# Patient Record
Sex: FEMALE | Race: WHITE | Employment: OTHER | URBAN - NONMETROPOLITAN AREA
[De-identification: names, ages, dates, MRNs, and addresses within clinical notes are randomized per-mention and may not be internally consistent; named-entity substitution may affect disease eponyms.]

---

## 2017-01-03 DIAGNOSIS — E11.21 TYPE 2 DIABETES MELLITUS WITH DIABETIC NEPHROPATHY (H): Primary | ICD-10-CM

## 2017-01-03 DIAGNOSIS — Z79.4 TYPE 2 DIABETES MELLITUS WITH DIABETIC NEPHROPATHY, WITH LONG-TERM CURRENT USE OF INSULIN (H): Chronic | ICD-10-CM

## 2017-01-03 DIAGNOSIS — E11.21 TYPE 2 DIABETES MELLITUS WITH DIABETIC NEPHROPATHY, WITH LONG-TERM CURRENT USE OF INSULIN (H): Chronic | ICD-10-CM

## 2017-01-03 NOTE — TELEPHONE ENCOUNTER
BD-Pen Needle /Tasha  ** Pt said that she is testing 4 times daily. Needs new RX         Last Written Prescription Date: 9/30/16  Last Fill Quantity: 100, # refills: PRN  Last Office Visit with G, [facility] or [facility] prescribing provider:  11/1/16    Next 5 appointments (look out 90 days)     Feb 10, 2017 11:00 AM   Return Visit with Apolinar Martinez MD   [facility] ([facility])    [facility]  [address]  [facility] [place] [address]   [phone]                   BP Readings from Last 3 Encounters:   11/01/16 118/62   10/23/16 139/56   10/20/16 115/47     MICROL       72   3/26/2014  No results found for this basename: microalbumin  CREATININE   Date Value Ref Range Status   10/04/2016 1.82* 0.52 - 1.04 mg/dL Final   ]  GFR ESTIMATE   Date Value Ref Range Status   10/04/2016 27* >60 mL/min/1.7m2 Final     Comment:     Non  GFR Calc   10/03/2016 30* >60 mL/min/1.7m2 Final     Comment:     Non  GFR Calc   10/01/2016 35* >60 mL/min/1.7m2 Final     Comment:     Non  GFR Calc     GFR ESTIMATE IF BLACK   Date Value Ref Range Status   10/04/2016 33* >60 mL/min/1.7m2 Final     Comment:      GFR Calc   10/03/2016 37* >60 mL/min/1.7m2 Final     Comment:      GFR Calc   10/01/2016 42* >60 mL/min/1.7m2 Final     Comment:      GFR Calc     CHOL      120   9/16/2015  HDL       46   9/16/2015  LDL       50   9/16/2015  LDL       74   7/2/2014  TRIG      118   9/16/2015  CHOLHDLRATIO      2.6   9/16/2015  AST       30   10/3/2016  ALT       13   10/3/2016  A1C      8.2   10/1/2016  A1C      9.1   7/21/2016  A1C     14.6   4/5/2016  A1C      7.8   4/20/2015  A1C      8.8   11/5/2014  POTASSIUM   Date Value Ref Range Status   10/04/2016 4.4 3.4 - 5.3 mmol/L Final     Accu-Chek Millicent Plus          Last Written Prescription Date: 5/26/16  Last Fill Quantity: 360, # refills:  0  Last Office Visit with [facility], [facility] or [facility] prescribing provider:  11/1/16 Dr.VanEck [facility]     Next 5 appointments (look out 90 days)     Feb 10, 2017 11:00 AM   Return Visit with Apolinar Martinez MD   [facility] ([facility])    [facility]  [address]  [place] [address]   [phone]                   BP Readings from Last 3 Encounters:   11/01/16 118/62   10/23/16 139/56   10/20/16 115/47     MICROL       72   3/26/2014  No results found for this basename: microalbumin  CREATININE   Date Value Ref Range Status   10/04/2016 1.82* 0.52 - 1.04 mg/dL Final   ]  GFR ESTIMATE   Date Value Ref Range Status   10/04/2016 27* >60 mL/min/1.7m2 Final     Comment:     Non  GFR Calc   10/03/2016 30* >60 mL/min/1.7m2 Final     Comment:     Non  GFR Calc   10/01/2016 35* >60 mL/min/1.7m2 Final     Comment:     Non  GFR Calc     GFR ESTIMATE IF BLACK   Date Value Ref Range Status   10/04/2016 33* >60 mL/min/1.7m2 Final     Comment:      GFR Calc   10/03/2016 37* >60 mL/min/1.7m2 Final     Comment:      GFR Calc   10/01/2016 42* >60 mL/min/1.7m2 Final     Comment:      GFR Calc     CHOL      120   9/16/2015  HDL       46   9/16/2015  LDL       50   9/16/2015  LDL       74   7/2/2014  TRIG      118   9/16/2015  CHOLHDLRATIO      2.6   9/16/2015  AST       30   10/3/2016  ALT       13   10/3/2016  A1C      8.2   10/1/2016  A1C      9.1   7/21/2016  A1C     14.6   4/5/2016  A1C      7.8   4/20/2015  A1C      8.8   11/5/2014  POTASSIUM   Date Value Ref Range Status   10/04/2016 4.4 3.4 - 5.3 mmol/L Final

## 2017-01-05 ENCOUNTER — TELEPHONE (OUTPATIENT)
Dept: FAMILY MEDICINE | Facility: CLINIC | Age: 72
End: 2017-01-05

## 2017-01-05 NOTE — TELEPHONE ENCOUNTER
Reason for Call:  Form, our goal is to have forms completed with 72 hours, however, some forms may require a visit or additional information.    Type of letter, form or note:  [facility]- B/P    Who is the form from?: [facility]- B/P (if other please explain)    Where did the form come from: form was faxed in    What clinic location was the form placed at?: [place]    Where the form was placed: 's Box    Call taken on 1/5/2017 at 3:38 PM by Leilani Jurado

## 2017-01-06 ENCOUNTER — CARE COORDINATION (OUTPATIENT)
Dept: CARE COORDINATION | Facility: CLINIC | Age: 72
End: 2017-01-06

## 2017-01-06 NOTE — Clinical Note
[facility]  [address]  [address]        January 6, 2017      Stefanie Velazquez  [address] [place] [address]        Dear Stefanie,  I am the Clinic Care Coordinator that works with your primary care provider's clinic. I recently tried to call and was unable to reach you. I just want to see how you are doing or if you have had any concerns. Please feel free to call me anytime. Below is a description of what Clinic Care Coordination is and how I can further assist you.      The Clinic Care Coordinator role is a Registered Nurse and/or  who understands the health care system. The goal of Clinic Care Coordination is to help you manage your health and improve access to the [facility] in the most efficient manner.  The Registered Nurse can assist you in meeting your health care goals by providing education, coordinating services, and strengthening the communication among your providers. The  can assist you with financial, behavioral, psychosocial, and chemical dependency and counseling/psychiatric resources.    Please feel free to keep this letter and contact information to contact me at [phone] with any further questions or concerns that may arise. We at [place] are focused on providing you with the highest-quality healthcare experience possible and that all starts with you.       Sincerely,     Lauren Varma RN, [facility]  RN Care Coordinator  [facility]  Phone # [phone]

## 2017-01-06 NOTE — PROGRESS NOTES
Clinic Care Coordination Contact  Care Team Conversations    RN CC left a VM for Eunice RN CM with [facility] asking for update regarding patient, await for call back.    Lauren Varma RN, [facility]  RN Care Coordinator  [facility]  Phone # [phone]  1/6/2017 11:32 AM

## 2017-01-10 NOTE — TELEPHONE ENCOUNTER
Form received back signed  1/10/17  Faxed Back & Sent to be scanned to this encounter  Leilani Orn Station Sec

## 2017-01-11 ENCOUNTER — TELEPHONE (OUTPATIENT)
Dept: FAMILY MEDICINE | Facility: CLINIC | Age: 72
End: 2017-01-11

## 2017-01-11 DIAGNOSIS — E11.21 TYPE 2 DIABETES MELLITUS WITH DIABETIC NEPHROPATHY, WITH LONG-TERM CURRENT USE OF INSULIN (H): Primary | Chronic | ICD-10-CM

## 2017-01-11 DIAGNOSIS — Z79.4 TYPE 2 DIABETES MELLITUS WITH DIABETIC NEPHROPATHY, WITH LONG-TERM CURRENT USE OF INSULIN (H): Primary | Chronic | ICD-10-CM

## 2017-01-11 NOTE — TELEPHONE ENCOUNTER
Needs new Rx for insulin pen needles states she is taking insulin 4 times daily before every meal and at bedtime please fax new Rx to Myah Butler CSS

## 2017-01-12 NOTE — PROGRESS NOTES
Clinic Care Coordination Contact  Care Team Conversations    RN CC left VM x 2 for Eunice RN CM with [facility] home [facility] asking for update on patient, await for call back.    RN CC will attempt to reach patient and/or home care nurse in another week.    Lauren Varma RN, [facility]  RN Care Coordinator  [facility]  Phone # [phone]  1/12/2017 3:25 PM

## 2017-01-13 NOTE — PROGRESS NOTES
Clinic Care Coordination Contact  Care Team Conversations    Eunice DONIS CM from [facility] left RN CC VM stating she was really sorry for not getting back to RN CC. She states patient is doing well. No major complaints. She last saw patient 1/3/17. She states patient has mild edema which is down from before, shortness of breath with excertion.  She reports her appetite is good but does not really like to cook. Is going out to the community. Using Biofreeze on her knees. Has issues with her insurance but is talking with her county . Recently faxed over her blood pressure readings and PCP just instructed to keep monitoring.    Plan:  RN CC will outreach to Eunice in one month for update.    Lauren Varma RN, [facility]  RN Care Coordinator  [facility]  Phone # [phone]  1/13/2017 9:10 AM

## 2017-01-18 ENCOUNTER — MEDICAL CORRESPONDENCE (OUTPATIENT)
Dept: HEALTH INFORMATION MANAGEMENT | Facility: CLINIC | Age: 72
End: 2017-01-18

## 2017-01-20 ENCOUNTER — TELEPHONE (OUTPATIENT)
Dept: FAMILY MEDICINE | Facility: CLINIC | Age: 72
End: 2017-01-20

## 2017-01-20 NOTE — TELEPHONE ENCOUNTER
Reason for Call:  Form, our goal is to have forms completed with 72 hours, however, some forms may require a visit or additional information.    Type of letter, form or note:  [facility]- [facility] orders    Who is the form from?: Home care    Where did the form come from: form was faxed in    What clinic location was the form placed at?: [place]    Where the form was placed: 's Box      Call taken on 1/20/2017 at 12:54 PM by Leilani Jurado

## 2017-01-23 NOTE — TELEPHONE ENCOUNTER
Form received back signed  1/23/17  Faxed Back & Sent to be scanned to this encounter  Leilani Orn [facility] Sec

## 2017-01-25 ENCOUNTER — TELEPHONE (OUTPATIENT)
Dept: FAMILY MEDICINE | Facility: CLINIC | Age: 72
End: 2017-01-25

## 2017-01-25 NOTE — TELEPHONE ENCOUNTER
Reason for Call:  Form, our goal is to have forms completed with 72 hours, however, some forms may require a visit or additional information.    Type of letter, form or note:  medical    Who is the form from?: [facility] order and Plan of care    Where did the form come from: form was faxed in    What clinic location was the form placed at?: [place]    Where the form was placed: 's Box    What number is listed as a contact on the form?:  Fax# [phone]       Additional comments:     Call taken on 1/25/2017 at 9:29 AM by Ilene Butler

## 2017-01-26 PROCEDURE — G0179 MD RECERTIFICATION HHA PT: HCPCS | Performed by: FAMILY MEDICINE

## 2017-01-26 NOTE — TELEPHONE ENCOUNTER
Form received back signed  1/26/17  Faxed Back & Sent to be scanned to this encounter  Leilani Orn Station Sec

## 2017-02-02 DIAGNOSIS — C20 RECTAL CANCER (H): ICD-10-CM

## 2017-02-02 LAB
ALBUMIN SERPL-MCNC: 3 G/DL (ref 3.4–5)
ALP SERPL-CCNC: 101 U/L (ref 40–150)
ALT SERPL W P-5'-P-CCNC: 17 U/L (ref 0–50)
ANION GAP SERPL CALCULATED.3IONS-SCNC: 13 MMOL/L (ref 3–14)
AST SERPL W P-5'-P-CCNC: 28 U/L (ref 0–45)
BASOPHILS # BLD AUTO: 0 10E9/L (ref 0–0.2)
BASOPHILS NFR BLD AUTO: 0.6 %
BILIRUB SERPL-MCNC: 0.6 MG/DL (ref 0.2–1.3)
BUN SERPL-MCNC: 21 MG/DL (ref 7–30)
CALCIUM SERPL-MCNC: 8.6 MG/DL (ref 8.5–10.1)
CEA SERPL-MCNC: 3.4 UG/L (ref 0–2.5)
CHLORIDE SERPL-SCNC: 107 MMOL/L (ref 94–109)
CO2 SERPL-SCNC: 19 MMOL/L (ref 20–32)
CREAT SERPL-MCNC: 1.48 MG/DL (ref 0.52–1.04)
DIFFERENTIAL METHOD BLD: NORMAL
EOSINOPHIL # BLD AUTO: 0.3 10E9/L (ref 0–0.7)
EOSINOPHIL NFR BLD AUTO: 5.3 %
ERYTHROCYTE [DISTWIDTH] IN BLOOD BY AUTOMATED COUNT: 13.6 % (ref 10–15)
GFR SERPL CREATININE-BSD FRML MDRD: 34 ML/MIN/1.7M2
GLUCOSE SERPL-MCNC: 164 MG/DL (ref 70–99)
HCT VFR BLD AUTO: 40 % (ref 35–47)
HGB BLD-MCNC: 13.4 G/DL (ref 11.7–15.7)
LYMPHOCYTES # BLD AUTO: 1.2 10E9/L (ref 0.8–5.3)
LYMPHOCYTES NFR BLD AUTO: 24 %
MCH RBC QN AUTO: 28.9 PG (ref 26.5–33)
MCHC RBC AUTO-ENTMCNC: 33.5 G/DL (ref 31.5–36.5)
MCV RBC AUTO: 86 FL (ref 78–100)
MONOCYTES # BLD AUTO: 0.4 10E9/L (ref 0–1.3)
MONOCYTES NFR BLD AUTO: 8.1 %
NEUTROPHILS # BLD AUTO: 3.1 10E9/L (ref 1.6–8.3)
NEUTROPHILS NFR BLD AUTO: 62 %
PLATELET # BLD AUTO: 229 10E9/L (ref 150–450)
POTASSIUM SERPL-SCNC: 4 MMOL/L (ref 3.4–5.3)
PROT SERPL-MCNC: 6.8 G/DL (ref 6.8–8.8)
RBC # BLD AUTO: 4.64 10E12/L (ref 3.8–5.2)
SODIUM SERPL-SCNC: 139 MMOL/L (ref 133–144)
WBC # BLD AUTO: 4.9 10E9/L (ref 4–11)

## 2017-02-02 PROCEDURE — 36415 COLL VENOUS BLD VENIPUNCTURE: CPT | Performed by: INTERNAL MEDICINE

## 2017-02-02 PROCEDURE — 85025 COMPLETE CBC W/AUTO DIFF WBC: CPT | Performed by: INTERNAL MEDICINE

## 2017-02-02 PROCEDURE — 82378 CARCINOEMBRYONIC ANTIGEN: CPT | Performed by: INTERNAL MEDICINE

## 2017-02-02 PROCEDURE — 80053 COMPREHEN METABOLIC PANEL: CPT | Performed by: INTERNAL MEDICINE

## 2017-02-03 ENCOUNTER — HOSPITAL ENCOUNTER (OUTPATIENT)
Dept: CT IMAGING | Facility: CLINIC | Age: 72
Discharge: HOME | End: 2017-02-03
Attending: INTERNAL MEDICINE | Admitting: INTERNAL MEDICINE
Payer: MEDICARE

## 2017-02-03 DIAGNOSIS — C20 RECTAL CANCER (H): ICD-10-CM

## 2017-02-03 PROCEDURE — 74176 CT ABD & PELVIS W/O CONTRAST: CPT

## 2017-02-10 ENCOUNTER — ONCOLOGY VISIT (OUTPATIENT)
Dept: ONCOLOGY | Facility: CLINIC | Age: 72
End: 2017-02-10
Attending: INTERNAL MEDICINE
Payer: COMMERCIAL

## 2017-02-10 VITALS
DIASTOLIC BLOOD PRESSURE: 60 MMHG | HEART RATE: 71 BPM | BODY MASS INDEX: 38.8 KG/M2 | SYSTOLIC BLOOD PRESSURE: 113 MMHG | TEMPERATURE: 99.2 F | WEIGHT: 212.2 LBS | OXYGEN SATURATION: 94 %

## 2017-02-10 DIAGNOSIS — C20 RECTAL CANCER (H): Primary | Chronic | ICD-10-CM

## 2017-02-10 PROCEDURE — 99213 OFFICE O/P EST LOW 20 MIN: CPT | Performed by: INTERNAL MEDICINE

## 2017-02-10 PROCEDURE — 99211 OFF/OP EST MAY X REQ PHY/QHP: CPT
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
## 2017-02-10 NOTE — NURSING NOTE
"Stefanie Velazquez is a 71 year old female who presents for:  Chief Complaint   Patient presents with     Oncology Clinic Visit     F/U appt        Initial Vitals:  /60 mmHg  Pulse 71  Temp(Src) 99.2  F (37.3  C) (Tympanic)  Wt 96.253 kg (212 lb 3.2 oz)  SpO2 94%  Breastfeeding? No Estimated body mass index is 38.8 kg/(m^2) as calculated from the following:    Height as of 10/23/16: 1.575 m (5' 2\").    Weight as of this encounter: 96.253 kg (212 lb 3.2 oz).. There is no height on file to calculate BSA. BP completed using cuff size: large  Data Unavailable No LMP recorded. Patient is postmenopausal. Allergies and medications reviewed.     Medications: Medication refills not needed today.  Pharmacy name entered into [employer]: [facility] - [place], [place] - [address] [place], [place] [address]TH [facility]    Comments: Cough x 1 year.  Now cough is dry.   Back of throat is sore from coughing.      10 minutes for nursing intake (face to face time)   Debbie Davalos RN          "

## 2017-02-10 NOTE — PATIENT INSTRUCTIONS
Caleb Watts~      Dr. Martinez would like to see you back in 6 months with labs beforehand.  Please feel free to call us in the meantime with any questions or concerns.  Thank you.    Sincerely,    Debbie Davalos RN  Oncology/Hematology  [phone]

## 2017-02-10 NOTE — MR AVS SNAPSHOT
After Visit Summary   2/10/2017    Stefanie Velazquez    MRN: [id number]           Patient Information     Date Of Birth          1945        Visit Information        Provider Department      2/10/2017 11:00 AM Apolinar Martinez MD [facility] ONCOLOGY      Today's Diagnoses     Rectal cancer- newly diagnosed adenoCA rectum Jan 2011    -  1       Care Instructions    Caleb Watts~      Dr. Martinez would like to see you back in 6 months with labs beforehand.  Please feel free to call us in the meantime with any questions or concerns.  Thank you.    Sincerely,    Debbie Davalos RN  Oncology/Hematology  [phone]        Follow-ups after your visit        Follow-up notes from your care team     Return in about 6 months (around 8/10/2017) for Blood work before next appointment.      Your next 10 appointments already scheduled     Mar 03, 2017  8:35 AM   LAB with [facility] ([facility])    [address] [address]   [phone]           Patient must bring picture ID.  Patient should be prepared to give a urine specimen  Please do not eat 10-12 hours before your appointment if you are coming in fasting for labs on lipids, cholesterol, or glucose (sugar).  Pregnant women should follow their Care Team instructions. Water with medications is okay. Do not drink coffee or other fluids.   If you have concerns about taking  your medications, please ask at office or if scheduling via [employer], send a message by clicking on Secure Messaging, Message Your Care Team.            Mar 03, 2017  8:45 AM   Ech Complete with [address] Echocardiography ([facility])    [address] [address]   [phone]           1.  Please bring or wear a comfortable two-piece outfit. 2.  You may eat, drink and take your normal medicines. 3.  For any questions that cannot be answered, please contact the ordering  physician            Mar 08, 2017  3:00 PM   Return Visit with Terence Del Cid MD   [facility] HEART AT [facility] ([facility])    [address]  [facility] [address]   [phone]            Aug 03, 2017  1:00 PM   [facility]    [address]  [facility] [address]   [phone]           Patient must bring picture ID.  Patient should be prepared to give a urine specimen  Please do not eat 10-12 hours before your appointment if you are coming in fasting for labs on lipids, cholesterol, or glucose (sugar).  Pregnant women should follow their Care Team instructions. Water with medications is okay. Do not drink coffee or other fluids.   If you have concerns about taking  your medications, please ask at office or if scheduling via Personal, send a message by clicking on Secure Messaging, Message Your Care Team.            Aug 11, 2017 11:00 AM   Return Visit with Apolinar Martinez MD   [facility] ([facility])    [facility]  [address]  [facility] [address]   [phone]              Future tests that were ordered for you today     Open Future Orders        Priority Expected Expires Ordered    CEA Routine 7/10/2017 2/10/2018 2/10/2017    Comprehensive metabolic panel Routine 7/10/2017 2/10/2018 2/10/2017    CBC with platelets differential Routine 7/10/2017 2/10/2018 2/10/2017            Who to contact     If you have questions or need follow up information about today's clinic visit or your schedule please contact [facility] directly at [phone].  Normal or non-critical lab and imaging results will be communicated to you by MyChart, letter or phone within 4 business days after the clinic has received the results. If you do not hear from us within 7 days, please contact the clinic through MyChart or phone. If you have a  critical or abnormal lab result, we will notify you by phone as soon as possible.  Submit refill requests through [employer] or call your pharmacy and they will forward the refill request to us. Please allow 3 business days for your refill to be completed.          Additional Information About Your Visit        [employer] Information     [employer] gives you secure access to your electronic health record. If you see a primary care provider, you can also send messages to your care team and make appointments. If you have questions, please call your primary care clinic.  If you do not have a primary care provider, please call [phone] and they will assist you.        Care EveryWhere ID     This is your Care EveryWhere ID. This could be used by other organizations to access your [place] medical records  [id number]        Your Vitals Were     Pulse Temperature Pulse Oximetry Breastfeeding?          71 99.2  F (37.3  C) (Tympanic) 94% No         Blood Pressure from Last 3 Encounters:   02/10/17 113/60   11/01/16 118/62   10/23/16 139/56    Weight from Last 3 Encounters:   02/10/17 96.253 kg (212 lb 3.2 oz)   11/01/16 97.523 kg (215 lb)   10/23/16 96.616 kg (213 lb)               Primary Care Provider Office Phone # Fax #    Sandra Morales [phone]       [facility] [address] [address]        Thank you!     Thank you for choosing [facility]  for your care. Our goal is always to provide you with excellent care. Hearing back from our patients is one way we can continue to improve our services. Please take a few minutes to complete the written survey that you may receive in the mail after your visit with us. Thank you!             Your Updated Medication List - Protect others around you: Learn how to safely use, store and throw away your medicines at www.disposemymeds.org.          This list is accurate as of: 2/10/17 11:50 AM.  Always use your most recent med list.                    Brand Name Dispense Instructions for use    ACCU-CHEK MILVIA Jeana     1 device    dispense one meter       acetaminophen 650 MG CR tablet    TYLENOL    60 tablet    Take 1 tablet (650 mg) by mouth 3 times daily       aspirin 81 MG tablet     30 tablet    Take 1 tablet (81 mg) by mouth daily       benzonatate 200 MG capsule    TESSALON    21 capsule    Take 1 capsule (200 mg) by mouth 3 times daily as needed for cough       blood glucose monitoring lancets     1 Box    Test BG 4 times daily       blood glucose monitoring test strip    ACCU-CHEK MILVIA    360 each    Use to test blood sugars 4 times daily or as directed.       cyanocobalamin 1000 MCG/ML injection    VITAMIN B12    1 mL    Inject 1 mL (1,000 mcg) into the muscle every 30 days       DEPEND EASY FIT UNDERGARMENTS Misc     300 each    1 Units every 2 hours X-large       gabapentin 100 MG capsule    NEURONTIN    90 capsule    Take 1 capsule (100 mg) by mouth 3 times daily       insulin detemir 100 UNIT/ML injection    LEVEMIR FLEXPEN/FLEXTOUCH    3 mL    Inject 22 Units Subcutaneous At Bedtime       insulin pen needle 32G X 4 MM    BD GABRIELLA U/F    120 each    Use 4 times per day or as directed.       LASIX PO      Take 40 mg by mouth daily       Latex Gloves Misc     50 each    1 each every 2 hours as needed       levothyroxine 88 MCG tablet    SYNTHROID/LEVOTHROID    90 tablet    Take 1 tablet (88 mcg) by mouth daily       loperamide 2 MG capsule    IMODIUM     Take 2 mg by mouth 2 times daily And 2 mg po q 6 hours prn       LOSARTAN POTASSIUM PO      Take 100 mg by mouth       metoprolol 50 MG 24 hr tablet    TOPROL-XL    90 tablet    Take 0.5 tablets (25 mg) by mouth daily       mineral oil-hydrophilic petrolatum      Apply topically as needed       NovoLOG FLEXPEN 100 UNIT/ML injection   Generic drug:  insulin aspart      Inject 12 Units Subcutaneous 3 times daily (with meals)       OMEPRAZOLE PO      Take 20 mg by mouth daily as needed        * order for DME     1 Units    Equipment being ordered: Diabetic shoes       * order for DME     1 Device    Equipment being ordered: walker       * order for DME     1 Device    Equipment being ordered: diabetic shoes       * order for DME     90 Can    Equipment being ordered: glucerna one can per day       * order for DME     90 Can    Equipment being ordered: ensure one can per day       * order for DME     2 Package    Equipment being ordered: Compression Stockings. 20-30 Pressure Level Knee High.  Please Fax to [employer]. Yvan? Tele [phone]? Fax [phone]       oxyCODONE 5 MG IR tablet    ROXICODONE    20 tablet    Take 0.5-1 tablets (2.5-5 mg) by mouth every 4 hours as needed for breakthrough pain or moderate to severe pain       Potassium Gluconate 595 MG Caps      Take 1 tablet by mouth daily       rOPINIRole 1 MG tablet    REQUIP    180 tablet    1-2 tabs daily as needed for restless legs       simvastatin 40 MG tablet    ZOCOR    90 tablet    Take 1 tablet (40 mg) by mouth daily       syringe (disposable) 1 ML Misc     1 each    1 each every 30 days With 1 inch needle for Vit B12 injection       triamcinolone 0.1 % cream    KENALOG    80 g    Apply sparingly to affected area two times daily as needed       * Notice:  This list has 6 medication(s) that are the same as other medications prescribed for you. Read the directions carefully, and ask your doctor or other care provider to review them with you.
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
## 2017-02-13 ENCOUNTER — CARE COORDINATION (OUTPATIENT)
Dept: CARE COORDINATION | Facility: CLINIC | Age: 72
End: 2017-02-13
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
## 2017-03-10 ENCOUNTER — CARE COORDINATION (OUTPATIENT)
Dept: CARE COORDINATION | Facility: CLINIC | Age: 72
End: 2017-03-10

## 2017-03-10 NOTE — PROGRESS NOTES
Clinic Care Coordination Contact  Care Team Conversations    RN CC called Eunice Diop RN CM with [facility] to get update on patient, however Eunice was not able to talk at this time but agreed to call RN CC back.    Await for Eunice to call RN CC back.    Lauren Varma RN, [facility]  RN Care Coordinator  [facility]  Phone # [phone]  3/10/2017 11:51 AM

## 2017-03-10 NOTE — PROGRESS NOTES
Clinic Care Coordination Contact  Care Team Conversations    Eunice Diop RN CM with [facility] left RN CC  stating she is available now to discuss patient.    RN CC to call Eunice back.    Lauren Varma RN, [facility]  RN Care Coordinator  [facility]  Phone # [phone]  3/10/2017 3:18 PM

## 2017-03-10 NOTE — PROGRESS NOTES
Clinic Care Coordination Contact  Care Team Conversations    RN CC spoke with Eunice DONIS CM with [facility] about patient. She states she sees patient every other Tuesday and will be seeing her 3/14/17. She states that the last time she saw her she had a rash on her legs but started using a cream that she was given last time she was in the hospital for cellulitis. Eunice states she advised her that if her legs worsen, she needs to get in to see her PCP ASAP. Eunice states she no longer can give patient her Vit B12 shots as they were not being covered but can do them in the clinic for them to be covered. Eunice states patient is due for her HBG A1c and B12 otherwise she is doing well.      Plan:  Eunice states she will update RN JES after next weeks visit.    Lauren Varma RN, [facility]  RN Care Coordinator  [facility], [facility]  Phone # [phone]  3/10/2017 4:11 PM

## 2017-03-15 ENCOUNTER — TELEPHONE (OUTPATIENT)
Dept: FAMILY MEDICINE | Facility: CLINIC | Age: 72
End: 2017-03-15

## 2017-03-15 NOTE — PATIENT INSTRUCTIONS
Reason for Call:  Form, our goal is to have forms completed with 72 hours, however, some forms may require a visit or additional information.    Type of letter, form or note:  medical    Who is the form from?: [facility]    Where did the form come from: form was faxed in    What clinic location was the form placed at?: [place]    Where the form was placed: [facility]        Call taken on 3/15/2017 at 12:48 PM by Ilene Butler

## 2017-03-16 ENCOUNTER — MEDICAL CORRESPONDENCE (OUTPATIENT)
Dept: HEALTH INFORMATION MANAGEMENT | Facility: CLINIC | Age: 72
End: 2017-03-16

## 2017-03-30 ENCOUNTER — OFFICE VISIT (OUTPATIENT)
Dept: FAMILY MEDICINE | Facility: CLINIC | Age: 72
End: 2017-03-30
Payer: COMMERCIAL

## 2017-03-30 ENCOUNTER — MEDICAL CORRESPONDENCE (OUTPATIENT)
Dept: HEALTH INFORMATION MANAGEMENT | Facility: CLINIC | Age: 72
End: 2017-03-30

## 2017-03-30 VITALS
DIASTOLIC BLOOD PRESSURE: 88 MMHG | OXYGEN SATURATION: 95 % | HEIGHT: 62 IN | WEIGHT: 205 LBS | BODY MASS INDEX: 37.73 KG/M2 | HEART RATE: 67 BPM | TEMPERATURE: 97.6 F | SYSTOLIC BLOOD PRESSURE: 128 MMHG

## 2017-03-30 DIAGNOSIS — Z13.89 SCREENING FOR DIABETIC PERIPHERAL NEUROPATHY: ICD-10-CM

## 2017-03-30 DIAGNOSIS — Z79.4 TYPE 2 DIABETES MELLITUS WITH DIABETIC NEPHROPATHY, WITH LONG-TERM CURRENT USE OF INSULIN (H): Primary | Chronic | ICD-10-CM

## 2017-03-30 DIAGNOSIS — C20 RECTAL CANCER (H): Chronic | ICD-10-CM

## 2017-03-30 DIAGNOSIS — G25.81 RESTLESS LEG SYNDROME: ICD-10-CM

## 2017-03-30 DIAGNOSIS — I87.2 VENOUS STASIS DERMATITIS OF BOTH LOWER EXTREMITIES: ICD-10-CM

## 2017-03-30 DIAGNOSIS — Z12.31 VISIT FOR SCREENING MAMMOGRAM: ICD-10-CM

## 2017-03-30 DIAGNOSIS — N18.30 CKD (CHRONIC KIDNEY DISEASE) STAGE 3, GFR 30-59 ML/MIN (H): Chronic | ICD-10-CM

## 2017-03-30 DIAGNOSIS — G62.9 NEUROPATHY: ICD-10-CM

## 2017-03-30 DIAGNOSIS — E03.9 ACQUIRED HYPOTHYROIDISM: Chronic | ICD-10-CM

## 2017-03-30 DIAGNOSIS — E78.5 HYPERLIPIDEMIA LDL GOAL <100: Chronic | ICD-10-CM

## 2017-03-30 DIAGNOSIS — I10 BENIGN ESSENTIAL HYPERTENSION: Chronic | ICD-10-CM

## 2017-03-30 DIAGNOSIS — E53.8 VITAMIN B 12 DEFICIENCY: ICD-10-CM

## 2017-03-30 DIAGNOSIS — K52.9 CHRONIC DIARRHEA: ICD-10-CM

## 2017-03-30 DIAGNOSIS — E11.21 TYPE 2 DIABETES MELLITUS WITH DIABETIC NEPHROPATHY, WITH LONG-TERM CURRENT USE OF INSULIN (H): Primary | Chronic | ICD-10-CM

## 2017-03-30 DIAGNOSIS — M65.341 TRIGGER RING FINGER OF RIGHT HAND: ICD-10-CM

## 2017-03-30 LAB
ALBUMIN SERPL-MCNC: 2.9 G/DL (ref 3.4–5)
ALP SERPL-CCNC: 101 U/L (ref 40–150)
ALT SERPL W P-5'-P-CCNC: 16 U/L (ref 0–50)
ANION GAP SERPL CALCULATED.3IONS-SCNC: 12 MMOL/L (ref 3–14)
AST SERPL W P-5'-P-CCNC: 23 U/L (ref 0–45)
BILIRUB SERPL-MCNC: 0.8 MG/DL (ref 0.2–1.3)
BUN SERPL-MCNC: 23 MG/DL (ref 7–30)
CALCIUM SERPL-MCNC: 8.6 MG/DL (ref 8.5–10.1)
CHLORIDE SERPL-SCNC: 108 MMOL/L (ref 94–109)
CO2 SERPL-SCNC: 21 MMOL/L (ref 20–32)
CREAT SERPL-MCNC: 1.33 MG/DL (ref 0.52–1.04)
GFR SERPL CREATININE-BSD FRML MDRD: 39 ML/MIN/1.7M2
GLUCOSE SERPL-MCNC: 230 MG/DL (ref 70–99)
HBA1C MFR BLD: 7.1 % (ref 4.3–6)
POTASSIUM SERPL-SCNC: 3.9 MMOL/L (ref 3.4–5.3)
PROT SERPL-MCNC: 6.9 G/DL (ref 6.8–8.8)
SODIUM SERPL-SCNC: 141 MMOL/L (ref 133–144)
TSH SERPL DL<=0.05 MIU/L-ACNC: 2.9 MU/L (ref 0.4–4)

## 2017-03-30 PROCEDURE — 83036 HEMOGLOBIN GLYCOSYLATED A1C: CPT | Performed by: FAMILY MEDICINE

## 2017-03-30 PROCEDURE — 84443 ASSAY THYROID STIM HORMONE: CPT | Performed by: FAMILY MEDICINE

## 2017-03-30 PROCEDURE — 99207 C FOOT EXAM  NO CHARGE: CPT | Performed by: FAMILY MEDICINE

## 2017-03-30 PROCEDURE — 80053 COMPREHEN METABOLIC PANEL: CPT | Performed by: FAMILY MEDICINE

## 2017-03-30 PROCEDURE — 36415 COLL VENOUS BLD VENIPUNCTURE: CPT | Performed by: FAMILY MEDICINE

## 2017-03-30 PROCEDURE — 96372 THER/PROPH/DIAG INJ SC/IM: CPT | Performed by: FAMILY MEDICINE

## 2017-03-30 PROCEDURE — 99215 OFFICE O/P EST HI 40 MIN: CPT | Mod: 25 | Performed by: FAMILY MEDICINE

## 2017-03-30 PROCEDURE — 82607 VITAMIN B-12: CPT | Performed by: FAMILY MEDICINE

## 2017-03-30 RX ORDER — METOPROLOL SUCCINATE 50 MG/1
50 TABLET, EXTENDED RELEASE ORAL DAILY
Qty: 90 TABLET | Refills: 1 | Status: SHIPPED | OUTPATIENT
Start: 2017-03-30 | End: 2018-12-04

## 2017-03-30 RX ORDER — LOSARTAN POTASSIUM 100 MG/1
100 TABLET ORAL DAILY
Qty: 90 TABLET | Refills: 1 | Status: SHIPPED | OUTPATIENT
Start: 2017-03-30 | End: 2018-12-04

## 2017-03-30 RX ORDER — FLUOCINONIDE 0.5 MG/G
OINTMENT TOPICAL
Qty: 60 G | Refills: 11 | Status: SHIPPED | OUTPATIENT
Start: 2017-03-30 | End: 2018-09-18

## 2017-03-30 RX ORDER — LOPERAMIDE HCL 2 MG
CAPSULE ORAL
Qty: 90 CAPSULE | Refills: 3 | Status: SHIPPED | OUTPATIENT
Start: 2017-03-30 | End: 2018-09-18

## 2017-03-30 NOTE — MR AVS SNAPSHOT
After Visit Summary   3/30/2017    Stefanie Velazquez    MRN: [id number]           Patient Information     Date Of Birth          1945        Visit Information        Provider Department      3/30/2017 11:00 AM Sandra Morales MD [facility]        Today's Diagnoses     Type 2 diabetes mellitus with diabetic nephropathy, with long-term current use of insulin (H)    -  1    Visit for screening mammogram        Screening for diabetic peripheral neuropathy        Trigger ring finger of right hand        CKD (chronic kidney disease) stage 3, GFR 30-59 ml/min        Vitamin B 12 deficiency        Benign essential hypertension        Rectal cancer- newly diagnosed adenoCA rectum Jan 2011        Hyperlipidemia LDL goal <100        Acquired hypothyroidism        Venous stasis dermatitis of both lower extremities        Neuropathy (H)        Chronic diarrhea          Care Instructions    Schedule yearly mammogram.    Buy a soft bath brush with a long handle so that you can scrub your feet.     Apply new steroid twcie a day to legs. Discontinue Triamcinolone use on your legs. Once your legs have cleared up completely,  You may stop applying it. If it starts to return, start applying it again.     See ortho for the finger    See me back in 3 months        Follow-ups after your visit        Additional Services     ORTHO  REFERRAL       [facility] is referring you to the Orthopedic  Services at [facility].       The  Representative will assist you in the coordination of your Orthopedic and Musculoskeletal Care as prescribed by your physician.    The  Representative will call you within 1 business day to help schedule your appointment, or you may contact the  Representative at:    All areas ~ [phone]     Type of Referral : Surgical / Specialist       Timeframe requested: Routine    Coverage of these  services is subject to the terms and limitations of your health insurance plan.  Please call member services at your health plan with any benefit or coverage questions.      If X-rays, CT or MRI's have been performed, please contact the facility where they were done to arrange for , prior to your scheduled appointment.  Please bring this referral request to your appointment and present it to your specialist.                  Your next 10 appointments already scheduled     Aug 03, 2017  1:00 PM CDT   [facility]    [address] [id number] [phone]           Patient must bring picture ID.  Patient should be prepared to give a urine specimen  Please do not eat 10-12 hours before your appointment if you are coming in fasting for labs on lipids, cholesterol, or glucose (sugar).  Pregnant women should follow their Care Team instructions. Water with medications is okay. Do not drink coffee or other fluids.   If you have concerns about taking  your medications, please ask at office or if scheduling via So[employer], send a message by clicking on Secure Messaging, Message Your Care Team.            Aug 11, 2017 11:00 AM CDT   Return Visit with Apolinar Martinez MD   [facility] ([facility])    [facility]  [address]  [facility] [place] [address]   [phone]              Future tests that were ordered for you today     Open Future Orders        Priority Expected Expires Ordered    MA SCREENING DIGITAL BILAT - Future  (s+30) Routine  3/30/2018 3/30/2017            Who to contact     If you have questions or need follow up information about today's clinic visit or your schedule please contact [facility] directly at [phone].  Normal or non-critical lab and imaging results will be communicated to you by MyChart, letter or phone within 4 business days after the  "clinic has received the results. If you do not hear from us within 7 days, please contact the clinic through [employer] or phone. If you have a critical or abnormal lab result, we will notify you by phone as soon as possible.  Submit refill requests through InterEx or call your pharmacy and they will forward the refill request to us. Please allow 3 business days for your refill to be completed.          Additional Information About Your Visit        [employer] Information     InterEx gives you secure access to your electronic health record. If you see a primary care provider, you can also send messages to your care team and make appointments. If you have questions, please call your primary care clinic.  If you do not have a primary care provider, please call [phone] and they will assist you.        Care EveryWhere ID     This is your Care EveryWhere ID. This could be used by other organizations to access your [facility] medical records  [id number]        Your Vitals Were     Pulse Temperature Height Pulse Oximetry BMI (Body Mass Index)       67 97.6  F (36.4  C) (Tympanic) 5' 1.5\" (1.562 m) 95% 38.11 kg/m2        Blood Pressure from Last 3 Encounters:   03/30/17 128/88   02/10/17 113/60   11/01/16 118/62    Weight from Last 3 Encounters:   03/30/17 205 lb (93 kg)   02/10/17 212 lb 3.2 oz (96.3 kg)   11/01/16 215 lb (97.5 kg)              We Performed the Following     Comprehensive metabolic panel     HEMOGLOBIN A1C     ORTHO  REFERRAL     TSH     Vitamin B12          Today's Medication Changes          These changes are accurate as of: 3/30/17 12:04 PM.  If you have any questions, ask your nurse or doctor.               Start taking these medicines.        Dose/Directions    fluocinonide 0.05 % ointment   Commonly known as:  LIDEX   Used for:  Venous stasis dermatitis of both lower extremities   Started by:  Sandra Morales MD        Apply sparingly to affected area twice daily as needed.  Do not apply " to face.   Quantity:  60 g   Refills:  11         These medicines have changed or have updated prescriptions.        Dose/Directions    insulin aspart 100 UNIT/ML injection   Commonly known as:  NovoLOG FLEXPEN   This may have changed:  how much to take   Used for:  Type 2 diabetes mellitus with diabetic nephropathy, with long-term current use of insulin (H)   Changed by:  Sandra Morales MD        Dose:  15 Units   Inject 15 Units Subcutaneous 3 times daily (with meals)   Quantity:  3 mL   Refills:  11       insulin detemir 100 UNIT/ML injection   Commonly known as:  LEVEMIR FLEXPEN/FLEXTOUCH   This may have changed:    - how much to take  - additional instructions   Used for:  Type 2 diabetes mellitus with diabetic nephropathy, with long-term current use of insulin (H)   Changed by:  Sandra Morales MD        Dose:  25 Units   Inject 25 Units Subcutaneous At Bedtime Fill when needed   Quantity:  3 mL   Refills:  3       loperamide 2 MG capsule   Commonly known as:  IMODIUM   This may have changed:    - how much to take  - how to take this  - when to take this  - additional instructions   Used for:  Rectal cancer (H), Chronic diarrhea   Changed by:  Sandra Morales MD        One capsule once a day, can use a second one if needed   Quantity:  90 capsule   Refills:  3       losartan 100 MG tablet   Commonly known as:  COZAAR   This may have changed:    - medication strength  - when to take this   Used for:  Benign essential hypertension   Changed by:  Sandra Morales MD        Dose:  100 mg   Take 1 tablet (100 mg) by mouth daily   Quantity:  90 tablet   Refills:  1       metoprolol 50 MG 24 hr tablet   Commonly known as:  TOPROL-XL   This may have changed:  how much to take   Used for:  Benign essential hypertension   Changed by:  Sandra Morales MD        Dose:  50 mg   Take 1 tablet (50 mg) by mouth daily   Quantity:  90 tablet   Refills:  1         Stop taking these medicines if you haven't  already. Please contact your care team if you have questions.     gabapentin 100 MG capsule   Commonly known as:  NEURONTIN   Stopped by:  Sandra Morales MD           Latex Gloves Misc   Stopped by:  Sandra Morales MD           order for DME   Stopped by:  Sandra Morales MD           oxyCODONE 5 MG IR tablet   Commonly known as:  ROXICODONE   Stopped by:  Sandra Morales MD                Where to get your medicines      These medications [facility] [address] [facility]  [address] [facility] [address]     Phone:  [phone]     fluocinonide 0.05 % ointment    insulin aspart 100 UNIT/ML injection    insulin detemir 100 UNIT/ML injection    loperamide 2 MG capsule    losartan 100 MG tablet    metoprolol 50 MG 24 hr tablet                Primary Care Provider Office Phone # Fax #    Sandra Morales [phone]       [facility] [address] [facility] [address]        Thank you!     Thank you for choosing [facility]  for your care. Our goal is always to provide you with excellent care. Hearing back from our patients is one way we can continue to improve our services. Please take a few minutes to complete the written survey that you may receive in the mail after your visit with us. Thank you!             Your Updated Medication List - Protect others around you: Learn how to safely use, store and throw away your medicines at www.disposemymeds.org.          This list is accurate as of: 3/30/17 12:04 PM.  Always use your most recent med list.                   Brand Name Dispense Instructions for use    ACCU-CHEK MILVIA Jeana     1 device    dispense one meter       acetaminophen 650 MG CR tablet    TYLENOL    60 tablet    Take 1 tablet (650 mg) by mouth 3 times daily       aspirin 81 MG tablet     30 tablet    Take 1 tablet (81 mg) by mouth daily       benzonatate 200 MG capsule    TESSALON    21 capsule     Take 1 capsule (200 mg) by mouth 3 times daily as needed for cough       blood glucose monitoring lancets     1 Box    Test BG 4 times daily       blood glucose monitoring test strip    ACCU-CHEK MILVIA    360 each    Use to test blood sugars 4 times daily or as directed.       cyanocobalamin 1000 MCG/ML injection    VITAMIN B12    1 mL    Inject 1 mL (1,000 mcg) into the muscle every 30 days       DEPEND EASY FIT UNDERGARMENTS Misc     300 each    1 Units every 2 hours X-large       fluocinonide 0.05 % ointment    LIDEX    60 g    Apply sparingly to affected area twice daily as needed.  Do not apply to face.       insulin aspart 100 UNIT/ML injection    NovoLOG FLEXPEN    3 mL    Inject 15 Units Subcutaneous 3 times daily (with meals)       insulin detemir 100 UNIT/ML injection    LEVEMIR FLEXPEN/FLEXTOUCH    3 mL    Inject 25 Units Subcutaneous At Bedtime Fill when needed       insulin pen needle 32G X 4 MM    BD GABRIELLA U/F    120 each    Use 4 times per day or as directed.       LASIX PO      Take 40 mg by mouth daily       levothyroxine 88 MCG tablet    SYNTHROID/LEVOTHROID    90 tablet    Take 1 tablet (88 mcg) by mouth daily       loperamide 2 MG capsule    IMODIUM    90 capsule    One capsule once a day, can use a second one if needed       losartan 100 MG tablet    COZAAR    90 tablet    Take 1 tablet (100 mg) by mouth daily       metoprolol 50 MG 24 hr tablet    TOPROL-XL    90 tablet    Take 1 tablet (50 mg) by mouth daily       mineral oil-hydrophilic petrolatum      Apply topically as needed       OMEPRAZOLE PO      Take 20 mg by mouth daily as needed       Potassium Gluconate 595 MG Caps      Take 1 tablet by mouth daily       rOPINIRole 1 MG tablet    REQUIP    180 tablet    1-2 tabs daily as needed for restless legs       simvastatin 40 MG tablet    ZOCOR    90 tablet    Take 1 tablet (40 mg) by mouth daily       syringe (disposable) 1 ML Misc     1 each    1 each every 30 days With 1 inch needle for Vit  B12 injection       triamcinolone 0.1 % cream    KENALOG    80 g    Apply sparingly to affected area two times daily as needed

## 2017-03-30 NOTE — PROGRESS NOTES
SUBJECTIVE:                                                    Stefanie Velazquez is a 72 year old female who presents to clinic today for the following health issues:      Cellulitis    Duration: on and off x long time    Description (location/character/radiation): rt leg - but both legs are affected    Intensity:  moderate    Accompanying signs and symptoms: swelling in evening    History (similar episodes/previous evaluation): yes -     Precipitating or alleviating factors: None    Therapies tried and outcome: compression sock and cream   She has been hospitalized in the past for cellulitis. Most recent was September 2016. She required a stay at [facility] after her discharge. Her home health nurse had noticed a rash on her legs in February. She comes every two weeks.   She feels that the cellulitis is trying to come back. She applies a triamcinolone to her legs, and this mostly clears it up, but then it starts to come back. She applies it 1-2 times a day.       Diabetes- Levemir 25 U, Novolog 15U with each meal.   Lab Results   Component Value Date    A1C 8.2 10/01/2016    A1C 9.1 07/21/2016    A1C 14.6 04/05/2016    A1C 7.8 04/20/2015    A1C 8.8 11/05/2014       HTN- furosemide 40 mg, losartan 100 mg   BP Readings from Last 6 Encounters:   03/30/17 128/88   02/10/17 113/60   11/01/16 118/62   10/23/16 139/56   10/20/16 115/47   10/16/16 117/58         Hypothyroidism- levothyroxine 88 mcg  TSH   Date Value Ref Range Status   04/05/2016 1.56 0.40 - 4.00 mU/L Final   04/20/2015 2.98 0.40 - 4.00 mU/L Final   11/05/2014 2.21 0.40 - 4.00 mU/L Final     Comment:     Effective 7/30/2014, the reference range for this assay has changed to reflect   new instrumentation/methodology.     01/02/2014 2.85 0.4 - 5.0 mU/L Final   10/16/2013 1.63 0.4 - 5.0 mU/L Final     Hyperlipidemia- simvastatin 40 mg  Recent Labs   Lab Test  09/16/15   1130  03/23/15   0926   CHOL  120  112   HDL  46*  44*   LDL  50  41   TRIG  118  134    CHOLHDLRATIO  2.6  2.5         Rt ring finger flare up - wants a cortisone shot  She has a trigger finger that gets stuck in the flexed position. It is very painful to flex it. She cannot extend it without using another finger to push it up. She has gotten cortisone shots in the past, but she has not been out of her house most of the winter and has not gotten it done.     Rectal cancer- Follow with Dr. Martinez. Last seen in February. Recent CT scan showed no evidence of recurrence. She takes loperamide for diarrhea daily, but occasionally needs a second one in the morning. She also takes omeprazole to calm her stomach.   She has not been getting a mammogram because she thought that the CT scan would be a substitute.    Restless Legs- Requip  She takes 1 Requip at night and will take another one during the day if needed.     B12 def-  She reports that she has not been getting the B12 injections.     Problem list and histories reviewed & adjusted, as indicated.  Additional history: as documented    BP Readings from Last 3 Encounters:   03/30/17 128/88   02/10/17 113/60   11/01/16 118/62    Wt Readings from Last 3 Encounters:   03/30/17 93 kg (205 lb)   02/10/17 96.3 kg (212 lb 3.2 oz)   11/01/16 97.5 kg (215 lb)           Reviewed and updated as needed this visit by clinical staff  Tobacco  Allergies  Meds  Problems       Reviewed and updated as needed this visit by Provider  Allergies  Meds  Problems       ROS:  C: NEGATIVE for fever, chills, change in weight  INTEGUMENTARY/SKIN: POSITIVE for rash on legs and on foot  E/M: NEGATIVE for ear, mouth and throat problems  R: NEGATIVE for significant cough or SOB  CV: NEGATIVE for chest pain, palpitations or peripheral edema  GI: NEGATIVE for nausea, abdominal pain, heartburn, POSITIVE for  diarrhea  : negative for, dysuria, frequency, hematuria and hesitancy  NEURO:POSITIVE for restless legs.     OBJECTIVE:                                                    BP  "128/88  Pulse 67  Temp 97.6  F (36.4  C) (Tympanic)  Ht 1.562 m (5' 1.5\")  Wt 93 kg (205 lb)  SpO2 95%  BMI 38.11 kg/m2  Body mass index is 38.11 kg/(m^2).  GENERAL: Obese, alert and no distress    RESP:Somewhat decreased breath sound.  lungs clear to auscultation - no rales, rhonchi or wheezes  CV: regular rate and rhythm, normal S1 S2, no S3 or S4,2/6 systolic ejection murmur  ABDOMEN: soft, nontender, no hepatosplenomegaly,.Limited by body habitus.   MS: right 4th finger triggers, is tender to range of motion   Skin: lightly erythematous slightly raised rash on anterior side of both legs, thickened on calf area of left, few excoriations, few scabs.   Diabetic foot exam: absent DP and PT pulses,feet warm to tough. no trophic changes, thick hyperpigmented hyperkeratosis at base of toes on left foot, Abnormal monofilament exam in left foot, abnormal monofilament exam on right foot except big toe.       ASSESSMENT/PLAN:                                                    Stefanie was seen today for cellulitis and hand pain.    Diagnoses and all orders for this visit:    Type 2 diabetes mellitus with diabetic nephropathy, with long-term current use of insulin (H)  -     HEMOGLOBIN A1C  -     insulin aspart (NOVOLOG FLEXPEN) 100 UNIT/ML injection; Inject 15 Units Subcutaneous 3 times daily (with meals)  -     insulin detemir (LEVEMIR FLEXPEN/FLEXTOUCH) 100 UNIT/ML injection; Inject 25 Units Subcutaneous At Bedtime Fill when needed  -     Comprehensive metabolic panel    Visit for screening mammogram  -     MA SCREENING DIGITAL BILAT - Future  (s+30); Future    Screening for diabetic peripheral neuropathy    Trigger ring finger of right hand  -     ORTHO  REFERRAL    CKD (chronic kidney disease) stage 3, GFR 30-59 ml/min  -     Comprehensive metabolic panel    Vitamin B 12 deficiency  -     Vitamin B12    Benign essential hypertension  -     losartan (COZAAR) 100 MG tablet; Take 1 tablet (100 mg) by mouth " daily  -     metoprolol (TOPROL-XL) 50 MG 24 hr tablet; Take 1 tablet (50 mg) by mouth daily    Rectal cancer- newly diagnosed adenoCA rectum Jan 2011  -     loperamide (IMODIUM) 2 MG capsule; One capsule once a day, can use a second one if needed    Hyperlipidemia LDL goal <100  On Simvastatin 40 mg.     Acquired hypothyroidism  -     TSH  On replacement    Venous stasis dermatitis of both lower extremities  -     fluocinonide (LIDEX) 0.05 % ointment; Apply sparingly to affected area twice daily as needed.  Do not apply to face.    Neuropathy (H)  Monitor feet. She didn't tolerate gabapentin.     Restless legs  On Requip     Chronic diarrhea  -     loperamide (IMODIUM) 2 MG capsule; One capsule once a day, can use a second one if needed      Start: 11:20 AM  End: 12:06 PM  40 min spent with patient, greater than 50% in counseling and coordination of care, discussion of multiple issues, going through medications in great detail.      Patient Instructions   Schedule yearly mammogram.    Buy a soft bath brush with a long handle so that you can scrub your feet.     Apply new steroid twcie a day to legs. Discontinue Triamcinolone use on your legs. Once your legs have cleared up completely,  You may stop applying it. If it starts to return, start applying it again.     See ortho for the finger    See me back in 3 months      This document serves as a record of the services and decisions personally performed and made by Sandra Morales MD. It was created on her behalf by Debbie Virk, a trained medical scribe. The creation of this document is based the provider's statements to the medical scribe.  Debbie Virk 11:13 AM 3/30/2017    Provider:   The information in this document, created by the medical scribe for me, accurately reflects the services I personally performed and the decisions made by me. I have reviewed and approved this document for accuracy prior to leaving the patient care area.  Sandra Morales MD 11:13  AM 3/30/2017    Sandra Morales MD  [facility]

## 2017-03-30 NOTE — PATIENT INSTRUCTIONS
Schedule yearly mammogram.    Buy a soft bath brush with a long handle so that you can scrub your feet.     Apply new steroid twcie a day to legs. Discontinue Triamcinolone use on your legs. Once your legs have cleared up completely,  You may stop applying it. If it starts to return, start applying it again.     See ortho for the finger    See me back in 3 months

## 2017-03-31 ENCOUNTER — TELEPHONE (OUTPATIENT)
Dept: FAMILY MEDICINE | Facility: CLINIC | Age: 72
End: 2017-03-31

## 2017-03-31 LAB — VIT B12 SERPL-MCNC: >6000 PG/ML (ref 193–986)

## 2017-03-31 NOTE — TELEPHONE ENCOUNTER
Pt said she is unable to get appt until Aug 9 with her sister. Pt is wondering if that appt is ok to wait for her Mammogram. Should she have it sooner?  [facility]

## 2017-03-31 NOTE — TELEPHONE ENCOUNTER
"Spoke to patient and asked why she has to wait until August? She stated: \"My Sister made both an appt for me and for her at the same time, but I thought Dr Morales wanted me to be seen for one sooner?..\" \"My Sister was going to have her son drive us down to that appointment..\" \"I had Radiation for Colon cancer and they do CT scans from my neck down to my groin..\"     Advised to have Mammogram sometime in the next 3 months as Office visit dictation from yesterday states to have annual Mammogram and then be seen for follow up in 3 months, so advised she should have Mammogram done sometime in the next 3 months. Patient verbalized understanding. Yaneli Keyes RN        "

## 2017-04-02 PROBLEM — G25.81 RESTLESS LEG SYNDROME: Status: ACTIVE | Noted: 2017-04-02

## 2017-04-08 ENCOUNTER — OFFICE VISIT (OUTPATIENT)
Dept: URGENT CARE | Facility: URGENT CARE | Age: 72
End: 2017-04-08
Payer: COMMERCIAL

## 2017-04-08 VITALS
RESPIRATION RATE: 22 BRPM | HEART RATE: 67 BPM | OXYGEN SATURATION: 95 % | SYSTOLIC BLOOD PRESSURE: 150 MMHG | DIASTOLIC BLOOD PRESSURE: 77 MMHG | TEMPERATURE: 98.2 F

## 2017-04-08 DIAGNOSIS — K22.2 ESOPHAGEAL OBSTRUCTION: Primary | ICD-10-CM

## 2017-04-08 PROCEDURE — 99212 OFFICE O/P EST SF 10 MIN: CPT | Mod: AT | Performed by: NURSE PRACTITIONER

## 2017-04-08 NOTE — MR AVS SNAPSHOT
After Visit Summary   4/8/2017    Stefanie Velazquez    MRN: [id number]           Patient Information     Date Of Birth          1945        Visit Information        Provider Department      4/8/2017 12:15 PM Corinna Espinoza APRN CNP [facility]        Today's Diagnoses     Esophageal obstruction    -  1      Care Instructions    To ER via ambulance - to [place]        Follow-ups after your visit        Your next 10 appointments already scheduled     Apr 10, 2017 10:00 AM CDT   MA SCREENING DIGITAL BILATERAL with [id number]   [facility]    [address] [facility] [address]   [phone]           Do not use any powder, lotion or deodorant under your arms or on your breast. If you do, we will ask you to remove it before your exam.  Wear comfortable, two-piece clothing.  If you have any allergies, tell your care team.  Bring any previous mammograms from other facilities or have them mailed to the breast center.            Jul 07, 2017  9:45 AM CDT   Ech Complete with [address] ([facility])    [address] [facility] [address]   [phone]           1.  Please bring or wear a comfortable two-piece outfit. 2.  You may eat, drink and take your normal medicines. 3.  For any questions that cannot be answered, please contact the ordering physician            Jul 07, 2017 10:45 AM CDT   LAB with [facility] ([facility])    [address] [facility] [address]   [phone]           Patient must bring picture ID.  Patient should be prepared to give a urine specimen  Please do not eat 10-12 hours before your appointment if you are coming in fasting for labs on lipids, cholesterol, or glucose (sugar).  Pregnant women should follow their Care Team instructions. Water with medications is okay. Do not drink coffee or  other fluids.   If you have concerns about taking  your medications, please ask at office or if scheduling via [employer], send a message by clicking on Secure Messaging, Message Your Care Team.            Jul 11, 2017  3:00 PM CDT   Return Visit with Terence Del Cid MD   [facility] AT [facility] ([facility])    [address] [facility] [address]   [phone]            Aug 03, 2017  1:00 PM CDT   LAB with [facility]   [facility]    [address] [facility] [address]   [phone]           Patient must bring picture ID.  Patient should be prepared to give a urine specimen  Please do not eat 10-12 hours before your appointment if you are coming in fasting for labs on lipids, cholesterol, or glucose (sugar).  Pregnant women should follow their Care Team instructions. Water with medications is okay. Do not drink coffee or other fluids.   If you have concerns about taking  your medications, please ask at office or if scheduling via [employer], send a message by clicking on Secure Messaging, Message Your Care Team.            Aug 09, 2017 11:15 AM CDT   MA SCREENING DIGITAL BILATERAL with W[place]MA2   [facility] ([facility])    [address] [facility] [address]   [phone]           Do not use any powder, lotion or deodorant under your arms or on your breast. If you do, we will ask you to remove it before your exam.  Wear comfortable, two-piece clothing.  If you have any allergies, tell your care team.  Bring any previous mammograms from other facilities or have them mailed to the breast center.            Aug 11, 2017 11:00 AM CDT   Return Visit with Apolinar Martinez MD   [facility] ([facility])    [facility]  [address]  [facility] [address]   [phone]              Who to contact     If you have questions or  need follow up information about today's clinic visit or your schedule please contact [facility] directly at [phone].  Normal or non-critical lab and imaging results will be communicated to you by MyChart, letter or phone within 4 business days after the clinic has received the results. If you do not hear from us within 7 days, please contact the clinic through [employer] or phone. If you have a critical or abnormal lab result, we will notify you by phone as soon as possible.  Submit refill requests through [employer] or call your pharmacy and they will forward the refill request to us. Please allow 3 business days for your refill to be completed.          Additional Information About Your Visit        [employer] Information     [employer] gives you secure access to your electronic health record. If you see a primary care provider, you can also send messages to your care team and make appointments. If you have questions, please call your primary care clinic.  If you do not have a primary care provider, please call [phone] and they will assist you.        Care EveryWhere ID     This is your Care EveryWhere ID. This could be used by other organizations to access your [place] medical records  [id number]        Your Vitals Were     Pulse Temperature Respirations Pulse Oximetry          67 98.2  F (36.8  C) (Tympanic) 22 95%         Blood Pressure from Last 3 Encounters:   04/08/17 150/77   03/30/17 128/88   02/10/17 113/60    Weight from Last 3 Encounters:   03/30/17 205 lb (93 kg)   02/10/17 212 lb 3.2 oz (96.3 kg)   11/01/16 215 lb (97.5 kg)              Today, you had the following     No orders found for display       Primary Care Provider Office Phone # Fax #    Sandra Morales [phone]       [facility] [address] [place] [address]        Thank you!     Thank you for choosing [facility]  for your care. Our  goal is always to provide you with excellent care. Hearing back from our patients is one way we can continue to improve our services. Please take a few minutes to complete the written survey that you may receive in the mail after your visit with us. Thank you!             Your Updated Medication List - Protect others around you: Learn how to safely use, store and throw away your medicines at www.disposemymeds.org.          This list is accurate as of: 4/8/17  2:09 PM.  Always use your most recent med list.                   Brand Name Dispense Instructions for use    ACCU-CHEK MILVIA Jeana     1 device    dispense one meter       acetaminophen 650 MG CR tablet    TYLENOL    60 tablet    Take 1 tablet (650 mg) by mouth 3 times daily       aspirin 81 MG tablet     30 tablet    Take 1 tablet (81 mg) by mouth daily       benzonatate 200 MG capsule    TESSALON    21 capsule    Take 1 capsule (200 mg) by mouth 3 times daily as needed for cough       blood glucose monitoring lancets     1 Box    Test BG 4 times daily       blood glucose monitoring test strip    ACCU-CHEK MILVIA    360 each    Use to test blood sugars 4 times daily or as directed.       cyanocobalamin 1000 MCG/ML injection    VITAMIN B12    1 mL    Inject 1 mL (1,000 mcg) into the muscle every 30 days       DEPEND EASY FIT UNDERGARMENTS Misc     300 each    1 Units every 2 hours X-large       fluocinonide 0.05 % ointment    LIDEX    60 g    Apply sparingly to affected area twice daily as needed.  Do not apply to face.       insulin aspart 100 UNIT/ML injection    NovoLOG FLEXPEN    3 mL    Inject 15 Units Subcutaneous 3 times daily (with meals)       insulin detemir 100 UNIT/ML injection    LEVEMIR FLEXPEN/FLEXTOUCH    3 mL    Inject 25 Units Subcutaneous At Bedtime Fill when needed       insulin pen needle 32G X 4 MM    BD GABRIELLA U/F    120 each    Use 4 times per day or as directed.       LASIX PO      Take 40 mg by mouth daily       levothyroxine 88 MCG  tablet    SYNTHROID/LEVOTHROID    90 tablet    Take 1 tablet (88 mcg) by mouth daily       loperamide 2 MG capsule    IMODIUM    90 capsule    One capsule once a day, can use a second one if needed       losartan 100 MG tablet    COZAAR    90 tablet    Take 1 tablet (100 mg) by mouth daily       metoprolol 50 MG 24 hr tablet    TOPROL-XL    90 tablet    Take 1 tablet (50 mg) by mouth daily       mineral oil-hydrophilic petrolatum      Apply topically as needed       OMEPRAZOLE PO      Take 20 mg by mouth daily as needed       Potassium Gluconate 595 MG Caps      Take 1 tablet by mouth daily       rOPINIRole 1 MG tablet    REQUIP    180 tablet    1-2 tabs daily as needed for restless legs       simvastatin 40 MG tablet    ZOCOR    90 tablet    Take 1 tablet (40 mg) by mouth daily       syringe (disposable) 1 ML Misc     1 each    1 each every 30 days With 1 inch needle for Vit B12 injection       triamcinolone 0.1 % cream    KENALOG    80 g    Apply sparingly to affected area two times daily as needed

## 2017-04-08 NOTE — PROGRESS NOTES
SUBJECTIVE:                                                    Stefanie Velazquez is a 72 year old female who presents to clinic today for the following health issues:      Acid reflux      Duration: last night    Description (location/character/radiation): ate prime rib last night, was chewing excessively wonders if got extra air in stomach. Shortly after the reflux started. Has taken 3 omeprazole with no releif    Intensity:  moderate    Accompanying signs and symptoms: spitting up mucus    History (similar episodes/previous evaluation): None    Precipitating or alleviating factors: None    Therapies tried and outcome: omeprazole           Problem list and histories reviewed & adjusted, as indicated.  Additional history: as documented    Patient Active Problem List   Diagnosis     Hypothyroidism     bmi 40     Chronic ischemic heart disease     Generalized osteoarthrosis, unspecified site     HEARING LOSS CONDUCTIVE, COMBINED TYPE     Esophageal reflux     Osteoporosis     RC-moderate (AHI 12, LSat 60%); REM RDI-73     Neuropathy (H)     Hyperlipidemia LDL goal <100     CKD (chronic kidney disease) stage 3, GFR 30-59 ml/min     Rectal cancer- newly diagnosed adenoCA rectum Jan 2011     Advanced directives, counseling/discussion     Anemia     Type 2 diabetes mellitus with diabetic nephropathy (H)     Radiation therapy complication     Vitamin B 12 deficiency     Vitamin D deficiency     Fracture of femur, distal, left, closed (H)     Dysuria     Bowel and bladder incontinence     Cellulitis of lower extremity, bilateral     Failure of outpatient treatment     Benign essential hypertension     Health Care Home     Chronic diarrhea     Restless leg syndrome     Past Surgical History:   Procedure Laterality Date     cabg       COLECTOMY LOW ANTERIOR  6/21/2011    Procedure:COLECTOMY LOW ANTERIOR; with loop ielostomy; Surgeon:ROBBIN MCDONNELL; Location:[facility]     COLONOSCOPY  1/26/2011    COLONOSCOPY performed by FLY  MASOUD SIEGEL at [facility]     COLONOSCOPY N/A 3/1/2016    Procedure: COLONOSCOPY;  Surgeon: Liza Neal MD;  Location: [facility]     INSERT PORT VASCULAR ACCESS  3/2/2011    INSERT PORT VASCULAR ACCESS performed by LIZA NEAL at [facility]     OPEN REDUCTION INTERNAL FIXATION FEMUR DISTAL  2/12/2013    Procedure: OPEN REDUCTION INTERNAL FIXATION FEMUR DISTAL;  Open reduction internal fixation left femur fracture--Anesth.Choice;  Surgeon: Ley, Jeffrey Duane, MD;  Location: [facility]     ORTHOPEDIC SURGERY       SIGMOIDOSCOPY FLEXIBLE  9/9/2011    Procedure:SIGMOIDOSCOPY FLEXIBLE; Performed prior to induction.; Surgeon:ROBBIN MCDONNELL; Location: OR     SURGICAL HISTORY OF -   10/24/2002    Heart bypass     SURGICAL HISTORY OF -   2002    R Knee arthroplasty     SURGICAL HISTORY OF -   2002    Mi 2 stints     TAKEDOWN ILEOSTOMY  9/9/2011    Procedure:TAKEDOWN ILEOSTOMY; Exploratory Laparotomy,  Loop Ileostomy Takedown, Small Bowel Resection x 2.; Surgeon:ROBBIN MCDONNELL; Location: OR       Social History   Substance Use Topics     Smoking status: Former Smoker     Smokeless tobacco: Never Used     Alcohol use Yes      Comment: rare social use     Family History   Problem Relation Age of Onset     DIABETES Sister      C.A.D. Sister      Breast Cancer Sister          Current Outpatient Prescriptions   Medication Sig Dispense Refill     insulin aspart (NOVOLOG FLEXPEN) 100 UNIT/ML injection Inject 15 Units Subcutaneous 3 times daily (with meals) 3 mL 11     insulin detemir (LEVEMIR FLEXPEN/FLEXTOUCH) 100 UNIT/ML injection Inject 25 Units Subcutaneous At Bedtime Fill when needed 3 mL 3     loperamide (IMODIUM) 2 MG capsule One capsule once a day, can use a second one if needed 90 capsule 3     losartan (COZAAR) 100 MG tablet Take 1 tablet (100 mg) by mouth daily 90 tablet 1     metoprolol (TOPROL-XL) 50 MG 24 hr tablet Take 1 tablet (50 mg) by mouth daily 90 tablet 1     fluocinonide (LIDEX) 0.05 %  ointment Apply sparingly to affected area twice daily as needed.  Do not apply to face. 60 g 11     insulin pen needle (BD GABRIELLA U/F) 32G X 4 MM Use 4 times per day or as directed. 120 each 1     blood glucose monitoring (ACCU-CHEK MILVIA) test strip Use to test blood sugars 4 times daily or as directed. 360 each 1     Incontinence Supply Disposable (DEPEND EASY FIT UNDERGARMENTS) MISC 1 Units every 2 hours X-large 300 each prn     cyanocobalamin (VITAMIN B12) 1000 MCG/ML injection Inject 1 mL (1,000 mcg) into the muscle every 30 days 1 mL 12     syringe, disposable, 1 ML MISC 1 each every 30 days With 1 inch needle for Vit B12 injection 1 each 11     rOPINIRole (REQUIP) 1 MG tablet 1-2 tabs daily as needed for restless legs 180 tablet 1     benzonatate (TESSALON) 200 MG capsule Take 1 capsule (200 mg) by mouth 3 times daily as needed for cough 21 capsule 0     mineral oil-hydrophilic petrolatum (AQUAPHOR) ointment Apply topically as needed       Furosemide (LASIX PO) Take 40 mg by mouth daily        OMEPRAZOLE PO Take 20 mg by mouth daily as needed        acetaminophen (TYLENOL) 650 MG CR tablet Take 1 tablet (650 mg) by mouth 3 times daily 60 tablet      Potassium Gluconate 595 MG CAPS Take 1 tablet by mouth daily       simvastatin (ZOCOR) 40 MG tablet Take 1 tablet (40 mg) by mouth daily 90 tablet 2     levothyroxine (SYNTHROID, LEVOTHROID) 88 MCG tablet Take 1 tablet (88 mcg) by mouth daily 90 tablet 1     triamcinolone (KENALOG) 0.1 % cream Apply sparingly to affected area two times daily as needed 80 g 0     blood glucose monitoring (ACCU-CHEK MULTICLIX) lancets Test BG 4 times daily 1 Box 11     aspirin 81 MG tablet Take 1 tablet (81 mg) by mouth daily 30 tablet 3     ACCU-CHEK MILVIA MELODY dispense one meter 1 device 0     Allergies   Allergen Reactions     Lisinopril Cough            Vicodin [Hydrocodone-Acetaminophen] Other (See Comments)     Caused extreme dizziness     Labs reviewed in EPIC    Reviewed and  updated as needed this visit by clinical staff  Tobacco  Allergies  Med Hx  Surg Hx  Fam Hx  Soc Hx      Reviewed and updated as needed this visit by Provider  Allergies         ROS:  Constitutional, HEENT, cardiovascular, pulmonary, GI, , musculoskeletal, neuro, skin, endocrine and psych systems are negative, except as otherwise noted.    OBJECTIVE:                                                    /77  Pulse 67  Temp 98.2  F (36.8  C) (Tympanic)  Resp 22  SpO2 95%  There is no height or weight on file to calculate BMI.  GENERAL: healthy, alert and in mild distress, keeps spitting up saliva  EYES: Eyes grossly normal to inspection, PERRL and conjunctivae and sclerae normal  HENT:normocephalic  NECK: supple with full ROM  RESP: lungs clear to auscultation - no rales, rhonchi or wheezes  CV: regular rate and rhythm, normal S1 S2, no S3 or S4, no murmur, click or rub  ABDOMEN: soft, nontender, no hepatosplenomegaly, no masses and bowel sounds normal      Diagnostic Test Results:  No results found. However, due to the size of the patient record, not all encounters were searched. Please check Results Review for a complete set of results.     ASSESSMENT/PLAN:                                                    Suspect partial obstruction of esophagus with prime rib. Called Dr. Ratliff and they do not have a surgeon on call this weekend who does therapeutic endoscopies. [place] does not either.  Dr. Ojeda, ER doctor in [facility], will attempt to give glucagon IV and 2 packets of EZ Gas po and if this does not work patient understands they will transfer her to a facility, possibly [place], that would have a surgeon on staff for scoping. All explained to patient and she is ok with this. Also informed patient's sister that she is going to [place].  Problem List Items Addressed This Visit     None      Visit Diagnoses     Esophageal obstruction    -  Primary               Patient Instructions   To ER  via ambulance - to [facility] Pineda CNP  [facility]

## 2017-04-11 ENCOUNTER — TELEPHONE (OUTPATIENT)
Dept: FAMILY MEDICINE | Facility: CLINIC | Age: 72
End: 2017-04-11

## 2017-04-11 NOTE — TELEPHONE ENCOUNTER
[facility]ice [facility] has a question regarding pt's dose on her [place]. Pt & Eunice  thought she was taking 25 mg. Her bottle says 100 mg. Please Verify.  [facility]

## 2017-04-12 ENCOUNTER — TELEPHONE (OUTPATIENT)
Dept: FAMILY MEDICINE | Facility: CLINIC | Age: 72
End: 2017-04-12

## 2017-04-12 NOTE — TELEPHONE ENCOUNTER
Reason for Call:  Form, our goal is to have forms completed with 72 hours, however, some forms may require a visit or additional information.    Type of letter, form or note:  medical    Who is the form from?: [facility]    Where did the form come from: form was faxed in    What clinic location was the form placed at?: [place]    Where the form was placed: [facility]         Call taken on 4/12/2017 at 9:56 AM by Ilene Butler

## 2017-04-13 ENCOUNTER — TELEPHONE (OUTPATIENT)
Dept: FAMILY MEDICINE | Facility: CLINIC | Age: 72
End: 2017-04-13

## 2017-04-13 NOTE — TELEPHONE ENCOUNTER
Form received back signed  4/13/17  Faxed Back & Sent to be scanned to this encounter  Leilani [facility] Station [facility]

## 2017-04-13 NOTE — TELEPHONE ENCOUNTER
Reason for Call:  Form, our goal is to have forms completed with 72 hours, however, some forms may require a visit or additional information.    Type of letter, form or note:  [facility]- Blood Pressure & UTI Urinary retention    Who is the form from?: [facility]- Blood Pressure & UTI Urinary retention (if other please explain)    Where did the form come from: form was faxed in    What clinic location was the form placed at?: [place]    Where the form was placed: [facility]      Call taken on 4/13/2017 at 3:09 PM by Leilani Jurado

## 2017-04-14 ENCOUNTER — TELEPHONE (OUTPATIENT)
Dept: FAMILY MEDICINE | Facility: CLINIC | Age: 72
End: 2017-04-14

## 2017-04-14 NOTE — TELEPHONE ENCOUNTER
Pt called today requesting appointment with Dr. Bernstein for Bladder does not feel like it is emptying. Also stubbed foot and would like that looked at.  Should pt be seen sooner by another provider? Appt scheduled 4/20/17.  Leilani [facility] Station Sec

## 2017-04-14 NOTE — TELEPHONE ENCOUNTER
Form received back signed  4/14/17  Faxed Back & Sent to be scanned to this encounter  Leilani Orn [facility]

## 2017-04-14 NOTE — TELEPHONE ENCOUNTER
Spoke to pt, and she feels she is OK to wait until 4-20-17 will call back with any further problems.  Swetha Ramos RN

## 2017-04-20 ENCOUNTER — RADIANT APPOINTMENT (OUTPATIENT)
Dept: GENERAL RADIOLOGY | Facility: CLINIC | Age: 72
End: 2017-04-20
Attending: FAMILY MEDICINE
Payer: COMMERCIAL

## 2017-04-20 ENCOUNTER — OFFICE VISIT (OUTPATIENT)
Dept: FAMILY MEDICINE | Facility: CLINIC | Age: 72
End: 2017-04-20
Payer: COMMERCIAL

## 2017-04-20 VITALS
BODY MASS INDEX: 37.92 KG/M2 | DIASTOLIC BLOOD PRESSURE: 78 MMHG | OXYGEN SATURATION: 95 % | WEIGHT: 204 LBS | HEART RATE: 79 BPM | SYSTOLIC BLOOD PRESSURE: 122 MMHG | TEMPERATURE: 97.7 F

## 2017-04-20 DIAGNOSIS — S99.922A INJURY OF LEFT FOOT, INITIAL ENCOUNTER: ICD-10-CM

## 2017-04-20 DIAGNOSIS — M79.672 LEFT FOOT PAIN: ICD-10-CM

## 2017-04-20 DIAGNOSIS — K21.9 GASTROESOPHAGEAL REFLUX DISEASE WITHOUT ESOPHAGITIS: ICD-10-CM

## 2017-04-20 DIAGNOSIS — T18.108D FOREIGN BODY IN ESOPHAGUS, SUBSEQUENT ENCOUNTER: Primary | ICD-10-CM

## 2017-04-20 DIAGNOSIS — K22.2 ESOPHAGEAL OBSTRUCTION: ICD-10-CM

## 2017-04-20 DIAGNOSIS — M79.605 PAIN OF LEFT LOWER EXTREMITY: ICD-10-CM

## 2017-04-20 PROCEDURE — 99215 OFFICE O/P EST HI 40 MIN: CPT | Performed by: FAMILY MEDICINE

## 2017-04-20 PROCEDURE — 73630 X-RAY EXAM OF FOOT: CPT | Mod: LT

## 2017-04-20 PROCEDURE — 73590 X-RAY EXAM OF LOWER LEG: CPT | Mod: LT

## 2017-04-20 NOTE — PROGRESS NOTES
"  SUBJECTIVE:                                                    Stefanie Velazquez is a 72 year old female who presents to clinic today for the following health issues:      ED/UC Followup:    Facility:  [facility] in [place]  Date of visit: 4/08/2017  Reason for visit: foreign obstruction in throat  Current Status: doing better   Choked on meat on  4/7/17. No prior history of choking  Choked on Prime Ribs on Friday 4/7/17. Thought she was having reflux so she took three rounds of omeprazole throughout the night. The next morning she went out with her sister to eat, and started spitting up foam, coughing, and having trouble breathing. Her sister then took her to  in [place]. They did not have the equipment to remove the obstruction, so they sent her to [facility].  The surgeon gave her a glucagon IV, 2 packets of EZ gas. This caused her to vomit, and dislodge the foreign body, and symptoms subsided.     Discharged with instructions to have liquid diet for now and ease into small pieces of solid food.     The problem is that she has very poor dentition, ad doesn't rubina    Echocardiogram October 4, 2016.  CT scan in 02/17 showed faint scarring in right lung.     Left toes were stubbed on Hospital bed  X 1 week ago  She hit the three middle toes on her bed. They are very sore and she has been wearing band aids over them for padding. They are still swollen and bruised. She also has been having pain higher up on her shin, just distal to her knee. She doesn't know if she hit her leg at the same time. She also has some bruising on her arm that she thinks may be from when she \"jumped back\" during this episode. She has been sitting with her leg elevated most of the time.     Bladder problems-  She feels like she has to go again right after voiding her bladder. This does not happen often, but at random times. She denies any incontinence. She reports occasional dysuria.    Problem list and histories reviewed & adjusted, as " indicated.  Additional history: as documented    BP Readings from Last 3 Encounters:   04/20/17 122/78   04/08/17 150/77   03/30/17 128/88    Wt Readings from Last 3 Encounters:   04/20/17 204 lb (92.5 kg)   03/30/17 205 lb (93 kg)   02/10/17 212 lb 3.2 oz (96.3 kg)         Reviewed and updated as needed this visit by clinical staff  Tobacco  Allergies  Meds  Problems  Med Hx  Surg Hx  Fam Hx  Soc Hx        Reviewed and updated as needed this visit by Provider  Allergies  Meds  Problems       ROS:  C: NEGATIVE for fever, chills, change in weight  E/M: NEGATIVE for ear, mouth  Problems. POSITIVE for sore throat.   R: POSITIVE for mild chronic cough  CV: NEGATIVE for chest pain, palpitations or peripheral edema  MUSCULOSKELETAL: POSITIVE  for left foot and lower leg pain.    OBJECTIVE:                                                    /78 (BP Location: Right arm)  Pulse 79  Temp 97.7  F (36.5  C) (Tympanic)  Wt 204 lb (92.5 kg)  SpO2 95%  BMI 37.92 kg/m2  Body mass index is 37.92 kg/(m^2).  GENERAL: Obese, alert and no distress  RESP: Faint crackles at base of right lung posteriorly  CV: regular rate and rhythm, normal S1 S2, no S3 or S4, no murmur, click or rub,   MS: Extensive ecchymosis on phalanges 2-3 and spreading to 5th digit on left foot. Fading ecchymosis on posterior left forearm.  PSYCH: concentration poor and affect normal/bright    X-ray- mild arthritis noted in phalanges, no visible fractures.      ASSESSMENT/PLAN:                                                    Stefanie was seen today for musculoskeletal problem and er f/u.    Diagnoses and all orders for this visit:    Foreign body in esophagus, subsequent encounter  Recommended patient refrain from large, solid foods. Also counseled her on resources available to have teeth fixed.    Esophageal obstruction  Recommended patient refrain from large, solid foods. Also counseled her on resources available to have teeth  fixed.    Gastroesophageal reflux disease without esophagitis  Continue Omeprazole as directed    Pain of left lower extremity  -     XR Tibia & Fibula Left 2 Views; Future    Injury of left foot, initial encounter  -     XR Foot Left G/E 3 Views; Future  -     XR Tibia & Fibula Left 2 Views; Future    Left foot pain  -     XR Foot Left G/E 3 Views; Future  Hard-sole shoe provided to patient.      40 min spent with patient, greater than 50% in counseling and coordination of care, and review of records and documentation.  Discussed the need to get teeth fixed and resources available for her. Also discussed need for hearing test and how she can complete this. Examined left foot and took X-rays. Fitted a stiff-soled shoe for her feet.     Patient Instructions   No chunks of meat, everything well cooked and well cut up     Get those teeth fixed!    Have your hearing aide checked again    See you in a month to see how things are going       This document serves as a record of the services and decisions personally performed and made by Sandra Morales MD. It was created on her behalf by Debbie Virk, a trained medical scribe. The creation of this document is based the provider's statements to the medical scribe.  Debbie Virk 10:57 AM 4/20/2017    Provider:   The information in this document, created by the medical scribe for me, accurately reflects the services I personally performed and the decisions made by me. I have reviewed and approved this document for accuracy prior to leaving the patient care area.  Sandra Morales MD 10:57 AM 4/20/2017    Sandra Morales MD  [facility]

## 2017-04-20 NOTE — PATIENT INSTRUCTIONS
No chunks of meat, everything well cooked and well cut up     Get those teeth fixed!    Have your hearing aide checked again    See you in a month to see how things are going

## 2017-04-20 NOTE — MR AVS SNAPSHOT
After Visit Summary   4/20/2017    Stefanie Velazquez    MRN: [id number]           Patient Information     Date Of Birth          1945        Visit Information        Provider Department      4/20/2017 10:40 AM Sandra Morales MD [facility]        Today's Diagnoses     Foreign body in esophagus, subsequent encounter    -  1    Esophageal obstruction        Gastroesophageal reflux disease without esophagitis        Pain of left lower extremity        Injury of left foot, initial encounter        Left foot pain          Care Instructions    No chunks of meat, everything well cooked and well cut up     Get those teeth fixed!    Have your hearing aide checked again    See you in a month to see how things are going         Follow-ups after your visit        Your next 10 appointments already scheduled     Jul 07, 2017  9:45 AM CDT   Ech Complete with [address] Echocardiography ([facility])    [address] [address]   [phone]           1.  Please bring or wear a comfortable two-piece outfit. 2.  You may eat, drink and take your normal medicines. 3.  For any questions that cannot be answered, please contact the ordering physician            Jul 07, 2017 10:45 AM CDT   LAB with [facility] ([facility])    [address] [address]   [phone]           Patient must bring picture ID.  Patient should be prepared to give a urine specimen  Please do not eat 10-12 hours before your appointment if you are coming in fasting for labs on lipids, cholesterol, or glucose (sugar).  Pregnant women should follow their Care Team instructions. Water with medications is okay. Do not drink coffee or other fluids.   If you have concerns about taking  your medications, please ask at office or if scheduling via [employer], send a message by clicking on Secure Messaging, Message Your Care Team.             Jul 11, 2017  3:00 PM CDT   Return Visit with Terence Del Cid MD   [facility] PHYSICIAN HEART AT [facility] ([facility])    [address] [facility] [address]   [phone]            Aug 03, 2017  1:00 PM CDT   LAB with [facility]   [facility]    [address] [facility] [id number] [phone]           Patient must bring picture ID.  Patient should be prepared to give a urine specimen  Please do not eat 10-12 hours before your appointment if you are coming in fasting for labs on lipids, cholesterol, or glucose (sugar).  Pregnant women should follow their Care Team instructions. Water with medications is okay. Do not drink coffee or other fluids.   If you have concerns about taking  your medications, please ask at office or if scheduling via [employer], send a message by clicking on Secure Messaging, Message Your Care Team.            Aug 09, 2017 11:15 AM CDT   MA SCREENING DIGITAL BILATERAL with [facility]   [facility] ([facility])    [address] [address]   [phone]           Do not use any powder, lotion or deodorant under your arms or on your breast. If you do, we will ask you to remove it before your exam.  Wear comfortable, two-piece clothing.  If you have any allergies, tell your care team.  Bring any previous mammograms from other facilities or have them mailed to the breast center.            Aug 11, 2017 11:00 AM CDT   Return Visit with Apolinar Martinez MD   [facility] ([facility])    [facility]  [address]  [facility] [address]   [phone]              Who to contact     If you have questions or need follow up information about today's clinic visit or your schedule please contact [facility] directly at [phone].  Normal or non-critical lab and imaging results will be  communicated to you by [employer], letter or phone within 4 business days after the clinic has received the results. If you do not hear from us within 7 days, please contact the clinic through [employer] or phone. If you have a critical or abnormal lab result, we will notify you by phone as soon as possible.  Submit refill requests through Flit or call your pharmacy and they will forward the refill request to us. Please allow 3 business days for your refill to be completed.          Additional Information About Your Visit        [email or web address] Information     Flit gives you secure access to your electronic health record. If you see a primary care provider, you can also send messages to your care team and make appointments. If you have questions, please call your primary care clinic.  If you do not have a primary care provider, please call [phone] and they will assist you.        Care EveryWhere ID     This is your Care EveryWhere ID. This could be used by other organizations to access your [place] medical records  [id number]        Your Vitals Were     Pulse Temperature Pulse Oximetry BMI (Body Mass Index)          79 97.7  F (36.5  C) (Tympanic) 95% 37.92 kg/m2         Blood Pressure from Last 3 Encounters:   04/20/17 122/78   04/08/17 150/77   03/30/17 128/88    Weight from Last 3 Encounters:   04/20/17 204 lb (92.5 kg)   03/30/17 205 lb (93 kg)   02/10/17 212 lb 3.2 oz (96.3 kg)               Primary Care Provider Office Phone # Fax #    Sandra Morales [phone]       [facility] [address] [place] [address]        Thank you!     Thank you for choosing [facility][note 3]  for your care. Our goal is always to provide you with excellent care. Hearing back from our patients is one way we can continue to improve our services. Please take a few minutes to complete the written survey that you may receive in the mail after your visit with us. Thank you!             Your  Updated Medication List - Protect others around you: Learn how to safely use, store and throw away your medicines at www.disposemymeds.org.          This list is accurate as of: 4/20/17 12:38 PM.  Always use your most recent med list.                   Brand Name Dispense Instructions for use    ACCU-CHEK MILVIA Jeana     1 device    dispense one meter       acetaminophen 650 MG CR tablet    TYLENOL    60 tablet    Take 1 tablet (650 mg) by mouth 3 times daily       aspirin 81 MG tablet     30 tablet    Take 1 tablet (81 mg) by mouth daily       benzonatate 200 MG capsule    TESSALON    21 capsule    Take 1 capsule (200 mg) by mouth 3 times daily as needed for cough       blood glucose monitoring lancets     1 Box    Test BG 4 times daily       blood glucose monitoring test strip    ACCU-CHEK MILVIA    360 each    Use to test blood sugars 4 times daily or as directed.       cyanocobalamin 1000 MCG/ML injection    VITAMIN B12    1 mL    Inject 1 mL (1,000 mcg) into the muscle every 30 days       DEPEND EASY FIT UNDERGARMENTS Misc     300 each    1 Units every 2 hours X-large       fluocinonide 0.05 % ointment    LIDEX    60 g    Apply sparingly to affected area twice daily as needed.  Do not apply to face.       insulin aspart 100 UNIT/ML injection    NovoLOG FLEXPEN    3 mL    Inject 15 Units Subcutaneous 3 times daily (with meals)       insulin detemir 100 UNIT/ML injection    LEVEMIR FLEXPEN/FLEXTOUCH    3 mL    Inject 25 Units Subcutaneous At Bedtime Fill when needed       insulin pen needle 32G X 4 MM    BD GABRIELLA U/F    120 each    Use 4 times per day or as directed.       LASIX PO      Take 40 mg by mouth daily       levothyroxine 88 MCG tablet    SYNTHROID/LEVOTHROID    90 tablet    Take 1 tablet (88 mcg) by mouth daily       loperamide 2 MG capsule    IMODIUM    90 capsule    One capsule once a day, can use a second one if needed       losartan 100 MG tablet    COZAAR    90 tablet    Take 1 tablet (100 mg) by  mouth daily       metoprolol 50 MG 24 hr tablet    TOPROL-XL    90 tablet    Take 1 tablet (50 mg) by mouth daily       mineral oil-hydrophilic petrolatum      Apply topically as needed       OMEPRAZOLE PO      Take 20 mg by mouth daily as needed       Potassium Gluconate 595 MG Caps      Take 1 tablet by mouth daily       rOPINIRole 1 MG tablet    REQUIP    180 tablet    1-2 tabs daily as needed for restless legs       simvastatin 40 MG tablet    ZOCOR    90 tablet    Take 1 tablet (40 mg) by mouth daily       syringe (disposable) 1 ML Misc     1 each    1 each every 30 days With 1 inch needle for Vit B12 injection       triamcinolone 0.1 % cream    KENALOG    80 g    Apply sparingly to affected area two times daily as needed

## 2017-04-20 NOTE — NURSING NOTE
"Chief Complaint   Patient presents with     Musculoskeletal Problem     rt toes smashed against Hospital bed     ER F/U     Firstlight - choking       Initial /78 (BP Location: Right arm)  Pulse 79  Temp 97.7  F (36.5  C) (Tympanic)  Wt 204 lb (92.5 kg)  SpO2 95%  BMI 37.92 kg/m2 Estimated body mass index is 37.92 kg/(m^2) as calculated from the following:    Height as of 3/30/17: 5' 1.5\" (1.562 m).    Weight as of this encounter: 204 lb (92.5 kg).  Medication Reconciliation: complete    Health Maintenance that is potentially due pending provider review:  NONE    n/a    "

## 2017-04-28 ENCOUNTER — TELEPHONE (OUTPATIENT)
Dept: FAMILY MEDICINE | Facility: CLINIC | Age: 72
End: 2017-04-28

## 2017-04-28 ENCOUNTER — MEDICAL CORRESPONDENCE (OUTPATIENT)
Dept: HEALTH INFORMATION MANAGEMENT | Facility: CLINIC | Age: 72
End: 2017-04-28

## 2017-04-28 NOTE — TELEPHONE ENCOUNTER
Reason for Call:  Form, our goal is to have forms completed with 72 hours, however, some forms may require a visit or additional information.    Type of letter, form or note:  [facility] orders Cyanocobalamin    Who is the form from?: [facility] orders Cyanocobalamin (if other please explain)    Where did the form come from: form was mailed in    What clinic location was the form placed at?: [place]    Where the form was placed: [facility]      Call taken on 4/28/2017 at 11:41 AM by Leilani Jurado

## 2017-04-28 NOTE — TELEPHONE ENCOUNTER
Form received back signed  4/28/17  Faxed Back & Sent to be scanned to this encounter  Leilani [place] [facility]

## 2017-05-24 ENCOUNTER — TELEPHONE (OUTPATIENT)
Dept: FAMILY MEDICINE | Facility: CLINIC | Age: 72
End: 2017-05-24

## 2017-05-24 NOTE — TELEPHONE ENCOUNTER
Reason for Call:  Form, our goal is to have forms completed with 72 hours, however, some forms may require a visit or additional information.    Type of letter, form or note:  medical    Who is the form from?: [facility] Plan of Care    Where did the form come from: form was faxed in    What clinic location was the form placed at?: [place]    Where the form was placed: [facility]          Call taken on 5/24/2017 at 3:28 PM by Ilene Butler

## 2017-05-25 PROCEDURE — G0179 MD RECERTIFICATION HHA PT: HCPCS | Performed by: FAMILY MEDICINE

## 2017-05-26 NOTE — TELEPHONE ENCOUNTER
Form received back signed  5/26/17  Faxed Back & Sent to be scanned to this encounter  Leilani [facility]

## 2017-05-30 ENCOUNTER — OFFICE VISIT (OUTPATIENT)
Dept: FAMILY MEDICINE | Facility: CLINIC | Age: 72
End: 2017-05-30
Payer: COMMERCIAL

## 2017-05-30 VITALS
OXYGEN SATURATION: 96 % | BODY MASS INDEX: 37.92 KG/M2 | WEIGHT: 204 LBS | DIASTOLIC BLOOD PRESSURE: 66 MMHG | HEART RATE: 68 BPM | SYSTOLIC BLOOD PRESSURE: 110 MMHG | TEMPERATURE: 97.9 F

## 2017-05-30 DIAGNOSIS — C20 RECTAL CANCER (H): Chronic | ICD-10-CM

## 2017-05-30 DIAGNOSIS — K03.81 BROKEN OR CRACKED TOOTH, NONTRAUMATIC: ICD-10-CM

## 2017-05-30 DIAGNOSIS — E11.21 TYPE 2 DIABETES MELLITUS WITH DIABETIC NEPHROPATHY, WITH LONG-TERM CURRENT USE OF INSULIN (H): Chronic | ICD-10-CM

## 2017-05-30 DIAGNOSIS — K05.30: Primary | ICD-10-CM

## 2017-05-30 DIAGNOSIS — Z79.4 TYPE 2 DIABETES MELLITUS WITH DIABETIC NEPHROPATHY, WITH LONG-TERM CURRENT USE OF INSULIN (H): Chronic | ICD-10-CM

## 2017-05-30 PROCEDURE — 99215 OFFICE O/P EST HI 40 MIN: CPT | Performed by: FAMILY MEDICINE

## 2017-05-30 NOTE — NURSING NOTE
"Chief Complaint   Patient presents with     Forms     Organization determination form for oral surgery       Initial /66  Pulse 68  Temp 97.9  F (36.6  C) (Tympanic)  Wt 204 lb (92.5 kg)  SpO2 96%  BMI 37.92 kg/m2 Estimated body mass index is 37.92 kg/(m^2) as calculated from the following:    Height as of 3/30/17: 5' 1.5\" (1.562 m).    Weight as of this encounter: 204 lb (92.5 kg).  Medication Reconciliation: complete    Health Maintenance that is potentially due pending provider review:  NONE    n/a    "

## 2017-05-30 NOTE — MR AVS SNAPSHOT
After Visit Summary   5/30/2017    Stefanie Velazquez    MRN: [id number]           Patient Information     Date Of Birth          1945        Visit Information        Provider Department      5/30/2017 2:40 PM Sandra Morales MD [facility]        Today's Diagnoses     Severe gum disease    -  1    Broken or cracked tooth, nontraumatic        Type 2 diabetes mellitus with diabetic nephropathy, with long-term current use of insulin (H)          Care Instructions    Start antibiotic for tooth    Call the oral surgeons, let me know if you have trouble      I had some trouble finding out how to get the Organization of determination form filled out. However, it seems that you  Or your representative can contact your plan to see if it will be covered. If it is not, they will send you a letter after 14 days explaining why it is not covered. Then there is an appeals process that has several levels where you can try top get it covered again.     This is from the medicare web site:    How do I file an appeal?  What's an appeal?    An appeal is the action you can take if you disagree with a coverage or payment decision made by Medicare, your Medicare health plan, or your Medicare Prescription Drug Plan. You can appeal if Medicare or your plan denies one of these:    Your request for a health care service, supply, item, or prescription drug that you think you should be able to get  Your request for payment for a health care service, supply, item, or prescription drug you already got  Your request to change the amount you must pay for a health care service, supply, item or prescription drug.  You can also appeal if Medicare or your plan stops providing or paying for all or part of a service, supply, item, or prescription drug you think you still need.    If you have a Medicare Medical Savings Account (MSA) Plan, you may appeal if you've met your deductible or you believe a service or item should  "count toward your deductible.    If you decide to appeal    If you decide to appeal, ask your doctor, health care provider, or supplier for any information that may help your case. See your plan materials, or contact your plan for details about your appeal rights. Generally, you can find your plan's contact information on your plan membership card. Or, you can get your plan's contact information from a Personalized Search (under General Search), or search by plan name.    The appeals process has 5 levels. If you disagree with the decision made at any level of the process, you can generally go to the next level. At each level, you'll be given instructions in the decision letter on how to move to the next level of appeal.      Appeals if you have a Medicare health plan  Requesting an organization determination    You have the right to ask your plan to provide or pay for items or services you think should be covered, provided, or continued. This is called an \"organization determination.\" You, your representative, or your doctor can ask your plan in advance to make sure that the services are covered or after payment of the services is denied. Get your plan's contact information from a Personalized Search (under General Search), or search by plan name.  If you think your health could be seriously harmed by waiting the standard 14 days for a decision, ask your plan for a fast decision. The plan must give you its decision within 72 hours if it determines, or your doctor tells your plan, that waiting for a standard decision may seriously jeopardize your life, health, or ability to regain maximum function.  If the plan won't cover the items or services you asked for, you'll get a notice explaining why your plan fully or partially denied your request and instructions on how to appeal your plan's decision by requesting a reconsideration. If you appeal the plan s decision, you may want to ask for a copy of your file containing " medical and other information about your case. Your plan may charge you for this           Follow-ups after your visit        Additional Services     ORAL SURGERY REFERRAL       Your provider has referred you to: [facility]  [phone]  (NOT any MA plan), [facility]  [phone], [facility]  ([facility]) [phone], [facility]  [phone], [facility]  [phone] and, [facility]  [phone] and [facility]  [phone], [facility] ([facility]) [phone] and [facility] ([facility]) [phone]       Please be aware that coverage of these services is subject to the terms and limitations of your health insurance plan.  Call member services at your health plan with any benefit or coverage questions.      Please bring the following to your appointment:    >>   Any x-rays, CTs or MRIs which have been performed.  Contact the facility where they were done to arrange for  prior to your scheduled appointment.    >>   List of current medications   >>   This referral request   >>   Any documents/labs given to you for this referral                  Your next 10 appointments already scheduled     Jul 07, 2017  9:45 AM CDT   Ech Complete with ZACARIAS   [facility] ([facility])    [address] [place] [address] [phone]           1.  Please bring or wear a comfortable two-piece outfit. 2.  You may eat, drink and take your normal medicines. 3.  For any questions that cannot be answered, please contact the ordering physician            Jul 07, 2017 10:45 AM CDT   LAB with [place] [facility]   [facility]    [address] [address] [phone]            Patient must bring picture ID.  Patient should be prepared to give a urine specimen  Please do not eat 10-12 hours before your appointment if you are coming in fasting for labs on lipids, cholesterol, or glucose (sugar).  Pregnant women should follow their Care Team instructions. Water with medications is okay. Do not drink coffee or other fluids.   If you have concerns about taking  your medications, please ask at office or if scheduling via [facility], send a message by clicking on Secure Messaging, Message Your Care Team.            Jul 11, 2017  3:00 PM CDT   Return Visit with Terence Del Cid MD   [facility] ([facility])    [address] [address]   [phone]            Aug 03, 2017  1:00 PM CDT   LAB with PI LAB   [facility]    [address] [place] [address]   [phone]           Patient must bring picture ID.  Patient should be prepared to give a urine specimen  Please do not eat 10-12 hours before your appointment if you are coming in fasting for labs on lipids, cholesterol, or glucose (sugar).  Pregnant women should follow their Care Team instructions. Water with medications is okay. Do not drink coffee or other fluids.   If you have concerns about taking  your medications, please ask at office or if scheduling via [facility], send a message by clicking on Secure Messaging, Message Your Care Team.            Aug 09, 2017 11:15 AM CDT   MA SCREENING DIGITAL BILATERAL with WYMA2   [facility] ([facility])    [address] [address]   [phone]           Do not use any powder, lotion or deodorant under your arms or on your breast. If you do, we will ask you to remove it before your exam.  Wear comfortable, two-piece clothing.  If you have any allergies, tell your care team.  Bring any previous mammograms from other  facilities or have them mailed to the breast center.            Aug 11, 2017 11:00 AM CDT   Return Visit with Apolinar Martinez MD   [facility] ([facility])    [facility]  [address]  [place] [place] [facility] [place] [address] [phone]              Who to contact     If you have questions or need follow up information about today's clinic visit or your schedule please contact [facility] directly at [phone].  Normal or non-critical lab and imaging results will be communicated to you by [email or web address], letter or phone within 4 business days after the clinic has received the results. If you do not hear from us within 7 days, please contact the clinic through [employer] or phone. If you have a critical or abnormal lab result, we will notify you by phone as soon as possible.  Submit refill requests through [employer] or call your pharmacy and they will forward the refill request to us. Please allow 3 business days for your refill to be completed.          Additional Information About Your Visit        [email or web address] Information     [employer] gives you secure access to your electronic health record. If you see a primary care provider, you can also send messages to your care team and make appointments. If you have questions, please call your primary care clinic.  If you do not have a primary care provider, please call [phone] and they will assist you.        Care EveryWhere ID     This is your Care EveryWhere ID. This could be used by other organizations to access your [place] medical records  [id number]        Your Vitals Were     Pulse Temperature Pulse Oximetry BMI (Body Mass Index)          68 97.9  F (36.6  C) (Tympanic) 96% 37.92 kg/m2         Blood Pressure from Last 3 Encounters:   05/30/17 110/66   04/20/17 122/78   04/08/17 150/77    Weight from Last 3 Encounters:   05/30/17 204 lb (92.5 kg)   04/20/17 204 lb (92.5 kg)   03/30/17 205 lb (93 kg)              We  Performed the Following     ORAL SURGERY REFERRAL          Today's Medication Changes          These changes are accurate as of: 5/30/17  3:33 PM.  If you have any questions, ask your nurse or doctor.               Start taking these medicines.        Dose/Directions    amoxicillin-clavulanate 875-125 MG per tablet   Commonly known as:  AUGMENTIN   Used for:  Broken or cracked tooth, nontraumatic, Severe gum disease   Started by:  Sandra Morales MD        Dose:  1 tablet   Take 1 tablet by mouth 2 times daily   Quantity:  20 tablet   Refills:  0         Stop taking these medicines if you haven't already. Please contact your care team if you have questions.     cyanocobalamin 1000 MCG/ML injection   Commonly known as:  VITAMIN B12   Stopped by:  Sandra Morales MD                Where to get your medicines      These medications were sent to [facility] [address] [place] [address] [address]  [address] [facility] [address]     Phone:  [phone]     amoxicillin-clavulanate 875-125 MG per tablet                Primary Care Provider Office Phone # Fax #    Sandra Morales [phone]       [facility] [address] [facility] [address]        Thank you!     Thank you for choosing [facility]  for your care. Our goal is always to provide you with excellent care. Hearing back from our patients is one way we can continue to improve our services. Please take a few minutes to complete the written survey that you may receive in the mail after your visit with us. Thank you!             Your Updated Medication List - Protect others around you: Learn how to safely use, store and throw away your medicines at www.disposemymeds.org.          This list is accurate as of: 5/30/17  3:33 PM.  Always use your most recent med list.                   Brand Name Dispense Instructions for use    ACCU-CHEK MILVIA Jeana     1 device    dispense one meter       acetaminophen  650 MG CR tablet    TYLENOL    60 tablet    Take 1 tablet (650 mg) by mouth 3 times daily       amoxicillin-clavulanate 875-125 MG per tablet    AUGMENTIN    20 tablet    Take 1 tablet by mouth 2 times daily       aspirin 81 MG tablet     30 tablet    Take 1 tablet (81 mg) by mouth daily       benzonatate 200 MG capsule    TESSALON    21 capsule    Take 1 capsule (200 mg) by mouth 3 times daily as needed for cough       blood glucose monitoring lancets     1 Box    Test BG 4 times daily       blood glucose monitoring test strip    ACCU-CHEK MILVIA    360 each    Use to test blood sugars 4 times daily or as directed.       DEPEND EASY FIT UNDERGARMENTS Misc     300 each    1 Units every 2 hours X-large       fluocinonide 0.05 % ointment    LIDEX    60 g    Apply sparingly to affected area twice daily as needed.  Do not apply to face.       insulin aspart 100 UNIT/ML injection    NovoLOG FLEXPEN    3 mL    Inject 15 Units Subcutaneous 3 times daily (with meals)       insulin detemir 100 UNIT/ML injection    LEVEMIR FLEXPEN/FLEXTOUCH    3 mL    Inject 25 Units Subcutaneous At Bedtime Fill when needed       insulin pen needle 32G X 4 MM    BD GABRIELLA U/F    120 each    Use 4 times per day or as directed.       LASIX PO      Take 40 mg by mouth daily       levothyroxine 88 MCG tablet    SYNTHROID/LEVOTHROID    90 tablet    Take 1 tablet (88 mcg) by mouth daily       loperamide 2 MG capsule    IMODIUM    90 capsule    One capsule once a day, can use a second one if needed       losartan 100 MG tablet    COZAAR    90 tablet    Take 1 tablet (100 mg) by mouth daily       metoprolol 50 MG 24 hr tablet    TOPROL-XL    90 tablet    Take 1 tablet (50 mg) by mouth daily       mineral oil-hydrophilic petrolatum      Apply topically as needed       OMEPRAZOLE PO      Take 20 mg by mouth daily as needed       Potassium Gluconate 595 MG Caps      Take 1 tablet by mouth daily       rOPINIRole 1 MG tablet    REQUIP    180 tablet    1-2  tabs daily as needed for restless legs       simvastatin 40 MG tablet    ZOCOR    90 tablet    Take 1 tablet (40 mg) by mouth daily       syringe (disposable) 1 ML Misc     1 each    1 each every 30 days With 1 inch needle for Vit B12 injection       triamcinolone 0.1 % cream    KENALOG    80 g    Apply sparingly to affected area two times daily as needed

## 2017-05-30 NOTE — PATIENT INSTRUCTIONS
Start antibiotic for tooth    Call the oral surgeons, let me know if you have trouble      I had some trouble finding out how to get the Organization of determination form filled out. However, it seems that you  Or your representative can contact your plan to see if it will be covered. If it is not, they will send you a letter after 14 days explaining why it is not covered. Then there is an appeals process that has several levels where you can try top get it covered again.     This is from the medicare web site:    How do I file an appeal?  What's an appeal?    An appeal is the action you can take if you disagree with a coverage or payment decision made by Medicare, your Medicare health plan, or your Medicare Prescription Drug Plan. You can appeal if Medicare or your plan denies one of these:    Your request for a health care service, supply, item, or prescription drug that you think you should be able to get  Your request for payment for a health care service, supply, item, or prescription drug you already got  Your request to change the amount you must pay for a health care service, supply, item or prescription drug.  You can also appeal if Medicare or your plan stops providing or paying for all or part of a service, supply, item, or prescription drug you think you still need.    If you have a Medicare Medical Savings Account (MSA) Plan, you may appeal if you've met your deductible or you believe a service or item should count toward your deductible.    If you decide to appeal    If you decide to appeal, ask your doctor, health care provider, or supplier for any information that may help your case. See your plan materials, or contact your plan for details about your appeal rights. Generally, you can find your plan's contact information on your plan membership card. Or, you can get your plan's contact information from a Personalized Search (under General Search), or search by plan name.    The appeals process has 5  "levels. If you disagree with the decision made at any level of the process, you can generally go to the next level. At each level, you'll be given instructions in the decision letter on how to move to the next level of appeal.      Appeals if you have a Medicare health plan  Requesting an organization determination    You have the right to ask your plan to provide or pay for items or services you think should be covered, provided, or continued. This is called an \"organization determination.\" You, your representative, or your doctor can ask your plan in advance to make sure that the services are covered or after payment of the services is denied. Get your plan's contact information from a Personalized Search (under General Search), or search by plan name.  If you think your health could be seriously harmed by waiting the standard 14 days for a decision, ask your plan for a fast decision. The plan must give you its decision within 72 hours if it determines, or your doctor tells your plan, that waiting for a standard decision may seriously jeopardize your life, health, or ability to regain maximum function.  If the plan won't cover the items or services you asked for, you'll get a notice explaining why your plan fully or partially denied your request and instructions on how to appeal your plan's decision by requesting a reconsideration. If you appeal the plan s decision, you may want to ask for a copy of your file containing medical and other information about your case. Your plan may charge you for this   "

## 2017-05-30 NOTE — PROGRESS NOTES
SUBJECTIVE:                                                    Stefanie Velzaquez is a 72 year old female who presents to clinic today with her , Brittany,  for the following health issues:    Wanting to consult on what to do for oral surgery - will need a form if you feel it is medically necessary to have surgery for removal due to jaw issues.    She had chocked a while back due to inability to chew food adequately. She has extreme jaw pain now as she has over 24 rotten teeth. She saw the dentist over at [facility] and he told her that she would need a surgeon to remove them. Her friend called her insurance and she was told that she could get her teeth fixed as long as her doctor gave her a medical order and a organization of determination form.   She feels the chemoradiation she received in the past has affected her bones and teeth. She has a history of rectal cancer, removed in 2011 by Dr Daly. She originally saw Dr Santos in oncology, then Dr Granda, then Dr Martinez, she had chemoradiation donePatient initially presented with a mucinous discharge and bloody stool a year ago and eventually workup in 01/2011 identified an obstructing rectal mass at 30 cm.  Biopsy was consistent with adenocarcinoma.  Complete staging revealed T4N2 rectal cancer and patient completed neoadjuvant chemoradiation with 5 FU.  Initial PET scan did not identify any distal lesions and just confirmed locally advanced rectal cancer at the rectosigmoid area.  She completed chemoradiation between 03/2011 and 05/2011.  In 06/2011, patient was taken by Dr. Augustin Daly and underwent resection of the primary lesion and had diverting ileostomy.  Subsequently, after the initial surgery, patient had a complicated course where the ileostomy stoma bag could not be kept in place and had recurrent leak from the ileostomy site resulting in unnecessary odor and skin irritation and inflammatory response in the abdominal wall.  As a result,  patient had re-anastomosis of the primary surgery in 08/2011.    From Dr Hernandez's note:  Patient initially presented with a mucinous discharge and bloody stool a year ago and eventually workup in 01/2011 identified an obstructing rectal mass at 30 cm.  Biopsy was consistent with adenocarcinoma.  Complete staging revealed T4N2 rectal cancer and patient completed neoadjuvant chemoradiation with 5 FU.  Initial PET scan did not identify any distal lesions and just confirmed locally advanced rectal cancer at the rectosigmoid area.  She completed chemoradiation between 03/2011 and 05/2011.  In 06/2011, patient was taken by Dr. Augustin Daly and underwent resection of the primary lesion and had diverting ileostomy.  Subsequently, after the initial surgery, patient had a complicated course where the ileostomy stoma bag could not be kept in place and had recurrent leak from the ileostomy site resulting in unnecessary odor and skin irritation and inflammatory response in the abdominal wall.  As a result, patient had re-anastomosis of the primary surgery in 08/2011.    Diabetes-numbers have been good   Lab Results   Component Value Date    A1C 7.1 03/30/2017    A1C 8.2 10/01/2016    A1C 9.1 07/21/2016    A1C 14.6 04/05/2016    A1C 7.8 04/20/2015      Problem list and histories reviewed & adjusted, as indicated.  Additional history: as documented    BP Readings from Last 3 Encounters:   05/30/17 110/66   04/20/17 122/78   04/08/17 150/77    Wt Readings from Last 3 Encounters:   05/30/17 204 lb (92.5 kg)   04/20/17 204 lb (92.5 kg)   03/30/17 205 lb (93 kg)            Reviewed and updated as needed this visit by clinical staff  Tobacco  Allergies  Meds  Problems  Med Hx  Surg Hx  Fam Hx  Soc Hx        Reviewed and updated as needed this visit by Provider  Allergies  Meds  Problems       ROS:  C: NEGATIVE for fever, chills, change in weight  ENT/MOUTH: NEGATIVE for ear and throat problems, POSITIVE for tooth pain and  jaw pain  R: NEGATIVE for significant cough or SOB  CV: NEGATIVE for chest pain, palpitations    OBJECTIVE:                                                    /66  Pulse 68  Temp 97.9  F (36.6  C) (Tympanic)  Wt 204 lb (92.5 kg)  SpO2 96%  BMI 37.92 kg/m2  Body mass index is 37.92 kg/(m^2).  GENERAL: healthy, alert and no distress  HENT: Multiple broken off teeth. Black roots. Multiple teeth missing. Tooth 3 over on left side from center on bottom has erythema, swelling. Only 2 complete teeth on top, 5 complete teeth on bottom.   RESP: lungs clear to auscultation - no rales, rhonchi or wheezes  CV: regular rate and rhythm, normal S1 S2, no S3 or S4, 1-2/6 systolic ejection murmur, no click or rub       ASSESSMENT/PLAN:                                                    Stefanie was seen today for forms.    Diagnoses and all orders for this visit:    Severe gum disease  -     ORAL SURGERY REFERRAL  -     amoxicillin-clavulanate (AUGMENTIN) 875-125 MG per tablet; Take 1 tablet by mouth 2 times daily    Broken or cracked tooth, nontraumatic  -     ORAL SURGERY REFERRAL  -     amoxicillin-clavulanate (AUGMENTIN) 875-125 MG per tablet; Take 1 tablet by mouth 2 times daily    Type 2 diabetes mellitus with diabetic nephropathy, with long-term current use of insulin (H)  Will get A1C next visit.     Rectal cancer-  Follows with Dr bella    Start: 2:50 PM  End: 3:35 PM  40 min spent with patient, greater than 50% in counseling and coordination of care. We discussed her situation and need for extensive surgery.       Patient Instructions   Start antibiotic for tooth    Call the oral surgeons, let me know if you have trouble      I had some trouble finding out how to get the Organization of determination form filled out. However, it seems that you  Or your representative can contact your plan to see if it will be covered. If it is not, they will send you a letter after 14 days explaining why it is not covered. Then  there is an appeals process that has several levels where you can try top get it covered again.     This is from the medicare web site:    How do I file an appeal?  What's an appeal?    An appeal is the action you can take if you disagree with a coverage or payment decision made by Medicare, your Medicare health plan, or your Medicare Prescription Drug Plan. You can appeal if Medicare or your plan denies one of these:    Your request for a health care service, supply, item, or prescription drug that you think you should be able to get  Your request for payment for a health care service, supply, item, or prescription drug you already got  Your request to change the amount you must pay for a health care service, supply, item or prescription drug.  You can also appeal if Medicare or your plan stops providing or paying for all or part of a service, supply, item, or prescription drug you think you still need.    If you have a Medicare Medical Savings Account (MSA) Plan, you may appeal if you've met your deductible or you believe a service or item should count toward your deductible.    If you decide to appeal    If you decide to appeal, ask your doctor, health care provider, or supplier for any information that may help your case. See your plan materials, or contact your plan for details about your appeal rights. Generally, you can find your plan's contact information on your plan membership card. Or, you can get your plan's contact information from a Personalized Search (under General Search), or search by plan name.    The appeals process has 5 levels. If you disagree with the decision made at any level of the process, you can generally go to the next level. At each level, you'll be given instructions in the decision letter on how to move to the next level of appeal.      Appeals if you have a Medicare health plan  Requesting an organization determination    You have the right to ask your plan to provide or pay for  "items or services you think should be covered, provided, or continued. This is called an \"organization determination.\" You, your representative, or your doctor can ask your plan in advance to make sure that the services are covered or after payment of the services is denied. Get your plan's contact information from a Personalized Search (under General Search), or search by plan name.  If you think your health could be seriously harmed by waiting the standard 14 days for a decision, ask your plan for a fast decision. The plan must give you its decision within 72 hours if it determines, or your doctor tells your plan, that waiting for a standard decision may seriously jeopardize your life, health, or ability to regain maximum function.  If the plan won't cover the items or services you asked for, you'll get a notice explaining why your plan fully or partially denied your request and instructions on how to appeal your plan's decision by requesting a reconsideration. If you appeal the plan s decision, you may want to ask for a copy of your file containing medical and other information about your case. Your plan may charge you for this       This document serves as a record of the services and decisions personally performed and made by Sandra Morales MD. It was created on her behalf by Debbie Virk, a trained medical scribe. The creation of this document is based the provider's statements to the medical scribe.  Debbie Virk 2:50 PM 5/30/2017    Provider:   The information in this document, created by the medical scribe for me, accurately reflects the services I personally performed and the decisions made by me. I have reviewed and approved this document for accuracy prior to leaving the patient care area.  Sandra Morales MD 2:50 PM 5/30/2017    Sandra Morales MD  [facility]  "

## 2017-06-17 ENCOUNTER — CARE COORDINATION (OUTPATIENT)
Dept: CARE COORDINATION | Facility: CLINIC | Age: 72
End: 2017-06-17

## 2017-06-17 NOTE — PROGRESS NOTES
Clinic Care Coordination Contact    Situation: Patient chart reviewed by care coordinator.    Background: Patient continues to live alone but has lots of support services. She was discharged from [facility] 9/16/16 and handed off to RN CC for follow up.     Assessment: Patient continues to do well in the community. She currently has Eunice Diop from [employer] coming out every other Tuesday, she is getting Meals on Wheels, [employer], [employer], [employer],  and her family helps her out as well.  She was recently in to see PCP 5/30/17 to talk about having her 24 rotten teeth removed but needing a medical order for insurance to pay. Form was completed. Patient has appointment with PCP 6/27/17 for follow up. Patient also has [facility].    Plan/Recommendations: RN CC to outreach to patient and home care after OV with PCP 6/27/17. RN CC sending out updated patient care plan.    Lauren Varma RN, [facility]  RN Care Coordinator  [facility]  Phone # [phone]  6/17/2017 10:12 AM

## 2017-06-17 NOTE — LETTER
[facility]  [address] [place]  [address]        June 17, 2017      Stefanie Velazquez  [address] [address]        Dear Stefanie,  I just wanted to update your information in your chart. I printed a new [facility] plan with your current information. If you have any questions or concerns or something looks incorrect, please give me a call.     Otherwise, I will plan to continue to outreach to you as we have previously planned.     Sincerely,     Lauren Varma RN, [facility]  RN Care Coordinator  [facility]  Phone # [phone]  6/17/2017 10:14 AM

## 2017-06-19 ENCOUNTER — TELEPHONE (OUTPATIENT)
Dept: FAMILY MEDICINE | Facility: CLINIC | Age: 72
End: 2017-06-19

## 2017-06-19 NOTE — TELEPHONE ENCOUNTER
Dr. Lowe requesting a letter that Dr. Bernstein Did not want pt to be under General Anesthesia.  Fax to Dr. Alan at [facility] [phone]  [facility]

## 2017-06-20 ENCOUNTER — TELEPHONE (OUTPATIENT)
Dept: FAMILY MEDICINE | Facility: CLINIC | Age: 72
End: 2017-06-20

## 2017-06-20 NOTE — TELEPHONE ENCOUNTER
Pt was at the Oral Surgeon at [facility]. Cherise would like to discuss her discussing with the oral surgeon from 6/19/17  [facility]

## 2017-06-27 ENCOUNTER — OFFICE VISIT (OUTPATIENT)
Dept: FAMILY MEDICINE | Facility: CLINIC | Age: 72
End: 2017-06-27
Payer: COMMERCIAL

## 2017-06-27 VITALS
BODY MASS INDEX: 37.55 KG/M2 | WEIGHT: 202 LBS | DIASTOLIC BLOOD PRESSURE: 64 MMHG | OXYGEN SATURATION: 95 % | SYSTOLIC BLOOD PRESSURE: 110 MMHG | TEMPERATURE: 97.9 F

## 2017-06-27 DIAGNOSIS — K02.9 DENTAL CARIES: Primary | ICD-10-CM

## 2017-06-27 DIAGNOSIS — I10 BENIGN ESSENTIAL HYPERTENSION: Chronic | ICD-10-CM

## 2017-06-27 DIAGNOSIS — E78.5 HYPERLIPIDEMIA LDL GOAL <100: Chronic | ICD-10-CM

## 2017-06-27 DIAGNOSIS — C20 RECTAL CANCER (H): Chronic | ICD-10-CM

## 2017-06-27 DIAGNOSIS — E03.8 OTHER SPECIFIED HYPOTHYROIDISM: Chronic | ICD-10-CM

## 2017-06-27 DIAGNOSIS — E11.21 TYPE 2 DIABETES MELLITUS WITH DIABETIC NEPHROPATHY, WITH LONG-TERM CURRENT USE OF INSULIN (H): Chronic | ICD-10-CM

## 2017-06-27 DIAGNOSIS — Z79.4 TYPE 2 DIABETES MELLITUS WITH DIABETIC NEPHROPATHY, WITH LONG-TERM CURRENT USE OF INSULIN (H): Chronic | ICD-10-CM

## 2017-06-27 DIAGNOSIS — E53.8 VITAMIN B 12 DEFICIENCY: ICD-10-CM

## 2017-06-27 DIAGNOSIS — N18.30 CKD (CHRONIC KIDNEY DISEASE) STAGE 3, GFR 30-59 ML/MIN (H): Chronic | ICD-10-CM

## 2017-06-27 PROCEDURE — 99214 OFFICE O/P EST MOD 30 MIN: CPT | Performed by: FAMILY MEDICINE

## 2017-06-27 NOTE — PATIENT INSTRUCTIONS
1/2 the long acting insulin the night prior to any oral surgery   Day of procedure/surgery, don't take any fast acting insulin until you eat    Take your thyroid medication day of surgery early. Take your blood pressure meds day of surgery with a sip of water as well     Take your health care directives with you to the surgery

## 2017-06-27 NOTE — LETTER
[facility]  [address] [facility] [address]  Phone: [phone]    June 27, 2017    Re: Stefanie Velazquez  [address] [facility] [address]            [facility]    Dear Dr Alan       I'm writing this letter on behalf of my patient, Stefanie Velazquez.  I think with all her extensive medical issues, I do not recommend that she have her oral surgery done under general anesthesia. If possible, I think local with MAC would be a safer choice.     It is my pleasure to participate in Stefanie's health care needs. Please feel free to call if any questions or concerns..          Sincerely,              Sandra Bernstein MD

## 2017-06-27 NOTE — MR AVS SNAPSHOT
After Visit Summary   6/27/2017    Stefanie Velazquez    MRN: [id number]           Patient Information     Date Of Birth          1945        Visit Information        Provider Department      6/27/2017 1:40 PM Sandra Morales MD [facility]        Today's Diagnoses     Dental caries    -  1    CKD (chronic kidney disease) stage 3, GFR 30-59 ml/min        Vitamin B 12 deficiency        Benign essential hypertension        Type 2 diabetes mellitus with diabetic nephropathy, with long-term current use of insulin (H)        Rectal cancer- newly diagnosed adenoCA rectum Jan 2011        Hyperlipidemia LDL goal <100        Other specified hypothyroidism          Care Instructions    1/2 the long acting insulin the night prior to any oral surgery   Day of procedure/surgery, don't take any fast acting insulin until you eat    Take your thyroid medication day of surgery early. Take your blood pressure meds day of surgery with a sip of water as well     Take your health care directives with you to the surgery                    Follow-ups after your visit        Your next 10 appointments already scheduled     Jul 07, 2017  9:45 AM CDT   Ech Complete with [address] ([facility])    [address] [facility] [facility] [address]   [phone]           1.  Please bring or wear a comfortable two-piece outfit. 2.  You may eat, drink and take your normal medicines. 3.  For any questions that cannot be answered, please contact the ordering physician            Jul 07, 2017 10:45 AM CDT   LAB with [facility] ([facility])    [address] [facility] [address]   [phone]           Patient must bring picture ID.  Patient should be prepared to give a urine specimen  Please do not eat 10-12 hours before your appointment if you are coming in fasting for labs on lipids, cholesterol, or glucose (sugar).   Pregnant women should follow their Care Team instructions. Water with medications is okay. Do not drink coffee or other fluids.   If you have concerns about taking  your medications, please ask at office or if scheduling via [employer], send a message by clicking on Secure Messaging, Message Your Care Team.            Jul 11, 2017  3:00 PM CDT   Return Visit with Terence Del Cid MD   [facility] PHYSICIAN HEART AT [facility] ([facility])    [address] [address]   [phone]            Aug 03, 2017  1:00 PM CDT   [facility]   [facility]    [address] [facility] [id number] [phone]           Patient must bring picture ID.  Patient should be prepared to give a urine specimen  Please do not eat 10-12 hours before your appointment if you are coming in fasting for labs on lipids, cholesterol, or glucose (sugar).  Pregnant women should follow their Care Team instructions. Water with medications is okay. Do not drink coffee or other fluids.   If you have concerns about taking  your medications, please ask at office or if scheduling via [employer], send a message by clicking on Secure Messaging, Message Your Care Team.            Aug 09, 2017 11:15 AM CDT   MA SCREENING DIGITAL BILATERAL with [facility]   [facility] ([facility])    [address] [address]   [phone]           Do not use any powder, lotion or deodorant under your arms or on your breast. If you do, we will ask you to remove it before your exam.  Wear comfortable, two-piece clothing.  If you have any allergies, tell your care team.  Bring any previous mammograms from other facilities or have them mailed to the breast center.            Aug 11, 2017 11:00 AM CDT   Return Visit with Apolinar Martinez MD   [facility] ([facility])    [employer] [facility]  [address]  [facility] [address]   [phone]              Future tests that were ordered for you today     Open Future Orders        Priority Expected Expires Ordered    Hemoglobin A1c Routine 7/7/2017 7/27/2017 6/27/2017    Vitamin B12 Routine 7/7/2017 7/27/2017 6/27/2017    CBC with platelets Routine 7/7/2017 7/27/2017 6/27/2017    Lipid Profile with reflex to direct LDL Routine 7/7/2017 7/27/2017 6/27/2017    Comprehensive metabolic panel Routine 7/7/2017 7/27/2017 6/27/2017            Who to contact     If you have questions or need follow up information about today's clinic visit or your schedule please contact [facility] directly at [phone].  Normal or non-critical lab and imaging results will be communicated to you by MyChart, letter or phone within 4 business days after the clinic has received the results. If you do not hear from us within 7 days, please contact the clinic through [employer] or phone. If you have a critical or abnormal lab result, we will notify you by phone as soon as possible.  Submit refill requests through [employer] or call your pharmacy and they will forward the refill request to us. Please allow 3 business days for your refill to be completed.          Additional Information About Your Visit        [employer] Information     [employer] gives you secure access to your electronic health record. If you see a primary care provider, you can also send messages to your care team and make appointments. If you have questions, please call your primary care clinic.  If you do not have a primary care provider, please call [phone] and they will assist you.        Care EveryWhere ID     This is your Care EveryWhere ID. This could be used by other organizations to access your [place] medical records  [id number]        Your Vitals Were     Temperature Pulse Oximetry BMI (Body Mass Index)             97.9  F (36.6  C) (Tympanic) 95% 37.55 kg/m2          Blood Pressure  from Last 3 Encounters:   06/27/17 110/64   05/30/17 110/66   04/20/17 122/78    Weight from Last 3 Encounters:   06/27/17 202 lb (91.6 kg)   05/30/17 204 lb (92.5 kg)   04/20/17 204 lb (92.5 kg)                 Today's Medication Changes          These changes are accurate as of: 6/27/17  2:32 PM.  If you have any questions, ask your nurse or doctor.               Stop taking these medicines if you haven't already. Please contact your care team if you have questions.     amoxicillin-clavulanate 875-125 MG per tablet   Commonly known as:  AUGMENTIN   Stopped by:  Sandra Morales MD                    Primary Care Provider Office Phone # Fax #    Sandra Morales [phone]       [facility] [address] [facility] [address]        Equal Access to Services     [facility] Center[facility] : Hadii rod ng hadasho Soamos, waaxda luqadaha, qaybta kaalmada adeegyada, waxay lucnidain haynardan livan rivera . So [facility] [phone].    ATENCIÓN: Si habla español, tiene a waite disposición servicios gratuitos de asistencia lingüística. Llame al [phone].    We comply with applicable federal civil rights laws and [place] laws. We do not discriminate on the basis of race, color, national origin, age, disability sex, sexual orientation or gender identity.            Thank you!     Thank you for choosing [facility]  for your care. Our goal is always to provide you with excellent care. Hearing back from our patients is one way we can continue to improve our services. Please take a few minutes to complete the written survey that you may receive in the mail after your visit with us. Thank you!             Your Updated Medication List - Protect others around you: Learn how to safely use, store and throw away your medicines at www.disposemymeds.org.          This list is accurate as of: 6/27/17  2:32 PM.  Always use your most recent med list.                   Brand Name Dispense Instructions  for use Diagnosis    ACCU-CHEK MILVIA Jeana     1 device    dispense one meter    Type II or unspecified type diabetes mellitus without mention of complication, uncontrolled       acetaminophen 650 MG CR tablet    TYLENOL    60 tablet    Take 1 tablet (650 mg) by mouth 3 times daily    Bilateral cellulitis of lower leg       aspirin 81 MG tablet     30 tablet    Take 1 tablet (81 mg) by mouth daily        benzonatate 200 MG capsule    TESSALON    21 capsule    Take 1 capsule (200 mg) by mouth 3 times daily as needed for cough    Chronic cough       blood glucose monitoring lancets     1 Box    Test BG 4 times daily    Type II or unspecified type diabetes mellitus without mention of complication, uncontrolled       blood glucose monitoring test strip    ACCU-CHEK MILVIA    360 each    Use to test blood sugars 4 times daily or as directed.    Type 2 diabetes mellitus with diabetic nephropathy, with long-term current use of insulin (H)       DEPEND EASY FIT UNDERGARMENTS Misc     300 each    1 Units every 2 hours X-large    Bowel and bladder incontinence, Rectal cancer (H)       fluocinonide 0.05 % ointment    LIDEX    60 g    Apply sparingly to affected area twice daily as needed.  Do not apply to face.    Venous stasis dermatitis of both lower extremities       insulin aspart 100 UNIT/ML injection    NovoLOG FLEXPEN    3 mL    Inject 15 Units Subcutaneous 3 times daily (with meals)    Type 2 diabetes mellitus with diabetic nephropathy, with long-term current use of insulin (H)       insulin detemir 100 UNIT/ML injection    LEVEMIR FLEXPEN/FLEXTOUCH    3 mL    Inject 25 Units Subcutaneous At Bedtime Fill when needed    Type 2 diabetes mellitus with diabetic nephropathy, with long-term current use of insulin (H)       insulin pen needle 32G X 4 MM    BD GABRIELLA U/F    120 each    Use 4 times per day or as directed.    Type 2 diabetes mellitus with diabetic nephropathy, with long-term current use of insulin (H)       LASIX PO       Take 40 mg by mouth daily        levothyroxine 88 MCG tablet    SYNTHROID/LEVOTHROID    90 tablet    Take 1 tablet (88 mcg) by mouth daily        loperamide 2 MG capsule    IMODIUM    90 capsule    One capsule once a day, can use a second one if needed    Rectal cancer (H), Chronic diarrhea       losartan 100 MG tablet    COZAAR    90 tablet    Take 1 tablet (100 mg) by mouth daily    Benign essential hypertension       metoprolol 50 MG 24 hr tablet    TOPROL-XL    90 tablet    Take 1 tablet (50 mg) by mouth daily    Benign essential hypertension       mineral oil-hydrophilic petrolatum      Apply topically as needed        OMEPRAZOLE PO      Take 20 mg by mouth daily as needed        Potassium Gluconate 595 MG Caps      Take 1 tablet by mouth daily    Stasis edema of both lower extremities       rOPINIRole 1 MG tablet    REQUIP    180 tablet    1-2 tabs daily as needed for restless legs    Restless leg syndrome       simvastatin 40 MG tablet    ZOCOR    90 tablet    Take 1 tablet (40 mg) by mouth daily        syringe (disposable) 1 ML Misc     1 each    1 each every 30 days With 1 inch needle for Vit B12 injection    Vitamin B 12 deficiency       triamcinolone 0.1 % cream    KENALOG    80 g    Apply sparingly to affected area two times daily as needed    Stasis dermatitis of both legs

## 2017-06-27 NOTE — PROGRESS NOTES
SUBJECTIVE:                                                    Stefanie Velazquez is a 72 year old female who presents to clinic today for the following health issues:      ORAL surgeon follow up      Duration: since radiation    Description (location/character/radiation): had consult    Intensity:  severe    Accompanying signs and symptoms: needs surgery    History (similar episodes/previous evaluation): None         Main reason she is here, is that she wants to discuss the oral surgeon visit. They had called me and I had recommended that she not be put under general anesthesia if they can avoid it. She is agreed but doesn't want to be awake for the procedure.   I got a notice from the oral surgeon for a letter with my recommendation.    b12-was high, she had been getting shots, we decided to try off of them and see how she does    Diabetes-  Lab Results   Component Value Date    A1C 7.1 03/30/2017    A1C 8.2 10/01/2016    A1C 9.1 07/21/2016    A1C 14.6 04/05/2016    A1C 7.8 04/20/2015     Hypothyroidism-on replacement   TSH   Date Value Ref Range Status   03/30/2017 2.90 0.40 - 4.00 mU/L Final   ]       Problem list and histories reviewed & adjusted, as indicated.  Additional history: as documented    BP Readings from Last 3 Encounters:   06/27/17 110/64   05/30/17 110/66   04/20/17 122/78    Wt Readings from Last 3 Encounters:   06/27/17 202 lb (91.6 kg)   05/30/17 204 lb (92.5 kg)   04/20/17 204 lb (92.5 kg)              Reviewed and updated as needed this visit by clinical staff  Tobacco  Med Hx  Surg Hx  Fam Hx  Soc Hx      Reviewed and updated as needed this visit by Provider        ROS: 5 point ROS negative except as noted above in HPI, including Gen., Resp., CV, GI &  system review.     OBJECTIVE: /64  Temp 97.9  F (36.6  C) (Tympanic)  Wt 202 lb (91.6 kg)  SpO2 95%  BMI 37.55 kg/m2   General: appears well, no distress, sitting comfortably in wheelchair  HEENT: poor dentition as described in  my previous note  Neck: supple, no adenopathy, no JVD   Heart: regular rate and rhythm, normal S1S2, no murmur  Lungs: clear to ascultation   Abd: soft, nontender, no HSM, no mass or distention   Ext: trace edema     ASSESSMENT:  1. Dental caries    2. CKD (chronic kidney disease) stage 3, GFR 30-59 ml/min    3. Vitamin B 12 deficiency    4. Benign essential hypertension    5. Type 2 diabetes mellitus with diabetic nephropathy, with long-term current use of insulin (H)    6. Rectal cancer- newly diagnosed adenoCA rectum Jan 2011    7. Hyperlipidemia LDL goal <100    8. Other specified hypothyroidism        PLAN:  Orders Placed This Encounter     Hemoglobin A1c     Vitamin B12     CBC with platelets     Lipid Profile with reflex to direct LDL     Comprehensive metabolic panel     She's coming back to get her labs, so future labs were ordered for her.   She is to follow up with oncology as planned  Letter to her oral surgeon written and sent    Patient Instructions   1/2 the long acting insulin the night prior to any oral surgery   Day of procedure/surgery, don't take any fast acting insulin until you eat    Take your thyroid medication day of surgery early. Take your blood pressure meds day of surgery with a sip of water as well     Take your health care directives with you to the surgery      Sandra Bernstein MD

## 2017-06-27 NOTE — NURSING NOTE
"Chief Complaint   Patient presents with     Lab Result Notice     go over labs     Oral Chemotherapy Management     go over results from oral surgeon       Initial /64  Temp 97.9  F (36.6  C) (Tympanic)  Wt 202 lb (91.6 kg)  SpO2 95%  BMI 37.55 kg/m2 Estimated body mass index is 37.55 kg/(m^2) as calculated from the following:    Height as of 3/30/17: 5' 1.5\" (1.562 m).    Weight as of this encounter: 202 lb (91.6 kg).  Medication Reconciliation: complete    Health Maintenance that is potentially due pending provider review:  NONE    n/a    "

## 2017-07-07 ENCOUNTER — TELEPHONE (OUTPATIENT)
Dept: FAMILY MEDICINE | Facility: CLINIC | Age: 72
End: 2017-07-07

## 2017-07-07 ENCOUNTER — HOSPITAL ENCOUNTER (OUTPATIENT)
Dept: CARDIOLOGY | Facility: CLINIC | Age: 72
Discharge: HOME | End: 2017-07-07
Attending: INTERNAL MEDICINE | Admitting: INTERNAL MEDICINE
Payer: MEDICARE

## 2017-07-07 DIAGNOSIS — I25.9 CHRONIC ISCHEMIC HEART DISEASE: ICD-10-CM

## 2017-07-07 DIAGNOSIS — I25.10 CORONARY ARTERY DISEASE INVOLVING NATIVE CORONARY ARTERY OF NATIVE HEART WITHOUT ANGINA PECTORIS: ICD-10-CM

## 2017-07-07 PROCEDURE — 93306 TTE W/DOPPLER COMPLETE: CPT | Mod: 26 | Performed by: INTERNAL MEDICINE

## 2017-07-07 PROCEDURE — 25500064 ZZH RX 255 OP 636: Performed by: INTERNAL MEDICINE

## 2017-07-07 PROCEDURE — 40000264 ECHO COMPLETE WITH OPTISON

## 2017-07-07 RX ADMIN — HUMAN ALBUMIN MICROSPHERES AND PERFLUTREN 2 ML: 10; .22 INJECTION, SOLUTION INTRAVENOUS at 10:27

## 2017-07-07 NOTE — TELEPHONE ENCOUNTER
Reason for Call:  Form, our goal is to have forms completed with 72 hours, however, some forms may require a visit or additional information.    Type of letter, form or note:  [facility]- Plan of Care 3/25/17-5/23/17    Who is the form from?: Home care    Where did the form come from: form was faxed in    What clinic location was the form placed at?: [place]    Where the form was placed: 's Box      Call taken on 7/7/2017 at 2:02 PM by Leilani Jurado

## 2017-07-10 ENCOUNTER — TELEPHONE (OUTPATIENT)
Dept: FAMILY MEDICINE | Facility: CLINIC | Age: 72
End: 2017-07-10

## 2017-07-10 DIAGNOSIS — E53.8 VITAMIN B 12 DEFICIENCY: ICD-10-CM

## 2017-07-10 DIAGNOSIS — Z79.4 TYPE 2 DIABETES MELLITUS WITH DIABETIC NEPHROPATHY, WITH LONG-TERM CURRENT USE OF INSULIN (H): Chronic | ICD-10-CM

## 2017-07-10 DIAGNOSIS — C20 RECTAL CANCER (H): Chronic | ICD-10-CM

## 2017-07-10 DIAGNOSIS — T66.XXXS RADIATION THERAPY COMPLICATION, SEQUELA: ICD-10-CM

## 2017-07-10 DIAGNOSIS — K52.9 CHRONIC DIARRHEA: ICD-10-CM

## 2017-07-10 DIAGNOSIS — E78.5 HYPERLIPIDEMIA LDL GOAL <100: Chronic | ICD-10-CM

## 2017-07-10 DIAGNOSIS — E11.21 TYPE 2 DIABETES MELLITUS WITH DIABETIC NEPHROPATHY, WITH LONG-TERM CURRENT USE OF INSULIN (H): Chronic | ICD-10-CM

## 2017-07-10 DIAGNOSIS — K08.9 POOR DENTITION: Primary | ICD-10-CM

## 2017-07-10 DIAGNOSIS — E63.9 POOR NUTRITION: ICD-10-CM

## 2017-07-10 LAB
ALBUMIN SERPL-MCNC: 2.9 G/DL (ref 3.4–5)
ALP SERPL-CCNC: 100 U/L (ref 40–150)
ALT SERPL W P-5'-P-CCNC: 18 U/L (ref 0–50)
ANION GAP SERPL CALCULATED.3IONS-SCNC: 6 MMOL/L (ref 3–14)
AST SERPL W P-5'-P-CCNC: 25 U/L (ref 0–45)
BASOPHILS # BLD AUTO: 0 10E9/L (ref 0–0.2)
BASOPHILS NFR BLD AUTO: 0.9 %
BILIRUB SERPL-MCNC: 0.6 MG/DL (ref 0.2–1.3)
BUN SERPL-MCNC: 20 MG/DL (ref 7–30)
CALCIUM SERPL-MCNC: 9.1 MG/DL (ref 8.5–10.1)
CEA SERPL-MCNC: 4.4 UG/L (ref 0–2.5)
CHLORIDE SERPL-SCNC: 106 MMOL/L (ref 94–109)
CHOLEST SERPL-MCNC: 196 MG/DL
CO2 SERPL-SCNC: 26 MMOL/L (ref 20–32)
CREAT SERPL-MCNC: 1.51 MG/DL (ref 0.52–1.04)
DIFFERENTIAL METHOD BLD: NORMAL
EOSINOPHIL # BLD AUTO: 0.4 10E9/L (ref 0–0.7)
EOSINOPHIL NFR BLD AUTO: 8.4 %
ERYTHROCYTE [DISTWIDTH] IN BLOOD BY AUTOMATED COUNT: 13.6 % (ref 10–15)
GFR SERPL CREATININE-BSD FRML MDRD: 34 ML/MIN/1.7M2
GLUCOSE SERPL-MCNC: 123 MG/DL (ref 70–99)
HBA1C MFR BLD: 7.9 % (ref 4.3–6)
HCT VFR BLD AUTO: 39.7 % (ref 35–47)
HDLC SERPL-MCNC: 41 MG/DL
HGB BLD-MCNC: 13.5 G/DL (ref 11.7–15.7)
LDLC SERPL CALC-MCNC: 116 MG/DL
LYMPHOCYTES # BLD AUTO: 1.4 10E9/L (ref 0.8–5.3)
LYMPHOCYTES NFR BLD AUTO: 30.2 %
MCH RBC QN AUTO: 29.9 PG (ref 26.5–33)
MCHC RBC AUTO-ENTMCNC: 34 G/DL (ref 31.5–36.5)
MCV RBC AUTO: 88 FL (ref 78–100)
MONOCYTES # BLD AUTO: 0.5 10E9/L (ref 0–1.3)
MONOCYTES NFR BLD AUTO: 10.9 %
NEUTROPHILS # BLD AUTO: 2.2 10E9/L (ref 1.6–8.3)
NEUTROPHILS NFR BLD AUTO: 49.6 %
NONHDLC SERPL-MCNC: 155 MG/DL
PLATELET # BLD AUTO: 233 10E9/L (ref 150–450)
POTASSIUM SERPL-SCNC: 4.7 MMOL/L (ref 3.4–5.3)
PROT SERPL-MCNC: 6.8 G/DL (ref 6.8–8.8)
RBC # BLD AUTO: 4.52 10E12/L (ref 3.8–5.2)
SODIUM SERPL-SCNC: 138 MMOL/L (ref 133–144)
TRIGL SERPL-MCNC: 196 MG/DL
VIT B12 SERPL-MCNC: 489 PG/ML (ref 193–986)
WBC # BLD AUTO: 4.5 10E9/L (ref 4–11)

## 2017-07-10 PROCEDURE — 85025 COMPLETE CBC W/AUTO DIFF WBC: CPT | Performed by: INTERNAL MEDICINE

## 2017-07-10 PROCEDURE — 82607 VITAMIN B-12: CPT | Performed by: FAMILY MEDICINE

## 2017-07-10 PROCEDURE — 36415 COLL VENOUS BLD VENIPUNCTURE: CPT | Performed by: INTERNAL MEDICINE

## 2017-07-10 PROCEDURE — 82378 CARCINOEMBRYONIC ANTIGEN: CPT | Performed by: INTERNAL MEDICINE

## 2017-07-10 PROCEDURE — 80053 COMPREHEN METABOLIC PANEL: CPT | Performed by: INTERNAL MEDICINE

## 2017-07-10 PROCEDURE — 83036 HEMOGLOBIN GLYCOSYLATED A1C: CPT | Performed by: FAMILY MEDICINE

## 2017-07-10 PROCEDURE — 80061 LIPID PANEL: CPT | Performed by: FAMILY MEDICINE

## 2017-07-10 NOTE — LETTER
[facility]  [address] [facility] [address]  Phone: [phone]    July 11, 2017    Re: Stefanie Velazquez  [address] [facility] [address]              To Whom It May Concern:           I'm writing this letter on behalf of my patient, Stefnaie Velazquez. She has Type 2 diabetes, with chronic diarrhea from treated rectal cancer, and struggles to get good nutrition. I have ordered Glucerna for her to supplement her diet as she has very poor dentition, which will require several oral surgeries, and she now has and will have a hard time eating for the next few weeks to months. I believe this is medically necessary.       It is my pleasure to participate in Stefanie's health care needs. Please feel free to call if any questions or concerns.             Sincerely,            Sandra Bernstein MD

## 2017-07-10 NOTE — TELEPHONE ENCOUNTER
[facility] is requesting a script for Glucerna.  Please have provider sign and return to .  [facility]

## 2017-07-10 NOTE — TELEPHONE ENCOUNTER
Pt returned call- Pt is having Oral surgery at the [facility]- 1st surgery 7/25/17 & 2nd surgery 8/15/17  Pt thought  recommended the Glucerna. Pt said she has not started drinking it. She will not be able to get Dentures for 5 months post surgery. Also needs a letter of Medical Necessity in order for insurance to cover.

## 2017-07-11 ENCOUNTER — OFFICE VISIT (OUTPATIENT)
Dept: CARDIOLOGY | Facility: CLINIC | Age: 72
End: 2017-07-11
Payer: COMMERCIAL

## 2017-07-11 VITALS
OXYGEN SATURATION: 94 % | SYSTOLIC BLOOD PRESSURE: 127 MMHG | DIASTOLIC BLOOD PRESSURE: 75 MMHG | HEART RATE: 54 BPM | WEIGHT: 203 LBS | BODY MASS INDEX: 37.74 KG/M2

## 2017-07-11 DIAGNOSIS — I10 BENIGN ESSENTIAL HYPERTENSION: Chronic | ICD-10-CM

## 2017-07-11 DIAGNOSIS — I25.9 CHRONIC ISCHEMIC HEART DISEASE: Primary | Chronic | ICD-10-CM

## 2017-07-11 DIAGNOSIS — E78.5 HYPERLIPIDEMIA LDL GOAL <100: Chronic | ICD-10-CM

## 2017-07-11 PROCEDURE — G0179 MD RECERTIFICATION HHA PT: HCPCS | Performed by: FAMILY MEDICINE

## 2017-07-11 PROCEDURE — 99214 OFFICE O/P EST MOD 30 MIN: CPT | Performed by: INTERNAL MEDICINE
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
## 2017-07-11 NOTE — MR AVS SNAPSHOT
After Visit Summary   7/11/2017    Stefanie Velazquez    MRN: [id number]           Patient Information     Date Of Birth          1945        Visit Information        Provider Department      7/11/2017 3:00 PM [facility] AT [facility]        Today's Diagnoses     Chronic ischemic heart disease    -  1    Benign essential hypertension        Hyperlipidemia LDL goal <100          Care Instructions    Thank you for your  Heart Care visit today. Your provider has recommended the following:  Medication Changes:   None today. Continue taking your medications as you have been.  Recommendations:  As discussed at your visit today with Dr. Del Cid.  Follow-up:  See Dr. Del Cid for cardiology follow up in 4 months with fasting labs to be done 1-2 days prior.   We kindly ask that you call cardiology scheduling at [phone] three months prior to requested revisit date to schedule future cardiology appointments.  Reminder:  1. Please bring in your current medication list or your medication, over the counter supplements and vitamin bottles as we will review these at each office visit.               [facility]~[address]~[place], [place]~[address]  Questions about your visit today?  Call your Cardiology Clinic RN's-Paulina Crockett and/or Emily Oro at [phone].                Follow-ups after your visit        Additional Services     Follow-Up with Cardiologist                 Your next 10 appointments already scheduled     Aug 03, 2017  1:00 PM CDT   LAB with PI LAB   [facility] ([facility]    [address] [place] [id number] [phone]           Patient must bring picture ID.  Patient should be prepared to give a urine specimen  Please do not eat 10-12 hours before your appointment if you are coming in fasting for labs on lipids,  "cholesterol, or glucose (sugar).  Pregnant women should follow their Care Team instructions. Water with medications is okay. Do not drink coffee or other fluids.   If you have concerns about taking  your medications, please ask at office or if scheduling via [facility], send a message by clicking on Secure Messaging, Message Your Care Team.            Aug 09, 2017 11:15 AM CDT   MA SCREENING DIGITAL BILATERAL with W[place]MA2   [facility] ([facility])    [address]  [facility] [address]   [phone]           Do not use any powder, lotion or deodorant under your arms or on your breast. If you do, we will ask you to remove it before your exam.  Wear comfortable, two-piece clothing.  If you have any allergies, tell your care team.  Bring any previous mammograms from other facilities or have them mailed to the breast center. Three-dimensional (3D) mammograms are available at [address] locations in [facility] and [place]. Benefits of 3D mammograms include: - Improved rate of cancer detection - Decreases your chance of having to go back for more tests, which means fewer: - \"False-positive\" results (This means that there is an abnormal area but it isn't cancer.) - Invasive testing procedures, such as a biopsy or surgery - Can provide clearer images of the breast if you have dense breast tissue. 3D mammography is an optional exam that anyone can have with a 2D mammogram. It doesn't replace or take the place of a 2D mammogram. 2D mammograms remain an effective screening test for all women.  Not all insurance companies cover the cost of a 3D mammogram. Check with your insurance.            Aug 11, 2017 11:00 AM CDT   Return Visit with Apolinar Martinez MD   [facility] ([facility])    [facility]  [address]  [facility] [address]   [phone]              Future tests that were ordered for you today     Open " Future Orders        Priority Expected Expires Ordered    Follow-Up with Cardiologist Routine 11/8/2017 7/11/2018 7/11/2017    Basic metabolic panel Routine 11/8/2017 7/11/2018 7/11/2017    Lipid Profile Routine 11/8/2017 7/11/2018 7/11/2017    ALT Routine 11/8/2017 7/11/2018 7/11/2017            Who to contact     If you have questions or need follow up information about today's clinic visit or your schedule please contact [facility] PHYSICIAN HEART AT [facility] directly at [phone].  Normal or non-critical lab and imaging results will be communicated to you by [facility], letter or phone within 4 business days after the clinic has received the results. If you do not hear from us within 7 days, please contact the clinic through [facility] or phone. If you have a critical or abnormal lab result, we will notify you by phone as soon as possible.  Submit refill requests through [facility] or call your pharmacy and they will forward the refill request to us. Please allow 3 business days for your refill to be completed.          Additional Information About Your Visit        MyChart Information     [facility] gives you secure access to your electronic health record. If you see a primary care provider, you can also send messages to your care team and make appointments. If you have questions, please call your primary care clinic.  If you do not have a primary care provider, please call [phone] and they will assist you.        Care EveryWhere ID     This is your Care EveryWhere ID. This could be used by other organizations to access your [place] medical records  [id number]        Your Vitals Were     Pulse Pulse Oximetry BMI (Body Mass Index)             54 94% 37.74 kg/m2          Blood Pressure from Last 3 Encounters:   07/11/17 127/75   06/27/17 110/64   05/30/17 110/66    Weight from Last 3 Encounters:   07/11/17 92.1 kg (203 lb)   06/27/17 91.6 kg (202 lb)   05/30/17 92.5 kg (204 lb)                Primary Care Provider Office Phone # Fax #    Sandra Morales [phone]       [facility] [address] [facility] [address]        Equal Access to Services     NIDA NUNN : Hadii rod ng rosieo Sohumaali, waaxda luqadaha, qaybta kaalmada adekeylada, ceci guzman laMartinestiven moy. So [facility] [phone].    ATENCIÓN: Si habla español, tiene a waite disposición servicios gratuitos de asistencia lingüística. Llame al [phone].    We comply with applicable federal civil rights laws and [place] laws. We do not discriminate on the basis of race, color, national origin, age, disability sex, sexual orientation or gender identity.            Thank you!     Thank you for choosing [facility] PHYSICIAN HEART AT [facility]  for your care. Our goal is always to provide you with excellent care. Hearing back from our patients is one way we can continue to improve our services. Please take a few minutes to complete the written survey that you may receive in the mail after your visit with us. Thank you!             Your Updated Medication List - Protect others around you: Learn how to safely use, store and throw away your medicines at www.disposemymeds.org.          This list is accurate as of: 7/11/17  3:15 PM.  Always use your most recent med list.                   Brand Name Dispense Instructions for use Diagnosis    ACCU-CHEK MILVIA Jeana     1 device    dispense one meter    Type II or unspecified type diabetes mellitus without mention of complication, uncontrolled       acetaminophen 650 MG CR tablet    TYLENOL    60 tablet    Take 1 tablet (650 mg) by mouth 3 times daily    Bilateral cellulitis of lower leg       aspirin 81 MG tablet     30 tablet    Take 1 tablet (81 mg) by mouth daily        benzonatate 200 MG capsule    TESSALON    21 capsule    Take 1 capsule (200 mg) by mouth 3 times daily as needed for cough    Chronic cough       blood glucose monitoring  lancets     1 Box    Test BG 4 times daily    Type II or unspecified type diabetes mellitus without mention of complication, uncontrolled       blood glucose monitoring test strip    ACCU-CHEK MILVIA    360 each    Use to test blood sugars 4 times daily or as directed.    Type 2 diabetes mellitus with diabetic nephropathy, with long-term current use of insulin (H)       DEPEND EASY FIT UNDERGARMENTS Misc     300 each    1 Units every 2 hours X-large    Bowel and bladder incontinence, Rectal cancer (H)       fluocinonide 0.05 % ointment    LIDEX    60 g    Apply sparingly to affected area twice daily as needed.  Do not apply to face.    Venous stasis dermatitis of both lower extremities       insulin aspart 100 UNIT/ML injection    NovoLOG FLEXPEN    3 mL    Inject 15 Units Subcutaneous 3 times daily (with meals)    Type 2 diabetes mellitus with diabetic nephropathy, with long-term current use of insulin (H)       insulin detemir 100 UNIT/ML injection    LEVEMIR FLEXPEN/FLEXTOUCH    3 mL    Inject 25 Units Subcutaneous At Bedtime Fill when needed    Type 2 diabetes mellitus with diabetic nephropathy, with long-term current use of insulin (H)       insulin pen needle 32G X 4 MM    BD GABRIELLA U/F    120 each    Use 4 times per day or as directed.    Type 2 diabetes mellitus with diabetic nephropathy, with long-term current use of insulin (H)       LASIX PO      Take 40 mg by mouth daily        levothyroxine 88 MCG tablet    SYNTHROID/LEVOTHROID    90 tablet    Take 1 tablet (88 mcg) by mouth daily        loperamide 2 MG capsule    IMODIUM    90 capsule    One capsule once a day, can use a second one if needed    Rectal cancer (H), Chronic diarrhea       losartan 100 MG tablet    COZAAR    90 tablet    Take 1 tablet (100 mg) by mouth daily    Benign essential hypertension       metoprolol 50 MG 24 hr tablet    TOPROL-XL    90 tablet    Take 1 tablet (50 mg) by mouth daily    Benign essential hypertension       mineral  oil-hydrophilic petrolatum      Apply topically as needed        OMEPRAZOLE PO      Take 20 mg by mouth daily as needed        Potassium Gluconate 595 MG Caps      Take 1 tablet by mouth daily    Stasis edema of both lower extremities       rOPINIRole 1 MG tablet    REQUIP    180 tablet    1-2 tabs daily as needed for restless legs    Restless leg syndrome       simvastatin 40 MG tablet    ZOCOR    90 tablet    Take 1 tablet (40 mg) by mouth daily        syringe (disposable) 1 ML Misc     1 each    1 each every 30 days With 1 inch needle for Vit B12 injection    Vitamin B 12 deficiency       triamcinolone 0.1 % cream    KENALOG    80 g    Apply sparingly to affected area two times daily as needed    Stasis dermatitis of both legs

## 2017-07-11 NOTE — PATIENT INSTRUCTIONS
Thank you for your [facility] visit today. Your provider has recommended the following:  Medication Changes:   None today. Continue taking your medications as you have been.  Recommendations:  As discussed at your visit today with Dr. Del Cid.  Follow-up:  See Dr. Del Cid for cardiology follow up in 4 months with fasting labs to be done 1-2 days prior.   We kindly ask that you call cardiology scheduling at [phone] three months prior to requested revisit date to schedule future cardiology appointments.  Reminder:  1. Please bring in your current medication list or your medication, over the counter supplements and vitamin bottles as we will review these at each office visit.               [facility]  [facility]~[address]~[place], [place]~[address]  Questions about your visit today?  Call your Cardiology Clinic RN's-Paulina Crockett and/or Emily Oro at [phone].

## 2017-07-12 ENCOUNTER — MEDICAL CORRESPONDENCE (OUTPATIENT)
Dept: HEALTH INFORMATION MANAGEMENT | Facility: CLINIC | Age: 72
End: 2017-07-12

## 2017-07-13 ENCOUNTER — TELEPHONE (OUTPATIENT)
Dept: FAMILY MEDICINE | Facility: CLINIC | Age: 72
End: 2017-07-13

## 2017-07-13 NOTE — TELEPHONE ENCOUNTER
According to the cardiology note he said she needs careful monitoring (careful anesthesia, cardiac monitoring and careful fluid administration) but said nothing about staying overnight. So it is hard for me to call and tell them that, but they could ask the cardiology office to do so if that is what he wants

## 2017-07-13 NOTE — TELEPHONE ENCOUNTER
Form received back signed  7/11/17  Faxed Back & Sent to be scanned to this encounter  Leilani Orn Station [facility]

## 2017-07-13 NOTE — TELEPHONE ENCOUNTER
MARIE Pt is calling regarding she was into see the Cardiologist. He recommends her to stay over night due to past heart issues after her Dental surgery.  Pt is wondering if  could let her Dr at the  know. Please Advise.  Leilani Orn [facility]

## 2017-07-14 NOTE — TELEPHONE ENCOUNTER
Spoke with pt she is ok with calling the Cardiology Dept.  Pt will be having her Surgery at [facility] - Dr. Austin - [phone]  [facility]

## 2017-07-18 NOTE — PROGRESS NOTES
Called and spoke with patient with below information. She is checking myChart and still taking her statin. She did ask if she needs to fast for her oral surgery next week and I instructed her to contact her surgeon to ask those questions.    Kristel Campo CMA

## 2017-07-20 ENCOUNTER — DOCUMENTATION ONLY (OUTPATIENT)
Dept: LAB | Facility: CLINIC | Age: 72
End: 2017-07-20

## 2017-07-20 NOTE — PROGRESS NOTES
Patient is scheduled for a lab appointment for 8/03/17.  Please place future orders for labs needed.  Thank you.

## 2017-07-21 ENCOUNTER — TELEPHONE (OUTPATIENT)
Dept: FAMILY MEDICINE | Facility: CLINIC | Age: 72
End: 2017-07-21

## 2017-07-21 DIAGNOSIS — C20 RECTAL CANCER (H): Primary | Chronic | ICD-10-CM

## 2017-07-21 PROCEDURE — G0179 MD RECERTIFICATION HHA PT: HCPCS | Performed by: FAMILY MEDICINE

## 2017-08-02 ENCOUNTER — TELEPHONE (OUTPATIENT)
Dept: FAMILY MEDICINE | Facility: CLINIC | Age: 72
End: 2017-08-02

## 2017-08-02 NOTE — TELEPHONE ENCOUNTER
Reason for Call:  Form, our goal is to have forms completed with 72 hours, however, some forms may require a visit or additional information.    Type of letter, form or note:  medical    Who is the form from?: [facility]    Where did the form come from: form was faxed in    What clinic location was the form placed at?: [place]    Where the form was placed: [facility]           Call taken on 8/2/2017 at 3:19 PM by Ilene Butler

## 2017-08-03 ENCOUNTER — MEDICAL CORRESPONDENCE (OUTPATIENT)
Dept: HEALTH INFORMATION MANAGEMENT | Facility: CLINIC | Age: 72
End: 2017-08-03

## 2017-08-09 ENCOUNTER — HOSPITAL ENCOUNTER (OUTPATIENT)
Dept: MAMMOGRAPHY | Facility: CLINIC | Age: 72
Discharge: HOME | End: 2017-08-09
Attending: FAMILY MEDICINE | Admitting: FAMILY MEDICINE
Payer: MEDICARE

## 2017-08-09 DIAGNOSIS — Z12.31 VISIT FOR SCREENING MAMMOGRAM: ICD-10-CM

## 2017-08-09 PROCEDURE — G0202 SCR MAMMO BI INCL CAD: HCPCS

## 2017-08-11 ENCOUNTER — ONCOLOGY VISIT (OUTPATIENT)
Dept: ONCOLOGY | Facility: CLINIC | Age: 72
End: 2017-08-11
Attending: INTERNAL MEDICINE
Payer: COMMERCIAL

## 2017-08-11 VITALS
OXYGEN SATURATION: 95 % | TEMPERATURE: 97.5 F | BODY MASS INDEX: 36.49 KG/M2 | HEART RATE: 51 BPM | HEIGHT: 62 IN | WEIGHT: 198.3 LBS | DIASTOLIC BLOOD PRESSURE: 48 MMHG | RESPIRATION RATE: 20 BRPM | SYSTOLIC BLOOD PRESSURE: 103 MMHG

## 2017-08-11 DIAGNOSIS — C20 RECTAL CANCER (H): Primary | Chronic | ICD-10-CM

## 2017-08-11 PROCEDURE — 99211 OFF/OP EST MAY X REQ PHY/QHP: CPT

## 2017-08-11 PROCEDURE — 99214 OFFICE O/P EST MOD 30 MIN: CPT | Performed by: INTERNAL MEDICINE

## 2017-08-11 RX ORDER — FLUTICASONE PROPIONATE 50 MCG
1 SPRAY, SUSPENSION (ML) NASAL
COMMUNITY
Start: 2013-03-25 | End: 2018-09-18

## 2017-08-11 RX ORDER — CHLORHEXIDINE GLUCONATE ORAL RINSE 1.2 MG/ML
SOLUTION DENTAL
COMMUNITY
Start: 2017-07-25 | End: 2018-09-18

## 2017-08-11 RX ORDER — CYANOCOBALAMIN 1000 UG/ML
1000 INJECTION, SOLUTION INTRAMUSCULAR; SUBCUTANEOUS
COMMUNITY
Start: 2013-04-01 | End: 2017-08-11

## 2017-08-11 RX ORDER — OXYCODONE HYDROCHLORIDE 5 MG/1
TABLET ORAL
COMMUNITY
Start: 2017-07-25 | End: 2018-03-20

## 2017-08-11 ASSESSMENT — PAIN SCALES - GENERAL: PAINLEVEL: MILD PAIN (3)

## 2017-08-11 NOTE — MR AVS SNAPSHOT
After Visit Summary   8/11/2017    Stefanie Velazquez    MRN: [id number]           Patient Information     Date Of Birth          1945        Visit Information        Provider Department      8/11/2017 11:00 AM Apolinar Martinez MD [facility] ONCOLOGY      Today's Diagnoses     Rectal cancer- newly diagnosed adenoCA rectum Jan 2011    -  1       Follow-ups after your visit        Follow-up notes from your care team     Return in about 6 months (around 2/11/2018) for Blood work before next appointment.      Your next 10 appointments already scheduled     Oct 19, 2017  9:15 AM CDT   LAB with NB LAB   [facility] ([facility]    [address] [place] [id number]   [phone]           Patient must bring picture ID. Patient should be prepared to give a urine specimen  Please do not eat 10-12 hours before your appointment if you are coming in fasting for labs on lipids, cholesterol, or glucose (sugar). Pregnant women should follow their Care Team instructions. Water with medications is okay. Do not drink coffee or other fluids. If you have concerns about taking  your medications, please ask at office or if scheduling via [employer], send a message by clicking on Secure Messaging, Message Your Care Team.            Oct 23, 2017  3:00 PM CDT   Return Visit with Terence Del Cid MD   [facility] PHYSICIAN HEART AT [facility] ([facility])    [address] [place] [id number] [phone]              Future tests that were ordered for you today     Open Future Orders        Priority Expected Expires Ordered    Comprehensive metabolic panel Routine 2/11/2018 8/11/2018 8/11/2017    CBC with platelets differential Routine 2/11/2018 8/11/2018 8/11/2017    CEA Routine 2/11/2018 8/11/2018 8/11/2017            Who to contact     If you have questions or need follow up information about today's clinic visit or your  "schedule please contact [facility] directly at [phone].  Normal or non-critical lab and imaging results will be communicated to you by MyChart, letter or phone within 4 business days after the clinic has received the results. If you do not hear from us within 7 days, please contact the clinic through MyChart or phone. If you have a critical or abnormal lab result, we will notify you by phone as soon as possible.  Submit refill requests through [employer] or call your pharmacy and they will forward the refill request to us. Please allow 3 business days for your refill to be completed.          Additional Information About Your Visit        [employer] Information     [employer] gives you secure access to your electronic health record. If you see a primary care provider, you can also send messages to your care team and make appointments. If you have questions, please call your primary care clinic.  If you do not have a primary care provider, please call [phone] and they will assist you.        Care EveryWhere ID     This is your Care EveryWhere ID. This could be used by other organizations to access your [facility] medical records  [id number]        Your Vitals Were     Pulse Temperature Respirations Height Pulse Oximetry Breastfeeding?    51 97.5  F (36.4  C) (Tympanic) 20 1.575 m (5' 2\") 95% No    BMI (Body Mass Index)                   36.27 kg/m2            Blood Pressure from Last 3 Encounters:   08/11/17 103/48   07/11/17 127/75   06/27/17 110/64    Weight from Last 3 Encounters:   08/11/17 89.9 kg (198 lb 4.8 oz)   07/11/17 92.1 kg (203 lb)   06/27/17 91.6 kg (202 lb)               Primary Care Provider Office Phone # Fax #    Sandra Morales [phone] [address] [place] [address]        Equal Access to Services     NIDA NUNN : Cristine velezo Soomaali, waaxda luqadaha, qaybta kaalmada aderenettayada, ceci moy. So wa " [phone].    ATENCIÓN: Si balwinder kelly, tiene a waite disposición servicios gratuitos de asistencia lingüística. Kosta cooper [phone].    We comply with applicable federal civil rights laws and [place] laws. We do not discriminate on the basis of race, color, national origin, age, disability sex, sexual orientation or gender identity.            Thank you!     Thank you for choosing [facility] CANCER [facility]  for your care. Our goal is always to provide you with excellent care. Hearing back from our patients is one way we can continue to improve our services. Please take a few minutes to complete the written survey that you may receive in the mail after your visit with us. Thank you!             Your Updated Medication List - Protect others around you: Learn how to safely use, store and throw away your medicines at www.disposemymeds.org.          This list is accurate as of: 8/11/17 11:45 AM.  Always use your most recent med list.                   Brand Name Dispense Instructions for use Diagnosis    ACCU-CHEK MILVIA Jeana     1 device    dispense one meter    Type II or unspecified type diabetes mellitus without mention of complication, uncontrolled       acetaminophen 650 MG CR tablet    TYLENOL    60 tablet    Take 1 tablet (650 mg) by mouth 3 times daily    Bilateral cellulitis of lower leg       aspirin 81 MG tablet     30 tablet    Take 1 tablet (81 mg) by mouth daily        benzonatate 200 MG capsule    TESSALON    21 capsule    Take 1 capsule (200 mg) by mouth 3 times daily as needed for cough    Chronic cough       blood glucose monitoring lancets     1 Box    Test BG 4 times daily    Type II or unspecified type diabetes mellitus without mention of complication, uncontrolled       blood glucose monitoring test strip    ACCU-CHEK MILVIA    360 each    Use to test blood sugars 4 times daily or as directed.    Type 2 diabetes mellitus with diabetic nephropathy, with long-term current use of insulin (H)        chlorhexidine 0.12 % solution    PERIDEX          DEPEND EASY FIT UNDERGARMENTS Misc     300 each    1 Units every 2 hours X-large    Bowel and bladder incontinence, Rectal cancer (H)       fluocinonide 0.05 % ointment    LIDEX    60 g    Apply sparingly to affected area twice daily as needed.  Do not apply to face.    Venous stasis dermatitis of both lower extremities       fluticasone 50 MCG/ACT spray    FLONASE     1 spray        GLUCERNA OS Liqd     24 each    One can two to three times a day    Poor dentition, Poor nutrition, Type 2 diabetes mellitus with diabetic nephropathy, with long-term current use of insulin (H), Rectal cancer (H), Chronic diarrhea, Radiation therapy complication, sequela       hypromellose 0.3 % Soln ophthalmic solution    GENTEAL     2 drops        insulin aspart 100 UNIT/ML injection    NovoLOG FLEXPEN    3 mL    Inject 15 Units Subcutaneous 3 times daily (with meals)    Type 2 diabetes mellitus with diabetic nephropathy, with long-term current use of insulin (H)       insulin detemir 100 UNIT/ML injection    LEVEMIR FLEXPEN/FLEXTOUCH    3 mL    Inject 25 Units Subcutaneous At Bedtime Fill when needed    Type 2 diabetes mellitus with diabetic nephropathy, with long-term current use of insulin (H)       insulin pen needle 32G X 4 MM    BD GABRIELLA U/F    120 each    Use 4 times per day or as directed.    Type 2 diabetes mellitus with diabetic nephropathy, with long-term current use of insulin (H)       LASIX PO      Take 40 mg by mouth daily        levothyroxine 88 MCG tablet    SYNTHROID/LEVOTHROID    90 tablet    Take 1 tablet (88 mcg) by mouth daily        loperamide 2 MG capsule    IMODIUM    90 capsule    One capsule once a day, can use a second one if needed    Rectal cancer (H), Chronic diarrhea       losartan 100 MG tablet    COZAAR    90 tablet    Take 1 tablet (100 mg) by mouth daily    Benign essential hypertension       metoprolol 50 MG 24 hr tablet    TOPROL-XL    90 tablet     Take 1 tablet (50 mg) by mouth daily    Benign essential hypertension       mineral oil-hydrophilic petrolatum      Apply topically as needed        OMEPRAZOLE PO      Take 20 mg by mouth daily as needed        oxyCODONE 5 MG IR tablet    ROXICODONE          Potassium Gluconate 595 MG Caps      Take 1 tablet by mouth daily    Stasis edema of both lower extremities       rOPINIRole 1 MG tablet    REQUIP    180 tablet    1-2 tabs daily as needed for restless legs    Restless leg syndrome       simvastatin 40 MG tablet    ZOCOR    90 tablet    Take 1 tablet (40 mg) by mouth daily        syringe (disposable) 1 ML Misc     1 each    1 each every 30 days With 1 inch needle for Vit B12 injection    Vitamin B 12 deficiency       triamcinolone 0.1 % cream    KENALOG    80 g    Apply sparingly to affected area two times daily as needed    Stasis dermatitis of both legs

## 2017-08-11 NOTE — PATIENT INSTRUCTIONS
We would like to see you back in clinic with Dr. Martinez in 6 months with labs prior. Copy of appointments, and after visit summary (AVS) given to patient.  If you have any questions during business hours (M-F 8 AM- 4PM), please call Sarika Man RN, BSN, OCN Oncology Hematology /Breast Cancer Navigator at [facility] [phone].   For questions after business hours, or on holidays/weekends, please call our after hours Nurse Triage line [phone]. Thank you.

## 2017-08-11 NOTE — NURSING NOTE
"Oncology Rooming Note    August 11, 2017 11:13 AM   Stefanie Velazquez is a 72 year old female who presents for:    Chief Complaint   Patient presents with     Oncology Clinic Visit     6 month Rectal CA. Review labs and Mammogram.      Initial Vitals: /48 (BP Location: Left arm, Patient Position: Sitting, Cuff Size: Adult Large)  Pulse 51  Temp 97.5  F (36.4  C) (Tympanic)  Resp 20  Ht 1.575 m (5' 2\")  Wt 89.9 kg (198 lb 4.8 oz)  SpO2 95%  Breastfeeding? No  BMI 36.27 kg/m2 Estimated body mass index is 36.27 kg/(m^2) as calculated from the following:    Height as of this encounter: 1.575 m (5' 2\").    Weight as of this encounter: 89.9 kg (198 lb 4.8 oz). Body surface area is 1.98 meters squared.  Mild Pain (3) Comment: teeth   No LMP recorded. Patient is postmenopausal.  Allergies reviewed: Yes  Medications reviewed: Yes    Medications: Medication refills not needed today.  Pharmacy name entered into [employer]: [id number] - [place], [place] - [address] [place] [address]    Clinical concerns: 6 month follow up Rectal CA. Review labs and Mammogram. Recent dental work done maintaining a liquid diet.     10 minutes for nursing intake (face to face time)     Marva Del Rio, [facility]            "
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
## 2017-08-17 ENCOUNTER — TELEPHONE (OUTPATIENT)
Dept: FAMILY MEDICINE | Facility: CLINIC | Age: 72
End: 2017-08-17

## 2017-08-17 ENCOUNTER — MEDICAL CORRESPONDENCE (OUTPATIENT)
Dept: HEALTH INFORMATION MANAGEMENT | Facility: CLINIC | Age: 72
End: 2017-08-17

## 2017-08-17 NOTE — TELEPHONE ENCOUNTER
Reason for Call:  Form, our goal is to have forms completed with 72 hours, however, some forms may require a visit or additional information.    Type of letter, form or note:  [facility]- O[facility]one    Who is the form from?: [facility] (if other please explain)    Where did the form come from: form was faxed in    What clinic location was the form placed at?: [place]    Where the form was placed: [facility]      Call taken on 8/17/2017 at 11:16 AM by Leilani Jurado

## 2017-08-18 NOTE — TELEPHONE ENCOUNTER
Form received back signed  8/17/17  Faxed Back & Sent to be scanned to this encounter  Leilani [place] [facility]

## 2017-09-08 ENCOUNTER — TELEPHONE (OUTPATIENT)
Dept: FAMILY MEDICINE | Facility: CLINIC | Age: 72
End: 2017-09-08

## 2017-09-08 NOTE — TELEPHONE ENCOUNTER
Pt said she is done with her Dental Surgery Issues. She had her last visit last week. Pt is wondering if  wants to see her regarding her Dental Issues. She does not have any complaints  [facility]

## 2017-09-11 NOTE — TELEPHONE ENCOUNTER
If everything is going well, I can see her mid Oct when her A1C is due. Any problems, have her see me

## 2017-09-11 NOTE — TELEPHONE ENCOUNTER
Pt notified and understood. Will call back for her Appt in Oct. Has to line up a ride.  Leilani [place]n [place] Sec

## 2017-09-12 ENCOUNTER — CARE COORDINATION (OUTPATIENT)
Dept: CARE COORDINATION | Facility: CLINIC | Age: 72
End: 2017-09-12

## 2017-09-13 NOTE — PROGRESS NOTES
Clinic Care Coordination Contact  Care Team Conversations    Patient was last contacted 6/17/17 date. Chart reviewed. No concerns noted at this time. Patient has not outreached to RN CC.  Patient has county services involved and home care agency. Patient has a letter with my contact information and access plan to reference if needed. Will close to active CC outreaches at this time.    Lauren Varma RN, [facility]  RN Care Coordinator  [facility]  Phone # [phone]  9/12/2017 8:24 PM

## 2017-09-18 ENCOUNTER — TELEPHONE (OUTPATIENT)
Dept: FAMILY MEDICINE | Facility: CLINIC | Age: 72
End: 2017-09-18

## 2017-09-18 ENCOUNTER — MEDICAL CORRESPONDENCE (OUTPATIENT)
Dept: HEALTH INFORMATION MANAGEMENT | Facility: CLINIC | Age: 72
End: 2017-09-18

## 2017-09-18 PROCEDURE — G0179 MD RECERTIFICATION HHA PT: HCPCS | Performed by: FAMILY MEDICINE

## 2017-09-29 DIAGNOSIS — E78.5 HYPERLIPIDEMIA LDL GOAL <70: ICD-10-CM

## 2017-09-30 NOTE — TELEPHONE ENCOUNTER
simvastatin (ZOCOR) 40 MG tablet     Last Written Prescription Date: 9/30/16  Last Fill Quantity: 90, # refills: 2  Last Office Visit with [facility] or [facility] prescribing provider: 6/27/17  Next 5 appointments (look out 90 days)     Oct 23, 2017  3:00 PM CDT   Return Visit with Terence Del Cid MD   [facility] PHYSICIAN HEART AT [facility]    [address] [place] [id number]   [phone]                   Lab Results   Component Value Date    CHOL 196 07/10/2017     Lab Results   Component Value Date    HDL 41 07/10/2017     Lab Results   Component Value Date     07/10/2017     Lab Results   Component Value Date    TRIG 196 07/10/2017     Lab Results   Component Value Date    CHOLHDLRATIO 2.6 09/16/2015     Suzy LOZANOR)

## 2017-10-02 DIAGNOSIS — E11.21 TYPE 2 DIABETES MELLITUS WITH DIABETIC NEPHROPATHY, WITH LONG-TERM CURRENT USE OF INSULIN (H): Chronic | ICD-10-CM

## 2017-10-02 DIAGNOSIS — Z79.4 TYPE 2 DIABETES MELLITUS WITH DIABETIC NEPHROPATHY, WITH LONG-TERM CURRENT USE OF INSULIN (H): Chronic | ICD-10-CM

## 2017-10-02 RX ORDER — SIMVASTATIN 40 MG
TABLET ORAL
Qty: 90 TABLET | Refills: 2 | Status: SHIPPED | OUTPATIENT
Start: 2017-10-02 | End: 2017-10-23

## 2017-10-16 DIAGNOSIS — I25.9 CHRONIC ISCHEMIC HEART DISEASE: Chronic | ICD-10-CM

## 2017-10-16 DIAGNOSIS — E78.5 HYPERLIPIDEMIA LDL GOAL <100: Chronic | ICD-10-CM

## 2017-10-16 DIAGNOSIS — I10 BENIGN ESSENTIAL HYPERTENSION: Chronic | ICD-10-CM

## 2017-10-16 LAB
ALT SERPL W P-5'-P-CCNC: 19 U/L (ref 0–50)
ANION GAP SERPL CALCULATED.3IONS-SCNC: 8 MMOL/L (ref 3–14)
BUN SERPL-MCNC: 24 MG/DL (ref 7–30)
CALCIUM SERPL-MCNC: 9.3 MG/DL (ref 8.5–10.1)
CHLORIDE SERPL-SCNC: 105 MMOL/L (ref 94–109)
CHOLEST SERPL-MCNC: 183 MG/DL
CO2 SERPL-SCNC: 25 MMOL/L (ref 20–32)
CREAT SERPL-MCNC: 1.4 MG/DL (ref 0.52–1.04)
GFR SERPL CREATININE-BSD FRML MDRD: 37 ML/MIN/1.7M2
GLUCOSE SERPL-MCNC: 93 MG/DL (ref 70–99)
HDLC SERPL-MCNC: 56 MG/DL
LDLC SERPL CALC-MCNC: 91 MG/DL
NONHDLC SERPL-MCNC: 127 MG/DL
POTASSIUM SERPL-SCNC: 4.1 MMOL/L (ref 3.4–5.3)
SODIUM SERPL-SCNC: 138 MMOL/L (ref 133–144)
TRIGL SERPL-MCNC: 178 MG/DL

## 2017-10-16 PROCEDURE — 80061 LIPID PANEL: CPT | Performed by: INTERNAL MEDICINE

## 2017-10-16 PROCEDURE — 36415 COLL VENOUS BLD VENIPUNCTURE: CPT | Performed by: INTERNAL MEDICINE

## 2017-10-16 PROCEDURE — 80048 BASIC METABOLIC PNL TOTAL CA: CPT | Performed by: INTERNAL MEDICINE

## 2017-10-16 PROCEDURE — 84460 ALANINE AMINO (ALT) (SGPT): CPT | Performed by: INTERNAL MEDICINE

## 2017-10-23 ENCOUNTER — TRANSFERRED RECORDS (OUTPATIENT)
Dept: HEALTH INFORMATION MANAGEMENT | Facility: CLINIC | Age: 72
End: 2017-10-23

## 2017-10-23 ENCOUNTER — OFFICE VISIT (OUTPATIENT)
Dept: CARDIOLOGY | Facility: CLINIC | Age: 72
End: 2017-10-23
Attending: INTERNAL MEDICINE
Payer: COMMERCIAL

## 2017-10-23 VITALS
SYSTOLIC BLOOD PRESSURE: 153 MMHG | HEART RATE: 57 BPM | WEIGHT: 199 LBS | DIASTOLIC BLOOD PRESSURE: 68 MMHG | BODY MASS INDEX: 36.4 KG/M2 | OXYGEN SATURATION: 96 %

## 2017-10-23 DIAGNOSIS — E78.5 HYPERLIPIDEMIA LDL GOAL <100: Chronic | ICD-10-CM

## 2017-10-23 DIAGNOSIS — I10 BENIGN ESSENTIAL HYPERTENSION: Chronic | ICD-10-CM

## 2017-10-23 DIAGNOSIS — I25.9 CHRONIC ISCHEMIC HEART DISEASE: Chronic | ICD-10-CM

## 2017-10-23 PROCEDURE — 99214 OFFICE O/P EST MOD 30 MIN: CPT | Performed by: INTERNAL MEDICINE

## 2017-10-23 RX ORDER — FUROSEMIDE 20 MG
20 TABLET ORAL DAILY
Qty: 90 TABLET | Refills: 3 | Status: SHIPPED | OUTPATIENT
Start: 2017-10-23 | End: 2017-11-10

## 2017-10-23 RX ORDER — FUROSEMIDE 20 MG
40 TABLET ORAL DAILY
Qty: 30 TABLET | Refills: 11 | Status: SHIPPED | OUTPATIENT
Start: 2017-10-23 | End: 2017-10-23
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
## 2017-10-23 NOTE — PATIENT INSTRUCTIONS
Thank you for your [facility] visit today. Your provider has recommended the following:  Medication Changes:  1. Take Furosemide (Lasix) 20 mg once a day in the morning.  Recommendations:  As discussed at your visit today with Dr. Del Cid  Follow-up:  See Dr. Del Cid for cardiology follow up in 4 months with fasting labs to be done 1-2 days prior to this revisit..  We kindly ask that you call cardiology scheduling at [phone] three months prior to requested revisit date to schedule future cardiology appointments.  Reminder:  1. Please bring in your current medication list or your medication, over the counter supplements and vitamin bottles as we will review these at each office visit.               [facility] HEART CARE  [facility]~[address]~[place], [place]~[address]  Questions about your visit today?  Call your Cardiology Clinic RN's-Paulina Crockett and/or Emily Oro at [phone].
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
## 2017-10-23 NOTE — MR AVS SNAPSHOT
After Visit Summary   10/23/2017    Stefanie Velazquez    MRN: [id number]           Patient Information     Date Of Birth          1945        Visit Information        Provider Department      10/23/2017 3:00 PM Terence Del Cid MD [facility] PHYSICIAN HEART AT [facility]        Today's Diagnoses     Benign essential hypertension        Chronic ischemic heart disease        Hyperlipidemia LDL goal <100          Care Instructions    Thank you for your  Heart Care visit today. Your provider has recommended the following:  Medication Changes:  1. Take Furosemide (Lasix) 20 mg once a day in the morning.  Recommendations:  As discussed at your visit today with Dr. Del Cid  Follow-up:  See Dr. Del Cid for cardiology follow up in 4 months with fasting labs to be done 1-2 days prior to this revisit..  We kindly ask that you call cardiology scheduling at [phone] three months prior to requested revisit date to schedule future cardiology appointments.  Reminder:  1. Please bring in your current medication list or your medication, over the counter supplements and vitamin bottles as we will review these at each office visit.               [facility]~[address]~[place], [place]~[address]  Questions about your visit today?  Call your Cardiology Clinic RN's-Paulina Crockett and/or Emily Oro at [phone].                Follow-ups after your visit        Additional Services     Follow-Up with Cardiologist                 Your next 10 appointments already scheduled     Mar 16, 2018  2:00 PM CDT   [facility]   [facility]    [address] [id number] [phone]           Patient must bring picture ID. Patient should be prepared to give a urine specimen  Please do not eat 10-12 hours before your appointment if you are coming in fasting for labs on lipids,  cholesterol, or glucose (sugar). Pregnant women should follow their Care Team instructions. Water with medications is okay. Do not drink coffee or other fluids. If you have concerns about taking  your medications, please ask at office or if scheduling via Groupspeak, send a message by clicking on Secure Messaging, Message Your Care Team.            Mar 23, 2018 11:30 AM CDT   Return Visit with Apolinar Martinez MD   [facility] ([facility])    [facility]  [address]  [facility] [address]   [phone]              Future tests that were ordered for you today     Open Future Orders        Priority Expected Expires Ordered    Basic metabolic panel Routine 2/20/2018 10/23/2018 10/23/2017    Lipid Profile Routine 2/20/2018 10/23/2018 10/23/2017    ALT Routine 2/20/2018 10/23/2018 10/23/2017    Follow-Up with Cardiologist Routine 2/20/2018 10/23/2018 10/23/2017            Who to contact     If you have questions or need follow up information about today's clinic visit or your schedule please contact [facility] PHYSICIAN HEART AT [facility] directly at [phone].  Normal or non-critical lab and imaging results will be communicated to you by [employer], letter or phone within 4 business days after the clinic has received the results. If you do not hear from us within 7 days, please contact the clinic through [employer] or phone. If you have a critical or abnormal lab result, we will notify you by phone as soon as possible.  Submit refill requests through Groupspeak or call your pharmacy and they will forward the refill request to us. Please allow 3 business days for your refill to be completed.          Additional Information About Your Visit        Groupspeak Information     Groupspeak gives you secure access to your electronic health record. If you see a primary care provider, you can also send messages to your care team and make appointments. If you have  questions, please call your primary care clinic.  If you do not have a primary care provider, please call [phone] and they will assist you.        Care EveryWhere ID     This is your Care EveryWhere ID. This could be used by other organizations to access your [place] medical records  [id number]        Your Vitals Were     Pulse Pulse Oximetry BMI (Body Mass Index)             57 96% 36.4 kg/m2          Blood Pressure from Last 3 Encounters:   10/23/17 153/68   08/11/17 103/48   07/11/17 127/75    Weight from Last 3 Encounters:   10/23/17 90.3 kg (199 lb)   08/11/17 89.9 kg (198 lb 4.8 oz)   07/11/17 92.1 kg (203 lb)              We Performed the Following     Follow-Up with Cardiologist          Today's Medication Changes          These changes are accurate as of: 10/23/17  3:45 PM.  If you have any questions, ask your nurse or doctor.               Start taking these medicines.        Dose/Directions    furosemide 20 MG tablet   Commonly known as:  LASIX   Used for:  Benign essential hypertension   Started by:  Terence Del Cid MD        Dose:  20 mg   Take 1 tablet (20 mg) by mouth daily   Quantity:  90 tablet   Refills:  3         These medicines have changed or have updated prescriptions.        Dose/Directions    simvastatin 40 MG tablet   Commonly known as:  ZOCOR   This may have changed:  Another medication with the same name was removed. Continue taking this medication, and follow the directions you see here.   Changed by:  Sandra Morales MD        Dose:  40 mg   Take 1 tablet (40 mg) by mouth daily   Quantity:  90 tablet   Refills:  2         Stop taking these medicines if you haven't already. Please contact your care team if you have questions.     benzonatate 200 MG capsule   Commonly known as:  TESSALON   Stopped by:  Terence Del Cid MD                Where to get your medicines      These medications were sent to [facility] [address] [place] [address] - [facility] [address] [address]  [address] [facility] [address]     Phone:  [phone]     furosemide 20 MG tablet                Primary Care Provider Office Phone # Fax #    Sandra Morales [phone]       [address] [facility] [address]        Equal Access to Services     NIDA NUNN : Hadii aad ku hadasho Soomaali, waaxda luqadaha, qaybta kaalmada adeegyada, waxay idiin hayaan adeeg megan laMartinestiven moy. So [facility] [phone].    ATENCIÓN: Si habla español, tiene a waite disposición servicios gratuitos de asistencia lingüística. Llame al [phone].    We comply with applicable federal civil rights laws and [place] laws. We do not discriminate on the basis of race, color, national origin, age, disability, sex, sexual orientation, or gender identity.            Thank you!     Thank you for choosing [facility] PHYSICIAN HEART AT [facility]  for your care. Our goal is always to provide you with excellent care. Hearing back from our patients is one way we can continue to improve our services. Please take a few minutes to complete the written survey that you may receive in the mail after your visit with us. Thank you!             Your Updated Medication List - Protect others around you: Learn how to safely use, store and throw away your medicines at www.disposemymeds.org.          This list is accurate as of: 10/23/17  3:45 PM.  Always use your most recent med list.                   Brand Name Dispense Instructions for use Diagnosis    ACCU-CHEK MILVIA Jeana     1 device    dispense one meter    Type II or unspecified type diabetes mellitus without mention of complication, uncontrolled       acetaminophen 650 MG CR tablet    TYLENOL    60 tablet    Take 1 tablet (650 mg) by mouth 3 times daily    Bilateral cellulitis of lower leg       aspirin 81 MG tablet     30 tablet    Take 1 tablet (81 mg) by mouth daily        blood glucose monitoring lancets     1 Box    Test BG 4 times daily    Type II or unspecified  type diabetes mellitus without mention of complication, uncontrolled       blood glucose monitoring test strip    ACCU-CHEK MILVIA    360 each    Use to test blood sugars 4 times daily or as directed.    Type 2 diabetes mellitus with diabetic nephropathy, with long-term current use of insulin (H)       chlorhexidine 0.12 % solution    PERIDEX          DEPEND EASY FIT UNDERGARMENTS Misc     300 each    1 Units every 2 hours X-large    Bowel and bladder incontinence, Rectal cancer (H)       fluocinonide 0.05 % ointment    LIDEX    60 g    Apply sparingly to affected area twice daily as needed.  Do not apply to face.    Venous stasis dermatitis of both lower extremities       fluticasone 50 MCG/ACT spray    FLONASE     1 spray        furosemide 20 MG tablet    LASIX    90 tablet    Take 1 tablet (20 mg) by mouth daily    Benign essential hypertension       GLUCERNA OS Liqd     24 each    One can two to three times a day    Poor dentition, Poor nutrition, Type 2 diabetes mellitus with diabetic nephropathy, with long-term current use of insulin (H), Rectal cancer (H), Chronic diarrhea, Radiation therapy complication, sequela       hypromellose 0.3 % Soln ophthalmic solution    GENTEAL     2 drops        insulin aspart 100 UNIT/ML injection    NovoLOG FLEXPEN    3 mL    Inject 15 Units Subcutaneous 3 times daily (with meals)    Type 2 diabetes mellitus with diabetic nephropathy, with long-term current use of insulin (H)       insulin detemir 100 UNIT/ML injection    LEVEMIR FLEXPEN/FLEXTOUCH    3 mL    Inject 25 Units Subcutaneous At Bedtime Fill when needed    Type 2 diabetes mellitus with diabetic nephropathy, with long-term current use of insulin (H)       insulin pen needle 32G X 4 MM    BD GABRIELLA U/F    120 each    Use 4 times per day or as directed.    Type 2 diabetes mellitus with diabetic nephropathy, with long-term current use of insulin (H)       levothyroxine 88 MCG tablet    SYNTHROID/LEVOTHROID    90 tablet     Take 1 tablet (88 mcg) by mouth daily        loperamide 2 MG capsule    IMODIUM    90 capsule    One capsule once a day, can use a second one if needed    Rectal cancer (H), Chronic diarrhea       losartan 100 MG tablet    COZAAR    90 tablet    Take 1 tablet (100 mg) by mouth daily    Benign essential hypertension       metoprolol 50 MG 24 hr tablet    TOPROL-XL    90 tablet    Take 1 tablet (50 mg) by mouth daily    Benign essential hypertension       mineral oil-hydrophilic petrolatum      Apply topically as needed        OMEPRAZOLE PO      Take 20 mg by mouth daily as needed        oxyCODONE 5 MG IR tablet    ROXICODONE          Potassium Gluconate 595 MG Caps      Take 1 tablet by mouth daily    Stasis edema of both lower extremities       rOPINIRole 1 MG tablet    REQUIP    180 tablet    1-2 tabs daily as needed for restless legs    Restless leg syndrome       simvastatin 40 MG tablet    ZOCOR    90 tablet    Take 1 tablet (40 mg) by mouth daily        syringe (disposable) 1 ML Misc     1 each    1 each every 30 days With 1 inch needle for Vit B12 injection    Vitamin B 12 deficiency       triamcinolone 0.1 % cream    KENALOG    80 g    Apply sparingly to affected area two times daily as needed    Stasis dermatitis of both legs

## 2017-11-03 ENCOUNTER — TELEPHONE (OUTPATIENT)
Dept: FAMILY MEDICINE | Facility: CLINIC | Age: 72
End: 2017-11-03

## 2017-11-03 NOTE — TELEPHONE ENCOUNTER
Reason for Call:  Form, our goal is to have forms completed with 72 hours, however, some forms may require a visit or additional information.    Type of letter, form or note:  [facility]    Who is the form from?: [facility] (if other please explain)    Where did the form come from: form was faxed in    What clinic location was the form placed at?: [place]    Where the form was placed: 's Box        Call taken on 11/3/2017 at 1:54 PM by Leilani Jurado

## 2017-11-06 ENCOUNTER — MEDICAL CORRESPONDENCE (OUTPATIENT)
Dept: HEALTH INFORMATION MANAGEMENT | Facility: CLINIC | Age: 72
End: 2017-11-06

## 2017-11-06 NOTE — TELEPHONE ENCOUNTER
Form received back signed  11/6/17  Faxed Back & Sent to be scanned to this encounter  Leilani [place] Sec

## 2017-11-10 ENCOUNTER — RADIANT APPOINTMENT (OUTPATIENT)
Dept: GENERAL RADIOLOGY | Facility: CLINIC | Age: 72
End: 2017-11-10
Attending: FAMILY MEDICINE
Payer: COMMERCIAL

## 2017-11-10 ENCOUNTER — OFFICE VISIT (OUTPATIENT)
Dept: FAMILY MEDICINE | Facility: CLINIC | Age: 72
End: 2017-11-10
Payer: COMMERCIAL

## 2017-11-10 VITALS
WEIGHT: 198 LBS | RESPIRATION RATE: 20 BRPM | DIASTOLIC BLOOD PRESSURE: 70 MMHG | TEMPERATURE: 98.1 F | OXYGEN SATURATION: 95 % | HEART RATE: 69 BPM | SYSTOLIC BLOOD PRESSURE: 150 MMHG | BODY MASS INDEX: 36.21 KG/M2

## 2017-11-10 DIAGNOSIS — I95.2 HYPOTENSION DUE TO DRUGS: Primary | ICD-10-CM

## 2017-11-10 DIAGNOSIS — R21 RASH AND NONSPECIFIC SKIN ERUPTION: ICD-10-CM

## 2017-11-10 DIAGNOSIS — C20 RECTAL CANCER (H): Chronic | ICD-10-CM

## 2017-11-10 DIAGNOSIS — I10 BENIGN ESSENTIAL HYPERTENSION: Chronic | ICD-10-CM

## 2017-11-10 DIAGNOSIS — Z79.4 TYPE 2 DIABETES MELLITUS WITH DIABETIC NEPHROPATHY, WITH LONG-TERM CURRENT USE OF INSULIN (H): Chronic | ICD-10-CM

## 2017-11-10 DIAGNOSIS — M79.604 PAIN OF RIGHT LOWER EXTREMITY: ICD-10-CM

## 2017-11-10 DIAGNOSIS — R79.0 LOW MAGNESIUM LEVEL: ICD-10-CM

## 2017-11-10 DIAGNOSIS — Z23 NEED FOR PROPHYLACTIC VACCINATION AND INOCULATION AGAINST INFLUENZA: ICD-10-CM

## 2017-11-10 DIAGNOSIS — E03.8 OTHER SPECIFIED HYPOTHYROIDISM: Chronic | ICD-10-CM

## 2017-11-10 DIAGNOSIS — E11.21 TYPE 2 DIABETES MELLITUS WITH DIABETIC NEPHROPATHY, WITH LONG-TERM CURRENT USE OF INSULIN (H): Chronic | ICD-10-CM

## 2017-11-10 DIAGNOSIS — N18.30 CKD (CHRONIC KIDNEY DISEASE) STAGE 3, GFR 30-59 ML/MIN (H): Chronic | ICD-10-CM

## 2017-11-10 LAB
ANION GAP SERPL CALCULATED.3IONS-SCNC: 10 MMOL/L (ref 3–14)
BUN SERPL-MCNC: 23 MG/DL (ref 7–30)
CALCIUM SERPL-MCNC: 9.3 MG/DL (ref 8.5–10.1)
CHLORIDE SERPL-SCNC: 108 MMOL/L (ref 94–109)
CO2 SERPL-SCNC: 23 MMOL/L (ref 20–32)
CREAT SERPL-MCNC: 1.53 MG/DL (ref 0.52–1.04)
GFR SERPL CREATININE-BSD FRML MDRD: 33 ML/MIN/1.7M2
GLUCOSE SERPL-MCNC: 165 MG/DL (ref 70–99)
HBA1C MFR BLD: 7.1 % (ref 4.3–6)
MAGNESIUM SERPL-MCNC: 2 MG/DL (ref 1.6–2.3)
POTASSIUM SERPL-SCNC: 4.1 MMOL/L (ref 3.4–5.3)
SODIUM SERPL-SCNC: 141 MMOL/L (ref 133–144)
TSH SERPL DL<=0.005 MIU/L-ACNC: 4.13 MU/L (ref 0.4–4)

## 2017-11-10 PROCEDURE — G0008 ADMIN INFLUENZA VIRUS VAC: HCPCS | Performed by: FAMILY MEDICINE

## 2017-11-10 PROCEDURE — 90662 IIV NO PRSV INCREASED AG IM: CPT | Performed by: FAMILY MEDICINE

## 2017-11-10 PROCEDURE — 80048 BASIC METABOLIC PNL TOTAL CA: CPT | Performed by: FAMILY MEDICINE

## 2017-11-10 PROCEDURE — 73630 X-RAY EXAM OF FOOT: CPT | Mod: RT

## 2017-11-10 PROCEDURE — 36415 COLL VENOUS BLD VENIPUNCTURE: CPT | Performed by: FAMILY MEDICINE

## 2017-11-10 PROCEDURE — 83036 HEMOGLOBIN GLYCOSYLATED A1C: CPT | Performed by: FAMILY MEDICINE

## 2017-11-10 PROCEDURE — 83735 ASSAY OF MAGNESIUM: CPT | Performed by: FAMILY MEDICINE

## 2017-11-10 PROCEDURE — 84443 ASSAY THYROID STIM HORMONE: CPT | Performed by: FAMILY MEDICINE

## 2017-11-10 PROCEDURE — 99214 OFFICE O/P EST MOD 30 MIN: CPT | Mod: 25 | Performed by: FAMILY MEDICINE

## 2017-11-10 RX ORDER — MENTHOL AND ZINC OXIDE .44; 20.625 G/100G; G/100G
OINTMENT TOPICAL 4 TIMES DAILY PRN
Status: ON HOLD | COMMUNITY
Start: 2017-11-10 | End: 2020-08-20

## 2017-11-10 RX ORDER — FUROSEMIDE 20 MG
20 TABLET ORAL DAILY
Qty: 90 TABLET | Refills: 3 | COMMUNITY
Start: 2017-11-10 | End: 2018-08-29

## 2017-11-10 NOTE — NURSING NOTE
"Chief Complaint   Patient presents with     Hospital F/U     [place] - low B/P and UTI       Initial /70 (BP Location: Left arm)  Pulse 69  Temp 98.1  F (36.7  C) (Tympanic)  Resp 20  Wt 198 lb (89.8 kg)  SpO2 95%  BMI 36.21 kg/m2 Estimated body mass index is 36.21 kg/(m^2) as calculated from the following:    Height as of 8/11/17: 5' 2\" (1.575 m).    Weight as of this encounter: 198 lb (89.8 kg).  Medication Reconciliation: complete    Health Maintenance that is potentially due pending provider review:  NONE    n/a    Is there anyone who you would like to be able to receive your results? No  If yes have patient fill out HELLEN    "

## 2017-11-10 NOTE — PATIENT INSTRUCTIONS
Restart the furosemide (lasix) at 20 mg daily  Ok to restart the potassium pill you have at home    Send me your blood pressures via [employer]    See me in a month    Lab Results   Component Value Date    A1C 7.1 11/10/2017    A1C 7.9 07/10/2017    A1C 7.1 03/30/2017    A1C 8.2 10/01/2016    A1C 9.1 07/21/2016

## 2017-11-10 NOTE — PROGRESS NOTES
SUBJECTIVE:   Stefanie Velazquez is a 72 year old female who presents to clinic today for the following health issues:          Hospital Follow-up Visit:    Hospital/Nursing Home/IP Rehab Facility: [facility]  Date of Admission: 10/29/2017   Date of Discharge: 10/31/2017  Reason(s) for Admission: UTI - dehydration with low B/P            Problems taking medications regularly:  None       Medication changes since discharge: None       Problems adhering to non-medication therapy:  None    Summary of hospitalization:  CareEverywhere information obtained and reviewed  Diagnostic Tests/Treatments reviewed.  Follow up needed: BNP  Other Healthcare Providers Involved in Patient s Care:         Homecare and Care Coordination  Update since discharge: improved.     Post Discharge Medication Reconciliation: discharge medications reconciled, continue medications without change.  Plan of care communicated with patient     Coding guidelines for this visit:  Type of Medical   Decision Making Face-to-Face Visit       within 7 Days of discharge Face-to-Face Visit        within 14 days of discharge   Moderate Complexity [id number]   High Complexity [id number]          Was in [place] at a conference, felt like she was going to pass out, first aid station at the place took her blood pressure which was very low. She went to emergency department, was hospitalized for hypotension, had a urinary tract infection. Treated with fluids and cipro. She didn't have an symptom of dysuria, urinary frequency or urgency. .She was kept 2 days for unstable blood pressures. They stopped the furosemide, metoprolol and potassium.   Still on losartan.    She has been feeling well at home. Does have more swelling in her ankles off of the lasix.     Has bruising below toes on right foot, doesn't think she had any trauma. A little sore.       BP Readings from Last 6 Encounters:   11/10/17 150/70   10/23/17 153/68   08/11/17 103/48   07/11/17 127/75    06/27/17 110/64   05/30/17 110/66      Diabetes-on levemir  Lab Results   Component Value Date    A1C 7.9 07/10/2017    A1C 7.1 03/30/2017    A1C 8.2 10/01/2016    A1C 9.1 07/21/2016    A1C 14.6 04/05/2016         Problem list and histories reviewed & adjusted, as indicated.  Additional history: as documented    Recent Labs   Lab Test  10/16/17   0935  07/10/17   1005  03/30/17   1207   10/01/16   0615   04/05/16   1158   09/16/15   1130   A1C   --   7.9*  7.1*   --   8.2*   < >  14.6*   --    --    LDL  91  116*   --    --    --    --    --    --   50   HDL  56  41*   --    --    --    --    --    --   46*   TRIG  178*  196*   --    --    --    --    --    --   118   ALT  19  18  16   < >  12   < >  21   < >  17   CR  1.40*  1.51*  1.33*   < >  1.48*   < >  1.33*   < >  1.36*   GFRESTIMATED  37*  34*  39*   < >  35*   < >  39*   < >  38*   GFRESTBLACK  44*  41*  47*   < >  42*   < >  47*   < >  46*   POTASSIUM  4.1  4.7  3.9   < >  4.1   < >  4.9   < >  4.5   TSH   --    --   2.90   --    --    --   1.56   --    --     < > = values in this interval not displayed.      BP Readings from Last 3 Encounters:   11/10/17 150/70   10/23/17 153/68   08/11/17 103/48    Wt Readings from Last 3 Encounters:   11/10/17 198 lb (89.8 kg)   10/23/17 199 lb (90.3 kg)   08/11/17 198 lb 4.8 oz (89.9 kg)                        Reviewed and updated as needed this visit by clinical staffTobacco  Allergies  Med Hx  Surg Hx  Fam Hx  Soc Hx      Reviewed and updated as needed this visit by Provider         ROS:  C: NEGATIVE for fever, chills, change in weight  E/M: NEGATIVE for ear, mouth and throat problems  R: NEGATIVE for significant cough or SOB  CV: NEGATIVE for chest pain, palpitations or peripheral edema  GI: NEGATIVE for nausea, abdominal pain, heartburn, or change in bowel habits  : No dysuria, urinary frequency or urgency.     OBJECTIVE:                                                    /70 (BP Location: Left arm)   Pulse 69  Temp 98.1  F (36.7  C) (Tympanic)  Resp 20  Wt 198 lb (89.8 kg)  SpO2 95%  BMI 36.21 kg/m2  Body mass index is 36.21 kg/(m^2).  GENERAL: healthy, alert, well nourished, well hydrated, no distress  HENT:  Mouth- no ulcers, no lesions  NECK: no tenderness, no adenopathy, no asymmetry, no masses, no stiffness; thyroid- normal to palpation  RESP: lungs clear to auscultation - no rales, no rhonchi, no wheezes  CV: regular rates and rhythm, normal S1 S2, no S3 or S4 and no murmur, no click or rub -  ABDOMEN: soft, no tenderness, no  hepatosplenomegaly, no masses, normal bowel sounds  MS: extremities- no gross deformities noted, no edema  Dense area of ecchymoses base of 2-4 digits on right foot     Results for orders placed or performed in visit on 11/10/17   Hemoglobin A1c   Result Value Ref Range    Hemoglobin A1C 7.1 (H) 4.3 - 6.0 %   TSH   Result Value Ref Range    TSH 4.13 (H) 0.40 - 4.00 mU/L   Basic metabolic panel   Result Value Ref Range    Sodium 141 133 - 144 mmol/L    Potassium 4.1 3.4 - 5.3 mmol/L    Chloride 108 94 - 109 mmol/L    Carbon Dioxide 23 20 - 32 mmol/L    Anion Gap 10 3 - 14 mmol/L    Glucose 165 (H) 70 - 99 mg/dL    Urea Nitrogen 23 7 - 30 mg/dL    Creatinine 1.53 (H) 0.52 - 1.04 mg/dL    GFR Estimate 33 (L) >60 mL/min/1.7m2    GFR Estimate If Black 40 (L) >60 mL/min/1.7m2    Calcium 9.3 8.5 - 10.1 mg/dL   Magnesium   Result Value Ref Range    Magnesium 2.0 1.6 - 2.3 mg/dL     *Note: Due to a large number of results and/or encounters for the requested time period, some results have not been displayed. A complete set of results can be found in Results Review.     Xray foot: no fracture       ASSESSMENT/PLAN:                                                      ASSESSMENT:  1. Hypotension due to drugs    2. Type 2 diabetes mellitus with diabetic nephropathy, with long-term current use of insulin (H)    3. Rash and nonspecific skin eruption    4. Need for prophylactic vaccination  and inoculation against influenza    5. Pain of right lower extremity    6. CKD (chronic kidney disease) stage 3, GFR 30-59 ml/min    7. Rectal cancer- newly diagnosed adenoCA rectum Jan 2011    8. Other specified hypothyroidism    9. Low magnesium level    10. Benign essential hypertension        PLAN:  Orders Placed This Encounter     XR Foot Right G/E 3 Views     FLU VACCINE, INCREASED ANTIGEN, PRESV FREE, AGE 65+ [63138]     ADMIN INFLUENZA (For MEDICARE Patients ONLY) []     Hemoglobin A1c     TSH     Basic metabolic panel     Magnesium     menthol-zinc oxide (CALMOSEPTINE) 0.44-20.625 % OINT ointment     BUTT PASTE - REGULAR (DR LOVE POOP GOOP BUTT PASTE FORMULA)     furosemide (LASIX) 20 MG tablet       Patient Instructions     Restart the furosemide (lasix) at 20 mg daily  Ok to restart the potassium pill you have at home    Send me your blood pressures via [employer]    See me in a month    Lab Results   Component Value Date    A1C 7.1 11/10/2017    A1C 7.9 07/10/2017    A1C 7.1 03/30/2017    A1C 8.2 10/01/2016    A1C 9.1 07/21/2016           Sandra Morales MD  [facility]  Injectable Influenza Immunization Documentation    1.  Is the person to be vaccinated sick today?   No    2. Does the person to be vaccinated have an allergy to a component   of the vaccine?   No  Egg Allergy Algorithm Link    3. Has the person to be vaccinated ever had a serious reaction   to influenza vaccine in the past?   No    4. Has the person to be vaccinated ever had Guillain-Barré syndrome?   No    Form completed by shamika

## 2017-11-10 NOTE — MR AVS SNAPSHOT
After Visit Summary   11/10/2017    Stefanie Velazquez    MRN: [id number]           Patient Information     Date Of Birth          1945        Visit Information        Provider Department      11/10/2017 10:00 AM Sandra Morales MD [facility]        Today's Diagnoses     Hypotension due to drugs    -  1    Type 2 diabetes mellitus with diabetic nephropathy, with long-term current use of insulin (H)        Rash and nonspecific skin eruption        Need for prophylactic vaccination and inoculation against influenza        Pain of right lower extremity        CKD (chronic kidney disease) stage 3, GFR 30-59 ml/min        Rectal cancer- newly diagnosed adenoCA rectum Jan 2011        Other specified hypothyroidism        Low magnesium level        Benign essential hypertension          Care Instructions    Restart the furosemide (lasix) at 20 mg daily  Ok to restart the potassium pill you have at home    Send me your blood pressures via [employer]    See me in a month    Lab Results   Component Value Date    A1C 7.1 11/10/2017    A1C 7.9 07/10/2017    A1C 7.1 03/30/2017    A1C 8.2 10/01/2016    A1C 9.1 07/21/2016                Follow-ups after your visit        Your next 10 appointments already scheduled     Mar 16, 2018  2:00 PM CDT   LAB with PI LAB   [facility] ([facility])    [address] [id number] [phone]           Please do not eat 10-12 hours before your appointment if you are coming in fasting for labs on lipids, cholesterol, or glucose (sugar). This does not apply to pregnant women. Water, hot tea and black coffee (with nothing added) are okay. Do not drink other fluids, diet soda or chew gum.            Mar 23, 2018 11:30 AM CDT   Return Visit with Apolinar Martinez MD   [facility] ([facility])    [facility]  [address] [address] [phone]               Who to contact     If you have questions or need follow up information about today's clinic visit or your schedule please contact [facility] directly at [phone].  Normal or non-critical lab and imaging results will be communicated to you by MyChart, letter or phone within 4 business days after the clinic has received the results. If you do not hear from us within 7 days, please contact the clinic through [employer] or phone. If you have a critical or abnormal lab result, we will notify you by phone as soon as possible.  Submit refill requests through [employer] or call your pharmacy and they will forward the refill request to us. Please allow 3 business days for your refill to be completed.          Additional Information About Your Visit        [employer] Information     [employer] gives you secure access to your electronic health record. If you see a primary care provider, you can also send messages to your care team and make appointments. If you have questions, please call your primary care clinic.  If you do not have a primary care provider, please call [phone] and they will assist you.        Care EveryWhere ID     This is your Care EveryWhere ID. This could be used by other organizations to access your [place] medical records  [id number]        Your Vitals Were     Pulse Temperature Respirations Pulse Oximetry BMI (Body Mass Index)       69 98.1  F (36.7  C) (Tympanic) 20 95% 36.21 kg/m2        Blood Pressure from Last 3 Encounters:   11/10/17 150/70   10/23/17 153/68   08/11/17 103/48    Weight from Last 3 Encounters:   11/10/17 198 lb (89.8 kg)   10/23/17 199 lb (90.3 kg)   08/11/17 198 lb 4.8 oz (89.9 kg)              We Performed the Following     ADMIN INFLUENZA (For MEDICARE Patients ONLY) []     Basic metabolic panel     FLU VACCINE, INCREASED ANTIGEN, PRESV FREE, AGE 65+ [85535]     Hemoglobin A1c     Magnesium     TSH          Today's Medication Changes           These changes are accurate as of: 11/10/17 12:10 PM.  If you have any questions, ask your nurse or doctor.               Start taking these medicines.        Dose/Directions    menthol-zinc oxide 0.44-20.625 % Oint ointment   Commonly known as:  CALMOSEPTINE   Used for:  Rash and nonspecific skin eruption   Started by:  Sandra Morales MD        Apply topically 4 times daily as needed for skin protection   Refills:  0            Where to get your medicines      Some of these will need a paper prescription and others can be bought over the counter.  Ask your nurse if you have questions.     You don't need a prescription for these medications     menthol-zinc oxide 0.44-20.625 % Oint ointment                Primary Care Provider Office Phone # Fax #    Sandra Morales [phone] [address] [address]        Equal Access to Services     [facility]: Hadii rod ng hadasho Soomaali, waaxda luqadaha, qaybta kaalmada adeegyada, ceci rivera . So [facility] [phone].    ATENCIÓN: Si habla español, tiene a waite disposición servicios gratuitos de asistencia lingüística. Llame al [phone].    We comply with applicable federal civil rights laws and [place] laws. We do not discriminate on the basis of race, color, national origin, age, disability, sex, sexual orientation, or gender identity.            Thank you!     Thank you for choosing [facility]  for your care. Our goal is always to provide you with excellent care. Hearing back from our patients is one way we can continue to improve our services. Please take a few minutes to complete the written survey that you may receive in the mail after your visit with us. Thank you!             Your Updated Medication List - Protect others around you: Learn how to safely use, store and throw away your medicines at www.disposemymeds.org.          This list is accurate as of: 11/10/17 12:10 PM.   Always use your most recent med list.                   Brand Name Dispense Instructions for use Diagnosis    ACCU-CHEK MILVIA Jeana     1 device    dispense one meter    Type II or unspecified type diabetes mellitus without mention of complication, uncontrolled       acetaminophen 650 MG CR tablet    TYLENOL    60 tablet    Take 1 tablet (650 mg) by mouth 3 times daily    Bilateral cellulitis of lower leg       aspirin 81 MG tablet     30 tablet    Take 1 tablet (81 mg) by mouth daily        blood glucose monitoring lancets     1 Box    Test BG 4 times daily    Type II or unspecified type diabetes mellitus without mention of complication, uncontrolled       blood glucose monitoring test strip    ACCU-CHEK MILVIA    360 each    Use to test blood sugars 4 times daily or as directed.    Type 2 diabetes mellitus with diabetic nephropathy, with long-term current use of insulin (H)       BUTT PASTE - REGULAR    DR LOVE POOP GOOP BUTT PASTE FORMULA     Apply topically every hour as needed for skin protection        chlorhexidine 0.12 % solution    PERIDEX          DEPEND EASY FIT UNDERGARMENTS Misc     300 each    1 Units every 2 hours X-large    Bowel and bladder incontinence, Rectal cancer (H)       fluocinonide 0.05 % ointment    LIDEX    60 g    Apply sparingly to affected area twice daily as needed.  Do not apply to face.    Venous stasis dermatitis of both lower extremities       fluticasone 50 MCG/ACT spray    FLONASE     1 spray        GLUCERNA OS Liqd     24 each    One can two to three times a day    Poor dentition, Poor nutrition, Type 2 diabetes mellitus with diabetic nephropathy, with long-term current use of insulin (H), Rectal cancer (H), Chronic diarrhea, Radiation therapy complication, sequela       hypromellose 0.3 % Soln ophthalmic solution    GENTEAL     2 drops        insulin aspart 100 UNIT/ML injection    NovoLOG FLEXPEN    3 mL    Inject 15 Units Subcutaneous 3 times daily (with meals)    Type 2  diabetes mellitus with diabetic nephropathy, with long-term current use of insulin (H)       insulin detemir 100 UNIT/ML injection    LEVEMIR FLEXPEN/FLEXTOUCH    3 mL    Inject 25 Units Subcutaneous At Bedtime Fill when needed    Type 2 diabetes mellitus with diabetic nephropathy, with long-term current use of insulin (H)       insulin pen needle 32G X 4 MM    BD GABRIELLA U/F    120 each    Use 4 times per day or as directed.    Type 2 diabetes mellitus with diabetic nephropathy, with long-term current use of insulin (H)       LASIX 20 MG tablet   Generic drug:  furosemide     90 tablet    Take 1 tablet (20 mg) by mouth daily    Benign essential hypertension       levothyroxine 88 MCG tablet    SYNTHROID/LEVOTHROID    90 tablet    Take 1 tablet (88 mcg) by mouth daily        loperamide 2 MG capsule    IMODIUM    90 capsule    One capsule once a day, can use a second one if needed    Rectal cancer (H), Chronic diarrhea       losartan 100 MG tablet    COZAAR    90 tablet    Take 1 tablet (100 mg) by mouth daily    Benign essential hypertension       menthol-zinc oxide 0.44-20.625 % Oint ointment    CALMOSEPTINE     Apply topically 4 times daily as needed for skin protection    Rash and nonspecific skin eruption       metoprolol 50 MG 24 hr tablet    TOPROL-XL    90 tablet    Take 1 tablet (50 mg) by mouth daily    Benign essential hypertension       mineral oil-hydrophilic petrolatum      Apply topically as needed        OMEPRAZOLE PO      Take 20 mg by mouth daily as needed        oxyCODONE IR 5 MG tablet    ROXICODONE          Potassium Gluconate 595 MG Caps      Take 1 tablet by mouth daily    Stasis edema of both lower extremities       rOPINIRole 1 MG tablet    REQUIP    180 tablet    1-2 tabs daily as needed for restless legs    Restless leg syndrome       simvastatin 40 MG tablet    ZOCOR    90 tablet    Take 1 tablet (40 mg) by mouth daily        syringe (disposable) 1 ML Misc     1 each    1 each every 30 days  With 1 inch needle for Vit B12 injection    Vitamin B 12 deficiency       triamcinolone 0.1 % cream    KENALOG    80 g    Apply sparingly to affected area two times daily as needed    Stasis dermatitis of both legs

## 2017-11-17 ENCOUNTER — TELEPHONE (OUTPATIENT)
Dept: FAMILY MEDICINE | Facility: CLINIC | Age: 72
End: 2017-11-17

## 2017-11-17 NOTE — TELEPHONE ENCOUNTER
Reason for Call:  Form, our goal is to have forms completed with 72 hours, however, some forms may require a visit or additional information.    Type of letter, form or note:  medical    Who is the form from?: Home care    Where did the form come from: form was faxed in    What clinic location was the form placed at?: [place]    Where the form was placed: [facility]          Call taken on 11/17/2017 at 8:53 AM by Gina Lenz

## 2017-11-20 ENCOUNTER — MEDICAL CORRESPONDENCE (OUTPATIENT)
Dept: HEALTH INFORMATION MANAGEMENT | Facility: CLINIC | Age: 72
End: 2017-11-20

## 2017-11-20 PROCEDURE — G0179 MD RECERTIFICATION HHA PT: HCPCS | Performed by: FAMILY MEDICINE

## 2017-11-20 NOTE — TELEPHONE ENCOUNTER
Date Received Back:  11/20/17  Faxed back and sent to be scanned:  11/20/17    Tea Bryan   [facility] [place]

## 2017-11-30 ENCOUNTER — MEDICAL CORRESPONDENCE (OUTPATIENT)
Dept: HEALTH INFORMATION MANAGEMENT | Facility: CLINIC | Age: 72
End: 2017-11-30

## 2017-12-01 ENCOUNTER — TELEPHONE (OUTPATIENT)
Dept: FAMILY MEDICINE | Facility: CLINIC | Age: 72
End: 2017-12-01

## 2017-12-01 NOTE — TELEPHONE ENCOUNTER
Reason for Call:  Form, our goal is to have forms completed with 72 hours, however, some forms may require a visit or additional information.    Type of letter, form or note:  [facility] Metoprolol    Who is the form from?: [facility] Metoprolol (if other please explain)    Where did the form come from: form was faxed in    What clinic location was the form placed at?: [place]    Where the form was placed: [facility]    Form received back signed  12/1/17  Faxed Back & Sent to be scanned to this encounter  Leilani Jurado [facility]            Call taken on 12/1/2017 at 1:32 PM by Leilani Jurado

## 2017-12-13 ENCOUNTER — TELEPHONE (OUTPATIENT)
Dept: FAMILY MEDICINE | Facility: CLINIC | Age: 72
End: 2017-12-13

## 2017-12-13 NOTE — TELEPHONE ENCOUNTER
Reason for Call:  Form, our goal is to have forms completed with 72 hours, however, some forms may require a visit or additional information.    Type of letter, form or note:  [facility]    Who is the form from?: Home care    Where did the form come from: form was faxed in    What clinic location was the form placed at?: [place]    Where the form was placed: [facility]    What number is listed as a contact on the form?: [phone]           Call taken on 12/13/2017 at 4:00 PM by Milagros Mcdowell

## 2017-12-14 ENCOUNTER — MEDICAL CORRESPONDENCE (OUTPATIENT)
Dept: HEALTH INFORMATION MANAGEMENT | Facility: CLINIC | Age: 72
End: 2017-12-14

## 2018-01-09 ENCOUNTER — MEDICAL CORRESPONDENCE (OUTPATIENT)
Dept: HEALTH INFORMATION MANAGEMENT | Facility: CLINIC | Age: 73
End: 2018-01-09

## 2018-01-09 ENCOUNTER — TELEPHONE (OUTPATIENT)
Dept: FAMILY MEDICINE | Facility: CLINIC | Age: 73
End: 2018-01-09

## 2018-01-09 NOTE — TELEPHONE ENCOUNTER
Reason for Call:  Form, our goal is to have forms completed with 72 hours, however, some forms may require a visit or additional information.    Type of letter, form or note:  Physician-[facility] Physicians order    Who is the form from?: [facility] (if other please explain)    Where did the form come from: form was faxed in    What clinic location was the form placed at?: [place]    Where the form was placed: [facility]    Call taken on 1/9/2018 at 2:28 PM by Leilani Jurado

## 2018-01-12 NOTE — TELEPHONE ENCOUNTER
Form received back signed  1/12/18  Faxed Back & Sent to be scanned to this encounter  Leilani Orn [facility]

## 2018-01-16 ENCOUNTER — ANESTHESIA EVENT (OUTPATIENT)
Dept: SURGERY | Facility: CLINIC | Age: 73
End: 2018-01-16
Payer: MEDICARE

## 2018-01-16 ENCOUNTER — TELEPHONE (OUTPATIENT)
Dept: FAMILY MEDICINE | Facility: CLINIC | Age: 73
End: 2018-01-16

## 2018-01-16 PROCEDURE — G0179 MD RECERTIFICATION HHA PT: HCPCS | Performed by: FAMILY MEDICINE

## 2018-01-16 NOTE — TELEPHONE ENCOUNTER
Reason for Call:  Form, our goal is to have forms completed with 72 hours, however, some forms may require a visit or additional information.    Type of letter, form or note:  [facility]- Plan of Care 1/19/18-3/19/18, Physician Order- Medication list  Who is the form from?: Home care    Where did the form come from: form was faxed in    What clinic location was the form placed at?: [place]    Where the form was placed: [facility]        Call taken on 1/16/2018 at 10:25 AM by Leilani Jurado

## 2018-01-16 NOTE — H&P
[facility]  [facility]  [address]  [facility] [address]  [phone]  Dept: [phone]    OPHTHALMOLOGY PRE-OPERATIVE  HISTORY AND PHYSICAL    DATE OF H/P:  2018    DATE OF SURGERY:  2018  PROCEDURE:  Procedure(s):  PHACOEMULSIFICATION WITH STANDARD INTRAOCULAR LENS IMPLANT, Left Eye  LENS IMPLANT:  ZCB00 +17.5  REFRACTIVE GOAL:  PL Sph  SURGEON:  Rony Key MD    ANESTHESIA:  TOPICAL / MAC    OR CASE REQUIREMENTS:    DEMOGRAPHICS:  Demographic Information on Stefanie Velazquez:    Stefanie Velazquez  Gender: female  : 1945  [address]  [facility] [id number] [phone] (home)     Medical Record: [id number]  Social Security Number: [id number]  Pharmacy: [facility] [address] [place] [address] [address] [facility]  Primary Care Provider: Sandra Morales    Parent's names are: Data Unavailable (mother) and Data Unavailable (father).    Insurance: Payor: [employer] / Plan: [employer] PLATINUM BLUE / Product Type: PPO /     OCULAR HISTORY:  Cataracts    HISTORIES:  Past Medical History:   Diagnosis Date     Acute myocardial infarction of other specified sites, episode of care unspecified 2002    MI 2 stints     Cancer of colon (H)      Chronic ischemic heart disease, unspecified 2003    cabgx3 ,  adenosine ef70%     Coronary atherosclerosis of unspecified type of vessel, native or graft     Coronary artery disease     Diabetes mellitus (H)      Esophageal reflux      Gastro-oesophageal reflux disease      Generalized osteoarthrosis, unspecified site 2005     Generalized osteoarthrosis, unspecified site 2005     HEARING LOSS CONDUCTIVE, COMBINED TYPE 2005    [place] measles as child     HYPOTHYROIDISM  2003     Mixed hyperlipidemia 2005     Obesity, unspecified 2005     RC-moderate (AHI 12, LSat 60%); REM RDI-73 2009    Sleep study [facility] was performed 09 in order to re-evaluate severity of RC. The  total sleep time was 412.0 minutes. The sleep latency was decreased at 8.7 minutes with Ambien 10mg. The REM sleep latency was 426.0 minutes. Sleep efficiency was reduced at 79.0%. The sleep architecture was disrupted with frequent sleep stage changes and arousals.  Snoring:  loud. Respiratory Events:   RDI o     Stented coronary artery      Type II or unspecified type diabetes mellitus with renal manifestations, uncontrolled(250.42) (H) 6/22/2005     Type II or unspecified type diabetes mellitus with renal manifestations, uncontrolled(250.42) (H) 6/22/2005     Unspecified disorder resulting from impaired renal function 6/22/2005    CR     1.82   06/21/2005     Unspecified essential hypertension        Past Surgical History:   Procedure Laterality Date     cabg       COLECTOMY LOW ANTERIOR  6/21/2011    Procedure:COLECTOMY LOW ANTERIOR; with loop ielostomy; Surgeon:ROBBIN MCDONNELL; Location:[facility]     COLONOSCOPY  1/26/2011    COLONOSCOPY performed by MASOUD BILL at [facility]     COLONOSCOPY N/A 3/1/2016    Procedure: COLONOSCOPY;  Surgeon: Liza Neal MD;  Location: [facility]     INSERT PORT VASCULAR ACCESS  3/2/2011    INSERT PORT VASCULAR ACCESS performed by LIZA NEAL at [facility]     OPEN REDUCTION INTERNAL FIXATION FEMUR DISTAL  2/12/2013    Procedure: OPEN REDUCTION INTERNAL FIXATION FEMUR DISTAL;  Open reduction internal fixation left femur fracture--Anesth[facility]Choice;  Surgeon: Ley, Jeffrey Duane, MD;  Location: [facility]     ORTHOPEDIC SURGERY       SIGMOIDOSCOPY FLEXIBLE  9/9/2011    Procedure:SIGMOIDOSCOPY FLEXIBLE; Performed prior to induction.; Surgeon:ROBBIN MCDONNELL; Location: OR     SURGICAL HISTORY OF -   10/24/2002    Heart bypass     SURGICAL HISTORY OF -   2002    R Knee arthroplasty     SURGICAL HISTORY OF -   2002    Mi 2 stints     TAKEDOWN ILEOSTOMY  9/9/2011    Procedure:TAKEDOWN ILEOSTOMY; Exploratory Laparotomy,  Loop Ileostomy Takedown, Small Bowel Resection x 2.;  Surgeon:ROBBIN MCDONNELL; Location: OR       Family History   Problem Relation Age of Onset     DIABETES Sister      C.A.D. Sister      Breast Cancer Sister        Social History   Substance Use Topics     Smoking status: Former Smoker     Smokeless tobacco: Never Used     Alcohol use Yes      Comment: rare social use       MEDICATIONS:  No current facility-administered medications for this encounter.      Current Outpatient Prescriptions   Medication Sig     menthol-zinc oxide (CALMOSEPTINE) 0.44-20.625 % OINT ointment Apply topically 4 times daily as needed for skin protection     BUTT PASTE - REGULAR (DR LOVE POOP GOOP BUTT PASTE FORMULA) Apply topically every hour as needed for skin protection     furosemide (LASIX) 20 MG tablet Take 1 tablet (20 mg) by mouth daily     insulin pen needle (BD GABRIELLA U/F) 32G X 4 MM Use 4 times per day or as directed.     hypromellose (GENTEAL) 0.3 % SOLN ophthalmic solution 2 drops     fluticasone (FLONASE) 50 MCG/ACT spray 1 spray     chlorhexidine (PERIDEX) 0.12 % solution      oxyCODONE (ROXICODONE) 5 MG IR tablet      Nutritional Supplements (GLUCERNA OS) LIQD One can two to three times a day     insulin aspart (NOVOLOG FLEXPEN) 100 UNIT/ML injection Inject 15 Units Subcutaneous 3 times daily (with meals)     insulin detemir (LEVEMIR FLEXPEN/FLEXTOUCH) 100 UNIT/ML injection Inject 25 Units Subcutaneous At Bedtime Fill when needed     loperamide (IMODIUM) 2 MG capsule One capsule once a day, can use a second one if needed     losartan (COZAAR) 100 MG tablet Take 1 tablet (100 mg) by mouth daily     metoprolol (TOPROL-XL) 50 MG 24 hr tablet Take 1 tablet (50 mg) by mouth daily (Patient not taking: Reported on 11/10/2017)     fluocinonide (LIDEX) 0.05 % ointment Apply sparingly to affected area twice daily as needed.  Do not apply to face.     blood glucose monitoring (ACCU-CHEK MILVIA) test strip Use to test blood sugars 4 times daily or as directed.     Incontinence Supply  Disposable (DEPEND EASY FIT UNDERGARMENTS) MISC 1 Units every 2 hours X-large     syringe, disposable, 1 ML MISC 1 each every 30 days With 1 inch needle for Vit B12 injection     rOPINIRole (REQUIP) 1 MG tablet 1-2 tabs daily as needed for restless legs     mineral oil-hydrophilic petrolatum (AQUAPHOR) ointment Apply topically as needed     OMEPRAZOLE PO Take 20 mg by mouth daily as needed      acetaminophen (TYLENOL) 650 MG CR tablet Take 1 tablet (650 mg) by mouth 3 times daily     Potassium Gluconate 595 MG CAPS Take 1 tablet by mouth daily (Patient not taking: Reported on 11/10/2017)     simvastatin (ZOCOR) 40 MG tablet Take 1 tablet (40 mg) by mouth daily     levothyroxine (SYNTHROID, LEVOTHROID) 88 MCG tablet Take 1 tablet (88 mcg) by mouth daily     triamcinolone (KENALOG) 0.1 % cream Apply sparingly to affected area two times daily as needed     blood glucose monitoring (ACCU-CHEK MULTICLIX) lancets Test BG 4 times daily     aspirin 81 MG tablet Take 1 tablet (81 mg) by mouth daily     ACCU-CHEK MILVIA MELODY dispense one meter       ALLERGIES:     Allergies   Allergen Reactions     Hydrocodone Unknown     Lisinopril Cough            Vicodin [Hydrocodone-Acetaminophen] Other (See Comments)     Caused extreme dizziness       PERTINENT SYSTEMS REVIEW:    1. Yes: CAD, s/p CABG / stent - stable - Do you have a history of heart attack, stroke, stent, bypass or surgery on an artery in the head, neck, heart or legs?  2. No - Do you ever have any pain or discomfort in your chest?  3. No - Do you have a history of  Heart Failure?  4. No - Are you troubled by shortness of breath when walking: On the level, up a slight hill or at night?  5. No - Do you currently have a cold, bronchitis or other respiratory infection?  6. No - Do you have a cough, shortness of breath or wheezing?  7. No - Do you sometimes get pains in the calves of your legs when you walk?  8. No - Do you or anyone in your family have previous history of  blood clots?  9. No - Do you or does anyone in your family have a serious bleeding problem such as prolonged bleeding following surgeries or cuts?  10. No - Have you ever had problems with anemia or been told to take iron pills?  11. No - Have you had any abnormal blood loss such as black, tarry or bloody stools, or abnormal vaginal bleeding?  12. Yes: after CABG and cancer treatment - Have you ever had a blood transfusion?  13. No - Have you or any of your relatives ever had problems with anesthesia?  14. No - Do you have sleep apnea, excessive snoring or daytime drowsiness?  15. No - Do you have any prosthetic heart valves?  16. Yes: knee replacement - Do you have prosthetic joints?    EXAMINATION:  Vitals were reviewed                       Vison:  Va, right - 20/50; left - 20/70   BAT, right - 20/100, left - 20/200  HEENT:  Cataract, otherwise unremarkable.  LUNGS:  Clear  CV:  Regular rate and rhythm without murmur  ABD:  Soft and nontender  NEURO:  Alert and nonfocal    IMPRESSION:  Patient cleared for ophthalmic surgery.  Low risk with monitored, light sedation.  I have assessed the patient's DVT risk, and no additional orders necessary.    PLAN:  Procedure(s):  PHACOEMULSIFICATION WITH STANDARD INTRAOCULAR LENS IMPLANT, Left Eye      Rony Key MD

## 2018-01-17 ENCOUNTER — TELEPHONE (OUTPATIENT)
Dept: FAMILY MEDICINE | Facility: CLINIC | Age: 73
End: 2018-01-17

## 2018-01-17 DIAGNOSIS — I87.2 STASIS DERMATITIS OF BOTH LEGS: ICD-10-CM

## 2018-01-17 RX ORDER — TRIAMCINOLONE ACETONIDE 1 MG/G
CREAM TOPICAL
Qty: 80 G | Refills: 1 | Status: SHIPPED | OUTPATIENT
Start: 2018-01-17 | End: 2018-09-18

## 2018-01-17 NOTE — TELEPHONE ENCOUNTER
Eunice Diop from [facility] says she sent a fax to Dr Morales about patient's itching on her legs. Dr Morales said she could use Triamcinolone on this but it was not sent to her pharmacy. She uses [facility] in [place]. Please send Rx. She is aware that Dr Morales is not in the office today.

## 2018-01-18 NOTE — ANESTHESIA PREPROCEDURE EVALUATION
Anesthesia Evaluation     . Pt has had prior anesthetic.            ROS/MED HX    ENT/Pulmonary:     (+)sleep apnea, RC risk factors tobacco use, Past use , . .    Neurologic:       Cardiovascular:     (+) Dyslipidemia, hypertension--CAD, -past MI,CABG-date: CABG x3, 6/2003, stent,6/2002  2 . : . . . :. .       METS/Exercise Tolerance:     Hematologic:     (+) Anemia, -      Musculoskeletal:   (+) arthritis, , , -       GI/Hepatic:     (+) GERD       Renal/Genitourinary:     (+) chronic renal disease,       Endo:     (+) type II DM Diabetic complications: neuropathy, thyroid problem hypothyroidism, Obesity, .      Psychiatric:         Infectious Disease:   (+) MRSA,       Malignancy:   (+) Malignancy History of GI          Other:                     Physical Exam  Normal systems: cardiovascular and pulmonary    Airway   Mallampati: III  TM distance: >3 FB  Neck ROM: full    Dental   (+) upper dentures and lower dentures    Cardiovascular       Pulmonary                     Anesthesia Plan      History & Physical Review  History and physical reviewed and following examination; no interval change.    ASA Status:  3 .    NPO Status:  > 8 hours    Plan for MAC Reason for MAC:  Deep or markedly invasive procedure (G8)  PONV prophylaxis:  Ondansetron (or other 5HT-3) and Dexamethasone or Solumedrol       Postoperative Care  Postoperative pain management:  IV analgesics, Oral pain medications and Multi-modal analgesia.      Consents  Anesthetic plan, risks, benefits and alternatives discussed with:  Patient..                          .

## 2018-01-22 ENCOUNTER — ANESTHESIA (OUTPATIENT)
Dept: SURGERY | Facility: CLINIC | Age: 73
End: 2018-01-22
Payer: MEDICARE

## 2018-01-22 ENCOUNTER — SURGERY (OUTPATIENT)
Age: 73
End: 2018-01-22

## 2018-01-22 ENCOUNTER — HOSPITAL ENCOUNTER (OUTPATIENT)
Facility: CLINIC | Age: 73
Discharge: HOME | End: 2018-01-22
Attending: OPHTHALMOLOGY | Admitting: OPHTHALMOLOGY
Payer: MEDICARE

## 2018-01-22 VITALS
BODY MASS INDEX: 35.88 KG/M2 | TEMPERATURE: 97.5 F | HEIGHT: 62 IN | SYSTOLIC BLOOD PRESSURE: 120 MMHG | HEART RATE: 54 BPM | WEIGHT: 195 LBS | DIASTOLIC BLOOD PRESSURE: 61 MMHG | RESPIRATION RATE: 18 BRPM | OXYGEN SATURATION: 96 %

## 2018-01-22 LAB — GLUCOSE BLDC GLUCOMTR-MCNC: 110 MG/DL (ref 70–99)

## 2018-01-22 PROCEDURE — 71000022 ZZH RECOVERY CATRACT PACKAGE: Performed by: OPHTHALMOLOGY

## 2018-01-22 PROCEDURE — 25000128 H RX IP 250 OP 636: Performed by: NURSE ANESTHETIST, CERTIFIED REGISTERED

## 2018-01-22 PROCEDURE — 82962 GLUCOSE BLOOD TEST: CPT

## 2018-01-22 PROCEDURE — 25000128 H RX IP 250 OP 636: Performed by: OPHTHALMOLOGY

## 2018-01-22 PROCEDURE — 25000125 ZZHC RX 250: Performed by: NURSE ANESTHETIST, CERTIFIED REGISTERED

## 2018-01-22 PROCEDURE — 25000125 ZZHC RX 250: Performed by: OPHTHALMOLOGY

## 2018-01-22 PROCEDURE — 37000012 ZZH ANESTHESIA CATARACT PACKAGE: Performed by: OPHTHALMOLOGY

## 2018-01-22 PROCEDURE — 36000025 ZZH CATARACT SURGICAL PACKAGE: Performed by: OPHTHALMOLOGY

## 2018-01-22 PROCEDURE — V2632 POST CHMBR INTRAOCULAR LENS: HCPCS | Performed by: OPHTHALMOLOGY

## 2018-01-22 DEVICE — EYE IMP IOL AMO PCL TECNIS ZCB00 17.5: Type: IMPLANTABLE DEVICE | Site: POSTERIOR CHAMBER | Status: FUNCTIONAL

## 2018-01-22 RX ORDER — TROPICAMIDE 10 MG/ML
1 SOLUTION/ DROPS OPHTHALMIC
Status: COMPLETED | OUTPATIENT
Start: 2018-01-22 | End: 2018-01-22

## 2018-01-22 RX ORDER — DEXAMETHASONE SODIUM PHOSPHATE 4 MG/ML
4 INJECTION, SOLUTION INTRA-ARTICULAR; INTRALESIONAL; INTRAMUSCULAR; INTRAVENOUS; SOFT TISSUE EVERY 10 MIN PRN
Status: DISCONTINUED | OUTPATIENT
Start: 2018-01-22 | End: 2018-01-22 | Stop reason: HOSPADM

## 2018-01-22 RX ORDER — PHENYLEPHRINE HYDROCHLORIDE 25 MG/ML
1 SOLUTION/ DROPS OPHTHALMIC
Status: COMPLETED | OUTPATIENT
Start: 2018-01-22 | End: 2018-01-22

## 2018-01-22 RX ORDER — MEPERIDINE HYDROCHLORIDE 25 MG/ML
12.5 INJECTION INTRAMUSCULAR; INTRAVENOUS; SUBCUTANEOUS
Status: DISCONTINUED | OUTPATIENT
Start: 2018-01-22 | End: 2018-01-22 | Stop reason: HOSPADM

## 2018-01-22 RX ORDER — LIDOCAINE 40 MG/G
CREAM TOPICAL
Status: DISCONTINUED | OUTPATIENT
Start: 2018-01-22 | End: 2018-01-22 | Stop reason: HOSPADM

## 2018-01-22 RX ORDER — ONDANSETRON 4 MG/1
4 TABLET, ORALLY DISINTEGRATING ORAL EVERY 30 MIN PRN
Status: DISCONTINUED | OUTPATIENT
Start: 2018-01-22 | End: 2018-01-22 | Stop reason: HOSPADM

## 2018-01-22 RX ORDER — PROMETHAZINE HYDROCHLORIDE 25 MG/ML
12.5 INJECTION, SOLUTION INTRAMUSCULAR; INTRAVENOUS
Status: DISCONTINUED | OUTPATIENT
Start: 2018-01-22 | End: 2018-01-22 | Stop reason: HOSPADM

## 2018-01-22 RX ORDER — EPHEDRINE SULFATE 50 MG/ML
INJECTION, SOLUTION INTRAMUSCULAR; INTRAVENOUS; SUBCUTANEOUS PRN
Status: DISCONTINUED | OUTPATIENT
Start: 2018-01-22 | End: 2018-01-22

## 2018-01-22 RX ORDER — PROPARACAINE HYDROCHLORIDE 5 MG/ML
SOLUTION/ DROPS OPHTHALMIC PRN
Status: DISCONTINUED | OUTPATIENT
Start: 2018-01-22 | End: 2018-01-22 | Stop reason: HOSPADM

## 2018-01-22 RX ORDER — SODIUM CHLORIDE, SODIUM LACTATE, POTASSIUM CHLORIDE, CALCIUM CHLORIDE 600; 310; 30; 20 MG/100ML; MG/100ML; MG/100ML; MG/100ML
INJECTION, SOLUTION INTRAVENOUS CONTINUOUS
Status: DISCONTINUED | OUTPATIENT
Start: 2018-01-22 | End: 2018-01-22 | Stop reason: HOSPADM

## 2018-01-22 RX ORDER — BALANCED SALT SOLUTION 6.4; .75; .48; .3; 3.9; 1.7 MG/ML; MG/ML; MG/ML; MG/ML; MG/ML; MG/ML
SOLUTION OPHTHALMIC PRN
Status: DISCONTINUED | OUTPATIENT
Start: 2018-01-22 | End: 2018-01-22 | Stop reason: HOSPADM

## 2018-01-22 RX ORDER — ONDANSETRON 2 MG/ML
4 INJECTION INTRAMUSCULAR; INTRAVENOUS EVERY 30 MIN PRN
Status: DISCONTINUED | OUTPATIENT
Start: 2018-01-22 | End: 2018-01-22 | Stop reason: HOSPADM

## 2018-01-22 RX ORDER — DIMENHYDRINATE 50 MG/ML
25 INJECTION, SOLUTION INTRAMUSCULAR; INTRAVENOUS
Status: DISCONTINUED | OUTPATIENT
Start: 2018-01-22 | End: 2018-01-22 | Stop reason: HOSPADM

## 2018-01-22 RX ORDER — CYCLOPENTOLATE HYDROCHLORIDE 10 MG/ML
1 SOLUTION/ DROPS OPHTHALMIC
Status: COMPLETED | OUTPATIENT
Start: 2018-01-22 | End: 2018-01-22

## 2018-01-22 RX ORDER — NALOXONE HYDROCHLORIDE 0.4 MG/ML
.1-.4 INJECTION, SOLUTION INTRAMUSCULAR; INTRAVENOUS; SUBCUTANEOUS
Status: DISCONTINUED | OUTPATIENT
Start: 2018-01-22 | End: 2018-01-22 | Stop reason: HOSPADM

## 2018-01-22 RX ADMIN — EPHEDRINE SULFATE 5 MG: 50 INJECTION INTRAMUSCULAR; INTRAVENOUS; SUBCUTANEOUS at 09:15

## 2018-01-22 RX ADMIN — LIDOCAINE HYDROCHLORIDE 1 ML: 10 INJECTION, SOLUTION EPIDURAL; INFILTRATION; INTRACAUDAL; PERINEURAL at 07:51

## 2018-01-22 RX ADMIN — CYCLOPENTOLATE HYDROCHLORIDE 1 DROP: 10 SOLUTION/ DROPS OPHTHALMIC at 07:57

## 2018-01-22 RX ADMIN — PROPARACAINE HYDROCHLORIDE 2 DROP: 5 SOLUTION/ DROPS OPHTHALMIC at 09:22

## 2018-01-22 RX ADMIN — EPHEDRINE SULFATE 10 MG: 50 INJECTION INTRAMUSCULAR; INTRAVENOUS; SUBCUTANEOUS at 09:20

## 2018-01-22 RX ADMIN — LIDOCAINE HYDROCHLORIDE 1 TUBE: 20 JELLY TOPICAL at 09:14

## 2018-01-22 RX ADMIN — BALANCED SALT SOLUTION 200 ML: 6.4; .75; .48; .3; 3.9; 1.7 SOLUTION OPHTHALMIC at 09:14

## 2018-01-22 RX ADMIN — MIDAZOLAM 2 MG: 1 INJECTION INTRAMUSCULAR; INTRAVENOUS at 09:05

## 2018-01-22 RX ADMIN — EPINEPHRINE 1 ML: 1 INJECTION, SOLUTION INTRAMUSCULAR; SUBCUTANEOUS at 09:15

## 2018-01-22 RX ADMIN — PHENYLEPHRINE HYDROCHLORIDE 1 DROP: 25 SOLUTION/ DROPS OPHTHALMIC at 08:06

## 2018-01-22 RX ADMIN — MOXIFLOXACIN HYDROCHLORIDE 0.7 ML: 5 SOLUTION/ DROPS OPHTHALMIC at 08:19

## 2018-01-22 RX ADMIN — TROPICAMIDE 1 DROP: 10 SOLUTION/ DROPS OPHTHALMIC at 08:07

## 2018-01-22 RX ADMIN — EPHEDRINE SULFATE 10 MG: 50 INJECTION INTRAMUSCULAR; INTRAVENOUS; SUBCUTANEOUS at 09:26

## 2018-01-22 RX ADMIN — TROPICAMIDE 1 DROP: 10 SOLUTION/ DROPS OPHTHALMIC at 07:50

## 2018-01-22 RX ADMIN — CYCLOPENTOLATE HYDROCHLORIDE 1 DROP: 10 SOLUTION/ DROPS OPHTHALMIC at 07:50

## 2018-01-22 RX ADMIN — TROPICAMIDE 1 DROP: 10 SOLUTION/ DROPS OPHTHALMIC at 07:58

## 2018-01-22 RX ADMIN — PHENYLEPHRINE HYDROCHLORIDE 1 DROP: 25 SOLUTION/ DROPS OPHTHALMIC at 07:57

## 2018-01-22 RX ADMIN — PHENYLEPHRINE HYDROCHLORIDE 1 DROP: 25 SOLUTION/ DROPS OPHTHALMIC at 07:50

## 2018-01-22 RX ADMIN — CYCLOPENTOLATE HYDROCHLORIDE 1 DROP: 10 SOLUTION/ DROPS OPHTHALMIC at 08:06

## 2018-01-22 RX ADMIN — Medication 1.4 ML GIVEN: at 09:25

## 2018-01-22 ASSESSMENT — LIFESTYLE VARIABLES: TOBACCO_USE: 1

## 2018-01-22 NOTE — ANESTHESIA CARE TRANSFER NOTE
Patient: Stefanie Velazquez    Procedure(s):  Left cataract removal with implant - Wound Class: I-Clean    Diagnosis: Cataract  Diagnosis Additional Information: No value filed.    Anesthesia Type:   MAC     Note:  Airway :Room Air  Patient transferred to:Phase II  Handoff Report: Identifed the Patient, Identified the Reponsible Provider, Reviewed the pertinent medical history, Discussed the surgical course, Reviewed Intra-OP anesthesia mangement and issues during anesthesia, Set expectations for post-procedure period and Allowed opportunity for questions and acknowledgement of understanding      Vitals: (Last set prior to Anesthesia Care Transfer)    CRNA VITALS  1/22/2018 0858 - 1/22/2018 0928      1/22/2018             NIBP: (!)  73/43    Pulse: 60    NIBP Mean: 55    SpO2: 94 %                Electronically Signed By: CLEMENCIA Beasley CRNA  January 22, 2018  9:28 AM

## 2018-01-22 NOTE — ANESTHESIA POSTPROCEDURE EVALUATION
Patient: Stefanie Velazquez    Procedure(s):  Left cataract removal with implant - Wound Class: I-Clean    Diagnosis:Cataract  Diagnosis Additional Information: No value filed.    Anesthesia Type:  MAC    Note:  Anesthesia Post Evaluation    Patient location during evaluation: Phase 2 and Floor  Patient participation: Able to fully participate in evaluation  Level of consciousness: awake and alert  Pain management: adequate  Airway patency: patent  Cardiovascular status: hemodynamically stable  Respiratory status: acceptable and room air  Hydration status: acceptable  PONV: none     Anesthetic complications: None          Last vitals:  Vitals:    01/22/18 0724   BP: 135/66   Pulse: 54   Resp: 18   Temp: 36.4  C (97.5  F)   SpO2: 95%         Electronically Signed By: CLEMENCIA Beasley CRNA  January 22, 2018  9:28 AM

## 2018-01-22 NOTE — OP NOTE
CATARACT OPERATIVE NOTE    PATIENT: Stefanie Velazquez  DATE OF SURGERY: 1/22/2018  PREOPERATIVE DIAGNOSIS:  Senile Nuclear Cataract, Left eye  POSTOPERATIVE DIAGNOSIS:  Senile Nuclear Cataract, Left eye  OPERATIVE PROCEDURE:  Phacoemulsification and placement of intraocular lens  SURGEON:  Rony Key MD  ANESTHESIA:  Topical / MAC  EBL:  None  SPECIMENS:  None  COMPLICATIONS:  None    PROCEDURE:  The patient was brought to the operating room at [facility].  The left eye was prepped and draped in the usual fashion for cataract surgery.  A wire lid speculum was inserted.  A super sharp blade was used to make a paracentesis at the 5 O'clock position.  The super sharp blade was used to make a partial thickness temporal groove, which was 3 mm in length.  0.8 mL of non-preserved epi-Shugarcaine was injected into the anterior chamber.  Viscoelastic was used to inflate the anterior chamber through a cannula.  A 2.5 mm microkeratome was used to make a temporal clear corneal incision in a two-plane fashion.  A cystotome needle and forceps were used to make a capsulorrhexis.  Hydrodissection and hydrodelineation were performed with Balance Salt Solution.  The lens was then phacoemulsified and removed without complications.  The cortical material was removed with bimanual irrigation and aspiration.  The capsular bag was filled with viscoelastic.  A posterior chamber intraocular lens, preselected and recorded, was folded and inserted into the capsular bag.  The viscoelastic was removed with the irrigation and aspiration tip.  Balanced Salt Solution with Vigamox was used to refill the anterior chamber.  The wounds were checked for water tightness and required no suture.  The wire lid speculum was removed.  The patient's left eye was cleaned and a drop of each post-operative drop was placed, followed by a tavarez shield.  The patient tolerated the procedure well, and there were no  complications.      Rony Key MD

## 2018-02-09 DIAGNOSIS — E78.5 HYPERLIPIDEMIA LDL GOAL <100: Chronic | ICD-10-CM

## 2018-02-09 DIAGNOSIS — C20 RECTAL CANCER (H): Chronic | ICD-10-CM

## 2018-02-09 DIAGNOSIS — I10 BENIGN ESSENTIAL HYPERTENSION: Chronic | ICD-10-CM

## 2018-02-09 DIAGNOSIS — I25.9 CHRONIC ISCHEMIC HEART DISEASE: Chronic | ICD-10-CM

## 2018-02-09 LAB
ALBUMIN SERPL-MCNC: 3.1 G/DL (ref 3.4–5)
ALP SERPL-CCNC: 89 U/L (ref 40–150)
ALT SERPL W P-5'-P-CCNC: 16 U/L (ref 0–50)
ANION GAP SERPL CALCULATED.3IONS-SCNC: 6 MMOL/L (ref 3–14)
AST SERPL W P-5'-P-CCNC: 25 U/L (ref 0–45)
BASOPHILS # BLD AUTO: 0 10E9/L (ref 0–0.2)
BASOPHILS NFR BLD AUTO: 0.5 %
BILIRUB SERPL-MCNC: 0.9 MG/DL (ref 0.2–1.3)
BUN SERPL-MCNC: 17 MG/DL (ref 7–30)
CALCIUM SERPL-MCNC: 9.2 MG/DL (ref 8.5–10.1)
CEA SERPL-MCNC: 3.1 UG/L (ref 0–2.5)
CHLORIDE SERPL-SCNC: 103 MMOL/L (ref 94–109)
CHOLEST SERPL-MCNC: 213 MG/DL
CO2 SERPL-SCNC: 29 MMOL/L (ref 20–32)
CREAT SERPL-MCNC: 1.38 MG/DL (ref 0.52–1.04)
DIFFERENTIAL METHOD BLD: ABNORMAL
EOSINOPHIL # BLD AUTO: 0.3 10E9/L (ref 0–0.7)
EOSINOPHIL NFR BLD AUTO: 6.8 %
ERYTHROCYTE [DISTWIDTH] IN BLOOD BY AUTOMATED COUNT: 13.6 % (ref 10–15)
GFR SERPL CREATININE-BSD FRML MDRD: 37 ML/MIN/1.7M2
GLUCOSE SERPL-MCNC: 129 MG/DL (ref 70–99)
HCT VFR BLD AUTO: 41 % (ref 35–47)
HDLC SERPL-MCNC: 62 MG/DL
HGB BLD-MCNC: 13.8 G/DL (ref 11.7–15.7)
LDLC SERPL CALC-MCNC: 124 MG/DL
LYMPHOCYTES # BLD AUTO: 1.1 10E9/L (ref 0.8–5.3)
LYMPHOCYTES NFR BLD AUTO: 28.3 %
MCH RBC QN AUTO: 29.9 PG (ref 26.5–33)
MCHC RBC AUTO-ENTMCNC: 33.7 G/DL (ref 31.5–36.5)
MCV RBC AUTO: 89 FL (ref 78–100)
MONOCYTES # BLD AUTO: 0.4 10E9/L (ref 0–1.3)
MONOCYTES NFR BLD AUTO: 10.1 %
NEUTROPHILS # BLD AUTO: 2.1 10E9/L (ref 1.6–8.3)
NEUTROPHILS NFR BLD AUTO: 54.3 %
NONHDLC SERPL-MCNC: 151 MG/DL
PLATELET # BLD AUTO: 211 10E9/L (ref 150–450)
POTASSIUM SERPL-SCNC: 4 MMOL/L (ref 3.4–5.3)
PROT SERPL-MCNC: 7.1 G/DL (ref 6.8–8.8)
RBC # BLD AUTO: 4.62 10E12/L (ref 3.8–5.2)
SODIUM SERPL-SCNC: 138 MMOL/L (ref 133–144)
TRIGL SERPL-MCNC: 137 MG/DL
WBC # BLD AUTO: 3.9 10E9/L (ref 4–11)

## 2018-02-09 PROCEDURE — 36415 COLL VENOUS BLD VENIPUNCTURE: CPT | Performed by: INTERNAL MEDICINE

## 2018-02-09 PROCEDURE — 80061 LIPID PANEL: CPT | Performed by: INTERNAL MEDICINE

## 2018-02-09 PROCEDURE — 85025 COMPLETE CBC W/AUTO DIFF WBC: CPT | Performed by: INTERNAL MEDICINE

## 2018-02-09 PROCEDURE — 80053 COMPREHEN METABOLIC PANEL: CPT | Performed by: INTERNAL MEDICINE

## 2018-02-09 PROCEDURE — 82378 CARCINOEMBRYONIC ANTIGEN: CPT | Performed by: INTERNAL MEDICINE

## 2018-02-13 ENCOUNTER — OFFICE VISIT (OUTPATIENT)
Dept: CARDIOLOGY | Facility: CLINIC | Age: 73
End: 2018-02-13
Attending: INTERNAL MEDICINE
Payer: COMMERCIAL

## 2018-02-13 VITALS
WEIGHT: 196 LBS | BODY MASS INDEX: 35.56 KG/M2 | HEART RATE: 62 BPM | SYSTOLIC BLOOD PRESSURE: 145 MMHG | OXYGEN SATURATION: 96 % | DIASTOLIC BLOOD PRESSURE: 86 MMHG

## 2018-02-13 DIAGNOSIS — I25.9 CHRONIC ISCHEMIC HEART DISEASE: Chronic | ICD-10-CM

## 2018-02-13 DIAGNOSIS — I10 BENIGN ESSENTIAL HYPERTENSION: Chronic | ICD-10-CM

## 2018-02-13 DIAGNOSIS — E78.5 HYPERLIPIDEMIA LDL GOAL <100: Chronic | ICD-10-CM

## 2018-02-13 PROCEDURE — 99214 OFFICE O/P EST MOD 30 MIN: CPT | Performed by: INTERNAL MEDICINE
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
## 2018-02-13 NOTE — H&P
[facility]  [facility]  [address]  [facility] [address]  [phone]  Dept: [phone]    OPHTHALMOLOGY PRE-OPERATIVE  HISTORY AND PHYSICAL    DATE OF H/P:  2018    DATE OF SURGERY:  2018  PROCEDURE:  Procedure(s):  PHACOEMULSIFICATION WITH STANDARD INTRAOCULAR LENS IMPLANT, Right Eye  LENS IMPLANT:  ZCB00 +17.5  REFRACTIVE GOAL:  PL Sph  SURGEON:  Rony Key MD    ANESTHESIA:  TOPICAL / MAC    OR CASE REQUIREMENTS:    DEMOGRAPHICS:  Demographic Information on Stefanie Velazquez:    Stefanie Velazquez  Gender: female  : 1945  [address]  [facility] [id number] [phone] (home)     Medical Record: [id number]  Social Security Number: [id number]  Pharmacy: [facility] [address] [place] [address] [address] [facility]  Primary Care Provider: Sandra Morales    Parent's names are: Data Unavailable (mother) and Data Unavailable (father).    Insurance: Payor: [employer] / Plan: BCBS PLATINUM BLUE / Product Type: PPO /     OCULAR HISTORY:  Cataracts, s/p IOL OS.    HISTORIES:  Past Medical History:   Diagnosis Date     Acute myocardial infarction of other specified sites, episode of care unspecified 2002    MI 2 stints     Cancer of colon (H)      Chronic ischemic heart disease, unspecified 2003    cabgx3 ,  adenosine ef70%     Coronary atherosclerosis of unspecified type of vessel, native or graft     Coronary artery disease     Diabetes mellitus (H)      Esophageal reflux      Gastro-oesophageal reflux disease      Generalized osteoarthrosis, unspecified site 2005     Generalized osteoarthrosis, unspecified site 2005     HEARING LOSS CONDUCTIVE, COMBINED TYPE 2005    Montserratian measles as child     HYPOTHYROIDISM  2003     Mixed hyperlipidemia 2005     Obesity, unspecified 2005     RC-moderate (AHI 12, LSat 60%); REM RDI-73 2009    Sleep study [facility] was performed 09 in order to re-evaluate severity  of RC. The total sleep time was 412.0 minutes. The sleep latency was decreased at 8.7 minutes with Ambien 10mg. The REM sleep latency was 426.0 minutes. Sleep efficiency was reduced at 79.0%. The sleep architecture was disrupted with frequent sleep stage changes and arousals.  Snoring:  loud. Respiratory Events:   RDI o     Stented coronary artery      Type II or unspecified type diabetes mellitus with renal manifestations, uncontrolled(250.42) (H) 6/22/2005     Type II or unspecified type diabetes mellitus with renal manifestations, uncontrolled(250.42) (H) 6/22/2005     Unspecified disorder resulting from impaired renal function 6/22/2005    CR     1.82   06/21/2005     Unspecified essential hypertension        Past Surgical History:   Procedure Laterality Date     cabg       COLECTOMY LOW ANTERIOR  6/21/2011    Procedure:COLECTOMY LOW ANTERIOR; with loop ielostomy; Surgeon:ROBBIN MCDONNELL; Location: OR     COLONOSCOPY  1/26/2011    COLONOSCOPY performed by MASOUD BILL at [facility]     COLONOSCOPY N/A 3/1/2016    Procedure: COLONOSCOPY;  Surgeon: Liza Neal MD;  Location: [facility]     INSERT PORT VASCULAR ACCESS  3/2/2011    INSERT PORT VASCULAR ACCESS performed by LIZA NEAL at [facility]     OPEN REDUCTION INTERNAL FIXATION FEMUR DISTAL  2/12/2013    Procedure: OPEN REDUCTION INTERNAL FIXATION FEMUR DISTAL;  Open reduction internal fixation left femur fracture--Anesth.Choice;  Surgeon: Ley, Jeffrey Duane, MD;  Location: [facility]     ORTHOPEDIC SURGERY       PHACOEMULSIFICATION WITH STANDARD INTRAOCULAR LENS IMPLANT Left 1/22/2018    Procedure: PHACOEMULSIFICATION WITH STANDARD INTRAOCULAR LENS IMPLANT;  Left cataract removal with implant;  Surgeon: Rony Key MD;  Location: [facility]     SIGMOIDOSCOPY FLEXIBLE  9/9/2011    Procedure:SIGMOIDOSCOPY FLEXIBLE; Performed prior to induction.; Surgeon:ROBBIN MCDONNELL; Location: OR     SURGICAL HISTORY OF -   10/24/2002    Heart  bypass     SURGICAL HISTORY OF -   2002    R Knee arthroplasty     SURGICAL HISTORY OF -   2002    Mi 2 stints     TAKEDOWN ILEOSTOMY  9/9/2011    Procedure:TAKEDOWN ILEOSTOMY; Exploratory Laparotomy,  Loop Ileostomy Takedown, Small Bowel Resection x 2.; Surgeon:ROBBIN MCDONNELL; Location: OR       Family History   Problem Relation Age of Onset     DIABETES Sister      C.A.D. Sister      Breast Cancer Sister        Social History   Substance Use Topics     Smoking status: Former Smoker     Smokeless tobacco: Never Used     Alcohol use Yes      Comment: rare social use       MEDICATIONS:  No current facility-administered medications for this encounter.      Current Outpatient Prescriptions   Medication Sig     rOPINIRole (REQUIP) 1 MG tablet TAKE ONE TABLET BY MOUTH THREE TIMES DAILY     triamcinolone (KENALOG) 0.1 % cream Apply sparingly to affected area two times daily as needed     menthol-zinc oxide (CALMOSEPTINE) 0.44-20.625 % OINT ointment Apply topically 4 times daily as needed for skin protection     BUTT PASTE - REGULAR (DR LOVE POOP GOOP BUTT PASTE FORMULA) Apply topically every hour as needed for skin protection     furosemide (LASIX) 20 MG tablet Take 1 tablet (20 mg) by mouth daily     insulin pen needle (BD GABRIELLA U/F) 32G X 4 MM Use 4 times per day or as directed.     hypromellose (GENTEAL) 0.3 % SOLN ophthalmic solution 2 drops     fluticasone (FLONASE) 50 MCG/ACT spray 1 spray     chlorhexidine (PERIDEX) 0.12 % solution      oxyCODONE (ROXICODONE) 5 MG IR tablet      Nutritional Supplements (GLUCERNA OS) LIQD One can two to three times a day     insulin aspart (NOVOLOG FLEXPEN) 100 UNIT/ML injection Inject 15 Units Subcutaneous 3 times daily (with meals)     insulin detemir (LEVEMIR FLEXPEN/FLEXTOUCH) 100 UNIT/ML injection Inject 25 Units Subcutaneous At Bedtime Fill when needed     loperamide (IMODIUM) 2 MG capsule One capsule once a day, can use a second one if needed     losartan (COZAAR) 100 MG  tablet Take 1 tablet (100 mg) by mouth daily     metoprolol (TOPROL-XL) 50 MG 24 hr tablet Take 1 tablet (50 mg) by mouth daily     fluocinonide (LIDEX) 0.05 % ointment Apply sparingly to affected area twice daily as needed.  Do not apply to face.     blood glucose monitoring (ACCU-CHEK MILVIA) test strip Use to test blood sugars 4 times daily or as directed.     Incontinence Supply Disposable (DEPEND EASY FIT UNDERGARMENTS) MISC 1 Units every 2 hours X-large     syringe, disposable, 1 ML MISC 1 each every 30 days With 1 inch needle for Vit B12 injection     rOPINIRole (REQUIP) 1 MG tablet 1-2 tabs daily as needed for restless legs     mineral oil-hydrophilic petrolatum (AQUAPHOR) ointment Apply topically as needed     OMEPRAZOLE PO Take 20 mg by mouth daily as needed      acetaminophen (TYLENOL) 650 MG CR tablet Take 1 tablet (650 mg) by mouth 3 times daily     Potassium Gluconate 595 MG CAPS Take 1 tablet by mouth daily     simvastatin (ZOCOR) 40 MG tablet Take 1 tablet (40 mg) by mouth daily     levothyroxine (SYNTHROID, LEVOTHROID) 88 MCG tablet Take 1 tablet (88 mcg) by mouth daily     blood glucose monitoring (ACCU-CHEK MULTICLIX) lancets Test BG 4 times daily     aspirin 81 MG tablet Take 1 tablet (81 mg) by mouth daily     ACCU-CHEK MILVIA MELODY dispense one meter       ALLERGIES:     Allergies   Allergen Reactions     Hydrocodone Unknown     Lisinopril Cough            Vicodin [Hydrocodone-Acetaminophen] Other (See Comments)     Caused extreme dizziness       PERTINENT SYSTEMS REVIEW:    1. Yes: CAD, s/p CABG / stent - stable - Do you have a history of heart attack, stroke, stent, bypass or surgery on an artery in the head, neck, heart or legs?  2. No - Do you ever have any pain or discomfort in your chest?  3. No - Do you have a history of  Heart Failure?  4. No - Are you troubled by shortness of breath when walking: On the level, up a slight hill or at night?  5. No - Do you currently have a cold, bronchitis  or other respiratory infection?  6. No - Do you have a cough, shortness of breath or wheezing?  7. No - Do you sometimes get pains in the calves of your legs when you walk?  8. No - Do you or anyone in your family have previous history of blood clots?  9. No - Do you or does anyone in your family have a serious bleeding problem such as prolonged bleeding following surgeries or cuts?  10. No - Have you ever had problems with anemia or been told to take iron pills?  11. No - Have you had any abnormal blood loss such as black, tarry or bloody stools, or abnormal vaginal bleeding?  12. Yes: after CABG and cancer treatment - Have you ever had a blood transfusion?  13. No - Have you or any of your relatives ever had problems with anesthesia?  14. No - Do you have sleep apnea, excessive snoring or daytime drowsiness?  15. No - Do you have any prosthetic heart valves?  16. Yes: knee replacement - Do you have prosthetic joints?    EXAMINATION:  Vitals were reviewed                     Vison:  Va, right - 20/50;   BAT, right - 20/100;  HEENT:  Cataract, otherwise unremarkable.  LUNGS:  Clear  CV:  Regular rate and rhythm without murmur  ABD:  Soft and nontender  NEURO:  Alert and nonfocal    IMPRESSION:  Patient cleared for ophthalmic surgery.  Low risk with monitored, light sedation.  I have assessed the patient's DVT risk, and no additional orders necessary.    PLAN:  Procedure(s):  PHACOEMULSIFICATION WITH STANDARD INTRAOCULAR LENS IMPLANT, Right Eye      Rony Key MD
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
## 2018-02-13 NOTE — PATIENT INSTRUCTIONS
[facility]  [facility]~[address]~[place], [place]~[address]  Thank you for your [facility] visit today. If you have questions regarding your visit, please contact your cardiology RN's, Paulina Crockett or Emily Oro, at [phone]. Your provider has recommended the following:  Medication Changes:  None today. Continue taking your medications as you have been.  Recommendations:  1. Fasting blood work to be done in 3 months just prior to your follow up with Dr Del Cid  Follow-up:  See Dr Del Cid for cardiology follow up in 3 months  To schedule a future appointment, we kindly ask that you call cardiology scheduling at [phone] three months prior to requested revisit date.               Reminder:  For your safety, we ask that you bring in your current medication(s) or an updated list of your medications with you to EACH office visit. Include the medication name, dose of pill on bottle and how you are taking it. Include over-the-counter medications or supplements. Your provider will review this at each visit and plan your care based on your current information.

## 2018-02-13 NOTE — MR AVS SNAPSHOT
After Visit Summary   2/13/2018    Stefanie Velazquez    MRN: [id number]           Patient Information     Date Of Birth          1945        Visit Information        Provider Department      2/13/2018 2:00 PM Terence Del Cid MD [facility]        Today's Diagnoses     Benign essential hypertension        Chronic ischemic heart disease        Hyperlipidemia LDL goal <100          Care Instructions    [facility]~[address]~[place], [place]~[address]  Thank you for your  Heart Care visit today. If you have questions regarding your visit, please contact your cardiology RN's, Paulina Crockett or Emily Oro, at [phone]. Your provider has recommended the following:  Medication Changes:  None today. Continue taking your medications as you have been.  Recommendations:  1. Fasting blood work to be done in 3 months just prior to your follow up with Dr Del Cid  Follow-up:  See Dr Del Cid for cardiology follow up in 3 months  To schedule a future appointment, we kindly ask that you call cardiology scheduling at [phone] three months prior to requested revisit date.               Reminder:  For your safety, we ask that you bring in your current medication(s) or an updated list of your medications with you to EACH office visit. Include the medication name, dose of pill on bottle and how you are taking it. Include over-the-counter medications or supplements. Your provider will review this at each visit and plan your care based on your current information.               Follow-ups after your visit        Additional Services     Follow-Up with Cardiologist                 Your next 10 appointments already scheduled     Feb 19, 2018   Procedure with Rony Key MD   [facility] (--)    [address] [facility] [id number] [phone]           The [facility] center is  located at [address] (between [address] in [place], [place] miles north of [place]).            Mar 23, 2018 11:30 AM CDT   Return Visit with Apolinar Martinez MD   [facility] ([facility])    [facility]  [address]  [facility] [address]   [phone]              Future tests that were ordered for you today     Open Future Orders        Priority Expected Expires Ordered    Basic metabolic panel Routine 5/14/2018 2/13/2019 2/13/2018    Lipid Profile Routine 5/14/2018 2/13/2019 2/13/2018    ALT Routine 5/14/2018 2/13/2019 2/13/2018    Follow-Up with Cardiologist Routine 5/14/2018 2/13/2019 2/13/2018            Who to contact     If you have questions or need follow up information about today's clinic visit or your schedule please contact [facility] directly at [phone].  Normal or non-critical lab and imaging results will be communicated to you by MyChart, letter or phone within 4 business days after the clinic has received the results. If you do not hear from us within 7 days, please contact the clinic through [employer] or phone. If you have a critical or abnormal lab result, we will notify you by phone as soon as possible.  Submit refill requests through [employer] or call your pharmacy and they will forward the refill request to us. Please allow 3 business days for your refill to be completed.          Additional Information About Your Visit        [employer] Information     [employer] gives you secure access to your electronic health record. If you see a primary care provider, you can also send messages to your care team and make appointments. If you have questions, please call your primary care clinic.  If you do not have a primary care provider, please call [phone] and they will assist you.        Care EveryWhere ID     This is your Care EveryWhere ID. This could be used by other  organizations to access your [place] medical records  [id number]        Your Vitals Were     Pulse Pulse Oximetry BMI (Body Mass Index)             62 96% 35.56 kg/m2          Blood Pressure from Last 3 Encounters:   02/13/18 145/86   01/22/18 120/61   11/10/17 150/70    Weight from Last 3 Encounters:   02/13/18 88.9 kg (196 lb)   01/22/18 88.5 kg (195 lb)   11/10/17 89.8 kg (198 lb)              We Performed the Following     Follow-Up with Cardiologist        Primary Care Provider Office Phone # Fax #    Sandra Morales [phone]       [address] [place] [address]        Equal Access to Services     NIDA NUNN : Hadii rod ng hadasho Soamos, waaxda luqadaha, qaybta kaalmada adeegyada, ceci moy. So [facility] [phone].    ATENCIÓN: Si habla español, tiene a waite disposición servicios gratuitos de asistencia lingüística. [facility] [phone].    We comply with applicable federal civil rights laws and [place] laws. We do not discriminate on the basis of race, color, national origin, age, disability, sex, sexual orientation, or gender identity.            Thank you!     Thank you for choosing [facility]  for your care. Our goal is always to provide you with excellent care. Hearing back from our patients is one way we can continue to improve our services. Please take a few minutes to complete the written survey that you may receive in the mail after your visit with us. Thank you!             Your Updated Medication List - Protect others around you: Learn how to safely use, store and throw away your medicines at www.disposemymeds.org.          This list is accurate as of 2/13/18  2:40 PM.  Always use your most recent med list.                   Brand Name Dispense Instructions for use Diagnosis    ACCU-CHEK MILVIA Jeana     1 device    dispense one meter    Type II or unspecified type diabetes mellitus without mention  of complication, uncontrolled       acetaminophen 650 MG CR tablet    TYLENOL    60 tablet    Take 1 tablet (650 mg) by mouth 3 times daily    Bilateral cellulitis of lower leg       aspirin 81 MG tablet     30 tablet    Take 1 tablet (81 mg) by mouth daily        blood glucose monitoring lancets     1 Box    Test BG 4 times daily    Type II or unspecified type diabetes mellitus without mention of complication, uncontrolled       blood glucose monitoring test strip    ACCU-CHEK MILVIA    360 each    Use to test blood sugars 4 times daily or as directed.    Type 2 diabetes mellitus with diabetic nephropathy, with long-term current use of insulin (H)       BUTT PASTE - REGULAR    DR LOVE POOP GOOP BUTT PASTE FORMULA     Apply topically every hour as needed for skin protection        chlorhexidine 0.12 % solution    PERIDEX          DEPEND EASY FIT UNDERGARMENTS Misc     300 each    1 Units every 2 hours X-large    Bowel and bladder incontinence, Rectal cancer (H)       fluocinonide 0.05 % ointment    LIDEX    60 g    Apply sparingly to affected area twice daily as needed.  Do not apply to face.    Venous stasis dermatitis of both lower extremities       fluticasone 50 MCG/ACT spray    FLONASE     1 spray        GLUCERNA OS Liqd     24 each    One can two to three times a day    Poor dentition, Poor nutrition, Type 2 diabetes mellitus with diabetic nephropathy, with long-term current use of insulin (H), Rectal cancer (H), Chronic diarrhea, Radiation therapy complication, sequela       hypromellose 0.3 % Soln ophthalmic solution    GENTEAL     2 drops        insulin aspart 100 UNIT/ML injection    NovoLOG FLEXPEN    3 mL    Inject 15 Units Subcutaneous 3 times daily (with meals)    Type 2 diabetes mellitus with diabetic nephropathy, with long-term current use of insulin (H)       insulin detemir 100 UNIT/ML injection    LEVEMIR FLEXPEN/FLEXTOUCH    3 mL    Inject 25 Units Subcutaneous At Bedtime Fill when needed    Type  2 diabetes mellitus with diabetic nephropathy, with long-term current use of insulin (H)       insulin pen needle 32G X 4 MM    BD GABRIELLA U/F    120 each    Use 4 times per day or as directed.    Type 2 diabetes mellitus with diabetic nephropathy, with long-term current use of insulin (H)       LASIX 20 MG tablet   Generic drug:  furosemide     90 tablet    Take 1 tablet (20 mg) by mouth daily    Benign essential hypertension       levothyroxine 88 MCG tablet    SYNTHROID/LEVOTHROID    90 tablet    Take 1 tablet (88 mcg) by mouth daily        loperamide 2 MG capsule    IMODIUM    90 capsule    One capsule once a day, can use a second one if needed    Rectal cancer (H), Chronic diarrhea       losartan 100 MG tablet    COZAAR    90 tablet    Take 1 tablet (100 mg) by mouth daily    Benign essential hypertension       menthol-zinc oxide 0.44-20.625 % Oint ointment    CALMOSEPTINE     Apply topically 4 times daily as needed for skin protection    Rash and nonspecific skin eruption       metoprolol succinate 50 MG 24 hr tablet    TOPROL-XL    90 tablet    Take 1 tablet (50 mg) by mouth daily    Benign essential hypertension       mineral oil-hydrophilic petrolatum      Apply topically as needed        OMEPRAZOLE PO      Take 20 mg by mouth daily as needed        oxyCODONE IR 5 MG tablet    ROXICODONE          Potassium Gluconate 595 MG Caps      Take 1 tablet by mouth daily    Stasis edema of both lower extremities       * rOPINIRole 1 MG tablet    REQUIP    180 tablet    1-2 tabs daily as needed for restless legs    Restless leg syndrome       * rOPINIRole 1 MG tablet    REQUIP    270 tablet    TAKE ONE TABLET BY MOUTH THREE TIMES DAILY    RLS (restless legs syndrome)       simvastatin 40 MG tablet    ZOCOR    90 tablet    Take 1 tablet (40 mg) by mouth daily        syringe (disposable) 1 ML Misc     1 each    1 each every 30 days With 1 inch needle for Vit B12 injection    Vitamin B 12 deficiency       triamcinolone 0.1  % cream    KENALOG    80 g    Apply sparingly to affected area two times daily as needed    Stasis dermatitis of both legs       * Notice:  This list has 2 medication(s) that are the same as other medications prescribed for you. Read the directions carefully, and ask your doctor or other care provider to review them with you.
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
## 2018-02-16 ENCOUNTER — ANESTHESIA EVENT (OUTPATIENT)
Dept: SURGERY | Facility: CLINIC | Age: 73
End: 2018-02-16
Payer: MEDICARE

## 2018-02-16 ASSESSMENT — LIFESTYLE VARIABLES: TOBACCO_USE: 1

## 2018-02-16 NOTE — ANESTHESIA PREPROCEDURE EVALUATION
Anesthesia Evaluation     . Pt has had prior anesthetic. Type: MAC and General           ROS/MED HX    ENT/Pulmonary:     (+)sleep apnea, tobacco use, Past use , . .    Neurologic: Comment: RLS    (+)neuropathy     Cardiovascular:     (+) Dyslipidemia, hypertension--CAD, -past MI,CABG-date: x3, 6/2003, stent,6/2002  2 . : . . . :. . Previous cardiac testing Echodate:7/2017results:Interpretation Summary     The left ventricle is normal in size.  The visual ejection fraction is estimated at 55-60%.  No regional wall motion abnormalities noted.  The right ventricle is normal in size and function.  No significant valvular heart disease.  _____________________________________________________________________________  __        Left Ventricle  The left ventricle is normal in size. There is normal left ventricular wall  thickness. The visual ejection fraction is estimated at 55-60%. No regional  wall motion abnormalities noted.     Right Ventricle  The right ventricle is normal in size and function.     Atria  Normal left atrial size. Right atrial size is normal. There is no color  Doppler evidence of an atrial shunt.     Mitral Valve  The mitral valve leaflets are mildly thickened. There is trace mitral  regurgitation.        Tricuspid Valve  There is trace tricuspid regurgitation. Normal IVC (1.5-2.5cm) with >50%  respiratory collapse; right atrial pressure is estimated at 5-10mmHg. Right  ventricular systolic pressure could not be approximated due to inadequate  tricuspid regurgitation.     Aortic Valve  There is trivial trileaflet aortic sclerosis. There is trace aortic  regurgitation.     Pulmonic Valve  There is trace pulmonic valvular regurgitation.     Vessels  Normal size aorta. The aortic root is normal size.     Pericardium  There is no pericardial effusion.        Rhythm  The rhythm was normal sinus.date: results: date: results: date: results:          METS/Exercise Tolerance:     Hematologic:     (+) Anemia,  -      Musculoskeletal:   (+) arthritis, , , -       GI/Hepatic:     (+) GERD       Renal/Genitourinary:     (+) chronic renal disease, type: CRI,       Endo:     (+) type II DM thyroid problem hypothyroidism, Obesity, .      Psychiatric:  - neg psychiatric ROS       Infectious Disease:   (+) MRSA,       Malignancy:   (+) Malignancy (rectal cancer- s/p low anterior colectomy) History of GI  GI CA status post Radiation and Surgery,         Other:    - neg other ROS                 Physical Exam  Normal systems: cardiovascular and pulmonary    Airway   Mallampati: II  TM distance: >3 FB  Neck ROM: full    Dental   (+) other  Comment: No teeth    Cardiovascular       Pulmonary                     Anesthesia Plan      History & Physical Review  History and physical reviewed and following examination; no interval change.    ASA Status:  3 .    NPO Status:  > 6 hours    Plan for MAC Reason for MAC:  Deep or markedly invasive procedure (G8)  PONV prophylaxis:  Ondansetron (or other 5HT-3) and Dexamethasone or Solumedrol  Additional equipment: Videolaryngoscope      Postoperative Care  Postoperative pain management:  IV analgesics, Oral pain medications and Multi-modal analgesia.      Consents  Anesthetic plan, risks, benefits and alternatives discussed with:  Patient..                          .

## 2018-02-19 ENCOUNTER — ANESTHESIA (OUTPATIENT)
Dept: SURGERY | Facility: CLINIC | Age: 73
End: 2018-02-19
Payer: MEDICARE

## 2018-02-19 ENCOUNTER — HOSPITAL ENCOUNTER (OUTPATIENT)
Facility: CLINIC | Age: 73
Discharge: HOME | End: 2018-02-19
Attending: OPHTHALMOLOGY | Admitting: OPHTHALMOLOGY
Payer: MEDICARE

## 2018-02-19 VITALS
SYSTOLIC BLOOD PRESSURE: 138 MMHG | OXYGEN SATURATION: 94 % | TEMPERATURE: 96 F | DIASTOLIC BLOOD PRESSURE: 66 MMHG | BODY MASS INDEX: 36.07 KG/M2 | HEIGHT: 62 IN | RESPIRATION RATE: 16 BRPM | WEIGHT: 196 LBS

## 2018-02-19 LAB — GLUCOSE BLDC GLUCOMTR-MCNC: 128 MG/DL (ref 70–99)

## 2018-02-19 PROCEDURE — V2632 POST CHMBR INTRAOCULAR LENS: HCPCS | Performed by: OPHTHALMOLOGY

## 2018-02-19 PROCEDURE — 36000025 ZZH CATARACT SURGICAL PACKAGE: Performed by: OPHTHALMOLOGY

## 2018-02-19 PROCEDURE — 25000125 ZZHC RX 250: Performed by: OPHTHALMOLOGY

## 2018-02-19 PROCEDURE — 71000022 ZZH RECOVERY CATRACT PACKAGE: Performed by: OPHTHALMOLOGY

## 2018-02-19 PROCEDURE — 82962 GLUCOSE BLOOD TEST: CPT

## 2018-02-19 PROCEDURE — 25000128 H RX IP 250 OP 636: Performed by: OPHTHALMOLOGY

## 2018-02-19 PROCEDURE — 25000128 H RX IP 250 OP 636: Performed by: NURSE ANESTHETIST, CERTIFIED REGISTERED

## 2018-02-19 PROCEDURE — 37000012 ZZH ANESTHESIA CATARACT PACKAGE: Performed by: OPHTHALMOLOGY

## 2018-02-19 DEVICE — EYE IMP IOL AMO PCL TECNIS ZCB00 17.5: Type: IMPLANTABLE DEVICE | Site: EYE | Status: FUNCTIONAL

## 2018-02-19 RX ORDER — PROPARACAINE HYDROCHLORIDE 5 MG/ML
SOLUTION/ DROPS OPHTHALMIC PRN
Status: DISCONTINUED | OUTPATIENT
Start: 2018-02-19 | End: 2018-02-19 | Stop reason: HOSPADM

## 2018-02-19 RX ORDER — BALANCED SALT SOLUTION 6.4; .75; .48; .3; 3.9; 1.7 MG/ML; MG/ML; MG/ML; MG/ML; MG/ML; MG/ML
SOLUTION OPHTHALMIC PRN
Status: DISCONTINUED | OUTPATIENT
Start: 2018-02-19 | End: 2018-02-19 | Stop reason: HOSPADM

## 2018-02-19 RX ORDER — TROPICAMIDE 10 MG/ML
1 SOLUTION/ DROPS OPHTHALMIC
Status: COMPLETED | OUTPATIENT
Start: 2018-02-19 | End: 2018-02-19

## 2018-02-19 RX ORDER — PHENYLEPHRINE HYDROCHLORIDE 25 MG/ML
1 SOLUTION/ DROPS OPHTHALMIC
Status: COMPLETED | OUTPATIENT
Start: 2018-02-19 | End: 2018-02-19

## 2018-02-19 RX ORDER — CYCLOPENTOLATE HYDROCHLORIDE 10 MG/ML
1 SOLUTION/ DROPS OPHTHALMIC
Status: COMPLETED | OUTPATIENT
Start: 2018-02-19 | End: 2018-02-19

## 2018-02-19 RX ADMIN — PHENYLEPHRINE HYDROCHLORIDE 1 DROP: 25 SOLUTION/ DROPS OPHTHALMIC at 06:59

## 2018-02-19 RX ADMIN — TROPICAMIDE 1 DROP: 10 SOLUTION/ DROPS OPHTHALMIC at 07:14

## 2018-02-19 RX ADMIN — CYCLOPENTOLATE HYDROCHLORIDE 1 DROP: 10 SOLUTION/ DROPS OPHTHALMIC at 06:59

## 2018-02-19 RX ADMIN — LIDOCAINE HYDROCHLORIDE 5 ML: 10 INJECTION, SOLUTION EPIDURAL; INFILTRATION; INTRACAUDAL; PERINEURAL at 06:57

## 2018-02-19 RX ADMIN — MIDAZOLAM 2 MG: 1 INJECTION INTRAMUSCULAR; INTRAVENOUS at 07:58

## 2018-02-19 RX ADMIN — CYCLOPENTOLATE HYDROCHLORIDE 1 DROP: 10 SOLUTION/ DROPS OPHTHALMIC at 07:13

## 2018-02-19 RX ADMIN — PHENYLEPHRINE HYDROCHLORIDE 1 DROP: 25 SOLUTION/ DROPS OPHTHALMIC at 06:48

## 2018-02-19 RX ADMIN — TROPICAMIDE 1 DROP: 10 SOLUTION/ DROPS OPHTHALMIC at 07:00

## 2018-02-19 RX ADMIN — TROPICAMIDE 1 DROP: 10 SOLUTION/ DROPS OPHTHALMIC at 06:47

## 2018-02-19 RX ADMIN — CYCLOPENTOLATE HYDROCHLORIDE 1 DROP: 10 SOLUTION/ DROPS OPHTHALMIC at 06:48

## 2018-02-19 RX ADMIN — PHENYLEPHRINE HYDROCHLORIDE 1 DROP: 25 SOLUTION/ DROPS OPHTHALMIC at 07:13

## 2018-02-19 NOTE — OP NOTE
OPHTHALMOLOGY OPERATIVE NOTE    PATIENT: Stefanie Velazquez  DATE OF SURGERY: 2/19/2018  PREOPERATIVE DIAGNOSIS:  Senile Nuclear Cataract, Right eye  POSTOPERATIVE DIAGNOSIS:  Senile Nuclear Cataract, Right eye  OPERATIVE PROCEDURE:  Phacoemulsification with placement of intraocular lens  SURGEON:  Rony Key MD  ANESTHESIA:  Topical / MAC  EBL:  None  SPECIMENS:  None  COMPLICATIONS:  None    PROCEDURE:  The patient was brought to the operating room at [facility].  The right eye was prepped and draped in the usual fashion for cataract surgery.  A wire lid speculum was inserted.  A super sharp blade was used to make a paracentesis at the 11 O'clock position.  The super sharp blade was used to make a partial thickness temporal groove, which was 3 mm in length.  0.8 mL of non-preserved epi-Shugarcaine was injected into the anterior chamber.  Viscoelastic was used to inflate the anterior chamber through a cannula.  A 2.5 mm microkeratome was used to make a temporal clear corneal incision in a two-plane fashion.  A cystotome needle and forceps were used to make a capsulorrhexis.  Hydrodissection and hydrodelineation were performed with Balance Salt Solution.  The lens was then phacoemulsified and removed without complications.  The cortical material was removed with bimanual irrigation and aspiration.  The capsular bag was filled with viscoelastic.  A posterior chamber intraocular lens, preselected and recorded, was folded and inserted into the capsular bag.  The viscoelastic was removed with the irrigation and aspiration tip.  Balanced Salt Solution with Vigamox, 150mg/0.1mL, was used to refill the anterior chamber.  The wounds were checked for water tightness and required no suture.  The wire lid speculum was removed.  The patient's right eye was cleaned and a drop of each post-operative drop was placed, followed by a tavarez shield.  The patient tolerated the procedure well, and there  were no complications.      Rony Key MD

## 2018-02-19 NOTE — ANESTHESIA POSTPROCEDURE EVALUATION
Patient: Stefanie Velazquez    Procedure(s):  Right cataract removal with implant - Wound Class: I-Clean    Diagnosis:cataract  Diagnosis Additional Information: No value filed.    Anesthesia Type:  MAC    Note:  Anesthesia Post Evaluation    Patient location during evaluation: Phase 2 and Bedside  Patient participation: Able to fully participate in evaluation  Level of consciousness: awake and alert  Pain management: adequate  Airway patency: patent  Cardiovascular status: acceptable and hemodynamically stable  Respiratory status: acceptable and room air  Hydration status: acceptable  PONV: none     Anesthetic complications: None          Last vitals:  Vitals:    02/19/18 0632   BP: 144/67   Resp: 16   Temp: 35.6  C (96  F)   SpO2: 92%         Electronically Signed By: CLEMENCIA Beasley CRNA  February 19, 2018  8:23 AM

## 2018-02-19 NOTE — ANESTHESIA CARE TRANSFER NOTE
Patient: Stefanie Velazquez    Procedure(s):  Right cataract removal with implant - Wound Class: I-Clean    Diagnosis: cataract  Diagnosis Additional Information: No value filed.    Anesthesia Type:   MAC     Note:  Airway :Room Air  Patient transferred to:Phase II  Handoff Report: Identifed the Patient, Identified the Reponsible Provider, Reviewed the pertinent medical history, Discussed the surgical course, Reviewed Intra-OP anesthesia mangement and issues during anesthesia, Set expectations for post-procedure period and Allowed opportunity for questions and acknowledgement of understanding      Vitals: (Last set prior to Anesthesia Care Transfer)    CRNA VITALS  2/19/2018 0749 - 2/19/2018 0819      2/19/2018             Pulse: 50    SpO2: 93 %                Electronically Signed By: CLEMENCIA Beasley CRNA  February 19, 2018  8:19 AM

## 2018-02-21 ENCOUNTER — TELEPHONE (OUTPATIENT)
Dept: FAMILY MEDICINE | Facility: CLINIC | Age: 73
End: 2018-02-21

## 2018-02-21 NOTE — TELEPHONE ENCOUNTER
Reason for Call:  Form, our goal is to have forms completed with 72 hours, however, some forms may require a visit or additional information.    Type of letter, form or note:  medical    Who is the form from?: [facility] Order    Where did the form come from: form was faxed in    What clinic location was the form placed at?: [place]    Where the form was placed: [facility]           Call taken on 2/21/2018 at 8:22 AM by Ilene Butler

## 2018-02-22 NOTE — TELEPHONE ENCOUNTER
Form received back signed  2/22/18  Faxed Back & Sent to be scanned to this encounter  Leilani Orn [place] Sec

## 2018-03-02 ENCOUNTER — MEDICAL CORRESPONDENCE (OUTPATIENT)
Dept: HEALTH INFORMATION MANAGEMENT | Facility: CLINIC | Age: 73
End: 2018-03-02

## 2018-03-05 ENCOUNTER — TELEPHONE (OUTPATIENT)
Dept: FAMILY MEDICINE | Facility: CLINIC | Age: 73
End: 2018-03-05

## 2018-03-05 NOTE — TELEPHONE ENCOUNTER
Reason for Call:  Form, our goal is to have forms completed with 72 hours, however, some forms may require a visit or additional information.    Type of letter, form or note:  [facility]- Providers Orders    Who is the form from?: [facility]- Providers Orders (if other please explain)    Where did the form come from: form was faxed in    What clinic location was the form placed at?: [place]    Where the form was placed: [facility]      Call taken on 3/5/2018 at 1:03 PM by Leilani Jurado

## 2018-03-06 ENCOUNTER — TELEPHONE (OUTPATIENT)
Dept: FAMILY MEDICINE | Facility: CLINIC | Age: 73
End: 2018-03-06

## 2018-03-06 DIAGNOSIS — L03.119 CELLULITIS OF LOWER EXTREMITY: Chronic | ICD-10-CM

## 2018-03-06 DIAGNOSIS — G62.9 NEUROPATHY: Chronic | ICD-10-CM

## 2018-03-06 DIAGNOSIS — E11.21 TYPE 2 DIABETES MELLITUS WITH DIABETIC NEPHROPATHY, WITH LONG-TERM CURRENT USE OF INSULIN (H): Primary | Chronic | ICD-10-CM

## 2018-03-06 DIAGNOSIS — I25.9 CHRONIC ISCHEMIC HEART DISEASE: Chronic | ICD-10-CM

## 2018-03-06 DIAGNOSIS — M81.0 OSTEOPOROSIS: ICD-10-CM

## 2018-03-06 DIAGNOSIS — G47.33 OSA (OBSTRUCTIVE SLEEP APNEA): Chronic | ICD-10-CM

## 2018-03-06 DIAGNOSIS — Z79.4 TYPE 2 DIABETES MELLITUS WITH DIABETIC NEPHROPATHY, WITH LONG-TERM CURRENT USE OF INSULIN (H): Primary | Chronic | ICD-10-CM

## 2018-03-06 DIAGNOSIS — E11.21 TYPE 2 DIABETES MELLITUS WITH DIABETIC NEPHROPATHY (H): Chronic | ICD-10-CM

## 2018-03-06 NOTE — TELEPHONE ENCOUNTER
Reason for Call: Request for an order or referral:    Order or referral being requested: [facility] [employer]- Requesting orders for pt as she is needing Service & repair on her Power Scooter.  Order for 1) Service & Repair on her Power Scooter including 2 New Batteries. Include Diagnosis/ Dx/ICD 10 codes associated with pt.    Date needed: as soon as possible    Has the patient been seen by the PCP for this problem? YES    Phone number Patient can be reached at:  Home number on file [phone] (home)    Best Time:  Any Time      Can we leave a detailed message on this number?  YES    Call taken on 3/6/2018 at 2:58 PM by Leilani Jurado

## 2018-03-19 PROCEDURE — G0179 MD RECERTIFICATION HHA PT: HCPCS | Performed by: FAMILY MEDICINE

## 2018-03-20 ENCOUNTER — TELEPHONE (OUTPATIENT)
Dept: FAMILY MEDICINE | Facility: CLINIC | Age: 73
End: 2018-03-20

## 2018-03-20 ENCOUNTER — OFFICE VISIT (OUTPATIENT)
Dept: FAMILY MEDICINE | Facility: CLINIC | Age: 73
End: 2018-03-20
Payer: COMMERCIAL

## 2018-03-20 VITALS
HEART RATE: 74 BPM | WEIGHT: 194 LBS | SYSTOLIC BLOOD PRESSURE: 139 MMHG | OXYGEN SATURATION: 96 % | RESPIRATION RATE: 16 BRPM | TEMPERATURE: 97.8 F | BODY MASS INDEX: 35.2 KG/M2 | DIASTOLIC BLOOD PRESSURE: 84 MMHG

## 2018-03-20 DIAGNOSIS — E53.8 VITAMIN B12 DEFICIENCY (NON ANEMIC): ICD-10-CM

## 2018-03-20 DIAGNOSIS — N30.01 ACUTE CYSTITIS WITH HEMATURIA: ICD-10-CM

## 2018-03-20 DIAGNOSIS — R29.898 LEG WEAKNESS, BILATERAL: ICD-10-CM

## 2018-03-20 DIAGNOSIS — E03.9 ACQUIRED HYPOTHYROIDISM: Chronic | ICD-10-CM

## 2018-03-20 DIAGNOSIS — N18.30 CKD (CHRONIC KIDNEY DISEASE) STAGE 3, GFR 30-59 ML/MIN (H): Chronic | ICD-10-CM

## 2018-03-20 DIAGNOSIS — G47.33 OSA (OBSTRUCTIVE SLEEP APNEA): Chronic | ICD-10-CM

## 2018-03-20 DIAGNOSIS — N39.41 URGE INCONTINENCE OF URINE: ICD-10-CM

## 2018-03-20 DIAGNOSIS — C20 RECTAL CANCER (H): Chronic | ICD-10-CM

## 2018-03-20 DIAGNOSIS — G62.9 NEUROPATHY: Chronic | ICD-10-CM

## 2018-03-20 DIAGNOSIS — G25.81 RESTLESS LEG SYNDROME: ICD-10-CM

## 2018-03-20 DIAGNOSIS — Z79.4 TYPE 2 DIABETES MELLITUS WITH DIABETIC NEPHROPATHY, WITH LONG-TERM CURRENT USE OF INSULIN (H): Primary | Chronic | ICD-10-CM

## 2018-03-20 DIAGNOSIS — I25.9 CHRONIC ISCHEMIC HEART DISEASE: Chronic | ICD-10-CM

## 2018-03-20 DIAGNOSIS — E78.5 HYPERLIPIDEMIA LDL GOAL <100: Chronic | ICD-10-CM

## 2018-03-20 DIAGNOSIS — R82.90 NONSPECIFIC FINDING ON EXAMINATION OF URINE: ICD-10-CM

## 2018-03-20 DIAGNOSIS — E11.21 TYPE 2 DIABETES MELLITUS WITH DIABETIC NEPHROPATHY, WITH LONG-TERM CURRENT USE OF INSULIN (H): Primary | Chronic | ICD-10-CM

## 2018-03-20 LAB
ALBUMIN UR-MCNC: 100 MG/DL
APPEARANCE UR: CLEAR
BACTERIA #/AREA URNS HPF: ABNORMAL /HPF
BILIRUB UR QL STRIP: NEGATIVE
COLOR UR AUTO: YELLOW
GLUCOSE UR STRIP-MCNC: NEGATIVE MG/DL
HBA1C MFR BLD: 7.3 % (ref 4.3–6)
HGB UR QL STRIP: NEGATIVE
KETONES UR STRIP-MCNC: NEGATIVE MG/DL
LEUKOCYTE ESTERASE UR QL STRIP: ABNORMAL
NITRATE UR QL: NEGATIVE
NON-SQ EPI CELLS #/AREA URNS LPF: ABNORMAL /LPF
PH UR STRIP: 7 PH (ref 5–7)
RBC #/AREA URNS AUTO: ABNORMAL /HPF
SOURCE: ABNORMAL
SP GR UR STRIP: 1.02 (ref 1–1.03)
UROBILINOGEN UR STRIP-ACNC: 0.2 EU/DL (ref 0.2–1)
WBC #/AREA URNS AUTO: ABNORMAL /HPF

## 2018-03-20 PROCEDURE — 83036 HEMOGLOBIN GLYCOSYLATED A1C: CPT | Performed by: FAMILY MEDICINE

## 2018-03-20 PROCEDURE — 87088 URINE BACTERIA CULTURE: CPT | Performed by: FAMILY MEDICINE

## 2018-03-20 PROCEDURE — 99215 OFFICE O/P EST HI 40 MIN: CPT | Performed by: FAMILY MEDICINE

## 2018-03-20 PROCEDURE — 36415 COLL VENOUS BLD VENIPUNCTURE: CPT | Performed by: FAMILY MEDICINE

## 2018-03-20 PROCEDURE — 81001 URINALYSIS AUTO W/SCOPE: CPT | Performed by: FAMILY MEDICINE

## 2018-03-20 PROCEDURE — 87086 URINE CULTURE/COLONY COUNT: CPT | Performed by: FAMILY MEDICINE

## 2018-03-20 PROCEDURE — 87186 SC STD MICRODIL/AGAR DIL: CPT | Performed by: FAMILY MEDICINE

## 2018-03-20 PROCEDURE — 82607 VITAMIN B-12: CPT | Performed by: FAMILY MEDICINE

## 2018-03-20 NOTE — TELEPHONE ENCOUNTER
Reason for Call:  Form, our goal is to have forms completed with 72 hours, however, some forms may require a visit or additional information.    Type of letter, form or note:  [facility]- Plan of Care - 3/20/18-5/18/18    Who is the form from?: [facility]- Plan of Care - 3/20/18-5/18/18 (if other please explain)    Where did the form come from: form was faxed in    What clinic location was the form placed at?: [place]    Where the form was placed: [facility]    Form received back signed  3/19/18  Faxed Back & Sent to be scanned to this encounter            Call taken on 3/20/2018 at 9:57 AM by Leilani Jurado

## 2018-03-20 NOTE — PROGRESS NOTES
SUBJECTIVE:   Stefanie Velazquez is a 73 year old female who presents to clinic today for the following health issues:      Diabetes Follow-up      Patient is checking blood sugars: good    Diabetic concerns: Neuropathy of feet     Symptoms of hypoglycemia (low blood sugar): none     Paresthesias (numbness or burning in feet) or sores: Yes - wants to discuss medications     Date of last diabetic eye exam: 2018  Lab Results   Component Value Date    A1C 7.1 11/10/2017    A1C 7.9 07/10/2017    A1C 7.1 03/30/2017    A1C 8.2 10/01/2016    A1C 9.1 07/21/2016        Hyperlipidemia Follow-Up      Rate your low fat/cholesterol diet?: fair    Taking statin?  No    Other lipid medications/supplements?:  none  Recent Labs   Lab Test  02/09/18   0910  10/16/17   0935   09/16/15   1130  03/23/15   0926   CHOL  213*  183   < >  120  112   HDL  62  56   < >  46*  44*   LDL  124*  91   < >  50  41   TRIG  137  178*   < >  118  134   CHOLHDLRATIO   --    --    --   2.6  2.5    < > = values in this interval not displayed.     All teeth are pulled. She is due to get dentures in a few weeks. Is not able to eat solids     Hypertension Follow-up      Outpatient blood pressures are being checked at home.  Results are a little high.    Low Salt Diet: low salt    BP Readings from Last 2 Encounters:   03/20/18 139/84   02/19/18 138/66     Hemoglobin A1C (%)   Date Value   11/10/2017 7.1 (H)   07/10/2017 7.9 (H)     LDL Cholesterol Calculated (mg/dL)   Date Value   02/09/2018 124 (H)   10/16/2017 91       Amount of exercise or physical activity: None    Problems taking medications regularly: No    Medication side effects: none    Diet: diabetic    obstructive sleep apnea-is always fatigued. Anytime that she sits and is quiet she falls asleep. She is not using her CPAP    B12-she used to get shots some time ago, she has not been on them for some time and asks if she still needs them    hypothyroidism -on replacement  TSH   Date Value Ref Range  Status   11/10/2017 4.13 (H) 0.40 - 4.00 mU/L Final   ]     Problem list and histories reviewed & adjusted, as indicated.  Additional history: as documented    BP Readings from Last 3 Encounters:   03/20/18 139/84   02/19/18 138/66   02/13/18 145/86    Wt Readings from Last 3 Encounters:   03/20/18 194 lb (88 kg)   02/19/18 196 lb (88.9 kg)   02/13/18 196 lb (88.9 kg)                    Reviewed and updated as needed this visit by clinical staff  Tobacco  Allergies  Meds  Med Hx  Surg Hx  Fam Hx  Soc Hx      Reviewed and updated as needed this visit by Provider         ROS:  CONSTITUTIONAL: NEGATIVE for fever, chills, change in weight  ENT/MOUTH: NEGATIVE for ear, mouth and throat problems  RESP: NEGATIVE for significant cough or SOB  CV: NEGATIVE for chest pain, palpitations or peripheral edema  GI: NEGATIVE for nausea, abdominal pain, heartburn, or change in bowel habits  : No dysuria, urinary frequency, has urgency    OBJECTIVE:                                                    /84 (BP Location: Left arm)  Pulse 74  Temp 97.8  F (36.6  C) (Tympanic)  Resp 16  Wt 194 lb (88 kg)  SpO2 96%  BMI 35.2 kg/m2  Body mass index is 35.2 kg/(m^2).  GENERAL: alert, well nourished, well hydrated, no distress, sitting comfortably in wheelchair  HENT: Mouth- edentulous  NECK: no tenderness, no adenopathy, no JVD  RESP: lungs clear to auscultation, decreased breath sounds bases  CV: regular rates and rhythm, normal S1 S2, 1-2/6 systolic ejection murmur LUSB  ABDOMEN: soft, no tenderness, no mass or distention  MS: extremities- no gross deformities noted, trace edema  Skin: dry scaly rash with excoriations on lower legs, right greater than left     Results for orders placed or performed in visit on 03/20/18   UA reflex to Microscopic and Culture   Result Value Ref Range    Color Urine Yellow     Appearance Urine Clear     Glucose Urine Negative NEG^Negative mg/dL    Bilirubin Urine Negative NEG^Negative     Ketones Urine Negative NEG^Negative mg/dL    Specific Gravity Urine 1.020 1.003 - 1.035    Blood Urine Negative NEG^Negative    pH Urine 7.0 5.0 - 7.0 pH    Protein Albumin Urine 100 (A) NEG^Negative mg/dL    Urobilinogen Urine 0.2 0.2 - 1.0 EU/dL    Nitrite Urine Negative NEG^Negative    Leukocyte Esterase Urine Moderate (A) NEG^Negative    Source Midstream Urine    Urine Microscopic   Result Value Ref Range    WBC Urine 5-10 (A) OTO5^0 - 5 /HPF    RBC Urine O - 2 OTO2^O - 2 /HPF    Squamous Epithelial /LPF Urine Moderate (A) FEW^Few /LPF    Bacteria Urine Moderate (A) NEG^Negative /HPF   Hemoglobin A1c   Result Value Ref Range    Hemoglobin A1C 7.3 (H) 4.3 - 6.0 %     *Note: Due to a large number of results and/or encounters for the requested time period, some results have not been displayed. A complete set of results can be found in Results Review.         ASSESSMENT/PLAN:                                                      ASSESSMENT / PLAN:  (E11.21,  Z79.4) Type 2 diabetes mellitus with diabetic nephropathy, with long-term current use of insulin (H)  (primary encounter diagnosis)  Comment: wellcontrolled  Plan: Hemoglobin A1c        Continue insulin,     (N39.41) Urge incontinence of urine  Comment:   Plan: UA reflex to Microscopic and Culture, Urine         Microscopic        Will see what urine culture grows      (N18.3) CKD (chronic kidney disease) stage 3, GFR 30-59 ml/min  Comment: stable  Plan: chem    (G25.81) Restless leg syndrome  Comment: controlled  Plan: continue Requip    (I25.9) Chronic ischemic heart disease  Comment: stable  Plan: followed by cardiology    (E03.9) Acquired hypothyroidism  Comment:   Plan: check TSH today, adjust as needed    (G47.33) RC-moderate (AHI 12, LSat 60%); REM RDI-73  Comment: not using CPAP  Plan: SLEEP EVALUATION & MANAGEMENT REFERRAL - [facility]  [phone]        (Age 2 and up)        Discussed importance    (G62.9)  Neuropathy (H)  Comment: stable  Plan: continue current treatment     (E78.5) Hyperlipidemia LDL goal <100  Comment: controlled  Plan: continue simvastatin    (C20) Rectal cancer- newly diagnosed adenoCA rectum Jan 2011  Comment:   Plan: followed by oncology    (E53.8) Vitamin B12 deficiency (non anemic)  Comment: uncertain  Plan: Vitamin B12           (R29.898) Leg weakness, bilateral  Comment: deconditioning  Plan: PHYSICAL THERAPY REFERRAL    40 min spent with patient, greater than 50% in counseling and coordination of care, discussion of multiple issues, including importance of CPAP machine and will do sleep study.   Check labs today   Physical therapy for conditioning    Patient Instructions   Get another sleep study    Today will check A1C, B12, thyroid, and kidneys    Physical therapy     See you in 3 months     Sandra Morales MD  [facility]

## 2018-03-20 NOTE — MR AVS SNAPSHOT
After Visit Summary   3/20/2018    Stefanie Velazquez    MRN: [id number]           Patient Information     Date Of Birth          1945        Visit Information        Provider Department      3/20/2018 3:20 PM Sandra Morales MD [facility]        Today's Diagnoses     Type 2 diabetes mellitus with diabetic nephropathy, with long-term current use of insulin (H)    -  1    Urge incontinence of urine        Nonspecific finding on examination of urine        CKD (chronic kidney disease) stage 3, GFR 30-59 ml/min        Restless leg syndrome        Chronic ischemic heart disease        Acquired hypothyroidism        RC-moderate (AHI 12, LSat 60%); REM RDI-73        Neuropathy (H)        Hyperlipidemia LDL goal <100        Rectal cancer- newly diagnosed adenoCA rectum Jan 2011        Vitamin B12 deficiency (non anemic)        Leg weakness, bilateral          Care Instructions    Get another sleep study    Today will check A1C, B12, thyroid, and kidneys    Physical therapy     See you in 3 months          Follow-ups after your visit        Additional Services     PHYSICAL THERAPY REFERRAL       *This therapy referral will be filtered to a centralized scheduling office at [facility] and the patient will receive a call to schedule an appointment at a [facility] location most convenient for them. *     [facility] provides Physical Therapy evaluation and treatment and many specialty services across the [facility] system.  If requesting a specialty program, please choose from the list below.    If you have not heard from the scheduling office within 2 business days, please call [phone] for all locations, with the exception of [place], please call [phone] and [facility], please call [phone]  Treatment: Evaluation & Treatment  Special Instructions/Modalities:   Special Programs: None    Please be aware that coverage of these services is  "subject to the terms and limitations of your health insurance plan.  Call member services at your health plan with any benefit or coverage questions.      **Note to Provider:  If you are referring outside of [place] for the therapy appointment, please list the name of the location in the \"special instructions\" above, print the referral and give to the patient to schedule the appointment.            SLEEP EVALUATION & MANAGEMENT REFERRAL - ADULT -[facility]  [phone] (Age 2 and up)       Please be aware that coverage of these services is subject to the terms and limitations of your health insurance plan.  Call member services at your health plan with any benefit or coverage questions.      Please bring the following to your appointment:    >>   List of current medications   >>   This referral request   >>   Any documents/labs given to you for this referral                      Your next 10 appointments already scheduled     Mar 23, 2018 11:30 AM CDT   Return Visit with Apolinar Martinez MD   [facility] ([facility])    [facility]  [address]  [facility] [address]   [phone]            May 03, 2018  9:00 AM CDT   LAB with [facility] LAB   [facility]    [address] [address]   [phone]           Please do not eat 10-12 hours before your appointment if you are coming in fasting for labs on lipids, cholesterol, or glucose (sugar). This does not apply to pregnant women. Water, hot tea and black coffee (with nothing added) are okay. Do not drink other fluids, diet soda or chew gum.            May 07, 2018  2:00 PM CDT   Return Visit with Terence Del Cid MD   [facility] ([facility])    [address] [place]  [facility] [address]   [phone]              Future tests that were ordered for you today     Open " Future Orders        Priority Expected Expires Ordered    SLEEP EVALUATION & MANAGEMENT REFERRAL - ADULT -[facility]  [phone] (Age 2 and up) Routine  3/20/2019 3/20/2018            Who to contact     If you have questions or need follow up information about today's clinic visit or your schedule please contact [facility] directly at [phone].  Normal or non-critical lab and imaging results will be communicated to you by [facility] [employer], letter or phone within 4 business days after the clinic has received the results. If you do not hear from us within 7 days, please contact the clinic through [employer] or phone. If you have a critical or abnormal lab result, we will notify you by phone as soon as possible.  Submit refill requests through [employer] or call your pharmacy and they will forward the refill request to us. Please allow 3 business days for your refill to be completed.          Additional Information About Your Visit        [facility] Information     [employer] gives you secure access to your electronic health record. If you see a primary care provider, you can also send messages to your care team and make appointments. If you have questions, please call your primary care clinic.  If you do not have a primary care provider, please call [phone] and they will assist you.        Care EveryWhere ID     This is your Care EveryWhere ID. This could be used by other organizations to access your [facility] medical records  [id number]        Your Vitals Were     Pulse Temperature Respirations Pulse Oximetry BMI (Body Mass Index)       74 97.8  F (36.6  C) (Tympanic) 16 96% 35.2 kg/m2        Blood Pressure from Last 3 Encounters:   03/20/18 139/84   02/19/18 138/66   02/13/18 145/86    Weight from Last 3 Encounters:   03/20/18 194 lb (88 kg)   02/19/18 196 lb (88.9 kg)   02/13/18 196 lb (88.9 kg)              We Performed the Following     Hemoglobin A1c     PHYSICAL THERAPY REFERRAL      UA reflex to Microscopic and Culture     Urine Culture Aerobic Bacterial     Urine Microscopic     Vitamin B12          Today's Medication Changes          These changes are accurate as of 3/20/18  4:13 PM.  If you have any questions, ask your nurse or doctor.               These medicines have changed or have updated prescriptions.        Dose/Directions    rOPINIRole 1 MG tablet   Commonly known as:  REQUIP   This may have changed:  Another medication with the same name was removed. Continue taking this medication, and follow the directions you see here.   Used for:  RLS (restless legs syndrome)   Changed by:  Sandra Morales MD        TAKE ONE TABLET BY MOUTH THREE TIMES DAILY   Quantity:  270 tablet   Refills:  0         Stop taking these medicines if you haven't already. Please contact your care team if you have questions.     order for DME   Stopped by:  Sandra Morales MD           oxyCODONE IR 5 MG tablet   Commonly known as:  ROXICODONE   Stopped by:  Sandra Morales MD                    Primary Care Provider Office Phone # Fax #    Sandra Morales [phone]       [address] [place] [address]        Equal Access to Services     [facility]: Hadii rod ku hadasho Soomaali, waaxda luqadaha, qaybta kaalmada adeegyada, waxay idiin hayestiven rivera . So [facility] [phone].    ATENCIÓN: Si habla español, tiene a waite disposición servicios gratuitos de asistencia lingüística. Llame al [phone].    We comply with applicable federal civil rights laws and [place] laws. We do not discriminate on the basis of race, color, national origin, age, disability, sex, sexual orientation, or gender identity.            Thank you!     Thank you for choosing [facility]  for your care. Our goal is always to provide you with excellent care. Hearing back from our patients is one way we can continue to improve our services. Please take a few minutes to complete  the written survey that you may receive in the mail after your visit with us. Thank you!             Your Updated Medication List - Protect others around you: Learn how to safely use, store and throw away your medicines at www.disposemymeds.org.          This list is accurate as of 3/20/18  4:13 PM.  Always use your most recent med list.                   Brand Name Dispense Instructions for use Diagnosis    ACCU-CHEK MILVIA Jeana     1 device    dispense one meter    Type II or unspecified type diabetes mellitus without mention of complication, uncontrolled       acetaminophen 650 MG CR tablet    TYLENOL    60 tablet    Take 1 tablet (650 mg) by mouth 3 times daily    Bilateral cellulitis of lower leg       aspirin 81 MG tablet     30 tablet    Take 1 tablet (81 mg) by mouth daily        blood glucose monitoring lancets     1 Box    Test BG 4 times daily    Type II or unspecified type diabetes mellitus without mention of complication, uncontrolled       blood glucose monitoring test strip    ACCU-CHEK MILVIA    360 each    Use to test blood sugars 4 times daily or as directed.    Type 2 diabetes mellitus with diabetic nephropathy, with long-term current use of insulin (H)       BUTT PASTE - REGULAR    DR LOVE POOP GOOP BUTT PASTE FORMULA     Apply topically every hour as needed for skin protection        chlorhexidine 0.12 % solution    PERIDEX          DEPEND EASY FIT UNDERGARMENTS Misc     300 each    1 Units every 2 hours X-large    Bowel and bladder incontinence, Rectal cancer (H)       fluocinonide 0.05 % ointment    LIDEX    60 g    Apply sparingly to affected area twice daily as needed.  Do not apply to face.    Venous stasis dermatitis of both lower extremities       fluticasone 50 MCG/ACT spray    FLONASE     1 spray        GLUCERNA OS Liqd     24 each    One can two to three times a day    Poor dentition, Poor nutrition, Type 2 diabetes mellitus with diabetic nephropathy, with long-term current use of  insulin (H), Rectal cancer (H), Chronic diarrhea, Radiation therapy complication, sequela       hypromellose 0.3 % Soln ophthalmic solution    GENTEAL     2 drops        insulin aspart 100 UNIT/ML injection    NovoLOG FLEXPEN    3 mL    Inject 15 Units Subcutaneous 3 times daily (with meals)    Type 2 diabetes mellitus with diabetic nephropathy, with long-term current use of insulin (H)       insulin detemir 100 UNIT/ML injection    LEVEMIR FLEXPEN/FLEXTOUCH    3 mL    Inject 25 Units Subcutaneous At Bedtime Fill when needed    Type 2 diabetes mellitus with diabetic nephropathy, with long-term current use of insulin (H)       insulin pen needle 32G X 4 MM    BD GABRIELLA U/F    120 each    Use 4 times per day or as directed.    Type 2 diabetes mellitus with diabetic nephropathy, with long-term current use of insulin (H)       LASIX 20 MG tablet   Generic drug:  furosemide     90 tablet    Take 1 tablet (20 mg) by mouth daily    Benign essential hypertension       levothyroxine 88 MCG tablet    SYNTHROID/LEVOTHROID    90 tablet    Take 1 tablet (88 mcg) by mouth daily        loperamide 2 MG capsule    IMODIUM    90 capsule    One capsule once a day, can use a second one if needed    Rectal cancer (H), Chronic diarrhea       losartan 100 MG tablet    COZAAR    90 tablet    Take 1 tablet (100 mg) by mouth daily    Benign essential hypertension       menthol-zinc oxide 0.44-20.625 % Oint ointment    CALMOSEPTINE     Apply topically 4 times daily as needed for skin protection    Rash and nonspecific skin eruption       metoprolol succinate 50 MG 24 hr tablet    TOPROL-XL    90 tablet    Take 1 tablet (50 mg) by mouth daily    Benign essential hypertension       mineral oil-hydrophilic petrolatum      Apply topically as needed        OMEPRAZOLE PO      Take 20 mg by mouth daily as needed        Potassium Gluconate 595 MG Caps      Take 1 tablet by mouth daily    Stasis edema of both lower extremities       rOPINIRole 1 MG  tablet    REQUIP    270 tablet    TAKE ONE TABLET BY MOUTH THREE TIMES DAILY    RLS (restless legs syndrome)       simvastatin 40 MG tablet    ZOCOR    90 tablet    Take 1 tablet (40 mg) by mouth daily        syringe (disposable) 1 ML Misc     1 each    1 each every 30 days With 1 inch needle for Vit B12 injection    Vitamin B 12 deficiency       triamcinolone 0.1 % cream    KENALOG    80 g    Apply sparingly to affected area two times daily as needed    Stasis dermatitis of both legs

## 2018-03-20 NOTE — PATIENT INSTRUCTIONS
Get another sleep study    Today will check A1C, B12, thyroid, and kidneys    Physical therapy     See you in 3 months

## 2018-03-20 NOTE — NURSING NOTE
"Chief Complaint   Patient presents with     Diabetes     recheck       Initial /84 (BP Location: Left arm)  Pulse 74  Temp 97.8  F (36.6  C) (Tympanic)  Resp 16  Wt 194 lb (88 kg)  SpO2 96%  BMI 35.2 kg/m2 Estimated body mass index is 35.2 kg/(m^2) as calculated from the following:    Height as of 2/19/18: 5' 2.25\" (1.581 m).    Weight as of this encounter: 194 lb (88 kg).      Health Maintenance that is potentially due pending provider review:  NONE    n/a    Is there anyone who you would like to be able to receive your results? No  If yes have patient fill out HELLEN    "

## 2018-03-21 ENCOUNTER — TELEPHONE (OUTPATIENT)
Dept: FAMILY MEDICINE | Facility: CLINIC | Age: 73
End: 2018-03-21

## 2018-03-21 LAB — VIT B12 SERPL-MCNC: 386 PG/ML (ref 193–986)

## 2018-03-21 RX ORDER — CIPROFLOXACIN 250 MG/1
250 TABLET, FILM COATED ORAL 2 TIMES DAILY
Qty: 10 TABLET | Refills: 0 | Status: SHIPPED | OUTPATIENT
Start: 2018-03-21 | End: 2019-02-24

## 2018-03-21 NOTE — TELEPHONE ENCOUNTER
Stefanie was seen in  yesterday by Dr Morales and forgot her folder of her medical papers in an aqua plastic folder. She would like to stop on Friday to pick it up. Please call to let her know if it was found and if she can get it.  [phone]

## 2018-03-22 LAB
BACTERIA SPEC CULT: ABNORMAL
Lab: ABNORMAL
SPECIMEN SOURCE: ABNORMAL

## 2018-03-23 ENCOUNTER — ONCOLOGY VISIT (OUTPATIENT)
Dept: ONCOLOGY | Facility: CLINIC | Age: 73
End: 2018-03-23
Attending: INTERNAL MEDICINE
Payer: COMMERCIAL

## 2018-03-23 VITALS
HEIGHT: 62 IN | SYSTOLIC BLOOD PRESSURE: 159 MMHG | TEMPERATURE: 99.1 F | DIASTOLIC BLOOD PRESSURE: 61 MMHG | RESPIRATION RATE: 18 BRPM | HEART RATE: 71 BPM | WEIGHT: 195.4 LBS | OXYGEN SATURATION: 93 % | BODY MASS INDEX: 35.96 KG/M2

## 2018-03-23 DIAGNOSIS — K21.9 GASTROESOPHAGEAL REFLUX DISEASE WITHOUT ESOPHAGITIS: Chronic | ICD-10-CM

## 2018-03-23 DIAGNOSIS — E03.9 ACQUIRED HYPOTHYROIDISM: Chronic | ICD-10-CM

## 2018-03-23 DIAGNOSIS — E78.5 HYPERLIPIDEMIA LDL GOAL <100: Chronic | ICD-10-CM

## 2018-03-23 DIAGNOSIS — C20 RECTAL CANCER (H): Primary | Chronic | ICD-10-CM

## 2018-03-23 DIAGNOSIS — E11.21 TYPE 2 DIABETES MELLITUS WITH DIABETIC NEPHROPATHY, WITHOUT LONG-TERM CURRENT USE OF INSULIN (H): Chronic | ICD-10-CM

## 2018-03-23 PROCEDURE — 99214 OFFICE O/P EST MOD 30 MIN: CPT | Performed by: INTERNAL MEDICINE

## 2018-03-23 PROCEDURE — G0463 HOSPITAL OUTPT CLINIC VISIT: HCPCS

## 2018-03-23 ASSESSMENT — PAIN SCALES - GENERAL: PAINLEVEL: NO PAIN (0)
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
## 2018-03-23 NOTE — MR AVS SNAPSHOT
After Visit Summary   3/23/2018    Stefanie Velazquez    MRN: [id number]           Patient Information     Date Of Birth          1945        Visit Information        Provider Department      3/23/2018 11:30 AM Apolinar Martinez MD [facility] ONCOLOGY      Today's Diagnoses     Rectal cancer- newly diagnosed adenoCA rectum Jan 2011    -  1      Care Instructions    We would like to see you back in 6 months for a follow up appointment with labs prior.  When you are in need of a refill, please call your pharmacy and they will send us a request.  Copy of appointments, and after visit summary (AVS) given to patient.  If you have any questions during business hours (M-F 8 AM- 4PM), please call Sarika Man RN, BSN, OCN Oncology Hematology /Breast Cancer Navigator at [facility] [phone].   For questions after business hours, or on holidays/weekends, please call our after hours Nurse Triage line [phone]. Thank you.            Follow-ups after your visit        Your next 10 appointments already scheduled     Mar 28, 2018  3:15 PM CDT   [facility], [facility]   [facility]    [address] [address]   [phone]            May 03, 2018  9:00 AM CDT   LAB with [facility] LAB   [facility]    [address] [address]   [phone]           Please do not eat 10-12 hours before your appointment if you are coming in fasting for labs on lipids, cholesterol, or glucose (sugar). This does not apply to pregnant women. Water, hot tea and black coffee (with nothing added) are okay. Do not drink other fluids, diet soda or chew gum.            May 07, 2018  2:00 PM CDT   Return Visit with Terence Del Cid MD   [facility] ([facility])    [address] [place] [address]  [place]  [facility] [address]   [phone]            Sep 21, 2018 11:00 AM CDT   LAB with PI LAB   [facility] ([facility])    [address] [facility] [address]   [phone]           Please do not eat 10-12 hours before your appointment if you are coming in fasting for labs on lipids, cholesterol, or glucose (sugar). This does not apply to pregnant women. Water, hot tea and black coffee (with nothing added) are okay. Do not drink other fluids, diet soda or chew gum.            Sep 28, 2018 11:00 AM CDT   Return Visit with Apolinar Martinez MD   [facility] ([facility])    [facility]  [address]  [facility] [address]   [phone]              Future tests that were ordered for you today     Open Future Orders        Priority Expected Expires Ordered    CBC with platelets differential Routine 9/23/2018 3/23/2019 3/23/2018    Comprehensive metabolic panel Routine 9/23/2018 3/23/2019 3/23/2018    CEA Routine 9/23/2018 3/23/2019 3/23/2018            Who to contact     If you have questions or need follow up information about today's clinic visit or your schedule please contact [facility] directly at [phone].  Normal or non-critical lab and imaging results will be communicated to you by [employer], letter or phone within 4 business days after the clinic has received the results. If you do not hear from us within 7 days, please contact the clinic through [employer] or phone. If you have a critical or abnormal lab result, we will notify you by phone as soon as possible.  Submit refill requests through [employer] or call your pharmacy and they will forward the refill request to us. Please allow 3 business days for your refill to be completed.          Additional Information About Your Visit        [employer] Medical Information     [employer] gives you secure access to your electronic health record. If you see a primary  "care provider, you can also send messages to your care team and make appointments. If you have questions, please call your primary care clinic.  If you do not have a primary care provider, please call [phone] and they will assist you.        Care EveryWhere ID     This is your Care EveryWhere ID. This could be used by other organizations to access your [place] medical records  [id number]        Your Vitals Were     Pulse Temperature Respirations Height Pulse Oximetry Breastfeeding?    71 99.1  F (37.3  C) (Tympanic) 18 1.581 m (5' 2.25\") 93% No    BMI (Body Mass Index)                   35.45 kg/m2            Blood Pressure from Last 3 Encounters:   03/23/18 159/61   03/20/18 139/84   02/19/18 138/66    Weight from Last 3 Encounters:   03/23/18 88.6 kg (195 lb 6.4 oz)   03/20/18 88 kg (194 lb)   02/19/18 88.9 kg (196 lb)               Primary Care Provider Office Phone # Fax #    Sandra Morales [phone]       [address] [place] [address]        Equal Access to Services     [facility] AH: Hadii aad ku hadasho Sohumaali, waaxda luqadaha, qaybta kaalmada adeegyada, ceci torresn livan rivera . So [facility] [phone].    ATENCIÓN: Si habla español, tiene a waite disposición servicios gratuitos de asistencia lingüística. [facility] [phone].    We comply with applicable federal civil rights laws and [place] laws. We do not discriminate on the basis of race, color, national origin, age, disability, sex, sexual orientation, or gender identity.            Thank you!     Thank you for choosing [facility]  for your care. Our goal is always to provide you with excellent care. Hearing back from our patients is one way we can continue to improve our services. Please take a few minutes to complete the written survey that you may receive in the mail after your visit with us. Thank you!             Your Updated Medication List - Protect others around you: Learn how to safely " use, store and throw away your medicines at www.disposemymeds.org.          This list is accurate as of 3/23/18 12:11 PM.  Always use your most recent med list.                   Brand Name Dispense Instructions for use Diagnosis    ACCU-CHEK MILVIA Jeana     1 device    dispense one meter    Type II or unspecified type diabetes mellitus without mention of complication, uncontrolled       acetaminophen 650 MG CR tablet    TYLENOL    60 tablet    Take 1 tablet (650 mg) by mouth 3 times daily    Bilateral cellulitis of lower leg       aspirin 81 MG tablet     30 tablet    Take 1 tablet (81 mg) by mouth daily        blood glucose monitoring lancets     1 Box    Test BG 4 times daily    Type II or unspecified type diabetes mellitus without mention of complication, uncontrolled       blood glucose monitoring test strip    ACCU-CHEK MILVIA    360 each    Use to test blood sugars 4 times daily or as directed.    Type 2 diabetes mellitus with diabetic nephropathy, with long-term current use of insulin (H)       BUTT PASTE - REGULAR    DR LOVE POCHAUNCEY GOOP BUTT PASTE FORMULA     Apply topically every hour as needed for skin protection        chlorhexidine 0.12 % solution    PERIDEX          ciprofloxacin 250 MG tablet    CIPRO    10 tablet    Take 1 tablet (250 mg) by mouth 2 times daily for 5 days    Acute cystitis with hematuria       DEPEND EASY FIT UNDERGARMENTS Misc     300 each    1 Units every 2 hours X-large    Bowel and bladder incontinence, Rectal cancer (H)       fluocinonide 0.05 % ointment    LIDEX    60 g    Apply sparingly to affected area twice daily as needed.  Do not apply to face.    Venous stasis dermatitis of both lower extremities       fluticasone 50 MCG/ACT spray    FLONASE     1 spray        GLUCERNA OS Liqd     24 each    One can two to three times a day    Poor dentition, Poor nutrition, Type 2 diabetes mellitus with diabetic nephropathy, with long-term current use of insulin (H), Rectal cancer (H),  Chronic diarrhea, Radiation therapy complication, sequela       hypromellose 0.3 % Soln ophthalmic solution    GENTEAL     2 drops        insulin aspart 100 UNIT/ML injection    NovoLOG FLEXPEN    3 mL    Inject 15 Units Subcutaneous 3 times daily (with meals)    Type 2 diabetes mellitus with diabetic nephropathy, with long-term current use of insulin (H)       insulin detemir 100 UNIT/ML injection    LEVEMIR FLEXPEN/FLEXTOUCH    3 mL    Inject 25 Units Subcutaneous At Bedtime Fill when needed    Type 2 diabetes mellitus with diabetic nephropathy, with long-term current use of insulin (H)       insulin pen needle 32G X 4 MM    BD GABRIELLA U/F    120 each    Use 4 times per day or as directed.    Type 2 diabetes mellitus with diabetic nephropathy, with long-term current use of insulin (H)       LASIX 20 MG tablet   Generic drug:  furosemide     90 tablet    Take 1 tablet (20 mg) by mouth daily    Benign essential hypertension       levothyroxine 88 MCG tablet    SYNTHROID/LEVOTHROID    90 tablet    Take 1 tablet (88 mcg) by mouth daily        loperamide 2 MG capsule    IMODIUM    90 capsule    One capsule once a day, can use a second one if needed    Rectal cancer (H), Chronic diarrhea       losartan 100 MG tablet    COZAAR    90 tablet    Take 1 tablet (100 mg) by mouth daily    Benign essential hypertension       menthol-zinc oxide 0.44-20.625 % Oint ointment    CALMOSEPTINE     Apply topically 4 times daily as needed for skin protection    Rash and nonspecific skin eruption       metoprolol succinate 50 MG 24 hr tablet    TOPROL-XL    90 tablet    Take 1 tablet (50 mg) by mouth daily    Benign essential hypertension       mineral oil-hydrophilic petrolatum      Apply topically as needed        OMEPRAZOLE PO      Take 20 mg by mouth daily as needed        Potassium Gluconate 595 MG Caps      Take 1 tablet by mouth daily    Stasis edema of both lower extremities       rOPINIRole 1 MG tablet    REQUIP    270 tablet     TAKE ONE TABLET BY MOUTH THREE TIMES DAILY    RLS (restless legs syndrome)       simvastatin 40 MG tablet    ZOCOR    90 tablet    Take 1 tablet (40 mg) by mouth daily        syringe (disposable) 1 ML Misc     1 each    1 each every 30 days With 1 inch needle for Vit B12 injection    Vitamin B 12 deficiency       triamcinolone 0.1 % cream    KENALOG    80 g    Apply sparingly to affected area two times daily as needed    Stasis dermatitis of both legs

## 2018-03-23 NOTE — PATIENT INSTRUCTIONS
We would like to see you back in 6 months for a follow up appointment with labs prior.  When you are in need of a refill, please call your pharmacy and they will send us a request.  Copy of appointments, and after visit summary (AVS) given to patient.  If you have any questions during business hours (M-F 8 AM- 4PM), please call Sarika Man RN, BSN, OCN Oncology Hematology /Breast Cancer Navigator at [facility] [phone].   For questions after business hours, or on holidays/weekends, please call our after hours Nurse Triage line [phone]. Thank you.

## 2018-03-23 NOTE — NURSING NOTE
"Oncology Rooming Note    March 23, 2018 11:23 AM   Stefanie Velazquez is a 73 year old female who presents for:    Chief Complaint   Patient presents with     Oncology Clinic Visit     6 month follow up Rectal CA. Review lab results.      Initial Vitals: /61 (BP Location: Left arm, Patient Position: Chair, Cuff Size: Adult Large)  Pulse 71  Temp 99.1  F (37.3  C) (Tympanic)  Resp 18  Ht 1.581 m (5' 2.25\")  Wt 88.6 kg (195 lb 6.4 oz)  SpO2 93%  Breastfeeding? No  BMI 35.45 kg/m2 Estimated body mass index is 35.45 kg/(m^2) as calculated from the following:    Height as of this encounter: 1.581 m (5' 2.25\").    Weight as of this encounter: 88.6 kg (195 lb 6.4 oz). Body surface area is 1.97 meters squared.  No Pain (0) Comment: Data Unavailable   No LMP recorded. Patient is postmenopausal.  Allergies reviewed: Yes  Medications reviewed: Yes    Medications: Medication refills not needed today.  Pharmacy name entered into [facility]: [facility] - [place], [place] - [address] [place] [address]    Clinical concerns:  6 month follow up Rectal CA. Review lab results. Recently diagnosed with E-Coli currently being treated with Ciprofloxacin.     8 minutes for nursing intake (face to face time)     Marva De lRio, [facility]            "

## 2018-03-26 ENCOUNTER — MEDICAL CORRESPONDENCE (OUTPATIENT)
Dept: HEALTH INFORMATION MANAGEMENT | Facility: CLINIC | Age: 73
End: 2018-03-26

## 2018-03-27 ENCOUNTER — TELEPHONE (OUTPATIENT)
Dept: FAMILY MEDICINE | Facility: CLINIC | Age: 73
End: 2018-03-27

## 2018-03-27 NOTE — TELEPHONE ENCOUNTER
Reason for Call:  Form, our goal is to have forms completed with 72 hours, however, some forms may require a visit or additional information.    Type of letter, form or note:  [facility]- Phy Orders    Who is the form from?: Home care    Where did the form come from: form was faxed in    What clinic location was the form placed at?: [place]    Where the form was placed: [facility]        Call taken on 3/27/2018 at 10:36 AM by Leilani Jurado
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
## 2018-03-28 ENCOUNTER — HOSPITAL ENCOUNTER (OUTPATIENT)
Dept: PHYSICAL THERAPY | Facility: CLINIC | Age: 73
Setting detail: THERAPIES SERIES
End: 2018-03-28
Attending: FAMILY MEDICINE
Payer: MEDICARE

## 2018-03-28 PROCEDURE — 97162 PT EVAL MOD COMPLEX 30 MIN: CPT | Mod: GP | Performed by: PHYSICAL THERAPIST

## 2018-03-28 PROCEDURE — 40000718 ZZHC STATISTIC PT DEPARTMENT ORTHO VISIT: Performed by: PHYSICAL THERAPIST

## 2018-03-28 PROCEDURE — 97535 SELF CARE MNGMENT TRAINING: CPT | Mod: GP | Performed by: PHYSICAL THERAPIST

## 2018-03-28 PROCEDURE — G8978 MOBILITY CURRENT STATUS: HCPCS | Mod: GP,CL | Performed by: PHYSICAL THERAPIST

## 2018-03-28 PROCEDURE — G8979 MOBILITY GOAL STATUS: HCPCS | Mod: GP,CK | Performed by: PHYSICAL THERAPIST

## 2018-03-28 PROCEDURE — 97110 THERAPEUTIC EXERCISES: CPT | Mod: GP | Performed by: PHYSICAL THERAPIST

## 2018-03-29 ENCOUNTER — PATIENT OUTREACH (OUTPATIENT)
Dept: CARE COORDINATION | Facility: CLINIC | Age: 73
End: 2018-03-29

## 2018-03-29 ASSESSMENT — ACTIVITIES OF DAILY LIVING (ADL): DEPENDENT_IADLS:: CLEANING;COOKING;LAUNDRY;SHOPPING;MEAL PREPARATION;MEDICATION MANAGEMENT;TRANSPORATION
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
## 2018-03-29 NOTE — PROGRESS NOTES
[facility]        OUTPATIENT PHYSICAL THERAPY FUNCTIONAL EVALUATION  PLAN OF TREATMENT FOR OUTPATIENT REHABILITATION  (COMPLETE FOR INITIAL CLAIMS ONLY)  Patient's Last Name, First Name, M.I.  YOB: 1945  Stefanie Velazquez     Provider's Name   [facility]   Medical Record No.  [id number]     Start of Care Date:  03/28/18   Onset Date:  03/20/18   Type:     _X__PT   ____OT  ____SLP Medical Diagnosis:  Leg weakness, bilaterally     PT Diagnosis:  deconditioning and balance deficits Visits from SOC:  1                              __________________________________________________________________________________  Plan of Treatment/Functional Goals:  ADL retraining, balance training, gait training, joint mobilization, neuromuscular re-education, ROM, strengthening, stretching, manual therapy     TENS, Ultrasound, Thermotherapy: Hydrocollator Packs     GOALS  1  Patient will tolerate standing for more than 10 minutes without an increase in fatigue in order to wash her dishes.  06/07/18    2  Patient will tolerate ambulating more than 500 feet with rolling walker in order to ambulate to go shopping.  06/07/18    3  Patient will be able to balance in the romberg stance for 30 seconds in order to be safe without house hold tasks.  05/10/18    Therapy Frequency:  2 times/Week   Predicted Duration of Therapy Intervention:  10 weeks    Ruby Bailey, PT                                    I CERTIFY THE NEED FOR THESE SERVICES FURNISHED UNDER        THIS PLAN OF TREATMENT AND WHILE UNDER MY CARE     (Physician co-signature of this document indicates review and certification of the therapy plan).                Certification Date From:  03/29/18   Certification Date To:  06/07/18    Referring Provider:  Dr. Morales    Initial Assessment  See Epic Evaluation- Start of Care  Date: 03/28/18

## 2018-03-29 NOTE — PROGRESS NOTES
Clinic Care Coordination Contact  Care Team Conversations    RN CC received a VM from Eunice Diop RN CM with [facility] (home care), stating she is with patient right now and patient is needing to know who to contact for transportation services from [facility] to places outside of [facility]. She requested either a return call or RN CC to call patient back.    Plan:  RN CC will return Eunice's call.    Lauren Varma RN, [facility]  RN Care Coordinator  [facility]  Phone # [phone]  3/29/2018 2:24 PM

## 2018-03-29 NOTE — PROGRESS NOTES
Clinic Care Coordination Contact  Care Team Conversations    RN CC returned Eunice's call letting her know that RN CC is no longer involved with patient but that RN CC can direct them to contact Angelika with [facility] at [phone] to assist in the transportation. RN CC explained this was set up prior by Lauren MUÑIZ with [facility], she verbalized understanding and will call Angelika.    Plan:  No further Care Coordination needs identified at this time. Patient may be referred to Care Coordination in the future if additional needs arise.  Pt encouraged to contact Care Coordinator through the clinic if situation changes and assistance is needed. No follow-up planned.    Lauren Varma RN, [facility]  RN Care Coordinator  [facility]  Phone # [phone]  3/29/2018 2:27 PM

## 2018-04-03 ENCOUNTER — TELEPHONE (OUTPATIENT)
Dept: FAMILY MEDICINE | Facility: CLINIC | Age: 73
End: 2018-04-03

## 2018-04-03 NOTE — TELEPHONE ENCOUNTER
Reason for Call:  Form, our goal is to have forms completed with 72 hours, however, some forms may require a visit or additional information.    Type of letter, form or note:  [facility]- Medication Orders    Who is the form from?: [facility]- Medication Orders (if other please explain)    Where did the form come from: form was faxed in    What clinic location was the form placed at?: [place]    Where the form was placed: [facility]        Call taken on 4/3/2018 at 9:10 AM by Leilani Jurado

## 2018-04-04 ENCOUNTER — HOSPITAL ENCOUNTER (OUTPATIENT)
Dept: PHYSICAL THERAPY | Facility: CLINIC | Age: 73
Setting detail: THERAPIES SERIES
End: 2018-04-04
Attending: FAMILY MEDICINE
Payer: MEDICARE

## 2018-04-04 PROCEDURE — 40000718 ZZHC STATISTIC PT DEPARTMENT ORTHO VISIT: Performed by: PHYSICAL THERAPIST

## 2018-04-04 PROCEDURE — 97110 THERAPEUTIC EXERCISES: CPT | Mod: GP | Performed by: PHYSICAL THERAPIST

## 2018-04-20 ENCOUNTER — RADIANT APPOINTMENT (OUTPATIENT)
Dept: GENERAL RADIOLOGY | Facility: CLINIC | Age: 73
End: 2018-04-20
Attending: NURSE PRACTITIONER
Payer: COMMERCIAL

## 2018-04-20 ENCOUNTER — OFFICE VISIT (OUTPATIENT)
Dept: FAMILY MEDICINE | Facility: CLINIC | Age: 73
End: 2018-04-20
Payer: COMMERCIAL

## 2018-04-20 VITALS
OXYGEN SATURATION: 97 % | HEART RATE: 69 BPM | TEMPERATURE: 98 F | SYSTOLIC BLOOD PRESSURE: 146 MMHG | WEIGHT: 195 LBS | RESPIRATION RATE: 24 BRPM | BODY MASS INDEX: 35.38 KG/M2 | DIASTOLIC BLOOD PRESSURE: 66 MMHG

## 2018-04-20 DIAGNOSIS — S72.402S CLOSED FRACTURE OF DISTAL END OF LEFT FEMUR, UNSPECIFIED FRACTURE MORPHOLOGY, SEQUELA: ICD-10-CM

## 2018-04-20 DIAGNOSIS — M54.42 ACUTE LEFT-SIDED LOW BACK PAIN WITH LEFT-SIDED SCIATICA: Primary | ICD-10-CM

## 2018-04-20 DIAGNOSIS — M79.659 UPPER LEG PAIN: ICD-10-CM

## 2018-04-20 DIAGNOSIS — M54.42 LEFT-SIDED LOW BACK PAIN WITH LEFT-SIDED SCIATICA: ICD-10-CM

## 2018-04-20 DIAGNOSIS — M79.652 PAIN OF LEFT THIGH: ICD-10-CM

## 2018-04-20 PROCEDURE — 99214 OFFICE O/P EST MOD 30 MIN: CPT | Performed by: NURSE PRACTITIONER

## 2018-04-20 PROCEDURE — 73502 X-RAY EXAM HIP UNI 2-3 VIEWS: CPT | Mod: FY

## 2018-04-20 PROCEDURE — 73552 X-RAY EXAM OF FEMUR 2/>: CPT | Mod: LT

## 2018-04-20 PROCEDURE — 72100 X-RAY EXAM L-S SPINE 2/3 VWS: CPT | Mod: FY

## 2018-04-20 ASSESSMENT — PAIN SCALES - GENERAL: PAINLEVEL: EXTREME PAIN (8)

## 2018-04-20 NOTE — PATIENT INSTRUCTIONS
We will call you with the results of your x rays    Use scooter at all times to keep weight off of your left leg.     You can take the pain medication up to 3 times a day. Do not use Ibuprofen or related products while on Diclofenac. You can take Tylenol.

## 2018-04-20 NOTE — MR AVS SNAPSHOT
After Visit Summary   4/20/2018    Stefanie Velazquez    MRN: [id number]           Patient Information     Date Of Birth          1945        Visit Information        Provider Department      4/20/2018 2:20 PM Steph Birmingham APRN CNP [facility]        Today's Diagnoses     Acute left-sided low back pain with left-sided sciatica    -  1    Left-sided low back pain with left-sided sciatica        Upper leg pain        Closed fracture of distal end of left femur, unspecified fracture morphology, sequela          Care Instructions    We will call you with the results of your x rays    Use scooter at all times to keep weight off of your left leg.     You can take the pain medication up to 3 times a day. Do not use Ibuprofen or related products while on Diclofenac. You can take Tylenol.          Follow-ups after your visit        Additional Services     ORTHO  REFERRAL       [facility] is referring you to the Orthopedic  Services at [facility].       The  Representative will assist you in the coordination of your Orthopedic and Musculoskeletal Care as prescribed by your physician.    The  Representative will call you within 1 business day to help schedule your appointment, or you may contact the  Representative at:    All areas ~ [phone]     Type of Referral : Non Surgical       Timeframe requested: Routine    Coverage of these services is subject to the terms and limitations of your health insurance plan.  Please call member services at your health plan with any benefit or coverage questions.      If X-rays, CT or MRI's have been performed, please contact the facility where they were done to arrange for , prior to your scheduled appointment.  Please bring this referral request to your appointment and present it to your specialist.                  Your next 10 appointments already  scheduled     May 03, 2018  9:00 AM CDT   LAB with [facility] LAB   [facility]    [address] [facility] [address]   [phone]           Please do not eat 10-12 hours before your appointment if you are coming in fasting for labs on lipids, cholesterol, or glucose (sugar). This does not apply to pregnant women. Water, hot tea and black coffee (with nothing added) are okay. Do not drink other fluids, diet soda or chew gum.            May 07, 2018  2:00 PM CDT   Return Visit with Terence Del Cid MD   [facility] ([facility])    [address]  [facility] [place] [address] [phone]            Sep 21, 2018 11:00 AM CDT   LAB with PI LAB   [facility] ([facility])    [address] [facility] [address]   [phone]           Please do not eat 10-12 hours before your appointment if you are coming in fasting for labs on lipids, cholesterol, or glucose (sugar). This does not apply to pregnant women. Water, hot tea and black coffee (with nothing added) are okay. Do not drink other fluids, diet soda or chew gum.            Sep 28, 2018 11:00 AM CDT   Return Visit with Apolinar Martinez MD   [facility] ([facility])    [facility]  [address]  [facility] [place] [address]   [phone]              Who to contact     If you have questions or need follow up information about today's clinic visit or your schedule please contact [facility] directly at [phone].  Normal or non-critical lab and imaging results will be communicated to you by MyChart, letter or phone within 4 business days after the clinic has received the results. If you do not hear from us within 7 days, please contact the clinic through MyChart or phone. If you have a critical or abnormal lab result, we will notify you by phone as soon as  possible.  Submit refill requests through [employer] or call your pharmacy and they will forward the refill request to us. Please allow 3 business days for your refill to be completed.          Additional Information About Your Visit        [employer] Information     [employer] gives you secure access to your electronic health record. If you see a primary care provider, you can also send messages to your care team and make appointments. If you have questions, please call your primary care clinic.  If you do not have a primary care provider, please call [phone] and they will assist you.        Care EveryWhere ID     This is your Care EveryWhere ID. This could be used by other organizations to access your [place] medical records  [id number]        Your Vitals Were     Pulse Temperature Respirations Pulse Oximetry Breastfeeding? BMI (Body Mass Index)    69 98  F (36.7  C) (Tympanic) 24 97% No 35.38 kg/m2       Blood Pressure from Last 3 Encounters:   04/20/18 146/66   03/23/18 159/61   03/20/18 139/84    Weight from Last 3 Encounters:   04/20/18 195 lb (88.5 kg)   03/23/18 195 lb 6.4 oz (88.6 kg)   03/20/18 194 lb (88 kg)              We Performed the Following     ORTHO  REFERRAL          Today's Medication Changes          These changes are accurate as of 4/20/18  4:01 PM.  If you have any questions, ask your nurse or doctor.               Start taking these medicines.        Dose/Directions    diclofenac 50 MG EC tablet   Commonly known as:  VOLTAREN   Used for:  Acute left-sided low back pain with left-sided sciatica   Started by:  Steph Birmingham APRN CNP        Dose:  50 mg   Take 1 tablet (50 mg) by mouth 3 times daily as needed for moderate pain   Quantity:  21 tablet   Refills:  1            Where to get your medicines      These medications were sent to [facility] [address] [place] [address] [address] [place] [address]  [address] [facility] [address]     Phone:  [phone]     diclofenac 50 MG EC  tablet                Primary Care Provider Office Phone # Fax #    Sandra Morales [phone]       [address] [facility] [address]        Equal Access to Services     NIDA ARLINE : Cristine rod ng rosieo Sohumaali, waaxda luqadaha, qaybta kaalmada sera, ceci nicolasrosi chinmay. So [facility] [phone].    ATENCIÓN: Si habla español, tiene a waite disposición servicios gratuitos de asistencia lingüística. Llame al [phone].    We comply with applicable federal civil rights laws and [place] laws. We do not discriminate on the basis of race, color, national origin, age, disability, sex, sexual orientation, or gender identity.            Thank you!     Thank you for choosing [facility]  for your care. Our goal is always to provide you with excellent care. Hearing back from our patients is one way we can continue to improve our services. Please take a few minutes to complete the written survey that you may receive in the mail after your visit with us. Thank you!             Your Updated Medication List - Protect others around you: Learn how to safely use, store and throw away your medicines at www.disposemymeds.org.          This list is accurate as of 4/20/18  4:01 PM.  Always use your most recent med list.                   Brand Name Dispense Instructions for use Diagnosis    ACCU-CHEK MILVIA Jeana     1 device    dispense one meter    Type II or unspecified type diabetes mellitus without mention of complication, uncontrolled       acetaminophen 650 MG CR tablet    TYLENOL    60 tablet    Take 1 tablet (650 mg) by mouth 3 times daily    Bilateral cellulitis of lower leg       aspirin 81 MG tablet     30 tablet    Take 1 tablet (81 mg) by mouth daily        blood glucose monitoring lancets     1 Box    Test BG 4 times daily    Type II or unspecified type diabetes mellitus without mention of complication, uncontrolled       blood glucose monitoring test strip     ACCU-CHEK MILVIA    360 each    Use to test blood sugars 4 times daily or as directed.    Type 2 diabetes mellitus with diabetic nephropathy, with long-term current use of insulin (H)       BUTT PASTE - REGULAR    DR LOVE POOP GOOP BUTT PASTE FORMULA     Apply topically every hour as needed for skin protection        chlorhexidine 0.12 % solution    PERIDEX          DEPEND EASY FIT UNDERGARMENTS Misc     300 each    1 Units every 2 hours X-large    Bowel and bladder incontinence, Rectal cancer (H)       diclofenac 50 MG EC tablet    VOLTAREN    21 tablet    Take 1 tablet (50 mg) by mouth 3 times daily as needed for moderate pain    Acute left-sided low back pain with left-sided sciatica       fluocinonide 0.05 % ointment    LIDEX    60 g    Apply sparingly to affected area twice daily as needed.  Do not apply to face.    Venous stasis dermatitis of both lower extremities       fluticasone 50 MCG/ACT spray    FLONASE     1 spray        GLUCERNA OS Liqd     24 each    One can two to three times a day    Poor dentition, Poor nutrition, Type 2 diabetes mellitus with diabetic nephropathy, with long-term current use of insulin (H), Rectal cancer (H), Chronic diarrhea, Radiation therapy complication, sequela       hypromellose 0.3 % Soln ophthalmic solution    GENTEAL     2 drops        insulin aspart 100 UNIT/ML injection    NovoLOG FLEXPEN    3 mL    Inject 15 Units Subcutaneous 3 times daily (with meals)    Type 2 diabetes mellitus with diabetic nephropathy, with long-term current use of insulin (H)       insulin detemir 100 UNIT/ML injection    LEVEMIR FLEXPEN/FLEXTOUCH    3 mL    Inject 25 Units Subcutaneous At Bedtime Fill when needed    Type 2 diabetes mellitus with diabetic nephropathy, with long-term current use of insulin (H)       insulin pen needle 32G X 4 MM    BD GABRIELLA U/F    120 each    Use 4 times per day or as directed.    Type 2 diabetes mellitus with diabetic nephropathy, with long-term current use of  insulin (H)       LASIX 20 MG tablet   Generic drug:  furosemide     90 tablet    Take 1 tablet (20 mg) by mouth daily    Benign essential hypertension       levothyroxine 88 MCG tablet    SYNTHROID/LEVOTHROID    90 tablet    Take 1 tablet (88 mcg) by mouth daily        loperamide 2 MG capsule    IMODIUM    90 capsule    One capsule once a day, can use a second one if needed    Rectal cancer (H), Chronic diarrhea       losartan 100 MG tablet    COZAAR    90 tablet    Take 1 tablet (100 mg) by mouth daily    Benign essential hypertension       menthol-zinc oxide 0.44-20.625 % Oint ointment    CALMOSEPTINE     Apply topically 4 times daily as needed for skin protection    Rash and nonspecific skin eruption       metoprolol succinate 50 MG 24 hr tablet    TOPROL-XL    90 tablet    Take 1 tablet (50 mg) by mouth daily    Benign essential hypertension       mineral oil-hydrophilic petrolatum      Apply topically as needed        OMEPRAZOLE PO      Take 20 mg by mouth daily as needed        Potassium Gluconate 595 MG Caps      Take 1 tablet by mouth daily    Stasis edema of both lower extremities       rOPINIRole 1 MG tablet    REQUIP    270 tablet    TAKE ONE TABLET BY MOUTH THREE TIMES DAILY    RLS (restless legs syndrome)       simvastatin 40 MG tablet    ZOCOR    90 tablet    Take 1 tablet (40 mg) by mouth daily        syringe (disposable) 1 ML Misc     1 each    1 each every 30 days With 1 inch needle for Vit B12 injection    Vitamin B 12 deficiency       triamcinolone 0.1 % cream    KENALOG    80 g    Apply sparingly to affected area two times daily as needed    Stasis dermatitis of both legs

## 2018-04-20 NOTE — PROGRESS NOTES
SUBJECTIVE:   Stefanie Velazquez is a 73 year old female who presents to clinic today for the following health issues:      Musculoskeletal problem/pain      Duration: 2 weeks    Description  Location: left upper leg    Intensity:  Severe fells like pinching frm     runs down left leg along the lateral thigh. No numb or tingling but describes it as pinching sensation. Just pain- sore all the time from the pinching. When it pinches she has sharp pain. So hard a few times that felt like leg would buckle if didn't have walker.     No injury, came on all of a sudden.     Accompanying signs and symptoms: none    History  Previous similar problem: YES- history of femur fracture  Previous evaluation:  None for this    Precipitating or alleviating factors:  Trauma or overuse: no   Aggravating factors include: standing and walking    Therapies tried and outcome: took old Pain pill which have helped for her to sleep    Did have stomachflu at same time the pain started. History of colon cancer and diarrhea. has had some vomiting intermittently past few weeks. Orange color. Doesn't depend on what she eats. Eats a lot of hot cereal, soups and juice, tea. Sometimes vomits. Stools have been dry and pain with passing. Sometimes long strings.    Rectal cancer diagnosed in 2011.       Problem list and histories reviewed & adjusted, as indicated.  Additional history: as documented    Patient Active Problem List   Diagnosis     Hypothyroidism     bmi 40     Chronic ischemic heart disease     Generalized osteoarthrosis, unspecified site     HEARING LOSS CONDUCTIVE, COMBINED TYPE     Esophageal reflux     Osteoporosis     RC-moderate (AHI 12, LSat 60%); REM RDI-73     Neuropathy (H)     Hyperlipidemia LDL goal <100     CKD (chronic kidney disease) stage 3, GFR 30-59 ml/min     Rectal cancer- newly diagnosed adenoCA rectum Jan 2011     Advanced directives, counseling/discussion     Anemia     Type 2 diabetes mellitus with diabetic  nephropathy (H)     Radiation therapy complication     Vitamin B 12 deficiency     Vitamin D deficiency     Fracture of femur, distal, left, closed (H)     Dysuria     Bowel and bladder incontinence     Cellulitis of lower extremity, bilateral     Failure of outpatient treatment     Benign essential hypertension     Health Care Home     Chronic diarrhea     Restless leg syndrome     Type 2 diabetes mellitus with diabetic nephropathy, with long-term current use of insulin (H)     Spinal stenosis of lumbar region without neurogenic claudication     DDD (degenerative disc disease), lumbar     Past Surgical History:   Procedure Laterality Date     cabg       COLECTOMY LOW ANTERIOR  6/21/2011    Procedure:COLECTOMY LOW ANTERIOR; with loop ielostomy; Surgeon:ROBBIN MCDONNELL; Location:[facility]     COLONOSCOPY  1/26/2011    COLONOSCOPY performed by MASOUD BILL at [facility]     COLONOSCOPY N/A 3/1/2016    Procedure: COLONOSCOPY;  Surgeon: Liza Neal MD;  Location: [facility]     INSERT PORT VASCULAR ACCESS  3/2/2011    INSERT PORT VASCULAR ACCESS performed by LIZA NEAL at [facility]     OPEN REDUCTION INTERNAL FIXATION FEMUR DISTAL  2/12/2013    Procedure: OPEN REDUCTION INTERNAL FIXATION FEMUR DISTAL;  Open reduction internal fixation left femur fracture--Anesth[facility]Choice;  Surgeon: Ley, Jeffrey Duane, MD;  Location: [facility]     ORTHOPEDIC SURGERY       PHACOEMULSIFICATION WITH STANDARD INTRAOCULAR LENS IMPLANT Left 1/22/2018    Procedure: PHACOEMULSIFICATION WITH STANDARD INTRAOCULAR LENS IMPLANT;  Left cataract removal with implant;  Surgeon: Rony Key MD;  Location: [facility]     PHACOEMULSIFICATION WITH STANDARD INTRAOCULAR LENS IMPLANT Right 2/19/2018    Procedure: PHACOEMULSIFICATION WITH STANDARD INTRAOCULAR LENS IMPLANT;  Right cataract removal with implant;  Surgeon: Rony Key MD;  Location: [facility]     SIGMOIDOSCOPY FLEXIBLE  9/9/2011    Procedure:SIGMOIDOSCOPY  FLEXIBLE; Performed prior to induction.; Surgeon:ROBBIN MCDONNELL; Location:[facility]     SURGICAL HISTORY OF -   10/24/2002    Heart bypass     SURGICAL HISTORY OF -   2002    R Knee arthroplasty     SURGICAL HISTORY OF -   2002    Mi 2 stints     TAKEDOWN ILEOSTOMY  9/9/2011    Procedure:TAKEDOWN ILEOSTOMY; Exploratory Laparotomy,  Loop Ileostomy Takedown, Small Bowel Resection x 2.; Surgeon:ROBBIN MCDONNELL; Location:[facility]       Social History   Substance Use Topics     Smoking status: Former Smoker     Smokeless tobacco: Never Used     Alcohol use Yes      Comment: rare social use     Family History   Problem Relation Age of Onset     DIABETES Sister      C.A.D. Sister      Breast Cancer Sister          Current Outpatient Prescriptions   Medication Sig Dispense Refill     ACCU-CHEK MILVIA MELODY dispense one meter 1 device 0     acetaminophen (TYLENOL) 650 MG CR tablet Take 1 tablet (650 mg) by mouth 3 times daily 60 tablet      aspirin 81 MG tablet Take 1 tablet (81 mg) by mouth daily 30 tablet 3     blood glucose monitoring (ACCU-CHEK MILVIA) test strip Use to test blood sugars 4 times daily or as directed. 360 each 1     blood glucose monitoring (ACCU-CHEK MULTICLIX) lancets Test BG 4 times daily 1 Box 11     BUTT PASTE - REGULAR (DR LOVE POOP GOOP BUTT PASTE FORMULA) Apply topically every hour as needed for skin protection       chlorhexidine (PERIDEX) 0.12 % solution        diclofenac (VOLTAREN) 50 MG EC tablet Take 1 tablet (50 mg) by mouth 3 times daily as needed for moderate pain 21 tablet 1     fluocinonide (LIDEX) 0.05 % ointment Apply sparingly to affected area twice daily as needed.  Do not apply to face. 60 g 11     fluticasone (FLONASE) 50 MCG/ACT spray 1 spray       furosemide (LASIX) 20 MG tablet Take 1 tablet (20 mg) by mouth daily 90 tablet 3     hypromellose (GENTEAL) 0.3 % SOLN ophthalmic solution 2 drops       Incontinence Supply Disposable (DEPEND EASY FIT UNDERGARMENTS) MISC 1 Units every 2 hours  X-large 300 each prn     insulin aspart (NOVOLOG FLEXPEN) 100 UNIT/ML injection Inject 15 Units Subcutaneous 3 times daily (with meals) 3 mL 11     insulin detemir (LEVEMIR FLEXPEN/FLEXTOUCH) 100 UNIT/ML injection Inject 25 Units Subcutaneous At Bedtime Fill when needed 3 mL 3     insulin pen needle (BD GABRIELLA U/F) 32G X 4 MM Use 4 times per day or as directed. 120 each 5     levothyroxine (SYNTHROID, LEVOTHROID) 88 MCG tablet Take 1 tablet (88 mcg) by mouth daily 90 tablet 1     loperamide (IMODIUM) 2 MG capsule One capsule once a day, can use a second one if needed 90 capsule 3     losartan (COZAAR) 100 MG tablet Take 1 tablet (100 mg) by mouth daily 90 tablet 1     menthol-zinc oxide (CALMOSEPTINE) 0.44-20.625 % OINT ointment Apply topically 4 times daily as needed for skin protection       metoprolol (TOPROL-XL) 50 MG 24 hr tablet Take 1 tablet (50 mg) by mouth daily 90 tablet 1     mineral oil-hydrophilic petrolatum (AQUAPHOR) ointment Apply topically as needed       Nutritional Supplements (GLUCERNA OS) LIQD One can two to three times a day 24 each 11     OMEPRAZOLE PO Take 20 mg by mouth daily as needed        Potassium Gluconate 595 MG CAPS Take 1 tablet by mouth daily       rOPINIRole (REQUIP) 1 MG tablet TAKE ONE TABLET BY MOUTH THREE TIMES DAILY 270 tablet 0     simvastatin (ZOCOR) 40 MG tablet Take 1 tablet (40 mg) by mouth daily 90 tablet 2     syringe, disposable, 1 ML MISC 1 each every 30 days With 1 inch needle for Vit B12 injection 1 each 11     triamcinolone (KENALOG) 0.1 % cream Apply sparingly to affected area two times daily as needed 80 g 1     gabapentin (NEURONTIN) 300 MG capsule Take 1 tablet (300 mg) at bedtime, after 4 days may go to 2 if needed 60 capsule 1     oxyCODONE IR (ROXICODONE) 5 MG tablet Take 1 tablet (5 mg) by mouth every 6 hours as needed for severe pain maximum 6 tablet(s) per day 20 tablet 0       Reviewed and updated as needed this visit by clinical staff  Tobacco   Allergies  Meds  Problems  Med Hx  Surg Hx  Fam Hx  Soc Hx        Reviewed and updated as needed this visit by Provider  Allergies  Meds  Problems         ROS:  Constitutional, HEENT, cardiovascular, pulmonary, gi and gu systems are negative, except as otherwise noted.    OBJECTIVE:     /66  Pulse 69  Temp 98  F (36.7  C) (Tympanic)  Resp 24  Wt 195 lb (88.5 kg)  SpO2 97%  Breastfeeding? No  BMI 35.38 kg/m2  Body mass index is 35.38 kg/(m^2).  GENERAL: healthy, alert, no distress, elderly and hard of hearing to conversational tones.  Her hearing aid is broken.  NECK: no adenopathy, no asymmetry, masses, or scars and thyroid normal to palpation  RESP: lungs clear to auscultation - no rales, rhonchi or wheezes  CV: regular rate and rhythm, normal S1 S2, no S3 or S4, no murmur, click or rub, no peripheral edema and peripheral pulses strong  MS: tenderness to palpation Paralumbar spine and LLE exam shows decreased strength in the left hip action, range of motion limited due to pain.  Able to stand and bear weight, however is painful.  NEURO: Normal strength and tone, mentation intact and speech normal    Diagnostic Test Results:  Xray - LEFT FEMUR TWO VIEWS  4/20/2018 3:29 PM      HISTORY: Upper leg pain. Closed fracture of distal end of left femur,  unspecified fracture morphology, sequela.     COMPARISON: None.     FINDINGS: IM aron fixation noted. Severe loss of joint space in the  medial compartment of the knee. There is no acute fracture or  dislocation. There are no worrisome bony lesions.         IMPRESSION: No acute osseous abnormality demonstrated.     PRINCE MCGUIRE MD    LUMBAR SPINE TWO-THREE  VIEWS  4/20/2018 3:29 PM      HISTORY: Low back pain. Left-sided low back pain with left-sided  sciatica.     COMPARISON: None.     FINDINGS: Mild-moderate multilevel spurring. Moderate to severe loss  of disc height at L5-S1. Mild anterolisthesis of L4 on L5 which is  felt likely degenerative.  Normal lumbar lordosis. No spondylolysis.  There is no acute fracture or dislocation. There are no worrisome bony  lesions.         IMPRESSION:  No acute osseous abnormality demonstrated.     PRINCE MCGUIRE MD    PELVIS WITH UNILATERAL HIP ONE VIEW LEFT  4/20/2018 3:30 PM      HISTORY: Upper leg pain.     COMPARISON: None.     FINDINGS: Moderate loss of joint space and spur formation in both  hips. There is no acute fracture or dislocation. There are no  worrisome bony lesions.         IMPRESSION: No acute osseous abnormality demonstrated.     PRINCE MCGUIRE MD    ASSESSMENT/PLAN:     ASSESSMENT:  1. Acute left-sided low back pain with left-sided sciatica.  No apparent injury.   2. Left-sided low back pain with left-sided sciatica    3. Upper leg pain    4. Closed fracture of distal end of left femur, unspecified fracture morphology, sequela        PLAN:  Orders Placed This Encounter     XR Femur Left 2 Views     XR Lumbar Spine 2/3 Views     ORTHO  REFERRAL     diclofenac (VOLTAREN) 50 MG EC tablet       Patient Instructions   We will call you with the results of your x rays    Use scooter at all times to keep weight off of your left leg.     You can take the pain medication up to 3 times a day. Do not use Ibuprofen or related products while on Diclofenac. You can take Tylenol.    Steph Birmingham, MADELEINE, APRN CNP  [facility]

## 2018-04-20 NOTE — NURSING NOTE
"Chief Complaint   Patient presents with     Musculoskeletal Problem       Initial Breastfeeding? No Estimated body mass index is 35.45 kg/(m^2) as calculated from the following:    Height as of 3/23/18: 5' 2.25\" (1.581 m).    Weight as of 3/23/18: 195 lb 6.4 oz (88.6 kg).      Health Maintenance that is potentially due pending provider review:  PHQ9    Per patient-declined today, planning follow up with pcp        "

## 2018-04-23 ENCOUNTER — OFFICE VISIT (OUTPATIENT)
Dept: FAMILY MEDICINE | Facility: CLINIC | Age: 73
End: 2018-04-23
Payer: COMMERCIAL

## 2018-04-23 VITALS
DIASTOLIC BLOOD PRESSURE: 80 MMHG | SYSTOLIC BLOOD PRESSURE: 122 MMHG | HEART RATE: 74 BPM | WEIGHT: 193 LBS | BODY MASS INDEX: 35.02 KG/M2 | OXYGEN SATURATION: 94 % | RESPIRATION RATE: 20 BRPM

## 2018-04-23 DIAGNOSIS — M54.42 ACUTE MIDLINE LOW BACK PAIN WITH LEFT-SIDED SCIATICA: ICD-10-CM

## 2018-04-23 DIAGNOSIS — M54.16 LUMBAR RADICULOPATHY: Primary | ICD-10-CM

## 2018-04-23 DIAGNOSIS — R29.898 LEFT LEG WEAKNESS: ICD-10-CM

## 2018-04-23 DIAGNOSIS — C20 RECTAL CANCER (H): Chronic | ICD-10-CM

## 2018-04-23 PROCEDURE — 99214 OFFICE O/P EST MOD 30 MIN: CPT | Performed by: FAMILY MEDICINE

## 2018-04-23 RX ORDER — GABAPENTIN 300 MG/1
CAPSULE ORAL
Qty: 60 CAPSULE | Refills: 1 | Status: SHIPPED | OUTPATIENT
Start: 2018-04-23 | End: 2018-09-18

## 2018-04-23 RX ORDER — OXYCODONE HYDROCHLORIDE 5 MG/1
5 TABLET ORAL EVERY 6 HOURS PRN
Qty: 10 TABLET | Refills: 0 | Status: SHIPPED | OUTPATIENT
Start: 2018-04-23 | End: 2018-05-01

## 2018-04-23 NOTE — PATIENT INSTRUCTIONS
Take 1 tablet gabapentin (300 mg) at bedtime, after 4 days may go to 2 if needed    May use narcotic if pain is severe    Get the CT scan of the back done    Physical therapy for the back and leg weakness    See me back in 2 weeks, earlier if you are miserable

## 2018-04-23 NOTE — PROGRESS NOTES
SUBJECTIVE:   Stefanie Velazquez is a 73 year old female who presents to clinic today for the following health issues:      Left sided sciatica pain follow up    She started physical therapy, got the stomach flu, then after that started feeling this pinching feeling in the left posterior hip. More than 2 weeks ago. Pain got very intense on Friday and she saw Krystal Birmingham. Please refer to her note. Pain is about the same. She was given diclofenac, may help.   Has weakness in her left leg, has to help lift it  No new bowel or bladder issues    Results for orders placed or performed in visit on 04/20/18   XR Pelvis w Hip Left 1 View    Narrative    PELVIS WITH UNILATERAL HIP ONE VIEW LEFT  4/20/2018 3:30 PM     HISTORY: Upper leg pain.    COMPARISON: None.    FINDINGS: Moderate loss of joint space and spur formation in both  hips. There is no acute fracture or dislocation. There are no  worrisome bony lesions.      Impression    IMPRESSION: No acute osseous abnormality demonstrated.    PRINCE MCGUIRE MD     *Note: Due to a large number of results and/or encounters for the requested time period, some results have not been displayed. A complete set of results can be found in Results Review.     FINDINGS: Mild-moderate multilevel spurring. Moderate to severe loss  of disc height at L5-S1. Mild anterolisthesis of L4 on L5 which is  felt likely degenerative. Normal lumbar lordosis. No spondylolysis.  There is no acute fracture or dislocation. There are no worrisome bony  lesions.         IMPRESSION:  No acute osseous abnormality demonstrated.     PRINCE MCGUIRE MD    Problem list and histories reviewed & adjusted, as indicated.  Additional history: as documented    BP Readings from Last 3 Encounters:   04/23/18 122/80   04/20/18 146/66   03/23/18 159/61    Wt Readings from Last 3 Encounters:   04/23/18 193 lb (87.5 kg)   04/20/18 195 lb (88.5 kg)   03/23/18 195 lb 6.4 oz (88.6 kg)                    Reviewed and updated as  needed this visit by clinical staff  Tobacco  Allergies  Meds  Med Hx  Surg Hx  Fam Hx  Soc Hx      Reviewed and updated as needed this visit by Provider         ROS:  ROS: 5 point ROS negative except as noted above in HPI, including Gen., Resp., CV, GI &  system review.     OBJECTIVE:                                                    /80 (BP Location: Left arm)  Pulse 74  Resp 20  Wt 193 lb (87.5 kg)  SpO2 94%  BMI 35.02 kg/m2  Body mass index is 35.02 kg/(m^2).  General: appears well, no distress, comfortable sitting in wheelchair  ENT: edentulous  MS: back exam: gait not assessed, is able to stand next to wheelchair,  some tenderness to palpation of back, tender in the sciatic notch, lower extremities with decreased strength in the left hip flexors,   Straight leg negative     Recent Results (from the past 744 hour(s))   XR Femur Left 2 Views    Narrative    LEFT FEMUR TWO VIEWS  4/20/2018 3:29 PM     HISTORY: Upper leg pain. Closed fracture of distal end of left femur,  unspecified fracture morphology, sequela.    COMPARISON: None.    FINDINGS: IM aron fixation noted. Severe loss of joint space in the  medial compartment of the knee. There is no acute fracture or  dislocation. There are no worrisome bony lesions.      Impression    IMPRESSION: No acute osseous abnormality demonstrated.    PRINCE MCGUIRE MD   XR Lumbar Spine 2/3 Views    Narrative    LUMBAR SPINE TWO-THREE  VIEWS  4/20/2018 3:29 PM     HISTORY: Low back pain. Left-sided low back pain with left-sided  sciatica.    COMPARISON: None.    FINDINGS: Mild-moderate multilevel spurring. Moderate to severe loss  of disc height at L5-S1. Mild anterolisthesis of L4 on L5 which is  felt likely degenerative. Normal lumbar lordosis. No spondylolysis.  There is no acute fracture or dislocation. There are no worrisome bony  lesions.      Impression    IMPRESSION:  No acute osseous abnormality demonstrated.    PRINCE MCGUIRE MD   XR Pelvis w Hip  Left 1 View    Narrative    PELVIS WITH UNILATERAL HIP ONE VIEW LEFT  4/20/2018 3:30 PM     HISTORY: Upper leg pain.    COMPARISON: None.    FINDINGS: Moderate loss of joint space and spur formation in both  hips. There is no acute fracture or dislocation. There are no  worrisome bony lesions.      Impression    IMPRESSION: No acute osseous abnormality demonstrated.    PRINCE MCGUIRE MD          ASSESSMENT/PLAN:                                                      ASSESSMENT:  1. Lumbar radiculopathy    2. Rectal cancer- newly diagnosed adenoCA rectum Jan 2011    3. Acute midline low back pain with left-sided sciatica    4. Left leg weakness        PLAN:  Orders Placed This Encounter     CT Lumbar Spine w/o Contrast     gabapentin (NEURONTIN) 300 MG capsule     oxyCODONE IR (ROXICODONE) 5 MG tablet     See below  Start gabapentin for nerve pain  Recommend CT or MRI for low back, with all the hardware in her femur may be best to just get a CT scan  Gone over benefits and risks, as well as side effects of medication.     Patient Instructions   Take 1 tablet gabapentin (300 mg) at bedtime, after 4 days may go to 2 if needed    May use narcotic if pain is severe    Get the CT scan of the back done    Physical therapy for the back and leg weakness    See me back in 2 weeks, earlier if you are miserable     Sandra Morales MD  [facility]

## 2018-04-23 NOTE — MR AVS SNAPSHOT
After Visit Summary   4/23/2018    Stefanie Velazquez    MRN: [id number]           Patient Information     Date Of Birth          1945        Visit Information        Provider Department      4/23/2018 6:00 PM Sandra Morales MD [facility]        Today's Diagnoses     Lumbar radiculopathy    -  1    Rectal cancer- newly diagnosed adenoCA rectum Jan 2011        Acute midline low back pain with left-sided sciatica        Left leg weakness          Care Instructions    Take 1 tablet gabapentin (300 mg) at bedtime, after 4 days may go to 2 if needed    May use narcotic if pain is severe    Get the CT scan of the back done    Physical therapy for the back and leg weakness    See me back in 2 weeks, earlier if you are miserable          Follow-ups after your visit        Your next 10 appointments already scheduled     Apr 23, 2018  6:00 PM CDT   SHORT with Sandra Morales MD   [facility] ([facility])    [address] [place] [id number]   [phone]            May 03, 2018  9:00 AM CDT   [facility]   [facility]    [address] [facility] [place] [id number] [phone]           Please do not eat 10-12 hours before your appointment if you are coming in fasting for labs on lipids, cholesterol, or glucose (sugar). This does not apply to pregnant women. Water, hot tea and black coffee (with nothing added) are okay. Do not drink other fluids, diet soda or chew gum.            May 07, 2018  2:00 PM CDT   Return Visit with Terence Del Cid MD   [facility] ([facility])    [address] [address]   [phone]            Sep 21, 2018 11:00 AM CDT   LAB with [facility]   [facility]    [address] [place] [id number] [phone]           Please do not eat 10-12 hours  before your appointment if you are coming in fasting for labs on lipids, cholesterol, or glucose (sugar). This does not apply to pregnant women. Water, hot tea and black coffee (with nothing added) are okay. Do not drink other fluids, diet soda or chew gum.            Sep 28, 2018 11:00 AM CDT   Return Visit with Apolinar Martinez MD   [facility] ([facility])    [facility]  [address]  [facility] [address]   [phone]              Future tests that were ordered for you today     Open Future Orders        Priority Expected Expires Ordered    CT Lumbar Spine w/o Contrast Routine  4/23/2019 4/23/2018            Who to contact     If you have questions or need follow up information about today's clinic visit or your schedule please contact [facility] directly at [phone].  Normal or non-critical lab and imaging results will be communicated to you by MyChart, letter or phone within 4 business days after the clinic has received the results. If you do not hear from us within 7 days, please contact the clinic through [employer] or phone. If you have a critical or abnormal lab result, we will notify you by phone as soon as possible.  Submit refill requests through [employer] or call your pharmacy and they will forward the refill request to us. Please allow 3 business days for your refill to be completed.          Additional Information About Your Visit        MyChart Information     [employer] gives you secure access to your electronic health record. If you see a primary care provider, you can also send messages to your care team and make appointments. If you have questions, please call your primary care clinic.  If you do not have a primary care provider, please call [phone] and they will assist you.        Care EveryWhere ID     This is your Care EveryWhere ID. This could be used by other organizations to access your [facility]  records  [id number]        Your Vitals Were     Pulse Respirations Pulse Oximetry BMI (Body Mass Index)          74 20 94% 35.02 kg/m2         Blood Pressure from Last 3 Encounters:   04/23/18 122/80   04/20/18 146/66   03/23/18 159/61    Weight from Last 3 Encounters:   04/23/18 193 lb (87.5 kg)   04/20/18 195 lb (88.5 kg)   03/23/18 195 lb 6.4 oz (88.6 kg)                 Today's Medication Changes          These changes are accurate as of 4/23/18  4:30 PM.  If you have any questions, ask your nurse or doctor.               Start taking these medicines.        Dose/Directions    gabapentin 300 MG capsule   Commonly known as:  NEURONTIN   Used for:  Lumbar radiculopathy   Started by:  Sandra Morales MD        Take 1 tablet (300 mg) at bedtime, after 4 days may go to 2 if needed   Quantity:  60 capsule   Refills:  1       oxyCODONE IR 5 MG tablet   Commonly known as:  ROXICODONE   Used for:  Lumbar radiculopathy   Started by:  Sandra Morales MD        Dose:  5 mg   Take 1 tablet (5 mg) by mouth every 6 hours as needed for severe pain maximum 6 tablet(s) per day   Quantity:  10 tablet   Refills:  0            Where to get your medicines      These medications were sent to [facility] [address] [place] [address]     Phone:  [phone]     gabapentin 300 MG capsule         Some of these will need a paper prescription and others can be bought over the counter.  Ask your nurse if you have questions.     Bring a paper prescription for each of these medications     oxyCODONE IR 5 MG tablet               Information about OPIOIDS     PRESCRIPTION OPIOIDS: WHAT YOU NEED TO KNOW   You have a prescription for an opioid (narcotic) pain medicine. Opioids can cause addiction. If you have a history of chemical dependency of any type, you are at a higher risk of becoming addicted to opioids. Only take this medicine after all other options have been tried. Take it  for as short a time and as few doses as possible.     Do not:    Drive. If you drive while taking these medicines, you could be arrested for driving under the influence (DUI).    Operate heavy machinery    Do any other dangerous activities while taking these medicines.     Drink any alcohol while taking these medicines.      Take with any other medicines that contain acetaminophen. Read all labels carefully. Look for the word  acetaminophen  or  Tylenol.  Ask your pharmacist if you have questions or are unsure.    Store your pills in a secure place, locked if possible. We will not replace any lost or stolen medicine. If you don t finish your medicine, please throw away (dispose) as directed by your pharmacist. The [place] [employer]ution [employer] has more information about safe disposal: https://www.pca.[facility].mn.us/living-green/managing-unwanted-medications    All opioids tend to cause constipation. Drink plenty of water and eat foods that have a lot of fiber, such as fruits, vegetables, prune juice, apple juice and high-fiber cereal. Take a laxative (Miralax, milk of magnesia, Colace, Senna) if you don t move your bowels at least every other day.          Primary Care Provider Office Phone # Fax #    Sandra Morales [phone]       [address] [place] [address]        Equal Access to Services     NIDA NUNN : Hadii rod ku hadasho Soomaali, waaxda luqadaha, qaybta kaalmada adeegyada, ceci moy. So [facility] [phone].    ATENCIÓN: Si habla español, tiene a waite disposición servicios gratuitos de asistencia lingüística. Llame al [phone].    We comply with applicable federal civil rights laws and [place] laws. We do not discriminate on the basis of race, color, national origin, age, disability, sex, sexual orientation, or gender identity.            Thank you!     Thank you for choosing [facility]  for your care. Our goal is always to  provide you with excellent care. Hearing back from our patients is one way we can continue to improve our services. Please take a few minutes to complete the written survey that you may receive in the mail after your visit with us. Thank you!             Your Updated Medication List - Protect others around you: Learn how to safely use, store and throw away your medicines at www.disposemymeds.org.          This list is accurate as of 4/23/18  4:30 PM.  Always use your most recent med list.                   Brand Name Dispense Instructions for use Diagnosis    ACCU-CHEK MILVIA Jeana     1 device    dispense one meter    Type II or unspecified type diabetes mellitus without mention of complication, uncontrolled       acetaminophen 650 MG CR tablet    TYLENOL    60 tablet    Take 1 tablet (650 mg) by mouth 3 times daily    Bilateral cellulitis of lower leg       aspirin 81 MG tablet     30 tablet    Take 1 tablet (81 mg) by mouth daily        blood glucose monitoring lancets     1 Box    Test BG 4 times daily    Type II or unspecified type diabetes mellitus without mention of complication, uncontrolled       blood glucose monitoring test strip    ACCU-CHEK MILVIA    360 each    Use to test blood sugars 4 times daily or as directed.    Type 2 diabetes mellitus with diabetic nephropathy, with long-term current use of insulin (H)       BUTT PASTE - REGULAR    DR LOVE POOP GOOP BUTT PASTE FORMULA     Apply topically every hour as needed for skin protection        chlorhexidine 0.12 % solution    PERIDEX          DEPEND EASY FIT UNDERGARMENTS Misc     300 each    1 Units every 2 hours X-large    Bowel and bladder incontinence, Rectal cancer (H)       diclofenac 50 MG EC tablet    VOLTAREN    21 tablet    Take 1 tablet (50 mg) by mouth 3 times daily as needed for moderate pain    Acute left-sided low back pain with left-sided sciatica       fluocinonide 0.05 % ointment    LIDEX    60 g    Apply sparingly to affected area twice  daily as needed.  Do not apply to face.    Venous stasis dermatitis of both lower extremities       fluticasone 50 MCG/ACT spray    FLONASE     1 spray        gabapentin 300 MG capsule    NEURONTIN    60 capsule    Take 1 tablet (300 mg) at bedtime, after 4 days may go to 2 if needed    Lumbar radiculopathy       GLUCERNA OS Liqd     24 each    One can two to three times a day    Poor dentition, Poor nutrition, Type 2 diabetes mellitus with diabetic nephropathy, with long-term current use of insulin (H), Rectal cancer (H), Chronic diarrhea, Radiation therapy complication, sequela       hypromellose 0.3 % Soln ophthalmic solution    GENTEAL     2 drops        insulin aspart 100 UNIT/ML injection    NovoLOG FLEXPEN    3 mL    Inject 15 Units Subcutaneous 3 times daily (with meals)    Type 2 diabetes mellitus with diabetic nephropathy, with long-term current use of insulin (H)       insulin detemir 100 UNIT/ML injection    LEVEMIR FLEXPEN/FLEXTOUCH    3 mL    Inject 25 Units Subcutaneous At Bedtime Fill when needed    Type 2 diabetes mellitus with diabetic nephropathy, with long-term current use of insulin (H)       insulin pen needle 32G X 4 MM    BD GABRIELLA U/F    120 each    Use 4 times per day or as directed.    Type 2 diabetes mellitus with diabetic nephropathy, with long-term current use of insulin (H)       LASIX 20 MG tablet   Generic drug:  furosemide     90 tablet    Take 1 tablet (20 mg) by mouth daily    Benign essential hypertension       levothyroxine 88 MCG tablet    SYNTHROID/LEVOTHROID    90 tablet    Take 1 tablet (88 mcg) by mouth daily        loperamide 2 MG capsule    IMODIUM    90 capsule    One capsule once a day, can use a second one if needed    Rectal cancer (H), Chronic diarrhea       losartan 100 MG tablet    COZAAR    90 tablet    Take 1 tablet (100 mg) by mouth daily    Benign essential hypertension       menthol-zinc oxide 0.44-20.625 % Oint ointment    CALMOSEPTINE     Apply topically 4  times daily as needed for skin protection    Rash and nonspecific skin eruption       metoprolol succinate 50 MG 24 hr tablet    TOPROL-XL    90 tablet    Take 1 tablet (50 mg) by mouth daily    Benign essential hypertension       mineral oil-hydrophilic petrolatum      Apply topically as needed        OMEPRAZOLE PO      Take 20 mg by mouth daily as needed        oxyCODONE IR 5 MG tablet    ROXICODONE    10 tablet    Take 1 tablet (5 mg) by mouth every 6 hours as needed for severe pain maximum 6 tablet(s) per day    Lumbar radiculopathy       Potassium Gluconate 595 MG Caps      Take 1 tablet by mouth daily    Stasis edema of both lower extremities       rOPINIRole 1 MG tablet    REQUIP    270 tablet    TAKE ONE TABLET BY MOUTH THREE TIMES DAILY    RLS (restless legs syndrome)       simvastatin 40 MG tablet    ZOCOR    90 tablet    Take 1 tablet (40 mg) by mouth daily        syringe (disposable) 1 ML Misc     1 each    1 each every 30 days With 1 inch needle for Vit B12 injection    Vitamin B 12 deficiency       triamcinolone 0.1 % cream    KENALOG    80 g    Apply sparingly to affected area two times daily as needed    Stasis dermatitis of both legs

## 2018-04-23 NOTE — NURSING NOTE
"Chief Complaint   Patient presents with     Back Pain     left sided pain follow up       Initial /80 (BP Location: Left arm)  Pulse 74  Resp 20  Wt 193 lb (87.5 kg)  SpO2 94%  BMI 35.02 kg/m2 Estimated body mass index is 35.02 kg/(m^2) as calculated from the following:    Height as of 3/23/18: 5' 2.25\" (1.581 m).    Weight as of this encounter: 193 lb (87.5 kg).      Health Maintenance that is potentially due pending provider review:  NONE    n/a    Is there anyone who you would like to be able to receive your results? No  If yes have patient fill out HELLEN    "

## 2018-04-25 ENCOUNTER — HOSPITAL ENCOUNTER (OUTPATIENT)
Dept: CT IMAGING | Facility: CLINIC | Age: 73
Discharge: HOME | End: 2018-04-25
Attending: FAMILY MEDICINE | Admitting: FAMILY MEDICINE
Payer: MEDICARE

## 2018-04-25 DIAGNOSIS — M54.42 ACUTE MIDLINE LOW BACK PAIN WITH LEFT-SIDED SCIATICA: ICD-10-CM

## 2018-04-25 DIAGNOSIS — M54.16 LUMBAR RADICULOPATHY: ICD-10-CM

## 2018-04-25 DIAGNOSIS — C20 RECTAL CANCER (H): Chronic | ICD-10-CM

## 2018-04-25 PROCEDURE — 72131 CT LUMBAR SPINE W/O DYE: CPT

## 2018-05-01 ENCOUNTER — TELEPHONE (OUTPATIENT)
Dept: FAMILY MEDICINE | Facility: CLINIC | Age: 73
End: 2018-05-01

## 2018-05-01 ENCOUNTER — MEDICAL CORRESPONDENCE (OUTPATIENT)
Dept: HEALTH INFORMATION MANAGEMENT | Facility: CLINIC | Age: 73
End: 2018-05-01

## 2018-05-01 ENCOUNTER — TELEPHONE (OUTPATIENT)
Dept: PALLIATIVE MEDICINE | Facility: CLINIC | Age: 73
End: 2018-05-01

## 2018-05-01 ENCOUNTER — OFFICE VISIT (OUTPATIENT)
Dept: FAMILY MEDICINE | Facility: CLINIC | Age: 73
End: 2018-05-01
Payer: COMMERCIAL

## 2018-05-01 VITALS
BODY MASS INDEX: 35.02 KG/M2 | WEIGHT: 193 LBS | HEART RATE: 74 BPM | RESPIRATION RATE: 20 BRPM | DIASTOLIC BLOOD PRESSURE: 78 MMHG | TEMPERATURE: 99 F | OXYGEN SATURATION: 95 % | SYSTOLIC BLOOD PRESSURE: 138 MMHG

## 2018-05-01 DIAGNOSIS — M48.061 SPINAL STENOSIS OF LUMBAR REGION WITHOUT NEUROGENIC CLAUDICATION: ICD-10-CM

## 2018-05-01 DIAGNOSIS — M54.16 LUMBAR RADICULOPATHY: Primary | ICD-10-CM

## 2018-05-01 DIAGNOSIS — M51.369 DDD (DEGENERATIVE DISC DISEASE), LUMBAR: ICD-10-CM

## 2018-05-01 PROCEDURE — 99214 OFFICE O/P EST MOD 30 MIN: CPT | Performed by: FAMILY MEDICINE

## 2018-05-01 RX ORDER — OXYCODONE HYDROCHLORIDE 5 MG/1
5 TABLET ORAL EVERY 6 HOURS PRN
Qty: 20 TABLET | Refills: 0 | Status: SHIPPED | OUTPATIENT
Start: 2018-05-01 | End: 2018-09-18

## 2018-05-01 NOTE — PATIENT INSTRUCTIONS
Increase the gabapentin to 2 at bedtime  We can increase that if you need to    Ok to take oxycodone if you are really bad    I recommend injections, are done in [place]. They should call you.     Physical therapy

## 2018-05-01 NOTE — TELEPHONE ENCOUNTER
Reason for Call:  Form, our goal is to have forms completed with 72 hours, however, some forms may require a visit or additional information.    Type of letter, form or note:  [facility] - Medication    Who is the form from?: Home care    Where did the form come from: form was faxed in    What clinic location was the form placed at?: [place]    Where the form was placed: [facility]      Call taken on 5/1/2018 at 12:56 PM by Leilani Jurado

## 2018-05-01 NOTE — NURSING NOTE
"Chief Complaint   Patient presents with     Back Pain     go over CT results       Initial /78 (BP Location: Left arm)  Pulse 74  Temp 99  F (37.2  C) (Tympanic)  Resp 20  Wt 193 lb (87.5 kg)  SpO2 95%  BMI 35.02 kg/m2 Estimated body mass index is 35.02 kg/(m^2) as calculated from the following:    Height as of 3/23/18: 5' 2.25\" (1.581 m).    Weight as of this encounter: 193 lb (87.5 kg).      Health Maintenance that is potentially due pending provider review:  NONE    n/a    Is there anyone who you would like to be able to receive your results? No  If yes have patient fill out HELLEN    "
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
## 2018-05-02 DIAGNOSIS — I25.9 CHRONIC ISCHEMIC HEART DISEASE: Chronic | ICD-10-CM

## 2018-05-02 DIAGNOSIS — E78.5 HYPERLIPIDEMIA LDL GOAL <100: Chronic | ICD-10-CM

## 2018-05-02 DIAGNOSIS — I10 BENIGN ESSENTIAL HYPERTENSION: Chronic | ICD-10-CM

## 2018-05-02 LAB
ALT SERPL W P-5'-P-CCNC: 14 U/L (ref 0–50)
ANION GAP SERPL CALCULATED.3IONS-SCNC: 9 MMOL/L (ref 3–14)
BUN SERPL-MCNC: 19 MG/DL (ref 7–30)
CALCIUM SERPL-MCNC: 8.9 MG/DL (ref 8.5–10.1)
CHLORIDE SERPL-SCNC: 104 MMOL/L (ref 94–109)
CHOLEST SERPL-MCNC: 175 MG/DL
CO2 SERPL-SCNC: 26 MMOL/L (ref 20–32)
CREAT SERPL-MCNC: 1.36 MG/DL (ref 0.52–1.04)
GFR SERPL CREATININE-BSD FRML MDRD: 38 ML/MIN/1.7M2
GLUCOSE SERPL-MCNC: 237 MG/DL (ref 70–99)
HDLC SERPL-MCNC: 53 MG/DL
LDLC SERPL CALC-MCNC: 75 MG/DL
NONHDLC SERPL-MCNC: 122 MG/DL
POTASSIUM SERPL-SCNC: 3.3 MMOL/L (ref 3.4–5.3)
SODIUM SERPL-SCNC: 139 MMOL/L (ref 133–144)
TRIGL SERPL-MCNC: 235 MG/DL

## 2018-05-02 PROCEDURE — 36415 COLL VENOUS BLD VENIPUNCTURE: CPT | Performed by: INTERNAL MEDICINE

## 2018-05-02 PROCEDURE — 80061 LIPID PANEL: CPT | Performed by: INTERNAL MEDICINE

## 2018-05-02 PROCEDURE — 84460 ALANINE AMINO (ALT) (SGPT): CPT | Performed by: INTERNAL MEDICINE

## 2018-05-02 PROCEDURE — 80048 BASIC METABOLIC PNL TOTAL CA: CPT | Performed by: INTERNAL MEDICINE

## 2018-05-07 ENCOUNTER — OFFICE VISIT (OUTPATIENT)
Dept: CARDIOLOGY | Facility: CLINIC | Age: 73
End: 2018-05-07
Attending: INTERNAL MEDICINE
Payer: COMMERCIAL

## 2018-05-07 VITALS
OXYGEN SATURATION: 95 % | WEIGHT: 195 LBS | DIASTOLIC BLOOD PRESSURE: 55 MMHG | HEART RATE: 47 BPM | BODY MASS INDEX: 35.38 KG/M2 | SYSTOLIC BLOOD PRESSURE: 102 MMHG

## 2018-05-07 DIAGNOSIS — I25.9 CHRONIC ISCHEMIC HEART DISEASE: Chronic | ICD-10-CM

## 2018-05-07 DIAGNOSIS — I10 BENIGN ESSENTIAL HYPERTENSION: Chronic | ICD-10-CM

## 2018-05-07 DIAGNOSIS — E78.5 HYPERLIPIDEMIA LDL GOAL <100: Chronic | ICD-10-CM

## 2018-05-07 LAB
ANION GAP SERPL CALCULATED.3IONS-SCNC: 8 MMOL/L (ref 3–14)
BUN SERPL-MCNC: 19 MG/DL (ref 7–30)
CALCIUM SERPL-MCNC: 8.5 MG/DL (ref 8.5–10.1)
CHLORIDE SERPL-SCNC: 106 MMOL/L (ref 94–109)
CO2 SERPL-SCNC: 27 MMOL/L (ref 20–32)
CREAT SERPL-MCNC: 1.55 MG/DL (ref 0.52–1.04)
GFR SERPL CREATININE-BSD FRML MDRD: 33 ML/MIN/1.7M2
GLUCOSE SERPL-MCNC: 304 MG/DL (ref 70–99)
POTASSIUM SERPL-SCNC: 4 MMOL/L (ref 3.4–5.3)
SODIUM SERPL-SCNC: 141 MMOL/L (ref 133–144)

## 2018-05-07 PROCEDURE — 99214 OFFICE O/P EST MOD 30 MIN: CPT | Performed by: INTERNAL MEDICINE

## 2018-05-07 PROCEDURE — 36415 COLL VENOUS BLD VENIPUNCTURE: CPT | Performed by: INTERNAL MEDICINE

## 2018-05-07 PROCEDURE — 80048 BASIC METABOLIC PNL TOTAL CA: CPT | Performed by: INTERNAL MEDICINE
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
## 2018-05-07 NOTE — MR AVS SNAPSHOT
After Visit Summary   5/7/2018    Stefanie Velazquez    MRN: [id number]           Patient Information     Date Of Birth          1945        Visit Information        Provider Department      5/7/2018 2:00 PM Terence Del Cid MD [facility]        Today's Diagnoses     Benign essential hypertension        Chronic ischemic heart disease        Hyperlipidemia LDL goal <100          Care Instructions    [facility]~[address]~[place], [place]~[address]  Thank you for your  Heart Care visit today. If you have questions regarding your visit, please contact your cardiology RN's, Paulina Crockett or Emily Oro, at [phone]. Your provider has recommended the following:  Medication Changes:  None today. Continue taking your medications as you have been.  Recommendations:  1. As discussed at your office visit today with Dr. Del Cid.  2. Go downstairs before you leave today to have a lab draw.  Follow-up:  1. Make an appointment to see Megan Horn PA-C or Malou Agosto PA-C for cardiology follow up in 3 months with fasting labs to be done 1-2 days prior to this revisit.  2. Make an appointment to see Dr. Del Cid for cardiology follow up in 6 months with Dr. Hugh Madrid or Dr. Jovel with fasting labs to be done 1-2 days prior to this revisit.  To schedule a future appointment, we kindly ask that you call cardiology scheduling at [phone] three months prior to requested revisit date.               Reminder:  For your safety, we ask that you bring in your current medication(s) or an updated list of your medications with you to EACH office visit. Include the medication name, dose of pill on bottle and how you are taking it. Include over-the-counter medications or supplements. Your provider will review this at each visit and plan your care based on your current information.                Follow-ups after your visit        Additional Services     Follow-Up with Cardiac Advanced Practice Provider           Follow-Up with Cardiologist                 Your next 10 appointments already scheduled     May 09, 2018 10:15 AM CDT   Ortho Eval with Ruby Bailey PT   [facility] ([facility])    [address] [address]   [phone]            May 17, 2018  8:45 AM CDT   Radiology Injections with Blue Lopez MD   [facility] ([facility])    [address]  [address]  [facility] [address]   [phone]            Sep 21, 2018 11:00 AM CDT   LAB with PI LAB   [facility] ([facility])    [address] [address]   [phone]           Please do not eat 10-12 hours before your appointment if you are coming in fasting for labs on lipids, cholesterol, or glucose (sugar). This does not apply to pregnant women. Water, hot tea and black coffee (with nothing added) are okay. Do not drink other fluids, diet soda or chew gum.            Sep 28, 2018 11:00 AM CDT   Return Visit with Apolinar Martinez MD   [facility] ([facility])    [facility]  [address]  [facility] [address]   [phone]              Future tests that were ordered for you today     Open Future Orders        Priority Expected Expires Ordered    Basic metabolic panel Routine 11/3/2018 5/7/2019 5/7/2018    Lipid Profile Routine 11/3/2018 5/7/2019 5/7/2018    ALT Routine 11/3/2018 5/7/2019 5/7/2018    Follow-Up with Cardiologist Routine 11/3/2018 5/7/2019 5/7/2018    Basic metabolic panel Routine 8/5/2018 5/7/2019 5/7/2018    Lipid Profile Routine 8/5/2018 5/7/2019 5/7/2018    ALT Routine 8/5/2018 5/7/2019 5/7/2018    Follow-Up with Cardiac Advanced Practice Provider Routine 8/5/2018 5/7/2019 5/7/2018             Who to contact     If you have questions or need follow up information about today's clinic visit or your schedule please contact [facility] directly at [phone].  Normal or non-critical lab and imaging results will be communicated to you by MyChart, letter or phone within 4 business days after the clinic has received the results. If you do not hear from us within 7 days, please contact the clinic through MyChart or phone. If you have a critical or abnormal lab result, we will notify you by phone as soon as possible.  Submit refill requests through [employer] or call your pharmacy and they will forward the refill request to us. Please allow 3 business days for your refill to be completed.          Additional Information About Your Visit        MobileMDhart Information     [employer] gives you secure access to your electronic health record. If you see a primary care provider, you can also send messages to your care team and make appointments. If you have questions, please call your primary care clinic.  If you do not have a primary care provider, please call [phone] and they will assist you.        Care EveryWhere ID     This is your Care EveryWhere ID. This could be used by other organizations to access your [place] medical records  [id number]        Your Vitals Were     Pulse Pulse Oximetry BMI (Body Mass Index)             47 95% 35.38 kg/m2          Blood Pressure from Last 3 Encounters:   05/07/18 102/55   05/01/18 138/78   04/23/18 122/80    Weight from Last 3 Encounters:   05/07/18 88.5 kg (195 lb)   05/01/18 87.5 kg (193 lb)   04/23/18 87.5 kg (193 lb)              We Performed the Following     Follow-Up with Cardiologist          Today's Medication Changes          These changes are accurate as of 5/7/18  2:38 PM.  If you have any questions, ask your nurse or doctor.               Stop taking these medicines if you haven't already. Please contact your care team  if you have questions.     aspirin 81 MG tablet   Stopped by:  Terence Del Cid MD                    Primary Care Provider Office Phone # Fax #    Sandra Morales [phone] [address] [place] [address]        Equal Access to Services     NIDA NUNN : Hadii aad ku hadasho Soomaali, waaxda luqadaha, qaybta kaalmada adeegyada, waxay idiin hayaan adeeg khdianesh la'nardan ah. So [facility] [phone].    ATENCIÓN: Si habla español, tiene a waite disposición servicios gratuitos de asistencia lingüística. Llame al [phone].    We comply with applicable federal civil rights laws and [place] laws. We do not discriminate on the basis of race, color, national origin, age, disability, sex, sexual orientation, or gender identity.            Thank you!     Thank you for choosing [facility]  for your care. Our goal is always to provide you with excellent care. Hearing back from our patients is one way we can continue to improve our services. Please take a few minutes to complete the written survey that you may receive in the mail after your visit with us. Thank you!             Your Updated Medication List - Protect others around you: Learn how to safely use, store and throw away your medicines at www.disposemymeds.org.          This list is accurate as of 5/7/18  2:38 PM.  Always use your most recent med list.                   Brand Name Dispense Instructions for use Diagnosis    ACCU-CHEK MILVIA Jeana     1 device    dispense one meter    Type II or unspecified type diabetes mellitus without mention of complication, uncontrolled       acetaminophen 650 MG CR tablet    TYLENOL    60 tablet    Take 1 tablet (650 mg) by mouth 3 times daily    Bilateral cellulitis of lower leg       blood glucose monitoring lancets     1 Box    Test BG 4 times daily    Type II or unspecified type diabetes mellitus without mention of complication, uncontrolled       blood  glucose monitoring test strip    ACCU-CHEK MILVIA    360 each    Use to test blood sugars 4 times daily or as directed.    Type 2 diabetes mellitus with diabetic nephropathy, with long-term current use of insulin (H)       BUTT PASTE - REGULAR    DR LOVE POOP GOOP BUTT PASTE FORMULA     Apply topically every hour as needed for skin protection        chlorhexidine 0.12 % solution    PERIDEX          DEPEND EASY FIT UNDERGARMENTS Misc     300 each    1 Units every 2 hours X-large    Bowel and bladder incontinence, Rectal cancer (H)       diclofenac 50 MG EC tablet    VOLTAREN    21 tablet    Take 1 tablet (50 mg) by mouth 3 times daily as needed for moderate pain    Acute left-sided low back pain with left-sided sciatica       fluocinonide 0.05 % ointment    LIDEX    60 g    Apply sparingly to affected area twice daily as needed.  Do not apply to face.    Venous stasis dermatitis of both lower extremities       fluticasone 50 MCG/ACT spray    FLONASE     1 spray        gabapentin 300 MG capsule    NEURONTIN    60 capsule    Take 1 tablet (300 mg) at bedtime, after 4 days may go to 2 if needed    Lumbar radiculopathy       GLUCERNA OS Liqd     24 each    One can two to three times a day    Poor dentition, Poor nutrition, Type 2 diabetes mellitus with diabetic nephropathy, with long-term current use of insulin (H), Rectal cancer (H), Chronic diarrhea, Radiation therapy complication, sequela       hypromellose 0.3 % Soln ophthalmic solution    GENTEAL     2 drops        insulin aspart 100 UNIT/ML injection    NovoLOG FLEXPEN    3 mL    Inject 15 Units Subcutaneous 3 times daily (with meals)    Type 2 diabetes mellitus with diabetic nephropathy, with long-term current use of insulin (H)       insulin detemir 100 UNIT/ML injection    LEVEMIR FLEXPEN/FLEXTOUCH    3 mL    Inject 25 Units Subcutaneous At Bedtime Fill when needed    Type 2 diabetes mellitus with diabetic nephropathy, with long-term current use of insulin (H)        insulin pen needle 32G X 4 MM    BD GABRIELLA U/F    120 each    Use 4 times per day or as directed.    Type 2 diabetes mellitus with diabetic nephropathy, with long-term current use of insulin (H)       LASIX 20 MG tablet   Generic drug:  furosemide     90 tablet    Take 1 tablet (20 mg) by mouth daily    Benign essential hypertension       levothyroxine 88 MCG tablet    SYNTHROID/LEVOTHROID    90 tablet    Take 1 tablet (88 mcg) by mouth daily        loperamide 2 MG capsule    IMODIUM    90 capsule    One capsule once a day, can use a second one if needed    Rectal cancer (H), Chronic diarrhea       losartan 100 MG tablet    COZAAR    90 tablet    Take 1 tablet (100 mg) by mouth daily    Benign essential hypertension       menthol-zinc oxide 0.44-20.625 % Oint ointment    CALMOSEPTINE     Apply topically 4 times daily as needed for skin protection    Rash and nonspecific skin eruption       metoprolol succinate 50 MG 24 hr tablet    TOPROL-XL    90 tablet    Take 1 tablet (50 mg) by mouth daily    Benign essential hypertension       mineral oil-hydrophilic petrolatum      Apply topically as needed        OMEPRAZOLE PO      Take 20 mg by mouth daily as needed        oxyCODONE IR 5 MG tablet    ROXICODONE    20 tablet    Take 1 tablet (5 mg) by mouth every 6 hours as needed for severe pain maximum 6 tablet(s) per day    Lumbar radiculopathy       Potassium Gluconate 595 MG Caps      Take 1 tablet by mouth daily    Stasis edema of both lower extremities       rOPINIRole 1 MG tablet    REQUIP    270 tablet    TAKE ONE TABLET BY MOUTH THREE TIMES DAILY    RLS (restless legs syndrome)       simvastatin 40 MG tablet    ZOCOR    90 tablet    Take 1 tablet (40 mg) by mouth daily        syringe (disposable) 1 ML Misc     1 each    1 each every 30 days With 1 inch needle for Vit B12 injection    Vitamin B 12 deficiency       triamcinolone 0.1 % cream    KENALOG    80 g    Apply sparingly to affected area two times daily as  needed    Stasis dermatitis of both legs
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
## 2018-05-07 NOTE — PATIENT INSTRUCTIONS
[facility]  [facility]~[address]~[place], [place]~[address]  Thank you for your [facility] visit today. If you have questions regarding your visit, please contact your cardiology RN's, Paulina Crockett or Emily Oro, at [phone]. Your provider has recommended the following:  Medication Changes:  None today. Continue taking your medications as you have been.  Recommendations:  1. As discussed at your office visit today with Dr. Del Cid.  2. Go downstairs before you leave today to have a lab draw.  Follow-up:  1. Make an appointment to see Megan Horn PA-C or Malou Agosto PA-C for cardiology follow up in 3 months with fasting labs to be done 1-2 days prior to this revisit.  2. Make an appointment to see Dr. Del Cid for cardiology follow up in 6 months with Dr. Cuenca, Dr. Reese or Dr. Jovel with fasting labs to be done 1-2 days prior to this revisit.  To schedule a future appointment, we kindly ask that you call cardiology scheduling at [phone] three months prior to requested revisit date.               Reminder:  For your safety, we ask that you bring in your current medication(s) or an updated list of your medications with you to EACH office visit. Include the medication name, dose of pill on bottle and how you are taking it. Include over-the-counter medications or supplements. Your provider will review this at each visit and plan your care based on your current information.
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
## 2018-05-09 ENCOUNTER — HOSPITAL ENCOUNTER (OUTPATIENT)
Dept: PHYSICAL THERAPY | Facility: CLINIC | Age: 73
Setting detail: THERAPIES SERIES
End: 2018-05-09
Attending: FAMILY MEDICINE
Payer: MEDICARE

## 2018-05-09 PROCEDURE — G8978 MOBILITY CURRENT STATUS: HCPCS | Mod: GP,CL | Performed by: PHYSICAL THERAPIST

## 2018-05-09 PROCEDURE — G8979 MOBILITY GOAL STATUS: HCPCS | Mod: GP,CK | Performed by: PHYSICAL THERAPIST

## 2018-05-09 PROCEDURE — 40000718 ZZHC STATISTIC PT DEPARTMENT ORTHO VISIT: Performed by: PHYSICAL THERAPIST

## 2018-05-09 PROCEDURE — 97110 THERAPEUTIC EXERCISES: CPT | Mod: GP | Performed by: PHYSICAL THERAPIST

## 2018-05-09 PROCEDURE — 97162 PT EVAL MOD COMPLEX 30 MIN: CPT | Mod: GP | Performed by: PHYSICAL THERAPIST

## 2018-05-09 NOTE — PROGRESS NOTES
Physical Therapy Evaluation  05/09/18 1000   General Information   Type of Visit Initial OP Ortho PT Evaluation   Start of Care Date 05/09/18   Referring Physician Dr. Morales   Patient/Family Goals Statement To decrease leg pains   Orders Evaluate and Treat   Date of Order 05/01/18   Insurance Type Medicare;[employer]   Medical Diagnosis Lumbar radiculopathy; spinal stenosis of lumbar region without neurogenic claudication; DDD lumbar   Surgical/Medical history reviewed Yes   Precautions/Limitations no known precautions/limitations       Present No   Body Part(s)   Body Part(s) Lumbar Spine/SI   Presentation and Etiology   Pertinent history of current problem (include personal factors and/or comorbidities that impact the POC) Reports that 3 weeks ago she started hurting in her L leg down to her knee.  Was concerned that something was wrong with the aron in her leg.  She saw the MD and had a CT scan down.  Reports no change in symptoms, however oxy and gabapentin have been helpful.  Standing and walking increase her symptoms.   Impairments A. Pain;B. Decreased WB tolerance;C. Swelling;F. Decreased strength and endurance;H. Impaired gait   Functional Limitations perform activities of daily living;perform desired leisure / sports activities   Symptom Location L LE and low back   How/Where did it occur From Degenerative Joint Disease;From insidious onset   Onset date of current episode/exacerbation 04/18/18   Chronicity New   Pain rating (0-10 point scale) Best (/10);Worst (/10)   Best (/10) 8   Worst (/10) 10   Pain quality A. Sharp;E. Shooting;F. Stabbing   Frequency of pain/symptoms A. Constant   Pain/symptoms are: The same all the time   Pain/symptoms exacerbated by B. Walking;G. Certain positions;I. Bending;J. ADL   Pain/symptoms eased by D. Nothing   Progression of symptoms since onset: Unchanged   Current / Previous Interventions   Diagnostic Tests: CT scan   CT Results Results   CT results  1. Advanced degenerative disc disease throughout the lumbar spine,greatest at L5-S1. 2. Multilevel apophyseal joint degenerative arthrosis in the mid and lower lumbar spine, with degenerative spondylolisthesis present at L3-L4 and L4-L5. 3. Multilevel central stenosis, greatest at L1-L2 and L3-L4, but also to a lesser degree at L2-L3 and L4-L5. 4. Multilevel foraminal stenosis, greatest bilaterally at L5-S1 as well as on the right at L4-L5 and L3-L4.   Prior Level of Function   Prior Level of Function-Mobility independent   Prior Level of Function-ADLs independent   Functional Level Prior Comment independent   Current Level of Function   Patient role/employment history G. Disabled;F. Retired   Fall Risk Screen   Fall screen completed by PT   Have you fallen 2 or more times in the past year? No   Have you fallen and had an injury in the past year? No   Timed Up and Go score (seconds) not tested due to severity of symptoms   Is patient a fall risk? Yes;Department fall risk interventions implemented   Fall screen comments Falls risk due to fear of falling and needing an assistive device to transfer from scooter to table   System Outcome Measures   Outcome Measures Low Back Pain (see Oswestry and Elton)   Lumbar Spine/SI Objective Findings   Observation Patient arrives to session in scooter, difficulty and pain with transfering from scooter to bed   Posture forward shoulders rounded back, decreased lumbar lordosis   Gait/Locomotion has to hold onto something to transfer from scooter to table   Balance/Proprioception (Single Leg Stance) not tested due to pain   Flexion ROM not tested due to severe pain with standing   Extension ROM not tested due to severe pain with standing   Right Side Bending ROM not tested due to severe pain with standing   Left Side Bending ROM not tested due to severe pain with standing   Repeated Extension-Standing ROM not tested due to severe pain with standing   Repeated Flexion-Standing ROM not  tested due to severe pain with standing   Pelvic Screen thigh thrust painful (-) for reproduction of symptoms   Hip Screen decreased hip flexion, ER, IR, and extension and painful with all movements   Transversus Abdominus Strength ([place] Leg Lowering-deg) poor   Hip Flexion (L2) Strength L: unable to perform without pain; R: 3+/5   Hip Extension Strength 3-/5 B   Knee Flexion Strength 4/5   Knee Extension (L3) Strength 3+/5 painful   Ankle Dorsiflexion (L4) Strength 4-/5   Great Toe Extension (L5) Strength 4-/5   Ankle Plantar Flexion (S1) Strength not tested due to pain   Hamstring Flexibility restricted B   Hip Flexor Flexibility restricted L > R   Piriformis Flexibility restricted B   SLR painful L   Slump Test unable to perform due to pain   Segmental Mobility unable to lay prone   Sensation Testing denies numbness and tingling   Palpation TTP L piriformis, L lumbar paraspinals, L SIJ, L greater trochanter   Planned Therapy Interventions   Planned Therapy Interventions gait training;joint mobilization;manual therapy;neuromuscular re-education;ROM;strengthening;stretching   Planned Modality Interventions   Planned Modality Interventions Electrical stimulation;TENS;Ultrasound;Iontophoresis;Cryotherapy;Hot packs   Planned Modality Interventions Comments as needed only   Clinical Impression   Criteria for Skilled Therapeutic Interventions Met yes, treatment indicated   PT Diagnosis Low back pain consistent with Lumbar radiculopathy L5-S1 and stenosis   Influenced by the following impairments decreased ROM, weakness, decreased flexibility   Functional limitations due to impairments difficulty with standing, walking, chair to bed transfers, and rolling in bed   Clinical Presentation Evolving/Changing   Clinical Presentation Rationale Prior LE weakness, hx cancer, hx of femur fx, decreased mobility with need for scooter   Clinical Decision Making (Complexity) Moderate complexity   Therapy Frequency 2 times/Week    Predicted Duration of Therapy Intervention (days/wks) 10 weeks   Risk & Benefits of therapy have been explained Yes   Patient, Family & other staff in agreement with plan of care Yes   Education Assessment   Preferred Learning Style Listening;Demonstration   Barriers to Learning Hearing   ORTHO GOALS   PT Ortho Eval Goals 1;2;3;4   Ortho Goal 1   Goal Identifier Transfers   Goal Description Patient will tolerate transfering between her scooter and bed without an increase in symptoms in order to have increased safety with transfers at home.   Target Date 06/20/18   Ortho Goal 2   Goal Identifier Bed mobility   Goal Description Patient will demonstrate ability to roll in bed wihtout an increase in symptoms in order to have improved sleep and ability to transfer positions through out night.   Target Date 07/04/18   Ortho Goal 3   Goal Identifier Standing   Goal Description Patient will tolerate standing longer than 10 minutes in order to perform morning ADLs.   Target Date 07/04/18   Ortho Goal 4   Goal Identifier Walking   Goal Description Patient will tolerate ambulating from her bedroom to bathroom (20 feet) without an increase in symptoms in order to have increase safety with ambulation at home.   Target Date 07/18/18   Total Evaluation Time   Total Evaluation Time 45 mins   Therapy Certification   Certification date from 05/09/18   Certification date to 07/18/18   Medical Diagnosis Lumbar radiculopathy; Spinal stenosis of lumbar region without neurogenic claudication; DDD lumbar   Please contact me with any questions or concerns.    Thank you for your referral,     Ruby Bailey, PT, DPT, CLT  Physical Therapist & Certified Lymphedema Therapist  [address] [address]  [phone]

## 2018-05-09 NOTE — PROGRESS NOTES
[facility]          OUTPATIENT PHYSICAL THERAPY ORTHOPEDIC EVALUATION  PLAN OF TREATMENT FOR OUTPATIENT REHABILITATION  (COMPLETE FOR INITIAL CLAIMS ONLY)  Patient's Last Name, First Name, M.I.  YOB: 1945  CarolineStefanie  J    Provider s Name:  [facility]   Medical Record No.  [id number]   Start of Care Date:  05/09/18   Onset Date:  04/18/18   Type:     _X__PT   ___OT   ___SLP Medical Diagnosis:  Lumbar radiculopathy; Spinal stenosis of lumbar region without neurogenic claudication; DDD lumbar     PT Diagnosis:  Low back pain consistent with Lumbar radiculopathy L5-S1 and stenosis   Visits from SOC:  1      _________________________________________________________________________________  Plan of Treatment/Functional Goals:  gait training, joint mobilization, manual therapy, neuromuscular re-education, ROM, strengthening, stretching     Electrical stimulation, TENS, Ultrasound, Iontophoresis, Cryotherapy, Hot packs  as needed only  Goals  Goal Identifier: Transfers  Goal Description: Patient will tolerate transfering between her scooter and bed without an increase in symptoms in order to have increased safety with transfers at home.  Target Date: 06/20/18    Goal Identifier: Bed mobility  Goal Description: Patient will demonstrate ability to roll in bed wihtout an increase in symptoms in order to have improved sleep and ability to transfer positions through out night.  Target Date: 07/04/18    Goal Identifier: Standing  Goal Description: Patient will tolerate standing longer than 10 minutes in order to perform morning ADLs.  Target Date: 07/04/18    Goal Identifier: Walking  Goal Description: Patient will tolerate ambulating from her bedroom to bathroom (20 feet) without an increase in symptoms in order to have increase safety with ambulation at home.  Target Date: 07/18/18    Therapy Frequency:  2 times/Week  Predicted Duration of Therapy Intervention:  10  khanh Bailey, PT                 I CERTIFY THE NEED FOR THESE SERVICES FURNISHED UNDER        THIS PLAN OF TREATMENT AND WHILE UNDER MY CARE     (Physician co-signature of this document indicates review and certification of the therapy plan).                       Certification Date From:  05/09/18   Certification Date To:  07/18/18    Referring Provider:  Dr. Morales    Initial Assessment        See Epic Evaluation Start of Care Date: 05/09/18

## 2018-05-15 ENCOUNTER — HOSPITAL ENCOUNTER (OUTPATIENT)
Dept: PHYSICAL THERAPY | Facility: CLINIC | Age: 73
Setting detail: THERAPIES SERIES
End: 2018-05-15
Attending: FAMILY MEDICINE
Payer: MEDICARE

## 2018-05-15 PROCEDURE — 40000718 ZZHC STATISTIC PT DEPARTMENT ORTHO VISIT: Performed by: PHYSICAL THERAPIST

## 2018-05-15 PROCEDURE — 97110 THERAPEUTIC EXERCISES: CPT | Mod: GP | Performed by: PHYSICAL THERAPIST

## 2018-05-17 ENCOUNTER — RADIOLOGY INJECTION OFFICE VISIT (OUTPATIENT)
Dept: PALLIATIVE MEDICINE | Facility: CLINIC | Age: 73
End: 2018-05-17
Attending: FAMILY MEDICINE
Payer: COMMERCIAL

## 2018-05-17 ENCOUNTER — HOSPITAL ENCOUNTER (OUTPATIENT)
Dept: RADIOLOGY | Facility: CLINIC | Age: 73
Discharge: HOME | End: 2018-05-17
Attending: ANESTHESIOLOGY | Admitting: ANESTHESIOLOGY
Payer: MEDICARE

## 2018-05-17 VITALS — HEART RATE: 73 BPM | DIASTOLIC BLOOD PRESSURE: 81 MMHG | SYSTOLIC BLOOD PRESSURE: 183 MMHG | RESPIRATION RATE: 16 BRPM

## 2018-05-17 DIAGNOSIS — M51.369 DDD (DEGENERATIVE DISC DISEASE), LUMBAR: ICD-10-CM

## 2018-05-17 DIAGNOSIS — M54.16 LUMBAR RADICULOPATHY: ICD-10-CM

## 2018-05-17 DIAGNOSIS — M54.16 LUMBAR RADICULOPATHY: Primary | ICD-10-CM

## 2018-05-17 PROCEDURE — 64483 NJX AA&/STRD TFRM EPI L/S 1: CPT | Mod: LT | Performed by: ANESTHESIOLOGY

## 2018-05-17 PROCEDURE — 25000125 ZZHC RX 250: Performed by: ANESTHESIOLOGY

## 2018-05-17 PROCEDURE — 27210202 XR LUMBAR TRANSFORAMINAL INJ SINGLE: Mod: TC

## 2018-05-17 PROCEDURE — 25000128 H RX IP 250 OP 636: Performed by: ANESTHESIOLOGY

## 2018-05-17 RX ORDER — BUPIVACAINE HYDROCHLORIDE 5 MG/ML
10 INJECTION, SOLUTION PERINEURAL ONCE
Status: COMPLETED | OUTPATIENT
Start: 2018-05-17 | End: 2018-05-17

## 2018-05-17 RX ORDER — LIDOCAINE HYDROCHLORIDE 10 MG/ML
5 INJECTION, SOLUTION EPIDURAL; INFILTRATION; INTRACAUDAL; PERINEURAL ONCE
Status: COMPLETED | OUTPATIENT
Start: 2018-05-17 | End: 2018-05-17

## 2018-05-17 RX ORDER — IOPAMIDOL 612 MG/ML
3 INJECTION, SOLUTION INTRATHECAL ONCE
Status: COMPLETED | OUTPATIENT
Start: 2018-05-17 | End: 2018-05-17

## 2018-05-17 RX ORDER — METHYLPREDNISOLONE ACETATE 40 MG/ML
40 INJECTION, SUSPENSION INTRA-ARTICULAR; INTRALESIONAL; INTRAMUSCULAR; SOFT TISSUE ONCE
Status: COMPLETED | OUTPATIENT
Start: 2018-05-17 | End: 2018-05-17

## 2018-05-17 RX ORDER — DEXAMETHASONE SODIUM PHOSPHATE 10 MG/ML
20 INJECTION, SOLUTION INTRAMUSCULAR; INTRAVENOUS ONCE
Status: COMPLETED | OUTPATIENT
Start: 2018-05-17 | End: 2018-05-17

## 2018-05-17 RX ADMIN — IOPAMIDOL 1 ML: 612 INJECTION, SOLUTION INTRATHECAL at 09:23

## 2018-05-17 RX ADMIN — LIDOCAINE HYDROCHLORIDE 1.5 ML: 10 INJECTION, SOLUTION EPIDURAL; INFILTRATION; INTRACAUDAL; PERINEURAL at 09:22

## 2018-05-17 RX ADMIN — DEXAMETHASONE SODIUM PHOSPHATE 5 MG: 10 INJECTION, SOLUTION INTRAMUSCULAR; INTRAVENOUS at 09:16

## 2018-05-17 RX ADMIN — BUPIVACAINE HYDROCHLORIDE 1 ML: 5 INJECTION, SOLUTION EPIDURAL; INTRACAUDAL at 09:22

## 2018-05-17 RX ADMIN — METHYLPREDNISOLONE ACETATE 40 MG: 40 INJECTION, SUSPENSION INTRA-ARTICULAR; INTRALESIONAL; INTRAMUSCULAR; SOFT TISSUE at 09:16

## 2018-05-17 NOTE — DISCHARGE INSTRUCTIONS
[facility]   Procedure Discharge Instructions    Today you saw:    Dr. Blue Lopez      You had an:  Epidural steroid injection        Medications used:  Lidocaine   Marcaine   Decadron Depo-medrol Iso Mark            Be cautious when walking. Numbness and/or weakness in the lower extremities may occur for up to 6-8 hours after the procedure due to effect of the local anesthetic    Do not drive for 6 hours. The effect of the local anesthetic could slow your reflexes.     You may resume your regular activities after 24 hours    Avoid strenuous activity for the first 24 hours    You may shower, however avoid swimming, tub baths or hot tubs for 24 hours following your procedure    You may have a mild to moderate increase in pain for several days following the injection.    It may take up to 14 days for the steroid medication to start working although you may feel the effect as early as a few days after the procedure.       You may use ice packs for 10-15 minutes, 3 to 4 times a day at the injection site for comfort    Do not use heat to painful areas for 6 to 8 hours. This will give the local anesthetic time to wear off and prevent you from accidentally burning your skin.     You may use anti-inflammatory medications (such as Ibuprofen or Aleve or Advil) or Tylenol for pain control if necessary    If you have diabetes, check your blood sugar more frequently than usual as your blood sugar may be higher than normal for 10-14 days following a steroid injection. Contact your doctor who manages your diabetes if your blood sugar is higher than usual    If you experience any of the following, call the pain center nursing line during work hours at [phone] or the after hours provider line at [phone]:  -Fever over 100 degree F  -Swelling, bleeding, redness, drainage, warmth at the injection site  -Progressive weakness or numbness in your legs  -Loss of bowel or bladder function  -Unusual  headache that is not relieved by Tylenol or other pain reliever  -Unusual new onset of pain that is not improving      Phone #s:  Appointment line: [phone];  Nurse line: [phone]

## 2018-05-17 NOTE — IP AVS SNAPSHOT
MRN:[id number]                      After Visit Summary   5/17/2018    Stefanie Velazquez    MRN: [id number]           Visit Information        Provider Department      5/17/2018  8:45 AM [facility]           Review of your medicines      UNREVIEWED medicines. Ask your doctor about these medicines        Dose / Directions    acetaminophen 650 MG CR tablet   Commonly known as:  TYLENOL   Used for:  Bilateral cellulitis of lower leg        Dose:  650 mg   Take 1 tablet (650 mg) by mouth 3 times daily   Quantity:  60 tablet   Refills:  0       BUTT PASTE - REGULAR   Commonly known as:  DR RUSLAN CEJAOP BUTT PASTE FORMULA        Apply topically every hour as needed for skin protection   Refills:  0       chlorhexidine 0.12 % solution   Commonly known as:  PERIDEX        Refills:  0       diclofenac 50 MG EC tablet   Commonly known as:  VOLTAREN   Used for:  Acute left-sided low back pain with left-sided sciatica        Dose:  50 mg   Take 1 tablet (50 mg) by mouth 3 times daily as needed for moderate pain   Quantity:  21 tablet   Refills:  1       fluocinonide 0.05 % ointment   Commonly known as:  LIDEX   Used for:  Venous stasis dermatitis of both lower extremities        Apply sparingly to affected area twice daily as needed.  Do not apply to face.   Quantity:  60 g   Refills:  11       fluticasone 50 MCG/ACT spray   Commonly known as:  FLONASE        Dose:  1 spray   1 spray   Refills:  0       gabapentin 300 MG capsule   Commonly known as:  NEURONTIN   Used for:  Lumbar radiculopathy        Take 1 tablet (300 mg) at bedtime, after 4 days may go to 2 if needed   Quantity:  60 capsule   Refills:  1       GLUCERNA OS Liqd   Used for:  Poor dentition, Poor nutrition, Type 2 diabetes mellitus with diabetic nephropathy, with long-term current use of insulin (H), Rectal cancer (H), Chronic diarrhea, Radiation therapy complication, sequela        One can two to three times a day    Quantity:  24 each   Refills:  11       hypromellose 0.3 % Soln ophthalmic solution   Commonly known as:  GENTEAL        Dose:  2 drop   2 drops   Refills:  0       insulin aspart 100 UNIT/ML injection   Commonly known as:  NovoLOG FLEXPEN   Used for:  Type 2 diabetes mellitus with diabetic nephropathy, with long-term current use of insulin (H)        Dose:  15 Units   Inject 15 Units Subcutaneous 3 times daily (with meals)   Quantity:  3 mL   Refills:  11       insulin detemir 100 UNIT/ML injection   Commonly known as:  LEVEMIR FLEXPEN/FLEXTOUCH   Used for:  Type 2 diabetes mellitus with diabetic nephropathy, with long-term current use of insulin (H)        Dose:  25 Units   Inject 25 Units Subcutaneous At Bedtime Fill when needed   Quantity:  3 mL   Refills:  3       LASIX 20 MG tablet   Used for:  Benign essential hypertension   Generic drug:  furosemide        Dose:  20 mg   Take 1 tablet (20 mg) by mouth daily   Quantity:  90 tablet   Refills:  3       levothyroxine 88 MCG tablet   Commonly known as:  SYNTHROID/LEVOTHROID        Dose:  88 mcg   Take 1 tablet (88 mcg) by mouth daily   Quantity:  90 tablet   Refills:  1       loperamide 2 MG capsule   Commonly known as:  IMODIUM   Used for:  Rectal cancer (H), Chronic diarrhea        One capsule once a day, can use a second one if needed   Quantity:  90 capsule   Refills:  3       losartan 100 MG tablet   Commonly known as:  COZAAR   Used for:  Benign essential hypertension        Dose:  100 mg   Take 1 tablet (100 mg) by mouth daily   Quantity:  90 tablet   Refills:  1       menthol-zinc oxide 0.44-20.625 % Oint ointment   Commonly known as:  CALMOSEPTINE   Used for:  Rash and nonspecific skin eruption        Apply topically 4 times daily as needed for skin protection   Refills:  0       metoprolol succinate 50 MG 24 hr tablet   Commonly known as:  TOPROL-XL   Used for:  Benign essential hypertension        Dose:  50 mg   Take 1 tablet (50 mg) by mouth daily    Quantity:  90 tablet   Refills:  1       mineral oil-hydrophilic petrolatum        Apply topically as needed   Refills:  0       OMEPRAZOLE PO        Dose:  20 mg   Take 20 mg by mouth daily as needed   Refills:  0       oxyCODONE IR 5 MG tablet   Commonly known as:  ROXICODONE   Used for:  Lumbar radiculopathy        Dose:  5 mg   Take 1 tablet (5 mg) by mouth every 6 hours as needed for severe pain maximum 6 tablet(s) per day   Quantity:  20 tablet   Refills:  0       Potassium Gluconate 595 MG Caps   Used for:  Stasis edema of both lower extremities        Dose:  1 tablet   Take 1 tablet by mouth daily   Refills:  0       rOPINIRole 1 MG tablet   Commonly known as:  REQUIP   Used for:  RLS (restless legs syndrome)        TAKE ONE TABLET BY MOUTH THREE TIMES DAILY   Quantity:  270 tablet   Refills:  0       simvastatin 40 MG tablet   Commonly known as:  ZOCOR        Dose:  40 mg   Take 1 tablet (40 mg) by mouth daily   Quantity:  90 tablet   Refills:  2       triamcinolone 0.1 % cream   Commonly known as:  KENALOG   Used for:  Stasis dermatitis of both legs        Apply sparingly to affected area two times daily as needed   Quantity:  80 g   Refills:  1         CONTINUE these medicines which have NOT CHANGED        Dose / Directions    ACCU-CHEK MILVIA Jeana   Used for:  Type II or unspecified type diabetes mellitus without mention of complication, uncontrolled        dispense one meter   Quantity:  1 device   Refills:  0       blood glucose monitoring lancets   Used for:  Type II or unspecified type diabetes mellitus without mention of complication, uncontrolled        Test BG 4 times daily   Quantity:  1 Box   Refills:  11       blood glucose monitoring test strip   Commonly known as:  ACCU-CHEK MILVIA   Used for:  Type 2 diabetes mellitus with diabetic nephropathy, with long-term current use of insulin (H)        Use to test blood sugars 4 times daily or as directed.   Quantity:  360 each   Refills:  1        DEPEND EASY FIT UNDERGARMENTS Misc   Used for:  Bowel and bladder incontinence, Rectal cancer (H)        Dose:  1 Units   1 Units every 2 hours X-large   Quantity:  300 each   Refills:  prn       insulin pen needle 32G X 4 MM   Commonly known as:  BD GABRIELLA U/F   Used for:  Type 2 diabetes mellitus with diabetic nephropathy, with long-term current use of insulin (H)        Use 4 times per day or as directed.   Quantity:  120 each   Refills:  5       syringe (disposable) 1 ML Misc   Used for:  Vitamin B 12 deficiency        Dose:  1 each   1 each every 30 days With 1 inch needle for Vit B12 injection   Quantity:  1 each   Refills:  11                Protect others around you: Learn how to safely use, store and throw away your medicines at www.disposemymeds.org.         Follow-ups after your visit        Your next 10 appointments already scheduled     May 18, 2018  7:45 AM CDT   [facility]   [facility]    [address] [place] [address]   [phone]            May 21, 2018 11:15 AM CDT   [facility], PT   [facility]    [address] [place] [address]   [phone]            May 24, 2018  8:45 AM CDT   [facility]tho [facility] Ruby Bailey PT   [facility]    [address] [place] [address]   [phone]            Aug 10, 2018  9:00 AM CDT   LAB with [facility]   [facility]    [address] [place] [address]   [phone]           Please do not eat 10-12 hours before your appointment if you are coming in fasting for labs on lipids, cholesterol, or glucose (sugar). This does not apply to pregnant women. Water, hot tea and black coffee (with nothing added) are okay. Do not drink other fluids, diet soda or chew gum.             Aug 14, 2018 11:30 AM CDT   Return Visit with Michelle Horn PA-C   [facility] ([facility])    [address]  [facility] [address]   [phone]            Sep 21, 2018 11:00 AM CDT   LAB with [facility]   [facility]    [address] [facility] [address]   [phone]           Please do not eat 10-12 hours before your appointment if you are coming in fasting for labs on lipids, cholesterol, or glucose (sugar). This does not apply to pregnant women. Water, hot tea and black coffee (with nothing added) are okay. Do not drink other fluids, diet soda or chew gum.            Sep 28, 2018 11:00 AM CDT   Return Visit with Apolinar Martinez MD   [facility] ([facility])    [facility]  [address]  [facility] [address]   [phone]               Care Instructions        Further instructions from your care team       [facility]   Procedure Discharge Instructions    Today you saw:    Dr. Blue Lopez      You had an:  Epidural steroid injection        Medications used:  Lidocaine   Marcaine   Decadron Depo-medrol Iso Mark            Be cautious when walking. Numbness and/or weakness in the lower extremities may occur for up to 6-8 hours after the procedure due to effect of the local anesthetic    Do not drive for 6 hours. The effect of the local anesthetic could slow your reflexes.     You may resume your regular activities after 24 hours    Avoid strenuous activity for the first 24 hours    You may shower, however avoid swimming, tub baths or hot tubs for 24 hours following your procedure    You may have a mild to moderate increase in pain for several days following the injection.    It may take up to 14 days for the steroid medication to start working although you may feel the effect as early as a few days after the  procedure.       You may use ice packs for 10-15 minutes, 3 to 4 times a day at the injection site for comfort    Do not use heat to painful areas for 6 to 8 hours. This will give the local anesthetic time to wear off and prevent you from accidentally burning your skin.     You may use anti-inflammatory medications (such as Ibuprofen or Aleve or Advil) or Tylenol for pain control if necessary    If you have diabetes, check your blood sugar more frequently than usual as your blood sugar may be higher than normal for 10-14 days following a steroid injection. Contact your doctor who manages your diabetes if your blood sugar is higher than usual    If you experience any of the following, call the pain center nursing line during work hours at [phone] or the after hours provider line at [phone]:  -Fever over 100 degree F  -Swelling, bleeding, redness, drainage, warmth at the injection site  -Progressive weakness or numbness in your legs  -Loss of bowel or bladder function  -Unusual headache that is not relieved by Tylenol or other pain reliever  -Unusual new onset of pain that is not improving      Phone #s:  Appointment line: [phone];  Nurse line: [phone]             Additional Information About Your Visit        [employer] Information     [facility] gives you secure access to your electronic health record. If you see a primary care provider, you can also send messages to your care team and make appointments. If you have questions, please call your primary care clinic.  If you do not have a primary care provider, please call [phone] and they will assist you.        Care EveryWhere ID     This is your Care EveryWhere ID. This could be used by other organizations to access your [facility] medical records  [id number]        Your Vitals Were     Blood Pressure Pulse Respirations             183/81 73 16          Primary Care Provider Office Phone # Fax #    Sandra Morales [phone]       Equal Access to Services     [facility]: Hadii rod ng grabiel Soamos, waaxda luqadaha, qaybta kaalmabradley zamora, ceci moy. So [facility] [phone].    ATENCIÓN: Si habla español, tiene a waite disposición servicios gratuitos de asistencia lingüística. Llame al [phone].    We comply with applicable federal civil rights laws and [place] laws. We do not discriminate on the basis of race, color, national origin, age, disability, sex, sexual orientation, or gender identity.            Thank you!     Thank you for choosing [facility] for your care. Our goal is always to provide you with excellent care. Hearing back from our patients is one way we can continue to improve our services. Please take a few minutes to complete the written survey that you may receive in the mail after you visit with us. Thank you!             Medication List: This is a list of all your medications and when to take them. Check marks below indicate your daily home schedule. Keep this list as a reference.      Medications           Morning Afternoon Evening Bedtime As Needed    ACCU-CHEK MILVIA Jeana   dispense one meter                                acetaminophen 650 MG CR tablet   Commonly known as:  TYLENOL   Take 1 tablet (650 mg) by mouth 3 times daily                                blood glucose monitoring lancets   Test BG 4 times daily                                blood glucose monitoring test strip   Commonly known as:  ACCU-CHEK MILVIA   Use to test blood sugars 4 times daily or as directed.                                BUTT PASTE - REGULAR   Commonly known as:  DR RUSLAN GUILLERMO BUTT PASTE FORMULA   Apply topically every hour as needed for skin protection                                chlorhexidine 0.12 % solution   Commonly known as:  PERIDEX                                DEPEND EASY FIT UNDERGARMENTS Misc   1 Units every 2 hours X-large                                diclofenac 50 MG EC  tablet   Commonly known as:  VOLTAREN   Take 1 tablet (50 mg) by mouth 3 times daily as needed for moderate pain                                fluocinonide 0.05 % ointment   Commonly known as:  LIDEX   Apply sparingly to affected area twice daily as needed.  Do not apply to face.                                fluticasone 50 MCG/ACT spray   Commonly known as:  FLONASE   1 spray                                gabapentin 300 MG capsule   Commonly known as:  NEURONTIN   Take 1 tablet (300 mg) at bedtime, after 4 days may go to 2 if needed                                GLUCERNA OS Liqd   One can two to three times a day                                hypromellose 0.3 % Soln ophthalmic solution   Commonly known as:  GENTEAL   2 drops                                insulin aspart 100 UNIT/ML injection   Commonly known as:  NovoLOG FLEXPEN   Inject 15 Units Subcutaneous 3 times daily (with meals)                                insulin detemir 100 UNIT/ML injection   Commonly known as:  LEVEMIR FLEXPEN/FLEXTOUCH   Inject 25 Units Subcutaneous At Bedtime Fill when needed                                insulin pen needle 32G X 4 MM   Commonly known as:  BD GABRIELLA U/F   Use 4 times per day or as directed.                                LASIX 20 MG tablet   Take 1 tablet (20 mg) by mouth daily   Generic drug:  furosemide                                levothyroxine 88 MCG tablet   Commonly known as:  SYNTHROID/LEVOTHROID   Take 1 tablet (88 mcg) by mouth daily                                loperamide 2 MG capsule   Commonly known as:  IMODIUM   One capsule once a day, can use a second one if needed                                losartan 100 MG tablet   Commonly known as:  COZAAR   Take 1 tablet (100 mg) by mouth daily                                menthol-zinc oxide 0.44-20.625 % Oint ointment   Commonly known as:  CALMOSEPTINE   Apply topically 4 times daily as needed for skin protection                                 metoprolol succinate 50 MG 24 hr tablet   Commonly known as:  TOPROL-XL   Take 1 tablet (50 mg) by mouth daily                                mineral oil-hydrophilic petrolatum   Apply topically as needed                                OMEPRAZOLE PO   Take 20 mg by mouth daily as needed                                oxyCODONE IR 5 MG tablet   Commonly known as:  ROXICODONE   Take 1 tablet (5 mg) by mouth every 6 hours as needed for severe pain maximum 6 tablet(s) per day                                Potassium Gluconate 595 MG Caps   Take 1 tablet by mouth daily                                rOPINIRole 1 MG tablet   Commonly known as:  REQUIP   TAKE ONE TABLET BY MOUTH THREE TIMES DAILY                                simvastatin 40 MG tablet   Commonly known as:  ZOCOR   Take 1 tablet (40 mg) by mouth daily                                syringe (disposable) 1 ML Misc   1 each every 30 days With 1 inch needle for Vit B12 injection                                triamcinolone 0.1 % cream   Commonly known as:  KENALOG   Apply sparingly to affected area two times daily as needed

## 2018-05-17 NOTE — IP AVS SNAPSHOT
[facility]    [address]    [facility] [address]    Phone:  [phone]    Fax:  [phone]                                       After Visit Summary   5/17/2018    Stefanie Velazquez    MRN: [id number]           After Visit Summary Signature Page     I have received my discharge instructions, and my questions have been answered. I have discussed any challenges I see with this plan with the nurse or doctor.    ..........................................................................................................................................  Patient/Patient Representative Signature      ..........................................................................................................................................  Patient Representative Print Name and Relationship to Patient    ..................................................               ................................................  Date                                            Time    ..........................................................................................................................................  Reviewed by Signature/Title    ...................................................              ..............................................  Date                                                            Time

## 2018-05-17 NOTE — PROGRESS NOTES
Pre procedure Diagnosis: lumbar radiculopathy, lumbar degenerative disc disease   Post procedure Diagnosis: Same  Procedure performed: lumbar transforaminal epidural steroid injection at L5-S1 on the left, fluoroscopically guided, contrast controlled  Anesthesia: none  Complications: none  Operators: Blue Lopez MD     Indications:   Stefanie Velazquez is a 73 year old female was sent by Dr. Morales for lumbar epidural steroid injection.  They have a history of chronic lower back pain with pain radiating down the left lower extremity.  Exam shows lumbar tenderness, antalgic gait and they have tried conservative treatment including physical therapy, medications.    CT lumbar spine was done on 4/25/18 which showed   FINDINGS: There are five nonrib-bearing or lumbar-type vertebrae.  There is a well-corticated sagittally-oriented gap or lucency within  the right L2 transverse process, presumably related to an old ununited  fracture. Advanced apophyseal joint degenerative changes are noted in  the mid and lower lumbar spine, particularly from L2-L3 through L4-L5.  This is associated with mild degenerative grade 1 spondylolisthesis at  L4-L5 and L3-L4. Advanced degenerative disc disease is noted  throughout the lumbar spine from L1-L2 through L5-S1 with intradiscal  gas and mild to moderate loss of disc height which is greatest at  L5-S1. L1 inferior endplate discogenic irregularity is also noted  compatible with a chronic Schmorl's node. Vertebral body heights are  normal. Although differentiation between disc and thecal sac is poorly  seen, there is probable severe central stenosis at L1-L2, L3-L4, and  to a lesser degree at L2-L3 and L4-L5 related to a combination of  annular bulging and ligamentum flavum thickening as well as  degenerative spondylolisthesis at L3-L4 and L4-L5. Lateral annular  bulging results in multilevel foraminal stenosis which is greatest  bilaterally at L5-S1 and on the right at L4-L5 and  L3-L4.       IMPRESSION:  1. Advanced degenerative disc disease throughout the lumbar spine,  greatest at L5-S1.  2. 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  3. Multilevel central stenosis, greatest at L1-L2 and L3-L4, but also  to a lesser degree at L2-L3 and L4-L5.  4. Multilevel foraminal stenosis, greatest bilaterally at L5-S1 as  well as on the right at L4-L5 and L3-L4.    Options/alternatives, benefits and risks were discussed with the patient including bleeding, infection, tissue trauma, numbness, weakness, paralysis, spinal cord injury, radiation exposure, headache and reaction to medications. Questions were answered to her satisfaction and she agrees to proceed. Voluntary informed consent was obtained and signed.     Vitals were reviewed: Yes  183/81  73 15  Allergies were reviewed:  Yes   Medications were reviewed:  Yes   Pre-procedure pain score: 6/10    Procedure:  After getting informed consent, patient was brought into the procedure suite and was placed in a prone position on the procedure table.   A Pause for the Cause was performed.  Patient was prepped and draped in sterile fashion.     After identifying the left L5-S1 neuroforamen, the C-arm was rotated to a left lateral oblique angle.  A total of 1.5 ml of Lidocaine 1% was used to anesthetize the skin and the needle track at a skin entry site coaxial with the fluoroscopy beam, and overriding the superior aspect of the neuroforamen.  A 25 gauge 5 inch spinal needle was advanced under intermittent fluoroscopy until it entered the foramen superiorly.    The needle position was then inspected from anteroposterior and lateral views, and the needle adjusted appropriately.  A total of 1 ml of Isovue-300 was injected, confirming appropriate position, with spread into the nerve root sheath and the epidural space, with no intravascular uptake. 14  ml was wasted    Then, after repeated negative aspiration, 2.5 ml of a combination of Depomedrol 40 mg, Decadron 5 mg, 0.5% bupivacaine 1 ml was injected and the needle was flushed with lidocaine and removed.    During the procedure, there was not a paresthesia.  Hemostasis was achieved, the area was cleaned, and bandaids were placed when appropriate.  The patient tolerated the procedure well, and was taken to the recovery room.    Images were saved to PACS.    Post-procedure pain score: 4/10  Follow-up includes:   -f/u phone call in one week  -f/u with referring provider    Blue Lopez MD  [facility]

## 2018-05-21 ENCOUNTER — HOSPITAL ENCOUNTER (OUTPATIENT)
Dept: PHYSICAL THERAPY | Facility: CLINIC | Age: 73
Setting detail: THERAPIES SERIES
End: 2018-05-21
Attending: FAMILY MEDICINE
Payer: MEDICARE

## 2018-05-21 PROCEDURE — 40000718 ZZHC STATISTIC PT DEPARTMENT ORTHO VISIT: Performed by: PHYSICAL THERAPIST

## 2018-05-21 PROCEDURE — 97140 MANUAL THERAPY 1/> REGIONS: CPT | Mod: GP | Performed by: PHYSICAL THERAPIST

## 2018-05-21 PROCEDURE — 97110 THERAPEUTIC EXERCISES: CPT | Mod: GP | Performed by: PHYSICAL THERAPIST

## 2018-05-22 ENCOUNTER — TELEPHONE (OUTPATIENT)
Dept: FAMILY MEDICINE | Facility: CLINIC | Age: 73
End: 2018-05-22

## 2018-05-22 PROCEDURE — G0179 MD RECERTIFICATION HHA PT: HCPCS | Performed by: FAMILY MEDICINE

## 2018-05-22 NOTE — TELEPHONE ENCOUNTER
Reason for Call:  Form, our goal is to have forms completed with 72 hours, however, some forms may require a visit or additional information.    Type of letter, form or note:  [facility] - Plan of care 5/19/18-7/17/18    Who is the form from?: [facility] - Plan of care 5/19/18-7/17/18 (if other please explain)    Where did the form come from: form was faxed in    What clinic location was the form placed at?: [facility]    Where the form was placed: 's [facility]    Form received back signed  5/22/18  Faxed Back & Sent to be scanned to this encounter            Call taken on 5/22/2018 at 2:12 PM by Leilani Jurado

## 2018-05-24 ENCOUNTER — HOSPITAL ENCOUNTER (OUTPATIENT)
Dept: PHYSICAL THERAPY | Facility: CLINIC | Age: 73
Setting detail: THERAPIES SERIES
End: 2018-05-24
Attending: FAMILY MEDICINE
Payer: MEDICARE

## 2018-05-24 PROCEDURE — 40000718 ZZHC STATISTIC PT DEPARTMENT ORTHO VISIT: Performed by: PHYSICAL THERAPIST

## 2018-05-24 PROCEDURE — 97110 THERAPEUTIC EXERCISES: CPT | Mod: GP | Performed by: PHYSICAL THERAPIST

## 2018-05-24 PROCEDURE — 97140 MANUAL THERAPY 1/> REGIONS: CPT | Mod: GP | Performed by: PHYSICAL THERAPIST

## 2018-05-30 ENCOUNTER — HOSPITAL ENCOUNTER (OUTPATIENT)
Dept: PHYSICAL THERAPY | Facility: CLINIC | Age: 73
Setting detail: THERAPIES SERIES
End: 2018-05-30
Attending: FAMILY MEDICINE
Payer: MEDICARE

## 2018-05-30 PROCEDURE — 97140 MANUAL THERAPY 1/> REGIONS: CPT | Mod: GP | Performed by: PHYSICAL THERAPIST

## 2018-05-30 PROCEDURE — 97110 THERAPEUTIC EXERCISES: CPT | Mod: GP | Performed by: PHYSICAL THERAPIST

## 2018-05-30 PROCEDURE — 40000718 ZZHC STATISTIC PT DEPARTMENT ORTHO VISIT: Performed by: PHYSICAL THERAPIST

## 2018-06-04 ENCOUNTER — HOSPITAL ENCOUNTER (OUTPATIENT)
Dept: PHYSICAL THERAPY | Facility: CLINIC | Age: 73
Setting detail: THERAPIES SERIES
End: 2018-06-04
Attending: FAMILY MEDICINE
Payer: MEDICARE

## 2018-06-04 PROCEDURE — 40000718 ZZHC STATISTIC PT DEPARTMENT ORTHO VISIT: Performed by: PHYSICAL THERAPIST

## 2018-06-04 PROCEDURE — 97140 MANUAL THERAPY 1/> REGIONS: CPT | Mod: GP | Performed by: PHYSICAL THERAPIST

## 2018-06-04 PROCEDURE — 97110 THERAPEUTIC EXERCISES: CPT | Mod: GP | Performed by: PHYSICAL THERAPIST

## 2018-06-05 ENCOUNTER — MEDICAL CORRESPONDENCE (OUTPATIENT)
Dept: HEALTH INFORMATION MANAGEMENT | Facility: CLINIC | Age: 73
End: 2018-06-05

## 2018-06-11 ENCOUNTER — HOSPITAL ENCOUNTER (OUTPATIENT)
Dept: PHYSICAL THERAPY | Facility: CLINIC | Age: 73
Setting detail: THERAPIES SERIES
End: 2018-06-11
Attending: FAMILY MEDICINE
Payer: MEDICARE

## 2018-06-11 PROCEDURE — 97110 THERAPEUTIC EXERCISES: CPT | Mod: GP | Performed by: PHYSICAL THERAPIST

## 2018-06-11 PROCEDURE — 40000718 ZZHC STATISTIC PT DEPARTMENT ORTHO VISIT: Performed by: PHYSICAL THERAPIST

## 2018-06-19 ENCOUNTER — TRANSFERRED RECORDS (OUTPATIENT)
Dept: HEALTH INFORMATION MANAGEMENT | Facility: CLINIC | Age: 73
End: 2018-06-19

## 2018-06-19 ENCOUNTER — HOSPITAL ENCOUNTER (OUTPATIENT)
Dept: PHYSICAL THERAPY | Facility: CLINIC | Age: 73
Setting detail: THERAPIES SERIES
End: 2018-06-19
Attending: FAMILY MEDICINE
Payer: MEDICARE

## 2018-06-19 PROCEDURE — 40000718 ZZHC STATISTIC PT DEPARTMENT ORTHO VISIT: Performed by: PHYSICAL THERAPIST

## 2018-06-19 PROCEDURE — 97110 THERAPEUTIC EXERCISES: CPT | Mod: GP | Performed by: PHYSICAL THERAPIST

## 2018-06-25 ENCOUNTER — HOSPITAL ENCOUNTER (OUTPATIENT)
Dept: PHYSICAL THERAPY | Facility: CLINIC | Age: 73
Setting detail: THERAPIES SERIES
End: 2018-06-25
Attending: FAMILY MEDICINE
Payer: MEDICARE

## 2018-06-25 PROCEDURE — 97110 THERAPEUTIC EXERCISES: CPT | Mod: GP | Performed by: PHYSICAL THERAPIST

## 2018-06-25 PROCEDURE — 40000718 ZZHC STATISTIC PT DEPARTMENT ORTHO VISIT: Performed by: PHYSICAL THERAPIST

## 2018-07-03 ENCOUNTER — HOSPITAL ENCOUNTER (OUTPATIENT)
Dept: PHYSICAL THERAPY | Facility: CLINIC | Age: 73
Setting detail: THERAPIES SERIES
End: 2018-07-03
Attending: FAMILY MEDICINE
Payer: MEDICARE

## 2018-07-03 PROCEDURE — 40000718 ZZHC STATISTIC PT DEPARTMENT ORTHO VISIT: Performed by: PHYSICAL THERAPIST

## 2018-07-03 PROCEDURE — G8978 MOBILITY CURRENT STATUS: HCPCS | Mod: GP,CL | Performed by: PHYSICAL THERAPIST

## 2018-07-03 PROCEDURE — G8979 MOBILITY GOAL STATUS: HCPCS | Mod: GP,CK | Performed by: PHYSICAL THERAPIST

## 2018-07-03 PROCEDURE — 97110 THERAPEUTIC EXERCISES: CPT | Mod: GP | Performed by: PHYSICAL THERAPIST

## 2018-07-03 NOTE — PROGRESS NOTES
"   Physical Therapy Progress Note  07/03/18 1000   Signing Clinician's Name / Credentials   Signing clinician's name / credentials Ruby Bailey PT DPT CLT   Session Number   Session Number 10/20 [employer]   Progress Note/Recertification   Progress Note Due Date 07/18/18   Recertification Due Date 07/18/18   PT Medicare Only G-code   G-code Mobility: Walking & Moving Around   Mobility: Walking & Moving Around   Mobility Current Status,  (eval/re-eval & every progress note) CL: 60-79% impairment   Current Mobility Modifier Rationale decreased tolerance to standing, weakness, difficulty with ambulation and balance   Mobility Goal,  (eval/re-eval, every progress note, & discharge) CK: 40-59% impairment   Adult Goals   PT Ortho Eval Goals 1;2;3;4   Ortho Goal 1   Goal Identifier Transfers   Goal Description Patient will tolerate transfering between her scooter and bed without an increase in symptoms in order to have increased safety with transfers at home.   Target Date 06/20/18   Date Met 06/19/18   Ortho Goal 2   Goal Identifier Bed mobility   Goal Description Patient will demonstrate ability to roll in bed wihtout an increase in symptoms in order to have improved sleep and ability to transfer positions through out night.   Target Date 07/04/18   Date Met 05/24/18   Ortho Goal 3   Goal Identifier Standing (8 minutes required rest breaks between standing exercises)   Goal Description Patient will tolerate standing longer than 10 minutes in order to perform morning ADLs.   Target Date 07/04/18   Ortho Goal 4   Goal Identifier Walking   Goal Description Patient will tolerate ambulating from her bedroom to bathroom (20 feet) without an increase in symptoms in order to have increase safety with ambulation at home.   Target Date 07/18/18   Date Met 06/04/18   Subjective Report   Subjective Report Pateint asking if an \"up walker\" would be helpful for improving her back pain.   Objective Measures   Objective " Measures Objective Measure 1;Objective Measure 2;Objective Measure 3   Objective Measure 1   Objective Measure Gait   Details Patient reports an increase in L thigh pain with ambulating 50 feet, forward flexed posture with wheeled walker and decreased stance time on L LE   Objective Measure 2   Objective Measure joint mobility   Objective Measure 3   Objective Measure TrA contraction   Details poor   Treatment Interventions   Interventions Therapeutic Procedure/Exercise;Manual Therapy   Therapeutic Procedure/exercise   Minutes 45   Skilled Intervention stretching ans strengthening to decrease pain and improve ambulation ability   Patient Response demonstrates and verbalizes understanding   Treatment Detail ambulating 50 feet with wheeled walker SBA, standing toe tapping x 5 reps, standing HS curl x 5 reps, standing marching x 5 reps (patient required seated rest breaks between reps of standing exercises), supine pelvic tilts 3 sec hold x 10 reps, glut max iso 3 sec hold x 10 reps, HS iso 3 sec hold x 10 reps, hip abduction hooklying G TB x 10 reps, hip adduction ball hooklying x 10 reps.   Progress decreased ROM, strength, and flexibility   Assessments Completed   Assessments Completed Reports significant fatigue following standing and leg exercises. Pateint demonstrates increased tolerance to ambulation by tolerating long distances, however fatigue and weakness conitnues to limit the sessions and her functional mobility at home.   Plan   Homework see above   Updates to plan of care 1 x a week   Plan for next session LE strengthening, core strengthening, and balance, start standing exercises, progress to every other week   Total Session Time   Timed Code Treatment Minutes 45 mins   Total Treatment Time (sum of timed and untimed services) 45 mins, 3 TE (session cut 15 mins short due to patient reporting fatigue from new exercises)   AMBULATORY CLINICS ONLY-MEDICAL AND TREATMENT DIAGNOSIS   Medical Diagnosis lumbar  radiculopathy; spinal stenosis of lumbar region without neurogenic claudication; DDD lumbar   PT Diagnosis low back pain consistent with lumbar radiculopathy of L5-S1     RECERTIFICATION    Stefanie Velazquez  1945    Session Number: 10/20 since start of care.    Reasons for Continuing Treatment:   Patient has demonstrated improvement in LBP, however continues to demonstrate functional mobility deficits and weakness limiting her ambulation safety.    Frequency/Duration  1 times per week for 6 weeks for a total of 6 visits.    Recertification Period  7/3/18 - 8/14/18    Physician Signature:    Date:    X_______________________________________________________    Physician Name: Dr. Andrew MD    I certify the need for these services furnished under this plan of treatment and while under my care. Physician co-signature of this document indicates review and certification of the therapy plan.  This signature may be written on paper, or electronically signed within [facility].       Please contact me with any questions or concerns.    Thank you for your referral,     Ruby Bailey, PT, DPT, CLT  Physical Therapist & Certified Lymphedema Therapist  [facility] - [address] [address] [phone]

## 2018-07-10 ENCOUNTER — HOSPITAL ENCOUNTER (OUTPATIENT)
Dept: PHYSICAL THERAPY | Facility: CLINIC | Age: 73
Setting detail: THERAPIES SERIES
End: 2018-07-10
Attending: FAMILY MEDICINE
Payer: MEDICARE

## 2018-07-10 PROCEDURE — 97110 THERAPEUTIC EXERCISES: CPT | Mod: GP | Performed by: PHYSICAL THERAPIST

## 2018-07-10 PROCEDURE — 40000718 ZZHC STATISTIC PT DEPARTMENT ORTHO VISIT: Performed by: PHYSICAL THERAPIST

## 2018-07-12 ENCOUNTER — MEDICAL CORRESPONDENCE (OUTPATIENT)
Dept: HEALTH INFORMATION MANAGEMENT | Facility: CLINIC | Age: 73
End: 2018-07-12

## 2018-07-12 ENCOUNTER — TELEPHONE (OUTPATIENT)
Dept: FAMILY MEDICINE | Facility: CLINIC | Age: 73
End: 2018-07-12

## 2018-07-12 NOTE — TELEPHONE ENCOUNTER
Reason for Call:  Form, our goal is to have forms completed with 72 hours, however, some forms may require a visit or additional information.    Type of letter, form or note:  [employer]    Who is the form from?: [employer] [employer] [facility] [employer] (if other please explain)    Where did the form come from: form was faxed in    What clinic location was the form placed at?: [facility]    Where the form was placed: 's Box        Call taken on 7/12/2018 at 3:16 PM by Leilani Jurado

## 2018-07-13 NOTE — TELEPHONE ENCOUNTER
Form received back signed  7/13/18  Faxed Back & Sent to be scanned to this encounter  Leilani [place] [facility]

## 2018-07-16 ENCOUNTER — TELEPHONE (OUTPATIENT)
Dept: FAMILY MEDICINE | Facility: CLINIC | Age: 73
End: 2018-07-16

## 2018-07-16 NOTE — TELEPHONE ENCOUNTER
Reason for Call:  Form, our goal is to have forms completed with 72 hours, however, some forms may require a visit or additional information.    Type of letter, form or note:  Plan of Care - 7/18/18-9/15/18    Who is the form from?: Home care    Where did the form come from: form was faxed in    What clinic location was the form placed at?: [place]    Where the form was placed: 's Box      Call taken on 7/16/2018 at 10:11 AM by Leilani Jurado

## 2018-07-18 ENCOUNTER — TELEPHONE (OUTPATIENT)
Dept: FAMILY MEDICINE | Facility: CLINIC | Age: 73
End: 2018-07-18

## 2018-07-18 ENCOUNTER — HOSPITAL ENCOUNTER (OUTPATIENT)
Dept: PHYSICAL THERAPY | Facility: CLINIC | Age: 73
Setting detail: THERAPIES SERIES
End: 2018-07-18
Attending: FAMILY MEDICINE
Payer: MEDICARE

## 2018-07-18 DIAGNOSIS — M48.061 SPINAL STENOSIS OF LUMBAR REGION WITHOUT NEUROGENIC CLAUDICATION: Primary | ICD-10-CM

## 2018-07-18 DIAGNOSIS — G89.29 CHRONIC MIDLINE LOW BACK PAIN WITHOUT SCIATICA: ICD-10-CM

## 2018-07-18 DIAGNOSIS — M54.50 CHRONIC MIDLINE LOW BACK PAIN WITHOUT SCIATICA: ICD-10-CM

## 2018-07-18 PROCEDURE — 97110 THERAPEUTIC EXERCISES: CPT | Mod: GP | Performed by: PHYSICAL THERAPIST

## 2018-07-18 PROCEDURE — 40000718 ZZHC STATISTIC PT DEPARTMENT ORTHO VISIT: Performed by: PHYSICAL THERAPIST

## 2018-07-18 PROCEDURE — 97535 SELF CARE MNGMENT TRAINING: CPT | Mod: GP | Performed by: PHYSICAL THERAPIST

## 2018-07-18 PROCEDURE — 97140 MANUAL THERAPY 1/> REGIONS: CPT | Mod: GP | Performed by: PHYSICAL THERAPIST

## 2018-07-18 PROCEDURE — G0179 MD RECERTIFICATION HHA PT: HCPCS | Performed by: FAMILY MEDICINE

## 2018-07-18 NOTE — TELEPHONE ENCOUNTER
----- Message from Ruby Bailey PT sent at 7/18/2018  1:21 PM CDT -----  Regarding: Patient requesting a Back Brace  I have been working with Concha in physical therapy for 12 visits.  We have made significant improvements with her back pain and strength, however today she reports that her back pain is started to come back.  She has been asking the last few sessions to get a back brace.  I don't normally recommend back braces and prefer core and back strengthening.  The patient reports that she feels unsteady and feels that a back brace will help with her steadiness.  We have been addressing her ambulation and balance at our sessions and I have noticed an improvement in ambulation distance, however the patient reports 2 near falls recently.  With the new recurrence of back pain and her report of 2 near falls at home this week, I feel that a back brace may be appropriate.  I do feel continued PT would also be beneficial.  If you agree, could you put in an order for a lumbar back brace?    Please contact me with any questions or concerns.    Thank you for your referral,     Ruby Bailey, PT, DPT, CLT  Physical Therapist & Certified Lymphedema Therapist  [facility] [place] [address]  [phone]

## 2018-07-19 NOTE — TELEPHONE ENCOUNTER
Form received back signed  7/19/18  Faxed Back & Sent to be scanned to this encounter  Leilani [facility]

## 2018-07-19 NOTE — TELEPHONE ENCOUNTER
BEN Bailey PT faxed order to [facility]  [phone]  [facility] [place] [facility]

## 2018-07-20 ENCOUNTER — OFFICE VISIT (OUTPATIENT)
Dept: FAMILY MEDICINE | Facility: CLINIC | Age: 73
End: 2018-07-20
Payer: COMMERCIAL

## 2018-07-20 ENCOUNTER — TELEPHONE (OUTPATIENT)
Dept: FAMILY MEDICINE | Facility: CLINIC | Age: 73
End: 2018-07-20

## 2018-07-20 VITALS
DIASTOLIC BLOOD PRESSURE: 72 MMHG | WEIGHT: 196 LBS | RESPIRATION RATE: 16 BRPM | OXYGEN SATURATION: 100 % | TEMPERATURE: 100 F | SYSTOLIC BLOOD PRESSURE: 130 MMHG | HEART RATE: 75 BPM | BODY MASS INDEX: 35.56 KG/M2

## 2018-07-20 DIAGNOSIS — M48.061 SPINAL STENOSIS OF LUMBAR REGION WITHOUT NEUROGENIC CLAUDICATION: ICD-10-CM

## 2018-07-20 DIAGNOSIS — E11.21 TYPE 2 DIABETES MELLITUS WITH DIABETIC NEPHROPATHY, WITH LONG-TERM CURRENT USE OF INSULIN (H): Chronic | ICD-10-CM

## 2018-07-20 DIAGNOSIS — N18.30 CKD (CHRONIC KIDNEY DISEASE) STAGE 3, GFR 30-59 ML/MIN (H): Chronic | ICD-10-CM

## 2018-07-20 DIAGNOSIS — R06.02 SOB (SHORTNESS OF BREATH): ICD-10-CM

## 2018-07-20 DIAGNOSIS — M79.89 LEG SWELLING: Primary | ICD-10-CM

## 2018-07-20 DIAGNOSIS — Z79.4 TYPE 2 DIABETES MELLITUS WITH DIABETIC NEPHROPATHY, WITH LONG-TERM CURRENT USE OF INSULIN (H): Chronic | ICD-10-CM

## 2018-07-20 DIAGNOSIS — I10 BENIGN ESSENTIAL HYPERTENSION: Chronic | ICD-10-CM

## 2018-07-20 DIAGNOSIS — E03.8 OTHER SPECIFIED HYPOTHYROIDISM: Chronic | ICD-10-CM

## 2018-07-20 LAB
ALBUMIN SERPL-MCNC: 2.8 G/DL (ref 3.4–5)
ALP SERPL-CCNC: 101 U/L (ref 40–150)
ALT SERPL W P-5'-P-CCNC: 19 U/L (ref 0–50)
ANION GAP SERPL CALCULATED.3IONS-SCNC: 11 MMOL/L (ref 3–14)
AST SERPL W P-5'-P-CCNC: 22 U/L (ref 0–45)
BILIRUB SERPL-MCNC: 0.5 MG/DL (ref 0.2–1.3)
BUN SERPL-MCNC: 20 MG/DL (ref 7–30)
CALCIUM SERPL-MCNC: 8.8 MG/DL (ref 8.5–10.1)
CHLORIDE SERPL-SCNC: 108 MMOL/L (ref 94–109)
CO2 SERPL-SCNC: 21 MMOL/L (ref 20–32)
CREAT SERPL-MCNC: 1.43 MG/DL (ref 0.52–1.04)
ERYTHROCYTE [DISTWIDTH] IN BLOOD BY AUTOMATED COUNT: 13.5 % (ref 10–15)
GFR SERPL CREATININE-BSD FRML MDRD: 36 ML/MIN/1.7M2
GLUCOSE SERPL-MCNC: 185 MG/DL (ref 70–99)
HBA1C MFR BLD: 8.5 % (ref 0–5.6)
HCT VFR BLD AUTO: 39.2 % (ref 35–47)
HGB BLD-MCNC: 13.8 G/DL (ref 11.7–15.7)
MCH RBC QN AUTO: 29.9 PG (ref 26.5–33)
MCHC RBC AUTO-ENTMCNC: 35.2 G/DL (ref 31.5–36.5)
MCV RBC AUTO: 85 FL (ref 78–100)
NT-PROBNP SERPL-MCNC: 137 PG/ML (ref 0–450)
PLATELET # BLD AUTO: 191 10E9/L (ref 150–450)
POTASSIUM SERPL-SCNC: 4.2 MMOL/L (ref 3.4–5.3)
PROT SERPL-MCNC: 6.5 G/DL (ref 6.8–8.8)
RBC # BLD AUTO: 4.61 10E12/L (ref 3.8–5.2)
SODIUM SERPL-SCNC: 140 MMOL/L (ref 133–144)
TSH SERPL DL<=0.005 MIU/L-ACNC: 5.32 MU/L (ref 0.4–4)
WBC # BLD AUTO: 5.5 10E9/L (ref 4–11)

## 2018-07-20 PROCEDURE — 84443 ASSAY THYROID STIM HORMONE: CPT | Performed by: FAMILY MEDICINE

## 2018-07-20 PROCEDURE — 99215 OFFICE O/P EST HI 40 MIN: CPT | Performed by: FAMILY MEDICINE

## 2018-07-20 PROCEDURE — 85027 COMPLETE CBC AUTOMATED: CPT | Performed by: FAMILY MEDICINE

## 2018-07-20 PROCEDURE — 36415 COLL VENOUS BLD VENIPUNCTURE: CPT | Performed by: FAMILY MEDICINE

## 2018-07-20 PROCEDURE — 83880 ASSAY OF NATRIURETIC PEPTIDE: CPT | Performed by: FAMILY MEDICINE

## 2018-07-20 PROCEDURE — 80053 COMPREHEN METABOLIC PANEL: CPT | Performed by: FAMILY MEDICINE

## 2018-07-20 PROCEDURE — 83036 HEMOGLOBIN GLYCOSYLATED A1C: CPT | Performed by: FAMILY MEDICINE

## 2018-07-20 NOTE — PATIENT INSTRUCTIONS
Continue the 20 unit(s) levimir  Go to 5 unit(s) Novalog for lunch for now    Increase the furosemide to 20 mg am, and 20 mg lunch    May need to see lymphedema clinic, I put in a referral    See you in 2 weeks to see how it is going

## 2018-07-20 NOTE — TELEPHONE ENCOUNTER
I will get more information and talk with Dr Morales's nurse.left message for patient to return call.  Alice Cavazos RN

## 2018-07-20 NOTE — TELEPHONE ENCOUNTER
Reason for Call: Request for an order or referral:    Order or referral being requested: Dry Pressure Mattress for Pt's Hospital Bed    Date needed: as soon as possible    Has the patient been seen by the PCP for this problem? YES        Phone number Patient can be reached at:  Home number [facility] file [facility]  [place]    Best Time:  Any Time      Can we leave a detailed message on this number?  YES    Call taken on 7/20/2018 at 3:58 PM by Leilani Jurado

## 2018-07-20 NOTE — NURSING NOTE
"Chief Complaint   Patient presents with     Leg Swelling     getting worse -PT noticed wed      Back Pain     unstable        Initial /72 (BP Location: Right arm)  Pulse 75  Temp 100  F (37.8  C) (Tympanic)  Resp 16  Wt 196 lb (88.9 kg)  SpO2 100%  BMI 35.56 kg/m2 Estimated body mass index is 35.56 kg/(m^2) as calculated from the following:    Height as of 3/23/18: 5' 2.25\" (1.581 m).    Weight as of this encounter: 196 lb (88.9 kg).      Health Maintenance that is potentially due pending provider review:  NONE    n/a    Is there anyone who you would like to be able to receive your results? No  If yes have patient fill out HELLEN    "

## 2018-07-20 NOTE — PROGRESS NOTES
SUBJECTIVE:   Stefanie Velazquez is a 73 year old female who presents to clinic today for the following health issues:      Sciatica issues - becoming unstable - PT not helping any more  Has almost fallen on her walker, she feels unsteady.     IMPRESSION:  1. Advanced degenerative disc disease throughout the lumbar spine,  greatest at L5-S1.  2. 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  3. Multilevel central stenosis, greatest at L1-L2 and L3-L4, but also  to a lesser degree at L2-L3 and L4-L5.  4. Multilevel foraminal stenosis, greatest bilaterally at L5-S1 as  well as on the right at L4-L5 and L3-L4.       Leg swelling  Getting worse - rt more then left. She is a little short of breath.   She has had her legs wrapped by the lymphedema clinic in the past.   She has missed some pills, probably the furosemide.   When she had her teeth removed.  She now has dentures and is being better about taking her pills.    Last echocardiogram was a year ago.     Interpretation Summary     The left ventricle is normal in size.  The visual ejection fraction is estimated at 55-60%.  No regional wall motion abnormalities noted.  The right ventricle is normal in size and function.  No significant valvular heart disease.    Hypothyroidism-on replacment  TSH   Date Value Ref Range Status   11/10/2017 4.13 (H) 0.40 - 4.00 mU/L Final      diabetes   Because she hasn't had teeth, she wasn't eating much.   Taking 20 unit(s) levemir at HS  Doing one 12 unit(s) shot of Novalog at noon which is usually her main meal  blood sugars have been very good, has had a few lows at night. She does have hypoglycemic awareness.       Lab Results   Component Value Date    A1C 7.3 03/20/2018    A1C 7.1 11/10/2017    A1C 7.9 07/10/2017    A1C 7.1 03/30/2017    A1C 8.2 10/01/2016       Problem list and histories reviewed & adjusted, as  indicated.  Additional history: as documented    BP Readings from Last 3 Encounters:   07/20/18 130/72   05/17/18 183/81   05/07/18 102/55    Wt Readings from Last 3 Encounters:   07/20/18 196 lb (88.9 kg)   05/07/18 195 lb (88.5 kg)   05/01/18 193 lb (87.5 kg)           Reviewed and updated as needed this visit by clinical staff  Tobacco  Allergies  Meds       Reviewed and updated as needed this visit by Provider         ROS:  CONSTITUTIONAL: NEGATIVE for fever, chills, change in weight  ENT/MOUTH: NEGATIVE for ear, mouth and throat problems  RESP: NEGATIVE for significant cough.  Does have some mild shortness of breath with activities.  CV: NEGATIVE for chest pain, palpitations or peripheral edema  GI: NEGATIVE for nausea, abdominal pain, heartburn, or change in bowel habits  : No dysuria, urinary frequency or urgency.     OBJECTIVE:                                                    /72 (BP Location: Right arm)  Pulse 75  Temp 100  F (37.8  C) (Tympanic)  Resp 16  Wt 196 lb (88.9 kg)  SpO2 100%  BMI 35.56 kg/m2  Body mass index is 35.56 kg/(m^2).  GENERAL:alert, well nourished, well hydrated, no distress  HENT: edentulous  NECK: no tenderness, no adenopathy, no JVD  RESP: lungs clear to auscultation - no rales, no rhonchi, no wheezes  CV: regular rates and rhythm, normal S1 S2, no S3 or S4 and no murmur, no click or rub -  ABDOMEN: soft, no tenderness, no  hepatosplenomegaly, no masses, normal bowel sounds  MS: +2 edema bilaterally, skin taut and shiny.         ASSESSMENT/PLAN:                                                      ASSESSMENT:  1. Leg swelling    2. Benign essential hypertension    3. Type 2 diabetes mellitus with diabetic nephropathy, with long-term current use of insulin (H)    4. SOB (shortness of breath)    5. Other specified hypothyroidism    6. Spinal stenosis of lumbar region without neurogenic claudication    7. CKD (chronic kidney disease) stage 3, GFR 30-59 ml/min         PLAN:  Orders Placed This Encounter     Comprehensive metabolic panel     BNP-N terminal pro     Hemoglobin A1c     CBC with platelets     TSH     LYMPHEDEMA THERAPY REFERRAL     order for DME     insulin detemir (LEVEMIR FLEXPEN/FLEXTOUCH) 100 UNIT/ML injection     insulin aspart (NOVOLOG FLEXPEN) 100 UNIT/ML injection     40 min spent with patient, greater than 50% in counseling and coordination of care.   We will check the A1c to decide her dosage of insulin.  Our A1c machine is down right now.  So for now will continue on the 20 units of Levemir and because she has had lows, will go to 5 units of NovoLog for lunch.  Not sure etiology of swelling.  It might all be lymphedema,  Or venous stasis.  We will increase the water pill and check a BNP.  Likely she will need to see the lymphedema clinic, and I put in the referral.  I like to see her back in a couple weeks to check on her.    Patient Instructions   Continue the 20 unit(s) levimir  Go to 5 unit(s) Novalog for lunch for now    Increase the furosemide to 20 mg am, and 20 mg lunch    May need to see lymphedema clinic, I put in a referral    See you in 2 weeks to see how it is going     Sandra Morales MD  [facility]

## 2018-07-20 NOTE — TELEPHONE ENCOUNTER
Stefanie mirza stating she was told by PT on Wednesday 07.18.18 that both her legs are swollen and should be seen. Also asking for a back brace.    Ilene Butler CSS

## 2018-07-20 NOTE — MR AVS SNAPSHOT
After Visit Summary   7/20/2018    Stefanie Velazquez    MRN: [id number]           Patient Information     Date Of Birth          1945        Visit Information        Provider Department      7/20/2018 3:20 PM Sandra Morales MD [facility]        Today's Diagnoses     Leg swelling    -  1    Benign essential hypertension        Type 2 diabetes mellitus with diabetic nephropathy, with long-term current use of insulin (H)        SOB (shortness of breath)        Other specified hypothyroidism        Spinal stenosis of lumbar region without neurogenic claudication        CKD (chronic kidney disease) stage 3, GFR 30-59 ml/min          Care Instructions    Continue the 20 unit(s) levimir  Go to 5 unit(s) Novalog for lunch for now    Increase the furosemide to 20 mg am, and 20 mg lunch    May need to see lymphedema clinic, I put in a referral    See you in 2 weeks to see how it is going          Follow-ups after your visit        Additional Services     LYMPHEDEMA THERAPY REFERRAL                 Your next 10 appointments already scheduled     Jul 25, 2018 10:15 AM CDT   Ortho Treatment with Ruby Bailey, PT   [facility])    [address] [place] [address]   [phone]            Aug 10, 2018  9:00 AM CDT   LAB with [facility]   [address] [address]   [phone]           Please do not eat 10-12 hours before your appointment if you are coming in fasting for labs on lipids, cholesterol, or glucose (sugar). This does not apply to pregnant women. Water, hot tea and black coffee (with nothing added) are okay. Do not drink other fluids, diet soda or chew gum.            Aug 14, 2018 11:30 AM CDT   Return Visit with Michelle Horn PA-C   [facility] ([facility])    [address]  [place]  [facility] [address]   [phone]            Sep 21, 2018 11:00 AM CDT   LAB with [facility]   [facility] ([facility])    [address] [facility] [address]   [phone]           Please do not eat 10-12 hours before your appointment if you are coming in fasting for labs on lipids, cholesterol, or glucose (sugar). This does not apply to pregnant women. Water, hot tea and black coffee (with nothing added) are okay. Do not drink other fluids, diet soda or chew gum.            Sep 28, 2018 11:00 AM CDT   Return Visit with Apolinar Martinez MD   [facility] ([facility])    [facility]  [address]  [facility] [address]   [phone]              Who to contact     If you have questions or need follow up information about today's clinic visit or your schedule please contact [facility] directly at [phone].  Normal or non-critical lab and imaging results will be communicated to you by [employer], letter or phone within 4 business days after the clinic has received the results. If you do not hear from us within 7 days, please contact the clinic through [employer] or phone. If you have a critical or abnormal lab result, we will notify you by phone as soon as possible.  Submit refill requests through [employer] or call your pharmacy and they will forward the refill request to us. Please allow 3 business days for your refill to be completed.          Additional Information About Your Visit        [employer] Information     [employer] gives you secure access to your electronic health record. If you see a primary care provider, you can also send messages to your care team and make appointments. If you have questions, please call your primary care clinic.  If you do not have a primary care provider, please call [phone] and they will assist you.        Care EveryWhere ID     This is your Care EveryWhere  ID. This could be used by other organizations to access your [place] medical records  [id number]        Your Vitals Were     Pulse Temperature Respirations Pulse Oximetry BMI (Body Mass Index)       75 100  F (37.8  C) (Tympanic) 16 100% 35.56 kg/m2        Blood Pressure from Last 3 Encounters:   07/20/18 130/72   05/17/18 183/81   05/07/18 102/55    Weight from Last 3 Encounters:   07/20/18 196 lb (88.9 kg)   05/07/18 195 lb (88.5 kg)   05/01/18 193 lb (87.5 kg)              We Performed the Following     BNP-N terminal pro     CBC with platelets     Comprehensive metabolic panel     Hemoglobin A1c     LYMPHEDEMA THERAPY REFERRAL     TSH          Today's Medication Changes          These changes are accurate as of 7/20/18  4:46 PM.  If you have any questions, ask your nurse or doctor.               Start taking these medicines.        Dose/Directions    order for DME   Used for:  Spinal stenosis of lumbar region without neurogenic claudication   Started by:  Sandra Morales MD        Equipment being ordered: Dry Pressure Mattress for hospital   Quantity:  1 Device   Refills:  0         These medicines have changed or have updated prescriptions.        Dose/Directions    insulin detemir 100 UNIT/ML injection   Commonly known as:  LEVEMIR FLEXPEN/FLEXTOUCH   This may have changed:  how much to take   Used for:  Type 2 diabetes mellitus with diabetic nephropathy, with long-term current use of insulin (H)   Changed by:  Sandra Morales MD        Dose:  20 Units   Inject 20 Units Subcutaneous At Bedtime Fill when needed   Quantity:  3 mL   Refills:  3       NovoLOG FLEXPEN 100 UNIT/ML injection   This may have changed:    - how much to take  - how to take this  - when to take this  - additional instructions   Used for:  Type 2 diabetes mellitus with diabetic nephropathy, with long-term current use of insulin (H)   Generic drug:  insulin aspart   Changed by:  Sandra Morales MD        5 units with lunch    Quantity:  3 mL   Refills:  11            Where to get your medicines      These medications were sent to [id number] - [place], [place] - [address] [facility]  [address] , [facility] [address]     Phone:  [phone]     insulin detemir 100 UNIT/ML injection         Some of these will need a paper prescription and others can be bought over the counter.  Ask your nurse if you have questions.     Bring a paper prescription for each of these medications     order for DME                Primary Care Provider Office Phone # Fax #    Sandra Morales [phone]       [address] [facility] [address]        Equal Access to Services     [facility]: Hadii aad ku hadasho Soomaali, waaxda luqadaha, qaybta kaalmada adeegyada, waxmandie alexandre hayaan livan rivera . So [facility] [phone].    ATENCIÓN: Si habla español, tiene a waite disposición servicios gratuitos de asistencia lingüística. Llame al [phone].    We comply with applicable federal civil rights laws and [place] laws. We do not discriminate on the basis of race, color, national origin, age, disability, sex, sexual orientation, or gender identity.            Thank you!     Thank you for choosing [facility]  for your care. Our goal is always to provide you with excellent care. Hearing back from our patients is one way we can continue to improve our services. Please take a few minutes to complete the written survey that you may receive in the mail after your visit with us. Thank you!             Your Updated Medication List - Protect others around you: Learn how to safely use, store and throw away your medicines at www.disposemymeds.org.          This list is accurate as of 7/20/18  4:46 PM.  Always use your most recent med list.                   Brand Name Dispense Instructions for use Diagnosis    ACCU-CHEK MILVIA Jeana     1 device    dispense one meter    Type II or unspecified type diabetes mellitus without  mention of complication, uncontrolled       acetaminophen 650 MG CR tablet    TYLENOL    60 tablet    Take 1 tablet (650 mg) by mouth 3 times daily    Bilateral cellulitis of lower leg       blood glucose monitoring lancets     1 Box    Test BG 4 times daily    Type II or unspecified type diabetes mellitus without mention of complication, uncontrolled       blood glucose monitoring test strip    ACCU-CHEK MILVIA    360 each    Use to test blood sugars 4 times daily or as directed.    Type 2 diabetes mellitus with diabetic nephropathy, with long-term current use of insulin (H)       BUTT PASTE - REGULAR    DR LOVE POOP GOOP BUTT PASTE FORMULA     Apply topically every hour as needed for skin protection        chlorhexidine 0.12 % solution    PERIDEX          DEPEND EASY FIT UNDERGARMENTS Misc     300 each    1 Units every 2 hours X-large    Bowel and bladder incontinence, Rectal cancer (H)       diclofenac 50 MG EC tablet    VOLTAREN    21 tablet    Take 1 tablet (50 mg) by mouth 3 times daily as needed for moderate pain    Acute left-sided low back pain with left-sided sciatica       fluocinonide 0.05 % ointment    LIDEX    60 g    Apply sparingly to affected area twice daily as needed.  Do not apply to face.    Venous stasis dermatitis of both lower extremities       fluticasone 50 MCG/ACT spray    FLONASE     1 spray        gabapentin 300 MG capsule    NEURONTIN    60 capsule    Take 1 tablet (300 mg) at bedtime, after 4 days may go to 2 if needed    Lumbar radiculopathy       GLUCERNA OS Liqd     24 each    One can two to three times a day    Poor dentition, Poor nutrition, Type 2 diabetes mellitus with diabetic nephropathy, with long-term current use of insulin (H), Rectal cancer (H), Chronic diarrhea, Radiation therapy complication, sequela       hypromellose 0.3 % Soln ophthalmic solution    GENTEAL     2 drops        insulin detemir 100 UNIT/ML injection    LEVEMIR FLEXPEN/FLEXTOUCH    3 mL    Inject 20 Units  Subcutaneous At Bedtime Fill when needed    Type 2 diabetes mellitus with diabetic nephropathy, with long-term current use of insulin (H)       insulin pen needle 32G X 4 MM    BD GABRIELLA U/F    120 each    Use 4 times per day or as directed.    Type 2 diabetes mellitus with diabetic nephropathy, with long-term current use of insulin (H)       LASIX 20 MG tablet   Generic drug:  furosemide     90 tablet    Take 1 tablet (20 mg) by mouth daily    Benign essential hypertension       levothyroxine 88 MCG tablet    SYNTHROID/LEVOTHROID    90 tablet    Take 1 tablet (88 mcg) by mouth daily        loperamide 2 MG capsule    IMODIUM    90 capsule    One capsule once a day, can use a second one if needed    Rectal cancer (H), Chronic diarrhea       losartan 100 MG tablet    COZAAR    90 tablet    Take 1 tablet (100 mg) by mouth daily    Benign essential hypertension       menthol-zinc oxide 0.44-20.625 % Oint ointment    CALMOSEPTINE     Apply topically 4 times daily as needed for skin protection    Rash and nonspecific skin eruption       metoprolol succinate 50 MG 24 hr tablet    TOPROL-XL    90 tablet    Take 1 tablet (50 mg) by mouth daily    Benign essential hypertension       mineral oil-hydrophilic petrolatum      Apply topically as needed        NovoLOG FLEXPEN 100 UNIT/ML injection   Generic drug:  insulin aspart     3 mL    5 units with lunch    Type 2 diabetes mellitus with diabetic nephropathy, with long-term current use of insulin (H)       OMEPRAZOLE PO      Take 20 mg by mouth daily as needed        order for DME     1 Device    Equipment being ordered: back brace    Spinal stenosis of lumbar region without neurogenic claudication, Chronic midline low back pain without sciatica       order for DME     1 Device    Equipment being ordered: Dry Pressure Mattress for [facility]    Spinal stenosis of lumbar region without neurogenic claudication       oxyCODONE IR 5 MG tablet    ROXICODONE    20 tablet    Take 1 tablet  (5 mg) by mouth every 6 hours as needed for severe pain maximum 6 tablet(s) per day    Lumbar radiculopathy       Potassium Gluconate 595 MG Caps      Take 1 tablet by mouth daily    Stasis edema of both lower extremities       rOPINIRole 1 MG tablet    REQUIP    270 tablet    TAKE ONE TABLET BY MOUTH THREE TIMES DAILY    RLS (restless legs syndrome)       simvastatin 40 MG tablet    ZOCOR    90 tablet    Take 1 tablet (40 mg) by mouth daily        syringe (disposable) 1 ML Misc     1 each    1 each every 30 days With 1 inch needle for Vit B12 injection    Vitamin B 12 deficiency       triamcinolone 0.1 % cream    KENALOG    80 g    Apply sparingly to affected area two times daily as needed    Stasis dermatitis of both legs

## 2018-07-23 RX ORDER — LEVOTHYROXINE SODIUM 100 UG/1
100 TABLET ORAL DAILY
Qty: 90 TABLET | Refills: 0 | Status: SHIPPED | OUTPATIENT
Start: 2018-07-23 | End: 2018-09-12

## 2018-07-25 ENCOUNTER — HOSPITAL ENCOUNTER (OUTPATIENT)
Dept: PHYSICAL THERAPY | Facility: CLINIC | Age: 73
Setting detail: THERAPIES SERIES
End: 2018-07-25
Attending: FAMILY MEDICINE
Payer: MEDICARE

## 2018-07-25 ENCOUNTER — TELEPHONE (OUTPATIENT)
Dept: FAMILY MEDICINE | Facility: CLINIC | Age: 73
End: 2018-07-25

## 2018-07-25 PROCEDURE — 97161 PT EVAL LOW COMPLEX 20 MIN: CPT | Mod: GP | Performed by: PHYSICAL THERAPIST

## 2018-07-25 PROCEDURE — G8988 SELF CARE GOAL STATUS: HCPCS | Mod: GP,CI | Performed by: PHYSICAL THERAPIST

## 2018-07-25 PROCEDURE — G8987 SELF CARE CURRENT STATUS: HCPCS | Mod: GP,CJ | Performed by: PHYSICAL THERAPIST

## 2018-07-25 PROCEDURE — 40000449 ZZHC STATISTIC PT VISIT, LYMPHEDEMA: Performed by: PHYSICAL THERAPIST

## 2018-07-25 PROCEDURE — 97140 MANUAL THERAPY 1/> REGIONS: CPT | Mod: GP | Performed by: PHYSICAL THERAPIST

## 2018-07-25 NOTE — PROGRESS NOTES
Lymphedema Physical Therapy Evaluation  07/25/18 1000   Quick Adds   Quick Adds Certification   Rehab Discipline   Discipline PT   Type of Visit   Type of visit Initial Edema Evaluation       present No   General Information   Start of care 07/25/18   Referring physician Dr. Morales   Orders Evaluate and treat as indicated   Order date 07/20/18   Medical diagnosis Leg swelling   Onset of illness / date of surgery 06/25/18   Edema onset 06/25/18   Affected body parts LLE;RLE   Edema etiology Radiation;Surgery;Insidious   Location - Radiation pelvic radiation due to colon CA prior to resection   Edema etiology comments edema since radiation and surgery for colon CA. Multiple hospitalizations significant debilitation   Pertinent history of current problem (PT: include personal factors and/or comorbidities that impact the POC; OT: include additional occupational profile info) Reports that she has been wearing compression stockings everyday.  Normally doesn't wear compression stockings at night, however her legs don't normally swell at night.  They started to swell at night and then she couldn't get her compression stockings on and her legs started to swell more.   Surgical / medical history reviewed Yes   Edema special tests Other  (no hx of DVTs)   Prior level of functional mobility 4ww and scooter   Prior treatment Complete decongestive therapy;Compression garments;Exercise;Elevation;Diuretics;Gradient compression bandaging   Community support Family / friend caregiver;Home health aid;Transportation service;Housekeeping service;Emergency call system;Other   Patient role / employment history Retired   Living environment Apartment / condo   Living environment comments lives close to [facility]. Uses mobility scooter to go to [facility] and carry her purchases home.   Current assistive devices Front wheeled walker;4 wheeled walker   Assistive device comments power scooter to get to store.   Fall Risk  Screen   Fall screen completed by PT   Have you fallen 2 or more times in the past year? No   Have you fallen and had an injury in the past year? No   Is patient a fall risk? Yes;Department fall risk interventions implemented   Fall screen comments Falls risk due to fear of falling and needing an assistive device to transfer from scooter to table   Subjective Report   Patient report of symptoms tightness, heaviness   Patient / Family Goals   Patient / family goals statement decrease edema to improve walking   Pain   Patient currently in pain No   Cognitive Status   Orientation Orientation to person, place and time   Level of consciousness Alert   Follows commands and answers questions 100% of the time   Personal safety and judgement Intact   Memory Intact   Edema Exam / Assessment   Skin condition Pitting;Intact   Pitting 2+   Pitting location R LE > L LE foot to mid shin B   Capillary refill Symmetrical   Dorsal pedal pulse Symmetrical   Stemmer sign Negative   Ulceration No   Girth Measurements   Girth Measurements Refer to separate girth measurement flowsheet   Volume LE   Right LE (mL) 2878.1   Left LE (mL) 2455.3   LE volume comparison RLE volume greater than LLE volume   % difference 14.7%   Range of Motion   ROM No deficits were identified   Strength   Strength comments generalized deconditioning   Posture   Posture Forward head position;Protracted shoulders   Palpation   Palpation mild tenderness bilateral dorsum of feet   Sensory   Sensory perception comments neuropathy   Vascular Assessment   Vascular Assessment Vascular concerns   Coordination   Coordination Gross motor coordination appropriate   Muscle Tone   Muscle tone No deficits were identified   Planned Edema Interventions   Planned edema interventions Manual lymph drainage;Gradient compression bandaging;Fit for compression garment;Exercises;Precautions to prevent infection / exacerbation;Education;Skin care / precautions;Home management program  development   Clinical Impression   Criteria for skilled therapeutic intervention met Yes   Therapy diagnosis B LE Lymphedema   Influenced by the following impairments / conditions Edema;Stage 2   Functional limitations due to impairments / conditions difficulty with ambulation and proper fit of clothing   Clinical Presentation  Clinical Presentation Rationale  Clinical Decision Making  Treatment frequency Stable/Uncomplicated  DM2 and LBP  Low Complexity  3 times / week   Treatment duration 8 weeks   Patient / family and/or staff in agreement with plan of care Yes   Risks and benefits of therapy have been explained Yes   Education Assessment   Preferred learning style Listening;Demonstration;Pictures / video   Barriers to learning Hearing   Goals   Edema Eval Goals 1;2;3   Goal 1   Goal identifier stg   Goal description pt to have around the clock tolerance to BLE comperm/GCB for edema reduction response   Target date 08/15/18   Goal 2   Goal identifier ltg   Goal description once appropriate, pt to be independent with donning, doffing and care of compression stocking/garment for longterm edema management for maintenance   Target date 09/19/18   Goal 3   Goal identifier ltg   Goal description pt to be independent with longterm RLE lymphedema management via HEP, elevation, compression garment wear and MLD (when/if appropriate)   Target date 09/19/18   Total Evaluation Time   Total evaluation time 30   Certification   Certification date from 07/25/18   Certification date to 09/19/18   Medical Diagnosis Leg Swelling   Certification I certify the need for these services furnished under this plan of treatment and while under my care.  (Physician co-signature of this document indicates review and certification of the therapy plan).    Please contact me with any questions or concerns.    Thank you for your referral,     Ruby Bailey, PT, DPT, CLT  Physical Therapist & Certified Lymphedema Therapist  [facility]  Rehabilitation Services - [address] [id number] [phone]

## 2018-07-25 NOTE — PROGRESS NOTES
[facility]        OUTPATIENT PHYSICAL THERAPY EDEMA EVALUATION  PLAN OF TREATMENT FOR OUTPATIENT REHABILITATION  (COMPLETE FOR INITIAL CLAIMS ONLY)  Patient's Last Name, First Name, Stefanie Freeman                           Provider s Name:   [facility] Medical Record No.  [id number]     Start of Care Date:  07/25/18   Onset Date:  06/25/18   Type:  PT   Medical Diagnosis:  Leg Swelling   Therapy Diagnosis:  B LE Lymphedema Visits from SOC:  1                                     __________________________________________________________________________________   Plan of Treatment/Functional Goals:    Manual lymph drainage, Gradient compression bandaging, Fit for compression garment, Exercises, Precautions to prevent infection / exacerbation, Education, Skin care / precautions, Home management program development        GOALS  1. Goal description: pt to have around the clock tolerance to BLE comperm/GCB for edema reduction response       Target date: 08/15/18  2. Goal description: once appropriate, pt to be independent with donning, doffing and care of compression stocking/garment for longterm edema management for maintenance       Target date: 09/19/18  3. Goal description: pt to be independent with longterm RLE lymphedema management via HEP, elevation, compression garment wear and MLD (when/if appropriate)       Target date: 09/19/18    Treatment frequency: 3 times / week   Treatment duration: 8 weeks    Ruby aBiley, PT                                    I CERTIFY THE NEED FOR THESE SERVICES FURNISHED UNDER        THIS PLAN OF TREATMENT AND WHILE UNDER MY CARE     (Physician co-signature of this document indicates review and certification of the therapy plan).                   Certification date from: 07/25/18       Certification date to: 09/19/18            Referring physician: Dr. Morales   Initial Assessment  See Epic Evaluation- Start of care: 07/25/18

## 2018-07-25 NOTE — TELEPHONE ENCOUNTER
Reason for Call:  Form, our goal is to have forms completed with 72 hours, however, some forms may require a visit or additional information.    Type of letter, form or note:  [facility]    Who is the form from?: [facility] (if other please explain)    Where did the form come from: form was faxed in    What clinic location was the form placed at?: [facility]    Where the form was placed: [facility]        Call taken on 7/25/2018 at 10:43 AM by Leilani Jurado

## 2018-07-26 ENCOUNTER — TELEPHONE (OUTPATIENT)
Dept: FAMILY MEDICINE | Facility: CLINIC | Age: 73
End: 2018-07-26

## 2018-07-26 DIAGNOSIS — I89.0 LYMPHEDEMA OF BOTH LOWER EXTREMITIES: ICD-10-CM

## 2018-07-26 DIAGNOSIS — G62.9 NEUROPATHY: Primary | Chronic | ICD-10-CM

## 2018-07-26 DIAGNOSIS — G25.81 RESTLESS LEG SYNDROME: ICD-10-CM

## 2018-07-26 NOTE — TELEPHONE ENCOUNTER
[facility] asking for Dr Morales to change an order.  The order needs to be Physical Therapy with the special program of Lymphedema for insurance to cover.

## 2018-07-30 NOTE — TELEPHONE ENCOUNTER
Form received back signed  8/26/18  Faxed Back & Sent to be scanned to this encounter  Leilani [place] Sec

## 2018-07-31 ENCOUNTER — HOSPITAL ENCOUNTER (OUTPATIENT)
Dept: PHYSICAL THERAPY | Facility: CLINIC | Age: 73
Setting detail: THERAPIES SERIES
End: 2018-07-31
Attending: FAMILY MEDICINE
Payer: MEDICARE

## 2018-07-31 PROCEDURE — 40000718 ZZHC STATISTIC PT DEPARTMENT ORTHO VISIT: Performed by: PHYSICAL THERAPIST

## 2018-07-31 PROCEDURE — 97140 MANUAL THERAPY 1/> REGIONS: CPT | Mod: GP | Performed by: PHYSICAL THERAPIST

## 2018-08-02 ENCOUNTER — HOSPITAL ENCOUNTER (OUTPATIENT)
Dept: PHYSICAL THERAPY | Facility: CLINIC | Age: 73
Setting detail: THERAPIES SERIES
End: 2018-08-02
Attending: FAMILY MEDICINE
Payer: MEDICARE

## 2018-08-02 PROCEDURE — 97140 MANUAL THERAPY 1/> REGIONS: CPT | Mod: GP | Performed by: PHYSICAL THERAPIST

## 2018-08-02 PROCEDURE — 40000449 ZZHC STATISTIC PT VISIT, LYMPHEDEMA: Performed by: PHYSICAL THERAPIST

## 2018-08-03 ENCOUNTER — TELEPHONE (OUTPATIENT)
Dept: FAMILY MEDICINE | Facility: CLINIC | Age: 73
End: 2018-08-03

## 2018-08-03 NOTE — TELEPHONE ENCOUNTER
Calling stating she had to cancel appointment today because ride issues. First 40 min appointment is 08.21.2018 please advise and call Stefanie with date and time states needs to be seen for spine issues    Ilene Butler CSS

## 2018-08-06 ENCOUNTER — HOSPITAL ENCOUNTER (OUTPATIENT)
Dept: PHYSICAL THERAPY | Facility: CLINIC | Age: 73
Setting detail: THERAPIES SERIES
End: 2018-08-06
Attending: FAMILY MEDICINE
Payer: MEDICARE

## 2018-08-06 PROCEDURE — 40000449 ZZHC STATISTIC PT VISIT, LYMPHEDEMA: Performed by: PHYSICAL THERAPIST

## 2018-08-06 PROCEDURE — 97140 MANUAL THERAPY 1/> REGIONS: CPT | Mod: GP | Performed by: PHYSICAL THERAPIST

## 2018-08-07 ENCOUNTER — OFFICE VISIT (OUTPATIENT)
Dept: FAMILY MEDICINE | Facility: CLINIC | Age: 73
End: 2018-08-07
Payer: COMMERCIAL

## 2018-08-07 VITALS
SYSTOLIC BLOOD PRESSURE: 139 MMHG | DIASTOLIC BLOOD PRESSURE: 72 MMHG | HEART RATE: 76 BPM | RESPIRATION RATE: 20 BRPM | TEMPERATURE: 98.8 F | WEIGHT: 195 LBS | BODY MASS INDEX: 35.38 KG/M2

## 2018-08-07 DIAGNOSIS — G25.81 RESTLESS LEG SYNDROME: ICD-10-CM

## 2018-08-07 DIAGNOSIS — I89.0 LYMPHEDEMA OF GENITALIA: ICD-10-CM

## 2018-08-07 DIAGNOSIS — E11.21 TYPE 2 DIABETES MELLITUS WITH DIABETIC NEPHROPATHY, WITH LONG-TERM CURRENT USE OF INSULIN (H): Primary | Chronic | ICD-10-CM

## 2018-08-07 DIAGNOSIS — G47.33 OSA (OBSTRUCTIVE SLEEP APNEA): ICD-10-CM

## 2018-08-07 DIAGNOSIS — R53.83 FATIGUE, UNSPECIFIED TYPE: ICD-10-CM

## 2018-08-07 DIAGNOSIS — Z79.4 TYPE 2 DIABETES MELLITUS WITH DIABETIC NEPHROPATHY, WITH LONG-TERM CURRENT USE OF INSULIN (H): Primary | Chronic | ICD-10-CM

## 2018-08-07 DIAGNOSIS — R06.02 SOB (SHORTNESS OF BREATH): ICD-10-CM

## 2018-08-07 DIAGNOSIS — I10 BENIGN ESSENTIAL HYPERTENSION: Chronic | ICD-10-CM

## 2018-08-07 DIAGNOSIS — I89.0 LYMPHEDEMA OF BOTH LOWER EXTREMITIES: ICD-10-CM

## 2018-08-07 DIAGNOSIS — M51.369 DDD (DEGENERATIVE DISC DISEASE), LUMBAR: ICD-10-CM

## 2018-08-07 DIAGNOSIS — H91.93 BILATERAL HEARING LOSS, UNSPECIFIED HEARING LOSS TYPE: ICD-10-CM

## 2018-08-07 DIAGNOSIS — M48.061 SPINAL STENOSIS OF LUMBAR REGION WITHOUT NEUROGENIC CLAUDICATION: ICD-10-CM

## 2018-08-07 PROCEDURE — 99215 OFFICE O/P EST HI 40 MIN: CPT | Performed by: FAMILY MEDICINE

## 2018-08-07 NOTE — MR AVS SNAPSHOT
After Visit Summary   8/7/2018    Stefanie Velazquez    MRN: [id number]           Patient Information     Date Of Birth          1945        Visit Information        Provider Department      8/7/2018 2:40 PM Sandra Morales MD [facility]        Today's Diagnoses     Fatigue, unspecified type    -  1    Lymphedema of both lower extremities        DDD (degenerative disc disease), lumbar        Spinal stenosis of lumbar region without neurogenic claudication        Restless leg syndrome        Type 2 diabetes mellitus with diabetic nephropathy, with long-term current use of insulin (H)        Benign essential hypertension        RC (obstructive sleep apnea)          Care Instructions    See the sleep doctor    See you in a few weeks               Follow-ups after your visit        Additional Services     SLEEP EVALUATION & MANAGEMENT REFERRAL - ADULT -[facility]  [phone] (Age 2 and up)       Please be aware that coverage of these services is subject to the terms and limitations of your health insurance plan.  Call member services at your health plan with any benefit or coverage questions.      Please bring the following to your appointment:    >>   List of current medications   >>   This referral request   >>   Any documents/labs given to you for this referral                      Your next 10 appointments already scheduled     Aug 08, 2018  1:45 PM CDT   [facility]hedema [facility]   [facility]    [address] [place] [address]   [phone]            Aug 10, 2018  9:00 AM CDT   LAB with [facility]   [facility]    [address] [facility] [address]   [phone]           Please do not eat 10-12 hours before your appointment if you are coming in fasting for labs on lipids, cholesterol, or glucose (sugar). This does not  apply to pregnant women. Water, hot tea and black coffee (with nothing added) are okay. Do not drink other fluids, diet soda or chew gum.            Aug 13, 2018 10:45 AM CDT   [facility]ema [facility], [facility]   [facility]    [address] [facility] [address]   [phone]            Aug 14, 2018 11:30 AM CDT   Return Visit with Michelle Horn PA-C   [facility] ([facility])    [address] [place]  [facility] [address]   [phone]            Aug 16, 2018  9:45 AM CDT   Lymphedema Treatment with Ruby Bailey, PT   [facility]    [address] [place] [address]   [phone]            Aug 21, 2018  2:40 PM CDT   SHORT with Sandra Morales MD   [facility]    [address] [place] [address] [phone]            Sep 21, 2018 11:00 AM CDT   LAB with PI LAB   [facility]    [address] [place] [id number] [phone]           Please do not eat 10-12 hours before your appointment if you are coming in fasting for labs on lipids, cholesterol, or glucose (sugar). This does not apply to pregnant women. Water, hot tea and black coffee (with nothing added) are okay. Do not drink other fluids, diet soda or chew gum.            Sep 28, 2018 11:00 AM CDT   Return Visit with Apolinar Martinez MD   [facility] ([facility])    [facility]  [address]  [facility] [address]   [phone]              Future tests that were ordered for you today     Open Future Orders        Priority Expected Expires Ordered    SLEEP EVALUATION & MANAGEMENT REFERRAL - ADULT -[facility]  [phone] (Age 2 and up) Routine  8/7/2019 8/7/2018             Who to contact     If you have questions or need follow up information about today's clinic visit or your schedule please contact [facility] directly at [phone].  Normal or non-critical lab and imaging results will be communicated to you by MyChart, letter or phone within 4 business days after the clinic has received the results. If you do not hear from us within 7 days, please contact the clinic through MyChart or phone. If you have a critical or abnormal lab result, we will notify you by phone as soon as possible.  Submit refill requests through [employer] or call your pharmacy and they will forward the refill request to us. Please allow 3 business days for your refill to be completed.          Additional Information About Your Visit        [employer] Information     [employer] gives you secure access to your electronic health record. If you see a primary care provider, you can also send messages to your care team and make appointments. If you have questions, please call your primary care clinic.  If you do not have a primary care provider, please call [phone] and they will assist you.        Care EveryWhere ID     This is your Care EveryWhere ID. This could be used by other organizations to access your [facility] medical records  [id number]        Your Vitals Were     Pulse Temperature Respirations BMI (Body Mass Index)          76 98.8  F (37.1  C) (Tympanic) 20 35.38 kg/m2         Blood Pressure from Last 3 Encounters:   08/07/18 139/72   07/20/18 130/72   05/17/18 183/81    Weight from Last 3 Encounters:   08/07/18 195 lb (88.5 kg)   07/20/18 196 lb (88.9 kg)   05/07/18 195 lb (88.5 kg)               Primary Care Provider Office Phone # Fax #    Sandra Morales [phone] [address] [place] [address]        Equal Access to Services     NIDA NUNN : Cristine Perry, fernando burgess, qaybta ceci roberts  megan rivera ah. [facility] [phone].    ATENCIÓN: Si balwinder kelly, tiene a waite disposición servicios gratuitos de asistencia lingüística. Kosta cooper [phone].    We comply with applicable federal civil rights laws and [place] laws. We do not discriminate on the basis of race, color, national origin, age, disability, sex, sexual orientation, or gender identity.            Thank you!     Thank you for choosing [facility]  for your care. Our goal is always to provide you with excellent care. Hearing back from our patients is one way we can continue to improve our services. Please take a few minutes to complete the written survey that you may receive in the mail after your visit with us. Thank you!             Your Updated Medication List - Protect others around you: Learn how to safely use, store and throw away your medicines at www.disposemymeds.org.          This list is accurate as of 8/7/18  4:23 PM.  Always use your most recent med list.                   Brand Name Dispense Instructions for use Diagnosis    ACCU-CHEK MILVIA Jeana     1 device    dispense one meter    Type II or unspecified type diabetes mellitus without mention of complication, uncontrolled       acetaminophen 650 MG CR tablet    TYLENOL    60 tablet    Take 1 tablet (650 mg) by mouth 3 times daily    Bilateral cellulitis of lower leg       blood glucose monitoring lancets     1 Box    Test BG 4 times daily    Type II or unspecified type diabetes mellitus without mention of complication, uncontrolled       blood glucose monitoring test strip    ACCU-CHEK MILVIA    360 each    Use to test blood sugars 4 times daily or as directed.    Type 2 diabetes mellitus with diabetic nephropathy, with long-term current use of insulin (H)       BUTT PASTE - REGULAR    DR LOVE POOP GOOP BUTT PASTE FORMULA     Apply topically every hour as needed for skin protection        chlorhexidine 0.12 % solution    PERIDEX          DEPEND EASY FIT  UNDERGARMENTS Misc     300 each    1 Units every 2 hours X-large    Bowel and bladder incontinence, Rectal cancer (H)       diclofenac 50 MG EC tablet    VOLTAREN    21 tablet    Take 1 tablet (50 mg) by mouth 3 times daily as needed for moderate pain    Acute left-sided low back pain with left-sided sciatica       fluocinonide 0.05 % ointment    LIDEX    60 g    Apply sparingly to affected area twice daily as needed.  Do not apply to face.    Venous stasis dermatitis of both lower extremities       fluticasone 50 MCG/ACT spray    FLONASE     1 spray        gabapentin 300 MG capsule    NEURONTIN    60 capsule    Take 1 tablet (300 mg) at bedtime, after 4 days may go to 2 if needed    Lumbar radiculopathy       GLUCERNA OS Liqd     24 each    One can two to three times a day    Poor dentition, Poor nutrition, Type 2 diabetes mellitus with diabetic nephropathy, with long-term current use of insulin (H), Rectal cancer (H), Chronic diarrhea, Radiation therapy complication, sequela       hypromellose 0.3 % Soln ophthalmic solution    GENTEAL     2 drops        insulin detemir 100 UNIT/ML injection    LEVEMIR FLEXPEN/FLEXTOUCH    3 mL    Inject 20 Units Subcutaneous At Bedtime Fill when needed    Type 2 diabetes mellitus with diabetic nephropathy, with long-term current use of insulin (H)       insulin pen needle 32G X 4 MM    BD GABRIELLA U/F    120 each    Use 4 times per day or as directed.    Type 2 diabetes mellitus with diabetic nephropathy, with long-term current use of insulin (H)       LASIX 20 MG tablet   Generic drug:  furosemide     90 tablet    Take 1 tablet (20 mg) by mouth daily    Benign essential hypertension       levothyroxine 100 MCG tablet    SYNTHROID/LEVOTHROID    90 tablet    Take 1 tablet (100 mcg) by mouth daily    Other specified hypothyroidism       loperamide 2 MG capsule    IMODIUM    90 capsule    One capsule once a day, can use a second one if needed    Rectal cancer (H), Chronic diarrhea        losartan 100 MG tablet    COZAAR    90 tablet    Take 1 tablet (100 mg) by mouth daily    Benign essential hypertension       menthol-zinc oxide 0.44-20.625 % Oint ointment    CALMOSEPTINE     Apply topically 4 times daily as needed for skin protection    Rash and nonspecific skin eruption       metoprolol succinate 50 MG 24 hr tablet    TOPROL-XL    90 tablet    Take 1 tablet (50 mg) by mouth daily    Benign essential hypertension       mineral oil-hydrophilic petrolatum      Apply topically as needed        NovoLOG FLEXPEN 100 UNIT/ML injection   Generic drug:  insulin aspart     3 mL    5 units with lunch    Type 2 diabetes mellitus with diabetic nephropathy, with long-term current use of insulin (H)       OMEPRAZOLE PO      Take 20 mg by mouth daily as needed        order for DME     1 Device    Equipment being ordered: back brace    Spinal stenosis of lumbar region without neurogenic claudication, Chronic midline low back pain without sciatica       order for DME     1 Device    Equipment being ordered: Dry Pressure [facility] for hospitals    Spinal stenosis of lumbar region without neurogenic claudication       oxyCODONE IR 5 MG tablet    ROXICODONE    20 tablet    Take 1 tablet (5 mg) by mouth every 6 hours as needed for severe pain maximum 6 tablet(s) per day    Lumbar radiculopathy       Potassium Gluconate 595 MG Caps      Take 1 tablet by mouth daily    Stasis edema of both lower extremities       rOPINIRole 1 MG tablet    REQUIP    270 tablet    TAKE ONE TABLET BY MOUTH THREE TIMES DAILY    RLS (restless legs syndrome)       simvastatin 40 MG tablet    ZOCOR    90 tablet    Take 1 tablet (40 mg) by mouth daily        syringe (disposable) 1 ML Misc     1 each    1 each every 30 days With 1 inch needle for Vit B12 injection    Vitamin B 12 deficiency       triamcinolone 0.1 % cream    KENALOG    80 g    Apply sparingly to affected area two times daily as needed    Stasis dermatitis of both legs

## 2018-08-07 NOTE — NURSING NOTE
"Chief Complaint   Patient presents with     Edema     leg swelling recheck       Initial /72  Pulse 76  Temp 98.8  F (37.1  C) (Tympanic)  Resp 20  Wt 195 lb (88.5 kg)  BMI 35.38 kg/m2 Estimated body mass index is 35.38 kg/(m^2) as calculated from the following:    Height as of 3/23/18: 5' 2.25\" (1.581 m).    Weight as of this encounter: 195 lb (88.5 kg).      Health Maintenance that is potentially due pending provider review:  NONE    n/a    Is there anyone who you would like to be able to receive your results? No  If yes have patient fill out HELLEN    "

## 2018-08-07 NOTE — PROGRESS NOTES
SUBJECTIVE:   Stefanie Velazquez is a 73 year old female who presents to clinic today for the following health issues:      Medication Followup of leg swelling    Taking Medication as prescribed: yes    Side Effects:  None    Medication Helping Symptoms:  yes     BNP was not elevated, I had her increase the furosemide to 20 mg am and lunch. That has helped.   She went to lymphedema clinic.  Her left leg is 3 cm smaller and right leg 5 cm smaller when measured on Friday 4 days ago. They are making great progress.     Diabetes-blood sugars still a little high  Checks in am fasting, at lunch or dinner, before or after    Lab Results   Component Value Date    A1C 8.5 07/20/2018    A1C 7.3 03/20/2018    A1C 7.1 11/10/2017    A1C 7.9 07/10/2017    A1C 7.1 03/30/2017     Wt Readings from Last 5 Encounters:   08/07/18 195 lb (88.5 kg)   07/20/18 196 lb (88.9 kg)   05/07/18 195 lb (88.5 kg)   05/01/18 193 lb (87.5 kg)   04/23/18 193 lb (87.5 kg)         Hypertension Follow-up      Outpatient blood pressures are being checked at home.  Results are good.    Low Salt Diet: low salt  BP Readings from Last 6 Encounters:   08/07/18 139/72   07/20/18 130/72   05/17/18 183/81   05/07/18 102/55   05/01/18 138/78   04/23/18 122/80      Can have some shortness of breath or light headedness with making the bed or activities. Is followed by cardiology.     Low back pain-they came out to measure for her back brace, but it hasn't come in yet.   Is hard for her to sit.   She has seen Dr Samantha Camara, is using a scooter or walker. The injection did help that he did. She had it in May.  Is taking tylenol, rarely oxycodone.    Is very fatigued. Sleeps poorly at night, is up a lot, sometimes doesn't get back to sleep right away.   She was using a CPAP but stopped it when she was in to the hospital for her cancer.   Her oxygen levels were checked at the hospital or [facility] at some point and it was ok per patient.     Problem list and histories  reviewed & adjusted, as indicated.  Additional history: as documented    BP Readings from Last 3 Encounters:   08/07/18 139/72   07/20/18 130/72   05/17/18 183/81    Wt Readings from Last 3 Encounters:   08/07/18 195 lb (88.5 kg)   07/20/18 196 lb (88.9 kg)   05/07/18 195 lb (88.5 kg)                    Reviewed and updated as needed this visit by clinical staff  Tobacco  Allergies  Meds       Reviewed and updated as needed this visit by Provider         ROS:  CONSTITUTIONAL: NEGATIVE for fever, chills, change in weight  ENT/MOUTH: NEGATIVE for ear, mouth and throat problems  RESP: mild shortness of breath as above  CV: NEGATIVE for chest pain, palpitations or peripheral edema  GI: NEGATIVE for nausea, abdominal pain, heartburn, or change in bowel habits  : No dysuria, urinary frequency or urgency.     OBJECTIVE:                                                    /72  Pulse 76  Temp 98.8  F (37.1  C) (Tympanic)  Resp 20  Wt 195 lb (88.5 kg)  BMI 35.38 kg/m2  Body mass index is 35.38 kg/(m^2).  GENERAL: healthy, alert, well nourished, well hydrated, no distress  Obvious hard of hearing  NECK: no tenderness, no adenopathy, no asymmetry, no masses, no stiffness; thyroid- normal to palpation  RESP: lungs clear to auscultation - no rales, no rhonchi, no wheezes  CV: regular rates and rhythm, normal S1 S2, no S3 or S4 and no murmur, no click or rub -  ABDOMEN: soft, no tenderness, no  hepatosplenomegaly, no masses, normal bowel sounds  MS: extremities- no gross deformities noted, graduated wraps in place, no edema noted         ASSESSMENT/PLAN:                                                      ASSESSMENT:  1. Type 2 diabetes mellitus with diabetic nephropathy, with long-term current use of insulin (H)    2. Lymphedema of both lower extremities    3. Fatigue, unspecified type    4. DDD (degenerative disc disease), lumbar    5. Spinal stenosis of lumbar region without neurogenic claudication    6.  Restless leg syndrome    7. Benign essential hypertension    8. RC (obstructive sleep apnea)    9. Bilateral hearing loss, unspecified hearing loss type    10. Lymphedema of genitalia    11. SOB (shortness of breath)        PLAN:  Orders Placed This Encounter     SLEEP EVALUATION & MANAGEMENT REFERRAL - [facility]  [phone] (Age 2 and up)     40 min spent with patient, greater than 50% in counseling and coordination of care, discussion of multiple issues today.   shortness of breath is not new, likely deconditioning  Continue lymphedema clinic, is helping  Get the CPAP figured out, likely source of her fatigue   Needs to continue to work with diabetic educator    Patient Instructions   See the sleep doctor    See you in a few weeks       Sandra Morales MD  [facility]

## 2018-08-08 ENCOUNTER — TELEPHONE (OUTPATIENT)
Dept: FAMILY MEDICINE | Facility: CLINIC | Age: 73
End: 2018-08-08

## 2018-08-08 ENCOUNTER — HOSPITAL ENCOUNTER (OUTPATIENT)
Dept: PHYSICAL THERAPY | Facility: CLINIC | Age: 73
Setting detail: THERAPIES SERIES
End: 2018-08-08
Attending: FAMILY MEDICINE
Payer: MEDICARE

## 2018-08-08 DIAGNOSIS — M51.369 DDD (DEGENERATIVE DISC DISEASE), LUMBAR: Primary | ICD-10-CM

## 2018-08-08 DIAGNOSIS — M48.061 SPINAL STENOSIS OF LUMBAR REGION WITHOUT NEUROGENIC CLAUDICATION: ICD-10-CM

## 2018-08-08 PROCEDURE — 40000718 ZZHC STATISTIC PT DEPARTMENT ORTHO VISIT: Performed by: PHYSICAL THERAPIST

## 2018-08-08 PROCEDURE — 97140 MANUAL THERAPY 1/> REGIONS: CPT | Mod: GP | Performed by: PHYSICAL THERAPIST

## 2018-08-08 NOTE — TELEPHONE ENCOUNTER
Pt notified along with Eunice who is helping her as pt is [facility].  Pt was given the Tele number to call as she also has a riding Issue. [phone]  Pt also aware of Sleep Study and Pt does not want to do this due to a ride also. Eunice will help her with these Appts.  Leilani [employer] [facility]

## 2018-08-08 NOTE — TELEPHONE ENCOUNTER
Reason for Call: Request for an order or referral:    Order or referral being requested: Stefanie says she saw Dr Morales yesterday and she wanted her to get another injection in her back. She says she tried to call to schedule but they told her Dr Morales has to put an order in her chart and once they get this, they will call her.     Date needed: as soon as possible    Has the patient been seen by the PCP for this problem? YES        Phone number Patient can be reached at:  Home number on file [phone] (home)    Best Time:  anytime    Can we leave a detailed message on this number?  YES    Call taken on 8/8/2018 at 10:34 AM by Crystal Skelton

## 2018-08-10 ENCOUNTER — HOSPITAL ENCOUNTER (OUTPATIENT)
Dept: PHYSICAL THERAPY | Facility: CLINIC | Age: 73
Setting detail: THERAPIES SERIES
End: 2018-08-10
Attending: FAMILY MEDICINE
Payer: MEDICARE

## 2018-08-10 PROCEDURE — 40000449 ZZHC STATISTIC PT VISIT, LYMPHEDEMA: Performed by: PHYSICAL THERAPIST

## 2018-08-10 PROCEDURE — 97140 MANUAL THERAPY 1/> REGIONS: CPT | Mod: GP | Performed by: PHYSICAL THERAPIST

## 2018-08-12 PROBLEM — I89.0 LYMPHEDEMA OF GENITALIA: Status: ACTIVE | Noted: 2018-08-12

## 2018-08-13 ENCOUNTER — HOSPITAL ENCOUNTER (OUTPATIENT)
Dept: PHYSICAL THERAPY | Facility: CLINIC | Age: 73
Setting detail: THERAPIES SERIES
End: 2018-08-13
Attending: FAMILY MEDICINE
Payer: MEDICARE

## 2018-08-13 PROCEDURE — 40000449 ZZHC STATISTIC PT VISIT, LYMPHEDEMA: Performed by: PHYSICAL THERAPIST

## 2018-08-13 PROCEDURE — 97140 MANUAL THERAPY 1/> REGIONS: CPT | Mod: GP | Performed by: PHYSICAL THERAPIST

## 2018-08-15 NOTE — PROGRESS NOTES
Outpatient Physical Therapy Discharge Note     Patient: Stefanie Velazquez  : 1945    Beginning/End Dates of Reporting Period:  3/28/18 to 18    Referring Provider: Dr. Morales    Therapy Diagnosis: deconditioning and balance deficits    Patient did not return for follow up treatments as directed.  Goal status and current objective information is therefore unknown.  Discharge from PT services at this time for this episode of treatment. Please see attached documentation under this episode of care for further information including dates of service, start of care date, referring physician, Dx, treatment plan, treatments, etc.    Please contact me with any questions or concerns.    Thank you for your referral.    Ruby Bailey, PT, DPT, CLT  Physical Therapist & Certified Lymphedema Therapist  [facility] - [address] [address] [phone]

## 2018-08-16 ENCOUNTER — HOSPITAL ENCOUNTER (OUTPATIENT)
Dept: PHYSICAL THERAPY | Facility: CLINIC | Age: 73
Setting detail: THERAPIES SERIES
End: 2018-08-16
Attending: FAMILY MEDICINE
Payer: MEDICARE

## 2018-08-16 PROCEDURE — 40000449 ZZHC STATISTIC PT VISIT, LYMPHEDEMA: Performed by: PHYSICAL THERAPIST

## 2018-08-16 PROCEDURE — 97140 MANUAL THERAPY 1/> REGIONS: CPT | Mod: GP | Performed by: PHYSICAL THERAPIST

## 2018-08-22 ENCOUNTER — TELEPHONE (OUTPATIENT)
Dept: FAMILY MEDICINE | Facility: CLINIC | Age: 73
End: 2018-08-22

## 2018-08-22 NOTE — TELEPHONE ENCOUNTER
Reason for Call:  Appointment, Requested Provider:  Dr Morales    PCP: Sandra Morales    Reason for visit: Stefanie had to cancel apt yesterday ( Tuesday) because she is having trouble getting rides from [place] to [place]. She says she had an overnight sleep study done at [facility] on Monday night and needs to see Dr Bernstein to go over the results and also to talk about her back. She says her niece is going to come to [place] next Tuesday 8/28 and she could get a ride with her if Dr Morales could work her in. Morning would be preferred.     Phone number patient can be reached at: Home number on file [phone] (home)    Best Time: anytime    Call taken on 8/22/2018 at 3:11 PM by Crystal Skelton

## 2018-08-22 NOTE — TELEPHONE ENCOUNTER
Reason for Call:  Form, our goal is to have forms completed with 72 hours, however, some forms may require a visit or additional information.    Type of letter, form or note:  Plan of Care    Who is the form from?: Home care    Where did the form come from: form was faxed in    What clinic location was the form placed at?: [place]    Where the form was placed: 's Box    What number is listed as a contact on the form?: Fax to [phone]       Micki Aviles  [facility] [place] [facility]      Call taken on 7/21/2017 at 8:47 AM by Micki Aviles         Detail Level: Simple Number Of Freeze-Thaw Cycles: 1 freeze-thaw cycle Render Post-Care Instructions In Note?: yes Duration Of Freeze Thaw-Cycle (Seconds): 2 Post-Care Instructions: I reviewed with the patient in detail post-care instructions. Patient is to wear sunprotection, and avoid picking at any of the treated lesions. Pt may apply Vaseline to crusted or scabbing areas. Consent: The patient's consent was obtained including but not limited to risks of crusting, scabbing, blistering, scarring, darker or lighter pigmentary change, recurrence, incomplete removal and infection.

## 2018-08-24 ENCOUNTER — TELEPHONE (OUTPATIENT)
Dept: FAMILY MEDICINE | Facility: CLINIC | Age: 73
End: 2018-08-24

## 2018-08-28 ENCOUNTER — TELEPHONE (OUTPATIENT)
Dept: FAMILY MEDICINE | Facility: CLINIC | Age: 73
End: 2018-08-28

## 2018-08-28 NOTE — TELEPHONE ENCOUNTER
[facility] - Edema  Form received back signed  8/28/18  Faxed Back & Sent to be scanned to this encounter  Leilani [facility]

## 2018-08-29 ENCOUNTER — OFFICE VISIT (OUTPATIENT)
Dept: FAMILY MEDICINE | Facility: CLINIC | Age: 73
End: 2018-08-29
Payer: COMMERCIAL

## 2018-08-29 VITALS
OXYGEN SATURATION: 95 % | TEMPERATURE: 98.3 F | DIASTOLIC BLOOD PRESSURE: 62 MMHG | SYSTOLIC BLOOD PRESSURE: 122 MMHG | HEART RATE: 82 BPM

## 2018-08-29 DIAGNOSIS — G89.29 CHRONIC MIDLINE LOW BACK PAIN WITHOUT SCIATICA: ICD-10-CM

## 2018-08-29 DIAGNOSIS — M54.50 CHRONIC MIDLINE LOW BACK PAIN WITHOUT SCIATICA: ICD-10-CM

## 2018-08-29 DIAGNOSIS — G25.81 RLS (RESTLESS LEGS SYNDROME): ICD-10-CM

## 2018-08-29 DIAGNOSIS — M48.061 SPINAL STENOSIS OF LUMBAR REGION WITHOUT NEUROGENIC CLAUDICATION: ICD-10-CM

## 2018-08-29 DIAGNOSIS — R29.6 FALLS FREQUENTLY: ICD-10-CM

## 2018-08-29 DIAGNOSIS — E03.8 OTHER SPECIFIED HYPOTHYROIDISM: ICD-10-CM

## 2018-08-29 DIAGNOSIS — I10 BENIGN ESSENTIAL HYPERTENSION: Chronic | ICD-10-CM

## 2018-08-29 DIAGNOSIS — Z79.4 TYPE 2 DIABETES MELLITUS WITH DIABETIC NEPHROPATHY, WITH LONG-TERM CURRENT USE OF INSULIN (H): Primary | Chronic | ICD-10-CM

## 2018-08-29 DIAGNOSIS — E78.5 HYPERLIPIDEMIA LDL GOAL <100: Chronic | ICD-10-CM

## 2018-08-29 DIAGNOSIS — M51.369 DDD (DEGENERATIVE DISC DISEASE), LUMBAR: ICD-10-CM

## 2018-08-29 DIAGNOSIS — E11.21 TYPE 2 DIABETES MELLITUS WITH DIABETIC NEPHROPATHY, WITH LONG-TERM CURRENT USE OF INSULIN (H): Primary | Chronic | ICD-10-CM

## 2018-08-29 PROCEDURE — 99215 OFFICE O/P EST HI 40 MIN: CPT | Performed by: FAMILY MEDICINE

## 2018-08-29 RX ORDER — ROPINIROLE 1 MG/1
TABLET, FILM COATED ORAL
Qty: 200 TABLET | Refills: 3 | Status: SHIPPED | OUTPATIENT
Start: 2018-08-29 | End: 2019-06-23

## 2018-08-29 RX ORDER — SIMVASTATIN 40 MG
40 TABLET ORAL DAILY
Qty: 90 TABLET | Refills: 2 | Status: SHIPPED | OUTPATIENT
Start: 2018-08-29 | End: 2019-10-15

## 2018-08-29 RX ORDER — FUROSEMIDE 20 MG
20 TABLET ORAL 2 TIMES DAILY
Qty: 180 TABLET | Refills: 3 | Status: SHIPPED | OUTPATIENT
Start: 2018-08-29 | End: 2020-05-16

## 2018-08-29 NOTE — PROGRESS NOTES
SUBJECTIVE:   Stefanie Velazquez is a 73 year old female who presents to clinic today for the following health issues:      Recheck on Leg Swelling    *left leg edema gone down  * Right leg edema persists, especially by ankle   *Started middle of June, but continues to get worse  *Getting them wrapped helped reduce the edema    Sleep study was completed 8/20. I can not access this in Care everywhere, I do see where it was done. She is to go back to get her cpap machine.    Low back pain-  Has not been able to get her back brace. She has had a lot of trouble with her back, more pain, feels more unstable walking with her walker, feels like she can tip over.   FINDINGS: There are five nonrib-bearing or lumbar-type vertebrae.  There is a well-corticated sagittally-oriented gap or lucency within  the right L2 transverse process, presumably related to an old ununited  fracture. Advanced apophyseal joint degenerative changes are noted in  the mid and lower lumbar spine, particularly from L2-L3 through L4-L5.  This is associated with mild degenerative grade 1 spondylolisthesis at  L4-L5 and L3-L4. Advanced degenerative disc disease is noted  throughout the lumbar spine from L1-L2 through L5-S1 with intradiscal  gas and mild to moderate loss of disc height which is greatest at  L5-S1. L1 inferior endplate discogenic irregularity is also noted  compatible with a chronic Schmorl's node. Vertebral body heights are  normal. Although differentiation between disc and thecal sac is poorly  seen, there is probable severe central stenosis at L1-L2, L3-L4, and  to a lesser degree at L2-L3 and L4-L5 related to a combination of  annular bulging and ligamentum flavum thickening as well as  degenerative spondylolisthesis at L3-L4 and L4-L5. Lateral annular  bulging results in multilevel foraminal stenosis which is greatest  bilaterally at L5-S1 and on the right at L4-L5 and L3-L4.         IMPRESSION:  1. Advanced degenerative disc disease  throughout the lumbar spine,  greatest at L5-S1.  2. 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  3. Multilevel central stenosis, greatest at L1-L2 and L3-L4, but also  to a lesser degree at L2-L3 and L4-L5.  4. Multilevel foraminal stenosis, greatest bilaterally at L5-S1 as  well as on the right at L4-L5 and L3-L4.     ISI GIBBONS MD      hypothyroidism   TSH   Date Value Ref Range Status   07/20/2018 5.32 (H) 0.40 - 4.00 mU/L Final      Restless legs-uses her medication bid and occasionally a dose in the middle and asks for me to update her prescription    Diabetes-  Lab Results   Component Value Date    A1C 8.5 07/20/2018    A1C 7.3 03/20/2018    A1C 7.1 11/10/2017    A1C 7.9 07/10/2017    A1C 7.1 03/30/2017     She is slowly increasing insulin    hypertension -  BP Readings from Last 6 Encounters:   08/29/18 122/62   08/07/18 139/72   07/20/18 130/72   05/17/18 183/81   05/07/18 102/55   05/01/18 138/78      She was just approved for assisted living facility here in town and is quite happy about that. Will be able to take her scooter a lot of places when weather is nice and they have a shuttle as well.     Problem list and histories reviewed & adjusted, as indicated.  Additional history: as documented    BP Readings from Last 3 Encounters:   08/29/18 122/62   08/07/18 139/72   07/20/18 130/72    Wt Readings from Last 3 Encounters:   08/07/18 195 lb (88.5 kg)   07/20/18 196 lb (88.9 kg)   05/07/18 195 lb (88.5 kg)              Reviewed and updated as needed this visit by clinical staff  Tobacco  Allergies  Meds  Problems  Med Hx  Surg Hx  Fam Hx  Soc Hx        Reviewed and updated as needed this visit by Provider  Allergies  Meds  Problems      ROS: 5 point ROS negative except as noted above in HPI, including Gen., Resp., CV, GI &  system review.     OBJECTIVE: /62 (BP Location: Left  arm, Patient Position: Sitting, Cuff Size: Adult Large)  Pulse 82  Temp 98.3  F (36.8  C) (Tympanic)  SpO2 95%   General: appears well, no distress, sitting in wheelchair  Neck: supple, no adenopathy, no JVD but difficult to assess due to body habitus  Heart: regular rate and rhythm, normal S1S2, no murmur  Lungs: clear to ascultation   Ext: right leg with plus one edema and an light erythematous rash on lower right leg with some excoriations on back    ASSESSMENT:  1. Type 2 diabetes mellitus with diabetic nephropathy, with long-term current use of insulin (H)    2. Other specified hypothyroidism    3. Benign essential hypertension    4. RLS (restless legs syndrome)    5. Hyperlipidemia LDL goal <100    6. DDD (degenerative disc disease), lumbar    7. Spinal stenosis of lumbar region without neurogenic claudication    8. Falls frequently    9. Chronic midline low back pain without sciatica        PLAN:  Orders Placed This Encounter     TSH     HOME CARE NURSING REFERRAL     furosemide (LASIX) 20 MG tablet     rOPINIRole (REQUIP) 1 MG tablet     simvastatin (ZOCOR) 40 MG tablet     See below  40 min spent with patient, greater than 50% in counseling and coordination of care and dealing with multiple issues today.   Recommend more physical therapy for her back and gait  Will need to get things ready for her move to assisted living and will see her back for that    Patient Instructions   Make sure you tell them you need the lab next week that I ordered as well    Use your steroid ointment twice a day on right leg    See you in a month prior to your moving to assisted living      Sandra Bernstein MD

## 2018-08-29 NOTE — NURSING NOTE
"Chief Complaint   Patient presents with     Leg Swelling     Recheck       Initial /62 (BP Location: Left arm, Patient Position: Sitting, Cuff Size: Adult Large)  Pulse 82  Temp 98.3  F (36.8  C) (Tympanic)  SpO2 95% Estimated body mass index is 35.38 kg/(m^2) as calculated from the following:    Height as of 3/23/18: 5' 2.25\" (1.581 m).    Weight as of 8/7/18: 195 lb (88.5 kg).      Health Maintenance that is potentially due pending provider review:  NONE    n/a    Is there anyone who you would like to be able to receive your results? No  If yes have patient fill out HELLEN      "

## 2018-08-29 NOTE — MR AVS SNAPSHOT
After Visit Summary   8/29/2018    Stefanie Velazquez    MRN: [id number]           Patient Information     Date Of Birth          1945        Visit Information        Provider Department      8/29/2018 11:40 AM Sandra Morales MD [facility]        Today's Diagnoses     Type 2 diabetes mellitus with diabetic nephropathy, with long-term current use of insulin (H)    -  1    Other specified hypothyroidism        Benign essential hypertension        RLS (restless legs syndrome)        Hyperlipidemia LDL goal <100          Care Instructions    Make sure you tell them you need the lab next week that I ordered as well    Use your steroid ointment twice a day on right leg    See you in a month prior to your moving to assisted living                  Follow-ups after your visit        Follow-up notes from your care team     Return in about 1 month (around 9/29/2018) for diabetes, assisted living.      Your next 10 appointments already scheduled     Aug 31, 2018  9:00 AM CDT   LAB with [facility]   [facility]    [address] [place] [id number] [phone]           Please do not eat 10-12 hours before your appointment if you are coming in fasting for labs on lipids, cholesterol, or glucose (sugar). This does not apply to pregnant women. Water, hot tea and black coffee (with nothing added) are okay. Do not drink other fluids, diet soda or chew gum.            Sep 07, 2018  1:30 PM CDT   Return Visit with CLEMENCIA Barry CNP   [facility] ([facility])    [address] [place] [address]   [phone]            Sep 21, 2018 11:00 AM CDT   LAB with [facility]   [facility]    [address] [place] [id number] [phone]           Please do not eat 10-12 hours before your appointment if you are coming in  fasting for labs on lipids, cholesterol, or glucose (sugar). This does not apply to pregnant women. Water, hot tea and black coffee (with nothing added) are okay. Do not drink other fluids, diet soda or chew gum.            Sep 28, 2018 11:00 AM CDT   Return Visit with Apolinar Martinez MD   [facility] ([facility])    [facility]  [address]  [facility] [place] [address] [phone]              Future tests that were ordered for you today     Open Future Orders        Priority Expected Expires Ordered    TSH Routine  8/29/2019 8/29/2018            Who to contact     If you have questions or need follow up information about today's clinic visit or your schedule please contact [facility] directly at [phone].  Normal or non-critical lab and imaging results will be communicated to you by Tengahhart, letter or phone within 4 business days after the clinic has received the results. If you do not hear from us within 7 days, please contact the clinic through Tengahhart or phone. If you have a critical or abnormal lab result, we will notify you by phone as soon as possible.  Submit refill requests through Cloudvu or call your pharmacy and they will forward the refill request to us. Please allow 3 business days for your refill to be completed.          Additional Information About Your Visit        Cloudvu Information     Cloudvu gives you secure access to your electronic health record. If you see a primary care provider, you can also send messages to your care team and make appointments. If you have questions, please call your primary care clinic.  If you do not have a primary care provider, please call [phone] and they will assist you.        Care EveryWhere ID     This is your Care EveryWhere ID. This could be used by other organizations to access your [place] medical records  [id number]        Your Vitals Were     Pulse Temperature Pulse  Oximetry             82 98.3  F (36.8  C) (Tympanic) 95%          Blood Pressure from Last 3 Encounters:   08/29/18 122/62   08/07/18 139/72   07/20/18 130/72    Weight from Last 3 Encounters:   08/07/18 195 lb (88.5 kg)   07/20/18 196 lb (88.9 kg)   05/07/18 195 lb (88.5 kg)                 Today's Medication Changes          These changes are accurate as of 8/29/18  1:06 PM.  If you have any questions, ask your nurse or doctor.               These medicines have changed or have updated prescriptions.        Dose/Directions    furosemide 20 MG tablet   Commonly known as:  LASIX   This may have changed:  when to take this   Used for:  Benign essential hypertension   Changed by:  Sandra Morales MD        Dose:  20 mg   Take 1 tablet (20 mg) by mouth 2 times daily   Quantity:  180 tablet   Refills:  3       rOPINIRole 1 MG tablet   Commonly known as:  REQUIP   This may have changed:  See the new instructions.   Used for:  RLS (restless legs syndrome)   Changed by:  Sandra Morales MD        1-3 tabs daily as needed, patient takes one in am, one in pm. occasionally will take in midday.   Quantity:  200 tablet   Refills:  3            Where to get your medicines      These medications were sent to [facility] [address] [place] [address]  [address] [place] [address]     Phone:  [phone]     furosemide 20 MG tablet    rOPINIRole 1 MG tablet    simvastatin 40 MG tablet                Primary Care Provider Office Phone # Fax #    Sandra Morales [phone]       [address] [place] [address]        Equal Access to Services     BRUNA NUNN AH: Hadbrice ng hadashshola Soamos, waaxda luqadaha, qaybta kaalmada aderenettayabradley, ceci moy. So [facility] [phone].    ATENCIÓN: Si habla español, tiene a waite disposición servicios gratuitos de asistencia lingüística. Llame al [phone].    We comply with applicable federal civil rights laws and  [place] laws. We do not discriminate on the basis of race, color, national origin, age, disability, sex, sexual orientation, or gender identity.            Thank you!     Thank you for choosing [facility]  for your care. Our goal is always to provide you with excellent care. Hearing back from our patients is one way we can continue to improve our services. Please take a few minutes to complete the written survey that you may receive in the mail after your visit with us. Thank you!             Your Updated Medication List - Protect others around you: Learn how to safely use, store and throw away your medicines at www.disposemymeds.org.          This list is accurate as of 8/29/18  1:06 PM.  Always use your most recent med list.                   Brand Name Dispense Instructions for use Diagnosis    ACCU-CHEK MILVIA Jeana     1 device    dispense one meter    Type II or unspecified type diabetes mellitus without mention of complication, uncontrolled       acetaminophen 650 MG CR tablet    TYLENOL    60 tablet    Take 1 tablet (650 mg) by mouth 3 times daily    Bilateral cellulitis of lower leg       blood glucose monitoring lancets     1 Box    Test BG 4 times daily    Type II or unspecified type diabetes mellitus without mention of complication, uncontrolled       blood glucose monitoring test strip    ACCU-CHEK MILVIA    360 each    Use to test blood sugars 4 times daily or as directed.    Type 2 diabetes mellitus with diabetic nephropathy, with long-term current use of insulin (H)       BUTT PASTE - REGULAR    DR LOVE POOP GOOP BUTT PASTE FORMULA     Apply topically every hour as needed for skin protection        chlorhexidine 0.12 % solution    PERIDEX          DEPEND EASY FIT UNDERGARMENTS Misc     300 each    1 Units every 2 hours X-large    Bowel and bladder incontinence, Rectal cancer (H)       diclofenac 50 MG EC tablet    VOLTAREN    21 tablet    Take 1 tablet (50 mg) by mouth 3 times daily  as needed for moderate pain    Acute left-sided low back pain with left-sided sciatica       fluocinonide 0.05 % ointment    LIDEX    60 g    Apply sparingly to affected area twice daily as needed.  Do not apply to face.    Venous stasis dermatitis of both lower extremities       fluticasone 50 MCG/ACT spray    FLONASE     1 spray        furosemide 20 MG tablet    LASIX    180 tablet    Take 1 tablet (20 mg) by mouth 2 times daily    Benign essential hypertension       gabapentin 300 MG capsule    NEURONTIN    60 capsule    Take 1 tablet (300 mg) at bedtime, after 4 days may go to 2 if needed    Lumbar radiculopathy       GLUCERNA OS Liqd     24 each    One can two to three times a day    Poor dentition, Poor nutrition, Type 2 diabetes mellitus with diabetic nephropathy, with long-term current use of insulin (H), Rectal cancer (H), Chronic diarrhea, Radiation therapy complication, sequela       hypromellose 0.3 % Soln ophthalmic solution    GENTEAL     2 drops        insulin detemir 100 UNIT/ML injection    LEVEMIR FLEXPEN/FLEXTOUCH    3 mL    Inject 20 Units Subcutaneous At Bedtime Fill when needed    Type 2 diabetes mellitus with diabetic nephropathy, with long-term current use of insulin (H)       insulin pen needle 32G X 4 MM    BD GABRIELLA U/F    120 each    Use 4 times per day or as directed.    Type 2 diabetes mellitus with diabetic nephropathy, with long-term current use of insulin (H)       levothyroxine 100 MCG tablet    SYNTHROID/LEVOTHROID    90 tablet    Take 1 tablet (100 mcg) by mouth daily    Other specified hypothyroidism       loperamide 2 MG capsule    IMODIUM    90 capsule    One capsule once a day, can use a second one if needed    Rectal cancer (H), Chronic diarrhea       losartan 100 MG tablet    COZAAR    90 tablet    Take 1 tablet (100 mg) by mouth daily    Benign essential hypertension       menthol-zinc oxide 0.44-20.625 % Oint ointment    CALMOSEPTINE     Apply topically 4 times daily as  needed for skin protection    Rash and nonspecific skin eruption       metoprolol succinate 50 MG 24 hr tablet    TOPROL-XL    90 tablet    Take 1 tablet (50 mg) by mouth daily    Benign essential hypertension       mineral oil-hydrophilic petrolatum      Apply topically as needed        NovoLOG FLEXPEN 100 UNIT/ML injection   Generic drug:  insulin aspart     3 mL    5 units with lunch    Type 2 diabetes mellitus with diabetic nephropathy, with long-term current use of insulin (H)       OMEPRAZOLE PO      Take 20 mg by mouth daily as needed        order for DME     1 Device    Equipment being ordered: back brace    Spinal stenosis of lumbar region without neurogenic claudication, Chronic midline low back pain without sciatica       order for DME     1 Device    Equipment being ordered: Dry Pressure Mattress for [facility]    Spinal stenosis of lumbar region without neurogenic claudication       oxyCODONE IR 5 MG tablet    ROXICODONE    20 tablet    Take 1 tablet (5 mg) by mouth every 6 hours as needed for severe pain maximum 6 tablet(s) per day    Lumbar radiculopathy       Potassium Gluconate 595 MG Caps      Take 1 tablet by mouth daily    Stasis edema of both lower extremities       rOPINIRole 1 MG tablet    REQUIP    200 tablet    1-3 tabs daily as needed, patient takes one in am, one in pm. occasionally will take in midday.    RLS (restless legs syndrome)       simvastatin 40 MG tablet    ZOCOR    90 tablet    Take 1 tablet (40 mg) by mouth daily    Hyperlipidemia LDL goal <100       syringe (disposable) 1 ML Misc     1 each    1 each every 30 days With 1 inch needle for Vit B12 injection    Vitamin B 12 deficiency       triamcinolone 0.1 % cream    KENALOG    80 g    Apply sparingly to affected area two times daily as needed    Stasis dermatitis of both legs

## 2018-08-29 NOTE — PATIENT INSTRUCTIONS
Make sure you tell them you need the lab next week that I ordered as well    Use your steroid ointment twice a day on right leg    See you in a month prior to your moving to assisted living

## 2018-08-30 ENCOUNTER — TELEPHONE (OUTPATIENT)
Dept: FAMILY MEDICINE | Facility: CLINIC | Age: 73
End: 2018-08-30

## 2018-08-30 NOTE — TELEPHONE ENCOUNTER
S-(situation): Patient currently has SN and homemaking services [facility]     B-(background): They are not able to provide physical therapy.  [facility] will discharge patient on 9/4/18    A-(assessment): We need new orders for physical therapy and skilled nursing effective 9/4/18    R-(recommendations):  Please put new orders in EPIC effective for 9/4/18

## 2018-08-31 ENCOUNTER — TELEPHONE (OUTPATIENT)
Dept: FAMILY MEDICINE | Facility: CLINIC | Age: 73
End: 2018-08-31

## 2018-08-31 DIAGNOSIS — I10 BENIGN ESSENTIAL HYPERTENSION: Chronic | ICD-10-CM

## 2018-08-31 DIAGNOSIS — C20 RECTAL CANCER (H): Chronic | ICD-10-CM

## 2018-08-31 DIAGNOSIS — E78.5 HYPERLIPIDEMIA LDL GOAL <100: Chronic | ICD-10-CM

## 2018-08-31 DIAGNOSIS — E03.8 OTHER SPECIFIED HYPOTHYROIDISM: ICD-10-CM

## 2018-08-31 DIAGNOSIS — I25.9 CHRONIC ISCHEMIC HEART DISEASE: Chronic | ICD-10-CM

## 2018-08-31 LAB
ALBUMIN SERPL-MCNC: 2.7 G/DL (ref 3.4–5)
ALP SERPL-CCNC: 89 U/L (ref 40–150)
ALT SERPL W P-5'-P-CCNC: 13 U/L (ref 0–50)
ANION GAP SERPL CALCULATED.3IONS-SCNC: 10 MMOL/L (ref 3–14)
AST SERPL W P-5'-P-CCNC: 20 U/L (ref 0–45)
BASOPHILS # BLD AUTO: 0 10E9/L (ref 0–0.2)
BASOPHILS NFR BLD AUTO: 0.6 %
BILIRUB SERPL-MCNC: 1.3 MG/DL (ref 0.2–1.3)
BUN SERPL-MCNC: 18 MG/DL (ref 7–30)
CALCIUM SERPL-MCNC: 8.3 MG/DL (ref 8.5–10.1)
CEA SERPL-MCNC: 3.3 UG/L (ref 0–2.5)
CHLORIDE SERPL-SCNC: 104 MMOL/L (ref 94–109)
CHOLEST SERPL-MCNC: 185 MG/DL
CO2 SERPL-SCNC: 23 MMOL/L (ref 20–32)
CREAT SERPL-MCNC: 1.45 MG/DL (ref 0.52–1.04)
DIFFERENTIAL METHOD BLD: NORMAL
EOSINOPHIL # BLD AUTO: 0.3 10E9/L (ref 0–0.7)
EOSINOPHIL NFR BLD AUTO: 5.4 %
ERYTHROCYTE [DISTWIDTH] IN BLOOD BY AUTOMATED COUNT: 13.6 % (ref 10–15)
GFR SERPL CREATININE-BSD FRML MDRD: 35 ML/MIN/1.7M2
GLUCOSE SERPL-MCNC: 115 MG/DL (ref 70–99)
HCT VFR BLD AUTO: 39.6 % (ref 35–47)
HDLC SERPL-MCNC: 51 MG/DL
HGB BLD-MCNC: 13.7 G/DL (ref 11.7–15.7)
LDLC SERPL CALC-MCNC: 107 MG/DL
LYMPHOCYTES # BLD AUTO: 1.4 10E9/L (ref 0.8–5.3)
LYMPHOCYTES NFR BLD AUTO: 21.9 %
MCH RBC QN AUTO: 29.8 PG (ref 26.5–33)
MCHC RBC AUTO-ENTMCNC: 34.6 G/DL (ref 31.5–36.5)
MCV RBC AUTO: 86 FL (ref 78–100)
MONOCYTES # BLD AUTO: 0.6 10E9/L (ref 0–1.3)
MONOCYTES NFR BLD AUTO: 9.1 %
NEUTROPHILS # BLD AUTO: 4 10E9/L (ref 1.6–8.3)
NEUTROPHILS NFR BLD AUTO: 63 %
NONHDLC SERPL-MCNC: 134 MG/DL
PLATELET # BLD AUTO: 188 10E9/L (ref 150–450)
POTASSIUM SERPL-SCNC: 3.4 MMOL/L (ref 3.4–5.3)
PROT SERPL-MCNC: 6.5 G/DL (ref 6.8–8.8)
RBC # BLD AUTO: 4.59 10E12/L (ref 3.8–5.2)
SODIUM SERPL-SCNC: 137 MMOL/L (ref 133–144)
TRIGL SERPL-MCNC: 134 MG/DL
TSH SERPL DL<=0.005 MIU/L-ACNC: 4.24 MU/L (ref 0.4–4)
WBC # BLD AUTO: 6.3 10E9/L (ref 4–11)

## 2018-08-31 PROCEDURE — 36415 COLL VENOUS BLD VENIPUNCTURE: CPT | Performed by: INTERNAL MEDICINE

## 2018-08-31 PROCEDURE — 82378 CARCINOEMBRYONIC ANTIGEN: CPT | Performed by: INTERNAL MEDICINE

## 2018-08-31 PROCEDURE — 84443 ASSAY THYROID STIM HORMONE: CPT | Performed by: FAMILY MEDICINE

## 2018-08-31 PROCEDURE — 80061 LIPID PANEL: CPT | Performed by: INTERNAL MEDICINE

## 2018-08-31 PROCEDURE — 80053 COMPREHEN METABOLIC PANEL: CPT | Performed by: INTERNAL MEDICINE

## 2018-08-31 PROCEDURE — 85025 COMPLETE CBC W/AUTO DIFF WBC: CPT | Performed by: INTERNAL MEDICINE

## 2018-08-31 NOTE — TELEPHONE ENCOUNTER
S-(situation):  [facility] received orders for home physical therapy    B-(background):  We received a call today from her nurse Eunice Diop from [facility].    A-(assessment):  Stefanie is not homebound, she has been going outpatient to [facility] for physical therapy and to have her legs wrapped.  [facility] can not see patient since she is not homebound.  Orders should be put in for her to continue outpatient physical therapy.  [facility] will continue to see her at home.    R-(recommendations): Please put in outpatient physical therapy orders.

## 2018-09-07 ENCOUNTER — OFFICE VISIT (OUTPATIENT)
Dept: CARDIOLOGY | Facility: CLINIC | Age: 73
End: 2018-09-07
Attending: INTERNAL MEDICINE
Payer: COMMERCIAL

## 2018-09-07 VITALS
BODY MASS INDEX: 35.16 KG/M2 | SYSTOLIC BLOOD PRESSURE: 114 MMHG | WEIGHT: 193.8 LBS | HEART RATE: 57 BPM | OXYGEN SATURATION: 91 % | DIASTOLIC BLOOD PRESSURE: 65 MMHG

## 2018-09-07 DIAGNOSIS — I10 BENIGN ESSENTIAL HYPERTENSION: Chronic | ICD-10-CM

## 2018-09-07 DIAGNOSIS — R60.0 LEG EDEMA, RIGHT: ICD-10-CM

## 2018-09-07 DIAGNOSIS — E78.5 HYPERLIPIDEMIA LDL GOAL <70: ICD-10-CM

## 2018-09-07 DIAGNOSIS — I25.810 CORONARY ARTERY DISEASE INVOLVING CORONARY BYPASS GRAFT OF NATIVE HEART WITHOUT ANGINA PECTORIS: Primary | ICD-10-CM

## 2018-09-07 PROCEDURE — 99214 OFFICE O/P EST MOD 30 MIN: CPT | Performed by: NURSE PRACTITIONER

## 2018-09-07 NOTE — PROGRESS NOTES
HPI and Plan:   See dictation    No orders of the defined types were placed in this encounter.      No orders of the defined types were placed in this encounter.      There are no discontinued medications.      Encounter Diagnoses   Name Primary?     Benign essential hypertension      Coronary artery disease involving coronary bypass graft of native heart without angina pectoris Yes     Hyperlipidemia LDL goal <70      Leg edema, right        CURRENT MEDICATIONS:  Current Outpatient Prescriptions   Medication Sig Dispense Refill     acetaminophen (TYLENOL) 650 MG CR tablet Take 1 tablet (650 mg) by mouth 3 times daily 60 tablet      BUTT PASTE - REGULAR (DR LOVE POOP GOOP BUTT PASTE FORMULA) Apply topically every hour as needed for skin protection       chlorhexidine (PERIDEX) 0.12 % solution        diclofenac (VOLTAREN) 50 MG EC tablet Take 1 tablet (50 mg) by mouth 3 times daily as needed for moderate pain 21 tablet 1     fluocinonide (LIDEX) 0.05 % ointment Apply sparingly to affected area twice daily as needed.  Do not apply to face. 60 g 11     fluticasone (FLONASE) 50 MCG/ACT spray 1 spray       furosemide (LASIX) 20 MG tablet Take 1 tablet (20 mg) by mouth 2 times daily 180 tablet 3     gabapentin (NEURONTIN) 300 MG capsule Take 1 tablet (300 mg) at bedtime, after 4 days may go to 2 if needed 60 capsule 1     hypromellose (GENTEAL) 0.3 % SOLN ophthalmic solution 2 drops       Incontinence Supply Disposable (DEPEND EASY FIT UNDERGARMENTS) MISC 1 Units every 2 hours X-large 300 each prn     insulin aspart (NOVOLOG FLEXPEN) 100 UNIT/ML injection 5 units with lunch 3 mL 11     insulin detemir (LEVEMIR FLEXPEN/FLEXTOUCH) 100 UNIT/ML injection Inject 20 Units Subcutaneous At Bedtime Fill when needed 3 mL 3     levothyroxine (SYNTHROID/LEVOTHROID) 100 MCG tablet Take 1 tablet (100 mcg) by mouth daily 90 tablet 0     loperamide (IMODIUM) 2 MG capsule One capsule once a day, can use a second one if needed 90 capsule 3      losartan (COZAAR) 100 MG tablet Take 1 tablet (100 mg) by mouth daily 90 tablet 1     menthol-zinc oxide (CALMOSEPTINE) 0.44-20.625 % OINT ointment Apply topically 4 times daily as needed for skin protection       metoprolol (TOPROL-XL) 50 MG 24 hr tablet Take 1 tablet (50 mg) by mouth daily 90 tablet 1     mineral oil-hydrophilic petrolatum (AQUAPHOR) ointment Apply topically as needed       Nutritional Supplements (GLUCERNA OS) LIQD One can two to three times a day 24 each 11     OMEPRAZOLE PO Take 20 mg by mouth daily as needed        oxyCODONE IR (ROXICODONE) 5 MG tablet Take 1 tablet (5 mg) by mouth every 6 hours as needed for severe pain maximum 6 tablet(s) per day 20 tablet 0     Potassium Gluconate 595 MG CAPS Take 1 tablet by mouth daily       rOPINIRole (REQUIP) 1 MG tablet 1-3 tabs daily as needed, patient takes one in am, one in pm. occasionally will take in midday. 200 tablet 3     simvastatin (ZOCOR) 40 MG tablet Take 1 tablet (40 mg) by mouth daily 90 tablet 2     triamcinolone (KENALOG) 0.1 % cream Apply sparingly to affected area two times daily as needed 80 g 1     ACCU-CHEK MILVIA MELODY dispense one meter (Patient not taking: No sig reported) 1 device 0     blood glucose monitoring (ACCU-CHEK MILVIA) test strip Use to test blood sugars 4 times daily or as directed. (Patient not taking: Reported on 9/7/2018) 360 each 1     blood glucose monitoring (ACCU-CHEK MULTICLIX) lancets Test BG 4 times daily (Patient not taking: Reported on 9/7/2018) 1 Box 11     insulin pen needle (BD GABRIELLA U/F) 32G X 4 MM Use 4 times per day or as directed. (Patient not taking: Reported on 9/7/2018) 120 each 5     order for DME Equipment being ordered: Dry Pressure Mattress for hospital (Patient not taking: Reported on 9/7/2018) 1 Device 0     order for DME Equipment being ordered: back brace (Patient not taking: Reported on 9/7/2018) 1 Device 0     syringe, disposable, 1 ML MISC 1 each every 30 days With 1 inch needle for  Vit B12 injection (Patient not taking: Reported on 9/7/2018) 1 each 11       ALLERGIES     Allergies   Allergen Reactions     Hydrocodone Unknown     Lisinopril Cough            Vicodin [Hydrocodone-Acetaminophen] Other (See Comments)     Caused extreme dizziness       PAST MEDICAL HISTORY:  Past Medical History:   Diagnosis Date     Acute myocardial infarction of other specified sites, episode of care unspecified 6/2002    MI 2 stints     Cancer of colon (H)      Chronic ischemic heart disease, unspecified 6/22/2003    cabgx3 , 2002 2/05 adenosine ef70%     Coronary atherosclerosis of unspecified type of vessel, native or graft     Coronary artery disease     Diabetes mellitus (H)      Esophageal reflux      Gastro-oesophageal reflux disease      Generalized osteoarthrosis, unspecified site 6/22/2005     Generalized osteoarthrosis, unspecified site 6/22/2005     HEARING LOSS CONDUCTIVE, COMBINED TYPE 6/22/2005    Malagasy measles as child     HYPOTHYROIDISM  6/22/2003     Mixed hyperlipidemia 6/22/2005     Obesity, unspecified 6/22/2005     RC-moderate (AHI 12, LSat 60%); REM RDI-73 6/1/2009    Sleep study [facility] was performed 5/26/09 in order to re-evaluate severity of RC. The total sleep time was 412.0 minutes. The sleep latency was decreased at 8.7 minutes with Ambien 10mg. The REM sleep latency was 426.0 minutes. Sleep efficiency was reduced at 79.0%. The sleep architecture was disrupted with frequent sleep stage changes and arousals.  Snoring:  loud. Respiratory Events:   RDI o     Stented coronary artery      Type II or unspecified type diabetes mellitus with renal manifestations, uncontrolled(250.42) (H) 6/22/2005     Type II or unspecified type diabetes mellitus with renal manifestations, uncontrolled(250.42) (H) 6/22/2005     Unspecified disorder resulting from impaired renal function 6/22/2005    CR     1.82   06/21/2005     Unspecified essential hypertension        PAST SURGICAL HISTORY:  Past  Surgical History:   Procedure Laterality Date     cabg       COLECTOMY LOW ANTERIOR  6/21/2011    Procedure:COLECTOMY LOW ANTERIOR; with loop ielostomy; Surgeon:ROBBIN MCDONNELL; Location: OR     COLONOSCOPY  1/26/2011    COLONOSCOPY performed by MASOUD BILL at [facility]     COLONOSCOPY N/A 3/1/2016    Procedure: COLONOSCOPY;  Surgeon: Liza Neal MD;  Location: [facility]     INSERT PORT VASCULAR ACCESS  3/2/2011    INSERT PORT VASCULAR ACCESS performed by LIZA NEAL at [facility]     OPEN REDUCTION INTERNAL FIXATION FEMUR DISTAL  2/12/2013    Procedure: OPEN REDUCTION INTERNAL FIXATION FEMUR DISTAL;  Open reduction internal fixation left femur fracture--Anesth[facility];  Surgeon: Ley, Jeffrey Duane, MD;  Location: [facility]     ORTHOPEDIC SURGERY       PHACOEMULSIFICATION WITH STANDARD INTRAOCULAR LENS IMPLANT Left 1/22/2018    Procedure: PHACOEMULSIFICATION WITH STANDARD INTRAOCULAR LENS IMPLANT;  Left cataract removal with implant;  Surgeon: Rony Key MD;  Location: [facility]     PHACOEMULSIFICATION WITH STANDARD INTRAOCULAR LENS IMPLANT Right 2/19/2018    Procedure: PHACOEMULSIFICATION WITH STANDARD INTRAOCULAR LENS IMPLANT;  Right cataract removal with implant;  Surgeon: Rony Key MD;  Location: [facility]     SIGMOIDOSCOPY FLEXIBLE  9/9/2011    Procedure:SIGMOIDOSCOPY FLEXIBLE; Performed prior to induction.; Surgeon:ROBBIN MCDONNELL; Location: OR     SURGICAL HISTORY OF -   10/24/2002    Heart bypass     SURGICAL HISTORY OF -   2002    R Knee arthroplasty     SURGICAL HISTORY OF -   2002    Mi 2 stints     TAKEDOWN ILEOSTOMY  9/9/2011    Procedure:TAKEDOWN ILEOSTOMY; Exploratory Laparotomy,  Loop Ileostomy Takedown, Small Bowel Resection x 2.; Surgeon:ROBBIN MCDONNELL; Location: OR       FAMILY HISTORY:  Family History   Problem Relation Age of Onset     Diabetes Sister      C.A.D. Sister      Breast Cancer Sister        SOCIAL HISTORY:  Social History     Social  History     Marital status: Single     Spouse name: N/A     Number of children: N/A     Years of education: N/A     Social History Main Topics     Smoking status: Former Smoker     Smokeless tobacco: Never Used     Alcohol use Yes      Comment: rare social use     Drug use: No     Sexual activity: No     Other Topics Concern     Parent/Sibling W/ Cabg, Mi Or Angioplasty Before 65f 55m? Yes     Social History Narrative       Review of Systems:  Skin:  Positive for bruising     Eyes:  Negative      ENT:  Positive for hearing loss    Respiratory:  Positive for cough;dyspnea on exertion     Cardiovascular:    Positive for;fatigue;edema;lightheadedness;dizziness    Gastroenterology:        Genitourinary:  Positive for   CKD- patient does not see nephrology  Musculoskeletal:  Negative      Neurologic:  Positive for   neuropathy  Psychiatric:  Negative      Heme/Lymph/Imm:  Positive for anemia    Endocrine:  Positive for thyroid disorder;diabetes      Physical Exam:  Vitals: /65 (BP Location: Right arm, Patient Position: Sitting, Cuff Size: Adult Regular)  Pulse 57  Wt 87.9 kg (193 lb 12.8 oz)  SpO2 91%  BMI 35.16 kg/m2    Constitutional:  cooperative, alert and oriented, well developed, well nourished, in no acute distress overweight      Skin:  warm and dry to the touch          Head:  normocephalic        Eyes:  sclera white        Lymph:      ENT:  no pallor or cyanosis dentures;hearing aide(s) present      Neck:  no stiffness        Respiratory:  normal breath sounds, clear to auscultation, normal A-P diameter, normal symmetry, normal respiratory excursion, no use of accessory muscles prolonged expiration       Cardiac: regular rhythm;normal S1 and S2   S4   systolic murmur;LUSB;grade 1          pulses below the femoral arteries are diminished                                      GI:  abdomen soft        Extremities and Muscular Skeletal:  no deformities, clubbing, cyanosis, erythema observed   bilateral LE  edema;1+;R greater than L;2+          Neurological:  affect appropriate;no gross motor deficits        Psych:  Alert and Oriented x 3        CC  Terence Del Cid MD  [address]  [place] [id number]

## 2018-09-07 NOTE — LETTER
9/7/2018    Sandra Morales MD  [address] [facility] [address]    RE: Stefanie Velazquez       Dear Colleague,    I had the pleasure of seeing Stefanie Velazquez in the [facility].    Cardiology Clinic Progress Note  Stefanie Velazquez MRN# [id number]   YOB: 1945 Age: 73 year old     Reason For Visit:  3 month follow-up   Primary Cardiologist:   Dr. Del Cid          History of Presenting Illness:    Stefanie Velazquez is a pleasant 73 year old patient with a past cardiac history significant for CAD status post CABG x 3 2002 in [place] with subsequent PCI for stent restenosis, hypertension, hyperlipidemia.  Past medical history significant for diabetes type 2, sleep apnea treated with CPAP, rectal carcinoma status post surgery, chemo, and radiation.    Pt was last seen by Dr. Del Cid on 5/7/2018.  She was doing well from from a cardiac standpoint and follow-up in 3 months was recommended.  Since then, she was seen by her PCP on 7/20/2018 for lower extremity edema and Lasix was increased to 20 mg twice daily.  She was also referred to lymphedema clinic.    Waqar presents today, with her sister Daiana and niece Crystal, for 3 month follow-up.  BMP shows stable creatinine at 1.45 with normal BUN and electrolytes.  Lipid profile showing total cholesterol 185 HDL 51  and triglycerides 134.  These results were reviewed with her today.  Since her Lasix was increased and she has been doing lymphedema therapy, her lower extremity edema has been improving.  Her left lower extremity is back to normal and her right lower extremity will have further lymphedema therapy.  She denies any other cardiac symptoms and has been doing well from a cardiac standpoint. She has restarted using CPAP due to symptoms of fatigue.  She will be moving to an assisted living within the next month.  She has also received a new set of dentures after her previous teeth needed to be pulled from side effects of  chemotherapy.  She tells me that before she received her dentures, she was having a hard time swallowing pills and may not have gotten all her doses of statin medication.  This may have contributed to her elevated LDL.  She is now able to swallow pills better and we will see what her LDL is when we recheck it. Patient reports no chest pain, shortness of breath, PND, orthopnea, presyncope, syncope, heart racing, or palpitations.      Current Cardiac Medications   Lasix 20 mg twice daily  Losartan 100 mg daily  Metoprolol XL 50 mg daily  Simvastatin 40 mg daily                     Assessment and Plan:     Plan  1.  Continue lymphedema therapy  2.  Follow-up with  March 3/2019 with lab work prior       1. CAD    s/p PCI to unknown vessel and CABG x 3 2002 in [place]     No angina     Continue statin, ARB, BB       2. Hyperlipidemia          Continue simvastatin 40 mg daily       3. Hypertension    Controlled     Continue losartan, metoprolol       4. LE Edema     Improving with lasix and lymphedema therapy          Thank you for allowing me to participate in this delightful patient's care.      This note was completed in part using Dragon voice recognition software. Although reviewed after completion, some word and grammatical errors may occur.    Tanya Nance, APRN, CNP           Data:   All laboratory data reviewed      HPI and Plan:   See dictation    No orders of the defined types were placed in this encounter.      No orders of the defined types were placed in this encounter.      There are no discontinued medications.      Encounter Diagnoses   Name Primary?     Benign essential hypertension      Coronary artery disease involving coronary bypass graft of native heart without angina pectoris Yes     Hyperlipidemia LDL goal <70      Leg edema, right        CURRENT MEDICATIONS:  Current Outpatient Prescriptions   Medication Sig Dispense Refill     acetaminophen (TYLENOL) 650 MG CR tablet  Take 1 tablet (650 mg) by mouth 3 times daily 60 tablet      BUTT PASTE - REGULAR (DR LOVE POOP GOOP BUTT PASTE FORMULA) Apply topically every hour as needed for skin protection       chlorhexidine (PERIDEX) 0.12 % solution        diclofenac (VOLTAREN) 50 MG EC tablet Take 1 tablet (50 mg) by mouth 3 times daily as needed for moderate pain 21 tablet 1     fluocinonide (LIDEX) 0.05 % ointment Apply sparingly to affected area twice daily as needed.  Do not apply to face. 60 g 11     fluticasone (FLONASE) 50 MCG/ACT spray 1 spray       furosemide (LASIX) 20 MG tablet Take 1 tablet (20 mg) by mouth 2 times daily 180 tablet 3     gabapentin (NEURONTIN) 300 MG capsule Take 1 tablet (300 mg) at bedtime, after 4 days may go to 2 if needed 60 capsule 1     hypromellose (GENTEAL) 0.3 % SOLN ophthalmic solution 2 drops       Incontinence Supply Disposable (DEPEND EASY FIT UNDERGARMENTS) MISC 1 Units every 2 hours X-large 300 each prn     insulin aspart (NOVOLOG FLEXPEN) 100 UNIT/ML injection 5 units with lunch 3 mL 11     insulin detemir (LEVEMIR FLEXPEN/FLEXTOUCH) 100 UNIT/ML injection Inject 20 Units Subcutaneous At Bedtime Fill when needed 3 mL 3     levothyroxine (SYNTHROID/LEVOTHROID) 100 MCG tablet Take 1 tablet (100 mcg) by mouth daily 90 tablet 0     loperamide (IMODIUM) 2 MG capsule One capsule once a day, can use a second one if needed 90 capsule 3     losartan (COZAAR) 100 MG tablet Take 1 tablet (100 mg) by mouth daily 90 tablet 1     menthol-zinc oxide (CALMOSEPTINE) 0.44-20.625 % OINT ointment Apply topically 4 times daily as needed for skin protection       metoprolol (TOPROL-XL) 50 MG 24 hr tablet Take 1 tablet (50 mg) by mouth daily 90 tablet 1     mineral oil-hydrophilic petrolatum (AQUAPHOR) ointment Apply topically as needed       Nutritional Supplements (GLUCERNA OS) LIQD One can two to three times a day 24 each 11     OMEPRAZOLE PO Take 20 mg by mouth daily as needed        oxyCODONE IR (ROXICODONE) 5 MG  tablet Take 1 tablet (5 mg) by mouth every 6 hours as needed for severe pain maximum 6 tablet(s) per day 20 tablet 0     Potassium Gluconate 595 MG CAPS Take 1 tablet by mouth daily       rOPINIRole (REQUIP) 1 MG tablet 1-3 tabs daily as needed, patient takes one in am, one in pm. occasionally will take in midday. 200 tablet 3     simvastatin (ZOCOR) 40 MG tablet Take 1 tablet (40 mg) by mouth daily 90 tablet 2     triamcinolone (KENALOG) 0.1 % cream Apply sparingly to affected area two times daily as needed 80 g 1     ACCU-CHEK MILVIA MELODY dispense one meter (Patient not taking: No sig reported) 1 device 0     blood glucose monitoring (ACCU-CHEK MILVIA) test strip Use to test blood sugars 4 times daily or as directed. (Patient not taking: Reported on 9/7/2018) 360 each 1     blood glucose monitoring (ACCU-CHEK MULTICLIX) lancets Test BG 4 times daily (Patient not taking: Reported on 9/7/2018) 1 Box 11     insulin pen needle (BD GABRIELLA U/F) 32G X 4 MM Use 4 times per day or as directed. (Patient not taking: Reported on 9/7/2018) 120 each 5     order for DME Equipment being ordered: Dry Pressure Mattress for hospital (Patient not taking: Reported on 9/7/2018) 1 Device 0     order for DME Equipment being ordered: back brace (Patient not taking: Reported on 9/7/2018) 1 Device 0     syringe, disposable, 1 ML MISC 1 each every 30 days With 1 inch needle for Vit B12 injection (Patient not taking: Reported on 9/7/2018) 1 each 11       ALLERGIES     Allergies   Allergen Reactions     Hydrocodone Unknown     Lisinopril Cough            Vicodin [Hydrocodone-Acetaminophen] Other (See Comments)     Caused extreme dizziness       PAST MEDICAL HISTORY:  Past Medical History:   Diagnosis Date     Acute myocardial infarction of other specified sites, episode of care unspecified 6/2002    MI 2 stints     Cancer of colon (H)      Chronic ischemic heart disease, unspecified 6/22/2003    cabgx3 , 2002 2/05 adenosine ef70%     Coronary  atherosclerosis of unspecified type of vessel, native or graft     Coronary artery disease     Diabetes mellitus (H)      Esophageal reflux      Gastro-oesophageal reflux disease      Generalized osteoarthrosis, unspecified site 6/22/2005     Generalized osteoarthrosis, unspecified site 6/22/2005     HEARING LOSS CONDUCTIVE, COMBINED TYPE 6/22/2005    Mongolian measles as child     HYPOTHYROIDISM  6/22/2003     Mixed hyperlipidemia 6/22/2005     Obesity, unspecified 6/22/2005     RC-moderate (AHI 12, LSat 60%); REM RDI-73 6/1/2009    Sleep study [facility] was performed 5/26/09 in order to re-evaluate severity of RC. The total sleep time was 412.0 minutes. The sleep latency was decreased at 8.7 minutes with Ambien 10mg. The REM sleep latency was 426.0 minutes. Sleep efficiency was reduced at 79.0%. The sleep architecture was disrupted with frequent sleep stage changes and arousals.  Snoring:  loud. Respiratory Events:   RDI o     Stented coronary artery      Type II or unspecified type diabetes mellitus with renal manifestations, uncontrolled(250.42) (H) 6/22/2005     Type II or unspecified type diabetes mellitus with renal manifestations, uncontrolled(250.42) (H) 6/22/2005     Unspecified disorder resulting from impaired renal function 6/22/2005    CR     1.82   06/21/2005     Unspecified essential hypertension        PAST SURGICAL HISTORY:  Past Surgical History:   Procedure Laterality Date     cabg       COLECTOMY LOW ANTERIOR  6/21/2011    Procedure:COLECTOMY LOW ANTERIOR; with loop ielostomy; Surgeon:ROBBIN MCDONNELL; Location:[facility]     COLONOSCOPY  1/26/2011    COLONOSCOPY performed by MASOUD BILL at [facility]     COLONOSCOPY N/A 3/1/2016    Procedure: COLONOSCOPY;  Surgeon: Liza Neal MD;  Location: [facility]     INSERT PORT VASCULAR ACCESS  3/2/2011    INSERT PORT VASCULAR ACCESS performed by LIZA NEAL at [facility]     OPEN REDUCTION INTERNAL FIXATION FEMUR DISTAL  2/12/2013     Procedure: OPEN REDUCTION INTERNAL FIXATION FEMUR DISTAL;  Open reduction internal fixation left femur fracture--[facility];  Surgeon: Ley, Jeffrey Duane, MD;  Location: [facility]     ORTHOPEDIC SURGERY       PHACOEMULSIFICATION WITH STANDARD INTRAOCULAR LENS IMPLANT Left 1/22/2018    Procedure: PHACOEMULSIFICATION WITH STANDARD INTRAOCULAR LENS IMPLANT;  Left cataract removal with implant;  Surgeon: Rony Key MD;  Location: [facility]     PHACOEMULSIFICATION WITH STANDARD INTRAOCULAR LENS IMPLANT Right 2/19/2018    Procedure: PHACOEMULSIFICATION WITH STANDARD INTRAOCULAR LENS IMPLANT;  Right cataract removal with implant;  Surgeon: Rony Key MD;  Location: [facility]     SIGMOIDOSCOPY FLEXIBLE  9/9/2011    Procedure:SIGMOIDOSCOPY FLEXIBLE; Performed prior to induction.; Surgeon:ROBBIN MCDONNELL; Location: OR     SURGICAL HISTORY OF -   10/24/2002    Heart bypass     SURGICAL HISTORY OF -   2002    R Knee arthroplasty     SURGICAL HISTORY OF -   2002    Mi 2 stints     TAKEDOWN ILEOSTOMY  9/9/2011    Procedure:TAKEDOWN ILEOSTOMY; Exploratory Laparotomy,  Loop Ileostomy Takedown, Small Bowel Resection x 2.; Surgeon:ROBBIN MCDONNELL; Location: OR       FAMILY HISTORY:  Family History   Problem Relation Age of Onset     Diabetes Sister      C.A.D. Sister      Breast Cancer Sister        SOCIAL HISTORY:  Social History     Social History     Marital status: Single     Spouse name: N/A     Number of children: N/A     Years of education: N/A     Social History Main Topics     Smoking status: Former Smoker     Smokeless tobacco: Never Used     Alcohol use Yes      Comment: rare social use     Drug use: No     Sexual activity: No     Other Topics Concern     Parent/Sibling W/ Cabg, Mi Or Angioplasty Before 65f 55m? Yes     Social History Narrative       Review of Systems:  Skin:  Positive for bruising     Eyes:  Negative      ENT:  Positive for hearing loss    Respiratory:  Positive for cough;dyspnea on  exertion     Cardiovascular:    Positive for;fatigue;edema;lightheadedness;dizziness    Gastroenterology:        Genitourinary:  Positive for   CKD- patient does not see nephrology  Musculoskeletal:  Negative      Neurologic:  Positive for   neuropathy  Psychiatric:  Negative      Heme/Lymph/Imm:  Positive for anemia    Endocrine:  Positive for thyroid disorder;diabetes      Physical Exam:  Vitals: /65 (BP Location: Right arm, Patient Position: Sitting, Cuff Size: Adult Regular)  Pulse 57  Wt 87.9 kg (193 lb 12.8 oz)  SpO2 91%  BMI 35.16 kg/m2    Constitutional:  cooperative, alert and oriented, well developed, well nourished, in no acute distress overweight      Skin:  warm and dry to the touch          Head:  normocephalic        Eyes:  sclera white        Lymph:      ENT:  no pallor or cyanosis dentures;hearing aide(s) present      Neck:  no stiffness        Respiratory:  normal breath sounds, clear to auscultation, normal A-P diameter, normal symmetry, normal respiratory excursion, no use of accessory muscles prolonged expiration       Cardiac: regular rhythm;normal S1 and S2   S4   systolic murmur;LUSB;grade 1          pulses below the femoral arteries are diminished                                      GI:  abdomen soft        Extremities and Muscular Skeletal:  no deformities, clubbing, cyanosis, erythema observed   bilateral LE edema;1+;R greater than L;2+          Neurological:  affect appropriate;no gross motor deficits        Psych:  Alert and Oriented x 3        Thank you for allowing me to participate in the care of your patient.    Sincerely,     CLEMENCIA Minaya [facility]

## 2018-09-07 NOTE — PROGRESS NOTES
Cardiology Clinic Progress Note  Stefanie Velazquez MRN# [id number]   YOB: 1945 Age: 73 year old     Reason For Visit:  3 month follow-up   Primary Cardiologist:   Dr. Del Cid          History of Presenting Illness:    Stefanie Velazquez is a pleasant 73 year old patient with a past cardiac history significant for CAD status post CABG x 3 2002 in [place] with subsequent PCI for stent restenosis, hypertension, hyperlipidemia.  Past medical history significant for diabetes type 2, sleep apnea treated with CPAP, rectal carcinoma status post surgery, chemo, and radiation.    Pt was last seen by Dr. Del Cid on 5/7/2018.  She was doing well from from a cardiac standpoint and follow-up in 3 months was recommended.  Since then, she was seen by her PCP on 7/20/2018 for lower extremity edema and Lasix was increased to 20 mg twice daily.  She was also referred to lymphedema clinic.    Pt presents today, with her sister Daiana and niece Crystal, for 3 month follow-up.  BMP shows stable creatinine at 1.45 with normal BUN and electrolytes.  Lipid profile showing total cholesterol 185 HDL 51  and triglycerides 134.  These results were reviewed with her today.  Since her Lasix was increased and she has been doing lymphedema therapy, her lower extremity edema has been improving.  Her left lower extremity is back to normal and her right lower extremity will have further lymphedema therapy.  She denies any other cardiac symptoms and has been doing well from a cardiac standpoint. She has restarted using CPAP due to symptoms of fatigue.  She will be moving to an assisted living within the next month.  She has also received a new set of dentures after her previous teeth needed to be pulled from side effects of chemotherapy.  She tells me that before she received her dentures, she was having a hard time swallowing pills and may not have gotten all her doses of statin medication.  This may have contributed to her elevated LDL.   She is now able to swallow pills better and we will see what her LDL is when we recheck it. Patient reports no chest pain, shortness of breath, PND, orthopnea, presyncope, syncope, heart racing, or palpitations.      Current Cardiac Medications   Lasix 20 mg twice daily  Losartan 100 mg daily  Metoprolol XL 50 mg daily  Simvastatin 40 mg daily                     Assessment and Plan:     Plan  1.  Continue lymphedema therapy  2.  Follow-up with  March 3/2019 with lab work prior       1. CAD    s/p PCI to unknown vessel and CABG x 3 2002 in [place]     No angina     Continue statin, ARB, BB       2. Hyperlipidemia          Continue simvastatin 40 mg daily       3. Hypertension    Controlled     Continue losartan, metoprolol       4. LE Edema     Improving with lasix and lymphedema therapy          Thank you for allowing me to participate in this delightful patient's care.      This note was completed in part using Dragon voice recognition software. Although reviewed after completion, some word and grammatical errors may occur.    Tanya Nance, CLEMENCIA, CNP           Data:   All laboratory data reviewed

## 2018-09-07 NOTE — MR AVS SNAPSHOT
After Visit Summary   9/7/2018    Stefanie Velazquez    MRN: [id number]           Patient Information     Date Of Birth          1945        Visit Information        Provider Department      9/7/2018 1:30 PM Tanya Rodney APRN CNP [facility]        Today's Diagnoses     Benign essential hypertension        Chronic ischemic heart disease        Hyperlipidemia LDL goal <100          Care Instructions    Thank you for your [facility] visit today. Your provider has recommended the following:  Medication Changes:  No changes   Recommendations:  Call if you have any concerns prior to next visit.   Follow-up:  See Dr. Cuenca for cardiology follow up in March 2019 with nonfasting lab work prior.  Call [phone] two months prior to request date to schedule any future appointments.  Reminder:  1. Please bring in all current medications, over the counter supplements and vitamin bottles to your next appointment.               [facility]~[address]~[place], [place]~[address]  Questions about your visit today?   Call your Cardiology Clinic RN's-Paulina Crockett and/or Emily Oro at [phone].            Follow-ups after your visit        Your next 10 appointments already scheduled     Sep 21, 2018 11:00 AM CDT   [facility]   [facility]    [address] [place] [address] [phone]           Please do not eat 10-12 hours before your appointment if you are coming in fasting for labs on lipids, cholesterol, or glucose (sugar). This does not apply to pregnant women. Water, hot tea and black coffee (with nothing added) are okay. Do not drink other fluids, diet soda or chew gum.            Sep 28, 2018 11:00 AM CDT   Return Visit with Apolinar Martinez MD   [facility] ([facility])    [facility]  [place]  [address]  [facility] [place] [address]   [phone]              Who to contact     If you have questions or need follow up information about today's clinic visit or your schedule please contact [facility] directly at [phone].  Normal or non-critical lab and imaging results will be communicated to you by MyChart, letter or phone within 4 business days after the clinic has received the results. If you do not hear from us within 7 days, please contact the clinic through MyChart or phone. If you have a critical or abnormal lab result, we will notify you by phone as soon as possible.  Submit refill requests through [facility] or call your pharmacy and they will forward the refill request to us. Please allow 3 business days for your refill to be completed.          Additional Information About Your Visit        MyChart Information     [facility] gives you secure access to your electronic health record. If you see a primary care provider, you can also send messages to your care team and make appointments. If you have questions, please call your primary care clinic.  If you do not have a primary care provider, please call [phone] and they will assist you.        Care EveryWhere ID     This is your Care EveryWhere ID. This could be used by other organizations to access your [facility] medical records  [id number]        Your Vitals Were     Pulse Pulse Oximetry BMI (Body Mass Index)             57 91% 35.16 kg/m2          Blood Pressure from Last 3 Encounters:   09/07/18 114/65   08/29/18 122/62   08/07/18 139/72    Weight from Last 3 Encounters:   09/07/18 87.9 kg (193 lb 12.8 oz)   08/07/18 88.5 kg (195 lb)   07/20/18 88.9 kg (196 lb)              We Performed the Following     Follow-Up with Cardiac Advanced Practice Provider        Primary Care Provider Office Phone # Fax #    Sandra Morales [phone] [address] [place] [address]  [place] [address]        Equal Access to Services     [facility] ARLINE : Hadii rod ng grabiel Perry, wajenda luqadaha, qaybta kaalmabradley zamora, ceci moy. So [facility] [phone].    ATENCIÓN: Si habla español, tiene a waite disposición servicios gratuitos de asistencia lingüística. Llame al [phone].    We comply with applicable federal civil rights laws and [place] laws. We do not discriminate on the basis of race, color, national origin, age, disability, sex, sexual orientation, or gender identity.            Thank you!     Thank you for choosing [facility]  for your care. Our goal is always to provide you with excellent care. Hearing back from our patients is one way we can continue to improve our services. Please take a few minutes to complete the written survey that you may receive in the mail after your visit with us. Thank you!             Your Updated Medication List - Protect others around you: Learn how to safely use, store and throw away your medicines at www.disposemymeds.org.          This list is accurate as of 9/7/18  2:07 PM.  Always use your most recent med list.                   Brand Name Dispense Instructions for use Diagnosis    ACCU-CHEK MILVIA Jeana     1 device    dispense one meter    Type II or unspecified type diabetes mellitus without mention of complication, uncontrolled       acetaminophen 650 MG CR tablet    TYLENOL    60 tablet    Take 1 tablet (650 mg) by mouth 3 times daily    Bilateral cellulitis of lower leg       blood glucose monitoring lancets     1 Box    Test BG 4 times daily    Type II or unspecified type diabetes mellitus without mention of complication, uncontrolled       blood glucose monitoring test strip    ACCU-CHEK MILVIA    360 each    Use to test blood sugars 4 times daily or as directed.    Type 2 diabetes mellitus with diabetic nephropathy, with long-term current use of insulin (H)       BUTT PASTE -  REGULAR    DR LOVE POOP GOOP BUTT PASTE FORMULA     Apply topically every hour as needed for skin protection        chlorhexidine 0.12 % solution    PERIDEX          DEPEND EASY FIT UNDERGARMENTS Misc     300 each    1 Units every 2 hours X-large    Bowel and bladder incontinence, Rectal cancer (H)       diclofenac 50 MG EC tablet    VOLTAREN    21 tablet    Take 1 tablet (50 mg) by mouth 3 times daily as needed for moderate pain    Acute left-sided low back pain with left-sided sciatica       fluocinonide 0.05 % ointment    LIDEX    60 g    Apply sparingly to affected area twice daily as needed.  Do not apply to face.    Venous stasis dermatitis of both lower extremities       fluticasone 50 MCG/ACT spray    FLONASE     1 spray        furosemide 20 MG tablet    LASIX    180 tablet    Take 1 tablet (20 mg) by mouth 2 times daily    Benign essential hypertension       gabapentin 300 MG capsule    NEURONTIN    60 capsule    Take 1 tablet (300 mg) at bedtime, after 4 days may go to 2 if needed    Lumbar radiculopathy       GLUCERNA OS Liqd     24 each    One can two to three times a day    Poor dentition, Poor nutrition, Type 2 diabetes mellitus with diabetic nephropathy, with long-term current use of insulin (H), Rectal cancer (H), Chronic diarrhea, Radiation therapy complication, sequela       hypromellose 0.3 % Soln ophthalmic solution    GENTEAL     2 drops        insulin detemir 100 UNIT/ML injection    LEVEMIR FLEXPEN/FLEXTOUCH    3 mL    Inject 20 Units Subcutaneous At Bedtime Fill when needed    Type 2 diabetes mellitus with diabetic nephropathy, with long-term current use of insulin (H)       insulin pen needle 32G X 4 MM    BD GABRIELLA U/F    120 each    Use 4 times per day or as directed.    Type 2 diabetes mellitus with diabetic nephropathy, with long-term current use of insulin (H)       levothyroxine 100 MCG tablet    SYNTHROID/LEVOTHROID    90 tablet    Take 1 tablet (100 mcg) by mouth daily    Other  specified hypothyroidism       loperamide 2 MG capsule    IMODIUM    90 capsule    One capsule once a day, can use a second one if needed    Rectal cancer (H), Chronic diarrhea       losartan 100 MG tablet    COZAAR    90 tablet    Take 1 tablet (100 mg) by mouth daily    Benign essential hypertension       menthol-zinc oxide 0.44-20.625 % Oint ointment    CALMOSEPTINE     Apply topically 4 times daily as needed for skin protection    Rash and nonspecific skin eruption       metoprolol succinate 50 MG 24 hr tablet    TOPROL-XL    90 tablet    Take 1 tablet (50 mg) by mouth daily    Benign essential hypertension       mineral oil-hydrophilic petrolatum      Apply topically as needed        NovoLOG FLEXPEN 100 UNIT/ML injection   Generic drug:  insulin aspart     3 mL    5 units with lunch    Type 2 diabetes mellitus with diabetic nephropathy, with long-term current use of insulin (H)       OMEPRAZOLE PO      Take 20 mg by mouth daily as needed        order for DME     1 Device    Equipment being ordered: back brace    Spinal stenosis of lumbar region without neurogenic claudication, Chronic midline low back pain without sciatica       order for DME     1 Device    Equipment being ordered: Dry Pressure Mattress for hospital    Spinal stenosis of lumbar region without neurogenic claudication       oxyCODONE IR 5 MG tablet    ROXICODONE    20 tablet    Take 1 tablet (5 mg) by mouth every 6 hours as needed for severe pain maximum 6 tablet(s) per day    Lumbar radiculopathy       Potassium Gluconate 595 MG Caps      Take 1 tablet by mouth daily    Stasis edema of both lower extremities       rOPINIRole 1 MG tablet    REQUIP    200 tablet    1-3 tabs daily as needed, patient takes one in am, one in pm. occasionally will take in midday.    RLS (restless legs syndrome)       simvastatin 40 MG tablet    ZOCOR    90 tablet    Take 1 tablet (40 mg) by mouth daily    Hyperlipidemia LDL goal <100       syringe (disposable) 1 ML  Misc     1 each    1 each every 30 days With 1 inch needle for Vit B12 injection    Vitamin B 12 deficiency       triamcinolone 0.1 % cream    KENALOG    80 g    Apply sparingly to affected area two times daily as needed    Stasis dermatitis of both legs

## 2018-09-07 NOTE — PATIENT INSTRUCTIONS
Thank you for your [facility] visit today. Your provider has recommended the following:  Medication Changes:  No changes   Recommendations:  Call if you have any concerns prior to next visit.   Follow-up:  See Dr. Cuenca for cardiology follow up in March 2019 with nonfasting lab work prior.  Call [phone] two months prior to request date to schedule any future appointments.  Reminder:  1. Please bring in all current medications, over the counter supplements and vitamin bottles to your next appointment.               [facility]  [facility]~[address]~[place], [place]~[address]  Questions about your visit today?   Call your Cardiology Clinic RN's-Paulina Crockett and/or Emily Oro at [phone].

## 2018-09-12 ENCOUNTER — TELEPHONE (OUTPATIENT)
Dept: FAMILY MEDICINE | Facility: CLINIC | Age: 73
End: 2018-09-12

## 2018-09-12 RX ORDER — LEVOTHYROXINE SODIUM 112 UG/1
112 TABLET ORAL DAILY
Qty: 90 TABLET | Refills: 0 | Status: SHIPPED | OUTPATIENT
Start: 2018-09-12 | End: 2018-11-12

## 2018-09-12 NOTE — TELEPHONE ENCOUNTER
Let patient know Tues 9/18/18  11:20 am, [facility] [address] already, let her know. Have her bring in paperwork for assisted living

## 2018-09-12 NOTE — TELEPHONE ENCOUNTER
Patient is moving and Dr. Bernstein wanted to see her once more before she moves. She is moving the last week in September and would like to get in next week (the week of September 17th-21st). She also states her legs are swelling pretty bad too. Please call her and let her know what day she can be seen.     Becky Merino-[place]

## 2018-09-13 ENCOUNTER — OFFICE VISIT (OUTPATIENT)
Dept: FAMILY MEDICINE | Facility: CLINIC | Age: 73
End: 2018-09-13
Payer: COMMERCIAL

## 2018-09-13 ENCOUNTER — HOSPITAL ENCOUNTER (OUTPATIENT)
Dept: ULTRASOUND IMAGING | Facility: CLINIC | Age: 73
Discharge: HOME | End: 2018-09-13
Attending: FAMILY MEDICINE | Admitting: FAMILY MEDICINE
Payer: MEDICARE

## 2018-09-13 ENCOUNTER — NURSE TRIAGE (OUTPATIENT)
Dept: NURSING | Facility: CLINIC | Age: 73
End: 2018-09-13

## 2018-09-13 ENCOUNTER — HOSPITAL ENCOUNTER (EMERGENCY)
Facility: CLINIC | Age: 73
Discharge: HOME | End: 2018-09-13
Attending: FAMILY MEDICINE | Admitting: FAMILY MEDICINE
Payer: MEDICARE

## 2018-09-13 ENCOUNTER — TELEPHONE (OUTPATIENT)
Dept: FAMILY MEDICINE | Facility: CLINIC | Age: 73
End: 2018-09-13

## 2018-09-13 VITALS
SYSTOLIC BLOOD PRESSURE: 172 MMHG | BODY MASS INDEX: 35.15 KG/M2 | HEART RATE: 60 BPM | DIASTOLIC BLOOD PRESSURE: 89 MMHG | WEIGHT: 191 LBS | TEMPERATURE: 97.7 F | HEIGHT: 62 IN | OXYGEN SATURATION: 95 %

## 2018-09-13 VITALS
RESPIRATION RATE: 18 BRPM | HEART RATE: 68 BPM | SYSTOLIC BLOOD PRESSURE: 114 MMHG | DIASTOLIC BLOOD PRESSURE: 84 MMHG | TEMPERATURE: 98.8 F | OXYGEN SATURATION: 96 % | HEIGHT: 62 IN | WEIGHT: 191 LBS | BODY MASS INDEX: 35.15 KG/M2

## 2018-09-13 DIAGNOSIS — Z79.4 TYPE 2 DIABETES MELLITUS WITH DIABETIC NEPHROPATHY, WITH LONG-TERM CURRENT USE OF INSULIN (H): Chronic | ICD-10-CM

## 2018-09-13 DIAGNOSIS — I82.491 ACUTE DEEP VEIN THROMBOSIS (DVT) OF OTHER SPECIFIED VEIN OF RIGHT LOWER EXTREMITY (H): Primary | ICD-10-CM

## 2018-09-13 DIAGNOSIS — R60.0 PEDAL EDEMA: ICD-10-CM

## 2018-09-13 DIAGNOSIS — I82.401 ACUTE DEEP VEIN THROMBOSIS (DVT) OF RIGHT LOWER EXTREMITY, UNSPECIFIED VEIN (H): ICD-10-CM

## 2018-09-13 DIAGNOSIS — E11.21 TYPE 2 DIABETES MELLITUS WITH DIABETIC NEPHROPATHY, WITH LONG-TERM CURRENT USE OF INSULIN (H): Chronic | ICD-10-CM

## 2018-09-13 LAB
ANION GAP SERPL CALCULATED.3IONS-SCNC: 9 MMOL/L (ref 3–14)
APTT PPP: 29 SEC (ref 22–37)
BASOPHILS # BLD AUTO: 0.1 10E9/L (ref 0–0.2)
BASOPHILS NFR BLD AUTO: 1 %
BUN SERPL-MCNC: 18 MG/DL (ref 7–30)
CALCIUM SERPL-MCNC: 9.4 MG/DL (ref 8.5–10.1)
CHLORIDE SERPL-SCNC: 106 MMOL/L (ref 94–109)
CO2 SERPL-SCNC: 27 MMOL/L (ref 20–32)
CREAT SERPL-MCNC: 1.48 MG/DL (ref 0.52–1.04)
DIFFERENTIAL METHOD BLD: NORMAL
EOSINOPHIL # BLD AUTO: 0.2 10E9/L (ref 0–0.7)
EOSINOPHIL NFR BLD AUTO: 4 %
ERYTHROCYTE [DISTWIDTH] IN BLOOD BY AUTOMATED COUNT: 13.1 % (ref 10–15)
GFR SERPL CREATININE-BSD FRML MDRD: 34 ML/MIN/1.7M2
GLUCOSE BLDC GLUCOMTR-MCNC: 100 MG/DL (ref 70–99)
GLUCOSE SERPL-MCNC: 93 MG/DL (ref 70–99)
HCT VFR BLD AUTO: 42.4 % (ref 35–47)
HGB BLD-MCNC: 14.2 G/DL (ref 11.7–15.7)
IMM GRANULOCYTES # BLD: 0 10E9/L (ref 0–0.4)
IMM GRANULOCYTES NFR BLD: 0.4 %
INR PPP: 1.15 (ref 0.86–1.14)
LYMPHOCYTES # BLD AUTO: 1.2 10E9/L (ref 0.8–5.3)
LYMPHOCYTES NFR BLD AUTO: 23.2 %
MCH RBC QN AUTO: 29.2 PG (ref 26.5–33)
MCHC RBC AUTO-ENTMCNC: 33.5 G/DL (ref 31.5–36.5)
MCV RBC AUTO: 87 FL (ref 78–100)
MONOCYTES # BLD AUTO: 0.4 10E9/L (ref 0–1.3)
MONOCYTES NFR BLD AUTO: 8 %
NEUTROPHILS # BLD AUTO: 3.3 10E9/L (ref 1.6–8.3)
NEUTROPHILS NFR BLD AUTO: 63.4 %
NRBC # BLD AUTO: 0 10*3/UL
NRBC BLD AUTO-RTO: 0 /100
PLATELET # BLD AUTO: 244 10E9/L (ref 150–450)
POTASSIUM SERPL-SCNC: 3.6 MMOL/L (ref 3.4–5.3)
RBC # BLD AUTO: 4.87 10E12/L (ref 3.8–5.2)
SODIUM SERPL-SCNC: 142 MMOL/L (ref 133–144)
WBC # BLD AUTO: 5.2 10E9/L (ref 4–11)

## 2018-09-13 PROCEDURE — 85610 PROTHROMBIN TIME: CPT | Performed by: FAMILY MEDICINE

## 2018-09-13 PROCEDURE — 80048 BASIC METABOLIC PNL TOTAL CA: CPT | Performed by: FAMILY MEDICINE

## 2018-09-13 PROCEDURE — 85730 THROMBOPLASTIN TIME PARTIAL: CPT | Performed by: FAMILY MEDICINE

## 2018-09-13 PROCEDURE — 99284 EMERGENCY DEPT VISIT MOD MDM: CPT | Mod: 25

## 2018-09-13 PROCEDURE — 00000146 ZZHCL STATISTIC GLUCOSE BY METER IP

## 2018-09-13 PROCEDURE — A9270 NON-COVERED ITEM OR SERVICE: HCPCS | Mod: GY | Performed by: FAMILY MEDICINE

## 2018-09-13 PROCEDURE — 25000132 ZZH RX MED GY IP 250 OP 250 PS 637: Mod: GY | Performed by: FAMILY MEDICINE

## 2018-09-13 PROCEDURE — 85025 COMPLETE CBC W/AUTO DIFF WBC: CPT | Performed by: FAMILY MEDICINE

## 2018-09-13 PROCEDURE — 99284 EMERGENCY DEPT VISIT MOD MDM: CPT | Mod: Z6 | Performed by: FAMILY MEDICINE

## 2018-09-13 PROCEDURE — 93971 EXTREMITY STUDY: CPT | Mod: RT

## 2018-09-13 PROCEDURE — 99214 OFFICE O/P EST MOD 30 MIN: CPT | Performed by: FAMILY MEDICINE

## 2018-09-13 RX ORDER — ROPINIROLE 1 MG/1
1 TABLET, FILM COATED ORAL ONCE
Status: COMPLETED | OUTPATIENT
Start: 2018-09-13 | End: 2018-09-13

## 2018-09-13 RX ORDER — CEPHALEXIN 500 MG/1
500 CAPSULE ORAL 2 TIMES DAILY
Qty: 20 CAPSULE | Refills: 0 | Status: SHIPPED | OUTPATIENT
Start: 2018-09-13 | End: 2018-09-13

## 2018-09-13 RX ADMIN — ROPINIROLE HYDROCHLORIDE 1 MG: 1 TABLET, FILM COATED ORAL at 19:21

## 2018-09-13 RX ADMIN — APIXABAN 10 MG: 5 TABLET, FILM COATED ORAL at 19:44

## 2018-09-13 ASSESSMENT — ENCOUNTER SYMPTOMS
WHEEZING: 0
BLOOD IN STOOL: 0
FREQUENCY: 0
SINUS PRESSURE: 0
DIAPHORESIS: 0
FEVER: 0
DIARRHEA: 0
DYSURIA: 0
ABDOMINAL PAIN: 0
CONSTIPATION: 0
PALPITATIONS: 0
COUGH: 0
NAUSEA: 0
SORE THROAT: 0
HEADACHES: 0
CHILLS: 0
VOMITING: 0
SHORTNESS OF BREATH: 0

## 2018-09-13 NOTE — TELEPHONE ENCOUNTER
Reason for call:  Patient reporting a symptom    Symptom or request: Bilateral Swelling Legs per [facility]    Phone Number patient can be reached at:  Home number on file [phone] (home)    Best Time:  Any Time      Can we leave a detailed message on this number:  YES    Call taken on 9/13/2018 at 2:49 PM by Leilani Jurado

## 2018-09-13 NOTE — ED AVS SNAPSHOT
[facility]    [address] [address]    Phone:  [phone]    Fax:  [phone]                                       Stefanie Velazquez   MRN: [id number]    Department:  [facility]   Date of Visit:  9/13/2018           After Visit Summary Signature Page     I have received my discharge instructions, and my questions have been answered. I have discussed any challenges I see with this plan with the nurse or doctor.    ..........................................................................................................................................  Patient/Patient Representative Signature      ..........................................................................................................................................  Patient Representative Print Name and Relationship to Patient    ..................................................               ................................................  Date                                   Time    ..........................................................................................................................................  Reviewed by Signature/Title    ...................................................              ..............................................  Date                                               Time          22EPIC Rev 08/18

## 2018-09-13 NOTE — MR AVS SNAPSHOT
After Visit Summary   9/13/2018    Stefanie Velazquez    MRN: [id number]           Patient Information     Date Of Birth          1945        Visit Information        Provider Department      9/13/2018 3:40 PM Orion Mancini MD [facility]        Today's Diagnoses     Pedal edema    -  1    Cellulitis of right lower extremity        Type 2 diabetes mellitus with diabetic nephropathy, with long-term current use of insulin (H)          Care Instructions    Please call [phone] to schedule U/S leg.            Follow-ups after your visit        Your next 10 appointments already scheduled     Sep 18, 2018 11:20 AM CDT   SHORT with Sandra Morales MD   [facility] ([facility])    [address] [place] [id number] [phone]            Sep 21, 2018 11:00 AM CDT   LAB with [facility]   [facility] ([facility]    [address] [facility] [place] [id number] [phone]           Please do not eat 10-12 hours before your appointment if you are coming in fasting for labs on lipids, cholesterol, or glucose (sugar). This does not apply to pregnant women. Water, hot tea and black coffee (with nothing added) are okay. Do not drink other fluids, diet soda or chew gum.            Sep 28, 2018 11:00 AM CDT   Return Visit with Apolinar Martinez MD   [facility] ([facility])    [facility]  [address] [facility] [address]  [facility] [address] [phone]              Future tests that were ordered for you today     Open Future Orders        Priority Expected Expires Ordered    US Lower Extremity Venous Duplex Right STAT  9/13/2019 9/13/2018            Who to contact     If you have questions or need follow up information about today's clinic visit or your schedule please contact [facility] directly at [phone].  Normal or non-critical lab and imaging  "results will be communicated to you by MyChart, letter or phone within 4 business days after the clinic has received the results. If you do not hear from us within 7 days, please contact the clinic through [employer] or phone. If you have a critical or abnormal lab result, we will notify you by phone as soon as possible.  Submit refill requests through [employer] or call your pharmacy and they will forward the refill request to us. Please allow 3 business days for your refill to be completed.          Additional Information About Your Visit        [employer] Information     [employer] gives you secure access to your electronic health record. If you see a primary care provider, you can also send messages to your care team and make appointments. If you have questions, please call your primary care clinic.  If you do not have a primary care provider, please call [phone] and they will assist you.        Care EveryWhere ID     This is your Care EveryWhere ID. This could be used by other organizations to access your [place] medical records  [id number]        Your Vitals Were     Pulse Temperature Respirations Height Pulse Oximetry Breastfeeding?    68 98.8  F (37.1  C) (Tympanic) 18 5' 2.25\" (1.581 m) 96% No    BMI (Body Mass Index)                   34.65 kg/m2            Blood Pressure from Last 3 Encounters:   09/13/18 114/84   09/07/18 114/65   08/29/18 122/62    Weight from Last 3 Encounters:   09/13/18 191 lb (86.6 kg)   09/07/18 193 lb 12.8 oz (87.9 kg)   08/07/18 195 lb (88.5 kg)                 Today's Medication Changes          These changes are accurate as of 9/13/18  4:21 PM.  If you have any questions, ask your nurse or doctor.               Start taking these medicines.        Dose/Directions    cephALEXin 500 MG capsule   Commonly known as:  KEFLEX   Used for:  Cellulitis of right lower extremity   Started by:  Orion Mancini MD        Dose:  500 mg   Take 1 capsule (500 mg) by mouth 2 times daily "   Quantity:  20 capsule   Refills:  0            Where to get your medicines      These medications were sent to [id number] - [place], [place] - [address] [facility]  [address] , [facility] [address]     Phone:  [phone]     cephALEXin 500 MG capsule                Primary Care Provider Office Phone # Fax #    Sandra Morales [phone]       [address] [facility] [address]        Equal Access to Services     NIDA NUNN : Hadii aad ku hadasho Soomaali, waaxda luqadaha, qaybta kaalmada adeegyada, waxay idiin hayaan adeeg kharash la'estiven . So [facility] [phone].    ATENCIÓN: Si habla español, tiene a waite disposición servicios gratuitos de asistencia lingüística. [facility] [phone].    We comply with applicable federal civil rights laws and [place] laws. We do not discriminate on the basis of race, color, national origin, age, disability, sex, sexual orientation, or gender identity.            Thank you!     Thank you for choosing [facility]  for your care. Our goal is always to provide you with excellent care. Hearing back from our patients is one way we can continue to improve our services. Please take a few minutes to complete the written survey that you may receive in the mail after your visit with us. Thank you!             Your Updated Medication List - Protect others around you: Learn how to safely use, store and throw away your medicines at www.disposemymeds.org.          This list is accurate as of 9/13/18  4:21 PM.  Always use your most recent med list.                   Brand Name Dispense Instructions for use Diagnosis    ACCU-CHEK MILVIA Jeana     1 device    dispense one meter    Type II or unspecified type diabetes mellitus without mention of complication, uncontrolled       acetaminophen 650 MG CR tablet    TYLENOL    60 tablet    Take 1 tablet (650 mg) by mouth 3 times daily    Bilateral cellulitis of lower leg       blood glucose monitoring lancets      1 Box    Test BG 4 times daily    Type II or unspecified type diabetes mellitus without mention of complication, uncontrolled       blood glucose monitoring test strip    ACCU-CHEK MILVIA    360 each    Use to test blood sugars 4 times daily or as directed.    Type 2 diabetes mellitus with diabetic nephropathy, with long-term current use of insulin (H)       BUTT PASTE - REGULAR    DR LOVE POOP GOOP BUTT PASTE FORMULA     Apply topically every hour as needed for skin protection        cephALEXin 500 MG capsule    KEFLEX    20 capsule    Take 1 capsule (500 mg) by mouth 2 times daily    Cellulitis of right lower extremity       chlorhexidine 0.12 % solution    PERIDEX          DEPEND EASY FIT UNDERGARMENTS Misc     300 each    1 Units every 2 hours X-large    Bowel and bladder incontinence, Rectal cancer (H)       diclofenac 50 MG EC tablet    VOLTAREN    21 tablet    Take 1 tablet (50 mg) by mouth 3 times daily as needed for moderate pain    Acute left-sided low back pain with left-sided sciatica       fluocinonide 0.05 % ointment    LIDEX    60 g    Apply sparingly to affected area twice daily as needed.  Do not apply to face.    Venous stasis dermatitis of both lower extremities       fluticasone 50 MCG/ACT spray    FLONASE     1 spray        furosemide 20 MG tablet    LASIX    180 tablet    Take 1 tablet (20 mg) by mouth 2 times daily    Benign essential hypertension       gabapentin 300 MG capsule    NEURONTIN    60 capsule    Take 1 tablet (300 mg) at bedtime, after 4 days may go to 2 if needed    Lumbar radiculopathy       GLUCERNA OS Liqd     24 each    One can two to three times a day    Poor dentition, Poor nutrition, Type 2 diabetes mellitus with diabetic nephropathy, with long-term current use of insulin (H), Rectal cancer (H), Chronic diarrhea, Radiation therapy complication, sequela       hypromellose 0.3 % Soln ophthalmic solution    GENTEAL     2 drops        insulin detemir 100 UNIT/ML injection     LEVEMIR FLEXPEN/FLEXTOUCH    3 mL    Inject 20 Units Subcutaneous At Bedtime Fill when needed    Type 2 diabetes mellitus with diabetic nephropathy, with long-term current use of insulin (H)       insulin pen needle 32G X 4 MM    BD GABRIELLA U/F    120 each    Use 4 times per day or as directed.    Type 2 diabetes mellitus with diabetic nephropathy, with long-term current use of insulin (H)       levothyroxine 112 MCG tablet    SYNTHROID/LEVOTHROID    90 tablet    Take 1 tablet (112 mcg) by mouth daily    Other specified hypothyroidism       loperamide 2 MG capsule    IMODIUM    90 capsule    One capsule once a day, can use a second one if needed    Rectal cancer (H), Chronic diarrhea       losartan 100 MG tablet    COZAAR    90 tablet    Take 1 tablet (100 mg) by mouth daily    Benign essential hypertension       menthol-zinc oxide 0.44-20.625 % Oint ointment    CALMOSEPTINE     Apply topically 4 times daily as needed for skin protection    Rash and nonspecific skin eruption       metoprolol succinate 50 MG 24 hr tablet    TOPROL-XL    90 tablet    Take 1 tablet (50 mg) by mouth daily    Benign essential hypertension       mineral oil-hydrophilic petrolatum      Apply topically as needed        NovoLOG FLEXPEN 100 UNIT/ML injection   Generic drug:  insulin aspart     3 mL    5 units with lunch    Type 2 diabetes mellitus with diabetic nephropathy, with long-term current use of insulin (H)       OMEPRAZOLE PO      Take 20 mg by mouth daily as needed        order for DME     1 Device    Equipment being ordered: back brace    Spinal stenosis of lumbar region without neurogenic claudication, Chronic midline low back pain without sciatica       order for DME     1 Device    Equipment being ordered: Dry Pressure Mattress for [facility]    Spinal stenosis of lumbar region without neurogenic claudication       oxyCODONE IR 5 MG tablet    ROXICODONE    20 tablet    Take 1 tablet (5 mg) by mouth every 6 hours as needed for  severe pain maximum 6 tablet(s) per day    Lumbar radiculopathy       Potassium Gluconate 595 MG Caps      Take 1 tablet by mouth daily    Stasis edema of both lower extremities       rOPINIRole 1 MG tablet    REQUIP    200 tablet    1-3 tabs daily as needed, patient takes one in am, one in pm. occasionally will take in midday.    RLS (restless legs syndrome)       simvastatin 40 MG tablet    ZOCOR    90 tablet    Take 1 tablet (40 mg) by mouth daily    Hyperlipidemia LDL goal <100       syringe (disposable) 1 ML Misc     1 each    1 each every 30 days With 1 inch needle for Vit B12 injection    Vitamin B 12 deficiency       triamcinolone 0.1 % cream    KENALOG    80 g    Apply sparingly to affected area two times daily as needed    Stasis dermatitis of both legs

## 2018-09-13 NOTE — NURSING NOTE
"Chief Complaint   Patient presents with     Leg Swelling       Initial /84 (Cuff Size: Adult Regular)  Pulse 68  Temp 98.8  F (37.1  C) (Tympanic)  Resp 18  Ht 5' 2.25\" (1.581 m)  Wt 191 lb (86.6 kg)  SpO2 96%  Breastfeeding? No  BMI 34.65 kg/m2 Estimated body mass index is 34.65 kg/(m^2) as calculated from the following:    Height as of this encounter: 5' 2.25\" (1.581 m).    Weight as of this encounter: 191 lb (86.6 kg).    Patient presents to the clinic using Walker    Health Maintenance that is potentially due pending provider review:  NONE    n/a    Is there anyone who you would like to be able to receive your results? Not Applicable  If yes have patient fill out HELLEN    "

## 2018-09-13 NOTE — PROGRESS NOTES
SUBJECTIVE:   Stefanie Velazquez is a 73 year old female who presents to clinic today for the following health issues:      Swelling       Duration: 4 days     Description (location/character/radiation): Increased edema in both legs, Right is worse.     Intensity:  moderate    Accompanying signs and symptoms: Rash is getting worse on legs. Will get red and spreads pretty quickly.    History (similar episodes/previous evaluation): has been hospitalized for his same condition in the past     Precipitating or alleviating factors: has been sitting a lot packing boxes as she is getting ready to move.     Therapies tried and outcome: trying to elevate legs         Problem list and histories reviewed & adjusted, as indicated.  Additional history: as documented    Patient Active Problem List   Diagnosis     Hypothyroidism     bmi 40     Chronic ischemic heart disease     Generalized osteoarthrosis, unspecified site     HEARING LOSS CONDUCTIVE, COMBINED TYPE     Esophageal reflux     Osteoporosis     RC-moderate (AHI 12, LSat 60%); REM RDI-73     Neuropathy (H)     Hyperlipidemia LDL goal <100     CKD (chronic kidney disease) stage 3, GFR 30-59 ml/min     Rectal cancer- newly diagnosed adenoCA rectum Jan 2011     Advanced directives, counseling/discussion     Anemia     Type 2 diabetes mellitus with diabetic nephropathy (H)     Radiation therapy complication     Vitamin B 12 deficiency     Vitamin D deficiency     Fracture of femur, distal, left, closed (H)     Dysuria     Bowel and bladder incontinence     Cellulitis of lower extremity, bilateral     Benign essential hypertension     Health Care Home     Chronic diarrhea     Restless leg syndrome     Type 2 diabetes mellitus with diabetic nephropathy, with long-term current use of insulin (H)     Spinal stenosis of lumbar region without neurogenic claudication     DDD (degenerative disc disease), lumbar     Lymphedema of genitalia     Past Surgical History:   Procedure  Laterality Date     cabg       COLECTOMY LOW ANTERIOR  6/21/2011    Procedure:COLECTOMY LOW ANTERIOR; with loop ielostomy; Surgeon:ROBBIN MCDONNELL; Location: OR     COLONOSCOPY  1/26/2011    COLONOSCOPY performed by MASOUD BILL at [facility]     COLONOSCOPY N/A 3/1/2016    Procedure: COLONOSCOPY;  Surgeon: Liza Neal MD;  Location: [facility]     INSERT PORT VASCULAR ACCESS  3/2/2011    INSERT PORT VASCULAR ACCESS performed by LIZA NEAL at [facility]     OPEN REDUCTION INTERNAL FIXATION FEMUR DISTAL  2/12/2013    Procedure: OPEN REDUCTION INTERNAL FIXATION FEMUR DISTAL;  Open reduction internal fixation left femur fracture--Anesth[facility]Choice;  Surgeon: Ley, Jeffrey Duane, MD;  Location: [facility]     ORTHOPEDIC SURGERY       PHACOEMULSIFICATION WITH STANDARD INTRAOCULAR LENS IMPLANT Left 1/22/2018    Procedure: PHACOEMULSIFICATION WITH STANDARD INTRAOCULAR LENS IMPLANT;  Left cataract removal with implant;  Surgeon: Rony Key MD;  Location: [facility]     PHACOEMULSIFICATION WITH STANDARD INTRAOCULAR LENS IMPLANT Right 2/19/2018    Procedure: PHACOEMULSIFICATION WITH STANDARD INTRAOCULAR LENS IMPLANT;  Right cataract removal with implant;  Surgeon: Rony Key MD;  Location: [facility]     SIGMOIDOSCOPY FLEXIBLE  9/9/2011    Procedure:SIGMOIDOSCOPY FLEXIBLE; Performed prior to induction.; Surgeon:ROBBIN MCDONNELL; Location: OR     SURGICAL HISTORY OF -   10/24/2002    Heart bypass     SURGICAL HISTORY OF -   2002    R Knee arthroplasty     SURGICAL HISTORY OF -   2002    Mi 2 stints     TAKEDOWN ILEOSTOMY  9/9/2011    Procedure:TAKEDOWN ILEOSTOMY; Exploratory Laparotomy,  Loop Ileostomy Takedown, Small Bowel Resection x 2.; Surgeon:ROBBIN MCDONNELL; Location: OR       Social History   Substance Use Topics     Smoking status: Former Smoker     Smokeless tobacco: Never Used     Alcohol use Yes      Comment: rare social use     Family History   Problem Relation Age of Onset      Diabetes Sister      C.A.D. Sister      Breast Cancer Sister          Current Outpatient Prescriptions   Medication Sig Dispense Refill     ACCU-CHEK MILVIA MELODY dispense one meter 1 device 0     acetaminophen (TYLENOL) 650 MG CR tablet Take 1 tablet (650 mg) by mouth 3 times daily 60 tablet      blood glucose monitoring (ACCU-CHEK MILVIA) test strip Use to test blood sugars 4 times daily or as directed. 360 each 1     blood glucose monitoring (ACCU-CHEK MULTICLIX) lancets Test BG 4 times daily 1 Box 11     BUTT PASTE - REGULAR (DR LOVE POOP GOOP BUTT PASTE FORMULA) Apply topically every hour as needed for skin protection       chlorhexidine (PERIDEX) 0.12 % solution        diclofenac (VOLTAREN) 50 MG EC tablet Take 1 tablet (50 mg) by mouth 3 times daily as needed for moderate pain 21 tablet 1     fluocinonide (LIDEX) 0.05 % ointment Apply sparingly to affected area twice daily as needed.  Do not apply to face. 60 g 11     fluticasone (FLONASE) 50 MCG/ACT spray 1 spray       furosemide (LASIX) 20 MG tablet Take 1 tablet (20 mg) by mouth 2 times daily 180 tablet 3     gabapentin (NEURONTIN) 300 MG capsule Take 1 tablet (300 mg) at bedtime, after 4 days may go to 2 if needed 60 capsule 1     hypromellose (GENTEAL) 0.3 % SOLN ophthalmic solution 2 drops       Incontinence Supply Disposable (DEPEND EASY FIT UNDERGARMENTS) MISC 1 Units every 2 hours X-large 300 each prn     insulin aspart (NOVOLOG FLEXPEN) 100 UNIT/ML injection 5 units with lunch 3 mL 11     insulin detemir (LEVEMIR FLEXPEN/FLEXTOUCH) 100 UNIT/ML injection Inject 20 Units Subcutaneous At Bedtime Fill when needed 3 mL 3     insulin pen needle (BD GABRIELLA U/F) 32G X 4 MM Use 4 times per day or as directed. 120 each 5     levothyroxine (SYNTHROID/LEVOTHROID) 112 MCG tablet Take 1 tablet (112 mcg) by mouth daily 90 tablet 0     loperamide (IMODIUM) 2 MG capsule One capsule once a day, can use a second one if needed 90 capsule 3     losartan (COZAAR) 100 MG  tablet Take 1 tablet (100 mg) by mouth daily 90 tablet 1     menthol-zinc oxide (CALMOSEPTINE) 0.44-20.625 % OINT ointment Apply topically 4 times daily as needed for skin protection       metoprolol (TOPROL-XL) 50 MG 24 hr tablet Take 1 tablet (50 mg) by mouth daily 90 tablet 1     mineral oil-hydrophilic petrolatum (AQUAPHOR) ointment Apply topically as needed       Nutritional Supplements (GLUCERNA OS) LIQD One can two to three times a day 24 each 11     OMEPRAZOLE PO Take 20 mg by mouth daily as needed        order for DME Equipment being ordered: Dry Pressure Mattress for hospital 1 Device 0     order for DME Equipment being ordered: back brace 1 Device 0     oxyCODONE IR (ROXICODONE) 5 MG tablet Take 1 tablet (5 mg) by mouth every 6 hours as needed for severe pain maximum 6 tablet(s) per day 20 tablet 0     Potassium Gluconate 595 MG CAPS Take 1 tablet by mouth daily       rOPINIRole (REQUIP) 1 MG tablet 1-3 tabs daily as needed, patient takes one in am, one in pm. occasionally will take in midday. 200 tablet 3     simvastatin (ZOCOR) 40 MG tablet Take 1 tablet (40 mg) by mouth daily 90 tablet 2     syringe, disposable, 1 ML MISC 1 each every 30 days With 1 inch needle for Vit B12 injection 1 each 11     triamcinolone (KENALOG) 0.1 % cream Apply sparingly to affected area two times daily as needed 80 g 1     Allergies   Allergen Reactions     Hydrocodone Unknown     Lisinopril Cough            Vicodin [Hydrocodone-Acetaminophen] Other (See Comments)     Caused extreme dizziness     Recent Labs   Lab Test  08/31/18   0905  07/20/18   1647   05/02/18   0910  03/20/18   1611  02/09/18   0910  11/10/17   1118   A1C   --   8.5*   --    --   7.3*   --   7.1*   LDL  107*   --    --   75   --   124*   --    HDL  51   --    --   53   --   62   --    TRIG  134   --    --   235*   --   137   --    ALT  13  19   --   14   --   16   --    CR  1.45*  1.43*   < >  1.36*   --   1.38*  1.53*   GFRESTIMATED  35*  36*   < >  38*  "  --   37*  33*   GFRESTBLACK  43*  43*   < >  46*   --   45*  40*   POTASSIUM  3.4  4.2   < >  3.3*   --   4.0  4.1   TSH  4.24*  5.32*   --    --    --    --   4.13*    < > = values in this interval not displayed.      BP Readings from Last 3 Encounters:   09/13/18 114/84   09/07/18 114/65   08/29/18 122/62    Wt Readings from Last 3 Encounters:   09/13/18 191 lb (86.6 kg)   09/07/18 193 lb 12.8 oz (87.9 kg)   08/07/18 195 lb (88.5 kg)                  Labs reviewed in EPIC    Reviewed and updated as needed this visit by clinical staff       Reviewed and updated as needed this visit by Provider         ROS:  Constitutional, HEENT, cardiovascular, pulmonary, gi and gu systems are negative, except as otherwise noted.    OBJECTIVE:     /84 (Cuff Size: Adult Regular)  Pulse 68  Temp 98.8  F (37.1  C) (Tympanic)  Resp 18  Ht 5' 2.25\" (1.581 m)  Wt 191 lb (86.6 kg)  SpO2 96%  Breastfeeding? No  BMI 34.65 kg/m2  Body mass index is 34.65 kg/(m^2).  GENERAL: alert, no distress and obese  NECK: no adenopathy, no asymmetry, masses, or scars and thyroid normal to palpation  RESP: lungs clear to auscultation - no rales, rhonchi or wheezes  CV: regular rates and rhythm, normal S1 S2, no S3 or S4 and no murmur, click or rub  ABDOMEN: soft, nontender, no hepatosplenomegaly, no masses and bowel sounds normal  SKIN: right leg pedal edema 2+ with some erythema/tenderness, pulses 2+, sensation to touch and pressure intact, no calf tenderness  NEURO: Normal strength and tone, mentation intact and speech normal  PSYCH: mentation appears normal, affect normal/bright            Lab Results   Component Value Date    A1C 8.5 07/20/2018    A1C 7.3 03/20/2018    A1C 7.1 11/10/2017    A1C 7.9 07/10/2017    A1C 7.1 03/30/2017     VENOUS ULTRASOUND RIGHT LEG  9/13/2018 5:48 PM      HISTORY: ; Pedal edema     COMPARISON: None.     FINDINGS:  Examination of the deep veins with graded compression and  color flow Doppler with spectral " wave form analysis shows nonocclusive  DVT thrombus from the right proximal femoral vein into the distal  posterior tibial vein in the calf.  Nonocclusive thrombus is seen in  the femoral vein, popliteal vein, and posterior tibial vein.         IMPRESSION: Nonocclusive DVT extending from the upper thigh to the mid  calf.    ASSESSMENT/PLAN:         ICD-10-CM    1. Acute deep vein thrombosis (DVT) of other specified vein of right lower extremity (H) I82.491    2. Pedal edema R60.0 US Lower Extremity Venous Duplex Right   3. Type 2 diabetes mellitus with diabetic nephropathy, with long-term current use of insulin (H) E11.21     Z79.4      73-year-old female presents with worsening right leg edema and pain.  Past medical history significant for chronic leg edema, type 2 diabetes mellitus, on insulin, chronic kidney disease stage III.  Physical examination remarkable for right leg edema with subtle erythema and tenderness.  Ultrasound confirmed nonocclusive DVT extending from the upper thigh to the mid calf.  Recommended to go ER for further evaluation and management.  Patient understood and in agreement with above plan.  All questions answered.      Patient Instructions   Please call [phone] to schedule U/S leg.        Orion Mancini MD  [facility]     Orion Mancini MD  [facility]

## 2018-09-13 NOTE — TELEPHONE ENCOUNTER
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  Denies l/e pain, reports feels more numb than discomfort.  Hx lymphedema, past hx cellulitis.  No VS available, denies feeling feverish.   Advised needs to be seen sooner than 09-18-18 OV with PCP. Scheduled with Dr. Mancini this afternoon.  JAM Rees RN

## 2018-09-13 NOTE — ED AVS SNAPSHOT
[facility] Emergency Department    [address] [facility] [address]    Phone:  [phone]    Fax:  [phone]                                       Stefanie Velazquez   MRN: [id number]    Department:  [facility] Emergency Department   Date of Visit:  9/13/2018           Patient Information     Date Of Birth          1945        Your diagnoses for this visit were:     Acute deep vein thrombosis (DVT) of right lower extremity, unspecified vein (H) Take eliquis 10 mg twice daily for 7 days, then take eliquis 5 mg orally twice daily.  Follow-up in clinic with Trino Bhagat as scheduled.    Type 2 diabetes mellitus with diabetic nephropathy, with long-term current use of insulin (H) Given the diabetes and other conditions, may continue aspirin, but only 81 mg orally daily and must take with food or milk.       You were seen by Leroy Patino MD.      Follow-up Information     Follow up with Sandra Morales MD In 4 days.    Specialty:  Family Practice    Why:  as scheduled    Contact information:    [address] [place] [id number]  [phone]          Follow up with [facility] Emergency Department.    Specialty:  EMERGENCY MEDICINE    Why:  As needed, If symptoms worsen    Contact information:    [address] [place] [address] [phone]    Additional information:    The medical center is located at   [address] [facility]. (between [address] in [place], [place] north   of [place]).        Discharge Instructions         ICD-10-CM    1. Acute deep vein thrombosis (DVT) of right lower extremity, unspecified vein (H) I82.401     Take eliquis 10 mg twice daily for 7 days, then take eliquis 5 mg orally twice daily.  Follow-up in clinic with Trino Bhagat as scheduled.   2. Type 2 diabetes mellitus with diabetic nephropathy, with long-term current use of insulin (H) E11.21     Z79.4     Given the diabetes and other conditions, may continue  aspirin, but only 81 mg orally daily and must take with food or milk.         Understanding Deep Vein Thrombosis  Deep vein thrombosis (DVT) is a condition in which a blood clot or thrombus forms in a deep vein. A blood clot is most common in the leg, but can develop in a large vein deep inside the leg, arm, or other part of the body. Part of the clot called an embolus can separate from the vein. It may travel to the lungs and form a pulmonary embolus (PE). This can cut off the flow to a portion of the lung or to the entire lung. A PE is a medical emergency and may cause death. Healthcare providers use the term venous thromboembolism (VTE) to describe the two conditions, DVT and PE. They use the term VTE because the two conditions are very closely related. And, because their prevention and treatment are closely related.  Over time, a blood clot can also permanently damage veins. To protect your health, a blood clot must be treated right away.  How DVT develops  The deep veins of the legs are located in the muscles. These help carry blood clot from the legs to the heart. When leg muscles contract and relax, blood is squeezed through the veins back to the heart. One-way valves inside the veins help keep the blood moving in the right direction. When blood moves too slowly or not at all, it can pool in the veins. This makes a clot more likely to form.    Risk factors  Anyone can develop a blood clot. The following risk factors make a blood clot more likely to happen:    Being inactive for a long period, such as when you re in the hospital, or traveling by plane or car    Injury to a vein from an accident, a broken bone, or surgery    Having blood clots in the past    Personal or family history of a blood-clotting disorder    Recent surgery    Cancer and certain cancer treatments    Smoking  Other factors can also put you at higher risk for a blood clot. They include:    Age over 60 years    Pregnancy    Taking birth  control or hormone replacement    Having other vein problems    Being overweight  Common symptoms  A blood clot does not always cause obvious symptoms. If you do have symptoms, they usually happen suddenly. They may include:    Pain, especially deep in the muscle    Swelling    Aching or tenderness    Red or warm skin    Fever  Call your healthcare provider if you have these symptoms.  Symptoms of pulmonary embolism may include:    Trouble breathing    Fast heartbeat    Chest pain    Sweating    Coughing (may cough up blood)    Fainting  Call 911 if you have these symptoms.   If you take medicine to help prevent blood clots, you have an increased risk of bleeding. Call 911 if you have heavy or uncontrolled bleeding. Call your healthcare provider if you have other signs or symptoms of bleeding like blood in the urine, bleeding with bowel movements, or bleeding from the nose, gums, a cut, or vagina.  Diagnosing DVT  Diagnosis begins with thorough questions about your symptoms and medical history along with a physical exam.  Diagnostic tests include:    An imaging test called a duplex ultrasound. This test uses sound waves to create pictures of veins and blood flow.    Blood tests to check for clotting and other problems.  If your healthcare provider thinks you may have pulmonary embolism, additional testing will be done.  Treating DVT  Treating a blood clot may include:    Medicine to thin the blood and prevent pulmonary embolism and other complications.    Staying in the hospital. This may or may not be necessary.    Surgery for some people, like those who cannot take blood-thinning medicines.  Preventing DVT  Many people who are in the hospital are at an increased risk of developing blood clots. So, preventing blood clots is an important part of in-hospital care. The care may include getting out of bed regularly, taking medicine, or using special therapies or devices. Other factors and conditions may increase the  risk of blood clots. Review your risk with your healthcare provider.  Date Last Reviewed: 5/1/2016 2000-2017 [employer]. [address] [place] [address], [place] [address]. All rights reserved. This information is not intended as a substitute for professional medical care. Always follow your healthcare professional's instructions.          Your next 10 appointments already scheduled     Sep 18, 2018 11:20 AM CDT   SHORT with Sandra Morales MD   [facility] ([facility])    [address] [place] [id number] [phone]            Sep 21, 2018 11:00 AM CDT   LAB with [facility]   [facility]    [address] [facility] [id number] [phone]           Please do not eat 10-12 hours before your appointment if you are coming in fasting for labs on lipids, cholesterol, or glucose (sugar). This does not apply to pregnant women. Water, hot tea and black coffee (with nothing added) are okay. Do not drink other fluids, diet soda or chew gum.            Sep 28, 2018 11:00 AM CDT   Return Visit with Apolinar Martinez MD   [facility] ([facility])    [facility]  [address] [facility] [address]  [facility] [address] [phone]              24 Hour Appointment Hotline       To make an appointment at any [facility], call [phone] ([phone]). If you don't have a family doctor or clinic, we will help you find one. [facility] are conveniently located to serve the needs of you and your family.             Review of your medicines      START taking        Dose / Directions Last dose taken    apixaban ANTICOAGULANT 5 MG tablet   Commonly known as:  ELIQUIS   Quantity:  74 tablet        Take 2 tabs twice daily for 7 days, then 5 mg orally twice daily   Refills:  0          Our records show that you are taking the medicines listed below. If these are  incorrect, please call your family doctor or clinic.        Dose / Directions Last dose taken    ACCU-CHEK MILVIA Jeana   Quantity:  1 device        dispense one meter   Refills:  0        acetaminophen 650 MG CR tablet   Commonly known as:  TYLENOL   Dose:  650 mg   Quantity:  60 tablet        Take 1 tablet (650 mg) by mouth 3 times daily   Refills:  0        blood glucose monitoring lancets   Quantity:  1 Box        Test BG 4 times daily   Refills:  11        blood glucose monitoring test strip   Commonly known as:  ACCU-CHEK MILVIA   Quantity:  360 each        Use to test blood sugars 4 times daily or as directed.   Refills:  1        BUTT PASTE - REGULAR   Commonly known as:  DR RUSLAN RITTER GOCHAUNCEY BUTT PASTE FORMULA        Apply topically every hour as needed for skin protection   Refills:  0        chlorhexidine 0.12 % solution   Commonly known as:  PERIDEX        Refills:  0        DEPEND EASY FIT UNDERGARMENTS Misc   Dose:  1 Units   Quantity:  300 each        1 Units every 2 hours X-large   Refills:  prn        diclofenac 50 MG EC tablet   Commonly known as:  VOLTAREN   Dose:  50 mg   Quantity:  21 tablet        Take 1 tablet (50 mg) by mouth 3 times daily as needed for moderate pain   Refills:  1        fluocinonide 0.05 % ointment   Commonly known as:  LIDEX   Quantity:  60 g        Apply sparingly to affected area twice daily as needed.  Do not apply to face.   Refills:  11        fluticasone 50 MCG/ACT spray   Commonly known as:  FLONASE   Dose:  1 spray        1 spray   Refills:  0        furosemide 20 MG tablet   Commonly known as:  LASIX   Dose:  20 mg   Quantity:  180 tablet        Take 1 tablet (20 mg) by mouth 2 times daily   Refills:  3        gabapentin 300 MG capsule   Commonly known as:  NEURONTIN   Quantity:  60 capsule        Take 1 tablet (300 mg) at bedtime, after 4 days may go to 2 if needed   Refills:  1        GLUCERNA OS Liqd   Quantity:  24 each        One can two to three times a day    Refills:  11        hypromellose 0.3 % Soln ophthalmic solution   Commonly known as:  GENTEAL   Dose:  2 drop        2 drops   Refills:  0        insulin detemir 100 UNIT/ML injection   Commonly known as:  LEVEMIR FLEXPEN/FLEXTOUCH   Dose:  20 Units   Quantity:  3 mL        Inject 20 Units Subcutaneous At Bedtime Fill when needed   Refills:  3        insulin pen needle 32G X 4 MM   Commonly known as:  BD GABRIELLA U/F   Quantity:  120 each        Use 4 times per day or as directed.   Refills:  5        levothyroxine 112 MCG tablet   Commonly known as:  SYNTHROID/LEVOTHROID   Dose:  112 mcg   Quantity:  90 tablet        Take 1 tablet (112 mcg) by mouth daily   Refills:  0        loperamide 2 MG capsule   Commonly known as:  IMODIUM   Quantity:  90 capsule        One capsule once a day, can use a second one if needed   Refills:  3        losartan 100 MG tablet   Commonly known as:  COZAAR   Dose:  100 mg   Quantity:  90 tablet        Take 1 tablet (100 mg) by mouth daily   Refills:  1        menthol-zinc oxide 0.44-20.625 % Oint ointment   Commonly known as:  CALMOSEPTINE        Apply topically 4 times daily as needed for skin protection   Refills:  0        metoprolol succinate 50 MG 24 hr tablet   Commonly known as:  TOPROL-XL   Dose:  50 mg   Quantity:  90 tablet        Take 1 tablet (50 mg) by mouth daily   Refills:  1        mineral oil-hydrophilic petrolatum        Apply topically as needed   Refills:  0        NovoLOG FLEXPEN 100 UNIT/ML injection   Quantity:  3 mL   Generic drug:  insulin aspart        5 units with lunch   Refills:  11        OMEPRAZOLE PO   Dose:  20 mg        Take 20 mg by mouth daily as needed   Refills:  0        order for DME   Quantity:  1 Device        Equipment being ordered: back brace   Refills:  0        order for DME   Quantity:  1 Device        Equipment being ordered: Dry Pressure Mattress for hospital   Refills:  0        oxyCODONE IR 5 MG tablet   Commonly known as:  ROXICODONE    Dose:  5 mg   Quantity:  20 tablet        Take 1 tablet (5 mg) by mouth every 6 hours as needed for severe pain maximum 6 tablet(s) per day   Refills:  0        Potassium Gluconate 595 MG Caps   Dose:  1 tablet        Take 1 tablet by mouth daily   Refills:  0        rOPINIRole 1 MG tablet   Commonly known as:  REQUIP   Quantity:  200 tablet        1-3 tabs daily as needed, patient takes one in am, one in pm. occasionally will take in midday.   Refills:  3        simvastatin 40 MG tablet   Commonly known as:  ZOCOR   Dose:  40 mg   Quantity:  90 tablet        Take 1 tablet (40 mg) by mouth daily   Refills:  2        syringe (disposable) 1 ML Misc   Dose:  1 each   Quantity:  1 each        1 each every 30 days With 1 inch needle for Vit B12 injection   Refills:  11        triamcinolone 0.1 % cream   Commonly known as:  KENALOG   Quantity:  80 g        Apply sparingly to affected area two times daily as needed   Refills:  1                Prescriptions were sent or printed at these locations (1 Prescription)                   [facility] [address]   [address] [facility] [address]    Telephone:  [phone]   Fax:  [phone]   Hours:                  E-Prescribed (1 of 1)         apixaban ANTICOAGULANT (ELIQUIS) 5 MG tablet                Procedures and tests performed during your visit     Basic metabolic panel    CBC with platelets differential    Glucose Monitoring Nursing    Glucose by meter    INR    Partial thromboplastin time      Orders Needing Specimen Collection     None      Pending Results     No orders found from 9/11/2018 to 9/14/2018.            Pending Culture Results     No orders found from 9/11/2018 to 9/14/2018.            Pending Results Instructions     If you had any lab results that were not finalized at the time of your Discharge, you can call the ED Lab Result RN at [phone]. You will be contacted by this team for any positive Lab results  or changes in treatment. The nurses are available 7 days a week from 10A to 6:30P.  You can leave a message 24 hours per day and they will return your call.        Test Results From Your Hospital Stay        9/13/2018  7:11 PM      Component Results     Component Value Ref Range & Units Status    WBC 5.2 4.0 - 11.0 10e9/L Final    RBC Count 4.87 3.8 - 5.2 10e12/L Final    Hemoglobin 14.2 11.7 - 15.7 g/dL Final    Hematocrit 42.4 35.0 - 47.0 % Final    MCV 87 78 - 100 fl Final    MCH 29.2 26.5 - 33.0 pg Final    MCHC 33.5 31.5 - 36.5 g/dL Final    RDW 13.1 10.0 - 15.0 % Final    Platelet Count 244 150 - 450 10e9/L Final    Diff Method Automated Method  Final    % Neutrophils 63.4 % Final    % Lymphocytes 23.2 % Final    % Monocytes 8.0 % Final    % Eosinophils 4.0 % Final    % Basophils 1.0 % Final    % Immature Granulocytes 0.4 % Final    Nucleated RBCs 0 0 /100 Final    Absolute Neutrophil 3.3 1.6 - 8.3 10e9/L Final    Absolute Lymphocytes 1.2 0.8 - 5.3 10e9/L Final    Absolute Monocytes 0.4 0.0 - 1.3 10e9/L Final    Absolute Eosinophils 0.2 0.0 - 0.7 10e9/L Final    Absolute Basophils 0.1 0.0 - 0.2 10e9/L Final    Abs Immature Granulocytes 0.0 0 - 0.4 10e9/L Final    Absolute Nucleated RBC 0.0  Final         9/13/2018  7:53 PM      Component Results     Component Value Ref Range & Units Status    INR 1.15 (H) 0.86 - 1.14 Final         9/13/2018  7:22 PM      Component Results     Component Value Ref Range & Units Status    PTT 29 22 - 37 sec Final         9/13/2018  7:33 PM      Component Results     Component Value Ref Range & Units Status    Sodium 142 133 - 144 mmol/L Final    Potassium 3.6 3.4 - 5.3 mmol/L Final    Chloride 106 94 - 109 mmol/L Final    Carbon Dioxide 27 20 - 32 mmol/L Final    Anion Gap 9 3 - 14 mmol/L Final    Glucose 93 70 - 99 mg/dL Final    Urea Nitrogen 18 7 - 30 mg/dL Final    Creatinine 1.48 (H) 0.52 - 1.04 mg/dL Final    GFR Estimate 34 (L) >60 mL/min/1.7m2 Final    Non African  American GFR Calc    GFR Estimate If Black 42 (L) >60 mL/min/1.7m2 Final    African American GFR Calc    Calcium 9.4 8.5 - 10.1 mg/dL Final         9/13/2018  7:44 PM      Component Results     Component Value Ref Range & Units Status    Glucose 100 (H) 70 - 99 mg/dL Final                Thank you for choosing [facility]       Thank you for choosing [facility] for your care. Our goal is always to provide you with excellent care. Hearing back from our patients is one way we can continue to improve our services. Please take a few minutes to complete the written survey that you may receive in the mail after you visit with us. Thank you!        [facility] Information     [employer] gives you secure access to your electronic health record. If you see a primary care provider, you can also send messages to your care team and make appointments. If you have questions, please call your primary care clinic.  If you do not have a primary care provider, please call [phone] and they will assist you.        Care EveryWhere ID     This is your Care EveryWhere ID. This could be used by other organizations to access your [facility] medical records  [id number]        Equal Access to Services     NIDA NUNN : Hadbrice Perry, fernando burgess, karon zamora, ceci moy. So [facility] [phone].    ATENCIÓN: Si habla español, tiene a waite disposición servicios gratuitos de asistencia lingüística. Kosta al [phone].    We comply with applicable federal civil rights laws and [place] laws. We do not discriminate on the basis of race, color, national origin, age, disability, sex, sexual orientation, or gender identity.            After Visit Summary       This is your record. Keep this with you and show to your community pharmacist(s) and doctor(s) at your next visit.

## 2018-09-14 ENCOUNTER — TELEPHONE (OUTPATIENT)
Dept: FAMILY MEDICINE | Facility: CLINIC | Age: 73
End: 2018-09-14

## 2018-09-14 NOTE — DISCHARGE INSTRUCTIONS
ICD-10-CM    1. Acute deep vein thrombosis (DVT) of right lower extremity, unspecified vein (H) I82.401     Take eliquis 10 mg twice daily for 7 days, then take eliquis 5 mg orally twice daily.  Follow-up in clinic with Trino Bhagat as scheduled.   2. Type 2 diabetes mellitus with diabetic nephropathy, with long-term current use of insulin (H) E11.21     Z79.4     Given the diabetes and other conditions, may continue aspirin, but only 81 mg orally daily and must take with food or milk.         Understanding Deep Vein Thrombosis  Deep vein thrombosis (DVT) is a condition in which a blood clot or thrombus forms in a deep vein. A blood clot is most common in the leg, but can develop in a large vein deep inside the leg, arm, or other part of the body. Part of the clot called an embolus can separate from the vein. It may travel to the lungs and form a pulmonary embolus (PE). This can cut off the flow to a portion of the lung or to the entire lung. A PE is a medical emergency and may cause death. Healthcare providers use the term venous thromboembolism (VTE) to describe the two conditions, DVT and PE. They use the term VTE because the two conditions are very closely related. And, because their prevention and treatment are closely related.  Over time, a blood clot can also permanently damage veins. To protect your health, a blood clot must be treated right away.  How DVT develops  The deep veins of the legs are located in the muscles. These help carry blood clot from the legs to the heart. When leg muscles contract and relax, blood is squeezed through the veins back to the heart. One-way valves inside the veins help keep the blood moving in the right direction. When blood moves too slowly or not at all, it can pool in the veins. This makes a clot more likely to form.    Risk factors  Anyone can develop a blood clot. The following risk factors make a blood clot more likely to happen:    Being inactive for a long  period, such as when you re in the hospital, or traveling by plane or car    Injury to a vein from an accident, a broken bone, or surgery    Having blood clots in the past    Personal or family history of a blood-clotting disorder    Recent surgery    Cancer and certain cancer treatments    Smoking  Other factors can also put you at higher risk for a blood clot. They include:    Age over 60 years    Pregnancy    Taking birth control or hormone replacement    Having other vein problems    Being overweight  Common symptoms  A blood clot does not always cause obvious symptoms. If you do have symptoms, they usually happen suddenly. They may include:    Pain, especially deep in the muscle    Swelling    Aching or tenderness    Red or warm skin    Fever  Call your healthcare provider if you have these symptoms.  Symptoms of pulmonary embolism may include:    Trouble breathing    Fast heartbeat    Chest pain    Sweating    Coughing (may cough up blood)    Fainting  Call 911 if you have these symptoms.   If you take medicine to help prevent blood clots, you have an increased risk of bleeding. Call 911 if you have heavy or uncontrolled bleeding. Call your healthcare provider if you have other signs or symptoms of bleeding like blood in the urine, bleeding with bowel movements, or bleeding from the nose, gums, a cut, or vagina.  Diagnosing DVT  Diagnosis begins with thorough questions about your symptoms and medical history along with a physical exam.  Diagnostic tests include:    An imaging test called a duplex ultrasound. This test uses sound waves to create pictures of veins and blood flow.    Blood tests to check for clotting and other problems.  If your healthcare provider thinks you may have pulmonary embolism, additional testing will be done.  Treating DVT  Treating a blood clot may include:    Medicine to thin the blood and prevent pulmonary embolism and other complications.    Staying in the hospital. This may or may  not be necessary.    Surgery for some people, like those who cannot take blood-thinning medicines.  Preventing DVT  Many people who are in the hospital are at an increased risk of developing blood clots. So, preventing blood clots is an important part of in-hospital care. The care may include getting out of bed regularly, taking medicine, or using special therapies or devices. Other factors and conditions may increase the risk of blood clots. Review your risk with your healthcare provider.  Date Last Reviewed: 5/1/2016 2000-2017 [employer]. [address] [place] [address], [place] [address]. All rights reserved. This information is not intended as a substitute for professional medical care. Always follow your healthcare professional's instructions.

## 2018-09-14 NOTE — TELEPHONE ENCOUNTER
Pt called to let  know.  Pt was into see Dr. Mancini per conversation with Home [facility] and RN- went to ER found she has a Blood Clot. Pt was placed on a Blood Thinner and sent her home.   Just a FYI for Dr Bernstein and she will keep her appt Tues.  [facility]

## 2018-09-14 NOTE — ED PROVIDER NOTES
History     Chief Complaint   Patient presents with     Leg Swelling     bilat leg swelling and was just dx with blood clot in rt leg, no cp or soa     HPI  Stefanie Velazquez is a 73 year old female who presents with a history of long-standing lymphedema, type 2 diabetes, prior lower extremity cellulitis, rectal cancer and status post radiation therapy with complications and followed by Dr. Holman, hypothyroidism and chronic kidney disease stage III.  She was referred from clinic by Dr. Underwood who called me to let me know that for the last 4 days the patient had been having increasing lower extremity edema that was particularly worse on the right lower extremity and was not associated with significant dyspnea or chest pain.  She had no history of DVT or PE.  She went to ultrasound of the right lower extremity demonstrating an extensive clot that is nonocclusive but continues from the mid thigh into the calf.    Patient is also noted a mild erythema that comes and goes over the anterior shin on the right side.  Has not had any associated fever.  There is no significant pain.    She also notes that her restless legs is active and she asked if I would give her a single ropinirole which he normally takes at home.    Problem List:    Patient Active Problem List    Diagnosis Date Noted     Fracture of femur, distal, left, closed (H) 02/11/2013     Priority: High     Advanced directives, counseling/discussion 06/17/2011     Priority: High     Advance Care Planning 7/18/2016: ACP Review of Chart / Resources Provided:  Reviewed chart for advance care plan.  Stefanie Velazquez has an up to date advance care plan on file.  Added by Jailyn Maki    Directive completed and on File 6/1/11.        Lymphedema of genitalia 08/12/2018     Priority: Medium     Spinal stenosis of lumbar region without neurogenic claudication 05/01/2018     Priority: Medium     DDD (degenerative disc disease), lumbar 05/01/2018     Priority: Medium      Type 2 diabetes mellitus with diabetic nephropathy, with long-term current use of insulin (H) 06/27/2017     Priority: Medium     Restless leg syndrome 04/02/2017     Priority: Medium     Chronic diarrhea 03/30/2017     Priority: Medium     Health Care Home 10/28/2016     Priority: Medium     Cellulitis of lower extremity, bilateral 09/30/2016     Priority: Medium     Benign essential hypertension 09/30/2016     Priority: Medium     Bowel and bladder incontinence 05/22/2015     Priority: Medium     Dysuria 10/24/2013     Priority: Medium     Vitamin B 12 deficiency 05/14/2012     Priority: Medium     Vitamin D deficiency 05/14/2012     Priority: Medium     Radiation therapy complication 11/09/2011     Priority: Medium     Type 2 diabetes mellitus with diabetic nephropathy (H) 10/19/2011     Priority: Medium     Anemia 08/26/2011     Priority: Medium     Rectal cancer- newly diagnosed adenoCA rectum Jan 2011 02/17/2011     Priority: Medium     Hyperlipidemia LDL goal <100 10/31/2010     Priority: Medium     Neuropathy (H) 04/09/2010     Priority: Medium     RC-moderate (AHI 12, LSat 60%); REM RDI-73 06/01/2009     Priority: Medium     Sleep study [facility] was performed 5/26/09 in order to re-evaluate severity of RC. The total sleep time was 412.0 minutes. The sleep latency was decreased at 8.7 minutes with Ambien 10mg. The REM sleep latency was 426.0 minutes. Sleep efficiency was reduced at 79.0%. The sleep architecture was disrupted with frequent sleep stage changes and arousals.  Snoring:  loud. Respiratory Events:   RDI of 57.5 and an AHI of 12.2. The REM RDI was 74.3 The lowest O2 saturation was 60.0%. This study is suggestive of moderate sleep apnea, profound desaturations during REM sleep, with 35 second apneas.    Other: PLM index was 13.8.      Recommendations:  Due to the profound desaturations during REM sleep, consider CPAP titration study to establish optimal CPAP treatment pressure.  2. Check  ferritin given her RLS symptoms. If RLS symptoms persist after treatment of RC, further therapy may be warranted. Replace iron as indicated by ferritin.         Osteoporosis 02/29/2008     Priority: Medium     Problem list name updated by automated process. Provider to review       Esophageal reflux 10/13/2007     Priority: Medium     On protonix;        bmi 40 06/22/2005     Priority: Medium     Problem list name updated by automated process. Provider to review       Generalized osteoarthrosis, unspecified site 06/22/2005     Priority: Medium     HEARING LOSS CONDUCTIVE, COMBINED TYPE 06/22/2005     Priority: Medium     [place] measles as child       Hypothyroidism 06/22/2003     Priority: Medium     Problem list name updated by automated process. Provider to review       Chronic ischemic heart disease 06/22/2003     Priority: Medium     Class: Chronic     cabgx3 09/2002,  with a left internal mammary (LIMA) to the left anterior descending and a bypass graft to the obtuse marginal branch of the circumflex and also PDA branch of the right coronary artery. She had an episode of atrial fibrillation postoperatively and was treated with sotalol.   PTCA and stent placement for recurrent restenosis.   2/05 adenosine ef70%  9/24/12 - Lexiscan nuclear stress test was positive for mild partially reversible ischemia in the LAD distribution. Imdur added.           CKD (chronic kidney disease) stage 3, GFR 30-59 ml/min 01/26/2011     Priority: Low        Past Medical History:    Past Medical History:   Diagnosis Date     Acute myocardial infarction of other specified sites, episode of care unspecified 6/2002     Cancer of colon (H)      Chronic ischemic heart disease, unspecified 6/22/2003     Coronary atherosclerosis of unspecified type of vessel, native or graft      Diabetes mellitus (H)      Esophageal reflux      Gastro-oesophageal reflux disease      Generalized osteoarthrosis, unspecified site 6/22/2005     Generalized  osteoarthrosis, unspecified site 6/22/2005     HEARING LOSS CONDUCTIVE, COMBINED TYPE 6/22/2005     HYPOTHYROIDISM  6/22/2003     Mixed hyperlipidemia 6/22/2005     Obesity, unspecified 6/22/2005     RC-moderate (AHI 12, LSat 60%); REM RDI-73 6/1/2009     Stented coronary artery      Type II or unspecified type diabetes mellitus with renal manifestations, uncontrolled(250.42) (H) 6/22/2005     Type II or unspecified type diabetes mellitus with renal manifestations, uncontrolled(250.42) (H) 6/22/2005     Unspecified disorder resulting from impaired renal function 6/22/2005     Unspecified essential hypertension        Past Surgical History:    Past Surgical History:   Procedure Laterality Date     cabg       COLECTOMY LOW ANTERIOR  6/21/2011    Procedure:COLECTOMY LOW ANTERIOR; with loop ielostomy; Surgeon:ROBBIN MCDONNELL; Location:[facility]     COLONOSCOPY  1/26/2011    COLONOSCOPY performed by MASOUD BILL at [facility]     COLONOSCOPY N/A 3/1/2016    Procedure: COLONOSCOPY;  Surgeon: Liza Neal MD;  Location: [facility]     INSERT PORT VASCULAR ACCESS  3/2/2011    INSERT PORT VASCULAR ACCESS performed by LIZA NEAL at [facility]     OPEN REDUCTION INTERNAL FIXATION FEMUR DISTAL  2/12/2013    Procedure: OPEN REDUCTION INTERNAL FIXATION FEMUR DISTAL;  Open reduction internal fixation left femur fracture--[facility];  Surgeon: Ley, Jeffrey Duane, MD;  Location: [facility]     ORTHOPEDIC SURGERY       PHACOEMULSIFICATION WITH STANDARD INTRAOCULAR LENS IMPLANT Left 1/22/2018    Procedure: PHACOEMULSIFICATION WITH STANDARD INTRAOCULAR LENS IMPLANT;  Left cataract removal with implant;  Surgeon: Rony Key MD;  Location: [facility]     PHACOEMULSIFICATION WITH STANDARD INTRAOCULAR LENS IMPLANT Right 2/19/2018    Procedure: PHACOEMULSIFICATION WITH STANDARD INTRAOCULAR LENS IMPLANT;  Right cataract removal with implant;  Surgeon: Rony Key MD;  Location: [facility]     SIGMOIDOSCOPY  FLEXIBLE  9/9/2011    Procedure:SIGMOIDOSCOPY FLEXIBLE; Performed prior to induction.; Surgeon:ROBBIN MCDONNELL; Location:[facility]     SURGICAL HISTORY OF -   10/24/2002    Heart bypass     SURGICAL HISTORY OF -   2002    R Knee arthroplasty     SURGICAL HISTORY OF -   2002    Mi 2 stints     TAKEDOWN ILEOSTOMY  9/9/2011    Procedure:TAKEDOWN ILEOSTOMY; Exploratory Laparotomy,  Loop Ileostomy Takedown, Small Bowel Resection x 2.; Surgeon:ROBBIN MCDONNELL; Location:[facility]       Family History:    Family History   Problem Relation Age of Onset     Diabetes Sister      C.A.D. Sister      Breast Cancer Sister        Social History:  Marital Status:  Single [1]  Social History   Substance Use Topics     Smoking status: Former Smoker     Smokeless tobacco: Never Used     Alcohol use Yes      Comment: rare social use        Medications:      ACCU-CHEK MILVIA MELODY   acetaminophen (TYLENOL) 650 MG CR tablet   blood glucose monitoring (ACCU-CHEK MILVIA) test strip   blood glucose monitoring (ACCU-CHEK MULTICLIX) lancets   BUTT PASTE - REGULAR (DR LOVE POCHAUNCEY GOOP BUTT PASTE FORMULA)   chlorhexidine (PERIDEX) 0.12 % solution   diclofenac (VOLTAREN) 50 MG EC tablet   fluocinonide (LIDEX) 0.05 % ointment   fluticasone (FLONASE) 50 MCG/ACT spray   furosemide (LASIX) 20 MG tablet   gabapentin (NEURONTIN) 300 MG capsule   hypromellose (GENTEAL) 0.3 % SOLN ophthalmic solution   Incontinence Supply Disposable (DEPEND EASY FIT UNDERGARMENTS) MISC   insulin aspart (NOVOLOG FLEXPEN) 100 UNIT/ML injection   insulin detemir (LEVEMIR FLEXPEN/FLEXTOUCH) 100 UNIT/ML injection   insulin pen needle (BD GABRIELLA U/F) 32G X 4 MM   levothyroxine (SYNTHROID/LEVOTHROID) 112 MCG tablet   loperamide (IMODIUM) 2 MG capsule   losartan (COZAAR) 100 MG tablet   menthol-zinc oxide (CALMOSEPTINE) 0.44-20.625 % OINT ointment   metoprolol (TOPROL-XL) 50 MG 24 hr tablet   mineral oil-hydrophilic petrolatum (AQUAPHOR) ointment   Nutritional Supplements (GLUCERNA OS) LIQD  "  OMEPRAZOLE PO   order for DME   order for DME   oxyCODONE IR (ROXICODONE) 5 MG tablet   Potassium Gluconate 595 MG CAPS   rOPINIRole (REQUIP) 1 MG tablet   simvastatin (ZOCOR) 40 MG tablet   syringe, disposable, 1 ML MISC   triamcinolone (KENALOG) 0.1 % cream         Review of Systems   Constitutional: Negative for chills, diaphoresis and fever.   HENT: Negative for ear pain, sinus pressure and sore throat.    Eyes: Negative for visual disturbance.   Respiratory: Negative for cough, shortness of breath and wheezing.    Cardiovascular: Positive for leg swelling. Negative for chest pain and palpitations.   Gastrointestinal: Negative for abdominal pain, blood in stool, constipation, diarrhea, nausea and vomiting.   Genitourinary: Negative for dysuria, frequency and urgency.   Skin: Positive for rash.   Neurological: Negative for headaches.   All other systems reviewed and are negative.    The patient is tangential and on every 1 of the review of systems questions the patient diverted, but was ultimately able to [place] back to the symptoms and it did not appear to be other significant associated symptoms at least in the acute period    Physical Exam   BP: (!) 163/100  Pulse: 60  Temp: 97.7  F (36.5  C)  Height: 157.5 cm (5' 2\")  Weight: 86.6 kg (191 lb)  SpO2: 96 %      Physical Exam   Constitutional: No distress.   HENT:   Mouth/Throat: Oropharynx is clear and moist.   Eyes: Conjunctivae are normal.   Neck: Neck supple.   Cardiovascular: Normal rate and regular rhythm.  Exam reveals no gallop and no friction rub.    No murmur heard.  Pulmonary/Chest: No respiratory distress. She has no wheezes. She has no rales.   Abdominal: Soft. Bowel sounds are normal. She exhibits no distension. There is no rebound.   Musculoskeletal: She exhibits edema (RT > LT).   Neurological: She exhibits normal muscle tone.   Skin: Rash (mild erythema anterior shin) noted. She is not diaphoretic.       ED Course     ED Course "     Procedures               Critical Care time:  none                   Results for orders placed or performed during the hospital encounter of 09/13/18 (from the past 24 hour(s))   US Lower Extremity Venous Duplex Right    Narrative    VENOUS ULTRASOUND RIGHT LEG  9/13/2018 5:48 PM     HISTORY: ; Pedal edema    COMPARISON: None.    FINDINGS:  Examination of the deep veins with graded compression and  color flow Doppler with spectral wave form analysis shows nonocclusive  DVT thrombus from the right proximal femoral vein into the distal  posterior tibial vein in the calf.  Nonocclusive thrombus is seen in  the femoral vein, popliteal vein, and posterior tibial vein.      Impression    IMPRESSION: Nonocclusive DVT extending from the upper thigh to the mid  calf.    HILARIO ALVARADO MD     *Note: Due to a large number of results and/or encounters for the requested time period, some results have not been displayed. A complete set of results can be found in Results Review.       Medications   rOPINIRole (REQUIP) tablet 1 mg (not administered)       Assessments & Plan (with Medical Decision Making)     MDM: Stefanie Velazquez is a 73 year old female who presented, referred from clinic for diagnosed deep vein thrombosis after swelling had been occurring in the right leg greater than left for the last 2-3 weeks with an underlying history of lymphedema and chronic lower extremity edema.  No dyspnea or chest pain.  Normal vital signs including oxygenation and respiratory rate.  Hypertensive but no hypertensive urgency.  The clot is fairly extensive but nonocclusive.  Involves from the thigh to the calf.  She is started on Eliquis after I conferred with pharmacy to confirm that she needed no renal adjustment and that it would be covered by insurance.  I have warned her that there is a high risk of bleeding with this and that she absolutely must not be injured or fall on this medication.  I have asked her to follow-up in clinic and  have started her on 10 mg twice daily of the Eliquis for 7 days  and 5 mg bid after the first week.  She does have a history of  Rectal cancer and is followed by Dr. Holman in oncology, but does not have an active cancer history.  Following up with Dr. Holman would be important regarding the duration of the Eliquis.  She is on aspirin due to diabetes and I asked her to limit the dosing to 81 mg daily.      I have reviewed the nursing notes.    I have reviewed the findings, diagnosis, plan and need for follow up with the patient.       New Prescriptions    No medications on file       Final diagnoses:   Acute deep vein thrombosis (DVT) of right lower extremity, unspecified vein (H) - Take eliquis 10 mg twice daily for 7 days, then take eliquis 5 mg orally twice daily.  Follow-up in clinic with Trino Bhagat as scheduled.   Type 2 diabetes mellitus with diabetic nephropathy, with long-term current use of insulin (H) - Given the diabetes and other conditions, may continue aspirin, but only 81 mg orally daily and must take with food or milk.       9/13/2018   [facility] EMERGENCY DEPARTMENT     Leroy Patino MD  09/14/18 [id number]

## 2018-09-17 ENCOUNTER — MEDICAL CORRESPONDENCE (OUTPATIENT)
Dept: HEALTH INFORMATION MANAGEMENT | Facility: CLINIC | Age: 73
End: 2018-09-17

## 2018-09-17 ENCOUNTER — TELEPHONE (OUTPATIENT)
Dept: FAMILY MEDICINE | Facility: CLINIC | Age: 73
End: 2018-09-17

## 2018-09-17 DIAGNOSIS — Z53.9 DIAGNOSIS NOT YET DEFINED: Primary | ICD-10-CM

## 2018-09-17 PROCEDURE — G0179 MD RECERTIFICATION HHA PT: HCPCS | Performed by: FAMILY MEDICINE

## 2018-09-17 NOTE — TELEPHONE ENCOUNTER
[facility] Medications & Plan of Care  Form placed on Dr. Bernstein's desk.  [place] Sec

## 2018-09-18 ENCOUNTER — OFFICE VISIT (OUTPATIENT)
Dept: FAMILY MEDICINE | Facility: CLINIC | Age: 73
End: 2018-09-18
Payer: COMMERCIAL

## 2018-09-18 VITALS
BODY MASS INDEX: 34.75 KG/M2 | WEIGHT: 190 LBS | OXYGEN SATURATION: 96 % | RESPIRATION RATE: 20 BRPM | SYSTOLIC BLOOD PRESSURE: 122 MMHG | TEMPERATURE: 98.5 F | HEART RATE: 59 BPM | DIASTOLIC BLOOD PRESSURE: 70 MMHG

## 2018-09-18 DIAGNOSIS — C20 RECTAL CANCER (H): Chronic | ICD-10-CM

## 2018-09-18 DIAGNOSIS — R15.9 BOWEL AND BLADDER INCONTINENCE: ICD-10-CM

## 2018-09-18 DIAGNOSIS — I82.401 ACUTE DEEP VEIN THROMBOSIS (DVT) OF RIGHT LOWER EXTREMITY, UNSPECIFIED VEIN (H): Primary | ICD-10-CM

## 2018-09-18 DIAGNOSIS — E11.21 TYPE 2 DIABETES MELLITUS WITH DIABETIC NEPHROPATHY, WITH LONG-TERM CURRENT USE OF INSULIN (H): Chronic | ICD-10-CM

## 2018-09-18 DIAGNOSIS — K52.9 CHRONIC DIARRHEA: ICD-10-CM

## 2018-09-18 DIAGNOSIS — M54.16 LUMBAR RADICULOPATHY: ICD-10-CM

## 2018-09-18 DIAGNOSIS — Z79.4 TYPE 2 DIABETES MELLITUS WITH DIABETIC NEPHROPATHY, WITH LONG-TERM CURRENT USE OF INSULIN (H): Chronic | ICD-10-CM

## 2018-09-18 DIAGNOSIS — I10 BENIGN ESSENTIAL HYPERTENSION: Chronic | ICD-10-CM

## 2018-09-18 DIAGNOSIS — R32 BOWEL AND BLADDER INCONTINENCE: ICD-10-CM

## 2018-09-18 DIAGNOSIS — L03.115 BILATERAL CELLULITIS OF LOWER LEG: ICD-10-CM

## 2018-09-18 DIAGNOSIS — I87.2 VENOUS STASIS DERMATITIS OF BOTH LOWER EXTREMITIES: ICD-10-CM

## 2018-09-18 DIAGNOSIS — J31.0 CHRONIC RHINITIS, UNSPECIFIED TYPE: ICD-10-CM

## 2018-09-18 DIAGNOSIS — L03.116 BILATERAL CELLULITIS OF LOWER LEG: ICD-10-CM

## 2018-09-18 DIAGNOSIS — Z23 NEED FOR PROPHYLACTIC VACCINATION AND INOCULATION AGAINST INFLUENZA: ICD-10-CM

## 2018-09-18 DIAGNOSIS — N18.30 CKD (CHRONIC KIDNEY DISEASE) STAGE 3, GFR 30-59 ML/MIN (H): Chronic | ICD-10-CM

## 2018-09-18 PROCEDURE — 90662 IIV NO PRSV INCREASED AG IM: CPT | Performed by: FAMILY MEDICINE

## 2018-09-18 PROCEDURE — G0008 ADMIN INFLUENZA VIRUS VAC: HCPCS | Performed by: FAMILY MEDICINE

## 2018-09-18 PROCEDURE — 99215 OFFICE O/P EST HI 40 MIN: CPT | Mod: 25 | Performed by: FAMILY MEDICINE

## 2018-09-18 RX ORDER — GABAPENTIN 300 MG/1
CAPSULE ORAL
Qty: 90 CAPSULE | Refills: 1
Start: 2018-09-18 | End: 2019-01-31

## 2018-09-18 RX ORDER — FLUOCINONIDE 0.5 MG/G
OINTMENT TOPICAL
Qty: 60 G | Refills: 11
Start: 2018-09-18 | End: 2021-01-01

## 2018-09-18 RX ORDER — LOPERAMIDE HCL 2 MG
CAPSULE ORAL
Qty: 90 CAPSULE | Refills: 3 | Status: ON HOLD
Start: 2018-09-18 | End: 2020-08-20

## 2018-09-18 RX ORDER — FLUTICASONE PROPIONATE 50 MCG
1 SPRAY, SUSPENSION (ML) NASAL DAILY
Qty: 1 BOTTLE | Refills: 3
Start: 2018-09-18

## 2018-09-18 RX ORDER — SENNOSIDES 8.6 MG
650 CAPSULE ORAL 3 TIMES DAILY PRN
Qty: 60 TABLET
Start: 2018-09-18 | End: 2020-08-19 | Stop reason: DRUGHIGH

## 2018-09-18 NOTE — MR AVS SNAPSHOT
After Visit Summary   9/18/2018    Stefanie Velazquez    MRN: [id number]           Patient Information     Date Of Birth          1945        Visit Information        Provider Department      9/18/2018 11:20 AM Sandra Morales MD [facility]        Today's Diagnoses     Acute deep vein thrombosis (DVT) of right lower extremity, unspecified vein (H)    -  1    Type 2 diabetes mellitus with diabetic nephropathy, with long-term current use of insulin (H)        CKD (chronic kidney disease) stage 3, GFR 30-59 ml/min        Benign essential hypertension        Rectal cancer- adenoCA rectum Jan 2011        Bilateral cellulitis of lower leg        Venous stasis dermatitis of both lower extremities        Chronic rhinitis, unspecified type        Lumbar radiculopathy        Bowel and bladder incontinence        Rectal cancer- newly diagnosed adenoCA rectum Jan 2011        Chronic diarrhea          Care Instructions    See you end of Oct for recheck labs    At some point you should see Dr Martinez about how long to be on the blood thinner              Follow-ups after your visit        Follow-up notes from your care team     Return in about 6 years (around 9/18/2024) for diabetes.      Your next 10 appointments already scheduled     Sep 21, 2018 11:00 AM CDT   LAB with [facility]   [facility] ([facility]    [address] [address]   [phone]           Please do not eat 10-12 hours before your appointment if you are coming in fasting for labs on lipids, cholesterol, or glucose (sugar). This does not apply to pregnant women. Water, hot tea and black coffee (with nothing added) are okay. Do not drink other fluids, diet soda or chew gum.            Sep 28, 2018 11:00 AM CDT   Return Visit with Apolinar Martinez MD   [facility] ([facility])    [facility]  [address]  [facility] [place]  [id number] [phone]              Who to contact     If you have questions or need follow up information about today's clinic visit or your schedule please contact [facility] directly at [phone].  Normal or non-critical lab and imaging results will be communicated to you by DataParentinghart, letter or phone within 4 business days after the clinic has received the results. If you do not hear from us within 7 days, please contact the clinic through DataParentinghart or phone. If you have a critical or abnormal lab result, we will notify you by phone as soon as possible.  Submit refill requests through [employer]ona or call your pharmacy and they will forward the refill request to us. Please allow 3 business days for your refill to be completed.          Additional Information About Your Visit        DataParentinghart Information     Opsona gives you secure access to your electronic health record. If you see a primary care provider, you can also send messages to your care team and make appointments. If you have questions, please call your primary care clinic.  If you do not have a primary care provider, please call [phone] and they will assist you.        Care EveryWhere ID     This is your Care EveryWhere ID. This could be used by other organizations to access your [facility] medical records  [id number]        Your Vitals Were     Pulse Temperature Respirations Pulse Oximetry BMI (Body Mass Index)       59 98.5  F (36.9  C) (Tympanic) 20 96% 34.75 kg/m2        Blood Pressure from Last 3 Encounters:   09/18/18 122/70   09/13/18 172/89   09/13/18 114/84    Weight from Last 3 Encounters:   09/18/18 190 lb (86.2 kg)   09/13/18 191 lb (86.6 kg)   09/13/18 191 lb (86.6 kg)              Today, you had the following     No orders found for display         Today's Medication Changes          These changes are accurate as of 9/18/18  1:00 PM.  If you have any questions, ask your nurse or doctor.               These medicines  have changed or have updated prescriptions.        Dose/Directions    acetaminophen 650 MG CR tablet   Commonly known as:  TYLENOL   This may have changed:    - when to take this  - reasons to take this   Used for:  Bilateral cellulitis of lower leg   Changed by:  Sandra Morales MD        Dose:  650 mg   Take 1 tablet (650 mg) by mouth 3 times daily as needed for mild pain or fever   Quantity:  60 tablet   Refills:  0       apixaban ANTICOAGULANT 5 MG tablet   Commonly known as:  ELIQUIS   This may have changed:  additional instructions   Used for:  Acute deep vein thrombosis (DVT) of right lower extremity, unspecified vein (H)   Changed by:  Sandra Morales MD        5 mg orally twice daily   Quantity:  60 tablet   Refills:  0       fluocinonide 0.05 % ointment   Commonly known as:  LIDEX   This may have changed:  additional instructions   Used for:  Venous stasis dermatitis of both lower extremities   Changed by:  Sandra Morales MD        Apply sparingly to rash on legs twice daily as needed.  Do not apply to face.   Quantity:  60 g   Refills:  11       fluticasone 50 MCG/ACT spray   Commonly known as:  FLONASE   This may have changed:    - how to take this  - when to take this   Used for:  Chronic rhinitis, unspecified type   Changed by:  Sandra Morales MD        Dose:  1 spray   Spray 1 spray into both nostrils daily   Quantity:  1 Bottle   Refills:  3       gabapentin 300 MG capsule   Commonly known as:  NEURONTIN   This may have changed:  additional instructions   Used for:  Lumbar radiculopathy   Changed by:  Sandra Morales MD        Take 1 tablet (300 mg) at bedtime   Quantity:  90 capsule   Refills:  1       loperamide 2 MG capsule   Commonly known as:  IMODIUM   This may have changed:  additional instructions   Used for:  Rectal cancer (H), Chronic diarrhea   Changed by:  Sandra Morales MD        One capsule once a day PRN, can use a second one if needed   Quantity:  90 capsule    Refills:  3         Stop taking these medicines if you haven't already. Please contact your care team if you have questions.     chlorhexidine 0.12 % solution   Commonly known as:  PERIDEX   Stopped by:  Sandra Morales MD           DEPEND EASY FIT UNDERGARMENTS Misc   Stopped by:  Sandra Morales MD           diclofenac 50 MG EC tablet   Commonly known as:  VOLTAREN   Stopped by:  Sandra Morales MD           GLUCERNA OS Liqd   Stopped by:  Sandra Morales MD           hypromellose 0.3 % Soln ophthalmic solution   Commonly known as:  GENTEAL   Stopped by:  Sandra Morales MD           mineral oil-hydrophilic petrolatum   Stopped by:  Sandra Morales MD           order for DME   Stopped by:  Sandra Morales MD           order for DME   Stopped by:  Sandra Morales MD           oxyCODONE IR 5 MG tablet   Commonly known as:  ROXICODONE   Stopped by:  Sandra Morales MD           syringe (disposable) 1 ML Misc   Stopped by:  Sandra Morales MD           triamcinolone 0.1 % cream   Commonly known as:  KENALOG   Stopped by:  Sandra Morales MD                Where to get your medicines      Some of these will need a paper prescription and others can be bought over the counter.  Ask your nurse if you have questions.     You don't need a prescription for these medications     acetaminophen 650 MG CR tablet    apixaban ANTICOAGULANT 5 MG tablet    fluocinonide 0.05 % ointment    fluticasone 50 MCG/ACT spray    gabapentin 300 MG capsule    loperamide 2 MG capsule                Primary Care Provider Office Phone # Fax #    Sandra Morales [phone]       [address] [place] [address]        Equal Access to Services     [facility] : Hadii rod Perry, wajenda aditya, qaybceci gonzalez. So [facility] [phone].    ATENCIÓN: Si habla español, tiene a waite disposición servicios gratuitos de  asistencia lingüística. Kosta al [phone].    We comply with applicable federal civil rights laws and [place] laws. We do not discriminate on the basis of race, color, national origin, age, disability, sex, sexual orientation, or gender identity.            Thank you!     Thank you for choosing [facility]  for your care. Our goal is always to provide you with excellent care. Hearing back from our patients is one way we can continue to improve our services. Please take a few minutes to complete the written survey that you may receive in the mail after your visit with us. Thank you!             Your Updated Medication List - Protect others around you: Learn how to safely use, store and throw away your medicines at www.disposemymeds.org.          This list is accurate as of 9/18/18  1:00 PM.  Always use your most recent med list.                   Brand Name Dispense Instructions for use Diagnosis    ACCU-CHEK MILVIA Jeana     1 device    dispense one meter    Type II or unspecified type diabetes mellitus without mention of complication, uncontrolled       acetaminophen 650 MG CR tablet    TYLENOL    60 tablet    Take 1 tablet (650 mg) by mouth 3 times daily as needed for mild pain or fever    Bilateral cellulitis of lower leg       apixaban ANTICOAGULANT 5 MG tablet    ELIQUIS    60 tablet    5 mg orally twice daily    Acute deep vein thrombosis (DVT) of right lower extremity, unspecified vein (H)       blood glucose monitoring lancets     1 Box    Test BG 4 times daily    Type II or unspecified type diabetes mellitus without mention of complication, uncontrolled       blood glucose monitoring test strip    ACCU-CHEK MILVIA    360 each    Use to test blood sugars 4 times daily or as directed.    Type 2 diabetes mellitus with diabetic nephropathy, with long-term current use of insulin (H)       BUTT PASTE - REGULAR    DR LOVE POOP GOOP BUTT PASTE FORMULA     Apply topically every hour as needed  for skin protection        fluocinonide 0.05 % ointment    LIDEX    60 g    Apply sparingly to rash on legs twice daily as needed.  Do not apply to face.    Venous stasis dermatitis of both lower extremities       fluticasone 50 MCG/ACT spray    FLONASE    1 Bottle    Spray 1 spray into both nostrils daily    Chronic rhinitis, unspecified type       furosemide 20 MG tablet    LASIX    180 tablet    Take 1 tablet (20 mg) by mouth 2 times daily    Benign essential hypertension       gabapentin 300 MG capsule    NEURONTIN    90 capsule    Take 1 tablet (300 mg) at bedtime    Lumbar radiculopathy       insulin detemir 100 UNIT/ML injection    LEVEMIR FLEXPEN/FLEXTOUCH    3 mL    Inject 20 Units Subcutaneous At Bedtime Fill when needed    Type 2 diabetes mellitus with diabetic nephropathy, with long-term current use of insulin (H)       insulin pen needle 32G X 4 MM    BD GABRIELLA U/F    120 each    Use 4 times per day or as directed.    Type 2 diabetes mellitus with diabetic nephropathy, with long-term current use of insulin (H)       levothyroxine 112 MCG tablet    SYNTHROID/LEVOTHROID    90 tablet    Take 1 tablet (112 mcg) by mouth daily    Other specified hypothyroidism       loperamide 2 MG capsule    IMODIUM    90 capsule    One capsule once a day PRN, can use a second one if needed    Rectal cancer (H), Chronic diarrhea       losartan 100 MG tablet    COZAAR    90 tablet    Take 1 tablet (100 mg) by mouth daily    Benign essential hypertension       menthol-zinc oxide 0.44-20.625 % Oint ointment    CALMOSEPTINE     Apply topically 4 times daily as needed for skin protection    Rash and nonspecific skin eruption       metoprolol succinate 50 MG 24 hr tablet    TOPROL-XL    90 tablet    Take 1 tablet (50 mg) by mouth daily    Benign essential hypertension       NovoLOG FLEXPEN 100 UNIT/ML injection   Generic drug:  insulin aspart     3 mL    5 units with lunch    Type 2 diabetes mellitus with diabetic nephropathy, with  long-term current use of insulin (H)       OMEPRAZOLE PO      Take 20 mg by mouth daily as needed        Potassium Gluconate 595 MG Caps      Take 1 tablet by mouth daily    Stasis edema of both lower extremities       rOPINIRole 1 MG tablet    REQUIP    200 tablet    1-3 tabs daily as needed, patient takes one in am, one in pm. occasionally will take in midday.    RLS (restless legs syndrome)       simvastatin 40 MG tablet    ZOCOR    90 tablet    Take 1 tablet (40 mg) by mouth daily    Hyperlipidemia LDL goal <100

## 2018-09-18 NOTE — PATIENT INSTRUCTIONS
See you end of Oct for recheck labs    At some point you should see Dr Martinez about how long to be on the blood thinner

## 2018-09-18 NOTE — PROGRESS NOTES
SUBJECTIVE:   Stefanie Velazquez is a 73 year old female who presents to clinic today for the following health issues:      ED/UC Followup:    Facility:  [facility]  Date of visit: 9/13/2018  Reason for visit: Rt DVT  Current Status: recheck swelling     DVT-  Had 4 days of increasing swelling in right leg, saw Dr Mancini, went to ultrasound and had an extensive dvt.   She was started on Eliquis. Swelling is a little better  She sees Dr Martinez for her rectal cancer.     Diabetes-on insulin, not as well controlled recently. She is back on track.     Lab Results   Component Value Date    A1C 8.5 07/20/2018    A1C 7.3 03/20/2018    A1C 7.1 11/10/2017    A1C 7.9 07/10/2017    A1C 7.1 03/30/2017      moving to assisted living next week, has forms for me to sign. Will need to have updated med list signed as well.     Hypertension-on losartan, lasix, metoprolol  BP Readings from Last 6 Encounters:   09/18/18 122/70   09/13/18 172/89   09/13/18 114/84   09/07/18 114/65   08/29/18 122/62   08/07/18 139/72      hypothyroidism -on levothyroxine   TSH   Date Value Ref Range Status   08/31/2018 4.24 (H) 0.40 - 4.00 mU/L Final      Neuropathy-on gabapentin    Hyperlipidemia-on simvastatin  Recent Labs   Lab Test  08/31/18   0905  05/02/18   0910   09/16/15   1130  03/23/15   0926   CHOL  185  175   < >  120  112   HDL  51  53   < >  46*  44*   LDL  107*  75   < >  50  41   TRIG  134  235*   < >  118  134   CHOLHDLRATIO   --    --    --   2.6  2.5    < > = values in this interval not displayed.          Problem list and histories reviewed & adjusted, as indicated.  Additional history: as documented    Recent Labs   Lab Test  09/13/18   1855  08/31/18   0905  07/20/18   1647   05/02/18   0910  03/20/18   1611  02/09/18   0910  11/10/17   1118   A1C   --    --   8.5*   --    --   7.3*   --   7.1*   LDL   --   107*   --    --   75   --   124*   --    HDL   --   51   --    --   53   --   62   --    TRIG   --   134   --    --   235*   --    137   --    ALT   --   13  19   --   14   --   16   --    CR  1.48*  1.45*  1.43*   < >  1.36*   --   1.38*  1.53*   GFRESTIMATED  34*  35*  36*   < >  38*   --   37*  33*   GFRESTBLACK  42*  43*  43*   < >  46*   --   45*  40*   POTASSIUM  3.6  3.4  4.2   < >  3.3*   --   4.0  4.1   TSH   --   4.24*  5.32*   --    --    --    --   4.13*    < > = values in this interval not displayed.      BP Readings from Last 3 Encounters:   09/18/18 122/70   09/13/18 172/89   09/13/18 114/84    Wt Readings from Last 3 Encounters:   09/18/18 190 lb (86.2 kg)   09/13/18 191 lb (86.6 kg)   09/13/18 191 lb (86.6 kg)                    Reviewed and updated as needed this visit by clinical staff  Tobacco  Allergies  Meds  Med Hx  Surg Hx  Fam Hx  Soc Hx      Reviewed and updated as needed this visit by Provider         ROS:  CONSTITUTIONAL: NEGATIVE for fever, chills, change in weight  ENT/MOUTH: NEGATIVE for ear, mouth and throat problems  RESP: NEGATIVE for significant cough or SOB  CV: NEGATIVE for chest pain, palpitations or peripheral edema  GI:  has had some early satiety or things don't taste great  : No dysuria, urinary frequency or urgency.     OBJECTIVE:                                                    /70  Pulse 59  Temp 98.5  F (36.9  C) (Tympanic)  Resp 20  Wt 190 lb (86.2 kg)  SpO2 96%  BMI 34.75 kg/m2  Body mass index is 34.75 kg/(m^2).  GENERAL: healthy, alert, well nourished, well hydrated, no distress  NECK: no tenderness, no adenopathy, no asymmetry, no masses, no stiffness; thyroid- normal to palpation  RESP: lungs clear to auscultation - no rales, no rhonchi, no wheezes  CV: regular rates and rhythm, normal S1 S2, no S3 or S4 and no murmur, no click or rub -  ABDOMEN: soft, no tenderness, no  hepatosplenomegaly, no masses, normal bowel sounds  MS: extremities- plus one edema, right greater than left         ASSESSMENT/PLAN:                                                      ASSESSMENT:  1.  Acute deep vein thrombosis (DVT) of right lower extremity, unspecified vein (H)    2. Type 2 diabetes mellitus with diabetic nephropathy, with long-term current use of insulin (H)    3. CKD (chronic kidney disease) stage 3, GFR 30-59 ml/min    4. Benign essential hypertension    5. Rectal cancer- adenoCA rectum Jan 2011    6. Bilateral cellulitis of lower leg    7. Venous stasis dermatitis of both lower extremities    8. Chronic rhinitis, unspecified type    9. Lumbar radiculopathy    10. Bowel and bladder incontinence    11. Rectal cancer- newly diagnosed adenoCA rectum Jan 2011    12. Chronic diarrhea    13. Need for prophylactic vaccination and inoculation against influenza        PLAN:  Orders Placed This Encounter     FLU VACCINE, INCREASED ANTIGEN, PRESV FREE, AGE 65+ [83754]     ADMIN INFLUENZA (For MEDICARE Patients ONLY) []     apixaban ANTICOAGULANT (ELIQUIS) 5 MG tablet     acetaminophen (TYLENOL) 650 MG CR tablet     fluocinonide (LIDEX) 0.05 % ointment     fluticasone (FLONASE) 50 MCG/ACT spray     gabapentin (NEURONTIN) 300 MG capsule     loperamide (IMODIUM) 2 MG capsule     40 min spent with patient, greater than 50% in counseling and coordination of care, going over med list in detatin, filling out forms and making sure she has everything she needs and understands for her assisted living move.   Recheck in a month for her labs    Patient Instructions   See you end of Oct for recheck labs    At some point you should see Dr Martinez about how long to be on the blood thinner         Sandra Morales MD  [facility]

## 2018-09-18 NOTE — PROGRESS NOTES

## 2018-09-18 NOTE — NURSING NOTE
"Chief Complaint   Patient presents with     ER F/U     DVT rt leg       Initial /70  Pulse 59  Temp 98.5  F (36.9  C) (Tympanic)  Resp 20  Wt 190 lb (86.2 kg)  SpO2 96%  BMI 34.75 kg/m2 Estimated body mass index is 34.75 kg/(m^2) as calculated from the following:    Height as of 9/13/18: 5' 2\" (1.575 m).    Weight as of this encounter: 190 lb (86.2 kg).    Patient presents to the clinic using Wheel Chair    Health Maintenance that is potentially due pending provider review:  NONE    n/a    Is there anyone who you would like to be able to receive your results? No  If yes have patient fill out HELLEN    "

## 2018-09-21 ENCOUNTER — DOCUMENTATION ONLY (OUTPATIENT)
Dept: LAB | Facility: CLINIC | Age: 73
End: 2018-09-21

## 2018-09-21 NOTE — PROGRESS NOTES
It looks like the pt had their labs drawn for Dr. Martinez 3 weeks ago. No additional labs are needed.     Thanks  Patricia Mao RN on 9/21/2018 at 9:54 AM

## 2018-09-21 NOTE — PROGRESS NOTES
Patient is scheduled for a lab appointment this morning.  Please place future orders for labs needed.  Thank you.

## 2018-09-28 ENCOUNTER — ONCOLOGY VISIT (OUTPATIENT)
Dept: ONCOLOGY | Facility: CLINIC | Age: 73
End: 2018-09-28
Attending: INTERNAL MEDICINE
Payer: COMMERCIAL

## 2018-09-28 VITALS
TEMPERATURE: 97.7 F | DIASTOLIC BLOOD PRESSURE: 58 MMHG | HEIGHT: 62 IN | OXYGEN SATURATION: 94 % | BODY MASS INDEX: 35.43 KG/M2 | SYSTOLIC BLOOD PRESSURE: 118 MMHG | RESPIRATION RATE: 18 BRPM | WEIGHT: 192.5 LBS | HEART RATE: 56 BPM

## 2018-09-28 DIAGNOSIS — E03.8 OTHER SPECIFIED HYPOTHYROIDISM: Chronic | ICD-10-CM

## 2018-09-28 DIAGNOSIS — E11.21 TYPE 2 DIABETES MELLITUS WITH DIABETIC NEPHROPATHY, WITH LONG-TERM CURRENT USE OF INSULIN (H): Chronic | ICD-10-CM

## 2018-09-28 DIAGNOSIS — Z79.4 TYPE 2 DIABETES MELLITUS WITH DIABETIC NEPHROPATHY, WITH LONG-TERM CURRENT USE OF INSULIN (H): Chronic | ICD-10-CM

## 2018-09-28 DIAGNOSIS — I10 BENIGN ESSENTIAL HYPERTENSION: Chronic | ICD-10-CM

## 2018-09-28 DIAGNOSIS — E78.5 HYPERLIPIDEMIA LDL GOAL <100: Chronic | ICD-10-CM

## 2018-09-28 DIAGNOSIS — C20 RECTAL CANCER (H): Primary | Chronic | ICD-10-CM

## 2018-09-28 DIAGNOSIS — K21.9 GASTROESOPHAGEAL REFLUX DISEASE WITHOUT ESOPHAGITIS: Chronic | ICD-10-CM

## 2018-09-28 PROCEDURE — 99214 OFFICE O/P EST MOD 30 MIN: CPT | Performed by: INTERNAL MEDICINE

## 2018-09-28 PROCEDURE — G0463 HOSPITAL OUTPT CLINIC VISIT: HCPCS

## 2018-09-28 RX ORDER — CEPHALEXIN 500 MG/1
CAPSULE ORAL
COMMUNITY
Start: 2018-09-13 | End: 2018-10-30

## 2018-09-28 ASSESSMENT — PAIN SCALES - GENERAL: PAINLEVEL: NO PAIN (0)

## 2018-09-28 NOTE — MR AVS SNAPSHOT
After Visit Summary   9/28/2018    Stefanie Velazquez    MRN: [id number]           Patient Information     Date Of Birth          1945        Visit Information        Provider Department      9/28/2018 11:00 AM Apolinar Martinez MD [facility] ONCOLOGY      Today's Diagnoses     Rectal cancer- newly diagnosed adenoCA rectum Jan 2011    -  1    Benign essential hypertension        Gastroesophageal reflux disease without esophagitis        Hyperlipidemia LDL goal <100        Other specified hypothyroidism        Type 2 diabetes mellitus with diabetic nephropathy, with long-term current use of insulin (H)           Follow-ups after your visit        Follow-up notes from your care team     Return in about 6 months (around 3/28/2019) for Blood work before next appointment.      Your next 10 appointments already scheduled     Oct 30, 2018 11:00 AM CDT   Office Visit with Sandra Morales MD   [facility] ([facility]    [address] [place] [id number] [phone]           Bring a current list of meds and any records pertaining to this visit. For Physicals, please bring immunization records and any forms needing to be filled out. Please arrive 10 minutes early to complete paperwork.            Mar 28, 2019 11:00 AM CDT   Return Visit with Apolinar Martinez MD   [facility] ([facility])    [facility]  [address] [address]   [phone]              Future tests that were ordered for you today     Open Future Orders        Priority Expected Expires Ordered    CBC with platelets differential Routine 3/28/2019 9/28/2019 9/28/2018    Comprehensive metabolic panel Routine 3/28/2019 9/28/2019 9/28/2018    CEA Routine 3/28/2019 9/28/2019 9/28/2018            Who to contact     If you have questions or need follow up information about today's clinic visit or your schedule  "please contact [facility] directly at [phone].  Normal or non-critical lab and imaging results will be communicated to you by MyChart, letter or phone within 4 business days after the clinic has received the results. If you do not hear from us within 7 days, please contact the clinic through MyChart or phone. If you have a critical or abnormal lab result, we will notify you by phone as soon as possible.  Submit refill requests through [employer] or call your pharmacy and they will forward the refill request to us. Please allow 3 business days for your refill to be completed.          Additional Information About Your Visit        [employer] Information     [employer] gives you secure access to your electronic health record. If you see a primary care provider, you can also send messages to your care team and make appointments. If you have questions, please call your primary care clinic.  If you do not have a primary care provider, please call [phone] and they will assist you.        Care EveryWhere ID     This is your Care EveryWhere ID. This could be used by other organizations to access your [facility] medical records  [id number]        Your Vitals Were     Pulse Temperature Respirations Height Pulse Oximetry Breastfeeding?    56 97.7  F (36.5  C) (Tympanic) 18 1.575 m (5' 2\") 94% No    BMI (Body Mass Index)                   35.21 kg/m2            Blood Pressure from Last 3 Encounters:   09/28/18 118/58   09/18/18 122/70   09/13/18 172/89    Weight from Last 3 Encounters:   09/28/18 87.3 kg (192 lb 8 oz)   09/18/18 86.2 kg (190 lb)   09/13/18 86.6 kg (191 lb)               Primary Care Provider Office Phone # Fax #    Sandra Morales [phone] [address] [place] [address]        Equal Access to Services     BRUNA NUNN : Cristine velezo Soamos, waaxda luqadaha, qaybta kaalmada sera, ceci moy. So [facility] [phone].    ATENCIÓN: " Si habla leticia, tiene a waite disposición servicios gratuitos de asistencia lingüística. Kosta cooper [phone].    We comply with applicable federal civil rights laws and [place] laws. We do not discriminate on the basis of race, color, national origin, age, disability, sex, sexual orientation, or gender identity.            Thank you!     Thank you for choosing [facility]  for your care. Our goal is always to provide you with excellent care. Hearing back from our patients is one way we can continue to improve our services. Please take a few minutes to complete the written survey that you may receive in the mail after your visit with us. Thank you!             Your Updated Medication List - Protect others around you: Learn how to safely use, store and throw away your medicines at www.disposemymeds.org.          This list is accurate as of 9/28/18 11:57 AM.  Always use your most recent med list.                   Brand Name Dispense Instructions for use Diagnosis    ACCU-CHEK MILVIA Jeana     1 device    dispense one meter    Type II or unspecified type diabetes mellitus without mention of complication, uncontrolled       acetaminophen 650 MG CR tablet    TYLENOL    60 tablet    Take 1 tablet (650 mg) by mouth 3 times daily as needed for mild pain or fever    Bilateral cellulitis of lower leg       apixaban ANTICOAGULANT 5 MG tablet    ELIQUIS    60 tablet    5 mg orally twice daily    Acute deep vein thrombosis (DVT) of right lower extremity, unspecified vein (H)       blood glucose monitoring lancets     1 Box    Test BG 4 times daily    Type II or unspecified type diabetes mellitus without mention of complication, uncontrolled       blood glucose monitoring test strip    ACCU-CHEK MILVIA    360 each    Use to test blood sugars 4 times daily or as directed.    Type 2 diabetes mellitus with diabetic nephropathy, with long-term current use of insulin (H)       BUTT PASTE - REGULAR    DR LOVE POOP GOOP BUTT  PASTE FORMULA     Apply topically every hour as needed for skin protection        cephALEXin 500 MG capsule    KEFLEX          fluocinonide 0.05 % ointment    LIDEX    60 g    Apply sparingly to rash on legs twice daily as needed.  Do not apply to face.    Venous stasis dermatitis of both lower extremities       fluticasone 50 MCG/ACT spray    FLONASE    1 Bottle    Spray 1 spray into both nostrils daily    Chronic rhinitis, unspecified type       furosemide 20 MG tablet    LASIX    180 tablet    Take 1 tablet (20 mg) by mouth 2 times daily    Benign essential hypertension       gabapentin 300 MG capsule    NEURONTIN    90 capsule    Take 1 tablet (300 mg) at bedtime    Lumbar radiculopathy       insulin detemir 100 UNIT/ML injection    LEVEMIR FLEXPEN/FLEXTOUCH    3 mL    Inject 20 Units Subcutaneous At Bedtime Fill when needed    Type 2 diabetes mellitus with diabetic nephropathy, with long-term current use of insulin (H)       insulin pen needle 32G X 4 MM    BD GABRIELLA U/F    120 each    Use 4 times per day or as directed.    Type 2 diabetes mellitus with diabetic nephropathy, with long-term current use of insulin (H)       levothyroxine 112 MCG tablet    SYNTHROID/LEVOTHROID    90 tablet    Take 1 tablet (112 mcg) by mouth daily    Other specified hypothyroidism       loperamide 2 MG capsule    IMODIUM    90 capsule    One capsule once a day PRN, can use a second one if needed    Rectal cancer (H), Chronic diarrhea       losartan 100 MG tablet    COZAAR    90 tablet    Take 1 tablet (100 mg) by mouth daily    Benign essential hypertension       menthol-zinc oxide 0.44-20.625 % Oint ointment    CALMOSEPTINE     Apply topically 4 times daily as needed for skin protection    Rash and nonspecific skin eruption       metoprolol succinate 50 MG 24 hr tablet    TOPROL-XL    90 tablet    Take 1 tablet (50 mg) by mouth daily    Benign essential hypertension       NovoLOG FLEXPEN 100 UNIT/ML injection   Generic drug:   insulin aspart     3 mL    5 units with lunch    Type 2 diabetes mellitus with diabetic nephropathy, with long-term current use of insulin (H)       OMEPRAZOLE PO      Take 20 mg by mouth daily as needed        Potassium Gluconate 595 MG Caps      Take 1 tablet by mouth daily    Stasis edema of both lower extremities       rOPINIRole 1 MG tablet    REQUIP    200 tablet    1-3 tabs daily as needed, patient takes one in am, one in pm. occasionally will take in midday.    RLS (restless legs syndrome)       simvastatin 40 MG tablet    ZOCOR    90 tablet    Take 1 tablet (40 mg) by mouth daily    Hyperlipidemia LDL goal <100
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
## 2018-09-28 NOTE — NURSING NOTE
"Oncology Rooming Note    September 28, 2018 11:12 AM   Stefanie Velazquez is a 73 year old female who presents for:    Chief Complaint   Patient presents with     Oncology Clinic Visit     6 month follow up Rectal CA. Review lab results.      Initial Vitals: /58 (BP Location: Left arm, Patient Position: Sitting, Cuff Size: Adult Large)  Pulse 56  Temp 97.7  F (36.5  C) (Tympanic)  Resp 18  Ht 1.575 m (5' 2\")  Wt 87.3 kg (192 lb 8 oz)  SpO2 94%  Breastfeeding? No  BMI 35.21 kg/m2 Estimated body mass index is 35.21 kg/(m^2) as calculated from the following:    Height as of this encounter: 1.575 m (5' 2\").    Weight as of this encounter: 87.3 kg (192 lb 8 oz). Body surface area is 1.95 meters squared.  No Pain (0) Comment: Data Unavailable   No LMP recorded. Patient is postmenopausal.  Allergies reviewed: Yes  Medications reviewed: Yes    Medications: Medication refills not needed today.  Pharmacy name entered into Encl[employer]: [facility] - [address]    Clinical concerns: 6 month follow up Rectal CA. Review lab results. C/o developed a blood clot last week in her right leg. Still with some edema bilaterally in her legs.     8 minutes for nursing intake (face to face time)     Marva Del Rio, [facility]            "

## 2018-09-28 NOTE — PATIENT INSTRUCTIONS
We would like to see you back in 6 months for a follow up appointment with labs prior.     When you are in need of a refill, please call your pharmacy and they will send us a request.      Copy of appointments, and after visit summary (AVS) given to patient.      If you have any questions please call Patricia Mao RN, BSN Oncology Hematology  [facility] [phone]. For questions after business hours, or on holidays/weekends, please call our after hours Nurse Triage line [phone]. Thank you.

## 2018-10-01 ENCOUNTER — MEDICAL CORRESPONDENCE (OUTPATIENT)
Dept: HEALTH INFORMATION MANAGEMENT | Facility: CLINIC | Age: 73
End: 2018-10-01
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
## 2018-10-03 ENCOUNTER — TELEPHONE (OUTPATIENT)
Dept: FAMILY MEDICINE | Facility: CLINIC | Age: 73
End: 2018-10-03

## 2018-10-03 DIAGNOSIS — K52.9 CHRONIC DIARRHEA: ICD-10-CM

## 2018-10-03 DIAGNOSIS — I89.0 LYMPHEDEMA OF BOTH LOWER EXTREMITIES: Primary | ICD-10-CM

## 2018-10-03 DIAGNOSIS — M51.369 DDD (DEGENERATIVE DISC DISEASE), LUMBAR: Primary | ICD-10-CM

## 2018-10-03 DIAGNOSIS — Z79.4 TYPE 2 DIABETES MELLITUS WITH DIABETIC NEPHROPATHY, WITH LONG-TERM CURRENT USE OF INSULIN (H): Chronic | ICD-10-CM

## 2018-10-03 DIAGNOSIS — G62.9 NEUROPATHY: Chronic | ICD-10-CM

## 2018-10-03 DIAGNOSIS — E11.21 TYPE 2 DIABETES MELLITUS WITH DIABETIC NEPHROPATHY, WITH LONG-TERM CURRENT USE OF INSULIN (H): Chronic | ICD-10-CM

## 2018-10-03 DIAGNOSIS — I82.401 ACUTE DEEP VEIN THROMBOSIS (DVT) OF RIGHT LOWER EXTREMITY, UNSPECIFIED VEIN (H): ICD-10-CM

## 2018-10-03 DIAGNOSIS — C20 RECTAL CANCER (H): Chronic | ICD-10-CM

## 2018-10-03 NOTE — TELEPHONE ENCOUNTER
Reason for call:  Patient reporting a symptom    Symptom or request: Pt continues to have Rt Leg Swelling. Pt is concerned should she be seen sooner then 10/30/18.   RN feels she does not need to go to the ER. Staff does not know pt well since she is new to them. But would like this addressed.    Duration (how long have symptoms been present): See OV Notes    Have you been treated for this before? Yes      Phone Number patient can be reached at:  [phone] Mell DONIS at [facility]    Best Time:  Any Time      Can we leave a detailed message on this number:  YES    Call taken on 10/3/2018 at 2:53 PM by Leilani Jurado

## 2018-10-03 NOTE — TELEPHONE ENCOUNTER
Order request came from [place]/[facility]  Requesting Service & Repair on her Hospital Bed. Please Include Diagnosis, DX/ICD 10 codes.  Please have Dr. Bernstein sign order return to [facility].    [facility]

## 2018-10-04 ENCOUNTER — TELEPHONE (OUTPATIENT)
Dept: FAMILY MEDICINE | Facility: CLINIC | Age: 73
End: 2018-10-04

## 2018-10-04 ENCOUNTER — MEDICAL CORRESPONDENCE (OUTPATIENT)
Dept: HEALTH INFORMATION MANAGEMENT | Facility: CLINIC | Age: 73
End: 2018-10-04

## 2018-10-04 NOTE — TELEPHONE ENCOUNTER
Dr. Bersntein said it is common to have swelling after a DVT and she should wear compression stockings. We can work her in on 10/16 at 12:40, but if she feels she needs to be seen sooner have her see someone else. Lisset Man MA

## 2018-10-04 NOTE — TELEPHONE ENCOUNTER
Janessa- Code Status  Form received back signed  10/4/18  Faxed Back & Sent to be scanned to this encounter  [facility]

## 2018-10-04 NOTE — TELEPHONE ENCOUNTER
Dr. Morales,  Spoke with Mell and gave Dr. Morales's message below, Mell says she needs an order for the compression stockings, have DME pended, also Mell says the patient had diarrhea through the night and an emesis once yesterday that they think is due to diet change and gave 2 capsules of Loperimide, wondering if can give her more, please advise.    JEWELS Crooks

## 2018-10-04 NOTE — TELEPHONE ENCOUNTER
DME Signed and in my outbasket   Ok to use loperimide one after each loose stool, up to 8 mg a day

## 2018-10-04 NOTE — TELEPHONE ENCOUNTER
Spoke with Mell and gave her the instruction below from Dr. Morales for the Loperimide, Mell will need the order signed and faxed over. Verbal order completed with instruction made, signed and faxed to Mell, closing this encounter.    JEWELS Crooks

## 2018-10-04 NOTE — TELEPHONE ENCOUNTER
Family care services- Discontinue home care services    Form received back signed  10/4/18  Faxed Back & Sent to be scanned to this encounter  [facility]

## 2018-10-04 NOTE — TELEPHONE ENCOUNTER
Spoke with authorized family member Crystal and gave her the message to tell the patient, Crystal gave patient's new cell phone number [phone] to call as well. Called the cell number and left message to call back.    JEWELS Crooks

## 2018-10-08 ENCOUNTER — TELEPHONE (OUTPATIENT)
Dept: FAMILY MEDICINE | Facility: CLINIC | Age: 73
End: 2018-10-08

## 2018-10-08 NOTE — TELEPHONE ENCOUNTER
Family Care Services- Discharge Summary  Form placed Dr. Amandeep daniels folder  [facility]il[facility]

## 2018-10-12 ENCOUNTER — TELEPHONE (OUTPATIENT)
Dept: FAMILY MEDICINE | Facility: CLINIC | Age: 73
End: 2018-10-12

## 2018-10-12 NOTE — TELEPHONE ENCOUNTER
Janessa Form - Phy Orders  Placed on Dr. Muhammad Desk for Sig    Leil[facility]

## 2018-10-15 ENCOUNTER — TELEPHONE (OUTPATIENT)
Dept: FAMILY MEDICINE | Facility: CLINIC | Age: 73
End: 2018-10-15

## 2018-10-15 ENCOUNTER — MEDICAL CORRESPONDENCE (OUTPATIENT)
Dept: HEALTH INFORMATION MANAGEMENT | Facility: CLINIC | Age: 73
End: 2018-10-15

## 2018-10-15 NOTE — TELEPHONE ENCOUNTER
Form received back signed  10/15/18  Faxed Back & Sent to be scanned to this encounter  [facility]

## 2018-10-15 NOTE — TELEPHONE ENCOUNTER
Reason for Call:    Appointment, Requested Provider:  Dr Morales    PCP: Sandra Morales    Reason for visit: Pt is scheduled to see Dr Morales but would like to see her sooner than Oct 30th if possible.    Duration of symptoms: Mell, Nurse at [facility] says Stefanie wants to be seen for unresolved diarrhea although it has been getting better in the last few days. She also has swelling in her legs but the nurse thinks it is looking better. Patient is concerned because of history of DVT.    Have you been treated for this in the past? Yes     Can we leave a detailed message on this number? YES    Phone number Mell can be reached at:  [phone]    Best Time: anytime    Call taken on 10/15/2018 at 10:53 AM by Crystal Skelton

## 2018-10-16 NOTE — TELEPHONE ENCOUNTER
Left message that we could see Concha on Oct.25th at 11:20. Told to call to reschedule to this if that time works. Lisset Man MA

## 2018-10-30 ENCOUNTER — TELEPHONE (OUTPATIENT)
Dept: FAMILY MEDICINE | Facility: CLINIC | Age: 73
End: 2018-10-30

## 2018-10-30 ENCOUNTER — OFFICE VISIT (OUTPATIENT)
Dept: FAMILY MEDICINE | Facility: CLINIC | Age: 73
End: 2018-10-30
Payer: COMMERCIAL

## 2018-10-30 VITALS
DIASTOLIC BLOOD PRESSURE: 62 MMHG | WEIGHT: 194 LBS | BODY MASS INDEX: 35.48 KG/M2 | RESPIRATION RATE: 20 BRPM | SYSTOLIC BLOOD PRESSURE: 118 MMHG | HEART RATE: 60 BPM | TEMPERATURE: 98.5 F

## 2018-10-30 DIAGNOSIS — R11.10 VOMITING AND DIARRHEA: Primary | ICD-10-CM

## 2018-10-30 DIAGNOSIS — N18.30 CKD (CHRONIC KIDNEY DISEASE) STAGE 3, GFR 30-59 ML/MIN (H): Chronic | ICD-10-CM

## 2018-10-30 DIAGNOSIS — E11.21 TYPE 2 DIABETES MELLITUS WITH DIABETIC NEPHROPATHY, WITH LONG-TERM CURRENT USE OF INSULIN (H): Chronic | ICD-10-CM

## 2018-10-30 DIAGNOSIS — I10 BENIGN ESSENTIAL HYPERTENSION: Chronic | ICD-10-CM

## 2018-10-30 DIAGNOSIS — I82.401 ACUTE DEEP VEIN THROMBOSIS (DVT) OF RIGHT LOWER EXTREMITY, UNSPECIFIED VEIN (H): ICD-10-CM

## 2018-10-30 DIAGNOSIS — Z79.4 TYPE 2 DIABETES MELLITUS WITH DIABETIC NEPHROPATHY, WITH LONG-TERM CURRENT USE OF INSULIN (H): Chronic | ICD-10-CM

## 2018-10-30 DIAGNOSIS — C20 RECTAL CANCER (H): Chronic | ICD-10-CM

## 2018-10-30 DIAGNOSIS — E03.8 OTHER SPECIFIED HYPOTHYROIDISM: Chronic | ICD-10-CM

## 2018-10-30 DIAGNOSIS — R53.1 WEAKNESS: ICD-10-CM

## 2018-10-30 DIAGNOSIS — R19.7 VOMITING AND DIARRHEA: Primary | ICD-10-CM

## 2018-10-30 LAB
ALBUMIN SERPL-MCNC: 2.7 G/DL (ref 3.4–5)
ALP SERPL-CCNC: 107 U/L (ref 40–150)
ALT SERPL W P-5'-P-CCNC: 20 U/L (ref 0–50)
ANION GAP SERPL CALCULATED.3IONS-SCNC: 11 MMOL/L (ref 3–14)
AST SERPL W P-5'-P-CCNC: 32 U/L (ref 0–45)
BASOPHILS # BLD AUTO: 0 10E9/L (ref 0–0.2)
BASOPHILS # BLD AUTO: NORMAL 10E9/L (ref 0–0.2)
BASOPHILS NFR BLD AUTO: 0.4 %
BASOPHILS NFR BLD AUTO: NORMAL %
BILIRUB SERPL-MCNC: 0.6 MG/DL (ref 0.2–1.3)
BUN SERPL-MCNC: 36 MG/DL (ref 7–30)
CALCIUM SERPL-MCNC: 8.8 MG/DL (ref 8.5–10.1)
CHLORIDE SERPL-SCNC: 104 MMOL/L (ref 94–109)
CO2 SERPL-SCNC: 21 MMOL/L (ref 20–32)
CREAT SERPL-MCNC: 1.57 MG/DL (ref 0.52–1.04)
DIFFERENTIAL METHOD BLD: ABNORMAL
DIFFERENTIAL METHOD BLD: NORMAL
EOSINOPHIL # BLD AUTO: 1.7 10E9/L (ref 0–0.7)
EOSINOPHIL # BLD AUTO: NORMAL 10E9/L (ref 0–0.7)
EOSINOPHIL NFR BLD AUTO: 25.7 %
EOSINOPHIL NFR BLD AUTO: NORMAL %
ERYTHROCYTE [DISTWIDTH] IN BLOOD BY AUTOMATED COUNT: 13.3 % (ref 10–15)
ERYTHROCYTE [DISTWIDTH] IN BLOOD BY AUTOMATED COUNT: NORMAL % (ref 10–15)
GFR SERPL CREATININE-BSD FRML MDRD: 32 ML/MIN/1.7M2
GLUCOSE SERPL-MCNC: 217 MG/DL (ref 70–99)
HBA1C MFR BLD: 8 % (ref 0–5.6)
HCT VFR BLD AUTO: 39.9 % (ref 35–47)
HCT VFR BLD AUTO: NORMAL % (ref 35–47)
HGB BLD-MCNC: 12.8 G/DL (ref 11.7–15.7)
HGB BLD-MCNC: NORMAL G/DL (ref 11.7–15.7)
IMM GRANULOCYTES # BLD: 0 10E9/L (ref 0–0.4)
IMM GRANULOCYTES NFR BLD: 0.3 %
LYMPHOCYTES # BLD AUTO: 1.1 10E9/L (ref 0.8–5.3)
LYMPHOCYTES # BLD AUTO: NORMAL 10E9/L (ref 0.8–5.3)
LYMPHOCYTES NFR BLD AUTO: 16 %
LYMPHOCYTES NFR BLD AUTO: NORMAL %
MCH RBC QN AUTO: 29.6 PG (ref 26.5–33)
MCH RBC QN AUTO: NORMAL PG (ref 26.5–33)
MCHC RBC AUTO-ENTMCNC: 32.1 G/DL (ref 31.5–36.5)
MCHC RBC AUTO-ENTMCNC: NORMAL G/DL (ref 31.5–36.5)
MCV RBC AUTO: 92 FL (ref 78–100)
MCV RBC AUTO: NORMAL FL (ref 78–100)
MONOCYTES # BLD AUTO: 0.6 10E9/L (ref 0–1.3)
MONOCYTES # BLD AUTO: NORMAL 10E9/L (ref 0–1.3)
MONOCYTES NFR BLD AUTO: 8.5 %
MONOCYTES NFR BLD AUTO: NORMAL %
NEUTROPHILS # BLD AUTO: 3.3 10E9/L (ref 1.6–8.3)
NEUTROPHILS # BLD AUTO: NORMAL 10E9/L (ref 1.6–8.3)
NEUTROPHILS NFR BLD AUTO: 49.1 %
NEUTROPHILS NFR BLD AUTO: NORMAL %
NRBC # BLD AUTO: 0 10*3/UL
NRBC BLD AUTO-RTO: 0 /100
PLATELET # BLD AUTO: 162 10E9/L (ref 150–450)
PLATELET # BLD AUTO: NORMAL 10E9/L (ref 150–450)
POTASSIUM SERPL-SCNC: 4.1 MMOL/L (ref 3.4–5.3)
PROT SERPL-MCNC: 7 G/DL (ref 6.8–8.8)
RBC # BLD AUTO: 4.33 10E12/L (ref 3.8–5.2)
RBC # BLD AUTO: NORMAL 10E12/L (ref 3.8–5.2)
SODIUM SERPL-SCNC: 136 MMOL/L (ref 133–144)
TSH SERPL DL<=0.005 MIU/L-ACNC: 0.19 MU/L (ref 0.4–4)
WBC # BLD AUTO: 6.7 10E9/L (ref 4–11)
WBC # BLD AUTO: NORMAL 10E9/L (ref 4–11)

## 2018-10-30 PROCEDURE — 84443 ASSAY THYROID STIM HORMONE: CPT | Performed by: FAMILY MEDICINE

## 2018-10-30 PROCEDURE — 85025 COMPLETE CBC W/AUTO DIFF WBC: CPT | Performed by: FAMILY MEDICINE

## 2018-10-30 PROCEDURE — 80053 COMPREHEN METABOLIC PANEL: CPT | Performed by: FAMILY MEDICINE

## 2018-10-30 PROCEDURE — 83036 HEMOGLOBIN GLYCOSYLATED A1C: CPT | Performed by: FAMILY MEDICINE

## 2018-10-30 PROCEDURE — 99215 OFFICE O/P EST HI 40 MIN: CPT | Performed by: FAMILY MEDICINE

## 2018-10-30 PROCEDURE — 36415 COLL VENOUS BLD VENIPUNCTURE: CPT | Performed by: FAMILY MEDICINE

## 2018-10-30 NOTE — PROGRESS NOTES
SUBJECTIVE:   Stefanie Velazquez is a 73 year old female who presents to clinic today for the following health issues:      Diarrhea      Duration: 1 month    Description:       Consistency of stool: runny       Blood in stool: no        Number of loose stools past 24 hours: 4-5    Intensity:  moderate    Accompanying signs and symptoms:       Fever: no        Nausea/vomitting: YES-        Abdominal pain: no        Weight loss: no     History (recent antibiotics or travel/ill contacts/med changes/testing done): in new housing    Precipitating or alleviating factors: new diet    Therapies tried and outcome: unknown    She had diarrhea for a little while when she first moved to assisted living, for about two weeks, did well and had solid stools, then returned a few days ago.   She has had nausea and vomiting the last week, threw up 3-4 times.   Has no abdominal pain.   No one else sick.   Stools are runny, 4-5 or more stools a day.   She thinks she has an open wound on her bottom from the diarrhea.   Feels weak  Has a history of rectal cancer and radiation, and some diarrhea from that, but had been pretty good.     DVT follow up  She is on Eliquis. Saw her oncologist recently, unfortunately he did not address the DVT.     Hypothyroidism-on levothyroxine   TSH   Date Value Ref Range Status   10/30/2018 0.19 (L) 0.40 - 4.00 mU/L Final      Diabetes-the assisted living is taking her blood sugars, I don't have those today.   She is supposed to be getting 20 units of levemir at night, and 5 of fast acting at lunch.     Hypertension-on losartan, metoprolol, lasix  BP Readings from Last 6 Encounters:   10/30/18 118/62   09/28/18 118/58   09/18/18 122/70   09/13/18 172/89   09/13/18 114/84   09/07/18 114/65         Problem list and histories reviewed & adjusted, as indicated.  Additional history: as documented    Recent Labs   Lab Test  10/30/18   1212  09/13/18   1855  08/31/18   0905  07/20/18   1647   05/02/18   0910   03/20/18   1611  02/09/18   0910   A1C  8.0*   --    --   8.5*   --    --   7.3*   --    LDL   --    --   107*   --    --   75   --   124*   HDL   --    --   51   --    --   53   --   62   TRIG   --    --   134   --    --   235*   --   137   ALT  20   --   13  19   --   14   --   16   CR  1.57*  1.48*  1.45*  1.43*   < >  1.36*   --   1.38*   GFRESTIMATED  32*  34*  35*  36*   < >  38*   --   37*   GFRESTBLACK  39*  42*  43*  43*   < >  46*   --   45*   POTASSIUM  4.1  3.6  3.4  4.2   < >  3.3*   --   4.0   TSH  0.19*   --   4.24*  5.32*   --    --    --    --     < > = values in this interval not displayed.      BP Readings from Last 3 Encounters:   10/30/18 118/62   09/28/18 118/58   09/18/18 122/70    Wt Readings from Last 3 Encounters:   10/30/18 194 lb (88 kg)   09/28/18 192 lb 8 oz (87.3 kg)   09/18/18 190 lb (86.2 kg)             Reviewed and updated as needed this visit by clinical staff  Tobacco  Allergies  Meds  Problems  Med Hx  Surg Hx  Fam Hx  Soc Hx        Reviewed and updated as needed this visit by Provider  Allergies  Meds  Problems         ROS:  CONSTITUTIONAL: NEGATIVE for fever, chills, change in weight  ENT/MOUTH: NEGATIVE for ear, mouth and throat problems  RESP: NEGATIVE for significant cough or SOB  CV: NEGATIVE for chest pain, palpitations or peripheral edema  GI: positive as above  : No dysuria, urinary frequency or urgency.     OBJECTIVE:                                                    /62 (BP Location: Left arm)  Pulse 60  Temp 98.5  F (36.9  C) (Tympanic)  Resp 20  Wt 194 lb (88 kg)  BMI 35.48 kg/m2  Body mass index is 35.48 kg/(m^2).  GENERAL:  Alert, appears fatigued, well nourished, well hydrated, no distress  NECK: no tenderness, no adenopathy, no asymmetry, no masses, no stiffness; thyroid- normal to palpation  RESP: lungs clear to auscultation - no rales, no rhonchi, no wheezes  CV: regular rates and rhythm, normal S1 S2, no S3 or S4 and no murmur, no  click or rub -  ABDOMEN: soft, no tenderness, no  hepatosplenomegaly, no masses, normal bowel sounds  Briefs taken down and stool in them, gluteal cleft has some erythema but no open areas noted     ASSESSMENT/PLAN:                                                      ASSESSMENT:  1. Vomiting and diarrhea    2. Weakness    3. Other specified hypothyroidism    4. CKD (chronic kidney disease) stage 3, GFR 30-59 ml/min (H)    5. Type 2 diabetes mellitus with diabetic nephropathy, with long-term current use of insulin (H)    6. Benign essential hypertension    7. Rectal cancer- newly diagnosed adenoCA rectum Jan 2011    8. Acute deep vein thrombosis (DVT) of right lower extremity, unspecified vein (H)        PLAN:  Orders Placed This Encounter     CBC with platelets     Comprehensive metabolic panel     Hemoglobin A1c     TSH     WBC Differential     CBC with platelets and differential     40 min spent with patient, greater than 50% in counseling and coordination of care, dealing with multiple issues.   See below    Patient Instructions   Imodium tid  Stool studies and labs    I'll have them send me the blood sugars and depending on those and A1C today, I'll give you my recommendations      Sandra Morales MD  [facility]

## 2018-10-30 NOTE — MR AVS SNAPSHOT
After Visit Summary   10/30/2018    Stefanie Velazquez    MRN: [id number]           Patient Information     Date Of Birth          1945        Visit Information        Provider Department      10/30/2018 11:00 AM Sandra Morales MD [facility]        Today's Diagnoses     Vomiting and diarrhea    -  1    Weakness        Other specified hypothyroidism        CKD (chronic kidney disease) stage 3, GFR 30-59 ml/min (H)        Type 2 diabetes mellitus with diabetic nephropathy, with long-term current use of insulin (H)        Benign essential hypertension        Rectal cancer- newly diagnosed adenoCA rectum Jan 2011          Care Instructions    Imodium tid  Stool studies and labs    I'll have them send me the blood sugars and depending on those and A1C today, I'll give you my recommendations              Follow-ups after your visit        Your next 10 appointments already scheduled     Mar 28, 2019 11:00 AM CDT   Return Visit with Apolinar Martinez MD   [facility] ([facility])    [facility]  [address] [address] [phone]              Future tests that were ordered for you today     Open Future Orders        Priority Expected Expires Ordered    Ova and Parasite Exam Routine Routine  10/30/2019 10/30/2018    Clostridium difficile toxin B PCR Routine  11/29/2018 10/30/2018    Enteric Bacteria and Virus Panel by CATERINA Stool Routine  10/30/2019 10/30/2018            Who to contact     If you have questions or need follow up information about today's clinic visit or your schedule please contact [facility] directly at [phone].  Normal or non-critical lab and imaging results will be communicated to you by MyChart, letter or phone within 4 business days after the clinic has received the results. If you do not hear from us within 7 days, please contact the clinic through MyChart or phone. If  you have a critical or abnormal lab result, we will notify you by phone as soon as possible.  Submit refill requests through [employer] or call your pharmacy and they will forward the refill request to us. Please allow 3 business days for your refill to be completed.          Additional Information About Your Visit        [employer] Information     [employer] gives you secure access to your electronic health record. If you see a primary care provider, you can also send messages to your care team and make appointments. If you have questions, please call your primary care clinic.  If you do not have a primary care provider, please call [phone] and they will assist you.        Care EveryWhere ID     This is your Care EveryWhere ID. This could be used by other organizations to access your [facility] medical records  [id number]        Your Vitals Were     Pulse Temperature Respirations BMI (Body Mass Index)          60 98.5  F (36.9  C) (Tympanic) 20 35.48 kg/m2         Blood Pressure from Last 3 Encounters:   10/30/18 118/62   09/28/18 118/58   09/18/18 122/70    Weight from Last 3 Encounters:   10/30/18 194 lb (88 kg)   09/28/18 192 lb 8 oz (87.3 kg)   09/18/18 190 lb (86.2 kg)              We Performed the Following     CBC with platelets     Comprehensive metabolic panel     Hemoglobin A1c     TSH          Today's Medication Changes          These changes are accurate as of 10/30/18 12:09 PM.  If you have any questions, ask your nurse or doctor.               Stop taking these medicines if you haven't already. Please contact your care team if you have questions.     cephALEXin 500 MG capsule   Commonly known as:  KEFLEX   Stopped by:  Sandra Morales MD                    Primary Care Provider Office Phone # Fax #    Sandra Morales [phone]       [address] [place] [address]        Equal Access to Services     NIDA NUNN : fernando Barakat, karon mcgill  ceci zamorarenetta murillo'aan ah. So [facility] [phone].    ATENCIÓN: Si shamala leticia, tiene a waite disposición servicios gratuitos de asistencia lingüística. Kosta al [phone].    We comply with applicable federal civil rights laws and [place] laws. We do not discriminate on the basis of race, color, national origin, age, disability, sex, sexual orientation, or gender identity.            Thank you!     Thank you for choosing [facility]  for your care. Our goal is always to provide you with excellent care. Hearing back from our patients is one way we can continue to improve our services. Please take a few minutes to complete the written survey that you may receive in the mail after your visit with us. Thank you!             Your Updated Medication List - Protect others around you: Learn how to safely use, store and throw away your medicines at www.disposemymeds.org.          This list is accurate as of 10/30/18 12:09 PM.  Always use your most recent med list.                   Brand Name Dispense Instructions for use Diagnosis    ACCU-CHEK MILVIA Jeana     1 device    dispense one meter    Type II or unspecified type diabetes mellitus without mention of complication, uncontrolled       acetaminophen 650 MG CR tablet    TYLENOL    60 tablet    Take 1 tablet (650 mg) by mouth 3 times daily as needed for mild pain or fever    Bilateral cellulitis of lower leg       apixaban ANTICOAGULANT 5 MG tablet    ELIQUIS    60 tablet    5 mg orally twice daily    Acute deep vein thrombosis (DVT) of right lower extremity, unspecified vein (H)       blood glucose monitoring lancets     1 Box    Test BG 4 times daily    Type II or unspecified type diabetes mellitus without mention of complication, uncontrolled       blood glucose monitoring test strip    ACCU-CHEK MILVIA    360 each    Use to test blood sugars 4 times daily or as directed.    Type 2 diabetes mellitus with diabetic  nephropathy, with long-term current use of insulin (H)       BUTT PASTE - REGULAR    DR LOVE POOP GOOP BUTT PASTE FORMULA     Apply topically every hour as needed for skin protection        fluocinonide 0.05 % ointment    LIDEX    60 g    Apply sparingly to rash on legs twice daily as needed.  Do not apply to face.    Venous stasis dermatitis of both lower extremities       fluticasone 50 MCG/ACT spray    FLONASE    1 Bottle    Spray 1 spray into both nostrils daily    Chronic rhinitis, unspecified type       furosemide 20 MG tablet    LASIX    180 tablet    Take 1 tablet (20 mg) by mouth 2 times daily    Benign essential hypertension       gabapentin 300 MG capsule    NEURONTIN    90 capsule    Take 1 tablet (300 mg) at bedtime    Lumbar radiculopathy       insulin detemir 100 UNIT/ML injection    LEVEMIR FLEXPEN/FLEXTOUCH    3 mL    Inject 20 Units Subcutaneous At Bedtime Fill when needed    Type 2 diabetes mellitus with diabetic nephropathy, with long-term current use of insulin (H)       insulin pen needle 32G X 4 MM    BD GABRIELLA U/F    120 each    Use 4 times per day or as directed.    Type 2 diabetes mellitus with diabetic nephropathy, with long-term current use of insulin (H)       levothyroxine 112 MCG tablet    SYNTHROID/LEVOTHROID    90 tablet    Take 1 tablet (112 mcg) by mouth daily    Other specified hypothyroidism       loperamide 2 MG capsule    IMODIUM    90 capsule    One capsule once a day PRN, can use a second one if needed    Rectal cancer (H), Chronic diarrhea       losartan 100 MG tablet    COZAAR    90 tablet    Take 1 tablet (100 mg) by mouth daily    Benign essential hypertension       menthol-zinc oxide 0.44-20.625 % Oint ointment    CALMOSEPTINE     Apply topically 4 times daily as needed for skin protection    Rash and nonspecific skin eruption       metoprolol succinate 50 MG 24 hr tablet    TOPROL-XL    90 tablet    Take 1 tablet (50 mg) by mouth daily    Benign essential hypertension        NovoLOG FLEXPEN 100 UNIT/ML injection   Generic drug:  insulin aspart     3 mL    5 units with lunch    Type 2 diabetes mellitus with diabetic nephropathy, with long-term current use of insulin (H)       OMEPRAZOLE PO      Take 20 mg by mouth daily as needed        order for DME     1 each    Equipment being ordered: Hospital Bed service and repair    DDD (degenerative disc disease), lumbar, Type 2 diabetes mellitus with diabetic nephropathy, with long-term current use of insulin (H), Neuropathy, Rectal cancer (H)       order for DME     2 Device    Equipment being ordered: Compression stockings    Acute deep vein thrombosis (DVT) of right lower extremity, unspecified vein (H)       Potassium Gluconate 595 MG Caps      Take 1 tablet by mouth daily    Stasis edema of both lower extremities       rOPINIRole 1 MG tablet    REQUIP    200 tablet    1-3 tabs daily as needed, patient takes one in am, one in pm. occasionally will take in midday.    RLS (restless legs syndrome)       simvastatin 40 MG tablet    ZOCOR    90 tablet    Take 1 tablet (40 mg) by mouth daily    Hyperlipidemia LDL goal <100

## 2018-10-30 NOTE — TELEPHONE ENCOUNTER
Containers at the .  Mell notified  Alice Cavazos RN    
Reason for Call:  Other call back    Detailed comments: Dr Morales ordered stool studies for patient but patient did not receive anything to collect the specimen with. Can the family  some specimen collect containers?    Phone Number Patient can be reached at: Other phone number:  dawn - [phone] - Please call when ready for pickup    Best Time: any    Can we leave a detailed message on this number?     Call taken on 10/30/2018 at 2:23 PM by Milagros Mcdowell      
no anemia, no easy bruising, no jaundice, no swollen lymph nodes.

## 2018-10-30 NOTE — NURSING NOTE
"Chief Complaint   Patient presents with     Deep Vein Thrombosis     recheck     Diarrhea     x 1 month       Initial /62 (BP Location: Left arm)  Pulse 60  Temp 98.5  F (36.9  C) (Tympanic)  Resp 20  Wt 194 lb (88 kg)  BMI 35.48 kg/m2 Estimated body mass index is 35.48 kg/(m^2) as calculated from the following:    Height as of 9/28/18: 5' 2\" (1.575 m).    Weight as of this encounter: 194 lb (88 kg).    Patient presents to the clinic using Wheel Chair    Health Maintenance that is potentially due pending provider review:  NONE    n/a    Is there anyone who you would like to be able to receive your results? No  If yes have patient fill out HELLEN    "

## 2018-10-30 NOTE — PATIENT INSTRUCTIONS
Imodium tid  Stool studies and labs    I'll have them send me the blood sugars and depending on those and A1C today, I'll give you my recommendations

## 2018-11-06 ENCOUNTER — PATIENT OUTREACH (OUTPATIENT)
Dept: EDUCATION SERVICES | Facility: CLINIC | Age: 73
End: 2018-11-06
Payer: COMMERCIAL

## 2018-11-06 DIAGNOSIS — E11.21 TYPE 2 DIABETES MELLITUS WITH DIABETIC NEPHROPATHY, WITH LONG-TERM CURRENT USE OF INSULIN (H): Chronic | ICD-10-CM

## 2018-11-06 DIAGNOSIS — Z79.4 TYPE 2 DIABETES MELLITUS WITH DIABETIC NEPHROPATHY, WITH LONG-TERM CURRENT USE OF INSULIN (H): Chronic | ICD-10-CM

## 2018-11-06 NOTE — PROGRESS NOTES
Diabetes education:    Blood sugar obtained from [facility] assisted living/[facility].    Question if all readings are premeal due to timing of blood sugars.  Ones noted two hours post when they should have been taken were noted as after meals below.    Call out to facility but all nurses at an offsite seminar will try again tomorrow.    Discussed with nurse at center: unable to do small or large meal since aids are giving the insulin injections.  Will have them do 3 units at breakfast, 5 units at lunch and 3 units at supper meal.        Work hard at getting blood sugar before meals are eaten     Breakfast pp Lunch insulin Supper  pp HS   23-Oct 120  232  219  267   24-Oct 131  205  211  294   25-Oct 152   248 169  261   26-Oct 129  271  263  222   27-Oct  304 191  177  159   28-Oct 229  229  218  243   29-Oct 136  214  170  167   30-Oct 97          304 224 248 204 #DIV/0! 230     Novolog dosing for Concha Velazquez:    3 units at breakfast  5 units at lunch  3 units at supper    If blood sugar is under 175 at a meal and pt is sick with emesis do not give novolog at that meal.

## 2018-11-06 NOTE — LETTER
11/7/2018     RE: Stefanie Velazquez  [address] [place] [address]      Nursing staff at [facility],    Please follow below plan for Concha Velazquez:    Novolog dosing for Concha Velazquez:    3 units at breakfast  5 units at lunch  3 units at supper    If blood sugar is under 175 at a meal and pt is sick with emesis do not give novolog at that meal.    Sincerely,    Sandra Medina MD  [facility] [place]  [address] [place] [address]  Phone: [phone]  Fax: [phone]

## 2018-11-09 ENCOUNTER — TELEPHONE (OUTPATIENT)
Dept: FAMILY MEDICINE | Facility: CLINIC | Age: 73
End: 2018-11-09

## 2018-11-09 DIAGNOSIS — E03.8 OTHER SPECIFIED HYPOTHYROIDISM: ICD-10-CM

## 2018-11-09 NOTE — TELEPHONE ENCOUNTER
Hi Concha,   Your thyroid was a little suppressed. I think Ill have you decrease the medication. Take 1/2 pill less one day a week. So a pill every day except on Sundays, take 1/2 tab. The kidney function was off but not changed, so stable. Blood count was fine although you had a greater amount of white blood cells that are associated with allergies, not sure why. Your left A1C was 8 as we discussed, I looked over the blood sugars that they sent, I am going to talk to Concha Bradley about them tomorrow, I may have you use the fast acting insulin with dinner as well as lunch. Your liver and electrolytes were fine.   Sandra Bernstein MD     left message for patient to return call.  Alice Cavazos RN

## 2018-11-11 ENCOUNTER — MEDICAL CORRESPONDENCE (OUTPATIENT)
Dept: HEALTH INFORMATION MANAGEMENT | Facility: CLINIC | Age: 73
End: 2018-11-11

## 2018-11-12 ENCOUNTER — TELEPHONE (OUTPATIENT)
Dept: FAMILY MEDICINE | Facility: CLINIC | Age: 73
End: 2018-11-12

## 2018-11-12 RX ORDER — LEVOTHYROXINE SODIUM 112 UG/1
112 TABLET ORAL DAILY
Qty: 90 TABLET | Refills: 0 | COMMUNITY
Start: 2018-11-12 | End: 2018-12-14

## 2018-11-12 NOTE — TELEPHONE ENCOUNTER
[facility]-Emesis- Omeprazole  Form placed in Dr. Bernstein's Green Folder  [facility] Sec

## 2018-11-12 NOTE — TELEPHONE ENCOUNTER
Patient asked the nurse at her assisted living to call as she says someone tried to reach her on Friday.     Please send this order to decrease her thyroid medication to [facility] because they are managing her meds.   Fax to: [phone]  Att: Mell    If any questions call Mell   [phone]

## 2018-11-16 NOTE — TELEPHONE ENCOUNTER
Form received back signed  11/16/18  Faxed Back & Sent to be scanned to this encounter  [facility]

## 2018-11-19 ENCOUNTER — TELEPHONE (OUTPATIENT)
Dept: FAMILY MEDICINE | Facility: CLINIC | Age: 73
End: 2018-11-19

## 2018-11-19 NOTE — TELEPHONE ENCOUNTER
Shannan with PT calling for 3 orders.    1. Video Swallow Study, at [facility]  2.  Speech Therapy    1 x week for 1 week   2 x a week for 1 week   1 x a week for 2 weeks   1 x a  Month   3. Physical Therapy Eval.    Please call back Shannan with  Verbal orders.    At [phone]    Shira Ace  [facility]

## 2018-11-19 NOTE — TELEPHONE ENCOUNTER
Prior Authorization Retail Medication Request    Medication/Dose: omeprazole qty  ICD code (if different than what is on RX):  Esophageal reflux [K21.9]   Previously Tried and Failed:  Omeprazole 20mg 1qd, omeprazole 40mg 1qd.  nolonger effective all day long.  Rationale:  Stable on 20mg BID-provides all day relief.    Insurance Name:  [facility] [phone]  Insurance ID:  [id number]      Pharmacy Information (if different than what is on RX)  Name:  [facility] pharmacy  Phone:  [phone]

## 2018-11-20 ENCOUNTER — MEDICAL CORRESPONDENCE (OUTPATIENT)
Dept: HEALTH INFORMATION MANAGEMENT | Facility: CLINIC | Age: 73
End: 2018-11-20

## 2018-11-20 ENCOUNTER — TELEPHONE (OUTPATIENT)
Dept: FAMILY MEDICINE | Facility: CLINIC | Age: 73
End: 2018-11-20

## 2018-11-20 NOTE — TELEPHONE ENCOUNTER
Central Prior Authorization Team   Phone: [phone]    PA Initiation    Medication: omeprazole qty  Insurance Company: [employer] - Phone [phone] Fax [phone]  Pharmacy Filling the Rx:    Filling Pharmacy Phone:    Filling Pharmacy Fax:    Start Date: 11/20/2018

## 2018-11-20 NOTE — TELEPHONE ENCOUNTER
Prior Authorization Approval    Authorization Effective Date: 1/1/2018  Authorization Expiration Date: 12/3/2018  Medication: omeprazole-APPROVED  Approved Dose/Quantity:    Reference #:     Insurance Company: [employer] [phone] Fax [phone]  Expected CoPay:       CoPay Card Available:      Foundation Assistance Needed:    Which Pharmacy is filling the prescription (Not needed for infusion/clinic administered):    Pharmacy Notified: Yes  Patient Notified: Yes

## 2018-11-20 NOTE — TELEPHONE ENCOUNTER
Shannan from [facility] is calling to request a video swallow study order be sent to [facility].     Patient also has hypertension and is wondering what the parameters are for contacting Dr. Bernstein with the readings?     Shannan can be reached at [phone].    Becky Merino-Station

## 2018-11-21 ENCOUNTER — TELEPHONE (OUTPATIENT)
Dept: FAMILY MEDICINE | Facility: CLINIC | Age: 73
End: 2018-11-21

## 2018-11-21 NOTE — TELEPHONE ENCOUNTER
Per Destin Beasley @ Home Care PT    1) Requesting Parameters when to be call Provider regarding B/P & BS    2) Requesting VO - PT on Going    2 x 8 weeks  Add   OT Eval- Pt having Problems with ADL, not completing Hygiene, Pressure Ulcer Risk    3)  Order Visit- Requesting Resources in her area. Pt moved to a new location.    [facility]

## 2018-11-21 NOTE — TELEPHONE ENCOUNTER
Call if blood sugar greater than 400 or less than 60  Call if blood pressure less than 100/60 or greater than 180/100    Ok for physical therapy   Ok for

## 2018-11-23 ENCOUNTER — TELEPHONE (OUTPATIENT)
Dept: FAMILY MEDICINE | Facility: CLINIC | Age: 73
End: 2018-11-23

## 2018-11-23 DIAGNOSIS — R13.10 DYSPHAGIA: Primary | ICD-10-CM

## 2018-11-23 NOTE — TELEPHONE ENCOUNTER
[facility] Blood Sugar numbers form  Form received back signed  11/21/18  Faxed Back & Sent to be scanned to this encounter  [facility]

## 2018-11-23 NOTE — TELEPHONE ENCOUNTER
Shannan is calling requesting a VO   Down grade her Liquids nector consistancy  [facility]

## 2018-11-23 NOTE — TELEPHONE ENCOUNTER
Shannan Mccann the Speech Therapist from [place] is needing verbal orders for the patient to down grade to [place] consistency, currently on regular liquids, says that the patient is showing symptoms of aspiration, the swallow study has not been completed yet but the staff are working to arrange transportation for this, please advise as Dr. Morales is out till next Tuesday.     JEWELS Crooks

## 2018-11-23 NOTE — TELEPHONE ENCOUNTER
Please call.  OK to change diet to nectar thickened liquids pending clinic swallow study from speech therapy.   ARTUR KOHLER MD

## 2018-11-26 ENCOUNTER — TELEPHONE (OUTPATIENT)
Dept: FAMILY MEDICINE | Facility: CLINIC | Age: 73
End: 2018-11-26

## 2018-11-26 NOTE — TELEPHONE ENCOUNTER
Form- [place] at Home- Down Grade Liquids to [place] Consistency  Form placed on Provider Dr. Amandeep shrestha for Signature.  [facility]

## 2018-11-26 NOTE — TELEPHONE ENCOUNTER
Shannan - Speech Therapist is requesting a Order for Thickened liquids to be sent to [facility]. Fax [phone]  [facility]

## 2018-11-26 NOTE — TELEPHONE ENCOUNTER
Taran Dunn  Form placed on Provider Dr. Bernstein's desk for Signature.  [facility]

## 2018-11-27 ENCOUNTER — TELEPHONE (OUTPATIENT)
Dept: FAMILY MEDICINE | Facility: CLINIC | Age: 73
End: 2018-11-27

## 2018-11-27 ENCOUNTER — MEDICAL CORRESPONDENCE (OUTPATIENT)
Dept: HEALTH INFORMATION MANAGEMENT | Facility: CLINIC | Age: 73
End: 2018-11-27

## 2018-11-27 DIAGNOSIS — I10 BENIGN ESSENTIAL HYPERTENSION: Chronic | ICD-10-CM

## 2018-11-27 NOTE — TELEPHONE ENCOUNTER
Trevin from patient's assisted living facility is calling stating patient's blood pressure has been low. They were ordered to call if her BP has been below 110/65. She states today it was 78/46 and her pulse was 54. Yesterday it was 110/60, the 25th it was 113/56, the 24th it was 106/48, 23rd it was 115/57, and the 22nd it was 104/51. Patient is in speech therapy and they are putting a thickener in her liquids. She hasn't been drinking much because she does not like the thickened liquid.     Becky Merino-Station

## 2018-11-29 NOTE — TELEPHONE ENCOUNTER
Form received back signed  11/29/18  Faxed Back & Sent to be scanned to this encounter  [facility]

## 2018-11-30 ENCOUNTER — HOSPITAL ENCOUNTER (OUTPATIENT)
Dept: SPEECH THERAPY | Facility: CLINIC | Age: 73
Setting detail: THERAPIES SERIES
End: 2018-11-30
Attending: FAMILY MEDICINE
Payer: MEDICARE

## 2018-11-30 ENCOUNTER — HOSPITAL ENCOUNTER (OUTPATIENT)
Dept: GENERAL RADIOLOGY | Facility: CLINIC | Age: 73
Discharge: HOME | End: 2018-11-30
Attending: FAMILY MEDICINE | Admitting: FAMILY MEDICINE
Payer: MEDICARE

## 2018-11-30 DIAGNOSIS — R13.10 DYSPHAGIA: ICD-10-CM

## 2018-11-30 PROCEDURE — G8997 SWALLOW GOAL STATUS: HCPCS | Mod: GN,CK | Performed by: SPEECH-LANGUAGE PATHOLOGIST

## 2018-11-30 PROCEDURE — 92611 MOTION FLUOROSCOPY/SWALLOW: CPT | Mod: GN | Performed by: SPEECH-LANGUAGE PATHOLOGIST

## 2018-11-30 PROCEDURE — G8998 SWALLOW D/C STATUS: HCPCS | Mod: GN,CK | Performed by: SPEECH-LANGUAGE PATHOLOGIST

## 2018-11-30 PROCEDURE — G8996 SWALLOW CURRENT STATUS: HCPCS | Mod: GN,CK | Performed by: SPEECH-LANGUAGE PATHOLOGIST

## 2018-11-30 PROCEDURE — 74230 X-RAY XM SWLNG FUNCJ C+: CPT

## 2018-11-30 PROCEDURE — 40000211 ZZHC STATISTIC SLP  DEPARTMENT VISIT: Performed by: SPEECH-LANGUAGE PATHOLOGIST

## 2018-11-30 RX ORDER — BARIUM SULFATE 400 MG/ML
10 SUSPENSION ORAL ONCE
Status: COMPLETED | OUTPATIENT
Start: 2018-11-30 | End: 2018-11-30

## 2018-11-30 RX ADMIN — BARIUM SULFATE 10 ML: 400 SUSPENSION ORAL at 10:18

## 2018-11-30 RX ADMIN — BARIUM SULFATE 10 ML: 400 SUSPENSION ORAL at 10:19

## 2018-11-30 NOTE — TELEPHONE ENCOUNTER
Number for Trevin called and the phone rang and rang, unable to leave a message.    JEWELS Crooks

## 2018-12-04 ENCOUNTER — TELEPHONE (OUTPATIENT)
Dept: FAMILY MEDICINE | Facility: CLINIC | Age: 73
End: 2018-12-04

## 2018-12-04 ENCOUNTER — MEDICAL CORRESPONDENCE (OUTPATIENT)
Dept: HEALTH INFORMATION MANAGEMENT | Facility: CLINIC | Age: 73
End: 2018-12-04

## 2018-12-04 RX ORDER — METOPROLOL SUCCINATE 50 MG/1
50 TABLET, EXTENDED RELEASE ORAL DAILY
Qty: 90 TABLET | Refills: 1 | COMMUNITY
Start: 2018-12-04 | End: 2019-05-21 | Stop reason: DRUGHIGH

## 2018-12-04 RX ORDER — LOSARTAN POTASSIUM 100 MG/1
100 TABLET ORAL DAILY
Qty: 90 TABLET | Refills: 1 | COMMUNITY
Start: 2018-12-04 | End: 2019-03-31

## 2018-12-04 NOTE — TELEPHONE ENCOUNTER
I sent signed fax to  [phone] with instructions to decrease losartan to 50 mg and metoprolol to 25 mg daily  Alice Cavazos RN

## 2018-12-04 NOTE — PROGRESS NOTES
Impression:  Ms. Velazquez presents with mild-moderate oropharyngeal dysphagia charactized by mild pharyngeal residue, swallow delay, and aspiration on thin liquids.  Pt would benefit from continues  services for dysphagia therapy to address use of chin tuck to see if that decreases overt aspiration symptoms to have upgrade to thin liquids,  oropharyngeal strengthening exercises, and determine if pt a candidate for free water protocol to encourage hydration.   Recommended continue diet of DDL2 with nectar consistency liquids until home care SLP determines upgrade appropriate.     Video Swallow Study  11/30/18    General Information   Type Of Visit Initial   Start Of Care Date 11/30/18   Referring Physician Sandra Morales MD   Orders Evaluate And Treat   Medical Diagnosis Dysphagia   Onset Of Illness/injury Or Date Of Surgery 11/23/18  (order date)   Precautions/limitations Swallowing Precautions  (modified diet)   Hearing Hard of hearing   Pertinent History of Current Problem/OT: Additional Occupational Profile Info Pt referred for a video swallow study by her SLP from homecare and physician.  Pt has been showing signs of aspiration with thin liquids.  Her diet was recently modified to mechanical soft with nectar liquids.  History includes coughing with emesis at times with oral intake.   Respiratory Status Room air   Prior Level Of Function Swallowing   Prior Level Of Function Comment pt reports has had cough for years.     Patient Role/employment History Retired   Living Environment Assisted Living  ([facility])   General Observations Pt alert and pleasant.  Able to provide PMH>   Patient/family Goals To drink morning coffee without thickener.   General Information Comments Pt states she does not wear her dentures as it makes her gag.  She does not like thickened liquids and hold back from drinking liquids.  Accepts thickened juice/milk.   Pain Assessment   Pain Reported No   Fall Risk Screen   Fall  screen completed by SLP   Fall screen comments Has homecare currently.   Clinical Swallow Evaluation   Oral Musculature generally intact   Structural Abnormalities none present   Dentition edentulous, does not have dentures   Mucosal Quality adequate   Mandibular Strength and Mobility intact   Oral Labial Strength and Mobility WFL   Lingual Strength and Mobility impaired coordination   Laryngeal Function Voicing initiated;Swallow;Throat clear;Cough   VFSS Eval: Radiology   Radiologist Dr. Callejas   Views Taken left lateral   Physical Location of Procedure [facility]   VFSS Eval: Thin Liquid Texture Trial   Mode of Presentation, Thin Liquid cup;self-fed   Order of Presentation 1,2   Preparatory Phase WFL   Oral Phase, Thin Liquid Premature pharyngeal entry   Pharyngeal Phase, Thin Liquid Delayed swallow reflex;Residue in valleculae  (mild post swallow residue)   Rosenbek's Penetration Aspiration Scale: Thin Liquid Trial Results 6 - contrast passes glottis, no subglottic residue remains (aspiration)   Response to Aspiration (delayed cough)   Successful Strategies Trialed During Procedure, Thin Liquid chin tuck   Diagnostic Statement Transient penetration and aspiration noted during the swallow response.  Delayed cough (4 seconds) present after aspiration.  Chin tuck appeared effective on 1/1 trials.   VFSS Eval: Nectar Thick Liquid Texture Trial   Mode of Presentation, Nectar cup;self-fed   Order of Presentation 3,4   Preparatory Phase WFL   Oral Phase, Nectar WFL   Pharyngeal Phase, Nectar Residue in valleculae  (minimal)   Rosenbek's Penetration Aspiration Scale: Nectar-Thick Liquid Trial Results 1 - no aspiration, contrast does not enter airway   Diagnostic Statement WFL   VFSS Eval: Honey Thick Texture Trial   Mode of Presentation, Honey spoon;fed by clinician   Order of Presentation 5   Preparatory Phase WFL   Oral Phase, Honey WFL   Pharyngeal Phase, Honey WFL   Rosenbek's Penetration Aspiration  Scale: Honey Trial Results 1 - no aspiration, contrast does not enter airway   Diagnostic Statement WFL   VFSS Eval: Pudding Thick Liquid Texture Trial   Mode of Presentation, Pudding spoon;fed by clinician   Order of Presentation 6   Preparatory Phase WFL   Oral Phase, Pudding WFL   Pharyngeal Phase, Pudding WFL   Rosenbek's Penetration Aspiration Scale: Pudding-Thick Liquid Trial Results 1 - no aspiration, contrast does not enter airway   Diagnostic Statement WFL   VFSS Eval: Solid Food Texture Trial   Mode of Presentation, Solid fed by clinician  (crushed rose cracker in pudding barium)   Order of Presentation 7   Preparatory Phase Poor bolus control;Insufficient mastication   Oral Phase, Solid Premature pharyngeal entry;WFL   Pharyngeal Phase, Solid Delayed swallow reflex   Rosenbek's Penetration Aspiration Scale: Solid Food Trial Results 1 - no aspiration, contrast does not enter airway   Diagnostic Statement Premature spillage over base of tongue with bolus in vallaculae 8+ seconds prior to initiation of swallow response.  Cleared well.   Swallow Compensations   Swallow Compensations Chin tuck   Results No difficulties noted   Educational Assessment   Barriers to Learning No barriers   Preferred Learning Style Listening   Esophageal Phase of Swallow   Patient reports or presents with symptoms of esophageal dysphagia No   Esophageal comments no residue in UES   Swallow Eval: Clinical Impressions   Skilled Criteria for Therapy Intervention Skilled criteria met.  Treatment indicated.  (currently seen through homecare)   Functional Assessment Scale (FAS) 4   Dysphagia Outcome Severity Scale (ALEA) Level 4 - ALEA   Treatment Diagnosis Mild-moderate dysphagia   Diet texture recommendations Dysphagia diet level 2;Nectar thick liquids   Recommended Feeding/Eating Techniques small sips/bites;maintain upright posture during/after eating for 30 mins;tuck chin during every swallow  (chin tuck with liquids)   Rehab  Potential good, to achieve stated therapy goals   Clinical Impression Comments Ms. Velazquez presents with mild-moderate oropharyngeal dysphagia charactized by mild pharyngeal residue, swallow delay, and aspiration on thin liquids.  Pt would benefit from continues ST services for dysphagia therapy to address use of chin tuck to see if that decreases overt aspiration symptoms to have upgrade to thin liquids,  oropharyngeal strengthening exercises, and determine if pt a candidate for free water protocol to encourage hydration.   Recommended continue diet of DDL2 with nectar consistency liquids until home care SLP determines upgrade appropriate.   Total Session Time   Total Session Time 30   Total Evaluation Time 30   SLP Medicare Only G-code   G-code Swallowing   Swallowing   Swallowing:  Current Status , Goal , Discharge -Clua Only-Modifier the same for all G-codes CK: 40-59% impairment   Swallowing: Current  & Discharge Modifier Rationale-Eval Only outcome measure tool level 4

## 2018-12-04 NOTE — ADDENDUM NOTE
Encounter addended by: Annmarie Cerna, SLP on: 12/4/2018  9:23 AM<BR>     Actions taken: Sign clinical note, Flowsheet accepted

## 2018-12-04 NOTE — TELEPHONE ENCOUNTER
I talked with Concha's sister, Daiana and she will have someone from where Concha is living call me today  Alice Cavazos RN

## 2018-12-04 NOTE — TELEPHONE ENCOUNTER
Reason for Call: Request for an order or referral:    Order or referral being requested: Increase speech therapy to 2x week for 2 weeks to address dysphagia.    Date needed: as soon as possible    Has the patient been seen by the PCP for this problem?     Additional comments:     Phone number Patient can be reached at:  Other phone number:  [facility] [phone]    Best Time:  any    Can we leave a detailed message on this number?      Call taken on 12/4/2018 at 3:21 PM by Milagros Mcdowell

## 2018-12-05 NOTE — PROGRESS NOTES
Outpatient Physical Therapy Discharge Note     Patient: Stefanie Velazquez  : 1945    Beginning/End Dates of Reporting Period:  18 to 18    Referring Provider: Dr. Morales    Therapy Diagnosis: low back pain consistent with lumbar radiculopathy    Patient did not return for follow up treatments as directed.  Goal status and current objective information is therefore unknown.  Discharge from PT services at this time for this episode of treatment. Please see attached documentation under this episode of care for further information including dates of service, start of care date, referring physician, Dx, treatment plan, treatments, etc.    Please contact me with any questions or concerns.    Thank you for your referral.    Ruby Bailey, PT, DPT, CLT  Physical Therapist & Certified Lymphedema Therapist  [address] [address] [phone]

## 2018-12-05 NOTE — PROGRESS NOTES
Outpatient Physical Therapy Discharge Note     Patient: Stefanie Velazquez  : 1945    Beginning/End Dates of Reporting Period:  18 to 18    Referring Provider: Dr. Morales    Therapy Diagnosis: B LE lymphedema    Patient did not return for follow up treatments as directed.  Goal status and current objective information is therefore unknown.  Discharge from PT services at this time for this episode of treatment. Please see attached documentation under this episode of care for further information including dates of service, start of care date, referring physician, Dx, treatment plan, treatments, etc.    Please contact me with any questions or concerns.    Thank you for your referral.    Ruby Bailey, PT, DPT, CLT  Physical Therapist & Certified Lymphedema Therapist  [address] [address] [phone]

## 2018-12-06 ENCOUNTER — MEDICAL CORRESPONDENCE (OUTPATIENT)
Dept: HEALTH INFORMATION MANAGEMENT | Facility: CLINIC | Age: 73
End: 2018-12-06

## 2018-12-06 ENCOUNTER — TELEPHONE (OUTPATIENT)
Dept: FAMILY MEDICINE | Facility: CLINIC | Age: 73
End: 2018-12-06

## 2018-12-06 NOTE — TELEPHONE ENCOUNTER
1) Additional Goal no need to track  2) increase Freq & Duration PT  3) Plan of Care - 11/19/18-1/17/19  Leilani Orn Station Sec

## 2018-12-12 ENCOUNTER — TELEPHONE (OUTPATIENT)
Dept: FAMILY MEDICINE | Facility: CLINIC | Age: 73
End: 2018-12-12

## 2018-12-12 NOTE — TELEPHONE ENCOUNTER
[facility] - Speech Therapy - [phone] -     Patient had a video swallow and it found rafaela aspiration with thin liquids - The recommendations is for her to have nectar thickining liquids.  Patient is refusing this - Patient lives at an assistant living - She has signed a waiver to have thin liquids against orders and they are afraid to give her medications because she chokes and aspirates - The patient spits up at the dinner table - which is a hazard to the others at her table. Patient is in denile that this is related to her swallowing - Her visit with Dr. Medina tomorrow is to run this by Dr. Medina and see what other options are.  Assisted Living is talking about maybe sending the patient to a different facility because of the issues involved.   Please call Shannan if you have any concerns or questions

## 2018-12-13 ENCOUNTER — OFFICE VISIT (OUTPATIENT)
Dept: FAMILY MEDICINE | Facility: CLINIC | Age: 73
End: 2018-12-13
Payer: COMMERCIAL

## 2018-12-13 ENCOUNTER — TELEPHONE (OUTPATIENT)
Dept: FAMILY MEDICINE | Facility: CLINIC | Age: 73
End: 2018-12-13

## 2018-12-13 VITALS
HEART RATE: 64 BPM | BODY MASS INDEX: 35.48 KG/M2 | DIASTOLIC BLOOD PRESSURE: 52 MMHG | TEMPERATURE: 97.9 F | RESPIRATION RATE: 16 BRPM | HEIGHT: 62 IN | OXYGEN SATURATION: 96 % | SYSTOLIC BLOOD PRESSURE: 120 MMHG

## 2018-12-13 DIAGNOSIS — N18.30 CKD (CHRONIC KIDNEY DISEASE) STAGE 3, GFR 30-59 ML/MIN (H): ICD-10-CM

## 2018-12-13 DIAGNOSIS — I82.401 ACUTE DEEP VEIN THROMBOSIS (DVT) OF RIGHT LOWER EXTREMITY, UNSPECIFIED VEIN (H): ICD-10-CM

## 2018-12-13 DIAGNOSIS — E11.21 TYPE 2 DIABETES MELLITUS WITH DIABETIC NEPHROPATHY, WITH LONG-TERM CURRENT USE OF INSULIN (H): ICD-10-CM

## 2018-12-13 DIAGNOSIS — Z79.4 TYPE 2 DIABETES MELLITUS WITH DIABETIC NEPHROPATHY, WITH LONG-TERM CURRENT USE OF INSULIN (H): ICD-10-CM

## 2018-12-13 DIAGNOSIS — E03.8 OTHER SPECIFIED HYPOTHYROIDISM: ICD-10-CM

## 2018-12-13 DIAGNOSIS — E66.01 MORBID OBESITY (H): ICD-10-CM

## 2018-12-13 DIAGNOSIS — I10 BENIGN ESSENTIAL HYPERTENSION: ICD-10-CM

## 2018-12-13 DIAGNOSIS — T17.908A ASPIRATION INTO AIRWAY, INITIAL ENCOUNTER: ICD-10-CM

## 2018-12-13 DIAGNOSIS — H91.93 BILATERAL HEARING LOSS, UNSPECIFIED HEARING LOSS TYPE: ICD-10-CM

## 2018-12-13 DIAGNOSIS — K52.9 CHRONIC DIARRHEA: ICD-10-CM

## 2018-12-13 DIAGNOSIS — R13.12 OROPHARYNGEAL DYSPHAGIA: Primary | ICD-10-CM

## 2018-12-13 LAB
ALBUMIN SERPL-MCNC: 2.8 G/DL (ref 3.4–5)
ALP SERPL-CCNC: 120 U/L (ref 40–150)
ALT SERPL W P-5'-P-CCNC: 23 U/L (ref 0–50)
ANION GAP SERPL CALCULATED.3IONS-SCNC: 7 MMOL/L (ref 3–14)
AST SERPL W P-5'-P-CCNC: 25 U/L (ref 0–45)
BILIRUB SERPL-MCNC: 0.4 MG/DL (ref 0.2–1.3)
BUN SERPL-MCNC: 37 MG/DL (ref 7–30)
CALCIUM SERPL-MCNC: 9.1 MG/DL (ref 8.5–10.1)
CHLORIDE SERPL-SCNC: 106 MMOL/L (ref 94–109)
CO2 SERPL-SCNC: 25 MMOL/L (ref 20–32)
CREAT SERPL-MCNC: 1.9 MG/DL (ref 0.52–1.04)
GFR SERPL CREATININE-BSD FRML MDRD: 26 ML/MIN/1.7M2
GLUCOSE SERPL-MCNC: 220 MG/DL (ref 70–99)
POTASSIUM SERPL-SCNC: 4.6 MMOL/L (ref 3.4–5.3)
PROT SERPL-MCNC: 6.9 G/DL (ref 6.8–8.8)
SODIUM SERPL-SCNC: 138 MMOL/L (ref 133–144)
TSH SERPL DL<=0.005 MIU/L-ACNC: 0.04 MU/L (ref 0.4–4)

## 2018-12-13 PROCEDURE — 80053 COMPREHEN METABOLIC PANEL: CPT | Performed by: FAMILY MEDICINE

## 2018-12-13 PROCEDURE — 84443 ASSAY THYROID STIM HORMONE: CPT | Performed by: FAMILY MEDICINE

## 2018-12-13 PROCEDURE — 99215 OFFICE O/P EST HI 40 MIN: CPT | Performed by: FAMILY MEDICINE

## 2018-12-13 PROCEDURE — 36415 COLL VENOUS BLD VENIPUNCTURE: CPT | Performed by: FAMILY MEDICINE

## 2018-12-13 NOTE — TELEPHONE ENCOUNTER
Oh, my mistake. She is supposed to stop after 3 months, I thought that was in Jan. No so. She can stop tomorrow.

## 2018-12-13 NOTE — NURSING NOTE
"Chief Complaint   Patient presents with     Throat Problem     Video swallow results f/u     Diabetes     Pt. would like insulin dosage to be changed       Initial /52 (BP Location: Right arm, Patient Position: Chair, Cuff Size: Adult Large)   Pulse 64   Temp 97.9  F (36.6  C) (Tympanic)   Resp 16   SpO2 96%  Estimated body mass index is 35.48 kg/m  as calculated from the following:    Height as of 9/28/18: 1.575 m (5' 2\").    Weight as of 10/30/18: 88 kg (194 lb).    Patient presents to the clinic using Wheel Chair    Health Maintenance that is potentially due pending provider review:  Advanced directive, labs    Possibly completing today per provider review.    Is there anyone who you would like to be able to receive your results? No  If yes have patient fill out HELLEN    Mayelin John CMA      "

## 2018-12-13 NOTE — TELEPHONE ENCOUNTER
Patient was seen today. They were originally told to stop the Eliquis tomorrow and then after today they got an order to stop the Eliquis on January 2nd. They need clarified directions on exactly when to stop this. Please advise.    Becky Merino-Station

## 2018-12-13 NOTE — PROGRESS NOTES
SUBJECTIVE:   Stefanie eVlazquez is a 73 year old female who presents to clinic today for the following health issues:      Gastrointestinal symptoms- Video Swallow F/U      Description: Pt. Had video swallow done. Was given instructions to thicken liquids. Pt. Has refused such instructions, and would like to know what Dr. Medina would instruct her to do.             REFLUX SYMPTOMS - problems swallowing solids and liquids and cough      Intensity:  moderate    History  Previous {similar problem: YES  Previous evaluation:  Video swallow    Aggravating factors: thin liquids    Alleviating factors: nothing    Other Therapies tried: Thickening liquids    Results of her video swallow study:   Impression:  Ms. Velazquez presents with mild-moderate oropharyngeal dysphagia charactized by mild pharyngeal residue, swallow delay, and aspiration on thin liquids.  Pt would benefit from [facility] services for dysphagia therapy to address use of chin tuck to see if that decreases overt aspiration symptoms to have upgrade to thin liquids,  oropharyngeal strengthening exercises, and determine if pt a candidate for free water protocol to encourage hydration.   Recommended continue diet of DDL2 with nectar consistency liquids until home care SLP determines upgrade appropriate.    Patient hates the water and coffee thickened and signed a waiver that she will drink thin liquids against medical advice. She tells me that she did that until she could talk to me about alternatives.   I go this telephone note from her home caring agency:      [facility] - Speech Therapy - [phone] -      Patient had a video swallow and it found rafaela aspiration with thin liquids - The recommendations is for her to have nectar thickining liquids.  Patient is refusing this - Patient lives at an assistant living - She has signed a waiver to have thin liquids against orders and they are afraid to give her medications because she chokes and  aspirates - The patient spits up at the dinner table - which is a hazard to the others at her table. Patient is in denile that this is related to her swallowing - Her visit with Dr. Medina tomorrow is to run this by Dr. Medina and see what other options are.  Assisted Living is talking about maybe sending the patient to a different facility because of the issues involved.   Please call Shannan if you have any concerns or questions      Diabetes Follow-up    Pt. Is reporting hyperglycemia. Would like to ask about possibly changing insulin dosage.  Patient is checking blood sugars: three times daily.   Blood sugar testing frequency justification: Uncontrolled diabetes  Results are as follows:         am - 112-140         lunchtime - 250-300         suppertime - 200's    Diabetic concerns: Blood sugar frequently over 200     Symptoms of hypoglycemia (low blood sugar): none     Paresthesias (numbness or burning in feet) or sores: No     Date of last diabetic eye exam: Unknown    On insulin, levemir 20 units, and short acting insulin 5 until tid  Lab Results   Component Value Date    A1C 8.0 10/30/2018    A1C 8.5 07/20/2018    A1C 7.3 03/20/2018    A1C 7.1 11/10/2017    A1C 7.9 07/10/2017     Sugars are low in ams, low 100s, lunch were 150s then last 5 days 200s-300s, supper 200s to 300s, night time are 200s-300s.     BP Readings from Last 2 Encounters:   12/13/18 120/52   10/30/18 118/62     Hemoglobin A1C (%)   Date Value   10/30/2018 8.0 (H)   07/20/2018 8.5 (H)     LDL Cholesterol Calculated (mg/dL)   Date Value   08/31/2018 107 (H)   05/02/2018 75       Diarrhea-has cleared up  She got used to being in a wheelchair, is now doing physical therapy and feels that is helping     DVT-on Elliquis, which stops soon    hypothyroidism -on levothyroxine, 112 mgm, takes one daily except Sun 1/2 pill which equals 104 mcg/day  TSH   Date Value Ref Range Status   10/30/2018 0.19 (L) 0.40 - 4.00 mU/L Final      Hearing loss-she  "broke one hearing aide, forgot her pocket talker today    Hypertension-on losartan, metoprolol, lasix  BP Readings from Last 6 Encounters:   12/13/18 120/52   10/30/18 118/62   09/28/18 118/58   09/18/18 122/70   09/13/18 172/89   09/13/18 114/84      blood pressures at her facility are very low   but close to 100, diastolic 40s and 50s  Problem list and histories reviewed & adjusted, as indicated.  Additional history: as documented    BP Readings from Last 3 Encounters:   12/13/18 120/52   10/30/18 118/62   09/28/18 118/58    Wt Readings from Last 3 Encounters:   10/30/18 88 kg (194 lb)   09/28/18 87.3 kg (192 lb 8 oz)   09/18/18 86.2 kg (190 lb)                    Reviewed and updated as needed this visit by clinical staff  Tobacco  Allergies  Meds  Med Hx  Surg Hx  Fam Hx  Soc Hx      Reviewed and updated as needed this visit by Provider         ROS:  CONSTITUTIONAL: NEGATIVE for fever, chills, change in weight  ENT/MOUTH: has not been able to wear dentures due to gag reflex, is working with physical therapy to help adjust  RESP:chronic cough, aspiration?  CV: NEGATIVE for chest pain, palpitations or peripheral edema  GI: NEGATIVE for nausea, abdominal pain, heartburn, or change in bowel habits  : No dysuria, urinary frequency or urgency.     OBJECTIVE:                                                    /52 (BP Location: Right arm, Patient Position: Chair, Cuff Size: Adult Large)   Pulse 64   Temp 97.9  F (36.6  C) (Tympanic)   Resp 16   Ht 1.575 m (5' 2\")   SpO2 96%   BMI 35.48 kg/m    Body mass index is 35.48 kg/m .  General: appears well, no distress, comfortable sitting in wheelchair  Oriented x 3  Very hard of hearing  HEENT: edentulous, one hearing aid in place on right   Neck: supple, no adenopathy, no JVD   Heart: regular rate and rhythm, normal S1S2, no murmur  Lungs: clear to ascultation   Abd: soft, nontender,  no mass or distention     Results for orders placed or performed " in visit on 12/13/18   TSH   Result Value Ref Range    TSH 0.04 (L) 0.40 - 4.00 mU/L   Comprehensive metabolic panel (BMP + Alb, Alk Phos, ALT, AST, Total. Bili, TP)   Result Value Ref Range    Sodium 138 133 - 144 mmol/L    Potassium 4.6 3.4 - 5.3 mmol/L    Chloride 106 94 - 109 mmol/L    Carbon Dioxide 25 20 - 32 mmol/L    Anion Gap 7 3 - 14 mmol/L    Glucose 220 (H) 70 - 99 mg/dL    Urea Nitrogen 37 (H) 7 - 30 mg/dL    Creatinine 1.90 (H) 0.52 - 1.04 mg/dL    GFR Estimate 26 (L) >60 mL/min/1.7m2    GFR Estimate If Black 31 (L) >60 mL/min/1.7m2    Calcium 9.1 8.5 - 10.1 mg/dL    Bilirubin Total 0.4 0.2 - 1.3 mg/dL    Albumin 2.8 (L) 3.4 - 5.0 g/dL    Protein Total 6.9 6.8 - 8.8 g/dL    Alkaline Phosphatase 120 40 - 150 U/L    ALT 23 0 - 50 U/L    AST 25 0 - 45 U/L     *Note: Due to a large number of results and/or encounters for the requested time period, some results have not been displayed. A complete set of results can be found in Results Review.         ASSESSMENT/PLAN:                                                      ASSESSMENT:  1. Oropharyngeal dysphagia    2. Type 2 diabetes mellitus with diabetic nephropathy, with long-term current use of insulin (H)    3. Acute deep vein thrombosis (DVT) of right lower extremity, unspecified vein (H)    4. Benign essential hypertension    5. Other specified hypothyroidism    6. CKD (chronic kidney disease) stage 3, GFR 30-59 ml/min (H)    7. Aspiration into airway, initial encounter    8. Morbid obesity (H)    9. Chronic diarrhea    10. Bilateral hearing loss, unspecified hearing loss type        PLAN:  Orders Placed This Encounter     TSH     Comprehensive metabolic panel (BMP + Alb, Alk Phos, ALT, AST, Total. Bili, TP)     50 min spent with patient, greater than 50% in counseling and coordination of care.  Discussion of multiple issues today. She really like the facility that she is in. I don't have any alternatives for her other than a G tube, and I don't  recommend that. I doI recommend that she thicken her liquids for now if she wants to stay in her facility, and I think she has a good chance of learning techniques like chin tuck and strengthening muscles to be able to handle thin liquids.   She agrees.   Will increase Novolog as below.   I was ill informed that she started Eliquis on 11/1 and they called, thus will stop that tomorrow, not Jan 2, for a total of 3 months anticoagulation  TSH came back suppressed, will decrease the levothyroxine from 112 mcg  (was taking 1/2 on one day so total 104 mcg/day) to 88 mcg/ day  blood pressures are way too low and will decrease her losartan  Recheck end of Jan/early Feb    Patient Instructions   Go with the thickened liquids for now  ]  Stop Eliquis Jan 2, 2019    Decrease losartan to 25 mg daily    Increase the Novolog to 5/7/7 units with appropriate meals       Sandra Morales MD  [facility]

## 2018-12-13 NOTE — PATIENT INSTRUCTIONS
Go with the thickened liquids for now  ]  Stop Eliquis Jan 2, 2019    Decrease losartan to 25 mg daily    Increase the Novolog to 5/7/7 units with appropriate meals

## 2018-12-14 ENCOUNTER — TELEPHONE (OUTPATIENT)
Dept: FAMILY MEDICINE | Facility: CLINIC | Age: 73
End: 2018-12-14

## 2018-12-14 DIAGNOSIS — E03.8 OTHER SPECIFIED HYPOTHYROIDISM: ICD-10-CM

## 2018-12-14 PROBLEM — R13.12 OROPHARYNGEAL DYSPHAGIA: Status: ACTIVE | Noted: 2018-12-14

## 2018-12-14 RX ORDER — LEVOTHYROXINE SODIUM 88 UG/1
88 TABLET ORAL DAILY
Qty: 90 TABLET | Refills: 1 | Status: SHIPPED | OUTPATIENT
Start: 2018-12-14 | End: 2019-01-31

## 2018-12-14 NOTE — TELEPHONE ENCOUNTER
Homecare RN called. They are suppose to report all BP's out of parameter. Today BP is is 112/58 (if under 60 they are suppose to notify MD). Pt is completely asymptomatic and acting normal. Advised to continue to monitor for now. Wilda Naqvi RN

## 2018-12-14 NOTE — TELEPHONE ENCOUNTER
Reason for Call:  Other     Detailed comments: [facility] - [phone] - Calling for advise on patients BP - 112/58 - About average - please call    Phone Number Patient can be reached at:     Best Time:     Can we leave a detailed message on this number? YES    Call taken on 12/14/2018 at 2:19 PM by Sandra Milian

## 2018-12-14 NOTE — TELEPHONE ENCOUNTER
Patient's niece returning call from clinic. Reviewed chart and likely call was regarding lab results and change in thyroid medication. Caller states this info should be given to patient's facility phone #[phone]. Attempted to call and give this info but they need order in faxed over and unable to do that from [facility]. Call routed to clinic to f/u with facility on Monday regarding below message:    Notes recorded by Deborah Martini MA on 12/14/2018 at 3:26 PM CST  Left Message for Patient.  Please give results when patient returns call.   Deborah Martini CMA     ------    Notes recorded by Sandra Morales MD on 12/14/2018 at 1:56 PM CST  Her thyroid is still suppressed, I've ordered a different dose of levothyroxine for her to take daily. Let her facility know it will be 88 mcg daily.  Her kidney function is a little bit worse, we will keep monitoring.   Sandra Bernstein MD

## 2018-12-14 NOTE — TELEPHONE ENCOUNTER
Spoke with nurse Trevin (asked if she was a nurse and said yes) at [facility] and orders to stop Eliquis per Dr. Bernstein given. Med list updated to reflect this.     Lisa RICHARDS RN

## 2018-12-14 NOTE — TELEPHONE ENCOUNTER
I left a message for the patient to return my call. Lives at [facility]: [phone].     Lisa RICHARDS RN

## 2018-12-14 NOTE — TELEPHONE ENCOUNTER
Trevin from [facility] called back and call her on her cell # [phone]    Ilene Butler CSS

## 2018-12-17 ENCOUNTER — TELEPHONE (OUTPATIENT)
Dept: FAMILY MEDICINE | Facility: CLINIC | Age: 73
End: 2018-12-17

## 2018-12-17 RX ORDER — LEVOTHYROXINE SODIUM 88 UG/1
88 TABLET ORAL DAILY
Qty: 90 TABLET | Refills: 1 | Status: CANCELLED | OUTPATIENT
Start: 2018-12-17

## 2018-12-17 NOTE — TELEPHONE ENCOUNTER
[facility]-1)  Plan of Care 2) PT Order  Form placed on Provider Dr. Amandeep shrestha for Signature.  Leilani Orn Station Sec

## 2018-12-18 ENCOUNTER — TELEPHONE (OUTPATIENT)
Dept: FAMILY MEDICINE | Facility: CLINIC | Age: 73
End: 2018-12-18

## 2018-12-18 NOTE — TELEPHONE ENCOUNTER
Reason for Call: Request for an order or referral:    Order or referral being requested: Continuation orders for speech therapy for dysphagia.  One x week for 2 weeks  Two X week for 3 weeks    Date needed: as soon as possible    Has the patient been seen by the PCP for this problem?     Additional comments:     Phone number Patient can be reached at:  Other phone number:  [facility]  [phone]    Best Time:  any    Can we leave a detailed message on this number?      Call taken on 12/18/2018 at 8:42 AM by Milagros Mcdowell

## 2018-12-19 ENCOUNTER — TELEPHONE (OUTPATIENT)
Dept: FAMILY MEDICINE | Facility: CLINIC | Age: 73
End: 2018-12-19

## 2018-12-19 ENCOUNTER — MEDICAL CORRESPONDENCE (OUTPATIENT)
Dept: HEALTH INFORMATION MANAGEMENT | Facility: CLINIC | Age: 73
End: 2018-12-19

## 2018-12-19 DIAGNOSIS — B37.2 YEAST INFECTION OF THE SKIN: Primary | ICD-10-CM

## 2018-12-19 RX ORDER — NYSTATIN 100000 [USP'U]/G
POWDER TOPICAL 2 TIMES DAILY
Qty: 30 G | Refills: 1 | Status: CANCELLED | OUTPATIENT
Start: 2018-12-19 | End: 2019-12-19

## 2018-12-19 RX ORDER — NYSTATIN 100000 [USP'U]/G
POWDER TOPICAL 2 TIMES DAILY
Qty: 60 G | Refills: 2 | Status: SHIPPED | OUTPATIENT
Start: 2018-12-19 | End: 2019-12-31

## 2018-12-19 NOTE — TELEPHONE ENCOUNTER
Form received back signed  12/19/18  Faxed Back & Sent to be scanned to this encounter  Leilani Orn Station Sec

## 2018-12-19 NOTE — TELEPHONE ENCOUNTER
RX with form in outbasket signed in [facility] Provider [facility].  Thanks Malou Lamar [facility]

## 2018-12-19 NOTE — TELEPHONE ENCOUNTER
Fax received from [facility]  Requesting Nystatin Powder for Abdominal Folds that are quite Red .  Fax placed on Malou KHANNA-CNP desk  [facility]

## 2018-12-26 ENCOUNTER — TELEPHONE (OUTPATIENT)
Dept: FAMILY MEDICINE | Facility: CLINIC | Age: 73
End: 2018-12-26

## 2018-12-26 NOTE — TELEPHONE ENCOUNTER
[facility]- Skilled ST Orders  Form placed on Provider Dr. Amandeep shrestha for Signature.  [facility]

## 2018-12-31 ENCOUNTER — MEDICAL CORRESPONDENCE (OUTPATIENT)
Dept: HEALTH INFORMATION MANAGEMENT | Facility: CLINIC | Age: 73
End: 2018-12-31

## 2019-01-03 NOTE — TELEPHONE ENCOUNTER
Form received back signed  12/31/19  Faxed Back & Sent to be scanned to this encounter  [facility]

## 2019-01-04 ENCOUNTER — TELEPHONE (OUTPATIENT)
Dept: FAMILY MEDICINE | Facility: CLINIC | Age: 74
End: 2019-01-04

## 2019-01-04 ENCOUNTER — MEDICAL CORRESPONDENCE (OUTPATIENT)
Dept: HEALTH INFORMATION MANAGEMENT | Facility: CLINIC | Age: 74
End: 2019-01-04

## 2019-01-04 DIAGNOSIS — R13.10 ABNORMAL SWALLOWING: Primary | ICD-10-CM

## 2019-01-04 NOTE — TELEPHONE ENCOUNTER
Reason for Call: Request for an order or referral:    Order or referral being requested: Repeat Video Swallow Study for Dyshagia  Shannan with [facility] is calling for a order for Concha Velazquez as stated above.  Would like this at [facility]  Their fax for this is [phone]      Date needed: as soon as possible    Has the patient been seen by the PCP for this problem? YES    Additional comments: pt resides at [facility] and will need to coordinate this order with them as the patient need metro mobility transportation.  [facility]'s number  [phone]        Can we leave a detailed message on this number?  YES    Call taken on 1/4/2019 at 3:10 PM by Shira Ace

## 2019-01-07 ENCOUNTER — MEDICAL CORRESPONDENCE (OUTPATIENT)
Dept: HEALTH INFORMATION MANAGEMENT | Facility: CLINIC | Age: 74
End: 2019-01-07

## 2019-01-08 ENCOUNTER — TELEPHONE (OUTPATIENT)
Dept: FAMILY MEDICINE | Facility: CLINIC | Age: 74
End: 2019-01-08

## 2019-01-08 NOTE — TELEPHONE ENCOUNTER
Sofia from [facility] at [place] - [phone] - Is requesting another video swallow since patient has been taking speech therapy - since November - They want to see if she is strong enough to go back to a regular diet. - Please place order and call Sofia.

## 2019-01-09 ENCOUNTER — TELEPHONE (OUTPATIENT)
Dept: FAMILY MEDICINE | Facility: CLINIC | Age: 74
End: 2019-01-09

## 2019-01-09 NOTE — TELEPHONE ENCOUNTER
[facility]- RALPH Training    Form received back signed  1/9/18  Faxed Back & Sent to be scanned to this encounter  [facility]

## 2019-01-11 ENCOUNTER — TELEPHONE (OUTPATIENT)
Dept: FAMILY MEDICINE | Facility: CLINIC | Age: 74
End: 2019-01-11

## 2019-01-11 NOTE — TELEPHONE ENCOUNTER
[facility] Orders- Medication  Form placed on Provider Dr. Amandeep shrestha for Signature.  [facility]      .

## 2019-01-14 ENCOUNTER — MEDICAL CORRESPONDENCE (OUTPATIENT)
Dept: HEALTH INFORMATION MANAGEMENT | Facility: CLINIC | Age: 74
End: 2019-01-14

## 2019-01-16 NOTE — TELEPHONE ENCOUNTER
Form received back signed  1/19/18  Faxed Back & Sent to be scanned to this encounter  [facility]

## 2019-01-22 ENCOUNTER — HOSPITAL ENCOUNTER (OUTPATIENT)
Dept: SPEECH THERAPY | Facility: CLINIC | Age: 74
Setting detail: THERAPIES SERIES
End: 2019-01-22
Attending: FAMILY MEDICINE
Payer: COMMERCIAL

## 2019-01-22 ENCOUNTER — HOSPITAL ENCOUNTER (OUTPATIENT)
Dept: GENERAL RADIOLOGY | Facility: CLINIC | Age: 74
Discharge: HOME | End: 2019-01-22
Attending: FAMILY MEDICINE | Admitting: FAMILY MEDICINE
Payer: COMMERCIAL

## 2019-01-22 DIAGNOSIS — R13.10 ABNORMAL SWALLOWING: ICD-10-CM

## 2019-01-22 DIAGNOSIS — R13.10 PROBLEMS WITH SWALLOWING: ICD-10-CM

## 2019-01-22 PROCEDURE — 92611 MOTION FLUOROSCOPY/SWALLOW: CPT | Mod: GN | Performed by: SPEECH-LANGUAGE PATHOLOGIST

## 2019-01-22 PROCEDURE — 74230 X-RAY XM SWLNG FUNCJ C+: CPT

## 2019-01-22 NOTE — PROGRESS NOTES
Video Swallow Study  01/22/19    General Information   Type Of Visit Initial   Start Of Care Date 01/22/19   Referring Physician Sandra Morales MD   Orders Comment Video Swallow Study   Medical Diagnosis Abnormal Swallow   Onset Of Illness/injury Or Date Of Surgery 01/07/19  (order date)   Precautions/limitations Swallowing Precautions   Hearing WFL for exam   Pertinent History of Current Problem/OT: Additional Occupational Profile Info Pt had previous video swallow on 11/30/18 :  presents with mild-moderate oropharyngeal dysphagia charactized by mild pharyngeal residue, swallow delay, and aspiration on thin liquids.  Since then she has been on a mechanical soft diet with nectar liquids.  She has been receiving ST through home care with focus on pharyngeal exercises and safe swallow strategies of chin tuck and double swallow.   Respiratory Status Room air   Patient Role/employment History Retired   Living Environment [place]   General Observations Pt alert and able to answer questions appropriately.   Patient/family Goals To be able to drink thin liquids.   General Information Comments Pt states she has been using a chin tuck at home 99.95 of the time.   Pain Assessment   Pain Reported No   Fall Risk Screen   Fall screen comments receives home care services currently.   Clinical Swallow Evaluation   Oral Musculature generally intact   Structural Abnormalities none present   Dentition edentulous, does not have dentures  (dentures are at home; not worn often )   Mucosal Quality good   Oral Labial Strength and Mobility WFL   Lingual Strength and Mobility WFL   Buccal Strength and Mobility intact   Laryngeal Function Voicing initiated;Throat clear;Swallow   VFSS Eval: Radiology   Radiologist Dr. Callejas   Views Taken left lateral   Physical Location of Procedure [facility] Radiology   VFSS Eval: Thin Liquid Texture Trial   Mode of Presentation, Thin Liquid cup;straw;self-fed   Order of Presentation 1,2,3,7    Preparatory Phase WFL   Pharyngeal Phase, Thin Liquid Residue in valleculae;Residue in pyriform sinus   Rosenbek's Penetration Aspiration Scale: Thin Liquid Trial Results 5 - contrast contacts vocal cords, visible residue remains (penetration)   Diagnostic Statement Pt spontaneously used chin tuck unless instructed not to use.  When a chin tuck was not used thin liquid penetrated to the level of the vocal cords.  Mild post swallow residue in vallaculea.  Double swallow helpful but not effective in fully clearing residue.   VFSS Eval: Nectar Thick Liquid Texture Trial   Mode of Presentation, Nectar cup;self-fed   Order of Presentation 4   Preparatory Phase WFL   Oral Phase, Nectar WFL   Pharyngeal Phase, [place] WFL   Rosenbek's Penetration Aspiration Scale: Nectar-Thick Liquid Trial Results 1 - no aspiration, contrast does not enter airway   Diagnostic Statement WFL.  Pt utilized chin tuck independently.   VFSS Eval: Pudding Thick Liquid Texture Trial   Mode of Presentation, Pudding spoon;fed by clinician   Order of Presentation 5   Preparatory Phase WFL   Oral Phase, Pudding WFL   Pharyngeal Phase, Pudding WFL   Rosenbek's Penetration Aspiration Scale: Pudding-Thick Liquid Trial Results 1 - no aspiration, contrast does not enter airway   Diagnostic Statement WNL   VFSS Eval: Solid Food Texture Trial   Mode of Presentation, Solid self-fed   Order of Presentation 6   Preparatory Phase WFL;Poor bolus control   Oral Phase, Solid Premature pharyngeal entry;WFL   Pharyngeal Phase, Solid Delayed swallow reflex;Pharyngeal wall coating   Rosenbek's Penetration Aspiration Scale: Solid Food Trial Results 1 - no aspiration, contrast does not enter airway   Diagnostic Statement Premature spillage over base of tongue to vallaculae with swallow delay.  Post swallow residue present in vallaculae.   Swallow Compensations   Swallow Compensations Chin tuck;Multiple swallow   Results (chin tuck appears effective)   Educational  Assessment   Barriers to Learning No barriers   Preferred Learning Style Listening   Esophageal Phase of Swallow   Patient reports or presents with symptoms of esophageal dysphagia No   Esophageal sweep performed during today s vidofluoroscopic exam  No   Swallow Eval: Clinical Impressions   Dysphagia Outcome Severity Scale (ALEA) Level 5 - ALEA   Treatment Diagnosis Mild oropharyngeal dysphagia   Diet texture recommendations Dysphagia diet level 2;Thin liquids   Recommended Feeding/Eating Techniques maintain upright posture during/after eating for 30 mins;no straws;tuck chin during every swallow   Clinical Impression Comments Mild dysphagia characterized by mildly reduced mastication and oral retention with solids. Note: pt had no dentition duiring exam.  Penetration with thin liquids to the level of the vocal cords if no chin tuck utilized.  No penetration with use of chin tuck 3/3 trials.  Double swallow minimally effective to clear residue in vallaculae.  Recommendations: Contineu mechanical soft diet.  Upgrade to be determined by treating SLP however premature pharyngal entry noted today with rose bernard.  Thin liquids with use of chin tuck for every sip.   Total Session Time   SLP Eval: VideoFluoroscopic Swallow function Minutes (90295) 30   Total Evaluation Time 30

## 2019-01-23 ENCOUNTER — TELEPHONE (OUTPATIENT)
Dept: FAMILY MEDICINE | Facility: CLINIC | Age: 74
End: 2019-01-23

## 2019-01-23 NOTE — TELEPHONE ENCOUNTER
[facility] [place] Order   Form placed on Provider Dr. Amandeep shrestha for Signature.  [facility]

## 2019-01-24 ENCOUNTER — MEDICAL CORRESPONDENCE (OUTPATIENT)
Dept: HEALTH INFORMATION MANAGEMENT | Facility: CLINIC | Age: 74
End: 2019-01-24

## 2019-01-24 ENCOUNTER — TELEPHONE (OUTPATIENT)
Dept: FAMILY MEDICINE | Facility: CLINIC | Age: 74
End: 2019-01-24

## 2019-01-24 NOTE — TELEPHONE ENCOUNTER
Reason for Call:  Home Health Care    Sofia with [facility] called regarding (reason for call): Request for change in Diet orders    Orders are needed for this patient.  Sofia says she has been on nectar thick diet and she is requesting this be changed to thin liquids        Pt Provider: Dr Morales    Phone Number Homecare Nurse can be reached at: [phone]    Can we leave a detailed message on this number? YES              Call taken on 1/24/2019 at 10:48 AM by Crystal Skelton

## 2019-01-24 NOTE — TELEPHONE ENCOUNTER
Form received back signed  1/24/18  Faxed Back & Sent to be scanned to this encounter  [facility]

## 2019-01-24 NOTE — TELEPHONE ENCOUNTER
Routing to PCP to advise.    Study Result     XR VIDEO SWALLOW W/O ESOPHAGRAM 1/22/2019 12:46 PM     HISTORY: Dysphagia.     FLUORO TIME:  1.9 minutes.     PROCEDURE: The patient's swallowing mechanism is assessed under  fluoroscopy in conjunction with a speech pathologist. Multiple  consistencies are utilized.  The cervical portion of the esophagus is  also visualized. Eight fluoroscopic sequences are obtained.     FINDINGS: A single episode of penetration is seen during the swallow  with the thin barium type consistency. Other trials with thin barium  type consistency ,utilizing the chin tuck maneuver before the swallow,  shows no additional convincing penetration. No aspiration is  identified. Some residual is seen in the pharynx after the swallow  with the thicker type consistencies. No pharyngeal mass, web, or  diverticulum are seen.                                                                      IMPRESSION: Swallowing mechanism findings, as described above. No  aspiration.     NIMESH ANGELA MD

## 2019-01-24 NOTE — TELEPHONE ENCOUNTER
Called and relayed order to JEWELS Black from [facility], as well as reviewed impression from swallow study, that there was no aspiration.    Lisa RICHARDS RN

## 2019-01-28 ENCOUNTER — TELEPHONE (OUTPATIENT)
Dept: FAMILY MEDICINE | Facility: CLINIC | Age: 74
End: 2019-01-28

## 2019-01-28 ENCOUNTER — MEDICAL CORRESPONDENCE (OUTPATIENT)
Dept: HEALTH INFORMATION MANAGEMENT | Facility: CLINIC | Age: 74
End: 2019-01-28

## 2019-01-28 NOTE — TELEPHONE ENCOUNTER
[facility] [place]- Videofleuroscopic Swallow Study    Form received back signed  1/28/19  Faxed Back & Sent to be scanned to this encounter  [facility]

## 2019-01-31 ENCOUNTER — OFFICE VISIT (OUTPATIENT)
Dept: FAMILY MEDICINE | Facility: CLINIC | Age: 74
End: 2019-01-31
Payer: COMMERCIAL

## 2019-01-31 VITALS
RESPIRATION RATE: 20 BRPM | HEART RATE: 58 BPM | BODY MASS INDEX: 36.44 KG/M2 | OXYGEN SATURATION: 98 % | SYSTOLIC BLOOD PRESSURE: 120 MMHG | WEIGHT: 198 LBS | DIASTOLIC BLOOD PRESSURE: 82 MMHG | TEMPERATURE: 97.8 F | HEIGHT: 62 IN

## 2019-01-31 DIAGNOSIS — E03.8 OTHER SPECIFIED HYPOTHYROIDISM: ICD-10-CM

## 2019-01-31 DIAGNOSIS — N18.4 CKD (CHRONIC KIDNEY DISEASE) STAGE 4, GFR 15-29 ML/MIN (H): ICD-10-CM

## 2019-01-31 DIAGNOSIS — E03.9 ACQUIRED HYPOTHYROIDISM: ICD-10-CM

## 2019-01-31 DIAGNOSIS — L30.9 DERMATITIS: ICD-10-CM

## 2019-01-31 DIAGNOSIS — E11.21 TYPE 2 DIABETES MELLITUS WITH DIABETIC NEPHROPATHY, WITH LONG-TERM CURRENT USE OF INSULIN (H): Primary | Chronic | ICD-10-CM

## 2019-01-31 DIAGNOSIS — I10 BENIGN ESSENTIAL HYPERTENSION: ICD-10-CM

## 2019-01-31 DIAGNOSIS — K52.9 CHRONIC DIARRHEA: ICD-10-CM

## 2019-01-31 DIAGNOSIS — M54.16 LUMBAR RADICULOPATHY: ICD-10-CM

## 2019-01-31 DIAGNOSIS — Z79.4 TYPE 2 DIABETES MELLITUS WITH DIABETIC NEPHROPATHY, WITH LONG-TERM CURRENT USE OF INSULIN (H): Primary | Chronic | ICD-10-CM

## 2019-01-31 LAB
CREAT UR-MCNC: 118 MG/DL
HBA1C MFR BLD: 8.1 % (ref 0–5.6)
MICROALBUMIN UR-MCNC: 179 MG/L
MICROALBUMIN/CREAT UR: 151.7 MG/G CR (ref 0–25)
TSH SERPL DL<=0.005 MIU/L-ACNC: 0.14 MU/L (ref 0.4–4)

## 2019-01-31 PROCEDURE — 83036 HEMOGLOBIN GLYCOSYLATED A1C: CPT | Performed by: FAMILY MEDICINE

## 2019-01-31 PROCEDURE — 82043 UR ALBUMIN QUANTITATIVE: CPT | Performed by: FAMILY MEDICINE

## 2019-01-31 PROCEDURE — 36415 COLL VENOUS BLD VENIPUNCTURE: CPT | Performed by: FAMILY MEDICINE

## 2019-01-31 PROCEDURE — 99215 OFFICE O/P EST HI 40 MIN: CPT | Performed by: FAMILY MEDICINE

## 2019-01-31 PROCEDURE — 84443 ASSAY THYROID STIM HORMONE: CPT | Performed by: FAMILY MEDICINE

## 2019-01-31 PROCEDURE — 80048 BASIC METABOLIC PNL TOTAL CA: CPT | Performed by: FAMILY MEDICINE

## 2019-01-31 RX ORDER — GABAPENTIN 300 MG/1
CAPSULE ORAL
Qty: 90 CAPSULE | Refills: 1 | Status: SHIPPED | OUTPATIENT
Start: 2019-01-31 | End: 2019-06-20

## 2019-01-31 RX ORDER — LEVOTHYROXINE SODIUM 88 UG/1
88 TABLET ORAL DAILY
Qty: 90 TABLET | Refills: 1 | Status: SHIPPED | OUTPATIENT
Start: 2019-01-31 | End: 2019-02-24

## 2019-01-31 ASSESSMENT — MIFFLIN-ST. JEOR: SCORE: 1338.43

## 2019-01-31 NOTE — PATIENT INSTRUCTIONS
Increase the insulin to 5 U/10u/10U     Use steroid cream twice a day to rash on hands and arms    See me back in 3 months

## 2019-01-31 NOTE — NURSING NOTE
"Chief Complaint   Patient presents with     Diabetes     3 month recheck       Initial /82 (BP Location: Left arm)   Pulse 58   Temp 97.8  F (36.6  C) (Tympanic)   Resp 20   Ht 1.562 m (5' 1.5\")   Wt 89.8 kg (198 lb)   SpO2 98%   BMI 36.81 kg/m   Estimated body mass index is 36.81 kg/m  as calculated from the following:    Height as of this encounter: 1.562 m (5' 1.5\").    Weight as of this encounter: 89.8 kg (198 lb).    Patient presents to the clinic using Wheel Chair    Health Maintenance that is potentially due pending provider review:  NONE    n/a    Is there anyone who you would like to be able to receive your results? No  If yes have patient fill out HELLEN    "

## 2019-01-31 NOTE — PROGRESS NOTES
SUBJECTIVE:   Stefanie Velazquez is a 73 year old female who presents to clinic today for the following health issues:      Diabetes Follow-up    Patient is checking blood sugars: twice daily.    Blood sugar testing frequency justification:   Results are as follows:         120-350    Diabetic concerns: blood sugar frequently over 200 and other - nausea and fatigue     Symptoms of hypoglycemia (low blood sugar): none     Paresthesias (numbness or burning in feet) or sores: No     Date of last diabetic eye exam: 2017    Her numbers are consistently low in am when fasting, 200s at noon prior to lunch, 200-300s supper and at bedtime.    Diabetes Management Resources    Hyperlipidemia Follow-Up      Rate your low fat/cholesterol diet?: fair    Taking statin?  Yes, no muscle aches from statin    Other lipid medications/supplements?:  none    Hypertension Follow-up      Outpatient blood pressures are being checked at home.  Results are 120/60.    Low Salt Diet: low salt    Hypothyroidism-on replacement  TSH   Date Value Ref Range Status   12/13/2018 0.04 (L) 0.40 - 4.00 mU/L Final      BP Readings from Last 2 Encounters:   01/31/19 120/82   12/13/18 120/52     Hemoglobin A1C (%)   Date Value   01/31/2019 8.1 (H)   10/30/2018 8.0 (H)     LDL Cholesterol Calculated (mg/dL)   Date Value   08/31/2018 107 (H)   05/02/2018 75       Amount of exercise or physical activity: None    Problems taking medications regularly: No    Medication side effects: none    Diet: diabetic      Rash is spreading on hands and forerms    Chronic kidney disease-last GFR was in the stage 4 range.    She passed her swallow study and now has thin liquids which makes her very happy     Her sister has put her name on the waiting list to live at same assisted living place    Rectal cancer-frequently gets diarrhea, imodium helps  Followed by oncology    Problem list and histories reviewed & adjusted, as indicated.  Additional history: as  "documented    Recent Labs   Lab Test 01/31/19  1044 12/13/18  1214 10/30/18  1212  08/31/18  0905 07/20/18  1647  05/02/18  0910  02/09/18  0910   A1C 8.1*  --  8.0*  --   --  8.5*  --   --    < >  --    LDL  --   --   --   --  107*  --   --  75  --  124*   HDL  --   --   --   --  51  --   --  53  --  62   TRIG  --   --   --   --  134  --   --  235*  --  137   ALT  --  23 20  --  13 19  --  14  --  16   CR  --  1.90* 1.57*   < > 1.45* 1.43*   < > 1.36*  --  1.38*   GFRESTIMATED  --  26* 32*   < > 35* 36*   < > 38*  --  37*   GFRESTBLACK  --  31* 39*   < > 43* 43*   < > 46*  --  45*   POTASSIUM  --  4.6 4.1   < > 3.4 4.2   < > 3.3*  --  4.0   TSH 0.14* 0.04* 0.19*  --  4.24* 5.32*  --   --   --   --     < > = values in this interval not displayed.      BP Readings from Last 3 Encounters:   01/31/19 120/82   12/13/18 120/52   10/30/18 118/62    Wt Readings from Last 3 Encounters:   01/31/19 89.8 kg (198 lb)   10/30/18 88 kg (194 lb)   09/28/18 87.3 kg (192 lb 8 oz)             Reviewed and updated as needed this visit by clinical staff  Tobacco  Allergies  Meds  Problems  Med Hx  Surg Hx  Fam Hx  Soc Hx        Reviewed and updated as needed this visit by Provider  Tobacco  Allergies  Meds  Problems  Med Hx  Surg Hx  Fam Hx         ROS:  CONSTITUTIONAL: NEGATIVE for fever, chills, change in weight  ENT/MOUTH: NEGATIVE for ear, mouth and throat problems  RESP: NEGATIVE for significant cough or SOB  CV: NEGATIVE for chest pain, palpitations or peripheral edema  GI: NEGATIVE for nausea, abdominal pain, heartburn, or change in bowel habits  : No dysuria, urinary frequency or urgency.     OBJECTIVE:                                                    /82 (BP Location: Left arm)   Pulse 58   Temp 97.8  F (36.6  C) (Tympanic)   Resp 20   Ht 1.562 m (5' 1.5\")   Wt 89.8 kg (198 lb)   SpO2 98%   BMI 36.81 kg/m    Body mass index is 36.81 kg/m .  GENERAL:  alert, well nourished, well hydrated, no " distress sitting comfortably in wheelchair  HENT: edentulous, hearing aids in place  NECK: no tenderness, no adenopathy, no JVD  RESP: lungs clear to auscultation - no rales, no rhonchi, no wheezes  CV: regular rates and rhythm, normal S1 S2, no S3 or S4 and no murmur, no click or rub -  ABDOMEN: soft, no tenderness, no mass or distention  Skin: few scattered erythematous macular dry patches on hands and arms    Diagnostic test results:  Results for orders placed or performed in visit on 01/31/19   Hemoglobin A1c   Result Value Ref Range    Hemoglobin A1C 8.1 (H) 0 - 5.6 %   Albumin Random Urine Quantitative with Creat Ratio   Result Value Ref Range    Creatinine Urine 118 mg/dL    Albumin Urine mg/L 179 mg/L    Albumin Urine mg/g Cr 151.70 (H) 0 - 25 mg/g Cr   TSH   Result Value Ref Range    TSH 0.14 (L) 0.40 - 4.00 mU/L     *Note: Due to a large number of results and/or encounters for the requested time period, some results have not been displayed. A complete set of results can be found in Results Review.         ASSESSMENT/PLAN:                                                      ASSESSMENT:  1. Type 2 diabetes mellitus with diabetic nephropathy, with long-term current use of insulin (H)    2. Lumbar radiculopathy    3. Other specified hypothyroidism    4. Benign essential hypertension    5. Chronic diarrhea    6. Acquired hypothyroidism    7. CKD (chronic kidney disease) stage 4, GFR 15-29 ml/min (H)    8. Dermatitis        PLAN:  Orders Placed This Encounter     EYE EXAM (SIMPLE-NONBILLABLE)         Hemoglobin A1c     Albumin Random Urine Quantitative with Creat Ratio     TSH     Basic metabolic panel     insulin aspart (NOVOLOG FLEXPEN) 100 UNIT/ML pen     gabapentin (NEURONTIN) 300 MG capsule     insulin detemir (LEVEMIR FLEXPEN/FLEXTOUCH) 100 UNIT/ML pen     levothyroxine (SYNTHROID/LEVOTHROID) 88 MCG tablet     Will increase insulin as below  It looks like we didn't get kidney function, will add on, if not  will need to have come back in.   If GFR persists under 30, will have see nephrology  40 min spent with patient, greater than 50% in counseling and coordination of care, discussion of multiple issues, and going over nursing home notes.     Patient Instructions   Increase the insulin to 5 U/10u/10U     Use steroid cream twice a day to rash on hands and arms    See me back in 3 months     Sandra Morales MD  [facility]

## 2019-02-03 PROBLEM — N18.4 CKD (CHRONIC KIDNEY DISEASE) STAGE 4, GFR 15-29 ML/MIN (H): Status: ACTIVE | Noted: 2019-02-03

## 2019-02-03 LAB
ANION GAP SERPL CALCULATED.3IONS-SCNC: 10 MMOL/L (ref 3–14)
BUN SERPL-MCNC: 40 MG/DL (ref 7–30)
CALCIUM SERPL-MCNC: 8.3 MG/DL (ref 8.5–10.1)
CHLORIDE SERPL-SCNC: 106 MMOL/L (ref 94–109)
CO2 SERPL-SCNC: 25 MMOL/L (ref 20–32)
CREAT SERPL-MCNC: 1.78 MG/DL (ref 0.52–1.04)
GFR SERPL CREATININE-BSD FRML MDRD: 27 ML/MIN/{1.73_M2}
GLUCOSE SERPL-MCNC: 289 MG/DL (ref 70–99)
POTASSIUM SERPL-SCNC: 4.6 MMOL/L (ref 3.4–5.3)
SODIUM SERPL-SCNC: 141 MMOL/L (ref 133–144)

## 2019-02-04 ENCOUNTER — TELEPHONE (OUTPATIENT)
Dept: FAMILY MEDICINE | Facility: CLINIC | Age: 74
End: 2019-02-04

## 2019-02-04 DIAGNOSIS — N18.4 CKD (CHRONIC KIDNEY DISEASE) STAGE 4, GFR 15-29 ML/MIN (H): Primary | ICD-10-CM

## 2019-02-04 NOTE — TELEPHONE ENCOUNTER
Dr.Van Latif please finish referral for nephrology. This will need to be faxed to [phone] this is the number for her assisted living. The telephone number on the referral is for .       Kamini Taylor CMA

## 2019-02-06 ENCOUNTER — TELEPHONE (OUTPATIENT)
Dept: FAMILY MEDICINE | Facility: CLINIC | Age: 74
End: 2019-02-06

## 2019-02-06 NOTE — TELEPHONE ENCOUNTER
Sofia Speech Therapist per [facility]   Requesting Continuation of Speech Therapy   2 x 1 week  1 x 2 weeks  Pt continues to progress towards her Goal which has not been met yet.  Swallowing to get her back to regular texture Liquids.   VO per Wilda Naqvi RN  [facility]

## 2019-02-07 ENCOUNTER — TELEPHONE (OUTPATIENT)
Dept: FAMILY MEDICINE | Facility: CLINIC | Age: 74
End: 2019-02-07

## 2019-02-07 ENCOUNTER — TRANSFERRED RECORDS (OUTPATIENT)
Dept: HEALTH INFORMATION MANAGEMENT | Facility: CLINIC | Age: 74
End: 2019-02-07

## 2019-02-07 NOTE — TELEPHONE ENCOUNTER
[facility]- Compression Hose  Form placed on Provider Dr. Bernstein desfrank for Signature.  Form received back signed  2/7/19  Faxed Back & Sent to be scanned to this encounter  [facility]

## 2019-02-11 ENCOUNTER — MEDICAL CORRESPONDENCE (OUTPATIENT)
Dept: HEALTH INFORMATION MANAGEMENT | Facility: CLINIC | Age: 74
End: 2019-02-11

## 2019-02-15 ENCOUNTER — MEDICAL CORRESPONDENCE (OUTPATIENT)
Dept: HEALTH INFORMATION MANAGEMENT | Facility: CLINIC | Age: 74
End: 2019-02-15

## 2019-02-15 ENCOUNTER — TELEPHONE (OUTPATIENT)
Dept: FAMILY MEDICINE | Facility: CLINIC | Age: 74
End: 2019-02-15

## 2019-02-15 NOTE — TELEPHONE ENCOUNTER
[facility]- 1/18/19-3/18-19  Form received back signed  2/15/19  Faxed Back & Sent to be scanned to this encounter  [facility]

## 2019-02-18 ENCOUNTER — TELEPHONE (OUTPATIENT)
Dept: FAMILY MEDICINE | Facility: CLINIC | Age: 74
End: 2019-02-18

## 2019-02-18 ENCOUNTER — MEDICAL CORRESPONDENCE (OUTPATIENT)
Dept: HEALTH INFORMATION MANAGEMENT | Facility: CLINIC | Age: 74
End: 2019-02-18

## 2019-02-18 NOTE — TELEPHONE ENCOUNTER
[facility] - OT   Form placed on Provider Dr. Amandeep shrestha for Signature.  [facility]

## 2019-02-20 ENCOUNTER — TELEPHONE (OUTPATIENT)
Dept: FAMILY MEDICINE | Facility: CLINIC | Age: 74
End: 2019-02-20

## 2019-02-21 ENCOUNTER — TELEPHONE (OUTPATIENT)
Dept: FAMILY MEDICINE | Facility: CLINIC | Age: 74
End: 2019-02-21

## 2019-02-21 ENCOUNTER — MEDICAL CORRESPONDENCE (OUTPATIENT)
Dept: HEALTH INFORMATION MANAGEMENT | Facility: CLINIC | Age: 74
End: 2019-02-21

## 2019-02-21 NOTE — TELEPHONE ENCOUNTER
Reason for call:  Patient reporting a symptom    Symptom or request: Jona Mell at [facility] - Pt is having Back Pain. Pt said she had a MRI done in the past. Pt is wondering if she can get Oxycodone. Janessa said they do not have a order for Pain Medication since she has arrived in Oct.Please Advise.    Have you been treated for this before? Yes    Phone Number patient can be reached at:  Home number on file [phone] (home)    Best Time:  Any Time      Can we leave a detailed message on this number:  YES    Call taken on 2/21/2019 at 12:04 PM by Leilani Jurado

## 2019-02-22 ENCOUNTER — TELEPHONE (OUTPATIENT)
Dept: FAMILY MEDICINE | Facility: CLINIC | Age: 74
End: 2019-02-22

## 2019-02-22 ENCOUNTER — OFFICE VISIT (OUTPATIENT)
Dept: FAMILY MEDICINE | Facility: CLINIC | Age: 74
End: 2019-02-22
Payer: COMMERCIAL

## 2019-02-22 VITALS
WEIGHT: 197 LBS | BODY MASS INDEX: 36.62 KG/M2 | HEART RATE: 60 BPM | TEMPERATURE: 96.8 F | SYSTOLIC BLOOD PRESSURE: 114 MMHG | DIASTOLIC BLOOD PRESSURE: 62 MMHG

## 2019-02-22 DIAGNOSIS — R82.90 NONSPECIFIC FINDING ON EXAMINATION OF URINE: ICD-10-CM

## 2019-02-22 DIAGNOSIS — N30.00 ACUTE CYSTITIS WITHOUT HEMATURIA: ICD-10-CM

## 2019-02-22 DIAGNOSIS — M54.50 ACUTE MIDLINE LOW BACK PAIN WITHOUT SCIATICA: Primary | ICD-10-CM

## 2019-02-22 LAB
ALBUMIN UR-MCNC: NEGATIVE MG/DL
APPEARANCE UR: CLEAR
BACTERIA #/AREA URNS HPF: ABNORMAL /HPF
BILIRUB UR QL STRIP: NEGATIVE
COLOR UR AUTO: YELLOW
GLUCOSE UR STRIP-MCNC: NEGATIVE MG/DL
HGB UR QL STRIP: ABNORMAL
KETONES UR STRIP-MCNC: NEGATIVE MG/DL
LEUKOCYTE ESTERASE UR QL STRIP: ABNORMAL
NITRATE UR QL: NEGATIVE
NON-SQ EPI CELLS #/AREA URNS LPF: ABNORMAL /LPF
PH UR STRIP: 7 PH (ref 5–7)
RBC #/AREA URNS AUTO: ABNORMAL /HPF
SOURCE: ABNORMAL
SP GR UR STRIP: 1.01 (ref 1–1.03)
UROBILINOGEN UR STRIP-ACNC: 0.2 EU/DL (ref 0.2–1)
WBC #/AREA URNS AUTO: ABNORMAL /HPF
WBC CLUMPS #/AREA URNS HPF: PRESENT /HPF

## 2019-02-22 PROCEDURE — 81001 URINALYSIS AUTO W/SCOPE: CPT | Performed by: NURSE PRACTITIONER

## 2019-02-22 PROCEDURE — 99214 OFFICE O/P EST MOD 30 MIN: CPT | Performed by: NURSE PRACTITIONER

## 2019-02-22 PROCEDURE — 87186 SC STD MICRODIL/AGAR DIL: CPT | Performed by: NURSE PRACTITIONER

## 2019-02-22 PROCEDURE — 87086 URINE CULTURE/COLONY COUNT: CPT | Performed by: NURSE PRACTITIONER

## 2019-02-22 PROCEDURE — 87088 URINE BACTERIA CULTURE: CPT | Performed by: NURSE PRACTITIONER

## 2019-02-22 RX ORDER — CEPHALEXIN 500 MG/1
500 CAPSULE ORAL 2 TIMES DAILY
Qty: 14 CAPSULE | Refills: 0 | Status: SHIPPED | OUTPATIENT
Start: 2019-02-22 | End: 2019-03-06

## 2019-02-22 NOTE — NURSING NOTE
"Chief Complaint   Patient presents with     Back Pain     last Oxy fill was 5/1/2018 ended on 9/18/18 of 20 tabs       Initial /62 (BP Location: Right arm, Patient Position: Chair, Cuff Size: Adult Large)   Pulse 60   Temp 96.8  F (36  C) (Tympanic)   Wt 89.4 kg (197 lb)   BMI 36.62 kg/m   Estimated body mass index is 36.62 kg/m  as calculated from the following:    Height as of 1/31/19: 1.562 m (5' 1.5\").    Weight as of this encounter: 89.4 kg (197 lb).    Patient presents to the clinic using No DME    Health Maintenance that is potentially due pending provider review:  NONE    n/a    Is there anyone who you would like to be able to receive your results? Not Applicable  If yes have patient fill out HELLEN Drummond M.A.      "

## 2019-02-22 NOTE — TELEPHONE ENCOUNTER
[facility] Esquivel two step order  Form received back signed  2/22/19  Faxed Back & Sent to be scanned to this encounter  [facility]

## 2019-02-22 NOTE — PROGRESS NOTES
SUBJECTIVE:   Stefanie Velazquez is a 73 year old female who presents to clinic today for the following health issues:    Back Pain       Duration: since August?        Specific cause: none    Description:   Location of pain: low back bilateral  Character of pain: intense and constant  Pain radiation: Starting to go upward into middle back   New numbness or weakness in legs, not attributed to pain:  no     Intensity: Currently 9/10    History:   Pain interferes with job: Not applicable  History of back problems: previous degenerative joint disease of the lumbar spine  Any previous MRI or X-rays: Yes- at [place].  Date 4/2018  Sees a specialist for back pain:  No  Therapies tried without relief: Tylenol only helps a little bit     Alleviating factors:   Improved by: none      Precipitating factors:  Worsened by: Standing          Accompanying Signs & Symptoms:  Risk of Fracture:    Risk of Cauda Equina:    Risk of Infection:    Risk of Cancer:    Risk of Ankylosing Spondylitis:  Onset at age <35, male, AND morning back stiffness. no                    Problem list and histories reviewed & adjusted, as indicated.  Additional history: as documented    Patient Active Problem List   Diagnosis     Hypothyroidism     bmi 40     Chronic ischemic heart disease     Generalized osteoarthrosis, unspecified site     HEARING LOSS CONDUCTIVE, COMBINED TYPE     Esophageal reflux     Osteoporosis     RC-moderate (AHI 12, LSat 60%); REM RDI-73     Neuropathy (H)     Hyperlipidemia LDL goal <100     CKD (chronic kidney disease) stage 3, GFR 30-59 ml/min (H)     Rectal cancer- newly diagnosed adenoCA rectum Jan 2011     Advanced directives, counseling/discussion     Anemia     Type 2 diabetes mellitus with diabetic nephropathy (H)     Radiation therapy complication     Vitamin B 12 deficiency     Vitamin D deficiency     Fracture of femur, distal, left, closed (H)     Dysuria     Bowel and bladder incontinence     Cellulitis of lower  extremity, bilateral     Benign essential hypertension     Health Care Home     Chronic diarrhea     Restless leg syndrome     Type 2 diabetes mellitus with diabetic nephropathy, with long-term current use of insulin (H)     Spinal stenosis of lumbar region without neurogenic claudication     DDD (degenerative disc disease), lumbar     Lymphedema of genitalia     Acute deep vein thrombosis (DVT) of right lower extremity, unspecified vein (H)     Oropharyngeal dysphagia     Bilateral hearing loss, unspecified hearing loss type     Lumbar radiculopathy     CKD (chronic kidney disease) stage 4, GFR 15-29 ml/min (H)     Past Surgical History:   Procedure Laterality Date     cabg       COLECTOMY LOW ANTERIOR  6/21/2011    Procedure:COLECTOMY LOW ANTERIOR; with loop ielostomy; Surgeon:ROBBIN MCDONNELL; Location:[facility]     COLONOSCOPY  1/26/2011    COLONOSCOPY performed by MASOUD BILL at [facility]     COLONOSCOPY N/A 3/1/2016    Procedure: COLONOSCOPY;  Surgeon: Liza Neal MD;  Location: [facility]     INSERT PORT VASCULAR ACCESS  3/2/2011    INSERT PORT VASCULAR ACCESS performed by LIZA NEAL at [facility]     OPEN REDUCTION INTERNAL FIXATION FEMUR DISTAL  2/12/2013    Procedure: OPEN REDUCTION INTERNAL FIXATION FEMUR DISTAL;  Open reduction internal fixation left femur fracture--[facility];  Surgeon: Ley, Jeffrey Duane, MD;  Location: [facility]     ORTHOPEDIC SURGERY       PHACOEMULSIFICATION WITH STANDARD INTRAOCULAR LENS IMPLANT Left 1/22/2018    Procedure: PHACOEMULSIFICATION WITH STANDARD INTRAOCULAR LENS IMPLANT;  Left cataract removal with implant;  Surgeon: Rony Key MD;  Location: [facility]     PHACOEMULSIFICATION WITH STANDARD INTRAOCULAR LENS IMPLANT Right 2/19/2018    Procedure: PHACOEMULSIFICATION WITH STANDARD INTRAOCULAR LENS IMPLANT;  Right cataract removal with implant;  Surgeon: Rony Key MD;  Location: [facility]     SIGMOIDOSCOPY FLEXIBLE  9/9/2011     Procedure:SIGMOIDOSCOPY FLEXIBLE; Performed prior to induction.; Surgeon:ROBBIN MCDONNELL; Location:[facility]     SURGICAL HISTORY OF -   10/24/2002    Heart bypass     SURGICAL HISTORY OF -   2002    R Knee arthroplasty     SURGICAL HISTORY OF -   2002    Mi 2 stints     TAKEDOWN ILEOSTOMY  9/9/2011    Procedure:TAKEDOWN ILEOSTOMY; Exploratory Laparotomy,  Loop Ileostomy Takedown, Small Bowel Resection x 2.; Surgeon:ROBBIN MCDONNELL; Location:[facility]       Social History     Tobacco Use     Smoking status: Former Smoker     Smokeless tobacco: Never Used   Substance Use Topics     Alcohol use: Yes     Comment: rare social use     Family History   Problem Relation Age of Onset     Diabetes Sister      C.A.D. Sister      Breast Cancer Sister          Current Outpatient Medications   Medication Sig Dispense Refill     ACCU-CHEK MILVIA MELODY dispense one meter 1 device 0     acetaminophen (TYLENOL) 650 MG CR tablet Take 1 tablet (650 mg) by mouth 3 times daily as needed for mild pain or fever 60 tablet      blood glucose monitoring (ACCU-CHEK MILVIA) test strip Use to test blood sugars 4 times daily or as directed. 360 each 1     blood glucose monitoring (ACCU-CHEK MULTICLIX) lancets Test BG 4 times daily 1 Box 11     BUTT PASTE - REGULAR (DR LOVE POOP GOOP BUTT PASTE FORMULA) Apply topically every hour as needed for skin protection       cephALEXin (KEFLEX) 500 MG capsule Take 1 capsule (500 mg) by mouth 2 times daily for 7 days 14 capsule 0     fluocinonide (LIDEX) 0.05 % ointment Apply sparingly to rash on legs twice daily as needed.  Do not apply to face. 60 g 11     fluticasone (FLONASE) 50 MCG/ACT spray Spray 1 spray into both nostrils daily 1 Bottle 3     furosemide (LASIX) 20 MG tablet Take 1 tablet (20 mg) by mouth 2 times daily 180 tablet 3     gabapentin (NEURONTIN) 300 MG capsule Take 1 tablet (300 mg) at bedtime 90 capsule 1     insulin aspart (NOVOLOG FLEXPEN) 100 UNIT/ML pen 5 units with breakfast, 10 units with lunch  and 10 units with supper 15 mL 1     insulin detemir (LEVEMIR FLEXPEN/FLEXTOUCH) 100 UNIT/ML pen Inject 20 Units Subcutaneous At Bedtime Fill when needed 3 mL 3     insulin pen needle (BD GABRIELLA U/F) 32G X 4 MM Use 4 times per day or as directed. 120 each 5     levothyroxine (SYNTHROID/LEVOTHROID) 88 MCG tablet Take 1 tablet (88 mcg) by mouth daily 90 tablet 1     loperamide (IMODIUM) 2 MG capsule One capsule once a day PRN, can use a second one if needed 90 capsule 3     losartan (COZAAR) 100 MG tablet Take 0.5 tablets (50 mg) by mouth daily 90 tablet 1     menthol-zinc oxide (CALMOSEPTINE) 0.44-20.625 % OINT ointment Apply topically 4 times daily as needed for skin protection       metoprolol succinate ER (TOPROL-XL) 50 MG 24 hr tablet Take 0.5 tablets (25 mg) by mouth daily 90 tablet 1     nystatin (MYCOSTATIN) 591840 UNIT/GM external powder Apply topically 2 times daily Until resolved then to use as needed. 60 g 2     OMEPRAZOLE PO Take 20 mg by mouth daily as needed        order for DME Equipment being ordered: Compression stockings 2 Device 0     order for DME Equipment being ordered: Hospital Bed service and repair 1 each 0     Potassium Gluconate 595 MG CAPS Take 1 tablet by mouth daily       rOPINIRole (REQUIP) 1 MG tablet 1-3 tabs daily as needed, patient takes one in am, one in pm. occasionally will take in midday. 200 tablet 3     simvastatin (ZOCOR) 40 MG tablet Take 1 tablet (40 mg) by mouth daily 90 tablet 2     Allergies   Allergen Reactions     Hydrocodone Unknown     Lisinopril Cough            Vicodin [Hydrocodone-Acetaminophen] Other (See Comments)     Caused extreme dizziness     Labs reviewed in EPIC    Reviewed and updated as needed this visit by clinical staff  Tobacco  Allergies  Meds  Problems  Med Hx  Surg Hx  Fam Hx  Soc Hx        Reviewed and updated as needed this visit by Provider  Tobacco  Allergies  Meds  Problems  Med Hx  Surg Hx  Fam Hx         ROS:  Constitutional,  HEENT, cardiovascular, pulmonary, GI, , musculoskeletal, neuro, skin, endocrine and psych systems are negative, except as otherwise noted.    OBJECTIVE:     /62 (BP Location: Right arm, Patient Position: Chair, Cuff Size: Adult Large)   Pulse 60   Temp 96.8  F (36  C) (Tympanic)   Wt 89.4 kg (197 lb)   BMI 36.62 kg/m    Body mass index is 36.62 kg/m .   GENERAL: healthy, alert and no distress, nontoxic in appearance  EYES: Eyes grossly normal to inspection, PERRL and conjunctivae and sclerae normal  HENT: ear canals and TM's normal, nose and mouth without ulcers or lesions, no teeth, tongue thick but speaks well  NECK: no adenopathy, supple with full ROM  RESP: lungs clear to auscultation - no rales, rhonchi or wheezes  CV: regular rate and rhythm, normal S1 S2, no S3 or S4, no murmur, click or rub, no peripheral edema  ABDOMEN: soft, nontender, no hepatosplenomegaly, no masses and bowel sounds normal  MS: no gross musculoskeletal defects noted, no edema, no pain to palpation over entire spine  No rash  Neg CVA tenderness    Diagnostic Test Results:  Results for orders placed or performed in visit on 02/22/19 (from the past 24 hour(s))   UA reflex to Microscopic and Culture   Result Value Ref Range    Color Urine Yellow     Appearance Urine Clear     Glucose Urine Negative NEG^Negative mg/dL    Bilirubin Urine Negative NEG^Negative    Ketones Urine Negative NEG^Negative mg/dL    Specific Gravity Urine 1.015 1.003 - 1.035    Blood Urine Trace (A) NEG^Negative    pH Urine 7.0 5.0 - 7.0 pH    Protein Albumin Urine Negative NEG^Negative mg/dL    Urobilinogen Urine 0.2 0.2 - 1.0 EU/dL    Nitrite Urine Negative NEG^Negative    Leukocyte Esterase Urine Moderate (A) NEG^Negative    Source Midstream Urine    Urine Microscopic   Result Value Ref Range    WBC Urine 25-50 (A) OTO5^0 - 5 /HPF    RBC Urine O - 2 OTO2^O - 2 /HPF    WBC Clumps Present (A) NEG^Negative /HPF    Squamous Epithelial /LPF Urine Few FEW^Few  /LPF    Bacteria Urine Few (A) NEG^Negative /HPF     *Note: Due to a large number of results and/or encounters for the requested time period, some results have not been displayed. A complete set of results can be found in Results Review.       ASSESSMENT/PLAN:     Problem List Items Addressed This Visit     None      Visit Diagnoses     Acute midline low back pain without sciatica    -  Primary    Relevant Orders    UA reflex to Microscopic and Culture (Completed)    Urine Microscopic (Completed)    Urine Culture Aerobic Bacterial (Completed)    Nonspecific finding on examination of urine        Relevant Orders    Urine Culture Aerobic Bacterial (Completed)    Acute cystitis without hematuria        Relevant Medications    cephALEXin (KEFLEX) 500 MG capsule               Patient Instructions   Take antibiotics as directed.    Follow up with Dr. Morales next Tuesday, February 26, 2019 at 3 PM    Antibiotics as directed   Urine culture is pending, will contact you if there is a change in treatment therapy.   Drink plenty of fluids. Prevention and treatment of UTI's discussed.   Signs and symptoms of pyelonephritis mentioned.   RTC if any worsening symptoms or if not improving as expected.   After completion of your antibiotic return for a repeat urinalysis.   You will just need to call the clinic to make a lab only appointment     Patient Education     Bladder Infection, Female (Adult)    Urine is normally doesn't have any bacteria in it. But bacteria can get into the urinary tract from the skin around the rectum. Or they can travel in the blood from elsewhere in the body. Once they are in your urinary tract, they can cause infection in the urethra (urethritis), the bladder (cystitis), or the kidneys (pyelonephritis).  The most common place for an infection is in the bladder. This is called a bladder infection. This is one of the most common infections in women. Most bladder infections are easily treated. They are not  "serious unless the infection spreads to the kidney.  The phrases \"bladder infection,\" \"UTI,\" and \"cystitis\" are often used to describe the same thing. But they are not always the same. Cystitis is an inflammation of the bladder. The most common cause of cystitis is an infection.  Symptoms  The infection causes inflammation in the urethra and bladder. This causes many of the symptoms. The most common symptoms of a bladder infection are:    Pain or burning when urinating    Having to urinate more often than usual    Urgent need to urinate    Only a small amount of urine comes out    Blood in urine    Abdominal discomfort. This is usually in the lower abdomen above the pubic bone.    Cloudy urine    Strong- or bad-smelling urine    Unable to urinate (urinary retention)    Unable to hold urine in (urinary incontinence)    Fever    Loss of appetite    Confusion (in older adults)  Causes  Bladder infections are not contagious. You can't get one from someone else, from a toilet seat, or from sharing a bath.  The most common cause of bladder infections is bacteria from the bowels. The bacteria get onto the skin around the opening of the urethra. From there, they can get into the urine and travel up to the bladder, causing inflammation and infection. This usually happens because of:    Wiping improperly after urinating. Always wipe from front to back.    Bowel incontinence    Pregnancy    Procedures such as having a catheter inserted    Older age    Not emptying your bladder. This can allow bacteria a chance to grow in your urine.    Dehydration    Constipation    Sex    Use of a diaphragm for birth control   Treatment  Bladder infections are diagnosed by a urine test. They are treated with antibiotics and usually clear up quickly without complications. Treatment helps prevent a more serious kidney infection.  Medicines  Medicines can help in the treatment of a bladder infection:    Take antibiotics until they are used up, " even if you feel better. It is important to finish them to make sure the infection has cleared.    You can use acetaminophen or ibuprofen for pain, fever, or discomfort, unless another medicine was prescribed. If you have chronic liver or kidney disease, talk with your healthcare provider before using these medicines. Also talk with your provider if you've ever had a stomach ulcer or gastrointestinal bleeding, or are taking blood-thinner medicines.    If you are given phenazopydridine to reduce burning with urination, it will cause your urine to become a bright orange color. This can stain clothing.  Care and prevention  These self-care steps can help prevent future infections:    Drink plenty of fluids to prevent dehydration and flush out your bladder. Do this unless you must restrict fluids for other health reasons, or your doctor told you not to.    Proper cleaning after going to the bathroom is important. Wipe from front to back after using the toilet to prevent the spread of bacteria.    Urinate more often. Don't try to hold urine in for a long time.    Wear loose-fitting clothes and cotton underwear. Avoid tight-fitting pants.    Improve your diet and prevent constipation. Eat more fresh fruit and vegetables, and fiber, and less junk and fatty foods.    Avoid sex until your symptoms are gone.    Avoid caffeine, alcohol, and spicy foods. These can irritate your bladder.    Urinate right after intercourse to flush out your bladder.    If you use birth control pills and have frequent bladder infections, discuss it with your doctor.  Follow-up care  Call your healthcare provider if all symptoms are not gone after 3 days of treatment. This is especially important if you have repeat infections.  If a culture was done, you will be told if your treatment needs to be changed. If directed, you can call to find out the results.  If X-rays were done, you will be told if the results will affect your treatment.  Call  911  Call 911 if any of the following occur:    Trouble breathing    Hard to wake up or confusion    Fainting or loss of consciousness    Rapid heart rate  When to seek medical advice  Call your healthcare provider right away if any of these occur:    Fever of 100.4 F (38.0 C) or higher, or as directed by your healthcare provider    Symptoms are not better by the third day of treatment    Back or belly (abdominal) pain that gets worse    Repeated vomiting, or unable to keep medicine down    Weakness or dizziness    Vaginal discharge    Pain, redness, or swelling in the outer vaginal area (labia)  Date Last Reviewed: 10/1/2016    9710-1512 [employer]. [address] [place] [address]. All rights reserved. This information is not intended as a substitute for professional medical care. Always follow your healthcare professional's instructions.             25 minutes spent with this patient greater than 50% in face to face counseling regarding the above.    CLEMENCIA Lee [facility]

## 2019-02-22 NOTE — PATIENT INSTRUCTIONS
"Take antibiotics as directed.    Follow up with Dr. Morales next Tuesday, February 26, 2019 at 3 PM    Antibiotics as directed   Urine culture is pending, will contact you if there is a change in treatment therapy.   Drink plenty of fluids. Prevention and treatment of UTI's discussed.   Signs and symptoms of pyelonephritis mentioned.   RTC if any worsening symptoms or if not improving as expected.   After completion of your antibiotic return for a repeat urinalysis.   You will just need to call the clinic to make a lab only appointment     Patient Education     Bladder Infection, Female (Adult)    Urine is normally doesn't have any bacteria in it. But bacteria can get into the urinary tract from the skin around the rectum. Or they can travel in the blood from elsewhere in the body. Once they are in your urinary tract, they can cause infection in the urethra (urethritis), the bladder (cystitis), or the kidneys (pyelonephritis).  The most common place for an infection is in the bladder. This is called a bladder infection. This is one of the most common infections in women. Most bladder infections are easily treated. They are not serious unless the infection spreads to the kidney.  The phrases \"bladder infection,\" \"UTI,\" and \"cystitis\" are often used to describe the same thing. But they are not always the same. Cystitis is an inflammation of the bladder. The most common cause of cystitis is an infection.  Symptoms  The infection causes inflammation in the urethra and bladder. This causes many of the symptoms. The most common symptoms of a bladder infection are:    Pain or burning when urinating    Having to urinate more often than usual    Urgent need to urinate    Only a small amount of urine comes out    Blood in urine    Abdominal discomfort. This is usually in the lower abdomen above the pubic bone.    Cloudy urine    Strong- or bad-smelling urine    Unable to urinate (urinary retention)    Unable to hold urine in " (urinary incontinence)    Fever    Loss of appetite    Confusion (in older adults)  Causes  Bladder infections are not contagious. You can't get one from someone else, from a toilet seat, or from sharing a bath.  The most common cause of bladder infections is bacteria from the bowels. The bacteria get onto the skin around the opening of the urethra. From there, they can get into the urine and travel up to the bladder, causing inflammation and infection. This usually happens because of:    Wiping improperly after urinating. Always wipe from front to back.    Bowel incontinence    Pregnancy    Procedures such as having a catheter inserted    Older age    Not emptying your bladder. This can allow bacteria a chance to grow in your urine.    Dehydration    Constipation    Sex    Use of a diaphragm for birth control   Treatment  Bladder infections are diagnosed by a urine test. They are treated with antibiotics and usually clear up quickly without complications. Treatment helps prevent a more serious kidney infection.  Medicines  Medicines can help in the treatment of a bladder infection:    Take antibiotics until they are used up, even if you feel better. It is important to finish them to make sure the infection has cleared.    You can use acetaminophen or ibuprofen for pain, fever, or discomfort, unless another medicine was prescribed. If you have chronic liver or kidney disease, talk with your healthcare provider before using these medicines. Also talk with your provider if you've ever had a stomach ulcer or gastrointestinal bleeding, or are taking blood-thinner medicines.    If you are given phenazopydridine to reduce burning with urination, it will cause your urine to become a bright orange color. This can stain clothing.  Care and prevention  These self-care steps can help prevent future infections:    Drink plenty of fluids to prevent dehydration and flush out your bladder. Do this unless you must restrict fluids  for other health reasons, or your doctor told you not to.    Proper cleaning after going to the bathroom is important. Wipe from front to back after using the toilet to prevent the spread of bacteria.    Urinate more often. Don't try to hold urine in for a long time.    Wear loose-fitting clothes and cotton underwear. Avoid tight-fitting pants.    Improve your diet and prevent constipation. Eat more fresh fruit and vegetables, and fiber, and less junk and fatty foods.    Avoid sex until your symptoms are gone.    Avoid caffeine, alcohol, and spicy foods. These can irritate your bladder.    Urinate right after intercourse to flush out your bladder.    If you use birth control pills and have frequent bladder infections, discuss it with your doctor.  Follow-up care  Call your healthcare provider if all symptoms are not gone after 3 days of treatment. This is especially important if you have repeat infections.  If a culture was done, you will be told if your treatment needs to be changed. If directed, you can call to find out the results.  If X-rays were done, you will be told if the results will affect your treatment.  Call 911  Call 911 if any of the following occur:    Trouble breathing    Hard to wake up or confusion    Fainting or loss of consciousness    Rapid heart rate  When to seek medical advice  Call your healthcare provider right away if any of these occur:    Fever of 100.4 F (38.0 C) or higher, or as directed by your healthcare provider    Symptoms are not better by the third day of treatment    Back or belly (abdominal) pain that gets worse    Repeated vomiting, or unable to keep medicine down    Weakness or dizziness    Vaginal discharge    Pain, redness, or swelling in the outer vaginal area (labia)  Date Last Reviewed: 10/1/2016    1575-5892 [employer]. [address] [place] [address], [place] [address]. All rights reserved. This information is not intended as a substitute for professional  medical care. Always follow your healthcare professional's instructions.

## 2019-02-22 NOTE — TELEPHONE ENCOUNTER
Form received back signed  2/22/19  Faxed Back & Sent to be scanned to this encounter  [facility]

## 2019-02-24 ENCOUNTER — APPOINTMENT (OUTPATIENT)
Dept: CT IMAGING | Facility: CLINIC | Age: 74
End: 2019-02-24
Attending: FAMILY MEDICINE
Payer: COMMERCIAL

## 2019-02-24 ENCOUNTER — HOSPITAL ENCOUNTER (EMERGENCY)
Facility: CLINIC | Age: 74
Discharge: HOME | End: 2019-02-24
Attending: FAMILY MEDICINE | Admitting: FAMILY MEDICINE
Payer: COMMERCIAL

## 2019-02-24 ENCOUNTER — APPOINTMENT (OUTPATIENT)
Dept: GENERAL RADIOLOGY | Facility: CLINIC | Age: 74
End: 2019-02-24
Attending: FAMILY MEDICINE
Payer: COMMERCIAL

## 2019-02-24 ENCOUNTER — APPOINTMENT (OUTPATIENT)
Dept: ULTRASOUND IMAGING | Facility: CLINIC | Age: 74
End: 2019-02-24
Attending: FAMILY MEDICINE
Payer: COMMERCIAL

## 2019-02-24 VITALS
RESPIRATION RATE: 20 BRPM | WEIGHT: 198 LBS | TEMPERATURE: 97.9 F | HEART RATE: 67 BPM | SYSTOLIC BLOOD PRESSURE: 122 MMHG | DIASTOLIC BLOOD PRESSURE: 63 MMHG | OXYGEN SATURATION: 97 % | BODY MASS INDEX: 36.81 KG/M2

## 2019-02-24 DIAGNOSIS — N18.4 ACUTE RENAL FAILURE SUPERIMPOSED ON STAGE 4 CHRONIC KIDNEY DISEASE, UNSPECIFIED ACUTE RENAL FAILURE TYPE (H): ICD-10-CM
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
## 2019-02-24 DIAGNOSIS — I82.541 CHRONIC DEEP VEIN THROMBOSIS (DVT) OF TIBIAL VEIN OF RIGHT LOWER EXTREMITY (H): ICD-10-CM

## 2019-02-24 DIAGNOSIS — N17.9 ACUTE RENAL FAILURE SUPERIMPOSED ON STAGE 4 CHRONIC KIDNEY DISEASE, UNSPECIFIED ACUTE RENAL FAILURE TYPE (H): ICD-10-CM

## 2019-02-24 DIAGNOSIS — R41.0 CONFUSION: ICD-10-CM

## 2019-02-24 DIAGNOSIS — R60.0 LEG EDEMA: ICD-10-CM

## 2019-02-24 LAB
ALBUMIN SERPL-MCNC: 3.2 G/DL (ref 3.4–5)
ALP SERPL-CCNC: 100 U/L (ref 40–150)
ALT SERPL W P-5'-P-CCNC: 18 U/L (ref 0–50)
ANION GAP SERPL CALCULATED.3IONS-SCNC: 9 MMOL/L (ref 3–14)
AST SERPL W P-5'-P-CCNC: 27 U/L (ref 0–45)
BASOPHILS # BLD AUTO: 0.1 10E9/L (ref 0–0.2)
BASOPHILS NFR BLD AUTO: 0.9 %
BILIRUB SERPL-MCNC: 0.5 MG/DL (ref 0.2–1.3)
BUN SERPL-MCNC: 50 MG/DL (ref 7–30)
CALCIUM SERPL-MCNC: 9.3 MG/DL (ref 8.5–10.1)
CHLORIDE SERPL-SCNC: 109 MMOL/L (ref 94–109)
CO2 SERPL-SCNC: 25 MMOL/L (ref 20–32)
CREAT SERPL-MCNC: 2.49 MG/DL (ref 0.52–1.04)
D DIMER PPP FEU-MCNC: 0.9 UG/ML FEU (ref 0–0.5)
DIFFERENTIAL METHOD BLD: NORMAL
EOSINOPHIL # BLD AUTO: 0.1 10E9/L (ref 0–0.7)
EOSINOPHIL NFR BLD AUTO: 1.9 %
ERYTHROCYTE [DISTWIDTH] IN BLOOD BY AUTOMATED COUNT: 12.8 % (ref 10–15)
GFR SERPL CREATININE-BSD FRML MDRD: 18 ML/MIN/{1.73_M2}
GLUCOSE SERPL-MCNC: 127 MG/DL (ref 70–99)
HCT VFR BLD AUTO: 37.8 % (ref 35–47)
HGB BLD-MCNC: 12.4 G/DL (ref 11.7–15.7)
IMM GRANULOCYTES # BLD: 0 10E9/L (ref 0–0.4)
IMM GRANULOCYTES NFR BLD: 0.3 %
LYMPHOCYTES # BLD AUTO: 1.5 10E9/L (ref 0.8–5.3)
LYMPHOCYTES NFR BLD AUTO: 25.8 %
MCH RBC QN AUTO: 30.1 PG (ref 26.5–33)
MCHC RBC AUTO-ENTMCNC: 32.8 G/DL (ref 31.5–36.5)
MCV RBC AUTO: 92 FL (ref 78–100)
MONOCYTES # BLD AUTO: 0.9 10E9/L (ref 0–1.3)
MONOCYTES NFR BLD AUTO: 15.7 %
NEUTROPHILS # BLD AUTO: 3.2 10E9/L (ref 1.6–8.3)
NEUTROPHILS NFR BLD AUTO: 55.4 %
NRBC # BLD AUTO: 0 10*3/UL
NRBC BLD AUTO-RTO: 0 /100
PLATELET # BLD AUTO: 186 10E9/L (ref 150–450)
POTASSIUM SERPL-SCNC: 4.6 MMOL/L (ref 3.4–5.3)
PROT SERPL-MCNC: 6.9 G/DL (ref 6.8–8.8)
RBC # BLD AUTO: 4.12 10E12/L (ref 3.8–5.2)
SODIUM SERPL-SCNC: 143 MMOL/L (ref 133–144)
WBC # BLD AUTO: 5.7 10E9/L (ref 4–11)

## 2019-02-24 PROCEDURE — 71046 X-RAY EXAM CHEST 2 VIEWS: CPT

## 2019-02-24 PROCEDURE — 72170 X-RAY EXAM OF PELVIS: CPT

## 2019-02-24 PROCEDURE — 93971 EXTREMITY STUDY: CPT | Mod: RT

## 2019-02-24 PROCEDURE — 36415 COLL VENOUS BLD VENIPUNCTURE: CPT | Performed by: FAMILY MEDICINE

## 2019-02-24 PROCEDURE — 72125 CT NECK SPINE W/O DYE: CPT

## 2019-02-24 PROCEDURE — 80053 COMPREHEN METABOLIC PANEL: CPT | Performed by: FAMILY MEDICINE

## 2019-02-24 PROCEDURE — 85379 FIBRIN DEGRADATION QUANT: CPT | Performed by: FAMILY MEDICINE

## 2019-02-24 PROCEDURE — 93010 ELECTROCARDIOGRAM REPORT: CPT | Mod: Z6 | Performed by: FAMILY MEDICINE

## 2019-02-24 PROCEDURE — 70450 CT HEAD/BRAIN W/O DYE: CPT

## 2019-02-24 PROCEDURE — 93005 ELECTROCARDIOGRAM TRACING: CPT

## 2019-02-24 PROCEDURE — 99285 EMERGENCY DEPT VISIT HI MDM: CPT | Mod: 25 | Performed by: FAMILY MEDICINE

## 2019-02-24 PROCEDURE — 85025 COMPLETE CBC W/AUTO DIFF WBC: CPT | Performed by: FAMILY MEDICINE

## 2019-02-24 PROCEDURE — 99285 EMERGENCY DEPT VISIT HI MDM: CPT | Mod: 25

## 2019-02-24 RX ORDER — MULTIVITAMIN WITH IRON
1 TABLET ORAL DAILY
Status: ON HOLD | COMMUNITY
End: 2020-08-31

## 2019-02-24 RX ORDER — LEVOTHYROXINE SODIUM 88 UG/1
88 TABLET ORAL DAILY
COMMUNITY
End: 2020-11-16

## 2019-02-24 ASSESSMENT — ENCOUNTER SYMPTOMS
DIAPHORESIS: 0
BLOOD IN STOOL: 0
SINUS PAIN: 0
SPEECH DIFFICULTY: 1
DIARRHEA: 0
COUGH: 0
SINUS PRESSURE: 0
WHEEZING: 0
HEADACHES: 0
FATIGUE: 1
SORE THROAT: 0
SHORTNESS OF BREATH: 0
MYALGIAS: 0
CHILLS: 0
ARTHRALGIAS: 0
ABDOMINAL PAIN: 0
NAUSEA: 0
CONSTIPATION: 0
DYSURIA: 0
FEVER: 0
FREQUENCY: 0
PALPITATIONS: 0
VOMITING: 0

## 2019-02-24 NOTE — ED AVS SNAPSHOT
[facility] Emergency Department  [address] [address]  Phone:  [phone]  Fax:  [phone]                                    Stefanie Velazquez   MRN: [id number]    Department:  [facility] Emergency Department   Date of Visit:  2/24/2019           After Visit Summary Signature Page    I have received my discharge instructions, and my questions have been answered. I have discussed any challenges I see with this plan with the nurse or doctor.    ..........................................................................................................................................  Patient/Patient Representative Signature      ..........................................................................................................................................  Patient Representative Print Name and Relationship to Patient    ..................................................               ................................................  Date                                   Time    ..........................................................................................................................................  Reviewed by Signature/Title    ...................................................              ..............................................  Date                                               Time          22EPIC Rev 08/18

## 2019-02-24 NOTE — ED NOTES
Pt resident of [facility]. Found on floor in [place]  8 am. She states that she fell while getting up from toilet. No LOC. Since fall she states that her hearing  Is more distant. Wears hearing aids. Has been having issues with one of them. She also states that she feels alittle she slow to process. Speech is slow and slurred.

## 2019-02-24 NOTE — ED PROVIDER NOTES
History     Chief Complaint   Patient presents with     Fall     found on floor by alexander 8 am this. no LOC      HPI  Stefanie Velazquez is a 73 year old female with a complex medical history who presents to the ED for evaluation following a fall. History was difficult to obtain as the patient was a poor historian. She is a resident of [facility]. At around 8:00 this morning, the patient reportedly stood up from the toilet to pull up her underwear when she fell to the floor. She was unable to get up by herself and staff discovered her a short time later. The patient denied any injuries, and was brought to breakfast. A short time later, the patient notified staff that she had difficulty reading the menu. Staff then became concerned at called EMS for transport to the ED. In the ED, the patient denies any head trauma, LOC, head or neck pain but acknowledges that she has felt more tired recently. She notes bilateral hearing loss chronically which makes initial evaluation more difficult. She adds that she recently completed occupational therapy for difficulty swallowing.   history of coronary disease, s/p CABG, but no current chest pain, shortness of breath or palpitations.    The patient is not currently on anticoagulant therapy, but was in the past for a history of a DVT in her lower right leg - last on anticoagulants in fall/winter 2018.      Allergies:  Allergies   Allergen Reactions     Hydrocodone Unknown     Lisinopril Cough            Vicodin [Hydrocodone-Acetaminophen] Other (See Comments)     Caused extreme dizziness       Problem List:    Patient Active Problem List    Diagnosis Date Noted     Fracture of femur, distal, left, closed (H) 02/11/2013     Priority: High     Advanced directives, counseling/discussion 06/17/2011     Priority: High     Advance Care Planning 7/18/2016: ACP Review of Chart / Resources Provided:  Reviewed chart for advance care plan.  Stefanie Velazquez has an up to date  advance care plan on file.  Added by Jailyn Maki    Directive completed and on File 6/1/11.        CKD (chronic kidney disease) stage 4, GFR 15-29 ml/min (H) 02/03/2019     Priority: Medium     Lumbar radiculopathy 01/31/2019     Priority: Medium     Oropharyngeal dysphagia 12/14/2018     Priority: Medium     Bilateral hearing loss, unspecified hearing loss type 12/14/2018     Priority: Medium     Acute deep vein thrombosis (DVT) of right lower extremity, unspecified vein (H) 10/31/2018     Priority: Medium     Lymphedema of genitalia 08/12/2018     Priority: Medium     Spinal stenosis of lumbar region without neurogenic claudication 05/01/2018     Priority: Medium     DDD (degenerative disc disease), lumbar 05/01/2018     Priority: Medium     Type 2 diabetes mellitus with diabetic nephropathy, with long-term current use of insulin (H) 06/27/2017     Priority: Medium     Restless leg syndrome 04/02/2017     Priority: Medium     Chronic diarrhea 03/30/2017     Priority: Medium     Health Care Home 10/28/2016     Priority: Medium     Cellulitis of lower extremity, bilateral 09/30/2016     Priority: Medium     Benign essential hypertension 09/30/2016     Priority: Medium     Bowel and bladder incontinence 05/22/2015     Priority: Medium     Dysuria 10/24/2013     Priority: Medium     Vitamin B 12 deficiency 05/14/2012     Priority: Medium     Vitamin D deficiency 05/14/2012     Priority: Medium     Radiation therapy complication 11/09/2011     Priority: Medium     Type 2 diabetes mellitus with diabetic nephropathy (H) 10/19/2011     Priority: Medium     Anemia 08/26/2011     Priority: Medium     Rectal cancer- newly diagnosed adenoCA rectum Jan 2011 02/17/2011     Priority: Medium     Hyperlipidemia LDL goal <100 10/31/2010     Priority: Medium     Neuropathy (H) 04/09/2010     Priority: Medium     RC-moderate (AHI 12, LSat 60%); REM RDI-73 06/01/2009     Priority: Medium     Sleep study [facility] was performed  5/26/09 in order to re-evaluate severity of RC. The total sleep time was 412.0 minutes. The sleep latency was decreased at 8.7 minutes with Ambien 10mg. The REM sleep latency was 426.0 minutes. Sleep efficiency was reduced at 79.0%. The sleep architecture was disrupted with frequent sleep stage changes and arousals.  Snoring:  loud. Respiratory Events:   RDI of 57.5 and an AHI of 12.2. The REM RDI was 74.3 The lowest O2 saturation was 60.0%. This study is suggestive of moderate sleep apnea, profound desaturations during REM sleep, with 35 second apneas.    Other: PLM index was 13.8.      Recommendations:  Due to the profound desaturations during REM sleep, consider CPAP titration study to establish optimal CPAP treatment pressure.  2. Check ferritin given her RLS symptoms. If RLS symptoms persist after treatment of RC, further therapy may be warranted. Replace iron as indicated by ferritin.         Osteoporosis 02/29/2008     Priority: Medium     Problem list name updated by automated process. Provider to review       Esophageal reflux 10/13/2007     Priority: Medium     On protonix;        bmi 40 06/22/2005     Priority: Medium     Problem list name updated by automated process. Provider to review       Generalized osteoarthrosis, unspecified site 06/22/2005     Priority: Medium     HEARING LOSS CONDUCTIVE, COMBINED TYPE 06/22/2005     Priority: Medium     [place] measles as child       Hypothyroidism 06/22/2003     Priority: Medium     Problem list name updated by automated process. Provider to review       Chronic ischemic heart disease 06/22/2003     Priority: Medium     Class: Chronic     cabgx3 09/2002,  with a left internal mammary (LIMA) to the left anterior descending and a bypass graft to the obtuse marginal branch of the circumflex and also PDA branch of the right coronary artery. She had an episode of atrial fibrillation postoperatively and was treated with sotalol.   PTCA and stent placement for  recurrent restenosis.   2/05 adenosine ef70%  9/24/12 - Lexiscan nuclear stress test was positive for mild partially reversible ischemia in the LAD distribution. Imdur added.           CKD (chronic kidney disease) stage 3, GFR 30-59 ml/min (H) 01/26/2011     Priority: Low        Past Medical History:    Past Medical History:   Diagnosis Date     Acute myocardial infarction of other specified sites, episode of care unspecified 6/2002     Cancer of colon (H)      Chronic ischemic heart disease, unspecified 6/22/2003     Coronary atherosclerosis of unspecified type of vessel, native or graft      Diabetes mellitus (H)      Esophageal reflux      Gastro-oesophageal reflux disease      Generalized osteoarthrosis, unspecified site 6/22/2005     Generalized osteoarthrosis, unspecified site 6/22/2005     HEARING LOSS CONDUCTIVE, COMBINED TYPE 6/22/2005     HYPOTHYROIDISM  6/22/2003     Mixed hyperlipidemia 6/22/2005     Obesity, unspecified 6/22/2005     RC-moderate (AHI 12, LSat 60%); REM RDI-73 6/1/2009     Stented coronary artery      Type II or unspecified type diabetes mellitus with renal manifestations, uncontrolled(250.42) (H) 6/22/2005     Type II or unspecified type diabetes mellitus with renal manifestations, uncontrolled(250.42) (H) 6/22/2005     Unspecified disorder resulting from impaired renal function 6/22/2005     Unspecified essential hypertension        Past Surgical History:    Past Surgical History:   Procedure Laterality Date     cabg       COLECTOMY LOW ANTERIOR  6/21/2011    Procedure:COLECTOMY LOW ANTERIOR; with loop ielostomy; Surgeon:ROBBIN MCDONNELL; Location:[facility]     COLONOSCOPY  1/26/2011    COLONOSCOPY performed by MASOUD BILL at [facility]     COLONOSCOPY N/A 3/1/2016    Procedure: COLONOSCOPY;  Surgeon: Liza Neal MD;  Location: [facility]     INSERT PORT VASCULAR ACCESS  3/2/2011    INSERT PORT VASCULAR ACCESS performed by LIZA NEAL at [facility]     OPEN  REDUCTION INTERNAL FIXATION FEMUR DISTAL  2/12/2013    Procedure: OPEN REDUCTION INTERNAL FIXATION FEMUR DISTAL;  Open reduction internal fixation left femur fracture--[facility];  Surgeon: Ley, Jeffrey Duane, MD;  Location: [facility]     ORTHOPEDIC SURGERY       PHACOEMULSIFICATION WITH STANDARD INTRAOCULAR LENS IMPLANT Left 1/22/2018    Procedure: PHACOEMULSIFICATION WITH STANDARD INTRAOCULAR LENS IMPLANT;  Left cataract removal with implant;  Surgeon: Rony Key MD;  Location: [facility]     PHACOEMULSIFICATION WITH STANDARD INTRAOCULAR LENS IMPLANT Right 2/19/2018    Procedure: PHACOEMULSIFICATION WITH STANDARD INTRAOCULAR LENS IMPLANT;  Right cataract removal with implant;  Surgeon: Rony Key MD;  Location: [facility]     SIGMOIDOSCOPY FLEXIBLE  9/9/2011    Procedure:SIGMOIDOSCOPY FLEXIBLE; Performed prior to induction.; Surgeon:ROBBIN MCDONNELL; Location: OR     SURGICAL HISTORY OF -   10/24/2002    Heart bypass     SURGICAL HISTORY OF -   2002    R Knee arthroplasty     SURGICAL HISTORY OF -   2002    Mi 2 stints     TAKEDOWN ILEOSTOMY  9/9/2011    Procedure:TAKEDOWN ILEOSTOMY; Exploratory Laparotomy,  Loop Ileostomy Takedown, Small Bowel Resection x 2.; Surgeon:ROBBIN MCDONNELL; Location: OR       Family History:    Family History   Problem Relation Age of Onset     Diabetes Sister      C.A.D. Sister      Breast Cancer Sister        Social History:  Marital Status:  Single [1]  Social History     Tobacco Use     Smoking status: Former Smoker     Smokeless tobacco: Never Used   Substance Use Topics     Alcohol use: Yes     Comment: rare social use     Drug use: No        Medications:      ACCU-CHEK MILVIA MELODY   acetaminophen (TYLENOL) 650 MG CR tablet   blood glucose monitoring (ACCU-CHEK MILVIA) test strip   blood glucose monitoring (ACCU-CHEK MULTICLIX) lancets   BUTT PASTE - REGULAR (DR LOVE POOP GOCHAUNCEY BUTT PASTE FORMULA)   cephALEXin (KEFLEX) 500 MG capsule   fluocinonide (LIDEX) 0.05 %  ointment   fluticasone (FLONASE) 50 MCG/ACT spray   furosemide (LASIX) 20 MG tablet   gabapentin (NEURONTIN) 300 MG capsule   insulin aspart (NOVOLOG FLEXPEN) 100 UNIT/ML pen   insulin detemir (LEVEMIR FLEXPEN/FLEXTOUCH) 100 UNIT/ML pen   insulin pen needle (BD GABRIELLA U/F) 32G X 4 MM   levothyroxine (SYNTHROID/LEVOTHROID) 88 MCG tablet   loperamide (IMODIUM) 2 MG capsule   losartan (COZAAR) 100 MG tablet   menthol-zinc oxide (CALMOSEPTINE) 0.44-20.625 % OINT ointment   metoprolol succinate ER (TOPROL-XL) 50 MG 24 hr tablet   nystatin (MYCOSTATIN) 329542 UNIT/GM external powder   OMEPRAZOLE PO   order for DME   order for DME   Potassium Gluconate 595 MG CAPS   rOPINIRole (REQUIP) 1 MG tablet   simvastatin (ZOCOR) 40 MG tablet         Review of Systems   Constitutional: Positive for fatigue. Negative for chills, diaphoresis and fever.   HENT: Negative for congestion, ear pain, sinus pressure, sinus pain and sore throat.    Eyes: Positive for visual disturbance.   Respiratory: Negative for cough, shortness of breath and wheezing.    Cardiovascular: Negative for chest pain and palpitations.   Gastrointestinal: Negative for abdominal pain, blood in stool, constipation, diarrhea, nausea and vomiting.   Genitourinary: Negative for dysuria, frequency and urgency.   Musculoskeletal: Negative for arthralgias and myalgias.   Skin: Negative for rash.   Neurological: Positive for syncope and speech difficulty. Negative for headaches.   All other systems reviewed and are negative.      Physical Exam   BP: 112/53  Heart Rate: 60  Temp: 97.6  F (36.4  C)  Resp: 16  Weight: 89.8 kg (198 lb)  SpO2: 97 %      Physical Exam   Constitutional: She is oriented to person, place, and time. She appears distressed.   HENT:   Head: Atraumatic.   Right Ear: Decreased hearing is noted.   Left Ear: Decreased hearing is noted.   Mouth/Throat: Oropharynx is clear and moist.   Eyes: EOM are normal. Pupils are equal, round, and reactive to light.    Neck: Neck supple. No tracheal tenderness and no spinous process tenderness present.   Cardiovascular: Normal rate and regular rhythm. Exam reveals distant heart sounds. Exam reveals no friction rub.   No murmur heard.  Pulmonary/Chest: Effort normal. No stridor. No respiratory distress. She has decreased breath sounds. She has no wheezes. She has no rales.   Abdominal: Soft. There is no tenderness.   Musculoskeletal:   Pelvis is non-tender to palpation.   Mildly asymmetric edema noted in the right leg compared to the left. Minimal pitting with no erythema.   Neurological: She is alert and oriented to person, place, and time. She has normal strength. She displays no tremor. No cranial nerve deficit or sensory deficit. Coordination (finger-nose-finger) normal. GCS eye subscore is 4. GCS verbal subscore is 5. GCS motor subscore is 6.   Slower responses to questions.                ED Course        Procedures                 EKG Interpretation:      Interpreted by Leroy Patino MD  EKG done at 1457 hrs. demonstrates a sinus rhythm 62 bpm with a normal axis and no ST change.  T wave inversion in leads with V4 V5 V6.  Flattening in 3 and aVF.  No Q waves.  Some poor R progression.  Intervals normal.  Normal conduction.  No ectopy.  Impression sinus rhythm 62 bpm and T wave flattening and inversion in the lateral and inferior leads.  Marginal change from prior EKG done September 2016.      Critical Care time:  none               Results for orders placed or performed during the hospital encounter of 02/24/19 (from the past 24 hour(s))   CBC with platelets differential   Result Value Ref Range    WBC 5.7 4.0 - 11.0 10e9/L    RBC Count 4.12 3.8 - 5.2 10e12/L    Hemoglobin 12.4 11.7 - 15.7 g/dL    Hematocrit 37.8 35.0 - 47.0 %    MCV 92 78 - 100 fl    MCH 30.1 26.5 - 33.0 pg    MCHC 32.8 31.5 - 36.5 g/dL    RDW 12.8 10.0 - 15.0 %    Platelet Count 186 150 - 450 10e9/L    Diff Method Automated Method     % Neutrophils  55.4 %    % Lymphocytes 25.8 %    % Monocytes 15.7 %    % Eosinophils 1.9 %    % Basophils 0.9 %    % Immature Granulocytes 0.3 %    Nucleated RBCs 0 0 /100    Absolute Neutrophil 3.2 1.6 - 8.3 10e9/L    Absolute Lymphocytes 1.5 0.8 - 5.3 10e9/L    Absolute Monocytes 0.9 0.0 - 1.3 10e9/L    Absolute Eosinophils 0.1 0.0 - 0.7 10e9/L    Absolute Basophils 0.1 0.0 - 0.2 10e9/L    Abs Immature Granulocytes 0.0 0 - 0.4 10e9/L    Absolute Nucleated RBC 0.0    Comprehensive metabolic panel   Result Value Ref Range    Sodium 143 133 - 144 mmol/L    Potassium 4.6 3.4 - 5.3 mmol/L    Chloride 109 94 - 109 mmol/L    Carbon Dioxide 25 20 - 32 mmol/L    Anion Gap 9 3 - 14 mmol/L    Glucose 127 (H) 70 - 99 mg/dL    Urea Nitrogen 50 (H) 7 - 30 mg/dL    Creatinine 2.49 (H) 0.52 - 1.04 mg/dL    GFR Estimate 18 (L) >60 mL/min/[1.73_m2]    GFR Estimate If Black 21 (L) >60 mL/min/[1.73_m2]    Calcium 9.3 8.5 - 10.1 mg/dL    Bilirubin Total 0.5 0.2 - 1.3 mg/dL    Albumin 3.2 (L) 3.4 - 5.0 g/dL    Protein Total 6.9 6.8 - 8.8 g/dL    Alkaline Phosphatase 100 40 - 150 U/L    ALT 18 0 - 50 U/L    AST 27 0 - 45 U/L   US Lower Extremity Venous Duplex Right    Narrative    VENOUS ULTRASOUND RIGHT LEG  2/24/2019 3:31 PM     HISTORY: Prior right DVT.  Not on anticoag,  asymmetric edema right.    COMPARISON: Ultrasound 9/13/2018.    TECHNIQUE: Examination of the deep veins was completed with graded  compression and color flow Doppler with spectral wave form analysis.    FINDINGS: Nonocclusive short segment deep vein thrombosis is present  within the right distal femoral vein at the junction of the femoral  and popliteal veins, improved compared to 9/13/2018. Nonocclusive  thrombus is present within the distal posterior tibial veins.  Evaluation of the peroneal veins is limited without appreciable  abnormality. Otherwise, the common femoral, proximal femoral, and  remaining segments of the calf veins demonstrate normal contour,  echogenicity,  vascularity, and compressibility.      Impression    IMPRESSION:  1. Nonocclusive short segment deep vein thrombosis is present at the  junction of the femoral and popliteal veins, improved compared to  9/13/2018.  2. Nonocclusive thrombus is present within the distal posterior tibial  veins.    DEBORAH ALLEN MD   Cervical spine CT w/o contrast    Narrative    CT CERVICAL SPINE WITHOUT CONTRAST   2/24/2019 4:35 PM     HISTORY: fall, ?feels slow since then     TECHNIQUE: Axial images of the cervical spine were obtained without  intravenous contrast. Multiplanar reformations were performed.   Radiation dose for this scan was reduced using automated exposure  control, adjustment of the mA and/or kV according to patient size, or  iterative reconstruction technique.    COMPARISON: None.    FINDINGS: There is no evidence of fracture. Alignment is normal.      Craniocervical junction: Normal.     C1-C2:  Normal.     C2-C3:  Normal disc, facet joints, spinal canal and neural foramina.     C3-C4:  Minimal annular disc bulge. Central canal and neural foramen  are patent. Mild degenerative change in the facet joints.     C4-C5:  Minimal annular disc bulge. Central canal and neural foramen  are patent.     C5-C6:  Central canal and neural foramen are patent.      C6-C7:  Minimal annular disc bulge. Central canal and neural foramen  are patent.      C7-T1:   Soft tissues are obscured by streak artifact off the  shoulders. The bony central canal and bony neural foramen are patent.      Impression    IMPRESSION:    1. Negative for fracture.  2. Mild degenerative changes.    HILARIO ALVARADO MD   Head CT w/o contrast    Narrative    CT SCAN OF THE HEAD WITHOUT CONTRAST   2/24/2019 4:36 PM     HISTORY: fall, head injury, ?confusion    TECHNIQUE:  Axial images of the head and coronal reformations without  IV contrast material. Radiation dose for this scan was reduced using  automated exposure control, adjustment of the mA and/or kV  according  to patient size, or iterative reconstruction technique.    COMPARISON: None.    FINDINGS:  There is diffuse parenchymal volume loss.  White matter  changes are present in the cerebral hemispheres that are consistent  with small vessel ischemic disease in this age patient. There is no  evidence of intracranial hemorrhage, mass, acute infarct or anomaly.  The visualized portions of the sinuses and mastoids appear normal.  There is no evidence of trauma.      Impression    IMPRESSION: No bleed or fractures are identified.      HILARIO ALVARADO MD   XR Pelvis 1/2 Views    Narrative    XR PELVIS 1/2 VW   2/24/2019 4:43 PM     HISTORY: fall    COMPARISON: Film dated 4/20/2018      Impression    IMPRESSION: No pelvic fractures are identified. Mild degenerative  changes seen in the right hip joint. An intramedullary aron is seen in  the left femur.    HILARIO ALVARADO MD   Chest XR,  PA & LAT    Narrative    CHEST TWO VIEWS 2/24/2019 4:44 PM     HISTORY: Fall, syncope.    COMPARISON: March 31, 2015       Impression    IMPRESSION: Some interstitial changes and vascular congestion  suggestive of congestive heart failure or volume overload noted. No  definite acute infiltrates.    PRINCE MCGUIRE MD   D dimer quantitative   Result Value Ref Range    D Dimer 0.9 (H) 0.0 - 0.50 ug/ml FEU     *Note: Due to a large number of results and/or encounters for the requested time period, some results have not been displayed. A complete set of results can be found in Results Review.       Medications - No data to display       14:15 Patient assessed.      Assessments & Plan (with Medical Decision Making)     MDM: Stefanie Velazquez is a 73 year old female who presented from who presented with a fall that occurred from a relatively low height basically when she was standing to try and put on her underwear after using the toilet and fell over.  She was not aware of any head injury or neck injury.  She presented to the emergency department with a  complaint that her group home of difficulty with some blurred vision and possible confusion after this it occurred.  She had no obvious focal neurologic deficit and was alert and oriented and some of her slow responses were likely related to her difficulty hearing loss and will much better when she used a pocket talker.  She had no obvious external signs of trauma.    Evaluation today included CT head and CT cervical spine as well as syncopal evaluation with your EKG and laboratory testing all of which was reassuring, except for a worsening of her chronic kidney disease.  Her creatinine at this time is increased to 2.5 from a baseline of 1.75.  Her chest x-ray demonstrates marginal change that may be consistent with minimal pulmonary congestion.  At this point I recommended pushing oral hydration and rechecking the creatinine again in short interval to confirm improvement.        I did identify some possible increased swelling of the right lower extremity and it is unclear if this represents post thrombotic syndrome of the leg which is previously had a DVT or new change.  As some of her fall had been possibly syncopal I did include an ultrasound of the lower extremity which demonstrates no occlusive thrombus but there is a nonocclusive thrombus that is in the tibial veins.  I did speak with the radiologist about this and it is unclear if this is old or new although it did not have the typical changes for chronic thrombus and it may represent new clot.  Other changes within the vein were not consistent with new DVT.  Unfortunately she is just had a fall and starting an anticoagulant may predispose her to greater risk than observation.  And therefore with her follow-up scheduled for the next couple of days I have asked her to await recheck before starting an anticoagulants and make a decision at that time whether potentially repeating serial ultrasounds to confirm no progression versus starting a short course on  anticoagulant.  This is also complicated by her renal function which would modify the dosing of a DOAC and may indicate the use of warfarin instead.    I have not started an anticoagulant at this time.    I have reviewed the nursing notes.    I have reviewed the findings, diagnosis, plan and need for follow up with the patient.           Final diagnoses:   Fall, initial encounter - no signs of significant injury from the fall.   Confusion - No serious findings on exam.  return for changes.   Leg edema   Chronic deep vein thrombosis (DVT) of tibial vein of right lower extremity (H) - This may be new clot while off the anticoagulant.  However, given the fall and the kidney function worsening today, I have not restarted the blood thinner.  This should be re-evaluated on tuesday at follow-up.  I would consider serial ultrasound vs restarting blood thinner.   Acute renal failure superimposed on stage 4 chronic kidney disease, unspecified acute renal failure type (H) - Stay hydrated, but avoid excessive salt. recheck labs tuesday on follow-up.       This document serves as a record of the services and decisions personally performed and made by Leroy Patino MD. It was created on HIS/HER behalf by Gina Akbar, a trained medical scribe. The creation of this document is based the provider's statements to the medical scribe.  Gina Akbar 3:10 PM 2/24/2019    Provider:   The information in this document, created by the medical scribe for me, accurately reflects the services I personally performed and the decisions made by me. I have reviewed and approved this document for accuracy prior to leaving the patient care area.  Leroy Patino MD 3:10 PM 2/24/2019 2/24/2019   [facility] EMERGENCY DEPARTMENT     Leroy Patino MD  02/25/19 0220

## 2019-02-24 NOTE — ED NOTES
Pt is alert and oriented -feels that her speech is slow and that she is processing things slower than usual-feels that speech is somewhat slurred-does not wear upper dentures-states that she fell after using toilet and trying to pull up diaper-denies injury-no bruises noted with head to toe exam-pt states that she did have difficulty reading breakfast menu after fall-also states that she was unable to get self off the floor after fall-uses a walker-pt is incontinent of urine and wet diaper changed -pt also used bedpan to urinate more

## 2019-02-24 NOTE — ED NOTES
Pt will get bedside US before going to CT and xray. Labs sent, no IV placed. Pt is using a pocket talker for communication as she does not have all of her hearing aides.

## 2019-02-25 ENCOUNTER — TELEPHONE (OUTPATIENT)
Dept: FAMILY MEDICINE | Facility: CLINIC | Age: 74
End: 2019-02-25

## 2019-02-25 DIAGNOSIS — Z79.4 TYPE 2 DIABETES MELLITUS WITH DIABETIC NEPHROPATHY, WITH LONG-TERM CURRENT USE OF INSULIN (H): Chronic | ICD-10-CM

## 2019-02-25 DIAGNOSIS — E11.21 TYPE 2 DIABETES MELLITUS WITH DIABETIC NEPHROPATHY, WITH LONG-TERM CURRENT USE OF INSULIN (H): Chronic | ICD-10-CM

## 2019-02-25 LAB
BACTERIA SPEC CULT: ABNORMAL
BACTERIA SPEC CULT: ABNORMAL
Lab: ABNORMAL
SPECIMEN SOURCE: ABNORMAL

## 2019-02-25 RX ORDER — INSULIN GLARGINE 100 [IU]/ML
20 INJECTION, SOLUTION SUBCUTANEOUS DAILY
Qty: 18 ML | Refills: 1 | Status: SHIPPED | OUTPATIENT
Start: 2019-02-25 | End: 2019-04-24

## 2019-02-25 NOTE — TELEPHONE ENCOUNTER
Levemir is not covered.  basaglar howardikpen is preferred--per [employer]. Please fax new rx if OK to change.

## 2019-02-25 NOTE — DISCHARGE INSTRUCTIONS
ICD-10-CM    1. Fall, initial encounter W19.XXXA     no signs of significant injury from the fall.   2. Confusion R41.0     No serious findings on exam.  return for changes.   3. Leg edema R60.0    4. Chronic deep vein thrombosis (DVT) of tibial vein of right lower extremity (H) I82.541     This may be new clot while off the anticoagulant.  However, given the fall and the kidney function worsening today, I have not restarted the blood thinner.  This should be re-evaluated on tuesday at follow-up.  I would consider serial ultrasound vs restarting blood thinner.   5. Acute renal failure superimposed on stage 4 chronic kidney disease, unspecified acute renal failure type (H) N17.9     N18.4     Stay hydrated, but avoid excessive salt. recheck labs tuesday on follow-up.

## 2019-02-25 NOTE — ED NOTES
Up and dressed. Discharge information given. Alert and oriented and offers no complaints at present. nad

## 2019-02-25 NOTE — TELEPHONE ENCOUNTER
Levemir Inj Flex touch  Form placed on Provider Sandra Bernstein desk for [employer].  [facility]

## 2019-02-26 ENCOUNTER — OFFICE VISIT (OUTPATIENT)
Dept: FAMILY MEDICINE | Facility: CLINIC | Age: 74
End: 2019-02-26
Payer: COMMERCIAL

## 2019-02-26 VITALS
BODY MASS INDEX: 36.81 KG/M2 | DIASTOLIC BLOOD PRESSURE: 52 MMHG | HEART RATE: 61 BPM | RESPIRATION RATE: 20 BRPM | TEMPERATURE: 98.3 F | SYSTOLIC BLOOD PRESSURE: 110 MMHG | WEIGHT: 198 LBS | OXYGEN SATURATION: 97 %

## 2019-02-26 DIAGNOSIS — N18.4 CKD (CHRONIC KIDNEY DISEASE) STAGE 4, GFR 15-29 ML/MIN (H): ICD-10-CM

## 2019-02-26 DIAGNOSIS — H91.93 BILATERAL HEARING LOSS, UNSPECIFIED HEARING LOSS TYPE: ICD-10-CM

## 2019-02-26 DIAGNOSIS — Z79.4 TYPE 2 DIABETES MELLITUS WITH DIABETIC NEPHROPATHY, WITH LONG-TERM CURRENT USE OF INSULIN (H): Chronic | ICD-10-CM

## 2019-02-26 DIAGNOSIS — I10 BENIGN ESSENTIAL HYPERTENSION: ICD-10-CM

## 2019-02-26 DIAGNOSIS — I82.401 ACUTE DEEP VEIN THROMBOSIS (DVT) OF RIGHT LOWER EXTREMITY, UNSPECIFIED VEIN (H): ICD-10-CM

## 2019-02-26 DIAGNOSIS — I82.403 RECURRENT ACUTE DEEP VEIN THROMBOSIS (DVT) OF BOTH LOWER EXTREMITIES (H): Primary | ICD-10-CM

## 2019-02-26 DIAGNOSIS — E11.21 TYPE 2 DIABETES MELLITUS WITH DIABETIC NEPHROPATHY, WITH LONG-TERM CURRENT USE OF INSULIN (H): Chronic | ICD-10-CM

## 2019-02-26 DIAGNOSIS — E03.8 OTHER SPECIFIED HYPOTHYROIDISM: ICD-10-CM

## 2019-02-26 DIAGNOSIS — C20 RECTAL CANCER (H): ICD-10-CM

## 2019-02-26 DIAGNOSIS — E66.01 MORBID OBESITY (H): ICD-10-CM

## 2019-02-26 PROCEDURE — 99215 OFFICE O/P EST HI 40 MIN: CPT | Performed by: FAMILY MEDICINE

## 2019-02-26 NOTE — NURSING NOTE
"Chief Complaint   Patient presents with     ER F/U     2/24 Fv [place] - fell by toilet and became disoriented       Initial /52 (BP Location: Right arm)   Pulse 61   Temp 98.3  F (36.8  C) (Tympanic)   Resp 20   Wt 86.2 kg (190 lb)   SpO2 97%   BMI 35.32 kg/m   Estimated body mass index is 35.32 kg/m  as calculated from the following:    Height as of 1/31/19: 1.562 m (5' 1.5\").    Weight as of this encounter: 86.2 kg (190 lb).    Patient presents to the clinic using Wheel Chair    Health Maintenance that is potentially due pending provider review:  NONE    n/a    Is there anyone who you would like to be able to receive your results? No  If yes have patient fill out HELLEN    "

## 2019-02-26 NOTE — PROGRESS NOTES
SUBJECTIVE:   Stefanie Velazquez is a 73 year old female who presents to clinic today for the following health issues:      ED/UC Followup:    Facility:  [facility]  Date of visit: 2/24/2019  Reason for visit: fall - possible DVT  Current Status: weak     Her right leg went out on her and she fell.   Had an ultrasound which was positive  She had the initial DVT in the right leg, that was about the same or improved, but had another one. She had stopped the Eliquis after 3 months therapy from the first one which happened in Sept.   They did not restart her on Eliquis in the emergency department, left it up to me. Her kidney function had worsened. Creatinine was 2.49. Eliquis does not have any renal adjustment, but not well studied for cr 2.5 or above.     Diabetes-blood sugars have been very high at breakfast and lunch. She is on basiglar 20 units (changed from levimir) and Novolog 5/10/10 units.     Lab Results   Component Value Date    A1C 8.1 01/31/2019    A1C 8.0 10/30/2018    A1C 8.5 07/20/2018    A1C 7.3 03/20/2018    A1C 7.1 11/10/2017      acute on chronic renal failure-  Creatinine is 2.49. This is up from 1.78 end of Jan., 1.90 12/13/18.  1.57 10/30/18.   She was referred to nephrology a month ago.    Problem list and histories reviewed & adjusted, as indicated.  Additional history: as documented    BP Readings from Last 3 Encounters:   02/26/19 110/52   02/24/19 122/63   02/22/19 114/62    Wt Readings from Last 3 Encounters:   02/26/19 89.8 kg (198 lb)   02/24/19 89.8 kg (198 lb)   02/22/19 89.4 kg (197 lb)                    Reviewed and updated as needed this visit by clinical staff  Tobacco  Allergies  Meds  Problems  Med Hx  Surg Hx  Fam Hx  Soc Hx        Reviewed and updated as needed this visit by Provider  Tobacco  Allergies  Meds  Problems  Med Hx  Surg Hx  Fam Hx         ROS: 5 point ROS negative except as noted above in HPI, including Gen., Resp., CV, GI &  system review.     OBJECTIVE:  /52 (BP Location: Right arm)   Pulse 61   Temp 98.3  F (36.8  C) (Tympanic)   Resp 20   Wt 89.8 kg (198 lb)   SpO2 97%   BMI 36.81 kg/m     General: appears well, no distress, comfortable sitting in wheelchair  Oriented x 3, extremely hard of hearing  Neck: supple, no adenopathy, no JVD   Heart: regular rate and rhythm, normal S1S2, quiet murmur 1/6  Lungs: clear to ascultation   Abd: soft, nontender,  no mass or distention   Ext: trace edema     ASSESSMENT:  1. Recurrent acute deep vein thrombosis (DVT) of both lower extremities (H)    2. Type 2 diabetes mellitus with diabetic nephropathy, with long-term current use of insulin (H)    3. Morbid obesity (H)    4. Rectal cancer (H)    5. CKD (chronic kidney disease) stage 4, GFR 15-29 ml/min (H)    6. Other specified hypothyroidism    7. Benign essential hypertension    8. Acute deep vein thrombosis (DVT) of right lower extremity, unspecified vein (H)    9. Bilateral hearing loss, unspecified hearing loss type        PLAN:  Orders Placed This Encounter     apixaban ANTICOAGULANT (ELIQUIS) 5 MG tablet     She is a hard stick, and is supposed to get fasting labs drawn next week prior to seeing the cardiologist, and a CEA later in the month for Dr Martinez.   Will get fasting labs this week and recheck her kidney function. They are able to do them at her assisted living facility.   She should see nephrology, not sure where that is at   If kidney function worsens, we may have to stop Eliquis. I'd like for her to discuss the situation with dr Martinez.   Recheck one month  40 min spent with patient, greater than 50% in counseling and coordination of care.     Patient Instructions   Draw blood on Friday    Increase breakfast novolog to 10 mg    Restart Eliquis 5 mg twice a day       Sandra Bernstein MD

## 2019-02-27 ENCOUNTER — TELEPHONE (OUTPATIENT)
Dept: FAMILY MEDICINE | Facility: CLINIC | Age: 74
End: 2019-02-27

## 2019-02-27 NOTE — TELEPHONE ENCOUNTER
JEWELS Licea from the [facility] says Stefanie was seen yesterday by Dr Morales and she wrote on her orders that they could draw a CEA blood test.  She needs the diagnosis code  Call Anuja [phone]    (Cyndi said Dr Morales will be in at noon today so we will need to ask her)

## 2019-02-27 NOTE — TELEPHONE ENCOUNTER
It is for her colon cancer. Dr Martinez wanted it so thought we would just add it on so she didn't need to get drawn again

## 2019-03-01 ENCOUNTER — HOSPITAL LABORATORY (OUTPATIENT)
Facility: OTHER | Age: 74
End: 2019-03-01

## 2019-03-01 LAB
ALBUMIN SERPL-MCNC: 2.9 G/DL (ref 3.4–5)
ALP SERPL-CCNC: 95 U/L (ref 40–150)
ALT SERPL W P-5'-P-CCNC: 21 U/L (ref 0–50)
ANION GAP SERPL CALCULATED.3IONS-SCNC: 9 MMOL/L (ref 3–14)
AST SERPL W P-5'-P-CCNC: 26 U/L (ref 0–45)
BILIRUB SERPL-MCNC: 0.5 MG/DL (ref 0.2–1.3)
BUN SERPL-MCNC: 49 MG/DL (ref 7–30)
CALCIUM SERPL-MCNC: 9 MG/DL (ref 8.5–10.1)
CHLORIDE SERPL-SCNC: 109 MMOL/L (ref 94–109)
CHOLEST SERPL-MCNC: 133 MG/DL
CO2 SERPL-SCNC: 25 MMOL/L (ref 20–32)
CREAT SERPL-MCNC: 1.86 MG/DL (ref 0.52–1.04)
ERYTHROCYTE [DISTWIDTH] IN BLOOD BY AUTOMATED COUNT: 12.2 % (ref 10–15)
GFR SERPL CREATININE-BSD FRML MDRD: 26 ML/MIN/{1.73_M2}
GLUCOSE SERPL-MCNC: 104 MG/DL (ref 70–99)
HCT VFR BLD AUTO: 34.6 % (ref 35–47)
HDLC SERPL-MCNC: 46 MG/DL
HGB BLD-MCNC: 11.9 G/DL (ref 11.7–15.7)
LDLC SERPL CALC-MCNC: 44 MG/DL
MCH RBC QN AUTO: 29.5 PG (ref 26.5–33)
MCHC RBC AUTO-ENTMCNC: 34.4 G/DL (ref 31.5–36.5)
MCV RBC AUTO: 86 FL (ref 78–100)
NONHDLC SERPL-MCNC: 87 MG/DL
PLATELET # BLD AUTO: 184 10E9/L (ref 150–450)
POTASSIUM SERPL-SCNC: 4.2 MMOL/L (ref 3.4–5.3)
PROT SERPL-MCNC: 6.3 G/DL (ref 6.8–8.8)
RBC # BLD AUTO: 4.03 10E12/L (ref 3.8–5.2)
SODIUM SERPL-SCNC: 143 MMOL/L (ref 133–144)
TRIGL SERPL-MCNC: 215 MG/DL
WBC # BLD AUTO: 3.5 10E9/L (ref 4–11)

## 2019-03-04 ENCOUNTER — HOSPITAL LABORATORY (OUTPATIENT)
Facility: OTHER | Age: 74
End: 2019-03-04

## 2019-03-04 LAB
ALBUMIN UR-MCNC: NEGATIVE MG/DL
APPEARANCE UR: ABNORMAL
BILIRUB UR QL STRIP: NEGATIVE
COLOR UR AUTO: ABNORMAL
GLUCOSE UR STRIP-MCNC: NEGATIVE MG/DL
HGB UR QL STRIP: NEGATIVE
KETONES UR STRIP-MCNC: NEGATIVE MG/DL
LEUKOCYTE ESTERASE UR QL STRIP: ABNORMAL
NITRATE UR QL: POSITIVE
PH UR STRIP: 5 PH (ref 5–7)
RBC #/AREA URNS AUTO: 15 /HPF (ref 0–2)
SOURCE: ABNORMAL
SP GR UR STRIP: 1.01 (ref 1–1.03)
UROBILINOGEN UR STRIP-MCNC: 0 MG/DL (ref 0–2)
WBC #/AREA URNS AUTO: 75 /HPF (ref 0–5)

## 2019-03-05 ENCOUNTER — DOCUMENTATION ONLY (OUTPATIENT)
Dept: OTHER | Facility: CLINIC | Age: 74
End: 2019-03-05

## 2019-03-06 ENCOUNTER — OFFICE VISIT (OUTPATIENT)
Dept: CARDIOLOGY | Facility: CLINIC | Age: 74
End: 2019-03-06
Payer: COMMERCIAL

## 2019-03-06 ENCOUNTER — TELEPHONE (OUTPATIENT)
Dept: FAMILY MEDICINE | Facility: CLINIC | Age: 74
End: 2019-03-06

## 2019-03-06 VITALS
WEIGHT: 196 LBS | HEART RATE: 55 BPM | BODY MASS INDEX: 36.43 KG/M2 | DIASTOLIC BLOOD PRESSURE: 51 MMHG | SYSTOLIC BLOOD PRESSURE: 91 MMHG | OXYGEN SATURATION: 96 %

## 2019-03-06 DIAGNOSIS — I25.10 CORONARY ARTERY DISEASE INVOLVING NATIVE CORONARY ARTERY OF NATIVE HEART WITHOUT ANGINA PECTORIS: Primary | ICD-10-CM

## 2019-03-06 LAB
BACTERIA SPEC CULT: NORMAL
Lab: NORMAL
SPECIMEN SOURCE: NORMAL

## 2019-03-06 PROCEDURE — 99203 OFFICE O/P NEW LOW 30 MIN: CPT | Performed by: INTERNAL MEDICINE

## 2019-03-06 NOTE — PATIENT INSTRUCTIONS
Stop the Cozaar monitor BP; if cough goes away don't restart Cozaar;  If /130 call clinic for BP follow up.        Reminder:  Please bring in all current medications, over the counter supplements and vitamin bottles to your next appointment.               [facility]~[address]~[place], [place]~[address]  Questions about your visit today?   Call your Cardiology Clinic RN's-Paulina Crockett and/or Emily Oro at [phone].

## 2019-03-06 NOTE — LETTER
3/6/2019      Sandra Morales MD  [address] [facility] [address]      RE: Stefanie Velazquez       Dear Colleague,    I had the pleasure of seeing Stefanie Velazquez in the [facility].    Service Date: 03/06/2019      HISTORY OF PRESENT ILLNESS:  Ms. Velazquez is a pleasant, 74-year-old woman who has a past medical history significant for coronary artery disease.  Specifically, she underwent bypass surgery in [place] in 2002 with a LIMA to the LAD, a vein graft to the OM and a vein graft to the PDA.  It sounds like she subsequently had one stent placed for some stenosis, but nothing since then, and that was also done around the time of her bypass surgery.  She was followed by Dr. Del Cid and is establishing care today, as he has retired.  She has a history of hypertension, hyperlipidemia, diabetes, type 2, sleep apnea and rectal carcinoma.  From the standpoint of her coronary artery disease, she has not had any chest pain, tightness or shortness of breath.  She is not particularly active.  She uses a walker to get around.  Her last stress test was in 2014 that showed a mild anterior defect, which was unchanged compared to a prior study.  Her diabetes in the past has been under poor control, but most recently in 01/2019, it is under a little bit better control with an A1c of 8.1.  She is on insulin.  Her cholesterol is under good control with an LDL of 44 and HDL of 46 on a cholesterol screen 03/01/2019.  She does have sleep apnea.  Apparently, she was not using her CPAP mask for a period of time, and Medicare took it away.  She does need to get back in to be reevaluated for that.  She does have lower extremity edema that is being managed with Lasix and has been relatively stable.  She has a lower extremity DVT as well, for which she is on Eliquis.  She told me about a cough that she has had.  I note that in the past she did have a cough with lisinopril, and I switched her to Cozaar.  The  cough went away when she stopped the lisinopril.  She did have a recent fall as well.  Her blood pressure is on the lower end at 91/50 with a pulse of 55.  Her weight has been relatively stable; she is 196.      IMPRESSION, REPORT AND PLAN:   1.  Coronary artery disease, status post CABG x3 in 2002 with a LIMA to the LAD, a vein graft to the OM and a vein graft to the PDA with subsequent PCI around 4361-9580 with no subsequent interventions, currently asymptomatic.   2.  Diabetes, on insulin, with an A1c of 8.1 in 01/2019.   3.  Obstructive sleep apnea.  Currently does not use her CPAP mask, but getting back in to have it represcribed.   4.  Rectal carcinoma, in remission.   5.  Lower extremity lymphedema, stable.   6.  Renal insufficiency with a creatinine of 2.5.   7.  Hyperlipidemia, well controlled on Zocor 40.   8.  Cough.   9.  Lower extremity DVT, on Eliquis.      DISCUSSION:  It was a pleasure being involved in the care of Ms. Velazquez.  I reviewed her history in detail as outlined.  Fortunately, from a cardiovascular standpoint, she seems to be doing relatively well.  We discussed her cough could be due to Cozaar, and given her very soft blood pressure and her recent fall, I think it is reasonable to stop the Cozaar and see how she does.  Her blood pressure is going to need to be monitored.  If her cough goes away, then I would not restart losartan.  If her blood pressure remains stable, then we can just keep her on the medicines that she is on and not add any additional ones.  However, If her blood pressure creeps up into the 130s, then I would like to see her back in clinic to adjust her medicines as needed.  She is going to try to get back in to Sleep Medicine to have a new CPAP mask prescribed.  Otherwise, no change to her medications.  We will plan to see her back in a year with an NP or sooner if there are any issues in the interim.         VISH COLLADO MD             D: 03/06/2019   T: 03/06/2019    MT: marc      Name:     SOL WORRELL   MRN:      [id number]        Account:      [id number]   :      1945           Service Date: 2019      Document: [id number]         Outpatient Encounter Medications as of 3/6/2019   Medication Sig Dispense Refill     ACCU-CHEK MILVIA MELODY dispense one meter 1 device 0     acetaminophen (TYLENOL) 650 MG CR tablet Take 1 tablet (650 mg) by mouth 3 times daily as needed for mild pain or fever 60 tablet      apixaban ANTICOAGULANT (ELIQUIS) 5 MG tablet Take 1 tablet (5 mg) by mouth 2 times daily 60 tablet 5     blood glucose monitoring (ACCU-CHEK MILVIA) test strip Use to test blood sugars 4 times daily or as directed. 360 each 1     blood glucose monitoring (ACCU-CHEK MULTICLIX) lancets Test BG 4 times daily 1 Box 11     BUTT PASTE - REGULAR (DR LOVE POCHAUNCEY GOOP BUTT PASTE FORMULA) Apply topically every hour as needed for skin protection       cholecalciferol 1000 units TABS Take 1,000 Units by mouth daily       fluocinonide (LIDEX) 0.05 % ointment Apply sparingly to rash on legs twice daily as needed.  Do not apply to face. 60 g 11     fluticasone (FLONASE) 50 MCG/ACT spray Spray 1 spray into both nostrils daily 1 Bottle 3     furosemide (LASIX) 20 MG tablet Take 1 tablet (20 mg) by mouth 2 times daily 180 tablet 3     gabapentin (NEURONTIN) 300 MG capsule Take 1 tablet (300 mg) at bedtime 90 capsule 1     insulin aspart (NOVOLOG FLEXPEN) 100 UNIT/ML pen 10 units with breakfast, 10 units with lunch and 10 units with supper 15 mL 1     insulin glargine (BASAGLAR KWIKPEN) 100 UNIT/ML pen Inject 20 Units Subcutaneous daily 18 mL 1     insulin pen needle (BD GABRIELLA U/F) 32G X 4 MM Use 4 times per day or as directed. 120 each 5     levothyroxine (SYNTHROID/LEVOTHROID) 88 MCG tablet Take 88 mcg by mouth daily       loperamide (IMODIUM) 2 MG capsule One capsule once a day PRN, can use a second one if needed 90 capsule 3     losartan (COZAAR) 100 MG tablet Take 100 mg by mouth  daily  90 tablet 1     magnesium 250 MG tablet Take 1 tablet by mouth daily       menthol-zinc oxide (CALMOSEPTINE) 0.44-20.625 % OINT ointment Apply topically 4 times daily as needed for skin protection       metoprolol succinate ER (TOPROL-XL) 50 MG 24 hr tablet Take 50 mg by mouth daily  90 tablet 1     nystatin (MYCOSTATIN) 204402 UNIT/GM external powder Apply topically 2 times daily Until resolved then to use as needed. 60 g 2     OMEPRAZOLE PO Take 20 mg by mouth daily as needed        order for DME Equipment being ordered: Compression stockings 2 Device 0     order for DME Equipment being ordered: Hospital Bed service and repair 1 each 0     Potassium Gluconate 595 MG CAPS Take 1 tablet by mouth daily       rOPINIRole (REQUIP) 1 MG tablet 1-3 tabs daily as needed, patient takes one in am, one in pm. occasionally will take in midday. 200 tablet 3     simvastatin (ZOCOR) 40 MG tablet Take 1 tablet (40 mg) by mouth daily 90 tablet 2     [DISCONTINUED] cephALEXin (KEFLEX) 500 MG capsule Take 1 capsule (500 mg) by mouth 2 times daily for 7 days 14 capsule 0     No facility-administered encounter medications on file as of 3/6/2019.        Again, thank you for allowing me to participate in the care of your patient.      Sincerely,    Starla Cuenca MD     [facility]

## 2019-03-06 NOTE — PROGRESS NOTES
Service Date: 03/06/2019      HISTORY OF PRESENT ILLNESS:  Ms. Velazquez is a pleasant, 74-year-old woman who has a past medical history significant for coronary artery disease.  Specifically, she underwent bypass surgery in [place] in 2002 with a LIMA to the LAD, a vein graft to the OM and a vein graft to the PDA.  It sounds like she subsequently had one stent placed for some stenosis, but nothing since then, and that was also done around the time of her bypass surgery.  She was followed by Dr. Del Cid and is establishing care today, as he has retired.  She has a history of hypertension, hyperlipidemia, diabetes, type 2, sleep apnea and rectal carcinoma.  From the standpoint of her coronary artery disease, she has not had any chest pain, tightness or shortness of breath.  She is not particularly active.  She uses a walker to get around.  Her last stress test was in 2014 that showed a mild anterior defect, which was unchanged compared to a prior study.  Her diabetes in the past has been under poor control, but most recently in 01/2019, it is under a little bit better control with an A1c of 8.1.  She is on insulin.  Her cholesterol is under good control with an LDL of 44 and HDL of 46 on a cholesterol screen 03/01/2019.  She does have sleep apnea.  Apparently, she was not using her CPAP mask for a period of time, and Medicare took it away.  She does need to get back in to be reevaluated for that.  She does have lower extremity edema that is being managed with Lasix and has been relatively stable.  She has a lower extremity DVT as well, for which she is on Eliquis.  She told me about a cough that she has had.  I note that in the past she did have a cough with lisinopril, and I switched her to Cozaar.  The cough went away when she stopped the lisinopril.  She did have a recent fall as well.  Her blood pressure is on the lower end at 91/50 with a pulse of 55.  Her weight has been relatively stable; she is 196.       IMPRESSION, REPORT AND PLAN:   1.  Coronary artery disease, status post CABG x3 in  with a LIMA to the LAD, a vein graft to the OM and a vein graft to the PDA with subsequent PCI around 9269-7970 with no subsequent interventions, currently asymptomatic.   2.  Diabetes, on insulin, with an A1c of 8.1 in 2019.   3.  Obstructive sleep apnea.  Currently does not use her CPAP mask, but getting back in to have it represcribed.   4.  Rectal carcinoma, in remission.   5.  Lower extremity lymphedema, stable.   6.  Renal insufficiency with a creatinine of 2.5.   7.  Hyperlipidemia, well controlled on Zocor 40.   8.  Cough.   9.  Lower extremity DVT, on Eliquis.      DISCUSSION:  It was a pleasure being involved in the care of Ms. Velazquez.  I reviewed her history in detail as outlined.  Fortunately, from a cardiovascular standpoint, she seems to be doing relatively well.  We discussed her cough could be due to Cozaar, and given her very soft blood pressure and her recent fall, I think it is reasonable to stop the Cozaar and see how she does.  Her blood pressure is going to need to be monitored.  If her cough goes away, then I would not restart losartan.  If her blood pressure remains stable, then we can just keep her on the medicines that she is on and not add any additional ones.  However, If her blood pressure creeps up into the 130s, then I would like to see her back in clinic to adjust her medicines as needed.  She is going to try to get back in to Sleep Medicine to have a new CPAP mask prescribed.  Otherwise, no change to her medications.  We will plan to see her back in a year with an NP or sooner if there are any issues in the interim.         VISH COLLADO MD             D: 2019   T: 2019   MT: marc      Name:     SOL VELAZQUEZ   MRN:      [id number]        Account:      [id number]   :      1945           Service Date: 2019      Document: [id number]

## 2019-03-06 NOTE — TELEPHONE ENCOUNTER
Date Exam Time Accession # Results    3/4/19  1:50 PM [id number]    Specimen Information: Unspecified Urine        Component Value Flag Ref Range Units Status Collected Lab   Color Urine Marilyn     Final 03/04/2019  1:50 PM 59   Appearance Urine     Final 03/04/2019  1:50 PM 59   Slightly Cloudy    Glucose Urine Negative   NEG^Negative mg/dL Final 03/04/2019  1:50 PM 59   Bilirubin Urine Negative   NEG^Negative  Final 03/04/2019  1:50 PM 59   Ketones Urine Negative   NEG^Negative mg/dL Final 03/04/2019  1:50 PM 59   Specific Gravity Urine 1.009   1.003 - 1.035  Final 03/04/2019  1:50 PM 59   Blood Urine Negative   NEG^Negative  Final 03/04/2019  1:50 PM 59   pH Urine 5.0   5.0 - 7.0 pH Final 03/04/2019  1:50 PM 59   Protein Albumin Urine Negative   NEG^Negative mg/dL Final 03/04/2019  1:50 PM 59   Urobilinogen mg/dL 0.0   0.0 - 2.0 mg/dL Final 03/04/2019  1:50 PM 59   Nitrite Urine Positive Abnormal   A  NEG^Negative  Final 03/04/2019  1:50 PM 59   Leukocyte Esterase Urine Small Abnormal   A  NEG^Negative  Final 03/04/2019  1:50 PM 59   Source     Final 03/04/2019  1:50 PM 59   Unspecified Urine    WBC Urine 75 Abnormally high   H  0 - 5 /HPF Final 03/04/2019  1:50 PM 59   RBC Urine 15 Abnormally high   H  0 - 2 /HPF Final 03/04/2019  1:50 PM 59   Lab and Collection     UA with Microscopic reflex to Culture (Order: [id number]) - 3/4/2019   Result History     UA with Microscopic reflex to Culture (Order #[id number]) on 3/4/2019 - Order Result History Report   UA with Microscopic reflex to Culture [[id number]]     Electronically signed by: Bal, [employer] Results on 03/04/19 1350 Status: Completed   Ordering user: Bal, [employer] Results 03/04/19 1350   Order Providers     Authorizing Provider Encounter Provider   Ana Mao APRN CNP Hemmer, Kelly, APRN CNP   Patient Release Status:     This result is viewable by the patient in [facility]   Result Information     Flag: Abnormal Status: Final result  (Collected: 3/4/2019  1:50 PM) Provider Status: Ordered

## 2019-03-06 NOTE — TELEPHONE ENCOUNTER
Pt finished antibiotic on 3-1-19 for UTI. F/u ua completed see results from 3/4/10.  Please advise in pcp absence. Notes left on Dr Penaloza's desk.  Wilda Naqvi RN

## 2019-03-06 NOTE — PROGRESS NOTES
HPI:     Please see dictated note    PAST MEDICAL HISTORY:  Past Medical History:   Diagnosis Date     Acute myocardial infarction of other specified sites, episode of care unspecified 6/2002    MI 2 stints     Cancer of colon (H)      Chronic ischemic heart disease, unspecified 6/22/2003    cabgx3 , 2002 2/05 adenosine ef70%     Coronary atherosclerosis of unspecified type of vessel, native or graft     Coronary artery disease     Diabetes mellitus (H)      Esophageal reflux      Gastro-oesophageal reflux disease      Generalized osteoarthrosis, unspecified site 6/22/2005     Generalized osteoarthrosis, unspecified site 6/22/2005     HEARING LOSS CONDUCTIVE, COMBINED TYPE 6/22/2005    [place] measles as child     HYPOTHYROIDISM  6/22/2003     Mixed hyperlipidemia 6/22/2005     Obesity, unspecified 6/22/2005     RC-moderate (AHI 12, LSat 60%); REM RDI-73 6/1/2009    Sleep study [facility] was performed 5/26/09 in order to re-evaluate severity of RC. The total sleep time was 412.0 minutes. The sleep latency was decreased at 8.7 minutes with Ambien 10mg. The REM sleep latency was 426.0 minutes. Sleep efficiency was reduced at 79.0%. The sleep architecture was disrupted with frequent sleep stage changes and arousals.  Snoring:  loud. Respiratory Events:   RDI o     Stented coronary artery      Type II or unspecified type diabetes mellitus with renal manifestations, uncontrolled(250.42) (H) 6/22/2005     Type II or unspecified type diabetes mellitus with renal manifestations, uncontrolled(250.42) (H) 6/22/2005     Unspecified disorder resulting from impaired renal function 6/22/2005    CR     1.82   06/21/2005     Unspecified essential hypertension        CURRENT MEDICATIONS:  Current Outpatient Medications   Medication Sig Dispense Refill     ACCU-CHEK MILVIA MELODY dispense one meter 1 device 0     acetaminophen (TYLENOL) 650 MG CR tablet Take 1 tablet (650 mg) by mouth 3 times daily as needed for mild pain or fever 60  tablet      apixaban ANTICOAGULANT (ELIQUIS) 5 MG tablet Take 1 tablet (5 mg) by mouth 2 times daily 60 tablet 5     blood glucose monitoring (ACCU-CHEK MILVIA) test strip Use to test blood sugars 4 times daily or as directed. 360 each 1     blood glucose monitoring (ACCU-CHEK MULTICLIX) lancets Test BG 4 times daily 1 Box 11     BUTT PASTE - REGULAR (DR LOVE POOP GOOP BUTT PASTE FORMULA) Apply topically every hour as needed for skin protection       cholecalciferol 1000 units TABS Take 1,000 Units by mouth daily       fluocinonide (LIDEX) 0.05 % ointment Apply sparingly to rash on legs twice daily as needed.  Do not apply to face. 60 g 11     fluticasone (FLONASE) 50 MCG/ACT spray Spray 1 spray into both nostrils daily 1 Bottle 3     furosemide (LASIX) 20 MG tablet Take 1 tablet (20 mg) by mouth 2 times daily 180 tablet 3     gabapentin (NEURONTIN) 300 MG capsule Take 1 tablet (300 mg) at bedtime 90 capsule 1     insulin aspart (NOVOLOG FLEXPEN) 100 UNIT/ML pen 10 units with breakfast, 10 units with lunch and 10 units with supper 15 mL 1     insulin glargine (BASAGLAR KWIKPEN) 100 UNIT/ML pen Inject 20 Units Subcutaneous daily 18 mL 1     insulin pen needle (BD GABRIELLA U/F) 32G X 4 MM Use 4 times per day or as directed. 120 each 5     levothyroxine (SYNTHROID/LEVOTHROID) 88 MCG tablet Take 88 mcg by mouth daily       loperamide (IMODIUM) 2 MG capsule One capsule once a day PRN, can use a second one if needed 90 capsule 3     losartan (COZAAR) 100 MG tablet Take 100 mg by mouth daily  90 tablet 1     magnesium 250 MG tablet Take 1 tablet by mouth daily       menthol-zinc oxide (CALMOSEPTINE) 0.44-20.625 % OINT ointment Apply topically 4 times daily as needed for skin protection       metoprolol succinate ER (TOPROL-XL) 50 MG 24 hr tablet Take 50 mg by mouth daily  90 tablet 1     nystatin (MYCOSTATIN) 131524 UNIT/GM external powder Apply topically 2 times daily Until resolved then to use as needed. 60 g 2     OMEPRAZOLE  PO Take 20 mg by mouth daily as needed        order for DME Equipment being ordered: Compression stockings 2 Device 0     order for DME Equipment being ordered: Hospital Bed service and repair 1 each 0     Potassium Gluconate 595 MG CAPS Take 1 tablet by mouth daily       rOPINIRole (REQUIP) 1 MG tablet 1-3 tabs daily as needed, patient takes one in am, one in pm. occasionally will take in midday. 200 tablet 3     simvastatin (ZOCOR) 40 MG tablet Take 1 tablet (40 mg) by mouth daily 90 tablet 2       PAST SURGICAL HISTORY:  Past Surgical History:   Procedure Laterality Date     cabg       COLECTOMY LOW ANTERIOR  6/21/2011    Procedure:COLECTOMY LOW ANTERIOR; with loop ielostomy; Surgeon:ROBBIN MCDONNELL; Location:[facility]     COLONOSCOPY  1/26/2011    COLONOSCOPY performed by MASOUD BILL at [facility]     COLONOSCOPY N/A 3/1/2016    Procedure: COLONOSCOPY;  Surgeon: Liza Neal MD;  Location: [facility]     INSERT PORT VASCULAR ACCESS  3/2/2011    INSERT PORT VASCULAR ACCESS performed by LIZA NEAL at [facility]     OPEN REDUCTION INTERNAL FIXATION FEMUR DISTAL  2/12/2013    Procedure: OPEN REDUCTION INTERNAL FIXATION FEMUR DISTAL;  Open reduction internal fixation left femur fracture--Anesth[facility]Choice;  Surgeon: Ley, Jeffrey Duane, MD;  Location: [facility]     ORTHOPEDIC SURGERY       PHACOEMULSIFICATION WITH STANDARD INTRAOCULAR LENS IMPLANT Left 1/22/2018    Procedure: PHACOEMULSIFICATION WITH STANDARD INTRAOCULAR LENS IMPLANT;  Left cataract removal with implant;  Surgeon: Rony Key MD;  Location: [facility]     PHACOEMULSIFICATION WITH STANDARD INTRAOCULAR LENS IMPLANT Right 2/19/2018    Procedure: PHACOEMULSIFICATION WITH STANDARD INTRAOCULAR LENS IMPLANT;  Right cataract removal with implant;  Surgeon: Rony Key MD;  Location: [facility]     SIGMOIDOSCOPY FLEXIBLE  9/9/2011    Procedure:SIGMOIDOSCOPY FLEXIBLE; Performed prior to induction.; Surgeon:ROBBIN MCDONNELL;  Location:[facility]     SURGICAL HISTORY OF -   10/24/2002    Heart bypass     SURGICAL HISTORY OF -   2002    R Knee arthroplasty     SURGICAL HISTORY OF -   2002    Mi 2 stints     TAKEDOWN ILEOSTOMY  9/9/2011    Procedure:TAKEDOWN ILEOSTOMY; Exploratory Laparotomy,  Loop Ileostomy Takedown, Small Bowel Resection x 2.; Surgeon:ROBBIN MCDONNELL; Location:[facility]       ALLERGIES     Allergies   Allergen Reactions     Hydrocodone Unknown     Lisinopril Cough            Vicodin [Hydrocodone-Acetaminophen] Other (See Comments)     Caused extreme dizziness       FAMILY HISTORY:  Family History   Problem Relation Age of Onset     Diabetes Sister      C.A.D. Sister      Breast Cancer Sister        SOCIAL HISTORY:  Social History     Socioeconomic History     Marital status: Single     Spouse name: Not on file     Number of children: Not on file     Years of education: Not on file     Highest education level: Not on file   Occupational History     Not on file   Social Needs     Financial resource strain: Not on file     Food insecurity:     Worry: Not on file     Inability: Not on file     Transportation needs:     Medical: Not on file     Non-medical: Not on file   Tobacco Use     Smoking status: Former Smoker     Smokeless tobacco: Never Used   Substance and Sexual Activity     Alcohol use: Yes     Comment: rare social use     Drug use: No     Sexual activity: No   Lifestyle     Physical activity:     Days per week: Not on file     Minutes per session: Not on file     Stress: Not on file   Relationships     Social connections:     Talks on phone: Not on file     Gets together: Not on file     Attends Sikh service: Not on file     Active member of club or organization: Not on file     Attends meetings of clubs or organizations: Not on file     Relationship status: Not on file     Intimate partner violence:     Fear of current or ex partner: Not on file     Emotionally abused: Not on file     Physically abused: Not on file      Forced sexual activity: Not on file   Other Topics Concern     Parent/sibling w/ CABG, MI or angioplasty before 65F 55M? Yes   Social History Narrative     Not on file       ROS:   Constitutional: No fever, chills, or sweats. No weight gain/loss   ENT: No visual disturbance, ear ache, epistaxis, sore throat  Allergies/Immunologic: Negative.   Respiratory: No cough, hemoptysia  Cardiovascular: As per HPI  GI: No nausea, vomiting, hematemesis, melena, or hematochezia  : No urinary frequency, dysuria, or hematuria  Integument: Negative  Psychiatric: Negative  Neuro: Negative  Endocrinology: Negative   Musculoskeletal: Negative    EXAM:  BP 91/51   Pulse 55   Wt 88.9 kg (196 lb)   SpO2 96%   BMI 36.43 kg/m    In general, the patient is a pleasant female in no apparent distress.    HEENT: NC/AT.  PERRLA.  EOMI.  Sclerae white, not injected.  Nares clear.  Pharynx without erythema or exudate.  Dentition intact.    Neck:  Carotids +4/4 bilaterally without bruits.  No jugular venous distension.   Heart: RRR. Normal S1, S2 splits physiologically. No murmur, rub, click, or gallop.   Lungs: CTA.  No ronchi, wheezes, rales.   Abdomen: Soft, nontender, nondistended.   Extremities: No clubbing, cyanosis, + edema 1+  Neurologic: Alert and oriented to person/place/time, normal speech, gait and affect  Skin: No petechiae, purpura or rash.    Labs:  LIPID RESULTS:  Lab Results   Component Value Date    CHOL 133 03/01/2019    HDL 46 (L) 03/01/2019    LDL 44 03/01/2019    TRIG 215 (H) 03/01/2019    CHOLHDLRATIO 2.6 09/16/2015    NHDL 87 03/01/2019       LIVER ENZYME RESULTS:  Lab Results   Component Value Date    AST 26 03/01/2019    ALT 21 03/01/2019       CBC RESULTS:  Lab Results   Component Value Date    WBC 3.5 (L) 03/01/2019    RBC 4.03 03/01/2019    HGB 11.9 03/01/2019    HCT 34.6 (L) 03/01/2019    MCV 86 03/01/2019    MCH 29.5 03/01/2019    MCHC 34.4 03/01/2019    RDW 12.2 03/01/2019     03/01/2019       BMP  RESULTS:  Lab Results   Component Value Date     03/01/2019    POTASSIUM 4.2 03/01/2019    CHLORIDE 109 03/01/2019    CO2 25 03/01/2019    ANIONGAP 9 03/01/2019     (H) 03/01/2019    BUN 49 (H) 03/01/2019    CR 1.86 (H) 03/01/2019    GFRESTIMATED 26 (L) 03/01/2019    GFRESTBLACK 30 (L) 03/01/2019    DWIGHT 9.0 03/01/2019        A1C RESULTS:  Lab Results   Component Value Date    A1C 8.1 (H) 01/31/2019       INR RESULTS:  Lab Results   Component Value Date    INR 1.15 (H) 09/13/2018    INR 2.71 (H) 02/18/2013       ROSIE Cuenca MD     CC  Patient Care Team:  Sandra Morales MD as PCP - General (Family Practice)  Eunice Diop, RN CM [facility] as   [facility] as   [facility] Volunteer  coordinator as Other (see comments)  Lori Pope-Health Care Agent  Ruth Chaudhari-Sister  Krystal-Financial Worker Sandra Dupont MD as Assigned PCP

## 2019-03-07 NOTE — TELEPHONE ENCOUNTER
Form / Results faxed back to [facility]  Sent to be scanned to [facility]

## 2019-03-07 NOTE — TELEPHONE ENCOUNTER
Called, talked with teri roberts.  No sx of concern, acting normally.  Culture shows no infection.      Does have asymptomatic pyuria, positive UA.  Continue to monitor.  In the absence of positive culture, and with no sx, no indication to treat.

## 2019-03-14 ENCOUNTER — TELEPHONE (OUTPATIENT)
Dept: FAMILY MEDICINE | Facility: CLINIC | Age: 74
End: 2019-03-14

## 2019-03-14 NOTE — TELEPHONE ENCOUNTER
[facility]- Diet Issue  Form placed on Provider Dr. Amandeep shrestha for Signature.  [facility]

## 2019-03-18 ENCOUNTER — MEDICAL CORRESPONDENCE (OUTPATIENT)
Dept: HEALTH INFORMATION MANAGEMENT | Facility: CLINIC | Age: 74
End: 2019-03-18

## 2019-03-19 NOTE — TELEPHONE ENCOUNTER
Form received back signed  3/19/19  Order for Diet issue/ PT  Faxed Back & Sent to be scanned to this encounter  [facility]

## 2019-03-20 ENCOUNTER — TELEPHONE (OUTPATIENT)
Dept: ONCOLOGY | Facility: CLINIC | Age: 74
End: 2019-03-20

## 2019-03-20 NOTE — TELEPHONE ENCOUNTER
Mell from [facility] called to request labs be faxed to them at [phone] as she, missed her lab appt here at clinic Monday. Labs will be drawn at their facility this week with results faxed to us.

## 2019-03-21 ENCOUNTER — TELEPHONE (OUTPATIENT)
Dept: FAMILY MEDICINE | Facility: CLINIC | Age: 74
End: 2019-03-21

## 2019-03-21 NOTE — TELEPHONE ENCOUNTER
Concha, RPT from [facility] says they need verbal orders for PT. They did get order for eval. She says Stefanie has a blood clot so she needs to know if they are allowed to walk her or exercise or if she needs to wait until she has re-evaluation with Dr Morales on 3/28.    Concha's secure voice mail [phone]

## 2019-03-22 ENCOUNTER — TRANSFERRED RECORDS (OUTPATIENT)
Dept: HEALTH INFORMATION MANAGEMENT | Facility: CLINIC | Age: 74
End: 2019-03-22

## 2019-03-22 ENCOUNTER — HOSPITAL LABORATORY (OUTPATIENT)
Facility: OTHER | Age: 74
End: 2019-03-22

## 2019-03-28 ENCOUNTER — OFFICE VISIT (OUTPATIENT)
Dept: FAMILY MEDICINE | Facility: CLINIC | Age: 74
End: 2019-03-28
Payer: COMMERCIAL

## 2019-03-28 ENCOUNTER — ONCOLOGY VISIT (OUTPATIENT)
Dept: ONCOLOGY | Facility: CLINIC | Age: 74
End: 2019-03-28
Attending: INTERNAL MEDICINE
Payer: COMMERCIAL

## 2019-03-28 VITALS
HEART RATE: 54 BPM | BODY MASS INDEX: 37 KG/M2 | TEMPERATURE: 98.1 F | OXYGEN SATURATION: 96 % | WEIGHT: 199 LBS | RESPIRATION RATE: 20 BRPM | DIASTOLIC BLOOD PRESSURE: 56 MMHG | SYSTOLIC BLOOD PRESSURE: 112 MMHG

## 2019-03-28 VITALS
HEART RATE: 61 BPM | BODY MASS INDEX: 38.44 KG/M2 | SYSTOLIC BLOOD PRESSURE: 123 MMHG | OXYGEN SATURATION: 97 % | HEIGHT: 61 IN | TEMPERATURE: 98.2 F | DIASTOLIC BLOOD PRESSURE: 48 MMHG | WEIGHT: 203.6 LBS | RESPIRATION RATE: 18 BRPM

## 2019-03-28 DIAGNOSIS — Z79.4 TYPE 2 DIABETES MELLITUS WITH DIABETIC NEPHROPATHY, WITH LONG-TERM CURRENT USE OF INSULIN (H): ICD-10-CM

## 2019-03-28 DIAGNOSIS — C20 RECTAL CANCER (H): ICD-10-CM

## 2019-03-28 DIAGNOSIS — I10 BENIGN ESSENTIAL HYPERTENSION: ICD-10-CM

## 2019-03-28 DIAGNOSIS — N18.4 CKD (CHRONIC KIDNEY DISEASE) STAGE 4, GFR 15-29 ML/MIN (H): ICD-10-CM

## 2019-03-28 DIAGNOSIS — B37.2 YEAST INFECTION OF THE SKIN: ICD-10-CM

## 2019-03-28 DIAGNOSIS — C20 RECTAL CANCER (H): Primary | Chronic | ICD-10-CM

## 2019-03-28 DIAGNOSIS — E11.21 TYPE 2 DIABETES MELLITUS WITH DIABETIC NEPHROPATHY, WITH LONG-TERM CURRENT USE OF INSULIN (H): Chronic | ICD-10-CM

## 2019-03-28 DIAGNOSIS — K21.9 GASTROESOPHAGEAL REFLUX DISEASE WITHOUT ESOPHAGITIS: Chronic | ICD-10-CM

## 2019-03-28 DIAGNOSIS — Z79.4 TYPE 2 DIABETES MELLITUS WITH DIABETIC NEPHROPATHY, WITH LONG-TERM CURRENT USE OF INSULIN (H): Chronic | ICD-10-CM

## 2019-03-28 DIAGNOSIS — I82.403 RECURRENT ACUTE DEEP VEIN THROMBOSIS (DVT) OF BOTH LOWER EXTREMITIES (H): Primary | ICD-10-CM

## 2019-03-28 DIAGNOSIS — G25.81 RESTLESS LEGS SYNDROME: ICD-10-CM

## 2019-03-28 DIAGNOSIS — H91.93 BILATERAL HEARING LOSS, UNSPECIFIED HEARING LOSS TYPE: ICD-10-CM

## 2019-03-28 DIAGNOSIS — E78.5 HYPERLIPIDEMIA LDL GOAL <100: Chronic | ICD-10-CM

## 2019-03-28 DIAGNOSIS — H61.21 IMPACTED CERUMEN OF RIGHT EAR: ICD-10-CM

## 2019-03-28 DIAGNOSIS — I10 BENIGN ESSENTIAL HYPERTENSION: Chronic | ICD-10-CM

## 2019-03-28 DIAGNOSIS — E03.9 ACQUIRED HYPOTHYROIDISM: Chronic | ICD-10-CM

## 2019-03-28 DIAGNOSIS — E11.21 TYPE 2 DIABETES MELLITUS WITH DIABETIC NEPHROPATHY, WITH LONG-TERM CURRENT USE OF INSULIN (H): ICD-10-CM

## 2019-03-28 DIAGNOSIS — I82.401 ACUTE DEEP VEIN THROMBOSIS (DVT) OF RIGHT LOWER EXTREMITY, UNSPECIFIED VEIN (H): ICD-10-CM

## 2019-03-28 PROCEDURE — G0463 HOSPITAL OUTPT CLINIC VISIT: HCPCS

## 2019-03-28 PROCEDURE — 99215 OFFICE O/P EST HI 40 MIN: CPT | Performed by: FAMILY MEDICINE

## 2019-03-28 PROCEDURE — 99214 OFFICE O/P EST MOD 30 MIN: CPT | Performed by: INTERNAL MEDICINE

## 2019-03-28 ASSESSMENT — MIFFLIN-ST. JEOR: SCORE: 1358.77

## 2019-03-28 ASSESSMENT — PAIN SCALES - GENERAL: PAINLEVEL: NO PAIN (0)

## 2019-03-28 NOTE — PROGRESS NOTES
SUBJECTIVE:   Stefanie Velazquez is a 74 year old female who presents to clinic today for the following health issues:    Medication Followup of DVT    Taking Medication as prescribed: yes    Side Effects:  None    Medication Helping Symptoms:  yes     We restarted Eliquis. Is to stay on it for 6 months at least. Dr Martinez will recheck an ultrasound in future    Diabetes,   She tells me blood sugars are still a little high in am and at lunch, but they did not send her blood sugars with her today.    Lab Results   Component Value Date    A1C 8.1 01/31/2019    A1C 8.0 10/30/2018    A1C 8.5 07/20/2018    A1C 7.3 03/20/2018    A1C 7.1 11/10/2017        Cerumen-was told she has wax by the audiologist and that it would impair her hearing aid effectiveness    hypertension -is on lasix,  Metoprolol. Her cardiologist stopped her losartan.   BP Readings from Last 6 Encounters:   03/28/19 112/56   03/28/19 123/48   03/06/19 91/51   02/26/19 110/52   02/24/19 122/63   02/22/19 114/62        Chronic kidney disease   Stage 4, creatinine was improved last time checked  Creatinine   Date Value Ref Range Status   03/01/2019 1.86 (H) 0.52 - 1.04 mg/dL Final      Yeast infection-The assisted living place set me a fax that she was having trouble with yeast infection under breasts, Malou Lamar had given her nystatin to use, she has been using lotion as well.     Rectal cancer-just saw Dr Martinez in oncology, with some bleeding he recommended that she have a colonoscopy    Restless legs-she always is taking one tab bid, occasionally one in middle of day. I have it written for tid prn so she always has to ask for it.     Problem list and histories reviewed & adjusted, as indicated.  Additional history: as documented    BP Readings from Last 3 Encounters:   03/28/19 112/56   03/28/19 123/48   03/06/19 91/51    Wt Readings from Last 3 Encounters:   03/28/19 90.3 kg (199 lb)   03/28/19 92.4 kg (203 lb 9.6 oz)   03/06/19 88.9 kg (196 lb)             Reviewed and updated as needed this visit by clinical staff  Tobacco  Allergies  Meds  Problems  Med Hx  Surg Hx  Fam Hx       Reviewed and updated as needed this visit by Provider  Tobacco  Allergies  Meds  Problems  Med Hx  Surg Hx  Fam Hx         ROS: 5 point ROS negative except as noted above in HPI, including Gen., Resp., CV, GI &  system review.     OBJECTIVE: /56 (BP Location: Left arm)   Pulse 54   Temp 98.1  F (36.7  C) (Tympanic)   Resp 20   Wt 90.3 kg (199 lb)   SpO2 96%   BMI 37.00 kg/m     General: appears well, no distress  HEENT: cerumen right side, oropharynx with no erythema, no exudate  Neck: supple, no adenopathy, no JVD  Heart: regular rate and rhythm, normal S1S2, no murmur  Lungs: clear to ascultation   Abd: soft, nontender, no mass or distention   Ext: trace edema both legs  Skin: erythematous macular let rash under right breast    ASSESSMENT:  1. Recurrent acute deep vein thrombosis (DVT) of both lower extremities (H)    2. Type 2 diabetes mellitus with diabetic nephropathy, with long-term current use of insulin (H)    3. Rectal cancer (H)    4. CKD (chronic kidney disease) stage 4, GFR 15-29 ml/min (H)    5. Benign essential hypertension    6. Bilateral hearing loss, unspecified hearing loss type    7. Yeast infection of the skin    8. Restless legs syndrome    9. Impacted cerumen of right ear        PLAN:  Orders Placed This Encounter     NEPHROLOGY ADULT REFERRAL     40 min spent with patient, greater than 50% in counseling and coordination of care and dealing with multiple issues today.   See below  Asked for blood pressures and blood sugars. She certainly doesn't seem to need the losartan.   Colonoscopy as planned     Patient Instructions   See the nephrologist for chronic kidney disease Stage 4  Last Creatinines 2.49, 1.86, GFRs were 18, then 26    [phone] Main number to call ask to see someone in [place] with [facility] consultants      Sandra Bernstein MD

## 2019-03-28 NOTE — PATIENT INSTRUCTIONS
See the nephrologist for chronic kidney disease Stage 4  Last Creatinines 2.49, 1.86, GFRs were 18, then 26    [phone] Main number to call ask to see someone in [place] with [facility] consultants
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
## 2019-03-28 NOTE — PATIENT INSTRUCTIONS
Arrange for colonoscopy.  Stop anticoagulants 2 days prior to the procedure.  Follow-up in 6 months with a repeat blood work

## 2019-03-28 NOTE — NURSING NOTE
"Chief Complaint   Patient presents with     Deep Vein Thrombosis     follow up from starting Eliquis       Initial /56 (BP Location: Left arm)   Pulse 54   Temp 98.1  F (36.7  C) (Tympanic)   Resp 20   Wt 90.3 kg (199 lb)   SpO2 96%   BMI 37.00 kg/m   Estimated body mass index is 37 kg/m  as calculated from the following:    Height as of an earlier encounter on 3/28/19: 1.562 m (5' 1.5\").    Weight as of this encounter: 90.3 kg (199 lb).    Patient presents to the clinic using Wheel Chair    Health Maintenance that is potentially due pending provider review:  NONE    n/a    Is there anyone who you would like to be able to receive your results? No  If yes have patient fill out HELLEN    "

## 2019-03-28 NOTE — PROGRESS NOTES
"Oncology Rooming Note    March 28, 2019 11:20 AM   Stefanie Velazquez is a 74 year old female who presents for:    Chief Complaint   Patient presents with     Oncology Clinic Visit     6 month follow up rectal cancer. Review lab results.      Initial Vitals: /48 (BP Location: Right arm, Patient Position: Sitting, Cuff Size: Adult Large)   Pulse 61   Temp 98.2  F (36.8  C) (Tympanic)   Resp 18   Ht 1.562 m (5' 1.5\")   Wt 92.4 kg (203 lb 9.6 oz)   SpO2 97%   Breastfeeding? No   BMI 37.85 kg/m   Estimated body mass index is 37.85 kg/m  as calculated from the following:    Height as of this encounter: 1.562 m (5' 1.5\").    Weight as of this encounter: 92.4 kg (203 lb 9.6 oz). Body surface area is 2 meters squared.  No Pain (0) Comment: Data Unavailable   No LMP recorded. Patient is postmenopausal.  Allergies reviewed: Yes  Medications reviewed: Yes    Medications: Medication refills not needed today.  Pharmacy name entered into EPIC: [facility] - [address]    Clinical concerns: 6 month follow up rectal cancer. Review lab results. Patient is concerned with her recent leg clot, very restless legs.       Marva Del Rio, [facility]            "
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
## 2019-03-31 PROBLEM — I82.403 RECURRENT ACUTE DEEP VEIN THROMBOSIS (DVT) OF BOTH LOWER EXTREMITIES (H): Status: ACTIVE | Noted: 2019-03-31

## 2019-03-31 PROBLEM — H61.21 IMPACTED CERUMEN OF RIGHT EAR: Status: ACTIVE | Noted: 2019-03-31

## 2019-04-01 LAB
ALBUMIN SERPL-MCNC: 2.5 G/DL (ref 3.4–5)
ALP SERPL-CCNC: 100 U/L (ref 40–150)
ALT SERPL W P-5'-P-CCNC: 18 U/L (ref 0–50)
ANION GAP SERPL CALCULATED.3IONS-SCNC: 7 MMOL/L (ref 3–14)
AST SERPL W P-5'-P-CCNC: 21 U/L (ref 0–45)
BILIRUB SERPL-MCNC: 0.4 MG/DL (ref 0.2–1.3)
BUN SERPL-MCNC: 36 MG/DL (ref 7–30)
CALCIUM SERPL-MCNC: 8.5 MG/DL (ref 8.5–10.1)
CEA SERPL-MCNC: 3.9 UG/L (ref 0–2.5)
CHLORIDE SERPL-SCNC: 104 MMOL/L (ref 94–109)
CO2 SERPL-SCNC: 26 MMOL/L (ref 20–32)
CREAT SERPL-MCNC: 1.65 MG/DL (ref 0.52–1.04)
ERYTHROCYTE [DISTWIDTH] IN BLOOD BY AUTOMATED COUNT: 13 % (ref 10–15)
GFR SERPL CREATININE-BSD FRML MDRD: 30 ML/MIN/{1.73_M2}
GLUCOSE SERPL-MCNC: 283 MG/DL (ref 70–99)
HCT VFR BLD AUTO: 33.4 % (ref 35–47)
HGB BLD-MCNC: 10.5 G/DL (ref 11.7–15.7)
MCH RBC QN AUTO: 28.5 PG (ref 26.5–33)
MCHC RBC AUTO-ENTMCNC: 31.4 G/DL (ref 31.5–36.5)
MCV RBC AUTO: 91 FL (ref 78–100)
PLATELET # BLD AUTO: 174 10E9/L (ref 150–450)
POTASSIUM SERPL-SCNC: 3.9 MMOL/L (ref 3.4–5.3)
PROT SERPL-MCNC: 6.2 G/DL (ref 6.8–8.8)
RBC # BLD AUTO: 3.69 10E12/L (ref 3.8–5.2)
SODIUM SERPL-SCNC: 137 MMOL/L (ref 133–144)
WBC # BLD AUTO: 2.9 10E9/L (ref 4–11)

## 2019-04-04 ENCOUNTER — MEDICAL CORRESPONDENCE (OUTPATIENT)
Dept: HEALTH INFORMATION MANAGEMENT | Facility: CLINIC | Age: 74
End: 2019-04-04

## 2019-04-05 ENCOUNTER — TELEPHONE (OUTPATIENT)
Dept: FAMILY MEDICINE | Facility: CLINIC | Age: 74
End: 2019-04-05

## 2019-04-05 NOTE — TELEPHONE ENCOUNTER
[facility] - Plan of Care- 3/20/19-5/18/19  Form received back signed  4/5/19  Faxed Back & Sent to be scanned to this encounter  Leil[facility] Sec

## 2019-04-15 ENCOUNTER — TELEPHONE (OUTPATIENT)
Dept: FAMILY MEDICINE | Facility: CLINIC | Age: 74
End: 2019-04-15

## 2019-04-15 NOTE — TELEPHONE ENCOUNTER
Pt is to have colonoscopy. Med list to provider to review and sign before testing is done. Forms were given to Dr Morales.

## 2019-04-15 NOTE — TELEPHONE ENCOUNTER
Mell, one of Stefanie's caretakers is calling stating about a week ago she faxed over a sheet asking for instructions for Stefanie's colonoscopy on the 24th. She hasn't received anything back but is faxing it again. Needs instructions on her medications.    Becky [facility]

## 2019-04-16 ENCOUNTER — MEDICAL CORRESPONDENCE (OUTPATIENT)
Dept: HEALTH INFORMATION MANAGEMENT | Facility: CLINIC | Age: 74
End: 2019-04-16

## 2019-04-16 NOTE — TELEPHONE ENCOUNTER
Called and gave orders, then faxed.     She is high risk because of her recurrent DVT, indeterminate bleeding risk with colonoscopy (depends on if they do biopsy or not).     Will stop the Eliquis one day prior, due to chronic kidney disease should last her until colonoscopy, then restart next day.

## 2019-04-18 NOTE — TELEPHONE ENCOUNTER
Forms was signed and faxed and sent to scanning.    [facility]  [facility]

## 2019-04-22 ENCOUNTER — TELEPHONE (OUTPATIENT)
Dept: FAMILY MEDICINE | Facility: CLINIC | Age: 74
End: 2019-04-22

## 2019-04-22 NOTE — TELEPHONE ENCOUNTER
Received letter from patients insurance regarding needing a new RX because patient is on Losartan Potassium 50mg - please advise

## 2019-04-23 ENCOUNTER — ANESTHESIA EVENT (OUTPATIENT)
Dept: GASTROENTEROLOGY | Facility: CLINIC | Age: 74
End: 2019-04-23
Payer: COMMERCIAL

## 2019-04-23 ENCOUNTER — TELEPHONE (OUTPATIENT)
Dept: FAMILY MEDICINE | Facility: CLINIC | Age: 74
End: 2019-04-23

## 2019-04-23 DIAGNOSIS — E11.21 TYPE 2 DIABETES MELLITUS WITH DIABETIC NEPHROPATHY, WITH LONG-TERM CURRENT USE OF INSULIN (H): Chronic | ICD-10-CM

## 2019-04-23 DIAGNOSIS — Z79.4 TYPE 2 DIABETES MELLITUS WITH DIABETIC NEPHROPATHY, WITH LONG-TERM CURRENT USE OF INSULIN (H): Chronic | ICD-10-CM

## 2019-04-23 ASSESSMENT — LIFESTYLE VARIABLES: TOBACCO_USE: 1

## 2019-04-23 NOTE — TELEPHONE ENCOUNTER
Last time I saw her, her cardiologist stopped her losartan.  BP Readings from Last 6 Encounters:   03/28/19 112/56   03/28/19 123/48   03/06/19 91/51   02/26/19 110/52   02/24/19 122/63   02/22/19 114/62      Call the place and ask them, it should be off of her list.

## 2019-04-23 NOTE — ANESTHESIA PREPROCEDURE EVALUATION
Anesthesia Pre-Procedure Evaluation    Patient: Stefanie Velazquez   MRN: [id number] : 1945          Preoperative Diagnosis: hx of rectal cancer,   screening    Procedure(s):  COLONOSCOPY    Past Medical History:   Diagnosis Date     Acute myocardial infarction of other specified sites, episode of care unspecified 2002    MI 2 stints     Cancer of colon (H)      Chronic ischemic heart disease, unspecified 2003    cabgx3 ,  adenosine ef70%     Coronary atherosclerosis of unspecified type of vessel, native or graft     Coronary artery disease     Diabetes mellitus (H)      Esophageal reflux      Gastro-oesophageal reflux disease      Generalized osteoarthrosis, unspecified site 2005     Generalized osteoarthrosis, unspecified site 2005     HEARING LOSS CONDUCTIVE, COMBINED TYPE 2005    [place] measles as child     HYPOTHYROIDISM  2003     Mixed hyperlipidemia 2005     Obesity, unspecified 2005     RC-moderate (AHI 12, LSat 60%); REM RDI-73 2009    Sleep study [facility] was performed 09 in order to re-evaluate severity of RC. The total sleep time was 412.0 minutes. The sleep latency was decreased at 8.7 minutes with Ambien 10mg. The REM sleep latency was 426.0 minutes. Sleep efficiency was reduced at 79.0%. The sleep architecture was disrupted with frequent sleep stage changes and arousals.  Snoring:  loud. Respiratory Events:   RDI o     Stented coronary artery      Type II or unspecified type diabetes mellitus with renal manifestations, uncontrolled(250.42) (H) 2005     Type II or unspecified type diabetes mellitus with renal manifestations, uncontrolled(250.42) (H) 2005     Unspecified disorder resulting from impaired renal function 2005    CR     1.82   2005     Unspecified essential hypertension      Past Surgical History:   Procedure Laterality Date     cabg       COLECTOMY LOW ANTERIOR  2011    Procedure:COLECTOMY LOW  ANTERIOR; with loop ielostomy; Surgeon:ROBBIN MCDONNELL; Location:[facility]     COLONOSCOPY  1/26/2011    COLONOSCOPY performed by MASOUD BILL at [facility]     COLONOSCOPY N/A 3/1/2016    Procedure: COLONOSCOPY;  Surgeon: Liza Neal MD;  Location: [facility]     INSERT PORT VASCULAR ACCESS  3/2/2011    INSERT PORT VASCULAR ACCESS performed by LIZA NEAL at [facility]     OPEN REDUCTION INTERNAL FIXATION FEMUR DISTAL  2/12/2013    Procedure: OPEN REDUCTION INTERNAL FIXATION FEMUR DISTAL;  Open reduction internal fixation left femur fracture--[facility];  Surgeon: Ley, Jeffrey Duane, MD;  Location: [facility]     ORTHOPEDIC SURGERY       PHACOEMULSIFICATION WITH STANDARD INTRAOCULAR LENS IMPLANT Left 1/22/2018    Procedure: PHACOEMULSIFICATION WITH STANDARD INTRAOCULAR LENS IMPLANT;  Left cataract removal with implant;  Surgeon: Rony Key MD;  Location: [facility]     PHACOEMULSIFICATION WITH STANDARD INTRAOCULAR LENS IMPLANT Right 2/19/2018    Procedure: PHACOEMULSIFICATION WITH STANDARD INTRAOCULAR LENS IMPLANT;  Right cataract removal with implant;  Surgeon: Rony Key MD;  Location: [facility]     SIGMOIDOSCOPY FLEXIBLE  9/9/2011    Procedure:SIGMOIDOSCOPY FLEXIBLE; Performed prior to induction.; Surgeon:ROBBIN MCDONNELL; Location:[facility]     SURGICAL HISTORY OF -   10/24/2002    Heart bypass     SURGICAL HISTORY OF -   2002    R Knee arthroplasty     SURGICAL HISTORY OF -   2002    Mi 2 stints     TAKEDOWN ILEOSTOMY  9/9/2011    Procedure:TAKEDOWN ILEOSTOMY; Exploratory Laparotomy,  Loop Ileostomy Takedown, Small Bowel Resection x 2.; Surgeon:ROBBIN MCDONNELL; Location:[facility]       Anesthesia Evaluation     . Pt has had prior anesthetic. Type: General, MAC and Regional           ROS/MED HX    ENT/Pulmonary:     (+)sleep apnea, other ENT- Hearing loss, tobacco use, Past use , . .    Neurologic:     (+)neuropathy other neuro RLS    Cardiovascular:     (+) Dyslipidemia,  hypertension--CAD, -past MI,CABG-date: x3, 2002, stent,2 . : . . . :. . Previous cardiac testing Echodate:7/7/17results:Interpretation Summary     The left ventricle is normal in size.  The visual ejection fraction is estimated at 55-60%.  No regional wall motion abnormalities noted.  The right ventricle is normal in size and function.  No significant valvular heart disease.  _____________________________________________________________________________  __        Left Ventricle  The left ventricle is normal in size. There is normal left ventricular wall  thickness. The visual ejection fraction is estimated at 55-60%. No regional  wall motion abnormalities noted.     Right Ventricle  The right ventricle is normal in size and function.     Atria  Normal left atrial size. Right atrial size is normal. There is no color  Doppler evidence of an atrial shunt.     Mitral Valve  The mitral valve leaflets are mildly thickened. There is trace mitral  regurgitation.        Tricuspid Valve  There is trace tricuspid regurgitation. Normal IVC (1.5-2.5cm) with >50%  respiratory collapse; right atrial pressure is estimated at 5-10mmHg. Right  ventricular systolic pressure could not be approximated due to inadequate  tricuspid regurgitation.     Aortic Valve  There is trivial trileaflet aortic sclerosis. There is trace aortic  regurgitation.     Pulmonic Valve  There is trace pulmonic valvular regurgitation.     Vessels  Normal size aorta. The aortic root is normal size.     Pericardium  There is no pericardial effusion.        Rhythm  The rhythm was normal sinus.  _______________________________________________date: results:ECG reviewed date:2/24/19 results:SR  Poor R wave progression  T wave abnormality date: results:          METS/Exercise Tolerance:     Hematologic:     (+) History of blood clots -      Musculoskeletal:   (+) arthritis,  other musculoskeletal- DDD; Spinal stenosis      GI/Hepatic:     (+) GERD Other GI/Hepatic  "Dysphagia      Renal/Genitourinary:     (+) chronic renal disease (CKD stage 4),       Endo:     (+) type II DM Last HgA1c: 8.1 date: 1/31/19 Diabetic complications: nephropathy, thyroid problem hypothyroidism, Obesity, .      Psychiatric:  - neg psychiatric ROS       Infectious Disease:         Malignancy:   (+) Malignancy History of GI          Other:                          Physical Exam  Normal systems: cardiovascular and pulmonary    Airway   Mallampati: II  TM distance: >3 FB  Neck ROM: full    Dental   (+) upper dentures and lower dentures    Cardiovascular       Pulmonary             Lab Results   Component Value Date    WBC 2.9 (L) 03/22/2019    HGB 10.5 (L) 03/22/2019    HCT 33.4 (L) 03/22/2019     03/22/2019    SED 51 (H) 10/16/2013     03/22/2019    POTASSIUM 3.9 03/22/2019    CHLORIDE 104 03/22/2019    CO2 26 03/22/2019    BUN 36 (H) 03/22/2019    CR 1.65 (H) 03/22/2019     (H) 03/22/2019    DWIGHT 8.5 03/22/2019    PHOS 3.1 09/15/2011    MAG 2.0 11/10/2017    ALBUMIN 2.5 (L) 03/22/2019    PROTTOTAL 6.2 (L) 03/22/2019    ALT 18 03/22/2019    AST 21 03/22/2019    ALKPHOS 100 03/22/2019    BILITOTAL 0.4 03/22/2019    LIPASE 286 08/30/2016    PTT 29 09/13/2018    INR 1.15 (H) 09/13/2018    FIBR 325 06/21/2011    TSH 0.14 (L) 01/31/2019    T4 1.32 06/14/2013       Preop Vitals  BP Readings from Last 3 Encounters:   03/28/19 112/56   03/28/19 123/48   03/06/19 91/51    Pulse Readings from Last 3 Encounters:   03/28/19 54   03/28/19 61   03/06/19 55      Resp Readings from Last 3 Encounters:   03/28/19 20   03/28/19 18   02/26/19 20    SpO2 Readings from Last 3 Encounters:   03/28/19 96%   03/28/19 97%   03/06/19 96%      Temp Readings from Last 1 Encounters:   03/28/19 36.7  C (98.1  F) (Tympanic)    Ht Readings from Last 1 Encounters:   03/28/19 1.562 m (5' 1.5\")      Wt Readings from Last 1 Encounters:   03/28/19 90.3 kg (199 lb)    Estimated body mass index is 37 kg/m  as calculated from " "the following:    Height as of 3/28/19: 1.562 m (5' 1.5\").    Weight as of 3/28/19: 90.3 kg (199 lb).       Anesthesia Plan      History & Physical Review  History and physical reviewed and following examination; no interval change.    ASA Status:  4 .    NPO Status:  > 6 hours    Plan for MAC with Propofol and Intravenous induction. Reason for MAC:  Deep or markedly invasive procedure (G8)  PONV prophylaxis:  Ondansetron (or other 5HT-3) and Dexamethasone or Solumedrol       Postoperative Care  Postoperative pain management:  Multi-modal analgesia.      Consents  Anesthetic plan, risks, benefits and alternatives discussed with:  Patient..                 CLEMENCIA Karimi CRNA  "

## 2019-04-23 NOTE — TELEPHONE ENCOUNTER
She is no longer on Losartan, from cardiology visit 3/6/19:    DISCUSSION:  It was a pleasure being involved in the care of Ms. Velazquez.  I reviewed her history in detail as outlined.  Fortunately, from a cardiovascular standpoint, she seems to be doing relatively well.  We discussed her cough could be due to Cozaar, and given her very soft blood pressure and her recent fall, I think it is reasonable to stop the Cozaar and see how she does.  Her blood pressure is going to need to be monitored.  If her cough goes away, then I would not restart losartan.  If her blood pressure remains stable, then we can just keep her on the medicines that she is on and not add any additional ones.  However, If her blood pressure creeps up into the 130s, then I would like to see her back in clinic to adjust her medicines as needed.

## 2019-04-23 NOTE — TELEPHONE ENCOUNTER
Basaglar will nolonger be covered next month.  Lantus or Toujeo are preferred.  Please fax new rx for her next fill. --per [facility]  Ins ph [phone]

## 2019-04-24 ENCOUNTER — HOSPITAL ENCOUNTER (OUTPATIENT)
Facility: CLINIC | Age: 74
Discharge: HOME | End: 2019-04-24
Attending: SURGERY | Admitting: SURGERY
Payer: COMMERCIAL

## 2019-04-24 ENCOUNTER — ANESTHESIA (OUTPATIENT)
Dept: GASTROENTEROLOGY | Facility: CLINIC | Age: 74
End: 2019-04-24
Payer: COMMERCIAL

## 2019-04-24 VITALS
SYSTOLIC BLOOD PRESSURE: 107 MMHG | WEIGHT: 199 LBS | DIASTOLIC BLOOD PRESSURE: 57 MMHG | RESPIRATION RATE: 16 BRPM | TEMPERATURE: 98 F | BODY MASS INDEX: 37 KG/M2

## 2019-04-24 LAB
COLONOSCOPY: NORMAL
GLUCOSE BLDC GLUCOMTR-MCNC: 118 MG/DL (ref 70–99)

## 2019-04-24 PROCEDURE — 37000008 ZZH ANESTHESIA TECHNICAL FEE, 1ST 30 MIN: Performed by: SURGERY

## 2019-04-24 PROCEDURE — 88305 TISSUE EXAM BY PATHOLOGIST: CPT | Mod: 26 | Performed by: SURGERY

## 2019-04-24 PROCEDURE — 45385 COLONOSCOPY W/LESION REMOVAL: CPT | Performed by: SURGERY

## 2019-04-24 PROCEDURE — 82962 GLUCOSE BLOOD TEST: CPT

## 2019-04-24 PROCEDURE — 45384 COLONOSCOPY W/LESION REMOVAL: CPT | Performed by: SURGERY

## 2019-04-24 PROCEDURE — 25800030 ZZH RX IP 258 OP 636: Performed by: SURGERY

## 2019-04-24 PROCEDURE — 88305 TISSUE EXAM BY PATHOLOGIST: CPT | Performed by: SURGERY

## 2019-04-24 PROCEDURE — 25000128 H RX IP 250 OP 636: Performed by: NURSE ANESTHETIST, CERTIFIED REGISTERED

## 2019-04-24 PROCEDURE — 25000125 ZZHC RX 250: Performed by: SURGERY

## 2019-04-24 RX ORDER — PROPOFOL 10 MG/ML
INJECTION, EMULSION INTRAVENOUS PRN
Status: DISCONTINUED | OUTPATIENT
Start: 2019-04-24 | End: 2019-04-24

## 2019-04-24 RX ORDER — FLUMAZENIL 0.1 MG/ML
0.2 INJECTION, SOLUTION INTRAVENOUS
Status: DISCONTINUED | OUTPATIENT
Start: 2019-04-24 | End: 2019-04-24 | Stop reason: HOSPADM

## 2019-04-24 RX ORDER — ONDANSETRON 2 MG/ML
4 INJECTION INTRAMUSCULAR; INTRAVENOUS
Status: DISCONTINUED | OUTPATIENT
Start: 2019-04-24 | End: 2019-04-24 | Stop reason: HOSPADM

## 2019-04-24 RX ORDER — PROPOFOL 10 MG/ML
INJECTION, EMULSION INTRAVENOUS CONTINUOUS PRN
Status: DISCONTINUED | OUTPATIENT
Start: 2019-04-24 | End: 2019-04-24

## 2019-04-24 RX ORDER — NALOXONE HYDROCHLORIDE 0.4 MG/ML
.1-.4 INJECTION, SOLUTION INTRAMUSCULAR; INTRAVENOUS; SUBCUTANEOUS
Status: DISCONTINUED | OUTPATIENT
Start: 2019-04-24 | End: 2019-04-24 | Stop reason: HOSPADM

## 2019-04-24 RX ORDER — LIDOCAINE 40 MG/G
CREAM TOPICAL
Status: DISCONTINUED | OUTPATIENT
Start: 2019-04-24 | End: 2019-04-24 | Stop reason: HOSPADM

## 2019-04-24 RX ORDER — SODIUM CHLORIDE, SODIUM LACTATE, POTASSIUM CHLORIDE, CALCIUM CHLORIDE 600; 310; 30; 20 MG/100ML; MG/100ML; MG/100ML; MG/100ML
INJECTION, SOLUTION INTRAVENOUS CONTINUOUS
Status: DISCONTINUED | OUTPATIENT
Start: 2019-04-24 | End: 2019-04-24 | Stop reason: HOSPADM

## 2019-04-24 RX ADMIN — LIDOCAINE HYDROCHLORIDE 50 MG: 10 INJECTION, SOLUTION EPIDURAL; INFILTRATION; INTRACAUDAL; PERINEURAL at 11:49

## 2019-04-24 RX ADMIN — PROPOFOL 40 MG: 10 INJECTION, EMULSION INTRAVENOUS at 11:50

## 2019-04-24 RX ADMIN — PROPOFOL 100 MCG/KG/MIN: 10 INJECTION, EMULSION INTRAVENOUS at 11:50

## 2019-04-24 RX ADMIN — LIDOCAINE HYDROCHLORIDE 1 ML: 10 INJECTION, SOLUTION EPIDURAL; INFILTRATION; INTRACAUDAL; PERINEURAL at 10:31

## 2019-04-24 RX ADMIN — SODIUM CHLORIDE, POTASSIUM CHLORIDE, SODIUM LACTATE AND CALCIUM CHLORIDE: 600; 310; 30; 20 INJECTION, SOLUTION INTRAVENOUS at 10:30

## 2019-04-24 NOTE — ANESTHESIA POSTPROCEDURE EVALUATION
Patient: Stefanie Velazquez    Procedure(s):  COLONOSCOPY, WITH LESION EXCISION USING HOT BIOPSY DEVICE    Diagnosis:hx of rectal cancer,   screening  Diagnosis Additional Information: No value filed.    Anesthesia Type:  MAC    Note:  Anesthesia Post Evaluation    Patient location during evaluation: Bedside  Patient participation: Able to fully participate in evaluation  Level of consciousness: awake and alert  Pain management: adequate  Airway patency: patent  Cardiovascular status: acceptable  Respiratory status: acceptable  Hydration status: acceptable  PONV: none     Anesthetic complications: None          Last vitals:  Vitals:    04/24/19 1027   BP: 109/59   Resp: 20   Temp: 36.7  C (98  F)         Electronically Signed By: CLEMENCIA Beasley CRNA  April 24, 2019  12:12 PM

## 2019-04-24 NOTE — ANESTHESIA CARE TRANSFER NOTE
Patient: Stefanie Velazquez    Procedure(s):  COLONOSCOPY, WITH LESION EXCISION USING HOT BIOPSY DEVICE    Diagnosis: hx of rectal cancer,   screening  Diagnosis Additional Information: No value filed.    Anesthesia Type:   MAC     Note:  Airway :Room Air  Patient transferred to:Phase II  Handoff Report: Identifed the Patient, Identified the Reponsible Provider, Reviewed the pertinent medical history, Discussed the surgical course, Reviewed Intra-OP anesthesia mangement and issues during anesthesia, Set expectations for post-procedure period and Allowed opportunity for questions and acknowledgement of understanding      Vitals: (Last set prior to Anesthesia Care Transfer)    CRNA VITALS  4/24/2019 1141 - 4/24/2019 1211      4/24/2019             Pulse:  68    Ht Rate:  67    SpO2:  100 %                Electronically Signed By: CLEMENCIA Beasley CRNA  April 24, 2019  12:11 PM

## 2019-04-24 NOTE — H&P
74 year old year old female here for colonoscopy for personal history of rectal cancer in 2011.  She has some occasional blood in her stools. She is on eliquis, last took 2 days ago.  Last colonoscopy was 2016, negative.      Patient Active Problem List   Diagnosis     Hypothyroidism     bmi 40     Chronic ischemic heart disease     Generalized osteoarthrosis, unspecified site     HEARING LOSS CONDUCTIVE, COMBINED TYPE     Esophageal reflux     Osteoporosis     RC-moderate (AHI 12, LSat 60%); REM RDI-73     Neuropathy (H)     Hyperlipidemia LDL goal <100     CKD (chronic kidney disease) stage 3, GFR 30-59 ml/min (H)     Rectal cancer- newly diagnosed adenoCA rectum Jan 2011     Anemia     Type 2 diabetes mellitus with diabetic nephropathy (H)     Radiation therapy complication     Vitamin B 12 deficiency     Vitamin D deficiency     Fracture of femur, distal, left, closed (H)     Dysuria     Bowel and bladder incontinence     Cellulitis of lower extremity, bilateral     Benign essential hypertension     Health Care Home     Chronic diarrhea     Restless leg syndrome     Type 2 diabetes mellitus with diabetic nephropathy, with long-term current use of insulin (H)     Spinal stenosis of lumbar region without neurogenic claudication     DDD (degenerative disc disease), lumbar     Lymphedema of genitalia     Acute deep vein thrombosis (DVT) of right lower extremity, unspecified vein (H)     Oropharyngeal dysphagia     Bilateral hearing loss, unspecified hearing loss type     Lumbar radiculopathy     CKD (chronic kidney disease) stage 4, GFR 15-29 ml/min (H)     Impacted cerumen of right ear     Recurrent acute deep vein thrombosis (DVT) of both lower extremities (H)       Past Medical History:   Diagnosis Date     Acute myocardial infarction of other specified sites, episode of care unspecified 6/2002    MI 2 stints     Cancer of colon (H)      Chronic ischemic heart disease, unspecified 6/22/2003    cabgx3 , 2002 2/05  adenosine ef70%     Coronary atherosclerosis of unspecified type of vessel, native or graft     Coronary artery disease     Diabetes mellitus (H)      Esophageal reflux      Gastro-oesophageal reflux disease      Generalized osteoarthrosis, unspecified site 6/22/2005     Generalized osteoarthrosis, unspecified site 6/22/2005     HEARING LOSS CONDUCTIVE, COMBINED TYPE 6/22/2005    [place] measles as child     HYPOTHYROIDISM  6/22/2003     Mixed hyperlipidemia 6/22/2005     Obesity, unspecified 6/22/2005     RC-moderate (AHI 12, LSat 60%); REM RDI-73 6/1/2009    Sleep study [facility] was performed 5/26/09 in order to re-evaluate severity of RC. The total sleep time was 412.0 minutes. The sleep latency was decreased at 8.7 minutes with Ambien 10mg. The REM sleep latency was 426.0 minutes. Sleep efficiency was reduced at 79.0%. The sleep architecture was disrupted with frequent sleep stage changes and arousals.  Snoring:  loud. Respiratory Events:   RDI o     Stented coronary artery      Type II or unspecified type diabetes mellitus with renal manifestations, uncontrolled(250.42) (H) 6/22/2005     Type II or unspecified type diabetes mellitus with renal manifestations, uncontrolled(250.42) (H) 6/22/2005     Unspecified disorder resulting from impaired renal function 6/22/2005    CR     1.82   06/21/2005     Unspecified essential hypertension        Past Surgical History:   Procedure Laterality Date     cabg       COLECTOMY LOW ANTERIOR  6/21/2011    Procedure:COLECTOMY LOW ANTERIOR; with loop ielostomy; Surgeon:ROBBIN MCDONNELL; Location:[facility]     COLONOSCOPY  1/26/2011    COLONOSCOPY performed by MASOUD BILL at [facility]     COLONOSCOPY N/A 3/1/2016    Procedure: COLONOSCOPY;  Surgeon: Liza Neal MD;  Location: [facility]     INSERT PORT VASCULAR ACCESS  3/2/2011    INSERT PORT VASCULAR ACCESS performed by LIZA NEAL at [facility]     OPEN REDUCTION INTERNAL FIXATION FEMUR DISTAL  2/12/2013     Procedure: OPEN REDUCTION INTERNAL FIXATION FEMUR DISTAL;  Open reduction internal fixation left femur fracture--Anesth[facility]Choice;  Surgeon: Ley, Jeffrey Duane, MD;  Location: [facility]     ORTHOPEDIC SURGERY       PHACOEMULSIFICATION WITH STANDARD INTRAOCULAR LENS IMPLANT Left 1/22/2018    Procedure: PHACOEMULSIFICATION WITH STANDARD INTRAOCULAR LENS IMPLANT;  Left cataract removal with implant;  Surgeon: Rony Key MD;  Location: [facility]     PHACOEMULSIFICATION WITH STANDARD INTRAOCULAR LENS IMPLANT Right 2/19/2018    Procedure: PHACOEMULSIFICATION WITH STANDARD INTRAOCULAR LENS IMPLANT;  Right cataract removal with implant;  Surgeon: Rony Key MD;  Location: [facility]     SIGMOIDOSCOPY FLEXIBLE  9/9/2011    Procedure:SIGMOIDOSCOPY FLEXIBLE; Performed prior to induction.; Surgeon:ROBBIN MCDONNELL; Location: OR     SURGICAL HISTORY OF -   10/24/2002    Heart bypass     SURGICAL HISTORY OF -   2002    R Knee arthroplasty     SURGICAL HISTORY OF -   2002    Mi 2 stints     TAKEDOWN ILEOSTOMY  9/9/2011    Procedure:TAKEDOWN ILEOSTOMY; Exploratory Laparotomy,  Loop Ileostomy Takedown, Small Bowel Resection x 2.; Surgeon:ROBBIN MCDONNELL; Location: OR       Family History   Problem Relation Age of Onset     Diabetes Sister      C.A.D. Sister      Breast Cancer Sister        No current outpatient medications on file.       Allergies   Allergen Reactions     Hydrocodone Unknown     Lisinopril Cough            Vicodin [Hydrocodone-Acetaminophen] Other (See Comments)     Caused extreme dizziness       Pt reports that she has quit smoking. She has never used smokeless tobacco. She reports that she drinks alcohol. She reports that she does not use drugs.    Exam:  /59   Temp 98  F (36.7  C)   Resp 20   Wt 90.3 kg (199 lb)   Breastfeeding? No   BMI 37.00 kg/m      Awake, Alert OX3  Lungs - CTA bilaterally  CV - RRR, no murmurs, distal pulses intact  Abd - soft, non-distended, non-tender, +BS  Extr  - No cyanosis or edema    A/P 74 year old year old female in need of colonoscopy for personal history of colon cancer. Risks, benefits, alternatives, and complications were discussed including the possibility of perforation, bleeding, missed lesion and the patient agreed to proceed.    Sandor Chambers, DO on 4/24/2019 at 11:42 AM

## 2019-04-26 LAB — COPATH REPORT: NORMAL

## 2019-05-02 ENCOUNTER — OFFICE VISIT (OUTPATIENT)
Dept: FAMILY MEDICINE | Facility: CLINIC | Age: 74
End: 2019-05-02
Payer: COMMERCIAL

## 2019-05-02 VITALS
SYSTOLIC BLOOD PRESSURE: 108 MMHG | OXYGEN SATURATION: 97 % | WEIGHT: 199 LBS | BODY MASS INDEX: 37 KG/M2 | HEART RATE: 62 BPM | TEMPERATURE: 98.3 F | DIASTOLIC BLOOD PRESSURE: 64 MMHG

## 2019-05-02 DIAGNOSIS — I10 BENIGN ESSENTIAL HYPERTENSION: ICD-10-CM

## 2019-05-02 DIAGNOSIS — E78.5 HYPERLIPIDEMIA LDL GOAL <100: ICD-10-CM

## 2019-05-02 DIAGNOSIS — E11.21 TYPE 2 DIABETES MELLITUS WITH DIABETIC NEPHROPATHY, WITH LONG-TERM CURRENT USE OF INSULIN (H): Primary | Chronic | ICD-10-CM

## 2019-05-02 DIAGNOSIS — H91.93 BILATERAL HEARING LOSS, UNSPECIFIED HEARING LOSS TYPE: ICD-10-CM

## 2019-05-02 DIAGNOSIS — I82.403 RECURRENT ACUTE DEEP VEIN THROMBOSIS (DVT) OF BOTH LOWER EXTREMITIES (H): ICD-10-CM

## 2019-05-02 DIAGNOSIS — C20 RECTAL CANCER (H): ICD-10-CM

## 2019-05-02 DIAGNOSIS — E03.8 OTHER SPECIFIED HYPOTHYROIDISM: ICD-10-CM

## 2019-05-02 DIAGNOSIS — Z79.4 TYPE 2 DIABETES MELLITUS WITH DIABETIC NEPHROPATHY, WITH LONG-TERM CURRENT USE OF INSULIN (H): Primary | Chronic | ICD-10-CM

## 2019-05-02 DIAGNOSIS — N18.4 CKD (CHRONIC KIDNEY DISEASE) STAGE 4, GFR 15-29 ML/MIN (H): ICD-10-CM

## 2019-05-02 LAB
HBA1C MFR BLD: 8.7 % (ref 0–5.6)
TSH SERPL DL<=0.005 MIU/L-ACNC: 0.48 MU/L (ref 0.4–4)

## 2019-05-02 PROCEDURE — 99214 OFFICE O/P EST MOD 30 MIN: CPT | Performed by: FAMILY MEDICINE

## 2019-05-02 PROCEDURE — 36415 COLL VENOUS BLD VENIPUNCTURE: CPT | Performed by: FAMILY MEDICINE

## 2019-05-02 PROCEDURE — 84443 ASSAY THYROID STIM HORMONE: CPT | Performed by: FAMILY MEDICINE

## 2019-05-02 PROCEDURE — 83036 HEMOGLOBIN GLYCOSYLATED A1C: CPT | Performed by: FAMILY MEDICINE

## 2019-05-02 NOTE — NURSING NOTE
"Chief Complaint   Patient presents with     Diabetes     recheck       Initial /64 (BP Location: Right arm)   Pulse 62   Temp 98.3  F (36.8  C) (Tympanic)   Wt 90.3 kg (199 lb)   SpO2 97%   BMI 37.00 kg/m   Estimated body mass index is 37 kg/m  as calculated from the following:    Height as of 3/28/19: 1.562 m (5' 1.5\").    Weight as of this encounter: 90.3 kg (199 lb).    Patient presents to the clinic using Wheel Chair    Health Maintenance that is potentially due pending provider review:  NONE    n/a    Is there anyone who you would like to be able to receive your results? No  If yes have patient fill out HELLEN    "

## 2019-05-02 NOTE — PATIENT INSTRUCTIONS
We'll increase the short acting insulin a little    See the foot doctor    See you back in 3 months

## 2019-05-02 NOTE — PROGRESS NOTES
SUBJECTIVE:   Stefanie Velazquez is a 74 year old female who presents to clinic today for the following   health issues:      Diabetes Follow-up      Patient is checking blood sugars: 4 x daily    Diabetic concerns: blood sugar frequently over 200     Symptoms of hypoglycemia (low blood sugar): none     Paresthesias (numbness or burning in feet) or sores: Yes      Date of last diabetic eye exam: 2018    Blood sugars are still up at lunch and dinner. They are good in am.   She wonders if she can get new diabetic shoes    Lab Results   Component Value Date    A1C 8.1 01/31/2019    A1C 8.0 10/30/2018    A1C 8.5 07/20/2018    A1C 7.3 03/20/2018    A1C 7.1 11/10/2017     On insulin   Diabetes Management Resources    Hyperlipidemia Follow-Up      Rate your low fat/cholesterol diet?: fair    Taking statin?  Yes, no muscle aches from statin    Other lipid medications/supplements?:  None      Recent Labs   Lab Test 03/01/19  0742 08/31/18  0905  09/16/15  1130 03/23/15  0926   CHOL 133 185   < > 120 112   HDL 46* 51   < > 46* 44*   LDL 44 107*   < > 50 41   TRIG 215* 134   < > 118 134   CHOLHDLRATIO  --   --   --  2.6 2.5    < > = values in this interval not displayed.      Hypertension Follow-up      Outpatient blood pressures are being checked at home.  Results are 130/80.    Low Salt Diet: low salt    BP Readings from Last 2 Encounters:   05/02/19 108/64   04/24/19 107/57     Hemoglobin A1C (%)   Date Value   05/02/2019 8.7 (H)   01/31/2019 8.1 (H)     LDL Cholesterol Calculated (mg/dL)   Date Value   03/01/2019 44   08/31/2018 107 (H)       Amount of exercise or physical activity: None    Problems taking medications regularly: No    Medication side effects: none    Diet: diabetic    Rectal cancer-followed by oncology. Just had a colonoscopy 4/24/19, she had 3 polyps removed.     DVT-recurrent, is on Eliquis. Followed by hematology.    Hypothyroidism-on replacement  TSH   Date Value Ref Range Status   05/02/2019 0.48 0.40  - 4.00 mU/L Final      Chronic kidney disease-she was at stage 4 more recently improved to GFR of 30. She was referred to nephrology.     Additional history: as documented    Reviewed  and updated as needed this visit by clinical staff  Tobacco  Allergies  Meds  Problems  Med Hx  Surg Hx  Fam Hx         Reviewed and updated as needed this visit by Provider  Tobacco  Allergies  Meds  Problems  Med Hx  Surg Hx  Fam Hx         BP Readings from Last 3 Encounters:   05/02/19 108/64   04/24/19 107/57   03/28/19 112/56    Wt Readings from Last 3 Encounters:   05/02/19 90.3 kg (199 lb)   04/24/19 90.3 kg (199 lb)   03/28/19 90.3 kg (199 lb)                    ROS:  CONSTITUTIONAL: NEGATIVE for fever, chills, change in weight  ENT/MOUTH: NEGATIVE for ear, mouth and throat problems  RESP: NEGATIVE for significant cough or SOB  CV: NEGATIVE for chest pain, palpitations or peripheral edema  GI: negative, diarrhea has resolved  : No dysuria, urinary frequency or urgency.     OBJECTIVE:                                                    /64 (BP Location: Right arm)   Pulse 62   Temp 98.3  F (36.8  C) (Tympanic)   Wt 90.3 kg (199 lb)   SpO2 97%   BMI 37.00 kg/m    Body mass index is 37 kg/m .  GENERAL: healthy, alert, well nourished, well hydrated, no distress  HENT:edentulous  NECK: no tenderness, no adenopathy, no asymmetry, no masses, no stiffness; thyroid- normal to palpation  RESP: lungs clear to auscultation - no rales, no rhonchi, no wheezes  CV: regular rates and rhythm, normal S1 S2, no S3 or S4 and 1-2/6 systolic ejection murmur, no click or rub -  ABDOMEN: soft, no tenderness, no  hepatosplenomegaly, no masses, normal bowel sounds  MS: extremities- compression stockings in placee       ASSESSMENT/PLAN:                                                      ASSESSMENT:  1. Type 2 diabetes mellitus with diabetic nephropathy, with long-term current use of insulin (H)    2. Recurrent acute deep vein  thrombosis (DVT) of both lower extremities (H)    3. Benign essential hypertension    4. Rectal cancer (H)    5. CKD (chronic kidney disease) stage 4, GFR 15-29 ml/min (H)    6. Bilateral hearing loss, unspecified hearing loss type    7. Other specified hypothyroidism    8. Hyperlipidemia LDL goal <100        PLAN:  Orders Placed This Encounter     Hemoglobin A1c     TSH     PODIATRY/FOOT & ANKLE SURGERY REFERRAL       Patient Instructions   We'll increase the short acting insulin a little    See the foot doctor    See you back in 3 months     Sandra Morales MD  [facility]

## 2019-05-07 ENCOUNTER — MEDICAL CORRESPONDENCE (OUTPATIENT)
Dept: HEALTH INFORMATION MANAGEMENT | Facility: CLINIC | Age: 74
End: 2019-05-07

## 2019-05-07 ENCOUNTER — TELEPHONE (OUTPATIENT)
Dept: FAMILY MEDICINE | Facility: CLINIC | Age: 74
End: 2019-05-07

## 2019-05-07 NOTE — TELEPHONE ENCOUNTER
Janessa- [facility] & Urine smelling strong, cloudy & Fatigued.  Form received back signed  5/7/19  Faxed Back & Sent to be scanned to this encounter  [facility]

## 2019-05-08 ENCOUNTER — TRANSFERRED RECORDS (OUTPATIENT)
Dept: HEALTH INFORMATION MANAGEMENT | Facility: CLINIC | Age: 74
End: 2019-05-08

## 2019-05-08 ENCOUNTER — HOSPITAL LABORATORY (OUTPATIENT)
Facility: OTHER | Age: 74
End: 2019-05-08

## 2019-05-08 LAB
ALBUMIN UR-MCNC: NEGATIVE MG/DL
APPEARANCE UR: CLEAR
BILIRUB UR QL STRIP: NEGATIVE
COLOR UR AUTO: ABNORMAL
GLUCOSE UR STRIP-MCNC: NEGATIVE MG/DL
HGB UR QL STRIP: NEGATIVE
KETONES UR STRIP-MCNC: NEGATIVE MG/DL
LEUKOCYTE ESTERASE UR QL STRIP: ABNORMAL
NITRATE UR QL: NEGATIVE
PH UR STRIP: 7 PH (ref 5–7)
RBC #/AREA URNS AUTO: 1 /HPF (ref 0–2)
SOURCE: ABNORMAL
SP GR UR STRIP: 1.01 (ref 1–1.03)
UROBILINOGEN UR STRIP-MCNC: 0 MG/DL (ref 0–2)
WBC #/AREA URNS AUTO: 88 /HPF (ref 0–5)

## 2019-05-09 ENCOUNTER — OFFICE VISIT (OUTPATIENT)
Dept: PODIATRY | Facility: CLINIC | Age: 74
End: 2019-05-09
Attending: FAMILY MEDICINE
Payer: COMMERCIAL

## 2019-05-09 ENCOUNTER — MEDICAL CORRESPONDENCE (OUTPATIENT)
Dept: HEALTH INFORMATION MANAGEMENT | Facility: CLINIC | Age: 74
End: 2019-05-09

## 2019-05-09 ENCOUNTER — TELEPHONE (OUTPATIENT)
Dept: FAMILY MEDICINE | Facility: CLINIC | Age: 74
End: 2019-05-09

## 2019-05-09 VITALS
WEIGHT: 199 LBS | DIASTOLIC BLOOD PRESSURE: 60 MMHG | BODY MASS INDEX: 37 KG/M2 | HEART RATE: 54 BPM | SYSTOLIC BLOOD PRESSURE: 138 MMHG

## 2019-05-09 DIAGNOSIS — M20.41 HAMMER TOES OF BOTH FEET: ICD-10-CM

## 2019-05-09 DIAGNOSIS — N30.00 ACUTE CYSTITIS WITHOUT HEMATURIA: Primary | ICD-10-CM

## 2019-05-09 DIAGNOSIS — E11.43 TYPE II DIABETES MELLITUS WITH PERIPHERAL AUTONOMIC NEUROPATHY (H): Primary | ICD-10-CM

## 2019-05-09 DIAGNOSIS — M20.42 HAMMER TOES OF BOTH FEET: ICD-10-CM

## 2019-05-09 PROCEDURE — 99213 OFFICE O/P EST LOW 20 MIN: CPT | Performed by: PODIATRIST

## 2019-05-09 RX ORDER — CIPROFLOXACIN 250 MG/1
250 TABLET, FILM COATED ORAL 2 TIMES DAILY
Qty: 10 TABLET | Refills: 0 | Status: CANCELLED | OUTPATIENT
Start: 2019-05-09 | End: 2019-05-14

## 2019-05-09 RX ORDER — CIPROFLOXACIN 500 MG/1
500 TABLET, FILM COATED ORAL DAILY
Qty: 5 TABLET | Refills: 0 | Status: ON HOLD | OUTPATIENT
Start: 2019-05-09 | End: 2019-05-23

## 2019-05-09 NOTE — PROGRESS NOTES
PATIENT HISTORY:  Stefanie Velazquez is a 74 year old female who presents to clinic for a diabetic foot evaluation.  The patient relates pain with ambulation in shoe gear.  The patient relates that the nails are difficult to trim at home.  The patient relates blood sugars are within normal limits.  The patient denies any redness, swelling or open sores on both feet.        REVIEW OF SYSTEMS:  Constitutional, HEENT, cardiovascular, pulmonary, GI, , musculoskeletal, neuro, skin, endocrine and psych systems are negative, except as otherwise noted.     PAST MEDICAL HISTORY:   Past Medical History:   Diagnosis Date     Acute myocardial infarction of other specified sites, episode of care unspecified 6/2002    MI 2 stints     Cancer of colon (H)      Chronic ischemic heart disease, unspecified 6/22/2003    cabgx3 , 2002 2/05 adenosine ef70%     Coronary atherosclerosis of unspecified type of vessel, native or graft     Coronary artery disease     Diabetes mellitus (H)      Esophageal reflux      Gastro-oesophageal reflux disease      Generalized osteoarthrosis, unspecified site 6/22/2005     Generalized osteoarthrosis, unspecified site 6/22/2005     HEARING LOSS CONDUCTIVE, COMBINED TYPE 6/22/2005    [place] measles as child     HYPOTHYROIDISM  6/22/2003     Mixed hyperlipidemia 6/22/2005     Obesity, unspecified 6/22/2005     RC-moderate (AHI 12, LSat 60%); REM RDI-73 6/1/2009    Sleep study [facility] was performed 5/26/09 in order to re-evaluate severity of RC. The total sleep time was 412.0 minutes. The sleep latency was decreased at 8.7 minutes with Ambien 10mg. The REM sleep latency was 426.0 minutes. Sleep efficiency was reduced at 79.0%. The sleep architecture was disrupted with frequent sleep stage changes and arousals.  Snoring:  loud. Respiratory Events:   RDI o     Stented coronary artery      Type II or unspecified type diabetes mellitus with renal manifestations, uncontrolled(250.42) (H) 6/22/2005     Type  II or unspecified type diabetes mellitus with renal manifestations, uncontrolled(250.42) (H) 6/22/2005     Unspecified disorder resulting from impaired renal function 6/22/2005    CR     1.82   06/21/2005     Unspecified essential hypertension         PAST SURGICAL HISTORY:   Past Surgical History:   Procedure Laterality Date     cabg       COLECTOMY LOW ANTERIOR  6/21/2011    Procedure:COLECTOMY LOW ANTERIOR; with loop ielostomy; Surgeon:ROBBIN MCDONNELL; Location[facility]     COLONOSCOPY  1/26/2011    COLONOSCOPY performed by MASOUD BILL at [facility]     COLONOSCOPY N/A 3/1/2016    Procedure: COLONOSCOPY;  Surgeon: Liza Neal MD;  Location: [facility]     INSERT PORT VASCULAR ACCESS  3/2/2011    INSERT PORT VASCULAR ACCESS performed by LIZA NEAL at [facility]     OPEN REDUCTION INTERNAL FIXATION FEMUR DISTAL  2/12/2013    Procedure: OPEN REDUCTION INTERNAL FIXATION FEMUR DISTAL;  Open reduction internal fixation left femur fracture--Anesth[facility]Choice;  Surgeon: Ley, Jeffrey Duane, MD;  Location: [facility]     ORTHOPEDIC SURGERY       PHACOEMULSIFICATION WITH STANDARD INTRAOCULAR LENS IMPLANT Left 1/22/2018    Procedure: PHACOEMULSIFICATION WITH STANDARD INTRAOCULAR LENS IMPLANT;  Left cataract removal with implant;  Surgeon: Rony Key MD;  Location: [facility]     PHACOEMULSIFICATION WITH STANDARD INTRAOCULAR LENS IMPLANT Right 2/19/2018    Procedure: PHACOEMULSIFICATION WITH STANDARD INTRAOCULAR LENS IMPLANT;  Right cataract removal with implant;  Surgeon: Rony Key MD;  Location: [facility]     SIGMOIDOSCOPY FLEXIBLE  9/9/2011    Procedure:SIGMOIDOSCOPY FLEXIBLE; Performed prior to induction.; Surgeon:ROBBIN MCDONNELL; Location: OR     SURGICAL HISTORY OF -   10/24/2002    Heart bypass     SURGICAL HISTORY OF -   2002    R Knee arthroplasty     SURGICAL HISTORY OF -   2002    Mi 2 stints     TAKEDOWN ILEOSTOMY  9/9/2011    Procedure:TAKEDOWN ILEOSTOMY; Exploratory Laparotomy,   Loop Ileostomy Takedown, Small Bowel Resection x 2.; Surgeon:ROBBIN MCDONNELL; Location[facility]        MEDICATIONS:   Current Outpatient Medications:      ACCU-CHEK MILVIA MELODY, dispense one meter, Disp: 1 device, Rfl: 0     acetaminophen (TYLENOL) 650 MG CR tablet, Take 1 tablet (650 mg) by mouth 3 times daily as needed for mild pain or fever, Disp: 60 tablet, Rfl:      apixaban ANTICOAGULANT (ELIQUIS) 5 MG tablet, Take 1 tablet (5 mg) by mouth 2 times daily, Disp: 60 tablet, Rfl: 5     blood glucose monitoring (ACCU-CHEK MILVIA) test strip, Use to test blood sugars 4 times daily or as directed., Disp: 360 each, Rfl: 1     blood glucose monitoring (ACCU-CHEK MULTICLIX) lancets, Test BG 4 times daily, Disp: 1 Box, Rfl: 11     BUTT PASTE - REGULAR (DR LOVE POOP GOOP BUTT PASTE FORMULA), Apply topically every hour as needed for skin protection, Disp: , Rfl:      cholecalciferol 1000 units TABS, Take 1,000 Units by mouth daily, Disp: , Rfl:      ciprofloxacin (CIPRO) 500 MG tablet, Take 1 tablet (500 mg) by mouth daily for 5 days, Disp: 5 tablet, Rfl: 0     fluocinonide (LIDEX) 0.05 % ointment, Apply sparingly to rash on legs twice daily as needed.  Do not apply to face., Disp: 60 g, Rfl: 11     fluticasone (FLONASE) 50 MCG/ACT spray, Spray 1 spray into both nostrils daily, Disp: 1 Bottle, Rfl: 3     furosemide (LASIX) 20 MG tablet, Take 1 tablet (20 mg) by mouth 2 times daily, Disp: 180 tablet, Rfl: 3     gabapentin (NEURONTIN) 300 MG capsule, Take 1 tablet (300 mg) at bedtime, Disp: 90 capsule, Rfl: 1     insulin aspart (NOVOLOG FLEXPEN) 100 UNIT/ML pen, 10 units with breakfast, 10 units with lunch and 10 units with supper, Disp: 15 mL, Rfl: 1     insulin glargine (LANTUS SOLOSTAR PEN) 100 UNIT/ML pen, Inject 20 Units Subcutaneous At Bedtime, Disp: 18 mL, Rfl: 3     insulin pen needle (BD GABRIELLA U/F) 32G X 4 MM, Use 4 times per day or as directed., Disp: 120 each, Rfl: 5     levothyroxine (SYNTHROID/LEVOTHROID) 88 MCG  tablet, Take 88 mcg by mouth daily, Disp: , Rfl:      loperamide (IMODIUM) 2 MG capsule, One capsule once a day PRN, can use a second one if needed, Disp: 90 capsule, Rfl: 3     magnesium 250 MG tablet, Take 1 tablet by mouth daily, Disp: , Rfl:      menthol-zinc oxide (CALMOSEPTINE) 0.44-20.625 % OINT ointment, Apply topically 4 times daily as needed for skin protection, Disp: , Rfl:      metoprolol succinate ER (TOPROL-XL) 50 MG 24 hr tablet, Take 50 mg by mouth daily , Disp: 90 tablet, Rfl: 1     nystatin (MYCOSTATIN) 569949 UNIT/GM external powder, Apply topically 2 times daily Until resolved then to use as needed., Disp: 60 g, Rfl: 2     OMEPRAZOLE PO, Take 20 mg by mouth daily as needed , Disp: , Rfl:      order for DME, Equipment being ordered: Compression stockings, Disp: 2 Device, Rfl: 0     order for DME, Equipment being ordered: Hospital Bed service and repair, Disp: 1 each, Rfl: 0     Potassium Gluconate 595 MG CAPS, Take 1 tablet by mouth daily, Disp: , Rfl:      rOPINIRole (REQUIP) 1 MG tablet, 1-3 tabs daily as needed, patient takes one in am, one in pm. occasionally will take in midday., Disp: 200 tablet, Rfl: 3     simvastatin (ZOCOR) 40 MG tablet, Take 1 tablet (40 mg) by mouth daily, Disp: 90 tablet, Rfl: 2     ALLERGIES:    Allergies   Allergen Reactions     Hydrocodone Unknown     Lisinopril Cough            Vicodin [Hydrocodone-Acetaminophen] Other (See Comments)     Caused extreme dizziness        SOCIAL HISTORY:   Social History     Socioeconomic History     Marital status: Single     Spouse name: Not on file     Number of children: Not on file     Years of education: Not on file     Highest education level: Not on file   Occupational History     Not on file   Social Needs     Financial resource strain: Not on file     Food insecurity:     Worry: Not on file     Inability: Not on file     Transportation needs:     Medical: Not on file     Non-medical: Not on file   Tobacco Use     Smoking  status: Former Smoker     Smokeless tobacco: Never Used   Substance and Sexual Activity     Alcohol use: Yes     Comment: rare social use     Drug use: No     Sexual activity: Never   Lifestyle     Physical activity:     Days per week: Not on file     Minutes per session: Not on file     Stress: Not on file   Relationships     Social connections:     Talks on phone: Not on file     Gets together: Not on file     Attends Worship service: Not on file     Active member of club or organization: Not on file     Attends meetings of clubs or organizations: Not on file     Relationship status: Not on file     Intimate partner violence:     Fear of current or ex partner: Not on file     Emotionally abused: Not on file     Physically abused: Not on file     Forced sexual activity: Not on file   Other Topics Concern     Parent/sibling w/ CABG, MI or angioplasty before 65F 55M? Yes   Social History Narrative     Not on file        FAMILY HISTORY:   Family History   Problem Relation Age of Onset     Diabetes Sister      C.A.D. Sister      Breast Cancer Sister         EXAM:Vitals: /60   Pulse 54   Wt 90.3 kg (199 lb)   BMI 37.00 kg/m    BMI= Body mass index is 37 kg/m .    Weight management plan: Patient was referred to their PCP to discuss a diet and exercise plan.    General appearance: Patient is alert and fully cooperative with history & exam.  No sign of distress is noted during the visit.     Psychiatric: Affect is pleasant & appropriate.  Patient appears motivated to improve health.     Respiratory: Breathing is regular & unlabored while sitting.     HEENT: Hearing is intact to spoken word.  Speech is clear.  No gross evidence of visual impairment that would impact ambulation.     Dermatologic: Skin is intact to both lower extremities without significant lesions, rash or abrasion.  No paronychia or evidence of soft tissue infection is noted.  The nails are hypertrophic and discolored.     Vascular: DP & PT  pulses are intact & regular bilaterally.  No significant edema or varicosities noted.  CFT and skin temperature is normal to both lower extremities.  There are no varicosities, no edema and no trophic changes noted.      Neurologic: Lower extremity sensation is intact to light touch.  No evidence of weakness or contracture in the lower extremities.  Noted evidence of neuropathy with diminished sensation bilaterally.       Musculoskeletal: Patient is ambulatory without assistive device or brace.  No gross ankle deformity noted.  No foot or ankle joint effusion is noted.  One notes a hammertoe contracture of the second digit bilaterally.       Assessment:  1.  Diabetes Mellitus and Peripheral Neuropathy.    2.  Hammertoe deformity.         Plan:  I have explained to the patient the condition.  I have discussed with the patient the importance of diabetic foot care.  The patient was referred to [facility] for custom diabetic shoes with repair with accommodative inserts that will aid in offloading the tension forces to the soft tissues and prevent further risk of amputation..            VELMA Ramos D.P.M., [email or web address].

## 2019-05-09 NOTE — PATIENT INSTRUCTIONS
"DIABETES AND YOUR FEET  Diabetes can result in several problems in the feet including ulcers (open sores) and amputations. Two of the most important reasons why people develop foot problems when they have diabetes is : 1. Neuropathy (loss of feeling)  2. Vascular disease (loss or decrease of blood flow).    Neuropathy is a term used to describe a loss of nerve function.  Patients with diabetes are at risk of developing neuropathy if their sugars continue to run high and are above the normal value. One theory for neuropathy is that the \"extra\" sugar in the body enters the nerves and is broken down. These by-products build up in the nerve causing it to swell and impairing nerve function. Often times, this can be prevented by controlling your sugars, dieting and exercise.    When a person develops neuropathy, they usually begin to feel numbness or tingling in their feet and sometime in their legs.  Other symptoms may include painful burning or hot feet, tingling or feeling like insects or ants are crawling on your feet or legs.  If the diabetes is sever and the sugars run high for long periods of time, neuropathy can also occur in the hands.    Vascular disease  is a term used to describe a loss or decrease in circulation (blood flow). There is a problem in getting blood and oxygen to areas that need it. Similar to neuropathy, sugars can build up in the walls of the arteries (blood vessels) and cause them to become swollen, thickened and hardened. This decreases the amount of blood that can go to an area that needs it. Though this is common in the legs of diabetic patients, it can also affect other arteries (blood vessels) in the body such as in the heart and eyes.    In the legs, vascular disease usually results in cramping. Patients who develop leg cramps after walking the same distance every time (i.e. One block, half a mile, ect.) need to let their doctors know so that their circulation may be checked. Cramps " causing severe pain in the feet and/or legs while sleeping and the cramps go away when you stand or hang your legs off the side of the bed, may also be a sign of poor blood circulation.  Occasional cramping in cold weather or on rare occasions with activity may not be due to poor circulation, but you should inform your doctor.    PREVENTION OF THESE DISEASES  The key to prevention is good blood sugar control. Poor blood sugar control is a big reason many of these problems start. Physical activity (exercise) is a very good way to help decrease your blood sugars. Exercise can lower your blood sugar, blood pressure, and cholesterol. It also reduces your risk for heart disease and stroke, relieves stress, and strengthens your heart, muscles and bones.  In addition, regular activity helps insulin work better, improves your blood circulation, and keeps your joints flexible. If you're trying to lose weight, a combination of exercise and wise food choices can help you reach your target weight and maintain it.      PAIN MANAGEMENT  1.Blood Sugar Control - Most important  2. Medications such as:  Amytriptylline, duloxetine, gabapentin, lyrica, tramadol  3. Nutritional therapy:  Vitamin B6 (100mg daily), Vitamin B12 (75mcg daily), Vitamin D 2000 IU daily), Alpha-Lipoic Acid (600-1800mg daily), Acetyl-L-Carnitine (500-1000mg TID, L-methyl folate (1500mcg daily)    ** Metformin can block Vitamin B6 and B12 so it is important to supplement**    FOOT CARE RECOMMENDATIONS   1. Wash your feet with lukewarm water and a mild soap and then dry them thoroughly, especially between the toes.     2. Examine your feet daily looking for cuts, corns, blisters, cracks, ect, especially after wearing new shoes. Make sure to look between your toes. If you cannot see the bottom of your feet, set a mirror on the floor and hold your foot over it, or ask a spouse, friend or family member to examine your feet for you. Contact your doctor immediately  if new problems are noted or if sores are not healing.     3. Immediately apply moisturizer to the tops and bottoms of your feet, avoiding areas between the toes. Hand lotion (Intesive Care, Fátima, Eucerin, Neutrogena, Curel, ect) is sufficient unless your doctor prescribes a medicated lotion. Apply sunscreen to your feet when going swimming outside.     4. Use clean comfortable shoes, wear white socks (if you have any bleeding or drainage, you will see it on white socks). Socks should not have thick seams or cut off the circulation around the leg. Break in new shoes slowly and rotate with older shoes until broken in. Check the inside of your shoes with your hand to look for areas of irritation or objects that may have fallen into your shoes.       5. Keep slippers by the side of your bed for use during the night.     6.  Shoes should be fitted by a professional and should not cause areas of irritation.  Check your feet regularly when wearing a new pair of shoes and replace them as needed.     7.  Talk to your doctor about proper exercise. Exercise and stretching stimulate blood flow to your feet and maintain proper glucose levels.     8.  Monitor your blood glucose level as instructed by your doctor. Notify your doctor immediately if your blood sugar is abnormally high or low.    9. Cut your nails straight across, but then gently round any sharp edges with a cardboard nail file. If you have neuropathy, peripheral vascular disease or cannot see that well to trim your own toenails contact [facility] ([phone]) or [facility] [employer] ([phone]).      THINGS TO AVOID DOING   1.  Do not soak your feet if you have an open sore. Use only lukewarm water and always check the temperature with your hand as hot water can easily burn your feet.       2.  Never use a hot water bottle or heating pad on your feet. Also do not apply cold compresses to your feet. With decreased sensation, you could burn or freeze your feet.        3.  Do not apply any of these to your feet:    -  Over the counter medicine for corns or warts    -  Harsh chemicals like boric acid    -  Do not self-treat corns, cuts, blisters or infections. Always consult your doctor.       4.  Do not wear sandals, slippers or walk barefoot, especially on hot sand or concrete or other harsh surfaces.     5.  If you smoke, stop!!!

## 2019-05-09 NOTE — TELEPHONE ENCOUNTER
[facility]- Discharge from Agency Summary  Form placed on Provider [employer] desk for [facility].  [facility]

## 2019-05-09 NOTE — LETTER
5/9/2019         RE: Stefanie Velazquez  [address] [address] [place] [id number]        Dear Colleague,    Thank you for referring your patient, Stefanie Velazquez, to the [facility]. Please see a copy of my visit note below.    PATIENT HISTORY:  Stefanie Velazquez is a 74 year old female who presents to clinic for a diabetic foot evaluation.  The patient relates pain with ambulation in shoe gear.  The patient relates that the nails are difficult to trim at home.  The patient relates blood sugars are within normal limits.  The patient denies any redness, swelling or open sores on both feet.        REVIEW OF SYSTEMS:  Constitutional, HEENT, cardiovascular, pulmonary, GI, , musculoskeletal, neuro, skin, endocrine and psych systems are negative, except as otherwise noted.     PAST MEDICAL HISTORY:   Past Medical History:   Diagnosis Date     Acute myocardial infarction of other specified sites, episode of care unspecified 6/2002    MI 2 stints     Cancer of colon (H)      Chronic ischemic heart disease, unspecified 6/22/2003    cabgx3 , 2002 2/05 adenosine ef70%     Coronary atherosclerosis of unspecified type of vessel, native or graft     Coronary artery disease     Diabetes mellitus (H)      Esophageal reflux      Gastro-oesophageal reflux disease      Generalized osteoarthrosis, unspecified site 6/22/2005     Generalized osteoarthrosis, unspecified site 6/22/2005     HEARING LOSS CONDUCTIVE, COMBINED TYPE 6/22/2005    Irish measles as child     HYPOTHYROIDISM  6/22/2003     Mixed hyperlipidemia 6/22/2005     Obesity, unspecified 6/22/2005     RC-moderate (AHI 12, LSat 60%); REM RDI-73 6/1/2009    Sleep study [facility] was performed 5/26/09 in order to re-evaluate severity of RC. The total sleep time was 412.0 minutes. The sleep latency was decreased at 8.7 minutes with Ambien 10mg. The REM sleep latency was 426.0 minutes. Sleep efficiency was reduced at 79.0%. The sleep architecture  was disrupted with frequent sleep stage changes and arousals.  Snoring:  loud. Respiratory Events:   RDI o     Stented coronary artery      Type II or unspecified type diabetes mellitus with renal manifestations, uncontrolled(250.42) (H) 6/22/2005     Type II or unspecified type diabetes mellitus with renal manifestations, uncontrolled(250.42) (H) 6/22/2005     Unspecified disorder resulting from impaired renal function 6/22/2005    CR     1.82   06/21/2005     Unspecified essential hypertension         PAST SURGICAL HISTORY:   Past Surgical History:   Procedure Laterality Date     cabg       COLECTOMY LOW ANTERIOR  6/21/2011    Procedure:COLECTOMY LOW ANTERIOR; with loop ielostomy; Surgeon:ROBBIN MCDONNELL; Location:[facility]     COLONOSCOPY  1/26/2011    COLONOSCOPY performed by MASOUD BILL at [facility]     COLONOSCOPY N/A 3/1/2016    Procedure: COLONOSCOPY;  Surgeon: Liza Neal MD;  Location: [facility]     INSERT PORT VASCULAR ACCESS  3/2/2011    INSERT PORT VASCULAR ACCESS performed by LIZA NEAL at [facility]     OPEN REDUCTION INTERNAL FIXATION FEMUR DISTAL  2/12/2013    Procedure: OPEN REDUCTION INTERNAL FIXATION FEMUR DISTAL;  Open reduction internal fixation left femur fracture--[facility];  Surgeon: Ley, Jeffrey Duane, MD;  Location: [facility]     ORTHOPEDIC SURGERY       PHACOEMULSIFICATION WITH STANDARD INTRAOCULAR LENS IMPLANT Left 1/22/2018    Procedure: PHACOEMULSIFICATION WITH STANDARD INTRAOCULAR LENS IMPLANT;  Left cataract removal with implant;  Surgeon: Rony Key MD;  Location: [facility]     PHACOEMULSIFICATION WITH STANDARD INTRAOCULAR LENS IMPLANT Right 2/19/2018    Procedure: PHACOEMULSIFICATION WITH STANDARD INTRAOCULAR LENS IMPLANT;  Right cataract removal with implant;  Surgeon: Rony Key MD;  Location: [facility]     SIGMOIDOSCOPY FLEXIBLE  9/9/2011    Procedure:SIGMOIDOSCOPY FLEXIBLE; Performed prior to induction.; Surgeon:ROBBIN MCDONNELL;  Location:[facility]     SURGICAL HISTORY OF -   10/24/2002    Heart bypass     SURGICAL HISTORY OF -   2002    R Knee arthroplasty     SURGICAL HISTORY OF -   2002    Mi 2 stints     TAKEDOWN ILEOSTOMY  9/9/2011    Procedure:TAKEDOWN ILEOSTOMY; Exploratory Laparotomy,  Loop Ileostomy Takedown, Small Bowel Resection x 2.; Surgeon:ROBBIN MCDONNELL; Location:[facility]        MEDICATIONS:   Current Outpatient Medications:      ACCU-CHEK MILVIA MELODY, dispense one meter, Disp: 1 device, Rfl: 0     acetaminophen (TYLENOL) 650 MG CR tablet, Take 1 tablet (650 mg) by mouth 3 times daily as needed for mild pain or fever, Disp: 60 tablet, Rfl:      apixaban ANTICOAGULANT (ELIQUIS) 5 MG tablet, Take 1 tablet (5 mg) by mouth 2 times daily, Disp: 60 tablet, Rfl: 5     blood glucose monitoring (ACCU-CHEK MILVIA) test strip, Use to test blood sugars 4 times daily or as directed., Disp: 360 each, Rfl: 1     blood glucose monitoring (ACCU-CHEK MULTICLIX) lancets, Test BG 4 times daily, Disp: 1 Box, Rfl: 11     BUTT PASTE - REGULAR (DR LOVE POOP GOOP BUTT PASTE FORMULA), Apply topically every hour as needed for skin protection, Disp: , Rfl:      cholecalciferol 1000 units TABS, Take 1,000 Units by mouth daily, Disp: , Rfl:      ciprofloxacin (CIPRO) 500 MG tablet, Take 1 tablet (500 mg) by mouth daily for 5 days, Disp: 5 tablet, Rfl: 0     fluocinonide (LIDEX) 0.05 % ointment, Apply sparingly to rash on legs twice daily as needed.  Do not apply to face., Disp: 60 g, Rfl: 11     fluticasone (FLONASE) 50 MCG/ACT spray, Spray 1 spray into both nostrils daily, Disp: 1 Bottle, Rfl: 3     furosemide (LASIX) 20 MG tablet, Take 1 tablet (20 mg) by mouth 2 times daily, Disp: 180 tablet, Rfl: 3     gabapentin (NEURONTIN) 300 MG capsule, Take 1 tablet (300 mg) at bedtime, Disp: 90 capsule, Rfl: 1     insulin aspart (NOVOLOG FLEXPEN) 100 UNIT/ML pen, 10 units with breakfast, 10 units with lunch and 10 units with supper, Disp: 15 mL, Rfl: 1     insulin  glargine (LANTUS SOLOSTAR PEN) 100 UNIT/ML pen, Inject 20 Units Subcutaneous At Bedtime, Disp: 18 mL, Rfl: 3     insulin pen needle (BD GABRIELLA U/F) 32G X 4 MM, Use 4 times per day or as directed., Disp: 120 each, Rfl: 5     levothyroxine (SYNTHROID/LEVOTHROID) 88 MCG tablet, Take 88 mcg by mouth daily, Disp: , Rfl:      loperamide (IMODIUM) 2 MG capsule, One capsule once a day PRN, can use a second one if needed, Disp: 90 capsule, Rfl: 3     magnesium 250 MG tablet, Take 1 tablet by mouth daily, Disp: , Rfl:      menthol-zinc oxide (CALMOSEPTINE) 0.44-20.625 % OINT ointment, Apply topically 4 times daily as needed for skin protection, Disp: , Rfl:      metoprolol succinate ER (TOPROL-XL) 50 MG 24 hr tablet, Take 50 mg by mouth daily , Disp: 90 tablet, Rfl: 1     nystatin (MYCOSTATIN) 470363 UNIT/GM external powder, Apply topically 2 times daily Until resolved then to use as needed., Disp: 60 g, Rfl: 2     OMEPRAZOLE PO, Take 20 mg by mouth daily as needed , Disp: , Rfl:      order for DME, Equipment being ordered: Compression stockings, Disp: 2 Device, Rfl: 0     order for DME, Equipment being ordered: Hospital Bed service and repair, Disp: 1 each, Rfl: 0     Potassium Gluconate 595 MG CAPS, Take 1 tablet by mouth daily, Disp: , Rfl:      rOPINIRole (REQUIP) 1 MG tablet, 1-3 tabs daily as needed, patient takes one in am, one in pm. occasionally will take in midday., Disp: 200 tablet, Rfl: 3     simvastatin (ZOCOR) 40 MG tablet, Take 1 tablet (40 mg) by mouth daily, Disp: 90 tablet, Rfl: 2     ALLERGIES:    Allergies   Allergen Reactions     Hydrocodone Unknown     Lisinopril Cough            Vicodin [Hydrocodone-Acetaminophen] Other (See Comments)     Caused extreme dizziness        SOCIAL HISTORY:   Social History     Socioeconomic History     Marital status: Single     Spouse name: Not on file     Number of children: Not on file     Years of education: Not on file     Highest education level: Not on file    Occupational History     Not on file   Social Needs     Financial resource strain: Not on file     Food insecurity:     Worry: Not on file     Inability: Not on file     Transportation needs:     Medical: Not on file     Non-medical: Not on file   Tobacco Use     Smoking status: Former Smoker     Smokeless tobacco: Never Used   Substance and Sexual Activity     Alcohol use: Yes     Comment: rare social use     Drug use: No     Sexual activity: Never   Lifestyle     Physical activity:     Days per week: Not on file     Minutes per session: Not on file     Stress: Not on file   Relationships     Social connections:     Talks on phone: Not on file     Gets together: Not on file     Attends Gnosticist service: Not on file     Active member of club or organization: Not on file     Attends meetings of clubs or organizations: Not on file     Relationship status: Not on file     Intimate partner violence:     Fear of current or ex partner: Not on file     Emotionally abused: Not on file     Physically abused: Not on file     Forced sexual activity: Not on file   Other Topics Concern     Parent/sibling w/ CABG, MI or angioplasty before 65F 55M? Yes   Social History Narrative     Not on file        FAMILY HISTORY:   Family History   Problem Relation Age of Onset     Diabetes Sister      C.A.D. Sister      Breast Cancer Sister         EXAM:Vitals: /60   Pulse 54   Wt 90.3 kg (199 lb)   BMI 37.00 kg/m     BMI= Body mass index is 37 kg/m .    Weight management plan: Patient was referred to their PCP to discuss a diet and exercise plan.    General appearance: Patient is alert and fully cooperative with history & exam.  No sign of distress is noted during the visit.     Psychiatric: Affect is pleasant & appropriate.  Patient appears motivated to improve health.     Respiratory: Breathing is regular & unlabored while sitting.     HEENT: Hearing is intact to spoken word.  Speech is clear.  No gross evidence of visual  impairment that would impact ambulation.     Dermatologic: Skin is intact to both lower extremities without significant lesions, rash or abrasion.  No paronychia or evidence of soft tissue infection is noted.  The nails are hypertrophic and discolored.     Vascular: DP & PT pulses are intact & regular bilaterally.  No significant edema or varicosities noted.  CFT and skin temperature is normal to both lower extremities.  There are no varicosities, no edema and no trophic changes noted.      Neurologic: Lower extremity sensation is intact to light touch.  No evidence of weakness or contracture in the lower extremities.  Noted evidence of neuropathy with diminished sensation bilaterally.       Musculoskeletal: Patient is ambulatory without assistive device or brace.  No gross ankle deformity noted.  No foot or ankle joint effusion is noted.  One notes a hammertoe contracture of the second digit bilaterally.       Assessment:  1.  Diabetes Mellitus and Peripheral Neuropathy.    2.  Hammertoe deformity.         Plan:  I have explained to the patient the condition.  I have discussed with the patient the importance of diabetic foot care.  The patient was referred to [facility] for custom diabetic shoes with repair with accommodative inserts that will aid in offloading the tension forces to the soft tissues and prevent further risk of amputation..            VELMA Ramos D.P.M., F.A.C.F.A.S.          Again, thank you for allowing me to participate in the care of your patient.        Sincerely,        Vinicio Ramos DPM

## 2019-05-09 NOTE — NURSING NOTE
"Chief Complaint   Patient presents with     Consult     Refferal daniel Elena inserts checked       Initial /60   Pulse 54   Wt 90.3 kg (199 lb)   BMI 37.00 kg/m   Estimated body mass index is 37 kg/m  as calculated from the following:    Height as of 3/28/19: 1.562 m (5' 1.5\").    Weight as of this encounter: 90.3 kg (199 lb).  Medications and allergies reviewed.      Tanya OLIVER MA    "

## 2019-05-09 NOTE — TELEPHONE ENCOUNTER
her urine was positive. I have an antibiotic for her, do they want that sent here or elsewhere?  Should be sent to [facility]  Patient notified and states understands results and advice. Also spoke to her caregiver, Mell.  Olga Man CMA.

## 2019-05-12 NOTE — TELEPHONE ENCOUNTER
Reason for Call:  Form, our goal is to have forms completed with 72 hours, however, some forms may require a visit or additional information.    Type of letter, form or note:  [facility]  B/P    Who is the form from?: [facility]  B/P (if other please explain)    Where did the form come from: form was faxed in    What clinic location was the form placed at?: [facility]    Where the form was placed: [facility]    Form received back signed  9/18/17  Faxed Back & Sent to be scanned to this encounter            Call taken on 9/18/2017 at 1:43 PM by Leilani Jurado         This patient does have evidence of infective focus-urine/possible pneumonia     My overall impression is sepsis.  Antibiotics given-   Antibiotics (From admission, onward)    Start     Stop Route Frequency Ordered    05/12/19 1745  piperacillin-tazobactam 4.5 g in dextrose 5 % 100 mL IVPB (ready to mix system)      05/13 0544 IV ED 1 Time 05/12/19 1735        Vancomycin    Blood  Urine culture   Severe Sepsis only-  Latest labs reviewed, they are-  Recent Labs   Lab 05/12/19  1241   BILITOT 0.3     Recent Labs   Lab 05/12/19  1241   LACTATE 0.8     No results for input(s): PH, PO2, PCO2, HCO3, BE in the last 168 hours.    Organ dysfunction indicated by Encephalopathy    Septic Shock only-  Shock with decreased perfusion noted, Fluid challenge  was given at 30cc/kg    Post- resuscitation assessment- (Done after fluids given for shock)  Vital signs post fluid administrations were-    Temp Readings from Last 1 Encounters:   05/12/19 99 °F (37.2 °C)     BP Readings from Last 1 Encounters:   05/12/19 120/74     Pulse Readings from Last 1 Encounters:   05/12/19 100       Perfusion assessment post bolus shows Good distal pulses, adequate capillary refill    Will Not start Pressors- Levophed for MAP of 65

## 2019-05-13 LAB
BACTERIA SPEC CULT: ABNORMAL
Lab: ABNORMAL
SPECIMEN SOURCE: ABNORMAL

## 2019-05-15 ENCOUNTER — TELEPHONE (OUTPATIENT)
Dept: FAMILY MEDICINE | Facility: CLINIC | Age: 74
End: 2019-05-15

## 2019-05-15 NOTE — TELEPHONE ENCOUNTER
[facility]- UA Results/ Culture  Dr. Bernstein wanted us to call to see if Pt completed her Antibiotic.   Yes Antibiotic was finished 5/13/19  [facility]

## 2019-05-21 ENCOUNTER — HOSPITAL ENCOUNTER (OUTPATIENT)
Facility: CLINIC | Age: 74
Setting detail: OBSERVATION
Discharge: HOME HEALTH | End: 2019-05-23
Attending: FAMILY MEDICINE | Admitting: INTERNAL MEDICINE
Payer: COMMERCIAL

## 2019-05-21 ENCOUNTER — APPOINTMENT (OUTPATIENT)
Dept: CT IMAGING | Facility: CLINIC | Age: 74
End: 2019-05-21
Attending: FAMILY MEDICINE
Payer: COMMERCIAL

## 2019-05-21 ENCOUNTER — APPOINTMENT (OUTPATIENT)
Dept: GENERAL RADIOLOGY | Facility: CLINIC | Age: 74
End: 2019-05-21
Attending: FAMILY MEDICINE
Payer: COMMERCIAL

## 2019-05-21 ENCOUNTER — APPOINTMENT (OUTPATIENT)
Dept: ULTRASOUND IMAGING | Facility: CLINIC | Age: 74
End: 2019-05-21
Attending: INTERNAL MEDICINE
Payer: COMMERCIAL

## 2019-05-21 ENCOUNTER — TELEPHONE (OUTPATIENT)
Dept: FAMILY MEDICINE | Facility: CLINIC | Age: 74
End: 2019-05-21

## 2019-05-21 ENCOUNTER — APPOINTMENT (OUTPATIENT)
Dept: ULTRASOUND IMAGING | Facility: CLINIC | Age: 74
End: 2019-05-21
Attending: PHYSICIAN ASSISTANT
Payer: COMMERCIAL

## 2019-05-21 DIAGNOSIS — N18.4 CKD (CHRONIC KIDNEY DISEASE) STAGE 4, GFR 15-29 ML/MIN (H): Chronic | ICD-10-CM

## 2019-05-21 DIAGNOSIS — E11.21 TYPE 2 DIABETES MELLITUS WITH DIABETIC NEPHROPATHY, WITH LONG-TERM CURRENT USE OF INSULIN (H): Chronic | ICD-10-CM

## 2019-05-21 DIAGNOSIS — Z79.4 TYPE 2 DIABETES MELLITUS WITH DIABETIC NEPHROPATHY, WITH LONG-TERM CURRENT USE OF INSULIN (H): Chronic | ICD-10-CM

## 2019-05-21 DIAGNOSIS — R55 SYNCOPE AND COLLAPSE: ICD-10-CM

## 2019-05-21 DIAGNOSIS — Z86.718 H/O DEEP VENOUS THROMBOSIS: ICD-10-CM

## 2019-05-21 DIAGNOSIS — N18.4 TYPE 2 DIABETES MELLITUS WITH STAGE 4 CHRONIC KIDNEY DISEASE, UNSPECIFIED WHETHER LONG TERM INSULIN USE (H): ICD-10-CM

## 2019-05-21 DIAGNOSIS — R55 SYNCOPE, UNSPECIFIED SYNCOPE TYPE: Primary | ICD-10-CM

## 2019-05-21 DIAGNOSIS — I25.9 CHRONIC ISCHEMIC HEART DISEASE: Chronic | ICD-10-CM

## 2019-05-21 DIAGNOSIS — E11.22 TYPE 2 DIABETES MELLITUS WITH STAGE 4 CHRONIC KIDNEY DISEASE, UNSPECIFIED WHETHER LONG TERM INSULIN USE (H): ICD-10-CM

## 2019-05-21 PROBLEM — I82.401 ACUTE DEEP VEIN THROMBOSIS (DVT) OF RIGHT LOWER EXTREMITY, UNSPECIFIED VEIN (H): Status: RESOLVED | Noted: 2018-10-31 | Resolved: 2019-05-21

## 2019-05-21 PROBLEM — I82.403 RECURRENT ACUTE DEEP VEIN THROMBOSIS (DVT) OF BOTH LOWER EXTREMITIES (H): Status: RESOLVED | Noted: 2019-03-31 | Resolved: 2019-05-21

## 2019-05-21 PROBLEM — I82.401 ACUTE DEEP VEIN THROMBOSIS (DVT) OF RIGHT LOWER EXTREMITY, UNSPECIFIED VEIN (H): Status: ACTIVE | Noted: 2018-10-31

## 2019-05-21 PROBLEM — R00.1 SYMPTOMATIC BRADYCARDIA: Status: ACTIVE | Noted: 2017-10-29

## 2019-05-21 LAB
ALBUMIN SERPL-MCNC: 2.9 G/DL (ref 3.4–5)
ALP SERPL-CCNC: 121 U/L (ref 40–150)
ALT SERPL W P-5'-P-CCNC: 16 U/L (ref 0–50)
ANION GAP SERPL CALCULATED.3IONS-SCNC: 5 MMOL/L (ref 3–14)
AST SERPL W P-5'-P-CCNC: 18 U/L (ref 0–45)
BASOPHILS # BLD AUTO: 0 10E9/L (ref 0–0.2)
BASOPHILS NFR BLD AUTO: 1.1 %
BILIRUB SERPL-MCNC: 0.3 MG/DL (ref 0.2–1.3)
BUN SERPL-MCNC: 33 MG/DL (ref 7–30)
CALCIUM SERPL-MCNC: 9.2 MG/DL (ref 8.5–10.1)
CHLORIDE SERPL-SCNC: 106 MMOL/L (ref 94–109)
CO2 SERPL-SCNC: 27 MMOL/L (ref 20–32)
CREAT SERPL-MCNC: 1.64 MG/DL (ref 0.52–1.04)
DIFFERENTIAL METHOD BLD: ABNORMAL
EOSINOPHIL # BLD AUTO: 0.2 10E9/L (ref 0–0.7)
EOSINOPHIL NFR BLD AUTO: 4.3 %
ERYTHROCYTE [DISTWIDTH] IN BLOOD BY AUTOMATED COUNT: 12.7 % (ref 10–15)
GFR SERPL CREATININE-BSD FRML MDRD: 30 ML/MIN/{1.73_M2}
GLUCOSE BLDC GLUCOMTR-MCNC: 146 MG/DL (ref 70–99)
GLUCOSE BLDC GLUCOMTR-MCNC: 267 MG/DL (ref 70–99)
GLUCOSE SERPL-MCNC: 176 MG/DL (ref 70–99)
HCT VFR BLD AUTO: 34.1 % (ref 35–47)
HGB BLD-MCNC: 10.8 G/DL (ref 11.7–15.7)
IMM GRANULOCYTES # BLD: 0 10E9/L (ref 0–0.4)
IMM GRANULOCYTES NFR BLD: 0.3 %
LYMPHOCYTES # BLD AUTO: 1 10E9/L (ref 0.8–5.3)
LYMPHOCYTES NFR BLD AUTO: 25.5 %
MCH RBC QN AUTO: 27.3 PG (ref 26.5–33)
MCHC RBC AUTO-ENTMCNC: 31.7 G/DL (ref 31.5–36.5)
MCV RBC AUTO: 86 FL (ref 78–100)
MONOCYTES # BLD AUTO: 0.5 10E9/L (ref 0–1.3)
MONOCYTES NFR BLD AUTO: 13.3 %
NEUTROPHILS # BLD AUTO: 2.1 10E9/L (ref 1.6–8.3)
NEUTROPHILS NFR BLD AUTO: 55.5 %
NRBC # BLD AUTO: 0 10*3/UL
NRBC BLD AUTO-RTO: 0 /100
PLATELET # BLD AUTO: 215 10E9/L (ref 150–450)
POTASSIUM SERPL-SCNC: 3.6 MMOL/L (ref 3.4–5.3)
PROT SERPL-MCNC: 6.5 G/DL (ref 6.8–8.8)
RBC # BLD AUTO: 3.96 10E12/L (ref 3.8–5.2)
SODIUM SERPL-SCNC: 138 MMOL/L (ref 133–144)
TROPONIN I SERPL-MCNC: <0.015 UG/L (ref 0–0.04)
TROPONIN I SERPL-MCNC: <0.015 UG/L (ref 0–0.04)
WBC # BLD AUTO: 3.8 10E9/L (ref 4–11)

## 2019-05-21 PROCEDURE — 36415 COLL VENOUS BLD VENIPUNCTURE: CPT | Performed by: INTERNAL MEDICINE

## 2019-05-21 PROCEDURE — 93010 ELECTROCARDIOGRAM REPORT: CPT | Mod: Z6 | Performed by: FAMILY MEDICINE

## 2019-05-21 PROCEDURE — 93880 EXTRACRANIAL BILAT STUDY: CPT

## 2019-05-21 PROCEDURE — 99285 EMERGENCY DEPT VISIT HI MDM: CPT | Mod: 25 | Performed by: FAMILY MEDICINE

## 2019-05-21 PROCEDURE — 00000146 ZZHCL STATISTIC GLUCOSE BY METER IP

## 2019-05-21 PROCEDURE — 99219 ZZC INITIAL OBSERVATION CARE,LEVL II: CPT | Performed by: PHYSICIAN ASSISTANT

## 2019-05-21 PROCEDURE — 36415 COLL VENOUS BLD VENIPUNCTURE: CPT | Performed by: FAMILY MEDICINE

## 2019-05-21 PROCEDURE — 85025 COMPLETE CBC W/AUTO DIFF WBC: CPT | Performed by: FAMILY MEDICINE

## 2019-05-21 PROCEDURE — 84484 ASSAY OF TROPONIN QUANT: CPT | Performed by: FAMILY MEDICINE

## 2019-05-21 PROCEDURE — 80053 COMPREHEN METABOLIC PANEL: CPT | Performed by: FAMILY MEDICINE

## 2019-05-21 PROCEDURE — 25000131 ZZH RX MED GY IP 250 OP 636 PS 637: Performed by: PHYSICIAN ASSISTANT

## 2019-05-21 PROCEDURE — 99207 ZZC CDG-CODE CATEGORY CHANGED: CPT | Performed by: PHYSICIAN ASSISTANT

## 2019-05-21 PROCEDURE — 96372 THER/PROPH/DIAG INJ SC/IM: CPT

## 2019-05-21 PROCEDURE — 70450 CT HEAD/BRAIN W/O DYE: CPT

## 2019-05-21 PROCEDURE — 93970 EXTREMITY STUDY: CPT

## 2019-05-21 PROCEDURE — 99285 EMERGENCY DEPT VISIT HI MDM: CPT | Mod: 25

## 2019-05-21 PROCEDURE — 71046 X-RAY EXAM CHEST 2 VIEWS: CPT

## 2019-05-21 PROCEDURE — G0378 HOSPITAL OBSERVATION PER HR: HCPCS

## 2019-05-21 PROCEDURE — 93005 ELECTROCARDIOGRAM TRACING: CPT

## 2019-05-21 PROCEDURE — 25000132 ZZH RX MED GY IP 250 OP 250 PS 637: Performed by: INTERNAL MEDICINE

## 2019-05-21 PROCEDURE — 25000132 ZZH RX MED GY IP 250 OP 250 PS 637: Performed by: PHYSICIAN ASSISTANT

## 2019-05-21 PROCEDURE — 84484 ASSAY OF TROPONIN QUANT: CPT | Mod: 91 | Performed by: INTERNAL MEDICINE

## 2019-05-21 RX ORDER — SIMVASTATIN 40 MG
40 TABLET ORAL DAILY
Status: DISCONTINUED | OUTPATIENT
Start: 2019-05-21 | End: 2019-05-23 | Stop reason: HOSPADM

## 2019-05-21 RX ORDER — LEVOTHYROXINE SODIUM 88 UG/1
44 TABLET ORAL
COMMUNITY
End: 2020-11-16

## 2019-05-21 RX ORDER — NICOTINE POLACRILEX 4 MG
15-30 LOZENGE BUCCAL
Status: DISCONTINUED | OUTPATIENT
Start: 2019-05-21 | End: 2019-05-23 | Stop reason: HOSPADM

## 2019-05-21 RX ORDER — POTASSIUM CHLORIDE 750 MG/1
10 TABLET, EXTENDED RELEASE ORAL DAILY
Status: DISCONTINUED | OUTPATIENT
Start: 2019-05-21 | End: 2019-05-23 | Stop reason: HOSPADM

## 2019-05-21 RX ORDER — LEVOTHYROXINE SODIUM 88 UG/1
88 TABLET ORAL DAILY
Status: DISCONTINUED | OUTPATIENT
Start: 2019-05-21 | End: 2019-05-23 | Stop reason: HOSPADM

## 2019-05-21 RX ORDER — SENNOSIDES 8.6 MG
650 CAPSULE ORAL 3 TIMES DAILY PRN
Status: DISCONTINUED | OUTPATIENT
Start: 2019-05-21 | End: 2019-05-23 | Stop reason: HOSPADM

## 2019-05-21 RX ORDER — ROPINIROLE 1 MG/1
1 TABLET, FILM COATED ORAL 3 TIMES DAILY PRN
Status: DISCONTINUED | OUTPATIENT
Start: 2019-05-21 | End: 2019-05-23 | Stop reason: HOSPADM

## 2019-05-21 RX ORDER — DEXTROSE MONOHYDRATE 25 G/50ML
25-50 INJECTION, SOLUTION INTRAVENOUS
Status: DISCONTINUED | OUTPATIENT
Start: 2019-05-21 | End: 2019-05-23 | Stop reason: HOSPADM

## 2019-05-21 RX ORDER — METOPROLOL SUCCINATE 25 MG/1
1 TABLET, EXTENDED RELEASE ORAL DAILY
Status: ON HOLD | COMMUNITY
Start: 2019-04-25 | End: 2019-05-23

## 2019-05-21 RX ORDER — LEVOTHYROXINE SODIUM 88 UG/1
44 TABLET ORAL WEEKLY
Status: DISCONTINUED | OUTPATIENT
Start: 2019-05-26 | End: 2019-05-23 | Stop reason: HOSPADM

## 2019-05-21 RX ORDER — FUROSEMIDE 20 MG
20 TABLET ORAL 2 TIMES DAILY
Status: DISCONTINUED | OUTPATIENT
Start: 2019-05-21 | End: 2019-05-23 | Stop reason: HOSPADM

## 2019-05-21 RX ORDER — GABAPENTIN 300 MG/1
300 CAPSULE ORAL AT BEDTIME
Status: DISCONTINUED | OUTPATIENT
Start: 2019-05-21 | End: 2019-05-23 | Stop reason: HOSPADM

## 2019-05-21 RX ORDER — METOPROLOL SUCCINATE 25 MG/1
25 TABLET, EXTENDED RELEASE ORAL DAILY
Status: DISCONTINUED | OUTPATIENT
Start: 2019-05-21 | End: 2019-05-22

## 2019-05-21 RX ADMIN — INSULIN ASPART 3 UNITS: 100 INJECTION, SOLUTION INTRAVENOUS; SUBCUTANEOUS at 17:17

## 2019-05-21 RX ADMIN — ROPINIROLE HYDROCHLORIDE 1 MG: 1 TABLET, FILM COATED ORAL at 15:43

## 2019-05-21 RX ADMIN — APIXABAN 5 MG: 5 TABLET, FILM COATED ORAL at 20:06

## 2019-05-21 RX ADMIN — LEVOTHYROXINE SODIUM 88 MCG: 88 TABLET ORAL at 15:43

## 2019-05-21 RX ADMIN — OMEPRAZOLE 20 MG: 20 CAPSULE, DELAYED RELEASE ORAL at 15:43

## 2019-05-21 RX ADMIN — METOPROLOL SUCCINATE 25 MG: 25 TABLET, EXTENDED RELEASE ORAL at 17:16

## 2019-05-21 RX ADMIN — GABAPENTIN 300 MG: 300 CAPSULE ORAL at 21:47

## 2019-05-21 RX ADMIN — SIMVASTATIN 40 MG: 40 TABLET, FILM COATED ORAL at 15:43

## 2019-05-21 RX ADMIN — INSULIN GLARGINE 20 UNITS: 100 INJECTION, SOLUTION SUBCUTANEOUS at 21:47

## 2019-05-21 ASSESSMENT — ENCOUNTER SYMPTOMS
DIAPHORESIS: 0
PALPITATIONS: 0
WHEEZING: 0
BLOOD IN STOOL: 0
COUGH: 0
SORE THROAT: 0
SHORTNESS OF BREATH: 0
DYSURIA: 0
FEVER: 0
DIARRHEA: 0
CONSTIPATION: 0
SINUS PRESSURE: 0
HEADACHES: 0
ABDOMINAL PAIN: 0
CHILLS: 0
VOMITING: 0
NAUSEA: 0
FREQUENCY: 0

## 2019-05-21 ASSESSMENT — MIFFLIN-ST. JEOR: SCORE: 1347.5

## 2019-05-21 NOTE — ED NOTES
"Patient has  [facility] to Observation  order. Patient has been given the Observation brochure -  What does Observation mean to me.\"  Patient has been given the opportunity to ask questions about observation status and their plan of care.      Lisa Sifuentes    "

## 2019-05-21 NOTE — PROGRESS NOTES
"After receiving \"blue slip\" indicating a high probability of admission, this chart was accessed to assess needs and begin patient care planning.    "

## 2019-05-21 NOTE — PLAN OF CARE
Vitals stable. Patient denies pain. States she is occasionally lightheaded.  Patient is up with assist of one and is able to stand and pivot to commode/chair, patient states she mainly uses wheelchair at home.  Patient c/o restless legs, PRN requip given which was effective in decreasing discomfort.  Chronic numbness/tingling noted to BLE.  Patient sleepy this evening, states she didn't get any sleep last night and is tired tonight.  Pocket talker being used d/t patient is hard of hearing.

## 2019-05-21 NOTE — H&P
[facility]    History and Physical - Hospitalist Service       Date of Admission:  5/21/2019    Assessment & Plan   Stefanie Velazquez is a 74 year old female with a history of MI, colon cancer, type 2 diabetes, GERD, hyper thyroidism, hyperlipidemia, obesity, RC, hypertension, and recurrent DVTs admitted on 5/21/2019. She presents following a syncopal episode that occurred while seated.    Syncope  Syncopal episode that occurred while seated in her wheelchair.  No prodrome.  Some full body shaking noted by bystanders.  No incontinence.  No postictal period.  ECG shows sinus bradycardia @ 51 without ischemic changes.  CT head negative. Mildly anemic, consistent with baseline. Neuro exam grossly normal.  Presence of a prodrome is primarily concerning for arrhythmia versus seizure.  No incontinence, tongue biting, or postictal period, suggesting arrhythmia is the more likely diagnosis.  - Telemetry  - Echo   - Carotid US  - Venous US of legs  - Troponin  - Orthostatics  - Consider Zio on discharge  - Consider VQ tomorrow    H/O deep venous thrombosis  Right lower extremity DVTs September 2018 and February 2019.  She was no longer anticoagulated at the time of the second DVT.  History of rectal cancer, not active at the time of the DVTs.  Anticoagulated on Eliquis.  - Continue Eliquis.    Chronic ischemic heart disease  S/P CABG x 3 2002. PCI 2002 with stent placement.  No recent episodes of chest pain, dyspnea, JOAQUIN, or palpitations. On metoprolol ER 25 mg, simvastatin 40 mg.  - Continue metoprolol, simvastatin.    Hypothyroidism  TSH 0.48 on 5/2/19.  Managed with Synthroid 88 mcg every day except Sunday, which she takes 44 mcg.  - Continue Synthroid.    Esophageal reflux  - Continue omeprazole.    RC moderate  Not currently using CPAP.    Hyperlipidemia  - Continue simvastatin 40 mg.    Rectal cancer  Diagnosed with rectal adenocarcinoma (13 cm mass) Jan 2011. Completed chemo and resection.  Follows with Dr. Martinez.    Anemia, likely due to CKD  Hgb 10.8 on admission, with recent range. Trending downwards since 10/2018.  - Follow daily hgb  - F/U with PCP    Benign essential hypertension  Patient blood pressures reviewed, appears well controlled on metoprolol succinate 25 mg and Lasix 20 mg.  - Continue Lasix and metoprolol.    Restless leg syndrome  Chronic, unchanged.  Well managed with Requip 3 times daily.  - Continue Requip.    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  Takes NovoLog 10 units with breakfast, 12 units with lunch, and 12 units with dinner. Lantus 20 units at bedtime.  Gabapentin 300 mg at bedtime for neuropathy.  - Continue gabapentin, Novolog and Lantus.  - Medium correction scale.  - Hypo/hyperglycemia protocols.    CKD stage 4  Creatinine 1.64, GFR 30 on admission, consistent with her baseline.  - Follow       Diet: Moderate Consistent CHO Diet    DVT Prophylaxis: Pneumatic Compression Devices  Lr Catheter: not present  Code Status: Full    Disposition Plan   Expected discharge: 1-2 days, recommended to prior living arrangement once syncope workup completed.  Entered: Michael Santos PA-C 05/21/2019, 6:03 PM     The patient's care was discussed with the Attending Physician, Dr. Self and Patient.    Michael Santos PA-C  [facility]    ______________________________________________________________________    Chief Complaint   Syncope    History is obtained from the patient, electronic health record and emergency department physician.    History of Present Illness   Stefanie Velazquez is a 74 year old female with a history of ischemic heart disease, hypertension CKD stage IV, rectal cancer, RC, hyperlipidemia, anemia of chronic disease, and RLS who presents following a syncopal episode at her assisted living facility.  She reports that she was wheeling herself  around the dining room in her wheelchair when she apparently blacked out and woke up a few minutes later next to the medication room, surrounded by staff.  She denies any preceding symptoms like dizziness, lightheadedness, chest pain, palpitations, or dyspnea.  Per bystanders, she exhibited brief full body shaking.  She woke up immediately without a postictal period.  No tongue biting or incontinence.    Denies headache, dizziness, chest pain, palpitations, dyspnea, JOAQUIN, lower extremity pain/swelling.    Review of Systems    The 10 point Review of Systems is negative other than noted in the HPI or here.     Past Medical History    I have reviewed this patient's medical history and updated it with pertinent information if needed.   Past Medical History:   Diagnosis Date     Acute deep vein thrombosis (DVT) of right lower extremity, unspecified vein (H) 10/31/2018     Acute myocardial infarction of other specified sites, episode of care unspecified 6/2002    MI 2 stints     Cancer of colon (H)      Cellulitis of lower extremity, bilateral 9/30/2016     Chronic ischemic heart disease, unspecified 6/22/2003    cabgx3 , 2002 2/05 adenosine ef70%     Coronary atherosclerosis of unspecified type of vessel, native or graft     Coronary artery disease     Diabetes mellitus (H)      Esophageal reflux      Fracture of femur, distal, left, closed (H) 2/11/2013     Gastro-oesophageal reflux disease      Generalized osteoarthrosis, unspecified site 6/22/2005     Generalized osteoarthrosis, unspecified site 6/22/2005     HEARING LOSS CONDUCTIVE, COMBINED TYPE 6/22/2005    [place] measles as child     HYPOTHYROIDISM  6/22/2003     Mixed hyperlipidemia 6/22/2005     Obesity, unspecified 6/22/2005     RC-moderate (AHI 12, LSat 60%); REM RDI-73 6/1/2009    Sleep study [facility] was performed 5/26/09 in order to re-evaluate severity of RC. The total sleep time was 412.0 minutes. The sleep latency was decreased at 8.7 minutes with  Ambien 10mg. The REM sleep latency was 426.0 minutes. Sleep efficiency was reduced at 79.0%. The sleep architecture was disrupted with frequent sleep stage changes and arousals.  Snoring:  loud. Respiratory Events:   RDI o     Recurrent acute deep vein thrombosis (DVT) of both lower extremities (H) 3/31/2019     Stented coronary artery      Type II or unspecified type diabetes mellitus with renal manifestations, uncontrolled(250.42) (H) 6/22/2005     Type II or unspecified type diabetes mellitus with renal manifestations, uncontrolled(250.42) (H) 6/22/2005     Unspecified disorder resulting from impaired renal function 6/22/2005    CR     1.82   06/21/2005     Unspecified essential hypertension        Past Surgical History   I have reviewed this patient's surgical history and updated it with pertinent information if needed.  Past Surgical History:   Procedure Laterality Date     cabg       COLECTOMY LOW ANTERIOR  6/21/2011    Procedure:COLECTOMY LOW ANTERIOR; with loop ielostomy; Surgeon:ROBBIN MCDONNELL; Location:[facility]     COLONOSCOPY  1/26/2011    COLONOSCOPY performed by MASOUD BILL at [facility]     COLONOSCOPY N/A 3/1/2016    Procedure: COLONOSCOPY;  Surgeon: Liza Neal MD;  Location: [facility]     INSERT PORT VASCULAR ACCESS  3/2/2011    INSERT PORT VASCULAR ACCESS performed by LIZA NEAL at [facility]     OPEN REDUCTION INTERNAL FIXATION FEMUR DISTAL  2/12/2013    Procedure: OPEN REDUCTION INTERNAL FIXATION FEMUR DISTAL;  Open reduction internal fixation left femur fracture--Anesth.Choice;  Surgeon: Ley, Jeffrey Duane, MD;  Location: [facility]     ORTHOPEDIC SURGERY       PHACOEMULSIFICATION WITH STANDARD INTRAOCULAR LENS IMPLANT Left 1/22/2018    Procedure: PHACOEMULSIFICATION WITH STANDARD INTRAOCULAR LENS IMPLANT;  Left cataract removal with implant;  Surgeon: Rony Key MD;  Location: [facility]     PHACOEMULSIFICATION WITH STANDARD INTRAOCULAR LENS IMPLANT Right 2/19/2018     Procedure: PHACOEMULSIFICATION WITH STANDARD INTRAOCULAR LENS IMPLANT;  Right cataract removal with implant;  Surgeon: Rony Key MD;  Location: [facility]     SIGMOIDOSCOPY FLEXIBLE  9/9/2011    Procedure:SIGMOIDOSCOPY FLEXIBLE; Performed prior to induction.; Surgeon:ROBBIN MCDONNELL; Location:[facility]     SURGICAL HISTORY OF -   10/24/2002    Heart bypass     SURGICAL HISTORY OF -   2002    R Knee arthroplasty     SURGICAL HISTORY OF -   2002    Mi 2 stints     TAKEDOWN ILEOSTOMY  9/9/2011    Procedure:TAKEDOWN ILEOSTOMY; Exploratory Laparotomy,  Loop Ileostomy Takedown, Small Bowel Resection x 2.; Surgeon:ROBBIN MCDONNELL; Location:[facility]       Social History   I have reviewed this patient's social history and updated it with pertinent information if needed.  Social History     Tobacco Use     Smoking status: Former Smoker     Smokeless tobacco: Never Used   Substance Use Topics     Alcohol use: Yes     Comment: rare social use     Drug use: No       Family History   I have reviewed this patient's family history and updated it with pertinent information if needed.   Family History   Problem Relation Age of Onset     Diabetes Sister      C.A.D. Sister      Breast Cancer Sister        Prior to Admission Medications   Prior to Admission Medications   Prescriptions Last Dose Informant Patient Reported? Taking?   ACCU-CHEK MILVIA MELODY  Self No Yes   Sig: dispense one meter   BUTT PASTE - REGULAR (DR LOVE POOP GOCHAUNCEY BUTT PASTE FORMULA) Unknown at Unknown time  Yes No   Sig: Apply topically every hour as needed for skin protection   OMEPRAZOLE PO 5/20/2019 at 1630  Yes Yes   Sig: Take 20 mg by mouth 2 times daily (before meals)    Potassium Gluconate 595 MG CAPS 5/20/2019 at 0830  No Yes   Sig: Take 1 tablet by mouth daily   acetaminophen (TYLENOL) 650 MG CR tablet 5/21/2019 at 0351  No Yes   Sig: Take 1 tablet (650 mg) by mouth 3 times daily as needed for mild pain or fever   apixaban ANTICOAGULANT (ELIQUIS) 5 MG  tablet 5/20/2019 at pm  No Yes   Sig: Take 1 tablet (5 mg) by mouth 2 times daily   blood glucose monitoring (ACCU-CHEK MILVIA) test strip 5/21/2019 at 0800 #128  No Yes   Sig: Use to test blood sugars 4 times daily or as directed.   blood glucose monitoring (ACCU-CHEK MULTICLIX) lancets 5/21/2019 at 0800 Self No Yes   Sig: Test BG 4 times daily   Patient taking differently: by In Vitro route 4 times daily Test BG 4 times daily   cholecalciferol 1000 units TABS 5/20/2019 at 0800  Yes Yes   Sig: Take 1,000 Units by mouth daily   ciprofloxacin (CIPRO) 500 MG tablet   No No   Sig: Take 1 tablet (500 mg) by mouth daily for 5 days   diclofenac (VOLTAREN) 50 MG EC tablet Unknown at Unknown time  Yes No   Sig: Take 50 mg by mouth 3 times daily as needed for moderate pain   fluocinonide (LIDEX) 0.05 % ointment 5/20/2019 at Unknown time  No Yes   Sig: Apply sparingly to rash on legs twice daily as needed.  Do not apply to face.   fluticasone (FLONASE) 50 MCG/ACT spray Unknown at Unknown time  No No   Sig: Spray 1 spray into both nostrils daily   furosemide (LASIX) 20 MG tablet 5/20/2019 at 1200  No Yes   Sig: Take 1 tablet (20 mg) by mouth 2 times daily   gabapentin (NEURONTIN) 300 MG capsule 5/20/2019 at hs  No Yes   Sig: Take 1 tablet (300 mg) at bedtime   insulin aspart (NOVOLOG FLEXPEN) 100 UNIT/ML pen 5/21/2019 at 0630  No Yes   Sig: 10 units with breakfast, 10 units with lunch and 10 units with supper   Patient taking differently: Inject 10 Units Subcutaneous daily (with breakfast) 10 units with breakfast, 10 units with lunch and 10 units with supper   insulin aspart (NOVOLOG FLEXPEN) 100 UNIT/ML pen 5/20/2019 at 1630  Yes Yes   Sig: Inject 12 Units Subcutaneous 2 times daily (with meals) Lunch and dinner   insulin glargine (LANTUS SOLOSTAR PEN) 100 UNIT/ML pen 5/20/2019 at hs  No Yes   Sig: Inject 20 Units Subcutaneous At Bedtime   insulin pen needle (BD GABRIELLA U/F) 32G X 4 MM   No No   Sig: Use 4 times per day or as  directed.   levothyroxine (SYNTHROID/LEVOTHROID) 88 MCG tablet 2019 at am  Yes Yes   Sig: Take 88 mcg by mouth daily Except on    levothyroxine (SYNTHROID/LEVOTHROID) 88 MCG tablet 2019 at am  Yes Yes   Sig: Take 44 mcg by mouth once a week = 1.2  Tab on    loperamide (IMODIUM) 2 MG capsule 2019  No Yes   Sig: One capsule once a day PRN, can use a second one if needed   magnesium 250 MG tablet 2019 at 10901  Yes Yes   Sig: Take 1 tablet by mouth daily   menthol-zinc oxide (CALMOSEPTINE) 0.44-20.625 % OINT ointment Unknown at Unknown time  No No   Sig: Apply topically 4 times daily as needed for skin protection   metoprolol succinate ER (TOPROL-XL) 25 MG 24 hr tablet 2019 at am  Yes Yes   Sig: Take 1 tablet by mouth daily   nystatin (MYCOSTATIN) 783214 UNIT/GM external powder Unknown at Unknown time  No No   Sig: Apply topically 2 times daily Until resolved then to use as needed.   order for DME   No No   Sig: Equipment being ordered: Hospital Bed service and repair   order for DME   No No   Sig: Equipment being ordered: Compression stockings   rOPINIRole (REQUIP) 1 MG tablet 2019 at am  No Yes   Si-3 tabs daily as needed, patient takes one in am, one in pm. occasionally will take in midday.   simvastatin (ZOCOR) 40 MG tablet 2019 at am  No Yes   Sig: Take 1 tablet (40 mg) by mouth daily      Facility-Administered Medications: None     Allergies   Allergies   Allergen Reactions     Hydrocodone Other (See Comments)     dizzy     Lisinopril Cough            Vicodin [Hydrocodone-Acetaminophen] Other (See Comments)     Caused extreme dizziness       Physical Exam   Vital Signs: Temp: 97.5  F (36.4  C) Temp src: Oral BP: 157/51 Pulse: 69 Heart Rate: 51 Resp: 16 SpO2: 96 % O2 Device: None (Room air)    Weight: 206 lbs 5.61 oz    General: Appears calm, comfortable, nontoxic. Answers questions quickly and appropriately with clear speech. No apparent distress.  Skin: Pink,  warm, dry.  Eyes: PERRL. Sclera are white.  Ptosis of the right eye, chronic.  HENT:  Normocephalic, atraumatic. Oropharynx is moist, without lesions or exudate.  Lymph/Hematologic: No occipital, anterior/posterior cervical, supraclavicular, or axillary lymphadenopathy.  CV: RRR, clear S1/S2 without murmur, rub, or gallop. Radial pulses equal bilaterally. No lower extremity edema.  Respiratory: CTA and equal bilaterally. Normal respiratory effort.  GI: Soft, nontender. Normal bowel sounds.  Musculoskeletal: Moving all extremities well. Normal muscle bulk and tone.  Neuro: Memory and cognition appear normal. CN II - XII grossly intact. Symmetrical extremity strength.  Finger-to-nose intact.  Rapid alternating movements intact.  Psych: Normal mood and affect.    Data   Data reviewed today: I reviewed all medications, new labs and imaging results over the last 24 hours. I personally reviewed the EKG tracing showing Sinus bradycardia without ischemic changes and the chest x-ray image(s) showing No effusions or infiltrates, normal cardiac silhouette.    Recent Labs   Lab 05/21/19  1101   WBC 3.8*   HGB 10.8*   MCV 86         POTASSIUM 3.6   CHLORIDE 106   CO2 27   BUN 33*   CR 1.64*   ANIONGAP 5   DWIGHT 9.2   *   ALBUMIN 2.9*   PROTTOTAL 6.5*   BILITOTAL 0.3   ALKPHOS 121   ALT 16   AST 18   TROPI <0.015     Recent Results (from the past 24 hour(s))   Head CT w/o contrast    Narrative    CT OF THE HEAD WITHOUT CONTRAST 5/21/2019 10:04 AM     COMPARISON: Head CT 2/24/2019    HISTORY: Altered level of consciousness (LOC), unexplained. Syncope  versus focal seizure.    TECHNIQUE: 5 mm thick axial CT images of the head were acquired  without IV contrast material.    FINDINGS: There is moderate diffuse cerebral volume loss. There are  subtle patchy areas of decreased density in the cerebral white matter  bilaterally that are consistent with sequela of chronic small vessel  ischemic disease.    The ventricles  and basal cisterns are within normal limits in  configuration given the degree of cerebral volume loss. There is no  midline shift. There are no extra-axial fluid collections.    No intracranial hemorrhage, mass or recent infarct.    The visualized paranasal sinuses are well-aerated. There is no  mastoiditis. There are no fractures of the visualized bones.      Impression    IMPRESSION: Diffuse cerebral volume loss and cerebral white matter  changes consistent with chronic small vessel ischemic disease. No  evidence for acute intracranial pathology.      Radiation dose for this scan was reduced using automated exposure  control, adjustment of the mA and/or kV according to patient size, or  iterative reconstruction technique.    IRMA NATH MD   XR Chest 2 Views    Narrative    XR CHEST 2 VW   5/21/2019 10:18 AM     HISTORY: seizure ,    COMPARISON: 2/24/2019      Impression    IMPRESSION: Tip of a right subclavian port is in satisfactory position  in the SVC. Question central pulmonary vascular congestion and  interstitial pulmonary edema. No focal infiltrates.    LESLIE SOSA MD

## 2019-05-21 NOTE — H&P
Pt seen in shared visit with PA. Pt with spell that sounds more like syncope than seizure. Etiology unclear. Will monitor with tele, get echo, serial trops, carotid ultrasound. Need to consider PE in this patient with hx dvt and if no obvious cause by tomorrow am, will order v/q scan ( ckd so can't have ct pulm angio.)    Sandra Self

## 2019-05-21 NOTE — TELEPHONE ENCOUNTER
Reason for Call:  Other     Detailed comments: FYI pt has been sent to [facility] for possible seizure and canceled her appt for 5/22/19  Call taken on 5/21/2019 at 2:44 PM by Shira Ace

## 2019-05-21 NOTE — LETTER
Transition Communication Hand-off for Care Transitions to Next Level of Care Provider    Name: Stfeanie Velazquez  : 1945  MRN #: [id number]  Primary Care Provider: Sandra Morales  Primary Care MD Name: mckenna  Primary Clinic: [address] [facility] [address]  Primary Care Clinic Name: [facility]  Reason for Hospitalization:  Syncope and collapse [R55]  Admit Date/Time: 2019  9:20 AM  Discharge Date: 19  Payor Source: Payor: [facility] / Plan: ARE MEDICARE / Product Type: HMO /     Readmission Assessment Measure (DEVON) Risk Score/category: NA         Reason for Communication Hand-off Referral: Fragility    Discharge Plan:  Back to [facility] with added  Home care PT       Concern for non-adherence with plan of care:   Y/N no  Discharge Needs Assessment:  Needs      Most Recent Value   Equipment Currently Used at Home  walker, rolling          Follow-up specialty is recommended: No    Follow-up plan:    Future Appointments   Date Time Provider Department Center   2019  2:40 PM Sandra Morales MD [facility]   2019 11:20 AM Apolinar Martinez MD [facility]   3/27/2020 11:20 AM Apolinar Martinez MD [facility]       Any outstanding tests or procedures:        Referrals     Future Labs/Procedures    Home Care Referral     Comments:    Your provider has referred you to: [facility]: [facility] [phone]   [email or web address]    Extended Emergency Contact Information  Primary Emergency Contact: Dori Pena (nimattie)  Address: [address] [place] [address]  Mobile Phone: [phone]  Relation: Relative  Secondary Emergency Contact: Lroi Pope ([facility])           [facility] [place]: [phone]  Mobile Phone: [phone]  Relation: Relative    Patient Anticipated Discharge Date: 19   RN, PT, HHA to begin 24 - 48 hours after discharge.  PLEASE  EVALUATE AND TREAT (Evaluation timeline is 24 - 48 hrs. Please call if there is need for a variance to this timeline).    REASON FOR REFERRAL: Assessment & Treatment: PT    ADDITIONAL SERVICES NEEDED: none    OTHER PERTINENT INFORMATION: Patient was last seen by provider on 5/22/219 for syncope.    No current outpatient medications on file.    Patient Active Problem List:     Hypothyroidism     bmi 40     Chronic ischemic heart disease     Generalized osteoarthrosis, unspecified site     HEARING LOSS CONDUCTIVE, COMBINED TYPE     Esophageal reflux     Osteoporosis     RC-moderate (AHI 12, LSat 60%); REM RDI-73     Neuropathy (H)     Hyperlipidemia LDL goal <100     Rectal cancer- newly diagnosed adenoCA rectum Jan 2011 and Positive Tubular Adenoma 2019     Anemia     Radiation therapy complication     Vitamin B 12 deficiency     Vitamin D deficiency     Dysuria     Bowel and bladder incontinence     Benign essential hypertension     Health Care Home     Chronic diarrhea     Restless leg syndrome     Type 2 diabetes mellitus with diabetic nephropathy, with long-term current use of insulin (H)     Spinal stenosis of lumbar region without neurogenic claudication     DDD (degenerative disc disease), lumbar     Lymphedema of genitalia     Oropharyngeal dysphagia     Bilateral hearing loss, unspecified hearing loss type     Lumbar radiculopathy     CKD (chronic kidney disease) stage 4, GFR 15-29 ml/min (H)     Impacted cerumen of right ear     H/O deep venous thrombosis     Symptomatic bradycardia     Syncope     Syncope and collapse      Documentation of Face to Face and Certification for Home Health Services    I certify that patient, Stefanie Velazquez is under my care and that I, or a Nurse Practitioner or Physician's Assistant working with me, had a face-to-face encounter that meets the physician face-to-face encounter requirements with this patient on: 5/22/2019.    This encounter with the patient was in whole, or in  part, for the following medical condition, which is the primary reason for Home Health Care: syncope.    I certify that, based on my findings, the following services are medically necessary Home Health Services: Physical Therapy    My clinical findings support the need for the above services because: Physical Therapy Services are needed to assess and treat the following functional impairments: syncope.    Further, I certify that my clinical findings support that this patient is homebound (i.e. absences from home require considerable and taxing effort and are for medical reasons or [facility] services or infrequently or of short duration when for other reasons) because: Requires assistance of another person or specialized equipment to access medical services because patient: Requires supervision of another for safe transfer..    Based on the above findings, I certify that this patient is confined to the home and needs intermittent skilled nursing care, physical therapy and/or speech therapy.  The patient is under my care, and I have initiated the establishment of the plan of care.  This patient will be followed by a physician who will periodically review the plan of care.    Physician/Provider to provide follow up care: Sandra Morales certified Physician at time of discharge: Dr. Sandra Self    Please be aware that coverage of these services is subject to the terms and limitations of your health insurance plan.  Call member services at your health plan with any benefit or coverage questions.            Key Recommendations:  Patient admitted following syncopal episode at [facility]; no clear cause, ziopatch x 2 weeks on discharge.   [facility] home care for PHYSICAL THERAPY at [facility] ordered.  Patient at baseline mobility upon discharge.      Magdalena Hamilton    AVS/Discharge Summary is the source of truth; this is a helpful guide for improved communication of patient story

## 2019-05-21 NOTE — ED PROVIDER NOTES
History     Chief Complaint   Patient presents with     Seizures     seizure for 30 seconds at nursing home      HPI  Stefanie Velazquez is a 74 year old female who presents with a history of coronary artery disease, rectal cancer adenocarcinoma of the rectum in 2011, type 2 diabetes on insulin, chronic kidney disease stage III, prior radiation therapy to the abdomen, spinal stenosis of the lumbar spine, prior DVT, vitamin B12 and vitamin D deficiency, hypothyroidism who presented with a sudden onset of seizure-like activity that lasted for less than a minutes while she was seated at the nursing home for breakfast had arm and leg movement that apparently was symmetric and altered level of consciousness during the event but minimal to no postictal.  As related by paramedics.  Glucose on their arrival was normal.  Patient just received insulin and was about to eat but did not take anything orally at that point.  There is no history of prior seizure and no loss of bowel or bladder function.  Patient has not had a prior syncopal episode.  She has multiple risks as well.  There have been no new medication changes.  She had been given her ropinirole which is been taking for some time -and this was dosed just before the episode occurred.  He relates no palpitations chest pain shortness of breath or other presyncopal symptoms.  She has no current symptoms currently.  She did note that her restless leg type symptoms were more active at night      Allergies:  Allergies   Allergen Reactions     Hydrocodone Unknown     Lisinopril Cough            Vicodin [Hydrocodone-Acetaminophen] Other (See Comments)     Caused extreme dizziness       Problem List:    Patient Active Problem List    Diagnosis Date Noted     Fracture of femur, distal, left, closed (H) 02/11/2013     Priority: High     Impacted cerumen of right ear 03/31/2019     Priority: Medium     Recurrent acute deep vein thrombosis (DVT) of both lower extremities (H)  03/31/2019     Priority: Medium     CKD (chronic kidney disease) stage 4, GFR 15-29 ml/min (H) 02/03/2019     Priority: Medium     Lumbar radiculopathy 01/31/2019     Priority: Medium     Oropharyngeal dysphagia 12/14/2018     Priority: Medium     Bilateral hearing loss, unspecified hearing loss type 12/14/2018     Priority: Medium     Acute deep vein thrombosis (DVT) of right lower extremity, unspecified vein (H) 10/31/2018     Priority: Medium     Lymphedema of genitalia 08/12/2018     Priority: Medium     Spinal stenosis of lumbar region without neurogenic claudication 05/01/2018     Priority: Medium     DDD (degenerative disc disease), lumbar 05/01/2018     Priority: Medium     Type 2 diabetes mellitus with diabetic nephropathy, with long-term current use of insulin (H) 06/27/2017     Priority: Medium     Restless leg syndrome 04/02/2017     Priority: Medium     Chronic diarrhea 03/30/2017     Priority: Medium     Health Care Home 10/28/2016     Priority: Medium     Cellulitis of lower extremity, bilateral 09/30/2016     Priority: Medium     Benign essential hypertension 09/30/2016     Priority: Medium     Bowel and bladder incontinence 05/22/2015     Priority: Medium     Dysuria 10/24/2013     Priority: Medium     Vitamin B 12 deficiency 05/14/2012     Priority: Medium     Vitamin D deficiency 05/14/2012     Priority: Medium     Radiation therapy complication 11/09/2011     Priority: Medium     Type 2 diabetes mellitus with diabetic nephropathy (H) 10/19/2011     Priority: Medium     Anemia 08/26/2011     Priority: Medium     Rectal cancer- newly diagnosed adenoCA rectum Jan 2011 and Positive Tubular Adenoma 2019 02/17/2011     Priority: Medium     Hyperlipidemia LDL goal <100 10/31/2010     Priority: Medium     Neuropathy (H) 04/09/2010     Priority: Medium     RC-moderate (AHI 12, LSat 60%); REM RDI-73 06/01/2009     Priority: Medium     Sleep study [facility] was performed 5/26/09 in order to re-evaluate  severity of RC. The total sleep time was 412.0 minutes. The sleep latency was decreased at 8.7 minutes with Ambien 10mg. The REM sleep latency was 426.0 minutes. Sleep efficiency was reduced at 79.0%. The sleep architecture was disrupted with frequent sleep stage changes and arousals.  Snoring:  loud. Respiratory Events:   RDI of 57.5 and an AHI of 12.2. The REM RDI was 74.3 The lowest O2 saturation was 60.0%. This study is suggestive of moderate sleep apnea, profound desaturations during REM sleep, with 35 second apneas.    Other: PLM index was 13.8.      Recommendations:  Due to the profound desaturations during REM sleep, consider CPAP titration study to establish optimal CPAP treatment pressure.  2. Check ferritin given her RLS symptoms. If RLS symptoms persist after treatment of RC, further therapy may be warranted. Replace iron as indicated by ferritin.         Osteoporosis 02/29/2008     Priority: Medium     Problem list name updated by automated process. Provider to review       Esophageal reflux 10/13/2007     Priority: Medium     On protonix;        bmi 40 06/22/2005     Priority: Medium     Problem list name updated by automated process. Provider to review       Generalized osteoarthrosis, unspecified site 06/22/2005     Priority: Medium     HEARING LOSS CONDUCTIVE, COMBINED TYPE 06/22/2005     Priority: Medium     [place]guillan measles as child       Hypothyroidism 06/22/2003     Priority: Medium     Problem list name updated by automated process. Provider to review       Chronic ischemic heart disease 06/22/2003     Priority: Medium     Class: Chronic     cabgx3 09/2002,  with a left internal mammary (LIMA) to the left anterior descending and a bypass graft to the obtuse marginal branch of the circumflex and also PDA branch of the right coronary artery. She had an episode of atrial fibrillation postoperatively and was treated with sotalol.   PTCA and stent placement for recurrent restenosis.   2/05 adenosine  ef70%  9/24/12 - Lexiscan nuclear stress test was positive for mild partially reversible ischemia in the LAD distribution. Imdur added.           CKD (chronic kidney disease) stage 3, GFR 30-59 ml/min (H) 01/26/2011     Priority: Low        Past Medical History:    Past Medical History:   Diagnosis Date     Acute myocardial infarction of other specified sites, episode of care unspecified 6/2002     Cancer of colon (H)      Chronic ischemic heart disease, unspecified 6/22/2003     Coronary atherosclerosis of unspecified type of vessel, native or graft      Diabetes mellitus (H)      Esophageal reflux      Gastro-oesophageal reflux disease      Generalized osteoarthrosis, unspecified site 6/22/2005     Generalized osteoarthrosis, unspecified site 6/22/2005     HEARING LOSS CONDUCTIVE, COMBINED TYPE 6/22/2005     HYPOTHYROIDISM  6/22/2003     Mixed hyperlipidemia 6/22/2005     Obesity, unspecified 6/22/2005     RC-moderate (AHI 12, LSat 60%); REM RDI-73 6/1/2009     Stented coronary artery      Type II or unspecified type diabetes mellitus with renal manifestations, uncontrolled(250.42) (H) 6/22/2005     Type II or unspecified type diabetes mellitus with renal manifestations, uncontrolled(250.42) (H) 6/22/2005     Unspecified disorder resulting from impaired renal function 6/22/2005     Unspecified essential hypertension        Past Surgical History:    Past Surgical History:   Procedure Laterality Date     cabg       COLECTOMY LOW ANTERIOR  6/21/2011    Procedure:COLECTOMY LOW ANTERIOR; with loop ielostomy; Surgeon:ROBBIN MCDONNELL; [facility]     COLONOSCOPY  1/26/2011    COLONOSCOPY performed by MASOUD BILL at [facility]     COLONOSCOPY N/A 3/1/2016    Procedure: COLONOSCOPY;  Surgeon: Liza Neal MD;  Location: [facility]     INSERT PORT VASCULAR ACCESS  3/2/2011    INSERT PORT VASCULAR ACCESS performed by LIZA NEAL at [facility]     OPEN REDUCTION INTERNAL FIXATION FEMUR DISTAL   2/12/2013    Procedure: OPEN REDUCTION INTERNAL FIXATION FEMUR DISTAL;  Open reduction internal fixation left femur fracture--[facility];  Surgeon: Ley, Jeffrey Duane, MD;  Location: [facility]     ORTHOPEDIC SURGERY       PHACOEMULSIFICATION WITH STANDARD INTRAOCULAR LENS IMPLANT Left 1/22/2018    Procedure: PHACOEMULSIFICATION WITH STANDARD INTRAOCULAR LENS IMPLANT;  Left cataract removal with implant;  Surgeon: Rony Key MD;  Location: [facility]     PHACOEMULSIFICATION WITH STANDARD INTRAOCULAR LENS IMPLANT Right 2/19/2018    Procedure: PHACOEMULSIFICATION WITH STANDARD INTRAOCULAR LENS IMPLANT;  Right cataract removal with implant;  Surgeon: Rony Key MD;  Location: [facility]     SIGMOIDOSCOPY FLEXIBLE  9/9/2011    Procedure:SIGMOIDOSCOPY FLEXIBLE; Performed prior to induction.; Surgeon:ROBBIN MCDONNELL; Location: OR     SURGICAL HISTORY OF -   10/24/2002    Heart bypass     SURGICAL HISTORY OF -   2002    R Knee arthroplasty     SURGICAL HISTORY OF -   2002    Mi 2 stints     TAKEDOWN ILEOSTOMY  9/9/2011    Procedure:TAKEDOWN ILEOSTOMY; Exploratory Laparotomy,  Loop Ileostomy Takedown, Small Bowel Resection x 2.; Surgeon:ROBBIN MCDONNELL; Location: OR       Family History:    Family History   Problem Relation Age of Onset     Diabetes Sister      C.A.D. Sister      Breast Cancer Sister        Social History:  Marital Status:  Single [1]  Social History     Tobacco Use     Smoking status: Former Smoker     Smokeless tobacco: Never Used   Substance Use Topics     Alcohol use: Yes     Comment: rare social use     Drug use: No        Medications:      ACCU-CHEK MILVIA MELODY   acetaminophen (TYLENOL) 650 MG CR tablet   apixaban ANTICOAGULANT (ELIQUIS) 5 MG tablet   blood glucose monitoring (ACCU-CHEK MILVIA) test strip   blood glucose monitoring (ACCU-CHEK MULTICLIX) lancets   BUTT PASTE - REGULAR (DR LOVE POOP GOCHAUNCEY BUTT PASTE FORMULA)   cholecalciferol 1000 units TABS   fluocinonide (LIDEX) 0.05 %  ointment   fluticasone (FLONASE) 50 MCG/ACT spray   furosemide (LASIX) 20 MG tablet   gabapentin (NEURONTIN) 300 MG capsule   insulin aspart (NOVOLOG FLEXPEN) 100 UNIT/ML pen   insulin glargine (LANTUS SOLOSTAR PEN) 100 UNIT/ML pen   insulin pen needle (BD GABRIELLA U/F) 32G X 4 MM   levothyroxine (SYNTHROID/LEVOTHROID) 88 MCG tablet   loperamide (IMODIUM) 2 MG capsule   magnesium 250 MG tablet   menthol-zinc oxide (CALMOSEPTINE) 0.44-20.625 % OINT ointment   metoprolol succinate ER (TOPROL-XL) 50 MG 24 hr tablet   nystatin (MYCOSTATIN) 729333 UNIT/GM external powder   OMEPRAZOLE PO   order for DME   order for DME   Potassium Gluconate 595 MG CAPS   rOPINIRole (REQUIP) 1 MG tablet   simvastatin (ZOCOR) 40 MG tablet         Review of Systems   Constitutional: Negative for chills, diaphoresis and fever.   HENT: Negative for ear pain, sinus pressure and sore throat.    Eyes: Negative for visual disturbance.   Respiratory: Negative for cough, shortness of breath and wheezing.    Cardiovascular: Negative for chest pain and palpitations.   Gastrointestinal: Negative for abdominal pain, blood in stool, constipation, diarrhea, nausea and vomiting.   Genitourinary: Negative for dysuria, frequency and urgency.   Skin: Negative for rash.   Neurological: Positive for syncope. Negative for headaches.   All other systems reviewed and are negative.        Physical Exam   BP: 118/56  Heart Rate: 54  Resp: 16  SpO2: 96 %      Physical Exam   Constitutional: No distress.   HENT:   Mouth/Throat: Oropharynx is clear and moist.   Eyes: Conjunctivae and EOM are normal.   Neck: Normal range of motion. Neck supple.   Cardiovascular: Normal rate, regular rhythm and normal heart sounds. Exam reveals no friction rub.   No murmur heard.  Pulmonary/Chest: Effort normal and breath sounds normal. No stridor. No respiratory distress. She has no wheezes. She has no rales.   Abdominal: Soft. Bowel sounds are normal. She exhibits no distension and no  mass. There is no tenderness. There is no guarding.   Musculoskeletal: She exhibits edema (bilateral symmetric, non-pitting).   Neurological: She is alert. She displays normal reflexes. No cranial nerve deficit or sensory deficit. She exhibits normal muscle tone. Coordination normal. GCS eye subscore is 4. GCS verbal subscore is 5. GCS motor subscore is 6.   Skin: No rash noted. She is not diaphoretic. No pallor.       ED Course     ED Course as of May 21 1850   Tue May 21, 2019   1105 XR Chest 2 Views     Procedures                 EKG Interpretation:      Interpreted by Leroy Patino MD  EKG done at 0944 hrs. demonstrates a sinus rhythm at 51 bpm with a normal axis and no ST change.  T wave inversion in lead V2 and flattening overall in the precordial leads.  Normal R progression.  No Q waves.  Normal intervals.  Normal conduction.  No ectopy.  Impression sinus rhythm 51 bpm and there is T wave flattening overall in inversion V2 but these signs were found on EKG done in February 2019    Critical Care time:  none               No results found. However, due to the size of the patient record, not all encounters were searched. Please check Results Review for a complete set of results.    Medications - No data to display    Assessments & Plan (with Medical Decision Making)     MDM: Stefanie Velazquez is a 74 year old female who presents with a history of seizure-like activity at the nursing home where she resides this morning but it was less than a minute and is likely more consistent with a syncopal episode.  She describes no presyncopal symptoms.  She is on anticoagulation but has had no head injury.  No seizure history in the past.  She is mildly bradycardic on presentation in the 50s and is on metoprolol.  This could have contributed.  She also has a history of anticoagulation on Eliquis but has no obvious bleeding.  She also has a history of diabetes and has been using insulin but glucose was in the 200s  today.    Differential diagnosis would include arrhythmia, coronary syndrome.  No recent VTE does have a history of DVT in the past no obvious acute provocative findings and is not tachycardic on presentation.  Not hypoxic and not tachypneic.  Due to her anticoagulation use will check CT head but doubt this was seizure as there was no postictal.  Loss of bowel or bladder function or history of seizure in the past.    The remainder of her testing was reassuring regarding syncopal evaluation including her EKG.  She does have bradycardia into the upper 40s on metoprolol, and may be reasonable to decrease this dosing.  Given her living in assisted living I would recommend a higher level of monitoring at least overnight to demonstrate no recurrent episodes.  I recommended that she remain overnight for monitoring and she agrees.  This will be on telemetry.  Discussed with Dr. Self who accepts for observation.         ED to Inpatient Handoff:    Discussed with Dr. Self  Patient accepted for Observation Stay  Pending studies include none  Code Status: Not Addressed             I have reviewed the nursing notes.    I have reviewed the findings, diagnosis, plan and need for follow up with the patient.          Medication List      There are no discharge medications for this visit.         Final diagnoses:   Syncope and collapse   H/O deep venous thrombosis   CKD (chronic kidney disease) stage 4, GFR 15-29 ml/min (H)   Type 2 diabetes mellitus with diabetic nephropathy, with long-term current use of insulin (H)       5/21/2019   [facility] EMERGENCY DEPARTMENT     Leroy Patino MD  05/21/19 2671

## 2019-05-21 NOTE — PROGRESS NOTES
[place] [facility] [place]    Patient admitted to room 2215 at approximately 1510 via cart from emergency room. Patient was accompanied by transport tech.     Verbal SBAR report received from JEWELS Kaufman prior to patient arrival.     Patient ambulated to bed with one assist. Patient alert and oriented X 3. The patient is not having any pain. 0-10 Pain Scale: 0. Admission vital signs: Blood pressure 157/51, pulse 60, temperature 97.5  F (36.4  C), temperature source Oral, resp. rate 16, height 1.524 m (5'), weight 93.6 kg (206 lb 5.6 oz), SpO2 96 %, not currently breastfeeding. Patient was oriented to plan of care, call light, bed controls, tv, telephone, bathroom and visiting hours.     Risk Assessment    The following safety risks were identified during admission: fall. Yellow risk band applied: YES.     Skin Initial Assessment    This writer admitted this patient and completed a full skin assessment and Efe score in the Adult PCS flowsheet. Appropriate interventions initiated as needed.     Sheri Shrestha

## 2019-05-21 NOTE — ED NOTES
"Patient has  [place] to Observation  order. Patient has been given the Observation brochure -  What does Observation mean to me.\"  Patient has been given the opportunity to ask questions about observation status and their plan of care.      Lisa Sifuentes    "

## 2019-05-21 NOTE — ED NOTES
Brief 30 sec loc with seizure like activity this am at breakfast table followed by emesis-pt alert and oriented when EMS arrived-mild ha-feels ok now but hungry

## 2019-05-22 ENCOUNTER — APPOINTMENT (OUTPATIENT)
Dept: CARDIOLOGY | Facility: CLINIC | Age: 74
End: 2019-05-22
Attending: INTERNAL MEDICINE
Payer: COMMERCIAL

## 2019-05-22 ENCOUNTER — APPOINTMENT (OUTPATIENT)
Dept: PHYSICAL THERAPY | Facility: CLINIC | Age: 74
End: 2019-05-22
Payer: COMMERCIAL

## 2019-05-22 ENCOUNTER — APPOINTMENT (OUTPATIENT)
Dept: NUCLEAR MEDICINE | Facility: CLINIC | Age: 74
End: 2019-05-22
Attending: INTERNAL MEDICINE
Payer: COMMERCIAL

## 2019-05-22 ENCOUNTER — APPOINTMENT (OUTPATIENT)
Dept: OCCUPATIONAL THERAPY | Facility: CLINIC | Age: 74
End: 2019-05-22
Payer: COMMERCIAL

## 2019-05-22 LAB
GLUCOSE BLDC GLUCOMTR-MCNC: 116 MG/DL (ref 70–99)
GLUCOSE BLDC GLUCOMTR-MCNC: 216 MG/DL (ref 70–99)
GLUCOSE BLDC GLUCOMTR-MCNC: 252 MG/DL (ref 70–99)
GLUCOSE BLDC GLUCOMTR-MCNC: 297 MG/DL (ref 70–99)
IRON SATN MFR SERPL: 11 % (ref 15–46)
IRON SERPL-MCNC: 37 UG/DL (ref 35–180)
TIBC SERPL-MCNC: 351 UG/DL (ref 240–430)
TROPONIN I SERPL-MCNC: <0.015 UG/L (ref 0–0.04)

## 2019-05-22 PROCEDURE — 99225 ZZC SUBSEQUENT OBSERVATION CARE,LEVEL II: CPT | Performed by: INTERNAL MEDICINE

## 2019-05-22 PROCEDURE — 40000264 ECHOCARDIOGRAM COMPLETE

## 2019-05-22 PROCEDURE — 97161 PT EVAL LOW COMPLEX 20 MIN: CPT | Mod: GP

## 2019-05-22 PROCEDURE — 97165 OT EVAL LOW COMPLEX 30 MIN: CPT | Mod: GO

## 2019-05-22 PROCEDURE — G0378 HOSPITAL OBSERVATION PER HR: HCPCS

## 2019-05-22 PROCEDURE — 84484 ASSAY OF TROPONIN QUANT: CPT | Performed by: INTERNAL MEDICINE

## 2019-05-22 PROCEDURE — 25000132 ZZH RX MED GY IP 250 OP 250 PS 637: Performed by: INTERNAL MEDICINE

## 2019-05-22 PROCEDURE — 96372 THER/PROPH/DIAG INJ SC/IM: CPT | Mod: 59

## 2019-05-22 PROCEDURE — 34300033 ZZH RX 343: Performed by: INTERNAL MEDICINE

## 2019-05-22 PROCEDURE — 27210210 NM LUNG SCAN VENTILATION AND PERFUSION

## 2019-05-22 PROCEDURE — A9540 TC99M MAA: HCPCS | Performed by: INTERNAL MEDICINE

## 2019-05-22 PROCEDURE — A9567 TECHNETIUM TC-99M AEROSOL: HCPCS | Performed by: INTERNAL MEDICINE

## 2019-05-22 PROCEDURE — 25500064 ZZH RX 255 OP 636: Performed by: INTERNAL MEDICINE

## 2019-05-22 PROCEDURE — 93306 TTE W/DOPPLER COMPLETE: CPT | Mod: 26 | Performed by: INTERNAL MEDICINE

## 2019-05-22 PROCEDURE — 25000132 ZZH RX MED GY IP 250 OP 250 PS 637: Performed by: PHYSICIAN ASSISTANT

## 2019-05-22 PROCEDURE — 99207 ZZC CDG-CODE CATEGORY CHANGED: CPT | Performed by: INTERNAL MEDICINE

## 2019-05-22 PROCEDURE — 83540 ASSAY OF IRON: CPT | Performed by: INTERNAL MEDICINE

## 2019-05-22 PROCEDURE — 36415 COLL VENOUS BLD VENIPUNCTURE: CPT | Performed by: INTERNAL MEDICINE

## 2019-05-22 PROCEDURE — 83550 IRON BINDING TEST: CPT | Performed by: INTERNAL MEDICINE

## 2019-05-22 PROCEDURE — 25000131 ZZH RX MED GY IP 250 OP 636 PS 637: Performed by: PHYSICIAN ASSISTANT

## 2019-05-22 PROCEDURE — 00000146 ZZHCL STATISTIC GLUCOSE BY METER IP

## 2019-05-22 RX ADMIN — APIXABAN 5 MG: 5 TABLET, FILM COATED ORAL at 19:33

## 2019-05-22 RX ADMIN — APIXABAN 5 MG: 5 TABLET, FILM COATED ORAL at 08:28

## 2019-05-22 RX ADMIN — METOPROLOL SUCCINATE 12.5 MG: 25 TABLET, EXTENDED RELEASE ORAL at 08:33

## 2019-05-22 RX ADMIN — GABAPENTIN 300 MG: 300 CAPSULE ORAL at 21:04

## 2019-05-22 RX ADMIN — OMEPRAZOLE 20 MG: 20 CAPSULE, DELAYED RELEASE ORAL at 06:08

## 2019-05-22 RX ADMIN — POTASSIUM CHLORIDE 10 MEQ: 750 TABLET, FILM COATED, EXTENDED RELEASE ORAL at 08:28

## 2019-05-22 RX ADMIN — KIT FOR THE PREPARATION OF TECHNETIUM TC 99M PENTETATE 41.7 MILLICURIE: 20 INJECTION, POWDER, LYOPHILIZED, FOR SOLUTION INTRAVENOUS; RESPIRATORY (INHALATION) at 12:00

## 2019-05-22 RX ADMIN — FUROSEMIDE 20 MG: 20 TABLET ORAL at 19:33

## 2019-05-22 RX ADMIN — ROPINIROLE HYDROCHLORIDE 1 MG: 1 TABLET, FILM COATED ORAL at 08:28

## 2019-05-22 RX ADMIN — SIMVASTATIN 40 MG: 40 TABLET, FILM COATED ORAL at 08:28

## 2019-05-22 RX ADMIN — ROPINIROLE HYDROCHLORIDE 1 MG: 1 TABLET, FILM COATED ORAL at 21:05

## 2019-05-22 RX ADMIN — HUMAN ALBUMIN MICROSPHERES AND PERFLUTREN 2 ML: 10; .22 INJECTION, SOLUTION INTRAVENOUS at 11:11

## 2019-05-22 RX ADMIN — OMEPRAZOLE 20 MG: 20 CAPSULE, DELAYED RELEASE ORAL at 17:09

## 2019-05-22 RX ADMIN — LEVOTHYROXINE SODIUM 88 MCG: 88 TABLET ORAL at 06:08

## 2019-05-22 RX ADMIN — FUROSEMIDE 20 MG: 20 TABLET ORAL at 08:28

## 2019-05-22 RX ADMIN — Medication 5.1 MILLICURIE: at 12:30

## 2019-05-22 RX ADMIN — INSULIN ASPART 4 UNITS: 100 INJECTION, SOLUTION INTRAVENOUS; SUBCUTANEOUS at 17:09

## 2019-05-22 RX ADMIN — INSULIN GLARGINE 20 UNITS: 100 INJECTION, SOLUTION SUBCUTANEOUS at 21:05

## 2019-05-22 RX ADMIN — INSULIN ASPART 2 UNITS: 100 INJECTION, SOLUTION INTRAVENOUS; SUBCUTANEOUS at 13:13

## 2019-05-22 NOTE — CONSULTS
"Care Transition Initial Assessment - RN      Met with: Patient.    DATA  Principal Problem:    Syncope  Active Problems:    Hypothyroidism    Chronic ischemic heart disease    Esophageal reflux    RC-moderate (AHI 12, LSat 60%); REM RDI-73    Hyperlipidemia LDL goal <100    Rectal cancer- newly diagnosed adenoCA rectum Jan 2011 and Positive Tubular Adenoma 2019    Anemia    Benign essential hypertension    Restless leg syndrome    Type 2 diabetes mellitus with diabetic nephropathy, with long-term current use of insulin (H)    CKD (chronic kidney disease) stage 4, GFR 15-29 ml/min (H)    H/O deep venous thrombosis    Syncope and collapse       Cognitive Status: awake, alert and oriented.  [facility]  Primary Care MD Name: mckenna  Contact information and PCP information verified: Yes  Lives With: alone         Description of Support System: Supportive, Involved       Support Assessment: Adequate family and caregiver support   Insurance concerns: No Insurance issues identified        This writer met with pt, introduced self and role.  Patient currently lives at [facility] (phone: [phone] Fax: [phone]).  Spoke with Trevin DONIS at [facility].  Patient currently has assistance with showers and bathing.  She moves independently in her own apartment.  Her niece, Crystal provides transportation in the community.  Trevin mentioned [facility] could accept patient back with \"intermittent\" assist of 2 needs.  Will await therapy recommendations. Discussed discharge planning and Medicare guidelines in regards to home care, TCU. Patient refers to return to [facility] if able but is agreeable to TCU if recommended.  Patient was provided with Medicare certified nursing home list. Pts choices are as follows [facility] (Phone: [phone] Fax: [phone]), [facility] (Phone: [phone] Admissions: [phone] Fax: [phone]) and [facility] (Admissions Phone: [phone] Main Phone: [phone] Fax: [phone]).  Referrals sent.      Addendum:  Following therapy recommendations, plan to return to [facility] (phone: [phone] Fax: [phone]) with [facility] ([phone] Fax: [phone]) for physical therapy services.  Spoke again with Trevin at [facility] who is in agreement with plan.  Home care referral/order placed.  Patient agreeable with plan.  Family to transport when ready for discharge.    PLAN    Return to [facility] with added physical therapy services via [facility] ([phone] Fax: [phone])    Magdalena Hamliton RN  Inpatient Care Coordinator  [facility] [phone]  [facility] [phone]      HOME CARE HAND OFF  Patient Name: Stefanie Velazquez    MRN: [id number]    : 1945    Patient Zip Code: [id number]    Admit Diagnosis: Syncope and collapse [R55]      Services Pt Needs at Home: PT    Discharge Support: Independent-Alone    Living Arrangements: Assisted living     or Address Other Than Pt: No    Wound Care: No    Anticipate DC Date: 2019

## 2019-05-22 NOTE — PROGRESS NOTES
Addendum    Nursing reported pt was having BRBPR with straining. I went to talk to pt. She says it has not happened in the hospital. It has been on and off since her colon surgery 2011. It was happening a few weeks before admit. She last had colonoscopy 4/24/2019.  This does not appear to be an acute problem.    Sandra Self

## 2019-05-22 NOTE — PROGRESS NOTES
Telemetry note: Patient has been in a Sinus Bradycardia high 40's to low 50's with occasional PAC's. Her rate hs dipped to the low of 41 bpm, which is not sustained.

## 2019-05-22 NOTE — PLAN OF CARE
Patient denies dizziness, lightheadedness.  Up with assist of 1, gait belt and walker. Some bright red blood noted with BM.  MD updated.  Requip given x1 for restless legs.

## 2019-05-22 NOTE — PROGRESS NOTES
05/22/19 1300   Quick Adds   Type of Visit Initial Occupational Therapy Evaluation   Living Environment   Lives With alone   Living Arrangements assisted living   Home Accessibility no concerns   Functional Level   Ambulation 3-->assistive equipment and person   Transferring 1-->assistive equipment   Toileting 1-->assistive equipment   Bathing 3-->assistive equipment and person  (assist to get into walk-in tub. )   Dressing 0-->independent   Eating 0-->independent   Communication 0-->understands/communicates without difficulty   Swallowing 0-->swallows foods/liquids without difficulty   Cognition 0 - no cognition issues reported   Fall history within last six months yes   Number of times patient has fallen within last six months 2   General Information   Onset of Illness/Injury or Date of Surgery - Date 05/21/19   Referring Physician Sandra Self MD   Patient/Family Goals Statement To return to [facility]   Additional Occupational Profile Info/Pertinent History of Current Problem Stefanie Velazquez is a 74 year old female with a history of MI, colon cancer, type 2 diabetes, GERD, hyper thyroidism, hyperlipidemia, obesity, RC, hypertension, and recurrent DVTs admitted on 5/21/2019. She presents following a syncopal episode that occurred while seated.   Precautions/Limitations fall precautions   Cognitive Status Examination   Orientation orientation to person, place and time   Level of Consciousness alert   Transfer Skill: Sit to Stand   Level of [place]: Sit/Stand stand-by assist   Physical Assist/Nonphysical Assist: Sit/Stand supervision   Transfer Skill: Sit to Stand full weight-bearing   Assistive Device for Transfer: Sit/Stand standard walker   Transfer Skill: Toilet Transfer   Level of [place]: Toilet stand-by assist   Physical Assist/Nonphysical Assist: Toilet supervision   Weight-Bearing Restrictions: Toilet full weight-bearing   Assistive Device standard walker   Upper Body Dressing   Level of  "[place]: Dress Upper Body independent   Lower Body Dressing   Level of [place]: Dress Lower Body independent   Grooming   Level of [place]: Grooming independent   Eating/Self Feeding   Level of [place]: Eating independent   Instrumental Activities of Daily Living (IADL)   IADL Comments walks down to meals with assist of w/c follow if staff is avaible. If not able to propel self to dining room.    Clinical Impression   Criteria for Skilled Therapeutic Interventions Met evaluation only   OT Diagnosis assess ADLs   Influenced by the following impairments syncope, weakness   Clinical Decision Making (Complexity) Low complexity   Anticipated Discharge Disposition Home with Assist;Home with Home Therapy  (with continued services. )   Risks and Benefits of Treatment have been explained. Yes   Patient, Family & other staff in agreement with plan of care Yes   [facility] AM-PAC  \"6 Clicks\" Daily Activity Inpatient Short Form   1. Putting on and taking off regular lower body clothing? 4 - None   2. Bathing (including washing, rinsing, drying)? 3 - A Little   3. Toileting, which includes using toilet, bedpan or urinal? 4 - None   4. Putting on and taking off regular upper body clothing? 4 - None   5. Taking care of personal grooming such as brushing teeth? 4 - None   6. Eating meals? 4 - None   Daily Activity Raw Score (Score out of 24.Lower scores equate to lower levels of function) 23   Total Evaluation Time   Total Evaluation Time (Minutes) 20     "

## 2019-05-22 NOTE — PLAN OF CARE
Patient alert, oriented, assist of 2 to pivot to commode. Primarily uses wheelchair at baseline. Denies pain, nausea, SOB. Rested comfortably overnight. LS clear. Telemetry in place. Blood glucose monitoring. Bradycardic. Pocket talker in room. Echo today. /41   Pulse 69   Temp 97.9  F (36.6  C) (Axillary)   Resp 16   Ht 1.524 m (5')   Wt 93.6 kg (206 lb 5.6 oz)   SpO2 96%   BMI 40.30 kg/m

## 2019-05-22 NOTE — PROGRESS NOTES
05/22/19 1143   Quick Adds   Type of Visit Initial PT Evaluation   Living Environment   Lives With alone   Living Arrangements assisted living   Home Accessibility no concerns   Functional Level Prior   Ambulation 3-->assistive equipment and person   Transferring 1-->assistive equipment   Toileting 1-->assistive equipment   Bathing 1-->assistive equipment   Communication 0-->understands/communicates without difficulty   Swallowing 0-->swallows foods/liquids without difficulty   General Information   Onset of Illness/Injury or Date of Surgery - Date 05/21/19   Referring Physician Dr Self   Patient/Family Goals Statement wants to return to [facility]   Pertinent History of Current Problem (include personal factors and/or comorbidities that impact the POC) Per H&P: Stefanie Velazquez is a 74 year old female with a history of MI, colon cancer, type 2 diabetes, GERD, hyper thyroidism, hyperlipidemia, obesity, RC, hypertension, and recurrent DVTs admitted on 5/21/2019. She presents following a syncopal episode that occurred while seated.   Cognitive Status Examination   Orientation person;place   Level of Consciousness alert   Follows Commands and Answers Questions 100% of the time   Personal Safety and Judgment intact   Posture    Posture Forward head position;Protracted shoulders;Kyphosis   Range of Motion (ROM)   ROM Comment WFL for age   Strength   Strength Comments generally 4 to 4+/5 throughout   Bed Mobility   Bed Mobility Comments SBA   Transfer Skills   Transfer Comments SBA   Gait   Gait Comments Pt amb with RW with ' with wide ROGELIO, slow yumiko, short step length, good balance.   Balance   Balance Comments good with RW   Clinical Impression   Criteria for Skilled Therapeutic Intervention evaluation only;current level of function same as previous level of function   Clinical Presentation Stable/Uncomplicated   Clinical Presentation Rationale clinical judgement   Clinical Decision Making (Complexity) Low  "complexity   Anticipated Discharge Disposition   ([facility])   Risk & Benefits of therapy have been explained Yes   Patient, Family & other staff in agreement with plan of care Yes   [facility] AM-PAC TM \"6 Clicks\"   2016, Trustees of [facility], under license to [employer].  All rights reserved.   6 Clicks Short Forms Basic Mobility Inpatient Short Form   [facility] AM-PAC  \"6 Clicks\" V.2 Basic Mobility Inpatient Short Form   1. Turning from your back to your side while in a flat bed without using bedrails? 3 - A Little   2. Moving from lying on your back to sitting on the side of a flat bed without using bedrails? 4 - None   3. Moving to and from a bed to a chair (including a wheelchair)? 3 - A Little   4. Standing up from a chair using your arms (e.g., wheelchair, or bedside chair)? 4 - None   5. To walk in hospital room? 3 - A Little   6. Climbing 3-5 steps with a railing? 2 - A Lot   Basic Mobility Raw Score (Score out of 24.Lower scores equate to lower levels of function) 19   Total Evaluation Time   Total Evaluation Time (Minutes) 30     Micki Man PT      "

## 2019-05-23 VITALS
DIASTOLIC BLOOD PRESSURE: 44 MMHG | BODY MASS INDEX: 40.51 KG/M2 | TEMPERATURE: 97.8 F | SYSTOLIC BLOOD PRESSURE: 126 MMHG | HEART RATE: 59 BPM | OXYGEN SATURATION: 96 % | HEIGHT: 60 IN | RESPIRATION RATE: 16 BRPM | WEIGHT: 206.35 LBS

## 2019-05-23 LAB
GLUCOSE BLDC GLUCOMTR-MCNC: 166 MG/DL (ref 70–99)
GLUCOSE BLDC GLUCOMTR-MCNC: 185 MG/DL (ref 70–99)
GLUCOSE BLDC GLUCOMTR-MCNC: 282 MG/DL (ref 70–99)
HGB BLD-MCNC: 11 G/DL (ref 11.7–15.7)

## 2019-05-23 PROCEDURE — 25000132 ZZH RX MED GY IP 250 OP 250 PS 637: Performed by: INTERNAL MEDICINE

## 2019-05-23 PROCEDURE — 00000146 ZZHCL STATISTIC GLUCOSE BY METER IP

## 2019-05-23 PROCEDURE — 99217 ZZC OBSERVATION CARE DISCHARGE: CPT | Performed by: INTERNAL MEDICINE

## 2019-05-23 PROCEDURE — 85018 HEMOGLOBIN: CPT | Performed by: INTERNAL MEDICINE

## 2019-05-23 PROCEDURE — 96372 THER/PROPH/DIAG INJ SC/IM: CPT

## 2019-05-23 PROCEDURE — G0378 HOSPITAL OBSERVATION PER HR: HCPCS

## 2019-05-23 PROCEDURE — 25000132 ZZH RX MED GY IP 250 OP 250 PS 637: Performed by: PHYSICIAN ASSISTANT

## 2019-05-23 PROCEDURE — 99207 ZZC CDG-CODE CATEGORY CHANGED: CPT | Performed by: INTERNAL MEDICINE

## 2019-05-23 PROCEDURE — 36415 COLL VENOUS BLD VENIPUNCTURE: CPT | Performed by: INTERNAL MEDICINE

## 2019-05-23 RX ORDER — IRBESARTAN 75 MG/1
75 TABLET ORAL AT BEDTIME
Status: DISCONTINUED | OUTPATIENT
Start: 2019-05-23 | End: 2019-05-23 | Stop reason: HOSPADM

## 2019-05-23 RX ORDER — IRBESARTAN 75 MG/1
75 TABLET ORAL AT BEDTIME
Qty: 30 TABLET | Refills: 0 | Status: SHIPPED | OUTPATIENT
Start: 2019-05-23 | End: 2019-06-20

## 2019-05-23 RX ORDER — POTASSIUM CHLORIDE 1.5 G/1.58G
20 POWDER, FOR SOLUTION ORAL
Qty: 15 PACKET | Refills: 0 | Status: SHIPPED | OUTPATIENT
Start: 2019-05-24 | End: 2019-08-11

## 2019-05-23 RX ORDER — METOPROLOL SUCCINATE 25 MG/1
12.5 TABLET, EXTENDED RELEASE ORAL DAILY
Qty: 30 TABLET | Refills: 0 | Status: SHIPPED | OUTPATIENT
Start: 2019-05-24 | End: 2019-12-27

## 2019-05-23 RX ADMIN — SIMVASTATIN 40 MG: 40 TABLET, FILM COATED ORAL at 08:36

## 2019-05-23 RX ADMIN — LEVOTHYROXINE SODIUM 88 MCG: 88 TABLET ORAL at 06:46

## 2019-05-23 RX ADMIN — INSULIN ASPART 1 UNITS: 100 INJECTION, SOLUTION INTRAVENOUS; SUBCUTANEOUS at 08:37

## 2019-05-23 RX ADMIN — METOPROLOL SUCCINATE 12.5 MG: 25 TABLET, EXTENDED RELEASE ORAL at 08:36

## 2019-05-23 RX ADMIN — POTASSIUM CHLORIDE 10 MEQ: 750 TABLET, FILM COATED, EXTENDED RELEASE ORAL at 08:36

## 2019-05-23 RX ADMIN — OMEPRAZOLE 20 MG: 20 CAPSULE, DELAYED RELEASE ORAL at 06:46

## 2019-05-23 RX ADMIN — INSULIN ASPART 3 UNITS: 100 INJECTION, SOLUTION INTRAVENOUS; SUBCUTANEOUS at 12:04

## 2019-05-23 RX ADMIN — APIXABAN 5 MG: 5 TABLET, FILM COATED ORAL at 08:36

## 2019-05-23 RX ADMIN — FUROSEMIDE 20 MG: 20 TABLET ORAL at 08:36

## 2019-05-23 NOTE — PLAN OF CARE
Alert and oriented, hard of hearing( pocket talker in room).   Up with assist of one and walker to bathroom. Incontinence of urine and small bowel movement.   Denies any dizziness. Denies pain.   Vital signs stable.   Using call light appropriately. Bed alarm on for safety.

## 2019-05-23 NOTE — PROGRESS NOTES
Name: Stefanie Velazquez    MRN#: [id number]    Reason for Hospitalization: Syncope and collapse [R55]    Discharge Date: 5/23/2019    Patient / Family response to discharge plan: Patient and family in agreement with renato to discharge today back to [facility] (phone: [phone] Fax: [phone]) with added [facility] ([phone] Fax: [phone]).  Family to transport when ready.    Other Providers (Care Coordinator, [facility], PCA services etc): No    CTS Hand Off Completed: Yes: completed    PAS #: NA    DEVON Score: NA    Future Appointments:   Future Appointments   Date Time Provider Department Center   5/29/2019 10:20 AM Deloris Costello APRN CNP [facility]   8/8/2019  2:40 PM Sandra Morales MD [facility]   9/27/2019 11:20 AM Apolinar Martinez MD [facility]   3/27/2020 11:20 AM Apolinar Martinez MD [facility]       Discharge Disposition: assisted living    Discharge Planner   Discharge Plans in progress: completed:  [facility]/ [facility] ([phone] Fax: [phone])  Barriers to discharge plan: none  Follow up plan: CCC, pcp follow up, Home care PT       Entered by: Magdalena Hamilton 05/23/2019 4:06 PM

## 2019-05-23 NOTE — DISCHARGE SUMMARY
[facility]    Discharge Summary  Hospital Medicine    Date of Admission:  5/21/2019  Date of Discharge:  5/23/2019   Discharging Provider: Sandra Self  Date of Service: 5/23/2019     Primary Care     Sandra Morales  [address] [facility] [address]       Assessment & Plan     Stefanie Velazquez is a 74 year old female with a history of MI, colon cancer, type 2 diabetes, GERD, hyper thyroidism, hyperlipidemia, obesity, RC, hypertension, and recurrent DVTs admitted on 5/21/2019. She presents following a syncopal episode that occurred while seated.     Syncope  Syncopal episode that occurred while seated in her wheelchair.  No prodrome.  Some full body shaking noted by bystanders.  No incontinence.  No postictal period.  ECG shows sinus bradycardia @ 51 without ischemic changes.  CT head negative. Mildly anemic, consistent with baseline. Neuro exam grossly normal.  Presence of a prodrome is primarily concerning for arrhythmia versus seizure.  No incontinence, tongue biting, or postictal period, suggesting syncope is the more likely diagnosis.  - Telemetry- so far nsr and sinus dilip to 40's ( no sx here)  - Echo - no findings that would cause syncope  - Carotid US- 50-69%bilat int carotid. Not severe enough to cause syncope  - Venous US of legs- neg for dvt  - Troponin- nl  - Orthostatics- slight rise in hr but this may just be from the activity of standing. Was not symptomatic  -  VQ   low probabilitiy    At this time no cause found unless pt was significantly bradycardic at the time of event. HR since lowering toprol are 50-60's. Will discharge pt on ziopatch for 14 days     H/O deep venous thrombosis  Right lower extremity DVTs September 2018 and February 2019.  She was no longer anticoagulated at the time of the second DVT.  History of rectal cancer, not active at the time of the DVTs.  Anticoagulated on Eliquis.  - Continue Eliquis.     Chronic ischemic heart disease  S/P CABG x 3  2002. PCI 2002 with stent placement.  No recent episodes of chest pain, dyspnea, JOAQUIN, or palpitations. On metoprolol ER 25 mg, simvastatin 40 mg.  - Continue metoprolo but lower dose of toprol bc of bradycardial, simvastatin.     Hypothyroidism  TSH 0.48 on 5/2/19.  Managed with Synthroid 88 mcg every day except Sunday, which she takes 44 mcg.  - Continue Synthroid.     Esophageal reflux  - Continue omeprazole.     RC moderate  Not currently using CPAP.     Hyperlipidemia  - Continue simvastatin 40 mg.     Rectal cancer  Diagnosed with rectal adenocarcinoma (13 cm mass) Jan 2011. Completed chemo and resection. Follows with Dr. Martienz.pt reports intermittent brbpr since     Anemia, likely due to CKD  Hgb 10.8 on admission, 11 on discharge. Stable since 3/2019. fe studies- sat 11 %  - F/U with PCP     Benign essential hypertension  Patient blood pressures reviewed, appears well controlled on metoprolol succinate 25 mg and Lasix 20 mg.  - Continue Lasix and metoprolol.bc of hr occ in 40's and as of now no clear cause for syncope, I decreased toprol to 12.5 daily     Restless leg syndrome  Chronic, unchanged.  Well managed with Requip 3 times daily.  - Continue Requip.     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  Takes NovoLog 10 units with breakfast, 12 units with lunch, and 12 units with dinner. Lantus 20 units at bedtime.  Gabapentin 300 mg at bedtime for neuropathy.  - Continue gabapentin, Novolog and Lantus.       CKD stage 4  Creatinine 1.64, GFR 30 on admission, consistent with her baseline    Left great toe pain-possible trauma related contusion vs ingrown toenail- on exam-? bruising under nail. She is supposed to reschedule an appt with DR Austin for insoles and can discuss this with him at that time           Discharge Disposition   To asisted living    Discharge Orders      Home Care Referral      Reason for your  hospital stay    syncope     Follow-up and recommended labs and tests     Primary md 1 week. Needs BMP at that time     Zio Patch Holter Adult Pediatric Greater than 48 hrs    Can this be done before patient leaves hospital today     Discharge Medications   Current Discharge Medication List      START taking these medications    Details   irbesartan (AVAPRO) 75 MG tablet Take 1 tablet (75 mg) by mouth At Bedtime  Qty: 30 tablet, Refills: 0    Associated Diagnoses: Chronic ischemic heart disease      potassium chloride (KLOR-CON) 20 MEQ packet Take 20 mEq by mouth Every Mon, Wed, Fri Morning  Qty: 15 packet, Refills: 0    Associated Diagnoses: Chronic ischemic heart disease         CONTINUE these medications which have CHANGED    Details   metoprolol succinate ER (TOPROL-XL) 25 MG 24 hr tablet Take 0.5 tablets (12.5 mg) by mouth daily  Qty: 30 tablet, Refills: 0    Associated Diagnoses: Chronic ischemic heart disease         CONTINUE these medications which have NOT CHANGED    Details   ACCU-CHEK MILVIA MELODY dispense one meter  Qty: 1 device, Refills: 0    Comments: Diagnosis code: 250.02  Associated Diagnoses: Type II or unspecified type diabetes mellitus without mention of complication, uncontrolled      acetaminophen (TYLENOL) 650 MG CR tablet Take 1 tablet (650 mg) by mouth 3 times daily as needed for mild pain or fever  Qty: 60 tablet    Associated Diagnoses: Bilateral cellulitis of lower leg      apixaban ANTICOAGULANT (ELIQUIS) 5 MG tablet Take 1 tablet (5 mg) by mouth 2 times daily  Qty: 60 tablet, Refills: 5    Associated Diagnoses: Recurrent acute deep vein thrombosis (DVT) of both lower extremities (H)      blood glucose monitoring (ACCU-CHEK MILVIA) test strip Use to test blood sugars 4 times daily or as directed.  Qty: 360 each, Refills: 1    Associated Diagnoses: Type 2 diabetes mellitus with diabetic nephropathy, with long-term current use of insulin (H)      blood glucose monitoring (ACCU-CHEK  MULTICLIX) lancets Test BG 4 times daily  Qty: 1 Box, Refills: 11    Associated Diagnoses: Type II or unspecified type diabetes mellitus without mention of complication, uncontrolled      cholecalciferol 1000 units TABS Take 1,000 Units by mouth daily      fluocinonide (LIDEX) 0.05 % ointment Apply sparingly to rash on legs twice daily as needed.  Do not apply to face.  Qty: 60 g, Refills: 11    Associated Diagnoses: Venous stasis dermatitis of both lower extremities      furosemide (LASIX) 20 MG tablet Take 1 tablet (20 mg) by mouth 2 times daily  Qty: 180 tablet, Refills: 3    Associated Diagnoses: Benign essential hypertension      gabapentin (NEURONTIN) 300 MG capsule Take 1 tablet (300 mg) at bedtime  Qty: 90 capsule, Refills: 1    Associated Diagnoses: Lumbar radiculopathy      !! insulin aspart (NOVOLOG FLEXPEN) 100 UNIT/ML pen Inject 12 Units Subcutaneous 2 times daily (with meals) Lunch and dinner      !! insulin aspart (NOVOLOG FLEXPEN) 100 UNIT/ML pen 10 units with breakfast, 10 units with lunch and 10 units with supper  Qty: 15 mL, Refills: 1    Comments: Dose change, fill when needed  Associated Diagnoses: Type 2 diabetes mellitus with diabetic nephropathy, with long-term current use of insulin (H)      insulin glargine (LANTUS SOLOSTAR PEN) 100 UNIT/ML pen Inject 20 Units Subcutaneous At Bedtime  Qty: 18 mL, Refills: 3    Associated Diagnoses: Type 2 diabetes mellitus with diabetic nephropathy, with long-term current use of insulin (H)      !! levothyroxine (SYNTHROID/LEVOTHROID) 88 MCG tablet Take 44 mcg by mouth once a week = 1.2  Tab on Sunday      !! levothyroxine (SYNTHROID/LEVOTHROID) 88 MCG tablet Take 88 mcg by mouth daily Except on Sunday      loperamide (IMODIUM) 2 MG capsule One capsule once a day PRN, can use a second one if needed  Qty: 90 capsule, Refills: 3    Associated Diagnoses: Rectal cancer (H); Chronic diarrhea      magnesium 250 MG tablet Take 1 tablet by mouth daily       OMEPRAZOLE PO Take 20 mg by mouth 2 times daily (before meals)       Potassium Gluconate 595 MG CAPS Take 1 tablet by mouth daily    Associated Diagnoses: Stasis edema of both lower extremities      rOPINIRole (REQUIP) 1 MG tablet 1-3 tabs daily as needed, patient takes one in am, one in pm. occasionally will take in midday.  Qty: 200 tablet, Refills: 3    Associated Diagnoses: RLS (restless legs syndrome)      simvastatin (ZOCOR) 40 MG tablet Take 1 tablet (40 mg) by mouth daily  Qty: 90 tablet, Refills: 2    Associated Diagnoses: Hyperlipidemia LDL goal <100      BUTT PASTE - REGULAR (DR LOVE POOP GOOP BUTT PASTE FORMULA) Apply topically every hour as needed for skin protection      diclofenac (VOLTAREN) 50 MG EC tablet Take 50 mg by mouth 3 times daily as needed for moderate pain      fluticasone (FLONASE) 50 MCG/ACT spray Spray 1 spray into both nostrils daily  Qty: 1 Bottle, Refills: 3    Associated Diagnoses: Chronic rhinitis, unspecified type      insulin pen needle (BD GABRIELLA U/F) 32G X 4 MM Use 4 times per day or as directed.  Qty: 120 each, Refills: 5    Associated Diagnoses: Type 2 diabetes mellitus with diabetic nephropathy, with long-term current use of insulin (H)      menthol-zinc oxide (CALMOSEPTINE) 0.44-20.625 % OINT ointment Apply topically 4 times daily as needed for skin protection    Associated Diagnoses: Rash and nonspecific skin eruption      nystatin (MYCOSTATIN) 803097 UNIT/GM external powder Apply topically 2 times daily Until resolved then to use as needed.  Qty: 60 g, Refills: 2    Associated Diagnoses: Yeast infection of the skin      !! order for DME Equipment being ordered: Compression stockings  Qty: 2 Device, Refills: 0    Associated Diagnoses: Acute deep vein thrombosis (DVT) of right lower extremity, unspecified vein (H)      !! order for DME Equipment being ordered: Hospital Bed service and repair  Qty: 1 each, Refills: 0    Associated Diagnoses: DDD (degenerative disc disease),  lumbar; Type 2 diabetes mellitus with diabetic nephropathy, with long-term current use of insulin (H); Neuropathy; Rectal cancer (H)       !! - Potential duplicate medications found. Please discuss with provider.      STOP taking these medications       ciprofloxacin (CIPRO) 500 MG tablet Comments:   Reason for Stopping:             Allergies   Allergies   Allergen Reactions     Hydrocodone Other (See Comments)     dizzy     Lisinopril Cough            Vicodin [Hydrocodone-Acetaminophen] Other (See Comments)     Caused extreme dizziness       Consultations This Hospital Stay     PHYSICAL THERAPY ADULT IP CONSULT  OCCUPATIONAL THERAPY ADULT IP CONSULT  CARE TRANSITION RN/SW IP CONSULT    Significant Results and Procedures   Procedures        Data   Results for orders placed or performed during the hospital encounter of 05/21/19   Head CT w/o contrast    Narrative    CT OF THE HEAD WITHOUT CONTRAST 5/21/2019 10:04 AM     COMPARISON: Head CT 2/24/2019    HISTORY: Altered level of consciousness (LOC), unexplained. Syncope  versus focal seizure.    TECHNIQUE: 5 mm thick axial CT images of the head were acquired  without IV contrast material.    FINDINGS: There is moderate diffuse cerebral volume loss. There are  subtle patchy areas of decreased density in the cerebral white matter  bilaterally that are consistent with sequela of chronic small vessel  ischemic disease.    The ventricles and basal cisterns are within normal limits in  configuration given the degree of cerebral volume loss. There is no  midline shift. There are no extra-axial fluid collections.    No intracranial hemorrhage, mass or recent infarct.    The visualized paranasal sinuses are well-aerated. There is no  mastoiditis. There are no fractures of the visualized bones.      Impression    IMPRESSION: Diffuse cerebral volume loss and cerebral white matter  changes consistent with chronic small vessel ischemic disease. No  evidence for acute intracranial  pathology.      Radiation dose for this scan was reduced using automated exposure  control, adjustment of the mA and/or kV according to patient size, or  iterative reconstruction technique.    IRMA NATH MD   XR Chest 2 Views    Narrative    XR CHEST 2 VW   5/21/2019 10:18 AM     HISTORY: seizure ,    COMPARISON: 2/24/2019      Impression    IMPRESSION: Tip of a right subclavian port is in satisfactory position  in the SVC. Question central pulmonary vascular congestion and  interstitial pulmonary edema. No focal infiltrates.    LESLIE SOSA MD   US Carotid Bilateral    Narrative    BILATERAL CAROTID ULTRASOUND May 22, 2019 12:21 AM     HISTORY: Syncope.    COMPARISON: None.    RIGHT CAROTID FINDINGS: There is minimal to mild atherosclerotic  plaque at the carotid bifurcation and proximal internal carotid  artery.  Right ICA PSV:  176  cm/sec.  Right ICA EDV:  11 cm/sec.  Right ICA/CCA PSV Ratio:  1.09.    These indicate  50 - 69%  diameter stenosis of the right ICA.    Right Vertebral: Antegrade flow.   Right ECA: Antegrade flow.     LEFT CAROTID FINDINGS: There is mild smooth atherosclerotic  plaque/intimal thickening in the common carotid and proximal internal  carotid arteries.  Left ICA PSV:  165  cm/sec.  Left ICA EDV:  21 cm/sec.  Left ICA/CCA PSV Ratio:  0.98.    These indicate  50 - 69%  diameter stenosis of the left ICA.    Left Vertebral: Antegrade flow.   Left ECA: Antegrade flow.     Causes of Decreased Accuracy: Overall, exam was challenging as patient  was unable to cooperate throughout the exam.      Impression    IMPRESSION:    1. 50-69% diameter stenosis of the right internal carotid artery  relative to the distal internal carotid artery diameter.  2. 50-69% diameter stenosis of the left internal carotid artery  relative to the distal internal carotid artery diameter.    SAPNA ANDERSON MD   US Lower Extremity Venous Duplex Bilateral    Narrative    ULTRASOUND VENOUS BILATERAL LOWER  EXTREMITIES WITH DOPPLER  5/22/2019  12:21 AM     HISTORY: Syncopal episode. Suspect deep venous thrombus and pulmonary  embolus.    COMPARISON: None.    TECHNIQUE: Spectral waveform and color Doppler evaluation were  performed.    FINDINGS: Normal compressibility of bilateral common femoral, femoral,  popliteal, posterior tibial and peroneal veins. The greater saphenous  veins are not visualized bilaterally. Unremarkable Doppler waveform  evaluation of bilateral common femoral, femoral and popliteal veins.      Impression    IMPRESSION: No evidence of thrombus in the major veins of bilateral  lower extremities.    GAVIN RODAS MD   NM Lung Scan Ventilation and Perfusion    [facility] LUNG SCAN VENTILATION AND PERFUSION 5/22/2019 1:04 PM    HISTORY: Short of breath.    TECHNIQUE: 5.1 mCi of Tc 99m MAA is given intravenously for the  perfusion portion of the study. 41.7 mCi of technetium 99m DTPA is  inhaled for the ventilation portion of the study.    COMPARISON: Is demonstrated 5/21/2019    FINDINGS: There are no ventilation-perfusion mismatches.  Some matched  nonsegmental defects are seen in each lung, mainly centrally. There  are no corresponding chest x-ray findings.      Impression    IMPRESSION: Low probability for pulmonary embolism.    NIMESH ANGELA MD     *Note: Due to a large number of results and/or encounters for the requested time period, some results have not been displayed. A complete set of results can be found in Results Review.     Echo-  Technically difficult study.     Left ventricular systolic function is normal.The visual ejection fraction is  estimated at 60-65%.  The right ventricular systolic function is normal.  The aortic valve is not well visualized.Aortic valve sclerosis noted. No  aortic stenosis is present.  The ascending aorta is mildly dilated.  The inferior vena cava was normal in size with preserved respiratory  Variability      Physical Exam   Temp:  [96.1  F (35.6   C)-98.3  F (36.8  C)] 97.8  F (36.6  C)  Pulse:  [50-65] 59  Heart Rate:  [53-62] 53  Resp:  [16-18] 16  BP: (123-146)/(36-59) 126/44  SpO2:  [94 %-99 %] 96 %  Vitals:    05/21/19 1502   Weight: 93.6 kg (206 lb 5.6 oz)       Constitutional:nad  CV: Regular  Respiratory: CTA bilaterally  GI: Soft, nontender  Skin: Warm and dry      The discharge plan was discussed with the patient     Total time on this discharge was  35 min    Sandra Self

## 2019-05-23 NOTE — PLAN OF CARE
Patient has been up to the bathroom to void and had a bowel movement . Patient has not c/o any dizziness. Patient has good appetitie oraly intake well. Patient uses her call ligth and can let her needs be known.call light within reach bed alarm on. All questions answered.

## 2019-05-23 NOTE — PLAN OF CARE
[facility] DISCHARGE NOTE    Patient discharged to assisted living at 12:48 PM via wheel chair. Accompanied by other:family  and staff. Discharge instructions reviewed with patient, opportunity offered to ask questions. Prescriptions sent to patients preferred pharmacy. All belongings sent with patient.    Michelle Beach

## 2019-05-24 ENCOUNTER — PATIENT OUTREACH (OUTPATIENT)
Dept: CARE COORDINATION | Facility: CLINIC | Age: 74
End: 2019-05-24

## 2019-05-24 ASSESSMENT — ACTIVITIES OF DAILY LIVING (ADL): DEPENDENT_IADLS:: CLEANING;COOKING;LAUNDRY;SHOPPING;MEAL PREPARATION;MONEY MANAGEMENT;TRANSPORTATION

## 2019-05-24 NOTE — PROGRESS NOTES
Letter by Magdalena Hamilton, RN on 2019     Transition Communication Hand-off for Care Transitions to Next Level of Care Provider     Name: Stefanie Velazquez  : 1945  MRN #: [id number]  Primary Care Provider: Sandra Morales  Primary Care MD Name: mckenna  Primary Clinic: [address] [facility] [address]  Primary Care Clinic Name: [facility]  Reason for Hospitalization:  Syncope and collapse [R55]  Admit Date/Time: 2019  9:20 AM  Discharge Date: 19  Payor Source: Payor: [facility] / Plan: ARE MEDICARE / Product Type: HMO /      Readmission Assessment Measure (DEVON) Risk Score/category: NA                       Reason for Communication Hand-off Referral: Fragility     Discharge Plan:  Back to [facility] with added [facility] PT        Concern for non-adherence with plan of care:                 Y/N no  Discharge Needs Assessment:      Needs      Most Recent Value   Equipment Currently Used at Home  walker, rolling             Follow-up specialty is recommended: No    Follow-up plan:    Future Appointments   Date Time Provider Department Center   2019  2:40 PM Sandra Morales MD [facility]   2019 11:20 AM Apolinar Martinez MD [facility]   3/27/2020 11:20 AM Apolinar Martinez MD [facility]         Any outstanding tests or procedures:        Referrals      Future Labs/Procedures     Home Care Referral      Comments:     Your provider has referred you to: [facility]: [facility] [phone]   [email or web address]     Extended Emergency Contact Information  Primary Emergency Contact: Dori Pena ([facility]  Address: [address] [id number] [facility]  Mobile Phone: [phone]  Relation: Relative  Secondary Emergency Contact: Lori Pope ([facility])           [facility] [place]  Home Phone: [phone]  Mobile Phone: [phone]  Relation: Relative     Patient  Anticipated Discharge Date: 5/23/19   RN, PT, HHA to begin 24 - 48 hours after discharge.  PLEASE EVALUATE AND TREAT (Evaluation timeline is 24 - 48 hrs. Please call if there is need for a variance to this timeline).     REASON FOR REFERRAL: Assessment & Treatment: PT     ADDITIONAL SERVICES NEEDED: none     OTHER PERTINENT INFORMATION: Patient was last seen by provider on 5/22/219 for syncope.     No current outpatient medications on file.     Patient Active Problem List:     Hypothyroidism     bmi 40     Chronic ischemic heart disease     Generalized osteoarthrosis, unspecified site     HEARING LOSS CONDUCTIVE, COMBINED TYPE     Esophageal reflux     Osteoporosis     RC-moderate (AHI 12, LSat 60%); REM RDI-73     Neuropathy (H)     Hyperlipidemia LDL goal <100     Rectal cancer- newly diagnosed adenoCA rectum Jan 2011 and Positive Tubular Adenoma 2019     Anemia     Radiation therapy complication     Vitamin B 12 deficiency     Vitamin D deficiency     Dysuria     Bowel and bladder incontinence     Benign essential hypertension     Health Care Home     Chronic diarrhea     Restless leg syndrome     Type 2 diabetes mellitus with diabetic nephropathy, with long-term current use of insulin (H)     Spinal stenosis of lumbar region without neurogenic claudication     DDD (degenerative disc disease), lumbar     Lymphedema of genitalia     Oropharyngeal dysphagia     Bilateral hearing loss, unspecified hearing loss type     Lumbar radiculopathy     CKD (chronic kidney disease) stage 4, GFR 15-29 ml/min (H)     Impacted cerumen of right ear     H/O deep venous thrombosis     Symptomatic bradycardia     Syncope     Syncope and collapse        Documentation of Face to Face and Certification for Home Health Services     I certify that patient, Stefanie Velazquez is under my care and that I, or a Nurse Practitioner or Physician's Assistant working with me, had a face-to-face encounter that meets the physician face-to-face  encounter requirements with this patient on: 5/22/2019.     This encounter with the patient was in whole, or in part, for the following medical condition, which is the primary reason for Home Health Care: syncope.     I certify that, based on my findings, the following services are medically necessary Home Health Services: Physical Therapy     My clinical findings support the need for the above services because: Physical Therapy Services are needed to assess and treat the following functional impairments: syncope.     Further, I certify that my clinical findings support that this patient is homebound (i.e. absences from home require considerable and taxing effort and are for medical reasons or [employer] services or infrequently or of short duration when for other reasons) because: Requires assistance of another person or specialized equipment to access medical services because patient: Requires supervision of another for safe transfer..     Based on the above findings, I certify that this patient is confined to the home and needs intermittent skilled nursing care, physical therapy and/or speech therapy.  The patient is under my care, and I have initiated the establishment of the plan of care.  This patient will be followed by a physician who will periodically review the plan of care.     Physician/Provider to provide follow up care: Sandra Morales certified Physician at time of discharge: Dr. Sandra Self     Please be aware that coverage of these services is subject to the terms and limitations of your health insurance plan.  Call member services at your health plan with any benefit or coverage questions.              Key Recommendations:  Patient admitted following syncopal episode at [facility]; no clear cause, ziopatch x 2 weeks on discharge.   [facility] for PHYSICAL THERAPY at [facility] ordered.  Patient at baseline mobility upon discharge.       Magdalena Hamilton     AVS/Discharge Summary is the  source of truth; this is a helpful guide for improved communication of patient story

## 2019-05-24 NOTE — LETTER
[facility] CARE COORDINATION  [place] [id number] [address] [address]  [phone]    May 24, 2019    Stefanie Velazquez  [address] [address] [place] [address]      Dear Stefanie,    I am a clinic care coordinator who works with Sandra Morales MD at [facility].  I wanted to introduce myself and provide you with my contact information so that you can call me with questions or concerns about your health care. Below is a description of clinic care coordination and how I can further assist you.     The clinic care coordinator is a registered nurse and/or  who understand the health care system. The goal of clinic care coordination is to help you manage your health and improve access to the [facility] in the most efficient manner. The registered nurse can assist you in meeting your health care goals by providing education, coordinating services, and strengthening the communication among your providers. The  can assist you with financial, behavioral, psychosocial, chemical dependency, counseling, and/or psychiatric resources.    Please feel free to contact me at [phone], with any questions or concerns. We at [facility] are focused on providing you with the highest-quality healthcare experience possible and that all starts with you.     Sincerely,     Lauren MALDONADO RN, PHN, [facility]  Primary Care Clinic RN Care Coordinator  [facility]   [email or web address]  Office:  [phone]          Enclosed: I have enclosed a copy of a 24 Hour Access Plan. This has helpful phone numbers for you to call when needed. Please keep this in an easy to access place to use as needed.

## 2019-05-24 NOTE — PROGRESS NOTES
Clinic Care Coordination Contact  Care Coordination Communication    Referral Source: IP Handoff    Clinical Data: Patient was hospitalized at [facility] from 5/21/19 to 5/23/19 with diagnosis of synocope.     Home Care Contact:              Home Care Agency: [facility]              Contact name () and phone number: Angela Tucker PT,  [phone].              Care Coordination contacted home care: Yes              Anticipated start of care date: 5/24/19    Patient Contact:               Introduced self and role of care coordination.               Discharge instructions were reviewed with patient/caregiver.               Do you have any questions about your medications? Caregiver Savannah denies having any questions or concerns.              Follow up appointment is scheduled for 5/29/19 with Deloris Costello CNP.              Provided 24 Hour Nurse Line and/or 24 Hour Appointment Scheduling: Yes              Home care has contacted patient: Yes              Patient questions/concerns: Care Coordinator RN spoke with caregiver Savannah at [facility], she states patient is doing good at [facility]. She denies having any questions or concerns regarding her discharge instructions. She states home care has made contact as well.     Plan: RN/SW Care Coordinator will await notification from home care staff informing RN/SW Care Coordinator of patients discharge plans/needs. RN/SW Care Coordinator will review chart and outreach to home care every 4 weeks and as needed.      Lauren MALDONADO, RN, PHN, [facility]  Primary Care Clinic RN Care Coordinator  [facility]   [email or web address]  Office:  [phone]

## 2019-05-24 NOTE — LETTER
Health Care Home - Access Care Plan    About Me:    Patient Name:  Stefanie Velazquez    YOB: 1945  Age:                      74 year old   Milady MRN:     [id number] Telephone Information:   Home Phone [phone]   Mobile [phone]       Address:  [address] [address] [place], [place] [address] Email address:  [email or web address]      Emergency Contact(s)   Name Relationship Lgl Grd Work Phone Home Phone Mobile Phone   1. ROB MCBRIDE* Relative No   [phone]   2. ELLE, * Relative No  [phone]   3. SARAI FARRIS Sister No  [phone]    4. KASANDRA SUTTON Sister No  [phone]              Health Maintenance: Routine Health maintenance Reviewed: Due/Overdue   Health Maintenance Due   Topic Date Due     ZOSTER IMMUNIZATION (1 of 2) 03/05/1993     MEDICARE ANNUAL WELLNESS VISIT  03/05/2008     DEXA  02/20/2010     DIABETIC FOOT EXAM  04/02/2018     FALL RISK ASSESSMENT  03/29/2019     Pt to talk with provider about what is needed or can continue wait.        My Access Plan  Medical Emergency 910   Questions or concerns during clinic hours Primary Clinic Line, I will call the clinic directly: [facility] - [phone]   24 Hour Appointment Line [phone] or  [phone]) (toll free)   24 Hour Nurse Line [phone] (toll free)   Questions or concerns outside clinic hours 24 Hour Appointment Line, I will call the after-hours on-call line:   [facility] [phone] or [phone] ([phone]) (toll-free)   Preferred Urgent Care [facility], [phone]   Preferred Hospital [place], [place]  [phone]   Preferred Pharmacy [facility] - [address]     Behavioral Health Crisis Line The National Suicide Prevention Lifeline at 1-150.225.3954 or 911                     My Care Team Members  Patient Care Team       Relationship Specialty Notifications Start  Sandra Womack MD PCP - General Family Practice  7/18/16     Phone: [phone] Fax: [phone] [address] [place] [address]    [facility]    9/6/16     Phone: [phone]         [facility]    9/6/16     Lori Pope-Health Care Agent    9/6/16     Assists with transportation, appointments-Niece    Phone: [phone]         Ruth Chaudhari-Sister    9/6/16     Lives by her, 85 years old, provides support & visits, has an easier time communicating than Concha    Phone: [phone]         Krystal-Financial Worker [place]    11/7/16     Phone: [phone]         Sandra Morales MD Assigned PCP   9/30/18     Phone: [phone] Fax: [phone] [address] [place] [address]    Lauren Varma, RN Clinic Care Coordinator Primary Care - CC Admissions 5/24/19     Phone: [phone] Fax: [phone]        [facility]    5/24/19     Phone: [phone]                My Medical and Care Information  Problem List   Patient Active Problem List   Diagnosis     Hypothyroidism     bmi 40     Chronic ischemic heart disease     Generalized osteoarthrosis, unspecified site     HEARING LOSS CONDUCTIVE, COMBINED TYPE     Esophageal reflux     Osteoporosis     RC-moderate (AHI 12, LSat 60%); REM RDI-73     Neuropathy (H)     Hyperlipidemia LDL goal <100     Rectal cancer- newly diagnosed adenoCA rectum Jan 2011 and Positive Tubular Adenoma 2019     Anemia     Radiation therapy complication     Vitamin B 12 deficiency     Vitamin D deficiency     Dysuria     Bowel and bladder incontinence     Benign essential hypertension     Health Care Home     Chronic diarrhea     Restless leg syndrome     Type 2 diabetes mellitus with diabetic nephropathy, with long-term current use of insulin (H)     Spinal stenosis of lumbar region without neurogenic claudication     DDD (degenerative disc disease), lumbar     Lymphedema of genitalia      Oropharyngeal dysphagia     Bilateral hearing loss, unspecified hearing loss type     Lumbar radiculopathy     CKD (chronic kidney disease) stage 4, GFR 15-29 ml/min (H)     Impacted cerumen of right ear     H/O deep venous thrombosis     Symptomatic bradycardia     Syncope     Syncope and collapse      Current Medications and Allergies:  See printed Medication Report

## 2019-05-29 ENCOUNTER — OFFICE VISIT (OUTPATIENT)
Dept: FAMILY MEDICINE | Facility: CLINIC | Age: 74
End: 2019-05-29
Payer: COMMERCIAL

## 2019-05-29 VITALS
DIASTOLIC BLOOD PRESSURE: 68 MMHG | RESPIRATION RATE: 20 BRPM | TEMPERATURE: 98.8 F | HEART RATE: 76 BPM | SYSTOLIC BLOOD PRESSURE: 126 MMHG | HEIGHT: 60 IN | BODY MASS INDEX: 40.3 KG/M2

## 2019-05-29 DIAGNOSIS — G25.81 RESTLESS LEG SYNDROME: Primary | ICD-10-CM

## 2019-05-29 DIAGNOSIS — K64.4 EXTERNAL HEMORRHOIDS: ICD-10-CM

## 2019-05-29 PROCEDURE — 99214 OFFICE O/P EST MOD 30 MIN: CPT | Performed by: NURSE PRACTITIONER

## 2019-05-29 PROCEDURE — 99207 C PAF COMPLETED  NO CHARGE: CPT | Performed by: NURSE PRACTITIONER

## 2019-05-29 RX ORDER — ROPINIROLE 0.5 MG/1
0.5 TABLET, FILM COATED ORAL 3 TIMES DAILY
Qty: 90 TABLET | Refills: 0 | Status: SHIPPED | OUTPATIENT
Start: 2019-05-29 | End: 2019-06-07

## 2019-05-29 RX ORDER — ACETAMINOPHEN 325 MG/1
650 TABLET ORAL ONCE
Status: COMPLETED | OUTPATIENT
Start: 2019-05-29 | End: 2019-05-29

## 2019-05-29 RX ADMIN — ACETAMINOPHEN 650 MG: 325 TABLET ORAL at 16:46

## 2019-05-29 NOTE — NURSING NOTE
Prior to injection, verified patient identity using patient's name and date of birth.  Due to injection administration, patient instructed to remain in clinic for 15 minutes  afterwards, and to report any adverse reaction to me immediately.    Acetaminophen 325mg    Drug Amount Wasted:  None.  2 tablets  Expiration Date:  12/2019    Mary Solorzano MA

## 2019-05-29 NOTE — PROGRESS NOTES
Subjective     Stefanie Velazquez is a 74 year old female who presents to clinic today for the following health issues:    HPI       Hospital Follow-up Visit:    Hospital/Nursing Home/IP Rehab Facility: [facility]  Date of Admission: 5/21/19  Date of Discharge: 5/23/19  Reason(s) for Admission: Syncope, DVT            Problems taking medications regularly:  None       Medication changes since discharge: None       Problems adhering to non-medication therapy:  None    Summary of hospitalization:  [facility] discharge summary reviewed  Diagnostic Tests/Treatments reviewed.  Follow up needed:   Other Healthcare Providers Involved in Patient s Care:         None  Update since discharge: improved.     Post Discharge Medication Reconciliation: discharge medications reconciled and changed, per note/orders (see AVS).  Plan of care communicated with patient     Coding guidelines for this visit:  Type of Medical   Decision Making Face-to-Face Visit       within 7 Days of discharge Face-to-Face Visit        within 14 days of discharge   Moderate Complexity [id number]   High Complexity 97974 [id number]                Patient Active Problem List   Diagnosis     Hypothyroidism     bmi 40     Chronic ischemic heart disease     Generalized osteoarthrosis, unspecified site     HEARING LOSS CONDUCTIVE, COMBINED TYPE     Esophageal reflux     Osteoporosis     RC-moderate (AHI 12, LSat 60%); REM RDI-73     Neuropathy (H)     Hyperlipidemia LDL goal <100     Rectal cancer- newly diagnosed adenoCA rectum Jan 2011 and Positive Tubular Adenoma 2019     Anemia     Radiation therapy complication     Vitamin B 12 deficiency     Vitamin D deficiency     Dysuria     Bowel and bladder incontinence     Benign essential hypertension     Health Care Home     Chronic diarrhea     Restless leg syndrome     Type 2 diabetes mellitus with diabetic nephropathy, with long-term current use of insulin (H)     Spinal stenosis of lumbar region  without neurogenic claudication     DDD (degenerative disc disease), lumbar     Lymphedema of genitalia     Oropharyngeal dysphagia     Bilateral hearing loss, unspecified hearing loss type     Lumbar radiculopathy     CKD (chronic kidney disease) stage 4, GFR 15-29 ml/min (H)     Impacted cerumen of right ear     H/O deep venous thrombosis     Symptomatic bradycardia     Syncope     Syncope and collapse     Past Surgical History:   Procedure Laterality Date     cabg       COLECTOMY LOW ANTERIOR  6/21/2011    Procedure:COLECTOMY LOW ANTERIOR; with loop ielostomy; Surgeon:ROBBIN MCDONNELL; Location:[facility]     COLONOSCOPY  1/26/2011    COLONOSCOPY performed by MASOUD BILL at [facility]     COLONOSCOPY N/A 3/1/2016    Procedure: COLONOSCOPY;  Surgeon: Liza Neal MD;  Location: [facility]     INSERT PORT VASCULAR ACCESS  3/2/2011    INSERT PORT VASCULAR ACCESS performed by LIZA NEAL at [facility]     OPEN REDUCTION INTERNAL FIXATION FEMUR DISTAL  2/12/2013    Procedure: OPEN REDUCTION INTERNAL FIXATION FEMUR DISTAL;  Open reduction internal fixation left femur fracture--Anesth[facility]Choice;  Surgeon: Ley, Jeffrey Duane, MD;  Location: [facility]     ORTHOPEDIC SURGERY       PHACOEMULSIFICATION WITH STANDARD INTRAOCULAR LENS IMPLANT Left 1/22/2018    Procedure: PHACOEMULSIFICATION WITH STANDARD INTRAOCULAR LENS IMPLANT;  Left cataract removal with implant;  Surgeon: Rony Key MD;  Location: [facility]     PHACOEMULSIFICATION WITH STANDARD INTRAOCULAR LENS IMPLANT Right 2/19/2018    Procedure: PHACOEMULSIFICATION WITH STANDARD INTRAOCULAR LENS IMPLANT;  Right cataract removal with implant;  Surgeon: Rony Key MD;  Location: [facility]     SIGMOIDOSCOPY FLEXIBLE  9/9/2011    Procedure:SIGMOIDOSCOPY FLEXIBLE; Performed prior to induction.; Surgeon:ROBBIN MCDONNELL; Location: OR     SURGICAL HISTORY OF -   10/24/2002    Heart bypass     SURGICAL HISTORY OF -   2002    R Knee arthroplasty      SURGICAL HISTORY OF -   2002    Mi 2 stints     TAKEDOWN ILEOSTOMY  9/9/2011    Procedure:TAKEDOWN ILEOSTOMY; Exploratory Laparotomy,  Loop Ileostomy Takedown, Small Bowel Resection x 2.; Surgeon:ROBBIN MCDONNELL; [facility]       Social History     Tobacco Use     Smoking status: Former Smoker     Smokeless tobacco: Never Used   Substance Use Topics     Alcohol use: Yes     Comment: rare social use     Family History   Problem Relation Age of Onset     Diabetes Sister      C.A.D. Sister      Breast Cancer Sister          Current Outpatient Medications   Medication Sig Dispense Refill     ACCU-CHEK MILVIA MELODY dispense one meter 1 device 0     acetaminophen (TYLENOL) 650 MG CR tablet Take 1 tablet (650 mg) by mouth 3 times daily as needed for mild pain or fever 60 tablet      apixaban ANTICOAGULANT (ELIQUIS) 5 MG tablet Take 1 tablet (5 mg) by mouth 2 times daily 60 tablet 5     blood glucose monitoring (ACCU-CHEK MILVIA) test strip Use to test blood sugars 4 times daily or as directed. 360 each 1     blood glucose monitoring (ACCU-CHEK MULTICLIX) lancets Test BG 4 times daily (Patient taking differently: by In Vitro route 4 times daily Test BG 4 times daily) 1 Box 11     BUTT PASTE - REGULAR (DR LOVE POOP GOOP BUTT PASTE FORMULA) Apply topically every hour as needed for skin protection       cholecalciferol 1000 units TABS Take 1,000 Units by mouth daily       diclofenac (VOLTAREN) 50 MG EC tablet Take 50 mg by mouth 3 times daily as needed for moderate pain       fluocinonide (LIDEX) 0.05 % ointment Apply sparingly to rash on legs twice daily as needed.  Do not apply to face. 60 g 11     fluticasone (FLONASE) 50 MCG/ACT spray Spray 1 spray into both nostrils daily 1 Bottle 3     furosemide (LASIX) 20 MG tablet Take 1 tablet (20 mg) by mouth 2 times daily 180 tablet 3     gabapentin (NEURONTIN) 300 MG capsule Take 1 tablet (300 mg) at bedtime 90 capsule 1     hydrocortisone (ANUSOL-HC) 2.5 % cream Place  rectally 2 times daily as needed for hemorrhoids 30 g 0     insulin aspart (NOVOLOG FLEXPEN) 100 UNIT/ML pen Inject 12 Units Subcutaneous 2 times daily (with meals) Lunch and dinner       insulin aspart (NOVOLOG FLEXPEN) 100 UNIT/ML pen 10 units with breakfast, 10 units with lunch and 10 units with supper (Patient taking differently: Inject 10 Units Subcutaneous daily (with breakfast) 10 units with breakfast, 10 units with lunch and 10 units with supper) 15 mL 1     insulin glargine (LANTUS SOLOSTAR PEN) 100 UNIT/ML pen Inject 20 Units Subcutaneous At Bedtime 18 mL 3     insulin pen needle (BD GABRIELLA U/F) 32G X 4 MM Use 4 times per day or as directed. 120 each 5     irbesartan (AVAPRO) 75 MG tablet Take 1 tablet (75 mg) by mouth At Bedtime 30 tablet 0     levothyroxine (SYNTHROID/LEVOTHROID) 88 MCG tablet Take 44 mcg by mouth once a week = 1.2  Tab on Sunday       levothyroxine (SYNTHROID/LEVOTHROID) 88 MCG tablet Take 88 mcg by mouth daily Except on Sunday       loperamide (IMODIUM) 2 MG capsule One capsule once a day PRN, can use a second one if needed 90 capsule 3     magnesium 250 MG tablet Take 1 tablet by mouth daily       menthol-zinc oxide (CALMOSEPTINE) 0.44-20.625 % OINT ointment Apply topically 4 times daily as needed for skin protection       metoprolol succinate ER (TOPROL-XL) 25 MG 24 hr tablet Take 0.5 tablets (12.5 mg) by mouth daily 30 tablet 0     nystatin (MYCOSTATIN) 176175 UNIT/GM external powder Apply topically 2 times daily Until resolved then to use as needed. 60 g 2     OMEPRAZOLE PO Take 20 mg by mouth 2 times daily (before meals)        order for DME Equipment being ordered: Compression stockings 2 Device 0     order for DME Equipment being ordered: Hospital Bed service and repair 1 each 0     potassium chloride (KLOR-CON) 20 MEQ packet Take 20 mEq by mouth Every Mon, Wed, Fri Morning 15 packet 0     Potassium Gluconate 595 MG CAPS Take 1 tablet by mouth daily       rOPINIRole (REQUIP) 0.5  MG tablet Take 1 tablet (0.5 mg) by mouth 3 times daily 90 tablet 0     rOPINIRole (REQUIP) 1 MG tablet 1-3 tabs daily as needed, patient takes one in am, one in pm. occasionally will take in midday. 200 tablet 3     simvastatin (ZOCOR) 40 MG tablet Take 1 tablet (40 mg) by mouth daily 90 tablet 2     Allergies   Allergen Reactions     Hydrocodone Other (See Comments)     dizzy     Lisinopril Cough            Vicodin [Hydrocodone-Acetaminophen] Other (See Comments)     Caused extreme dizziness     Recent Labs   Lab Test 05/21/19  1101 05/02/19  1447 03/22/19  0923 03/01/19  0742  01/31/19  1044  10/30/18  1212  08/31/18  0905  05/02/18  0910   A1C  --  8.7*  --   --   --  8.1*  --  8.0*  --   --    < >  --    LDL  --   --   --  44  --   --   --   --   --  107*  --  75   HDL  --   --   --  46*  --   --   --   --   --  51  --  53   TRIG  --   --   --  215*  --   --   --   --   --  134  --  235*   ALT 16  --  18 21   < >  --    < > 20  --  13   < > 14   CR 1.64*  --  1.65* 1.86*   < > 1.78*   < > 1.57*   < > 1.45*   < > 1.36*   GFRESTIMATED 30*  --  30* 26*   < > 27*   < > 32*   < > 35*   < > 38*   GFRESTBLACK 35*  --  35* 30*   < > 32*   < > 39*   < > 43*   < > 46*   POTASSIUM 3.6  --  3.9 4.2   < > 4.6   < > 4.1   < > 3.4   < > 3.3*   TSH  --  0.48  --   --   --  0.14*   < > 0.19*  --  4.24*   < >  --     < > = values in this interval not displayed.      BP Readings from Last 3 Encounters:   05/29/19 126/68   05/23/19 126/44   05/09/19 138/60    Wt Readings from Last 3 Encounters:   05/21/19 93.6 kg (206 lb 5.6 oz)   05/09/19 90.3 kg (199 lb)   05/02/19 90.3 kg (199 lb)                 Reviewed and updated as needed this visit by Provider         Review of Systems   ROS COMP: Constitutional, HEENT, cardiovascular, pulmonary, gi and gu systems are negative, except as otherwise noted.      Objective    There were no vitals taken for this visit.  There is no height or weight on file to calculate BMI.  Physical Exam    GENERAL: healthy, alert and no distress  EYES: Eyes grossly normal to inspection, PERRL and conjunctivae and sclerae normal  HENT: ear canals and TM's normal, nose and mouth without ulcers or lesions  NECK: no adenopathy, no asymmetry, masses, or scars and thyroid normal to palpation  RESP: lungs clear to auscultation - no rales, rhonchi or wheezes  CV: regular rate and rhythm, normal S1 S2, no S3 or S4, no murmur, click or rub, no peripheral edema and peripheral pulses strong  MS: no gross musculoskeletal defects noted, no edema  SKIN: no suspicious lesions or rashes  NEURO: Normal strength and tone, mentation intact and speech normal  PSYCH: mentation appears normal, affect normal/bright    Diagnostic Test Results:  Labs reviewed in Epic        Assessment & Plan     (G25.81) Restless leg syndrome  (primary encounter diagnosis)  Comment: Patient recently seen for syncopal episodes work-up was negative for strokes or TIAs.  Feel part of the concern can be that the Requip is a for her restless legs syndrome at 1 mg tablets 3 times daily can be causing some of her drowsiness and syncopal.  Will decrease down to 0.5 mg to see if it controls symptoms drowsiness improves as recommended by the emergency room patient is still to follow-up for ZIO Patch.  We will repeat her comprehensive panel unable to obtain today patient patient states she needs to have her right pick her up declined lab appointment will return for that in the next couple of days Will return next week for lab only appointment  Plan: rOPINIRole (REQUIP) 0.5 MG tablet,         acetaminophen (TYLENOL) tablet 650 mg      (K64.4) External hemorrhoids  Comment: Patient noted to have hemorrhoids from colonoscopy.  Unable to do a thorough rectal exam we will treat for hemorrhoids if not improving should return.   Plan: hydrocortisone (ANUSOL-HC) 2.5 % cream      Reviewed with care provider that she does need to have a ZIO patch and a lab only appointment on  Monday or Tuesday of next week..        No follow-ups on file.    CLEMENCIA Quintero CNP  [facility]

## 2019-05-30 ENCOUNTER — TELEPHONE (OUTPATIENT)
Dept: FAMILY MEDICINE | Facility: CLINIC | Age: 74
End: 2019-05-30

## 2019-05-30 DIAGNOSIS — C20 RECTAL CANCER (H): Primary | ICD-10-CM

## 2019-05-30 DIAGNOSIS — R15.9 BOWEL AND BLADDER INCONTINENCE: ICD-10-CM

## 2019-05-30 DIAGNOSIS — R32 BOWEL AND BLADDER INCONTINENCE: ICD-10-CM

## 2019-05-30 NOTE — TELEPHONE ENCOUNTER
Faxed letter received from [facility]. Requesting new order for XL Size  Pull ups.  Pt is currently using 6-8 pull ups a day.   Please give signed order to Sta[facility]. Leilani to fax with Form.  Leilani [facility]

## 2019-06-03 ENCOUNTER — MEDICAL CORRESPONDENCE (OUTPATIENT)
Dept: HEALTH INFORMATION MANAGEMENT | Facility: CLINIC | Age: 74
End: 2019-06-03

## 2019-06-03 ENCOUNTER — TELEPHONE (OUTPATIENT)
Dept: FAMILY MEDICINE | Facility: CLINIC | Age: 74
End: 2019-06-03

## 2019-06-03 NOTE — TELEPHONE ENCOUNTER
Form received back signed  6/3/19  Faxed Back & Sent to be scanned to this encounter  Leilani Orn [facility]

## 2019-06-04 ENCOUNTER — TELEPHONE (OUTPATIENT)
Dept: FAMILY MEDICINE | Facility: CLINIC | Age: 74
End: 2019-06-04

## 2019-06-04 ENCOUNTER — MEDICAL CORRESPONDENCE (OUTPATIENT)
Dept: HEALTH INFORMATION MANAGEMENT | Facility: CLINIC | Age: 74
End: 2019-06-04

## 2019-06-04 NOTE — TELEPHONE ENCOUNTER
[facility] now requests orders and shares plan of care/discharge summaries for some patients through [employer].  Please REPLY TO THIS MESSAGE OR ROUTE BACK TO THE AUTHOR in order to give authorization for orders when needed.  This is considered a verbal order, you will still receive a faxed copy of orders for signature.  Thank you for your assistance in improving collaboration for our patients.    ORDER    MD SUMMARY/PLAN OF CARE    PT admission needed new orders for homecare SOC as we have been unable to schedule client.    Angela Tucker MPT

## 2019-06-04 NOTE — TELEPHONE ENCOUNTER
[facility] now requests orders and shares plan of care/discharge summaries for some patients through [employer].  Please REPLY TO THIS MESSAGE OR ROUTE BACK TO THE AUTHOR in order to give authorization for orders when needed.  This is considered a verbal order, you will still receive a faxed copy of orders for signature.  Thank you for your assistance in improving collaboration for our patients.    ORDER    MD SUMMARY/PLAN OF CARE    PT for ambulation and exercises.  All other disciplines declined.    Angela Tucker MPT

## 2019-06-04 NOTE — TELEPHONE ENCOUNTER
[facility]  Requesting order for Incontinence Products ( up to 300/month), Gloves ( 4 Boxes) & chux pads.  Please include refill amounts or PRN, Diagnosis DX / ICD - 10 codes.  Please return scripts to [facility] to Fax.  Leilani [facility]n [facility]

## 2019-06-05 ENCOUNTER — HOSPITAL LABORATORY (OUTPATIENT)
Facility: OTHER | Age: 74
End: 2019-06-05

## 2019-06-05 LAB
ALBUMIN SERPL-MCNC: 3.1 G/DL (ref 3.4–5)
ALP SERPL-CCNC: 119 U/L (ref 40–150)
ALT SERPL W P-5'-P-CCNC: 22 U/L (ref 0–50)
ANION GAP SERPL CALCULATED.3IONS-SCNC: 7 MMOL/L (ref 3–14)
AST SERPL W P-5'-P-CCNC: 22 U/L (ref 0–45)
BILIRUB SERPL-MCNC: 0.4 MG/DL (ref 0.2–1.3)
BUN SERPL-MCNC: 41 MG/DL (ref 7–30)
CALCIUM SERPL-MCNC: 9.3 MG/DL (ref 8.5–10.1)
CHLORIDE SERPL-SCNC: 107 MMOL/L (ref 94–109)
CO2 SERPL-SCNC: 28 MMOL/L (ref 20–32)
CREAT SERPL-MCNC: 1.83 MG/DL (ref 0.52–1.04)
GFR SERPL CREATININE-BSD FRML MDRD: 26 ML/MIN/{1.73_M2}
GLUCOSE SERPL-MCNC: 223 MG/DL (ref 70–99)
POTASSIUM SERPL-SCNC: 4.6 MMOL/L (ref 3.4–5.3)
PROT SERPL-MCNC: 7 G/DL (ref 6.8–8.8)
SODIUM SERPL-SCNC: 142 MMOL/L (ref 133–144)

## 2019-06-06 ENCOUNTER — TELEPHONE (OUTPATIENT)
Dept: FAMILY MEDICINE | Facility: CLINIC | Age: 74
End: 2019-06-06

## 2019-06-06 NOTE — TELEPHONE ENCOUNTER
Addended by: PARAS QUINTEROS on: 3/23/2018 03:17 PM     Modules accepted: Orders     [facility]- Lab Results  Form received back signed  6/6/19  Faxed Back & Sent to be scanned to this encounter  [facility]

## 2019-06-07 ENCOUNTER — MEDICAL CORRESPONDENCE (OUTPATIENT)
Dept: HEALTH INFORMATION MANAGEMENT | Facility: CLINIC | Age: 74
End: 2019-06-07

## 2019-06-07 ENCOUNTER — TELEPHONE (OUTPATIENT)
Dept: FAMILY MEDICINE | Facility: CLINIC | Age: 74
End: 2019-06-07

## 2019-06-07 DIAGNOSIS — G25.81 RESTLESS LEG SYNDROME: ICD-10-CM

## 2019-06-07 RX ORDER — PRAMIPEXOLE DIHYDROCHLORIDE 0.25 MG/1
0.25 TABLET ORAL 2 TIMES DAILY
Qty: 60 TABLET | Refills: 1 | Status: SHIPPED | OUTPATIENT
Start: 2019-06-07 | End: 2019-12-31

## 2019-06-07 NOTE — TELEPHONE ENCOUNTER
Trevin with assisted living calling.  Reason for Call:  Other appointment    Detailed comments: Pt was in on the 5/29/19. She saw Deloris Costello. Pts restless leg syndrome meds were increased to 3 times a day. Pt sleeps all day. Then on 6/3/19 Dr Morales lowered to twice a day. Pt still sleeps all day. Even ate yogurt with knife last night cause she is so tired and out of it. appt is set up for 6/20/19 but she wants to get in sooner.    Phone Number Patient can be reached at: Other phone number:  Trevin - [phone]    Best Time: any    Can we leave a detailed message on this number? YES    Call taken on 6/7/2019 at 9:34 AM by Milagros Mcdowell

## 2019-06-10 ENCOUNTER — MEDICAL CORRESPONDENCE (OUTPATIENT)
Dept: HEALTH INFORMATION MANAGEMENT | Facility: CLINIC | Age: 74
End: 2019-06-10

## 2019-06-10 ENCOUNTER — TELEPHONE (OUTPATIENT)
Dept: FAMILY MEDICINE | Facility: CLINIC | Age: 74
End: 2019-06-10

## 2019-06-10 NOTE — TELEPHONE ENCOUNTER
[facility] - Restless Legs Issues.   Form received back signed  6/10/19  Faxed Back & Sent to be scanned to this encounter  Leilani [facility]          [facility]

## 2019-06-13 ENCOUNTER — MEDICAL CORRESPONDENCE (OUTPATIENT)
Dept: HEALTH INFORMATION MANAGEMENT | Facility: CLINIC | Age: 74
End: 2019-06-13

## 2019-06-14 ENCOUNTER — HOSPITAL ENCOUNTER (OUTPATIENT)
Dept: CARDIOLOGY | Facility: CLINIC | Age: 74
Discharge: HOME | End: 2019-06-14
Attending: INTERNAL MEDICINE | Admitting: INTERNAL MEDICINE
Payer: COMMERCIAL

## 2019-06-14 ENCOUNTER — TELEPHONE (OUTPATIENT)
Dept: FAMILY MEDICINE | Facility: CLINIC | Age: 74
End: 2019-06-14

## 2019-06-14 ENCOUNTER — ALLIED HEALTH/NURSE VISIT (OUTPATIENT)
Dept: AUDIOLOGY | Facility: CLINIC | Age: 74
End: 2019-06-14
Payer: COMMERCIAL

## 2019-06-14 DIAGNOSIS — H91.90 HEARING LOSS, UNSPECIFIED HEARING LOSS TYPE, UNSPECIFIED LATERALITY: Primary | ICD-10-CM

## 2019-06-14 DIAGNOSIS — R55 SYNCOPE, UNSPECIFIED SYNCOPE TYPE: ICD-10-CM

## 2019-06-14 DIAGNOSIS — R55 SYNCOPE AND COLLAPSE: ICD-10-CM

## 2019-06-14 PROCEDURE — V5266 BATTERY FOR HEARING DEVICE: HCPCS | Performed by: AUDIOLOGIST

## 2019-06-14 PROCEDURE — 99207 ZZC NO CHARGE LOS: CPT | Performed by: AUDIOLOGIST

## 2019-06-14 PROCEDURE — 0298T ZIO PATCH HOLTER ADULT PEDIATRIC GREATER THAN 48 HRS: CPT | Performed by: INTERNAL MEDICINE

## 2019-06-14 PROCEDURE — 0296T ZIO PATCH HOLTER ADULT PEDIATRIC GREATER THAN 48 HRS: CPT

## 2019-06-14 NOTE — TELEPHONE ENCOUNTER
[facility]- Multiple issues at [facility]- Sleepy, Potassium, Abdominal Fold yeast  Infection.  Form received back signed  6/4/19  Faxed Back & Sent to be scanned to this encounter  Leilani [facility]

## 2019-06-14 NOTE — PROGRESS NOTES
Patient stopped in to purchase hearing aid batteries. She purchased two packs (12 cells) of size 13 hearing aid batteries.     Daniel Kwon, [facility]  Licensed Audiologist #[id number]  6/14/2019

## 2019-06-18 ENCOUNTER — TELEPHONE (OUTPATIENT)
Dept: FAMILY MEDICINE | Facility: CLINIC | Age: 74
End: 2019-06-18

## 2019-06-18 ENCOUNTER — MEDICAL CORRESPONDENCE (OUTPATIENT)
Dept: HEALTH INFORMATION MANAGEMENT | Facility: CLINIC | Age: 74
End: 2019-06-18

## 2019-06-18 DIAGNOSIS — G47.33 OSA (OBSTRUCTIVE SLEEP APNEA): Primary | Chronic | ICD-10-CM

## 2019-06-18 NOTE — TELEPHONE ENCOUNTER
Reason for Call: Request for an order or referral:    Order or referral being requested: Sleep study    Date needed: as soon as possible    Has the patient been seen by the PCP for this problem? YES    Additional comments: Note from Trevin: Stefanie Velazquez would like to have a sleep study completed so she can get her CPAP back.   If provider agrees please place order and  write the phone number so she can coordinate the appointment and transporation    Phone number Patient can be reached at:  Other phone number:  Trevin Riddle [phone]  Fax [phone]    Copy of fax request given to Dr Morales for signature    Best Time:  any    Can we leave a detailed message on this number?      Call taken on 6/18/2019 at 8:40 AM by Milagros Mcdowell

## 2019-06-18 NOTE — TELEPHONE ENCOUNTER
"Reason for Call: Request for an order or referral:    Order or referral being requested: Standard walker, khoa size for person under 5'2\". With 5\" wheels.    Date needed: as soon as possible    Has the patient been seen by the PCP for this problem?     Additional comments: karri Westbrook  PT requesting walker for patient. She is to short for the one she is currently using.  Send request to [employer] [facility].    Phone number Patient can be reached at:  Other phone number:  Angela - JATIN [phone]    Best Time:  any    Can we leave a detailed message on this number?      Call taken on 6/18/2019 at 10:23 AM by Milagros Mcdowell  "

## 2019-06-20 ENCOUNTER — OFFICE VISIT (OUTPATIENT)
Dept: FAMILY MEDICINE | Facility: CLINIC | Age: 74
End: 2019-06-20
Payer: COMMERCIAL

## 2019-06-20 VITALS
DIASTOLIC BLOOD PRESSURE: 44 MMHG | WEIGHT: 202 LBS | RESPIRATION RATE: 14 BRPM | SYSTOLIC BLOOD PRESSURE: 112 MMHG | OXYGEN SATURATION: 94 % | TEMPERATURE: 99.2 F | HEART RATE: 67 BPM | HEIGHT: 60 IN | BODY MASS INDEX: 39.66 KG/M2

## 2019-06-20 DIAGNOSIS — Z79.4 TYPE 2 DIABETES MELLITUS WITH DIABETIC NEPHROPATHY, WITH LONG-TERM CURRENT USE OF INSULIN (H): ICD-10-CM

## 2019-06-20 DIAGNOSIS — I10 BENIGN ESSENTIAL HYPERTENSION: ICD-10-CM

## 2019-06-20 DIAGNOSIS — H91.93 BILATERAL HEARING LOSS, UNSPECIFIED HEARING LOSS TYPE: ICD-10-CM

## 2019-06-20 DIAGNOSIS — I82.403 RECURRENT ACUTE DEEP VEIN THROMBOSIS (DVT) OF BOTH LOWER EXTREMITIES (H): ICD-10-CM

## 2019-06-20 DIAGNOSIS — E11.21 TYPE 2 DIABETES MELLITUS WITH DIABETIC NEPHROPATHY, WITH LONG-TERM CURRENT USE OF INSULIN (H): ICD-10-CM

## 2019-06-20 DIAGNOSIS — G25.81 RESTLESS LEG SYNDROME: ICD-10-CM

## 2019-06-20 DIAGNOSIS — G47.33 OSA (OBSTRUCTIVE SLEEP APNEA): Primary | ICD-10-CM

## 2019-06-20 DIAGNOSIS — N18.4 CKD (CHRONIC KIDNEY DISEASE) STAGE 4, GFR 15-29 ML/MIN (H): ICD-10-CM

## 2019-06-20 DIAGNOSIS — I25.9 CHRONIC ISCHEMIC HEART DISEASE: Chronic | ICD-10-CM

## 2019-06-20 DIAGNOSIS — E03.8 OTHER SPECIFIED HYPOTHYROIDISM: ICD-10-CM

## 2019-06-20 PROCEDURE — 99215 OFFICE O/P EST HI 40 MIN: CPT | Performed by: FAMILY MEDICINE

## 2019-06-20 RX ORDER — IRBESARTAN 75 MG/1
37.5 TABLET ORAL AT BEDTIME
Qty: 30 TABLET | Refills: 3 | Status: ON HOLD | OUTPATIENT
Start: 2019-06-20 | End: 2020-04-13

## 2019-06-20 ASSESSMENT — PAIN SCALES - GENERAL: PAINLEVEL: NO PAIN (0)

## 2019-06-20 ASSESSMENT — MIFFLIN-ST. JEOR: SCORE: 1327.77

## 2019-06-20 NOTE — PROGRESS NOTES
Subjective     Stefanie Velazquez is a 74 year old female who presents to clinic today for the following health issues:    HPI   Diabetes Follow-up      How often are you checking your blood sugar? Four or more times daily    What time of day are you checking your blood sugars (select all that apply)?  Before and after meals and At bedtime    Have you had any blood sugars above 200?  Yes 400 and 300 the rest are 200    Have you had any blood sugars below 70?  No    What symptoms do you notice when your blood sugar is low?  None    What concerns do you have today about your diabetes? None     Do you have any of these symptoms? (Select all that apply)  Numbness in feet     Have you had a diabetic eye exam in the last 12 months? Yes- Date of last eye exam: 03/2019    Blood sugars in ams are lower, low 100s, avg 138  Prior to lunch are higher, 200s, avg 215  Prior to supper avg 221  Late pm avg 263  Lab Results   Component Value Date    A1C 8.7 05/02/2019    A1C 8.1 01/31/2019    A1C 8.0 10/30/2018    A1C 8.5 07/20/2018    A1C 7.3 03/20/2018     Is on lantus 20 units, and novolog 10/12/12 units with meals     falls asleep very easily. Has been that way for years, but much worse the last few months. Can fall asleep at dinner, during a conversation. Is excessive    hypertension -blood pressures at assisted living  avg 131/61. Often diastolic readings are under 60  Is on lasix, Avapro, metoprolol, potassium   She asks if she can take the over the counter potassium as well as the prescription strength potassium   BP Readings from Last 6 Encounters:   06/20/19 112/44   05/29/19 126/68   05/23/19 126/44   05/09/19 138/60   05/02/19 108/64   04/24/19 107/57      Hypothyroidism-on replacement  TSH   Date Value Ref Range Status   05/02/2019 0.48 0.40 - 4.00 mU/L Final         Restless legs-  The small dose of mirapex is working very well just at night  Has not had to use her PRN dose    obstructive sleep apnea-she didn't use her  machine every night and the company took it away. She'll need a new sleep study, and this was ordered yesterday and she already has an apt.     Recurrent DVT-on Elliquis, followed by hematology    BP Readings from Last 2 Encounters:   06/20/19 112/44   05/29/19 126/68     Hemoglobin A1C (%)   Date Value   05/02/2019 8.7 (H)   01/31/2019 8.1 (H)     LDL Cholesterol Calculated (mg/dL)   Date Value   03/01/2019 44   08/31/2018 107 (H)       Diabetes Management Resources    Amount of exercise or physical activity: None - chair exercises    Problems taking medications regularly: No    Medication side effects: none    Diet: regular (no restrictions)        BP Readings from Last 3 Encounters:   06/20/19 112/44   05/29/19 126/68   05/23/19 126/44    Wt Readings from Last 3 Encounters:   06/20/19 91.6 kg (202 lb)   05/21/19 93.6 kg (206 lb 5.6 oz)   05/09/19 90.3 kg (199 lb)                 Reviewed and updated as needed this visit by Provider  Tobacco  Allergies  Meds  Problems  Med Hx  Surg Hx  Fam Hx         Review of Systems   ROS: 5 point ROS negative except as noted above in HPI, including Gen., Resp., CV, GI &  system review.       Objective    /44 (BP Location: Left arm, Patient Position: Sitting, Cuff Size: Adult Large)   Pulse 67   Temp 99.2  F (37.3  C) (Tympanic)   Resp 14   Ht 1.524 m (5')   Wt 91.6 kg (202 lb)   SpO2 94%   Breastfeeding? No   BMI 39.45 kg/m    Body mass index is 39.45 kg/m .  Physical Exam   General: appears well, no distress, comfortable sitting in wheel chair  Emerald: edentulous, hard of hearing  Neck: supple, no adenopathy, no JVD   Heart: regular rate and rhythm, normal S1S2, 1-2/6 systolic ejection murmur   Lungs: clear to ascultation   Abd: soft, nontender,  no mass or distention   Ext: plus one edema left leg, trace right leg        ASSESSMENT:  1. RC-moderate (AHI 12, LSat 60%); REM RDI-73    2. Chronic ischemic heart disease    3. Restless leg syndrome    4. Type 2  diabetes mellitus with diabetic nephropathy, with long-term current use of insulin (H)    5. Recurrent acute deep vein thrombosis (DVT) of both lower extremities (H)    6. Benign essential hypertension    7. CKD (chronic kidney disease) stage 4, GFR 15-29 ml/min (H)    8. Bilateral hearing loss, unspecified hearing loss type    9. Other specified hypothyroidism        PLAN:  Orders Placed This Encounter     irbesartan (AVAPRO) 75 MG tablet     Not sure why she is so tired.   Going through medications, gabapentin could be contributing and will do trial of  Also hypotension could be a factor.   Decrease the Avapro to half and monitor  We'll increase the short acting insulin across the board by 2 untits each meal to 12/14/14  Low blood sugars in am limit increasing lantus  Recheck one month  40 min spent with patient, greater than 50% in counseling and coordination of care and discussion of multiple issues.   Reminded her she needs to see the nephrologist.   Untreated RC is also likely contributing    Patient Instructions   We are going to try off the gabapentin to see if it is contributing to the sleepiness    Decrease blood pressure medication    Increase the short acting insulin     Sandra Bernstein MD

## 2019-06-20 NOTE — PATIENT INSTRUCTIONS
We are going to try off the gabapentin to see if it is contributing to the sleepiness    Decrease blood pressure medication    Increase the short acting insulin

## 2019-06-21 ENCOUNTER — PATIENT OUTREACH (OUTPATIENT)
Dept: CARE COORDINATION | Facility: CLINIC | Age: 74
End: 2019-06-21

## 2019-06-21 ASSESSMENT — ACTIVITIES OF DAILY LIVING (ADL): DEPENDENT_IADLS:: CLEANING;COOKING;LAUNDRY;SHOPPING;MEAL PREPARATION;MONEY MANAGEMENT;TRANSPORTATION

## 2019-06-21 NOTE — PROGRESS NOTES
Clinic Care Coordination Contact    Situation: Patient chart reviewed by care coordinator.    Background: Patient was hospitalized at [facility] from 5/21/19 to 5/23/19 with diagnosis of synocope. Patient discharged to home at [facility] with [facility]. She is only receiving PT services as she declined all other services.    Assessment: Patient currently has [facility] services and lives at [facility]. She did see PCP yesterday, weaning off of gabapentin to see if that is contributing to her sleepiness, decrease BP medication, increase short acting insulin and f/u with PCP in one month.     Plan/Recommendations: RN Care Coordinator will await notification from home care staff informing RN Care Coordinator of any concerns and/or discharge plans/needs. No further follow up planned unless notified of any concerns.       Lauren MALDONADO RN, PHN, [facility]  Primary Care Clinic RN Care Coordinator  [facility]   [email or web address]  Office:  [phone]

## 2019-06-24 ENCOUNTER — MEDICAL CORRESPONDENCE (OUTPATIENT)
Dept: HEALTH INFORMATION MANAGEMENT | Facility: CLINIC | Age: 74
End: 2019-06-24

## 2019-06-25 ENCOUNTER — TELEPHONE (OUTPATIENT)
Dept: FAMILY MEDICINE | Facility: CLINIC | Age: 74
End: 2019-06-25

## 2019-06-25 NOTE — TELEPHONE ENCOUNTER
[facility] PT Orders & B/P's attached  Form placed on Provider Dr. Amandeep shrestha for Signature.  Leilani Orn Station Sec

## 2019-06-25 NOTE — TELEPHONE ENCOUNTER
Form received back signed  6/25/19  Faxed Back & Sent to be scanned to this encounter  Leilani Orn Station Sec

## 2019-07-01 ENCOUNTER — TELEPHONE (OUTPATIENT)
Dept: FAMILY MEDICINE | Facility: CLINIC | Age: 74
End: 2019-07-01

## 2019-07-01 NOTE — TELEPHONE ENCOUNTER
VO Orders PT 1 x week for 1 week  2 x week for 4 weeks.  Strengthening  and endurance training.  Pt is currently at [facility]  VO ok per Emily Cavazos RN  Leilani Orn Station Sec

## 2019-07-15 ENCOUNTER — TELEPHONE (OUTPATIENT)
Dept: FAMILY MEDICINE | Facility: CLINIC | Age: 74
End: 2019-07-15

## 2019-07-15 ENCOUNTER — MEDICAL CORRESPONDENCE (OUTPATIENT)
Dept: HEALTH INFORMATION MANAGEMENT | Facility: CLINIC | Age: 74
End: 2019-07-15

## 2019-07-15 NOTE — TELEPHONE ENCOUNTER
[facility]- Plan of Care 6/28/19-8/26/19  Form placed on Provider Dr. Amandeep shrestha for Signature.  Leilani Orn Station Sec

## 2019-07-15 NOTE — TELEPHONE ENCOUNTER
Form received back signed  7/15/19  Faxed Back & Sent to be scanned to this encounter  Leilani Orn Station Sec

## 2019-07-22 ENCOUNTER — TELEPHONE (OUTPATIENT)
Dept: FAMILY MEDICINE | Facility: CLINIC | Age: 74
End: 2019-07-22

## 2019-07-22 ENCOUNTER — ANCILLARY PROCEDURE (OUTPATIENT)
Dept: GENERAL RADIOLOGY | Facility: CLINIC | Age: 74
End: 2019-07-22
Attending: PODIATRIST
Payer: COMMERCIAL

## 2019-07-22 ENCOUNTER — OFFICE VISIT (OUTPATIENT)
Dept: PODIATRY | Facility: CLINIC | Age: 74
End: 2019-07-22
Payer: COMMERCIAL

## 2019-07-22 VITALS
SYSTOLIC BLOOD PRESSURE: 143 MMHG | HEIGHT: 60 IN | WEIGHT: 202 LBS | HEART RATE: 59 BPM | DIASTOLIC BLOOD PRESSURE: 69 MMHG | BODY MASS INDEX: 39.66 KG/M2

## 2019-07-22 DIAGNOSIS — M79.672 LEFT FOOT PAIN: ICD-10-CM

## 2019-07-22 DIAGNOSIS — E11.43 TYPE II DIABETES MELLITUS WITH PERIPHERAL AUTONOMIC NEUROPATHY (H): ICD-10-CM

## 2019-07-22 DIAGNOSIS — M20.41 HAMMER TOES OF BOTH FEET: ICD-10-CM

## 2019-07-22 DIAGNOSIS — M79.672 LEFT FOOT PAIN: Primary | ICD-10-CM

## 2019-07-22 DIAGNOSIS — M20.42 HAMMER TOES OF BOTH FEET: ICD-10-CM

## 2019-07-22 PROCEDURE — 73630 X-RAY EXAM OF FOOT: CPT | Mod: LT

## 2019-07-22 PROCEDURE — 99213 OFFICE O/P EST LOW 20 MIN: CPT | Performed by: PODIATRIST

## 2019-07-22 ASSESSMENT — MIFFLIN-ST. JEOR: SCORE: 1327.77

## 2019-07-22 NOTE — TELEPHONE ENCOUNTER
Janessa- Medications Crushed  Form placed on Provider [place] desk for Signature.  Leilani [facility] Station Sec

## 2019-07-22 NOTE — LETTER
7/22/2019         RE: Stefanie Velazquez  [address] [address] [place] [id number]        Dear Colleague,    Thank you for referring your patient, Stefanie Velazquez, to the [facility]. Please see a copy of my visit note below.    PATIENT HISTORY:  Stefanie Velazquez is a 74 year old female who presents to clinic for a diabetic foot evaluation.  The patient relates pain with ambulation in shoe gear.   The patient relates blood sugars are within normal limits.  The patient denies any redness, swelling or open sores on both feet.       REVIEW OF SYSTEMS:  Constitutional, HEENT, cardiovascular, pulmonary, GI, , musculoskeletal, neuro, skin, endocrine and psych systems are negative, except as otherwise noted.     PAST MEDICAL HISTORY:   Past Medical History:   Diagnosis Date     Acute deep vein thrombosis (DVT) of right lower extremity, unspecified vein (H) 10/31/2018     Acute myocardial infarction of other specified sites, episode of care unspecified 6/2002    MI 2 stints     Cancer of colon (H)      Cellulitis of lower extremity, bilateral 9/30/2016     Chronic ischemic heart disease, unspecified 6/22/2003    cabgx3 , 2002 2/05 adenosine ef70%     Coronary atherosclerosis of unspecified type of vessel, native or graft     Coronary artery disease     Diabetes mellitus (H)      Esophageal reflux      Fracture of femur, distal, left, closed (H) 2/11/2013     Gastro-oesophageal reflux disease      Generalized osteoarthrosis, unspecified site 6/22/2005     Generalized osteoarthrosis, unspecified site 6/22/2005     HEARING LOSS CONDUCTIVE, COMBINED TYPE 6/22/2005    Maltese measles as child     HYPOTHYROIDISM  6/22/2003     Mixed hyperlipidemia 6/22/2005     Obesity, unspecified 6/22/2005     RC-moderate (AHI 12, LSat 60%); REM RDI-73 6/1/2009    Sleep study [facility] was performed 5/26/09 in order to re-evaluate severity of RC. The total sleep time was 412.0 minutes. The sleep latency was  decreased at 8.7 minutes with Ambien 10mg. The REM sleep latency was 426.0 minutes. Sleep efficiency was reduced at 79.0%. The sleep architecture was disrupted with frequent sleep stage changes and arousals.  Snoring:  loud. Respiratory Events:   RDI o     Recurrent acute deep vein thrombosis (DVT) of both lower extremities (H) 3/31/2019     Stented coronary artery      Type II or unspecified type diabetes mellitus with renal manifestations, uncontrolled(250.42) (H) 6/22/2005     Type II or unspecified type diabetes mellitus with renal manifestations, uncontrolled(250.42) (H) 6/22/2005     Unspecified disorder resulting from impaired renal function 6/22/2005    CR     1.82   06/21/2005     Unspecified essential hypertension         PAST SURGICAL HISTORY:   Past Surgical History:   Procedure Laterality Date     cabg       COLECTOMY LOW ANTERIOR  6/21/2011    Procedure:COLECTOMY LOW ANTERIOR; with loop ielostomy; Surgeon:ROBBIN MCDONNELL; Location:[facility]     COLONOSCOPY  1/26/2011    COLONOSCOPY performed by MASOUD BILL at [facility]     COLONOSCOPY N/A 3/1/2016    Procedure: COLONOSCOPY;  Surgeon: Liza Neal MD;  Location: [facility]     INSERT PORT VASCULAR ACCESS  3/2/2011    INSERT PORT VASCULAR ACCESS performed by LIZA NEAL at [facility]     OPEN REDUCTION INTERNAL FIXATION FEMUR DISTAL  2/12/2013    Procedure: OPEN REDUCTION INTERNAL FIXATION FEMUR DISTAL;  Open reduction internal fixation left femur fracture--[facility];  Surgeon: Ley, Jeffrey Duane, MD;  Location: [facility]     ORTHOPEDIC SURGERY       PHACOEMULSIFICATION WITH STANDARD INTRAOCULAR LENS IMPLANT Left 1/22/2018    Procedure: PHACOEMULSIFICATION WITH STANDARD INTRAOCULAR LENS IMPLANT;  Left cataract removal with implant;  Surgeon: Rony Key MD;  Location: [facility]     PHACOEMULSIFICATION WITH STANDARD INTRAOCULAR LENS IMPLANT Right 2/19/2018    Procedure: PHACOEMULSIFICATION WITH STANDARD INTRAOCULAR LENS  IMPLANT;  Right cataract removal with implant;  Surgeon: Rony Key MD;  Location: [facility]     SIGMOIDOSCOPY FLEXIBLE  9/9/2011    Procedure:SIGMOIDOSCOPY FLEXIBLE; Performed prior to induction.; Surgeon:ROBBIN MCDONNELL; Location:[facility]     SURGICAL HISTORY OF -   10/24/2002    Heart bypass     SURGICAL HISTORY OF -   2002    R Knee arthroplasty     SURGICAL HISTORY OF -   2002    Mi 2 stints     TAKEDOWN ILEOSTOMY  9/9/2011    Procedure:TAKEDOWN ILEOSTOMY; Exploratory Laparotomy,  Loop Ileostomy Takedown, Small Bowel Resection x 2.; Surgeon:ROBBIN MCDONNELL; Location:[facility]        MEDICATIONS:   Current Outpatient Medications:      ACCU-CHEK MILVIA MELODY, dispense one meter, Disp: 1 device, Rfl: 0     apixaban ANTICOAGULANT (ELIQUIS) 5 MG tablet, Take 1 tablet (5 mg) by mouth 2 times daily, Disp: 60 tablet, Rfl: 5     blood glucose monitoring (ACCU-CHEK MILVIA) test strip, Use to test blood sugars 4 times daily or as directed., Disp: 360 each, Rfl: 1     blood glucose monitoring (ACCU-CHEK MULTICLIX) lancets, Test BG 4 times daily (Patient taking differently: by In Vitro route 4 times daily Test BG 4 times daily), Disp: 1 Box, Rfl: 11     BUTT PASTE - REGULAR (DR LOVE POOP GOOP BUTT PASTE FORMULA), Apply topically every hour as needed for skin protection, Disp: , Rfl:      cholecalciferol 1000 units TABS, Take 1,000 Units by mouth daily, Disp: , Rfl:      diclofenac (VOLTAREN) 50 MG EC tablet, Take 50 mg by mouth 3 times daily as needed for moderate pain, Disp: , Rfl:      fluocinonide (LIDEX) 0.05 % ointment, Apply sparingly to rash on legs twice daily as needed.  Do not apply to face., Disp: 60 g, Rfl: 11     fluticasone (FLONASE) 50 MCG/ACT spray, Spray 1 spray into both nostrils daily, Disp: 1 Bottle, Rfl: 3     furosemide (LASIX) 20 MG tablet, Take 1 tablet (20 mg) by mouth 2 times daily, Disp: 180 tablet, Rfl: 3     hydrocortisone (ANUSOL-HC) 2.5 % cream, Place rectally 2 times daily as needed for  hemorrhoids, Disp: 30 g, Rfl: 0     insulin aspart (NOVOLOG FLEXPEN) 100 UNIT/ML pen, Inject 12 Units Subcutaneous 2 times daily (with meals) Lunch and dinner, Disp: , Rfl:      insulin aspart (NOVOLOG FLEXPEN) 100 UNIT/ML pen, 10 units with breakfast, 10 units with lunch and 10 units with supper (Patient taking differently: Inject 10 Units Subcutaneous daily (with breakfast) 10 units with breakfast, 10 units with lunch and 10 units with supper), Disp: 15 mL, Rfl: 1     insulin glargine (LANTUS SOLOSTAR PEN) 100 UNIT/ML pen, Inject 20 Units Subcutaneous At Bedtime, Disp: 18 mL, Rfl: 3     insulin pen needle (BD GABRIELLA U/F) 32G X 4 MM, Use 4 times per day or as directed., Disp: 120 each, Rfl: 5     irbesartan (AVAPRO) 75 MG tablet, Take 0.5 tablets (37.5 mg) by mouth At Bedtime, Disp: 30 tablet, Rfl: 3     levothyroxine (SYNTHROID/LEVOTHROID) 88 MCG tablet, Take 44 mcg by mouth once a week = 1.2  Tab on Sunday, Disp: , Rfl:      levothyroxine (SYNTHROID/LEVOTHROID) 88 MCG tablet, Take 88 mcg by mouth daily Except on Sunday, Disp: , Rfl:      loperamide (IMODIUM) 2 MG capsule, One capsule once a day PRN, can use a second one if needed, Disp: 90 capsule, Rfl: 3     magnesium 250 MG tablet, Take 1 tablet by mouth daily, Disp: , Rfl:      menthol-zinc oxide (CALMOSEPTINE) 0.44-20.625 % OINT ointment, Apply topically 4 times daily as needed for skin protection, Disp: , Rfl:      metoprolol succinate ER (TOPROL-XL) 25 MG 24 hr tablet, Take 0.5 tablets (12.5 mg) by mouth daily, Disp: 30 tablet, Rfl: 0     nystatin (MYCOSTATIN) 575581 UNIT/GM external powder, Apply topically 2 times daily Until resolved then to use as needed., Disp: 60 g, Rfl: 2     OMEPRAZOLE PO, Take 20 mg by mouth 2 times daily (before meals) , Disp: , Rfl:      order for DME, Equipment being ordered:  Incontinence Products: Gloves (4 Boxes) & chux pads, Disp: 300 each, Rfl: 11     order for DME, Equipment being ordered: order for XL Size  Pull ups. Pt is  currently using 6-8 pull ups a day, Disp: 300 each, Rfl: 11     order for DME, Equipment being ordered: Compression stockings, Disp: 2 Device, Rfl: 0     order for DME, Equipment being ordered: Hospital Bed service and repair, Disp: 1 each, Rfl: 0     potassium chloride (KLOR-CON) 20 MEQ packet, Take 20 mEq by mouth Every Mon, Wed, Fri Morning, Disp: 15 packet, Rfl: 0     pramipexole (MIRAPEX) 0.25 MG tablet, Take 1 tablet (0.25 mg) by mouth 2 times daily, Disp: 60 tablet, Rfl: 1     simvastatin (ZOCOR) 40 MG tablet, Take 1 tablet (40 mg) by mouth daily, Disp: 90 tablet, Rfl: 2     acetaminophen (TYLENOL) 650 MG CR tablet, Take 1 tablet (650 mg) by mouth 3 times daily as needed for mild pain or fever (Patient not taking: Reported on 6/20/2019), Disp: 60 tablet, Rfl:      ALLERGIES:    Allergies   Allergen Reactions     Hydrocodone Other (See Comments)     dizzy     Lisinopril Cough            Vicodin [Hydrocodone-Acetaminophen] Other (See Comments)     Caused extreme dizziness        SOCIAL HISTORY:   Social History     Socioeconomic History     Marital status: Single     Spouse name: Not on file     Number of children: Not on file     Years of education: Not on file     Highest education level: Not on file   Occupational History     Not on file   Social Needs     Financial resource strain: Not on file     Food insecurity:     Worry: Not on file     Inability: Not on file     Transportation needs:     Medical: Not on file     Non-medical: Not on file   Tobacco Use     Smoking status: Former Smoker     Smokeless tobacco: Never Used   Substance and Sexual Activity     Alcohol use: Yes     Comment: rare social use     Drug use: No     Sexual activity: Never   Lifestyle     Physical activity:     Days per week: Not on file     Minutes per session: Not on file     Stress: Not on file   Relationships     Social connections:     Talks on phone: Not on file     Gets together: Not on file     Attends Gnosticist service: Not  on file     Active member of club or organization: Not on file     Attends meetings of clubs or organizations: Not on file     Relationship status: Not on file     Intimate partner violence:     Fear of current or ex partner: Not on file     Emotionally abused: Not on file     Physically abused: Not on file     Forced sexual activity: Not on file   Other Topics Concern     Parent/sibling w/ CABG, MI or angioplasty before 65F 55M? Yes   Social History Narrative     Not on file        FAMILY HISTORY:   Family History   Problem Relation Age of Onset     Diabetes Sister      C.A.D. Sister      Breast Cancer Sister         EXAM:Vitals: /69   Pulse 59   Ht 1.524 m (5')   Wt 91.6 kg (202 lb)   BMI 39.45 kg/m     BMI= Body mass index is 39.45 kg/m .        General appearance: Patient is alert and fully cooperative with history & exam.  No sign of distress is noted during the visit.     Psychiatric: Affect is pleasant & appropriate.  Patient appears motivated to improve health.     Respiratory: Breathing is regular & unlabored while sitting.     HEENT: Hearing is intact to spoken word.  Speech is clear.  No gross evidence of visual impairment that would impact ambulation.     Dermatologic: Skin is intact to both lower extremities without significant lesions, rash or abrasion.  No paronychia or evidence of soft tissue infection is noted.  The nails are hypertrophic and discolored.     Vascular: DP & PT pulses are intact & regular bilaterally.  No significant edema or varicosities noted.  CFT and skin temperature is normal to both lower extremities.  There are noted varicosities, noted edema and noted trophic changes noted.      Neurologic: Lower extremity sensation is intact to light touch.  No evidence of weakness or contracture in the lower extremities.  Noted evidence of neuropathy with diminished sensation bilaterally.       Musculoskeletal: Patient is ambulatory without assistive device or brace.  No gross  ankle deformity noted.  No foot or ankle joint effusion is noted.  One notes a hammertoe contracture of the second digit bilaterally.         Assessment:  1.  Diabetes Mellitus and Peripheral Neuropathy.    2.  Hammertoe deformity.          Plan:  I have explained to the patient the condition.  I have discussed with the patient the importance of diabetic foot care.  The patient was referred to [facility] for custom diabetic shoes and accommodative inserts that will aid in offloading the tension forces to the soft tissues and prevent further risk of amputation.           BENITO Barcenas.PSUSHMA., F.A.C.F.A.S.          Again, thank you for allowing me to participate in the care of your patient.        Sincerely,        Vinicio Ramos DPM

## 2019-07-22 NOTE — PATIENT INSTRUCTIONS
"Concha to follow up with Primary Care provider regarding elevated blood pressure.  DIABETES AND YOUR FEET  Diabetes can result in several problems in the feet including ulcers (open sores) and amputations. Two of the most important reasons why people develop foot problems when they have diabetes is : 1. Neuropathy (loss of feeling)  2. Vascular disease (loss or decrease of blood flow).    Neuropathy is a term used to describe a loss of nerve function.  Patients with diabetes are at risk of developing neuropathy if their sugars continue to run high and are above the normal value. One theory for neuropathy is that the \"extra\" sugar in the body enters the nerves and is broken down. These by-products build up in the nerve causing it to swell and impairing nerve function. Often times, this can be prevented by controlling your sugars, dieting and exercise.    When a person develops neuropathy, they usually begin to feel numbness or tingling in their feet and sometime in their legs.  Other symptoms may include painful burning or hot feet, tingling or feeling like insects or ants are crawling on your feet or legs.  If the diabetes is sever and the sugars run high for long periods of time, neuropathy can also occur in the hands.    Vascular disease  is a term used to describe a loss or decrease in circulation (blood flow). There is a problem in getting blood and oxygen to areas that need it. Similar to neuropathy, sugars can build up in the walls of the arteries (blood vessels) and cause them to become swollen, thickened and hardened. This decreases the amount of blood that can go to an area that needs it. Though this is common in the legs of diabetic patients, it can also affect other arteries (blood vessels) in the body such as in the heart and eyes.    In the legs, vascular disease usually results in cramping. Patients who develop leg cramps after walking the same distance every time (i.e. One block, half a mile, ect.) need " to let their doctors know so that their circulation may be checked. Cramps causing severe pain in the feet and/or legs while sleeping and the cramps go away when you stand or hang your legs off the side of the bed, may also be a sign of poor blood circulation.  Occasional cramping in cold weather or on rare occasions with activity may not be due to poor circulation, but you should inform your doctor.    PREVENTION OF THESE DISEASES  The key to prevention is good blood sugar control. Poor blood sugar control is a big reason many of these problems start. Physical activity (exercise) is a very good way to help decrease your blood sugars. Exercise can lower your blood sugar, blood pressure, and cholesterol. It also reduces your risk for heart disease and stroke, relieves stress, and strengthens your heart, muscles and bones.  In addition, regular activity helps insulin work better, improves your blood circulation, and keeps your joints flexible. If you're trying to lose weight, a combination of exercise and wise food choices can help you reach your target weight and maintain it.      PAIN MANAGEMENT  1.Blood Sugar Control - Most important  2. Medications such as:  Amytriptylline, duloxetine, gabapentin, lyrica, tramadol  3. Nutritional therapy:  Vitamin B6 (100mg daily), Vitamin B12 (75mcg daily), Vitamin D 2000 IU daily), Alpha-Lipoic Acid (600-1800mg daily), Acetyl-L-Carnitine (500-1000mg TID, L-methyl folate (1500mcg daily)    ** Metformin can block Vitamin B6 and B12 so it is important to supplement**    FOOT CARE RECOMMENDATIONS   1. Wash your feet with lukewarm water and a mild soap and then dry them thoroughly, especially between the toes.     2. Examine your feet daily looking for cuts, corns, blisters, cracks, ect, especially after wearing new shoes. Make sure to look between your toes. If you cannot see the bottom of your feet, set a mirror on the floor and hold your foot over it, or ask a spouse, friend or  family member to examine your feet for you. Contact your doctor immediately if new problems are noted or if sores are not healing.     3. Immediately apply moisturizer to the tops and bottoms of your feet, avoiding areas between the toes. Hand lotion (Intesive Care, Fátima, Eucerin, Neutrogena, Curel, ect) is sufficient unless your doctor prescribes a medicated lotion. Apply sunscreen to your feet when going swimming outside.     4. Use clean comfortable shoes, wear white socks (if you have any bleeding or drainage, you will see it on white socks). Socks should not have thick seams or cut off the circulation around the leg. Break in new shoes slowly and rotate with older shoes until broken in. Check the inside of your shoes with your hand to look for areas of irritation or objects that may have fallen into your shoes.       5. Keep slippers by the side of your bed for use during the night.     6.  Shoes should be fitted by a professional and should not cause areas of irritation.  Check your feet regularly when wearing a new pair of shoes and replace them as needed.     7.  Talk to your doctor about proper exercise. Exercise and stretching stimulate blood flow to your feet and maintain proper glucose levels.     8.  Monitor your blood glucose level as instructed by your doctor. Notify your doctor immediately if your blood sugar is abnormally high or low.    9. Cut your nails straight across, but then gently round any sharp edges with a cardboard nail file. If you have neuropathy, peripheral vascular disease or cannot see that well to trim your own toenails contact [facility] ([phone]) or [facility] ([phone]).      THINGS TO AVOID DOING   1.  Do not soak your feet if you have an open sore. Use only lukewarm water and always check the temperature with your hand as hot water can easily burn your feet.       2.  Never use a hot water bottle or heating pad on your feet. Also do not apply cold compresses to your  feet. With decreased sensation, you could burn or freeze your feet.       3.  Do not apply any of these to your feet:    -  Over the counter medicine for corns or warts    -  Harsh chemicals like boric acid    -  Do not self-treat corns, cuts, blisters or infections. Always consult your doctor.       4.  Do not wear sandals, slippers or walk barefoot, especially on hot sand or concrete or other harsh surfaces.     5.  If you smoke, stop!!!

## 2019-07-22 NOTE — NURSING NOTE
Chief Complaint   Patient presents with     Consult     fell on left foot       Initial /69   Pulse 59   Ht 1.524 m (5')   Wt 91.6 kg (202 lb)   BMI 39.45 kg/m   Estimated body mass index is 39.45 kg/m  as calculated from the following:    Height as of this encounter: 1.524 m (5').    Weight as of this encounter: 91.6 kg (202 lb).  Medications and allergies reviewed.      Tanya OLIVER MA

## 2019-07-23 ENCOUNTER — MEDICAL CORRESPONDENCE (OUTPATIENT)
Dept: HEALTH INFORMATION MANAGEMENT | Facility: CLINIC | Age: 74
End: 2019-07-23

## 2019-07-23 NOTE — PROGRESS NOTES
PATIENT HISTORY:  Stefanie Velazquez is a 74 year old female who presents to clinic for a diabetic foot evaluation.  The patient relates pain with ambulation in shoe gear.   The patient relates blood sugars are within normal limits.  The patient denies any redness, swelling or open sores on both feet.       REVIEW OF SYSTEMS:  Constitutional, HEENT, cardiovascular, pulmonary, GI, , musculoskeletal, neuro, skin, endocrine and psych systems are negative, except as otherwise noted.     PAST MEDICAL HISTORY:   Past Medical History:   Diagnosis Date     Acute deep vein thrombosis (DVT) of right lower extremity, unspecified vein (H) 10/31/2018     Acute myocardial infarction of other specified sites, episode of care unspecified 6/2002    MI 2 stints     Cancer of colon (H)      Cellulitis of lower extremity, bilateral 9/30/2016     Chronic ischemic heart disease, unspecified 6/22/2003    cabgx3 , 2002 2/05 adenosine ef70%     Coronary atherosclerosis of unspecified type of vessel, native or graft     Coronary artery disease     Diabetes mellitus (H)      Esophageal reflux      Fracture of femur, distal, left, closed (H) 2/11/2013     Gastro-oesophageal reflux disease      Generalized osteoarthrosis, unspecified site 6/22/2005     Generalized osteoarthrosis, unspecified site 6/22/2005     HEARING LOSS CONDUCTIVE, COMBINED TYPE 6/22/2005    Luxembourger measles as child     HYPOTHYROIDISM  6/22/2003     Mixed hyperlipidemia 6/22/2005     Obesity, unspecified 6/22/2005     RC-moderate (AHI 12, LSat 60%); REM RDI-73 6/1/2009    Sleep study [facility] was performed 5/26/09 in order to re-evaluate severity of RC. The total sleep time was 412.0 minutes. The sleep latency was decreased at 8.7 minutes with Ambien 10mg. The REM sleep latency was 426.0 minutes. Sleep efficiency was reduced at 79.0%. The sleep architecture was disrupted with frequent sleep stage changes and arousals.  Snoring:  loud. Respiratory Events:   RDI o      Recurrent acute deep vein thrombosis (DVT) of both lower extremities (H) 3/31/2019     Stented coronary artery      Type II or unspecified type diabetes mellitus with renal manifestations, uncontrolled(250.42) (H) 6/22/2005     Type II or unspecified type diabetes mellitus with renal manifestations, uncontrolled(250.42) (H) 6/22/2005     Unspecified disorder resulting from impaired renal function 6/22/2005    CR     1.82   06/21/2005     Unspecified essential hypertension         PAST SURGICAL HISTORY:   Past Surgical History:   Procedure Laterality Date     cabg       COLECTOMY LOW ANTERIOR  6/21/2011    Procedure:COLECTOMY LOW ANTERIOR; with loop ielostomy; Surgeon:ROBBIN MCDONNELL; Location: OR     COLONOSCOPY  1/26/2011    COLONOSCOPY performed by MASOUD BILL at [facility]     COLONOSCOPY N/A 3/1/2016    Procedure: COLONOSCOPY;  Surgeon: Liza Nael MD;  Location: [facility]     INSERT PORT VASCULAR ACCESS  3/2/2011    INSERT PORT VASCULAR ACCESS performed by LIZA NEAL at [facility]     OPEN REDUCTION INTERNAL FIXATION FEMUR DISTAL  2/12/2013    Procedure: OPEN REDUCTION INTERNAL FIXATION FEMUR DISTAL;  Open reduction internal fixation left femur fracture--[facility];  Surgeon: Ley, Jeffrey Duane, MD;  Location: [facility]     ORTHOPEDIC SURGERY       PHACOEMULSIFICATION WITH STANDARD INTRAOCULAR LENS IMPLANT Left 1/22/2018    Procedure: PHACOEMULSIFICATION WITH STANDARD INTRAOCULAR LENS IMPLANT;  Left cataract removal with implant;  Surgeon: Rony Key MD;  Location: [facility]     PHACOEMULSIFICATION WITH STANDARD INTRAOCULAR LENS IMPLANT Right 2/19/2018    Procedure: PHACOEMULSIFICATION WITH STANDARD INTRAOCULAR LENS IMPLANT;  Right cataract removal with implant;  Surgeon: Rony Key MD;  Location: [facility]     SIGMOIDOSCOPY FLEXIBLE  9/9/2011    Procedure:SIGMOIDOSCOPY FLEXIBLE; Performed prior to induction.; Surgeon:ROBBIN MCDONNELL; Location: OR     SURGICAL  HISTORY OF -   10/24/2002    Heart bypass     SURGICAL HISTORY OF -   2002    R Knee arthroplasty     SURGICAL HISTORY OF -   2002    Mi 2 stints     TAKEDOWN ILEOSTOMY  9/9/2011    Procedure:TAKEDOWN ILEOSTOMY; Exploratory Laparotomy,  Loop Ileostomy Takedown, Small Bowel Resection x 2.; Surgeon:ROBBIN MCDONNELL; Location: OR        MEDICATIONS:   Current Outpatient Medications:      ACCU-CHEK MILVIA MELODY, dispense one meter, Disp: 1 device, Rfl: 0     apixaban ANTICOAGULANT (ELIQUIS) 5 MG tablet, Take 1 tablet (5 mg) by mouth 2 times daily, Disp: 60 tablet, Rfl: 5     blood glucose monitoring (ACCU-CHEK MILVIA) test strip, Use to test blood sugars 4 times daily or as directed., Disp: 360 each, Rfl: 1     blood glucose monitoring (ACCU-CHEK MULTICLIX) lancets, Test BG 4 times daily (Patient taking differently: by In Vitro route 4 times daily Test BG 4 times daily), Disp: 1 Box, Rfl: 11     BUTT PASTE - REGULAR (DR LOVE POOP GOOP BUTT PASTE FORMULA), Apply topically every hour as needed for skin protection, Disp: , Rfl:      cholecalciferol 1000 units TABS, Take 1,000 Units by mouth daily, Disp: , Rfl:      diclofenac (VOLTAREN) 50 MG EC tablet, Take 50 mg by mouth 3 times daily as needed for moderate pain, Disp: , Rfl:      fluocinonide (LIDEX) 0.05 % ointment, Apply sparingly to rash on legs twice daily as needed.  Do not apply to face., Disp: 60 g, Rfl: 11     fluticasone (FLONASE) 50 MCG/ACT spray, Spray 1 spray into both nostrils daily, Disp: 1 Bottle, Rfl: 3     furosemide (LASIX) 20 MG tablet, Take 1 tablet (20 mg) by mouth 2 times daily, Disp: 180 tablet, Rfl: 3     hydrocortisone (ANUSOL-HC) 2.5 % cream, Place rectally 2 times daily as needed for hemorrhoids, Disp: 30 g, Rfl: 0     insulin aspart (NOVOLOG FLEXPEN) 100 UNIT/ML pen, Inject 12 Units Subcutaneous 2 times daily (with meals) Lunch and dinner, Disp: , Rfl:      insulin aspart (NOVOLOG FLEXPEN) 100 UNIT/ML pen, 10 units with breakfast, 10 units with  lunch and 10 units with supper (Patient taking differently: Inject 10 Units Subcutaneous daily (with breakfast) 10 units with breakfast, 10 units with lunch and 10 units with supper), Disp: 15 mL, Rfl: 1     insulin glargine (LANTUS SOLOSTAR PEN) 100 UNIT/ML pen, Inject 20 Units Subcutaneous At Bedtime, Disp: 18 mL, Rfl: 3     insulin pen needle (BD GABRIELLA U/F) 32G X 4 MM, Use 4 times per day or as directed., Disp: 120 each, Rfl: 5     irbesartan (AVAPRO) 75 MG tablet, Take 0.5 tablets (37.5 mg) by mouth At Bedtime, Disp: 30 tablet, Rfl: 3     levothyroxine (SYNTHROID/LEVOTHROID) 88 MCG tablet, Take 44 mcg by mouth once a week = 1.2  Tab on Sunday, Disp: , Rfl:      levothyroxine (SYNTHROID/LEVOTHROID) 88 MCG tablet, Take 88 mcg by mouth daily Except on Sunday, Disp: , Rfl:      loperamide (IMODIUM) 2 MG capsule, One capsule once a day PRN, can use a second one if needed, Disp: 90 capsule, Rfl: 3     magnesium 250 MG tablet, Take 1 tablet by mouth daily, Disp: , Rfl:      menthol-zinc oxide (CALMOSEPTINE) 0.44-20.625 % OINT ointment, Apply topically 4 times daily as needed for skin protection, Disp: , Rfl:      metoprolol succinate ER (TOPROL-XL) 25 MG 24 hr tablet, Take 0.5 tablets (12.5 mg) by mouth daily, Disp: 30 tablet, Rfl: 0     nystatin (MYCOSTATIN) 438985 UNIT/GM external powder, Apply topically 2 times daily Until resolved then to use as needed., Disp: 60 g, Rfl: 2     OMEPRAZOLE PO, Take 20 mg by mouth 2 times daily (before meals) , Disp: , Rfl:      order for DME, Equipment being ordered:  Incontinence Products: Gloves (4 Boxes) & chux pads, Disp: 300 each, Rfl: 11     order for DME, Equipment being ordered: order for XL Size  Pull ups. Pt is currently using 6-8 pull ups a day, Disp: 300 each, Rfl: 11     order for DME, Equipment being ordered: Compression stockings, Disp: 2 Device, Rfl: 0     order for DME, Equipment being ordered: Hospital Bed service and repair, Disp: 1 each, Rfl: 0     potassium  chloride (KLOR-CON) 20 MEQ packet, Take 20 mEq by mouth Every Mon, Wed, Fri Morning, Disp: 15 packet, Rfl: 0     pramipexole (MIRAPEX) 0.25 MG tablet, Take 1 tablet (0.25 mg) by mouth 2 times daily, Disp: 60 tablet, Rfl: 1     simvastatin (ZOCOR) 40 MG tablet, Take 1 tablet (40 mg) by mouth daily, Disp: 90 tablet, Rfl: 2     acetaminophen (TYLENOL) 650 MG CR tablet, Take 1 tablet (650 mg) by mouth 3 times daily as needed for mild pain or fever (Patient not taking: Reported on 6/20/2019), Disp: 60 tablet, Rfl:      ALLERGIES:    Allergies   Allergen Reactions     Hydrocodone Other (See Comments)     dizzy     Lisinopril Cough            Vicodin [Hydrocodone-Acetaminophen] Other (See Comments)     Caused extreme dizziness        SOCIAL HISTORY:   Social History     Socioeconomic History     Marital status: Single     Spouse name: Not on file     Number of children: Not on file     Years of education: Not on file     Highest education level: Not on file   Occupational History     Not on file   Social Needs     Financial resource strain: Not on file     Food insecurity:     Worry: Not on file     Inability: Not on file     Transportation needs:     Medical: Not on file     Non-medical: Not on file   Tobacco Use     Smoking status: Former Smoker     Smokeless tobacco: Never Used   Substance and Sexual Activity     Alcohol use: Yes     Comment: rare social use     Drug use: No     Sexual activity: Never   Lifestyle     Physical activity:     Days per week: Not on file     Minutes per session: Not on file     Stress: Not on file   Relationships     Social connections:     Talks on phone: Not on file     Gets together: Not on file     Attends Mandaen service: Not on file     Active member of club or organization: Not on file     Attends meetings of clubs or organizations: Not on file     Relationship status: Not on file     Intimate partner violence:     Fear of current or ex partner: Not on file     Emotionally abused:  Not on file     Physically abused: Not on file     Forced sexual activity: Not on file   Other Topics Concern     Parent/sibling w/ CABG, MI or angioplasty before 65F 55M? Yes   Social History Narrative     Not on file        FAMILY HISTORY:   Family History   Problem Relation Age of Onset     Diabetes Sister      C.A.D. Sister      Breast Cancer Sister         EXAM:Vitals: /69   Pulse 59   Ht 1.524 m (5')   Wt 91.6 kg (202 lb)   BMI 39.45 kg/m    BMI= Body mass index is 39.45 kg/m .        General appearance: Patient is alert and fully cooperative with history & exam.  No sign of distress is noted during the visit.     Psychiatric: Affect is pleasant & appropriate.  Patient appears motivated to improve health.     Respiratory: Breathing is regular & unlabored while sitting.     HEENT: Hearing is intact to spoken word.  Speech is clear.  No gross evidence of visual impairment that would impact ambulation.     Dermatologic: Skin is intact to both lower extremities without significant lesions, rash or abrasion.  No paronychia or evidence of soft tissue infection is noted.  The nails are hypertrophic and discolored.     Vascular: DP & PT pulses are intact & regular bilaterally.  No significant edema or varicosities noted.  CFT and skin temperature is normal to both lower extremities.  There are noted varicosities, noted edema and noted trophic changes noted.      Neurologic: Lower extremity sensation is intact to light touch.  No evidence of weakness or contracture in the lower extremities.  Noted evidence of neuropathy with diminished sensation bilaterally.       Musculoskeletal: Patient is ambulatory without assistive device or brace.  No gross ankle deformity noted.  No foot or ankle joint effusion is noted.  One notes a hammertoe contracture of the second digit bilaterally.         Assessment:  1.  Diabetes Mellitus and Peripheral Neuropathy.    2.  Hammertoe deformity.          Plan:  I have explained to  the patient the condition.  I have discussed with the patient the importance of diabetic foot care.  The patient was referred to [facility] for custom diabetic shoes and accommodative inserts that will aid in offloading the tension forces to the soft tissues and prevent further risk of amputation.           VELMA Ramos D.P.M., JD.DANIEL.

## 2019-07-26 ENCOUNTER — TELEPHONE (OUTPATIENT)
Dept: FAMILY MEDICINE | Facility: CLINIC | Age: 74
End: 2019-07-26

## 2019-07-26 NOTE — TELEPHONE ENCOUNTER
[facility]- Phy Orders & B/P  Form placed on Provider [place] desk for Signature.  Leilani Orn [place] Sec

## 2019-07-26 NOTE — TELEPHONE ENCOUNTER
Form received back signed  7/26/19  Faxed Back & Sent to be scanned to this encounter  Leilani [place] Sec

## 2019-07-30 ENCOUNTER — MEDICAL CORRESPONDENCE (OUTPATIENT)
Dept: HEALTH INFORMATION MANAGEMENT | Facility: CLINIC | Age: 74
End: 2019-07-30

## 2019-07-30 ENCOUNTER — TELEPHONE (OUTPATIENT)
Dept: FAMILY MEDICINE | Facility: CLINIC | Age: 74
End: 2019-07-30

## 2019-07-30 NOTE — TELEPHONE ENCOUNTER
Form received back signed  7/30/19  Faxed Back & Sent to be scanned to this encounter  Leilani [place] Sec

## 2019-07-30 NOTE — TELEPHONE ENCOUNTER
[facility]- Multiple requests & orders requested  Form received back signed  7/30/19  Faxed Back & Sent to be scanned to this encounter  Leilani Orn [place] Sec

## 2019-08-05 ENCOUNTER — TELEPHONE (OUTPATIENT)
Dept: FAMILY MEDICINE | Facility: CLINIC | Age: 74
End: 2019-08-05

## 2019-08-05 NOTE — TELEPHONE ENCOUNTER
Reason for call:  Patient reporting a symptom    Symptom or request: Mell from [facility] says patient usually takes pramipexole (MIRAPEX) 0.25 MG tablet at 9:00 PM before bedtime for restless legs. Last night she refused it. She took it a 1:00 AM.  Her legs have been moving all morning so they gave her a PRN dose at 9:00 AM. She says her legs are still moving and uncomfortable. They are wondering if Dr Morales has any ideas as to what they can give her with the medications she has.       Phone Number patient can be reached at:   [phone]  Mell -Nurse at [facility]    Best Time:  anytime    Can we leave a detailed message on this number:  YES    Call taken on 8/5/2019 at 10:01 AM by Crystal Skelton

## 2019-08-06 ENCOUNTER — TELEPHONE (OUTPATIENT)
Dept: FAMILY MEDICINE | Facility: CLINIC | Age: 74
End: 2019-08-06

## 2019-08-08 ENCOUNTER — OFFICE VISIT (OUTPATIENT)
Dept: FAMILY MEDICINE | Facility: CLINIC | Age: 74
End: 2019-08-08
Payer: COMMERCIAL

## 2019-08-08 VITALS
TEMPERATURE: 98.6 F | WEIGHT: 201 LBS | BODY MASS INDEX: 39.46 KG/M2 | RESPIRATION RATE: 24 BRPM | HEIGHT: 60 IN | HEART RATE: 76 BPM | DIASTOLIC BLOOD PRESSURE: 76 MMHG | OXYGEN SATURATION: 97 % | SYSTOLIC BLOOD PRESSURE: 139 MMHG

## 2019-08-08 DIAGNOSIS — Z79.4 TYPE 2 DIABETES MELLITUS WITH DIABETIC NEPHROPATHY, WITH LONG-TERM CURRENT USE OF INSULIN (H): Primary | ICD-10-CM

## 2019-08-08 DIAGNOSIS — E11.21 TYPE 2 DIABETES MELLITUS WITH DIABETIC NEPHROPATHY, WITH LONG-TERM CURRENT USE OF INSULIN (H): Primary | ICD-10-CM

## 2019-08-08 DIAGNOSIS — I25.9 CHRONIC ISCHEMIC HEART DISEASE: Chronic | ICD-10-CM

## 2019-08-08 DIAGNOSIS — N18.4 CKD (CHRONIC KIDNEY DISEASE) STAGE 4, GFR 15-29 ML/MIN (H): ICD-10-CM

## 2019-08-08 DIAGNOSIS — E03.8 OTHER SPECIFIED HYPOTHYROIDISM: ICD-10-CM

## 2019-08-08 DIAGNOSIS — G25.81 RESTLESS LEG SYNDROME: ICD-10-CM

## 2019-08-08 DIAGNOSIS — I87.303 STASIS EDEMA OF BOTH LOWER EXTREMITIES: ICD-10-CM

## 2019-08-08 DIAGNOSIS — G47.33 OSA (OBSTRUCTIVE SLEEP APNEA): ICD-10-CM

## 2019-08-08 DIAGNOSIS — I10 BENIGN ESSENTIAL HYPERTENSION: ICD-10-CM

## 2019-08-08 LAB
ANION GAP SERPL CALCULATED.3IONS-SCNC: 9 MMOL/L (ref 3–14)
BUN SERPL-MCNC: 36 MG/DL (ref 7–30)
CALCIUM SERPL-MCNC: 8.9 MG/DL (ref 8.5–10.1)
CHLORIDE SERPL-SCNC: 109 MMOL/L (ref 94–109)
CO2 SERPL-SCNC: 24 MMOL/L (ref 20–32)
CREAT SERPL-MCNC: 1.82 MG/DL (ref 0.52–1.04)
GFR SERPL CREATININE-BSD FRML MDRD: 26 ML/MIN/{1.73_M2}
GLUCOSE SERPL-MCNC: 155 MG/DL (ref 70–99)
HBA1C MFR BLD: 7.8 % (ref 0–5.6)
POTASSIUM SERPL-SCNC: 4.3 MMOL/L (ref 3.4–5.3)
SODIUM SERPL-SCNC: 142 MMOL/L (ref 133–144)

## 2019-08-08 PROCEDURE — 83036 HEMOGLOBIN GLYCOSYLATED A1C: CPT | Performed by: FAMILY MEDICINE

## 2019-08-08 PROCEDURE — 99215 OFFICE O/P EST HI 40 MIN: CPT | Performed by: FAMILY MEDICINE

## 2019-08-08 PROCEDURE — 36415 COLL VENOUS BLD VENIPUNCTURE: CPT | Performed by: FAMILY MEDICINE

## 2019-08-08 PROCEDURE — 80048 BASIC METABOLIC PNL TOTAL CA: CPT | Performed by: FAMILY MEDICINE

## 2019-08-08 ASSESSMENT — MIFFLIN-ST. JEOR: SCORE: 1323.23

## 2019-08-08 NOTE — NURSING NOTE
Chief Complaint   Patient presents with     Diabetes     Hypertension       Initial /76 (BP Location: Left arm)   Pulse 76   Temp 98.6  F (37  C) (Tympanic)   Resp 24   Ht 1.524 m (5')   Wt 91.2 kg (201 lb)   SpO2 97%   BMI 39.26 kg/m   Estimated body mass index is 39.26 kg/m  as calculated from the following:    Height as of this encounter: 1.524 m (5').    Weight as of this encounter: 91.2 kg (201 lb).    Patient presents to the clinic using Wheel Chair    Health Maintenance that is potentially due pending provider review:  NONE    n/a    Is there anyone who you would like to be able to receive your results? No  If yes have patient fill out HELLEN

## 2019-08-08 NOTE — PATIENT INSTRUCTIONS
Increase the lantus to 22 units    See lymphedema clinic to get wraps    Try 1/2 mirapex at bedtime    I'll talk to Concha about the continuous blood glucose monitor    See you in 3 months

## 2019-08-08 NOTE — PROGRESS NOTES
Subjective     Stefanie Velazquez is a 74 year old female who presents to clinic today for the following health issues:    HPI   Diabetes Follow-up      How often are you checking your blood sugar? Four or more times daily    What time of day are you checking your blood sugars (select all that apply)?  Before and after meals and At bedtime    Have you had any blood sugars above 200?  Yes     Have you had any blood sugars below 70?  No    What symptoms do you notice when your blood sugar is low?  None    What concerns do you have today about your diabetes? Blood sugar is often over 200 and Other: fingers  hurt     Do you have any of these symptoms? (Select all that apply)  Numbness in feet and Burning in feet - ace wrap works best - worried about swelling in legs     Have you had a diabetic eye exam in the last 12 months? Yes - total eye    Lab Results   Component Value Date    A1C 7.8 2019    A1C 8.7 2019    A1C 8.1 2019    A1C 8.0 10/30/2018    A1C 8.5 2018      blood sugars are av:30 am:180, 11:30 am 246, 4:30 pm 263, 8 pm 255    On Lantus 20 units daily, Novolog 10 units am, 12 units lunch and dinner  She hates having her finger poked qid    Pt has type 2 diabetes. She is testing her blood sugars 4 times daily. She is on 4 insulin injections daily.  Pt would benefit from a continuous glucose monitor to adjust her insulin and improve her diabetes control.     Diabetes Management Resources    Hyperlipidemia Follow-Up      Are you having any of the following symptoms? (Select all that apply)  No complaints of shortness of breath, chest pain or pressure.  No increased sweating or nausea with activity.  No left-sided neck or arm pain.  No complaints of pain in calves when walking 1-2 blocks.    Are you regularly taking any medication or supplement to lower your cholesterol?   Yes- .    Are you having muscle aches or other side effects that you think could be caused by your cholesterol lowering  medication?  No  Recent Labs   Lab Test 03/01/19  0742 08/31/18  0905  09/16/15  1130 03/23/15  0926   CHOL 133 185   < > 120 112   HDL 46* 51   < > 46* 44*   LDL 44 107*   < > 50 41   TRIG 215* 134   < > 118 134   CHOLHDLRATIO  --   --   --  2.6 2.5    < > = values in this interval not displayed.      Hypertension Follow-up      Do you check your blood pressure regularly outside of the clinic? Yes     Are you following a low salt diet? No    Are your blood pressures ever more than 140 on the top number (systolic) OR more   than 90 on the bottom number (diastolic), for example 140/90? No  On irbesartan  Also on metoprolol if pulse allows  avg pulse was 63  On lasix and potassium   She asks if she can take the over the counter potassium instead of the packets of potassium as they taste so bad  Average blood pressures 131/63    hypothyroidism   TSH   Date Value Ref Range Status   05/02/2019 0.48 0.40 - 4.00 mU/L Final     Restless legs-she wonders if the pill is making her tired. Wants to try less.    BP Readings from Last 2 Encounters:   08/08/19 139/76   07/22/19 143/69     Hemoglobin A1C (%)   Date Value   08/08/2019 7.8 (H)   05/02/2019 8.7 (H)     LDL Cholesterol Calculated (mg/dL)   Date Value   03/01/2019 44   08/31/2018 107 (H)       Amount of exercise or physical activity: None    Problems taking medications regularly: No    Medication side effects: none    Diet: diabetic        BP Readings from Last 3 Encounters:   08/08/19 139/76   07/22/19 143/69   06/20/19 112/44    Wt Readings from Last 3 Encounters:   08/08/19 91.2 kg (201 lb)   07/22/19 91.6 kg (202 lb)   06/20/19 91.6 kg (202 lb)                 Reviewed and updated as needed this visit by Provider  Tobacco  Allergies  Meds  Problems  Med Hx  Surg Hx  Fam Hx         Review of Systems   ROS: 5 point ROS negative except as noted above in HPI, including Gen., Resp., CV, GI &  system review.   Bowels are better, no diarrhea      Objective    BP  139/76 (BP Location: Left arm)   Pulse 76   Temp 98.6  F (37  C) (Tympanic)   Resp 24   Ht 1.524 m (5')   Wt 91.2 kg (201 lb)   SpO2 97%   BMI 39.26 kg/m    Body mass index is 39.26 kg/m .  Physical Exam   General: appears well, no distress, sitting comfortably in wheelchair  HEENT: hearing aides in place, edentulous  Neck: supple, no adenopathy, no JVD  Heart: regular rate and rhythm, normal S1S2, no murmur  Lungs: clear to ascultation   Abd: .soft, nontender, no mass or distention   Ext: has socks on that are too loose, plus one to two edema    Lab Results   Component Value Date    A1C 7.8 08/08/2019           ASSESSMENT:  1. Type 2 diabetes mellitus with diabetic nephropathy, with long-term current use of insulin (H)    2. Stasis edema of both lower extremities    3. RC-moderate (AHI 12, LSat 60%); REM RDI-73    4. Restless leg syndrome    5. Benign essential hypertension    6. CKD (chronic kidney disease) stage 4, GFR 15-29 ml/min (H)    7. Other specified hypothyroidism    8. Chronic ischemic heart disease        PLAN:  Orders Placed This Encounter     Hemoglobin A1c     Basic metabolic panel     LYMPHEDEMA THERAPY REFERRAL     40 min spent with patient, greater than 50% in counseling and coordination of care and dealing with multiple issues today.   See below  Recheck 3 months    Pt has type 2 diabetes. She is testing her blood sugars 4 times daily. She is on 4 insulin injections daily.  Pt would benefit from a continuous glucose monitor to adjust her insulin and improve her diabetes control.     Patient Instructions   Increase the lantus to 22 units    See lymphedema clinic to get wraps    Try 1/2 mirapex at bedtime    I'll talk to Concha about the continuous blood glucose monitor    See you in 3 months     Sandra Bernstein MD

## 2019-08-09 ENCOUNTER — MEDICAL CORRESPONDENCE (OUTPATIENT)
Dept: HEALTH INFORMATION MANAGEMENT | Facility: CLINIC | Age: 74
End: 2019-08-09

## 2019-08-09 NOTE — TELEPHONE ENCOUNTER
[facility], fax dated  08/09/19 Rewrite of order for Pramipexole without the range as stated.    Shira Ace  [facility]

## 2019-08-11 RX ORDER — POTASSIUM CHLORIDE 750 MG/1
10 TABLET, EXTENDED RELEASE ORAL 2 TIMES DAILY
Qty: 180 TABLET | Refills: 1 | Status: ON HOLD | OUTPATIENT
Start: 2019-08-11 | End: 2020-06-06

## 2019-08-13 NOTE — TELEPHONE ENCOUNTER
Form received back signed  8/13/19  Faxed Back & Sent to be scanned to this encounter  [facility]

## 2019-08-15 ENCOUNTER — TELEPHONE (OUTPATIENT)
Dept: FAMILY MEDICINE | Facility: CLINIC | Age: 74
End: 2019-08-15

## 2019-08-15 NOTE — TELEPHONE ENCOUNTER
[employer] for Medicare  Form placed on Provider Malou KHANNA-CNP desk for Signature.  [facility]

## 2019-08-15 NOTE — TELEPHONE ENCOUNTER
Form received back signed  8/15/19  Faxed Back & Sent to be scanned to this encounter  [facility]

## 2019-08-22 ENCOUNTER — HOSPITAL ENCOUNTER (OUTPATIENT)
Dept: PHYSICAL THERAPY | Facility: CLINIC | Age: 74
Setting detail: THERAPIES SERIES
End: 2019-08-22
Attending: FAMILY MEDICINE | Admitting: FAMILY MEDICINE
Payer: COMMERCIAL

## 2019-08-22 DIAGNOSIS — N18.4 CKD (CHRONIC KIDNEY DISEASE) STAGE 4, GFR 15-29 ML/MIN (H): ICD-10-CM

## 2019-08-22 PROCEDURE — 97140 MANUAL THERAPY 1/> REGIONS: CPT | Mod: GP | Performed by: PHYSICAL THERAPIST

## 2019-08-22 PROCEDURE — 97161 PT EVAL LOW COMPLEX 20 MIN: CPT | Mod: GP | Performed by: PHYSICAL THERAPIST

## 2019-08-22 NOTE — PROGRESS NOTES
[facility]        OUTPATIENT PHYSICAL THERAPY EDEMA EVALUATION  PLAN OF TREATMENT FOR OUTPATIENT REHABILITATION  (COMPLETE FOR INITIAL CLAIMS ONLY)  Patient's Last Name, First Name, Stefanie Freeman                           Provider s Name:   [facility] Medical Record No.  [id number]     Start of Care Date:  08/22/19   Onset Date:  08/08/19   Type:  PT   Medical Diagnosis:  CKD stage 4 GFR 15-20 ml/min; lymphedema   Therapy Diagnosis:  B LE secondary lymphedema Visits from SOC:  1                                     __________________________________________________________________________________   Plan of Treatment/Functional Goals:    Manual lymph drainage, Gradient compression bandaging, Fit for compression garment, Exercises, Precautions to prevent infection / exacerbation, Education, Manual therapy, Skin care / precautions, Soft tissue mobilization, Myofascial release, Home management program development        GOALS  1. Goal description: pt to have around the clock tolerance to BLE GCB for edema reduction response       Target date: 09/12/19  2. Goal description: once appropriate, pt to be independent with donning, doffing and care of compression stocking/garment for longterm edema management for maintenance       Target date: 10/31/19  3. Goal description: pt to be independent with longterm BLE lymphedema management via HEP, elevation, compression garment wear and MLD (when/if appropriate)       Target date: 10/31/19            Treatment Frequency: 3x/week   Treatment duration: 10 week    Ruby Bailey, PT                                    I CERTIFY THE NEED FOR THESE SERVICES FURNISHED UNDER        THIS PLAN OF TREATMENT AND WHILE UNDER MY CARE     (Physician co-signature of this document indicates review and certification of the therapy plan).                    Certification date from: 08/22/19       Certification date to: 10/31/19           Referring physician: Dr. Morales   Initial Assessment  See Epic Evaluation- Start of care: 08/22/19
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
## 2019-08-26 ENCOUNTER — HOSPITAL ENCOUNTER (OUTPATIENT)
Dept: PHYSICAL THERAPY | Facility: CLINIC | Age: 74
Setting detail: THERAPIES SERIES
End: 2019-08-26
Attending: FAMILY MEDICINE
Payer: COMMERCIAL

## 2019-08-26 PROCEDURE — 97140 MANUAL THERAPY 1/> REGIONS: CPT | Mod: GP | Performed by: REHABILITATION PRACTITIONER

## 2019-08-29 ENCOUNTER — HOSPITAL ENCOUNTER (OUTPATIENT)
Dept: PHYSICAL THERAPY | Facility: CLINIC | Age: 74
Setting detail: THERAPIES SERIES
End: 2019-08-29
Attending: FAMILY MEDICINE
Payer: COMMERCIAL

## 2019-08-29 PROCEDURE — 97140 MANUAL THERAPY 1/> REGIONS: CPT | Mod: GP | Performed by: REHABILITATION PRACTITIONER

## 2019-09-03 ENCOUNTER — TELEPHONE (OUTPATIENT)
Dept: FAMILY MEDICINE | Facility: CLINIC | Age: 74
End: 2019-09-03

## 2019-09-03 ENCOUNTER — HOSPITAL ENCOUNTER (OUTPATIENT)
Dept: PHYSICAL THERAPY | Facility: CLINIC | Age: 74
Setting detail: THERAPIES SERIES
End: 2019-09-03
Attending: FAMILY MEDICINE
Payer: COMMERCIAL

## 2019-09-03 DIAGNOSIS — I87.303 STASIS EDEMA OF BOTH LOWER EXTREMITIES: Primary | ICD-10-CM

## 2019-09-03 PROCEDURE — 97140 MANUAL THERAPY 1/> REGIONS: CPT | Mod: GP | Performed by: REHABILITATION PRACTITIONER

## 2019-09-03 NOTE — TELEPHONE ENCOUNTER
----- Message from Ruby Bailey PT sent at 8/27/2019  4:32 PM CDT -----  Regarding: Pt requesting home care  Dr. Morales,    Patient and her assisted living are reporting that it is too difficult for her to come to the lymphedema clinic in [place] due to transportation.  They are requesting that she receive homecare lymphedema services.  I instructed them that she would need to get an order for homecare lymphedema and they wanted me to reach out to you.    Please contact me with any questions or concerns.    Thank you for your referral,     Ruby Bailey, PT, DPT, CLT  Physical Therapist & Certified [place] [address]  [phone]

## 2019-09-04 ENCOUNTER — TELEPHONE (OUTPATIENT)
Dept: FAMILY MEDICINE | Facility: CLINIC | Age: 74
End: 2019-09-04

## 2019-09-04 NOTE — TELEPHONE ENCOUNTER
S: Request for OT to do lymphedema  B: Patient well known to HC, but will need RN to do SOC visit.   A: Due to high volume of patients we will probably not see patient until early next week.  R: May we get HC RN orders to begin 9/9?  Thanks so much,  Krystal Crawford, RN  [facility]   [phone]

## 2019-09-06 ENCOUNTER — TELEPHONE (OUTPATIENT)
Dept: FAMILY MEDICINE | Facility: CLINIC | Age: 74
End: 2019-09-06

## 2019-09-06 DIAGNOSIS — C20 RECTAL CANCER (H): ICD-10-CM

## 2019-09-06 DIAGNOSIS — R32 BOWEL AND BLADDER INCONTINENCE: ICD-10-CM

## 2019-09-06 DIAGNOSIS — R15.9 BOWEL AND BLADDER INCONTINENCE: ICD-10-CM

## 2019-09-06 NOTE — TELEPHONE ENCOUNTER
Signed RX faxed to [facility].  [facility]

## 2019-09-06 NOTE — TELEPHONE ENCOUNTER
[facility]essa- Incontinent Briefs  Per Staff at [facility] is concerned about running out due to increased incontinence. Requesting for a order to be sent to [facility] for a larger qty.   [facility]

## 2019-09-09 ENCOUNTER — PATIENT OUTREACH (OUTPATIENT)
Dept: GERIATRIC MEDICINE | Facility: CLINIC | Age: 74
End: 2019-09-09

## 2019-09-09 ENCOUNTER — TELEPHONE (OUTPATIENT)
Dept: FAMILY MEDICINE | Facility: CLINIC | Age: 74
End: 2019-09-09

## 2019-09-09 NOTE — LETTER
September 9, 2019    SOL WORRELL  [id number] [address]      Dear Sol:    As a member of [facility] (MSC+) you are provided a care coordinator. I will be your new care coordinator as of 09/01/2019. I will be calling you soon to see how you are doing and determine your needs.    If you have any questions, please feel free to call me at [phone]. If you reach my voice mail, please leave a message and your phone number. If you are hearing impaired, please call the [place] Relay at 612 or [phone] ([email or web address] relay service).    I look forward to speaking with you soon.    Sincerely,        Komal Krishnan RN-BC    E-mail: [email or web address]  Phone: [phone]      [facility]            [facility]+ [facility]  [id number] DHS Approved ([id number])  [id number] (11/18)

## 2019-09-09 NOTE — PROGRESS NOTES
[facility] Care Coordination Contact    Member became effective with [facility] on 09/01/2019 with [facility] MSC+.  Previous Health Plan: [facility] MSC+  Previous Care System: [facility]  Previous care coordinators name and number: Debbie Saunders  Waliudmila Type: EW  Last MMIS Entry: Date 07/31/2019 and Type 98 OTHER  MMIS visit date if different from above:   Services Listed in MMIS:   A3 F [facility]-CSP 35 F CASE MGMT 06 C HOMEMAKER  62 C LAUNDRY  Member currently receiving the following home care services:     Member currently receiving the following community resources:    UTF received: No: Requested on 09/09/2019 via [facility]     Tony Woods  Care Management Specialist  [facility]  [phone]    9/10/2019  cc received notification that member will now be Case Managed by [facility].    EMR reviewed. Awaiting UTF from [facility].    Komal Krishnan RN-[facility]   [phone]

## 2019-09-10 ENCOUNTER — PATIENT OUTREACH (OUTPATIENT)
Dept: GERIATRIC MEDICINE | Facility: CLINIC | Age: 74
End: 2019-09-10

## 2019-09-10 NOTE — PROGRESS NOTES
[facility] Care Coordination Contact      [facility] Care System Change (Transfer)    Member is new enrollee to [facility] effective 9/1/2019 with [facility] health plan. Member transferred from [facility].    No home visit required because this care coordinator (CC) has received all required documentation from the previous CC.    Writer t/c to member, introduced self as member's new CC. Confirmed with member that the welcome letter with writer's name and contact information has been received.  Reviewed LTCC/Health Risk Assessment (HRA) and POC with member. No changes noted.  Transitional HRA completed. Care Plan Summary updated and reflects current services.  Required referral authorization information communicated to CMS: Yes    MMIS entry completed by: Care Coordinator    Writer reviewed the following with member:    ER visits: Yes 5/21 d/t Syncope  Hospitalizations: Yes 5/21/19 d/t Syncope  TCU stays: No  Significant health status changes: None   Falls/Injuries: No  ADL/IADL changes: No  Changes in services: No    Follow-Up Plan: Member informed of future contact, plan to f/u with member with at next regularly scheduled contact.  Contact information shared with member and family, encouraged member to call with any questions or concerns.      cc will schedule member to be seen by the last day of March 2020 to ensure her EW remains open.    Komal Krishnan RN-[facility]   [phone]

## 2019-09-17 ENCOUNTER — DOCUMENTATION ONLY (OUTPATIENT)
Dept: FAMILY MEDICINE | Facility: CLINIC | Age: 74
End: 2019-09-17

## 2019-09-17 ENCOUNTER — MEDICAL CORRESPONDENCE (OUTPATIENT)
Dept: HEALTH INFORMATION MANAGEMENT | Facility: CLINIC | Age: 74
End: 2019-09-17

## 2019-09-17 NOTE — PROGRESS NOTES
[facility] now requests orders and shares plan of care/discharge summaries for some patients through [employer].  Please REPLY TO THIS MESSAGE OR ROUTE BACK TO THE AUTHOR in order to give authorization for orders when needed.  This is considered a verbal order, you will still receive a faxed copy of orders for signature.  Thank you for your assistance in improving collaboration for our patients.    ORDER  PT eval done today.  PT to see 1 x more this week then 2x a week for up to 4 weeks . PT to see for encouraging more regular ambulation, make sure safely lock the wc at all times, bed mobility and fall prevention education

## 2019-09-18 ENCOUNTER — TELEPHONE (OUTPATIENT)
Dept: FAMILY MEDICINE | Facility: CLINIC | Age: 74
End: 2019-09-18

## 2019-09-18 NOTE — TELEPHONE ENCOUNTER
[facility]- Phy Orders  Form received back signed  9/18/19  Faxed Back & Sent to be scanned to this encounter  [facility]

## 2019-09-19 NOTE — PROGRESS NOTES
Outpatient Physical Therapy Discharge Note     Patient: Stefanie Velazquez  : 1945    Beginning/End Dates of Reporting Period:  2019 to 2019    Referring Provider: Dr. Sandra Bernstein MD    Therapy Diagnosis: BLE secondary lymphedema      Client Self Report: CNA changed GCB's on Saturday    Objective Measurements:  Objective Measure: girth  Data: last measured on 19 RLE -1.7% and LLE -3.1%     Goals:  Goal Identifier stg 1   Goal Description pt to have around the clock tolerance to BLE GCB for edema reduction response   Target Date 19   Date Met  19   Progress:     Goal Identifier ltg 1   Goal Description once appropriate, pt to be independent with donning, doffing and care of compression stocking/garment for longterm edema management for maintenance   Target Date 10/31/19   Date Met      Progress:     Goal Identifier ltg 2   Goal Description pt to be independent with longterm BLE lymphedema management via HEP, elevation, compression garment wear and MLD (when/if appropriate)   Target Date 10/31/19   Date Met      Progress:     Progress Toward Goals:   Progress limited due to patient last seen in clinic on 9/3/19 and then started homecare to take over cares and services       Plan:  Discharge from therapy.    Discharge:    Reason for Discharge: transferring cares to homecare     Equipment Issued: none    Discharge Plan: Patient to continue with home care therapy

## 2019-09-20 ENCOUNTER — HOSPITAL LABORATORY (OUTPATIENT)
Facility: OTHER | Age: 74
End: 2019-09-20

## 2019-09-20 ENCOUNTER — TRANSFERRED RECORDS (OUTPATIENT)
Dept: HEALTH INFORMATION MANAGEMENT | Facility: CLINIC | Age: 74
End: 2019-09-20

## 2019-09-20 ENCOUNTER — TELEPHONE (OUTPATIENT)
Dept: PHYSICAL THERAPY | Facility: CLINIC | Age: 74
End: 2019-09-20

## 2019-09-20 LAB
ALT SERPL-CCNC: 21 U/L (ref 0–50)
AST SERPL-CCNC: 28 U/L (ref 0–45)
CREAT SERPL-MCNC: 1.81 MG/DL (ref 0.52–1.04)
GFR SERPL CREATININE-BSD FRML MDRD: 27 ML/MIN/1.73_M2
GLUCOSE SERPL-MCNC: 130 MG/DL (ref 70–99)
POTASSIUM SERPL-SCNC: 3.7 MMOL/L (ref 3.4–5.3)

## 2019-09-20 NOTE — TELEPHONE ENCOUNTER
Spoke with Arnold at [facility] to inform her that pt will be discharged at this clinic due to pt being pickup by Home care agency, and phone number of  with  agency that will be covering compression garments,  agency should pursue further coverage for compression garments.

## 2019-09-23 ENCOUNTER — OFFICE VISIT (OUTPATIENT)
Dept: SLEEP MEDICINE | Facility: CLINIC | Age: 74
End: 2019-09-23
Attending: FAMILY MEDICINE
Payer: COMMERCIAL

## 2019-09-23 ENCOUNTER — DOCUMENTATION ONLY (OUTPATIENT)
Dept: FAMILY MEDICINE | Facility: CLINIC | Age: 74
End: 2019-09-23

## 2019-09-23 ENCOUNTER — MEDICAL CORRESPONDENCE (OUTPATIENT)
Dept: HEALTH INFORMATION MANAGEMENT | Facility: CLINIC | Age: 74
End: 2019-09-23

## 2019-09-23 VITALS
RESPIRATION RATE: 16 BRPM | BODY MASS INDEX: 39.46 KG/M2 | DIASTOLIC BLOOD PRESSURE: 50 MMHG | HEIGHT: 60 IN | WEIGHT: 201 LBS | OXYGEN SATURATION: 95 % | SYSTOLIC BLOOD PRESSURE: 112 MMHG | HEART RATE: 63 BPM

## 2019-09-23 DIAGNOSIS — G47.33 OSA (OBSTRUCTIVE SLEEP APNEA): Chronic | ICD-10-CM

## 2019-09-23 DIAGNOSIS — G47.33 OSA (OBSTRUCTIVE SLEEP APNEA): Primary | ICD-10-CM

## 2019-09-23 PROCEDURE — 99205 OFFICE O/P NEW HI 60 MIN: CPT | Performed by: FAMILY MEDICINE

## 2019-09-23 ASSESSMENT — MIFFLIN-ST. JEOR: SCORE: 1323.23

## 2019-09-23 NOTE — PROGRESS NOTES
[facility] now requests orders and shares plan of care/discharge summaries for some patients through [employer].  Please REPLY TO THIS MESSAGE OR ROUTE BACK TO THE AUTHOR in order to give authorization for orders when needed.  This is considered a verbal order, you will still receive a faxed copy of orders for signature.  Thank you for your assistance in improving collaboration for our patients.    Order  OT eval for cognitive testing requested by [facility] staff to see if she is able to make appropriate decisions for herself.

## 2019-09-23 NOTE — PATIENT INSTRUCTIONS
Your BMI is Body mass index is 39.26 kg/m .  Weight management is a personal decision.  If you are interested in exploring weight loss strategies, the following discussion covers the approaches that may be successful. Body mass index (BMI) is one way to tell whether you are at a healthy weight, overweight, or obese. It measures your weight in relation to your height.  A BMI of 18.5 to 24.9 is in the healthy range. A person with a BMI of 25 to 29.9 is considered overweight, and someone with a BMI of 30 or greater is considered obese. More than two-thirds of American adults are considered overweight or obese.  Being overweight or obese increases the risk for further weight gain. Excess weight may lead to heart disease and diabetes.  Creating and following plans for healthy eating and physical activity may help you improve your health.  Weight control is part of healthy lifestyle and includes exercise, emotional health, and healthy eating habits. Careful eating habits lifelong are the mainstay of weight control. Though there are significant health benefits from weight loss, long-term weight loss with diet alone may be very difficult to achieve- studies show long-term success with dietary management in less than 10% of people. Attaining a healthy weight may be especially difficult to achieve in those with severe obesity. In some cases, medications, devices and surgical management might be considered.  What can you do?  If you are overweight or obese and are interested in methods for weight loss, you should discuss this with your provider.     Consider reducing daily calorie intake by 500 calories.     Keep a food journal.     Avoiding skipping meals, consider cutting portions instead.    Diet combined with exercise helps maintain muscle while optimizing fat loss. Strength training is particularly important for building and maintaining muscle mass. Exercise helps reduce stress, increase energy, and improves fitness.  Increasing exercise without diet control, however, may not burn enough calories to loose weight.       Start walking three days a week 10-20 minutes at a time    Work towards walking thirty minutes five days a week     Eventually, increase the speed of your walking for 1-2 minutes at time    In addition, we recommend that you review healthy lifestyles and methods for weight loss available through the National Institutes of Health patient information sites:  http://win.niddk.nih.gov/publications/index.htm    And look into health and wellness programs that may be available through your health insurance provider, employer, local community center, or mariela club.    Weight management plan: Patient was referred to their PCP to discuss a diet and exercise plan.

## 2019-09-24 ENCOUNTER — TELEPHONE (OUTPATIENT)
Dept: FAMILY MEDICINE | Facility: CLINIC | Age: 74
End: 2019-09-24

## 2019-09-24 NOTE — TELEPHONE ENCOUNTER
[facility]- Lab results & Orders  Form received back signed  9/24/19  Faxed Back & Sent to be scanned to this encounter  Leil[facility]

## 2019-09-24 NOTE — PROGRESS NOTES
"  Sleep Consultation:    Date on this visit: 9/23/2019    Stefanie Velazquez is sent by Sandra Medina for a sleep consultation regarding restarting treatment for RC.    Primary Physician: Sandra Medina     Chief Complaint: \"I think I slept better with a CPAP.\"    HPI:  Stefanie Velazquez is a 73 yo F with history of RC who presents to get replacement or new CPAP and equipment to restart treatment for RC.    Pertinent PMHx of rectal cancer, bilateral hearing loss, neuropathy, hypothyroidism, DM II, ischemic CAD, HTN, bradycardia, RLS, obesity.    She is seen with her niece today, lives in an assisted living / nursing home environment.  She is very pleasant, but quite hard of hearing.  But, she seems to follow conversation when speaking loudly and slowly.    She does have a history of at least 3-4 prior PSG's and most recently were performed here in 2009:  5/26/2009 - PSG with weight 229 lbs, AHI 12.2, RDI 57.5, lele SpO2 60% in REM.  PLM index 13.8.  6/17/2009 - Titration PSG with CPAP 15 effective but with some evidence of airflow limitation and no supine REM observed.  Recommended for CPAP 16.  1/8/2002 - PAP titration PSG.  CPAP 11 effective.  PLM index 41.8 and with arousals of 13.6.  11/19/2001 - MSLT with mean sleep latency of 11.8 minutes with no SOREM's on 4 naps.  11/14/2001 - PSG with AHI 14.6, lele SpO2 87%, worse in REM.    Her history of using CPAP is very vague to me.  Records show she did receive a CPAP in 2009, but no supplies since ~2012.  She thinks someone took her CPAP back while in the hospital, but I can't confirm this.    She would like to restart using CPAP since she is bothered by feeling very sleepy during the day.  She originally felt this was due to her pramipexole 0.25mg BID, but I feel this is less likely.  She also tends to sleep with head of bed elevated.    It is unclear to me about her RLS symptoms, but presumed are controlled on the prampipexole 0.25mg BID.  Most recent " ferritin of 169 ng/mL on 10/16/2013.    Stefanie denies any sleep walking and dream enactment.      Allergies:    Allergies   Allergen Reactions     Hydrocodone Other (See Comments)     dizzy     Lisinopril Cough            Vicodin [Hydrocodone-Acetaminophen] Other (See Comments)     Caused extreme dizziness       Medications:    Current Outpatient Medications   Medication Sig Dispense Refill     ACCU-CHEK MILVIA MELODY dispense one meter 1 device 0     acetaminophen (TYLENOL) 650 MG CR tablet Take 1 tablet (650 mg) by mouth 3 times daily as needed for mild pain or fever 60 tablet      apixaban ANTICOAGULANT (ELIQUIS) 5 MG tablet Take 1 tablet (5 mg) by mouth 2 times daily 60 tablet 5     blood glucose monitoring (ACCU-CHEK MILVIA) test strip Use to test blood sugars 4 times daily or as directed. 360 each 1     blood glucose monitoring (ACCU-CHEK MULTICLIX) lancets Test BG 4 times daily (Patient taking differently: by In Vitro route 4 times daily Test BG 4 times daily) 1 Box 11     BUTT PASTE - REGULAR (DR LOVE POOP GOOP BUTT PASTE FORMULA) Apply topically every hour as needed for skin protection       cholecalciferol 1000 units TABS Take 1,000 Units by mouth daily       diclofenac (VOLTAREN) 50 MG EC tablet Take 50 mg by mouth 3 times daily as needed for moderate pain       fluocinonide (LIDEX) 0.05 % ointment Apply sparingly to rash on legs twice daily as needed.  Do not apply to face. 60 g 11     fluticasone (FLONASE) 50 MCG/ACT spray Spray 1 spray into both nostrils daily 1 Bottle 3     furosemide (LASIX) 20 MG tablet Take 1 tablet (20 mg) by mouth 2 times daily 180 tablet 3     hydrocortisone (ANUSOL-HC) 2.5 % cream Place rectally 2 times daily as needed for hemorrhoids 30 g 0     insulin aspart (NOVOLOG FLEXPEN) 100 UNIT/ML pen Inject 12 Units Subcutaneous 2 times daily (with meals) Lunch and dinner       insulin aspart (NOVOLOG FLEXPEN) 100 UNIT/ML pen 10 units with breakfast, 10 units with lunch and 10 units with  supper (Patient taking differently: Inject 10 Units Subcutaneous daily (with breakfast) 10 units with breakfast, 10 units with lunch and 10 units with supper) 15 mL 1     insulin glargine (LANTUS SOLOSTAR PEN) 100 UNIT/ML pen Inject 20 Units Subcutaneous At Bedtime 18 mL 3     insulin pen needle (BD GABRIELLA U/F) 32G X 4 MM Use 4 times per day or as directed. 120 each 5     irbesartan (AVAPRO) 75 MG tablet Take 0.5 tablets (37.5 mg) by mouth At Bedtime 30 tablet 3     levothyroxine (SYNTHROID/LEVOTHROID) 88 MCG tablet Take 44 mcg by mouth once a week = 1.2  Tab on Sunday       levothyroxine (SYNTHROID/LEVOTHROID) 88 MCG tablet Take 88 mcg by mouth daily Except on Sunday       loperamide (IMODIUM) 2 MG capsule One capsule once a day PRN, can use a second one if needed 90 capsule 3     magnesium 250 MG tablet Take 1 tablet by mouth daily       menthol-zinc oxide (CALMOSEPTINE) 0.44-20.625 % OINT ointment Apply topically 4 times daily as needed for skin protection       metoprolol succinate ER (TOPROL-XL) 25 MG 24 hr tablet Take 0.5 tablets (12.5 mg) by mouth daily 30 tablet 0     nystatin (MYCOSTATIN) 230576 UNIT/GM external powder Apply topically 2 times daily Until resolved then to use as needed. 60 g 2     OMEPRAZOLE PO Take 20 mg by mouth 2 times daily (before meals)        order for DME Equipment being ordered:  order for XL Size  Pull ups.  Pt is currently using 12 pull ups a day 350 each 11     order for DME Equipment being ordered:   Incontinence Products: Gloves (4 Boxes) & chux pads 300 each 11     order for DME Equipment being ordered: Compression stockings 2 Device 0     order for DME Equipment being ordered: Hospital Bed service and repair 1 each 0     potassium chloride ER (K-DUR/KLOR-CON M) 10 MEQ CR tablet Take 1 tablet (10 mEq) by mouth 2 times daily 180 tablet 1     pramipexole (MIRAPEX) 0.25 MG tablet Take 1 tablet (0.25 mg) by mouth 2 times daily 60 tablet 1     simvastatin (ZOCOR) 40 MG tablet Take 1  tablet (40 mg) by mouth daily 90 tablet 2       Problem List:  Patient Active Problem List    Diagnosis Date Noted     H/O deep venous thrombosis 2019     Priority: Medium     Syncope 2019     Priority: Medium     Syncope and collapse 2019     Priority: Medium     Impacted cerumen of right ear 2019     Priority: Medium     CKD (chronic kidney disease) stage 4, GFR 15-29 ml/min (H) 2019     Priority: Medium     Lumbar radiculopathy 2019     Priority: Medium     Oropharyngeal dysphagia 2018     Priority: Medium     Bilateral hearing loss, unspecified hearing loss type 2018     Priority: Medium     Lymphedema of genitalia 2018     Priority: Medium     Spinal stenosis of lumbar region without neurogenic claudication 2018     Priority: Medium     DDD (degenerative disc disease), lumbar 2018     Priority: Medium     Symptomatic bradycardia 10/29/2017     Priority: Medium     Type 2 diabetes mellitus with diabetic nephropathy, with long-term current use of insulin (H) 2017     Priority: Medium     Restless leg syndrome 2017     Priority: Medium     Chronic diarrhea 2017     Priority: Medium     Health Care Home 10/28/2016     Priority: Medium     [facility] Care Coordination  JUNE Vega  Phone: [phone]    [facility] CARE PLAN SUMMARY    Member Name:  Stefanie JUAN Velazquez        *Assisted Living*  Address:   [place] [address] Phone: [phone] (Home)    :  1945 Date of Assessment:  Due by 3/31/2020 d/t Opening of EW on 2019   Health Plan:  [facility] [employer]  Health Plan Number: [id number] Medical Assistance Number: [id number]  Financial Worker:    Case #:     P Care Coordinator:    JUNE Vega CC Phone:  [phone]  CC Fax:  [phone]   FVP Enrollment Date: 2019 Case Mix:  A  Rate Cell:  B   Waiver Type:  EW   Primary Emergency Contact:  Dori Pena (niece)  Address: [address] [place] [address]  Mobile Phone: [phone]  Relation: Relative  Secondary Emergency Contact: Lori Pope (niece)           [facility]  Home Phone: [phone]  Mobile Phone: [phone]  Relation: Relative Language:  English  :  No   Health Care Agent/POA:  Yes Advanced Directives/Living Will:  Yes   Primary Care Clinic/Phone/Fax:  [facility]/(p) [phone], (f) [phone] Primary Dx:   R69  Secondary Dx:     Primary Physician:  Sandra Medina   Height:  5' 0''  Weight:  201 lbs   Specialty Physician:    Audiologist:     Eye Care Provider:   Dental Care Provider:    [facility]: [facility] [phone] or [phone]   Other:        [facility] CURRENT SERVICES SUMMARY  Equipment owned/DME history: Unk  SERVICE TYPE/PROVIDER NAME/PHONE AUTH DATE FREQUENCY Units OR $ Amt DESCRIPTION   Medical Transportation: [employer] [facility] [phone]  Fax:  9/1/2019 - 3/31/2020 As needed Varies    [facility]  Phone: [phone]    Fax: 9/1/2019- 3/31/2019 Monthly Per [employer]      * For ADC please select ADC provider and EW Transportation in order to process auth               Benign essential hypertension 09/30/2016     Priority: Medium     Bowel and bladder incontinence 05/22/2015     Priority: Medium     Dysuria 10/24/2013     Priority: Medium     Vitamin B 12 deficiency 05/14/2012     Priority: Medium     Vitamin D deficiency 05/14/2012     Priority: Medium     Radiation therapy complication 11/09/2011     Priority: Medium     Anemia 08/26/2011     Priority: Medium     Rectal cancer- newly diagnosed adenoCA rectum Jan 2011 and Positive Tubular Adenoma 2019 02/17/2011     Priority: Medium     Hyperlipidemia LDL goal <100 10/31/2010     Priority: Medium     Neuropathy (H) 04/09/2010     Priority: Medium     RC-moderate (AHI 12, LSat 60%); REM  RDI-73 06/01/2009     Priority: Medium     Sleep study [facility] was performed 5/26/09 in order to re-evaluate severity of RC. The total sleep time was 412.0 minutes. The sleep latency was decreased at 8.7 minutes with Ambien 10mg. The REM sleep latency was 426.0 minutes. Sleep efficiency was reduced at 79.0%. The sleep architecture was disrupted with frequent sleep stage changes and arousals.  Snoring:  loud. Respiratory Events:   RDI of 57.5 and an AHI of 12.2. The REM RDI was 74.3 The lowest O2 saturation was 60.0%. This study is suggestive of moderate sleep apnea, profound desaturations during REM sleep, with 35 second apneas.    Other: PLM index was 13.8.      Recommendations:  Due to the profound desaturations during REM sleep, consider CPAP titration study to establish optimal CPAP treatment pressure.  2. Check ferritin given her RLS symptoms. If RLS symptoms persist after treatment of RC, further therapy may be warranted. Replace iron as indicated by ferritin.         Osteoporosis 02/29/2008     Priority: Medium     Problem list name updated by automated process. Provider to review       Esophageal reflux 10/13/2007     Priority: Medium     On protonix;        bmi 40 06/22/2005     Priority: Medium     Problem list name updated by automated process. Provider to review       Generalized osteoarthrosis, unspecified site 06/22/2005     Priority: Medium     HEARING LOSS CONDUCTIVE, COMBINED TYPE 06/22/2005     Priority: Medium     [place]ian measles as child       Hypothyroidism 06/22/2003     Priority: Medium     Problem list name updated by automated process. Provider to review       Chronic ischemic heart disease 06/22/2003     Priority: Medium     Class: Chronic     cabgx3 09/2002,  with a left internal mammary (LIMA) to the left anterior descending and a bypass graft to the obtuse marginal branch of the circumflex and also PDA branch of the right coronary artery. She had an episode of atrial fibrillation  postoperatively and was treated with sotalol.   PTCA and stent placement for recurrent restenosis.   2/05 adenosine ef70%  9/24/12 - Lexiscan nuclear stress test was positive for mild partially reversible ischemia in the LAD distribution. Imdur added.              Past Medical/Surgical History:  Past Medical History:   Diagnosis Date     Acute deep vein thrombosis (DVT) of right lower extremity, unspecified vein (H) 10/31/2018     Acute myocardial infarction of other specified sites, episode of care unspecified 6/2002    MI 2 stints     Cancer of colon (H)      Cellulitis of lower extremity, bilateral 9/30/2016     Chronic ischemic heart disease, unspecified 6/22/2003    cabgx3 , 2002 2/05 adenosine ef70%     Coronary atherosclerosis of unspecified type of vessel, native or graft     Coronary artery disease     Diabetes mellitus (H)      Esophageal reflux      Fracture of femur, distal, left, closed (H) 2/11/2013     Gastro-oesophageal reflux disease      Generalized osteoarthrosis, unspecified site 6/22/2005     Generalized osteoarthrosis, unspecified site 6/22/2005     HEARING LOSS CONDUCTIVE, COMBINED TYPE 6/22/2005    Latvian measles as child     HYPOTHYROIDISM  6/22/2003     Mixed hyperlipidemia 6/22/2005     Obesity, unspecified 6/22/2005     RC-moderate (AHI 12, LSat 60%); REM RDI-73 6/1/2009    Sleep study [facility] was performed 5/26/09 in order to re-evaluate severity of RC. The total sleep time was 412.0 minutes. The sleep latency was decreased at 8.7 minutes with Ambien 10mg. The REM sleep latency was 426.0 minutes. Sleep efficiency was reduced at 79.0%. The sleep architecture was disrupted with frequent sleep stage changes and arousals.  Snoring:  loud. Respiratory Events:   RDI o     Recurrent acute deep vein thrombosis (DVT) of both lower extremities (H) 3/31/2019     Stented coronary artery      Type II or unspecified type diabetes mellitus with renal manifestations, uncontrolled(250.42) (H)  6/22/2005     Type II or unspecified type diabetes mellitus with renal manifestations, uncontrolled(250.42) (H) 6/22/2005     Unspecified disorder resulting from impaired renal function 6/22/2005    CR     1.82   06/21/2005     Unspecified essential hypertension      Past Surgical History:   Procedure Laterality Date     cabg       COLECTOMY LOW ANTERIOR  6/21/2011    Procedure:COLECTOMY LOW ANTERIOR; with loop ielostomy; Surgeon:ROBBIN MCDONNELL; Location:[facility]     COLONOSCOPY  1/26/2011    COLONOSCOPY performed by MASOUD BILL at [facility]     COLONOSCOPY N/A 3/1/2016    Procedure: COLONOSCOPY;  Surgeon: Liza Neal MD;  Location: [facility]     INSERT PORT VASCULAR ACCESS  3/2/2011    INSERT PORT VASCULAR ACCESS performed by LIZA NEAL at [facility]     OPEN REDUCTION INTERNAL FIXATION FEMUR DISTAL  2/12/2013    Procedure: OPEN REDUCTION INTERNAL FIXATION FEMUR DISTAL;  Open reduction internal fixation left femur fracture--[facility];  Surgeon: Ley, Jeffrey Duane, MD;  Location: [facility]     ORTHOPEDIC SURGERY       PHACOEMULSIFICATION WITH STANDARD INTRAOCULAR LENS IMPLANT Left 1/22/2018    Procedure: PHACOEMULSIFICATION WITH STANDARD INTRAOCULAR LENS IMPLANT;  Left cataract removal with implant;  Surgeon: Rony Key MD;  Location: [facility]     PHACOEMULSIFICATION WITH STANDARD INTRAOCULAR LENS IMPLANT Right 2/19/2018    Procedure: PHACOEMULSIFICATION WITH STANDARD INTRAOCULAR LENS IMPLANT;  Right cataract removal with implant;  Surgeon: Rony Key MD;  Location: [facility]     SIGMOIDOSCOPY FLEXIBLE  9/9/2011    Procedure:SIGMOIDOSCOPY FLEXIBLE; Performed prior to induction.; Surgeon:ROBBIN MCDONNELL; Location: OR     SURGICAL HISTORY OF -   10/24/2002    Heart bypass     SURGICAL HISTORY OF -   2002    R Knee arthroplasty     SURGICAL HISTORY OF -   2002    Mi 2 stints     TAKEDOWN ILEOSTOMY  9/9/2011    Procedure:TAKEDOWN ILEOSTOMY; Exploratory Laparotomy,  Loop  Ileostomy Takedown, Small Bowel Resection x 2.; Surgeon:ROBBIN MCDONNELL; Location: OR       Social History:  Social History     Socioeconomic History     Marital status: Single     Spouse name: Not on file     Number of children: Not on file     Years of education: Not on file     Highest education level: Not on file   Occupational History     Not on file   Social Needs     Financial resource strain: Not on file     Food insecurity:     Worry: Not on file     Inability: Not on file     Transportation needs:     Medical: Not on file     Non-medical: Not on file   Tobacco Use     Smoking status: Former Smoker     Smokeless tobacco: Never Used   Substance and Sexual Activity     Alcohol use: Yes     Comment: rare social use     Drug use: No     Sexual activity: Never   Lifestyle     Physical activity:     Days per week: Not on file     Minutes per session: Not on file     Stress: Not on file   Relationships     Social connections:     Talks on phone: Not on file     Gets together: Not on file     Attends Orthodox service: Not on file     Active member of club or organization: Not on file     Attends meetings of clubs or organizations: Not on file     Relationship status: Not on file     Intimate partner violence:     Fear of current or ex partner: Not on file     Emotionally abused: Not on file     Physically abused: Not on file     Forced sexual activity: Not on file   Other Topics Concern     Parent/sibling w/ CABG, MI or angioplasty before 65F 55M? Yes   Social History Narrative     Not on file       Family History:  Family History   Problem Relation Age of Onset     Diabetes Sister      C.A.D. Sister      Breast Cancer Sister        Review of Systems:  A complete review of systems reviewed by me is negative with the exeption of what has been mentioned in the history of present illness.    Physical Examination:  Vitals: /50   Pulse 63   Resp 16   Ht 5' (1.524 m)   Wt 201 lb (91.2 kg)   SpO2 95%   BMI  39.26 kg/m    BMI= Body mass index is 39.26 kg/m .    Neck Cir (cm): 38 cm    No flowsheet data found.       GENERAL APPEARANCE: healthy, alert, no distress, over weight and hard of hearing  PSYCH: mentation appears normal and affect normal/bright    Impression/Plan:    Stefanie Velazquez is a 75 yo F with history of RC who presents to get replacement or new CPAP and equipment to restart treatment for RC.    Pertinent PMHx of rectal cancer, bilateral hearing loss, neuropathy, hypothyroidism, DM II, ischemic CAD, HTN, bradycardia, RLS, obesity.    She is seen with her niece today, lives in an assisted living / nursing home environment.  She is very pleasant, but quite hard of hearing.  But, she seems to follow conversation when speaking loudly and slowly.    1.)  History of mild RC with severe desaturations in REM  2.)  RLS  3.)  Hypersomnia   - Given she reports that she had improvement in hypersomnia with treatment of RC, it seems reasonable to restart treatment.  I also feel it is very important to normalize SpO2 given co-morbid ischemic CAD / HTN.   - Based on her prior sleep testing, we agreed to start with CPAP auto-titrate 10-15 cm H2O with follow-up overnight oximetry if AHI < 5 and clinically improved.   - No concerns with continuing pramipexole 0.25mg BID and I feel this would be unlikely to be the cause of her daytime sleepiness.    Plan as given to patient as above.    I have spent 60 minutes with this patient today in which greater than 50% of this time was spent in the counseling / coordination of care regarding RC.    Over 50% of our time was spent in coordination and counseling of care for obstructive sleep apnea (RC).  We reviewed pathophysiology of RC, prior sleep testing if performed, indication for sleep testing, importance of treatment of significant RC to reduce long-term cardiovascular risk factors, importance of weight management, and proper care of equipment.      Pasha Velez,  MD    CC: Sandra Medina

## 2019-09-25 ENCOUNTER — TELEPHONE (OUTPATIENT)
Dept: FAMILY MEDICINE | Facility: CLINIC | Age: 74
End: 2019-09-25

## 2019-09-25 LAB — CEA SERPL-MCNC: 2.4 UG/L (ref 0–2.5)

## 2019-09-25 NOTE — TELEPHONE ENCOUNTER
[facility] now requests orders and shares plan of care/discharge summaries for some patients through [employer].  Please REPLY TO THIS MESSAGE OR ROUTE BACK TO THE AUTHOR in order to give authorization for orders when needed.  This is considered a verbal order, you will still receive a faxed copy of orders for signature.  Thank you for your assistance in improving collaboration for our patients.    ORDER    Requesting Occupational therapy treatment to complete comprehensive cognitive testing,educate caregivers on pt's cognitive status, and modify environment as needed to best support pt in her home. Requesting 2x per week for 2 weeks starting week of 9/29/19    Thank you,  Kassidy Shoemaker, OTR/L

## 2019-09-27 ENCOUNTER — ONCOLOGY VISIT (OUTPATIENT)
Dept: ONCOLOGY | Facility: CLINIC | Age: 74
End: 2019-09-27
Attending: INTERNAL MEDICINE
Payer: COMMERCIAL

## 2019-09-27 VITALS
DIASTOLIC BLOOD PRESSURE: 51 MMHG | HEIGHT: 60 IN | WEIGHT: 204.4 LBS | TEMPERATURE: 98.9 F | BODY MASS INDEX: 40.13 KG/M2 | SYSTOLIC BLOOD PRESSURE: 127 MMHG | OXYGEN SATURATION: 96 % | HEART RATE: 64 BPM | RESPIRATION RATE: 20 BRPM

## 2019-09-27 DIAGNOSIS — E03.9 ACQUIRED HYPOTHYROIDISM: Chronic | ICD-10-CM

## 2019-09-27 DIAGNOSIS — C20 RECTAL CANCER (H): Primary | Chronic | ICD-10-CM

## 2019-09-27 DIAGNOSIS — E11.21 TYPE 2 DIABETES MELLITUS WITH DIABETIC NEPHROPATHY, WITH LONG-TERM CURRENT USE OF INSULIN (H): Chronic | ICD-10-CM

## 2019-09-27 DIAGNOSIS — E78.5 HYPERLIPIDEMIA LDL GOAL <100: Chronic | ICD-10-CM

## 2019-09-27 DIAGNOSIS — I10 BENIGN ESSENTIAL HYPERTENSION: Chronic | ICD-10-CM

## 2019-09-27 DIAGNOSIS — Z79.4 TYPE 2 DIABETES MELLITUS WITH DIABETIC NEPHROPATHY, WITH LONG-TERM CURRENT USE OF INSULIN (H): Chronic | ICD-10-CM

## 2019-09-27 PROCEDURE — 99214 OFFICE O/P EST MOD 30 MIN: CPT | Performed by: INTERNAL MEDICINE

## 2019-09-27 PROCEDURE — G0463 HOSPITAL OUTPT CLINIC VISIT: HCPCS

## 2019-09-27 ASSESSMENT — MIFFLIN-ST. JEOR: SCORE: 1338.65

## 2019-09-27 ASSESSMENT — PAIN SCALES - GENERAL: PAINLEVEL: NO PAIN (0)
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
## 2019-10-07 ENCOUNTER — TELEPHONE (OUTPATIENT)
Dept: FAMILY MEDICINE | Facility: CLINIC | Age: 74
End: 2019-10-07

## 2019-10-07 NOTE — TELEPHONE ENCOUNTER
Pt is calling wanting to discuss her Legs/ Wrappings with Dr. Medina. She was asking for appt 10/8/19.  I told Stefanie I would talk to the Nurse at [facility] to have her come and discuss with pt.  I spoke with Mell Nurse at [facility] [phone]. Pt has been doing good with her wraps. Pt feels she needs to follow up with Dr. Medina  After each wrapping.  Appt scheduled 11/8/19 with Dr. Medina  [facility]

## 2019-10-10 ENCOUNTER — DOCUMENTATION ONLY (OUTPATIENT)
Dept: SLEEP MEDICINE | Facility: CLINIC | Age: 74
End: 2019-10-10
Payer: COMMERCIAL

## 2019-10-10 DIAGNOSIS — G47.33 OSA (OBSTRUCTIVE SLEEP APNEA): Primary | ICD-10-CM

## 2019-10-10 NOTE — PROGRESS NOTES
"Pt came to CPAP appointment with Lori ocasio and sister. Per discussion with patient, she had a CPAP last year that was \"taken away\" was provided by [facility]. Called [facility] to verify, spoke with Marisol at the [place] location. Pt did receive a CPAP on 9/4/18 and was returned on 2/21/19 due to not meeting compliance. Pt states she did not meet compliance due to other health issues going on at that time. With this information I am unable to provide patient with a new CPAP today until a repeat sleep study is completed, either in lab or home. Patient would prefer to do a home sleep test if allowed by her insurance. Will route to Dr. Velez to place an order.    Called Lori ocasio, after appointment to see if she would like to do the HST with [facility] or [facility], requested her to call me back at [phone]    Purvi Rees, DME Coordinator    "

## 2019-10-10 NOTE — PROGRESS NOTES
Patient Lori ocasio returned call. I explained that patient is able to complete a Home Sleep Test. She would like to complete it through [facility]. Discussed that usual  time for HST is on Mondays. She states understanding.     Purvi Rees, DME Coordinator

## 2019-10-11 ENCOUNTER — TELEPHONE (OUTPATIENT)
Dept: SLEEP MEDICINE | Facility: CLINIC | Age: 74
End: 2019-10-11

## 2019-10-15 DIAGNOSIS — E78.5 HYPERLIPIDEMIA LDL GOAL <100: Chronic | ICD-10-CM

## 2019-10-15 RX ORDER — SIMVASTATIN 40 MG
40 TABLET ORAL DAILY
Qty: 90 TABLET | Refills: 2 | Status: SHIPPED | OUTPATIENT
Start: 2019-10-15 | End: 2020-09-09 | Stop reason: DRUGHIGH

## 2019-10-15 NOTE — TELEPHONE ENCOUNTER
Lori called [facility] to set up appointment for HST. Informed her that HST's are booked out for a month at least. Would like orders sent to [facility] in case she is able to get there sooner. Will route to sleep providers medical assistant.    Purvi Rees, DME Coordinator

## 2019-10-15 NOTE — TELEPHONE ENCOUNTER
Called Lori. She is going to check with [place] to see when they can do an HST first. If sooner she will call back and we will need to fax direct referral to their lab to [facility] in [place].with signed order and Dr. Teresa. The phone number to sleep center . [phone]  Waiting for her to call us back with what she preferrs to do

## 2019-10-16 NOTE — TELEPHONE ENCOUNTER
Attempted to schedule HST. No answer on Lori(pt's niece) phone. LVM to contact [facility].

## 2019-10-16 NOTE — TELEPHONE ENCOUNTER
Called Lori ocasio informed her what [facility] states. Will route message to [facility].    Please call Lori to schedule.    Purvi Rees DME Coordinator

## 2019-10-16 NOTE — TELEPHONE ENCOUNTER
Micki from [facility] returned call. States that since patient failed compliance with her CPAP earlier this year, the provider there would not sign off on patient completing a home sleep study test in order to re-qualify as they would want to complete a split-night if possible. She requested that I contact patients ninfa Lori. Informed her I would do so.    Purvi Rees, DME Coordinator

## 2019-10-17 ENCOUNTER — PATIENT OUTREACH (OUTPATIENT)
Dept: GERIATRIC MEDICINE | Facility: CLINIC | Age: 74
End: 2019-10-17

## 2019-10-30 NOTE — PROGRESS NOTES
[facility] Care Coordination Contact    cc received a VM message from Renny ( [phone]), members niece.    Renny stated she had some questions and would like a call back.    cc attempted to reach her.  VM message left requesting a return call.    Komal Krishnan RN-[facility]   [phone]       10/30/2019  cc spoke with renny.  Renny stated that member has several questions and is requesting a home visit.    Visit scheduled for November 5th @ 10a.m.    CMS to prep chart.     Komal Krishnan RN-[facility]   [phone]

## 2019-11-05 ENCOUNTER — HEALTH MAINTENANCE LETTER (OUTPATIENT)
Age: 74
End: 2019-11-05

## 2019-11-08 ENCOUNTER — TELEPHONE (OUTPATIENT)
Dept: EDUCATION SERVICES | Facility: CLINIC | Age: 74
End: 2019-11-08

## 2019-11-08 ENCOUNTER — OFFICE VISIT (OUTPATIENT)
Dept: FAMILY MEDICINE | Facility: CLINIC | Age: 74
End: 2019-11-08
Payer: COMMERCIAL

## 2019-11-08 VITALS
SYSTOLIC BLOOD PRESSURE: 104 MMHG | HEIGHT: 60 IN | WEIGHT: 201 LBS | TEMPERATURE: 97.5 F | DIASTOLIC BLOOD PRESSURE: 58 MMHG | BODY MASS INDEX: 39.46 KG/M2 | OXYGEN SATURATION: 96 % | HEART RATE: 58 BPM | RESPIRATION RATE: 14 BRPM

## 2019-11-08 DIAGNOSIS — E11.21 TYPE 2 DIABETES MELLITUS WITH DIABETIC NEPHROPATHY, WITH LONG-TERM CURRENT USE OF INSULIN (H): Primary | Chronic | ICD-10-CM

## 2019-11-08 DIAGNOSIS — D64.9 ANEMIA, UNSPECIFIED TYPE: Primary | ICD-10-CM

## 2019-11-08 DIAGNOSIS — I10 BENIGN ESSENTIAL HYPERTENSION: ICD-10-CM

## 2019-11-08 DIAGNOSIS — E78.5 HYPERLIPIDEMIA LDL GOAL <100: ICD-10-CM

## 2019-11-08 DIAGNOSIS — H90.2: ICD-10-CM

## 2019-11-08 DIAGNOSIS — M51.369 DDD (DEGENERATIVE DISC DISEASE), LUMBAR: ICD-10-CM

## 2019-11-08 DIAGNOSIS — C20 RECTAL CANCER (H): ICD-10-CM

## 2019-11-08 DIAGNOSIS — M48.061 SPINAL STENOSIS OF LUMBAR REGION WITHOUT NEUROGENIC CLAUDICATION: ICD-10-CM

## 2019-11-08 DIAGNOSIS — G47.33 OSA (OBSTRUCTIVE SLEEP APNEA): ICD-10-CM

## 2019-11-08 DIAGNOSIS — N18.4 CKD (CHRONIC KIDNEY DISEASE) STAGE 4, GFR 15-29 ML/MIN (H): ICD-10-CM

## 2019-11-08 DIAGNOSIS — E11.21 TYPE 2 DIABETES MELLITUS WITH DIABETIC NEPHROPATHY, WITH LONG-TERM CURRENT USE OF INSULIN (H): ICD-10-CM

## 2019-11-08 DIAGNOSIS — G25.81 RESTLESS LEG SYNDROME: ICD-10-CM

## 2019-11-08 DIAGNOSIS — Z86.718 H/O DEEP VENOUS THROMBOSIS: ICD-10-CM

## 2019-11-08 DIAGNOSIS — E03.8 OTHER SPECIFIED HYPOTHYROIDISM: ICD-10-CM

## 2019-11-08 DIAGNOSIS — Z79.4 TYPE 2 DIABETES MELLITUS WITH DIABETIC NEPHROPATHY, WITH LONG-TERM CURRENT USE OF INSULIN (H): ICD-10-CM

## 2019-11-08 DIAGNOSIS — M54.16 LUMBAR RADICULOPATHY: ICD-10-CM

## 2019-11-08 DIAGNOSIS — Z79.4 TYPE 2 DIABETES MELLITUS WITH DIABETIC NEPHROPATHY, WITH LONG-TERM CURRENT USE OF INSULIN (H): Primary | Chronic | ICD-10-CM

## 2019-11-08 LAB
ALBUMIN SERPL-MCNC: 2.9 G/DL (ref 3.4–5)
ALP SERPL-CCNC: 102 U/L (ref 40–150)
ALT SERPL W P-5'-P-CCNC: 20 U/L (ref 0–50)
ANION GAP SERPL CALCULATED.3IONS-SCNC: 6 MMOL/L (ref 3–14)
AST SERPL W P-5'-P-CCNC: 27 U/L (ref 0–45)
BASOPHILS # BLD AUTO: 0 10E9/L (ref 0–0.2)
BASOPHILS NFR BLD AUTO: 0.5 %
BILIRUB SERPL-MCNC: 0.4 MG/DL (ref 0.2–1.3)
BUN SERPL-MCNC: 39 MG/DL (ref 7–30)
CALCIUM SERPL-MCNC: 8.9 MG/DL (ref 8.5–10.1)
CHLORIDE SERPL-SCNC: 107 MMOL/L (ref 94–109)
CO2 SERPL-SCNC: 25 MMOL/L (ref 20–32)
CREAT SERPL-MCNC: 1.65 MG/DL (ref 0.52–1.04)
DIFFERENTIAL METHOD BLD: ABNORMAL
EOSINOPHIL # BLD AUTO: 0.1 10E9/L (ref 0–0.7)
EOSINOPHIL NFR BLD AUTO: 3.8 %
ERYTHROCYTE [DISTWIDTH] IN BLOOD BY AUTOMATED COUNT: 14.2 % (ref 10–15)
GFR SERPL CREATININE-BSD FRML MDRD: 30 ML/MIN/{1.73_M2}
GLUCOSE SERPL-MCNC: 140 MG/DL (ref 70–99)
HBA1C MFR BLD: 7.7 % (ref 0–5.6)
HCT VFR BLD AUTO: 31.7 % (ref 35–47)
HGB BLD-MCNC: 9.8 G/DL (ref 11.7–15.7)
LYMPHOCYTES # BLD AUTO: 1 10E9/L (ref 0.8–5.3)
LYMPHOCYTES NFR BLD AUTO: 27.4 %
MCH RBC QN AUTO: 25.5 PG (ref 26.5–33)
MCHC RBC AUTO-ENTMCNC: 30.9 G/DL (ref 31.5–36.5)
MCV RBC AUTO: 83 FL (ref 78–100)
MONOCYTES # BLD AUTO: 0.4 10E9/L (ref 0–1.3)
MONOCYTES NFR BLD AUTO: 10.3 %
NEUTROPHILS # BLD AUTO: 2.1 10E9/L (ref 1.6–8.3)
NEUTROPHILS NFR BLD AUTO: 58 %
PLATELET # BLD AUTO: 219 10E9/L (ref 150–450)
POTASSIUM SERPL-SCNC: 4.4 MMOL/L (ref 3.4–5.3)
PROT SERPL-MCNC: 6.6 G/DL (ref 6.8–8.8)
RBC # BLD AUTO: 3.84 10E12/L (ref 3.8–5.2)
SODIUM SERPL-SCNC: 138 MMOL/L (ref 133–144)
WBC # BLD AUTO: 3.7 10E9/L (ref 4–11)

## 2019-11-08 PROCEDURE — 90662 IIV NO PRSV INCREASED AG IM: CPT | Performed by: FAMILY MEDICINE

## 2019-11-08 PROCEDURE — 99215 OFFICE O/P EST HI 40 MIN: CPT | Mod: 25 | Performed by: FAMILY MEDICINE

## 2019-11-08 PROCEDURE — 80053 COMPREHEN METABOLIC PANEL: CPT | Performed by: FAMILY MEDICINE

## 2019-11-08 PROCEDURE — 83036 HEMOGLOBIN GLYCOSYLATED A1C: CPT | Performed by: FAMILY MEDICINE

## 2019-11-08 PROCEDURE — G0008 ADMIN INFLUENZA VIRUS VAC: HCPCS | Performed by: FAMILY MEDICINE

## 2019-11-08 PROCEDURE — 85025 COMPLETE CBC W/AUTO DIFF WBC: CPT | Performed by: FAMILY MEDICINE

## 2019-11-08 PROCEDURE — 36415 COLL VENOUS BLD VENIPUNCTURE: CPT | Performed by: FAMILY MEDICINE

## 2019-11-08 RX ORDER — FLASH GLUCOSE SCANNING READER
1 EACH MISCELLANEOUS PRN
Qty: 1 DEVICE | Refills: 0 | Status: SHIPPED | OUTPATIENT
Start: 2019-11-08 | End: 2019-11-11

## 2019-11-08 RX ORDER — FLASH GLUCOSE SENSOR
1 KIT MISCELLANEOUS
Qty: 2 EACH | Refills: 11 | Status: SHIPPED | OUTPATIENT
Start: 2019-11-08 | End: 2019-11-11

## 2019-11-08 ASSESSMENT — PAIN SCALES - GENERAL: PAINLEVEL: NO PAIN (0)

## 2019-11-08 ASSESSMENT — MIFFLIN-ST. JEOR: SCORE: 1323.23

## 2019-11-08 NOTE — PROGRESS NOTES
Subjective   Chief Complaint   Patient presents with     Leg Swelling     f/u lymphadema     Diabetes     diabetes check         Stefanie Velazquez is a 74 year old female who presents to clinic today for the following health issues:    HPI   Diabetes Follow-up    How often are you checking your blood sugar? Four or more times daily  Blood sugar testing frequency justification:  Adjustment of medication(s)  What time of day are you checking your blood sugars (select all that apply)?  Before and after meals  Have you had any blood sugars above 200?  No  Have you had any blood sugars below 70?  No    What symptoms do you notice when your blood sugar is low?  None    What concerns do you have today about your diabetes? Other: 4 x a day checking blood sugars is this nescessary     Do you have any of these symptoms? (Select all that apply)  Numbness in feet     Have you had a diabetic eye exam in the last 12 months? yes  On NovoLog 12 units with lunch and dinner and 10 units with breakfast.  On Lantus 20 units at bedtime.  Unfortunately she does not have her blood sugars with her today.  She has them test her blood sugars 4 times a day breakfast lunch dinner and bedtime and she says is really killing her fingers.  Concha Bradley had tred to get her a continuous glucose monitor and I am not sure where we are at with that.    Lab Results   Component Value Date    A1C 7.7 11/08/2019    A1C 7.8 08/08/2019    A1C 8.7 05/02/2019    A1C 8.1 01/31/2019    A1C 8.0 10/30/2018        Diabetes Management Resources    Hyperlipidemia Follow-Up      Are you having any of the following symptoms? (Select all that apply)  Shortness of breath    Are you regularly taking any medication or supplement to lower your cholesterol?   No    Are you having muscle aches or other side effects that you think could be caused by your cholesterol lowering medication?  No  Recent Labs   Lab Test 03/01/19  0742 08/31/18  0905  09/16/15  1130 03/23/15  0926   CHOL  133 185   < > 120 112   HDL 46* 51   < > 46* 44*   LDL 44 107*   < > 50 41   TRIG 215* 134   < > 118 134   CHOLHDLRATIO  --   --   --  2.6 2.5    < > = values in this interval not displayed.      Hypertension Follow-up      Do you check your blood pressure regularly outside of the clinic? Yes     Are you following a low salt diet? No    Are your blood pressures ever more than 140 on the top number (systolic) OR more   than 90 on the bottom number (diastolic), for example 140/90? No  On lasix, metoprolol, irbesartan    Lymphedema  Leg wraps are working very well    Wheelchair is broken, they can't get the correct screws and as it is very old, she asks me to order a new one   She does try to walk as much as she can      BP Readings from Last 2 Encounters:   11/08/19 104/58   09/27/19 127/51     Hemoglobin A1C (%)   Date Value   08/08/2019 7.8 (H)   05/02/2019 8.7 (H)     LDL Cholesterol Calculated (mg/dL)   Date Value   03/01/2019 44   08/31/2018 107 (H)         How many servings of fruits and vegetables do you eat daily?  0-1    On average, how many sweetened beverages do you drink each day (soda, juice, sweet tea, etc)?   0    How many days per week do you miss taking your medication? 0    Sleep Apnea  Needs her sleep machine  She had an appointment with Dr. Muir and, in the end she needed to redo her sleep study to have Medicare pay for her CPAP again.  She will have a home sleep test on Monday, her niece will go pick it up and then bring back the next day.  Then should be able to get her CPAP back and she is really anxious about that. She is not sleeping well and is tired during the day.  Looking back her appointment with the [facility] sleep doctor last year showed she had moderate sleep apnea. When she had a bout of diarrhea earlier this year, she stopped using it as it impeded her getting to the bathroom in time and they took it away for noncompliance.     hypothyroidism -on levothyroxine   TSH   Date Value Ref  Range Status   05/02/2019 0.48 0.40 - 4.00 mU/L Final     Rectal cancer  Saw the oncologist 9/27/19    Recent Labs   Lab Test 09/20/19 08/08/19  1437 06/05/19  1109 05/21/19  1101 05/02/19  1447  03/01/19  0742  01/31/19  1044  08/31/18  0905  05/02/18  0910   A1C  --  7.8*  --   --  8.7*  --   --   --  8.1*   < >  --    < >  --    LDL  --   --   --   --   --   --  44  --   --   --  107*  --  75   HDL  --   --   --   --   --   --  46*  --   --   --  51  --  53   TRIG  --   --   --   --   --   --  215*  --   --   --  134  --  235*   ALT 21  --  22 16  --    < > 21   < >  --    < > 13   < > 14   CR 1.81* 1.82* 1.83* 1.64*  --    < > 1.86*   < > 1.78*   < > 1.45*   < > 1.36*   GFRESTIMATED 27* 26* 26* 30*  --    < > 26*   < > 27*   < > 35*   < > 38*   GFRESTBLACK 31* 31* 30* 35*  --    < > 30*   < > 32*   < > 43*   < > 46*   POTASSIUM 3.7 4.3 4.6 3.6  --    < > 4.2   < > 4.6   < > 3.4   < > 3.3*   TSH  --   --   --   --  0.48  --   --   --  0.14*   < > 4.24*   < >  --     < > = values in this interval not displayed.      BP Readings from Last 3 Encounters:   11/08/19 104/58   09/27/19 127/51   09/23/19 112/50    Wt Readings from Last 3 Encounters:   11/08/19 91.2 kg (201 lb)   09/27/19 92.7 kg (204 lb 6.4 oz)   09/23/19 91.2 kg (201 lb)                 Reviewed and updated as needed this visit by Provider         Review of Systems   ROS COMP: Constitutional, HEENT, cardiovascular, pulmonary, gi and gu systems are negative, except as otherwise noted.      Objective    /58 (BP Location: Right arm, Patient Position: Sitting, Cuff Size: Adult Large)   Pulse 58   Temp 97.5  F (36.4  C) (Tympanic)   Resp 14   Ht 1.524 m (5')   Wt 91.2 kg (201 lb)   SpO2 96%   Breastfeeding? No   BMI 39.26 kg/m    Body mass index is 39.26 kg/m .  Physical Exam   GENERAL: healthy, alert and no distress  EYES: Eyes grossly normal to inspection, PERRL and conjunctivae and sclerae normal  NECK: no adenopathy, no asymmetry, masses,  or scars and thyroid normal to palpation  RESP: lungs clear to auscultation - no rales, rhonchi or wheezes  CV: regular rate and rhythm, normal S1 S2, no S3 or S4, no murmur, click or rub, no peripheral edema and peripheral pulses strong  ABDOMEN: soft, nontender, no hepatosplenomegaly, no masses and bowel sounds normal  MS: no gross musculoskeletal defects noted, no edema  PSYCH: mentation appears normal, affect normal/bright    Diagnostic Test Results:  Labs reviewed in Epic        ASSESSMENT:  1. Type 2 diabetes mellitus with diabetic nephropathy, with long-term current use of insulin (H)    2. RC-moderate (AHI 12, LSat 60%); REM RDI-73    3. Hyperlipidemia LDL goal <100    4. Rectal cancer (H)    5. Restless leg syndrome    6. Benign essential hypertension    7. CKD (chronic kidney disease) stage 4, GFR 15-29 ml/min (H)    8. Other specified hypothyroidism    9. HEARING LOSS CONDUCTIVE, COMBINED TYPE    10. Spinal stenosis of lumbar region without neurogenic claudication    11. Lumbar radiculopathy    12. H/O deep venous thrombosis    13. DDD (degenerative disc disease), lumbar        PLAN:  Orders Placed This Encounter     VACCINE ADMINISTRATION, INITIAL     FLU VACCINE, INCREASED ANTIGEN, PRESV FREE     Hemoglobin A1c     Comprehensive metabolic panel     CBC with platelets and differential     order for DME     40 min spent with patient, greater than 50% in counseling and coordination of care and dealing with multiple issues today.   See below.   Concha Bradley is working on the CGM    Patient Instructions   We'll try to get you a continuous glucose monitor    I've ordered a new wheelchair for you. Your place or niece will need to help you get it.     Decrease the furosemide to 20 mg once a day instead of twice     Sandra Bernstein MD

## 2019-11-08 NOTE — PATIENT INSTRUCTIONS
We'll try to get you a continuous glucose monitor    I've ordered a new wheelchair for you. Your place or niece will need to help you get it.     Decrease the furosemide to 20 mg once a day instead of twice

## 2019-11-08 NOTE — TELEPHONE ENCOUNTER
Diabetes education:    Will try ordering cameron 14 day via [employer] for pt.    [facility] stated she is not in there network and didn't bother to let us know.    RN sent to esteban Braldey RD on 11/13/2019 at 2:21 PM

## 2019-11-11 ENCOUNTER — TELEPHONE (OUTPATIENT)
Dept: FAMILY MEDICINE | Facility: CLINIC | Age: 74
End: 2019-11-11

## 2019-11-11 ENCOUNTER — OFFICE VISIT (OUTPATIENT)
Dept: SLEEP MEDICINE | Facility: CLINIC | Age: 74
End: 2019-11-11
Payer: COMMERCIAL

## 2019-11-11 ENCOUNTER — MEDICAL CORRESPONDENCE (OUTPATIENT)
Dept: HEALTH INFORMATION MANAGEMENT | Facility: CLINIC | Age: 74
End: 2019-11-11

## 2019-11-11 DIAGNOSIS — Z79.4 TYPE 2 DIABETES MELLITUS WITH DIABETIC NEPHROPATHY, WITH LONG-TERM CURRENT USE OF INSULIN (H): Chronic | ICD-10-CM

## 2019-11-11 DIAGNOSIS — G47.33 OSA (OBSTRUCTIVE SLEEP APNEA): ICD-10-CM

## 2019-11-11 DIAGNOSIS — E11.21 TYPE 2 DIABETES MELLITUS WITH DIABETIC NEPHROPATHY, WITH LONG-TERM CURRENT USE OF INSULIN (H): Chronic | ICD-10-CM

## 2019-11-11 PROCEDURE — G0399 HOME SLEEP TEST/TYPE 3 PORTA: HCPCS | Performed by: FAMILY MEDICINE

## 2019-11-11 RX ORDER — FLASH GLUCOSE SCANNING READER
1 EACH MISCELLANEOUS PRN
Qty: 1 DEVICE | Refills: 0 | Status: SHIPPED | OUTPATIENT
Start: 2019-11-11 | End: 2020-03-16

## 2019-11-11 RX ORDER — FLASH GLUCOSE SENSOR
1 KIT MISCELLANEOUS
Qty: 2 EACH | Refills: 11 | Status: SHIPPED | OUTPATIENT
Start: 2019-11-11 | End: 2020-03-16

## 2019-11-11 NOTE — PROGRESS NOTES
Pt is completing a home sleep test. Pt's niece, Lori, was instructed on how to put on the Noxturnal T3 device and associated equipment before going to bed and given the opportunity to practice putting it on before leaving the sleep center. Lori was reminded to bring the home sleep test kit back to the center tomorrow, at agreed upon time for download and reporting.

## 2019-11-11 NOTE — TELEPHONE ENCOUNTER
[employer] She[employer] sensors and  readers.  Medicare Part B needs to be billed first. They won't fill at [facility] as they do not Bill Medicare Part B  This needs to go to [facility] [phone] DME Supplier  Please Advise.  Leilani Orn Station Sec

## 2019-11-12 ENCOUNTER — DOCUMENTATION ONLY (OUTPATIENT)
Dept: SLEEP MEDICINE | Facility: CLINIC | Age: 74
End: 2019-11-12
Payer: COMMERCIAL

## 2019-11-12 NOTE — PROGRESS NOTES
This HSAT was performed using a Noxturnal T3 device which recorded snore, sound, movement activity, body position, nasal pressure, oronasal thermal airflow, pulse, oximetry and both chest and abdominal respiratory effort. HSAT data was restricted to the time patient states they were in bed.     HSAT was scored using 1B 4% hypopnea rule.     HST AHI (Non-PAT): 6.8  Snoring was reported as light - soft grunts  Time with SpO2 below 89% was 43 minutes.   Overall signal quality was good    Pt will follow up with sleep provider to determine appropriate therapy.

## 2019-11-14 ENCOUNTER — DOCUMENTATION ONLY (OUTPATIENT)
Dept: SLEEP MEDICINE | Facility: CLINIC | Age: 74
End: 2019-11-14

## 2019-11-14 NOTE — PROCEDURES
HOME SLEEP STUDY INTERPRETATION    Patient: Stefanie Velazquez  MRN: [id number]  YOB: 1945  Study Date: 11/11/2019  Referring Provider: Sandra Medina  Ordering Provider: Pasha Velez MD     Indications for Home Study: Stefanie Velazquez is a 74 year old female with a history of rectal cancer, bilateral hearing loss, neuropathy, hypothyroidism, DM II, ischemic CAD, HTN, bradycardia, RLS, obesity who presents with known history of obstructive sleep apnea and needed repeat testing to allow for restart of treatment.    She does have a history of at least 3-4 prior PSG's and most recently were performed here in 2009:  5/26/2009 - PSG with weight 229 lbs, AHI 12.2, RDI 57.5, lele SpO2 60% in REM.  PLM index 13.8.  6/17/2009 - Titration PSG with CPAP 15 effective but with some evidence of airflow limitation and no supine REM observed.  Recommended for CPAP 16.  1/8/2002 - PAP titration PSG.  CPAP 11 effective.  PLM index 41.8 and with arousals of 13.6.  11/19/2001 - MSLT with mean sleep latency of 11.8 minutes with no SOREM's on 4 naps.  11/14/2001 - PSG with AHI 14.6, lele SpO2 87%, worse in REM.    Estimated body mass index is 39.26 kg/m  as calculated from the following:    Height as of 11/8/19: 1.524 m (5').    Weight as of 11/8/19: 91.2 kg (201 lb).    Data: A full night home sleep study was performed recording the standard physiologic parameters including body position, movement, sound, nasal pressure, thermal oral airflow, chest and abdominal movements with respiratory inductance plethysmography, and oxygen saturation by pulse oximetry. Pulse rate was estimated by oximetry recording. This study was considered adequate based on > 4 hours of quality oximetry and respiratory recording. As specified by the AASM Manual for the Scoring of Sleep and Associated events, version 2.3, Rule VIII.D 1B, 4% oxygen desaturation scoring for hypopneas is used as a standard of care on all home sleep apnea  testing.    Analysis Time:  540.2 minutes    Respiration:   Sleep Associated Hypoxemia: sustained hypoxemia was not present. Baseline oxygen saturation was 91.1%.  Time with saturation less than or equal to 88% was 43 minutes. The lowest oxygen saturation was 74%.   Snoring: Snoring was present.  Respiratory events: The home study revealed a presence of 7 obstructive apneas and 3 mixed and central apneas. There were 51 hypopneas resulting in a combined apnea/hypopnea index [AHI] of 6.8 events per hour.  AHI was 6.9 per hour supine, - per hour prone, 4.6 per hour on left side, and 0 per hour on right side.   Pattern: Excluding events noted above, respiratory rate and pattern was Normal.    Position: Percent of time spent: supine - 93.5%, prone - 0%, on left - 2.3%, on right - 1%.    Heart Rate: By pulse oximetry normal rate was noted.     Assessment:   - Mild obstructive sleep apnea.  - Sleep associated hypoxemia was present.    Recommendations:  Consider auto-CPAP at 5-15 cmH2O or polysomnography with full night PAP titration.  Suggest optimizing sleep hygiene and avoiding sleep deprivation.  Weight management.    Diagnosis Code(s): Obstructive Sleep Apnea G47.33, Hypoxemia G47.36    Pasha Velez MD, MD, November 14, 2019   Diplomate, American Board of Family Medicine, Sleep Medicine

## 2019-11-21 ENCOUNTER — TELEPHONE (OUTPATIENT)
Dept: FAMILY MEDICINE | Facility: CLINIC | Age: 74
End: 2019-11-21

## 2019-11-21 DIAGNOSIS — R32 BOWEL AND BLADDER INCONTINENCE: ICD-10-CM

## 2019-11-21 DIAGNOSIS — C20 RECTAL CANCER (H): ICD-10-CM

## 2019-11-21 DIAGNOSIS — R15.9 BOWEL AND BLADDER INCONTINENCE: ICD-10-CM

## 2019-11-21 NOTE — TELEPHONE ENCOUNTER
[employer]- Manual Wheel Chair  Form placed on Provider [place] desk for [employer].  Leilani Orn Station Sec

## 2019-11-21 NOTE — TELEPHONE ENCOUNTER
Form received back signed  11/21/19  Faxed Back & Sent to be scanned to this encounter  Leilani Orn Station Sec

## 2019-11-21 NOTE — TELEPHONE ENCOUNTER
[employer]- Gloves & Chux Pads  FL/PC  Requesting Incontin[employer] Products- ( up to 300/month) , Gloves ( 4 Boxes ), & Chux Pads. Please include Refill amounts or PRN.   Please-Please return to S.S.  Leilani Orn Station Sec

## 2019-11-27 ENCOUNTER — HOSPITAL PATHOLOGY (OUTPATIENT)
Facility: OTHER | Age: 74
End: 2019-11-27

## 2019-11-27 ENCOUNTER — TELEPHONE (OUTPATIENT)
Dept: FAMILY MEDICINE | Facility: CLINIC | Age: 74
End: 2019-11-27

## 2019-11-27 ENCOUNTER — HOSPITAL LABORATORY (OUTPATIENT)
Facility: OTHER | Age: 74
End: 2019-11-27

## 2019-11-27 LAB
BASOPHILS # BLD AUTO: 0 10E9/L (ref 0–0.2)
BASOPHILS NFR BLD AUTO: 0.8 %
DIFFERENTIAL METHOD BLD: ABNORMAL
EOSINOPHIL # BLD AUTO: 0.2 10E9/L (ref 0–0.7)
EOSINOPHIL NFR BLD AUTO: 5.3 %
ERYTHROCYTE [DISTWIDTH] IN BLOOD BY AUTOMATED COUNT: 14.2 % (ref 10–15)
FERRITIN SERPL-MCNC: 7 NG/ML (ref 8–252)
HCT VFR BLD AUTO: 31.3 % (ref 35–47)
HGB BLD-MCNC: 9.4 G/DL (ref 11.7–15.7)
IMM GRANULOCYTES # BLD: 0 10E9/L (ref 0–0.4)
IMM GRANULOCYTES NFR BLD: 0.3 %
LYMPHOCYTES # BLD AUTO: 1.2 10E9/L (ref 0.8–5.3)
LYMPHOCYTES NFR BLD AUTO: 30 %
MCH RBC QN AUTO: 24.6 PG (ref 26.5–33)
MCHC RBC AUTO-ENTMCNC: 30 G/DL (ref 31.5–36.5)
MCV RBC AUTO: 82 FL (ref 78–100)
MONOCYTES # BLD AUTO: 0.4 10E9/L (ref 0–1.3)
MONOCYTES NFR BLD AUTO: 9.8 %
NEUTROPHILS # BLD AUTO: 2.1 10E9/L (ref 1.6–8.3)
NEUTROPHILS NFR BLD AUTO: 53.8 %
NRBC # BLD AUTO: 0 10*3/UL
NRBC BLD AUTO-RTO: 0 /100
PLATELET # BLD AUTO: 227 10E9/L (ref 150–450)
RBC # BLD AUTO: 3.82 10E12/L (ref 3.8–5.2)
RETICS # AUTO: 60.4 10E9/L (ref 25–95)
RETICS/RBC NFR AUTO: 1.6 % (ref 0.5–2)
VIT B12 SERPL-MCNC: 369 PG/ML (ref 193–986)
WBC # BLD AUTO: 4 10E9/L (ref 4–11)

## 2019-11-27 NOTE — TELEPHONE ENCOUNTER
[facility] sent fax requesting Robitussin DM 10 ml q 4 hours prn for congestion. Please advise. Call attempted to nursing home no answer. Wilda Naqvi RN

## 2019-12-02 LAB — COPATH REPORT: NORMAL

## 2019-12-03 ENCOUNTER — TELEPHONE (OUTPATIENT)
Dept: FAMILY MEDICINE | Facility: CLINIC | Age: 74
End: 2019-12-03

## 2019-12-03 DIAGNOSIS — D64.9 ANEMIA, UNSPECIFIED TYPE: Primary | ICD-10-CM

## 2019-12-03 RX ORDER — FERROUS SULFATE 325(65) MG
325 TABLET ORAL
Qty: 30 TABLET | Refills: 1 | Status: ON HOLD | OUTPATIENT
Start: 2019-12-03 | End: 2020-06-06

## 2019-12-03 NOTE — TELEPHONE ENCOUNTER
Notes recorded by Sandra Medina MD on 11/29/2019 at 9:30 AM CST  Her iron is low. Have them give her one iron ferrous sulfate daily 325 mg. If on now, make it twice a day. Recheck in one or two months  Sandra Medina MD

## 2019-12-10 ENCOUNTER — TELEPHONE (OUTPATIENT)
Dept: FAMILY MEDICINE | Facility: CLINIC | Age: 74
End: 2019-12-10

## 2019-12-10 NOTE — TELEPHONE ENCOUNTER
[employer]- Medication- [employer]   Form placed on Provider  desk for [employer].  Leilani Orn Station Sec

## 2019-12-12 ENCOUNTER — MEDICAL CORRESPONDENCE (OUTPATIENT)
Dept: HEALTH INFORMATION MANAGEMENT | Facility: CLINIC | Age: 74
End: 2019-12-12

## 2019-12-13 NOTE — TELEPHONE ENCOUNTER
Form received back signed  12/13/19  Faxed Back & Sent to be scanned to this encounter  Leilani Orn Station Sec

## 2019-12-17 ENCOUNTER — MEDICAL CORRESPONDENCE (OUTPATIENT)
Dept: HEALTH INFORMATION MANAGEMENT | Facility: CLINIC | Age: 74
End: 2019-12-17

## 2019-12-19 ENCOUNTER — TELEPHONE (OUTPATIENT)
Dept: FAMILY MEDICINE | Facility: CLINIC | Age: 74
End: 2019-12-19

## 2019-12-19 NOTE — TELEPHONE ENCOUNTER
Form - [employer]- B/P dates   Form received back signed  12/19/19  Faxed Back & Sent to be scanned to this encounter  Leilani [facility] Sec

## 2019-12-27 ENCOUNTER — VIRTUAL VISIT (OUTPATIENT)
Dept: SLEEP MEDICINE | Facility: CLINIC | Age: 74
End: 2019-12-27
Payer: COMMERCIAL

## 2019-12-27 DIAGNOSIS — G47.33 OSA (OBSTRUCTIVE SLEEP APNEA): Primary | ICD-10-CM

## 2019-12-27 PROCEDURE — G2012 BRIEF CHECK IN BY MD/QHP: HCPCS | Performed by: FAMILY MEDICINE

## 2019-12-27 NOTE — PROGRESS NOTES
"Stefanie Velazquez is a 74 year old female who is being evaluated via a billable telephone visit.      The patient has been notified of following:     \"This telephone visit will be conducted via a call between you and your physician/provider. We have found that certain health care needs can be provided without the need for a physical exam.  This service lets us provide the care you need with a short phone conversation.  If a prescription is necessary we can send it directly to your pharmacy.  If lab work is needed we can place an order for that and you can then stop by our lab to have the test done at a later time.    If during the course of the call the physician/provider feels a telephone visit is not appropriate, you will not be charged for this service.\"     Consent has been obtained for this service by 1 care team member: yes. See the scanned image in the medical record.    Stefanie Velazquez complains of  No chief complaint on file.      I have reviewed and updated the patient's Past Medical History, Social History, Family History and Medication List.    ALLERGIES  Hydrocodone; Lisinopril; and Vicodin [hydrocodone-acetaminophen]    Lisa Howard MA on 12/27/2019 at 1:46 PM      Additional provider notes: Called and reviewed HST results with group home staff member.  HST performed 11/11/2019 with weight 201 lbs.  Analysis time 540.2 minutes.  AHI 6.8.  Baseline SpO2 91.1%, <= 88% for 43 minutes.    Assessment/Plan:  1.)  Mild RC with sleep-associated hypoxemia   - Group home staff with share results with Concha and her family.  Based on our visit, I feel she would likely struggle with CPAP, but can consider.    I have reviewed the note as documented above.  This accurately captures the substance of my conversation with the patient.  Group home staff    Total time of call between patient and provider was 10 minutes     Pasha Velez MD, MD      "

## 2019-12-31 ENCOUNTER — HOSPITAL ENCOUNTER (EMERGENCY)
Facility: CLINIC | Age: 74
Discharge: HOME | End: 2019-12-31
Attending: PHYSICIAN ASSISTANT | Admitting: PHYSICIAN ASSISTANT
Payer: COMMERCIAL

## 2019-12-31 ENCOUNTER — APPOINTMENT (OUTPATIENT)
Dept: GENERAL RADIOLOGY | Facility: CLINIC | Age: 74
End: 2019-12-31
Attending: PHYSICIAN ASSISTANT
Payer: COMMERCIAL

## 2019-12-31 ENCOUNTER — APPOINTMENT (OUTPATIENT)
Dept: ULTRASOUND IMAGING | Facility: CLINIC | Age: 74
End: 2019-12-31
Attending: PHYSICIAN ASSISTANT
Payer: COMMERCIAL

## 2019-12-31 VITALS
OXYGEN SATURATION: 93 % | RESPIRATION RATE: 18 BRPM | HEART RATE: 72 BPM | SYSTOLIC BLOOD PRESSURE: 150 MMHG | TEMPERATURE: 97.9 F | DIASTOLIC BLOOD PRESSURE: 72 MMHG

## 2019-12-31 DIAGNOSIS — G25.81 RESTLESS LEG SYNDROME: ICD-10-CM

## 2019-12-31 DIAGNOSIS — R82.90 ABNORMAL FINDING ON URINALYSIS: ICD-10-CM

## 2019-12-31 DIAGNOSIS — K92.1 BLACK STOOL: ICD-10-CM

## 2019-12-31 DIAGNOSIS — M79.652 PAIN OF LEFT THIGH: ICD-10-CM

## 2019-12-31 LAB
ALBUMIN SERPL-MCNC: 2.7 G/DL (ref 3.4–5)
ALBUMIN UR-MCNC: NEGATIVE MG/DL
ALP SERPL-CCNC: 108 U/L (ref 40–150)
ALT SERPL W P-5'-P-CCNC: 20 U/L (ref 0–50)
ANION GAP SERPL CALCULATED.3IONS-SCNC: 9 MMOL/L (ref 3–14)
APPEARANCE UR: CLEAR
AST SERPL W P-5'-P-CCNC: 29 U/L (ref 0–45)
BACTERIA #/AREA URNS HPF: ABNORMAL /HPF
BASOPHILS # BLD AUTO: 0.1 10E9/L (ref 0–0.2)
BASOPHILS NFR BLD AUTO: 1.4 %
BILIRUB SERPL-MCNC: 0.4 MG/DL (ref 0.2–1.3)
BILIRUB UR QL STRIP: NEGATIVE
BUN SERPL-MCNC: 22 MG/DL (ref 7–30)
CALCIUM SERPL-MCNC: 8.8 MG/DL (ref 8.5–10.1)
CHLORIDE SERPL-SCNC: 112 MMOL/L (ref 94–109)
CO2 SERPL-SCNC: 23 MMOL/L (ref 20–32)
COLOR UR AUTO: ABNORMAL
CREAT SERPL-MCNC: 1.39 MG/DL (ref 0.52–1.04)
DIFFERENTIAL METHOD BLD: ABNORMAL
EOSINOPHIL # BLD AUTO: 0.2 10E9/L (ref 0–0.7)
EOSINOPHIL NFR BLD AUTO: 5.3 %
ERYTHROCYTE [DISTWIDTH] IN BLOOD BY AUTOMATED COUNT: 19.9 % (ref 10–15)
GFR SERPL CREATININE-BSD FRML MDRD: 37 ML/MIN/{1.73_M2}
GLUCOSE SERPL-MCNC: 217 MG/DL (ref 70–99)
GLUCOSE UR STRIP-MCNC: NEGATIVE MG/DL
HCT VFR BLD AUTO: 34.9 % (ref 35–47)
HEMOCCULT STL QL: NORMAL
HGB BLD-MCNC: 10.8 G/DL (ref 11.7–15.7)
HGB UR QL STRIP: NEGATIVE
IMM GRANULOCYTES # BLD: 0 10E9/L (ref 0–0.4)
IMM GRANULOCYTES NFR BLD: 0.3 %
INTERNAL QC OK POCT: YES
KETONES UR STRIP-MCNC: NEGATIVE MG/DL
LEUKOCYTE ESTERASE UR QL STRIP: ABNORMAL
LIPASE SERPL-CCNC: 281 U/L (ref 73–393)
LYMPHOCYTES # BLD AUTO: 1 10E9/L (ref 0.8–5.3)
LYMPHOCYTES NFR BLD AUTO: 27.5 %
MCH RBC QN AUTO: 26.5 PG (ref 26.5–33)
MCHC RBC AUTO-ENTMCNC: 30.9 G/DL (ref 31.5–36.5)
MCV RBC AUTO: 86 FL (ref 78–100)
MONOCYTES # BLD AUTO: 0.5 10E9/L (ref 0–1.3)
MONOCYTES NFR BLD AUTO: 14.4 %
NEUTROPHILS # BLD AUTO: 1.8 10E9/L (ref 1.6–8.3)
NEUTROPHILS NFR BLD AUTO: 51.1 %
NITRATE UR QL: NEGATIVE
NRBC # BLD AUTO: 0 10*3/UL
NRBC BLD AUTO-RTO: 0 /100
PH UR STRIP: 7 PH (ref 5–7)
PLATELET # BLD AUTO: 205 10E9/L (ref 150–450)
POTASSIUM SERPL-SCNC: 4 MMOL/L (ref 3.4–5.3)
PROT SERPL-MCNC: 6.4 G/DL (ref 6.8–8.8)
RBC # BLD AUTO: 4.07 10E12/L (ref 3.8–5.2)
RBC #/AREA URNS AUTO: 1 /HPF (ref 0–2)
SODIUM SERPL-SCNC: 144 MMOL/L (ref 133–144)
SOURCE: ABNORMAL
SP GR UR STRIP: 1.01 (ref 1–1.03)
TEST CARD LOT NUMBER: NORMAL
UROBILINOGEN UR STRIP-MCNC: 0 MG/DL (ref 0–2)
WBC # BLD AUTO: 3.6 10E9/L (ref 4–11)
WBC #/AREA URNS AUTO: 11 /HPF (ref 0–5)

## 2019-12-31 PROCEDURE — 99285 EMERGENCY DEPT VISIT HI MDM: CPT | Mod: Z6 | Performed by: PHYSICIAN ASSISTANT

## 2019-12-31 PROCEDURE — 73552 X-RAY EXAM OF FEMUR 2/>: CPT | Mod: LT

## 2019-12-31 PROCEDURE — 87088 URINE BACTERIA CULTURE: CPT | Performed by: PHYSICIAN ASSISTANT

## 2019-12-31 PROCEDURE — 87186 SC STD MICRODIL/AGAR DIL: CPT | Performed by: PHYSICIAN ASSISTANT

## 2019-12-31 PROCEDURE — 73502 X-RAY EXAM HIP UNI 2-3 VIEWS: CPT

## 2019-12-31 PROCEDURE — 96374 THER/PROPH/DIAG INJ IV PUSH: CPT | Performed by: PHYSICIAN ASSISTANT

## 2019-12-31 PROCEDURE — 81001 URINALYSIS AUTO W/SCOPE: CPT | Performed by: PHYSICIAN ASSISTANT

## 2019-12-31 PROCEDURE — 82272 OCCULT BLD FECES 1-3 TESTS: CPT | Performed by: PHYSICIAN ASSISTANT

## 2019-12-31 PROCEDURE — 99285 EMERGENCY DEPT VISIT HI MDM: CPT | Mod: 25 | Performed by: PHYSICIAN ASSISTANT

## 2019-12-31 PROCEDURE — 25000128 H RX IP 250 OP 636: Performed by: PHYSICIAN ASSISTANT

## 2019-12-31 PROCEDURE — 83690 ASSAY OF LIPASE: CPT | Performed by: PHYSICIAN ASSISTANT

## 2019-12-31 PROCEDURE — 72100 X-RAY EXAM L-S SPINE 2/3 VWS: CPT

## 2019-12-31 PROCEDURE — 87086 URINE CULTURE/COLONY COUNT: CPT | Performed by: PHYSICIAN ASSISTANT

## 2019-12-31 PROCEDURE — 85025 COMPLETE CBC W/AUTO DIFF WBC: CPT | Performed by: PHYSICIAN ASSISTANT

## 2019-12-31 PROCEDURE — 80053 COMPREHEN METABOLIC PANEL: CPT | Performed by: PHYSICIAN ASSISTANT

## 2019-12-31 PROCEDURE — 93971 EXTREMITY STUDY: CPT | Mod: LT

## 2019-12-31 RX ORDER — METOPROLOL SUCCINATE 25 MG/1
12.5 TABLET, EXTENDED RELEASE ORAL DAILY
Status: ON HOLD | COMMUNITY
Start: 2019-11-20 | End: 2020-04-13

## 2019-12-31 RX ORDER — APIXABAN 5 MG/1
1 TABLET, FILM COATED ORAL 2 TIMES DAILY
COMMUNITY
Start: 2019-12-19 | End: 2020-11-16

## 2019-12-31 RX ORDER — CEFDINIR 300 MG/1
300 CAPSULE ORAL 2 TIMES DAILY
Qty: 14 CAPSULE | Refills: 0 | Status: SHIPPED | OUTPATIENT
Start: 2019-12-31 | End: 2019-12-31

## 2019-12-31 RX ORDER — HYDROMORPHONE HYDROCHLORIDE 1 MG/ML
0.2 INJECTION, SOLUTION INTRAMUSCULAR; INTRAVENOUS; SUBCUTANEOUS ONCE
Status: COMPLETED | OUTPATIENT
Start: 2019-12-31 | End: 2019-12-31

## 2019-12-31 RX ORDER — CEFDINIR 300 MG/1
300 CAPSULE ORAL 2 TIMES DAILY
Qty: 14 CAPSULE | Refills: 0 | Status: SHIPPED | OUTPATIENT
Start: 2019-12-31 | End: 2020-03-16

## 2019-12-31 RX ORDER — PRAMIPEXOLE DIHYDROCHLORIDE 0.5 MG/1
1 TABLET ORAL AT BEDTIME
Status: ON HOLD | COMMUNITY
Start: 2019-12-19 | End: 2020-06-06

## 2019-12-31 RX ORDER — PRAMIPEXOLE DIHYDROCHLORIDE 0.5 MG/1
0.5 TABLET ORAL
Status: ON HOLD | COMMUNITY
End: 2020-06-06

## 2019-12-31 RX ADMIN — HYDROMORPHONE HYDROCHLORIDE 0.2 MG: 1 INJECTION, SOLUTION INTRAMUSCULAR; INTRAVENOUS; SUBCUTANEOUS at 11:07

## 2019-12-31 ASSESSMENT — ENCOUNTER SYMPTOMS
NAUSEA: 0
EYES NEGATIVE: 1
LIGHT-HEADEDNESS: 0
NUMBNESS: 0
WEAKNESS: 0
RESPIRATORY NEGATIVE: 1
FEVER: 0
FATIGUE: 1
CARDIOVASCULAR NEGATIVE: 1
ABDOMINAL PAIN: 0
DIZZINESS: 0
VOMITING: 0
HEADACHES: 0
DIARRHEA: 0
NECK PAIN: 0
BACK PAIN: 0
WOUND: 0

## 2019-12-31 NOTE — ED PROVIDER NOTES
History     Chief Complaint   Patient presents with     Leg Pain     left leg pain since last pm-hip to knee-no known injury     HPI  Stefanie Velazquez is a 74 year old female with history of deep vein thrombosis, syncope, chronic kidney disease stage IV, lumbar radiculopathy, oropharyngeal dysphasia, bilateral hearing loss, lymphedema of the genitalia, spinal stenosis of lumbar region, degenerative disc disease, symptomatic bradycardia, type 2 diabetes mellitus, restless leg syndrome, chronic diarrhea, benign hypertension, bowel bladder incontinence, anemia, rectal cancer, hyperlipidemia, neuropathy, and osteoarthritis and chronic ischemic heart disease. Patient presents via ambulance from her assisted living facility for left leg pain. Patient states the pain is from her left hip to the knee. Patient states she fell about 6-8 weeks ago, but denies recent fall or injury. Patient states she normally transfers from her bed to a wheelchair and moves around the assisted living facility in a wheelchair.  Patient does this by herself without issues, but over the past day or 2 has needed more help per patient. Patient states she her left leg pain is constant. Patient denies calf swelling, redness, warmth, swelling, abdominal pain, fevers, chest pain, shortness of breath, headache, dizziness, weakness, rash, nausea/vomiting, diarrhea, constipation, joint swelling/erythema.  Throughout history patient was able to answer questions, but appeared slightly confused at times and occasionally would go off on tangents regarding her counted.  Patient's niece ended up arriving few minutes after history and physical was obtained and states that this sometimes is patient's baseline cognition and patient does have a history of hearing loss which can cause her to be slightly more confused.  Patient states she was informed today that she has a intertrigo yeast infection in her groin region that she gets frequently because of her  incontinence and wearing depends.  Staff is aware and was putting powder on her groin region for fungal infection.  Patient also states that she took Tylenol in the middle of the night for her leg pain, but is unsure of when her last dose was.  After further discussion with niece I was able to get permission to contact the assisted living facility.  I spoke to Mell at the assisted living who states patient was given her regular morning meds except she refused the Tylenol, restless leg medication, and diclofenac.  Her last dose of Tylenol was around 1 AM this morning for her leg pain.  Daughter also states that she has not been sleeping well for the past 2 nights and a little more teary yesterday.  She does not think the patient has had any more confusion over the past day or 2, but was not at the facility this morning to see her in person.  She also states the patient has not had any significant falls or injury recently and no recent illness.    Allergies:  Allergies   Allergen Reactions     Hydrocodone Other (See Comments)     dizzy     Lisinopril Cough            Vicodin [Hydrocodone-Acetaminophen] Other (See Comments)     Caused extreme dizziness       Problem List:    Patient Active Problem List    Diagnosis Date Noted     H/O deep venous thrombosis 05/21/2019     Priority: Medium     Syncope 05/21/2019     Priority: Medium     Syncope and collapse 05/21/2019     Priority: Medium     Impacted cerumen of right ear 03/31/2019     Priority: Medium     CKD (chronic kidney disease) stage 4, GFR 15-29 ml/min (H) 02/03/2019     Priority: Medium     Lumbar radiculopathy 01/31/2019     Priority: Medium     Oropharyngeal dysphagia 12/14/2018     Priority: Medium     Bilateral hearing loss, unspecified hearing loss type 12/14/2018     Priority: Medium     Lymphedema of genitalia 08/12/2018     Priority: Medium     Spinal stenosis of lumbar region without neurogenic claudication 05/01/2018     Priority: Medium     DDD  (degenerative disc disease), lumbar 2018     Priority: Medium     Symptomatic bradycardia 10/29/2017     Priority: Medium     Type 2 diabetes mellitus with diabetic nephropathy, with long-term current use of insulin (H) 2017     Priority: Medium     Restless leg syndrome 2017     Priority: Medium     Chronic diarrhea 2017     Priority: Medium     Health Care Home 10/28/2016     Priority: Medium     [facility] Care Coordination  JUNE Vega  Phone: [phone]    [facility] CARE PLAN SUMMARY    Member Name:  Stefanie Velazquez        *Assisted Living*  Address:   [facility]    [address] [place], [place] [address] Phone: [phone] (Home)    :  1945 Date of Assessment:  Due by 3/31/2020 d/t Opening of EW on 2019   Health Plan:  [facility] [employer]  Health Plan Number: [id number] Medical Assistance Number: [id number]  Financial Worker:    Case #:     [facility] Care Coordinator:    JUNE Vega CC Phone: - [phone]  CC Fax:  [phone]   FVP Enrollment Date: 2019 Case Mix:  A-  Rate Cell:  B   Waiver Type:  EW   Primary Emergency Contact: Dori Pena (niece)  Address: [address] [place] [address]  Mobile Phone: [phone]  Relation: Relative  Secondary Emergency Contact: Lori Pope (niece)           [place], [place]   Home Phone: [phone]  Mobile Phone: [phone]  Relation: Relative Language:  English  :  No   Health Care Agent/POA:  Yes Advanced Directives/Living Will:  Yes   Primary Care Clinic/Phone/Fax:  [facility]/(p) [phone], (f) [phone] Primary Dx:   R69  Secondary Dx:     Primary Physician:  Sandra Medina   Height:  5' 0''  Weight:  204 lbs   Specialty Physician:    Audiologist:     Eye Care Provider:   Dental Care Provider:    [facility]: [facility] [phone] or [phone]   Other:        [facility] CURRENT SERVICES  SUMMARY  Equipment owned/DME history: [employer]  SERVICE TYPE/PROVIDER NAME/PHONE AUTH DATE FREQUENCY Units OR $ Amt DESCRIPTION   Medical Transportation: [employer] [facility] [phone]  Fax:  9/1/2019 - 3/31/2020 As needed Varies    [facility]  Phone: [phone]    Fax: 9/1/2019- 3/31/2019 Monthly Per [employer]      * For ADC please select ADC provider and [facility] Transportation in order to process auth               Benign essential hypertension 09/30/2016     Priority: Medium     Bowel and bladder incontinence 05/22/2015     Priority: Medium     Dysuria 10/24/2013     Priority: Medium     Vitamin B 12 deficiency 05/14/2012     Priority: Medium     Vitamin D deficiency 05/14/2012     Priority: Medium     Radiation therapy complication 11/09/2011     Priority: Medium     Anemia 08/26/2011     Priority: Medium     Rectal cancer- newly diagnosed adenoCA rectum Jan 2011 and Positive Tubular Adenoma 2019 02/17/2011     Priority: Medium     Hyperlipidemia LDL goal <100 10/31/2010     Priority: Medium     Neuropathy (H) 04/09/2010     Priority: Medium     RC-moderate (AHI 12, LSat 60%); REM RDI-73 06/01/2009     Priority: Medium     HST performed 11/11/2019 with weight 201 lbs.  Analysis time 540.2 minutes.  AHI 6.8.  Baseline SpO2 91.1%, <= 88% for 43 minutes.    Sleep study [facility] was performed 5/26/09 in order to re-evaluate severity of CR. The total sleep time was 412.0 minutes. The sleep latency was decreased at 8.7 minutes with Ambien 10mg. The REM sleep latency was 426.0 minutes. Sleep efficiency was reduced at 79.0%. The sleep architecture was disrupted with frequent sleep stage changes and arousals.  Snoring:  loud. Respiratory Events:   RDI of 57.5 and an AHI of 12.2. The REM RDI was 74.3 The lowest O2 saturation was 60.0%. This study is suggestive of moderate sleep apnea, profound desaturations during REM sleep, with 35 second apneas.    Other: PLM index was 13.8.      Recommendations:  Due to the  profound desaturations during REM sleep, consider CPAP titration study to establish optimal CPAP treatment pressure.  2. Check ferritin given her RLS symptoms. If RLS symptoms persist after treatment of RC, further therapy may be warranted. Replace iron as indicated by ferritin.         Osteoporosis 02/29/2008     Priority: Medium     Problem list name updated by automated process. Provider to review       Esophageal reflux 10/13/2007     Priority: Medium     On protonix;        bmi 40 06/22/2005     Priority: Medium     Problem list name updated by automated process. Provider to review       Generalized osteoarthrosis, unspecified site 06/22/2005     Priority: Medium     HEARING LOSS CONDUCTIVE, COMBINED TYPE 06/22/2005     Priority: Medium     Georgian measles as child       Hypothyroidism 06/22/2003     Priority: Medium     Problem list name updated by automated process. Provider to review       Chronic ischemic heart disease 06/22/2003     Priority: Medium     Class: Chronic     cabgx3 09/2002,  with a left internal mammary (LIMA) to the left anterior descending and a bypass graft to the obtuse marginal branch of the circumflex and also PDA branch of the right coronary artery. She had an episode of atrial fibrillation postoperatively and was treated with sotalol.   PTCA and stent placement for recurrent restenosis.   2/05 adenosine ef70%  9/24/12 - Lexiscan nuclear stress test was positive for mild partially reversible ischemia in the LAD distribution. Imdur added.              Past Medical History:    Past Medical History:   Diagnosis Date     Acute deep vein thrombosis (DVT) of right lower extremity, unspecified vein (H) 10/31/2018     Acute myocardial infarction of other specified sites, episode of care unspecified 6/2002     Cancer of colon (H)      Cellulitis of lower extremity, bilateral 9/30/2016     Chronic ischemic heart disease, unspecified 6/22/2003     Coronary atherosclerosis of unspecified type of  vessel, native or graft      Diabetes mellitus (H)      Esophageal reflux      Fracture of femur, distal, left, closed (H) 2/11/2013     Gastro-oesophageal reflux disease      Generalized osteoarthrosis, unspecified site 6/22/2005     Generalized osteoarthrosis, unspecified site 6/22/2005     HEARING LOSS CONDUCTIVE, COMBINED TYPE 6/22/2005     HYPOTHYROIDISM  6/22/2003     Mixed hyperlipidemia 6/22/2005     Obesity, unspecified 6/22/2005     RC-moderate (AHI 12, LSat 60%); REM RDI-73 6/1/2009     Recurrent acute deep vein thrombosis (DVT) of both lower extremities (H) 3/31/2019     Stented coronary artery      Type II or unspecified type diabetes mellitus with renal manifestations, uncontrolled(250.42) (H) 6/22/2005     Type II or unspecified type diabetes mellitus with renal manifestations, uncontrolled(250.42) (H) 6/22/2005     Unspecified disorder resulting from impaired renal function 6/22/2005     Unspecified essential hypertension        Past Surgical History:    Past Surgical History:   Procedure Laterality Date     cabg       COLECTOMY LOW ANTERIOR  6/21/2011    Procedure:COLECTOMY LOW ANTERIOR; with loop ielostomy; Surgeon:ROBBIN MCDONNELL; Location: OR     COLONOSCOPY  1/26/2011    COLONOSCOPY performed by MASOUD BILL at [facility]     COLONOSCOPY N/A 3/1/2016    Procedure: COLONOSCOPY;  Surgeon: Liza Neal MD;  Location: [facility]     INSERT PORT VASCULAR ACCESS  3/2/2011    INSERT PORT VASCULAR ACCESS performed by LIZA NEAL at [facility]     OPEN REDUCTION INTERNAL FIXATION FEMUR DISTAL  2/12/2013    Procedure: OPEN REDUCTION INTERNAL FIXATION FEMUR DISTAL;  Open reduction internal fixation left femur fracture--[facility];  Surgeon: Ley, Jeffrey Duane, MD;  Location: [facility]     ORTHOPEDIC SURGERY       PHACOEMULSIFICATION WITH STANDARD INTRAOCULAR LENS IMPLANT Left 1/22/2018    Procedure: PHACOEMULSIFICATION WITH STANDARD INTRAOCULAR LENS IMPLANT;  Left cataract removal  with implant;  Surgeon: Rony Key MD;  Location: [facility]     PHACOEMULSIFICATION WITH STANDARD INTRAOCULAR LENS IMPLANT Right 2/19/2018    Procedure: PHACOEMULSIFICATION WITH STANDARD INTRAOCULAR LENS IMPLANT;  Right cataract removal with implant;  Surgeon: Rony Key MD;  Location: [facility]     SIGMOIDOSCOPY FLEXIBLE  9/9/2011    Procedure:SIGMOIDOSCOPY FLEXIBLE; Performed prior to induction.; Surgeon:ROBBIN MCDONNELL; Location:[facility]     SURGICAL HISTORY OF -   10/24/2002    Heart bypass     SURGICAL HISTORY OF -   2002    R Knee arthroplasty     SURGICAL HISTORY OF -   2002    Mi 2 stints     TAKEDOWN ILEOSTOMY  9/9/2011    Procedure:TAKEDOWN ILEOSTOMY; Exploratory Laparotomy,  Loop Ileostomy Takedown, Small Bowel Resection x 2.; Surgeon:ROBBIN MCDONNELL; Location:[facility] OR       Family History:    Family History   Problem Relation Age of Onset     Diabetes Sister      C.A.D. Sister      Breast Cancer Sister        Social History:  Marital Status:  Single [1]  Social History     Tobacco Use     Smoking status: Former Smoker     Smokeless tobacco: Never Used   Substance Use Topics     Alcohol use: Yes     Comment: rare social use     Drug use: No        Medications:    acetaminophen (TYLENOL) 650 MG CR tablet  blood glucose monitoring (ACCU-CHEK MILVIA) test strip  cefdinir (OMNICEF) 300 MG capsule  diclofenac (VOLTAREN) 50 MG EC tablet  OMEPRAZOLE PO  ACCU-CHEK MILVIA MELODY  blood glucose monitoring (ACCU-CHEK MULTICLIX) lancets  BUTT PASTE - REGULAR (DR LOVE POOP GOOP BUTT PASTE FORMULA)  cholecalciferol 1000 units TABS  Continuous Blood Gluc  (FREESTYLE JAJA 14 DAY READER) MELODY  Continuous Blood Gluc Sensor (FREESTYLE JAJA 14 DAY SENSOR) MISC  ferrous sulfate (FEROSUL) 325 (65 Fe) MG tablet  fluocinonide (LIDEX) 0.05 % ointment  fluticasone (FLONASE) 50 MCG/ACT spray  furosemide (LASIX) 20 MG tablet  hydrocortisone (ANUSOL-HC) 2.5 % cream  insulin aspart (NOVOLOG FLEXPEN) 100 UNIT/ML  pen  insulin aspart (NOVOLOG FLEXPEN) 100 UNIT/ML pen  insulin glargine (LANTUS SOLOSTAR PEN) 100 UNIT/ML pen  insulin pen needle (BD GABRIELLA U/F) 32G X 4 MM  irbesartan (AVAPRO) 75 MG tablet  levothyroxine (SYNTHROID/LEVOTHROID) 88 MCG tablet  levothyroxine (SYNTHROID/LEVOTHROID) 88 MCG tablet  loperamide (IMODIUM) 2 MG capsule  magnesium 250 MG tablet  menthol-zinc oxide (CALMOSEPTINE) 0.44-20.625 % OINT ointment  omeprazole (PRILOSEC) 20 MG DR capsule  order for DME  order for DME  order for DME  order for DME  order for DME  order for DME  potassium chloride ER (K-DUR/KLOR-CON M) 10 MEQ CR tablet  pramipexole (MIRAPEX) 0.25 MG tablet  simvastatin (ZOCOR) 40 MG tablet          Review of Systems   Constitutional: Positive for fatigue. Negative for fever.   HENT: Negative.    Eyes: Negative.    Respiratory: Negative.    Cardiovascular: Negative.    Gastrointestinal: Negative for abdominal pain, diarrhea, nausea and vomiting.   Genitourinary: Negative.    Musculoskeletal: Negative for back pain and neck pain.        Left leg pain from hip to knee   Skin: Positive for rash (intertrigous rash to the groin. ). Negative for wound.   Neurological: Negative for dizziness, weakness, light-headedness, numbness and headaches.   All other systems reviewed and are negative.      Physical Exam   BP: (!) 155/81  Pulse: 73  Heart Rate: 73  Temp: 97.9  F (36.6  C)  Resp: 18  SpO2: 96 %      Physical Exam  Vitals signs and nursing note reviewed. Exam conducted with a chaperone present.   Constitutional:       General: She is awake.      Appearance: She is obese. She is not ill-appearing, toxic-appearing or diaphoretic.      Comments: Patient appears confused on and off throughout history and physical. She is very uncomfortable in exam room with persistent complaints of left leg pain. Patient appears restless.    HENT:      Head: Normocephalic and atraumatic.      Jaw: No trismus or pain on movement.      Nose: Nose normal.       Mouth/Throat:      Lips: Pink.      Mouth: Mucous membranes are moist. No angioedema.      Comments: Positive enlarged tongue which daughter states is normal for her.   Eyes:      Extraocular Movements: Extraocular movements intact.      Pupils: Pupils are equal, round, and reactive to light.   Cardiovascular:      Rate and Rhythm: Normal rate and regular rhythm.      Heart sounds: Normal heart sounds. No murmur.   Pulmonary:      Effort: Pulmonary effort is normal.      Breath sounds: Normal breath sounds.   Abdominal:      General: Abdomen is flat. Bowel sounds are normal.      Palpations: Abdomen is soft. There is no mass.      Tenderness: There is no abdominal tenderness. There is no right CVA tenderness, left CVA tenderness, guarding or rebound.   Genitourinary:     Rectum: Normal. Guaiac result negative. No mass, tenderness or external hemorrhoid. Normal anal tone.   Musculoskeletal:      Left hip: She exhibits tenderness (to the upper lower leg from the lateral hip through the femur to the knee. ). She exhibits normal strength, no swelling, no crepitus, no deformity and no laceration.      Left knee: She exhibits bony tenderness (to entire knee and femur. ). She exhibits normal range of motion, no swelling, no effusion, no ecchymosis, no deformity, no laceration, no erythema, normal alignment, no LCL laxity, normal patellar mobility, normal meniscus and no MCL laxity.      Lumbar back: She exhibits tenderness (over left SI joint ). She exhibits normal range of motion, no swelling, no edema, no deformity, no laceration, no pain, no spasm and normal pulse.      Left upper leg: She exhibits tenderness. She exhibits no swelling, no edema, no deformity and no laceration.      Left lower leg: Normal.        Legs:       Left foot: Normal.      Comments: Patient with full range of motion in the left hip and knee with pain.    Skin:     General: Skin is warm.      Capillary Refill: Capillary refill takes less than 2  seconds.      Findings: Rash (to right groin typical for intertrigious rash. ) present. No abrasion, bruising, ecchymosis, erythema or petechiae.   Neurological:      Mental Status: She is alert.      GCS: GCS eye subscore is 4. GCS verbal subscore is 5. GCS motor subscore is 6.      Sensory: Sensation is intact.      Motor: Motor function is intact. No weakness.      Deep Tendon Reflexes:      Reflex Scores:       Patellar reflexes are 2+ on the left side.       Achilles reflexes are 2+ on the left side.  Psychiatric:         Attention and Perception: Attention normal.         Mood and Affect: Mood and affect normal.         Speech: Speech normal.         Behavior: Behavior normal. Behavior is cooperative.         Thought Content: Thought content normal.         Judgment: Judgment normal.      Comments: Patient difficult to assess if this is her normal baseline mentation or if there is more confusion due to patient occasionally changing the topic of conversation and talking about her . Patient's Niece states that she occasionally has some confusion due to the fact that she is hard of hearing, but niece is unsure whether this is her normal baseline or slightly worsening confusion.          ED Course        Procedures              Critical Care time:  none               Results for orders placed or performed during the hospital encounter of 12/31/19 (from the past 24 hour(s))   Occult blood stool POCT   Result Value Ref Range    Occult Blood neg neg    Internal QC OK Yes     Test Card Lot Number [id number]    Lumbar spine XR, 2-3 views    Narrative    LUMBAR SPINE THREE VIEWS  12/31/2019 11:41 AM     HISTORY: Left lower back and leg pain with fall 6-8 weeks ago per  patient. Rule out fracture.    COMPARISON: 4/20/2018      Impression    IMPRESSION: Multilevel degenerative disc disease and degenerative  facet arthropathy. Vacuum phenomenon at multiple levels. No  significant change.    DEBORAH WISEMAN MD   Pelvis  XR w/ unilateral hip left    Narrative    PELVIS AND LEFT HIP ONE VIEW;   LEFT FEMUR TWO VIEWS  12/31/2019 11:42 AM     HISTORY: Left upper leg pain from knee to hip; rule out fracture.    COMPARISON: Pelvis x-ray from 2/24/2019.      Impression    IMPRESSION: There is an intramedullary aron in place in the left femur  extending from the greater trochanter to the femoral condylar region.  There is a healed obliquely-oriented fracture of the distal femoral  metadiaphyseal region. There are three cross fixation screws distally  and one proximally. Moderate to advanced bilateral hip degenerative  changes. No evidence of acute fracture. Advanced left knee  degenerative changes.    DAJUAN PRATER MD   XR Femur Left 2 Views    Narrative    PELVIS AND LEFT HIP ONE VIEW;   LEFT FEMUR TWO VIEWS  12/31/2019 11:42 AM     HISTORY: Left upper leg pain from knee to hip; rule out fracture.    COMPARISON: Pelvis x-ray from 2/24/2019.      Impression    IMPRESSION: There is an intramedullary aron in place in the left femur  extending from the greater trochanter to the femoral condylar region.  There is a healed obliquely-oriented fracture of the distal femoral  metadiaphyseal region. There are three cross fixation screws distally  and one proximally. Moderate to advanced bilateral hip degenerative  changes. No evidence of acute fracture. Advanced left knee  degenerative changes.    DAJUAN PRATER MD   US Lower Extremity Venous Duplex Left    Narrative    VENOUS ULTRASOUND LEFT LOWER EXTREMITY  12/31/2019 12:12 PM     HISTORY: History of DVT in past with pain from knee to hip that  started last night. Rule out DVT.    COMPARISON: May 21, 2019    TECHNIQUE: Color Doppler and spectral waveform analysis performed  throughout the deep veins of the left lower extremity.    FINDINGS: The left common femoral, proximal greater saphenous,  femoral, and popliteal veins demonstrate normal blood flow,  compression, and augmentation. Posterior tibial  and peroneal veins are  compressible. Contralateral right common femoral vein is patent.      Impression    IMPRESSION: Negative for deep venous thrombosis in the left lower  extremity.    SAPNA ANDERSON MD   CBC with platelets differential   Result Value Ref Range    WBC 3.6 (L) 4.0 - 11.0 10e9/L    RBC Count 4.07 3.8 - 5.2 10e12/L    Hemoglobin 10.8 (L) 11.7 - 15.7 g/dL    Hematocrit 34.9 (L) 35.0 - 47.0 %    MCV 86 78 - 100 fl    MCH 26.5 26.5 - 33.0 pg    MCHC 30.9 (L) 31.5 - 36.5 g/dL    RDW 19.9 (H) 10.0 - 15.0 %    Platelet Count 205 150 - 450 10e9/L    Diff Method Automated Method     % Neutrophils 51.1 %    % Lymphocytes 27.5 %    % Monocytes 14.4 %    % Eosinophils 5.3 %    % Basophils 1.4 %    % Immature Granulocytes 0.3 %    Nucleated RBCs 0 0 /100    Absolute Neutrophil 1.8 1.6 - 8.3 10e9/L    Absolute Lymphocytes 1.0 0.8 - 5.3 10e9/L    Absolute Monocytes 0.5 0.0 - 1.3 10e9/L    Absolute Eosinophils 0.2 0.0 - 0.7 10e9/L    Absolute Basophils 0.1 0.0 - 0.2 10e9/L    Abs Immature Granulocytes 0.0 0 - 0.4 10e9/L    Absolute Nucleated RBC 0.0    Comprehensive metabolic panel   Result Value Ref Range    Sodium 144 133 - 144 mmol/L    Potassium 4.0 3.4 - 5.3 mmol/L    Chloride 112 (H) 94 - 109 mmol/L    Carbon Dioxide 23 20 - 32 mmol/L    Anion Gap 9 3 - 14 mmol/L    Glucose 217 (H) 70 - 99 mg/dL    Urea Nitrogen 22 7 - 30 mg/dL    Creatinine 1.39 (H) 0.52 - 1.04 mg/dL    GFR Estimate 37 (L) >60 mL/min/[1.73_m2]    GFR Estimate If Black 42 (L) >60 mL/min/[1.73_m2]    Calcium 8.8 8.5 - 10.1 mg/dL    Bilirubin Total 0.4 0.2 - 1.3 mg/dL    Albumin 2.7 (L) 3.4 - 5.0 g/dL    Protein Total 6.4 (L) 6.8 - 8.8 g/dL    Alkaline Phosphatase 108 40 - 150 U/L    ALT 20 0 - 50 U/L    AST 29 0 - 45 U/L   Lipase   Result Value Ref Range    Lipase 281 73 - 393 U/L   UA with Microscopic   Result Value Ref Range    Color Urine Straw     Appearance Urine Clear     Glucose Urine Negative NEG^Negative mg/dL    Bilirubin Urine  Negative NEG^Negative    Ketones Urine Negative NEG^Negative mg/dL    Specific Gravity Urine 1.006 1.003 - 1.035    Blood Urine Negative NEG^Negative    pH Urine 7.0 5.0 - 7.0 pH    Protein Albumin Urine Negative NEG^Negative mg/dL    Urobilinogen mg/dL 0.0 0.0 - 2.0 mg/dL    Nitrite Urine Negative NEG^Negative    Leukocyte Esterase Urine Trace (A) NEG^Negative    Source Catheterized Urine     WBC Urine 11 (H) 0 - 5 /HPF    RBC Urine 1 0 - 2 /HPF    Bacteria Urine Few (A) NEG^Negative /HPF     *Note: Due to a large number of results and/or encounters for the requested time period, some results have not been displayed. A complete set of results can be found in Results Review.       Medications   HYDROmorphone (PF) (DILAUDID) injection 0.2 mg (0.2 mg Intravenous Given 12/31/19 1107)   patient states improved pain post dilaudid with complete resolution of left leg pain as day goes on.  Patient crossing her legs comfortably in exam room and not complaining of any short of left leg pain.    Patient had x-ray to the lumbar spine, left pelvic and hip and left femur with intramedullary aron in place of the left femur extending from the greater trochanter to the femoral condylar region.  There is a healing obliquely oriented fracture of the distal femoral metadiaphyseal region.  There are 3 cross fixation screws distally and 1 proximally.  Moderate advanced bilateral hip degenerative changes.  No evidence of acute fracture.  Advanced left knee degenerative changes.  Lumbar x-ray shows multilevel degenerative disc disease and degenerative facet arthropathy.  Vacuum phenomenon at multiple levels.  No significant changes.  Venous Doppler to the left lower extremity obtained per concerns from his niece and assisted living due to patient having history of DVTs in the past.  This was negative for deep vein thrombosis.  UA also obtained in office today which shows few bacteria, 1 RBC, 11 WBCs, and trace leuk esterase.     At time of  giving patient these results patient's pain was improved, but patient started talking about how she over the past 2 weeks has had on and off black stools and has a history of rectal cancer.  Guaiac and rectal exam was then obtained with guaiac negative and normal rectal exam.  I then added a few labs (CBC, CMP, lipase) which all appear to be her normal baseline for lab results. No significant abnormal findings noted.     After further discussion with niece and patient we attempted to have patient transfer from bed to wheelchair and back to the bed since this is what she does at the assisted living and she seemed to have some difficulty with transferring back into bed from her wheelchair.  We contacted the assisted living facility and I spoke to Mell who knows patient very well and she states that she feels confident that patient would be safe coming back to the assisted living as long as there is no medical issues that would warrant her needing inpatient care.  She states that she can have assisted transfers for the next few days until patient can follow-up with primary care doctor.  Nimattie agrees with this plan and will follow-up with primary care doctor for further evaluation and management in the next couple of days.  Patient sent home with prescription for cefdinir twice daily for 7 days for abnormal findings in urine until we get the urine culture results back.  Confusion may be baseline versus fatigue due to the fact that she has not been sleeping well over the past couple days versus possible urinary tract infection.  Patient does not appear septic or toxic and this point.  Patient is pain-free at time of discharge and sleeping comfortably in exam bed.  Earlier in the day she was very restless with her legs, but denied pain.  Appointment with Dr. Purcell set up for patient this Friday for follow-up and further evaluation.  Patient to return to the emergency department if symptoms worsen or change these were  discussed with patient Inis given on discharge paperwork as well as discussed with Mell at the assisted living facility.  Patient discharged in stable condition.      Assessments & Plan (with Medical Decision Making)     I have reviewed the nursing notes.    I have reviewed the findings, diagnosis, plan and need for follow up with the patient.    Stefanie Velazquez is a 74 year old female with history of deep vein thrombosis, syncope, chronic kidney disease stage IV, lumbar radiculopathy, oropharyngeal dysphasia, bilateral hearing loss, lymphedema of the genitalia, spinal stenosis of lumbar region, degenerative disc disease, symptomatic bradycardia, type 2 diabetes mellitus, restless leg syndrome, chronic diarrhea, benign hypertension, bowel bladder incontinence, anemia, rectal cancer, hyperlipidemia, neuropathy, and osteoarthritis and chronic ischemic heart disease. Patient presents via ambulance from her assisted living facility for left leg pain. Patient states the pain is from her left hip to the knee. Patient states she fell about 6-8 weeks ago, but denies recent fall or injury. Patient states she normally transfers from her bed to a wheelchair and moves around the assisted living facility in a wheelchair.  Patient does this by herself without issues, but over the past day or 2 has needed more help per patient. Patient states she her left leg pain is constant. Patient denies calf swelling, redness, warmth, swelling, abdominal pain, fevers, chest pain, shortness of breath, headache, dizziness, weakness, rash, nausea/vomiting, diarrhea, constipation, joint swelling/erythema.  Throughout history patient was able to answer questions, but appeared slightly confused at times and occasionally would go off on tangents regarding her counted.  Patient's niece ended up arriving few minutes after history and physical was obtained and states that this sometimes is patient's baseline cognition and patient does have a history of  hearing loss which can cause her to be slightly more confused.  Patient states she was informed today that she has a intertrigo yeast infection in her groin region that she gets frequently because of her incontinence and wearing depends.  Staff is aware and was putting powder on her groin region for fungal infection.  Patient also states that she took Tylenol in the middle of the night for her leg pain, but is unsure of when her last dose was.  After further discussion with niece I was able to get permission to contact the assisted living facility.  I spoke to Mell at the assisted living who states patient was given her regular morning meds except she refused the Tylenol, restless leg medication, and diclofenac.  Her last dose of Tylenol was around 1 AM this morning for her leg pain.  Daughter also states that she has not been sleeping well for the past 2 nights and a little more teary yesterday.  She does not think the patient has had any more confusion over the past day or 2, but was not at the facility this morning to see her in person.  She also states the patient has not had any significant falls or injury recently and no recent illness.    patient states improved pain post dilaudid with complete resolution of left leg pain as day goes on.  Patient crossing her legs comfortably in exam room and not complaining of any short of left leg pain.    Patient had x-ray to the lumbar spine, left pelvic and hip and left femur with intramedullary aron in place of the left femur extending from the greater trochanter to the femoral condylar region.  There is a healing obliquely oriented fracture of the distal femoral metadiaphyseal region.  There are 3 cross fixation screws distally and 1 proximally.  Moderate advanced bilateral hip degenerative changes.  No evidence of acute fracture.  Advanced left knee degenerative changes.  Lumbar x-ray shows multilevel degenerative disc disease and degenerative facet arthropathy.   Vacuum phenomenon at multiple levels.  No significant changes.  Venous Doppler to the left lower extremity obtained per concerns from his niece and assisted living due to patient having history of DVTs in the past.  This was negative for deep vein thrombosis.  UA also obtained in office today which shows few bacteria, 1 RBC, 11 WBCs, and trace leuk esterase.     At time of giving patient these results patient's pain was improved, but patient started talking about how she over the past 2 weeks has had on and off black stools and has a history of rectal cancer.  Guaiac and rectal exam was then obtained with guaiac negative and normal rectal exam.  I then added a few labs (CBC, CMP, lipase) which all appear to be her normal baseline for lab results. No significant abnormal findings noted.     After further discussion with niece and patient we attempted to have patient transfer from bed to wheelchair and back to the bed since this is what she does at the assisted living and she seemed to have some difficulty with transferring back into bed from her wheelchair.  We contacted the assisted living facility and I spoke to Mell who knows patient very well and she states that she feels confident that patient would be safe coming back to the assisted living as long as there is no medical issues that would warrant her needing inpatient care.  She states that she can have assisted transfers for the next few days until patient can follow-up with primary care doctor.  Yfn agrees with this plan and will follow-up with primary care doctor for further evaluation and management in the next couple of days.  Patient sent home with prescription for cefdinir twice daily for 7 days for abnormal findings in urine until we get the urine culture results back.  Confusion may be baseline versus fatigue due to the fact that she has not been sleeping well over the past couple days versus possible urinary tract infection.  Patient does not appear  septic or toxic and this point.  Patient is pain-free at time of discharge and sleeping comfortably in exam bed.  Earlier in the day she was very restless with her legs, but denied pain.  Appointment with Dr. Purcell set up for patient this Friday for follow-up and further evaluation.  Patient to return to the emergency department if symptoms worsen or change these were discussed with patient Inis given on discharge paperwork as well as discussed with Mell at the assisted living facility.  Patient discharged in stable condition.    DDX: For left leg pain include DVT and fracture which were both ruled out on imaging today.  Degenerative disc disease which could be causing a little bit of sciatic pain which is consistent with her exam findings and symptoms.  Patient's fatigue/confusion discussed with niece and is this baseline confusion versus worsening benign confusion versus extreme fatigue from not sleeping well for the past couple of nights versus slight restless legs from not taking her restless leg medication the past few days versus early urinary tract infection that may be contributing in some increased confusion.  Patient's vitals are normal at time of discharge and patient discharged in stable condition.      Current Discharge Medication List      START taking these medications    Details   cefdinir (OMNICEF) 300 MG capsule Take 1 capsule (300 mg) by mouth 2 times daily  Qty: 14 capsule, Refills: 0             Final diagnoses:   Pain of left thigh   Abnormal finding on urinalysis   Black stool   Restless leg syndrome       12/31/2019   [facility] EMERGENCY DEPARTMENT     Kirstie Parry PA-C  12/31/19 8835

## 2019-12-31 NOTE — ED AVS SNAPSHOT
[facility] Emergency Department  [address] [address]  Phone:  [phone]  Fax:  [phone]                                    Stefanie Velazquez   MRN: [id number]    Department:  [facility] Emergency Department   Date of Visit:  12/31/2019           After Visit Summary Signature Page    I have received my discharge instructions, and my questions have been answered. I have discussed any challenges I see with this plan with the nurse or doctor.    ..........................................................................................................................................  Patient/Patient Representative Signature      ..........................................................................................................................................  Patient Representative Print Name and Relationship to Patient    ..................................................               ................................................  Date                                   Time    ..........................................................................................................................................  Reviewed by Signature/Title    ...................................................              ..............................................  Date                                               Time          22EPIC Rev 08/18

## 2020-01-01 LAB
BACTERIA SPEC CULT: ABNORMAL
Lab: ABNORMAL
SPECIMEN SOURCE: ABNORMAL

## 2020-01-01 NOTE — DISCHARGE INSTRUCTIONS
Increase fluids, rest, tylenol over the counter as needed for pain.     Use medication for restless leg syndrome as directed.    Take cefdinir twice daily for abnormal findings on UA until urine culture results are obtained.    Patient to return to the emergency department if fever, confusion, headache, chest pain, shortness of breath, weakness, abdominal pain, nausea or vomiting, back pain, or change in symptoms occur.    Patient follow-up with primary care doctor and Friday.  Appointment set for recheck.  See appointment time below.

## 2020-01-02 NOTE — RESULT ENCOUNTER NOTE
Final Urine Culture Report on 1/1/20  Emergency Dept/Urgent Care discharge antibiotic prescribed: Cefdinir (Omnicef) 300 mg capsule, 1 capsule (300 mg) by mouth 2 times daily for 7 days  #1. Bacteria, 50,000 to 100,000 colonies/ml Escherichia coli, is SUSCEPTIBLE to Antibiotic.    As per [facility] ED Lab Result protocol, no change in antibiotic therapy. poor plus

## 2020-01-03 ENCOUNTER — TELEPHONE (OUTPATIENT)
Dept: FAMILY MEDICINE | Facility: CLINIC | Age: 75
End: 2020-01-03

## 2020-01-03 ENCOUNTER — OFFICE VISIT (OUTPATIENT)
Dept: FAMILY MEDICINE | Facility: CLINIC | Age: 75
End: 2020-01-03
Payer: COMMERCIAL

## 2020-01-03 VITALS
DIASTOLIC BLOOD PRESSURE: 70 MMHG | SYSTOLIC BLOOD PRESSURE: 128 MMHG | WEIGHT: 201 LBS | RESPIRATION RATE: 24 BRPM | TEMPERATURE: 99.1 F | HEART RATE: 156 BPM | HEIGHT: 60 IN | BODY MASS INDEX: 39.46 KG/M2 | OXYGEN SATURATION: 96 %

## 2020-01-03 DIAGNOSIS — M54.16 LUMBAR RADICULOPATHY: Primary | ICD-10-CM

## 2020-01-03 DIAGNOSIS — C20 RECTAL CANCER (H): ICD-10-CM

## 2020-01-03 DIAGNOSIS — E66.01 MORBID OBESITY (H): ICD-10-CM

## 2020-01-03 DIAGNOSIS — D64.9 ANEMIA, UNSPECIFIED TYPE: ICD-10-CM

## 2020-01-03 DIAGNOSIS — R00.2 PALPITATIONS: ICD-10-CM

## 2020-01-03 DIAGNOSIS — I10 BENIGN ESSENTIAL HYPERTENSION: ICD-10-CM

## 2020-01-03 DIAGNOSIS — G25.81 RESTLESS LEG SYNDROME: ICD-10-CM

## 2020-01-03 DIAGNOSIS — N18.4 CKD (CHRONIC KIDNEY DISEASE) STAGE 4, GFR 15-29 ML/MIN (H): ICD-10-CM

## 2020-01-03 DIAGNOSIS — R00.0 TACHYCARDIA: ICD-10-CM

## 2020-01-03 DIAGNOSIS — Z79.4 TYPE 2 DIABETES MELLITUS WITH DIABETIC NEPHROPATHY, WITH LONG-TERM CURRENT USE OF INSULIN (H): ICD-10-CM

## 2020-01-03 DIAGNOSIS — G47.33 OSA (OBSTRUCTIVE SLEEP APNEA): ICD-10-CM

## 2020-01-03 DIAGNOSIS — E11.21 TYPE 2 DIABETES MELLITUS WITH DIABETIC NEPHROPATHY, WITH LONG-TERM CURRENT USE OF INSULIN (H): ICD-10-CM

## 2020-01-03 PROCEDURE — 93000 ELECTROCARDIOGRAM COMPLETE: CPT | Performed by: FAMILY MEDICINE

## 2020-01-03 PROCEDURE — 99215 OFFICE O/P EST HI 40 MIN: CPT | Performed by: FAMILY MEDICINE

## 2020-01-03 RX ORDER — GABAPENTIN 100 MG/1
100 CAPSULE ORAL AT BEDTIME
Qty: 90 CAPSULE | Refills: 1 | Status: SHIPPED | OUTPATIENT
Start: 2020-01-03 | End: 2020-06-25

## 2020-01-03 ASSESSMENT — MIFFLIN-ST. JEOR: SCORE: 1323.23

## 2020-01-03 NOTE — TELEPHONE ENCOUNTER
Per Diane from  [facility] - Unable to Fax at this time.  Will Fax her BS when available.  Unable to Fax  1/1/20 8:10 am  178               12:19 pm 221              4:57 pm  251    132/70 Pulse 79              9: 52 pm 243    2/2/20  8:31 am  177             12:10 pm 303               4:48 pm  409                9:06 pm 298    2/3/20  9:08 am 180               12:18 pm 318  [facility] Sec

## 2020-01-03 NOTE — NURSING NOTE
Chief Complaint   Patient presents with     ER F/U     leg pain       Initial /70 (BP Location: Left arm)   Pulse 156   Temp 99.1  F (37.3  C) (Tympanic)   Resp 24   Ht 1.524 m (5')   Wt 91.2 kg (201 lb)   SpO2 96%   BMI 39.26 kg/m   Estimated body mass index is 39.26 kg/m  as calculated from the following:    Height as of this encounter: 1.524 m (5').    Weight as of this encounter: 91.2 kg (201 lb).    Patient presents to the clinic using Wheel Chair    Health Maintenance that is potentially due pending provider review:  NONE    n/a    Is there anyone who you would like to be able to receive your results? No  If yes have patient fill out HELLEN

## 2020-01-03 NOTE — PATIENT INSTRUCTIONS
Start gabapentin 100 mg at bedtime    Physical therapy     zio patch    Check pulses when palpitations

## 2020-01-03 NOTE — PROGRESS NOTES
Subjective     Stefanie Velazquez is a 74 year old female who presents to clinic today for the following health issues:    HPI   ED/UC Followup:    Facility:  [facility]  Date of visit: 12/31/2019  Reason for visit: left leg pain  Current Status: still hurts to pivot     Was excruciatingly [id number]  Still is having a hard time pivoting, to stand  Low back hurts and goes down the left buttock into the left thigh.   No new bladder or bowel concerns  She had xrays of femur, pelvis and hip and lumbar spine as well as an ultrasound    diabetes  Blood sugars have been in the 200s  Lab Results   Component Value Date    A1C 7.7 11/08/2019    A1C 7.8 08/08/2019    A1C 8.7 05/02/2019    A1C 8.1 01/31/2019    A1C 8.0 10/30/2018     Is on Novolog and Lantus  Patient does not come with blood sugars today    Patient has Type 2 diabetes and  is checking blood sugars 4 times daily.  Patient is taking 4 multiple daily injections or is on a continuous subcutaneous insulin infusion pump.  Patient requires frequent adjustments to their insulin treatment regimen and would benefit greatly from a personal continuous glucose monitoring system.    hypertension   On lasix and potassium, ibersartan, metoprolol  BP Readings from Last 6 Encounters:   01/03/20 128/70   12/31/19 (!) 150/72   11/08/19 104/58   09/27/19 127/51   09/23/19 112/50   08/08/19 139/76      Palpitations  When I noted her heart rate was irregular, she tells me that for the last few weeks notes a fluttering feeling in her heart, maybe 2-3 times a day. Maybe some shortness of breath wlth it. No chest pain.     Rectal cancer  She is followed by oncology    Chronic kidney disease -stable around GFR 30-40    hypothyroidism   On replacement  TSH   Date Value Ref Range Status   05/02/2019 0.48 0.40 - 4.00 mU/L Final        BP Readings from Last 3 Encounters:   01/03/20 128/70   12/31/19 (!) 150/72   11/08/19 104/58    Wt Readings from Last 3 Encounters:   01/03/20 91.2 kg (201 lb)    11/08/19 91.2 kg (201 lb)   09/27/19 92.7 kg (204 lb 6.4 oz)                      Reviewed and updated as needed this visit by Provider         Review of Systems   ROS: 5 point ROS negative except as noted above in HPI, including Gen., Resp., CV, GI &  system review.       Objective    /70 (BP Location: Left arm)   Pulse 156   Temp 99.1  F (37.3  C) (Tympanic)   Resp 24   Ht 1.524 m (5')   Wt 91.2 kg (201 lb)   SpO2 96%   BMI 39.26 kg/m    Body mass index is 39.26 kg/m .  Physical Exam   General: appears well, no distress, hearing aides in place, sitting comfortably in wheelchair  Neck: supple, no adenopathy, no JVD  Heart: initial exam had an irregularly irregular rate, fast rate around 120, no mumur,   After getting up on exam table and EKG, was regular rate and rhythm, normal S1S2, no murmur  Lungs: clear to ascultation   Abd; soft, nontender,  no mass or distention   Ext: trace edema  MS: patient shows pain into the sciatic notch and around into anterior thigh, she has pain in lower left back with palpation, gets up with great difficulty and steps with caution, assist of two        EKG shows sinus rhythm    (M54.16) Lumbar radiculopathy  (primary encounter diagnosis)  Comment: cause of leg pain  Plan: gabapentin (NEURONTIN) 100 MG capsule, PHYSICAL        THERAPY REFERRAL        Will have her try gabapentin again for nerve pain Gone over benefits and risks, as well as side effects of medication. Physical therapy     (R00.0) Tachycardia  Comment:  likely had Afib with RVR  Plan: EKG 12-lead complete w/read - Clinics       I think she converted back to normal sinus rhythm   Will get a zio patch   Is on metoprolol, consider increasing dose.   Will restart Elliquis depending on EKG    (R00.2) Palpitations  Comment: as above   Plan: Zio Patch Holter Adult Pediatric Greater than         48 hrs        as above     (C20) Rectal cancer (H)  Comment: in remission  Plan: followed by oncology    (D64.9)  Anemia, unspecified type  Comment: iron deficieicy  Plan: improving with iron    (E11.21,  Z79.4) Type 2 diabetes mellitus with diabetic nephropathy, with long-term current use of insulin (H)  Comment: fair control  Plan: continue insulin, have asked facility to fax me sugars    (G47.33) RC-moderate (AHI 12, LSat 60%); REM RDI-73  Comment: recent sleep study  Plan: will follow with sleep doctor    (G25.81) Restless leg syndrome  Comment: stable  Plan: continue Requip    (I10) Benign essential hypertension  Comment: well controlled   Plan: continue lasix, potassium, metoprolol, ibesartan    (N18.4) CKD (chronic kidney disease) stage 4, GFR 15-29 ml/min (H)  Comment: stable  Plan: monitor    (E66.01) Morbid obesity (H)  Comment:   Plan: monitor     Hypothyroidism  Continue levothyroxine     40 min spent with patient, greater than 50% in counseling and coordination of care and dealing with multiple issues today    Patient Instructions   Start gabapentin 100 mg at bedtime    Physical therapy     zio patch    Check pulses when palpitations          Sandra Bernstein MD

## 2020-01-06 ENCOUNTER — MEDICAL CORRESPONDENCE (OUTPATIENT)
Dept: HEALTH INFORMATION MANAGEMENT | Facility: CLINIC | Age: 75
End: 2020-01-06

## 2020-01-06 ENCOUNTER — TELEPHONE (OUTPATIENT)
Dept: EDUCATION SERVICES | Facility: CLINIC | Age: 75
End: 2020-01-06

## 2020-01-06 DIAGNOSIS — Z79.4 TYPE 2 DIABETES MELLITUS WITH DIABETIC NEPHROPATHY, WITH LONG-TERM CURRENT USE OF INSULIN (H): Primary | Chronic | ICD-10-CM

## 2020-01-06 DIAGNOSIS — E11.21 TYPE 2 DIABETES MELLITUS WITH DIABETIC NEPHROPATHY, WITH LONG-TERM CURRENT USE OF INSULIN (H): Primary | Chronic | ICD-10-CM

## 2020-01-06 RX ORDER — FLASH GLUCOSE SENSOR
1 KIT MISCELLANEOUS
Qty: 2 EACH | Refills: 11 | Status: SHIPPED | OUTPATIENT
Start: 2020-01-06 | End: 2020-10-16

## 2020-01-06 RX ORDER — FLASH GLUCOSE SCANNING READER
1 EACH MISCELLANEOUS PRN
Qty: 1 DEVICE | Refills: 0 | Status: SHIPPED | OUTPATIENT
Start: 2020-01-06 | End: 2020-10-16

## 2020-01-06 NOTE — TELEPHONE ENCOUNTER
Diabetes education contact:    Struggling to get the sensor for pt, she is in an assisted living.    Paperwork is all good for medicare compliance.    Sent Rx to [facility] for cameron 14 day.    Concha Bradley RD on 1/6/2020 at 2:59 PM

## 2020-01-07 NOTE — TELEPHONE ENCOUNTER
Faxed BS / B/P sent to us. Dr. Bernstein addressed on Fax  Form received back signed  1/7/2020  Faxed Back & Sent to be scanned to this encounter  Leilani [facility]

## 2020-01-09 ENCOUNTER — DOCUMENTATION ONLY (OUTPATIENT)
Dept: SLEEP MEDICINE | Facility: CLINIC | Age: 75
End: 2020-01-09

## 2020-01-09 DIAGNOSIS — G47.33 OSA (OBSTRUCTIVE SLEEP APNEA): Primary | ICD-10-CM

## 2020-01-09 NOTE — PROGRESS NOTES
Patient was offered choice of vendor and chose [facility].  Patient Stefanie Velazquez was set up at [place] on January 9, 2020. Patient received a Resmed AirSense 10 Auto. Pressures were set at 5-15 cm H2O.   Patient s ramp is off cm H2O for Off and FLEX/EPR is 2.  Patient received a Resmed Mask name: [id number]  Pillow mask size Medium, heated tubing and heated humidifier.  Patient does need to meet compliance with usage only.  Purvi Rees

## 2020-01-13 ENCOUNTER — DOCUMENTATION ONLY (OUTPATIENT)
Dept: SLEEP MEDICINE | Facility: CLINIC | Age: 75
End: 2020-01-13
Payer: COMMERCIAL

## 2020-01-13 ENCOUNTER — TELEPHONE (OUTPATIENT)
Dept: FAMILY MEDICINE | Facility: CLINIC | Age: 75
End: 2020-01-13

## 2020-01-13 NOTE — PROGRESS NOTES
3 DAY STM VISIT    Diagnostic AHI:  6.8 HST    Patient contacted for 3 day STM visit.  Subjective measures:  Spoke with  and she said staff is having difficulty to get Concha to use her machine. They continue to work on education with her.    Replacement device: No    STM ordered by provider: Yes     Device type: Auto-CPAP  PAP settings from order::    CPAP min 5 cm  H20  CPAP max 15 cm  H20     Mask type:    Nasal Pillows     Device settings from machine  Min CPAP 5.0        Max CPAP 15.0        Assessment: Nightly usage, most nights under four hours.  Action plan: Patient to have 14 day STM visit. Patient has a follow up visit scheduled:   no.    Total time spent on accessing, reviewing and interpreting remote patient PAP therapy data:   9 minutes      Total time spent with direct patient communication :   3 minutes

## 2020-01-13 NOTE — TELEPHONE ENCOUNTER
[facility]- Nursing Questions / Orders  Form placed on Provider  Dr. Sandra Bernstein   desfrank for Signature.

## 2020-01-16 ENCOUNTER — MEDICAL CORRESPONDENCE (OUTPATIENT)
Dept: HEALTH INFORMATION MANAGEMENT | Facility: CLINIC | Age: 75
End: 2020-01-16

## 2020-01-17 NOTE — TELEPHONE ENCOUNTER
Form received back signed  1/17/20  Faxed Back & Sent to be scanned to this encounter  Leilani [place] [facility]

## 2020-01-21 ENCOUNTER — PATIENT OUTREACH (OUTPATIENT)
Dept: ONCOLOGY | Facility: CLINIC | Age: 75
End: 2020-01-21

## 2020-01-21 NOTE — PROGRESS NOTES
Pt's niece, Shira, called and requested that her appt be moved from 9/25 to 3/27/2020. Her sister has an appt at the same time and they are both at [facility]. She said that they do better if they are able to come together. Ok per Dr. Martinez. Appt moved and pt's niece was notified.   Orders for both patients were faxed to the Assisted Living with request to have results faxed to clinic.     Mark Kim RN on 1/21/2020 at 4:31 PM

## 2020-01-22 ENCOUNTER — TELEPHONE (OUTPATIENT)
Dept: FAMILY MEDICINE | Facility: CLINIC | Age: 75
End: 2020-01-22

## 2020-01-22 NOTE — TELEPHONE ENCOUNTER
Reason for Call:  Other     Detailed comments: Patient wants to see Dr. Bernstein as soon as possible for spine pain - please call patient with an appt    Phone Number Patient can be reached at: Home number on file [phone] (home)    Best Time:     Can we leave a detailed message on this number? YES    Call taken on 1/22/2020 at 10:49 AM by Sandra Milian

## 2020-01-23 ENCOUNTER — HOSPITAL ENCOUNTER (OUTPATIENT)
Dept: CARDIOLOGY | Facility: CLINIC | Age: 75
Discharge: HOME | End: 2020-01-23
Attending: FAMILY MEDICINE | Admitting: FAMILY MEDICINE
Payer: COMMERCIAL

## 2020-01-23 ENCOUNTER — DOCUMENTATION ONLY (OUTPATIENT)
Dept: SLEEP MEDICINE | Facility: CLINIC | Age: 75
End: 2020-01-23
Payer: COMMERCIAL

## 2020-01-23 ENCOUNTER — TELEPHONE (OUTPATIENT)
Dept: FAMILY MEDICINE | Facility: CLINIC | Age: 75
End: 2020-01-23

## 2020-01-23 ENCOUNTER — TELEPHONE (OUTPATIENT)
Dept: SLEEP MEDICINE | Facility: CLINIC | Age: 75
End: 2020-01-23

## 2020-01-23 DIAGNOSIS — R00.2 PALPITATIONS: ICD-10-CM

## 2020-01-23 DIAGNOSIS — M54.16 LUMBAR RADICULOPATHY: ICD-10-CM

## 2020-01-23 DIAGNOSIS — G47.33 OSA (OBSTRUCTIVE SLEEP APNEA): Primary | ICD-10-CM

## 2020-01-23 DIAGNOSIS — M48.061 SPINAL STENOSIS OF LUMBAR REGION WITHOUT NEUROGENIC CLAUDICATION: Primary | ICD-10-CM

## 2020-01-23 PROCEDURE — 0298T ZIO PATCH HOLTER ADULT PEDIATRIC GREATER THAN 48 HRS: CPT | Performed by: INTERNAL MEDICINE

## 2020-01-23 PROCEDURE — 0296T ZIO PATCH HOLTER ADULT PEDIATRIC GREATER THAN 48 HRS: CPT

## 2020-01-23 NOTE — TELEPHONE ENCOUNTER
Reason for Call: Request for an order or referral:    Order or referral being requested: Steph is calling for PT Orders.   [facility] Assisted Living is requesting PT Home Care- A order was placed 1/3/20 for Out Patient PT. Pt is not able to get to Out Patient pt. Requesting [facility] to go to her for Physical Therapy.   Lumbar Radiculopathy  If approved please redo order Steph will watch for the order in Epic  Please Advise    Date needed: as soon as possible    Has the patient been seen by the PCP for this problem? YES    Additional comments: Any Time      Phone number Patient can be reached at:  Other phone number:  [phone]     Best Time:  Any Time      Can we leave a detailed message on this number?  YES    Call taken on 1/23/2020 at 12:51 PM by Leilani Jurado

## 2020-01-23 NOTE — TELEPHONE ENCOUNTER
Called and spoke with caregiver/nurse Mell. She states that patient has been anxious with using chinstrap, however I have noted a decrease in her leaks since picking up a chinstrap.  Spoke with Richard Bloch ([facility]) regarding patient leaks. He states to have her wear during naps. He will put in a pressure change order.     Purvi Rees, DME Coordinator

## 2020-01-24 ENCOUNTER — DOCUMENTATION ONLY (OUTPATIENT)
Dept: SLEEP MEDICINE | Facility: CLINIC | Age: 75
End: 2020-01-24

## 2020-01-24 NOTE — PROGRESS NOTES
14  DAY STM VISIT    Diagnostic AHI:  6.8 HST    Message left for pt to return call-see STM note from 1/23/20    Assessment: Pt not meeting objective benchmarks for AHI and leak      Action plan: pt to have 30 day STM visit.      Device type: Auto-CPAP    PAP settings: CPAP min 6.0 cm  H20       CPAP max 13.0 cm  H20      95th% pressure 14.1 cm  H20     Mask type:  Nasal Pillows    Objective measures: 14 day rolling measures      Compliance  71 %      Leak  35.12 lpm  last  upload      AHI 7.09   last  upload      Average number of minutes 354      Objective measure goal  Compliance   Goal >70%  Leak   Goal < 24 lpm  AHI  Goal < 5  Usage  Goal >240      Total time spent on accessing, reviewing and interpreting remote patient PAP therapy data:   12 minutes      Total time spent with direct patient communication :   0 minutes

## 2020-01-31 ENCOUNTER — TELEPHONE (OUTPATIENT)
Dept: FAMILY MEDICINE | Facility: CLINIC | Age: 75
End: 2020-01-31

## 2020-01-31 NOTE — TELEPHONE ENCOUNTER
[employer] form - Wheel Chair  Form placed on Provider Dr. Bernstein desfrank for [employer].  [facility] Station Sec

## 2020-02-04 ENCOUNTER — TELEPHONE (OUTPATIENT)
Dept: EDUCATION SERVICES | Facility: CLINIC | Age: 75
End: 2020-02-04

## 2020-02-04 NOTE — TELEPHONE ENCOUNTER
Form completed and OV & Order faxed back to [facility] [phone] Attn: Chiara.  Form received back signed  2/4/20  Faxed Back & Sent to be scanned to this encounter  Leilani [facility] Sec

## 2020-02-04 NOTE — TELEPHONE ENCOUNTER
Diabetes education contact:    Called [facility] and was told they attempted to reach Concha [id number] and was not able to get her.    I got their number and called Concha and explained that she needed to call them.  Gave her number: [phone].    Concha Bradley RD, CDE

## 2020-02-07 ENCOUNTER — TELEPHONE (OUTPATIENT)
Dept: FAMILY MEDICINE | Facility: CLINIC | Age: 75
End: 2020-02-07

## 2020-02-07 DIAGNOSIS — M54.16 LUMBAR RADICULOPATHY: ICD-10-CM

## 2020-02-07 DIAGNOSIS — N39.0 URINARY TRACT INFECTION: Primary | ICD-10-CM

## 2020-02-07 DIAGNOSIS — M48.061 SPINAL STENOSIS OF LUMBAR REGION WITHOUT NEUROGENIC CLAUDICATION: ICD-10-CM

## 2020-02-07 NOTE — TELEPHONE ENCOUNTER
Reason for call:  Patient reporting a symptom    Symptom or request: Back Pain - Lower Sciatica Pain. Pt said the Tylenol is not helping. Staff are placing heat and ice on it. Pt had Physical Therapy 2/6/20   Please advise on the Pain.  Mell is aware Dr. Bernstein is not in today.    Phone Number patient can be reached at:  [facility] [phone]    Best Time:  Any Time      Can we leave a detailed message on this number:  YES    Call taken on 2/7/2020 at 12:37 PM by Leilani Jurado

## 2020-02-07 NOTE — TELEPHONE ENCOUNTER
The facility the patient is living at called back - Wilda griggs me to tell them Dr. Medina is not available so if the pain is that bad patient should be seen in UC at clinic or in [place] -

## 2020-02-10 RX ORDER — TRAMADOL HYDROCHLORIDE 50 MG/1
50 TABLET ORAL EVERY 6 HOURS PRN
Qty: 20 TABLET | Refills: 1 | Status: ON HOLD | OUTPATIENT
Start: 2020-02-10 | End: 2020-04-13

## 2020-02-10 NOTE — TELEPHONE ENCOUNTER
If she is still having a lot of pain, and it is her usual back pain, I can give her something stronger for pain like tramadol or oxycodon until I see her again.  If it seems like a new pain, we worsen I can see her later this week. Also states pt is having trouble so needs a medication that is crushable. Wilda Naqvi RN

## 2020-02-10 NOTE — TELEPHONE ENCOUNTER
Nurse Mell called. States pain is the same. Also states urine is strong smelling and they would like to do a urine test. Can we order ua and pain medications.

## 2020-02-11 ENCOUNTER — PATIENT OUTREACH (OUTPATIENT)
Dept: GERIATRIC MEDICINE | Facility: CLINIC | Age: 75
End: 2020-02-11

## 2020-02-11 ENCOUNTER — DOCUMENTATION ONLY (OUTPATIENT)
Dept: SLEEP MEDICINE | Facility: CLINIC | Age: 75
End: 2020-02-11

## 2020-02-11 ENCOUNTER — MEDICAL CORRESPONDENCE (OUTPATIENT)
Dept: HEALTH INFORMATION MANAGEMENT | Facility: CLINIC | Age: 75
End: 2020-02-11

## 2020-02-11 NOTE — PROGRESS NOTES
[facility] Care Coordination Contact    cc received the following notification from [facility] today:    Hi Komal  I delivered a new MWC to Stefanie Velazquez today.  stefanie would like a cushion for her chair. Insurance will not help unless she has sores.  I can drop ship one for $79.90  Can we help Stefanie.      --   Thank you,     Aaron Reed     [phone] cell     [email or web address]     [email or web address]    cc agrees with this request.  CPS updated and forwarded to CMS for auth.    Komal Krishnan RN-[facility]   [phone]

## 2020-02-12 NOTE — PROGRESS NOTES
Patient returned call.    Subjective measures:   Pt reports she is sleeping better and feeling more rested.  She feels that her mask is complicated and she and her staff struggles with application.  She     Assessment: Pt meeting objective benchmarks.  Patient failing following subjective benchmarks: mask discomfort     Action plan:follow up per provider request      Total time spent counseling, coaching  and reviewing PAP therapy data with patient  10 minutes     [id number] no (this call)    [id number] no (previous call)

## 2020-02-12 NOTE — PROGRESS NOTES
30 DAY [facility] VISIT    Diagnostic AHI:   6.8  HST    Message left for patient to return call     Assessment: Pt meeting objective benchmarks.     Action plan:   Patient has not scheduled a follow up visit with Dr. Velez.  Device type: Auto-CPAP  PAP settings: CPAP min 6.0 cm  H20     CPAP max 13.0 cm  H20        95th% pressure 12.9 cm  H20   Mask type:  Nasal Pillows  Objective measures: 14 day rolling measures      Compliance  78 %      Leak  37.04 lpm  last  upload      AHI 3.69   last  upload      Average number of minutes 338      Objective measure goal  Compliance   Goal >70%  Leak   Goal < 24 lpm  AHI  Goal < 5  Usage  Goal >240        Total time spent on accessing and interpreting remote patient PAP therapy data  10 minutes    Total time spent counseling, coaching  and reviewing PAP therapy data with patient  0 minutes     [id number] no this call  [id number] no  at 3 or 14 day [facility]

## 2020-02-13 ENCOUNTER — HOSPITAL LABORATORY (OUTPATIENT)
Facility: OTHER | Age: 75
End: 2020-02-13

## 2020-02-13 NOTE — LETTER
February 20, 2020      Stefanie Velazquez  [address]  [address] [place] [id number]        Dear ,    We are writing to inform you of your test results.    Your urine culture grew out bacteria that cipro should work well to kill off.      Resulted Orders   Urine Culture Aerobic Bacterial   Result Value Ref Range    Specimen Description Unspecified Urine     Special Requests Specimen received in preservative     Culture Micro >100,000 colonies/mL  Escherichia coli   (A)        If you have any questions or concerns, please call the clinic at the number listed above.       Sincerely,        Sandra Morales MD

## 2020-02-14 DIAGNOSIS — N30.00 ACUTE CYSTITIS WITHOUT HEMATURIA: Primary | ICD-10-CM

## 2020-02-14 LAB
BACTERIA SPEC CULT: ABNORMAL
Lab: ABNORMAL
SPECIMEN SOURCE: ABNORMAL

## 2020-02-14 RX ORDER — CIPROFLOXACIN 250 MG/1
250 TABLET, FILM COATED ORAL 2 TIMES DAILY
Qty: 14 TABLET | Refills: 0 | Status: SHIPPED | OUTPATIENT
Start: 2020-02-14 | End: 2020-03-16

## 2020-02-16 ENCOUNTER — HEALTH MAINTENANCE LETTER (OUTPATIENT)
Age: 75
End: 2020-02-16

## 2020-02-17 ENCOUNTER — TELEPHONE (OUTPATIENT)
Dept: FAMILY MEDICINE | Facility: CLINIC | Age: 75
End: 2020-02-17

## 2020-02-17 ENCOUNTER — MEDICAL CORRESPONDENCE (OUTPATIENT)
Dept: HEALTH INFORMATION MANAGEMENT | Facility: CLINIC | Age: 75
End: 2020-02-17

## 2020-02-17 NOTE — TELEPHONE ENCOUNTER
1) Fluocinonide Medication  2) urine results  Form placed on Provider Dr. Amandeep shrestha for [facility].  Leilani Or[facility] Station Sec

## 2020-02-18 ENCOUNTER — TELEPHONE (OUTPATIENT)
Dept: FAMILY MEDICINE | Facility: CLINIC | Age: 75
End: 2020-02-18

## 2020-02-18 LAB
FOLATE RBC-MCNC: 1088 NG/ML
HCT VFR BLD CALC: 31.3 %

## 2020-02-18 NOTE — TELEPHONE ENCOUNTER
Reason for Call: Dr Hicks only   Detailed comments: Mell called to report that Concha has had 2 falls. She's OK , no apparent injury. She is on the antibiotic for UTI.    Call taken on 2/18/2020 at 2:19 PM by Crystal Skelton

## 2020-02-19 ENCOUNTER — PATIENT OUTREACH (OUTPATIENT)
Dept: GERIATRIC MEDICINE | Facility: CLINIC | Age: 75
End: 2020-02-19

## 2020-02-19 NOTE — PROGRESS NOTES
[facility] Care Coordination Contact    cc received a call from COURTNEY Monte @ [facility].    Mell stated that member is her own POA and she is having more difficulty paying her bills.  Mell is requesting help as family is no longer assisting member.  cc reviewed Rep Payee services and stated I would contact members family to review if this is an option they would like to pursue.    Mell also verbalized concern with Hoarding tendencies and that members apartment is becoming a safety concern.  NURIS Krause -- Supervisor and this writer talked with  Mell regarding her need to notify family that this needs to be cleaned or Eviction may need to be completed.      cc will contact Mell after speaking with family regarding Rep Payee.    Komal Krishnan RN-[facility]   [phone]

## 2020-02-20 NOTE — TELEPHONE ENCOUNTER
Form received back signed  2/20/20   Faxed Back & Sent to be scanned to this encounter  Leilani Orn Station Sec

## 2020-03-16 ENCOUNTER — OFFICE VISIT (OUTPATIENT)
Dept: FAMILY MEDICINE | Facility: CLINIC | Age: 75
End: 2020-03-16
Payer: COMMERCIAL

## 2020-03-16 ENCOUNTER — ANCILLARY PROCEDURE (OUTPATIENT)
Dept: GENERAL RADIOLOGY | Facility: CLINIC | Age: 75
End: 2020-03-16
Attending: FAMILY MEDICINE
Payer: COMMERCIAL

## 2020-03-16 VITALS
HEART RATE: 63 BPM | RESPIRATION RATE: 16 BRPM | WEIGHT: 198 LBS | OXYGEN SATURATION: 97 % | SYSTOLIC BLOOD PRESSURE: 134 MMHG | TEMPERATURE: 97.1 F | DIASTOLIC BLOOD PRESSURE: 60 MMHG | HEIGHT: 60 IN | BODY MASS INDEX: 38.87 KG/M2
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
## 2020-03-16 DIAGNOSIS — M48.061 SPINAL STENOSIS OF LUMBAR REGION WITHOUT NEUROGENIC CLAUDICATION: ICD-10-CM

## 2020-03-16 DIAGNOSIS — N18.4 CKD (CHRONIC KIDNEY DISEASE) STAGE 4, GFR 15-29 ML/MIN (H): ICD-10-CM

## 2020-03-16 DIAGNOSIS — E78.5 HYPERLIPIDEMIA LDL GOAL <100: ICD-10-CM

## 2020-03-16 DIAGNOSIS — I10 BENIGN ESSENTIAL HYPERTENSION: ICD-10-CM

## 2020-03-16 DIAGNOSIS — Z79.4 TYPE 2 DIABETES MELLITUS WITH DIABETIC NEPHROPATHY, WITH LONG-TERM CURRENT USE OF INSULIN (H): ICD-10-CM
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
## 2020-03-16 DIAGNOSIS — S92.404A CLOSED NONDISPLACED FRACTURE OF PHALANX OF RIGHT GREAT TOE, UNSPECIFIED PHALANX, INITIAL ENCOUNTER: ICD-10-CM

## 2020-03-16 DIAGNOSIS — E11.21 TYPE 2 DIABETES MELLITUS WITH DIABETIC NEPHROPATHY, WITH LONG-TERM CURRENT USE OF INSULIN (H): ICD-10-CM

## 2020-03-16 DIAGNOSIS — M79.671 RIGHT FOOT PAIN: ICD-10-CM

## 2020-03-16 DIAGNOSIS — D50.8 IRON DEFICIENCY ANEMIA SECONDARY TO INADEQUATE DIETARY IRON INTAKE: ICD-10-CM

## 2020-03-16 DIAGNOSIS — H90.2: ICD-10-CM

## 2020-03-16 DIAGNOSIS — C20 RECTAL CANCER (H): ICD-10-CM

## 2020-03-16 DIAGNOSIS — G47.33 OSA (OBSTRUCTIVE SLEEP APNEA): Primary | ICD-10-CM

## 2020-03-16 DIAGNOSIS — L98.9 SKIN LESION: ICD-10-CM

## 2020-03-16 DIAGNOSIS — M54.16 LUMBAR RADICULOPATHY: ICD-10-CM

## 2020-03-16 DIAGNOSIS — N30.00 ACUTE CYSTITIS WITHOUT HEMATURIA: ICD-10-CM

## 2020-03-16 DIAGNOSIS — E03.8 OTHER SPECIFIED HYPOTHYROIDISM: ICD-10-CM

## 2020-03-16 LAB
ALBUMIN SERPL-MCNC: 3 G/DL (ref 3.4–5)
ALP SERPL-CCNC: 110 U/L (ref 40–150)
ALT SERPL W P-5'-P-CCNC: 15 U/L (ref 0–50)
ANION GAP SERPL CALCULATED.3IONS-SCNC: 4 MMOL/L (ref 3–14)
AST SERPL W P-5'-P-CCNC: 24 U/L (ref 0–45)
BILIRUB SERPL-MCNC: 0.5 MG/DL (ref 0.2–1.3)
BUN SERPL-MCNC: 27 MG/DL (ref 7–30)
CALCIUM SERPL-MCNC: 8.9 MG/DL (ref 8.5–10.1)
CHLORIDE SERPL-SCNC: 108 MMOL/L (ref 94–109)
CHOLEST SERPL-MCNC: 144 MG/DL
CO2 SERPL-SCNC: 28 MMOL/L (ref 20–32)
CREAT SERPL-MCNC: 1.44 MG/DL (ref 0.52–1.04)
ERYTHROCYTE [DISTWIDTH] IN BLOOD BY AUTOMATED COUNT: 14.5 % (ref 10–15)
FERRITIN SERPL-MCNC: 31 NG/ML (ref 8–252)
GFR SERPL CREATININE-BSD FRML MDRD: 35 ML/MIN/{1.73_M2}
GLUCOSE SERPL-MCNC: 227 MG/DL (ref 70–99)
HBA1C MFR BLD: 6.9 % (ref 0–5.6)
HCT VFR BLD AUTO: 37.6 % (ref 35–47)
HDLC SERPL-MCNC: 65 MG/DL
HGB BLD-MCNC: 12.3 G/DL (ref 11.7–15.7)
IRON SATN MFR SERPL: 16 % (ref 15–46)
IRON SERPL-MCNC: 50 UG/DL (ref 35–180)
LDLC SERPL CALC-MCNC: 51 MG/DL
MCH RBC QN AUTO: 29.6 PG (ref 26.5–33)
MCHC RBC AUTO-ENTMCNC: 32.7 G/DL (ref 31.5–36.5)
MCV RBC AUTO: 91 FL (ref 78–100)
NONHDLC SERPL-MCNC: 79 MG/DL
PLATELET # BLD AUTO: 177 10E9/L (ref 150–450)
POTASSIUM SERPL-SCNC: 3.8 MMOL/L (ref 3.4–5.3)
PROT SERPL-MCNC: 6.7 G/DL (ref 6.8–8.8)
RBC # BLD AUTO: 4.15 10E12/L (ref 3.8–5.2)
SODIUM SERPL-SCNC: 140 MMOL/L (ref 133–144)
TIBC SERPL-MCNC: 316 UG/DL (ref 240–430)
TRIGL SERPL-MCNC: 142 MG/DL
TSH SERPL DL<=0.005 MIU/L-ACNC: 1.94 MU/L (ref 0.4–4)
WBC # BLD AUTO: 3.2 10E9/L (ref 4–11)

## 2020-03-16 PROCEDURE — 73630 X-RAY EXAM OF FOOT: CPT | Mod: RT

## 2020-03-16 PROCEDURE — 83550 IRON BINDING TEST: CPT | Performed by: FAMILY MEDICINE

## 2020-03-16 PROCEDURE — 80053 COMPREHEN METABOLIC PANEL: CPT | Performed by: FAMILY MEDICINE

## 2020-03-16 PROCEDURE — 85027 COMPLETE CBC AUTOMATED: CPT | Performed by: FAMILY MEDICINE

## 2020-03-16 PROCEDURE — 83036 HEMOGLOBIN GLYCOSYLATED A1C: CPT | Performed by: FAMILY MEDICINE

## 2020-03-16 PROCEDURE — 84443 ASSAY THYROID STIM HORMONE: CPT | Performed by: FAMILY MEDICINE

## 2020-03-16 PROCEDURE — 82728 ASSAY OF FERRITIN: CPT | Performed by: FAMILY MEDICINE

## 2020-03-16 PROCEDURE — 36415 COLL VENOUS BLD VENIPUNCTURE: CPT | Performed by: FAMILY MEDICINE

## 2020-03-16 PROCEDURE — 99215 OFFICE O/P EST HI 40 MIN: CPT | Performed by: FAMILY MEDICINE

## 2020-03-16 PROCEDURE — 83540 ASSAY OF IRON: CPT | Performed by: FAMILY MEDICINE

## 2020-03-16 PROCEDURE — 99207 C FOOT EXAM  NO CHARGE: CPT | Performed by: FAMILY MEDICINE

## 2020-03-16 PROCEDURE — 80061 LIPID PANEL: CPT | Performed by: FAMILY MEDICINE

## 2020-03-16 ASSESSMENT — MIFFLIN-ST. JEOR: SCORE: 1304.62

## 2020-03-16 NOTE — PATIENT INSTRUCTIONS
Check UA at home    Apply steroid to sptt on base of right thumb twice a day x 7 days    Boot cast for 4-6 weeks, see back in one month to recheck

## 2020-03-16 NOTE — LETTER
March 20, 2020      Stefanie Velazquez  [address]  [address] [place] [phone]      Dear ,    We are writing to inform you of your test results.    Your glucose, or blood sugar, was 227.   Hemoglobin A1C measures the average of the blood sugar over several months. If you have diabetes, the goal is under 8%. Yours is very good at 6.9%  Hemoglobin measures the amount of red blood cells carrying oxygen to the body's tissues. Yours was back to normal on the iron. Your stores of iron are coming up as well.   Electrolytes, including sodium, potassium, chloride, bicarbonate and calcium are all normal salts in the bloodstream. Yours are all normal.  Urea nitrogen and creatinine are kidney function tests. Yours are again elevated, but stable.  ALT and AST are liver function tests. Yours are normal. Your protein level is still a little low, try to eat more protein.   Your cholesterol was great.   The TSH looks at thyroid function.  Yours was normal.    Resulted Orders   Hemoglobin A1c   Result Value Ref Range    Hemoglobin A1C 6.9 (H) 0 - 5.6 %      Comment:      Normal <5.7% Prediabetes 5.7-6.4%  Diabetes 6.5% or higher - adopted from ADA   consensus guidelines.     Lipid panel reflex to direct LDL Fasting   Result Value Ref Range    Cholesterol 144 <200 mg/dL    Triglycerides 142 <150 mg/dL      Comment:      Non Fasting    HDL Cholesterol 65 >49 mg/dL    LDL Cholesterol Calculated 51 <100 mg/dL      Comment:      Desirable:       <100 mg/dl    Non HDL Cholesterol 79 <130 mg/dL   TSH   Result Value Ref Range    TSH 1.94 0.40 - 4.00 mU/L   Ferritin   Result Value Ref Range    Ferritin 31 8 - 252 ng/mL   Comprehensive metabolic panel (BMP + Alb, Alk Phos, ALT, AST, Total. Bili, TP)   Result Value Ref Range    Sodium 140 133 - 144 mmol/L    Potassium 3.8 3.4 - 5.3 mmol/L    Chloride 108 94 - 109 mmol/L    Carbon Dioxide 28 20 - 32 mmol/L    Anion Gap 4 3 - 14 mmol/L    Glucose 227 (H) 70 - 99 mg/dL       Comment:      Non Fasting    Urea Nitrogen 27 7 - 30 mg/dL    Creatinine 1.44 (H) 0.52 - 1.04 mg/dL    GFR Estimate 35 (L) >60 mL/min/[1.73_m2]      Comment:      Non  GFR Calc  Starting 12/18/2018, serum creatinine based estimated GFR (eGFR) will be   calculated using the Chronic Kidney Disease Epidemiology Collaboration   (CKD-EPI) equation.      GFR Estimate If Black 40 (L) >60 mL/min/[1.73_m2]      Comment:       GFR Calc  Starting 12/18/2018, serum creatinine based estimated GFR (eGFR) will be   calculated using the Chronic Kidney Disease Epidemiology Collaboration   (CKD-EPI) equation.      Calcium 8.9 8.5 - 10.1 mg/dL    Bilirubin Total 0.5 0.2 - 1.3 mg/dL    Albumin 3.0 (L) 3.4 - 5.0 g/dL    Protein Total 6.7 (L) 6.8 - 8.8 g/dL    Alkaline Phosphatase 110 40 - 150 U/L    ALT 15 0 - 50 U/L    AST 24 0 - 45 U/L   CBC with platelets   Result Value Ref Range    WBC 3.2 (L) 4.0 - 11.0 10e9/L    RBC Count 4.15 3.8 - 5.2 10e12/L    Hemoglobin 12.3 11.7 - 15.7 g/dL    Hematocrit 37.6 35.0 - 47.0 %    MCV 91 78 - 100 fl    MCH 29.6 26.5 - 33.0 pg    MCHC 32.7 31.5 - 36.5 g/dL    RDW 14.5 10.0 - 15.0 %    Platelet Count 177 150 - 450 10e9/L   Iron and iron binding capacity   Result Value Ref Range    Iron 50 35 - 180 ug/dL    Iron Binding Cap 316 240 - 430 ug/dL    Iron Saturation Index 16 15 - 46 %       If you have any questions or concerns, please call the clinic at the number listed above.       Sincerely,        Sandra Morales MD

## 2020-03-16 NOTE — PROGRESS NOTES
Subjective     Stefanie Velazquez is a 75 year old female who presents to clinic today for the following health issues:    HPI   Face to face visit for C-PAP  She has a CPAP machine that is not adjustable, it is leaking air. She has been using it every night.   She needed to see me to have an MD certify her according to her assisted living, although she did see Dr Velez in Sept. She has had several sleep studies that show sleep apnea. Recent Overnight oximetry confirmed. She thinks the mask isn't quite right as she feels the air blow over her forehead.     Diabetes Follow-up    How often are you checking your blood sugar? Four or more times daily  Blood sugar testing frequency justification:  Adjustment of medication(s)  What time of day are you checking your blood sugars (select all that apply)?  Before and after meals  Have you had any blood sugars above 200?  Yes   Have you had any blood sugars below 70?  No    What symptoms do you notice when your blood sugar is low?  None    What concerns do you have today about your diabetes? None     Do you have any of these symptoms? (Select all that apply)  Numbness in feet and Burning in feet    Have you had a diabetic eye exam in the last 12 months? No     Lab Results   Component Value Date    A1C 7.7 11/08/2019    A1C 7.8 08/08/2019    A1C 8.7 05/02/2019    A1C 8.1 01/31/2019    A1C 8.0 10/30/2018      she is on Novolog and Lantus  blood sugars are better.   ams this month 88, 101, 107, 133, 214, 154, 133  Lung and dinner 133-345, 168-300 but mostly 200s, night time 148-270.     Patient has Type 1 or 2 diabetes and  is checking blood sugars 4 times daily.  Patient is taking 4 multiple daily injections/  Patient requires frequent adjustments to their insulin treatment regimen and would benefit greatly from a personal continuous glucose monitoring system.    Hyperlipidemia Follow-Up      Are you regularly taking any medication or supplement to lower your cholesterol?    Yes- simvastatin    Are you having muscle aches or other side effects that you think could be caused by your cholesterol lowering medication?  No  Recent Labs   Lab Test 03/01/19  0742 08/31/18  0905  09/16/15  1130 03/23/15  0926   CHOL 133 185   < > 120 112   HDL 46* 51   < > 46* 44*   LDL 44 107*   < > 50 41   TRIG 215* 134   < > 118 134   CHOLHDLRATIO  --   --   --  2.6 2.5    < > = values in this interval not displayed.      Hypertension Follow-up      Do you check your blood pressure regularly outside of the clinic? Yes     Are you following a low salt diet? Yes    Are your blood pressures ever more than 140 on the top number (systolic) OR more   than 90 on the bottom number (diastolic), for example 140/90? Yes  BP Readings from Last 6 Encounters:   03/16/20 134/60   01/03/20 128/70   12/31/19 (!) 150/72   11/08/19 104/58   09/27/19 127/51   09/23/19 112/50      She is on lasix and potassium, ibersartan, I stopped metoprolol  She has blood pressure readings from her assisted living and they are ok, mostly in the 130s-140s,/60-70s., pulses 60s-70s     Hypothyroidism Follow-up      Since last visit, patient describes the following symptoms: Weight stable, no hair loss, no skin changes, no constipation,  loose stools    TSH   Date Value Ref Range Status   05/02/2019 0.48 0.40 - 4.00 mU/L Final      History of iron deficiency anemia  Hemoglobin in Nov was 9.4,   Last ferritin was 7. FIT test was negative.  Dec hemoglobin was 10.8.    She has been on an iron daily.     Urinary tract infections  Had one in [place] was e coli, pansensitive.   Another in Feb, e coli, again pansensitive,   Some odor now and urinary frequency.   Unfortunately, just urinated here.     Fell last week out of bed, said she rolled over and fell out. Hit her right foot and has a lot of pain in it. Doesn't do a lot of walking but hurts when she does.    Chronic kidney disease   Stage 3, has been stable for awhile. At one point went into the stage  4 and nephrology consult was recommended, but she didn't make it there. She has been better since then. GFR 30-35 baseline last 6 months    Palpitations  Zio patch only showed 4 sec run of SVT    Low back pain  Is somewhat better. Doing physical therapy at assisted living. Started gabapentin.     BP Readings from Last 2 Encounters:   03/16/20 134/60   01/03/20 128/70     Hemoglobin A1C (%)   Date Value   03/16/2020 6.9 (H)   11/08/2019 7.7 (H)     LDL Cholesterol Calculated (mg/dL)   Date Value   03/01/2019 44   08/31/2018 107 (H)                 Recent Labs   Lab Test 03/16/20  1041 12/31/19  1306 11/08/19  1408  08/08/19  1437  05/02/19  1447  03/01/19  0742  08/31/18  0905   A1C 6.9*  --  7.7*  --  7.8*  --  8.7*  --   --    < >  --    LDL 51  --   --   --   --   --   --   --  44  --  107*   HDL 65  --   --   --   --   --   --   --  46*  --  51   TRIG 142  --   --   --   --   --   --   --  215*  --  134   ALT 15 20 20   < >  --    < >  --    < > 21   < > 13   CR 1.44* 1.39* 1.65*   < > 1.82*   < >  --    < > 1.86*   < > 1.45*   GFRESTIMATED 35* 37* 30*   < > 26*   < >  --    < > 26*   < > 35*   GFRESTBLACK 40* 42* 34*   < > 31*   < >  --    < > 30*   < > 43*   POTASSIUM 3.8 4.0 4.4   < > 4.3   < >  --    < > 4.2   < > 3.4   TSH 1.94  --   --   --   --   --  0.48  --   --    < > 4.24*    < > = values in this interval not displayed.      BP Readings from Last 3 Encounters:   03/16/20 134/60   01/03/20 128/70   12/31/19 (!) 150/72    Wt Readings from Last 3 Encounters:   03/16/20 89.8 kg (198 lb)   01/03/20 91.2 kg (201 lb)   11/08/19 91.2 kg (201 lb)                      Reviewed and updated as needed this visit by Provider         Review of Systems   ROS COMP: Constitutional, HEENT, cardiovascular, pulmonary, gi and gu systems are negative, except as otherwise noted.      Objective    /60 (BP Location: Right arm, Patient Position: Chair, Cuff Size: Adult Large)   Pulse 63   Temp 97.1  F (36.2  C)  (Tympanic)   Resp 16   Ht 1.524 m (5')   Wt 89.8 kg (198 lb)   SpO2 97%   BMI 38.67 kg/m    Body mass index is 38.67 kg/m .  Physical Exam   General: appears well, no distress, comfortable sitting in wheelchair  HEENT: hearing aides in place, sclera nonicteric, very hard of hearing  Neck: supple, no adenopathy, no JVD   Heart: regular rate and rhythm, normal S1S2, no murmur  Lungs: clear to ascultation   Abd: soft, nontender,  no mass or distention   Ext: trace edema, lymphedema wraps in place  Right foot with mild edema, no erythema, hint of ecchymoses big toe and below  Tenderness proximal big toe  Feet with good sensation and no lesions  1 cm dry scaly lesion base of thumb    Lab Results   Component Value Date    A1C 6.9 03/16/2020    A1C 7.7 11/08/2019    A1C 7.8 08/08/2019    A1C 8.7 05/02/2019    A1C 8.1 01/31/2019        Xray read by myself and shows a nondisplace fracture proximal phalynx first toe    (G47.33) RC-moderate (AHI 12, 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 Bili, TP), insulin glargine (LANTUS PEN)        100 UNIT/ML pen            (D50.8) Iron deficiency anemia secondary to inadequate dietary iron intake  Comment: improving  Plan: Ferritin, CBC with platelets, Iron and iron         binding capacity            (I10) Benign essential hypertension  Comment: fair control  Plan: monitor    (N18.4) CKD (chronic kidney disease) stage 4, GFR 15-29 ml/min (H)  Comment: stable  Plan: Comprehensive metabolic panel (BMP + Alb, Alk         Phos, ALT, AST,  Total. Bili, TP)        monitor    (E78.5) Hyperlipidemia LDL goal <100  Comment: controled  Plan: Lipid panel reflex to direct LDL Fasting           (E03.8) Other specified hypothyroidism  Comment: long standing  Plan: TSH        Adjust as needed    (C20) Rectal cancer (H)  Comment:   Plan: followed by Dr Martinez    (W19.XXXA,  Y92.009) Fall at home, initial encounter  Comment: out of bed  Plan: XR Foot Right G/E 3 Views            (M79.671) Right foot pain  Comment:   Plan: XR Foot Right G/E 3 Views, Ankle/Foot Bracing         Supplies Order for DME - ONLY FOR DME            (S92.404A) Closed nondisplaced fracture of phalanx of right great toe, unspecified phalanx, initial encounter  Comment: acute  Plan: Ankle/Foot Bracing Supplies Order for DME -         ONLY FOR DME        Boot cast, xray 4-6 weeks due to COVID 19 outbreak    (M48.061) Spinal stenosis of lumbar region without neurogenic claudication  Comment: better  Plan: continue gabapentin    (M54.16) Lumbar radiculopathy  Comment: improved  Plan: continue gabapentin    40 min spent with patient, greater than 50% in counseling and coordination of care, discussion and dealing with multiple issues, discussion of sleep apnea treatment    UA came back positive for e coli, will treat with cipro     Patient Instructions   Check UA at home    Apply steroid to sptt on base of right thumb twice a day x 7 days    Boot cast for 4-6 weeks, see back in one month to recheck       Sandra Bernstein MD

## 2020-03-16 NOTE — NURSING NOTE
Chief Complaint   Patient presents with     Diabetes     CPAP Follow Up       Initial There were no vitals taken for this visit. Estimated body mass index is 39.26 kg/m  as calculated from the following:    Height as of 1/3/20: 1.524 m (5').    Weight as of 1/3/20: 91.2 kg (201 lb).    Patient presents to the clinic using Wheel Chair    Health Maintenance that is potentially due pending provider review:  NONE    n/a    Is there anyone who you would like to be able to receive your results? Yes  If yes have patient fill out HELLEN

## 2020-03-18 ENCOUNTER — HOSPITAL LABORATORY (OUTPATIENT)
Facility: OTHER | Age: 75
End: 2020-03-18

## 2020-03-18 LAB
ALBUMIN UR-MCNC: NEGATIVE MG/DL
APPEARANCE UR: CLEAR
BACTERIA #/AREA URNS HPF: ABNORMAL /HPF
BILIRUB UR QL STRIP: NEGATIVE
COLOR UR AUTO: YELLOW
GLUCOSE UR STRIP-MCNC: NEGATIVE MG/DL
HGB UR QL STRIP: ABNORMAL
KETONES UR STRIP-MCNC: NEGATIVE MG/DL
LEUKOCYTE ESTERASE UR QL STRIP: ABNORMAL
MUCOUS THREADS #/AREA URNS LPF: PRESENT /LPF
NITRATE UR QL: NEGATIVE
PH UR STRIP: 7 PH (ref 5–7)
RBC #/AREA URNS AUTO: 2 /HPF (ref 0–2)
SOURCE: ABNORMAL
SP GR UR STRIP: 1.01 (ref 1–1.03)
SQUAMOUS #/AREA URNS AUTO: <1 /HPF (ref 0–1)
UROBILINOGEN UR STRIP-MCNC: 0 MG/DL (ref 0–2)
WBC #/AREA URNS AUTO: 53 /HPF (ref 0–5)

## 2020-03-19 LAB
BACTERIA SPEC CULT: ABNORMAL
BACTERIA SPEC CULT: ABNORMAL
Lab: ABNORMAL
SPECIMEN SOURCE: ABNORMAL

## 2020-03-19 RX ORDER — CIPROFLOXACIN 250 MG/1
250 TABLET, FILM COATED ORAL 2 TIMES DAILY
Qty: 14 TABLET | Refills: 0 | Status: ON HOLD | OUTPATIENT
Start: 2020-03-19 | End: 2020-04-11

## 2020-03-20 ENCOUNTER — HOSPITAL LABORATORY (OUTPATIENT)
Facility: OTHER | Age: 75
End: 2020-03-20

## 2020-03-20 ENCOUNTER — TELEPHONE (OUTPATIENT)
Dept: FAMILY MEDICINE | Facility: CLINIC | Age: 75
End: 2020-03-20

## 2020-03-20 DIAGNOSIS — N39.0 URINARY TRACT INFECTION: ICD-10-CM

## 2020-03-20 DIAGNOSIS — R30.0 DYSURIA: Primary | ICD-10-CM

## 2020-03-20 NOTE — TELEPHONE ENCOUNTER
Notify her assisted living that she does have a UTI and that I sent in an antibiotic for her (cipro 250 mg bid x 7 days). [facility]   MD Mell Rutherford, caregiver, notified and states understands results and advice.  She is requesting an order to be mailed to her.

## 2020-03-26 LAB — CEA SERPL-MCNC: 3.1 UG/L (ref 0–2.5)

## 2020-03-30 ENCOUNTER — PATIENT OUTREACH (OUTPATIENT)
Dept: GERIATRIC MEDICINE | Facility: CLINIC | Age: 75
End: 2020-03-30

## 2020-04-01 ENCOUNTER — MEDICAL CORRESPONDENCE (OUTPATIENT)
Dept: HEALTH INFORMATION MANAGEMENT | Facility: CLINIC | Age: 75
End: 2020-04-01

## 2020-04-06 ENCOUNTER — TELEPHONE (OUTPATIENT)
Dept: FAMILY MEDICINE | Facility: CLINIC | Age: 75
End: 2020-04-06

## 2020-04-06 ENCOUNTER — APPOINTMENT (OUTPATIENT)
Dept: CT IMAGING | Facility: CLINIC | Age: 75
DRG: 064 | End: 2020-04-06
Attending: EMERGENCY MEDICINE
Payer: COMMERCIAL

## 2020-04-06 ENCOUNTER — NURSE TRIAGE (OUTPATIENT)
Dept: NURSING | Facility: CLINIC | Age: 75
End: 2020-04-06

## 2020-04-06 ENCOUNTER — HOSPITAL ENCOUNTER (INPATIENT)
Facility: CLINIC | Age: 75
LOS: 6 days | Discharge: NURSING, REHAB OR LONG-TERM CARE | DRG: 064 | End: 2020-04-13
Attending: EMERGENCY MEDICINE | Admitting: FAMILY MEDICINE
Payer: COMMERCIAL

## 2020-04-06 ENCOUNTER — APPOINTMENT (OUTPATIENT)
Dept: GENERAL RADIOLOGY | Facility: CLINIC | Age: 75
DRG: 064 | End: 2020-04-06
Attending: EMERGENCY MEDICINE
Payer: COMMERCIAL

## 2020-04-06 DIAGNOSIS — K21.9 GASTROESOPHAGEAL REFLUX DISEASE, ESOPHAGITIS PRESENCE NOT SPECIFIED: Primary | Chronic | ICD-10-CM

## 2020-04-06 DIAGNOSIS — R41.82 ALTERED MENTAL STATUS, UNSPECIFIED ALTERED MENTAL STATUS TYPE: ICD-10-CM

## 2020-04-06 DIAGNOSIS — R41.82 ALTERED MENTAL STATUS: ICD-10-CM

## 2020-04-06 DIAGNOSIS — E87.20 ACIDOSIS: ICD-10-CM

## 2020-04-06 DIAGNOSIS — R13.12 OROPHARYNGEAL DYSPHAGIA: ICD-10-CM

## 2020-04-06 DIAGNOSIS — R79.89 ELEVATED LACTIC ACID LEVEL: ICD-10-CM

## 2020-04-06 DIAGNOSIS — R79.89 ELEVATED TROPONIN: ICD-10-CM

## 2020-04-06 DIAGNOSIS — N28.9 RENAL INSUFFICIENCY: ICD-10-CM

## 2020-04-06 LAB
ALBUMIN SERPL-MCNC: 3.3 G/DL (ref 3.4–5)
ALBUMIN UR-MCNC: 100 MG/DL
ALP SERPL-CCNC: 107 U/L (ref 40–150)
ALT SERPL W P-5'-P-CCNC: 26 U/L (ref 0–50)
ANION GAP SERPL CALCULATED.3IONS-SCNC: 8 MMOL/L (ref 3–14)
APPEARANCE UR: ABNORMAL
AST SERPL W P-5'-P-CCNC: 38 U/L (ref 0–45)
BACTERIA #/AREA URNS HPF: ABNORMAL /HPF
BASOPHILS # BLD AUTO: 0.1 10E9/L (ref 0–0.2)
BASOPHILS NFR BLD AUTO: 1.3 %
BILIRUB SERPL-MCNC: 0.8 MG/DL (ref 0.2–1.3)
BILIRUB UR QL STRIP: NEGATIVE
BUN SERPL-MCNC: 39 MG/DL (ref 7–30)
CALCIUM SERPL-MCNC: 10.2 MG/DL (ref 8.5–10.1)
CHLORIDE SERPL-SCNC: 118 MMOL/L (ref 94–109)
CO2 SERPL-SCNC: 22 MMOL/L (ref 20–32)
COLOR UR AUTO: YELLOW
CREAT SERPL-MCNC: 2.05 MG/DL (ref 0.52–1.04)
DIFFERENTIAL METHOD BLD: NORMAL
EOSINOPHIL # BLD AUTO: 0.1 10E9/L (ref 0–0.7)
EOSINOPHIL NFR BLD AUTO: 1.9 %
ERYTHROCYTE [DISTWIDTH] IN BLOOD BY AUTOMATED COUNT: 13.5 % (ref 10–15)
GFR SERPL CREATININE-BSD FRML MDRD: 23 ML/MIN/{1.73_M2}
GLUCOSE SERPL-MCNC: 121 MG/DL (ref 70–99)
GLUCOSE UR STRIP-MCNC: NEGATIVE MG/DL
HCT VFR BLD AUTO: 45.5 % (ref 35–47)
HGB BLD-MCNC: 14.6 G/DL (ref 11.7–15.7)
HGB UR QL STRIP: ABNORMAL
HYALINE CASTS #/AREA URNS LPF: 7 /LPF (ref 0–2)
IMM GRANULOCYTES # BLD: 0 10E9/L (ref 0–0.4)
IMM GRANULOCYTES NFR BLD: 0.2 %
KETONES UR STRIP-MCNC: NEGATIVE MG/DL
LACTATE BLD-SCNC: 2.4 MMOL/L (ref 0.7–2)
LEUKOCYTE ESTERASE UR QL STRIP: ABNORMAL
LIPASE SERPL-CCNC: 221 U/L (ref 73–393)
LYMPHOCYTES # BLD AUTO: 2.1 10E9/L (ref 0.8–5.3)
LYMPHOCYTES NFR BLD AUTO: 35.5 %
MCH RBC QN AUTO: 30 PG (ref 26.5–33)
MCHC RBC AUTO-ENTMCNC: 32.1 G/DL (ref 31.5–36.5)
MCV RBC AUTO: 93 FL (ref 78–100)
MONOCYTES # BLD AUTO: 0.6 10E9/L (ref 0–1.3)
MONOCYTES NFR BLD AUTO: 10.4 %
MUCOUS THREADS #/AREA URNS LPF: PRESENT /LPF
NEUTROPHILS # BLD AUTO: 3 10E9/L (ref 1.6–8.3)
NEUTROPHILS NFR BLD AUTO: 50.7 %
NITRATE UR QL: NEGATIVE
NRBC # BLD AUTO: 0 10*3/UL
NRBC BLD AUTO-RTO: 0 /100
PH UR STRIP: 5 PH (ref 5–7)
PLATELET # BLD AUTO: 251 10E9/L (ref 150–450)
POTASSIUM SERPL-SCNC: 4.4 MMOL/L (ref 3.4–5.3)
PROT SERPL-MCNC: 7.7 G/DL (ref 6.8–8.8)
RBC # BLD AUTO: 4.87 10E12/L (ref 3.8–5.2)
RBC #/AREA URNS AUTO: >182 /HPF (ref 0–2)
SODIUM SERPL-SCNC: 148 MMOL/L (ref 133–144)
SOURCE: ABNORMAL
SP GR UR STRIP: 1.01 (ref 1–1.03)
TROPONIN I SERPL-MCNC: 0.05 UG/L (ref 0–0.04)
UROBILINOGEN UR STRIP-MCNC: 0 MG/DL (ref 0–2)
WBC # BLD AUTO: 5.9 10E9/L (ref 4–11)
WBC #/AREA URNS AUTO: 13 /HPF (ref 0–5)

## 2020-04-06 PROCEDURE — 25000128 H RX IP 250 OP 636: Performed by: EMERGENCY MEDICINE

## 2020-04-06 PROCEDURE — 93010 ELECTROCARDIOGRAM REPORT: CPT | Mod: Z6 | Performed by: EMERGENCY MEDICINE

## 2020-04-06 PROCEDURE — 99291 CRITICAL CARE FIRST HOUR: CPT | Mod: 25 | Performed by: EMERGENCY MEDICINE

## 2020-04-06 PROCEDURE — 96367 TX/PROPH/DG ADDL SEQ IV INF: CPT | Performed by: EMERGENCY MEDICINE

## 2020-04-06 PROCEDURE — 85025 COMPLETE CBC W/AUTO DIFF WBC: CPT | Performed by: EMERGENCY MEDICINE

## 2020-04-06 PROCEDURE — 87086 URINE CULTURE/COLONY COUNT: CPT | Performed by: EMERGENCY MEDICINE

## 2020-04-06 PROCEDURE — 80053 COMPREHEN METABOLIC PANEL: CPT | Performed by: EMERGENCY MEDICINE

## 2020-04-06 PROCEDURE — 99285 EMERGENCY DEPT VISIT HI MDM: CPT | Mod: 25 | Performed by: EMERGENCY MEDICINE

## 2020-04-06 PROCEDURE — 96365 THER/PROPH/DIAG IV INF INIT: CPT | Performed by: EMERGENCY MEDICINE

## 2020-04-06 PROCEDURE — 83605 ASSAY OF LACTIC ACID: CPT | Performed by: EMERGENCY MEDICINE

## 2020-04-06 PROCEDURE — 81001 URINALYSIS AUTO W/SCOPE: CPT | Performed by: EMERGENCY MEDICINE

## 2020-04-06 PROCEDURE — 25000132 ZZH RX MED GY IP 250 OP 250 PS 637: Performed by: EMERGENCY MEDICINE

## 2020-04-06 PROCEDURE — 70450 CT HEAD/BRAIN W/O DYE: CPT

## 2020-04-06 PROCEDURE — 87040 BLOOD CULTURE FOR BACTERIA: CPT | Performed by: EMERGENCY MEDICINE

## 2020-04-06 PROCEDURE — 25800030 ZZH RX IP 258 OP 636: Performed by: EMERGENCY MEDICINE

## 2020-04-06 PROCEDURE — 84484 ASSAY OF TROPONIN QUANT: CPT | Performed by: EMERGENCY MEDICINE

## 2020-04-06 PROCEDURE — 93005 ELECTROCARDIOGRAM TRACING: CPT | Performed by: EMERGENCY MEDICINE

## 2020-04-06 PROCEDURE — 71046 X-RAY EXAM CHEST 2 VIEWS: CPT

## 2020-04-06 PROCEDURE — 83690 ASSAY OF LIPASE: CPT | Performed by: EMERGENCY MEDICINE

## 2020-04-06 RX ORDER — CEFAZOLIN SODIUM 1 G/50ML
2000 SOLUTION INTRAVENOUS
Status: DISCONTINUED | OUTPATIENT
Start: 2020-04-06 | End: 2020-04-07

## 2020-04-06 RX ADMIN — Medication 2.5 MG: at 22:44

## 2020-04-06 RX ADMIN — TAZOBACTAM SODIUM AND PIPERACILLIN SODIUM 3.38 G: 375; 3 INJECTION, SOLUTION INTRAVENOUS at 22:54

## 2020-04-06 RX ADMIN — VANCOMYCIN HYDROCHLORIDE 2000 MG: 10 INJECTION, POWDER, LYOPHILIZED, FOR SOLUTION INTRAVENOUS at 23:11

## 2020-04-06 RX ADMIN — SODIUM CHLORIDE 1000 ML: 9 INJECTION, SOLUTION INTRAVENOUS at 21:11

## 2020-04-06 NOTE — TELEPHONE ENCOUNTER
[facility] care RN called-Pt has been watching the news and is now concerned she has covid. Also very paranoid. Pt has no physical symptoms of covid. RN states she has actually been doing very well. Now she will not eat or take her medications since yesterday am. She will not let anyone touch her. Neuro intact. States pt is tracking well. [phone] is cell for home care RN.  Wilda Naqvi RN

## 2020-04-06 NOTE — LETTER
Transition Communication Hand-off for Care Transitions to Next Level of Care Provider    Name: Stefanie Velazquez  : 1945  MRN #: [id number]  Primary Care Provider: Sandra Morales  Primary Care MD Name: Amandeep  Primary Clinic: [address] [facility] [address]  Primary Care Clinic Name: [facility]  Reason for Hospitalization:  Renal insufficiency [N28.9]  Elevated troponin [R79.89]  Elevated lactic acid level [R79.89]  Altered mental status, unspecified altered mental status type [R41.82]  Admit Date/Time: 2020  7:04 PM  Discharge Date: 2020  Payor Source: Payor: ARE / Plan: [facility] [facility] [facility] / Product Type: HMO     Readmission Assessment Measure (DEVON) Risk Score/category:  Medium    Reason for Communication Hand-off Referral:  Enrolled in  [employer] care coordination.    Discharge Plan: Return to [facility] (Phone: [phone] Fax:[phone]) via [facility] wheelchair ride.  [facility] ([phone] Fax: [phone]) for PT services ordered.     Concern for non-adherence with plan of care:   Y/N  No  Discharge Needs Assessment:  Needs      Most Recent Value   Equipment Currently Used at Home  walker, standard, wheelchair, manual        Follow-up plan:    Future Appointments   Date Time Provider Department Center   2020 11:00 AM Apolinar Martinez MD [facility]   2020  1:40 PM Tanya Rodney APRN [facility]     Key Recommendations:  Return to [facility] with  Home Care for PT services.    ARIELLA Martinez    AVS/Discharge Summary is the source of truth; this is a helpful guide for improved communication of patient story

## 2020-04-06 NOTE — TELEPHONE ENCOUNTER
Pt called-Refused to talk stating she will only talk to Dr Medina. Video visit made for tomorrow. JEWELS engle will assist pt with the visit. Wilda Naqvi RN

## 2020-04-06 NOTE — TELEPHONE ENCOUNTER
Reason for call:  Patient reporting a symptom    Symptom or request: Pt has had a Mental Upset this past weekend.  [facility] would like to discuss with Nurse.    Phone Number patient can be reached at:  Home number on file [phone] (home)    Best Time:  Any Time      Can we leave a detailed message on this number:  YES    Call taken on 4/6/2020 at 10:42 AM by Leilani Jurado

## 2020-04-06 NOTE — TELEPHONE ENCOUNTER
She needs to eat and take her medications!! I have to make a call right now and then am done for the day, so please call her and tell her that. Even if she has COVID she has to eat and take her medications! If I need to talk to her, can do so tomorrow.

## 2020-04-06 NOTE — TELEPHONE ENCOUNTER
"Mell and COURTNEY with [facility] living facility in [place] calling:  \"Yesterday she started acting so unusually not herself\".  Staff at her living facility are very worried about her.    I reviewed Dr. Morales's notes with Mell and they did schedule an phone visit with Dr. Morales for tomorrow.      Mell reports that Concha had an unwitnessed fall on 4/1/2020.  There is no evidence of head injury, however she said anything is possible.  She also had an episode of vomiting yesterday.    Mell stresses that her behavior is so far off her baseline that they are very worried.    Assisted living will need to arrange medical transport.    Discussed with Mell any possibility of a UTI.  Concha was most recently treated for UTI in March, finished abx treatment on 3/27.  She also had UTI at end of December and was treated with abx.    Mell reports that there was no confusion with the prior UTI's.  Mell denies any sxs of UTI.    Paged on call provider for [facility] group to speak to me at [facility].  Dr. Contreras is on call, page sent @ 5:34 pm via smart web. 2ND page sent @ 5:46 pm.    Dr. Contreras is concerned that due to recent fall she should be assessed in person to rule out injury from fall that is causing confusion.  Notified Mell at [facility] of Dr. Contreras's recommendation, she appears to understand directives and agrees with plan.    Reason for Disposition    Very strange or paranoid behavior    Additional Information    Negative: [1] Difficult to awaken or acting confused (e.g., disoriented, slurred speech) AND [2] present now AND [3] has diabetes (diabetes mellitus)    Negative: [1] Difficult to awaken or acting confused (e.g., disoriented, slurred speech) AND [2] present now AND [3] new onset    Negative: [1] Weakness of the face, arm, or leg on one side of the body AND [2] new onset    Negative: [1] Numbness of the face, arm, or leg on one side of the body AND [2] new onset    Negative: [1] Loss of speech or garbled speech AND [2] " new onset    Negative: Difficulty breathing or bluish lips    Negative: Shock suspected (e.g., cold/pale/clammy skin, too weak to stand, low BP, rapid pulse)    Negative: Seeing, hearing, or feeling things that are not there (i.e., visual, auditory, or tactile hallucinations)    Negative: Followed a head injury    Negative: Drug overdose suspected    Negative: Sounds like a life-threatening emergency to the triager    Negative: Alcohol use, abuse or dependence: question or problem related to    Negative: Drug abuse or dependence: question or problem related to    Negative: Headache or vomiting    Negative: Stiff neck (can't touch chin to chest)    Protocols used: CONFUSION - DELIRIUM-A-AH    Kyra Dee RN  [facility]

## 2020-04-07 ENCOUNTER — ANESTHESIA (OUTPATIENT)
Dept: INTENSIVE CARE | Facility: CLINIC | Age: 75
DRG: 064 | End: 2020-04-07
Payer: COMMERCIAL

## 2020-04-07 ENCOUNTER — APPOINTMENT (OUTPATIENT)
Dept: GENERAL RADIOLOGY | Facility: CLINIC | Age: 75
DRG: 064 | End: 2020-04-07
Attending: FAMILY MEDICINE
Payer: COMMERCIAL

## 2020-04-07 ENCOUNTER — ANESTHESIA EVENT (OUTPATIENT)
Dept: INTENSIVE CARE | Facility: CLINIC | Age: 75
DRG: 064 | End: 2020-04-07
Payer: COMMERCIAL

## 2020-04-07 PROBLEM — R41.82 ALTERED MENTAL STATUS: Status: ACTIVE | Noted: 2020-04-07

## 2020-04-07 PROBLEM — G93.41 ACUTE METABOLIC ENCEPHALOPATHY: Status: ACTIVE | Noted: 2020-04-07

## 2020-04-07 LAB
ALBUMIN SERPL-MCNC: 3.3 G/DL (ref 3.4–5)
ALP SERPL-CCNC: 100 U/L (ref 40–150)
ALT SERPL W P-5'-P-CCNC: 26 U/L (ref 0–50)
ANION GAP SERPL CALCULATED.3IONS-SCNC: 12 MMOL/L (ref 3–14)
ANION GAP SERPL CALCULATED.3IONS-SCNC: 5 MMOL/L (ref 3–14)
AST SERPL W P-5'-P-CCNC: 53 U/L (ref 0–45)
BASE DEFICIT BLDV-SCNC: 0.2 MMOL/L
BASE DEFICIT BLDV-SCNC: 0.2 MMOL/L
BASE DEFICIT BLDV-SCNC: 2.6 MMOL/L
BASE DEFICIT BLDV-SCNC: 2.8 MMOL/L
BILIRUB SERPL-MCNC: 1.1 MG/DL (ref 0.2–1.3)
BUN SERPL-MCNC: 38 MG/DL (ref 7–30)
BUN SERPL-MCNC: 38 MG/DL (ref 7–30)
CALCIUM SERPL-MCNC: 8.3 MG/DL (ref 8.5–10.1)
CALCIUM SERPL-MCNC: 9 MG/DL (ref 8.5–10.1)
CHLORIDE SERPL-SCNC: 119 MMOL/L (ref 94–109)
CHLORIDE SERPL-SCNC: 120 MMOL/L (ref 94–109)
CK SERPL-CCNC: 1221 U/L (ref 30–225)
CK SERPL-CCNC: 1802 U/L (ref 30–225)
CO2 SERPL-SCNC: 20 MMOL/L (ref 20–32)
CO2 SERPL-SCNC: 25 MMOL/L (ref 20–32)
CREAT SERPL-MCNC: 2.05 MG/DL (ref 0.52–1.04)
CREAT SERPL-MCNC: 2.32 MG/DL (ref 0.52–1.04)
CREAT UR-MCNC: 162 MG/DL
CRP SERPL-MCNC: 7.8 MG/L (ref 0–8)
ERYTHROCYTE [DISTWIDTH] IN BLOOD BY AUTOMATED COUNT: 13.2 % (ref 10–15)
FRACT EXCRET NA UR+SERPL-RTO: 0.2 %
GFR SERPL CREATININE-BSD FRML MDRD: 20 ML/MIN/{1.73_M2}
GFR SERPL CREATININE-BSD FRML MDRD: 23 ML/MIN/{1.73_M2}
GLUCOSE BLDC GLUCOMTR-MCNC: 120 MG/DL (ref 70–99)
GLUCOSE BLDC GLUCOMTR-MCNC: 125 MG/DL (ref 70–99)
GLUCOSE BLDC GLUCOMTR-MCNC: 139 MG/DL (ref 70–99)
GLUCOSE SERPL-MCNC: 117 MG/DL (ref 70–99)
GLUCOSE SERPL-MCNC: 128 MG/DL (ref 70–99)
HCO3 BLDV-SCNC: 22 MMOL/L (ref 21–28)
HCO3 BLDV-SCNC: 22 MMOL/L (ref 21–28)
HCO3 BLDV-SCNC: 23 MMOL/L (ref 21–28)
HCO3 BLDV-SCNC: 24 MMOL/L (ref 21–28)
HCT VFR BLD AUTO: 42.1 % (ref 35–47)
HGB BLD-MCNC: 13.9 G/DL (ref 11.7–15.7)
MCH RBC QN AUTO: 30.8 PG (ref 26.5–33)
MCHC RBC AUTO-ENTMCNC: 33 G/DL (ref 31.5–36.5)
MCV RBC AUTO: 93 FL (ref 78–100)
MRSA DNA SPEC QL NAA+PROBE: NEGATIVE
O2/TOTAL GAS SETTING VFR VENT: 21 %
O2/TOTAL GAS SETTING VFR VENT: ABNORMAL %
PCO2 BLDV: 34 MM HG (ref 40–50)
PCO2 BLDV: 35 MM HG (ref 40–50)
PCO2 BLDV: 36 MM HG (ref 40–50)
PCO2 BLDV: 37 MM HG (ref 40–50)
PH BLDV: 7.4 PH (ref 7.32–7.43)
PH BLDV: 7.4 PH (ref 7.32–7.43)
PH BLDV: 7.43 PH (ref 7.32–7.43)
PH BLDV: 7.45 PH (ref 7.32–7.43)
PLATELET # BLD AUTO: 203 10E9/L (ref 150–450)
PO2 BLDV: 40 MM HG (ref 25–47)
PO2 BLDV: 75 MM HG (ref 25–47)
PO2 BLDV: 83 MM HG (ref 25–47)
PO2 BLDV: 91 MM HG (ref 25–47)
POTASSIUM SERPL-SCNC: 3.6 MMOL/L (ref 3.4–5.3)
POTASSIUM SERPL-SCNC: 4.3 MMOL/L (ref 3.4–5.3)
PROCALCITONIN SERPL-MCNC: 0.2 NG/ML
PROT SERPL-MCNC: 7 G/DL (ref 6.8–8.8)
RBC # BLD AUTO: 4.52 10E12/L (ref 3.8–5.2)
SODIUM SERPL-SCNC: 150 MMOL/L (ref 133–144)
SODIUM SERPL-SCNC: 151 MMOL/L (ref 133–144)
SODIUM UR-SCNC: 21 MMOL/L
SPECIMEN SOURCE: NORMAL
TROPONIN I SERPL-MCNC: 0.08 UG/L (ref 0–0.04)
TROPONIN I SERPL-MCNC: 0.41 UG/L (ref 0–0.04)
TSH SERPL DL<=0.005 MIU/L-ACNC: 2.69 MU/L (ref 0.4–4)
WBC # BLD AUTO: 7.6 10E9/L (ref 4–11)

## 2020-04-07 PROCEDURE — 25000125 ZZHC RX 250: Performed by: NURSE ANESTHETIST, CERTIFIED REGISTERED

## 2020-04-07 PROCEDURE — 82570 ASSAY OF URINE CREATININE: CPT | Performed by: FAMILY MEDICINE

## 2020-04-07 PROCEDURE — 25000128 H RX IP 250 OP 636

## 2020-04-07 PROCEDURE — 20000003 ZZH R&B ICU

## 2020-04-07 PROCEDURE — 96366 THER/PROPH/DIAG IV INF ADDON: CPT | Performed by: EMERGENCY MEDICINE

## 2020-04-07 PROCEDURE — 80048 BASIC METABOLIC PNL TOTAL CA: CPT | Performed by: FAMILY MEDICINE

## 2020-04-07 PROCEDURE — 25000125 ZZHC RX 250: Performed by: FAMILY MEDICINE

## 2020-04-07 PROCEDURE — 87641 MR-STAPH DNA AMP PROBE: CPT | Performed by: FAMILY MEDICINE

## 2020-04-07 PROCEDURE — 40000922 ZZH STATISTIC RCP TIME PACU VENT EA 10 MIN

## 2020-04-07 PROCEDURE — 25800030 ZZH RX IP 258 OP 636: Performed by: FAMILY MEDICINE

## 2020-04-07 PROCEDURE — 84484 ASSAY OF TROPONIN QUANT: CPT | Performed by: FAMILY MEDICINE

## 2020-04-07 PROCEDURE — 25000128 H RX IP 250 OP 636: Performed by: EMERGENCY MEDICINE

## 2020-04-07 PROCEDURE — 87070 CULTURE OTHR SPECIMN AEROBIC: CPT | Performed by: FAMILY MEDICINE

## 2020-04-07 PROCEDURE — 36415 COLL VENOUS BLD VENIPUNCTURE: CPT | Performed by: FAMILY MEDICINE

## 2020-04-07 PROCEDURE — 40000270 ZZH STATISTIC OXYGEN  O2DAILY TECH TIME

## 2020-04-07 PROCEDURE — 40000671 ZZH STATISTIC ANESTHESIA CASE

## 2020-04-07 PROCEDURE — 25000125 ZZHC RX 250: Performed by: PHYSICIAN ASSISTANT

## 2020-04-07 PROCEDURE — 25000128 H RX IP 250 OP 636: Performed by: NURSE ANESTHETIST, CERTIFIED REGISTERED

## 2020-04-07 PROCEDURE — 86140 C-REACTIVE PROTEIN: CPT | Performed by: FAMILY MEDICINE

## 2020-04-07 PROCEDURE — 25000128 H RX IP 250 OP 636: Performed by: FAMILY MEDICINE

## 2020-04-07 PROCEDURE — 96372 THER/PROPH/DIAG INJ SC/IM: CPT | Performed by: EMERGENCY MEDICINE

## 2020-04-07 PROCEDURE — 84300 ASSAY OF URINE SODIUM: CPT | Performed by: FAMILY MEDICINE

## 2020-04-07 PROCEDURE — 40000358 ZZHCL STATISTIC DRUG SCREEN MULTIPLE (METRO): Performed by: FAMILY MEDICINE

## 2020-04-07 PROCEDURE — 82550 ASSAY OF CK (CPK): CPT | Performed by: FAMILY MEDICINE

## 2020-04-07 PROCEDURE — C9113 INJ PANTOPRAZOLE SODIUM, VIA: HCPCS | Performed by: FAMILY MEDICINE

## 2020-04-07 PROCEDURE — 99207 ZZC CDG-CODE CATEGORY CHANGED: CPT | Performed by: FAMILY MEDICINE

## 2020-04-07 PROCEDURE — 40000986 XR CHEST PORT 1 VW

## 2020-04-07 PROCEDURE — 00000146 ZZHCL STATISTIC GLUCOSE BY METER IP

## 2020-04-07 PROCEDURE — 84443 ASSAY THYROID STIM HORMONE: CPT | Performed by: FAMILY MEDICINE

## 2020-04-07 PROCEDURE — 84145 PROCALCITONIN (PCT): CPT | Performed by: FAMILY MEDICINE

## 2020-04-07 PROCEDURE — 5A1945Z RESPIRATORY VENTILATION, 24-96 CONSECUTIVE HOURS: ICD-10-PCS | Performed by: FAMILY MEDICINE

## 2020-04-07 PROCEDURE — 94002 VENT MGMT INPAT INIT DAY: CPT

## 2020-04-07 PROCEDURE — 40000275 ZZH STATISTIC RCP TIME EA 10 MIN

## 2020-04-07 PROCEDURE — 99223 1ST HOSP IP/OBS HIGH 75: CPT | Mod: AI | Performed by: FAMILY MEDICINE

## 2020-04-07 PROCEDURE — 40000313 ZZH STATISTIC SUCTION SPUTUM

## 2020-04-07 PROCEDURE — 25000132 ZZH RX MED GY IP 250 OP 250 PS 637: Performed by: FAMILY MEDICINE

## 2020-04-07 PROCEDURE — 80307 DRUG TEST PRSMV CHEM ANLYZR: CPT | Performed by: FAMILY MEDICINE

## 2020-04-07 PROCEDURE — 82803 BLOOD GASES ANY COMBINATION: CPT | Performed by: FAMILY MEDICINE

## 2020-04-07 PROCEDURE — 40000809 ZZH STATISTIC NO DOCUMENTATION TO SUPPORT CHARGE

## 2020-04-07 PROCEDURE — 93005 ELECTROCARDIOGRAM TRACING: CPT

## 2020-04-07 PROCEDURE — 80053 COMPREHEN METABOLIC PANEL: CPT | Performed by: FAMILY MEDICINE

## 2020-04-07 PROCEDURE — 25800030 ZZH RX IP 258 OP 636: Performed by: EMERGENCY MEDICINE

## 2020-04-07 PROCEDURE — 87640 STAPH A DNA AMP PROBE: CPT | Performed by: FAMILY MEDICINE

## 2020-04-07 PROCEDURE — 25800030 ZZH RX IP 258 OP 636: Performed by: PHYSICIAN ASSISTANT

## 2020-04-07 PROCEDURE — 85027 COMPLETE CBC AUTOMATED: CPT | Performed by: FAMILY MEDICINE

## 2020-04-07 RX ORDER — DEXTROSE MONOHYDRATE 25 G/50ML
25-50 INJECTION, SOLUTION INTRAVENOUS
Status: DISCONTINUED | OUTPATIENT
Start: 2020-04-07 | End: 2020-04-11

## 2020-04-07 RX ORDER — NALOXONE HYDROCHLORIDE 0.4 MG/ML
.1-.4 INJECTION, SOLUTION INTRAMUSCULAR; INTRAVENOUS; SUBCUTANEOUS
Status: DISCONTINUED | OUTPATIENT
Start: 2020-04-07 | End: 2020-04-08

## 2020-04-07 RX ORDER — NICOTINE POLACRILEX 4 MG
15-30 LOZENGE BUCCAL
Status: DISCONTINUED | OUTPATIENT
Start: 2020-04-07 | End: 2020-04-11

## 2020-04-07 RX ORDER — ACETAMINOPHEN 325 MG/1
650 TABLET ORAL EVERY 4 HOURS PRN
Status: DISCONTINUED | OUTPATIENT
Start: 2020-04-07 | End: 2020-04-13 | Stop reason: HOSPADM

## 2020-04-07 RX ORDER — LEVOTHYROXINE SODIUM 88 UG/1
88 TABLET ORAL
Status: DISCONTINUED | OUTPATIENT
Start: 2020-04-07 | End: 2020-04-07

## 2020-04-07 RX ORDER — LEVOTHYROXINE SODIUM 88 UG/1
44 TABLET ORAL
Status: DISCONTINUED | OUTPATIENT
Start: 2020-04-12 | End: 2020-04-07

## 2020-04-07 RX ORDER — PROPOFOL 10 MG/ML
5-75 INJECTION, EMULSION INTRAVENOUS CONTINUOUS
Status: DISCONTINUED | OUTPATIENT
Start: 2020-04-07 | End: 2020-04-09

## 2020-04-07 RX ORDER — HALOPERIDOL 5 MG/ML
2.5 INJECTION INTRAMUSCULAR EVERY 30 MIN PRN
Status: DISCONTINUED | OUTPATIENT
Start: 2020-04-07 | End: 2020-04-07

## 2020-04-07 RX ORDER — PROCHLORPERAZINE 25 MG
12.5 SUPPOSITORY, RECTAL RECTAL EVERY 12 HOURS PRN
Status: DISCONTINUED | OUTPATIENT
Start: 2020-04-07 | End: 2020-04-07

## 2020-04-07 RX ORDER — ATROPINE SULFATE 0.1 MG/ML
0.5 INJECTION INTRAVENOUS ONCE
Status: COMPLETED | OUTPATIENT
Start: 2020-04-07 | End: 2020-04-07

## 2020-04-07 RX ORDER — HEPARIN SODIUM 10000 [USP'U]/100ML
750 INJECTION, SOLUTION INTRAVENOUS CONTINUOUS
Status: DISCONTINUED | OUTPATIENT
Start: 2020-04-07 | End: 2020-04-07

## 2020-04-07 RX ORDER — ATROPINE SULFATE 0.1 MG/ML
INJECTION INTRAVENOUS
Status: COMPLETED
Start: 2020-04-07 | End: 2020-04-07

## 2020-04-07 RX ORDER — OLANZAPINE 5 MG/1
TABLET, ORALLY DISINTEGRATING ORAL
Status: DISCONTINUED
Start: 2020-04-07 | End: 2020-04-07 | Stop reason: HOSPADM

## 2020-04-07 RX ORDER — LORAZEPAM 2 MG/ML
.5-1 INJECTION INTRAMUSCULAR EVERY 4 HOURS PRN
Status: DISCONTINUED | OUTPATIENT
Start: 2020-04-07 | End: 2020-04-13 | Stop reason: HOSPADM

## 2020-04-07 RX ORDER — FENTANYL CITRATE 50 UG/ML
INJECTION, SOLUTION INTRAMUSCULAR; INTRAVENOUS PRN
Status: DISCONTINUED | OUTPATIENT
Start: 2020-04-07 | End: 2020-04-07

## 2020-04-07 RX ORDER — LIDOCAINE 40 MG/G
CREAM TOPICAL
Status: DISCONTINUED | OUTPATIENT
Start: 2020-04-07 | End: 2020-04-13 | Stop reason: HOSPADM

## 2020-04-07 RX ORDER — CEFAZOLIN SODIUM 1 G/50ML
2000 SOLUTION INTRAVENOUS
Status: DISCONTINUED | OUTPATIENT
Start: 2020-04-08 | End: 2020-04-08

## 2020-04-07 RX ORDER — ONDANSETRON 4 MG/1
4 TABLET, ORALLY DISINTEGRATING ORAL EVERY 6 HOURS PRN
Status: DISCONTINUED | OUTPATIENT
Start: 2020-04-07 | End: 2020-04-07

## 2020-04-07 RX ORDER — ACETAMINOPHEN 325 MG/1
650 TABLET ORAL EVERY 4 HOURS PRN
Status: DISCONTINUED | OUTPATIENT
Start: 2020-04-07 | End: 2020-04-07

## 2020-04-07 RX ORDER — SIMVASTATIN 40 MG
40 TABLET ORAL DAILY
Status: DISCONTINUED | OUTPATIENT
Start: 2020-04-07 | End: 2020-04-07

## 2020-04-07 RX ORDER — ONDANSETRON 2 MG/ML
4 INJECTION INTRAMUSCULAR; INTRAVENOUS EVERY 6 HOURS PRN
Status: DISCONTINUED | OUTPATIENT
Start: 2020-04-07 | End: 2020-04-13 | Stop reason: HOSPADM

## 2020-04-07 RX ORDER — LORAZEPAM 2 MG/ML
INJECTION INTRAMUSCULAR
Status: COMPLETED
Start: 2020-04-07 | End: 2020-04-07

## 2020-04-07 RX ORDER — HEPARIN SODIUM,PORCINE 10 UNIT/ML
2-5 VIAL (ML) INTRAVENOUS
Status: DISCONTINUED | OUTPATIENT
Start: 2020-04-07 | End: 2020-04-07

## 2020-04-07 RX ORDER — OLANZAPINE 5 MG/1
5 TABLET, ORALLY DISINTEGRATING ORAL AT BEDTIME
Status: DISCONTINUED | OUTPATIENT
Start: 2020-04-07 | End: 2020-04-07

## 2020-04-07 RX ORDER — SODIUM CHLORIDE 9 MG/ML
INJECTION, SOLUTION INTRAVENOUS CONTINUOUS
Status: DISCONTINUED | OUTPATIENT
Start: 2020-04-07 | End: 2020-04-07

## 2020-04-07 RX ORDER — LORAZEPAM 2 MG/ML
0.5 INJECTION INTRAMUSCULAR ONCE
Status: COMPLETED | OUTPATIENT
Start: 2020-04-07 | End: 2020-04-07

## 2020-04-07 RX ORDER — PRAMIPEXOLE DIHYDROCHLORIDE 0.5 MG/1
0.5 TABLET ORAL AT BEDTIME
Status: DISCONTINUED | OUTPATIENT
Start: 2020-04-07 | End: 2020-04-07

## 2020-04-07 RX ORDER — ONDANSETRON 2 MG/ML
4 INJECTION INTRAMUSCULAR; INTRAVENOUS EVERY 6 HOURS PRN
Status: DISCONTINUED | OUTPATIENT
Start: 2020-04-07 | End: 2020-04-07

## 2020-04-07 RX ORDER — ATROPINE SULFATE 0.1 MG/ML
0.5 INJECTION INTRAVENOUS
Status: DISCONTINUED | OUTPATIENT
Start: 2020-04-07 | End: 2020-04-11

## 2020-04-07 RX ORDER — LIDOCAINE 40 MG/G
CREAM TOPICAL
Status: DISCONTINUED | OUTPATIENT
Start: 2020-04-07 | End: 2020-04-08

## 2020-04-07 RX ORDER — LEVOTHYROXINE SODIUM ANHYDROUS 100 UG/5ML
50 INJECTION, POWDER, LYOPHILIZED, FOR SOLUTION INTRAVENOUS DAILY
Status: DISCONTINUED | OUTPATIENT
Start: 2020-04-07 | End: 2020-04-12

## 2020-04-07 RX ORDER — GABAPENTIN 100 MG/1
100 CAPSULE ORAL AT BEDTIME
Status: DISCONTINUED | OUTPATIENT
Start: 2020-04-07 | End: 2020-04-07

## 2020-04-07 RX ORDER — AMPICILLIN 2 G/1
2 INJECTION, POWDER, FOR SOLUTION INTRAVENOUS EVERY 4 HOURS
Status: DISCONTINUED | OUTPATIENT
Start: 2020-04-07 | End: 2020-04-08

## 2020-04-07 RX ORDER — ETOMIDATE 2 MG/ML
INJECTION INTRAVENOUS PRN
Status: DISCONTINUED | OUTPATIENT
Start: 2020-04-07 | End: 2020-04-07

## 2020-04-07 RX ORDER — SODIUM CHLORIDE, SODIUM LACTATE, POTASSIUM CHLORIDE, CALCIUM CHLORIDE 600; 310; 30; 20 MG/100ML; MG/100ML; MG/100ML; MG/100ML
INJECTION, SOLUTION INTRAVENOUS CONTINUOUS
Status: DISCONTINUED | OUTPATIENT
Start: 2020-04-07 | End: 2020-04-08

## 2020-04-07 RX ORDER — POLYETHYLENE GLYCOL 3350 17 G/17G
17 POWDER, FOR SOLUTION ORAL DAILY PRN
Status: DISCONTINUED | OUTPATIENT
Start: 2020-04-07 | End: 2020-04-13 | Stop reason: HOSPADM

## 2020-04-07 RX ORDER — PROCHLORPERAZINE MALEATE 5 MG
5 TABLET ORAL EVERY 6 HOURS PRN
Status: DISCONTINUED | OUTPATIENT
Start: 2020-04-07 | End: 2020-04-07

## 2020-04-07 RX ORDER — OLANZAPINE 10 MG/2ML
5 INJECTION, POWDER, FOR SOLUTION INTRAMUSCULAR DAILY PRN
Status: DISCONTINUED | OUTPATIENT
Start: 2020-04-07 | End: 2020-04-07

## 2020-04-07 RX ORDER — OLANZAPINE 10 MG/2ML
5 INJECTION, POWDER, FOR SOLUTION INTRAMUSCULAR ONCE
Status: COMPLETED | OUTPATIENT
Start: 2020-04-07 | End: 2020-04-07

## 2020-04-07 RX ORDER — NALOXONE HYDROCHLORIDE 0.4 MG/ML
.1-.4 INJECTION, SOLUTION INTRAMUSCULAR; INTRAVENOUS; SUBCUTANEOUS
Status: DISCONTINUED | OUTPATIENT
Start: 2020-04-07 | End: 2020-04-07

## 2020-04-07 RX ORDER — BENZTROPINE MESYLATE 0.5 MG/1
1-2 TABLET ORAL 3 TIMES DAILY PRN
Status: DISCONTINUED | OUTPATIENT
Start: 2020-04-07 | End: 2020-04-09

## 2020-04-07 RX ORDER — OLANZAPINE 10 MG/2ML
INJECTION, POWDER, FOR SOLUTION INTRAMUSCULAR
Status: COMPLETED
Start: 2020-04-07 | End: 2020-04-07

## 2020-04-07 RX ORDER — CEFTRIAXONE SODIUM 2 G/50ML
2 INJECTION, SOLUTION INTRAVENOUS EVERY 12 HOURS
Status: DISCONTINUED | OUTPATIENT
Start: 2020-04-07 | End: 2020-04-08

## 2020-04-07 RX ORDER — NALOXONE HYDROCHLORIDE 0.4 MG/ML
.1-.4 INJECTION, SOLUTION INTRAMUSCULAR; INTRAVENOUS; SUBCUTANEOUS
Status: DISCONTINUED | OUTPATIENT
Start: 2020-04-07 | End: 2020-04-13 | Stop reason: HOSPADM

## 2020-04-07 RX ORDER — PROPOFOL 10 MG/ML
20-30 INJECTION, EMULSION INTRAVENOUS EVERY 30 MIN PRN
Status: DISCONTINUED | OUTPATIENT
Start: 2020-04-07 | End: 2020-04-09

## 2020-04-07 RX ADMIN — HALOPERIDOL LACTATE 2.5 MG: 5 INJECTION INTRAMUSCULAR at 02:59

## 2020-04-07 RX ADMIN — SODIUM CHLORIDE, POTASSIUM CHLORIDE, SODIUM LACTATE AND CALCIUM CHLORIDE 500 ML: 600; 310; 30; 20 INJECTION, SOLUTION INTRAVENOUS at 05:34

## 2020-04-07 RX ADMIN — OLANZAPINE 5 MG: 10 INJECTION, POWDER, FOR SOLUTION INTRAMUSCULAR at 01:21

## 2020-04-07 RX ADMIN — NOREPINEPHRINE BITARTRATE 0.05 MCG/KG/MIN: 1 INJECTION INTRAVENOUS at 17:32

## 2020-04-07 RX ADMIN — PROPOFOL 20 MCG/KG/MIN: 10 INJECTION, EMULSION INTRAVENOUS at 06:11

## 2020-04-07 RX ADMIN — PROPOFOL 15 MCG/KG/MIN: 10 INJECTION, EMULSION INTRAVENOUS at 13:47

## 2020-04-07 RX ADMIN — HALOPERIDOL LACTATE 2.5 MG: 5 INJECTION INTRAMUSCULAR at 03:35

## 2020-04-07 RX ADMIN — SODIUM CHLORIDE, POTASSIUM CHLORIDE, SODIUM LACTATE AND CALCIUM CHLORIDE 500 ML: 600; 310; 30; 20 INJECTION, SOLUTION INTRAVENOUS at 16:28

## 2020-04-07 RX ADMIN — HALOPERIDOL LACTATE 2.5 MG: 5 INJECTION INTRAMUSCULAR at 02:29

## 2020-04-07 RX ADMIN — DEXMEDETOMIDINE 0.2 MCG/KG/HR: 100 INJECTION, SOLUTION, CONCENTRATE INTRAVENOUS at 04:30

## 2020-04-07 RX ADMIN — Medication 100 MG: at 06:02

## 2020-04-07 RX ADMIN — OLANZAPINE 5 MG: 10 INJECTION, POWDER, FOR SOLUTION INTRAMUSCULAR at 01:16

## 2020-04-07 RX ADMIN — SODIUM CHLORIDE, POTASSIUM CHLORIDE, SODIUM LACTATE AND CALCIUM CHLORIDE: 600; 310; 30; 20 INJECTION, SOLUTION INTRAVENOUS at 05:22

## 2020-04-07 RX ADMIN — ACYCLOVIR SODIUM 450 MG: 50 INJECTION, SOLUTION INTRAVENOUS at 09:35

## 2020-04-07 RX ADMIN — PROPOFOL 20 MG: 10 INJECTION, EMULSION INTRAVENOUS at 06:53

## 2020-04-07 RX ADMIN — LORAZEPAM 0.5 MG: 2 INJECTION INTRAMUSCULAR at 05:00

## 2020-04-07 RX ADMIN — LORAZEPAM 1 MG: 2 INJECTION INTRAMUSCULAR; INTRAVENOUS at 20:04

## 2020-04-07 RX ADMIN — CEFTRIAXONE SODIUM 2 G: 2 INJECTION, SOLUTION INTRAVENOUS at 13:48

## 2020-04-07 RX ADMIN — NOREPINEPHRINE BITARTRATE 0.1 MCG/KG/MIN: 1 INJECTION INTRAVENOUS at 17:34

## 2020-04-07 RX ADMIN — SODIUM CHLORIDE: 9 INJECTION, SOLUTION INTRAVENOUS at 02:32

## 2020-04-07 RX ADMIN — AMPICILLIN SODIUM 2 G: 2 INJECTION, POWDER, FOR SOLUTION INTRAMUSCULAR; INTRAVENOUS at 18:27

## 2020-04-07 RX ADMIN — FENTANYL CITRATE 50 MCG: 50 INJECTION, SOLUTION INTRAMUSCULAR; INTRAVENOUS at 06:02

## 2020-04-07 RX ADMIN — TAZOBACTAM SODIUM AND PIPERACILLIN SODIUM 3.38 G: 375; 3 INJECTION, SOLUTION INTRAVENOUS at 06:08

## 2020-04-07 RX ADMIN — SODIUM CHLORIDE, POTASSIUM CHLORIDE, SODIUM LACTATE AND CALCIUM CHLORIDE: 600; 310; 30; 20 INJECTION, SOLUTION INTRAVENOUS at 16:32

## 2020-04-07 RX ADMIN — ATROPINE SULFATE 0.5 MG: 0.1 INJECTION PARENTERAL at 17:09

## 2020-04-07 RX ADMIN — AMPICILLIN SODIUM 2 G: 2 INJECTION, POWDER, FOR SOLUTION INTRAMUSCULAR; INTRAVENOUS at 14:29

## 2020-04-07 RX ADMIN — PANTOPRAZOLE SODIUM 40 MG: 40 INJECTION, POWDER, LYOPHILIZED, FOR SOLUTION INTRAVENOUS at 15:39

## 2020-04-07 RX ADMIN — AMPICILLIN SODIUM 2 G: 2 INJECTION, POWDER, FOR SOLUTION INTRAMUSCULAR; INTRAVENOUS at 21:23

## 2020-04-07 RX ADMIN — ETOMIDATE 10 MG: 2 INJECTION, SOLUTION INTRAVENOUS at 06:02

## 2020-04-07 RX ADMIN — LORAZEPAM 0.5 MG: 2 INJECTION INTRAMUSCULAR; INTRAVENOUS at 11:32

## 2020-04-07 RX ADMIN — ATROPINE SULFATE 0.5 MG: 0.1 INJECTION PARENTERAL at 17:15

## 2020-04-07 RX ADMIN — LORAZEPAM 0.5 MG: 2 INJECTION INTRAMUSCULAR; INTRAVENOUS at 05:00

## 2020-04-07 RX ADMIN — NOREPINEPHRINE BITARTRATE 0.2 MCG/KG/MIN: 1 INJECTION INTRAVENOUS at 17:44

## 2020-04-07 RX ADMIN — LEVOTHYROXINE SODIUM ANHYDROUS 50 MCG: 100 INJECTION, POWDER, LYOPHILIZED, FOR SOLUTION INTRAVENOUS at 09:35

## 2020-04-07 RX ADMIN — SODIUM CHLORIDE, POTASSIUM CHLORIDE, SODIUM LACTATE AND CALCIUM CHLORIDE 1000 ML: 600; 310; 30; 20 INJECTION, SOLUTION INTRAVENOUS at 12:31

## 2020-04-07 RX ADMIN — NOREPINEPHRINE BITARTRATE 0.03 MCG/KG/MIN: 1 INJECTION INTRAVENOUS at 17:19

## 2020-04-07 RX ADMIN — SODIUM CHLORIDE, POTASSIUM CHLORIDE, SODIUM LACTATE AND CALCIUM CHLORIDE 1000 ML: 600; 310; 30; 20 INJECTION, SOLUTION INTRAVENOUS at 10:13

## 2020-04-07 ASSESSMENT — ACTIVITIES OF DAILY LIVING (ADL)
ADLS_ACUITY_SCORE: 14
ADLS_ACUITY_SCORE: 14
ADLS_ACUITY_SCORE: 26
ADLS_ACUITY_SCORE: 22
ADLS_ACUITY_SCORE: 14

## 2020-04-07 ASSESSMENT — LIFESTYLE VARIABLES: TOBACCO_USE: 1

## 2020-04-07 NOTE — PROGRESS NOTES
Pt increasingly agitated.    precedex up to 1.4mcg/kg/hr and ativan bump 0.5mg and still constant motion, but now more snoring respirations.    Concern for airway protection if we go higher with sedation, but also RR 40s, , BPs 170s, HR regular narrow at 145-163  The cause of all this is unclear but she needs airway protection for further sedation and will tire out with RR consistently in the 40s.    Discussed with ninfa Rojas the need to make the decision to proceed to intubation. She verifiess the patient is full code.      Will proceed with intubation , mechanical ventilation and then propofol anesthesia with ativan prn.   Settings initially AC/350ml (8/kg)/18/peep 5  Patient is not PUI (no cough or SOB or fever on admission, WBC elevated not low and plts normal)      Immediately after intubation HR down to 110, BP

## 2020-04-07 NOTE — ED NOTES
Pt continues to be 1:1 due to difficulty in redirecting behaviors, high fall risk, confusion/dementia. Pt on bed alarm.

## 2020-04-07 NOTE — PLAN OF CARE
1130-patient remains restless, moving all extremities, shakes head when suctioned. See vital sign complex flow sheet for vitals, blood pressure remains low, fluids bolus x2 given per physician order.  Propofol decreased again for low blood pressure. Remains on vent see flow sheet. 1400-Dr. Desir updated on low urine output, see new orders.  1615-patient's heart rate dropped to the 40's remains in sinus.  Patient has stopped being restless and no further movements of extremities.  1630-patient's blood pressure low, web paged physician on call for pressor, see order.  1645-phone call to Dr. Desir, updated with continue low blood pressure, heart rate in the 40's, see orders.  1700-propofol off.  1720-Atropine 0.5mg IVP, 1730-Levophed started, see flow sheet. 1735-phone call from Dr. Desir, and he was updated on patient's blood pressure and heart rate, and patient would move feet when touched, otherwise patient continues to not move extremities.

## 2020-04-07 NOTE — ED NOTES
Unable to collect 2nd blood culture as patient is refusing, MD updated & instructed to start IV antibiotics & cancel 2nd culture.

## 2020-04-07 NOTE — PROGRESS NOTES
Report given to [facility] RN, patient is intubated,  ETT #8 at 23 at lips after 2 x-rays and advanced X 1,  Suctioned X 2 with just scant returns of clear phlegm.,  Now using Propofal for sedation.  Drop in BP with intubation meds and propofal but starting to awaken, needed to increase back to 25 Mcg/ kg/minute with 2 bumps of 20 mg to keep calm.  HR has remained elevated but down to 130's now, slow grade fever of 99.1 taken at 0530.  Resting quietly now.  Plan of care continues with 1 to 1 nursing and close monitoring.

## 2020-04-07 NOTE — ANESTHESIA POSTPROCEDURE EVALUATION
Patient: Stefanie Velazquez    * No procedures listed *    Diagnosis:* No pre-op diagnosis entered *  Diagnosis Additional Information: No value filed.    Anesthesia Type:  No value filed.    Note:  Anesthesia Post Evaluation    Patient location during evaluation: Bedside  Patient participation: Unable to participate in evaluation secondary to underlying medical condition  Post-procedure mental status: sedated   Pain management: unable to assess  Airway patency: patent  Cardiovascular status: stable  Respiratory status: ETT  Hydration status: stable  PONV: unable to assess     Anesthetic complications: None          Last vitals:  Vitals:    04/07/20 0455 04/07/20 0500 04/07/20 0505   BP: (!) 174/88  (!) 174/88   Pulse:  154    Resp: 28 (!) 76 (!) 33   Temp:      SpO2: 95% 93% 94%         Electronically Signed By: CLEMENCIA Johnston CRNA  April 7, 2020  6:44 AM

## 2020-04-07 NOTE — ED NOTES
Per nurse Mell report pt has been increasingly paranoid, thinking she is being poisoned. Pt is refusing to eat, refusing medications, per nurse they have been holding her insulin due to pt not eating. Per staff report this came on out of the blue yesterday morning. Pt does not typically act this way. Pt was diagnosed with a UTI in March, pt did finish her course of antibiotics. Pt typically has increased lethargy with UTI. Pt is alert but slow to respond and confused, states she thinks she is at the [place]. Attempted neuro exam, pt became resistive, pt holding newspaper and will not let go. Dr. Pacheco in to assess pt.

## 2020-04-07 NOTE — PROGRESS NOTES
Pt continued get increasingly more agitated from arrival to the floor and as the night went. We were unable to redirect or calm her down at all. Pt continuously wanted to get up and out of bed. We got the pt up into the chair and she just wanted to get back into bed. She was constantly moving and appeared uncomfortable; pt unable to verbalize discomforts. Was up to the bedside commode by an assist of 3 and pt voided 125 ml. This did not settle her down at all. Unable to obtain a BP d/t continuous struggling. Pt continued swinging at staff. Calling staff satan. Constant swinging of arms and legs. Constant trying to get out of bed. Orders for haldol, restraints and boone if pt was retaining obtained. 3x doses of haldol with out any effect of calming the pt down. This writer and another RN were holding pt and trying to calm her constantly for her stay up on med/surg. Transferred down to ICU. Pt was stable upon transfer. Brought down by RN.

## 2020-04-07 NOTE — PROGRESS NOTES
[facility] TRANSPORT NOTE  Data:   Reason for Transport:  Increased behavior issues and in judy of more advanced care    Stefanie Velazquez was transported to 1005   via cart at 0425.  Patient was accompanied by Registered Nurse. Equipment used for transport: None. Family was aware of reason for transport: no    Action:  Report: received from ELIDA Jarrett/S RN    Response:  Patient's condition when transferred off unit was unstable.    Cindy Johanson, RN

## 2020-04-07 NOTE — ED PROVIDER NOTES
History     Chief Complaint   Patient presents with     Altered Mental Status     Pt from a nursing home, per EMS report, pt has not been acting herself the last 24 hours. Pt has been refusing eating, taking medications, during normal daily activities.      HPI  Stefanie Velazquez is a 75 year old female with a past medical history significant for diabetes type 2, hypothyroidism chronic ischemic heart disease cancer of colon, history of DVT on Eliquis who presents the emergency department from her nursing home complaining of altered mental status.  Patient reportedly has been paranoid all day today.  She has been talking about being poisoned and having cancer.  She had refused to eat or take her medications.  This started yesterday but is worsened today.  She does not typically assess weight.  Had a UTI in the end of March and just finished her course of antibiotics.  This usually causes increased lethargy.  Patient is more agitated at this time.  She answers some questions appropriately but perseverates on people poisoning her and the fact she may have cancer.  Patient thinks she is in the specialty office of her primary care.  Patient refuses to get into bed at this time.  She has memory care issues per nursing staff but is usually conversant.  No reported recent falls.  Unable to get history from patient due to altered mental status.  There have been no reported new medications and has not taken her medications today.    Allergies:  Allergies   Allergen Reactions     Hydrocodone Other (See Comments)     dizzy     Lisinopril Cough            Vicodin [Hydrocodone-Acetaminophen] Other (See Comments)     Caused extreme dizziness       Problem List:    Patient Active Problem List    Diagnosis Date Noted     H/O deep venous thrombosis 05/21/2019     Priority: Medium     Syncope 05/21/2019     Priority: Medium     Syncope and collapse 05/21/2019     Priority: Medium     Impacted cerumen of right ear 03/31/2019      Priority: Medium     CKD (chronic kidney disease) stage 4, GFR 15-29 ml/min (H) 2019     Priority: Medium     Lumbar radiculopathy 2019     Priority: Medium     Oropharyngeal dysphagia 2018     Priority: Medium     Bilateral hearing loss, unspecified hearing loss type 2018     Priority: Medium     Lymphedema of genitalia 2018     Priority: Medium     Spinal stenosis of lumbar region without neurogenic claudication 2018     Priority: Medium     DDD (degenerative disc disease), lumbar 2018     Priority: Medium     Symptomatic bradycardia 10/29/2017     Priority: Medium     Type 2 diabetes mellitus with diabetic nephropathy, with long-term current use of insulin (H) 2017     Priority: Medium     Restless leg syndrome 2017     Priority: Medium     Chronic diarrhea 2017     Priority: Medium     Health Care Home 10/28/2016     Priority: Medium     [facility] Care Coordination  JUNE Vega  Phone: [phone]    [facility] CARE PLAN SUMMARY    Member Name:  Stefanie Velazquez        *Assisted Living*  Address:   [place] [address] Phone: [phone] (Home)    :  1945 Date of Assessment:  Due by 3/31/2020 d/t Opening of EW on 2019   Health Plan:  [facility] [employer]  Health Plan Number: [id number] Medical Assistance Number: [id number]  Financial Worker:    Case #:     FVP Care Coordinator:    JUNE Vega CC Phone: [phone]  CC Fax:  [phone]   FVP Enrollment Date: 2019 Case Mix:  A-  Rate Cell:  B   Waiver Type:  EW   Primary Emergency Contact: Dori Pena (niece)  Address: [address] [place] [address]  Mobile Phone: [phone]  Relation: Relative  Secondary Emergency Contact: Lori Pope (niece)           [place], [place]   Home Phone: [phone]  Mobile Phone: [phone]  Relation: Relative Language:  English  :  [place]    Health Care Agent/POA:  Yes Advanced Directives/Living Will:  Yes   Primary Care Clinic/Phone/Fax:  [facility]/p) [phone], f) [phone] Primary Dx:   R69  Secondary Dx:     Primary Physician:  Sandra Medina   Height:  5' 0''  Weight:  201 lbs   Specialty Physician:    Audiologist:     Eye Care Provider:   Dental Care Provider:    [facility]: [facility] [phone] or [phone]   Other:        [facility] CURRENT SERVICES SUMMARY  Equipment owned/DME history: WC 2/2020)  SERVICE TYPE/PROVIDER NAME/PHONE AUTH DATE FREQUENCY Units OR $ Amt DESCRIPTION   Medical Transportation: [employer] [phone]  Fax:  9/1/2019 - 3/31/2020 As needed Varies    [facility]  Phone: [phone]    Fax: 9/1/2019- 3/31/2019 Monthly Per [employer]      [facility]  [phone] cell 2/11/2020 - 3/31/2020 1x order $79.90 WC Cushion       * For ADC please select ADC provider and EW Transportation in order to process auth               Benign essential hypertension 09/30/2016     Priority: Medium     Bowel and bladder incontinence 05/22/2015     Priority: Medium     Dysuria 10/24/2013     Priority: Medium     Vitamin B 12 deficiency 05/14/2012     Priority: Medium     Vitamin D deficiency 05/14/2012     Priority: Medium     Radiation therapy complication 11/09/2011     Priority: Medium     Anemia 08/26/2011     Priority: Medium     Rectal cancer- newly diagnosed adenoCA rectum Jan 2011 and Positive Tubular Adenoma 2019 02/17/2011     Priority: Medium     Hyperlipidemia LDL goal <100 10/31/2010     Priority: Medium     Neuropathy (H) 04/09/2010     Priority: Medium     RC-moderate (AHI 12, LSat 60%); REM RDI-73 06/01/2009     Priority: Medium     HST performed 11/11/2019 with weight 201 lbs.  Analysis time 540.2 minutes.  AHI 6.8.  Baseline SpO2 91.1%, <= 88% for 43 minutes.    Sleep study [facility] was performed 5/26/09 in order to re-evaluate severity of  RC. The total sleep time was 412.0 minutes. The sleep latency was decreased at 8.7 minutes with Ambien 10mg. The REM sleep latency was 426.0 minutes. Sleep efficiency was reduced at 79.0%. The sleep architecture was disrupted with frequent sleep stage changes and arousals.  Snoring:  loud. Respiratory Events:   RDI of 57.5 and an AHI of 12.2. The REM RDI was 74.3 The lowest O2 saturation was 60.0%. This study is suggestive of moderate sleep apnea, profound desaturations during REM sleep, with 35 second apneas.    Other: PLM index was 13.8.      Recommendations:  Due to the profound desaturations during REM sleep, consider CPAP titration study to establish optimal CPAP treatment pressure.  2. Check ferritin given her RLS symptoms. If RLS symptoms persist after treatment of RC, further therapy may be warranted. Replace iron as indicated by ferritin.         Osteoporosis 02/29/2008     Priority: Medium     Problem list name updated by automated process. Provider to review       Esophageal reflux 10/13/2007     Priority: Medium     On protonix;        bmi 40 06/22/2005     Priority: Medium     Problem list name updated by automated process. Provider to review       Generalized osteoarthrosis, unspecified site 06/22/2005     Priority: Medium     HEARING LOSS CONDUCTIVE, COMBINED TYPE 06/22/2005     Priority: Medium     [place] measles as child       Hypothyroidism 06/22/2003     Priority: Medium     Problem list name updated by automated process. Provider to review       Chronic ischemic heart disease 06/22/2003     Priority: Medium     Class: Chronic     cabgx3 09/2002,  with a left internal mammary (LIMA) to the left anterior descending and a bypass graft to the obtuse marginal branch of the circumflex and also PDA branch of the right coronary artery. She had an episode of atrial fibrillation postoperatively and was treated with sotalol.   PTCA and stent placement for recurrent restenosis.   2/05 adenosine  ef70%  9/24/12 - Lexiscan nuclear stress test was positive for mild partially reversible ischemia in the LAD distribution. Imdur added.              Past Medical History:    Past Medical History:   Diagnosis Date     Acute deep vein thrombosis (DVT) of right lower extremity, unspecified vein (H) 10/31/2018     Acute myocardial infarction of other specified sites, episode of care unspecified 6/2002     Cancer of colon (H)      Cellulitis of lower extremity, bilateral 9/30/2016     Chronic ischemic heart disease, unspecified 6/22/2003     Coronary atherosclerosis of unspecified type of vessel, native or graft      Diabetes mellitus (H)      Esophageal reflux      Fracture of femur, distal, left, closed (H) 2/11/2013     Gastro-oesophageal reflux disease      Generalized osteoarthrosis, unspecified site 6/22/2005     Generalized osteoarthrosis, unspecified site 6/22/2005     HEARING LOSS CONDUCTIVE, COMBINED TYPE 6/22/2005     HYPOTHYROIDISM  6/22/2003     Mixed hyperlipidemia 6/22/2005     Obesity, unspecified 6/22/2005     RC-moderate (AHI 12, LSat 60%); REM RDI-73 6/1/2009     Recurrent acute deep vein thrombosis (DVT) of both lower extremities (H) 3/31/2019     Stented coronary artery      Type II or unspecified type diabetes mellitus with renal manifestations, uncontrolled(250.42) (H) 6/22/2005     Type II or unspecified type diabetes mellitus with renal manifestations, uncontrolled(250.42) (H) 6/22/2005     Unspecified disorder resulting from impaired renal function 6/22/2005     Unspecified essential hypertension        Past Surgical History:    Past Surgical History:   Procedure Laterality Date     cabg       COLECTOMY LOW ANTERIOR  6/21/2011    Procedure:COLECTOMY LOW ANTERIOR; with loop ielostomy; Surgeon:ROBBIN MCDONNELL; Location:[facility]     COLONOSCOPY  1/26/2011    COLONOSCOPY performed by MASOUD BILL at [facility]     COLONOSCOPY N/A 3/1/2016    Procedure: COLONOSCOPY;  Surgeon: Angelika Neal  MD Priscila;  Location: [facility]     INSERT PORT VASCULAR ACCESS  3/2/2011    INSERT PORT VASCULAR ACCESS performed by LIZA MAGALLANES at [facility]     OPEN REDUCTION INTERNAL FIXATION FEMUR DISTAL  2/12/2013    Procedure: OPEN REDUCTION INTERNAL FIXATION FEMUR DISTAL;  Open reduction internal fixation left femur fracture--Anesth.Choice;  Surgeon: Ley, Jeffrey Duane, MD;  Location: [facility]     ORTHOPEDIC SURGERY       PHACOEMULSIFICATION WITH STANDARD INTRAOCULAR LENS IMPLANT Left 1/22/2018    Procedure: PHACOEMULSIFICATION WITH STANDARD INTRAOCULAR LENS IMPLANT;  Left cataract removal with implant;  Surgeon: Rony Key MD;  Location: [facility]     PHACOEMULSIFICATION WITH STANDARD INTRAOCULAR LENS IMPLANT Right 2/19/2018    Procedure: PHACOEMULSIFICATION WITH STANDARD INTRAOCULAR LENS IMPLANT;  Right cataract removal with implant;  Surgeon: Rony Key MD;  Location: [facility]     SIGMOIDOSCOPY FLEXIBLE  9/9/2011    Procedure:SIGMOIDOSCOPY FLEXIBLE; Performed prior to induction.; Surgeon:ROBBIN MCDONNELL; Location: OR     SURGICAL HISTORY OF -   10/24/2002    Heart bypass     SURGICAL HISTORY OF -   2002    R Knee arthroplasty     SURGICAL HISTORY OF -   2002    Mi 2 stints     TAKEDOWN ILEOSTOMY  9/9/2011    Procedure:TAKEDOWN ILEOSTOMY; Exploratory Laparotomy,  Loop Ileostomy Takedown, Small Bowel Resection x 2.; Surgeon:ROBBIN MCDONNELL; Location: OR       Family History:    Family History   Problem Relation Age of Onset     Diabetes Sister      C.A.D. Sister      Breast Cancer Sister        Social History:  Marital Status:  Single [1]  Social History     Tobacco Use     Smoking status: Former Smoker     Smokeless tobacco: Never Used   Substance Use Topics     Alcohol use: Yes     Comment: rare social use     Drug use: No        Medications:    ACCU-CHEK MILVIA MELODY  acetaminophen (TYLENOL) 650 MG CR tablet  aspirin (ASA) 81 MG tablet  blood glucose monitoring (ACCU-CHEK MILVIA) test  strip  blood glucose monitoring (ACCU-CHEK MULTICLIX) lancets  cholecalciferol 1000 units TABS  Continuous Blood Gluc  (FREESTYLE JAJA 14 DAY READER) MELODY  Continuous Blood Gluc Sensor (FREESTYLE JAJA 14 DAY SENSOR) MISC  diclofenac (VOLTAREN) 50 MG EC tablet  ELIQUIS ANTICOAGULANT 5 MG tablet  ferrous sulfate (FEROSUL) 325 (65 Fe) MG tablet  fluocinonide (LIDEX) 0.05 % ointment  fluticasone (FLONASE) 50 MCG/ACT spray  furosemide (LASIX) 20 MG tablet  gabapentin (NEURONTIN) 100 MG capsule  hydrocortisone (ANUSOL-HC) 2.5 % cream  insulin aspart (NOVOLOG FLEXPEN) 100 UNIT/ML pen  insulin glargine (LANTUS PEN) 100 UNIT/ML pen  insulin pen needle (BD GABRIELLA U/F) 32G X 4 MM  irbesartan (AVAPRO) 75 MG tablet  levothyroxine (SYNTHROID/LEVOTHROID) 88 MCG tablet  levothyroxine (SYNTHROID/LEVOTHROID) 88 MCG tablet  loperamide (IMODIUM) 2 MG capsule  magnesium 250 MG tablet  menthol-zinc oxide (CALMOSEPTINE) 0.44-20.625 % OINT ointment  metoprolol succinate ER (TOPROL-XL) 25 MG 24 hr tablet  OMEPRAZOLE PO  order for DME  order for DME  order for DME  order for DME  order for DME  order for DME  potassium chloride ER (K-DUR/KLOR-CON M) 10 MEQ CR tablet  pramipexole (MIRAPEX) 0.5 MG tablet  pramipexole (MIRAPEX) 0.5 MG tablet  simvastatin (ZOCOR) 40 MG tablet          Review of Systems   Unable to perform ROS: Mental status change       Physical Exam   BP: (!) 137/111  Pulse: 89  Heart Rate: 78  Temp: 99  F (37.2  C)  Resp: 18  Weight: 89.8 kg (198 lb)  SpO2: 97 %      Physical Exam  Vitals signs and nursing note reviewed.   Constitutional:       General: She is not in acute distress.     Appearance: She is obese. She is not ill-appearing or toxic-appearing.   HENT:      Head: Normocephalic.      Nose: Nose normal.      Mouth/Throat:      Mouth: Mucous membranes are dry.      Pharynx: Oropharynx is clear. No posterior oropharyngeal erythema.   Eyes:      Conjunctiva/sclera: Conjunctivae normal.      Pupils: Pupils are  equal, round, and reactive to light.   Neck:      Musculoskeletal: Normal range of motion and neck supple.      Comments: No JVD is present.  Cardiovascular:      Rate and Rhythm: Normal rate and regular rhythm.      Pulses: Normal pulses.      Heart sounds: Normal heart sounds. No murmur.   Pulmonary:      Effort: Pulmonary effort is normal.      Breath sounds: No wheezing, rhonchi or rales.      Comments: Breath sounds are decreased at bases.  Abdominal:      General: Abdomen is flat.      Palpations: Abdomen is soft.      Tenderness: There is no abdominal tenderness. There is no right CVA tenderness or left CVA tenderness.   Musculoskeletal:      Comments: No midline back tenderness.  No erythema edema of the back.  Moving all extremities well small amount of ecchymosis on arms no obvious erythema noted on chest abdomen back and legs.  Patient was wearing a cam boot on the right foot and leg which was removed no obvious erythema edema or calf tenderness noted.  Pulses sensation symmetrical.   Skin:     General: Skin is warm and dry.      Capillary Refill: Capillary refill takes less than 2 seconds.      Findings: No erythema or rash.   Neurological:      Comments: Patient is alert to person.  She thinks she is in a clinical office.  She is perseverating about coronavirus, cancer and people trying to poison her.  She is able to be redirected and called.  Answers some questions appropriately.  Does not follow cranial nerve exams to any extent.  No focal neurologic deficit is noted.   Psychiatric:      Comments: Positive mild agitation and confusion.         ED Course        Procedures               EKG Interpretation:      Interpreted by Anand Pacheco MD  Rhythm: normal sinus   Rate: Normal  Axis: Normal  Ectopy: none  Conduction: normal  ST Segments/ T Waves: T wave inversion Lateral and Inferior  Q Waves: none  Comparison to prior: twave inversion is new since 1/3/20    Clinical Impression: Normal sinus  rhythm with inferior lateral T wave inversion.    Critical Care time:  Was 30  minutes for this patient excluding procedures.  For management of possible sepsis/altered mental status.       The patient has signs of Severe Sepsis as evidenced by:    1. 2 SIRS criteria, AND  2. Suspected infection, AND   3. Organ dysfunction: Lactic Acid > 2.0    Time severe sepsis diagnosis confirmed:  as this was the time when Lactate resulted, and the level was > 2.0    3 Hour Severe Sepsis Bundle Completion:    1. Initial Lactic Acid Result:   Recent Labs   Lab Test 04/06/20 2028   LACT 2.4*     2. Blood Cultures before Antibiotics: Yes  3. Broad Spectrum Antibiotics Administered:  yes       Anti-infectives (From admission through now)    Start     Dose/Rate Route Frequency Ordered Stop    04/06/20 2217  piperacillin-tazobactam (ZOSYN) infusion 3.375 g      3.375 g  over 1 Hours Intravenous ONCE 04/06/20 2216 04/06/20 2311          4. Fluid volume administered in ED: Full fluid bolus was not administered secondary to patient's obesity history of cardiac ischemia and chronic renal disease.    BMI Readings from Last 1 Encounters:   04/07/20 37.24 kg/m                                  Results for orders placed or performed during the hospital encounter of 04/06/20 (from the past 24 hour(s))   CBC with platelets differential   Result Value Ref Range    WBC 5.9 4.0 - 11.0 10e9/L    RBC Count 4.87 3.8 - 5.2 10e12/L    Hemoglobin 14.6 11.7 - 15.7 g/dL    Hematocrit 45.5 35.0 - 47.0 %    MCV 93 78 - 100 fl    MCH 30.0 26.5 - 33.0 pg    MCHC 32.1 31.5 - 36.5 g/dL    RDW 13.5 10.0 - 15.0 %    Platelet Count 251 150 - 450 10e9/L    Diff Method Automated Method     % Neutrophils 50.7 %    % Lymphocytes 35.5 %    % Monocytes 10.4 %    % Eosinophils 1.9 %    % Basophils 1.3 %    % Immature Granulocytes 0.2 %    Nucleated RBCs 0 0 /100    Absolute Neutrophil 3.0 1.6 - 8.3 10e9/L    Absolute Lymphocytes 2.1 0.8 - 5.3 10e9/L    Absolute Monocytes  0.6 0.0 - 1.3 10e9/L    Absolute Eosinophils 0.1 0.0 - 0.7 10e9/L    Absolute Basophils 0.1 0.0 - 0.2 10e9/L    Abs Immature Granulocytes 0.0 0 - 0.4 10e9/L    Absolute Nucleated RBC 0.0    Comprehensive metabolic panel   Result Value Ref Range    Sodium 148 (H) 133 - 144 mmol/L    Potassium 4.4 3.4 - 5.3 mmol/L    Chloride 118 (H) 94 - 109 mmol/L    Carbon Dioxide 22 20 - 32 mmol/L    Anion Gap 8 3 - 14 mmol/L    Glucose 121 (H) 70 - 99 mg/dL    Urea Nitrogen 39 (H) 7 - 30 mg/dL    Creatinine 2.05 (H) 0.52 - 1.04 mg/dL    GFR Estimate 23 (L) >60 mL/min/[1.73_m2]    GFR Estimate If Black 26 (L) >60 mL/min/[1.73_m2]    Calcium 10.2 (H) 8.5 - 10.1 mg/dL    Bilirubin Total 0.8 0.2 - 1.3 mg/dL    Albumin 3.3 (L) 3.4 - 5.0 g/dL    Protein Total 7.7 6.8 - 8.8 g/dL    Alkaline Phosphatase 107 40 - 150 U/L    ALT 26 0 - 50 U/L    AST 38 0 - 45 U/L   Lipase   Result Value Ref Range    Lipase 221 73 - 393 U/L   Lactic acid whole blood   Result Value Ref Range    Lactic Acid 2.4 (H) 0.7 - 2.0 mmol/L   Troponin I   Result Value Ref Range    Troponin I ES 0.052 (H) 0.000 - 0.045 ug/L   CT Head w/o Contrast    Narrative    EXAM: CT HEAD W/O CONTRAST  LOCATION: [facility]  DATE/TIME: 4/6/2020 8:40 PM    INDICATION: Confusion.  COMPARISON: Head CT 05/21/2019.  TECHNIQUE: Routine without IV contrast. Multiplanar reformats. Dose reduction techniques were used.    FINDINGS:  INTRACRANIAL CONTENTS: No evidence of acute intracranial hemorrhage. No mass effect or midline shift. Extensive periventricular white matter hypodensities which are nonspecific, but likely related to chronic microvascular ischemic disease. Chronic area   of encephalomalacia in the right frontal white matter. Moderate diffuse parenchymal volume loss. Ventricular size is unchanged without evidence of hydrocephalus.    VISUALIZED ORBITS/SINUSES/MASTOIDS: No intraorbital abnormality. No paranasal sinus mucosal disease. No middle ear or mastoid  effusion.    BONES/SOFT TISSUES: No acute abnormality.      Impression    IMPRESSION:  1.  No evidence of acute hemorrhage, mass, or herniation.  2.  Marked diffuse parenchymal volume loss and extensive white matter changes likely due to chronic microvascular ischemic disease.   Chest XR,  PA & LAT    Narrative    CHEST TWO VIEWS     4/6/2020 8:59 PM     HISTORY: Confusion.    COMPARISON: 5/21/2019.      Impression    IMPRESSION: Stable right chest port venous catheter and sternotomy.  Cardiac silhouette within normal limits. No focal airspace disease. No  effusions identified.    KIMI HINTON MD   UA with Microscopic   Result Value Ref Range    Color Urine Yellow     Appearance Urine Slightly Cloudy     Glucose Urine Negative NEG^Negative mg/dL    Bilirubin Urine Negative NEG^Negative    Ketones Urine Negative NEG^Negative mg/dL    Specific Gravity Urine 1.013 1.003 - 1.035    Blood Urine Large (A) NEG^Negative    pH Urine 5.0 5.0 - 7.0 pH    Protein Albumin Urine 100 (A) NEG^Negative mg/dL    Urobilinogen mg/dL 0.0 0.0 - 2.0 mg/dL    Nitrite Urine Negative NEG^Negative    Leukocyte Esterase Urine Trace (A) NEG^Negative    Source Midstream Urine     WBC Urine 13 (H) 0 - 5 /HPF    RBC Urine >182 (H) 0 - 2 /HPF    Bacteria Urine Few (A) NEG^Negative /HPF    Mucous Urine Present (A) NEG^Negative /LPF    Hyaline Casts 7 (H) 0 - 2 /LPF     *Note: Due to a large number of results and/or encounters for the requested time period, some results have not been displayed. A complete set of results can be found in Results Review.       Medications   OLANZapine zydis (zyPREXA) ODT half-tab 2.5 mg (has no administration in time range)   piperacillin-tazobactam (ZOSYN) infusion 3.375 g (has no administration in time range)   vancomycin (VANCOCIN) 2,000 mg in sodium chloride 0.9 % 500 mL intermittent infusion (has no administration in time range)   0.9% sodium chloride BOLUS (1,000 mLs Intravenous New Bag 4/6/20 2111)        Assessments & Plan (with Medical Decision Making) records were reviewed.  Labs were obtained.  CT scan of the head and chest x-ray were ordered.  Zyprexa was ordered but patient would not take it.  She did calm down with redirecting.  CT scan of the head reveals no obvious acute abnormality chest x-ray was without acute abnormality.  EKG revealed T wave abnormalities in the inferior lateral leads these are new from previous EKG.  Her troponin is slightly elevated at 0.52.  She does have a cardiac history.  Patient's white count is 5.9 hemoglobin 14.6 platelet count 251.  No left shift is present.  Comprehensive metabolic panel significant for sodium elevated 148 chloride 118 glucose 121 BUN is 39 creatinine elevated at 2.05.  Lactic acid was 2.4.  IV fluids were ordered and given the patient.  Patient did eventually take the Zyprexa.  And gave a urine sample.  Urine with large blood 100 protein trace leukocytes esterase 13 WBCs greater than 182 RBCs.  No obvious acute source for her confusion is noted at this time but I did did cover the patient with Zosyn and vancomycin.  Blood cultures had been obtained.  I discussed the case with Dr. Barfield who is willing to admit the patient for further evaluation and care.  Patient is mildly agitated but redirectable at this time.  She will be placed in a bed monitor and patient will be signed out to Dr. Tidwell to monitor until admission.  She may need further Zyprexa if her agitation worsens.     I have reviewed the nursing notes.    I have reviewed the findings, diagnosis, plan and need for follow up with the patient.       New Prescriptions    No medications on file       Final diagnoses:   Altered mental status, unspecified altered mental status type   Elevated lactic acid level - possible sepsis   Renal insufficiency   Elevated troponin       4/6/2020   [facility] EMERGENCY DEPARTMENT     Anand Pacheco MD  04/07/20 8612

## 2020-04-07 NOTE — ANESTHESIA PREPROCEDURE EVALUATION
Anesthesia Pre-Procedure Evaluation    Patient: Stefanie Velazquez   MRN: [id number] : 1945          Preoperative Diagnosis: * No pre-op diagnosis entered *    * No procedures listed *    Past Medical History:   Diagnosis Date     Acute deep vein thrombosis (DVT) of right lower extremity, unspecified vein (H) 10/31/2018     Acute myocardial infarction of other specified sites, episode of care unspecified 2002    MI 2 stints     Cancer of colon (H)      Cellulitis of lower extremity, bilateral 2016     Chronic ischemic heart disease, unspecified 2003    cabgx3 ,  adenosine ef70%     Coronary atherosclerosis of unspecified type of vessel, native or graft     Coronary artery disease     Diabetes mellitus (H)      Esophageal reflux      Fracture of femur, distal, left, closed (H) 2013     Gastro-oesophageal reflux disease      Generalized osteoarthrosis, unspecified site 2005     Generalized osteoarthrosis, unspecified site 2005     HEARING LOSS CONDUCTIVE, COMBINED TYPE 2005    Frisian measles as child     HYPOTHYROIDISM  2003     Mixed hyperlipidemia 2005     Obesity, unspecified 2005     RC-moderate (AHI 12, LSat 60%); REM RDI-73 2009    Sleep study [facility] was performed 09 in order to re-evaluate severity of RC. The total sleep time was 412.0 minutes. The sleep latency was decreased at 8.7 minutes with Ambien 10mg. The REM sleep latency was 426.0 minutes. Sleep efficiency was reduced at 79.0%. The sleep architecture was disrupted with frequent sleep stage changes and arousals.  Snoring:  loud. Respiratory Events:   RDI o     Recurrent acute deep vein thrombosis (DVT) of both lower extremities (H) 3/31/2019     Stented coronary artery      Type II or unspecified type diabetes mellitus with renal manifestations, uncontrolled(250.42) (H) 2005     Type II or unspecified type diabetes mellitus with renal manifestations, uncontrolled(250.42) (H)  6/22/2005     Unspecified disorder resulting from impaired renal function 6/22/2005    CR     1.82   06/21/2005     Unspecified essential hypertension      Past Surgical History:   Procedure Laterality Date     cabg       COLECTOMY LOW ANTERIOR  6/21/2011    Procedure:COLECTOMY LOW ANTERIOR; with loop ielostomy; Surgeon:ROBBIN MCDONNELL; Location:[facility]     COLONOSCOPY  1/26/2011    COLONOSCOPY performed by MASOUD BILL at [facility]     COLONOSCOPY N/A 3/1/2016    Procedure: COLONOSCOPY;  Surgeon: Liza Neal MD;  Location: [facility]     INSERT PORT VASCULAR ACCESS  3/2/2011    INSERT PORT VASCULAR ACCESS performed by LIZA NEAL at [facility]     OPEN REDUCTION INTERNAL FIXATION FEMUR DISTAL  2/12/2013    Procedure: OPEN REDUCTION INTERNAL FIXATION FEMUR DISTAL;  Open reduction internal fixation left femur fracture--Anesth.Choice;  Surgeon: Ley, Jeffrey Duane, MD;  Location: [facility]     ORTHOPEDIC SURGERY       PHACOEMULSIFICATION WITH STANDARD INTRAOCULAR LENS IMPLANT Left 1/22/2018    Procedure: PHACOEMULSIFICATION WITH STANDARD INTRAOCULAR LENS IMPLANT;  Left cataract removal with implant;  Surgeon: Rony Key MD;  Location: [facility]     PHACOEMULSIFICATION WITH STANDARD INTRAOCULAR LENS IMPLANT Right 2/19/2018    Procedure: PHACOEMULSIFICATION WITH STANDARD INTRAOCULAR LENS IMPLANT;  Right cataract removal with implant;  Surgeon: Rony Key MD;  Location: [facility]     SIGMOIDOSCOPY FLEXIBLE  9/9/2011    Procedure:SIGMOIDOSCOPY FLEXIBLE; Performed prior to induction.; Surgeon:ROBBIN MCDONNELL; Location:[facility]     SURGICAL HISTORY OF -   10/24/2002    Heart bypass     SURGICAL HISTORY OF -   2002    R Knee arthroplasty     SURGICAL HISTORY OF -   2002    Mi 2 stints     TAKEDOWN ILEOSTOMY  9/9/2011    Procedure:TAKEDOWN ILEOSTOMY; Exploratory Laparotomy,  Loop Ileostomy Takedown, Small Bowel Resection x 2.; Surgeon:ROBBIN MCDONNELL; Location:[facility]       Anesthesia  Evaluation     . Pt has had prior anesthetic.            ROS/MED HX    ENT/Pulmonary:     (+)sleep apnea, RC risk factors hypertension, obese, tobacco use, Past use , . .    Neurologic: Comment: Hearing loss    (+)neuropathy other neuro altered mental status with increased agitation    Cardiovascular:     (+) Dyslipidemia, hypertension--CAD, -past MI,CABG-date: 2002, stent,. : . . fainting (syncope). :. . Previous cardiac testing date:results:date: results:ECG reviewed date:4/7/20 results:Undetermined Tachycardia -with ectopic ventricular couplets   -Nonspecific ST depression   +   Diffuse nonspecific T-abnormality  -Nondiagnostic.     ABNORMAL date: results:          METS/Exercise Tolerance:     Hematologic:     (+) History of blood clots pt is anticoagulated, -      Musculoskeletal: Comment: Lumbar radiculopathy  DDD  Spinal stenosis  (+) arthritis,  -       GI/Hepatic: Comment: dysphagia    (+) GERD       Renal/Genitourinary:     (+) chronic renal disease,       Endo:     (+) type II DM thyroid problem hypothyroidism, Obesity, .      Psychiatric:  - neg psychiatric ROS       Infectious Disease:   (+) MRSA,       Malignancy:   (+) Malignancy History of Other  Other CA colon status post         Other:                                 Lab Results   Component Value Date    WBC 7.6 04/07/2020    HGB 13.9 04/07/2020    HCT 42.1 04/07/2020     04/07/2020    SED 51 (H) 10/16/2013     (H) 04/07/2020    POTASSIUM 3.6 04/07/2020    CHLORIDE 119 (H) 04/07/2020    CO2 20 04/07/2020    BUN 38 (H) 04/07/2020    CR 2.05 (H) 04/07/2020     (H) 04/07/2020    DWIGHT 9.0 04/07/2020    PHOS 3.1 09/15/2011    MAG 2.0 11/10/2017    ALBUMIN 3.3 (L) 04/07/2020    PROTTOTAL 7.0 04/07/2020    ALT 26 04/07/2020    AST 53 (H) 04/07/2020    ALKPHOS 100 04/07/2020    BILITOTAL 1.1 04/07/2020    LIPASE 221 04/06/2020    PTT 29 09/13/2018    INR 1.15 (H) 09/13/2018    FIBR 325 06/21/2011    TSH 1.94 03/16/2020    T4 1.32  06/14/2013       Preop Vitals  BP Readings from Last 3 Encounters:   04/07/20 (!) 174/88   03/16/20 134/60   01/03/20 128/70    Pulse Readings from Last 3 Encounters:   04/07/20 154   03/16/20 63   01/03/20 156      Resp Readings from Last 3 Encounters:   04/07/20 (!) 33   03/16/20 16   01/03/20 24    SpO2 Readings from Last 3 Encounters:   04/07/20 94%   03/16/20 97%   01/03/20 96%      Temp Readings from Last 1 Encounters:   04/07/20 37  C (98.6  F) (Axillary)    Ht Readings from Last 1 Encounters:   03/16/20 1.524 m (5')      Wt Readings from Last 1 Encounters:   04/06/20 89.8 kg (198 lb)    Estimated body mass index is 38.67 kg/m  as calculated from the following:    Height as of 3/16/20: 1.524 m (5').    Weight as of this encounter: 89.8 kg (198 lb).       Anesthesia Plan      History & Physical Review  History and physical reviewed and following examination, relevant changes include: altered mental status and agitation requiring intubation    ASA Status:  3 .        Plan for General with Etomidate induction. Maintenance will be Other.      Additional equipment: Videolaryngoscope        Postoperative Care  Postoperative pain management:  IV analgesics.      Consents  Anesthetic plan, risks, benefits and alternatives discussed with:  Other (See Comment) (Emergent intubation--unable to discuss with patient due to mental status)..                 CLEMENCIA Johnston CRNA

## 2020-04-07 NOTE — ED NOTES
RN to update IP nurse Luiza on behaviors and medication administration. Pt beginning to calm slightly and was transferred to m/s bed.

## 2020-04-07 NOTE — PROGRESS NOTES
On Call note  -agitation worsening since the ED.    - was extremely paranoid and some agitation in the ED but no acutely restless, constant motion, pushing to get out of bed, 2 nurses in trying to hold her back in the chair.  Will stop for a minute, then up again.      W/u so far without evidence of acute infection: UA slightly abnormal, CXR negative CT head without acute lesions.    Does try to get up to urinate frequently.    Totally not redirectable  Had total of 10 mg Zyprexa in the ED, and seems to be worse, more agitated.      ASSESSMENT:   -acute delirium: unclear cause.  Ninfa Dennis states she is usually calm and able to discuss events but has some mild short term memory issues.  She has been extremely worried about Covid.  Hasn't been eating/drinking, taking meds and fears someone is trying to poison her the last 2 days.  She is now kicking.  No cogent speech.       Plan     Haldol 2.5 mg every 30 min to max 15 mg in 24 hrs.   Will need restraints to protect nursing staff.  Discussed with ninfa that this may be needed until she settles down but it is our last resort.    Cath for residual since we can't do a bladder scan and she feels she needs to go all the time.  Leave in if >300 ml  Diabetic order set.

## 2020-04-07 NOTE — CONSULTS
Care Transition Initial Assessment - RN      Met with: Family and Caregiver.    DATA  Active Problems:    Altered mental status    Acute metabolic encephalopathy       Cognitive Status: sedated.  [facility]  Primary Care MD Name: TrinoSalomon  Contact information and PCP information verified: Yes  Lives With: facility resident      Quality of Family Relationships: supportive, involved, helpful  Description of Support System: Supportive, Involved   Who is your support system?: Other (specify)(nieces)   Support Assessment: Adequate family and caregiver support   Insurance concerns: No Insurance issues identified        This writer spoke with niece, Crystal, introduced self and role. Patient currently resides at [facility] (phone: [phone] Fax: [phone]).  Spoke with Mell RN @ [facility] ([phone]).  Patient has help medication administration, laundry, showers and incontinence care.  She is wheelchair bound at baseline, but is able to pull herself up with hand rail assist in the bathroom.  She has some short term memory loss at baseline.  Patient's two sisters live at [facility].  Her two nieces, Crystal and Lori live locally and are supportive.      Patient remains intubated at this time and discharge plans are pending.  Briefly discussed discharge planning and medicare guidelines in regards to home care and SNF benefits.  Mell stated patient would be able to return to [facility] if she is at baseline for her mentation (no paranoia) and at baseline for mobility.  Crystal's goal is for patient to return to her [facility] is able.  She is understanding and agreeable to potential need for TCU at discharge.    Pt/family was provided with the Medicare Compare list for SNF.  Discussed associated medicare star ratings to assist with choice for referrals/discharge planning Yes    Education was given to pt/family that star ratings are updated/maintained by Medicare  and can be reviewed by visiting www.medicare.gov Yes    Patient was provided with Medicare certified nursing home list. Pts choices are as follows [facility] (Phone: [phone] Admissions: [phone] Fax: [phone]), [facility] (Admissions Phone: [phone] Main Phone: [phone] Fax: [phone]), [facility] (Phone: [phone] Fax: [phone]).  CTS to send referral to above facilities when closer to discharge/patient stabilized.        Patient is followed by Komal Krishnan,  Partner Care coordinator.  Initial contact made with tentative discharge plans.  CTS to keep CC informed of plans.      PLAN    Return to [facility] vs TCU  CTS to follow    SHILPA GilbertN RN  Inpatient Care Coordinator  [facility] [phone]  [facility] [phone]

## 2020-04-07 NOTE — H&P
Admitted:     04/06/2020      CHIEF COMPLAINT:  Acute confusion, paranoia.      HISTORY OF PRESENT ILLNESS:  The patient is a resident of an assisted living with some mild cognitive impairment, primarily short-term memory loss at baseline but too is otherwise, generally conversant, interactive and just needs help with remembering her medications and making sure she gets to meals.  Starting about 24-36 hours prior to admission, she started showing increasing paranoia and confusion.  She started not eating or drinking, quit taking her medications, was excessively concerned about COVID and that she might have it, and then moved onto concern that someone was trying to poison her or kill her.  She was sent to the Emergency Department.  In the ED, they thought there was some evidence of UTI and she did have urinary frequency while she was there, but no fever or white count.  She was started on Zosyn and vancomycin empirically after blood cultures drawn.  Chest x-ray was negative.  CT head negative.  She did have some evidence of elevated creatinine and BUN from her baseline, also slightly elevated troponin, but no complaints of chest pain when she was awake enough to answer this.        With the increasing agitation and paranoia, she got olanzapine 2.5 mg and then subsequently another 5 mg dose.      After she got to the floor, when I saw her, she was acutely agitated and very difficult to communicate with.  She is hard of hearing at baseline, but she was completely non-redirectable.  Constantly trying to get up, flexing forward, moving about.  She is mostly wheelchair bound at baseline.  Two nurses were with her constantly.  She received a total of 10 mg of olanzapine and 2.5 mg of Haldol and continued to worsen.  At that point, she was also noted to have tachycardia up to 160s with her baseline when she first came in at 120 and then down to 90 in the ED.  She was afebrile throughout.      I can get no more history from  her.  I talked to the niece who tried to calm her down the night prior to this admission and can get her to talk to her, but could not get her to eat or drink.      PAST MEDICAL HISTORY:  Hypothyroidism, CAD with a CABG in 2002, history of perioperative atrial fibrillation during her CABG.  Last Lexiscan in 2012 showing partially reversible ischemia and Imdur was added, GERD, RC, history of rectal cancer treated 2011, hypertension, type 2 diabetes, CKD with a baseline creatinine of 1.4, osteoporosis, B12 deficiency, urge incontinence, restless leg syndrome, degenerative disk disease, bilateral sensorineural hearing loss, history of DVT, PE.      MEDICATIONS PRIOR TO ADMISSION:   1.  Aspirin 81 mg daily.   2.  Tylenol 650 t.i.d. p.r.n.   3.  Cholecalciferol 1000 mg daily.   4.  Cipro 250 mg b.i.d. for 7 days.  She just finished that a week ago.     5.  Eliquis 5 mg b.i.d.   6.  Voltaren 50 mg t.i.d. p.r.n. moderate pain.   7.  Ferrous sulfate 325 mg daily.   8.  Lidex cream p.r.n. to rash on legs.   9.  Lasix 20 mg b.i.d.   10.  Neurontin 100 mg at bedtime.   11.  NovoLog 14 mg b.i.d. with lunch and dinner.   12.  Glargine insulin 24 units at bedtime.   13.  Avapro 37.5 mg at bedtime.   14.  Synthroid 44 mcg once a week and 88 mcg all other days.   15.  Magnesium 250 mg daily.   16.  Metoprolol 12.5 mg daily.   17.  Omeprazole 20 mg b.i.d.   18.  Potassium chloride 10 mEq b.i.d.   19.  Mirapex 0.5 mg at bedtime plus p.r.n. x 1.   20.  Simvastatin 40 mg daily.   21.  Tramadol 50 mg daily, stopped sometime in the last 2 weeks.      ALLERGIES:  LISINOPRIL caused cough.  VICODIN caused extreme dizziness as well as HYDROCODONE.      SOCIAL HISTORY:  She lives in assisted living.  She has no children.  A niece checks in on her frequently.   She was a former smoker, quit years ago.  No alcohol currently.      FAMILY HISTORY:  Reviewed.  See Epic, it is noncontributory at this time.      REVIEW OF SYSTEMS:  Unable.  She is  completely confused at this time.  Her baseline functioning is that she is mostly wheelchair bound.  She transfers.  She has stress and urge incontinence.  She has not complained of any recent cough, fevers, shortness of breath, chest pain.  This is per the niece.      OBJECTIVE:     GENERAL:  At the time I am seeing her, she is acutely agitated, in constant motion, buckling at the waist, trying to get up when.  When we get through to her, she can say a few words, but she was not redirectable, she cannot tell us what she needs or wants.  She just keeps moving.   VITAL SIGNS:  She is afebrile.  Pulse is 160, blood pressure 127/87, respiratory rate 18-25 at this point.  O2 sat 92% on room air.   HEENT:  Eyes show pupils equal and reactive.  She looks about, EOMs appear intact, but she will not follow directions.   NECK:  Supple without masses, nodes.   CHEST:  Clear to A and P.   CARDIOVASCULAR:  Marked tachycardia without murmur that I can hear.  There is no edema.   ABDOMEN:  Seems to be soft and nontender, though again she cannot follow commands.   EXTREMITIES:  There is no significant rigidity.  No tenderness when I press on her muscles.   NEUROLOGIC:  Shows just constant motion, cannot follow any commands, completely disoriented.      LABORATORY DATA:  Show normal white count, completely normal CBC.  UA shows 13 WBCs per high-power field, 182 RBCs.  Sodium slightly elevated at 148, chloride elevated at 118, BUN 39, creatinine 2.5.  This was 27 and 1.44 respectively 2 weeks ago.  Calcium elevated at 10.2.  Will check an ionized calcium.  Lactic acid 2.4, troponin initially 0.052.      ASSESSMENT:   1.  Acute metabolic encephalopathy on top of mild cognitive impairment/dementia.  At this point, she is extremely agitated and unable to be redirected.  She seems to have gotten worse with neuroleptic treatment including olanzapine 10 mg and Haldol 2.5 mg.  She is constantly pulling at 4-point restraints on the floor.   Will transfer her to the ICU for 5-point restraints and start Precedex initially with a goal of mild to moderate sedation.  Try and avoid benzodiazepines as these frequently make it worse, but we may need to go with those since she has gotten worse with olanzapine.   We are continuing the search for cause including infection, metabolic, toxic.  No evidence of withdrawal or new drugs other than the worsening with the neuroleptics.  She is being covered for possible infection.  Urine cultures and blood cultures are pending.  Chest x-ray is negative.  CT head is negative for structural disease.  She does have mild hypercalcemia at 10.2 with normal up to 10.1 which would seem unlikely to cause this degree of encephalopathy.   2.  Possible urinary tract infection.  She did have some urinary frequency and actually a catheter was put in and she had over 300 mL residual, so that may have been at least contributing initially.  Will leave the Lr and if we can for now at least.   3.  Acute kidney injury on chronic kidney disease.  Will hydrate with IV LR.  Follow kidney functions.   4.  Elevated lactic acid.  I believe this is from the constant muscle motion that she had.  We are checking a CK, repeat troponin, CBC, chemistries.  If the CK is elevated, it would at least suggest the possibility of a neuroleptic malignant syndrome which should not come on this early, but per up-to-date, can occur with a single dose.  Usually, should have fever and lead pipe rigidity which she does not have either.   Elevated troponin, suspect demand ischemia.  This is mild elevation, we are rechecking it.  She did not have any significant EKG changes other than the sinus tachycardia.   5.  History of deep venous thrombosis/pulmonary embolism.  Will continue her Eliquis, but actually since she cannot take it orally right now, will put her on Lovenox full dose 1 mg/kg.  We are holding her Lasix, Mirapex, potassium supplementation.       DISPOSITION:  She needs ICU care.  If we cannot identify metabolic or toxic cause, she may need transfer to [facility].  The niece states that in the , there was an episode where she was inpatient at [place], but she has shown no psychiatric disease since then.         MAO CHINO MD             D: 2020   T: 2020   MT: LOAN      Name:     SOL WORRELL   MRN:      [id number]        Account:      [id number]   :      1945        Admitted:     2020                   Document: [id number]
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
## 2020-04-07 NOTE — PROGRESS NOTES
Dr. Barfield in to see patient, decision made to intubated.  Patient has been here since 0425 in 4 point restraints arrived in M/S bed and transferred to ICU bed with 5 assist to maintain both patient and staff safety. .  Arrived without IV, was pulled out by patient.  IV restarted and Pecedex drip started and titrated to max of 1.4 Mcg/ Kg/ minute.  Also given Ativan 0.5 mg IV to see if able to calm with combo of two drugs.  Second IV site established for antibiotics.  Remains in 4 point restraints  With no improvement in behavior.  Pupils pinpoint do NOT react to light, will not make eye contact or follow commands. Maintaining saturations on RA in mid 90's.  Added oxygen per NC to preoxygenated for intubation.  Awaiting anaesthesia.

## 2020-04-07 NOTE — PHARMACY-VANCOMYCIN DOSING SERVICE
Patient to start the heparin C-V/Vascular protocol with a goal anti 10a level of 0.15-0.35. Using a HT of 60 inches and a WT of 89.8 kg, a dosing WT of 63.2 kg was calculated. Based on this dosing WT, give patient a heparin bolus of 3500 units from the bag and then start a drip at 750 units/hr. Check the patient's anti 10a level 6 hours after starting the drip at ~1900.   Per C-V/Vascular protocol.    AMBER Gama.Ph.    Add: start 4/7/20 @ 1900.

## 2020-04-07 NOTE — PHARMACY-VANCOMYCIN DOSING SERVICE
Pharmacy Vancomycin Initial Note  Date of Service 2020  Patient's  1945  75 year old, female    Indication: Sepsis    Current estimated CrCl = Estimated Creatinine Clearance: 22.9 mL/min (A) (based on SCr of 2.05 mg/dL (H)).    Creatinine for last 3 days  2020:  8:28 PM Creatinine 2.05 mg/dL    Recent Vancomycin Level(s) for last 3 days  No results found for requested labs within last 72 hours.      Vancomycin IV Administrations (past 72 hours)      No vancomycin orders with administrations in past 72 hours.                Nephrotoxins and other renal medications (From now, onward)    Start     Dose/Rate Route Frequency Ordered Stop    20 2227  vancomycin (VANCOCIN) 2,000 mg in sodium chloride 0.9 % 500 mL intermittent infusion      2,000 mg  over 2 Hours Intravenous EVERY 48 HOURS 20 2226      20 2217  piperacillin-tazobactam (ZOSYN) infusion 3.375 g      3.375 g  over 1 Hours Intravenous ONCE 20 2216            Contrast Orders - past 72 hours (72h ago, onward)    None                Plan:  1.  Start vancomycin  2000 mg IV q48h.   2.  Goal Trough Level: 15-20 mg/L   3.  Pharmacy will check trough levels as appropriate in 1-3 Days.    4. Serum creatinine levels will be ordered daily for the first week of therapy and at least twice weekly for subsequent weeks.    5. [place] method utilized to dose vancomycin therapy: Method 1    Adrian Olivares [facility]

## 2020-04-07 NOTE — ANESTHESIA PROCEDURE NOTES
Airway   Date/Time: 4/7/2020 6:02 AM   Patient location during procedure: Floor    General Information and Staff   Performed: CRNA     Consent for Airway   Urgency: emergent  Consent: The procedure was performed in an emergent situation.        Indications and Patient Condition  Indications for airway management: altered level of consciousness  Induction type:intravenous  Mask difficulty assessment: easy with oral airway    Final Airway Details  Final airway type: endotracheal airway  Successful airway:ETT  ETT size (mm): 7.0 Cuffed: yes   Blade: Combs  Blade size: #3  Successful intubation technique: asleep  Facilitating devices/methods: Combs  Endotracheal tube insertion site: right side of mouth   Measured from: lips  ETT to lips (cm): 19  Grade View of Cords: 1  Placement verified by: capnometry Number of attempts at approach: 1  Assessment post-intubation includes: ETT secured, Vent settings by primary/ICU team, Primary/ICU team to review CXR, Sedation to be ordered by primary/ICU team and No apparent complications  Secured with:foam trach ties  Ease of procedure: easy  Dentition: Intact and Unchanged

## 2020-04-07 NOTE — PROGRESS NOTES
Patient placed on ventilator to maintain airway patency. Current parameters are CMV 18, RR 22,, FiO2 30% and +5 PEEP. No. 7 ETT secured at 23 at the lips with minimal leak technique, BBS coarse but equal. Auto PEEP 3. Plateau pressure 16. Sputum sample and VBG pending. Continue to monitor.

## 2020-04-07 NOTE — ANESTHESIA CARE TRANSFER NOTE
Patient: Stefanie Velazquez    * No procedures listed *    Diagnosis: * No pre-op diagnosis entered *  Diagnosis Additional Information: No value filed.    Anesthesia Type:   No value filed.     Note:  Airway :ETT  Patient transferred to:ICU  ICU Handoff: Call for PAUSE to initiate/utilize ICU HANDOFF, Identified Patient, Identified Responsible Provider, Reviewed the Pertinent Medical History, Discussed Surgical Course, Reviewed Intra-OP Anesthesia Management and Issues during Anesthesia, Set Expectations for Post Procedure Period and Allowed Opportunity for Questions and Acknowledgement of Understanding      Vitals: (Last set prior to Anesthesia Care Transfer)              Electronically Signed By: CLEMENCIA Johnston CRNA  April 7, 2020  6:44 AM

## 2020-04-07 NOTE — PROGRESS NOTES
[facility] [place]MISSION NOTE    Patient admitted to room 2401 at approximately 0140 via cart from emergency room. Patient was accompanied by transport tech.     Verbal SBAR report received from Toña DONIS prior to patient arrival.     Patient trasferred to bed via assist of 2 Patient alert and oriented X 1. The patient is not having any pain.  . Admission vital signs: Blood pressure (!) 174/88, pulse 154, temperature 98.6  F (37  C), temperature source Axillary, resp. rate (!) 33, weight 89.8 kg (198 lb), SpO2 94 %, not currently breastfeeding. Patient was oriented to plan of care, call light, bed controls, tv, telephone, bathroom and visiting hours.     Risk Assessment    The following safety risks were identified during admission: fall. Yellow risk band applied: YES.     Skin Initial Assessment    This writer admitted this patient and completed a full skin assessment and Efe score in the Adult PCS flowsheet. Appropriate interventions initiated as needed.     Secondary skin check completed by Carolyn DONIS.         Education    Patient has a [place] to Observation order: No  Observation education completed and documented: Yes      Luiza Brand RN

## 2020-04-07 NOTE — H&P (VIEW-ONLY)
History     Chief Complaint   Patient presents with     Altered Mental Status     Pt from a nursing home, per EMS report, pt has not been acting herself the last 24 hours. Pt has been refusing eating, taking medications, during normal daily activities.      HPI  Stefanie Velazquez is a 75 year old female with a past medical history significant for diabetes type 2, hypothyroidism chronic ischemic heart disease cancer of colon, history of DVT on Eliquis who presents the emergency department from her nursing home complaining of altered mental status.  Patient reportedly has been paranoid all day today.  She has been talking about being poisoned and having cancer.  She had refused to eat or take her medications.  This started yesterday but is worsened today.  She does not typically assess weight.  Had a UTI in the end of March and just finished her course of antibiotics.  This usually causes increased lethargy.  Patient is more agitated at this time.  She answers some questions appropriately but perseverates on people poisoning her and the fact she may have cancer.  Patient thinks she is in the specialty office of her primary care.  Patient refuses to get into bed at this time.  She has memory care issues per nursing staff but is usually conversant.  No reported recent falls.  Unable to get history from patient due to altered mental status.  There have been no reported new medications and has not taken her medications today.    Allergies:  Allergies   Allergen Reactions     Hydrocodone Other (See Comments)     dizzy     Lisinopril Cough            Vicodin [Hydrocodone-Acetaminophen] Other (See Comments)     Caused extreme dizziness       Problem List:    Patient Active Problem List    Diagnosis Date Noted     H/O deep venous thrombosis 05/21/2019     Priority: Medium     Syncope 05/21/2019     Priority: Medium     Syncope and collapse 05/21/2019     Priority: Medium     Impacted cerumen of right ear 03/31/2019      Priority: Medium     CKD (chronic kidney disease) stage 4, GFR 15-29 ml/min (H) 2019     Priority: Medium     Lumbar radiculopathy 2019     Priority: Medium     Oropharyngeal dysphagia 2018     Priority: Medium     Bilateral hearing loss, unspecified hearing loss type 2018     Priority: Medium     Lymphedema of genitalia 2018     Priority: Medium     Spinal stenosis of lumbar region without neurogenic claudication 2018     Priority: Medium     DDD (degenerative disc disease), lumbar 2018     Priority: Medium     Symptomatic bradycardia 10/29/2017     Priority: Medium     Type 2 diabetes mellitus with diabetic nephropathy, with long-term current use of insulin (H) 2017     Priority: Medium     Restless leg syndrome 2017     Priority: Medium     Chronic diarrhea 2017     Priority: Medium     Health Care Home 10/28/2016     Priority: Medium     [facility] Care Coordination  JUNE Vega  Phone: [phone]    [facility] CARE PLAN SUMMARY    Member Name:  Stefanie Velazquez        *Assisted Living*  Address:   [place] [address] Phone: [phone] (Home)    :  1945 Date of Assessment:  Due by 3/31/2020 d/t Opening of EW on 2019   Health Plan:  [facility] [employer]  Health Plan Number: [id number] Medical Assistance Number: [id number]  Financial Worker:    Case #:     FVP Care Coordinator:    JUNE Vega CC Phone: [phone]  CC Fax:  [phone]   FVP Enrollment Date: 2019 Case Mix:  A-  Rate Cell:  B   Waiver Type:  EW   Primary Emergency Contact: Dori Pena (niece)  Address: [address] [place] [address]  Mobile Phone: [phone]  Relation: Relative  Secondary Emergency Contact: Lori Pope (niece)           [place], [place]   Home Phone: [phone]  Mobile Phone: [phone]  Relation: Relative Language:  English  :  [place]    Health Care Agent/POA:  Yes Advanced Directives/Living Will:  Yes   Primary Care Clinic/Phone/Fax:  [facility]/p) [phone], f) [phone] Primary Dx:   R69  Secondary Dx:     Primary Physician:  Sandra Medina   Height:  5' 0''  Weight:  201 lbs   Specialty Physician:    Audiologist:     Eye Care Provider:   Dental Care Provider:    [facility]: [facility] [phone] or [phone]   Other:        [facility] CURRENT SERVICES SUMMARY  Equipment owned/DME history: WC 2/2020)  SERVICE TYPE/PROVIDER NAME/PHONE AUTH DATE FREQUENCY Units OR $ Amt DESCRIPTION   Medical Transportation: [employer] [phone]  Fax:  9/1/2019 - 3/31/2020 As needed Varies    [facility]  Phone: [phone]    Fax: 9/1/2019- 3/31/2019 Monthly Per [employer]      [facility]  [phone] cell 2/11/2020 - 3/31/2020 1x order $79.90 WC Cushion       * For ADC please select ADC provider and EW Transportation in order to process auth               Benign essential hypertension 09/30/2016     Priority: Medium     Bowel and bladder incontinence 05/22/2015     Priority: Medium     Dysuria 10/24/2013     Priority: Medium     Vitamin B 12 deficiency 05/14/2012     Priority: Medium     Vitamin D deficiency 05/14/2012     Priority: Medium     Radiation therapy complication 11/09/2011     Priority: Medium     Anemia 08/26/2011     Priority: Medium     Rectal cancer- newly diagnosed adenoCA rectum Jan 2011 and Positive Tubular Adenoma 2019 02/17/2011     Priority: Medium     Hyperlipidemia LDL goal <100 10/31/2010     Priority: Medium     Neuropathy (H) 04/09/2010     Priority: Medium     RC-moderate (AHI 12, LSat 60%); REM RDI-73 06/01/2009     Priority: Medium     HST performed 11/11/2019 with weight 201 lbs.  Analysis time 540.2 minutes.  AHI 6.8.  Baseline SpO2 91.1%, <= 88% for 43 minutes.    Sleep study [facility] was performed 5/26/09 in order to re-evaluate severity of  RC. The total sleep time was 412.0 minutes. The sleep latency was decreased at 8.7 minutes with Ambien 10mg. The REM sleep latency was 426.0 minutes. Sleep efficiency was reduced at 79.0%. The sleep architecture was disrupted with frequent sleep stage changes and arousals.  Snoring:  loud. Respiratory Events:   RDI of 57.5 and an AHI of 12.2. The REM RDI was 74.3 The lowest O2 saturation was 60.0%. This study is suggestive of moderate sleep apnea, profound desaturations during REM sleep, with 35 second apneas.    Other: PLM index was 13.8.      Recommendations:  Due to the profound desaturations during REM sleep, consider CPAP titration study to establish optimal CPAP treatment pressure.  2. Check ferritin given her RLS symptoms. If RLS symptoms persist after treatment of RC, further therapy may be warranted. Replace iron as indicated by ferritin.         Osteoporosis 02/29/2008     Priority: Medium     Problem list name updated by automated process. Provider to review       Esophageal reflux 10/13/2007     Priority: Medium     On protonix;        bmi 40 06/22/2005     Priority: Medium     Problem list name updated by automated process. Provider to review       Generalized osteoarthrosis, unspecified site 06/22/2005     Priority: Medium     HEARING LOSS CONDUCTIVE, COMBINED TYPE 06/22/2005     Priority: Medium     Ivorian measles as child       Hypothyroidism 06/22/2003     Priority: Medium     Problem list name updated by automated process. Provider to review       Chronic ischemic heart disease 06/22/2003     Priority: Medium     Class: Chronic     cabgx3 09/2002,  with a left internal mammary (LIMA) to the left anterior descending and a bypass graft to the obtuse marginal branch of the circumflex and also PDA branch of the right coronary artery. She had an episode of atrial fibrillation postoperatively and was treated with sotalol.   PTCA and stent placement for recurrent restenosis.   2/05 adenosine  ef70%  9/24/12 - Lexiscan nuclear stress test was positive for mild partially reversible ischemia in the LAD distribution. Imdur added.              Past Medical History:    Past Medical History:   Diagnosis Date     Acute deep vein thrombosis (DVT) of right lower extremity, unspecified vein (H) 10/31/2018     Acute myocardial infarction of other specified sites, episode of care unspecified 6/2002     Cancer of colon (H)      Cellulitis of lower extremity, bilateral 9/30/2016     Chronic ischemic heart disease, unspecified 6/22/2003     Coronary atherosclerosis of unspecified type of vessel, native or graft      Diabetes mellitus (H)      Esophageal reflux      Fracture of femur, distal, left, closed (H) 2/11/2013     Gastro-oesophageal reflux disease      Generalized osteoarthrosis, unspecified site 6/22/2005     Generalized osteoarthrosis, unspecified site 6/22/2005     HEARING LOSS CONDUCTIVE, COMBINED TYPE 6/22/2005     HYPOTHYROIDISM  6/22/2003     Mixed hyperlipidemia 6/22/2005     Obesity, unspecified 6/22/2005     RC-moderate (AHI 12, LSat 60%); REM RDI-73 6/1/2009     Recurrent acute deep vein thrombosis (DVT) of both lower extremities (H) 3/31/2019     Stented coronary artery      Type II or unspecified type diabetes mellitus with renal manifestations, uncontrolled(250.42) (H) 6/22/2005     Type II or unspecified type diabetes mellitus with renal manifestations, uncontrolled(250.42) (H) 6/22/2005     Unspecified disorder resulting from impaired renal function 6/22/2005     Unspecified essential hypertension        Past Surgical History:    Past Surgical History:   Procedure Laterality Date     cabg       COLECTOMY LOW ANTERIOR  6/21/2011    Procedure:COLECTOMY LOW ANTERIOR; with loop ielostomy; Surgeon:ROBBIN MCDONNELL; Location:[facility]     COLONOSCOPY  1/26/2011    COLONOSCOPY performed by MASOUD BILL at [facility]     COLONOSCOPY N/A 3/1/2016    Procedure: COLONOSCOPY;  Surgeon: Angelika Neal  MD Priscila;  Location: [facility]     INSERT PORT VASCULAR ACCESS  3/2/2011    INSERT PORT VASCULAR ACCESS performed by LIZA MAGALLANES at [facility]     OPEN REDUCTION INTERNAL FIXATION FEMUR DISTAL  2/12/2013    Procedure: OPEN REDUCTION INTERNAL FIXATION FEMUR DISTAL;  Open reduction internal fixation left femur fracture--Anesth[facility]Choice;  Surgeon: Ley, Jeffrey Duane, MD;  Location: [facility]     ORTHOPEDIC SURGERY       PHACOEMULSIFICATION WITH STANDARD INTRAOCULAR LENS IMPLANT Left 1/22/2018    Procedure: PHACOEMULSIFICATION WITH STANDARD INTRAOCULAR LENS IMPLANT;  Left cataract removal with implant;  Surgeon: Rony Key MD;  Location: [facility]     PHACOEMULSIFICATION WITH STANDARD INTRAOCULAR LENS IMPLANT Right 2/19/2018    Procedure: PHACOEMULSIFICATION WITH STANDARD INTRAOCULAR LENS IMPLANT;  Right cataract removal with implant;  Surgeon: Rony Key MD;  Location: [facility]     SIGMOIDOSCOPY FLEXIBLE  9/9/2011    Procedure:SIGMOIDOSCOPY FLEXIBLE; Performed prior to induction.; Surgeon:ROBBIN MCDONNELL; Location: OR     SURGICAL HISTORY OF -   10/24/2002    Heart bypass     SURGICAL HISTORY OF -   2002    R Knee arthroplasty     SURGICAL HISTORY OF -   2002    Mi 2 stints     TAKEDOWN ILEOSTOMY  9/9/2011    Procedure:TAKEDOWN ILEOSTOMY; Exploratory Laparotomy,  Loop Ileostomy Takedown, Small Bowel Resection x 2.; Surgeon:ROBBIN MCDONNELL; Location: OR       Family History:    Family History   Problem Relation Age of Onset     Diabetes Sister      C.A.D. Sister      Breast Cancer Sister        Social History:  Marital Status:  Single [1]  Social History     Tobacco Use     Smoking status: Former Smoker     Smokeless tobacco: Never Used   Substance Use Topics     Alcohol use: Yes     Comment: rare social use     Drug use: No        Medications:    ACCU-CHEK MILVIA MELODY  acetaminophen (TYLENOL) 650 MG CR tablet  aspirin (ASA) 81 MG tablet  blood glucose monitoring (ACCU-CHEK MILVIA) test  strip  blood glucose monitoring (ACCU-CHEK MULTICLIX) lancets  cholecalciferol 1000 units TABS  Continuous Blood Gluc  (FREESTYLE JAJA 14 DAY READER) MELODY  Continuous Blood Gluc Sensor (FREESTYLE JAJA 14 DAY SENSOR) MISC  diclofenac (VOLTAREN) 50 MG EC tablet  ELIQUIS ANTICOAGULANT 5 MG tablet  ferrous sulfate (FEROSUL) 325 (65 Fe) MG tablet  fluocinonide (LIDEX) 0.05 % ointment  fluticasone (FLONASE) 50 MCG/ACT spray  furosemide (LASIX) 20 MG tablet  gabapentin (NEURONTIN) 100 MG capsule  hydrocortisone (ANUSOL-HC) 2.5 % cream  insulin aspart (NOVOLOG FLEXPEN) 100 UNIT/ML pen  insulin glargine (LANTUS PEN) 100 UNIT/ML pen  insulin pen needle (BD GABRIELLA U/F) 32G X 4 MM  irbesartan (AVAPRO) 75 MG tablet  levothyroxine (SYNTHROID/LEVOTHROID) 88 MCG tablet  levothyroxine (SYNTHROID/LEVOTHROID) 88 MCG tablet  loperamide (IMODIUM) 2 MG capsule  magnesium 250 MG tablet  menthol-zinc oxide (CALMOSEPTINE) 0.44-20.625 % OINT ointment  metoprolol succinate ER (TOPROL-XL) 25 MG 24 hr tablet  OMEPRAZOLE PO  order for DME  order for DME  order for DME  order for DME  order for DME  order for DME  potassium chloride ER (K-DUR/KLOR-CON M) 10 MEQ CR tablet  pramipexole (MIRAPEX) 0.5 MG tablet  pramipexole (MIRAPEX) 0.5 MG tablet  simvastatin (ZOCOR) 40 MG tablet          Review of Systems   Unable to perform ROS: Mental status change       Physical Exam   BP: (!) 137/111  Pulse: 89  Heart Rate: 78  Temp: 99  F (37.2  C)  Resp: 18  Weight: 89.8 kg (198 lb)  SpO2: 97 %      Physical Exam  Vitals signs and nursing note reviewed.   Constitutional:       General: She is not in acute distress.     Appearance: She is obese. She is not ill-appearing or toxic-appearing.   HENT:      Head: Normocephalic.      Nose: Nose normal.      Mouth/Throat:      Mouth: Mucous membranes are dry.      Pharynx: Oropharynx is clear. No posterior oropharyngeal erythema.   Eyes:      Conjunctiva/sclera: Conjunctivae normal.      Pupils: Pupils are  equal, round, and reactive to light.   Neck:      Musculoskeletal: Normal range of motion and neck supple.      Comments: No JVD is present.  Cardiovascular:      Rate and Rhythm: Normal rate and regular rhythm.      Pulses: Normal pulses.      Heart sounds: Normal heart sounds. No murmur.   Pulmonary:      Effort: Pulmonary effort is normal.      Breath sounds: No wheezing, rhonchi or rales.      Comments: Breath sounds are decreased at bases.  Abdominal:      General: Abdomen is flat.      Palpations: Abdomen is soft.      Tenderness: There is no abdominal tenderness. There is no right CVA tenderness or left CVA tenderness.   Musculoskeletal:      Comments: No midline back tenderness.  No erythema edema of the back.  Moving all extremities well small amount of ecchymosis on arms no obvious erythema noted on chest abdomen back and legs.  Patient was wearing a cam boot on the right foot and leg which was removed no obvious erythema edema or calf tenderness noted.  Pulses sensation symmetrical.   Skin:     General: Skin is warm and dry.      Capillary Refill: Capillary refill takes less than 2 seconds.      Findings: No erythema or rash.   Neurological:      Comments: Patient is alert to person.  She thinks she is in a clinical office.  She is perseverating about coronavirus, cancer and people trying to poison her.  She is able to be redirected and called.  Answers some questions appropriately.  Does not follow cranial nerve exams to any extent.  No focal neurologic deficit is noted.   Psychiatric:      Comments: Positive mild agitation and confusion.         ED Course        Procedures               EKG Interpretation:      Interpreted by Anand Pacheco MD  Rhythm: normal sinus   Rate: Normal  Axis: Normal  Ectopy: none  Conduction: normal  ST Segments/ T Waves: T wave inversion Lateral and Inferior  Q Waves: none  Comparison to prior: twave inversion is new since 1/3/20    Clinical Impression: Normal sinus  rhythm with inferior lateral T wave inversion.    Critical Care time:  Was 30  minutes for this patient excluding procedures.  For management of possible sepsis/altered mental status.       The patient has signs of Severe Sepsis as evidenced by:    1. 2 SIRS criteria, AND  2. Suspected infection, AND   3. Organ dysfunction: Lactic Acid > 2.0    Time severe sepsis diagnosis confirmed:  as this was the time when Lactate resulted, and the level was > 2.0    3 Hour Severe Sepsis Bundle Completion:    1. Initial Lactic Acid Result:   Recent Labs   Lab Test 04/06/20 2028   LACT 2.4*     2. Blood Cultures before Antibiotics: Yes  3. Broad Spectrum Antibiotics Administered:  yes       Anti-infectives (From admission through now)    Start     Dose/Rate Route Frequency Ordered Stop    04/06/20 2217  piperacillin-tazobactam (ZOSYN) infusion 3.375 g      3.375 g  over 1 Hours Intravenous ONCE 04/06/20 2216 04/06/20 2311          4. Fluid volume administered in ED: Full fluid bolus was not administered secondary to patient's obesity history of cardiac ischemia and chronic renal disease.    BMI Readings from Last 1 Encounters:   04/07/20 37.24 kg/m                                  Results for orders placed or performed during the hospital encounter of 04/06/20 (from the past 24 hour(s))   CBC with platelets differential   Result Value Ref Range    WBC 5.9 4.0 - 11.0 10e9/L    RBC Count 4.87 3.8 - 5.2 10e12/L    Hemoglobin 14.6 11.7 - 15.7 g/dL    Hematocrit 45.5 35.0 - 47.0 %    MCV 93 78 - 100 fl    MCH 30.0 26.5 - 33.0 pg    MCHC 32.1 31.5 - 36.5 g/dL    RDW 13.5 10.0 - 15.0 %    Platelet Count 251 150 - 450 10e9/L    Diff Method Automated Method     % Neutrophils 50.7 %    % Lymphocytes 35.5 %    % Monocytes 10.4 %    % Eosinophils 1.9 %    % Basophils 1.3 %    % Immature Granulocytes 0.2 %    Nucleated RBCs 0 0 /100    Absolute Neutrophil 3.0 1.6 - 8.3 10e9/L    Absolute Lymphocytes 2.1 0.8 - 5.3 10e9/L    Absolute Monocytes  0.6 0.0 - 1.3 10e9/L    Absolute Eosinophils 0.1 0.0 - 0.7 10e9/L    Absolute Basophils 0.1 0.0 - 0.2 10e9/L    Abs Immature Granulocytes 0.0 0 - 0.4 10e9/L    Absolute Nucleated RBC 0.0    Comprehensive metabolic panel   Result Value Ref Range    Sodium 148 (H) 133 - 144 mmol/L    Potassium 4.4 3.4 - 5.3 mmol/L    Chloride 118 (H) 94 - 109 mmol/L    Carbon Dioxide 22 20 - 32 mmol/L    Anion Gap 8 3 - 14 mmol/L    Glucose 121 (H) 70 - 99 mg/dL    Urea Nitrogen 39 (H) 7 - 30 mg/dL    Creatinine 2.05 (H) 0.52 - 1.04 mg/dL    GFR Estimate 23 (L) >60 mL/min/[1.73_m2]    GFR Estimate If Black 26 (L) >60 mL/min/[1.73_m2]    Calcium 10.2 (H) 8.5 - 10.1 mg/dL    Bilirubin Total 0.8 0.2 - 1.3 mg/dL    Albumin 3.3 (L) 3.4 - 5.0 g/dL    Protein Total 7.7 6.8 - 8.8 g/dL    Alkaline Phosphatase 107 40 - 150 U/L    ALT 26 0 - 50 U/L    AST 38 0 - 45 U/L   Lipase   Result Value Ref Range    Lipase 221 73 - 393 U/L   Lactic acid whole blood   Result Value Ref Range    Lactic Acid 2.4 (H) 0.7 - 2.0 mmol/L   Troponin I   Result Value Ref Range    Troponin I ES 0.052 (H) 0.000 - 0.045 ug/L   CT Head w/o Contrast    Narrative    EXAM: CT HEAD W/O CONTRAST  LOCATION: [facility]  DATE/TIME: 4/6/2020 8:40 PM    INDICATION: Confusion.  COMPARISON: Head CT 05/21/2019.  TECHNIQUE: Routine without IV contrast. Multiplanar reformats. Dose reduction techniques were used.    FINDINGS:  INTRACRANIAL CONTENTS: No evidence of acute intracranial hemorrhage. No mass effect or midline shift. Extensive periventricular white matter hypodensities which are nonspecific, but likely related to chronic microvascular ischemic disease. Chronic area   of encephalomalacia in the right frontal white matter. Moderate diffuse parenchymal volume loss. Ventricular size is unchanged without evidence of hydrocephalus.    VISUALIZED ORBITS/SINUSES/MASTOIDS: No intraorbital abnormality. No paranasal sinus mucosal disease. No middle ear or mastoid  effusion.    BONES/SOFT TISSUES: No acute abnormality.      Impression    IMPRESSION:  1.  No evidence of acute hemorrhage, mass, or herniation.  2.  Marked diffuse parenchymal volume loss and extensive white matter changes likely due to chronic microvascular ischemic disease.   Chest XR,  PA & LAT    Narrative    CHEST TWO VIEWS     4/6/2020 8:59 PM     HISTORY: Confusion.    COMPARISON: 5/21/2019.      Impression    IMPRESSION: Stable right chest port venous catheter and sternotomy.  Cardiac silhouette within normal limits. No focal airspace disease. No  effusions identified.    KIMI HINTON MD   UA with Microscopic   Result Value Ref Range    Color Urine Yellow     Appearance Urine Slightly Cloudy     Glucose Urine Negative NEG^Negative mg/dL    Bilirubin Urine Negative NEG^Negative    Ketones Urine Negative NEG^Negative mg/dL    Specific Gravity Urine 1.013 1.003 - 1.035    Blood Urine Large (A) NEG^Negative    pH Urine 5.0 5.0 - 7.0 pH    Protein Albumin Urine 100 (A) NEG^Negative mg/dL    Urobilinogen mg/dL 0.0 0.0 - 2.0 mg/dL    Nitrite Urine Negative NEG^Negative    Leukocyte Esterase Urine Trace (A) NEG^Negative    Source Midstream Urine     WBC Urine 13 (H) 0 - 5 /HPF    RBC Urine >182 (H) 0 - 2 /HPF    Bacteria Urine Few (A) NEG^Negative /HPF    Mucous Urine Present (A) NEG^Negative /LPF    Hyaline Casts 7 (H) 0 - 2 /LPF     *Note: Due to a large number of results and/or encounters for the requested time period, some results have not been displayed. A complete set of results can be found in Results Review.       Medications   OLANZapine zydis (zyPREXA) ODT half-tab 2.5 mg (has no administration in time range)   piperacillin-tazobactam (ZOSYN) infusion 3.375 g (has no administration in time range)   vancomycin (VANCOCIN) 2,000 mg in sodium chloride 0.9 % 500 mL intermittent infusion (has no administration in time range)   0.9% sodium chloride BOLUS (1,000 mLs Intravenous New Bag 4/6/20 2111)        Assessments & Plan (with Medical Decision Making) records were reviewed.  Labs were obtained.  CT scan of the head and chest x-ray were ordered.  Zyprexa was ordered but patient would not take it.  She did calm down with redirecting.  CT scan of the head reveals no obvious acute abnormality chest x-ray was without acute abnormality.  EKG revealed T wave abnormalities in the inferior lateral leads these are new from previous EKG.  Her troponin is slightly elevated at 0.52.  She does have a cardiac history.  Patient's white count is 5.9 hemoglobin 14.6 platelet count 251.  No left shift is present.  Comprehensive metabolic panel significant for sodium elevated 148 chloride 118 glucose 121 BUN is 39 creatinine elevated at 2.05.  Lactic acid was 2.4.  IV fluids were ordered and given the patient.  Patient did eventually take the Zyprexa.  And gave a urine sample.  Urine with large blood 100 protein trace leukocytes esterase 13 WBCs greater than 182 RBCs.  No obvious acute source for her confusion is noted at this time but I did did cover the patient with Zosyn and vancomycin.  Blood cultures had been obtained.  I discussed the case with Dr. Barfield who is willing to admit the patient for further evaluation and care.  Patient is mildly agitated but redirectable at this time.  She will be placed in a bed monitor and patient will be signed out to Dr. Tidwell to monitor until admission.  She may need further Zyprexa if her agitation worsens.     I have reviewed the nursing notes.    I have reviewed the findings, diagnosis, plan and need for follow up with the patient.       New Prescriptions    No medications on file       Final diagnoses:   Altered mental status, unspecified altered mental status type   Elevated lactic acid level - possible sepsis   Renal insufficiency   Elevated troponin       4/6/2020   [facility] EMERGENCY DEPARTMENT     Anand Pacheco MD  04/07/20 4008

## 2020-04-08 ENCOUNTER — ANESTHESIA EVENT (OUTPATIENT)
Dept: SURGERY | Facility: CLINIC | Age: 75
DRG: 064 | End: 2020-04-08
Payer: COMMERCIAL

## 2020-04-08 ENCOUNTER — ANESTHESIA EVENT (OUTPATIENT)
Dept: INTENSIVE CARE | Facility: CLINIC | Age: 75
DRG: 064 | End: 2020-04-08
Payer: COMMERCIAL

## 2020-04-08 ENCOUNTER — ANESTHESIA (OUTPATIENT)
Dept: SURGERY | Facility: CLINIC | Age: 75
DRG: 064 | End: 2020-04-08
Payer: COMMERCIAL

## 2020-04-08 ENCOUNTER — ANESTHESIA (OUTPATIENT)
Dept: INTENSIVE CARE | Facility: CLINIC | Age: 75
DRG: 064 | End: 2020-04-08
Payer: COMMERCIAL

## 2020-04-08 ENCOUNTER — APPOINTMENT (OUTPATIENT)
Dept: MRI IMAGING | Facility: CLINIC | Age: 75
DRG: 064 | End: 2020-04-08
Attending: FAMILY MEDICINE
Payer: COMMERCIAL

## 2020-04-08 ENCOUNTER — APPOINTMENT (OUTPATIENT)
Dept: CARDIOLOGY | Facility: CLINIC | Age: 75
DRG: 064 | End: 2020-04-08
Attending: FAMILY MEDICINE
Payer: COMMERCIAL

## 2020-04-08 LAB
ALBUMIN SERPL-MCNC: 2.3 G/DL (ref 3.4–5)
ALP SERPL-CCNC: 73 U/L (ref 40–150)
ALT SERPL W P-5'-P-CCNC: 28 U/L (ref 0–50)
ANION GAP SERPL CALCULATED.3IONS-SCNC: 7 MMOL/L (ref 3–14)
APPEARANCE CSF: CLEAR
AST SERPL W P-5'-P-CCNC: 97 U/L (ref 0–45)
BACTERIA SPEC CULT: NORMAL
BASE EXCESS BLDV CALC-SCNC: 1.2 MMOL/L
BASOPHILS # BLD AUTO: 0.1 10E9/L (ref 0–0.2)
BASOPHILS NFR BLD AUTO: 1.1 %
BILIRUB DIRECT SERPL-MCNC: 0.5 MG/DL (ref 0–0.2)
BILIRUB SERPL-MCNC: 0.8 MG/DL (ref 0.2–1.3)
BUN SERPL-MCNC: 35 MG/DL (ref 7–30)
CALCIUM SERPL-MCNC: 8.4 MG/DL (ref 8.5–10.1)
CHLORIDE SERPL-SCNC: 118 MMOL/L (ref 94–109)
CK SERPL-CCNC: 2512 U/L (ref 30–225)
CO2 SERPL-SCNC: 25 MMOL/L (ref 20–32)
COLOR CSF: COLORLESS
CREAT SERPL-MCNC: 2.14 MG/DL (ref 0.52–1.04)
DIFFERENTIAL METHOD BLD: ABNORMAL
EOSINOPHIL # BLD AUTO: 0.3 10E9/L (ref 0–0.7)
EOSINOPHIL NFR BLD AUTO: 6.2 %
ERYTHROCYTE [DISTWIDTH] IN BLOOD BY AUTOMATED COUNT: 13.5 % (ref 10–15)
GFR SERPL CREATININE-BSD FRML MDRD: 22 ML/MIN/{1.73_M2}
GLUCOSE CSF-MCNC: 58 MG/DL (ref 40–70)
GLUCOSE SERPL-MCNC: 108 MG/DL (ref 70–99)
GRAM STN SPEC: NORMAL
HCO3 BLDV-SCNC: 26 MMOL/L (ref 21–28)
HCT VFR BLD AUTO: 35.4 % (ref 35–47)
HGB BLD-MCNC: 11.2 G/DL (ref 11.7–15.7)
IMM GRANULOCYTES # BLD: 0 10E9/L (ref 0–0.4)
IMM GRANULOCYTES NFR BLD: 0.2 %
LYMPHOCYTES # BLD AUTO: 1 10E9/L (ref 0.8–5.3)
LYMPHOCYTES NFR BLD AUTO: 22.2 %
Lab: NORMAL
MCH RBC QN AUTO: 30.4 PG (ref 26.5–33)
MCHC RBC AUTO-ENTMCNC: 31.6 G/DL (ref 31.5–36.5)
MCV RBC AUTO: 96 FL (ref 78–100)
MONOCYTES # BLD AUTO: 0.4 10E9/L (ref 0–1.3)
MONOCYTES NFR BLD AUTO: 9.3 %
NEUTROPHILS # BLD AUTO: 2.7 10E9/L (ref 1.6–8.3)
NEUTROPHILS NFR BLD AUTO: 61 %
NRBC # BLD AUTO: 0 10*3/UL
NRBC BLD AUTO-RTO: 0 /100
O2/TOTAL GAS SETTING VFR VENT: NORMAL %
PCO2 BLDV: 43 MM HG (ref 40–50)
PH BLDV: 7.4 PH (ref 7.32–7.43)
PLATELET # BLD AUTO: 145 10E9/L (ref 150–450)
PO2 BLDV: 38 MM HG (ref 25–47)
POTASSIUM SERPL-SCNC: 3.8 MMOL/L (ref 3.4–5.3)
PROT CSF-MCNC: 74 MG/DL (ref 15–60)
PROT SERPL-MCNC: 5.3 G/DL (ref 6.8–8.8)
RBC # BLD AUTO: 3.68 10E12/L (ref 3.8–5.2)
RBC # CSF MANUAL: 4 /UL (ref 0–2)
SODIUM SERPL-SCNC: 150 MMOL/L (ref 133–144)
SPECIMEN SOURCE: NORMAL
SPECIMEN SOURCE: NORMAL
TROPONIN I SERPL-MCNC: 0.22 UG/L (ref 0–0.04)
TUBE # CSF: 4 #
WBC # BLD AUTO: 4.5 10E9/L (ref 4–11)
WBC # CSF MANUAL: 2 /UL (ref 0–5)

## 2020-04-08 PROCEDURE — 009U3ZX DRAINAGE OF SPINAL CANAL, PERCUTANEOUS APPROACH, DIAGNOSTIC: ICD-10-PCS | Performed by: FAMILY MEDICINE

## 2020-04-08 PROCEDURE — 82550 ASSAY OF CK (CPK): CPT | Performed by: FAMILY MEDICINE

## 2020-04-08 PROCEDURE — 37000009 ZZH ANESTHESIA TECHNICAL FEE, EACH ADDTL 15 MIN

## 2020-04-08 PROCEDURE — 40000809 ZZH STATISTIC NO DOCUMENTATION TO SUPPORT CHARGE

## 2020-04-08 PROCEDURE — 25000128 H RX IP 250 OP 636: Performed by: FAMILY MEDICINE

## 2020-04-08 PROCEDURE — 40000671 ZZH STATISTIC ANESTHESIA CASE

## 2020-04-08 PROCEDURE — 87015 SPECIMEN INFECT AGNT CONCNTJ: CPT | Performed by: FAMILY MEDICINE

## 2020-04-08 PROCEDURE — 70551 MRI BRAIN STEM W/O DYE: CPT

## 2020-04-08 PROCEDURE — 87798 DETECT AGENT NOS DNA AMP: CPT | Performed by: FAMILY MEDICINE

## 2020-04-08 PROCEDURE — 80076 HEPATIC FUNCTION PANEL: CPT | Performed by: FAMILY MEDICINE

## 2020-04-08 PROCEDURE — 25000131 ZZH RX MED GY IP 250 OP 636 PS 637: Performed by: FAMILY MEDICINE

## 2020-04-08 PROCEDURE — 25800029 ZZH RX IP 258 OP 250: Performed by: FAMILY MEDICINE

## 2020-04-08 PROCEDURE — 99233 SBSQ HOSP IP/OBS HIGH 50: CPT | Performed by: FAMILY MEDICINE

## 2020-04-08 PROCEDURE — 37000008 ZZH ANESTHESIA TECHNICAL FEE, 1ST 30 MIN

## 2020-04-08 PROCEDURE — 87529 HSV DNA AMP PROBE: CPT | Performed by: FAMILY MEDICINE

## 2020-04-08 PROCEDURE — 25000125 ZZHC RX 250: Performed by: FAMILY MEDICINE

## 2020-04-08 PROCEDURE — 84157 ASSAY OF PROTEIN OTHER: CPT | Performed by: FAMILY MEDICINE

## 2020-04-08 PROCEDURE — 94003 VENT MGMT INPAT SUBQ DAY: CPT

## 2020-04-08 PROCEDURE — 40000922 ZZH STATISTIC RCP TIME PACU VENT EA 10 MIN

## 2020-04-08 PROCEDURE — 25800030 ZZH RX IP 258 OP 636: Performed by: FAMILY MEDICINE

## 2020-04-08 PROCEDURE — 80048 BASIC METABOLIC PNL TOTAL CA: CPT | Performed by: FAMILY MEDICINE

## 2020-04-08 PROCEDURE — 40000275 ZZH STATISTIC RCP TIME EA 10 MIN

## 2020-04-08 PROCEDURE — 84484 ASSAY OF TROPONIN QUANT: CPT | Performed by: FAMILY MEDICINE

## 2020-04-08 PROCEDURE — 25500064 ZZH RX 255 OP 636: Performed by: FAMILY MEDICINE

## 2020-04-08 PROCEDURE — 82945 GLUCOSE OTHER FLUID: CPT | Performed by: FAMILY MEDICINE

## 2020-04-08 PROCEDURE — 40000270 ZZH STATISTIC OXYGEN  O2DAILY TECH TIME

## 2020-04-08 PROCEDURE — C9113 INJ PANTOPRAZOLE SODIUM, VIA: HCPCS | Performed by: FAMILY MEDICINE

## 2020-04-08 PROCEDURE — 93306 TTE W/DOPPLER COMPLETE: CPT | Mod: 26 | Performed by: INTERNAL MEDICINE

## 2020-04-08 PROCEDURE — 99207 ZZC CDG-MDM COMPONENT: MEETS LOW - DOWN CODED: CPT | Performed by: NURSE PRACTITIONER

## 2020-04-08 PROCEDURE — 99207 ZZC CDG-CRITICAL CARE TIME NOT DOCUMENTED: CPT | Performed by: FAMILY MEDICINE

## 2020-04-08 PROCEDURE — 99358 PROLONG SERVICE W/O CONTACT: CPT | Performed by: NURSE PRACTITIONER

## 2020-04-08 PROCEDURE — 40000313 ZZH STATISTIC SUCTION SPUTUM

## 2020-04-08 PROCEDURE — 20000003 ZZH R&B ICU

## 2020-04-08 PROCEDURE — 87205 SMEAR GRAM STAIN: CPT | Performed by: FAMILY MEDICINE

## 2020-04-08 PROCEDURE — 89050 BODY FLUID CELL COUNT: CPT | Performed by: FAMILY MEDICINE

## 2020-04-08 PROCEDURE — 99222 1ST HOSP IP/OBS MODERATE 55: CPT | Performed by: NURSE PRACTITIONER

## 2020-04-08 PROCEDURE — 93306 TTE W/DOPPLER COMPLETE: CPT

## 2020-04-08 PROCEDURE — 82803 BLOOD GASES ANY COMBINATION: CPT | Performed by: FAMILY MEDICINE

## 2020-04-08 PROCEDURE — 87070 CULTURE OTHR SPECIMN AEROBIC: CPT | Performed by: FAMILY MEDICINE

## 2020-04-08 PROCEDURE — 85025 COMPLETE CBC W/AUTO DIFF WBC: CPT | Performed by: FAMILY MEDICINE

## 2020-04-08 RX ORDER — CEFTRIAXONE SODIUM 1 G/50ML
1 INJECTION, SOLUTION INTRAVENOUS EVERY 24 HOURS
Status: DISCONTINUED | OUTPATIENT
Start: 2020-04-09 | End: 2020-04-09

## 2020-04-08 RX ORDER — SODIUM CHLORIDE 450 MG/100ML
INJECTION, SOLUTION INTRAVENOUS CONTINUOUS
Status: DISCONTINUED | OUTPATIENT
Start: 2020-04-08 | End: 2020-04-11

## 2020-04-08 RX ORDER — AMPICILLIN 2 G/1
2 INJECTION, POWDER, FOR SOLUTION INTRAVENOUS EVERY 8 HOURS
Status: DISCONTINUED | OUTPATIENT
Start: 2020-04-08 | End: 2020-04-08

## 2020-04-08 RX ORDER — HYDROMORPHONE HYDROCHLORIDE 1 MG/ML
0.3 INJECTION, SOLUTION INTRAMUSCULAR; INTRAVENOUS; SUBCUTANEOUS
Status: DISCONTINUED | OUTPATIENT
Start: 2020-04-08 | End: 2020-04-13 | Stop reason: HOSPADM

## 2020-04-08 RX ADMIN — INSULIN GLARGINE 24 UNITS: 100 INJECTION, SOLUTION SUBCUTANEOUS at 22:03

## 2020-04-08 RX ADMIN — CEFTRIAXONE SODIUM 2 G: 2 INJECTION, SOLUTION INTRAVENOUS at 12:16

## 2020-04-08 RX ADMIN — PANTOPRAZOLE SODIUM 40 MG: 40 INJECTION, POWDER, LYOPHILIZED, FOR SOLUTION INTRAVENOUS at 15:47

## 2020-04-08 RX ADMIN — AMPICILLIN SODIUM 2 G: 2 INJECTION, POWDER, FOR SOLUTION INTRAMUSCULAR; INTRAVENOUS at 05:56

## 2020-04-08 RX ADMIN — HYDROMORPHONE HYDROCHLORIDE 0.3 MG: 1 INJECTION, SOLUTION INTRAMUSCULAR; INTRAVENOUS; SUBCUTANEOUS at 21:32

## 2020-04-08 RX ADMIN — SODIUM CHLORIDE, POTASSIUM CHLORIDE, SODIUM LACTATE AND CALCIUM CHLORIDE: 600; 310; 30; 20 INJECTION, SOLUTION INTRAVENOUS at 02:01

## 2020-04-08 RX ADMIN — HUMAN ALBUMIN MICROSPHERES AND PERFLUTREN 2 ML: 10; .22 INJECTION, SOLUTION INTRAVENOUS at 08:38

## 2020-04-08 RX ADMIN — ENOXAPARIN SODIUM 90 MG: 100 INJECTION SUBCUTANEOUS at 20:12

## 2020-04-08 RX ADMIN — LEVOTHYROXINE SODIUM ANHYDROUS 50 MCG: 100 INJECTION, POWDER, LYOPHILIZED, FOR SOLUTION INTRAVENOUS at 08:16

## 2020-04-08 RX ADMIN — SODIUM CHLORIDE: 4.5 INJECTION, SOLUTION INTRAVENOUS at 18:05

## 2020-04-08 RX ADMIN — CEFTRIAXONE SODIUM 2 G: 2 INJECTION, SOLUTION INTRAVENOUS at 00:54

## 2020-04-08 RX ADMIN — AMPICILLIN SODIUM 2 G: 2 INJECTION, POWDER, FOR SOLUTION INTRAMUSCULAR; INTRAVENOUS at 02:03

## 2020-04-08 RX ADMIN — PROPOFOL 5 MCG/KG/MIN: 10 INJECTION, EMULSION INTRAVENOUS at 20:08

## 2020-04-08 RX ADMIN — SODIUM CHLORIDE: 4.5 INJECTION, SOLUTION INTRAVENOUS at 08:01

## 2020-04-08 RX ADMIN — ACYCLOVIR SODIUM 450 MG: 50 INJECTION, SOLUTION INTRAVENOUS at 09:49

## 2020-04-08 ASSESSMENT — ACTIVITIES OF DAILY LIVING (ADL)
ADLS_ACUITY_SCORE: 22

## 2020-04-08 ASSESSMENT — LIFESTYLE VARIABLES
TOBACCO_USE: 1
TOBACCO_USE: 1

## 2020-04-08 NOTE — PLAN OF CARE
Pt continues to be lightly sedated on 5 mcg/min of propofol. Sedation was turned off for weaning trial this AM and pt is now moving around in the bed more, almost continuously, and still not showing any signs of following commands or opening her eyes. Prior to wean she was comfortably sedated on the 5 mcg/min, hardly responding to repositioning or oral cares. Levophed was stopped at 0110. BP is currently 133/61 HR varies from 40s-60s. O2 100% on 30%FIO2.  LS clear; small amount of clear secretions with suctioning. Good urine output. Afebrile.

## 2020-04-08 NOTE — CONSULTS
[facility]    Palliative Care Consultation--Inpatient  Admission Date: 4/6/2020   Visit Date: April 8, 2020  PCP: Sandra Medina   Requested by: Vinicio Desir      HISTORY of PRESENT ILLNESS:  Stefanie Velazquez is a 75 year old year old female with a history of multiple medical problems including DM-II, RLS, bradycardia, b/l recurrent LE DVTs, RC, ischemic CAD, obesity, among others.  She was admitted with altered mental status that was first manifest on Sunday 4/5.  The symptoms gradually worsened with the emergence of paranoia and fear of being poisoned so she presented to the ED on 4/6. On 4/7, upon admission to the nursing unit, she was very agitated, non redirectable and was given 10 mg Olanzapine and 2.5 mg Haldol.  She became increasingly agitated, constantly in motion.  Precedex was started, but respirations were sonorous and rapid and she became tachycardic.  After consultation with ninfa Rojas, the decision was made to intubate Concha on 4/7, for the purpose of safeguarding her airway.  She currently is sedated on low dose Propofol (5 mcg/kg), but has demonstrted myoclonic jerking and agitation when the sedation is lightened.  Throughout this period of time, a cause for thie agitation has not been found.  Therefore, she was referred to Palliative Care for exploration of goals of care..      ASSESSMENT & PLAN:    Agitated delirium, acute onset, unknown etiology   - see Goals of Care  - alternatives to Propofol:   - Geodon 20 mg IM q4h PRN, or    - Geodon 20-40 mg PO q4h (or per rectum), or   - Thorazine up to 800 mg/day; I would start at 100 mg q6h if the goal is sedation   - Ativan could be added as an adjunct; 0.5 - 1 mg q4h PRN    Advance Care Planning:  - Understanding of condition:  n/a  - Decisional capacity:  Lacking; she has a health care directive dateed 8/17/17.  The first health care agent is no longer involved, and the second agent is her niece Lori.    - Code status:  Full Code  -  "Health Care Directive: Yes, If Yes, is there a copy in the EMR?   Yes  - POLST:  No    Goals of Care:  - per ninfa Crystal, who discussed this with Lori (who is the health care agent), if Concha should require CPR or re-intubation, they believe Concha would want it as, they explain, she has a strong will to live and she was perfectly lucid as recently as 4/4  - per her health care directive, she would want to be removed from the vent at the 2-week radha, certainly before a trach would be required; Crystal and Lori are going to further discuss this further  - health care directive also specifies \"no feeding tube\"         Thank you for the opportunity to be of service to this patient and family.      Maldonado Valiente) AMY Nair  Palliative Care  Private cell:  [phone]     Face to face:   1115 - 1140, 25 min  Non face to face:   1155 - 1245, 50 min, > 50% spent reviewing history and discussing goals of care via phone with ninfa Rojas.       = = = = = = = = = = = = = = = =      PROBLEM LIST  Patient Active Problem List   Diagnosis     Hypothyroidism     bmi 40     Chronic ischemic heart disease     Generalized osteoarthrosis, unspecified site     HEARING LOSS CONDUCTIVE, COMBINED TYPE     Esophageal reflux     Osteoporosis     RC-moderate (AHI 12, LSat 60%); REM RDI-73     Neuropathy (H)     Hyperlipidemia LDL goal <100     Rectal cancer- newly diagnosed adenoCA rectum Jan 2011 and Positive Tubular Adenoma 2019     Anemia     Radiation therapy complication     Vitamin B 12 deficiency     Vitamin D deficiency     Dysuria     Bowel and bladder incontinence     Benign essential hypertension     Health Care Home     Chronic diarrhea     Restless leg syndrome     Type 2 diabetes mellitus with diabetic nephropathy, with long-term current use of insulin (H)     Spinal stenosis of lumbar region without neurogenic claudication     DDD (degenerative disc disease), lumbar     Lymphedema of genitalia     Oropharyngeal dysphagia     " Bilateral hearing loss, unspecified hearing loss type     Lumbar radiculopathy     CKD (chronic kidney disease) stage 4, GFR 15-29 ml/min (H)     Impacted cerumen of right ear     H/O deep venous thrombosis     Symptomatic bradycardia     Syncope     Syncope and collapse     Altered mental status     Acute metabolic encephalopathy       PAST MEDICAL HISTORY  Past Medical History:   Diagnosis Date     Acute deep vein thrombosis (DVT) of right lower extremity, unspecified vein (H) 10/31/2018     Acute myocardial infarction of other specified sites, episode of care unspecified 6/2002    MI 2 stints     Cancer of colon (H)      Cellulitis of lower extremity, bilateral 9/30/2016     Chronic ischemic heart disease, unspecified 6/22/2003    cabgx3 , 2002 2/05 adenosine ef70%     Coronary atherosclerosis of unspecified type of vessel, native or graft     Coronary artery disease     Diabetes mellitus (H)      Esophageal reflux      Fracture of femur, distal, left, closed (H) 2/11/2013     Gastro-oesophageal reflux disease      Generalized osteoarthrosis, unspecified site 6/22/2005     Generalized osteoarthrosis, unspecified site 6/22/2005     HEARING LOSS CONDUCTIVE, COMBINED TYPE 6/22/2005    [place] measles as child     HYPOTHYROIDISM  6/22/2003     Mixed hyperlipidemia 6/22/2005     Obesity, unspecified 6/22/2005     RC-moderate (AHI 12, LSat 60%); REM RDI-73 6/1/2009    Sleep study [facility] was performed 5/26/09 in order to re-evaluate severity of RC. The total sleep time was 412.0 minutes. The sleep latency was decreased at 8.7 minutes with Ambien 10mg. The REM sleep latency was 426.0 minutes. Sleep efficiency was reduced at 79.0%. The sleep architecture was disrupted with frequent sleep stage changes and arousals.  Snoring:  loud. Respiratory Events:   RDI o     Recurrent acute deep vein thrombosis (DVT) of both lower extremities (H) 3/31/2019     [place]     childhood; with resultant hearing loss     Stented  coronary artery      Type II or unspecified type diabetes mellitus with renal manifestations, uncontrolled(250.42) (H) 6/22/2005     Type II or unspecified type diabetes mellitus with renal manifestations, uncontrolled(250.42) (H) 6/22/2005     Unspecified disorder resulting from impaired renal function 6/22/2005    CR     1.82   06/21/2005     Unspecified essential hypertension        PAST SURGICAL HISTORY  Past Surgical History:   Procedure Laterality Date     cabg       COLECTOMY LOW ANTERIOR  6/21/2011    Procedure:COLECTOMY LOW ANTERIOR; with loop ielostomy; Surgeon:ROBBIN MCDONNELL; Location:[facility]     COLONOSCOPY  1/26/2011    COLONOSCOPY performed by MASOUD BILL at [facility]     COLONOSCOPY N/A 3/1/2016    Procedure: COLONOSCOPY;  Surgeon: Liza Neal MD;  Location: [facility]     INSERT PORT VASCULAR ACCESS  3/2/2011    INSERT PORT VASCULAR ACCESS performed by LIZA NEAL at [facility]     OPEN REDUCTION INTERNAL FIXATION FEMUR DISTAL  2/12/2013    Procedure: OPEN REDUCTION INTERNAL FIXATION FEMUR DISTAL;  Open reduction internal fixation left femur fracture--Anesth.Choice;  Surgeon: Ley, Jeffrey Duane, MD;  Location: [facility]     ORTHOPEDIC SURGERY       PHACOEMULSIFICATION WITH STANDARD INTRAOCULAR LENS IMPLANT Left 1/22/2018    Procedure: PHACOEMULSIFICATION WITH STANDARD INTRAOCULAR LENS IMPLANT;  Left cataract removal with implant;  Surgeon: Rony Key MD;  Location: [facility]     PHACOEMULSIFICATION WITH STANDARD INTRAOCULAR LENS IMPLANT Right 2/19/2018    Procedure: PHACOEMULSIFICATION WITH STANDARD INTRAOCULAR LENS IMPLANT;  Right cataract removal with implant;  Surgeon: Rony Key MD;  Location: [facility]     SIGMOIDOSCOPY FLEXIBLE  9/9/2011    Procedure:SIGMOIDOSCOPY FLEXIBLE; Performed prior to induction.; Surgeon:ROBBIN MCDONENLL; Location: OR     SURGICAL HISTORY OF -   10/24/2002    Heart bypass     SURGICAL HISTORY OF -   2002    R Knee arthroplasty      SURGICAL HISTORY OF -   2002    Mi 2 stints     TAKEDOWN ILEOSTOMY  9/9/2011    Procedure:TAKEDOWN ILEOSTOMY; Exploratory Laparotomy,  Loop Ileostomy Takedown, Small Bowel Resection x 2.; Surgeon:ROBBIN MCDONNELL; Location: OR       FAMILY MEDICAL HISTORY  Family History   Problem Relation Age of Onset     Diabetes Sister      C.A.D. Sister      Breast Cancer Sister        SOCIAL HISTORY  History   Smoking Status     Former Smoker   Smokeless Tobacco     Never Used     Social History    Substance and Sexual Activity      Alcohol use: Yes        Comment: rare social use    History   Drug Use No     Social History     Social History Narrative     Not on file       SPIRITUAL HISTORY  Not known; patient sedated     ALLERGIES  Allergies   Allergen Reactions     Hydrocodone Other (See Comments)     dizzy     Lisinopril Cough            Vicodin [Hydrocodone-Acetaminophen] Other (See Comments)     Caused extreme dizziness       MEDICATIONS  Medications Prior to Admission  Current Facility-Administered Medications   Medication     acetaminophen (TYLENOL) tablet 650 mg     acyclovir (ZOVIRAX) 450 mg in D5W 100 mL intermittent infusion     ampicillin (OMNIPEN) 2 g vial to attach to  ml bag     atropine injection 0.5 mg     benztropine (COGENTIN) tablet 1-2 mg     cefTRIAXone IN D5W (ROCEPHIN) intermittent infusion 2 g     Contraindications to both pharmacological and mechanical prophylaxis (must document contraindications for both in this order)     glucose gel 15-30 g    Or     dextrose 50 % injection 25-50 mL    Or     glucagon injection 1 mg     diclofenac (VOLTAREN) EC tablet 50 mg     insulin aspart (NovoLOG) injection (RAPID ACTING)     insulin aspart (NovoLOG) injection (RAPID ACTING)     insulin glargine (LANTUS PEN) injection 24 Units     levothyroxine (SYNTHROID) injection 50 mcg     lidocaine (LMX4) kit     lidocaine (LMX4) kit     lidocaine 1 % 0.1-1 mL     lidocaine 1 % 0.1-1 mL     LORazepam (ATIVAN)  injection 0.5-1 mg     melatonin tablet 1 mg     naloxone (NARCAN) injection 0.1-0.4 mg     naloxone (NARCAN) injection 0.1-0.4 mg     norepinephrine (LEVOPHED) 16 mg in sodium chloride 0.9 % 250 mL infusion     ondansetron (ZOFRAN) injection 4 mg     pantoprazole (PROTONIX) 40 mg IV push injection     polyethylene glycol (MIRALAX) Packet 17 g     prochlorperazine (COMPAZINE) injection 5 mg     propofol (DIPRIVAN) infusion    And     propofol (DIPRIVAN) infusion 20-30 mg     sodium chloride (PF) 0.9% PF flush 3 mL     sodium chloride (PF) 0.9% PF flush 3 mL     sodium chloride 0.45% infusion (premixed bag)     vancomycin (VANCOCIN) 2,000 mg in sodium chloride 0.9 % 500 mL intermittent infusion       Current Medications    acyclovir (ZOVIRAX) IV  10 mg/kg (Ideal) Intravenous Q24H     ampicillin  2 g Intravenous Q8H     cefTRIAXone  2 g Intravenous Q12H     insulin aspart  14 Units Subcutaneous BID w/meals     insulin aspart  1-10 Units Subcutaneous Q4H     insulin glargine  24 Units Subcutaneous At Bedtime     levothyroxine  50 mcg Intravenous Daily     pantoprazole (PROTONIX) IV  40 mg Intravenous Q24H     sodium chloride (PF)  3 mL Intracatheter Q8H     vancomycin (VANCOCIN) IV  2,000 mg Intravenous Q48H       PRN Medications  acetaminophen, atropine, benztropine, - MEDICATION INSTRUCTIONS -, glucose **OR** dextrose **OR** glucagon, diclofenac, lidocaine 4%, lidocaine 4%, lidocaine (buffered or not buffered), lidocaine (buffered or not buffered), LORazepam, melatonin, naloxone, naloxone, [DISCONTINUED] ondansetron **OR** ondansetron, polyethylene glycol, prochlorperazine **OR** [DISCONTINUED] prochlorperazine **OR** [DISCONTINUED] prochlorperazine, propofol (DIPRIVAN) infusion **AND** propofol **AND** CK total **AND** Triglycerides, sodium chloride (PF)      REVIEW OF SYSTEMS  Patient unable due to sedation/Propofol.  Per chart review, patient is able to transfer from bed to wheelchair and all mobility is per w/c.   At baseline, she is incontinent of bowel and bladder.  Has b/l S/N hearing loss due to a remote measles infection as a child.  She has a h/o neuropathy but it's not clear if that is 2o diabetes or chemotherapy given for rectal cancer in 2011.  She was recently (9/19) treated for lymphedema.  She has a h/o intertriginous groin candida infection.  She was treated with Cipro for a UTI just prior to admission.      Performance Assessment:    Palliative Performance Scale, v.2     10%  Extensive disease. Bedbound & requires total care. No oral intake. Drowsy or comatose or confused.        PHYSICAL EXAMINATION  Patient Vitals for the past 24 hrs:   BP Temp Temp src Pulse Heart Rate Resp SpO2 Weight   04/08/20 1100 106/46 -- -- (!) 47 51 14 100 % --   04/08/20 1000 123/50 -- -- 53 52 16 100 % --   04/08/20 0945 125/57 -- -- 63 61 17 100 % --   04/08/20 0930 107/47 -- -- (!) 47 (!) 45 14 100 % --   04/08/20 0915 110/47 -- -- (!) 46 (!) 46 14 100 % --   04/08/20 0900 103/48 -- -- (!) 46 (!) 47 13 100 % --   04/08/20 0845 101/46 -- -- (!) 44 (!) 48 13 100 % --   04/08/20 0830 (!) 89/43 -- -- (!) 48 (!) 46 13 100 % --   04/08/20 0815 102/45 -- -- (!) 46 (!) 47 11 100 % --   04/08/20 0800 101/51 -- Axillary (!) 47 (!) 46 14 100 % --   04/08/20 0745 95/50 -- -- (!) 46 (!) 44 13 100 % --   04/08/20 0730 102/47 -- -- (!) 48 (!) 47 13 100 % --   04/08/20 0715 91/43 -- -- (!) 44 (!) 45 13 100 % --   04/08/20 0700 99/48 -- -- (!) 48 (!) 46 13 100 % --   04/08/20 0645 95/48 -- -- (!) 46 (!) 48 13 99 % --   04/08/20 0630 91/47 -- -- 52 50 13 99 % --   04/08/20 0615 95/45 -- -- 50 53 13 99 % --   04/08/20 0600 106/48 -- -- 51 51 14 99 % --   04/08/20 0545 131/60 -- -- 55 53 13 99 % --   04/08/20 0530 133/61 -- -- 59 61 17 99 % --   04/08/20 0516 -- -- -- -- -- -- -- 86.9 kg (191 lb 9.3 oz)   04/08/20 0515 136/60 -- -- 66 68 15 96 % --   04/08/20 0500 (!) 148/71 -- -- 61 65 13 96 % --   04/08/20 0454 -- -- -- -- 66 -- -- --   04/08/20  0445 128/76 -- -- 59 56 13 100 % --   04/08/20 0435 -- -- -- -- 61 -- 99 % --   04/08/20 0430 (!) 152/63 -- -- 60 61 19 98 % --   04/08/20 0415 131/65 -- -- 55 53 12 100 % --   04/08/20 0400 (!) 144/61 -- -- (!) 48 (!) 46 13 100 % --   04/08/20 0345 (!) 126/91 -- -- 58 51 14 99 % --   04/08/20 0330 112/53 -- -- (!) 42 (!) 40 13 100 % --   04/08/20 0315 105/54 -- -- (!) 44 (!) 44 13 100 % --   04/08/20 0300 112/52 -- -- (!) 41 (!) 44 13 99 % --   04/08/20 0245 101/48 -- -- (!) 44 (!) 45 14 100 % --   04/08/20 0230 99/47 -- -- (!) 46 (!) 45 14 100 % --   04/08/20 0215 105/52 -- -- (!) 47 50 14 100 % --   04/08/20 0200 105/51 -- -- 50 (!) 48 14 100 % --   04/08/20 0130 96/45 -- -- (!) 43 (!) 49 14 100 % --   04/08/20 0115 113/55 -- -- (!) 48 (!) 46 14 100 % --   04/08/20 0100 114/49 -- -- (!) 41 (!) 43 13 100 % --   04/08/20 0045 108/51 -- -- (!) 47 (!) 45 13 100 % --   04/08/20 0030 116/50 -- -- (!) 47 (!) 45 13 100 % --   04/08/20 0015 116/53 -- -- (!) 47 (!) 44 13 100 % --   04/08/20 0000 102/46 97.9  F (36.6  C) Oral (!) 45 (!) 45 14 100 % --   04/07/20 2357 102/46 -- -- (!) 46 (!) 45 14 100 % --   04/07/20 2345 97/47 -- -- (!) 46 (!) 46 13 100 % --   04/07/20 2330 112/50 -- -- (!) 42 (!) 44 13 100 % --   04/07/20 2314 -- -- -- -- -- -- 100 % --   04/07/20 2245 99/44 -- -- (!) 44 (!) 45 13 100 % --   04/07/20 2230 96/44 -- -- (!) 46 (!) 46 14 100 % --   04/07/20 2215 104/48 -- -- 53 52 14 100 % --   04/07/20 2200 134/60 -- -- 61 60 14 100 % --   04/07/20 2145 (!) 150/78 -- -- 73 64 11 99 % --   04/07/20 2130 (!) 135/115 -- -- 73 53 12 100 % --   04/07/20 2115 127/54 -- -- (!) 48 (!) 48 11 100 % --   04/07/20 2100 119/58 -- -- (!) 49 (!) 47 9 100 % --   04/07/20 2045 124/56 -- -- (!) 48 (!) 47 9 100 % --   04/07/20 2030 107/50 -- -- (!) 45 (!) 45 14 100 % --   04/07/20 2015 103/48 -- -- (!) 46 (!) 47 13 100 % --   04/07/20 2000 108/50 -- -- (!) 47 (!) 49 14 100 % --   04/07/20 1945 111/45 -- -- (!) 48 (!) 48 13 100  % --   04/07/20 1930 118/52 -- -- (!) 49 (!) 48 18 100 % --   04/07/20 1845 112/47 -- -- (!) 47 (!) 48 14 100 % --   04/07/20 1830 109/46 -- -- (!) 48 (!) 48 13 100 % --   04/07/20 1815 110/51 -- -- 50 (!) 49 14 100 % --   04/07/20 1800 (!) 181/72 -- -- 59 57 13 100 % --   04/07/20 1745 (!) 161/77 -- -- (!) 47 52 27 100 % --   04/07/20 1730 (!) 86/39 -- -- 55 54 13 100 % --   04/07/20 1720 96/46 -- -- -- 57 14 100 % --   04/07/20 1715 96/46 -- -- 59 61 14 100 % --   04/07/20 1710 98/47 -- -- -- (!) 46 13 99 % --   04/07/20 1700 (!) 85/42 -- -- (!) 49 (!) 46 14 99 % --   04/07/20 1645 (!) 75/39 -- -- (!) 45 (!) 47 13 99 % --   04/07/20 1630 (!) 68/33 -- -- (!) 47 (!) 49 13 98 % --   04/07/20 1615 (!) 66/34 -- -- 54 55 13 98 % --   04/07/20 1600 98/49 97.9  F (36.6  C) Oral 61 52 14 98 % --   04/07/20 1556 -- -- -- -- 57 -- -- --   04/07/20 1545 97/45 -- -- 64 63 14 97 % --   04/07/20 1530 100/41 -- -- 61 63 13 98 % --   04/07/20 1515 (!) 91/39 -- -- 62 63 14 99 % --   04/07/20 1500 (!) 92/38 -- -- 63 61 14 98 % --   04/07/20 1445 (!) 80/38 -- -- 65 66 13 98 % --   04/07/20 1430 (!) 91/38 -- -- 63 64 13 98 % --   04/07/20 1415 (!) 88/38 -- -- 68 66 14 96 % --   04/07/20 1400 (!) 83/39 -- -- 70 67 14 98 % --   04/07/20 1345 93/41 -- -- 65 65 13 98 % --   04/07/20 1330 (!) 91/39 -- -- 60 61 14 98 % --   04/07/20 1315 (!) 73/35 -- -- 60 61 13 98 % --   04/07/20 1300 (!) 81/34 -- -- 62 63 16 97 % --   04/07/20 1245 (!) 73/37 -- -- 58 62 15 97 % --   04/07/20 1230 (!) 66/29 -- -- 60 63 14 98 % --   04/07/20 1215 (!) 74/36 -- -- 64 64 21 98 % --   04/07/20 1200 (!) 80/45 99  F (37.2  C) Oral 69 67 14 97 % --   04/07/20 1154 -- -- -- -- 73 -- -- --   04/07/20 1145 90/61 -- -- 72 73 13 95 % --   04/07/20 1130 (!) 79/50 -- -- 70 75 20 96 % --   04/07/20 1115 (!) 76/43 -- -- 80 78 18 97 % --      Wt Readings from Last 5 Encounters:   04/08/20 86.9 kg (191 lb 9.3 oz)   03/16/20 89.8 kg (198 lb)   01/03/20 91.2 kg (201 lb)    11/08/19 91.2 kg (201 lb)   09/27/19 92.7 kg (204 lb 6.4 oz)     Constitutional:  Obese, intubated, sedated lightly and does stir a bit during the exam  Eyes:  Anicteric, PERRL, pupils miotic  HEENT:  Neck supple, unable to examine mouth due to intubation  Lymph/Hematologic:  No epitrochlear, axillary, anterior or posterior cervical, or supraclavicular lymphadenopathy is appreciated  Cardiovascular:  Irreg irreg w/o m/r/g, tachycardic, radial pulses palpable, DP  nonpalpable  Respiratory:  Clear  GI: Soft, non-tender, hypoactive bowel sounds, no hepatosplenomegaly  Genitourinary:  boone  Musculoskeletal:  Moves limbs, neck for a moment during exam  Skin:  Warm, dry, multiple bruises on forearms  Neurological:  Unable   Psych:  Minimally responsive    Intake/Output Summary (Last 24 hours) at 4/8/2020 1238  Last data filed at 4/8/2020 1200  Gross per 24 hour   Intake 4611.56 ml   Output 1225 ml   Net 3386.56 ml          DATA  ROUTINE ICU LABS (Last four results)  CMP  Recent Labs   Lab 04/08/20  0443 04/07/20  1213 04/07/20  0440 04/06/20 2028   * 150* 151* 148*   POTASSIUM 3.8 4.3 3.6 4.4   CHLORIDE 118* 120* 119* 118*   CO2 25 25 20 22   ANIONGAP 7 5 12 8   * 117* 128* 121*   BUN 35* 38* 38* 39*   CR 2.14* 2.32* 2.05* 2.05*   GFRESTIMATED 22* 20* 23* 23*   GFRESTBLACK 25* 23* 26* 26*   DWIGHT 8.4* 8.3* 9.0 10.2*   PROTTOTAL 5.3*  --  7.0 7.7   ALBUMIN 2.3*  --  3.3* 3.3*   BILITOTAL 0.8  --  1.1 0.8   ALKPHOS 73  --  100 107   AST 97*  --  53* 38   ALT 28  --  26 26     CBC  Recent Labs   Lab 04/08/20  0443 04/07/20  0440 04/06/20 2028   WBC 4.5 7.6 5.9   RBC 3.68* 4.52 4.87   HGB 11.2* 13.9 14.6   HCT 35.4 42.1 45.5   MCV 96 93 93   MCH 30.4 30.8 30.0   MCHC 31.6 33.0 32.1   RDW 13.5 13.2 13.5   * 203 251     INRNo lab results found in last 7 days.  Arterial Blood Gas  Recent Labs   Lab 04/08/20  0810 04/07/20  1213 04/07/20  1015 04/07/20  0734   O2PER 30% 35% 35% 35%         Recent Results  (from the past 48 hour(s))   CT Head w/o Contrast    Narrative    EXAM: CT HEAD W/O CONTRAST  LOCATION: [facility]  DATE/TIME: 4/6/2020 8:40 PM    INDICATION: Confusion.  COMPARISON: Head CT 05/21/2019.  TECHNIQUE: Routine without IV contrast. Multiplanar reformats. Dose reduction techniques were used.    FINDINGS:  INTRACRANIAL CONTENTS: No evidence of acute intracranial hemorrhage. No mass effect or midline shift. Extensive periventricular white matter hypodensities which are nonspecific, but likely related to chronic microvascular ischemic disease. Chronic area   of encephalomalacia in the right frontal white matter. Moderate diffuse parenchymal volume loss. Ventricular size is unchanged without evidence of hydrocephalus.    VISUALIZED ORBITS/SINUSES/MASTOIDS: No intraorbital abnormality. No paranasal sinus mucosal disease. No middle ear or mastoid effusion.    BONES/SOFT TISSUES: No acute abnormality.      Impression    IMPRESSION:  1.  No evidence of acute hemorrhage, mass, or herniation.  2.  Marked diffuse parenchymal volume loss and extensive white matter changes likely due to chronic microvascular ischemic disease.   Chest XR,  PA & LAT    Narrative    CHEST TWO VIEWS     4/6/2020 8:59 PM     HISTORY: Confusion.    COMPARISON: 5/21/2019.      Impression    IMPRESSION: Stable right chest port venous catheter and sternotomy.  Cardiac silhouette within normal limits. No focal airspace disease. No  effusions identified.    KIMI HINTON MD   XR Chest Port 1 View    Narrative    EXAM: CHEST SINGLE VIEW PORTABLE  LOCATION: [facility]  DATE/TIME: 4/7/2020 6:05 AM    INDICATION: Tube placement.  COMPARISON: 04/06/2020 at 2052 hours.      Impression    IMPRESSION:   1. Interval placement of an endotracheal tube with distal tube tip in the mid trachea, approximately 5 cm proximal to the ivis.  2. No other significant interval change since the recent comparison study.             XR Chest  Port 1 View    Narrative    EXAM: XR CHEST PORT 1 VW  LOCATION: [facility]  DATE/TIME: 2020 6:39 AM    INDICATION: Tube placement  COMPARISON: 2020      Impression    IMPRESSION: Endotracheal tube 3.4 cm above ivis. Median sternotomy. Right Port-A-Cath. Stable cardiomediastinal silhouette. No focal consolidation. Trace right effusion.   Echocardiogram Complete    Narrative    [id number]  [id number]  [id number]  [id number]^WESLEY^PRINCE^LORETO           [facility]  Echocardiography Laboratory  [address] [place] [address]        Name: SOL WORRELL  MRN: [id number]  : 1945  Study Date: 2020 08:13 AM  Age: 75 yrs  Gender: Female  Patient Location: [facility]  Reason For Study: Shock  Ordering Physician: PRINCE OLSON  Referring Physician: Sandra Bernstein  Performed By: Amaya Courtney RDCS     BSA: 1.8 m2  Height: 60 in  Weight: 191 lb  HR: 46  BP: 96/46 mmHg  _____________________________________________________________________________  __        Procedure  Complete Portable Echo Adult. Optison (NDC #3180-5154) given intravenously.  _____________________________________________________________________________  __        Interpretation Summary     The visual ejection fraction is estimated at 60-65%.  Left ventricular systolic function is normal.  The study was technically difficult.  _____________________________________________________________________________  __        Left Ventricle  The left ventricle is normal in size. There is mild concentric left  ventricular hypertrophy. Diastolic Doppler findings (E/E' ratio and/or other  parameters) suggest left ventricular filling pressures are increased. The  visual ejection fraction is estimated at 60-65%. Left ventricular systolic  function is normal.     Right Ventricle  The right ventricle is normal size. The right ventricular systolic function is  borderline reduced.     Atria  The left atrium is mildly dilated. Right  atrial size is normal. There is no  color Doppler evidence of an atrial shunt.     Mitral Valve  There is trace mitral regurgitation.        Tricuspid Valve  There is trace tricuspid regurgitation. Right ventricular systolic pressure  could not be approximated due to inadequate tricuspid regurgitation.     Aortic Valve  The aortic valve is trileaflet. There is moderate trileaflet aortic sclerosis.  There is trace aortic regurgitation. No hemodynamically significant valvular  aortic stenosis.     Pulmonic Valve  There is trace pulmonic valvular regurgitation. Normal pulmonic valve  velocity.     Vessels  The aortic root is normal size. Normal size ascending aorta. Unable to assess  mean RA pressure given the patient is on a ventilator.     Pericardium  There is no pericardial effusion.        Rhythm  The rhythm was sinus bradycardia.  _____________________________________________________________________________  __  MMode/2D Measurements & Calculations  IVSd: 1.3 cm     LVIDd: 5.0 cm  LVIDs: 3.1 cm  LVPWd: 1.1 cm  FS: 37.8 %  LV mass(C)d: 231.2 grams  LV mass(C)dI: 126.3 grams/m2  Ao root diam: 3.1 cm  LA dimension: 3.8 cm  asc Aorta Diam: 3.4 cm  LA/Ao: 1.2  LA Volume (BP): 63.0 ml  LA Volume Index (BP): 34.4 ml/m2  RWT: 0.44           Doppler Measurements & Calculations  MV E max bree: 92.6 cm/sec  MV A max bree: 78.8 cm/sec  MV E/A: 1.2  MV dec time: 0.21 sec  PA acc time: 0.16 sec  E/E' av.9  Lateral E/e': 18.0  Medial E/e': 19.8           _____________________________________________________________________________  __           Report approved by: Fuad Ramos 2020 09:49 AM

## 2020-04-08 NOTE — INTERVAL H&P NOTE
Eliquis stopped early march (per hospitalist note)    The History and Physical has been reviewed, the patient has been examined and no changes have occurred in the patient's condition since the H & P was completed.

## 2020-04-08 NOTE — PROGRESS NOTES
DATE:  4/8/2020   TIME OF RECEIPT FROM LAB:  0720  LAB TEST:  CK   LAB VALUE:  2,512  RESULTS GIVEN WITH READ-BACK TO (PROVIDER): WEB PAGE  Dr. Desir  TIME LAB VALUE REPORTED TO PROVIDER:   0737

## 2020-04-08 NOTE — ANESTHESIA CARE TRANSFER NOTE
Patient: Stefanie Velazquez    Procedure(s):  ANESTHESIA OUT OF OR MRI    Diagnosis: Acute metabolic encephalopathy [G93.41]  Diagnosis Additional Information: No value filed.    Anesthesia Type:   General     Note:  Airway :ETT  Patient transferred to:ICU  Comments: VSS   Paralytic reversed   Returned to baseline ICU Handoff: Call for PAUSE to initiate/utilize ICU HANDOFF, Identified Patient, Identified Responsible Provider, Reviewed the Pertinent Medical History, Discussed Surgical Course, Reviewed Intra-OP Anesthesia Management and Issues during Anesthesia, Set Expectations for Post Procedure Period and Allowed Opportunity for Questions and Acknowledgement of Understanding      Vitals: (Last set prior to Anesthesia Care Transfer)              Electronically Signed By: CLEMENCIA March CRNA  April 8, 2020  3:41 PM

## 2020-04-08 NOTE — PROGRESS NOTES
Patient weaned on CPAP+5, PS+10, 30% x 35 minutes, Patient tolerated wean ok as far as work of breathing, however unable to follow directions and remained restless with deliium during entire process with sedation turned off.  Patient now back on AC settings with light sedation turned back on

## 2020-04-08 NOTE — ANESTHESIA PREPROCEDURE EVALUATION
Anesthesia Pre-Procedure Evaluation    Patient: Stefanie Velazquez   MRN: [id number] : 1945          Preoperative Diagnosis: * No pre-op diagnosis entered *    * No procedures listed *    Past Medical History:   Diagnosis Date     Acute deep vein thrombosis (DVT) of right lower extremity, unspecified vein (H) 10/31/2018     Acute myocardial infarction of other specified sites, episode of care unspecified 2002    MI 2 stints     Cancer of colon (H)      Cellulitis of lower extremity, bilateral 2016     Chronic ischemic heart disease, unspecified 2003    cabgx3 ,  adenosine ef70%     Coronary atherosclerosis of unspecified type of vessel, native or graft     Coronary artery disease     Diabetes mellitus (H)      Esophageal reflux      Fracture of femur, distal, left, closed (H) 2013     Gastro-oesophageal reflux disease      Generalized osteoarthrosis, unspecified site 2005     Generalized osteoarthrosis, unspecified site 2005     HEARING LOSS CONDUCTIVE, COMBINED TYPE 2005    [place] measles as child     HYPOTHYROIDISM  2003     Mixed hyperlipidemia 2005     Obesity, unspecified 2005     RC-moderate (AHI 12, LSat 60%); REM RDI-73 2009    Sleep study [facility] was performed 09 in order to re-evaluate severity of RC. The total sleep time was 412.0 minutes. The sleep latency was decreased at 8.7 minutes with Ambien 10mg. The REM sleep latency was 426.0 minutes. Sleep efficiency was reduced at 79.0%. The sleep architecture was disrupted with frequent sleep stage changes and arousals.  Snoring:  loud. 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, uncontrolled(250.42) (H) 2005     Type II or unspecified type diabetes  mellitus with renal manifestations, uncontrolled(250.42) (H) 6/22/2005     Unspecified disorder resulting from impaired renal function 6/22/2005    CR     1.82   06/21/2005     Unspecified essential hypertension      Past Surgical History:   Procedure Laterality Date     cabg       COLECTOMY LOW ANTERIOR  6/21/2011    Procedure:COLECTOMY LOW ANTERIOR; with loop ielostomy; Surgeon:ROBBIN MCDONNELL; Location:[facility]     COLONOSCOPY  1/26/2011    COLONOSCOPY performed by MASOUD BILL at [facility]     COLONOSCOPY N/A 3/1/2016    Procedure: COLONOSCOPY;  Surgeon: Liza Neal MD;  Location: [facility]     INSERT PORT VASCULAR ACCESS  3/2/2011    INSERT PORT VASCULAR ACCESS performed by LIZA NEAL at [facility]     OPEN REDUCTION INTERNAL FIXATION FEMUR DISTAL  2/12/2013    Procedure: OPEN REDUCTION INTERNAL FIXATION FEMUR DISTAL;  Open reduction internal fixation left femur fracture--[facility]esth[facility];  Surgeon: Ley, Jeffrey Duane, MD;  Location: [facility]     ORTHOPEDIC SURGERY       PHACOEMULSIFICATION WITH STANDARD INTRAOCULAR LENS IMPLANT Left 1/22/2018    Procedure: PHACOEMULSIFICATION WITH STANDARD INTRAOCULAR LENS IMPLANT;  Left cataract removal with implant;  Surgeon: Rony Key MD;  Location: [facility]     PHACOEMULSIFICATION WITH STANDARD INTRAOCULAR LENS IMPLANT Right 2/19/2018    Procedure: PHACOEMULSIFICATION WITH STANDARD INTRAOCULAR LENS IMPLANT;  Right cataract removal with implant;  Surgeon: Rony Key MD;  Location: [facility]     SIGMOIDOSCOPY FLEXIBLE  9/9/2011    Procedure:SIGMOIDOSCOPY FLEXIBLE; Performed prior to induction.; Surgeon:ROBBIN MCDONNELL; Location: OR     SURGICAL HISTORY OF -   10/24/2002    Heart bypass     SURGICAL HISTORY OF -   2002    R Knee arthroplasty     SURGICAL HISTORY OF -   2002    Mi 2 stints     TAKEDOWN ILEOSTOMY  9/9/2011    Procedure:TAKEDOWN ILEOSTOMY; Exploratory Laparotomy,  Loop Ileostomy Takedown, Small Bowel Resection x 2.;  Surgeon:ROBBIN MCDONNELL; Location:[facility]       Anesthesia Evaluation     . Pt has had prior anesthetic. Type: General and MAC           ROS/MED HX    ENT/Pulmonary:     (+)sleep apnea, tobacco use, Past use , . .    Neurologic:       Cardiovascular:     (+) hypertension--CAD, --. : . . fainting (syncope). :. .       METS/Exercise Tolerance:     Hematologic:         Musculoskeletal:         GI/Hepatic:     (+) GERD       Renal/Genitourinary:     (+) chronic renal disease,       Endo:     (+) type II DM .      Psychiatric:     (+) psychiatric history schizophrenia      Infectious Disease:         Malignancy:         Other:                            Physical Exam  Normal systems: cardiovascular, pulmonary and dental    Airway   Mallampati: II  TM distance: >3 FB  Neck ROM: full    Dental     Cardiovascular       Pulmonary   PE comment: intubated            Lab Results   Component Value Date    WBC 4.5 04/08/2020    HGB 11.2 (L) 04/08/2020    HCT 35.4 04/08/2020     (L) 04/08/2020    CRP 7.8 04/07/2020    SED 51 (H) 10/16/2013     (H) 04/08/2020    POTASSIUM 3.8 04/08/2020    CHLORIDE 118 (H) 04/08/2020    CO2 25 04/08/2020    BUN 35 (H) 04/08/2020    CR 2.14 (H) 04/08/2020     (H) 04/08/2020    DWIGHT 8.4 (L) 04/08/2020    PHOS 3.1 09/15/2011    MAG 2.0 11/10/2017    ALBUMIN 2.3 (L) 04/08/2020    PROTTOTAL 5.3 (L) 04/08/2020    ALT 28 04/08/2020    AST 97 (H) 04/08/2020    ALKPHOS 73 04/08/2020    BILITOTAL 0.8 04/08/2020    LIPASE 221 04/06/2020    PTT 29 09/13/2018    INR 1.15 (H) 09/13/2018    FIBR 325 06/21/2011    TSH 2.69 04/07/2020    T4 1.32 06/14/2013       Preop Vitals  BP Readings from Last 3 Encounters:   04/08/20 (!) 87/34   03/16/20 134/60   01/03/20 128/70    Pulse Readings from Last 3 Encounters:   04/08/20 69   03/16/20 63   01/03/20 156      Resp Readings from Last 3 Encounters:   04/08/20 14   03/16/20 16   01/03/20 24    SpO2 Readings from Last 3 Encounters:   04/08/20 98%   03/16/20  97%   01/03/20 96%      Temp Readings from Last 1 Encounters:   04/08/20 36.9  C (98.5  F) (Axillary)    Ht Readings from Last 1 Encounters:   03/16/20 1.524 m (5')      Wt Readings from Last 1 Encounters:   04/08/20 86.9 kg (191 lb 9.3 oz)    Estimated body mass index is 37.42 kg/m  as calculated from the following:    Height as of 3/16/20: 1.524 m (5').    Weight as of an earlier encounter on 4/8/20: 86.9 kg (191 lb 9.3 oz).       Anesthesia Plan      History & Physical Review      ASA Status:  4 emergent.    NPO Status:  > 8 hours    Plan for General with Propofol induction. Maintenance will be Inhalation.             Postoperative Care      Consents                   CLEMENCIA March CRNA

## 2020-04-08 NOTE — PROGRESS NOTES
[facility] Hospitalist Service      Subjective:  Intubated  Moves all extremities but no verbal response with wean    Review of Systems:  unable    Physical Exam:  Vitals Were Reviewed    Patient Vitals for the past 16 hrs:   BP Temp Temp src Pulse Heart Rate Resp SpO2 Weight   04/08/20 0516 -- -- -- -- -- -- -- 86.9 kg (191 lb 9.3 oz)   04/08/20 0515 136/60 -- -- 66 68 15 96 % --   04/08/20 0500 (!) 148/71 -- -- 61 65 13 96 % --   04/08/20 0454 -- -- -- -- 66 -- -- --   04/08/20 0445 128/76 -- -- 59 56 13 100 % --   04/08/20 0435 -- -- -- -- 61 -- 99 % --   04/08/20 0430 (!) 152/63 -- -- 60 61 19 98 % --   04/08/20 0415 131/65 -- -- 55 53 12 100 % --   04/08/20 0400 (!) 144/61 -- -- (!) 48 (!) 46 13 100 % --   04/08/20 0345 (!) 126/91 -- -- 58 51 14 99 % --   04/08/20 0330 112/53 -- -- (!) 42 (!) 40 13 100 % --   04/08/20 0315 105/54 -- -- (!) 44 (!) 44 13 100 % --   04/08/20 0300 112/52 -- -- (!) 41 (!) 44 13 99 % --   04/08/20 0245 101/48 -- -- (!) 44 (!) 45 14 100 % --   04/08/20 0230 99/47 -- -- (!) 46 (!) 45 14 100 % --   04/08/20 0215 105/52 -- -- (!) 47 50 14 100 % --   04/08/20 0200 105/51 -- -- 50 (!) 48 14 100 % --   04/08/20 0130 96/45 -- -- (!) 43 (!) 49 14 100 % --   04/08/20 0115 113/55 -- -- (!) 48 (!) 46 14 100 % --   04/08/20 0100 114/49 -- -- (!) 41 (!) 43 13 100 % --   04/08/20 0045 108/51 -- -- (!) 47 (!) 45 13 100 % --   04/08/20 0030 116/50 -- -- (!) 47 (!) 45 13 100 % --   04/08/20 0015 116/53 -- -- (!) 47 (!) 44 13 100 % --   04/08/20 0000 102/46 97.9  F (36.6  C) Oral (!) 45 (!) 45 14 100 % --   04/07/20 2357 102/46 -- -- (!) 46 (!) 45 14 100 % --   04/07/20 2345 97/47 -- -- (!) 46 (!) 46 13 100 % --   04/07/20 2330 112/50 -- -- (!) 42 (!) 44 13 100 % --   04/07/20 2314 -- -- -- -- -- -- 100 % --   04/07/20 2245 99/44 -- -- (!) 44 (!) 45 13 100 % --   04/07/20 2230 96/44 -- -- (!) 46 (!) 46 14 100 % --   04/07/20 2215 104/48 -- -- 53 52 14 100 % --   04/07/20 2200 134/60 -- -- 61 60 14  100 % --   04/07/20 2145 (!) 150/78 -- -- 73 64 11 99 % --   04/07/20 2130 (!) 135/115 -- -- 73 53 12 100 % --   04/07/20 2115 127/54 -- -- (!) 48 (!) 48 11 100 % --   04/07/20 2100 119/58 -- -- (!) 49 (!) 47 9 100 % --   04/07/20 2045 124/56 -- -- (!) 48 (!) 47 9 100 % --   04/07/20 2030 107/50 -- -- (!) 45 (!) 45 14 100 % --   04/07/20 2015 103/48 -- -- (!) 46 (!) 47 13 100 % --   04/07/20 2000 108/50 -- -- (!) 47 (!) 49 14 100 % --   04/07/20 1945 111/45 -- -- (!) 48 (!) 48 13 100 % --   04/07/20 1930 118/52 -- -- (!) 49 (!) 48 18 100 % --   04/07/20 1845 112/47 -- -- (!) 47 (!) 48 14 100 % --   04/07/20 1830 109/46 -- -- (!) 48 (!) 48 13 100 % --   04/07/20 1815 110/51 -- -- 50 (!) 49 14 100 % --   04/07/20 1800 (!) 181/72 -- -- 59 57 13 100 % --   04/07/20 1745 (!) 161/77 -- -- (!) 47 52 27 100 % --   04/07/20 1730 (!) 86/39 -- -- 55 54 13 100 % --   04/07/20 1720 96/46 -- -- -- 57 14 100 % --   04/07/20 1715 96/46 -- -- 59 61 14 100 % --   04/07/20 1710 98/47 -- -- -- (!) 46 13 99 % --   04/07/20 1700 (!) 85/42 -- -- (!) 49 (!) 46 14 99 % --   04/07/20 1645 (!) 75/39 -- -- (!) 45 (!) 47 13 99 % --   04/07/20 1630 (!) 68/33 -- -- (!) 47 (!) 49 13 98 % --   04/07/20 1615 (!) 66/34 -- -- 54 55 13 98 % --   04/07/20 1600 98/49 97.9  F (36.6  C) Oral 61 52 14 98 % --   04/07/20 1556 -- -- -- -- 57 -- -- --   04/07/20 1545 97/45 -- -- 64 63 14 97 % --   04/07/20 1530 100/41 -- -- 61 63 13 98 % --   04/07/20 1515 (!) 91/39 -- -- 62 63 14 99 % --   04/07/20 1500 (!) 92/38 -- -- 63 61 14 98 % --   04/07/20 1445 (!) 80/38 -- -- 65 66 13 98 % --   04/07/20 1430 (!) 91/38 -- -- 63 64 13 98 % --         Intake/Output Summary (Last 24 hours) at 4/8/2020 0628  Last data filed at 4/8/2020 0516  Gross per 24 hour   Intake 5120.36 ml   Output 1375 ml   Net 3745.36 ml       GENERAL APPEARANCE: When propofol was weaned patient had restless motor activity in all extremities, she would not open her eyes, respond to voice or  commands  EYES: Pupils 3 mm and reactive  RESP: lungs clear to auscultation - no rales, rhonchi or wheezes  CV: regular rate and rhythm, normal S1 S2, no S3 or S4 and no murmur, click or rub   ABDOMEN: soft, nontender, no HSM or masses and bowel sounds normal  MS: no clubbing, cyanosis; no edema  SKIN: clear without significant rashes or lesions  NEURO: As above-patient had motor restlessness with the propofol wean but did not open her eyes or respond to commands    Lab:  Recent Labs   Lab Test 04/08/20  0443 04/07/20  1213   * 150*   POTASSIUM 3.8 4.3   CHLORIDE 118* 120*   CO2 25 25   ANIONGAP 7 5   * 117*   BUN 35* 38*   CR 2.14* 2.32*   DWIGHT 8.4* 8.3*     CBC RESULTS:   Recent Labs   Lab Test 04/08/20  0443 04/07/20  0440   WBC 4.5 7.6   RBC 3.68* 4.52   HGB 11.2* 13.9   HCT 35.4 42.1   * 203       Results for orders placed or performed during the hospital encounter of 04/06/20 (from the past 24 hour(s))   Glucose by meter   Result Value Ref Range    Glucose 139 (H) 70 - 99 mg/dL   XR Chest Port 1 View    Narrative    EXAM: CHEST SINGLE VIEW PORTABLE  LOCATION: [facility]  DATE/TIME: 4/7/2020 6:05 AM    INDICATION: Tube placement.  COMPARISON: 04/06/2020 at 2052 hours.      Impression    IMPRESSION:   1. Interval placement of an endotracheal tube with distal tube tip in the mid trachea, approximately 5 cm proximal to the ivis.  2. No other significant interval change since the recent comparison study.             XR Chest Port 1 View    Narrative    EXAM: XR CHEST PORT 1 VW  LOCATION: [facility]  DATE/TIME: 4/7/2020 6:39 AM    INDICATION: Tube placement  COMPARISON: 04/07/2020      Impression    IMPRESSION: Endotracheal tube 3.4 cm above ivis. Median sternotomy. Right Port-A-Cath. Stable cardiomediastinal silhouette. No focal consolidation. Trace right effusion.   Methicillin Resistant Staph Aureus PCR    Specimen: Nares   Result Value Ref Range    Specimen  Description Nares     Methicillin Resist/Sens S. aureus PCR Negative NEG^Negative   Blood gas venous   Result Value Ref Range    Ph Venous 7.40 7.32 - 7.43 pH    PCO2 Venous 36 (L) 40 - 50 mm Hg    PO2 Venous 91 (H) 25 - 47 mm Hg    Bicarbonate Venous 22 21 - 28 mmol/L    Base Deficit Venous 2.6 mmol/L    FIO2 35%    Sputum Culture Aerobic Bacterial    Specimen: Sputum   Result Value Ref Range    Specimen Description Sputum     Special Requests Endotracheal     Culture Micro PENDING    Glucose by meter   Result Value Ref Range    Glucose 125 (H) 70 - 99 mg/dL   Blood gas venous   Result Value Ref Range    Ph Venous 7.45 (H) 7.32 - 7.43 pH    PCO2 Venous 34 (L) 40 - 50 mm Hg    PO2 Venous 83 (H) 25 - 47 mm Hg    Bicarbonate Venous 23 21 - 28 mmol/L    Base Deficit Venous 0.2 mmol/L    FIO2 35%    Troponin I   Result Value Ref Range    Troponin I ES 0.414 (HH) 0.000 - 0.045 ug/L   CK total   Result Value Ref Range    CK Total 1,802 (HH) 30 - 225 U/L   Basic metabolic panel   Result Value Ref Range    Sodium 150 (H) 133 - 144 mmol/L    Potassium 4.3 3.4 - 5.3 mmol/L    Chloride 120 (H) 94 - 109 mmol/L    Carbon Dioxide 25 20 - 32 mmol/L    Anion Gap 5 3 - 14 mmol/L    Glucose 117 (H) 70 - 99 mg/dL    Urea Nitrogen 38 (H) 7 - 30 mg/dL    Creatinine 2.32 (H) 0.52 - 1.04 mg/dL    GFR Estimate 20 (L) >60 mL/min/[1.73_m2]    GFR Estimate If Black 23 (L) >60 mL/min/[1.73_m2]    Calcium 8.3 (L) 8.5 - 10.1 mg/dL   Blood gas venous   Result Value Ref Range    Ph Venous 7.43 7.32 - 7.43 pH    PCO2 Venous 37 (L) 40 - 50 mm Hg    PO2 Venous 75 (H) 25 - 47 mm Hg    Bicarbonate Venous 24 21 - 28 mmol/L    Base Deficit Venous 0.2 mmol/L    FIO2 35%    Care Transition RN/SW IP Consult    Narrative    Magdalena Hamilton RN     4/7/2020  1:36 PM  Care Transition Initial Assessment - RN      Met with: Family and Caregiver.    DATA  Active Problems:    Altered mental status    Acute metabolic encephalopathy       Cognitive Status:  sedated.  [facility]  Primary Care MD Name: TrinoSalomon  Contact information and PCP information verified: Yes  Lives With: facility resident      Quality of Family Relationships: supportive, involved, helpful  Description of Support System: Supportive, Involved   Who is your support system?: Other (specify)(nieces)   Support Assessment: Adequate family and caregiver support   Insurance concerns: No Insurance issues identified        This writer spoke with niece, Crystal, introduced self and role.   Patient currently resides at [facility] known as   [facility] (phone: [phone] Fax:   [phone]).  Spoke with Mell RN @ [facility] ([phone]).  Patient   has help medication administration, laundry, showers and   incontinence care.  She is wheelchair bound at baseline, but is   able to pull herself up with hand rail assist in the bathroom.    She has some short term memory loss at baseline.  Patient's two   sisters live at [facility].  Her two nieces, Crystal and Lori live   locally and are supportive.      Patient remains intubated at this time and discharge plans are   pending.  Briefly discussed discharge planning and medicare   guidelines in regards to home care and SNF benefits.  Mell stated   patient would be able to return to [facility] if she is at baseline for   her mentation (no paranoia) and at baseline for mobility.    Crystal's goal is for patient to return to her [facility] is able.  She is   understanding and agreeable to potential need for TCU at   discharge.    Pt/family was provided with the Medicare Compare list for SNF.    Discussed associated medicare star ratings to assist with choice   for referrals/discharge planning Yes    Education was given to pt/family that star ratings are   updated/maintained by Medicare and can be reviewed by visiting   www.medicare.gov Yes    Patient was provided with Medicare certified nursing home list.   Pts choices are as  follows [facility] (Phone:   [phone] Admissions: [phone] Fax: [phone]),   [facility] (Admissions Phone: [phone]   Main Phone: [phone] Fax: [phone]), [place] [facility]   [facility] (Phone: [phone] Fax: [phone]).  CTS to send   referral to above facilities when closer to discharge/patient   stabilized.        Patient is followed by [facility]   coordinator.  Initial contact made with tentative discharge   plans.  CTS to keep CC informed of plans.      PLAN    [facility]  CTS to follow    MARIN Gilbert RN  Inpatient Care Coordinator  [facility] [phone]  [facility] [phone]     Drug screen urine   Result Value Ref Range    Benzodiazepine Qual Urine Negative NEG^Negative    Cannabinoids Qual Urine Negative NEG^Negative    Cocaine Qual Urine Negative NEG^Negative    Opiates Qualitative Urine Negative NEG^Negative    Acetaminophen Qual PENDING NEG^Negative    Amantadine Qual PENDING NEG^Negative    Amitriptyline Qual PENDING NEG^Negative    Amoxapine Qual PENDING NEG^Negative    Amphetamines Qual PENDING NEG^Negative    Atropine Qual PENDING NEG^Negative    Bupropion Qual PENDING NEG^Negative    Caffeine Qual PENDING NEG^Negative    Carbamazepine Qual PENDING NEG^Negative    Chlorpheniramine Qual PENDING NEG^Negative    Chlorpromazine Qual PENDING NEG^Negative    Citalopram Qual PENDING NEG^Negative    Clomipramine Qual PENDING NEG^Negative    Cocaine Qual PENDING NEG^Negative    Codeine Qual PENDING NEG^Negative    Desipramine Qual PENDING NEG^Negative    Dextromethorphan Qual PENDING NEG^Negative    Diphenhydramine Qual PENDING NEG^Negative    Doxepin/metabolite Qual PENDING NEG^Negative    Doxylamine Qual PENDING NEG^Negative    Ephedrine or pseudo Qual PENDING NEG^Negative    Fentanyl Qual PENDING NEG^Negative    Fluoxetine and metab Qual PENDING  NEG^Negative    Hydrocodone Qual PENDING NEG^Negative    Hydromorphone Qual PENDING NEG^Negative    Ibuprofen Qual PENDING NEG^Negative    Imipramine Qual PENDING NEG^Negative    Ketamine Qual PENDING NEG^Negative    Lamotrigine Qual PENDING NEG^Negative    Lidocaine Qual PENDING NEG^Negative    Loxapine Qual PENDING NEG^Negative    Maprotiline Qual PENDING NEG^Negative    MDMA Qual PENDING NEG^Negative    Meperidine Qual PENDING NEG^Negative    Methadone Qual PENDING NEG^Negative    Methamphetamine Qual PENDING NEG^Negative    Mirtazapine Qual PENDING NEG^Negative    Morphine Qual PENDING NEG^Negative    Nicotine Qual PENDING NEG^Negative    Nortriptyline Qual PENDING NEG^Negative    Olanzapine Qual PENDING NEG^Negative    Oxycodone Qual PENDING NEG^Negative    Pentazocine Qual PENDING NEG^Negative    Phencyclidine Qual PENDING NEG^Negative    Phentermine Qual PENDING NEG^Negative    Propofol Qual PENDING NEG^Negative    Propoxyphene Qual PENDING NEG^Negative    Propranolol Qual PENDING NEG^Negative    Pyrilamine Qual PENDING NEG^Negative    Quetiapine Metab Qual PENDING NEG^Negative    Salicylate Qual PENDING NEG^Negative    Sertraline Qual PENDING NEG^Negative    Theobromine Qual PENDING NEG^Negative    Trimipramine Qual PENDING NEG^Negative    Topiramate Qual PENDING NEG^Negative    Tramadol Qual PENDING NEG^Negative    Venlafaxine Qual PENDING NEG^Negative   Fractional excretion of sodium   Result Value Ref Range    Creatinine Urine 162 mg/dL    Sodium Urine mmol/L 21 mmol/L    %FENA 0.2 %   CBC with platelets differential   Result Value Ref Range    WBC 4.5 4.0 - 11.0 10e9/L    RBC Count 3.68 (L) 3.8 - 5.2 10e12/L    Hemoglobin 11.2 (L) 11.7 - 15.7 g/dL    Hematocrit 35.4 35.0 - 47.0 %    MCV 96 78 - 100 fl    MCH 30.4 26.5 - 33.0 pg    MCHC 31.6 31.5 - 36.5 g/dL    RDW 13.5 10.0 - 15.0 %    Platelet Count 145 (L) 150 - 450 10e9/L    Diff Method Automated Method     % Neutrophils 61.0 %    % Lymphocytes 22.2 %     % Monocytes 9.3 %    % Eosinophils 6.2 %    % Basophils 1.1 %    % Immature Granulocytes 0.2 %    Nucleated RBCs 0 0 /100    Absolute Neutrophil 2.7 1.6 - 8.3 10e9/L    Absolute Lymphocytes 1.0 0.8 - 5.3 10e9/L    Absolute Monocytes 0.4 0.0 - 1.3 10e9/L    Absolute Eosinophils 0.3 0.0 - 0.7 10e9/L    Absolute Basophils 0.1 0.0 - 0.2 10e9/L    Abs Immature Granulocytes 0.0 0 - 0.4 10e9/L    Absolute Nucleated RBC 0.0    Basic metabolic panel   Result Value Ref Range    Sodium 150 (H) 133 - 144 mmol/L    Potassium 3.8 3.4 - 5.3 mmol/L    Chloride 118 (H) 94 - 109 mmol/L    Carbon Dioxide 25 20 - 32 mmol/L    Anion Gap 7 3 - 14 mmol/L    Glucose 108 (H) 70 - 99 mg/dL    Urea Nitrogen 35 (H) 7 - 30 mg/dL    Creatinine 2.14 (H) 0.52 - 1.04 mg/dL    GFR Estimate 22 (L) >60 mL/min/[1.73_m2]    GFR Estimate If Black 25 (L) >60 mL/min/[1.73_m2]    Calcium 8.4 (L) 8.5 - 10.1 mg/dL   Hepatic panel   Result Value Ref Range    Bilirubin Direct 0.5 (H) 0.0 - 0.2 mg/dL    Bilirubin Total 0.8 0.2 - 1.3 mg/dL    Albumin 2.3 (L) 3.4 - 5.0 g/dL    Protein Total 5.3 (L) 6.8 - 8.8 g/dL    Alkaline Phosphatase 73 40 - 150 U/L    ALT 28 0 - 50 U/L    AST 97 (H) 0 - 45 U/L   Troponin I   Result Value Ref Range    Troponin I ES 0.217 (HH) 0.000 - 0.045 ug/L     *Note: Due to a large number of results and/or encounters for the requested time period, some results have not been displayed. A complete set of results can be found in Results Review.       Assessment and Plan:    Acute metabolic encephalopathy on top of mild cognitive impairment/dementia/severe agitation requring intubation.  After admit she became  extremely agitated and unable to be redirected.  She seemed to have gotten worse with neuroleptic treatment including olanzapine 10 mg and Haldol 2.5 mg.  She was constantly pulling at 4-point restraints on the floor.  Transferred to the ICU for 5-point restraints and Precedex started with out improvement. Ultimately due to severe  agitation and sinus tach of 150 pt intubated by Dr Barfield 4/7/20 0545..  We are continuing the search for cause including infection, metabolic, toxic.  No evidence of withdrawal or new drugs other than the worsening with the neuroleptics.  She is being covered for possible infection including meningitis and herpes encephalitis..  Urine cultures and blood cultures are neg so far.  Chest x-ray is negative.  CT head is negative for structural disease.  She did have mild  hypercalcemia at 10.2 with normal up to 10.1 which would seem unlikely to cause this degree of encephalopathy.      Currently on vent. On propofol. Hr variable about 66. bp [id number].  procede with LP today (has been off Eliquis with last dose 3/5, last dose lovenox 3/7 AM). VBG pending.  She is on empiric antibiotics for bacterial meningitis plus acyclovir.  The source of the delirium is unclear.      Infection  UA showed mild pyuria-culture neg.MRSA nasal swab neg. Blood cultures neg, PCT 0.2,  On ampicillin, rocephin , vanco, acyclovir. CRP 7.8.  No fever noted.    Acute kidney injury on chronic kidney disease.   Admit creat 2.05- 2.32--now 2.14. Oliguric 4/7/20. I and O pos 3600  Urine output in the last 5 hours was 70 cc an hour.  FENA suggested prerenal etiology.     Elevated lactic acid.  Mild lactic acid elevation is been present.  It is unclear if this was related to sepsis     Traumatic rhabdo  CK elevation  Probably due motor agitation.Some possibility of a neuroleptic malignant syndrome which should not come on this early, but per up-to-date, can occur with a single dose.  Usually, should have fever and lead pipe rigidity which she does not have either.      CK 2512. (up from 1221 upon admit). Will continue to follow and hydrate.    Elevated troponin  cardiac   suspect demand ischemia.    trops 0.052--0.084--0.414--0.217  ekg shows some ant and lat t wave inversions-but some degree of this chronically. Avoiding anticoagulation and anti  platelet due to LP  Had hypotension 4/7/20-etiology unclear. Also had sinus dilip which persists to some degree,  echo pending.    diabetes mellitus type 2   Continue lantus, sliding scale ordered    Mild transaminase elevation  ? From rhabdo     htn  Holding metoprolol, lasix , avapro  Required norepi 4/7/20 at about 6 PM    Hypernatremia  Sodium 150--changing to half normal saline 04/08/20      hyperlipidemia   Holding zocor     gerd  Hold omeprazole, use iv protonix     RLS  Holding mirapex     hypothyroidism   Hold oral levothyroxine, use iv replacement while vented    History of deep venous thrombosis/pulmonary embolism.  Will on eliquis pta, has dose 3/5. Will restart after LP    prophylaxis-holding eliquis, protonix  FEN-npo, half normal saline at 125     Plan-  Etiology of problems unclear. She worsened after meds (zyprexa, haldol). A med related issue (dystonia) not ruled out but seems unlikely since she still appears to have altered mental status with sedation wean.      It is still possible that this is purely a psychiatric problem.  Plan for today is to proceed with an MRI.  This will be done without contrast because of the renal dysfunction.  We will also proceed with an LP.    When these are completed we will need to consider when she could be extubated.  Think we need a plan in place prior to extubation.  Minute have palliative care assist me in determining what the goals of care are.     11:32 AM   Maldonado Nair discussed the case with me.  She pointed out that there have been case reports of delirium , paranoia, mickie psychosis, and twitching from Cipro.  The patient was on this prior to admission.    1:16 PM   Two wbc per hpf  Csf protein mildly elevated  Stop ampicillin, acyclovir, and vanco  I left some empiric rocephin but at 1 gram q 24 hours.  Proceed with mri.    Will likely leave pt on vent overnight and consider extubation 4/9/20 when full team present.    1:52 PM   Discussed with   Wachter  Add dilaudid 0.2 iv q 2-3  Try extubating tomorrow.  He thinks this is delerium vs psychiatric.    1:58 PM   Discussed with ninfa Meadows    2:37 PM   Will continue acyclovir for now given mild csf protein elevation.  Herpes simplex and zoster pcr ordered  Therefore will continue acyclovir for now.  Asking pharmacy to reorder acyclovir as per renal function.

## 2020-04-08 NOTE — ANESTHESIA PREPROCEDURE EVALUATION
Anesthesia Pre-Procedure Evaluation    Patient: Stefanie Velazquez   MRN: [id number] : 1945          Preoperative Diagnosis: * No pre-op diagnosis entered *    * No procedures listed *    Past Medical History:   Diagnosis Date     Acute deep vein thrombosis (DVT) of right lower extremity, unspecified vein (H) 10/31/2018     Acute myocardial infarction of other specified sites, episode of care unspecified 2002    MI 2 stints     Cancer of colon (H)      Cellulitis of lower extremity, bilateral 2016     Chronic ischemic heart disease, unspecified 2003    cabgx3 ,  adenosine ef70%     Coronary atherosclerosis of unspecified type of vessel, native or graft     Coronary artery disease     Diabetes mellitus (H)      Esophageal reflux      Fracture of femur, distal, left, closed (H) 2013     Gastro-oesophageal reflux disease      Generalized osteoarthrosis, unspecified site 2005     Generalized osteoarthrosis, unspecified site 2005     HEARING LOSS CONDUCTIVE, COMBINED TYPE 2005    Welsh measles as child     HYPOTHYROIDISM  2003     Mixed hyperlipidemia 2005     Obesity, unspecified 2005     RC-moderate (AHI 12, LSat 60%); REM RDI-73 2009    Sleep study [facility] was performed 09 in order to re-evaluate severity of RC. The total sleep time was 412.0 minutes. The sleep latency was decreased at 8.7 minutes with Ambien 10mg. The REM sleep latency was 426.0 minutes. Sleep efficiency was reduced at 79.0%. The sleep architecture was disrupted with frequent sleep stage changes and arousals.  Snoring:  loud. Respiratory Events:   RDI o     Recurrent acute deep vein thrombosis (DVT) of both lower extremities (H) 3/31/2019     Stented coronary artery      Type II or unspecified type diabetes mellitus with renal manifestations, uncontrolled(250.42) (H) 2005     Type II or unspecified type diabetes mellitus with renal manifestations, uncontrolled(250.42) (H)  6/22/2005     Unspecified disorder resulting from impaired renal function 6/22/2005    CR     1.82   06/21/2005     Unspecified essential hypertension      Past Surgical History:   Procedure Laterality Date     cabg       COLECTOMY LOW ANTERIOR  6/21/2011    Procedure:COLECTOMY LOW ANTERIOR; with loop ielostomy; Surgeon:ROBBIN MCDONNELL; Location:[facility]     COLONOSCOPY  1/26/2011    COLONOSCOPY performed by MASOUD BILL at [facility]     COLONOSCOPY N/A 3/1/2016    Procedure: COLONOSCOPY;  Surgeon: Liza Neal MD;  Location: [facility]     INSERT PORT VASCULAR ACCESS  3/2/2011    INSERT PORT VASCULAR ACCESS performed by LIZA NEAL at [facility]     OPEN REDUCTION INTERNAL FIXATION FEMUR DISTAL  2/12/2013    Procedure: OPEN REDUCTION INTERNAL FIXATION FEMUR DISTAL;  Open reduction internal fixation left femur fracture--[facility];  Surgeon: Ley, Jeffrey Duane, MD;  Location: [facility]     ORTHOPEDIC SURGERY       PHACOEMULSIFICATION WITH STANDARD INTRAOCULAR LENS IMPLANT Left 1/22/2018    Procedure: PHACOEMULSIFICATION WITH STANDARD INTRAOCULAR LENS IMPLANT;  Left cataract removal with implant;  Surgeon: Rony Key MD;  Location: [facility]     PHACOEMULSIFICATION WITH STANDARD INTRAOCULAR LENS IMPLANT Right 2/19/2018    Procedure: PHACOEMULSIFICATION WITH STANDARD INTRAOCULAR LENS IMPLANT;  Right cataract removal with implant;  Surgeon: Rony Key MD;  Location: [facility]     SIGMOIDOSCOPY FLEXIBLE  9/9/2011    Procedure:SIGMOIDOSCOPY FLEXIBLE; Performed prior to induction.; Surgeon:ROBBIN MCDONNELL; Location:[facility]     SURGICAL HISTORY OF -   10/24/2002    Heart bypass     SURGICAL HISTORY OF -   2002    R Knee arthroplasty     SURGICAL HISTORY OF -   2002    Mi 2 stints     TAKEDOWN ILEOSTOMY  9/9/2011    Procedure:TAKEDOWN ILEOSTOMY; Exploratory Laparotomy,  Loop Ileostomy Takedown, Small Bowel Resection x 2.; Surgeon:ROBBIN MCDONNELL; Location:[facility]       Anesthesia  Evaluation     . Pt has had prior anesthetic. Type: General and MAC           ROS/MED HX    ENT/Pulmonary:     (+)sleep apnea, tobacco use, Past use , . .    Neurologic:       Cardiovascular:     (+) hypertension--CAD, --. : . . fainting (syncope). :. .       METS/Exercise Tolerance:     Hematologic:         Musculoskeletal:         GI/Hepatic:     (+) GERD       Renal/Genitourinary:     (+) chronic renal disease,       Endo:     (+) type II DM .      Psychiatric:     (+) psychiatric history schizophrenia      Infectious Disease:         Malignancy:         Other:                          Physical Exam  Normal systems: cardiovascular, pulmonary and dental    Airway   Mallampati: II  TM distance: >3 FB  Neck ROM: full    Dental     Cardiovascular       Pulmonary   PE comment: intubated            Lab Results   Component Value Date    WBC 4.5 04/08/2020    HGB 11.2 (L) 04/08/2020    HCT 35.4 04/08/2020     (L) 04/08/2020    CRP 7.8 04/07/2020    SED 51 (H) 10/16/2013     (H) 04/08/2020    POTASSIUM 3.8 04/08/2020    CHLORIDE 118 (H) 04/08/2020    CO2 25 04/08/2020    BUN 35 (H) 04/08/2020    CR 2.14 (H) 04/08/2020     (H) 04/08/2020    DWIGHT 8.4 (L) 04/08/2020    PHOS 3.1 09/15/2011    MAG 2.0 11/10/2017    ALBUMIN 2.3 (L) 04/08/2020    PROTTOTAL 5.3 (L) 04/08/2020    ALT 28 04/08/2020    AST 97 (H) 04/08/2020    ALKPHOS 73 04/08/2020    BILITOTAL 0.8 04/08/2020    LIPASE 221 04/06/2020    PTT 29 09/13/2018    INR 1.15 (H) 09/13/2018    FIBR 325 06/21/2011    TSH 2.69 04/07/2020    T4 1.32 06/14/2013       Preop Vitals  BP Readings from Last 3 Encounters:   04/08/20 101/51   03/16/20 134/60   01/03/20 128/70    Pulse Readings from Last 3 Encounters:   04/08/20 (!) 47   03/16/20 63   01/03/20 156      Resp Readings from Last 3 Encounters:   04/08/20 14   03/16/20 16   01/03/20 24    SpO2 Readings from Last 3 Encounters:   04/08/20 100%   03/16/20 97%   01/03/20 96%      Temp Readings from Last 1  Encounters:   04/08/20 36.6  C (97.9  F) (Oral)    Ht Readings from Last 1 Encounters:   03/16/20 1.524 m (5')      Wt Readings from Last 1 Encounters:   04/08/20 86.9 kg (191 lb 9.3 oz)    Estimated body mass index is 37.42 kg/m  as calculated from the following:    Height as of 3/16/20: 1.524 m (5').    Weight as of this encounter: 86.9 kg (191 lb 9.3 oz).       Anesthesia Plan  Procedure only, no anesthetic delivered    History & Physical Review      ASA Status:  4 emergent.    NPO Status:  > 8 hours         Postoperative Care      Consents                 CLEMENCIA March CRNA

## 2020-04-08 NOTE — PLAN OF CARE
Pt sedated with Propofol, not following commands, easily agitated to stimuli. VSS. SB 40-50's, BP stable, afebrile. CMV 30%, minimal vent settings. LS clear, minimal secretions. NPO. NO BM. Lr in place with good UOP. Port-a-cath, and 2 PIV's. Pt had echo, LP and MRI done today. Possible extubation tomorrow.

## 2020-04-08 NOTE — ANESTHESIA POSTPROCEDURE EVALUATION
Patient: Stefanie Velazquez    Procedure(s):  ANESTHESIA OUT OF OR MRI    Diagnosis:Acute metabolic encephalopathy [G93.41]  Diagnosis Additional Information: No value filed.    Anesthesia Type:  General    Note:  Anesthesia Post Evaluation    Patient location during evaluation: ICU  Patient participation: Unable to participate in evaluation secondary to underlying medical condition (intubated, sedated prior to procedure )  Level of consciousness: obtunded/minimal responses  Pain management: unable to assess  Airway patency: patent (ETT )  Cardiovascular status: acceptable  Respiratory status: ETT  Hydration status: stable  PONV: unable to assess     Anesthetic complications: None          Last vitals:  Vitals:    04/08/20 1330 04/08/20 1345 04/08/20 1530   BP: (!) 154/69 (!) 166/77 120/71   Pulse: 65 59    Resp: 15 9 8   Temp:      SpO2: 100% 98% 100%         Electronically Signed By: CLEMENCIA March CRNA  April 8, 2020  3:42 PM

## 2020-04-08 NOTE — ANESTHESIA PROCEDURE NOTES
Procedure note : lumbar puncture  Staff -       CRNA: Henna German APRN CRNA    Performed By: CRNA        Pre-Procedure    Location: ICU    Procedure Times:4/8/2020 11:10 AM and 4/8/2020 12:10 PM  Pre-Anesthestic Checklist: patient identified, IV checked, site marked, risks and benefits discussed, informed consent, monitors and equipment checked, pre-op evaluation, at physician/surgeon's request and post-op pain management    Timeout  Correct Patient: Yes   Correct Procedure: Yes   Correct Site: Yes   Correct Laterality: Yes   Correct Position: Yes   Site Marked: Yes   .   Procedure Documentation  ASA 4  Diagnosis:acute metabolic encepalopathy .    Procedure: lumbar puncture, .   Patient Position:LLD Insertion Site:L4-5  (midline approach)     Patient Prep/Sterile Barriers; chlorhexidine gluconate and isopropyl alcohol.  .  Needle:  Spinal Needle (gauge): 25  Spinal/LP Needle Length (inches): 3.5 # of attempts: 2 and  # of redirects:  2 No introducer used .        Assessment/Narrative  Paresthesias: No.  .  .  10 mL of clear CSF fluid removed .

## 2020-04-08 NOTE — PROGRESS NOTES
TELE:  Consulted by Dr. Desir of [facility] regarding this patient with altered mental status.  She apparently became increasingly agitated and non-redirectable several days ago requiring intubation;  Prior to this she had had increased paranoia.  She continues to be delirious when sedation weaned.    Per Dr. Milian's report she does not have psychomotor changes consistent with neuroleptic malignant syndrome or serotonin syndrome.  She does not have seizure-like activity.  No known history of substance use/dependence/withdrawal.  Labs reveal mild hypernatremia, mild but stable yaakov at 2.14 but bun ok at 35.  Elevated ck at 2500 for which she is on fluids.  Relatively normal lfts, cbc, UA and Utox.  Head CT normal.  LP appears un-infected.  Agree with MR imaging of brain to evaluate for organic cause.  Agree with holding anti-psychotics for now, as she apparently had a paradoxical reaction to these prior to intubation.    Agree with propofol and ativan for sedation.  Would add something for analgesia such as dilaudid.  Agree with half normal saline to treat both hypernatremia and rhabdomyolysis.    Tele ICU remains available for support and consultation.  Please continue to consult as needed.

## 2020-04-09 LAB
ALBUMIN SERPL-MCNC: 2 G/DL (ref 3.4–5)
ALP SERPL-CCNC: 65 U/L (ref 40–150)
ALT SERPL W P-5'-P-CCNC: 25 U/L (ref 0–50)
ANION GAP SERPL CALCULATED.3IONS-SCNC: 6 MMOL/L (ref 3–14)
AST SERPL W P-5'-P-CCNC: 70 U/L (ref 0–45)
BACTERIA SPEC CULT: NORMAL
BASE DEFICIT BLDV-SCNC: 0.2 MMOL/L
BILIRUB DIRECT SERPL-MCNC: 0.3 MG/DL (ref 0–0.2)
BILIRUB SERPL-MCNC: 0.5 MG/DL (ref 0.2–1.3)
BUN SERPL-MCNC: 29 MG/DL (ref 7–30)
CALCIUM SERPL-MCNC: 7.4 MG/DL (ref 8.5–10.1)
CHLORIDE SERPL-SCNC: 117 MMOL/L (ref 94–109)
CK SERPL-CCNC: 919 U/L (ref 30–225)
CO2 SERPL-SCNC: 24 MMOL/L (ref 20–32)
CREAT SERPL-MCNC: 1.86 MG/DL (ref 0.52–1.04)
ERYTHROCYTE [DISTWIDTH] IN BLOOD BY AUTOMATED COUNT: 13.2 % (ref 10–15)
GFR SERPL CREATININE-BSD FRML MDRD: 26 ML/MIN/{1.73_M2}
GLUCOSE SERPL-MCNC: 66 MG/DL (ref 70–99)
HCO3 BLDV-SCNC: 25 MMOL/L (ref 21–28)
HCT VFR BLD AUTO: 29.8 % (ref 35–47)
HGB BLD-MCNC: 9.4 G/DL (ref 11.7–15.7)
HSV1 DNA CSF QL NAA+PROBE: NOT DETECTED
HSV2 DNA CSF QL NAA+PROBE: NOT DETECTED
Lab: NORMAL
MCH RBC QN AUTO: 30.2 PG (ref 26.5–33)
MCHC RBC AUTO-ENTMCNC: 31.5 G/DL (ref 31.5–36.5)
MCV RBC AUTO: 96 FL (ref 78–100)
MICROBIOLOGIST REVIEW: NORMAL
PCO2 BLDV: 44 MM HG (ref 40–50)
PH BLDV: 7.37 PH (ref 7.32–7.43)
PLATELET # BLD AUTO: 113 10E9/L (ref 150–450)
PO2 BLDV: 38 MM HG (ref 25–47)
POTASSIUM SERPL-SCNC: 3.3 MMOL/L (ref 3.4–5.3)
POTASSIUM SERPL-SCNC: 3.6 MMOL/L (ref 3.4–5.3)
PROT SERPL-MCNC: 4.8 G/DL (ref 6.8–8.8)
PTH-INTACT SERPL-MCNC: 136 PG/ML (ref 18–80)
RBC # BLD AUTO: 3.11 10E12/L (ref 3.8–5.2)
SODIUM SERPL-SCNC: 147 MMOL/L (ref 133–144)
SPECIMEN SOURCE: NORMAL
WBC # BLD AUTO: 3.7 10E9/L (ref 4–11)

## 2020-04-09 PROCEDURE — 25800025 ZZH RX 258: Performed by: FAMILY MEDICINE

## 2020-04-09 PROCEDURE — 20000003 ZZH R&B ICU

## 2020-04-09 PROCEDURE — 85027 COMPLETE CBC AUTOMATED: CPT | Performed by: FAMILY MEDICINE

## 2020-04-09 PROCEDURE — 99233 SBSQ HOSP IP/OBS HIGH 50: CPT | Performed by: FAMILY MEDICINE

## 2020-04-09 PROCEDURE — 99207 ZZC CDG-CRITICAL CARE TIME NOT DOCUMENTED: CPT | Performed by: FAMILY MEDICINE

## 2020-04-09 PROCEDURE — 83970 ASSAY OF PARATHORMONE: CPT | Performed by: FAMILY MEDICINE

## 2020-04-09 PROCEDURE — 25000128 H RX IP 250 OP 636: Performed by: FAMILY MEDICINE

## 2020-04-09 PROCEDURE — 82803 BLOOD GASES ANY COMBINATION: CPT | Performed by: FAMILY MEDICINE

## 2020-04-09 PROCEDURE — 25800030 ZZH RX IP 258 OP 636: Performed by: FAMILY MEDICINE

## 2020-04-09 PROCEDURE — 25800029 ZZH RX IP 258 OP 250: Performed by: FAMILY MEDICINE

## 2020-04-09 PROCEDURE — 84132 ASSAY OF SERUM POTASSIUM: CPT | Performed by: FAMILY MEDICINE

## 2020-04-09 PROCEDURE — C9113 INJ PANTOPRAZOLE SODIUM, VIA: HCPCS | Performed by: FAMILY MEDICINE

## 2020-04-09 PROCEDURE — 94003 VENT MGMT INPAT SUBQ DAY: CPT

## 2020-04-09 PROCEDURE — 40000275 ZZH STATISTIC RCP TIME EA 10 MIN

## 2020-04-09 PROCEDURE — 80048 BASIC METABOLIC PNL TOTAL CA: CPT | Performed by: FAMILY MEDICINE

## 2020-04-09 PROCEDURE — 25000125 ZZHC RX 250: Performed by: FAMILY MEDICINE

## 2020-04-09 PROCEDURE — 80076 HEPATIC FUNCTION PANEL: CPT | Performed by: FAMILY MEDICINE

## 2020-04-09 PROCEDURE — 82550 ASSAY OF CK (CPK): CPT | Performed by: FAMILY MEDICINE

## 2020-04-09 RX ORDER — POTASSIUM CL/LIDO/0.9 % NACL 10MEQ/0.1L
10 INTRAVENOUS SOLUTION, PIGGYBACK (ML) INTRAVENOUS
Status: DISCONTINUED | OUTPATIENT
Start: 2020-04-09 | End: 2020-04-13 | Stop reason: HOSPADM

## 2020-04-09 RX ORDER — GLYCOPYRROLATE 0.2 MG/ML
.2-.4 INJECTION, SOLUTION INTRAMUSCULAR; INTRAVENOUS EVERY 4 HOURS PRN
Status: DISCONTINUED | OUTPATIENT
Start: 2020-04-09 | End: 2020-04-13 | Stop reason: HOSPADM

## 2020-04-09 RX ORDER — POTASSIUM CHLORIDE 7.45 MG/ML
10 INJECTION INTRAVENOUS
Status: DISCONTINUED | OUTPATIENT
Start: 2020-04-09 | End: 2020-04-13 | Stop reason: HOSPADM

## 2020-04-09 RX ORDER — POTASSIUM CHLORIDE 29.8 MG/ML
20 INJECTION INTRAVENOUS
Status: DISCONTINUED | OUTPATIENT
Start: 2020-04-09 | End: 2020-04-13 | Stop reason: HOSPADM

## 2020-04-09 RX ORDER — POTASSIUM CHLORIDE 1500 MG/1
20-40 TABLET, EXTENDED RELEASE ORAL
Status: DISCONTINUED | OUTPATIENT
Start: 2020-04-09 | End: 2020-04-13 | Stop reason: HOSPADM

## 2020-04-09 RX ORDER — POTASSIUM CHLORIDE 1.5 G/1.58G
20-40 POWDER, FOR SOLUTION ORAL
Status: DISCONTINUED | OUTPATIENT
Start: 2020-04-09 | End: 2020-04-13 | Stop reason: HOSPADM

## 2020-04-09 RX ADMIN — DEXTROSE MONOHYDRATE 25 ML: 500 INJECTION PARENTERAL at 13:36

## 2020-04-09 RX ADMIN — Medication 10 MEQ: at 08:16

## 2020-04-09 RX ADMIN — PROPOFOL 15 MCG/KG/MIN: 10 INJECTION, EMULSION INTRAVENOUS at 05:23

## 2020-04-09 RX ADMIN — DEXTROSE AND SODIUM CHLORIDE: 5; 450 INJECTION, SOLUTION INTRAVENOUS at 23:03

## 2020-04-09 RX ADMIN — ENOXAPARIN SODIUM 90 MG: 100 INJECTION SUBCUTANEOUS at 20:00

## 2020-04-09 RX ADMIN — Medication 10 MEQ: at 12:22

## 2020-04-09 RX ADMIN — SODIUM CHLORIDE: 4.5 INJECTION, SOLUTION INTRAVENOUS at 01:18

## 2020-04-09 RX ADMIN — LEVOTHYROXINE SODIUM ANHYDROUS 50 MCG: 100 INJECTION, POWDER, LYOPHILIZED, FOR SOLUTION INTRAVENOUS at 08:16

## 2020-04-09 RX ADMIN — DEXTROSE AND SODIUM CHLORIDE: 5; 450 INJECTION, SOLUTION INTRAVENOUS at 13:18

## 2020-04-09 RX ADMIN — DEXTROSE MONOHYDRATE 25 ML: 500 INJECTION PARENTERAL at 04:58

## 2020-04-09 RX ADMIN — Medication 10 MEQ: at 07:00

## 2020-04-09 RX ADMIN — DEXTROSE MONOHYDRATE 25 ML: 500 INJECTION PARENTERAL at 07:46

## 2020-04-09 RX ADMIN — DEXTROSE MONOHYDRATE 25 ML: 500 INJECTION PARENTERAL at 11:51

## 2020-04-09 RX ADMIN — PANTOPRAZOLE SODIUM 40 MG: 40 INJECTION, POWDER, LYOPHILIZED, FOR SOLUTION INTRAVENOUS at 14:15

## 2020-04-09 RX ADMIN — Medication 10 MEQ: at 09:33

## 2020-04-09 RX ADMIN — ACYCLOVIR SODIUM 600 MG: 50 INJECTION, SOLUTION INTRAVENOUS at 10:51

## 2020-04-09 ASSESSMENT — ACTIVITIES OF DAILY LIVING (ADL)
BATHING: 4-->COMPLETELY DEPENDENT
NUMBER_OF_TIMES_PATIENT_HAS_FALLEN_WITHIN_LAST_SIX_MONTHS: 2
DRESS: 4-->COMPLETELY DEPENDENT
ADLS_ACUITY_SCORE: 30
SWALLOWING: 0-->SWALLOWS FOODS/LIQUIDS WITHOUT DIFFICULTY
TRANSFERRING: 4-->COMPLETELY DEPENDENT
ADLS_ACUITY_SCORE: 39
ADLS_ACUITY_SCORE: 30
TOILETING: 4-->COMPLETELY DEPENDENT
FALL_HISTORY_WITHIN_LAST_SIX_MONTHS: YES
ADLS_ACUITY_SCORE: 39
AMBULATION: 4-->COMPLETELY DEPENDENT
RETIRED_EATING: 3-->ASSISTIVE EQUIPMENT AND PERSON
COGNITION: 1 - ATTENTION OR MEMORY DEFICITS
ADLS_ACUITY_SCORE: 26
RETIRED_COMMUNICATION: 0-->UNDERSTANDS/COMMUNICATES WITHOUT DIFFICULTY
ADLS_ACUITY_SCORE: 30

## 2020-04-09 NOTE — PROGRESS NOTES
SPIRITUAL HE[facility] SERVICES  [facility] -     Referral Source: Pt's niece, Crystal, requested prayer    - I read over notes related to Concha's admission and the reason for her stay.    - I read over notes from previous admissions where she had asked for a  visit to determine what her needs and requests were at those times.    - I offered prayer for Concha - for health, recovery, inner calm and God's strength during this difficult time for her.  Prayer also offered for Concha's family and for staff as they provide care.    Plan:  will monitor patient's ongoing need for support during LOS.      Ernesto Hernandez M.A., [facility]  Staff   [facility]  Office: [phone]  Cell: [phone]  Pager [phone]

## 2020-04-09 NOTE — PROVIDER NOTIFICATION
Pt is currently weaning on PS 10, +5. Pt tolerates wean.  Plan wean into next shift at 0700 per MD.

## 2020-04-09 NOTE — PROGRESS NOTES
Weaning trial started 0635. Patient opens eyes but does not follow commands.   Marva Kingston RN on 4/9/2020 at 6:54 AM

## 2020-04-09 NOTE — PLAN OF CARE
Patient was intermittently restless/agitated on the propofol drip. Did not follow commands when awake. HR in 40s-50s, discussed PRN atropine w/[place] but advised not to give. Repositioned for comfort, dilaudid given for pain. Blood glucose at 0415 this Am read 59 but did not result in chart-25mL d50% given after that reading. Re-check 137. *official lab reading at 0515 66*  Marva Kingston RN on 4/9/2020 at 5:18 AM

## 2020-04-09 NOTE — PROGRESS NOTES
[facility] Hospitalist Service      Subjective:  unable    Review of Systems:  unable    Physical Exam:  Vitals Were Reviewed    Patient Vitals for the past 16 hrs:   BP Temp Temp src Pulse Heart Rate Resp SpO2   04/09/20 0545 92/48 -- -- (!) 42 (!) 42 14 100 %   04/09/20 0530 (!) 89/48 -- -- (!) 42 (!) 41 13 97 %   04/09/20 0515 (!) 83/44 -- -- (!) 41 (!) 41 13 98 %   04/09/20 0500 91/46 -- -- (!) 44 (!) 44 13 97 %   04/09/20 0445 96/47 -- -- (!) 43 (!) 44 13 96 %   04/09/20 0430 102/80 -- -- (!) 44 (!) 44 13 93 %   04/09/20 0415 95/52 -- -- (!) 46 (!) 43 14 100 %   04/09/20 0400 105/53 98.4  F (36.9  C) Axillary (!) 45 (!) 45 13 100 %   04/09/20 0345 108/50 -- -- (!) 45 (!) 45 11 100 %   04/09/20 0330 106/52 -- -- (!) 49 (!) 46 11 100 %   04/09/20 0315 101/52 -- -- (!) 45 (!) 42 13 100 %   04/09/20 0300 106/56 -- -- (!) 41 (!) 42 15 100 %   04/09/20 0245 91/47 -- -- (!) 42 (!) 42 13 100 %   04/09/20 0230 (!) 89/46 -- -- (!) 41 (!) 41 13 100 %   04/09/20 0215 104/53 -- -- (!) 43 (!) 43 14 100 %   04/09/20 0200 98/47 -- -- (!) 43 (!) 44 14 100 %   04/09/20 0145 106/50 -- -- (!) 44 (!) 44 14 100 %   04/09/20 0130 94/47 -- -- (!) 43 (!) 43 13 100 %   04/09/20 0115 (!) 88/42 -- -- (!) 41 (!) 44 16 100 %   04/09/20 0100 105/49 -- -- (!) 49 (!) 49 14 100 %   04/09/20 0045 107/54 -- -- (!) 48 (!) 49 13 100 %   04/09/20 0030 108/47 -- -- (!) 48 51 13 100 %   04/09/20 0015 120/58 -- -- 50 52 11 100 %   04/09/20 0000 117/54 98.6  F (37  C) Axillary 55 63 14 96 %   04/08/20 2345 110/52 -- -- (!) 46 (!) 49 12 100 %   04/08/20 2330 (!) 87/43 -- -- (!) 42 (!) 43 13 100 %   04/08/20 2315 (!) 89/43 -- -- (!) 42 (!) 43 14 100 %   04/08/20 2300 (!) 87/42 -- -- (!) 44 (!) 45 14 100 %   04/08/20 2200 102/46 -- -- (!) 48 (!) 48 14 100 %   04/08/20 2147 -- -- -- -- -- 14 --   04/08/20 2100 135/68 -- -- 55 53 8 100 %   04/08/20 2000 135/61 98.2  F (36.8  C) Axillary (!) 46 (!) 45 13 100 %   04/08/20 1900 (!) 140/64 -- -- 51 51 14 100  %   04/08/20 1800 (!) 150/63 -- -- 50 (!) 49 13 100 %   04/08/20 1700 (!) 144/61 -- -- 50 51 14 99 %   04/08/20 1619 -- -- -- -- 51 13 99 %   04/08/20 1600 (!) 145/63 98.6  F (37  C) Axillary -- 51 19 99 %   04/08/20 1530 120/71 -- -- -- 61 8 100 %         Intake/Output Summary (Last 24 hours) at 4/9/2020 0612  Last data filed at 4/9/2020 0500  Gross per 24 hour   Intake 3182.6 ml   Output 1000 ml   Net 2182.6 ml       GENERAL APPEARANCE: currently responds to pain, moves all extremities purposefully, no response to voice  EYES: conjunctiva clear, eyes grossly normal  RESP: lungs clear to auscultation - no rales, rhonchi or wheezes  CV: regular rate and rhythm, normal S1 S2, no S3 or S4 and no murmur, click or rub   ABDOMEN: soft, nontender, no HSM or masses and bowel sounds normal  MS: no clubbing, cyanosis; no edema  SKIN: clear without significant rashes or lesions  NEURO: as above, currently have just started pressure support trial and complete propofol wean.    Lab:  Recent Labs   Lab Test 04/09/20 0445 04/08/20 0443   * 150*   POTASSIUM 3.3* 3.8   CHLORIDE 117* 118*   CO2 24 25   ANIONGAP 6 7   GLC 66* 108*   BUN 29 35*   CR 1.86* 2.14*   DWIGHT 7.4* 8.4*     CBC RESULTS:   Recent Labs   Lab Test 04/09/20 0445 04/08/20  0443   WBC 3.7* 4.5   RBC 3.11* 3.68*   HGB 9.4* 11.2*   HCT 29.8* 35.4   * 145*       Results for orders placed or performed during the hospital encounter of 04/06/20 (from the past 24 hour(s))   Palliative Care Adult IP Consult: goals of care; Consultant may enter orders: Yes; Patient to be seen: Routine - within 24 hours; Requesting provider? Hospitalist (if different from attending physician)    Narrative    Sondra Nair, APRN CNP     4/8/2020  1:43 PM  [facility]    Palliative Care Consultation--Inpatient  Admission Date: 4/6/2020   Visit Date: April 8, 2020  PCP: Sandra Medina   Requested by: Vinicio Desir      HISTORY of PRESENT ILLNESS:  Stefanie Velazquez is a  75 year old year old female with a history of   multiple medical problems including DM-II, RLS, bradycardia, b/l   recurrent LE DVTs, RC, ischemic CAD, obesity, among others.  She   was admitted with altered mental status that was first manifest   on Sunday 4/5.  The symptoms gradually worsened with the   emergence of paranoia and fear of being poisoned so she presented   to the ED on 4/6. On 4/7, upon admission to the nursing unit, she   was very agitated, non redirectable and was given 10 mg   Olanzapine and 2.5 mg Haldol.  She became increasingly agitated,   constantly in motion.  Precedex was started, but respirations   were sonorous and rapid and she became tachycardic.  After   consultation with ninfa Rojas, the decision was made to intubate   Concha on 4/7, for the purpose of safeguarding her airway.  She   currently is sedated on low dose Propofol (5 mcg/kg), but has   demonstrted myoclonic jerking and agitation when the sedation is   lightened.  Throughout this period of time, a cause for thie   agitation has not been found.  Therefore, she was referred to   Palliative Care for exploration of goals of care..      ASSESSMENT & PLAN:    Agitated delirium, acute onset, unknown etiology   - see Goals of Care  - alternatives to Propofol:   - Geodon 20 mg IM q4h PRN, or    - Geodon 20-40 mg PO q4h (or per rectum), or   - Thorazine up to 800 mg/day; I would start at 100 mg q6h if the   goal is sedation   - Ativan could be added as an adjunct; 0.5 - 1 mg q4h PRN    Advance Care Planning:  - Understanding of condition:  n/a  - Decisional capacity:  Lacking; she has a health care directive   dateed 8/17/17.  The first health care agent is no longer   involved, and the second agent is her niece Lori.    - Code status:  Full Code  - Health Care Directive: Yes, If Yes, is there a copy in the EMR?     Yes  - POLST:  No    Goals of Care:  - per ninfa Rojas, who discussed this with Lori (who is the   health care agent), if  "Concha should require CPR or re-intubation,   they believe Concha would want it as, they explain, she has a strong   will to live and she was perfectly lucid as recently as 4/4  - per her health care directive, she would want to be removed   from the vent at the 2-week radha, certainly before a trach would   be required; Crystal and Lori are going to further discuss this   further  - health care directive also specifies \"no feeding tube\"         Thank you for the opportunity to be of service to this patient   and family.      Maldonado Valiente) AMY Nair  Palliative Care  Private cell:  [phone]     Face to face:   1115 - 1140, 25 min  Non face to face:   1155 - 1245, 50 min, > 50% spent reviewing   history and discussing goals of care via phone with ninfa Rojas.       = = = = = = = = = = = = = = = =      PROBLEM LIST  Patient Active Problem List   Diagnosis   ?  Hypothyroidism   ?  bmi 40   ?  Chronic ischemic heart disease   ?  Generalized osteoarthrosis, unspecified site   ?  HEARING LOSS CONDUCTIVE, COMBINED TYPE   ?  Esophageal reflux   ?  Osteoporosis   ?  RC-moderate (AHI 12, LSat 60%); REM RDI-73   ?  Neuropathy (H)   ?  Hyperlipidemia LDL goal <100   ?  Rectal cancer- newly diagnosed adenoCA rectum Jan 2011 and   Positive Tubular Adenoma 2019   ?  Anemia   ?  Radiation therapy complication   ?  Vitamin B 12 deficiency   ?  Vitamin D deficiency   ?  Dysuria   ?  Bowel and bladder incontinence   ?  Benign essential hypertension   ?  Health Care Home   ?  Chronic diarrhea   ?  Restless leg syndrome   ?  Type 2 diabetes mellitus with diabetic nephropathy, with   long-term current use of insulin (H)   ?  Spinal stenosis of lumbar region without neurogenic   claudication   ?  DDD (degenerative disc disease), lumbar   ?  Lymphedema of genitalia   ?  Oropharyngeal dysphagia   ?  Bilateral hearing loss, unspecified hearing loss type   ?  Lumbar radiculopathy   ?  CKD (chronic kidney disease) stage 4, GFR 15-29 ml/min (H) "   ?  Impacted cerumen of right ear   ?  H/O deep venous thrombosis   ?  Symptomatic bradycardia   ?  Syncope   ?  Syncope and collapse   ?  Altered mental status   ?  Acute metabolic encephalopathy       PAST MEDICAL HISTORY  Past Medical History:   Diagnosis Date   ?  Acute deep vein thrombosis (DVT) of right lower extremity,   unspecified vein (H) 10/31/2018   ?  Acute myocardial infarction of other specified sites, episode   of care unspecified 6/2002    MI 2 stints   ?  Cancer of colon (H)    ?  Cellulitis of lower extremity, bilateral 9/30/2016   ?  Chronic ischemic heart disease, unspecified 6/22/2003    cabgx3 , 2002 2/05 adenosine ef70%   ?  Coronary atherosclerosis of unspecified type of vessel, native   or graft     Coronary artery disease   ?  Diabetes mellitus (H)    ?  Esophageal reflux    ?  Fracture of femur, distal, left, closed (H) 2/11/2013   ?  Gastro-oesophageal reflux disease    ?  Generalized osteoarthrosis, unspecified site 6/22/2005   ?  Generalized osteoarthrosis, unspecified site 6/22/2005   ?  HEARING LOSS CONDUCTIVE, COMBINED TYPE 6/22/2005    [place] measles as child   ?  HYPOTHYROIDISM  6/22/2003   ?  Mixed hyperlipidemia 6/22/2005   ?  Obesity, unspecified 6/22/2005   ?  RC-moderate (AHI 12, LSat 60%); REM RDI-73 6/1/2009    Sleep study [facility] was performed 5/26/09 in order to   re-evaluate severity of RC. The total sleep time was 412.0   minutes. The sleep latency was decreased at 8.7 minutes with   Ambien 10mg. The REM sleep latency was 426.0 minutes. Sleep   efficiency was reduced at 79.0%. The sleep architecture was   disrupted with frequent sleep stage changes and arousals.    Snoring:  loud. Respiratory Events:   RDI o   ?  Recurrent acute deep vein thrombosis (DVT) of both lower   extremities (H) 3/31/2019   ?  [place]     childhood; with resultant hearing loss   ?  Stented coronary artery    ?  Type II or unspecified type diabetes mellitus with renal   manifestations,  uncontrolled(250.42) (H) 6/22/2005   ?  Type II or unspecified type diabetes mellitus with renal   manifestations, uncontrolled(250.42) (H) 6/22/2005   ?  Unspecified disorder resulting from impaired renal function   6/22/2005    CR     1.82   06/21/2005   ?  Unspecified essential hypertension        PAST SURGICAL HISTORY  Past Surgical History:   Procedure Laterality Date   ?  cabg     ?  COLECTOMY LOW ANTERIOR  6/21/2011    Procedure:COLECTOMY LOW ANTERIOR; with loop ielostomy;   Surgeon:ROBBIN MCDONNELL; Location:[facility] OR   ?  COLONOSCOPY  1/26/2011    COLON[facility]COPY performed by MASOUD BILL at [facility]   ?  COLONOSCOPY N/A 3/1/2016    Procedure: COLONOSCOPY;  Surgeon: Liza Neal MD;  Location: [facility]   ?  INSERT PORT VASCULAR ACCESS  3/2/2011    INSERT PORT VASCULAR ACCESS performed by LIZA NEAL at [facility]   ?  OPEN REDUCTION INTERNAL FIXATION FEMUR DISTAL  2/12/2013    Procedure: OPEN REDUCTION INTERNAL FIXATION FEMUR DISTAL;  Open   reduction internal fixation left femur fracture--Anesth.Choice;    Surgeon: Ley, Jeffrey Duane, MD;  Location: [facility]   ?  ORTHOPEDIC SURGERY     ?  PHACOEMULSIFICATION WITH STANDARD INTRAOCULAR LENS IMPLANT   Left 1/22/2018    Procedure: PHACOEMULSIFICATION WITH STANDARD INTRAOCULAR LENS   IMPLANT;  Left cataract removal with implant;  Surgeon: Rony Key MD;  [facility]   ?  PHACOEMULSIFICATION WITH STANDARD INTRAOCULAR LENS IMPLANT   Right 2/19/2018    Procedure: PHACOEMULSIFICATION WITH STANDARD INTRAOCULAR LENS   IMPLANT;  Right cataract removal with implant;  Surgeon: Rony Key MD;  Location: [facility]   ?  SIGMOIDOSCOPY FLEXIBLE  9/9/2011    Procedure:SIGMOIDOSCOPY FLEXIBLE; Performed prior to induction.;   Surgeon:ROBBIN MCDONNELL; Location: OR   ?  SURGICAL HISTORY OF -   10/24/2002    Heart bypass   ?  SURGICAL HISTORY OF -   2002    R Knee arthroplasty   ?  SURGICAL HISTORY OF -   2002    Mi 2 stints   ?   TAKEDOWN ILEOSTOMY  9/9/2011    Procedure:TAKEDOWN ILEOSTOMY; Exploratory Laparotomy,  Loop   Ileostomy Takedown, Small Bowel Resection x 2.; Surgeon:ROBBIN MCDONNELL; Location: OR       FAMILY MEDICAL HISTORY  Family History   Problem Relation Age of Onset   ?  Diabetes Sister    ?  C.A.D. Sister    ?  Breast Cancer Sister        SOCIAL HISTORY  History   Smoking Status   ?  Former Smoker   Smokeless Tobacco   ?  Never Used     Social History    Substance and Sexual Activity      Alcohol use: Yes        Comment: rare social use    History   Drug Use No     Social History     Social History Narrative   ?  Not on file       SPIRITUAL HISTORY  Not known; patient sedated     ALLERGIES  Allergies   Allergen Reactions   ?  Hydrocodone Other (See Comments)     dizzy   ?  Lisinopril Cough          ?  Vicodin [Hydrocodone-Acetaminophen] Other (See Comments)     Caused extreme dizziness       MEDICATIONS  Medications Prior to Admission  Current Facility-Administered Medications   Medication   ?  acetaminophen (TYLENOL) tablet 650 mg   ?  acyclovir (ZOVIRAX) 450 mg in D5W 100 mL intermittent infusion     ?  ampicillin (OMNIPEN) 2 g vial to attach to  ml bag   ?  atropine injection 0.5 mg   ?  benztropine (COGENTIN) tablet 1-2 mg   ?  cefTRIAXone IN D5W (ROCEPHIN) intermittent infusion 2 g   ?  Contraindications to both pharmacological and mechanical   prophylaxis (must document contraindications for both in this   order)   ?  glucose gel 15-30 g    Or   ?  dextrose 50 % injection 25-50 mL    Or   ?  glucagon injection 1 mg   ?  diclofenac (VOLTAREN) EC tablet 50 mg   ?  insulin aspart (NovoLOG) injection (RAPID ACTING)   ?  insulin aspart (NovoLOG) injection (RAPID ACTING)   ?  insulin glargine (LANTUS PEN) injection 24 Units   ?  levothyroxine (SYNTHROID) injection 50 mcg   ?  lidocaine (LMX4) kit   ?  lidocaine (LMX4) kit   ?  lidocaine 1 % 0.1-1 mL   ?  lidocaine 1 % 0.1-1 mL   ?  LORazepam (ATIVAN) injection 0.5-1  mg   ?  melatonin tablet 1 mg   ?  naloxone (NARCAN) injection 0.1-0.4 mg   ?  naloxone (NARCAN) injection 0.1-0.4 mg   ?  norepinephrine (LEVOPHED) 16 mg in sodium chloride 0.9 % 250   mL infusion   ?  ondansetron (ZOFRAN) injection 4 mg   ?  pantoprazole (PROTONIX) 40 mg IV push injection   ?  polyethylene glycol (MIRALAX) Packet 17 g   ?  prochlorperazine (COMPAZINE) injection 5 mg   ?  propofol (DIPRIVAN) infusion    And   ?  propofol (DIPRIVAN) infusion 20-30 mg   ?  sodium chloride (PF) 0.9% PF flush 3 mL   ?  sodium chloride (PF) 0.9% PF flush 3 mL   ?  sodium chloride 0.45% infusion (premixed bag)   ?  vancomycin (VANCOCIN) 2,000 mg in sodium chloride 0.9 % 500 mL   intermittent infusion       Current Medications  ?  acyclovir (ZOVIRAX) IV  10 mg/kg (Ideal) Intravenous Q24H   ?  ampicillin  2 g Intravenous Q8H   ?  cefTRIAXone  2 g Intravenous Q12H   ?  insulin aspart  14 Units Subcutaneous BID w/meals   ?  insulin aspart  1-10 Units Subcutaneous Q4H   ?  insulin glargine  24 Units Subcutaneous At Bedtime   ?  levothyroxine  50 mcg Intravenous Daily   ?  pantoprazole (PROTONIX) IV  40 mg Intravenous Q24H   ?  sodium chloride (PF)  3 mL Intracatheter Q8H   ?  vancomycin (VANCOCIN) IV  2,000 mg Intravenous Q48H       PRN Medications  acetaminophen, atropine, benztropine, - MEDICATION INSTRUCTIONS   -, glucose **OR** dextrose **OR** glucagon, diclofenac, lidocaine   4%, lidocaine 4%, lidocaine (buffered or not buffered), lidocaine   (buffered or not buffered), LORazepam, melatonin, naloxone,   naloxone, [DISCONTINUED] ondansetron **OR** ondansetron,   polyethylene glycol, prochlorperazine **OR** [DISCONTINUED]   prochlorperazine **OR** [DISCONTINUED] prochlorperazine, propofol   (DIPRIVAN) infusion **AND** propofol **AND** CK total **AND**   Triglycerides, sodium chloride (PF)      REVIEW OF SYSTEMS  Patient unable due to sedation/Propofol.  Per chart review,   patient is able to transfer from bed to wheelchair  and all   mobility is per w/c.  At baseline, she is incontinent of bowel   and bladder.  Has b/l S/N hearing loss due to a remote measles   infection as a child.  She has a h/o neuropathy but it's not   clear if that is 2o diabetes or chemotherapy given for rectal   cancer in 2011.  She was recently (9/19) treated for lymphedema.    She has a h/o intertriginous groin candida infection.  She was   treated with Cipro for a UTI just prior to admission.      Performance Assessment:    Palliative Performance Scale, v.2     10%  Extensive disease. Bedbound & requires total care. No   oral intake. Drowsy or comatose or confused.        PHYSICAL EXAMINATION  Patient Vitals for the past 24 hrs:   BP Temp Temp src Pulse Heart Rate Resp SpO2 Weight   04/08/20 1100 106/46 ?-- ?-- (!) 47 51 14 100 % ?--   04/08/20 1000 123/50 ?-- ?-- 53 52 16 100 % ?--   04/08/20 0945 125/57 ?-- ?-- 63 61 17 100 % ?--   04/08/20 0930 107/47 ?-- ?-- (!) 47 (!) 45 14 100 % ?--   04/08/20 0915 110/47 ?-- ?-- (!) 46 (!) 46 14 100 % ?--   04/08/20 0900 103/48 ?-- ?-- (!) 46 (!) 47 13 100 % ?--   04/08/20 0845 101/46 ?-- ?-- (!) 44 (!) 48 13 100 % ?--   04/08/20 0830 (!) 89/43 ?-- ?-- (!) 48 (!) 46 13 100 % ?--   04/08/20 0815 102/45 ?-- ?-- (!) 46 (!) 47 11 100 % ?--   04/08/20 0800 101/51 ?-- Axillary (!) 47 (!) 46 14 100 % ?--   04/08/20 0745 95/50 ?-- ?-- (!) 46 (!) 44 13 100 % ?--   04/08/20 0730 102/47 ?-- ?-- (!) 48 (!) 47 13 100 % ?--   04/08/20 0715 91/43 ?-- ?-- (!) 44 (!) 45 13 100 % ?--   04/08/20 0700 99/48 ?-- ?-- (!) 48 (!) 46 13 100 % ?--   04/08/20 0645 95/48 ?-- ?-- (!) 46 (!) 48 13 99 % ?--   04/08/20 0630 91/47 ?-- ?-- 52 50 13 99 % ?--   04/08/20 0615 95/45 ?-- ?-- 50 53 13 99 % ?--   04/08/20 0600 106/48 ?-- ?-- 51 51 14 99 % ?--   04/08/20 0545 131/60 ?-- ?-- 55 53 13 99 % ?--   04/08/20 0530 133/61 ?-- ?-- 59 61 17 99 % ?--   04/08/20 0516 ?-- ?-- ?-- ?-- ?-- ?-- ?-- 86.9 kg (191 lb 9.3 oz)   04/08/20 0515 136/60 ?-- ?-- 66 68  15 96 % ?--   04/08/20 0500 (!) 148/71 ?-- ?-- 61 65 13 96 % ?--   04/08/20 0454 ?-- ?-- ?-- ?-- 66 ?-- ?-- ?--   04/08/20 0445 128/76 ?-- ?-- 59 56 13 100 % ?--   04/08/20 0435 ?-- ?-- ?-- ?-- 61 ?-- 99 % ?--   04/08/20 0430 (!) 152/63 ?-- ?-- 60 61 19 98 % ?--   04/08/20 0415 131/65 ?-- ?-- 55 53 12 100 % ?--   04/08/20 0400 (!) 144/61 ?-- ?-- (!) 48 (!) 46 13 100 % ?--   04/08/20 0345 (!) 126/91 ?-- ?-- 58 51 14 99 % ?--   04/08/20 0330 112/53 ?-- ?-- (!) 42 (!) 40 13 100 % ?--   04/08/20 0315 105/54 ?-- ?-- (!) 44 (!) 44 13 100 % ?--   04/08/20 0300 112/52 ?-- ?-- (!) 41 (!) 44 13 99 % ?--   04/08/20 0245 101/48 ?-- ?-- (!) 44 (!) 45 14 100 % ?--   04/08/20 0230 99/47 ?-- ?-- (!) 46 (!) 45 14 100 % ?--   04/08/20 0215 105/52 ?-- ?-- (!) 47 50 14 100 % ?--   04/08/20 0200 105/51 ?-- ?-- 50 (!) 48 14 100 % ?--   04/08/20 0130 96/45 ?-- ?-- (!) 43 (!) 49 14 100 % ?--   04/08/20 0115 113/55 ?-- ?-- (!) 48 (!) 46 14 100 % ?--   04/08/20 0100 114/49 ?-- ?-- (!) 41 (!) 43 13 100 % ?--   04/08/20 0045 108/51 ?-- ?-- (!) 47 (!) 45 13 100 % ?--   04/08/20 0030 116/50 ?-- ?-- (!) 47 (!) 45 13 100 % ?--   04/08/20 0015 116/53 ?-- ?-- (!) 47 (!) 44 13 100 % ?--   04/08/20 0000 102/46 97.9  F (36.6  C) Oral (!) 45 (!) 45 14 100   % ?--   04/07/20 2357 102/46 ?-- ?-- (!) 46 (!) 45 14 100 % ?--   04/07/20 2345 97/47 ?-- ?-- (!) 46 (!) 46 13 100 % ?--   04/07/20 2330 112/50 ?-- ?-- (!) 42 (!) 44 13 100 % ?--   04/07/20 2314 ?-- ?-- ?-- ?-- ?-- ?-- 100 % ?--   04/07/20 2245 99/44 ?-- ?-- (!) 44 (!) 45 13 100 % ?--   04/07/20 2230 96/44 ?-- ?-- (!) 46 (!) 46 14 100 % ?--   04/07/20 2215 104/48 ?-- ?-- 53 52 14 100 % ?--   04/07/20 2200 134/60 ?-- ?-- 61 60 14 100 % ?--   04/07/20 2145 (!) 150/78 ?-- ?-- 73 64 11 99 % ?--   04/07/20 2130 (!) 135/115 ?-- ?-- 73 53 12 100 % ?--   04/07/20 2115 127/54 ?-- ?-- (!) 48 (!) 48 11 100 % ?--   04/07/20 2100 119/58 ?-- ?-- (!) 49 (!) 47 9 100 % ?--   04/07/20 2045 124/56 ?-- ?-- (!) 48 (!)  47 9 100 % ?--   04/07/20 2030 107/50 ?-- ?-- (!) 45 (!) 45 14 100 % ?--   04/07/20 2015 103/48 ?-- ?-- (!) 46 (!) 47 13 100 % ?--   04/07/20 2000 108/50 ?-- ?-- (!) 47 (!) 49 14 100 % ?--   04/07/20 1945 111/45 ?-- ?-- (!) 48 (!) 48 13 100 % ?--   04/07/20 1930 118/52 ?-- ?-- (!) 49 (!) 48 18 100 % ?--   04/07/20 1845 112/47 ?-- ?-- (!) 47 (!) 48 14 100 % ?--   04/07/20 1830 109/46 ?-- ?-- (!) 48 (!) 48 13 100 % ?--   04/07/20 1815 110/51 ?-- ?-- 50 (!) 49 14 100 % ?--   04/07/20 1800 (!) 181/72 ?-- ?-- 59 57 13 100 % ?--   04/07/20 1745 (!) 161/77 ?-- ?-- (!) 47 52 27 100 % ?--   04/07/20 1730 (!) 86/39 ?-- ?-- 55 54 13 100 % ?--   04/07/20 1720 96/46 ?-- ?-- ?-- 57 14 100 % ?--   04/07/20 1715 96/46 ?-- ?-- 59 61 14 100 % ?--   04/07/20 1710 98/47 ?-- ?-- ?-- (!) 46 13 99 % ?--   04/07/20 1700 (!) 85/42 ?-- ?-- (!) 49 (!) 46 14 99 % ?--   04/07/20 1645 (!) 75/39 ?-- ?-- (!) 45 (!) 47 13 99 % ?--   04/07/20 1630 (!) 68/33 ?-- ?-- (!) 47 (!) 49 13 98 % ?--   04/07/20 1615 (!) 66/34 ?-- ?-- 54 55 13 98 % ?--   04/07/20 1600 98/49 97.9  F (36.6  C) Oral 61 52 14 98 % ?--   04/07/20 1556 ?-- ?-- ?-- ?-- 57 ?-- ?-- ?--   04/07/20 1545 97/45 ?-- ?-- 64 63 14 97 % ?--   04/07/20 1530 100/41 ?-- ?-- 61 63 13 98 % ?--   04/07/20 1515 (!) 91/39 ?-- ?-- 62 63 14 99 % ?--   04/07/20 1500 (!) 92/38 ?-- ?-- 63 61 14 98 % ?--   04/07/20 1445 (!) 80/38 ?-- ?-- 65 66 13 98 % ?--   04/07/20 1430 (!) 91/38 ?-- ?-- 63 64 13 98 % ?--   04/07/20 1415 (!) 88/38 ?-- ?-- 68 66 14 96 % ?--   04/07/20 1400 (!) 83/39 ?-- ?-- 70 67 14 98 % ?--   04/07/20 1345 93/41 ?-- ?-- 65 65 13 98 % ?--   04/07/20 1330 (!) 91/39 ?-- ?-- 60 61 14 98 % ?--   04/07/20 1315 (!) 73/35 ?-- ?-- 60 61 13 98 % ?--   04/07/20 1300 (!) 81/34 ?-- ?-- 62 63 16 97 % ?--   04/07/20 1245 (!) 73/37 ?-- ?-- 58 62 15 97 % ?--   04/07/20 1230 (!) 66/29 ?-- ?-- 60 63 14 98 % ?--   04/07/20 1215 (!) 74/36 ?-- ?-- 64 64 21 98 % ?--   04/07/20 1200 (!) 80/45 99  F (37.2  C) Oral  69 67 14 97 % ?--   04/07/20 1154 ?-- ?-- ?-- ?-- 73 ?-- ?-- ?--   04/07/20 1145 90/61 ?-- ?-- 72 73 13 95 % ?--   04/07/20 1130 (!) 79/50 ?-- ?-- 70 75 20 96 % ?--   04/07/20 1115 (!) 76/43 ?-- ?-- 80 78 18 97 % ?--      Wt Readings from Last 5 Encounters:   04/08/20 86.9 kg (191 lb 9.3 oz)   03/16/20 89.8 kg (198 lb)   01/03/20 91.2 kg (201 lb)   11/08/19 91.2 kg (201 lb)   09/27/19 92.7 kg (204 lb 6.4 oz)     Constitutional:  Obese, intubated, sedated lightly and does stir   a bit during the exam  Eyes:  Anicteric, PERRL, pupils miotic  HEENT:  Neck supple, unable to examine mouth due to intubation  Lymph/Hematologic:  No epitrochlear, axillary, anterior or   posterior cervical, or supraclavicular lymphadenopathy is   appreciated  Cardiovascular:  Irreg irreg w/o m/r/g, tachycardic, radial   pulses palpable, DP  nonpalpable  Respiratory:  Clear  GI: Soft, non-tender, hypoactive bowel sounds, no   hepatosplenomegaly  Genitourinary:  boone  Musculoskeletal:  Moves limbs, neck for a moment during exam  Skin:  Warm, dry, multiple bruises on forearms  Neurological:  Unable   Psych:  Minimally responsive    Intake/Output Summary (Last 24 hours) at 4/8/2020 1238  Last data filed at 4/8/2020 1200  Gross per 24 hour   Intake 4611.56 ml   Output 1225 ml   Net 3386.56 ml          DATA  ROUTINE ICU LABS (Last four results)  CMP  Recent Labs   Lab 04/08/20  0443 04/07/20  1213 04/07/20  0440 04/06/20 2028   * 150* 151* 148*   POTASSIUM 3.8 4.3 3.6 4.4   CHLORIDE 118* 120* 119* 118*   CO2 25 25 20 22   ANIONGAP 7 5 12 8   * 117* 128* 121*   BUN 35* 38* 38* 39*   CR 2.14* 2.32* 2.05* 2.05*   GFRESTIMATED 22* 20* 23* 23*   GFRESTBLACK 25* 23* 26* 26*   DWIGHT 8.4* 8.3* 9.0 10.2*   PROTTOTAL 5.3*  --  7.0 7.7   ALBUMIN 2.3*  --  3.3* 3.3*   BILITOTAL 0.8  --  1.1 0.8   ALKPHOS 73  --  100 107   AST 97*  --  53* 38   ALT 28  --  26 26     CBC  Recent Labs   Lab 04/08/20  0443 04/07/20  0440 04/06/20 2028   WBC 4.5 7.6  5.9   RBC 3.68* 4.52 4.87   HGB 11.2* 13.9 14.6   HCT 35.4 42.1 45.5   MCV 96 93 93   MCH 30.4 30.8 30.0   MCHC 31.6 33.0 32.1   RDW 13.5 13.2 13.5   * 203 251     INRNo lab results found in last 7 days.  Arterial Blood Gas  Recent Labs   Lab 04/08/20  0810 04/07/20  1213 04/07/20  1015 04/07/20  0734   O2PER 30% 35% 35% 35%         Recent Results (from the past 48 hour(s))   CT Head w/o Contrast    Narrative    EXAM: CT HEAD W/O CONTRAST  LOCATION: [facility]  DATE/TIME: 4/6/2020 8:40 PM    INDICATION: Confusion.  COMPARISON: Head CT 05/21/2019.  TECHNIQUE: Routine without IV contrast. Multiplanar reformats.   Dose reduction techniques were used.    FINDINGS:  INTRACRANIAL CONTENTS: No evidence of acute intracranial   hemorrhage. No mass effect or midline shift. Extensive   periventricular white matter hypodensities which are nonspecific,   but likely related to chronic microvascular ischemic disease.   Chronic area   of encephalomalacia in the right frontal white matter. Moderate   diffuse parenchymal volume loss. Ventricular size is unchanged   without evidence of hydrocephalus.    VISUALIZED ORBITS/SINUSES/MASTOIDS: No intraorbital abnormality.   No paranasal sinus mucosal disease. No middle ear or mastoid   effusion.    BONES/SOFT TISSUES: No acute abnormality.      Impression    IMPRESSION:  1.  No evidence of acute hemorrhage, mass, or herniation.  2.  Marked diffuse parenchymal volume loss and extensive white   matter changes likely due to chronic microvascular ischemic   disease.   Chest XR,  PA & LAT    Narrative    CHEST TWO VIEWS     4/6/2020 8:59 PM     HISTORY: Confusion.    COMPARISON: 5/21/2019.      Impression    IMPRESSION: Stable right chest port venous catheter and   sternotomy.  Cardiac silhouette within normal limits. No focal airspace   disease. No  effusions identified.    KIMI HINTON MD   XR Chest Port 1 View    Narrative    EXAM: CHEST SINGLE VIEW PORTABLE  LOCATION:  [facility]  DATE/TIME: 2020 6:05 AM    INDICATION: Tube placement.  COMPARISON: 2020 at 2052 hours.      Impression    IMPRESSION:   1. Interval placement of an endotracheal tube with distal tube   tip in the mid trachea, approximately 5 cm proximal to the   ivis.  2. No other significant interval change since the recent   comparison study.             XR Chest Port 1 View    Narrative    EXAM: XR CHEST PORT 1 VW  LOCATION: [facility]  DATE/TIME: 2020 6:39 AM    INDICATION: Tube placement  COMPARISON: 2020      Impression    IMPRESSION: Endotracheal tube 3.4 cm above ivis. Median   sternotomy. Right Port-A-Cath. Stable cardiomediastinal   silhouette. No focal consolidation. Trace right effusion.   Echocardiogram Complete    Narrative    [id number]  [id number]  [id number]  [id number]^WESLEY^PRINCE^LORETO           [facility]  Echocardiography Laboratory  [address] [address]        Name: SOL WORRELL  MRN: [id number]  : 1945  Study Date: 2020 08:13 AM  Age: 75 yrs  Gender: Female  Patient Location: [facility]  Reason For Study: Shock  Ordering Physician: PRINCE OLSON  Referring Physician: Sandra Bernstein  Performed By: Amaya Courtney RDCS     BSA: 1.8 m2  Height: 60 in  Weight: 191 lb  HR: 46  BP: 96/46 mmHg  __________________________________________________________________  ___________  __        Procedure  Complete Portable Echo Adult. Optison (NDC #[id number]) given   intravenously.  __________________________________________________________________  ___________  __        Interpretation Summary     The visual ejection fraction is estimated at 60-65%.  Left ventricular systolic function is normal.  The study was technically difficult.  __________________________________________________________________  ___________  __        Left Ventricle  The left ventricle is normal in size. There is mild concentric   left  ventricular  hypertrophy. Diastolic Doppler findings (E/E' ratio   and/or other  parameters) suggest left ventricular filling pressures are   increased. The  visual ejection fraction is estimated at 60-65%. Left ventricular   systolic  function is normal.     Right Ventricle  The right ventricle is normal size. The right ventricular   systolic function is  borderline reduced.     Atria  The left atrium is mildly dilated. Right atrial size is normal.   There is no  color Doppler evidence of an atrial shunt.     Mitral Valve  There is trace mitral regurgitation.        Tricuspid Valve  There is trace tricuspid regurgitation. Right ventricular   [Narrative was truncated due to length]   Blood gas venous   Result Value Ref Range    Ph Venous 7.40 7.32 - 7.43 pH    PCO2 Venous 43 40 - 50 mm Hg    PO2 Venous 38 25 - 47 mm Hg    Bicarbonate Venous 26 21 - 28 mmol/L    Base Excess Venous 1.2 mmol/L    FIO2 30%    Echocardiogram Complete    Narrative    [id number]  [id number]  [id number]  [id number]^WESLEY^PRINCE^LORETO           [facility]  Echocardiography Laboratory  [address] [place] [address]        Name: SOL WORRELL  MRN: [id number]  : 1945  Study Date: 2020 08:13 AM  Age: 75 yrs  Gender: Female  Patient Location: [facility]  Reason For Study: Shock  Ordering Physician: PRINCE OLSON  Referring Physician: Sandra Bernstein  Performed By: Amaya Courtney RDCS     BSA: 1.8 m2  Height: 60 in  Weight: 191 lb  HR: 46  BP: 96/46 mmHg  _____________________________________________________________________________  __        Procedure  Complete Portable Echo Adult. Optison (NDC #[id number]) given intravenously.  _____________________________________________________________________________  __        Interpretation Summary     The visual ejection fraction is estimated at 60-65%.  Left ventricular systolic function is normal.  The study was technically  difficult.  _____________________________________________________________________________  __        Left Ventricle  The left ventricle is normal in size. There is mild concentric left  ventricular hypertrophy. Diastolic Doppler findings (E/E' ratio and/or other  parameters) suggest left ventricular filling pressures are increased. The  visual ejection fraction is estimated at 60-65%. Left ventricular systolic  function is normal.     Right Ventricle  The right ventricle is normal size. The right ventricular systolic function is  borderline reduced.     Atria  The left atrium is mildly dilated. Right atrial size is normal. There is no  color Doppler evidence of an atrial shunt.     Mitral Valve  There is trace mitral regurgitation.        Tricuspid Valve  There is trace tricuspid regurgitation. Right ventricular systolic pressure  could not be approximated due to inadequate tricuspid regurgitation.     Aortic Valve  The aortic valve is trileaflet. There is moderate trileaflet aortic sclerosis.  There is trace aortic regurgitation. No hemodynamically significant valvular  aortic stenosis.     Pulmonic Valve  There is trace pulmonic valvular regurgitation. Normal pulmonic valve  velocity.     Vessels  The aortic root is normal size. Normal size ascending aorta. Unable to assess  mean RA pressure given the patient is on a ventilator.     Pericardium  There is no pericardial effusion.        Rhythm  The rhythm was sinus bradycardia.  _____________________________________________________________________________  __  MMode/2D Measurements & Calculations  IVSd: 1.3 cm     LVIDd: 5.0 cm  LVIDs: 3.1 cm  LVPWd: 1.1 cm  FS: 37.8 %  LV mass(C)d: 231.2 grams  LV mass(C)dI: 126.3 grams/m2  Ao root diam: 3.1 cm  LA dimension: 3.8 cm  asc Aorta Diam: 3.4 cm  LA/Ao: 1.2  LA Volume (BP): 63.0 ml  LA Volume Index (BP): 34.4 ml/m2  RWT: 0.44           Doppler Measurements & Calculations  MV E max bree: 92.6 cm/sec  MV A max bree: 78.8  cm/sec  MV E/A: 1.2  MV dec time: 0.21 sec  PA acc time: 0.16 sec  E/E' av.9  Lateral E/e': 18.0  Medial E/e': 19.8           _____________________________________________________________________________  __           Report approved by: Fuad Ramos 2020 09:49 AM      Glucose CSF:   Result Value Ref Range    Glucose CSF 58 40 - 70 mg/dL   Protein total CSF:   Result Value Ref Range    Protein Total CSF 74 (H) 15 - 60 mg/dL   Gram stain    Specimen: Cerebral spinal fluid; Cerebrospinal fluid   Result Value Ref Range    Specimen Description Cerebrospinal fluid     Gram Stain No organisms seen     Gram Stain Rare  WBC'S seen       Gram Stain       Quantification of host cells and microbiological organisms was done on a cytocentrifuged   preparation.     Cell count with differential CSF:   Result Value Ref Range    WBC CSF 2 0 - 5 /uL    RBC CSF 4 (H) 0 - 2 /uL    Tube Number 4 #    Color CSF Colorless CLRL^Colorless    Appearance CSF Clear CLER^Clear   MRI Brain w/o contrast    Addendum: 2020    Addendum: With respect to the intrinsic T1 hyperintense signal in the  bilateral globus pallidus, an additional alternative differential  consideration would include atypical MR appearance of basal ganglia  calcification (which can be physiologic, or related to endocrine  abnormalities such as parathyroid hormone abnormalities).  Clinical/laboratory correlation is suggested.    PRINCE RAY MD      Narrative    MRI BRAIN WITHOUT CONTRAST  2020 3:14 PM    HISTORY: Delirium, altered mental status, elevated creatinine.    TECHNIQUE: Multiplanar, multisequence MRI of the brain without  gadolinium IV contrast material.      COMPARISON: CT head 2020    FINDINGS: There is a subtle focus of restricted diffusion in the  subcortical white matter of the fusiform gyrus of the right temporal  lobe (series 12 image 62, series 7 image 56) concerning for a small  acute infarct. There is corresponding T2  FLAIR hyperintense signal in  that location (series 9 image 9). No other evidence for acute infarct.  Examination of that location is mildly limited due to artifact from  the skull base of the middle cranial fossa.    There is an unchanged area of gliosis in the right frontal lobe  subcortical white matter deep to the trough of the right superior  frontal sulcus (series 9 image 19). This may represent sequela of  chronic infarct. Moderate confluent areas of T2 and T2 FLAIR  hyperintense signal within the periventricular and deep cerebral white  matter likely representing chronic small vessel ischemic disease. Mild  to moderate global brain parenchymal volume loss. No intracranial  hemorrhage or extra-axial fluid collection. The ventricles are normal  in size and configuration. There is intrinsic T1 hyperintense signal  that appears relatively symmetric involving the bilateral globus  pallidi and substantia nigra.    Bilateral ocular globes appear normal with the exception of findings  of previous cataract surgery. Moderate mucosal thickening in the  ethmoid air cells. The patient is intubated with secretions in the  oral cavity and pharynx. There is a right mastoid effusion. Trace  fluid in the left mastoid. The major vascular flow voids of the skull  base are maintained.      Impression    IMPRESSION:  1. Findings suspicious for small acute infarct in the subcortical  white matter of the fusiform gyrus of the right temporal lobe.  2. Relatively symmetric-appearing intrinsic T1 hyperintense signal  representing mineralization within the globus pallidus and substantia  nigra bilaterally. This pattern is most commonly encountered in  patients with chronic liver disease due to manganese deposition, but  can also be seen in setting of patients on TPN or with occupational  exposure to manganese or with noncirrhotic portal vein thrombosis.  Consider correlation with hepatic laboratory values and ultrasound of  the abdomen  with Doppler for further characterization.  3. Chronic small area of encephalomalacia in the subcortical white  matter of the right frontal lobe, unchanged.  4. Global brain parenchymal volume loss and moderate presumed chronic  small vessel ischemic disease.    PRINCE RAY MD   Basic metabolic panel   Result Value Ref Range    Sodium 147 (H) 133 - 144 mmol/L    Potassium 3.3 (L) 3.4 - 5.3 mmol/L    Chloride 117 (H) 94 - 109 mmol/L    Carbon Dioxide 24 20 - 32 mmol/L    Anion Gap 6 3 - 14 mmol/L    Glucose 66 (L) 70 - 99 mg/dL    Urea Nitrogen 29 7 - 30 mg/dL    Creatinine 1.86 (H) 0.52 - 1.04 mg/dL    GFR Estimate 26 (L) >60 mL/min/[1.73_m2]    GFR Estimate If Black 30 (L) >60 mL/min/[1.73_m2]    Calcium 7.4 (L) 8.5 - 10.1 mg/dL   CBC with platelets   Result Value Ref Range    WBC 3.7 (L) 4.0 - 11.0 10e9/L    RBC Count 3.11 (L) 3.8 - 5.2 10e12/L    Hemoglobin 9.4 (L) 11.7 - 15.7 g/dL    Hematocrit 29.8 (L) 35.0 - 47.0 %    MCV 96 78 - 100 fl    MCH 30.2 26.5 - 33.0 pg    MCHC 31.5 31.5 - 36.5 g/dL    RDW 13.2 10.0 - 15.0 %    Platelet Count 113 (L) 150 - 450 10e9/L   Hepatic panel   Result Value Ref Range    Bilirubin Direct 0.3 (H) 0.0 - 0.2 mg/dL    Bilirubin Total 0.5 0.2 - 1.3 mg/dL    Albumin 2.0 (L) 3.4 - 5.0 g/dL    Protein Total 4.8 (L) 6.8 - 8.8 g/dL    Alkaline Phosphatase 65 40 - 150 U/L    ALT 25 0 - 50 U/L    AST 70 (H) 0 - 45 U/L   Blood gas venous   Result Value Ref Range    Ph Venous 7.37 7.32 - 7.43 pH    PCO2 Venous 44 40 - 50 mm Hg    PO2 Venous 38 25 - 47 mm Hg    Bicarbonate Venous 25 21 - 28 mmol/L    Base Deficit Venous 0.2 mmol/L    FIO2 PENDING    CK total   Result Value Ref Range    CK Total 919 (H) 30 - 225 U/L     *Note: Due to a large number of results and/or encounters for the requested time period, some results have not been displayed. A complete set of results can be found in Results Review.       Assessment and Plan:    Acute metabolic encephalopathy on top of mild cognitive  impairment/dementia/severe agitation requring intubation.   Rule out acute CVA in the subcortical white matter of the fusiform gyrus of the right temporal lobe.  Relatively symmetric-appearing intrinsic T1 hyperintense signal  representing mineralization within the globus pallidus and substantia  nigra bilaterally.      After admit she became  extremely agitated and unable to be redirected.  She seemed to have gotten worse with neuroleptic treatment including olanzapine 10 mg and Haldol 2.5 mg.  She was constantly pulling at 4-point restraints on the floor.  Transferred to the ICU for 5-point restraints and Precedex started with out improvement. Ultimately due to severe agitation and sinus tach of 150 pt intubated by Dr Barfield 4/7/20 0545..  We are continuing the search for cause including infection, metabolic, toxic.  No evidence of withdrawal or new drugs other than the worsening with the neuroleptics.  She is being covered for possible infection including meningitis and herpes encephalitis..  Urine cultures and blood cultures are neg so far.  Chest x-ray is negative.  CT head is negative for structural disease.  She did have mild  hypercalcemia at 10.2 with normal up to 10.1 which would seem unlikely to cause this degree of encephalopathy.      Currently on vent. On propofol. Hr variable often 40's . BP soft.  Just starting pressure support trial.     Infection  UA showed mild pyuria-culture neg.MRSA nasal swab neg. Blood cultures neg, PCT 0.2,  On acyclovir. CRP 7.8.  No fever noted. LP with two wbc per hpf. CSF protein mildly increased.Herpes pcr pending. Stopping empiric rocephin 04/09/20 .     Acute kidney injury on chronic kidney disease.   Admit creat 2.05-- 2.32-- 2.14--1.86. Oliguric 4/7/20. I and O pos 5869  Urine output  FENA suggested prerenal etiology two days ago.     Elevated lactic acid.  Mild lactic acid elevation is been present.  It is unclear if this was related to sepsis      Traumatic  rhabdo  CK elevation  Probably due motor agitation.Some possibility of a neuroleptic malignant syndrome which should not come on this early, but per up-to-date, can occur with a single dose.  Usually, should have fever and lead pipe rigidity which she does not have either.       .  Will continue to follow and hydrate.  Consider ruq us when stable given above neuro findings.     Elevated troponin  cardiac   suspect demand ischemia.    trops 0.052--0.084--0.414--0.217  ekg shows some ant and lat t wave inversions-but some degree of this chronically. Avoiding anticoagulation and anti platelet due to LP  Had hypotension 4/7/20-etiology unclear. Also had sinus dilip which persists.  echo with normal EF, tends to drop bp when propofol is increased.     diabetes mellitus type 2   On lantus and sliding scale  hypogycemic 04/09/20 --lantus reduced to 18    Mild pancytopenia  Unclear etiology, some of this maybe dilutional     Mild transaminase elevation  ? From rhabdo     htn  Holding metoprolol, lasix , avapro  Required norepi 4/7/20 at about 6 PM  bp soft overnight 4/8/20-04/09/20 -corresponds to propofol bumps.     Hypernatremia  Improved, sodium 147, changed to half normal saline 04/08/20       hyperlipidemia   Holding zocor     gerd  Hold omeprazole, use iv protonix     RLS  Holding mirapex     hypothyroidism   Hold oral levothyroxine, use iv replacement while vented     History of deep venous thrombosis/pulmonary embolism.    lovenox restarted 4/8/20 evening following LP    prophylaxis-lovenox  FEN-npo, half normal saline at 100     Plan-  etiology still unclear, possibly a temporal lobe stroke, will talk to stroke neuro later. Doing pressure support trial and propofol wean. Unclear if able to extubate. Continue acyclovir until pcr's are back. Stopping rocephin.      Will talk to stroke neuro regarding antiplatelet therapy.    Reducing iv fluids, lowering lantus.     Follow cbc.     10:43 AM   I discussed the  case at length with Dr. Francis who is on-call for stroke neuro at the [facility].  He reviewed the MRI scan.  I also discussed the MRI scan with the radiologist who read it.  He felt that the abnormalities seen in the globus pallidus and substantia nigra were not related to her clinical condition.  It looks like she may have had a small stroke in the right temporal lobe.  This is quite small.  Dr. Francis postulated that perhaps a small stroke combined with her overall cognitive dysfunction caused the symptoms.    Patient was responding to commands but generally somnolent.  She passed her pressure support trial.  She would squeeze my hand upon command.  Dr. Francis endorsed trying to extubate her.  Therefore she was extubated.    I will continue acyclovir until both of the herpes PCR's are back.    At this point given the uncertainty of the case, I am not going to add an antiplatelet agent since she is already anticoagulated.    2:03 PM  Patient is somewhat somnolent.  But she can wake up.  She knows she is in the hospital.  She is communicating with us.  She is diffusely weak.  I called Crystal another of her nieces at [phone] and updated her she is can bring her sleep CPAP machine so she can use it tonight.  The RN is can order us a swallow evaluation from speech as she is fairly noisy in her airway at this time.  Therefore I am in a keep her n.p.o.    3:39 PM   Will check cxr tomorrow.

## 2020-04-09 NOTE — PLAN OF CARE
Pt extubated to NC.  BS clear diminished, no distress.  Will monitor respiratory status.      Pippa Lara, RRT

## 2020-04-09 NOTE — PLAN OF CARE
Patient extubated at 1015 this morning. Still drowsy but becoming more alert and oriented. Aware that she is in the hospital, confused about situation and time. SB 50's, BP soft since extubation, MD aware. Afebrile. On RA after being extubated. Cough very wet with some secretions, coughs after ice chips. Speech consult placed. Remains NPO. No BM this shift. Lr in place with adequate UOP. Blood sugars needing PRN D50. IVF switched to D5.45. IV access: Port-a-cath, 2 PIV's. Complains of right foot pain, states it is broken. No pain medication given today. Up in chair with two assist after extubation.

## 2020-04-10 ENCOUNTER — PATIENT OUTREACH (OUTPATIENT)
Dept: GERIATRIC MEDICINE | Facility: CLINIC | Age: 75
End: 2020-04-10

## 2020-04-10 ENCOUNTER — APPOINTMENT (OUTPATIENT)
Dept: SPEECH THERAPY | Facility: CLINIC | Age: 75
DRG: 064 | End: 2020-04-10
Payer: COMMERCIAL

## 2020-04-10 ENCOUNTER — APPOINTMENT (OUTPATIENT)
Dept: PHYSICAL THERAPY | Facility: CLINIC | Age: 75
DRG: 064 | End: 2020-04-10
Payer: COMMERCIAL

## 2020-04-10 ENCOUNTER — APPOINTMENT (OUTPATIENT)
Dept: GENERAL RADIOLOGY | Facility: CLINIC | Age: 75
DRG: 064 | End: 2020-04-10
Attending: FAMILY MEDICINE
Payer: COMMERCIAL

## 2020-04-10 ENCOUNTER — APPOINTMENT (OUTPATIENT)
Dept: OCCUPATIONAL THERAPY | Facility: CLINIC | Age: 75
DRG: 064 | End: 2020-04-10
Payer: COMMERCIAL

## 2020-04-10 LAB
ACETAMINOPHEN QUAL: NEGATIVE
ALBUMIN SERPL-MCNC: 2 G/DL (ref 3.4–5)
ALP SERPL-CCNC: 90 U/L (ref 40–150)
ALT SERPL W P-5'-P-CCNC: 28 U/L (ref 0–50)
AMANTADINE: NEGATIVE
AMITRIPTYLINE QUAL: NEGATIVE
AMOXAPINE: NEGATIVE
AMPHETAMINES QUAL: NEGATIVE
ANION GAP SERPL CALCULATED.3IONS-SCNC: 5 MMOL/L (ref 3–14)
AST SERPL W P-5'-P-CCNC: 53 U/L (ref 0–45)
ATROPINE: NEGATIVE
BASE DEFICIT BLDV-SCNC: 1.9 MMOL/L
BENZODIAZ UR QL: NEGATIVE
BILIRUB DIRECT SERPL-MCNC: 0.4 MG/DL (ref 0–0.2)
BILIRUB SERPL-MCNC: 0.7 MG/DL (ref 0.2–1.3)
BUN SERPL-MCNC: 23 MG/DL (ref 7–30)
BUPROPION QUAL: NEGATIVE
CAFFEINE QUAL: NEGATIVE
CALCIUM SERPL-MCNC: 7.4 MG/DL (ref 8.5–10.1)
CANNABINOIDS UR QL SCN: NEGATIVE
CARBAMAZEPINE QUAL: NEGATIVE
CHLORIDE SERPL-SCNC: 116 MMOL/L (ref 94–109)
CHLORPHENIRAMINE: NEGATIVE
CHLORPROMAZINE: NEGATIVE
CITALOPRAM QUAL: NEGATIVE
CK SERPL-CCNC: 406 U/L (ref 30–225)
CLOMIPRAMINE QUAL: NEGATIVE
CO2 SERPL-SCNC: 24 MMOL/L (ref 20–32)
COCAINE QUAL: NEGATIVE
COCAINE UR QL: NEGATIVE
CODEINE QUAL: NEGATIVE
CREAT SERPL-MCNC: 1.6 MG/DL (ref 0.52–1.04)
DESIPRAMINE QUAL: NEGATIVE
DEXTROMETHORPHAN: NEGATIVE
DIPHENHYDRAMINE: NEGATIVE
DOXEPIN/METABOLITE: NEGATIVE
DOXYLAMINE: NEGATIVE
EPHEDRINE OR PSEUDO: NEGATIVE
ERYTHROCYTE [DISTWIDTH] IN BLOOD BY AUTOMATED COUNT: 13.2 % (ref 10–15)
FENTANYL QUAL: NEGATIVE
FLUOXETINE AND METAB: NEGATIVE
GFR SERPL CREATININE-BSD FRML MDRD: 31 ML/MIN/{1.73_M2}
GLUCOSE BLDC GLUCOMTR-MCNC: 100 MG/DL (ref 70–99)
GLUCOSE BLDC GLUCOMTR-MCNC: 79 MG/DL (ref 70–99)
GLUCOSE BLDC GLUCOMTR-MCNC: 88 MG/DL (ref 70–99)
GLUCOSE BLDC GLUCOMTR-MCNC: 94 MG/DL (ref 70–99)
GLUCOSE BLDC GLUCOMTR-MCNC: 96 MG/DL (ref 70–99)
GLUCOSE BLDC GLUCOMTR-MCNC: 96 MG/DL (ref 70–99)
GLUCOSE SERPL-MCNC: 100 MG/DL (ref 70–99)
HCO3 BLDV-SCNC: 24 MMOL/L (ref 21–28)
HCT VFR BLD AUTO: 29.7 % (ref 35–47)
HGB BLD-MCNC: 9.6 G/DL (ref 11.7–15.7)
HYDROCODONE QUAL: NEGATIVE
HYDROMORPHONE QUAL: NEGATIVE
IBUPROFEN QUAL: NEGATIVE
IMIPRAMINE QUAL: NEGATIVE
KETAMINE QUAL: NEGATIVE
LACTATE BLD-SCNC: 0.9 MMOL/L (ref 0.7–2)
LAMOTRIGINE QUAL: NEGATIVE
LIDOCAIN SPEC QL: NEGATIVE
LOXAPINE: NEGATIVE
MAPROTYLINE: NEGATIVE
MCH RBC QN AUTO: 30.6 PG (ref 26.5–33)
MCHC RBC AUTO-ENTMCNC: 32.3 G/DL (ref 31.5–36.5)
MCV RBC AUTO: 95 FL (ref 78–100)
MDMA QUAL: NEGATIVE
MEPERIDINE QUAL: NEGATIVE
METHAMPHETAMINE: NEGATIVE
METHODONE QUAL: NEGATIVE
MIRTAZAPINE QUAL: NEGATIVE
MORPHINE QUAL: NEGATIVE
NICOTINE: NEGATIVE
NORTRIPTYLINE QUAL: NEGATIVE
OLANZAPINE QUAL: POSITIVE
OPIATES UR QL SCN: NEGATIVE
OXYCODONE QUAL: NEGATIVE
PCO2 BLDV: 42 MM HG (ref 40–50)
PENTAZOCINE: NEGATIVE
PH BLDV: 7.36 PH (ref 7.32–7.43)
PHENCYCLIDINE QUAL: NEGATIVE
PHENTERMINE: NEGATIVE
PLATELET # BLD AUTO: 117 10E9/L (ref 150–450)
PO2 BLDV: 37 MM HG (ref 25–47)
POTASSIUM SERPL-SCNC: 3.5 MMOL/L (ref 3.4–5.3)
PROCALCITONIN SERPL-MCNC: 0.3 NG/ML
PROPOFOL QUAL: NEGATIVE
PROPOXPHENE QUAL: NEGATIVE
PROPRANOLOL QUAL: NEGATIVE
PROT SERPL-MCNC: 5.1 G/DL (ref 6.8–8.8)
PYRILAMINE: NEGATIVE
QUETIAPINE METAB QUAL: NEGATIVE
RBC # BLD AUTO: 3.14 10E12/L (ref 3.8–5.2)
SALICYLATE QUAL: NEGATIVE
SERTRALINE QUAL: NEGATIVE
SODIUM SERPL-SCNC: 145 MMOL/L (ref 133–144)
SPECIMEN TYPE: NORMAL
THEOBROMINE: POSITIVE
TOPIRAMATE QUAL: NEGATIVE
TRAMADOL QUAL: NEGATIVE
TRIMIPRAMINE QUAL: NEGATIVE
VARICELLA ZOSTER DNA PCR COMMENT: NORMAL
VENLAFAXINE QUAL: NEGATIVE
VZV DNA SPEC QL NAA+PROBE: NORMAL
WBC # BLD AUTO: 3.1 10E9/L (ref 4–11)

## 2020-04-10 PROCEDURE — 82803 BLOOD GASES ANY COMBINATION: CPT | Performed by: FAMILY MEDICINE

## 2020-04-10 PROCEDURE — 97165 OT EVAL LOW COMPLEX 30 MIN: CPT | Mod: GO | Performed by: OCCUPATIONAL THERAPIST

## 2020-04-10 PROCEDURE — 99233 SBSQ HOSP IP/OBS HIGH 50: CPT | Performed by: FAMILY MEDICINE

## 2020-04-10 PROCEDURE — 71045 X-RAY EXAM CHEST 1 VIEW: CPT

## 2020-04-10 PROCEDURE — 00000146 ZZHCL STATISTIC GLUCOSE BY METER IP

## 2020-04-10 PROCEDURE — 97161 PT EVAL LOW COMPLEX 20 MIN: CPT | Mod: GP | Performed by: PHYSICAL THERAPIST

## 2020-04-10 PROCEDURE — 25800025 ZZH RX 258: Performed by: FAMILY MEDICINE

## 2020-04-10 PROCEDURE — 25000128 H RX IP 250 OP 636: Performed by: FAMILY MEDICINE

## 2020-04-10 PROCEDURE — 25000125 ZZHC RX 250: Performed by: FAMILY MEDICINE

## 2020-04-10 PROCEDURE — 80076 HEPATIC FUNCTION PANEL: CPT | Performed by: FAMILY MEDICINE

## 2020-04-10 PROCEDURE — 83605 ASSAY OF LACTIC ACID: CPT | Performed by: FAMILY MEDICINE

## 2020-04-10 PROCEDURE — 20000003 ZZH R&B ICU

## 2020-04-10 PROCEDURE — 82550 ASSAY OF CK (CPK): CPT | Performed by: FAMILY MEDICINE

## 2020-04-10 PROCEDURE — 25000132 ZZH RX MED GY IP 250 OP 250 PS 637: Performed by: FAMILY MEDICINE

## 2020-04-10 PROCEDURE — 92610 EVALUATE SWALLOWING FUNCTION: CPT | Mod: GN | Performed by: SPEECH-LANGUAGE PATHOLOGIST

## 2020-04-10 PROCEDURE — 80048 BASIC METABOLIC PNL TOTAL CA: CPT | Performed by: FAMILY MEDICINE

## 2020-04-10 PROCEDURE — C9113 INJ PANTOPRAZOLE SODIUM, VIA: HCPCS | Performed by: FAMILY MEDICINE

## 2020-04-10 PROCEDURE — 25800030 ZZH RX IP 258 OP 636: Performed by: FAMILY MEDICINE

## 2020-04-10 PROCEDURE — 97535 SELF CARE MNGMENT TRAINING: CPT | Mod: GO | Performed by: OCCUPATIONAL THERAPIST

## 2020-04-10 PROCEDURE — 85027 COMPLETE CBC AUTOMATED: CPT | Performed by: FAMILY MEDICINE

## 2020-04-10 PROCEDURE — 84145 PROCALCITONIN (PCT): CPT | Performed by: FAMILY MEDICINE

## 2020-04-10 RX ORDER — ZIPRASIDONE HYDROCHLORIDE 20 MG/1
20 CAPSULE ORAL 2 TIMES DAILY WITH MEALS
Status: DISCONTINUED | OUTPATIENT
Start: 2020-04-10 | End: 2020-04-10

## 2020-04-10 RX ORDER — FUROSEMIDE 10 MG/ML
20 INJECTION INTRAMUSCULAR; INTRAVENOUS ONCE
Status: COMPLETED | OUTPATIENT
Start: 2020-04-10 | End: 2020-04-10

## 2020-04-10 RX ORDER — OLANZAPINE 5 MG/1
5 TABLET, ORALLY DISINTEGRATING ORAL AT BEDTIME
Status: DISCONTINUED | OUTPATIENT
Start: 2020-04-10 | End: 2020-04-11

## 2020-04-10 RX ADMIN — PANTOPRAZOLE SODIUM 40 MG: 40 INJECTION, POWDER, LYOPHILIZED, FOR SOLUTION INTRAVENOUS at 15:23

## 2020-04-10 RX ADMIN — FUROSEMIDE 20 MG: 10 INJECTION, SOLUTION INTRAMUSCULAR; INTRAVENOUS at 08:13

## 2020-04-10 RX ADMIN — OLANZAPINE 5 MG: 5 TABLET, ORALLY DISINTEGRATING ORAL at 21:30

## 2020-04-10 RX ADMIN — ACYCLOVIR SODIUM 600 MG: 50 INJECTION, SOLUTION INTRAVENOUS at 11:22

## 2020-04-10 RX ADMIN — ENOXAPARIN SODIUM 90 MG: 100 INJECTION SUBCUTANEOUS at 21:30

## 2020-04-10 RX ADMIN — LEVOTHYROXINE SODIUM ANHYDROUS 50 MCG: 100 INJECTION, POWDER, LYOPHILIZED, FOR SOLUTION INTRAVENOUS at 08:19

## 2020-04-10 RX ADMIN — DEXTROSE AND SODIUM CHLORIDE: 5; 450 INJECTION, SOLUTION INTRAVENOUS at 10:15

## 2020-04-10 ASSESSMENT — ACTIVITIES OF DAILY LIVING (ADL)
ADLS_ACUITY_SCORE: 41
ADLS_ACUITY_SCORE: 38
ADLS_ACUITY_SCORE: 43
ADLS_ACUITY_SCORE: 37
ADLS_ACUITY_SCORE: 37
ADLS_ACUITY_SCORE: 41

## 2020-04-10 NOTE — PROGRESS NOTES
Finally agreed to rest, over course of past 3 hours awake restless and agitated at times, requesting to talk to Crystal or Malou.  Question if confused or not understands or unable to hear when attempting conversation and explanation.  Did talk through situation, reoriented frequently and explaining it was late and too late to call. Repositioned multiple times, replaced BiPAP mask, nasal prongs but refused to wear chin strap.  Will monitor saturations with out and replace it noted to drop.  Has also been removing oxymetry probe from finger and also from ear.  Will continue to monitor closely.

## 2020-04-10 NOTE — PROGRESS NOTES
"Pt repeatedly asking to call \"Sadiq.\" Called Crystal ocasio, who explained to pt that Sadiq is now out of town and that pt has already talked to her this morning. Pt is now upset and crying because she wants to go home. Asks the same questions repeatedly and doesn't retain or understand why she is here. Hitting call light multiple times with the same requests. Reorientation and reassurance provided.   "

## 2020-04-10 NOTE — PROGRESS NOTES
04/10/20 1000   Quick Adds   Type of Visit Initial Occupational Therapy Evaluation   Living Environment   Lives With facility resident   Living Arrangements assisted living   Home Accessibility no concerns   Self-Care   Usual Activity Tolerance fair   Current Activity Tolerance fair   Regular Exercise No   Functional Level   Ambulation 4-->completely dependent   Transferring 4-->completely dependent   Toileting 4-->completely dependent   Bathing 4-->completely dependent   Dressing 4-->completely dependent   Eating 3-->assistive equipment and person   Communication 0-->understands/communicates without difficulty   Swallowing 0-->swallows foods/liquids without difficulty   Cognition 1 - attention or memory deficits   Fall history within last six months yes   Number of times patient has fallen within last six months 2   Which of the above functional risks had a recent onset or change? fall history;bathing;toileting;transferring;ambulation   General Information   Onset of Illness/Injury or Date of Surgery - Date 04/06/20   Referring Physician Dr. Desir   Patient/Family Goals Statement Her goal is to go home.     Additional Occupational Profile Info/Pertinent History of Current Problem 75 year old female admitted through ER due to altered mental status.  PMH:  DM II, hypothyroidism, chronic ischemic heart disease, h/o colon cancer, hx DVT on Eliquis.  Recent right toe fx per client in which she is to wear a boot.     Precautions/Limitations fall precautions   Weight-Bearing Status - LUE weight-bearing as tolerated   Weight-Bearing Status - RUE weight-bearing as tolerated   Weight-Bearing Status - LLE weight-bearing as tolerated   Weight-Bearing Status - RLE weight-bearing as tolerated   Cognitive Status Examination   Orientation orientation to person, place and time   Level of Consciousness alert   Visual Perception   Visual Perception Wears glasses   Sensory Examination   Sensory Comments increased sensitivity in  feet   Pain Assessment   Patient Currently in Pain   (reports pain in right foot and right UE)   Integumentary/Edema   Integumentary/Edema Comments great deal of bruising on her UB   Range of Motion (ROM)   ROM Comment Limited shoulder ROM, functional elbow and wrist/hand ROM   Transfer Skill: Sit to Stand   Level of [place]: Sit/Stand moderate assist (50% patients effort)   Physical Assist/Nonphysical Assist: Sit/Stand 2 persons   Transfer Skill: Sit to Stand weight-bearing as tolerated   Assistive Device for Transfer: Sit/Stand rolling walker   Transfer Skill: Toilet Transfer   Toilet Transfer Skill Comments catheter in place   Lower Body Dressing   Level of [place]: Dress Lower Body maximum assist (25% patients effort)   Physical Assist/Nonphysical Assist: Dress Lower Body 2 person assist  (2 to help stand from seat when pulling up garments)   Grooming   Level of [place]: Grooming minimum assist (75% patients effort)   Physical Assist/Nonphysical Assist: Grooming verbal cues   Activities of Daily Living Analysis   Impairments Contributing to Impaired Activities of Daily Living balance impaired;pain;strength decreased;sensation decreased   General Therapy Interventions   Planned Therapy Interventions ADL retraining   Clinical Impression   Criteria for Skilled Therapeutic Interventions Met yes, treatment indicated   OT Diagnosis ADL dependence, generalized weakness   Influenced by the following impairments pain, weakness, question cognitive level   Assessment of Occupational Performance 3-5 Performance Deficits   Identified Performance Deficits dressing, grooming, toileting, transfers, mobilty   Clinical Decision Making (Complexity) Low complexity   Therapy Frequency Daily   Predicted Duration of Therapy Intervention (days/wks) 3 days   Anticipated Discharge Disposition Transitional Care Facility;Long Term Care Facility   Risks and Benefits of Treatment have been explained. Yes   Patient, Family &  "other staff in agreement with plan of care Yes   Clinical Impression Comments 75 year old female who requires moderate assist with transfers and ADL skills.  She is able to vocalize her needs at this time and wants to go home.  If the staff at her previous residence can assist her with her needs she can return to this facility.   [facility] TM \"6 Clicks\"   2016, Trustees of [facility], under license to [employer].  All rights reserved.   6 Clicks Short Forms Daily Activity Inpatient Short Form   [facility]  \"6 Clicks\" Daily Activity Inpatient Short Form   1. Putting on and taking off regular lower body clothing? 2 - A Lot   2. Bathing (including washing, rinsing, drying)? 2 - A Lot   3. Toileting, which includes using toilet, bedpan or urinal? 2 - A Lot   4. Putting on and taking off regular upper body clothing? 3 - A Little   5. Taking care of personal grooming such as brushing teeth? 3 - A Little   6. Eating meals? 3 - A Little   Daily Activity Raw Score (Score out of 24.Lower scores equate to lower levels of function) 15   Total Evaluation Time   Total Evaluation Time (Minutes) 10     "

## 2020-04-10 NOTE — PROGRESS NOTES
Reason for Follow up: discharge planning    Anticipated discharge needs: Spoke with patient's niece, Crystal via phone.  Patient remains confused and unable to discuss plan at this time.  Discussed therapy recommendations.  Crystal's goal is for patient to return to her [facility] if able, but is agreeable to TCU if needed.      Spoke with Mell RN @ [facility] ([phone]) with update.  Patient would need to be at baseline mobility to return to [facility] (able to transfer independently using guard rails from chair to bed, chair to toilet).      Revisited TCU referrals choices, confirmed to be [facility] (Phone: [phone] Admissions: [phone] Fax: [phone]), [facility] (Admissions Phone: [phone] Main Phone: [phone] Fax: [phone]), [place] [facility] (Phone: [phone] Fax: [phone]).  Referrals now pending.      CTS to arrange transportation upon discharge.      Next steps: CTS to follow    Discharge Planner   Discharge Plans in progress: return to prison vs TCU (ref pend)  Barriers to discharge plan: medical stability  Follow up plan: CTS to follow       Entered by: Magdalena Hamilton 04/10/2020 1:59 PM       SHILPA GilbertN RN  Inpatient RN care coordinator  [facility] [phone]  [facility] [phone]

## 2020-04-10 NOTE — LETTER
April 10, 2020     SOL WORRELL  [id number]  [address] [place] [id number]      Dear Sol,    Welcome to [facility] [place] [facility] ([facility]) health program. My name is JUNE Vega. I am your [facility] care coordinator.     I will call you soon to see how you are doing and determine what needs you may have. My job is to help connect you to services, complete an assessment, and develop a care plan with you. There is no charge to you for the care coordination and assessment services. Our goal is to keep you as healthy and independent as possible.     [facility] combines the benefits you may already receive from Medical Assistance, Medicare and the Prescription Drug Coverage Program.    Soon you will receive a new [facility] member identification (ID) card from [facility]. When you receive it, please use this card where you get your health services.]    If you have questions, please call me at [phone]. If you reach my voice mail, leave a message and your phone number. If you are hearing impaired, please call the [place] Relay at 673 or [phone] (iebuuq-yn-byespt relay service).    Sincerely,    JUNE Vega    E-mail: [email or web address]  Phone: [phone]      [facility] ([facility]) is a health plan that contracts with both Medicare and the [place] Medical Assistance (Medicaid) program to provide benefits of both programs to enrollees. Enrollment in [facility] depends on contract renewal.    MSC+ [id number] DHS Approved ([id number])  [id number] (11/18)

## 2020-04-10 NOTE — PLAN OF CARE
"Pt awake this evening and wanting to talk to \"soila\". Explained it's too late to call anyone and we can re-visit tomorrow. Pt refusing to wear cpap or cannula, maintaining sats so far.   Marva Kingston RN on 4/9/2020 at 11:15 PM    "

## 2020-04-10 NOTE — PROGRESS NOTES
[facility] Care Coordination Contact    4/1/20 Product change from [facility] MSC+ to [facility] MSHO. WL sent.     Gina Wheatley  Care Management Specialist   [facility]   [phone]

## 2020-04-10 NOTE — PLAN OF CARE
Discharge Planner PT   Patient plan for discharge: Return to long term    Current status: Eval completed this morning.  Pt alert and conversing approp. ; [place]  Partial co/ RX w/ OT. Pt requires min. assistance of 2 to stand- assisted pt to [place] R CAM boot for R toe fracture .     Barriers to return to prior living situation: medical stability    Recommendations for discharge: Home w/ assistance vs TCU depending on how much assistance the  [facility]/ groups home  can provide.  At baseline; pt transfers only; reliant on a  wc for mobility    Rationale for recommendations: Continued PT to address strength/ mobility        Entered by: Pippa Liriano 04/10/2020 1:20 PM

## 2020-04-10 NOTE — PROGRESS NOTES
"Pt waking up more as the day progresses. Aware she is in [place] but continues to be disoriented to time and situation. Requesting to call Crystal frequently, pt able to call and talk to her but then begins to ask the same questions again after a short period of time and requesting to call her back. Reorientation provided frequently. Pt asked to get up in the chair, transferred with walker, gait belt and heavy assist of two. Pt states \"I shouldn't feel this weak!\" Requesting to eat/drink, speech therapy consulted but recommended more time prior to initiating food/drink. Pt frequently asking to see the MD (he will round this afternoon), why she is here and when she can go home. Questions answered frequently. Currently working with PT.   "

## 2020-04-10 NOTE — PROGRESS NOTES
[facility] Hospitalist Service      Subjective:  Pt sleeping --was up all night agitated -so I did not wak    Review of Systems:  unable    Physical Exam:  Vitals Were Reviewed    Patient Vitals for the past 16 hrs:   BP Temp Temp src Pulse Heart Rate Resp SpO2 Weight   04/10/20 0630 -- -- -- -- 52 17 96 % --   04/10/20 0600 106/41 -- -- 50 50 19 100 % 90.1 kg (198 lb 10.2 oz)   04/10/20 0530 -- -- -- -- 54 18 99 % --   04/10/20 0500 104/41 -- -- 54 51 21 93 % --   04/10/20 0430 (!) 140/56 -- -- -- 52 26 93 % --   04/10/20 0400 (!) 140/56 -- -- 58 52 13 94 % --   04/10/20 0300 106/50 -- -- 53 51 14 -- --   04/10/20 0200 119/55 -- -- 58 55 (!) 7 90 % --   04/10/20 0100 (!) 89/33 -- -- 51 54 15 92 % --   04/10/20 0000 109/42 98.1  F (36.7  C) Axillary 52 56 10 95 % --   04/09/20 2300 107/59 -- -- 56 53 15 99 % --   04/09/20 2235 -- -- -- -- -- -- 98 % --   04/09/20 2215 90/49 -- -- -- 55 9 98 % --   04/09/20 2200 (!) 85/34 -- -- (!) 47 (!) 49 (!) 0 97 % --   04/09/20 2100 91/42 -- -- (!) 47 50 14 97 % --   04/09/20 2000 106/56 -- -- 52 53 17 96 % --   04/09/20 1900 92/43 -- -- (!) 48 (!) 48 16 94 % --   04/09/20 1800 (!) 86/45 -- -- 51 52 12 96 % --   04/09/20 1700 (!) 88/41 -- -- 52 52 19 100 % --   04/09/20 1600 95/46 97.9  F (36.6  C) Axillary 51 52 16 100 % --   04/09/20 1500 (!) 88/46 -- -- 57 52 16 94 % --         Intake/Output Summary (Last 24 hours) at 4/10/2020 0642  Last data filed at 4/10/2020 0600  Gross per 24 hour   Intake 3157.5 ml   Output 700 ml   Net 2457.5 ml       GENERAL APPEARANCE: non distressed, sleeping  EYES: conjunctiva clear, eyes grossly normal  HENT: tongue is some what protuberant  RESP: lungs clear to auscultation - no rales, rhonchi or wheezes  CV: regular rate and rhythm, normal S1 S2, no S3 or S4 and no murmur, click or rub   ABDOMEN: soft, nontender, no HSM or masses and bowel sounds normal  MS: tr edema  SKIN: clear without significant rashes or lesions    Lab:  Recent Labs    Lab Test 04/10/20  0520 04/09/20  1610 04/09/20  0445   *  --  147*   POTASSIUM 3.5 3.6 3.3*   CHLORIDE 116*  --  117*   CO2 24  --  24   ANIONGAP 5  --  6   *  --  66*   BUN 23  --  29   CR 1.60*  --  1.86*   DWIGHT 7.4*  --  7.4*     CBC RESULTS:   Recent Labs   Lab Test 04/10/20  0520 04/09/20  0445   WBC 3.1* 3.7*   RBC 3.14* 3.11*   HGB 9.6* 9.4*   HCT 29.7* 29.8*   * 113*       Results for orders placed or performed during the hospital encounter of 04/06/20 (from the past 24 hour(s))   Potassium   Result Value Ref Range    Potassium 3.6 3.4 - 5.3 mmol/L   Glucose by meter   Result Value Ref Range    Glucose 79 70 - 99 mg/dL   Glucose by meter   Result Value Ref Range    Glucose 96 70 - 99 mg/dL   Basic metabolic panel   Result Value Ref Range    Sodium 145 (H) 133 - 144 mmol/L    Potassium 3.5 3.4 - 5.3 mmol/L    Chloride 116 (H) 94 - 109 mmol/L    Carbon Dioxide 24 20 - 32 mmol/L    Anion Gap 5 3 - 14 mmol/L    Glucose 100 (H) 70 - 99 mg/dL    Urea Nitrogen 23 7 - 30 mg/dL    Creatinine 1.60 (H) 0.52 - 1.04 mg/dL    GFR Estimate 31 (L) >60 mL/min/[1.73_m2]    GFR Estimate If Black 36 (L) >60 mL/min/[1.73_m2]    Calcium 7.4 (L) 8.5 - 10.1 mg/dL   CBC with platelets   Result Value Ref Range    WBC 3.1 (L) 4.0 - 11.0 10e9/L    RBC Count 3.14 (L) 3.8 - 5.2 10e12/L    Hemoglobin 9.6 (L) 11.7 - 15.7 g/dL    Hematocrit 29.7 (L) 35.0 - 47.0 %    MCV 95 78 - 100 fl    MCH 30.6 26.5 - 33.0 pg    MCHC 32.3 31.5 - 36.5 g/dL    RDW 13.2 10.0 - 15.0 %    Platelet Count 117 (L) 150 - 450 10e9/L   Hepatic panel   Result Value Ref Range    Bilirubin Direct 0.4 (H) 0.0 - 0.2 mg/dL    Bilirubin Total 0.7 0.2 - 1.3 mg/dL    Albumin 2.0 (L) 3.4 - 5.0 g/dL    Protein Total 5.1 (L) 6.8 - 8.8 g/dL    Alkaline Phosphatase 90 40 - 150 U/L    ALT 28 0 - 50 U/L    AST 53 (H) 0 - 45 U/L   Blood gas venous   Result Value Ref Range    Ph Venous 7.36 7.32 - 7.43 pH    PCO2 Venous 42 40 - 50 mm Hg    PO2 Venous 37 25 - 47  mm Hg    Bicarbonate Venous 24 21 - 28 mmol/L    Base Deficit Venous 1.9 mmol/L   CK total   Result Value Ref Range    CK Total 406 (H) 30 - 225 U/L   Lactic acid level STAT   Result Value Ref Range    Lactate for Sepsis Protocol 0.9 0.7 - 2.0 mmol/L   XR Chest Port 1 View    Narrative    EXAM: XR CHEST PORT 1 VW  LOCATION: [facility]  DATE/TIME: 4/10/2020 5:43 AM    INDICATION: Cough. Post extubation.  COMPARISON: 04/07/2020.      Impression    IMPRESSION: Shallow inspiration. Right Port-A-Cath tip the cavoatrial junction. Median sternotomy. Interval removal endotracheal tube. Pulmonary venous congestion. Bibasilar atelectasis or infiltrate. Small right effusion.     *Note: Due to a large number of results and/or encounters for the requested time period, some results have not been displayed. A complete set of results can be found in Results Review.       Assessment and Plan:    Acute metabolic encephalopathy on top of mild cognitive impairment/dementia/severe agitation requring intubation.   Rule out acute CVA in the subcortical white matter of the fusiform gyrus of the right temporal lobe.  Relatively symmetric-appearing intrinsic T1 hyperintense signal  representing mineralization within the globus pallidus and substantia  nigra bilaterally.       After admit she became  extremely agitated and unable to be redirected.  She seemed to have gotten worse with neuroleptic treatment including olanzapine 10 mg and Haldol 2.5 mg.  She was constantly pulling at 4-point restraints on the floor.  Transferred to the ICU for 5-point restraints and Precedex started with out improvement. Ultimately due to severe agitation and sinus tach of 150 pt intubated by Dr Barfield 4/7/20 0545..    Pt extubated 4/9/20. She has had increasing agitation overnight.  Work  Far has revealed possible small right temporal lobe stroke which along with previous cognitive function may have precipitated events. Infectious work up is  negative except varicella zoster pcr pending and pt remains on acyclovir. Globus pallidus and substantia nigra findings thought to chronic and not relevant to current situation per stroke neuro.     Will need to add some type of sedative med.      Infection  UA showed mild pyuria-culture neg.MRSA nasal swab neg. Blood cultures neg, PCT 0.2,  On acyclovir. CRP 7.8.  No fever noted. LP with two wbc per hpf. CSF protein mildly increased, Zoster pcr pending. Stopped empiric rocephin 04/09/20.     Mild hypoxia since extubation-suspect atelectasis and some fluid overload.  cxr shows bibasilar atelectasis vs infiltrate, some pul venous congestion.  Continue off atbs. Recheck pcr. Try small dose lasix.       Acute kidney injury on chronic kidney disease.   Admit creat 2.05-- 2.32-- 2.14--1.86--1.6. Oliguric 4/7/20. I and O pos 8611  FENA suggested prerenal etiology two days ago.  uo 975 yesterday  Suspect some fluid overload  Try small dose lasix and decrease fluids to 50 until I get speech eval for swallow.  If clears allowed-stop kasey     Elevated lactic acid.  Mild lactic acid elevation is been present.  It is unclear if this was related to sepsis      Traumatic rhabdo  CK elevation  Probably due motor agitation.Some possibility of a neuroleptic malignant syndrome which should not come on this early, but per up-to-date, can occur with a single dose.  Usually, should have fever and lead pipe rigidity which she does not have either.       .      Elevated troponin  cardiac   suspect demand ischemia.    trops 0.052--0.084--0.414--0.217  ekg shows some ant and lat t wave inversions-but some degree of this chronically. Avoiding anticoagulation and anti platelet due to LP  Had hypotension 4/7/20-etiology unclear. Also had sinus dilip which persists.  echo with normal EF, tends to drop bp when propofol is increased.     diabetes mellitus type 2   On lantus and sliding scale  hypogycemic 04/09/20 --lantus reduced to  18     Mild pancytopenia  Unclear etiology, some of this maybe dilutional--follow      Mild transaminase elevation  ? From rhabdo     htn  Holding metoprolol, lasix , avapro  Required norepi 4/7/20 at about 6 PM  bp soft overnight 4/8/20-04/09/20 -corresponds to propofol bumps.     Hypernatremia  Improved 04/10/20       hyperlipidemia   Holding zocor     gerd  Hold omeprazole, use iv protonix     RLS  Holding mirapex     hypothyroidism   Hold oral levothyroxine, use iv replacement while vented     History of deep venous thrombosis/pulmonary embolism.    lovenox restarted 4/8/20 evening following LP     prophylaxis-lovenox  FEN-npo, half normal saline at 50     Plan-  etiology still unclear-- likely a temporal lobe stroke combined with pre existing cognitive dysfunction. Try lasix. Stop iv fluids later if able to swallow liquids.  Follow creat. PCT ordered.     Pt will likely need some sedation--will discuss with Maldonado Nair.     Will move out of icu when I think it is unlikely that we will have problem controlling agitation.    1:28 PM  Case discussed with her niece Lisa her phone number is [phone].  Patient appears to aspirate clinically-we have not been able to reinstitute her diet.  We are waiting for further speech recommendations.  Therefore I have stopped the Geodon and order just a Zyprexa nightly if she would have some type of paradoxical reaction tonight with the Zyprexa. I would advise Benadryl 25 mg IV     Procalcitonin is pending

## 2020-04-10 NOTE — PROGRESS NOTES
"Impression:  Pt up in chair for the first time in four days after extubation 4/9/20.  Pt alert and wanting to eat.  Has soft vocal quality.  Pt assessed in chair with ice chip, water via spoon, and pudding consistencies.  Mildly decreased lingual coordination and swallow delay present with likely pharygeal pooling.  Pt had cough after swallow with water and when assessed with thicker consistency/pudding.  Pt not aware of difficulties and focused on wanting more sips/bites even though cough is present after the swallow.   Recommend continue NPO due to overt aspiration symptoms after swallow of thin and pudding consistencies..   SLP will reassess this afternoon or tomorrow morning.     Bedside Swallow Evaluation  04/10/20    General Information   Onset Date 04/10/20   Start of Care Date 04/10/20   Referring Physician Vinicio Desir MD    Patient Profile Review/OT: Additional Occupational Profile Info See Profile for full history and prior level of function   Patient/Family Goals Statement Pt states \"I am hungry.\"   Swallowing Evaluation Bedside swallow evaluation   Behaviorial Observations Alert;Confused   Mode of current nutrition NPO   Respiratory Status O2 Supply   Type of O2 supply Nasal cannula   Comments Pt intubated 4/7/20-4/9/20 .  Physician noted states She has had increasing agitation overnight.  Possible small right temporal lobe stroke which along with previous cognitive function may have precipitated events.  Bedside evaluation ordered to asses swallow prior to starting oral intake.  Pt is alert and assessed while sitting up in chair in room.  Pt is talkative and states she is hungry and wants pudding.  Follows basic directions well.   Clinical Swallow Evaluation   Oral Musculature generally intact   Structural Abnormalities none present   Dentition edentulous, does not have dentures   Mucosal Quality adequate   Mandibular Strength and Mobility impaired   Oral Labial Strength and Mobility impaired " coordination;WFL   Lingual Strength and Mobility impaired coordination;other (see comments)  (decreased lingual strength)   Laryngeal Function Voicing initiated  (soft/hoarse vocal quality)   Additional Documentation Yes   Clinical Swallow Eval: Thin Liquid Texture Trial   Mode of Presentation, Thin Liquids spoon;fed by clinician   Volume of Liquid or Food Presented ice chip x2, 1 tsp water via spoon   Oral Phase of Swallow other (see comments);Premature pharyngeal entry   Pharyngeal Phase of Swallow impaired;coughing/choking;no awareness of problems   Diagnostic Statement Cough after swallow and wet vocal quality x1.  Pt coughed up thin liquid into oral cavity after swallow.   Clinical Swallow Eval: Pudding Thick Liquid Texture Trial   Mode of Presentation, Pudding spoon;fed by clinician   Volume of Pudding Presented 1 tsp x4   Oral Phase, Pudding WFL   Pharyngeal Phase, Pudding impaired;coughing/choking;no awareness of problems  (strong reflexive cough)   Diagnostic Statement Cough after the swallow 2 of 4 bites.  Pt clears contents to oral cavity to spit out.   Clinical Swallow Eval: Solid Food Texture Trial   Diagnostic Statement DNT due to aspiration risk.   Swallow Compensations   Swallow Compensations No compensations were used   General Therapy Interventions   Planned Therapy Interventions Dysphagia Treatment   Dysphagia treatment Instruction of safe swallow strategies;Modified diet education  (diet texture analysis)   Intervention Comments Reassess swallow function with goal of safe swallow with at least one consistency   Swallow Eval: Clinical Impressions   Skilled Criteria for Therapy Intervention Skilled criteria met.  Treatment indicated.   Treatment Diagnosis Mild oral and moderate pharyngeal dysphagia   Diet texture recommendations NPO   Therapy Frequency Daily   Predicted Duration of Therapy Intervention (days/wks) 3 days   Patient, family and/or staff in agreement with Plan of Care Yes   Clinical  Impression Comments Pt up in chair for the first time in four days after extubation 4/9/20.  Pt alert and wanting to eat.  Has soft vocal quality.  Pt assessed in chair with ice chip, water via spoon, and pudding consistencies.  Mildly decreased lingual coordination and swallow delay present with likely pharygeal pooling.  Pt had cough after swallow with water and when assessed with thicker consistency/pudding.  Pt not aware of difficulties and focused on wanting more sips/bites even though cough is present after the swallow.   Recommend continue NPO due to overt aspiration symptoms after swallow of thin and pudding consistencies..   SLP will reassess this afternoon or tomorrow morning.   Total Evaluation Time   Total Evaluation Time (Minutes) 25

## 2020-04-10 NOTE — PROGRESS NOTES
04/10/20 1300   Quick Adds   Type of Visit Initial PT Evaluation   Living Environment   Lives With facility resident   Living Arrangements assisted living   Home Accessibility no concerns   Self-Care   Usual Activity Tolerance fair   Current Activity Tolerance fair   Functional Level Prior   Ambulation 4-->completely dependent   Transferring 3-->assistive equipment and person   Toileting 3-->assistive equipment and person   Bathing 4-->completely dependent   Communication 0-->understands/communicates without difficulty   Swallowing 0-->swallows foods/liquids without difficulty   Cognition 1 - attention or memory deficits   Fall history within last six months yes   Number of times patient has fallen within last six months 2   Which of the above functional risks had a recent onset or change? transferring;fall history   Prior Functional Level Comment PLOF- Pt reports transferring  indep or at times w/ assistance of one. Fall w/ transferring as has a R toe  fracture. : reliant on a wc for mobility   General Information   Onset of Illness/Injury or Date of Surgery - Date 04/09/20   Referring Physician Karlee   Patient/Family Goals Statement Pt requesting to discharge- repeatley requesting to discharge to home and talk w/ the doctor    Pertinent History of Current Problem (include personal factors and/or comorbidities that impact the POC) 75 year old year old female with a history of multiple medical problems including DM-II, RLS, bradycardia, b/l recurrent LE DVTs, RC, ischemic CAD, obesity, among others.  She was admitted with altered mental status that was first manifest on Sunday 4/5.  The symptoms gradually worsened with the emergence of paranoia and fear of being poisoned so she presented to the ED on 4/6. On 4/7, upon admission to the nursing unit, she was very agitated, non redirectable and was given 10 mg Olanzapine and 2.5 mg Haldol.  She became increasingly agitated, constantly in motion.  Precedex was  started, but respirations were sonorous and rapid and she became tachycardic.  After consultation with ninfa Crystal, the decision was made to intubate Concha on 4/7, for the purpose of safeguarding her airway., but has demonstrted myoclonic jerking and agitation; extubated 4/9/20.  Imaging revealedacute CVA in the subcortical white matter of the fusiform gyrus of the right temporal lobe   Precautions/Limitations fall precautions   General Observations Pt alert, conversing approp- able to relay she has a R toe fracture and needs her boot  on   General Info Comments 3 LO2   Range of Motion (ROM)   ROM Comment WFL    Strength   Strength Comments Appears to have full AG/ symmetricval LE strength    Bed Mobility   Bed Mobility Comments NT-    Transfer Skills   Transfer Comments Min. assistance/2 sit> stand w/ RW. ;   assistance pt to carrington R CAm boot; shoe on Left. ' stood for approx 30 seconds each bout   Gait   Gait Comments Pt nonambulatory at baseline   Sensory Examination   Sensory Perception Comments decreased but intact to LT in LEs   General Therapy Interventions   Planned Therapy Interventions bed mobility training;strengthening;transfer training;progressive activity/exercise   Clinical Impression   Criteria for Skilled Therapeutic Intervention yes, treatment indicated   PT Diagnosis Generalized weakness   Influenced by the following impairments Diffuse weakness    Functional limitations due to impairments change in dep. w/ bed mobility, transfers   Clinical Presentation Stable/Uncomplicated   Clinical Presentation Rationale clinical  judgement   Clinical Decision Making (Complexity) Low complexity   Therapy Frequency Daily   Predicted Duration of Therapy Intervention (days/wks) LOS   Anticipated Discharge Disposition Home with Assist;Home with Home Therapy;Transitional Care Facility  (dep. on progress)   Risk & Benefits of therapy have been explained Yes   Patient, Family & other staff in agreement with plan of care  "Yes   [facility] TM \"6 Clicks\"   2016, Trustees of [facility], under license to [employer].  All rights reserved.   6 Clicks Short Forms Daily Activity Inpatient Short Form   [facility]  \"6 Clicks\" V.2 Basic Mobility Inpatient Short Form   1. Turning from your back to your side while in a flat bed without using bedrails? 3 - A Little   2. Moving from lying on your back to sitting on the side of a flat bed without using bedrails? 2 - A Lot   3. Moving to and from a bed to a chair (including a wheelchair)? 2 - A Lot   4. Standing up from a chair using your arms (e.g., wheelchair, or bedside chair)? 2 - A Lot   5. To walk in hospital room? 1 - Total   6. Climbing 3-5 steps with a railing? 1 - Total   Basic Mobility Raw Score (Score out of 24.Lower scores equate to lower levels of function) 11     "

## 2020-04-10 NOTE — PLAN OF CARE
Discharge Planner OT   Patient plan for discharge: Concha would like to return home and is able to state where home was ([facility])  Current status: Requiring assist of 2 for sit to stand.  Noted boot for right toe fx.  Able to groom with set up and min A.  Requires max A for LE dressing.    Barriers to return to prior living situation: weakness, pain  Recommendations for discharge: If her current status of needing mod A with ADL's and assist of 2 to transfer can be handled at her previous living she would be able to return, if not she would need TCU/skilled nursing level  Rationale for recommendations: current OT assessment of needs with ADL's and mobility       Entered by: Abhishek Huff 04/10/2020 11:08 AM

## 2020-04-10 NOTE — PROGRESS NOTES
Pt was able to rest for about an hour this afternoon. Is continuing to ask for food and water, reminded that she failed her swallow study today and is unsafe to tolerate oral intake at this time, will reassess tomorrow per speech. Pt needing frequent reminders about this. Mouth swabs done multiple times an hour. Right eye remains closed at times but able to open it when asked.  Mouth tends to remain open with tongue out and slight droop to the left at rest but when she is asked to smile both sides rise evenly. MD notified, will continue to monitor for worsening symptoms. Call light within reach. Alarms activated and audible.

## 2020-04-10 NOTE — PLAN OF CARE
Continues to be restless, pulling CPAP and O2 saturation monitor off during night but more sleepy this am, follows commands when consistently coached even when sleepy during am chest x-ray.  Still needing reorientation to situation and place but will recognize own name, weight up this am and urine is jone but clear.  Adequate amount though.  Skin tears are about same, skin fragile with much bruising under BP cuff and previous IV insert and lab draw sights.  IV is accessed to port=a cath.  Plan of care unchanged.  Monitoring closely.

## 2020-04-11 ENCOUNTER — APPOINTMENT (OUTPATIENT)
Dept: PHYSICAL THERAPY | Facility: CLINIC | Age: 75
DRG: 064 | End: 2020-04-11
Payer: COMMERCIAL

## 2020-04-11 ENCOUNTER — APPOINTMENT (OUTPATIENT)
Dept: OCCUPATIONAL THERAPY | Facility: CLINIC | Age: 75
DRG: 064 | End: 2020-04-11
Payer: COMMERCIAL

## 2020-04-11 ENCOUNTER — APPOINTMENT (OUTPATIENT)
Dept: SPEECH THERAPY | Facility: CLINIC | Age: 75
DRG: 064 | End: 2020-04-11
Payer: COMMERCIAL

## 2020-04-11 LAB
ALBUMIN SERPL-MCNC: 2.2 G/DL (ref 3.4–5)
ALP SERPL-CCNC: 110 U/L (ref 40–150)
ALT SERPL W P-5'-P-CCNC: 30 U/L (ref 0–50)
ANION GAP SERPL CALCULATED.3IONS-SCNC: 6 MMOL/L (ref 3–14)
AST SERPL W P-5'-P-CCNC: 43 U/L (ref 0–45)
BASE DEFICIT BLDV-SCNC: 0 MMOL/L
BILIRUB DIRECT SERPL-MCNC: 0.4 MG/DL (ref 0–0.2)
BILIRUB SERPL-MCNC: 0.7 MG/DL (ref 0.2–1.3)
BUN SERPL-MCNC: 16 MG/DL (ref 7–30)
CALCIUM SERPL-MCNC: 7.6 MG/DL (ref 8.5–10.1)
CHLORIDE SERPL-SCNC: 113 MMOL/L (ref 94–109)
CK SERPL-CCNC: 228 U/L (ref 30–225)
CO2 SERPL-SCNC: 25 MMOL/L (ref 20–32)
CREAT SERPL-MCNC: 1.47 MG/DL (ref 0.52–1.04)
ERYTHROCYTE [DISTWIDTH] IN BLOOD BY AUTOMATED COUNT: 13 % (ref 10–15)
GFR SERPL CREATININE-BSD FRML MDRD: 34 ML/MIN/{1.73_M2}
GLUCOSE BLDC GLUCOMTR-MCNC: 102 MG/DL (ref 70–99)
GLUCOSE BLDC GLUCOMTR-MCNC: 132 MG/DL (ref 70–99)
GLUCOSE BLDC GLUCOMTR-MCNC: 74 MG/DL (ref 70–99)
GLUCOSE BLDC GLUCOMTR-MCNC: 85 MG/DL (ref 70–99)
GLUCOSE BLDC GLUCOMTR-MCNC: 97 MG/DL (ref 70–99)
GLUCOSE SERPL-MCNC: 89 MG/DL (ref 70–99)
HCO3 BLDV-SCNC: 26 MMOL/L (ref 21–28)
HCT VFR BLD AUTO: 32.5 % (ref 35–47)
HGB BLD-MCNC: 10.6 G/DL (ref 11.7–15.7)
MCH RBC QN AUTO: 30.5 PG (ref 26.5–33)
MCHC RBC AUTO-ENTMCNC: 32.6 G/DL (ref 31.5–36.5)
MCV RBC AUTO: 94 FL (ref 78–100)
PCO2 BLDV: 45 MM HG (ref 40–50)
PH BLDV: 7.36 PH (ref 7.32–7.43)
PLATELET # BLD AUTO: 127 10E9/L (ref 150–450)
PO2 BLDV: 33 MM HG (ref 25–47)
POTASSIUM SERPL-SCNC: 3.5 MMOL/L (ref 3.4–5.3)
PROT SERPL-MCNC: 5.7 G/DL (ref 6.8–8.8)
RBC # BLD AUTO: 3.47 10E12/L (ref 3.8–5.2)
SODIUM SERPL-SCNC: 144 MMOL/L (ref 133–144)
WBC # BLD AUTO: 2.9 10E9/L (ref 4–11)

## 2020-04-11 PROCEDURE — 99233 SBSQ HOSP IP/OBS HIGH 50: CPT | Performed by: FAMILY MEDICINE

## 2020-04-11 PROCEDURE — 12000000 ZZH R&B MED SURG/OB

## 2020-04-11 PROCEDURE — 25000125 ZZHC RX 250: Performed by: FAMILY MEDICINE

## 2020-04-11 PROCEDURE — 99359 PROLONG SERV W/O CONTACT ADD: CPT | Performed by: NURSE PRACTITIONER

## 2020-04-11 PROCEDURE — 00000146 ZZHCL STATISTIC GLUCOSE BY METER IP

## 2020-04-11 PROCEDURE — 25800025 ZZH RX 258: Performed by: FAMILY MEDICINE

## 2020-04-11 PROCEDURE — 92526 ORAL FUNCTION THERAPY: CPT | Mod: GN | Performed by: SPEECH-LANGUAGE PATHOLOGIST

## 2020-04-11 PROCEDURE — 97530 THERAPEUTIC ACTIVITIES: CPT | Mod: GP | Performed by: PHYSICAL THERAPIST

## 2020-04-11 PROCEDURE — 25000131 ZZH RX MED GY IP 250 OP 636 PS 637: Performed by: FAMILY MEDICINE

## 2020-04-11 PROCEDURE — 80076 HEPATIC FUNCTION PANEL: CPT | Performed by: FAMILY MEDICINE

## 2020-04-11 PROCEDURE — 82550 ASSAY OF CK (CPK): CPT | Performed by: FAMILY MEDICINE

## 2020-04-11 PROCEDURE — C9113 INJ PANTOPRAZOLE SODIUM, VIA: HCPCS | Performed by: FAMILY MEDICINE

## 2020-04-11 PROCEDURE — 25000132 ZZH RX MED GY IP 250 OP 250 PS 637: Performed by: PHYSICIAN ASSISTANT

## 2020-04-11 PROCEDURE — 99233 SBSQ HOSP IP/OBS HIGH 50: CPT | Performed by: NURSE PRACTITIONER

## 2020-04-11 PROCEDURE — 85027 COMPLETE CBC AUTOMATED: CPT | Performed by: FAMILY MEDICINE

## 2020-04-11 PROCEDURE — 80048 BASIC METABOLIC PNL TOTAL CA: CPT | Performed by: FAMILY MEDICINE

## 2020-04-11 PROCEDURE — 99358 PROLONG SERVICE W/O CONTACT: CPT | Performed by: NURSE PRACTITIONER

## 2020-04-11 PROCEDURE — 25000132 ZZH RX MED GY IP 250 OP 250 PS 637: Performed by: FAMILY MEDICINE

## 2020-04-11 PROCEDURE — 25000128 H RX IP 250 OP 636: Performed by: FAMILY MEDICINE

## 2020-04-11 PROCEDURE — 97535 SELF CARE MNGMENT TRAINING: CPT | Mod: GO

## 2020-04-11 PROCEDURE — 99356 ZZC PROLONGED SERV,INPATIENT,1ST HR: CPT | Performed by: NURSE PRACTITIONER

## 2020-04-11 PROCEDURE — 40000894 ZZH STATISTIC OT IP EVAL DEFER

## 2020-04-11 PROCEDURE — 82803 BLOOD GASES ANY COMBINATION: CPT | Performed by: FAMILY MEDICINE

## 2020-04-11 RX ORDER — OLANZAPINE 10 MG/1
10 TABLET, ORALLY DISINTEGRATING ORAL AT BEDTIME
Status: DISCONTINUED | OUTPATIENT
Start: 2020-04-11 | End: 2020-04-11 | Stop reason: CLARIF

## 2020-04-11 RX ORDER — NICOTINE POLACRILEX 4 MG
15-30 LOZENGE BUCCAL
Status: DISCONTINUED | OUTPATIENT
Start: 2020-04-11 | End: 2020-04-13 | Stop reason: HOSPADM

## 2020-04-11 RX ORDER — HEPARIN SODIUM (PORCINE) LOCK FLUSH IV SOLN 100 UNIT/ML 100 UNIT/ML
5 SOLUTION INTRAVENOUS
Status: DISCONTINUED | OUTPATIENT
Start: 2020-04-11 | End: 2020-04-13 | Stop reason: HOSPADM

## 2020-04-11 RX ORDER — HEPARIN SODIUM,PORCINE 10 UNIT/ML
5-10 VIAL (ML) INTRAVENOUS
Status: DISCONTINUED | OUTPATIENT
Start: 2020-04-11 | End: 2020-04-13 | Stop reason: HOSPADM

## 2020-04-11 RX ORDER — OLANZAPINE 5 MG/1
10 TABLET, ORALLY DISINTEGRATING ORAL AT BEDTIME
Status: DISCONTINUED | OUTPATIENT
Start: 2020-04-11 | End: 2020-04-12

## 2020-04-11 RX ORDER — HEPARIN SODIUM,PORCINE 10 UNIT/ML
5-10 VIAL (ML) INTRAVENOUS EVERY 24 HOURS
Status: DISCONTINUED | OUTPATIENT
Start: 2020-04-11 | End: 2020-04-13 | Stop reason: HOSPADM

## 2020-04-11 RX ORDER — DEXTROSE MONOHYDRATE 25 G/50ML
25-50 INJECTION, SOLUTION INTRAVENOUS
Status: DISCONTINUED | OUTPATIENT
Start: 2020-04-11 | End: 2020-04-13 | Stop reason: HOSPADM

## 2020-04-11 RX ADMIN — OLANZAPINE 10 MG: 5 TABLET, ORALLY DISINTEGRATING ORAL at 23:37

## 2020-04-11 RX ADMIN — INSULIN ASPART 14 UNITS: 100 INJECTION, SOLUTION INTRAVENOUS; SUBCUTANEOUS at 18:45

## 2020-04-11 RX ADMIN — HEPARIN, PORCINE (PF) 10 UNIT/ML INTRAVENOUS SYRINGE 5 ML: at 23:41

## 2020-04-11 RX ADMIN — HYDROMORPHONE HYDROCHLORIDE 0.3 MG: 1 INJECTION, SOLUTION INTRAMUSCULAR; INTRAVENOUS; SUBCUTANEOUS at 01:06

## 2020-04-11 RX ADMIN — DEXTROSE AND SODIUM CHLORIDE: 5; 450 INJECTION, SOLUTION INTRAVENOUS at 03:55

## 2020-04-11 RX ADMIN — MICONAZOLE NITRATE: 20 POWDER TOPICAL at 23:36

## 2020-04-11 RX ADMIN — ENOXAPARIN SODIUM 90 MG: 100 INJECTION SUBCUTANEOUS at 23:32

## 2020-04-11 RX ADMIN — LEVOTHYROXINE SODIUM ANHYDROUS 50 MCG: 100 INJECTION, POWDER, LYOPHILIZED, FOR SOLUTION INTRAVENOUS at 07:25

## 2020-04-11 RX ADMIN — PANTOPRAZOLE SODIUM 40 MG: 40 INJECTION, POWDER, LYOPHILIZED, FOR SOLUTION INTRAVENOUS at 14:23

## 2020-04-11 ASSESSMENT — ACTIVITIES OF DAILY LIVING (ADL)
ADLS_ACUITY_SCORE: 37
ADLS_ACUITY_SCORE: 39
ADLS_ACUITY_SCORE: 39
ADLS_ACUITY_SCORE: 37
ADLS_ACUITY_SCORE: 39
ADLS_ACUITY_SCORE: 39

## 2020-04-11 NOTE — PROGRESS NOTES
"Patient pressing call light repeatedly, greater than 15 times in the last hour.  Often in the room with her eyes closed, but has firm  on call light.  When awakened to ask what she needs she ask repetitive questions such as \"I want to call Crystal\" \"I just want to talk\" \"where is my wheelchair\" \"swab my mouth\".  Despite reassurance and attempt to meet patients needs prior to leaving the room she continues to make frequent requests.   "

## 2020-04-11 NOTE — PLAN OF CARE
Pt seen while in chair and reassessed with pudding and honey consistencies.  Pt had spontaneous throat clears and cough x1.  Mild delay with swallow intiation.  Has clear vocal quality after cough/throat clear.   Spoke with palliative care nurse as they are considering hospice care.  Pt has mildly improved since yesterday in regards to swallow function, however still has overt dysphagia symtoms with modified consistencies.  Pt's directives are for no tube feedings.  Discussed results with Nakia Nair from Pallative care.   Diet recommendation for decreasing aspiration risk is DDL1 with honey consistency liquids.

## 2020-04-11 NOTE — PROGRESS NOTES
Patient has slept poorly overnight.  She has continued to use her call light repeatedly along with banging it on the side rail as well.  She makes repeated requests, but is conversant. Has been on 2 L NC when she leave it in place, but frequently removes it with sats remaining around 88-90%.  She reports feeling better today.

## 2020-04-11 NOTE — PLAN OF CARE
OT SUMMARY D/C:  Patient plan for D/C:   TCU    Current Status:   Requires A with ADLs and related mobility. Able to remove L shoe and sock with SOB and effort bending forward. Min/mod A of 2 sit to stand from chair to RW. Able to transfer to chair side commode with CGA, VCs and walker A    Barriers to return to prior living situation:   Not at baseline    Recommendation for D/C:  TCU    Rationale for D/C recommendations: TCU    Steph Nascimento, OTR

## 2020-04-11 NOTE — PROGRESS NOTES
Pt up in the chairs for meals today, transferring with walker, gait belt and assist of one. Has denied any pain. States she is much happier now that she gets to eat/drink, continues to tolerate well. Niece brought ipad in today and pt has been playing games on that and talking to family. Denies any pain. Call light within reach. Alarms activated and audible.

## 2020-04-11 NOTE — PLAN OF CARE
Discharge Planner PT   Patient plan for discharge: back to [place]  Current status: sit <> stand with Nori on first try, once standing transferred to WC with CGA using FWW. After rest break completed sit <> stand WC with CGA and transferred to chair with CGA using FWW. Upright in chair at end of session with all needs in reach.   Barriers to return to prior living situation: medical status  Recommendations for discharge: back to [place] with continued assistance of CGA for ensuring safe transfers.   Rationale for recommendations: patient able to complete transfers today with CGA for chair <> WC with use of FWW.        Entered by: Kallie Stevens 04/11/2020 11:42 AM

## 2020-04-11 NOTE — PROGRESS NOTES
Palliative Care Follow-up Hospital Note  Date of Admission:  4/6/2020   Visit Date:  April 11, 2020        HISTORY of PRESENT ILLNESS:  Stefanie Velazquez is a 75 year old year old female with a history of multiple medical problems including DM-II, RLS, bradycardia, b/l recurrent LE DVTs, RC, ischemic CAD, obesity, among others.  She was admitted with altered mental status that was first manifest on Sunday 4/5.  The symptoms gradually worsened with the emergence of paranoia and fear of being poisoned so she presented to the ED on 4/6. On 4/7, upon admission to the nursing unit, she was very agitated, non redirectable and was given 10 mg Olanzapine and 2.5 mg Haldol.  She became increasingly agitated, constantly in motion.  Precedex was started, but respirations were sonorous and rapid and she became tachycardic.  After consultation with ninfa Rojas, the decision was made to intubate Concha on 4/7, for the purpose of safeguarding her airway.  She currently is sedated on low dose Propofol (5 mcg/kg), but has demonstrted myoclonic jerking and agitation when the sedation is lightened.  Throughout this period of time, a cause for thie agitation has not been found.  Therefore, she was referred to Palliative Care for exploration of goals of care.        ASSESSMENT & PLAN:     Dysphagia following R temporal lobe stroke  - see Goals of Care below    Agitated delirium, acute onset, unknown etiology   - see Goals of Care  - alternatives to Propofol:              - Geodon 20 mg IM q4h PRN, or               - Geodon 20-40 mg PO q4h (or per rectum), or              - Thorazine up to 800 mg/day; I would start at 100 mg q6h if the goal is sedation              - Ativan could be added as an adjunct; 0.5 - 1 mg q4h PRN     Advance Care Planning:  - Understanding of condition:  n/a  - Decisional capacity:  Lacking; she has a health care directive dateed 8/17/17.  The first health care agent is no longer involved, and the second agent is her  "ninfa Sandoval.    - Code status:  Full Code  - Health Care Directive: Yes, If Yes, is there a copy in the EMR?   Yes  - POLST:  No     Goals of Care:  - per ninfa Rojas, who discussed this with Lori (who is the health care agent), if Concha should require CPR or re-intubation, they believe Concha would want it as, they explain, she has a strong will to live and she was perfectly lucid as recently as 4/4  - per her health care directive, she would want to be removed from the vent at the 2-week radha, certainly before a trach would be required; Crystal and Lori are going to further discuss this further  - health care directive also specifies \"no feeding tube\"  - 4/11/20:    -No. 1 goal is to go back to [facility], today, where her 2 sisters also reside   -No.  2 goal is to eat/drink; refuses feeding tube   -No.  3 goal is to get her iPad if she is going to have to remain here thru the w/e   -NB:  She is willing to enroll in hospice should she develop aspiration pneumonia                        She is willing to accept DNR/DNI code status if she enrolls in hospice.     Thank you for the opportunity to be of service to this patient and family.      Maldonado Nair (Ann), CNP  Palliative Care  [facility] cell:  [phone]       Face to face:   0840 - 0915 and 1734-2367 and 4047-3056,  85min, > 50% spent discussing  goals of care related to dysphagia, ie, feeding tube or not; risk of aspiration and pneumonia--persists in desire for no FT.  Also discussed [facility]'s requirements for her return,  assessment of patient's comprehension of consequences to aspiration, the hospice should she return with aspiration pneumonia.     Non face to face:  [id number] and 1214-1487 and 4435-2253 and 7526-0441, 135 min, > 50% spent communicating via phone with ninfa Rojas, particularly explaining pt's agreement to enroll in hospice with DNR/DNI code status should she develop aspiration pneumonia; coordinating care with  attending, nursing, PT and Care " Transitions    ========================    SUBJECTIVE:  Wants to go home (#1); feels her swallowing problems will improve when she is back with her sisters and staff including Mell and Malou.  Wants to eat/drink (#2).  Upon instruction followed by questioning, she appears to understand that dysphagia can lead to aspiration and pneumonia.    She had dysphagia in the past that improved after 2 weeks.  Recalls previous instructions to tuck chin with each swallow and avoid concurrent eating and drinking.  Willing to enroll in hospice if dysphagia results in recurrent pneumonia, and understand that hospice would keep her at [facility] and NOT send her to the hospital if she gets sick; understands code status, if/when in hospice, would typically be DNR/DNI.    ROS:  As described in HPI and Subjective.  Nursing reports her cognitive status is back to baseline, and was likely more confused last night when she repeatedly pressed the call light.  No confusion today.  Hungry.  Working well with PT, and transferred chair to w/c with contact guard assist only.  Otherwise, ALL are negative.    MEDICATIONS:  Allergies   Allergen Reactions     Hydrocodone Other (See Comments)     dizzy     Lisinopril Cough            Vicodin [Hydrocodone-Acetaminophen] Other (See Comments)     Caused extreme dizziness     Scheduled meds:    enoxaparin ANTICOAGULANT  1 mg/kg Subcutaneous Q24H     insulin aspart  14 Units Subcutaneous BID w/meals     insulin aspart  1-10 Units Subcutaneous Q4H     insulin glargine  18 Units Subcutaneous At Bedtime     levothyroxine  50 mcg Intravenous Daily     OLANZapine zydis  10 mg Oral At Bedtime     pantoprazole (PROTONIX) IV  40 mg Intravenous Q24H     sodium chloride (PF)  3 mL Intracatheter Q8H     PRN meds:  acetaminophen, glucose **OR** dextrose **OR** glucagon, glycopyrrolate, HYDROmorphone, lidocaine 4%, lidocaine (buffered or not buffered), LORazepam, naloxone, [DISCONTINUED] ondansetron **OR**  ondansetron, polyethylene glycol, potassium chloride, potassium chloride with lidocaine, potassium chloride, potassium chloride, potassium chloride, prochlorperazine **OR** [DISCONTINUED] prochlorperazine **OR** [DISCONTINUED] prochlorperazine, sodium chloride (PF)      OBJECTIVE:  Patient Vitals for the past 24 hrs:   BP Temp Temp src Pulse Heart Rate Resp SpO2 Weight   04/11/20 0738 (!) 137/94 98.5  F (36.9  C) Oral -- 52 15 95 % --   04/11/20 0600 (!) 143/70 -- -- 63 -- 16 92 % --   04/11/20 0500 -- -- -- -- -- -- -- 94.6 kg (208 lb 8.9 oz)   04/11/20 0443 -- -- -- -- 54 -- -- --   04/11/20 0400 118/53 -- -- 56 53 15 -- --   04/11/20 0300 133/66 -- -- 69 68 16 -- --   04/11/20 0200 116/41 -- -- 59 60 19 98 % --   04/11/20 0100 (!) 117/37 -- -- -- 59 -- 95 % --   04/11/20 0015 -- -- -- -- -- -- 94 % --   04/11/20 0000 127/65 99  F (37.2  C) Oral 62 -- 20 -- --   04/10/20 2339 -- -- -- -- 61 -- -- --   04/10/20 2300 (!) 121/117 -- -- 69 68 16 -- --   04/10/20 2200 118/55 -- -- 61 55 29 -- --   04/10/20 2100 126/59 -- -- 51 51 27 96 % --   04/10/20 2037 -- -- -- -- 52 -- -- --   04/10/20 2000 (!) 141/67 -- -- 50 -- -- -- --   04/10/20 1947 -- 98  F (36.7  C) Oral -- -- -- -- --   04/10/20 1800 134/65 -- -- 50 50 20 -- --   04/10/20 1700 (!) 126/97 -- -- 52 62 14 98 % --   04/10/20 1600 136/65 -- -- (!) 49 (!) 46 18 100 % --   04/10/20 1535 -- -- -- -- -- -- (!) 85 % --   04/10/20 1500 135/64 -- -- (!) 49 (!) 44 8 94 % --   04/10/20 1451 -- 98.2  F (36.8  C) Oral -- 52 12 95 % --   04/10/20 1400 96/85 -- -- (!) 48 50 10 98 % --   04/10/20 1328 -- -- -- -- 53 19 -- --   04/10/20 1300 116/46 -- -- 59 52 16 -- --   04/10/20 1200 93/41 97.8  F (36.6  C) Oral 50 53 9 98 % --   04/10/20 1107 91/63 97.5  F (36.4  C) Oral -- 51 10 99 % --   04/10/20 1100 -- -- -- (!) 49 50 8 99 % --   04/10/20 0935 -- -- -- -- 53 (!) 0 99 % --       Wt Readings from Last 10 Encounters:   04/11/20 94.6 kg (208 lb 8.9 oz)   03/16/20 89.8 kg  (198 lb)   01/03/20 91.2 kg (201 lb)   11/08/19 91.2 kg (201 lb)   09/27/19 92.7 kg (204 lb 6.4 oz)   09/23/19 91.2 kg (201 lb)   08/08/19 91.2 kg (201 lb)   07/22/19 91.6 kg (202 lb)   06/20/19 91.6 kg (202 lb)   05/21/19 93.6 kg (206 lb 5.6 oz)     Awake, alert, aware of setting and surroundings  Recalls recent past, including confusion  Breathing unlabored  Clearly possesses decisional capacity today    Intake/Output Summary (Last 24 hours) at 4/11/2020 0914  Last data filed at 4/11/2020 0600  Gross per 24 hour   Intake 1304.16 ml   Output 3575 ml   Net -2270.84 ml         ROUTINE ICU LABS (Last four results)  CMP  Recent Labs   Lab 04/11/20  0530 04/10/20  0520 04/09/20  1610 04/09/20  0445 04/08/20  0443    145*  --  147* 150*   POTASSIUM 3.5 3.5 3.6 3.3* 3.8   CHLORIDE 113* 116*  --  117* 118*   CO2 25 24  --  24 25   ANIONGAP 6 5  --  6 7   GLC 89 100*  --  66* 108*   BUN 16 23  --  29 35*   CR 1.47* 1.60*  --  1.86* 2.14*   GFRESTIMATED 34* 31*  --  26* 22*   GFRESTBLACK 39* 36*  --  30* 25*   DWIGHT 7.6* 7.4*  --  7.4* 8.4*   PROTTOTAL 5.7* 5.1*  --  4.8* 5.3*   ALBUMIN 2.2* 2.0*  --  2.0* 2.3*   BILITOTAL 0.7 0.7  --  0.5 0.8   ALKPHOS 110 90  --  65 73   AST 43 53*  --  70* 97*   ALT 30 28  --  25 28     CBC  Recent Labs   Lab 04/11/20  0530 04/10/20  0520 04/09/20  0445 04/08/20  0443   WBC 2.9* 3.1* 3.7* 4.5   RBC 3.47* 3.14* 3.11* 3.68*   HGB 10.6* 9.6* 9.4* 11.2*   HCT 32.5* 29.7* 29.8* 35.4   MCV 94 95 96 96   MCH 30.5 30.6 30.2 30.4   MCHC 32.6 32.3 31.5 31.6   RDW 13.0 13.2 13.2 13.5   * 117* 113* 145*     INRNo lab results found in last 7 days.  Arterial Blood Gas  Recent Labs   Lab 04/08/20  0810 04/07/20  1213 04/07/20  1015 04/07/20  0734   O2PER 30% 35% 35% 35%       Recent Results (from the past 48 hour(s))   XR Chest Port 1 View    Narrative    EXAM: XR CHEST PORT 1 VW  LOCATION: [facility]  DATE/TIME: 4/10/2020 5:43 AM    INDICATION: Cough. Post  extubation.  COMPARISON: 04/07/2020.      Impression    IMPRESSION: Shallow inspiration. Right Port-A-Cath tip the cavoatrial junction. Median sternotomy. Interval removal endotracheal tube. Pulmonary venous congestion. Bibasilar atelectasis or infiltrate. Small right effusion.

## 2020-04-11 NOTE — PROGRESS NOTES
[facility]      Subjective:  I want to go home.  She seems to not want a feeding tube.    Review of Systems:  Hard to fully obtain  She won't stay on task answering questions    Physical Exam:  Vitals Were Reviewed    Patient Vitals for the past 16 hrs:   BP Temp Temp src Pulse Heart Rate Resp SpO2 Weight   04/11/20 0738 (!) 137/94 98.5  F (36.9  C) Oral -- 52 15 95 % --   04/11/20 0600 (!) 143/70 -- -- 63 -- 16 92 % --   04/11/20 0500 -- -- -- -- -- -- -- 94.6 kg (208 lb 8.9 oz)   04/11/20 0443 -- -- -- -- 54 -- -- --   04/11/20 0400 118/53 -- -- 56 53 15 -- --   04/11/20 0300 133/66 -- -- 69 68 16 -- --   04/11/20 0200 116/41 -- -- 59 60 19 98 % --   04/11/20 0100 (!) 117/37 -- -- -- 59 -- 95 % --   04/11/20 0015 -- -- -- -- -- -- 94 % --   04/11/20 0000 127/65 99  F (37.2  C) Oral 62 -- 20 -- --   04/10/20 2339 -- -- -- -- 61 -- -- --   04/10/20 2300 (!) 121/117 -- -- 69 68 16 -- --   04/10/20 2200 118/55 -- -- 61 55 29 -- --   04/10/20 2100 126/59 -- -- 51 51 27 96 % --   04/10/20 2037 -- -- -- -- 52 -- -- --   04/10/20 2000 (!) 141/67 -- -- 50 -- -- -- --   04/10/20 1947 -- 98  F (36.7  C) Oral -- -- -- -- --   04/10/20 1800 134/65 -- -- 50 50 20 -- --   04/10/20 1700 (!) 126/97 -- -- 52 62 14 98 % --   04/10/20 1600 136/65 -- -- (!) 49 (!) 46 18 100 % --         Intake/Output Summary (Last 24 hours) at 4/11/2020 0744  Last data filed at 4/11/2020 0600  Gross per 24 hour   Intake 1527.49 ml   Output 3575 ml   Net -2047.51 ml       GENERAL APPEARANCE: alert, coherent, fixates on certain issues  RESP: some rhonchi  CV: regular rate and rhythm, normal S1 S2, no S3 or S4 and no murmur, click or rub   ABDOMEN: soft, nontender, no HSM or masses and bowel sounds normal  MS: no clubbing, cyanosis; no edema  SKIN: clear without significant rashes or lesions  NEURO: much more alert and coherent, seems to choke on liquids.    Lab:  Recent Labs   Lab Test 04/11/20  0530 04/10/20  0520    145*    POTASSIUM 3.5 3.5   CHLORIDE 113* 116*   CO2 25 24   ANIONGAP 6 5   GLC 89 100*   BUN 16 23   CR 1.47* 1.60*   DWIGHT 7.6* 7.4*     CBC RESULTS:   Recent Labs   Lab Test 04/11/20  0530 04/10/20  0520   WBC 2.9* 3.1*   RBC 3.47* 3.14*   HGB 10.6* 9.6*   HCT 32.5* 29.7*   * 117*       Results for orders placed or performed during the hospital encounter of 04/06/20 (from the past 24 hour(s))   Glucose by meter   Result Value Ref Range    Glucose 100 (H) 70 - 99 mg/dL   Glucose by meter   Result Value Ref Range    Glucose 88 70 - 99 mg/dL   Glucose by meter   Result Value Ref Range    Glucose 94 70 - 99 mg/dL   Glucose by meter   Result Value Ref Range    Glucose 96 70 - 99 mg/dL   Glucose by meter   Result Value Ref Range    Glucose 74 70 - 99 mg/dL   Basic metabolic panel   Result Value Ref Range    Sodium 144 133 - 144 mmol/L    Potassium 3.5 3.4 - 5.3 mmol/L    Chloride 113 (H) 94 - 109 mmol/L    Carbon Dioxide 25 20 - 32 mmol/L    Anion Gap 6 3 - 14 mmol/L    Glucose 89 70 - 99 mg/dL    Urea Nitrogen 16 7 - 30 mg/dL    Creatinine 1.47 (H) 0.52 - 1.04 mg/dL    GFR Estimate 34 (L) >60 mL/min/[1.73_m2]    GFR Estimate If Black 39 (L) >60 mL/min/[1.73_m2]    Calcium 7.6 (L) 8.5 - 10.1 mg/dL   CBC with platelets   Result Value Ref Range    WBC 2.9 (L) 4.0 - 11.0 10e9/L    RBC Count 3.47 (L) 3.8 - 5.2 10e12/L    Hemoglobin 10.6 (L) 11.7 - 15.7 g/dL    Hematocrit 32.5 (L) 35.0 - 47.0 %    MCV 94 78 - 100 fl    MCH 30.5 26.5 - 33.0 pg    MCHC 32.6 31.5 - 36.5 g/dL    RDW 13.0 10.0 - 15.0 %    Platelet Count 127 (L) 150 - 450 10e9/L   Hepatic panel   Result Value Ref Range    Bilirubin Direct 0.4 (H) 0.0 - 0.2 mg/dL    Bilirubin Total 0.7 0.2 - 1.3 mg/dL    Albumin 2.2 (L) 3.4 - 5.0 g/dL    Protein Total 5.7 (L) 6.8 - 8.8 g/dL    Alkaline Phosphatase 110 40 - 150 U/L    ALT 30 0 - 50 U/L    AST 43 0 - 45 U/L   Blood gas venous   Result Value Ref Range    Ph Venous 7.36 7.32 - 7.43 pH    PCO2 Venous 45 40 - 50 mm Hg     PO2 Venous 33 25 - 47 mm Hg    Bicarbonate Venous 26 21 - 28 mmol/L    Base Deficit Venous 0.0 mmol/L   CK total   Result Value Ref Range    CK Total 228 (H) 30 - 225 U/L   Glucose by meter   Result Value Ref Range    Glucose 85 70 - 99 mg/dL     *Note: Due to a large number of results and/or encounters for the requested time period, some results have not been displayed. A complete set of results can be found in Results Review.       Assessment and Plan:  Acute metabolic encephalopathy on top of mild cognitive impairment/dementia/severe agitation requring intubation.   Rule out acute CVA in the subcortical white matter of the fusiform gyrus of the right temporal lobe.  Relatively symmetric-appearing intrinsic T1 hyperintense signal  representing mineralization within the globus pallidus and substantia  nigra bilaterally.       After admit she became  extremely agitated and unable to be redirected.  She seemed to have gotten worse with neuroleptic treatment including olanzapine 10 mg and Haldol 2.5 mg.  She was constantly pulling at 4-point restraints on the floor.  Transferred to the ICU for 5-point restraints and Precedex started with out improvement. Ultimately due to severe agitation and sinus tach of 150 pt intubated by Dr Barfield 4/7/20 0545..     Pt extubated 4/9/20. She has had increasing agitation overnight.  Work  Far has revealed possible small right temporal lobe stroke which along with previous cognitive function may have precipitated events. Infectious work up is negative except varicella zoster pcr pending and pt remains on acyclovir. Globus pallidus and substantia nigra findings thought to chronic and not relevant to current situation per stroke neuro.       zyprexa given 4/10/20 evening.  Slept poorly, calls out constantly  Increase zyprexa odt to 10 mg qhs      Infection  UA showed mild pyuria-culture neg.MRSA nasal swab neg. Blood cultures neg, PCT 0.2,  . CRP 7.8.  No fever noted. LP with two wbc  per hpf. CSF protein mildly increased (may be from diabetes mellitus type 2 per stroke neuro).. Stopped empiric rocephin 04/09/20. Stopped acyclovir 4/10/20 due to neg herpes pcr's from csf.     Mild hypoxia since extubation-suspect atelectasis and some fluid overload.  cxr shows bibasilar atelectasis vs infiltrate, some pul venous congestion.  Continue off atbs. Lasix times one given 4/10/20     Currently on room air    Possible aspiration  Choking on liquids, family says she did this previously.  Awaiting repeat speech eval.  If continued problems ---? Feeding tube, ? PEG , or palliate and allow liquids.    Acute kidney injury on chronic kidney disease-related to dehydration.   Admit creat 2.05-- 2.32-- 2.14--1.86--1.6-1.47.  I and O pos 6500  FENA suggested prerenal etiology 3 days ago.  uo improved     Elevated lactic acid.  Mild lactic acid elevation is been present.  It is unclear if this was related to sepsis      Traumatic rhabdo  CK elevation  Probably due motor agitation.Some possibility of a neuroleptic malignant syndrome which should not come on this early, but per up-to-date, can occur with a single dose.  Usually, should have fever and lead pipe rigidity which she does not have either.       .      Elevated troponin  Sinus dilip   suspect demand ischemia.    trops 0.052--0.084--0.414--0.217  ekg shows some ant and lat t wave inversions-but some degree of this chronically. Had hypotension while intubated and briefly needed norepi.  Pt has persistent sinus dilip starting after intubation.  She had some hypotension during it earlier in the hospitalization and received atropine.   Improved now -but persistent.  Would avoid restarting  Prior to admit metoprolol    diabetes mellitus type 2   On lantus and sliding scale  hypogycemic 04/09/20 --lantus reduced to 18     Mild pancytopenia  Unclear etiology, some of this maybe dilutional--follow      Mild transaminase elevation  From  rhabdo-resolving     htn  Holding metoprolol, lasix , avapro  Required norepi 4/7/20 for bp support while on vent    Normotensive 04/11/20      Hypernatremia  Improved 04/10/20       hyperlipidemia   Holding zocor     gerd  Hold omeprazole, use iv protonix     RLS  Holding mirapex     hypothyroidism   Hold oral levothyroxine, using  iv replacement      History of deep venous thrombosis/pulmonary embolism.-holding Eliquis    lovenox restarted 4/8/20 evening following LP     prophylaxis-lovenox full dose  FEN-npo, half normal saline at 75     Plan-  etiology still unclear-- likely a temporal lobe stroke combined with pre existing cognitive dysfunction.     Niece Lisa her phone number is [phone]    Patient appears to aspirate clinically-we have not been able to reinstitute her diet. We are waiting for further speech recommendations. If unable to swallow--?? Feeding tube , peg or simply palliating allowing liquids would be options.  I called Maldonado Nair-discussed with her and she is going to see pt.    Pt's mental status has cleared. But she is disruptive, calls out constantly.        11:58 AM  Patient will not be accepted back to her assisted living until Monday.  I have changed her to mealtime sliding scale insulin.  Her twice daily NovoLog is back in place.  Her Lantus is reduced.  I will resume other oral meds tomorrow depending on her status.  Maldonado Nair has spent a great deal of time on the case and the plan was made in conjunction with her to proceed with feeding the patient p.o. rather than considering a feeding tube (either temporary or permanent).    3:06 PM   Probably add some pta oral meds tomorrow.  Stop iv.

## 2020-04-11 NOTE — PROGRESS NOTES
SW addendum: This writer did speak with on call [facility] and they are NOT able to accept pt back until Monday. RN would like to make sure they have everything they need on their end for her to return. They are not able to have palliative care term care come into their facility at this time because of COVID-19.  This writer did call pts niece and update her. CTS to follow.  Komal Krishnan, [facility] Care coordinator will need to be updated on discharge, family is requesting that transport be arranged.     GREGOR Min   [facility] [phone]            Reason for Follow up: DC planning    Anticipated discharge needs: Pt lives at [facility] (Phone: [phone]), this writer did call on call RN at [phone] regarding discharge. ON CALL NEEDS TO APPROVE PTS RETURN.  At this time pt is a heavy assist of 1 with a gait belt. Pt and family's goal is to return to her apt with palliative care. Pt was provided with Medicare certified home care list. Pt chooses to use [facility] palliative Care ([phone] Fax: [phone]), will also need PT for strengthening. Pts diet will be a dysphagia diet with honey thickened liquids.     Next steps: Waiting for on call to call back, pt and family would like pt to return to her apt    Discharge Planner   Discharge Plans in progress: MARY ELLEN  Barriers to discharge plan: medical stability  Follow up plan: MARY ELLEN with palliative care       Entered by: Mira Tavarez 04/11/2020 11:23 AM           CONNOR Haider, [facility] [phone]

## 2020-04-11 NOTE — PROGRESS NOTES
Pt much more awake and alert today. Able to converse appropriately and remember information told to her. Not hitting her call light or yelling out excessively. Sitting up in the chair drinking (honey thickened) liquids per speech and tolerating well.

## 2020-04-12 ENCOUNTER — APPOINTMENT (OUTPATIENT)
Dept: PHYSICAL THERAPY | Facility: CLINIC | Age: 75
DRG: 064 | End: 2020-04-12
Payer: COMMERCIAL

## 2020-04-12 LAB
ALBUMIN SERPL-MCNC: 2.2 G/DL (ref 3.4–5)
ALP SERPL-CCNC: 135 U/L (ref 40–150)
ALT SERPL W P-5'-P-CCNC: 25 U/L (ref 0–50)
ANION GAP SERPL CALCULATED.3IONS-SCNC: 4 MMOL/L (ref 3–14)
AST SERPL W P-5'-P-CCNC: 35 U/L (ref 0–45)
BILIRUB DIRECT SERPL-MCNC: 0.3 MG/DL (ref 0–0.2)
BILIRUB SERPL-MCNC: 0.6 MG/DL (ref 0.2–1.3)
BUN SERPL-MCNC: 15 MG/DL (ref 7–30)
CALCIUM SERPL-MCNC: 8 MG/DL (ref 8.5–10.1)
CHLORIDE SERPL-SCNC: 112 MMOL/L (ref 94–109)
CK SERPL-CCNC: 146 U/L (ref 30–225)
CO2 SERPL-SCNC: 26 MMOL/L (ref 20–32)
CREAT SERPL-MCNC: 1.32 MG/DL (ref 0.52–1.04)
ERYTHROCYTE [DISTWIDTH] IN BLOOD BY AUTOMATED COUNT: 12.9 % (ref 10–15)
GFR SERPL CREATININE-BSD FRML MDRD: 39 ML/MIN/{1.73_M2}
GLUCOSE BLDC GLUCOMTR-MCNC: 109 MG/DL (ref 70–99)
GLUCOSE BLDC GLUCOMTR-MCNC: 113 MG/DL (ref 70–99)
GLUCOSE BLDC GLUCOMTR-MCNC: 120 MG/DL (ref 70–99)
GLUCOSE BLDC GLUCOMTR-MCNC: 130 MG/DL (ref 70–99)
GLUCOSE BLDC GLUCOMTR-MCNC: 74 MG/DL (ref 70–99)
GLUCOSE SERPL-MCNC: 88 MG/DL (ref 70–99)
HCT VFR BLD AUTO: 31.3 % (ref 35–47)
HGB BLD-MCNC: 10.4 G/DL (ref 11.7–15.7)
MCH RBC QN AUTO: 30.4 PG (ref 26.5–33)
MCHC RBC AUTO-ENTMCNC: 33.2 G/DL (ref 31.5–36.5)
MCV RBC AUTO: 92 FL (ref 78–100)
PLATELET # BLD AUTO: 131 10E9/L (ref 150–450)
POTASSIUM SERPL-SCNC: 3.5 MMOL/L (ref 3.4–5.3)
PROT SERPL-MCNC: 5.5 G/DL (ref 6.8–8.8)
RBC # BLD AUTO: 3.42 10E12/L (ref 3.8–5.2)
SODIUM SERPL-SCNC: 142 MMOL/L (ref 133–144)
WBC # BLD AUTO: 3 10E9/L (ref 4–11)

## 2020-04-12 PROCEDURE — 12000000 ZZH R&B MED SURG/OB

## 2020-04-12 PROCEDURE — 99233 SBSQ HOSP IP/OBS HIGH 50: CPT | Performed by: FAMILY MEDICINE

## 2020-04-12 PROCEDURE — 25000132 ZZH RX MED GY IP 250 OP 250 PS 637: Performed by: FAMILY MEDICINE

## 2020-04-12 PROCEDURE — 25000128 H RX IP 250 OP 636: Performed by: FAMILY MEDICINE

## 2020-04-12 PROCEDURE — 00000146 ZZHCL STATISTIC GLUCOSE BY METER IP

## 2020-04-12 PROCEDURE — 82550 ASSAY OF CK (CPK): CPT | Performed by: FAMILY MEDICINE

## 2020-04-12 PROCEDURE — 25000132 ZZH RX MED GY IP 250 OP 250 PS 637: Performed by: PHYSICIAN ASSISTANT

## 2020-04-12 PROCEDURE — 25000131 ZZH RX MED GY IP 250 OP 636 PS 637: Performed by: FAMILY MEDICINE

## 2020-04-12 PROCEDURE — 80076 HEPATIC FUNCTION PANEL: CPT | Performed by: FAMILY MEDICINE

## 2020-04-12 PROCEDURE — 97530 THERAPEUTIC ACTIVITIES: CPT | Mod: GP | Performed by: PHYSICAL THERAPIST

## 2020-04-12 PROCEDURE — 25000125 ZZHC RX 250: Performed by: FAMILY MEDICINE

## 2020-04-12 PROCEDURE — 80048 BASIC METABOLIC PNL TOTAL CA: CPT | Performed by: FAMILY MEDICINE

## 2020-04-12 PROCEDURE — 97110 THERAPEUTIC EXERCISES: CPT | Mod: GP | Performed by: PHYSICAL THERAPIST

## 2020-04-12 PROCEDURE — 85027 COMPLETE CBC AUTOMATED: CPT | Performed by: FAMILY MEDICINE

## 2020-04-12 RX ORDER — LEVOTHYROXINE SODIUM 88 UG/1
44 TABLET ORAL WEEKLY
Status: DISCONTINUED | OUTPATIENT
Start: 2020-04-19 | End: 2020-04-13 | Stop reason: HOSPADM

## 2020-04-12 RX ORDER — PRAMIPEXOLE DIHYDROCHLORIDE 0.5 MG/1
0.5 TABLET ORAL
Status: DISCONTINUED | OUTPATIENT
Start: 2020-04-12 | End: 2020-04-13 | Stop reason: HOSPADM

## 2020-04-12 RX ORDER — PRAMIPEXOLE DIHYDROCHLORIDE 0.5 MG/1
0.5 TABLET ORAL AT BEDTIME
Status: DISCONTINUED | OUTPATIENT
Start: 2020-04-12 | End: 2020-04-13 | Stop reason: HOSPADM

## 2020-04-12 RX ORDER — SIMVASTATIN 40 MG
40 TABLET ORAL AT BEDTIME
Status: DISCONTINUED | OUTPATIENT
Start: 2020-04-12 | End: 2020-04-13 | Stop reason: HOSPADM

## 2020-04-12 RX ORDER — POTASSIUM CHLORIDE 750 MG/1
10 TABLET, EXTENDED RELEASE ORAL 2 TIMES DAILY
Status: DISCONTINUED | OUTPATIENT
Start: 2020-04-12 | End: 2020-04-13 | Stop reason: HOSPADM

## 2020-04-12 RX ORDER — FUROSEMIDE 20 MG
20 TABLET ORAL 2 TIMES DAILY
Status: DISCONTINUED | OUTPATIENT
Start: 2020-04-12 | End: 2020-04-13 | Stop reason: HOSPADM

## 2020-04-12 RX ORDER — LEVOTHYROXINE SODIUM 88 UG/1
88 TABLET ORAL
Status: DISCONTINUED | OUTPATIENT
Start: 2020-04-13 | End: 2020-04-13 | Stop reason: HOSPADM

## 2020-04-12 RX ADMIN — INSULIN GLARGINE 18 UNITS: 100 INJECTION, SOLUTION SUBCUTANEOUS at 22:47

## 2020-04-12 RX ADMIN — INSULIN ASPART 14 UNITS: 100 INJECTION, SOLUTION INTRAVENOUS; SUBCUTANEOUS at 17:25

## 2020-04-12 RX ADMIN — MICONAZOLE NITRATE: 20 POWDER TOPICAL at 21:09

## 2020-04-12 RX ADMIN — OMEPRAZOLE 20 MG: 20 CAPSULE, DELAYED RELEASE ORAL at 16:16

## 2020-04-12 RX ADMIN — PRAMIPEXOLE DIHYDROCHLORIDE 0.5 MG: 0.5 TABLET ORAL at 21:09

## 2020-04-12 RX ADMIN — FUROSEMIDE 20 MG: 20 TABLET ORAL at 11:35

## 2020-04-12 RX ADMIN — PRAMIPEXOLE DIHYDROCHLORIDE 0.5 MG: 0.5 TABLET ORAL at 01:15

## 2020-04-12 RX ADMIN — HEPARIN, PORCINE (PF) 10 UNIT/ML INTRAVENOUS SYRINGE 5 ML: at 06:39

## 2020-04-12 RX ADMIN — LEVOTHYROXINE SODIUM ANHYDROUS 50 MCG: 100 INJECTION, POWDER, LYOPHILIZED, FOR SOLUTION INTRAVENOUS at 08:18

## 2020-04-12 RX ADMIN — HYDROMORPHONE HYDROCHLORIDE 0.3 MG: 1 INJECTION, SOLUTION INTRAMUSCULAR; INTRAVENOUS; SUBCUTANEOUS at 02:14

## 2020-04-12 RX ADMIN — SIMVASTATIN 40 MG: 40 TABLET, FILM COATED ORAL at 21:09

## 2020-04-12 RX ADMIN — POTASSIUM CHLORIDE 10 MEQ: 750 TABLET, FILM COATED, EXTENDED RELEASE ORAL at 11:35

## 2020-04-12 RX ADMIN — POTASSIUM CHLORIDE 10 MEQ: 750 TABLET, FILM COATED, EXTENDED RELEASE ORAL at 21:09

## 2020-04-12 RX ADMIN — FUROSEMIDE 20 MG: 20 TABLET ORAL at 21:08

## 2020-04-12 RX ADMIN — INSULIN ASPART 14 UNITS: 100 INJECTION, SOLUTION INTRAVENOUS; SUBCUTANEOUS at 09:08

## 2020-04-12 RX ADMIN — MICONAZOLE NITRATE: 20 POWDER TOPICAL at 08:26

## 2020-04-12 RX ADMIN — APIXABAN 5 MG: 5 TABLET, FILM COATED ORAL at 21:08

## 2020-04-12 RX ADMIN — APIXABAN 5 MG: 5 TABLET, FILM COATED ORAL at 11:35

## 2020-04-12 RX ADMIN — HEPARIN, PORCINE (PF) 10 UNIT/ML INTRAVENOUS SYRINGE 5 ML: at 22:47

## 2020-04-12 ASSESSMENT — ACTIVITIES OF DAILY LIVING (ADL)
ADLS_ACUITY_SCORE: 39
ADLS_ACUITY_SCORE: 37

## 2020-04-12 NOTE — PROGRESS NOTES
[facility] TRANSPORT NOTE  Data:   Reason for Transport:  Med/Surgg    Stefanie Velazquez was transported to Med/Surg 301 via wheel chair at 2200.  Patient was accompanied by Nursing Assistant. Equipment used for transport: None. Family was aware of reason for transport: yes Yfn Silva updated.     Action:  Report: given to Shae DONIS    Response:  Patient's condition when transferred off unit was stable.    Luiza Brand RN

## 2020-04-12 NOTE — PROGRESS NOTES
Skin affirmation note    Admitting nurse completed full skin assessment, Efe score and Efe interventions. This writer agrees with the initial skin assessment findings of rashes under L breast and minor pinkness in the abd/sigifredo folds. 0.5cm open area noted to the L inner thigh, superficial, pink, clean.

## 2020-04-12 NOTE — PLAN OF CARE
Discharge Planner PT   Patient plan for discharge: back to [facility]  Current status: sit <> stand CGA took a few tries but patient able to independently figure out where to put hands on chair to help her the most with standing. Transferred chair > bed with FWW and CGA.  sat for a minute to rest. sit <> stand from bed CGA, transferred back to chair with FWW and CGA. Completed LE strengthening exercises. Upright in chair at end of session with all needs in reach and alarm on.   Barriers to return to prior living situation: medical status  Recommendations for discharge: Assisted living   Rationale for recommendations: patient able to complete transfers again today with CGA and FWW       Entered by: Kallie Stevens 04/12/2020 9:30 AM        Physical Therapy Discharge Summary    Reason for therapy discharge:    All goals and outcomes met, no further needs identified.  Dangelo plans to discharge back to assisted Living    Progress towards therapy goal(s). See goals on Care Plan in [facility] electronic health record for goal details.  Goals met    Therapy recommendation(s):    No further therapy is recommended.  Patient to continue with exercises classes at assisted living facility

## 2020-04-12 NOTE — PLAN OF CARE
Secondary skin check done with Jesenia DUARTE upon arrival to floor.   Patient forgetful. Repeatedly asked the same question and started to get more restless. Zyprexa administered, made patient worse, restless leg kicked I and her confusion increased. She requested her medication for restless leg and something for pain. Mirapex and Dilaudid given.  She slept for most of evening.   Walking boot on right leg removed for a while. She has a broken toe and needs it on with activity.   Up with 2 and walker to pivot turn to bed.   Micatin powder applied to under left breast and groin.  Lr catheter in place.   Bed alarm on for safety.

## 2020-04-12 NOTE — PLAN OF CARE
Patient has been compliant with cares and taking medications.  Up with 2 assist to chair, discharge back to [facility] tomorrow.

## 2020-04-12 NOTE — PROGRESS NOTES
[facility]      Subjective:  I am doing pretty well.  She denies pain.  She has been eating    Review of Systems:  CONSTITUTIONAL: Weakness is improving  INTEGUMENTARY/SKIN: NEGATIVE for worrisome rashes, moles or lesions  EYES: NEGATIVE for vision changes or irritation  ENT/MOUTH: NEGATIVE for ear, mouth and throat problems  RESP: Some coughing after eating  BREAST: NEGATIVE for masses, tenderness or discharge  CV: NEGATIVE for chest pain, palpitations or peripheral edema  GI: NEGATIVE for nausea, abdominal pain, heartburn, or change in bowel habits  : NEGATIVE for frequency, dysuria, or hematuria  MUSCULOSKELETAL: NEGATIVE for significant arthralgias or myalgia  NEURO: Nurses report she is a bit impulsive and sometimes asks the same question repetitively.  But she is calm and cooperative  ENDOCRINE: NEGATIVE for temperature intolerance, skin/hair changes  HEME: NEGATIVE for bleeding problems  PSYCHIATRIC: NEGATIVE for changes in mood or affect    Physical Exam:  Vitals Were Reviewed    Patient Vitals for the past 16 hrs:   BP Temp Temp src Pulse Heart Rate Resp SpO2 Weight   04/12/20 0751 (!) 147/51 98  F (36.7  C) Oral -- 52 18 97 % --   04/12/20 0500 -- -- -- -- -- -- -- 98.1 kg (216 lb 4.3 oz)   04/12/20 0443 123/50 97.8  F (36.6  C) Oral 52 -- 18 95 % --   04/12/20 0229 -- -- -- -- -- 18 -- --   04/11/20 2223 (!) 141/67 97.9  F (36.6  C) Oral 53 -- 18 98 % 98.1 kg (216 lb 4.3 oz)   04/11/20 1947 117/58 98  F (36.7  C) Oral 58 -- 16 96 % --         Intake/Output Summary (Last 24 hours) at 4/12/2020 1035  Last data filed at 4/12/2020 0656  Gross per 24 hour   Intake 1440 ml   Output 1450 ml   Net -10 ml       GENERAL APPEARANCE: She is alert, a bit impulsive but cooperative.  EYES: conjunctiva clear, eyes grossly normal  RESP: lungs clear to auscultation - no rales, rhonchi or wheezes  CV: regular rate and rhythm, normal S1 S2, no S3 or S4 and no murmur, click or rub   ABDOMEN: soft,  nontender, no HSM or masses and bowel sounds normal  MS: no clubbing, cyanosis; tr edema  SKIN: clear without significant rashes or lesions  NEURO: She is alert and oriented.  Slightly impulsive.  Speech is a little garbled.  Sometimes her tongue is a bit protuberant.  At times she seems to have a little weakness around her right mouth area but is not persistent.  Her strength is improving- she got out of bed this morning without assistance.    Lab:  Recent Labs   Lab Test 04/12/20  0640 04/11/20  0530    144   POTASSIUM 3.5 3.5   CHLORIDE 112* 113*   CO2 26 25   ANIONGAP 4 6   GLC 88 89   BUN 15 16   CR 1.32* 1.47*   DWIGHT 8.0* 7.6*     CBC RESULTS:   Recent Labs   Lab Test 04/12/20  0640 04/11/20  0530   WBC 3.0* 2.9*   RBC 3.42* 3.47*   HGB 10.4* 10.6*   HCT 31.3* 32.5*   * 127*       Results for orders placed or performed during the hospital encounter of 04/06/20 (from the past 24 hour(s))   Glucose by meter   Result Value Ref Range    Glucose 102 (H) 70 - 99 mg/dL   Glucose by meter   Result Value Ref Range    Glucose 132 (H) 70 - 99 mg/dL   Glucose by meter   Result Value Ref Range    Glucose 97 70 - 99 mg/dL   Glucose by meter   Result Value Ref Range    Glucose 120 (H) 70 - 99 mg/dL   Basic metabolic panel   Result Value Ref Range    Sodium 142 133 - 144 mmol/L    Potassium 3.5 3.4 - 5.3 mmol/L    Chloride 112 (H) 94 - 109 mmol/L    Carbon Dioxide 26 20 - 32 mmol/L    Anion Gap 4 3 - 14 mmol/L    Glucose 88 70 - 99 mg/dL    Urea Nitrogen 15 7 - 30 mg/dL    Creatinine 1.32 (H) 0.52 - 1.04 mg/dL    GFR Estimate 39 (L) >60 mL/min/[1.73_m2]    GFR Estimate If Black 45 (L) >60 mL/min/[1.73_m2]    Calcium 8.0 (L) 8.5 - 10.1 mg/dL   CBC with platelets   Result Value Ref Range    WBC 3.0 (L) 4.0 - 11.0 10e9/L    RBC Count 3.42 (L) 3.8 - 5.2 10e12/L    Hemoglobin 10.4 (L) 11.7 - 15.7 g/dL    Hematocrit 31.3 (L) 35.0 - 47.0 %    MCV 92 78 - 100 fl    MCH 30.4 26.5 - 33.0 pg    MCHC 33.2 31.5 - 36.5 g/dL     RDW 12.9 10.0 - 15.0 %    Platelet Count 131 (L) 150 - 450 10e9/L   Hepatic panel   Result Value Ref Range    Bilirubin Direct 0.3 (H) 0.0 - 0.2 mg/dL    Bilirubin Total 0.6 0.2 - 1.3 mg/dL    Albumin 2.2 (L) 3.4 - 5.0 g/dL    Protein Total 5.5 (L) 6.8 - 8.8 g/dL    Alkaline Phosphatase 135 40 - 150 U/L    ALT 25 0 - 50 U/L    AST 35 0 - 45 U/L   CK total   Result Value Ref Range    CK Total 146 30 - 225 U/L   Glucose by meter   Result Value Ref Range    Glucose 74 70 - 99 mg/dL     *Note: Due to a large number of results and/or encounters for the requested time period, some results have not been displayed. A complete set of results can be found in Results Review.       Assessment and Plan:    Assessment and Plan:  Acute metabolic encephalopathy on top of mild cognitive impairment/dementia/severe agitation requring intubation.   Rule out acute CVA in the subcortical white matter of the fusiform gyrus of the right temporal lobe.  Relatively symmetric-appearing intrinsic T1 hyperintense signal  representing mineralization within the globus pallidus and substantia  nigra bilaterally.       After admit she became  extremely agitated and unable to be redirected.  She seemed to have gotten worse with neuroleptic treatment including olanzapine 10 mg and Haldol 2.5 mg.  She was constantly pulling at 4-point restraints on the floor.  Transferred to the ICU for 5-point restraints and Precedex started with out improvement. Ultimately due to severe agitation and sinus tach of 150 pt intubated by Dr Barfield 4/7/20 0545..     Pt extubated 4/9/20.  Work up revealed possible small right temporal lobe stroke which along with previous cognitive function may have precipitated events. Infectious work up.Globus pallidus and substantia nigra findings thought to chronic and not relevant to current situation per stroke neuro-so not pursued. One other possibility--cipro which the patient was on prior to admission has been known to cause  acute paranoia and delirium.      Currently the patient is dramatically improved.  Her cognitive function has improved since extubation.  She is forgetful and is often repetitively asking the same thing.  She was given Zyprexa nightly-but the nurses thought this exacerbated her cognitive status so it has been stopped.  The plan is for her to go back to her assisted living-but they cannot accept her back till Monday.     I did not add antiplatelet therapy as the diagnosis is sill uncertain.      Infection ruled out  UA showed mild pyuria-culture neg.MRSA nasal swab neg. Blood cultures neg, PCT 0.2,  . CRP 7.8.  No fever noted. LP with two wbc per hpf. CSF protein mildly increased (may be from diabetes mellitus type 2 per stroke neuro).. Stopped empiric rocephin 04/09/20. Stopped acyclovir 4/10/20 due to neg herpes pcr's from csf.     Mild hypoxia since extubation-suspect atelectasis and some fluid overload.  cxr showed bibasilar atelectasis vs infiltrate, some pul venous congestion.   Lasix times one given 4/10/20      Currently on room air     Possible aspiration  Choking on liquids after extubation, family says she did this previously.  Ultimately after speech evaluation it was elected to start her on a diet.  Maldonado Nair was involved.  The patient did not want any type of feeding tube.  The patient has been advised that she is at increased risk for aspiration events.     Acute kidney injury on chronic kidney disease-related to dehydration.   Admit creat 2.05-- 2.32-- 2.14--1.86--1.6-1.47-1.32.  I and O pos 6500  FENA suggested prerenal etiology 4 days ago.    Restarting lasix 04/12/20. Holding avapro.     Elevated lactic acid.  Present initially-no sign of sepsis     Traumatic rhabdomyolysis  CK elevation  Probably due motor agitation.CK has normalized.      Elevated troponin  Sinus dilip  suspect demand ischemia.    trops 0.052--0.084--0.414--0.217  ekg shows some ant and lat t wave inversions-but some degree  of this chronically. Had hypotension while intubated and briefly needed norepi.  Pt has persistent sinus dilip starting after intubation.  She had some hypotension during it earlier in the hospitalization and received atropine.   Improved now -but persistent.  Would avoid restarting prior to admit metoprolol.     diabetes mellitus type 2   On lantus and sliding scale  hypogycemic 04/09/20 --lantus reduced to 18 units     Mild pancytopenia  Unclear etiology, some of this maybe dilutional--follow      Mild transaminase elevation  From rhabdo-resolved     htn  Holding metoprolol, lasix , avapro  Required norepi 4/7/20 for bp support while on vent    Will restart Lasix.  She remains off metoprolol due to the bradycardia.  Consider restarting Avapro soon.     Hypernatremia  Improved 04/10/20       hyperlipidemia   Restart Zocor 04/12/20      gerd  Restart omeprazole 04/12/20      RLS  Restart  mirapex 04/12/20      hypothyroidism   Hold oral levothyroxine, using  iv replacement      History of deep venous thrombosis/pulmonary embolism  Stopping Lovenox and restarting Eliquis April 12, 2020     prophylaxis-Eliquis  FEN-dysphagia diet     Plan-  Etiology still unclear-- likely a temporal lobe stroke combined with pre existing cognitive dysfunction. As above reaction to cipro not ruled out.     Niece Lisa- her phone number is [phone]     Patient appears to aspirate clinically.  After consultation with Maldonado Nair a palliative care decision was made to reinstitute a diet.  Patient refuses any type of feeding tube.    I am resuming many of her oral medications today.  The group home cannot take her back until April 13.  Likely discharge back to the group home then.  Removing Lr..

## 2020-04-13 VITALS
HEART RATE: 52 BPM | BODY MASS INDEX: 42.24 KG/M2 | SYSTOLIC BLOOD PRESSURE: 106 MMHG | RESPIRATION RATE: 18 BRPM | DIASTOLIC BLOOD PRESSURE: 41 MMHG | OXYGEN SATURATION: 93 % | TEMPERATURE: 98.2 F | WEIGHT: 216.27 LBS

## 2020-04-13 LAB
ALBUMIN SERPL-MCNC: 2.2 G/DL (ref 3.4–5)
ALP SERPL-CCNC: 128 U/L (ref 40–150)
ALT SERPL W P-5'-P-CCNC: 25 U/L (ref 0–50)
ANION GAP SERPL CALCULATED.3IONS-SCNC: 7 MMOL/L (ref 3–14)
AST SERPL W P-5'-P-CCNC: 25 U/L (ref 0–45)
BACTERIA SPEC CULT: NO GROWTH
BACTERIA SPEC CULT: NO GROWTH
BASE EXCESS BLDV CALC-SCNC: 2.2 MMOL/L
BILIRUB DIRECT SERPL-MCNC: 0.3 MG/DL (ref 0–0.2)
BILIRUB SERPL-MCNC: 0.5 MG/DL (ref 0.2–1.3)
BUN SERPL-MCNC: 13 MG/DL (ref 7–30)
CALCIUM SERPL-MCNC: 8.3 MG/DL (ref 8.5–10.1)
CHLORIDE SERPL-SCNC: 112 MMOL/L (ref 94–109)
CK SERPL-CCNC: 77 U/L (ref 30–225)
CO2 SERPL-SCNC: 26 MMOL/L (ref 20–32)
CREAT SERPL-MCNC: 1.25 MG/DL (ref 0.52–1.04)
ERYTHROCYTE [DISTWIDTH] IN BLOOD BY AUTOMATED COUNT: 13.2 % (ref 10–15)
GFR SERPL CREATININE-BSD FRML MDRD: 41 ML/MIN/{1.73_M2}
GLUCOSE BLDC GLUCOMTR-MCNC: 163 MG/DL (ref 70–99)
GLUCOSE BLDC GLUCOMTR-MCNC: 87 MG/DL (ref 70–99)
GLUCOSE BLDC GLUCOMTR-MCNC: 90 MG/DL (ref 70–99)
GLUCOSE SERPL-MCNC: 83 MG/DL (ref 70–99)
HCO3 BLDV-SCNC: 27 MMOL/L (ref 21–28)
HCT VFR BLD AUTO: 30.9 % (ref 35–47)
HGB BLD-MCNC: 10.4 G/DL (ref 11.7–15.7)
MCH RBC QN AUTO: 31 PG (ref 26.5–33)
MCHC RBC AUTO-ENTMCNC: 33.7 G/DL (ref 31.5–36.5)
MCV RBC AUTO: 92 FL (ref 78–100)
O2/TOTAL GAS SETTING VFR VENT: NORMAL %
PCO2 BLDV: 42 MM HG (ref 40–50)
PH BLDV: 7.42 PH (ref 7.32–7.43)
PLATELET # BLD AUTO: 147 10E9/L (ref 150–450)
PO2 BLDV: 39 MM HG (ref 25–47)
POTASSIUM SERPL-SCNC: 3.6 MMOL/L (ref 3.4–5.3)
PROT SERPL-MCNC: 5.4 G/DL (ref 6.8–8.8)
RBC # BLD AUTO: 3.36 10E12/L (ref 3.8–5.2)
SODIUM SERPL-SCNC: 145 MMOL/L (ref 133–144)
SPECIMEN SOURCE: NORMAL
SPECIMEN SOURCE: NORMAL
WBC # BLD AUTO: 3.2 10E9/L (ref 4–11)

## 2020-04-13 PROCEDURE — 82550 ASSAY OF CK (CPK): CPT | Performed by: FAMILY MEDICINE

## 2020-04-13 PROCEDURE — 99238 HOSP IP/OBS DSCHRG MGMT 30/<: CPT | Performed by: INTERNAL MEDICINE

## 2020-04-13 PROCEDURE — 80076 HEPATIC FUNCTION PANEL: CPT | Performed by: FAMILY MEDICINE

## 2020-04-13 PROCEDURE — 00000146 ZZHCL STATISTIC GLUCOSE BY METER IP

## 2020-04-13 PROCEDURE — 25000132 ZZH RX MED GY IP 250 OP 250 PS 637: Performed by: INTERNAL MEDICINE

## 2020-04-13 PROCEDURE — 82803 BLOOD GASES ANY COMBINATION: CPT | Performed by: FAMILY MEDICINE

## 2020-04-13 PROCEDURE — 85027 COMPLETE CBC AUTOMATED: CPT | Performed by: FAMILY MEDICINE

## 2020-04-13 PROCEDURE — 25000132 ZZH RX MED GY IP 250 OP 250 PS 637: Performed by: FAMILY MEDICINE

## 2020-04-13 PROCEDURE — 80048 BASIC METABOLIC PNL TOTAL CA: CPT | Performed by: FAMILY MEDICINE

## 2020-04-13 PROCEDURE — 25000131 ZZH RX MED GY IP 250 OP 636 PS 637: Performed by: FAMILY MEDICINE

## 2020-04-13 PROCEDURE — 99207 ZZC CDG-CODE INCORRECT PER BILLING BASED ON TIME: CPT | Performed by: INTERNAL MEDICINE

## 2020-04-13 RX ORDER — POTASSIUM CHLORIDE 20MEQ/15ML
10 LIQUID (ML) ORAL ONCE
Status: COMPLETED | OUTPATIENT
Start: 2020-04-13 | End: 2020-04-13

## 2020-04-13 RX ADMIN — ACETAMINOPHEN 650 MG: 325 TABLET, FILM COATED ORAL at 11:24

## 2020-04-13 RX ADMIN — INSULIN ASPART 1 UNITS: 100 INJECTION, SOLUTION INTRAVENOUS; SUBCUTANEOUS at 11:50

## 2020-04-13 RX ADMIN — LEVOTHYROXINE SODIUM 88 MCG: 88 TABLET ORAL at 06:12

## 2020-04-13 RX ADMIN — MICONAZOLE NITRATE: 20 POWDER TOPICAL at 08:40

## 2020-04-13 RX ADMIN — APIXABAN 5 MG: 5 TABLET, FILM COATED ORAL at 09:29

## 2020-04-13 RX ADMIN — INSULIN ASPART 14 UNITS: 100 INJECTION, SOLUTION INTRAVENOUS; SUBCUTANEOUS at 09:31

## 2020-04-13 RX ADMIN — OMEPRAZOLE 20 MG: 20 CAPSULE, DELAYED RELEASE ORAL at 06:12

## 2020-04-13 RX ADMIN — FUROSEMIDE 20 MG: 20 TABLET ORAL at 09:29

## 2020-04-13 RX ADMIN — POTASSIUM CHLORIDE 10 MEQ: 20 SOLUTION ORAL at 10:11

## 2020-04-13 ASSESSMENT — ACTIVITIES OF DAILY LIVING (ADL)
ADLS_ACUITY_SCORE: 37
ADLS_ACUITY_SCORE: 37
ADLS_ACUITY_SCORE: 43
ADLS_ACUITY_SCORE: 37

## 2020-04-13 NOTE — PROGRESS NOTES
Name: Stefanie Velazquez    MRN#: [id number]    Reason for Hospitalization: Renal insufficiency [N28.9]  Elevated troponin [R79.89]  Elevated lactic acid level [R79.89]  Altered mental status, unspecified altered mental status type [R41.82]    Discharge Date: 2020    Patient / Family response to discharge plan: Niece is aware of pt's discharge today back to [facility] via [facility] wheelchair transportation at 4:30 PM today.      Patient currently resides at [facility] formerly known as [facility] (phone: [phone] Fax: [phone])    Other Providers (Care Coordinator, County Services, PCA services etc): Yes: Pt has [facility] RN case manager, Komal Krishnan.  PT recommends PT services for mobility.  [facility] ([phone] Fax: [phone]) order is placed.    CTS Hand Off Completed: Yes: PCP & RN CC    PAS #: N/A    DEVON Score: Medium    Future Appointments:   Future Appointments   Date Time Provider Department Center   2020 11:00 AM Apolinar Martinez MD [facility]   2020  1:40 PM Tanya Rodney [facility]N C[facility]     Discharge Disposition: return to [facility] living    Discharge Planner   Discharge Plans in progress: Return to [facility] with Home Care PT orders  Barriers to discharge plan: N/A  Follow up plan: [facility] staff, PCP & [facility] CC to follow       Entered by: Gina Nayak 2020 11:33 AM       ARIELLA Tyson  [facility] [phone]   [facility]  [phone]    HOME CARE HAND OFF  Patient Name: Stefanie Velazquez    MRN: [id number]    : 1945    Patient Zip Code: [address]    Admit Diagnosis: Renal insufficiency [N28.9]  Elevated troponin [R79.89]  Elevated lactic acid level [R79.89]  Altered mental status, unspecified altered mental status type [R41.82]      Services Pt Needs at Home: PT    Discharge Support: Family/Friend Support, [facility] (phone:  [phone] Fax: [phone])    Living Arrangements: Assisted living     or Address Other Than Pt: Yes: staff at [facility]    Wound Care: Yes:  Barrier cream to buttocks.    Anticipate DC Date: 4/13/2020          3

## 2020-04-13 NOTE — PLAN OF CARE
Patient alert, pleasant, and orientated x4. Vital signs stable. Heart rate bradycardic in the upper 50s. On room air. Afebrile. Patient requested tylenol for a headache caused by coughing.      Patient up with assist of 2 with walker and gait belt. Patient sat up in the chair for meals. BG checks completed. Dysphagia diet with honey thick liquids. Patient tolerating diet and denied any nausea.    Patient incontinent of bowel and bladder. Tiffany-cares completed . Rash under breast and in groin washed with soap and water; antimicrobial powder applied. Skin tear to right hand covered with mepilex dressing.       Potassium level 3.6; no replacement per protocol.     Patient to discharge back to [facility] at 1630 via [facility] transport.

## 2020-04-13 NOTE — PROGRESS NOTES
[facility] DISCHARGE NOTE    Patient discharged to assisted living at 4:19 PM via wheel chair. Accompanied by other:[facility] transport and staff. Discharge instructions reviewed with patient, opportunity offered to ask questions. Prescriptions sent to patients preferred pharmacy. All belongings sent with patient.    Purvi Ayala RN

## 2020-04-13 NOTE — PLAN OF CARE
Pt alert, confused, difficult to understand. Assist 2 with walker to commode. Takes pills crushed with pudding. Honey thickened liquid. Cam boot for R leg when ambulating due to broken R great toe. Barrier cream to buttocks. Pt has port, heparin locked. IV saline locked. BP (!) 153/51   Pulse 52   Temp 97.7  F (36.5  C) (Oral)   Resp 20   Wt 98.1 kg (216 lb 4.3 oz)   SpO2 94%   BMI 42.24 kg/m

## 2020-04-13 NOTE — DISCHARGE SUMMARY
[facility]    Discharge Summary  Hospital Medicine    Date of Admission:  4/6/2020  Date of Discharge:  4/13/2020   Discharging Provider: Sandra Self  Date of Service: 4/13/2020     Primary Care     Sandra Medina  [address] [facility] [address]      Identification and Chief Compaint: Stefanie Velazquez is a 75 year old female with hx cognitive dysfunction who presented on 4/6/2020 with increased cnfusion and agitation  Discharge Disposition    discharge to assisted living    Discharge Orders      Reason for your hospital stay    Acute metabolic encephalopathy , possibly secondary to Cipro     Follow-up and recommended labs and tests     Primary care one week     Discharge Medications   Current Discharge Medication List      START taking these medications    Details   LANsoprazole (PREVACID SOLUTAB) 15 MG ODT Take 1 tablet (15 mg) by mouth 2 times daily (before meals)  Qty: 180 tablet, Refills: 3    Associated Diagnoses: Gastroesophageal reflux disease, esophagitis presence not specified; Oropharyngeal dysphagia         CONTINUE these medications which have NOT CHANGED    Details   ACCU-CHEK MILVIA MELODY dispense one meter  Qty: 1 device, Refills: 0    Comments: Diagnosis code: 250.02  Associated Diagnoses: Type II or unspecified type diabetes mellitus without mention of complication, uncontrolled      acetaminophen (TYLENOL) 650 MG CR tablet Take 1 tablet (650 mg) by mouth 3 times daily as needed for mild pain or fever  Qty: 60 tablet    Associated Diagnoses: Bilateral cellulitis of lower leg      aspirin (ASA) 81 MG tablet Take 81 mg by mouth daily      blood glucose monitoring (ACCU-CHEK MILVIA) test strip Use to test blood sugars 4 times daily or as directed.  Qty: 360 each, Refills: 1    Associated Diagnoses: Type 2 diabetes mellitus with diabetic nephropathy, with long-term current use of insulin (H)      blood glucose monitoring (ACCU-CHEK MULTICLIX) lancets Test BG 4 times  daily  Qty: 1 Box, Refills: 11    Associated Diagnoses: Type II or unspecified type diabetes mellitus without mention of complication, uncontrolled      cholecalciferol 1000 units TABS Take 1,000 Units by mouth daily      Continuous Blood Gluc  (FREESTYLE JAJA 14 DAY READER) MELODY 1 each as needed (use to scan the sensor to check the blood sugars)  Qty: 1 Device, Refills: 0    Associated Diagnoses: Type 2 diabetes mellitus with diabetic nephropathy, with long-term current use of insulin (H)      Continuous Blood Gluc Sensor (FREESTYLE JAJA 14 DAY SENSOR) MISC 1 each every 14 days  Qty: 2 each, Refills: 11    Comments: Pt at an assisted living so make sure you follow up if no call back  Associated Diagnoses: Type 2 diabetes mellitus with diabetic nephropathy, with long-term current use of insulin (H)      diclofenac (VOLTAREN) 50 MG EC tablet Take 50 mg by mouth 3 times daily as needed for moderate pain      ELIQUIS ANTICOAGULANT 5 MG tablet Take 1 tablet by mouth 2 times daily      ferrous sulfate (FEROSUL) 325 (65 Fe) MG tablet Take 1 tablet (325 mg) by mouth daily (with breakfast)  Qty: 30 tablet, Refills: 1    Associated Diagnoses: Anemia, unspecified type      fluocinonide (LIDEX) 0.05 % ointment Apply sparingly to rash on legs twice daily as needed.  Do not apply to face.  Qty: 60 g, Refills: 11    Associated Diagnoses: Venous stasis dermatitis of both lower extremities      fluticasone (FLONASE) 50 MCG/ACT spray Spray 1 spray into both nostrils daily  Qty: 1 Bottle, Refills: 3    Associated Diagnoses: Chronic rhinitis, unspecified type      furosemide (LASIX) 20 MG tablet Take 1 tablet (20 mg) by mouth 2 times daily  Qty: 180 tablet, Refills: 3    Associated Diagnoses: Benign essential hypertension      gabapentin (NEURONTIN) 100 MG capsule Take 1 capsule (100 mg) by mouth At Bedtime  Qty: 90 capsule, Refills: 1    Associated Diagnoses: Lumbar radiculopathy      hydrocortisone (ANUSOL-HC) 2.5 % cream  Place rectally 2 times daily as needed for hemorrhoids  Qty: 30 g, Refills: 0    Associated Diagnoses: External hemorrhoids      insulin aspart (NOVOLOG FLEXPEN) 100 UNIT/ML pen Inject 14 Units Subcutaneous 2 times daily (with meals) Lunch and dinner       insulin glargine (LANTUS PEN) 100 UNIT/ML pen Inject 24 Units Subcutaneous At Bedtime  Qty: 18 mL, Refills: 3    Comments: If Lantus is not covered by insurance, may substitute Basaglar at same dose and frequency.    Associated Diagnoses: Type 2 diabetes mellitus with diabetic nephropathy, with long-term current use of insulin (H)      insulin pen needle (BD GABRIELLA U/F) 32G X 4 MM Use 4 times per day or as directed.  Qty: 120 each, Refills: 5    Associated Diagnoses: Type 2 diabetes mellitus with diabetic nephropathy, with long-term current use of insulin (H)      !! levothyroxine (SYNTHROID/LEVOTHROID) 88 MCG tablet Take 44 mcg by mouth once a week = 1.2  Tab on Sunday      !! levothyroxine (SYNTHROID/LEVOTHROID) 88 MCG tablet Take 88 mcg by mouth daily Except on Sunday      loperamide (IMODIUM) 2 MG capsule One capsule once a day PRN, can use a second one if needed  Qty: 90 capsule, Refills: 3    Associated Diagnoses: Rectal cancer (H); Chronic diarrhea      magnesium 250 MG tablet Take 1 tablet by mouth daily      menthol-zinc oxide (CALMOSEPTINE) 0.44-20.625 % OINT ointment Apply topically 4 times daily as needed for skin protection    Associated Diagnoses: Rash and nonspecific skin eruption      !! order for DME Equipment being ordered:   Incontinence Products: Gloves (4 Boxes) & chux pads  Qty: 300 each, Refills: 11    Associated Diagnoses: Rectal cancer (H); Bowel and bladder incontinence      !! order for DME Equipment being ordered: Wheelchair  Qty: 1 Device, Refills: 0    Associated Diagnoses: Spinal stenosis of lumbar region without neurogenic claudication; Lumbar radiculopathy; DDD (degenerative disc disease), lumbar      !! order for DME Equipment being  ordered: Incontinence briefs/Depends  Qty: 12 Package, Refills: 11    Associated Diagnoses: Rectal cancer (H)      !! order for DME Equipment being ordered:  order for XL Size  Pull ups.  Pt is currently using 12 pull ups a day  Qty: 350 each, Refills: 11    Associated Diagnoses: Rectal cancer (H); Bowel and bladder incontinence      !! order for DME Equipment being ordered: Compression stockings  Qty: 2 Device, Refills: 0    Associated Diagnoses: Acute deep vein thrombosis (DVT) of right lower extremity, unspecified vein (H)      !! order for DME Equipment being ordered: Hospital Bed service and repair  Qty: 1 each, Refills: 0    Associated Diagnoses: DDD (degenerative disc disease), lumbar; Type 2 diabetes mellitus with diabetic nephropathy, with long-term current use of insulin (H); Neuropathy; Rectal cancer (H)      potassium chloride ER (K-DUR/KLOR-CON M) 10 MEQ CR tablet Take 1 tablet (10 mEq) by mouth 2 times daily  Qty: 180 tablet, Refills: 1    Associated Diagnoses: Chronic ischemic heart disease      !! pramipexole (MIRAPEX) 0.5 MG tablet Take 1 tablet by mouth At Bedtime       !! pramipexole (MIRAPEX) 0.5 MG tablet Take 0.5 mg by mouth One hour after scheduled dose and every 8 hours as needed      simvastatin (ZOCOR) 40 MG tablet Take 1 tablet (40 mg) by mouth daily  Qty: 90 tablet, Refills: 2    Associated Diagnoses: Hyperlipidemia LDL goal <100       !! - Potential duplicate medications found. Please discuss with provider.      STOP taking these medications       ciprofloxacin (CIPRO) 250 MG tablet Comments:   Reason for Stopping:         irbesartan (AVAPRO) 75 MG tablet Comments:   Reason for Stopping:         metoprolol succinate ER (TOPROL-XL) 25 MG 24 hr tablet Comments:   Reason for Stopping:         OMEPRAZOLE PO Comments:   Reason for Stopping:         traMADol (ULTRAM) 50 MG tablet Comments:   Reason for Stopping:             Allergies   Allergies   Allergen Reactions     Ciprofloxacin Anxiety      Pt developed agitation, paranoia while on cipro--on clear if this is what caused it.  But would try to avoid in future.     Hydrocodone Other (See Comments)     dizzy     Lisinopril Cough            Vicodin [Hydrocodone-Acetaminophen] Other (See Comments)     Caused extreme dizziness       Consultations This Hospital Stay     PHARMACY TO DOSE VANCO  PHARMACY IP CONSULT  PHARMACY TO DOSE HEPARIN  PHARMACY TO DOSE VANCO  VASCULAR ACCESS ADULT IP CONSULT  CARE TRANSITION RN/SW IP CONSULT  PALLIATIVE CARE ADULT IP CONSULT  SPIRITUAL HEALTH SERVICES IP CONSULT  SPEECH LANGUAGE PATH ADULT IP CONSULT  SPEECH LANGUAGE PATH ADULT IP CONSULT  PHYSICAL THERAPY ADULT IP CONSULT  OCCUPATIONAL THERAPY PEDS IP CONSULT    Significant Results and Procedures   Procedures        Data   Results for orders placed or performed during the hospital encounter of 04/06/20   Chest XR,  PA & LAT    Narrative    CHEST TWO VIEWS     4/6/2020 8:59 PM     HISTORY: Confusion.    COMPARISON: 5/21/2019.      Impression    IMPRESSION: Stable right chest port venous catheter and sternotomy.  Cardiac silhouette within normal limits. No focal airspace disease. No  effusions identified.    KIMI HINTON MD   CT Head w/o Contrast    Narrative    EXAM: CT HEAD W/O CONTRAST  LOCATION: [facility]  DATE/TIME: 4/6/2020 8:40 PM    INDICATION: Confusion.  COMPARISON: Head CT 05/21/2019.  TECHNIQUE: Routine without IV contrast. Multiplanar reformats. Dose reduction techniques were used.    FINDINGS:  INTRACRANIAL CONTENTS: No evidence of acute intracranial hemorrhage. No mass effect or midline shift. Extensive periventricular white matter hypodensities which are nonspecific, but likely related to chronic microvascular ischemic disease. Chronic area   of encephalomalacia in the right frontal white matter. Moderate diffuse parenchymal volume loss. Ventricular size is unchanged without evidence of hydrocephalus.    VISUALIZED ORBITS/SINUSES/MASTOIDS:  No intraorbital abnormality. No paranasal sinus mucosal disease. No middle ear or mastoid effusion.    BONES/SOFT TISSUES: No acute abnormality.      Impression    IMPRESSION:  1.  No evidence of acute hemorrhage, mass, or herniation.  2.  Marked diffuse parenchymal volume loss and extensive white matter changes likely due to chronic microvascular ischemic disease.   XR Chest Port 1 View    Narrative    EXAM: CHEST SINGLE VIEW PORTABLE  LOCATION: [facility]  DATE/TIME: 4/7/2020 6:05 AM    INDICATION: Tube placement.  COMPARISON: 04/06/2020 at 2052 hours.      Impression    IMPRESSION:   1. Interval placement of an endotracheal tube with distal tube tip in the mid trachea, approximately 5 cm proximal to the ivis.  2. No other significant interval change since the recent comparison study.             XR Chest Port 1 View    Narrative    EXAM: XR CHEST PORT 1 VW  LOCATION: [facility]  DATE/TIME: 4/7/2020 6:39 AM    INDICATION: Tube placement  COMPARISON: 04/07/2020      Impression    IMPRESSION: Endotracheal tube 3.4 cm above ivis. Median sternotomy. Right Port-A-Cath. Stable cardiomediastinal silhouette. No focal consolidation. Trace right effusion.   MRI Brain w/o contrast    Addendum: 4/9/2020    Addendum: Again with respect to the intrinsically T1 hyperintense  signal in the bilateral globus pallidus and substantia nigra, the  primary considerations include processes that result in excess mineral  deposition. These would include chronic liver disease/cirrhosis (due  to excessive manganese deposition, although excess manganese  deposition can be seen in other processes such as occupational  exposure, drug abuse or long term TPN), Isael's disease (due to  excessive copper deposition), parathyroid disease or physiologic  calcification (although often T1 hyperintense signal from  calcification is evident on CT and this is not the case in this  patient), or gadolinium deposition  (although typically this has been  reported in patients who have had multiple, and more commonly  numerous, MRIs with gadolinium-based contrast agent). Again, this  finding certainly may be incidental to the patient's current  delirium/unspecified confusional state/encephalopathy and clinical  correlation is recommended.     Also upon further review a punctate chronic left cerebellar infarct is  also noted (series 6 image 6).    The additional findings and discussion from this addendum were related  by phone by myself to Dr. Desir at approximately 8:08 AM on 4/9/2020.    End of addendum.     PRINCE RAY MD      Addendum: 4/8/2020    Addendum: With respect to the intrinsic T1 hyperintense signal in the  bilateral globus pallidus, an additional alternative differential  consideration would include atypical MR appearance of basal ganglia  calcification (which can be physiologic, or related to endocrine  abnormalities such as parathyroid hormone abnormalities).  Clinical/laboratory correlation is suggested.    PRINCE RAY MD      Narrative    MRI BRAIN WITHOUT CONTRAST  4/8/2020 3:14 PM    HISTORY: Delirium, altered mental status, elevated creatinine.    TECHNIQUE: Multiplanar, multisequence MRI of the brain without  gadolinium IV contrast material.      COMPARISON: CT head 4/6/2020    FINDINGS: There is a subtle focus of restricted diffusion in the  subcortical white matter of the fusiform gyrus of the right temporal  lobe (series 12 image 62, series 7 image 56) concerning for a small  acute infarct. There is corresponding T2 FLAIR hyperintense signal in  that location (series 9 image 9). No other evidence for acute infarct.  Examination of that location is mildly limited due to artifact from  the skull base of the middle cranial fossa.    There is an unchanged area of gliosis in the right frontal lobe  subcortical white matter deep to the trough of the right superior  frontal sulcus (series 9 image 19). This  may represent sequela of  chronic infarct. Moderate confluent areas of T2 and T2 FLAIR  hyperintense signal within the periventricular and deep cerebral white  matter likely representing chronic small vessel ischemic disease. Mild  to moderate global brain parenchymal volume loss. No intracranial  hemorrhage or extra-axial fluid collection. The ventricles are normal  in size and configuration. There is intrinsic T1 hyperintense signal  that appears relatively symmetric involving the bilateral globus  pallidi and substantia nigra.    Bilateral ocular globes appear normal with the exception of findings  of previous cataract surgery. Moderate mucosal thickening in the  ethmoid air cells. The patient is intubated with secretions in the  oral cavity and pharynx. There is a right mastoid effusion. Trace  fluid in the left mastoid. The major vascular flow voids of the skull  base are maintained.      Impression    IMPRESSION:  1. Findings suspicious for small acute infarct in the subcortical  white matter of the fusiform gyrus of the right temporal lobe.  2. Relatively symmetric-appearing intrinsic T1 hyperintense signal  representing mineralization within the globus pallidus and substantia  nigra bilaterally. This pattern is most commonly encountered in  patients with chronic liver disease due to manganese deposition, but  can also be seen in setting of patients on TPN or with occupational  exposure to manganese or with noncirrhotic portal vein thrombosis.  Consider correlation with hepatic laboratory values and ultrasound of  the abdomen with Doppler for further characterization.  3. Chronic small area of encephalomalacia in the subcortical white  matter of the right frontal lobe, unchanged.  4. Global brain parenchymal volume loss and moderate presumed chronic  small vessel ischemic disease.    PRINCE RAY MD   XR Chest Port 1 View    Narrative    EXAM: XR CHEST PORT 1 VW  LOCATION: [facility]  Services  DATE/TIME: 4/10/2020 5:43 AM    INDICATION: Cough. Post extubation.  COMPARISON: 2020.      Impression    IMPRESSION: Shallow inspiration. Right Port-A-Cath tip the cavoatrial junction. Median sternotomy. Interval removal endotracheal tube. Pulmonary venous congestion. Bibasilar atelectasis or infiltrate. Small right effusion.   Echocardiogram Complete    Narrative    [id number]  [id number]  [id number]  [id number]^WESLEY^PRINCE^LORETO           [facility]  Echocardiography Laboratory  [address]  [place] [id number]        Name: SOL WORRELL  MRN: [id number]  : 1945  Study Date: 2020 08:13 AM  Age: 75 yrs  Gender: Female  Patient Location: [facility]  Reason For Study: Shock  Ordering Physician: PRINCE OLSON  Referring Physician: Sandra Bernstein  Performed By: Amaya Courtney RDCS     BSA: 1.8 m2  Height: 60 in  Weight: 191 lb  HR: 46  BP: 96/46 mmHg  _____________________________________________________________________________  __        Procedure  Complete Portable Echo Adult. Optison (NDC #6405-8143) given intravenously.  _____________________________________________________________________________  __        Interpretation Summary     The visual ejection fraction is estimated at 60-65%.  Left ventricular systolic function is normal.  The study was technically difficult.  _____________________________________________________________________________  __        Left Ventricle  The left ventricle is normal in size. There is mild concentric left  ventricular hypertrophy. Diastolic Doppler findings (E/E' ratio and/or other  parameters) suggest left ventricular filling pressures are increased. The  visual ejection fraction is estimated at 60-65%. Left ventricular systolic  function is normal.     Right Ventricle  The right ventricle is normal size. The right ventricular systolic function is  borderline reduced.     Atria  The left atrium is mildly dilated. Right atrial size is  normal. There is no  color Doppler evidence of an atrial shunt.     Mitral Valve  There is trace mitral regurgitation.        Tricuspid Valve  There is trace tricuspid regurgitation. Right ventricular systolic pressure  could not be approximated due to inadequate tricuspid regurgitation.     Aortic Valve  The aortic valve is trileaflet. There is moderate trileaflet aortic sclerosis.  There is trace aortic regurgitation. No hemodynamically significant valvular  aortic stenosis.     Pulmonic Valve  There is trace pulmonic valvular regurgitation. Normal pulmonic valve  velocity.     Vessels  The aortic root is normal size. Normal size ascending aorta. Unable to assess  mean RA pressure given the patient is on a ventilator.     Pericardium  There is no pericardial effusion.        Rhythm  The rhythm was sinus bradycardia.  _____________________________________________________________________________  __  MMode/2D Measurements & Calculations  IVSd: 1.3 cm     LVIDd: 5.0 cm  LVIDs: 3.1 cm  LVPWd: 1.1 cm  FS: 37.8 %  LV mass(C)d: 231.2 grams  LV mass(C)dI: 126.3 grams/m2  Ao root diam: 3.1 cm  LA dimension: 3.8 cm  asc Aorta Diam: 3.4 cm  LA/Ao: 1.2  LA Volume (BP): 63.0 ml  LA Volume Index (BP): 34.4 ml/m2  RWT: 0.44           Doppler Measurements & Calculations  MV E max bree: 92.6 cm/sec  MV A max bree: 78.8 cm/sec  MV E/A: 1.2  MV dec time: 0.21 sec  PA acc time: 0.16 sec  E/E' av.9  Lateral E/e': 18.0  Medial E/e': 19.8           _____________________________________________________________________________  __           Report approved by: Fuad Ramos 2020 09:49 AM        *Note: Due to a large number of results and/or encounters for the requested time period, some results have not been displayed. A complete set of results can be found in Results Review.     Pending Results     Physical Exam   Temp:  [97.5  F (36.4  C)-98.2  F (36.8  C)] 98.2  F (36.8  C)  Pulse:  [52] 52  Heart Rate:  [54-55]  54  Resp:  [18-20] 18  BP: (106-153)/(41-51) 106/41  SpO2:  [93 %-97 %] 93 %  Vitals:    04/11/20 0500 04/11/20 2223 04/12/20 0500   Weight: 94.6 kg (208 lb 8.9 oz) 98.1 kg (216 lb 4.3 oz) 98.1 kg (216 lb 4.3 oz)       Constitutional: nad, conversant and appropriate, can give me some hx of her cough related to aspiration  CV: Regular  Respiratory: CTA bilaterally  GI: Soft, nontender  Skin: Warm and dry              Assessment and Plan:  Acute metabolic encephalopathy on top of mild cognitive impairment/dementia/severe agitation requring intubation.   Rule out acute CVA in the subcortical white matter of the fusiform gyrus of the right temporal lobe.  Relatively symmetric-appearing intrinsic T1 hyperintense signal  representing mineralization within the globus pallidus and substantia  nigra bilaterally.       After admit she became  extremely agitated and unable to be redirected.  She seemed to have gotten worse with neuroleptic treatment including olanzapine 10 mg and Haldol 2.5 mg.  She was constantly pulling at 4-point restraints on the floor.  Transferred to the ICU for 5-point restraints and Precedex started with out improvement. Ultimately due to severe agitation and sinus tach of 150 pt intubated by Dr Barfield 4/7/20 0545..     Pt extubated 4/9/20.  Work up revealed possible small right temporal lobe stroke which along with previous cognitive function may have precipitated events. Infectious work up.Globus pallidus and substantia nigra findings thought to chronic and not relevant to current situation per stroke neuro-so not pursued. One other possibility--cipro which the patient was on prior to admission has been known to cause acute paranoia and delirium.      Currently the patient is dramatically improved.  Her cognitive function has improved since extubation.  She is forgetful and is often repetitively asking the same thing.  She was given Zyprexa nightly-but the nurses thought this exacerbated her  cognitive status so it has been stopped.  The plan is for her to go back to her assisted living  antiplatelet therapy not added as the diagnosis is sill uncertain.      Infection ruled out  UA showed mild pyuria-culture neg.MRSA nasal swab neg. Blood cultures neg, PCT 0.2,  . CRP 7.8.  No fever noted. LP with two wbc per hpf. CSF protein mildly increased (may be from diabetes mellitus type 2 per stroke neuro).. Stopped empiric rocephin 04/09/20. Stopped acyclovir 4/10/20 due to neg herpes pcr's from csf.     Mild hypoxia since extubation-suspect atelectasis and some fluid overload.  cxr showed bibasilar atelectasis vs infiltrate, some pul venous congestion.   Lasix times one given 4/10/20      Currently on room air     Possible aspiration  Choking on liquids after extubation, family says she did this previously.  Ultimately after speech evaluation it was elected to start her on a diet.  Maldonado Nair was involved.  The patient did not want any type of feeding tube.  The patient has been advised that she is at increased risk for aspiration events.     Acute kidney injury on chronic kidney disease-related to dehydration.   Admit creat 2.05-- 2.32-- 2.14--1.86--1.6-1.47-1.32.  I and O pos 6500  FENA suggested prerenal etiology 4 days ago.    Restarting lasix 04/12/20. Holding avapro      Elevated lactic acid.  Present initially-no sign of sepsis     Traumatic rhabdomyolysis  CK elevation  Probably due motor agitation.CK has normalized.      Elevated troponin  Sinus dilip  suspect demand ischemia.    trops 0.052--0.084--0.414--0.217  ekg shows some ant and lat t wave inversions-but some degree of this chronically. Had hypotension while intubated and briefly needed norepi.  Pt has persistent sinus dilip starting after intubation.  She had some hypotension during it earlier in the hospitalization and received atropine.   Improved now -but persistent.  Would avoid restarting prior to admit metoprolol.     diabetes  mellitus type 2   On lantus and sliding scale  hypogycemic 04/09/20 --lantus reduced to 18 units     Mild pancytopenia  Unclear etiology, some of this maybe dilutional--follow      Mild transaminase elevation  From rhabdo-resolved     htn  Holding metoprolol, lasix , avapro  Required norepi 4/7/20 for bp support while on vent     restarted Lasix.  She remains off metoprolol due to the bradycardia.  Not restarted on avapro at this time as BP on lower side   Hypernatremia  resolved     hyperlipidemia   Restart Zocor 04/12/20      gerd  Restart omeprazole 04/12/20 . Switch to lansoprazole at d/c     RLS  Restart  mirapex 04/12/20      hypothyroidism   Hold oral levothyroxine, using  iv replacement      History of deep venous thrombosis/pulmonary embolism  Stopping Lovenox and restarting Eliquis April 12, 2020     prophylaxis-Eliquis  FEN-dysphagia diet     Plan-  Etiology still unclear-- likely a temporal lobe stroke combined with pre existing cognitive dysfunction. As above reaction to cipro not ruled out.   Patient appears to aspirate clinically.  After consultation with Maldonado Nair a palliative care decision was made to reinstitute a diet.  Patient refuses any type of feeding tube.  ..                            Sandra Self MD

## 2020-04-14 ENCOUNTER — TELEPHONE (OUTPATIENT)
Dept: FAMILY MEDICINE | Facility: CLINIC | Age: 75
End: 2020-04-14

## 2020-04-14 ENCOUNTER — PATIENT OUTREACH (OUTPATIENT)
Dept: CARE COORDINATION | Facility: CLINIC | Age: 75
End: 2020-04-14

## 2020-04-14 NOTE — TELEPHONE ENCOUNTER
Voice message left at 12:35    Reason for Call:  Other     Detailed comments: Mell, nurse at [facility] in [place] says Stefanie has returned there from the hospital.  1. They diagnosed her with dysphagia so have ordered honey thick liquids.  She is refusing.  2. She needs to see Dr Bernstein within a week for follow up and will need xray of her toe.    Phone Number Patient can be reached at:  Mell [phone]    Best Time: Did not say    Can we leave a detailed message on this number? Not Applicable    Call taken on 4/14/2020 at 12:56 PM by Crystal Skelton

## 2020-04-14 NOTE — TELEPHONE ENCOUNTER
ED/UC/IP follow up phone call: [facility] discharge 4/13/20 Altered mental status, Gastroesophageal reflux    RN please call to follow up.    Number of ED visits in past 12 months = 1    Pt referred to Care Coordination.

## 2020-04-14 NOTE — PROGRESS NOTES
Clinic Care Coordination Contact  Care Coordination Transition Communication    Referral Source: IP Handoff    Clinical Data: Patient was hospitalized at [facility] from 4-6 to 4-13 with diagnosis of Renal insufficiency [N28.9]  Elevated troponin [R79.89]  Elevated lactic acid level [R79.89]  Altered mental status, unspecified altered mental status type [R41.82].     Transition to Facility:           [facility] (Phone: [phone] Fax:[phone]) via  [employer] wheelchair ride.  [facility] ([phone] Fax: [phone]) for PT services ordered.    Plan: RN/SW Care Coordinator will await notification from facility staff informing RN/SW Care Coordinator of patient's discharge plans/needs. RN/SW Care Coordinator will review chart and outreach to facility staff every 4 weeks and as needed.     Also being followed by [facility] .    Fredi Andrade RN  Primary Care Clinic RN Care Coordinator  [facility]   [phone]

## 2020-04-16 ENCOUNTER — OFFICE VISIT (OUTPATIENT)
Dept: FAMILY MEDICINE | Facility: CLINIC | Age: 75
End: 2020-04-16
Payer: COMMERCIAL

## 2020-04-16 ENCOUNTER — ANCILLARY PROCEDURE (OUTPATIENT)
Dept: GENERAL RADIOLOGY | Facility: CLINIC | Age: 75
End: 2020-04-16
Attending: FAMILY MEDICINE
Payer: COMMERCIAL

## 2020-04-16 VITALS
SYSTOLIC BLOOD PRESSURE: 130 MMHG | HEART RATE: 61 BPM | OXYGEN SATURATION: 98 % | TEMPERATURE: 98 F | RESPIRATION RATE: 18 BRPM | DIASTOLIC BLOOD PRESSURE: 62 MMHG

## 2020-04-16 DIAGNOSIS — I87.303 STASIS EDEMA OF BOTH LOWER EXTREMITIES: ICD-10-CM

## 2020-04-16 DIAGNOSIS — I82.403 RECURRENT ACUTE DEEP VEIN THROMBOSIS (DVT) OF BOTH LOWER EXTREMITIES (H): ICD-10-CM

## 2020-04-16 DIAGNOSIS — G93.41 METABOLIC ENCEPHALOPATHY: Primary | ICD-10-CM

## 2020-04-16 DIAGNOSIS — E11.21 TYPE 2 DIABETES MELLITUS WITH DIABETIC NEPHROPATHY, WITH LONG-TERM CURRENT USE OF INSULIN (H): ICD-10-CM

## 2020-04-16 DIAGNOSIS — S92.404A CLOSED NONDISPLACED FRACTURE OF PHALANX OF RIGHT GREAT TOE, UNSPECIFIED PHALANX, INITIAL ENCOUNTER: ICD-10-CM

## 2020-04-16 DIAGNOSIS — N18.4 CKD (CHRONIC KIDNEY DISEASE) STAGE 4, GFR 15-29 ML/MIN (H): ICD-10-CM

## 2020-04-16 DIAGNOSIS — I10 BENIGN ESSENTIAL HYPERTENSION: ICD-10-CM

## 2020-04-16 DIAGNOSIS — Z79.4 TYPE 2 DIABETES MELLITUS WITH DIABETIC NEPHROPATHY, WITH LONG-TERM CURRENT USE OF INSULIN (H): ICD-10-CM

## 2020-04-16 PROCEDURE — 73660 X-RAY EXAM OF TOE(S): CPT | Mod: RT

## 2020-04-16 PROCEDURE — 99214 OFFICE O/P EST MOD 30 MIN: CPT | Performed by: FAMILY MEDICINE

## 2020-04-17 ENCOUNTER — TELEPHONE (OUTPATIENT)
Dept: FAMILY MEDICINE | Facility: CLINIC | Age: 75
End: 2020-04-17

## 2020-04-20 ENCOUNTER — MEDICAL CORRESPONDENCE (OUTPATIENT)
Dept: HEALTH INFORMATION MANAGEMENT | Facility: CLINIC | Age: 75
End: 2020-04-20

## 2020-04-23 NOTE — TELEPHONE ENCOUNTER
Form received back signed  4/23/20  Faxed Back & Sent to be scanned to this encounter  Leilani Orn Station Sec

## 2020-04-24 ENCOUNTER — HOSPITAL ENCOUNTER (INPATIENT)
Facility: CLINIC | Age: 75
LOS: 1 days | Discharge: NURSING, REHAB OR LONG-TERM CARE | DRG: 206 | End: 2020-04-25
Attending: FAMILY MEDICINE | Admitting: INTERNAL MEDICINE
Payer: COMMERCIAL

## 2020-04-24 ENCOUNTER — TELEPHONE (OUTPATIENT)
Dept: FAMILY MEDICINE | Facility: CLINIC | Age: 75
End: 2020-04-24

## 2020-04-24 ENCOUNTER — APPOINTMENT (OUTPATIENT)
Dept: CT IMAGING | Facility: CLINIC | Age: 75
DRG: 206 | End: 2020-04-24
Attending: FAMILY MEDICINE
Payer: COMMERCIAL

## 2020-04-24 ENCOUNTER — MEDICAL CORRESPONDENCE (OUTPATIENT)
Dept: HEALTH INFORMATION MANAGEMENT | Facility: CLINIC | Age: 75
End: 2020-04-24

## 2020-04-24 DIAGNOSIS — J69.0 ASPIRATION PNEUMONITIS (H): Primary | ICD-10-CM

## 2020-04-24 DIAGNOSIS — Z20.828 CONTACT WITH AND (SUSPECTED) EXPOSURE TO OTHER VIRAL COMMUNICABLE DISEASES: ICD-10-CM

## 2020-04-24 DIAGNOSIS — J18.9 ATYPICAL PNEUMONIA: ICD-10-CM

## 2020-04-24 DIAGNOSIS — Z87.891 HISTORY OF TOBACCO USE: ICD-10-CM

## 2020-04-24 LAB
ALBUMIN SERPL-MCNC: 2.7 G/DL (ref 3.4–5)
ALP SERPL-CCNC: 126 U/L (ref 40–150)
ALT SERPL W P-5'-P-CCNC: 19 U/L (ref 0–50)
ANION GAP SERPL CALCULATED.3IONS-SCNC: 6 MMOL/L (ref 3–14)
AST SERPL W P-5'-P-CCNC: 26 U/L (ref 0–45)
BASOPHILS # BLD AUTO: 0 10E9/L (ref 0–0.2)
BASOPHILS NFR BLD AUTO: 0.7 %
BILIRUB SERPL-MCNC: 0.6 MG/DL (ref 0.2–1.3)
BUN SERPL-MCNC: 26 MG/DL (ref 7–30)
CALCIUM SERPL-MCNC: 8.8 MG/DL (ref 8.5–10.1)
CHLORIDE SERPL-SCNC: 107 MMOL/L (ref 94–109)
CO2 SERPL-SCNC: 27 MMOL/L (ref 20–32)
CREAT SERPL-MCNC: 1.66 MG/DL (ref 0.52–1.04)
DIFFERENTIAL METHOD BLD: ABNORMAL
EOSINOPHIL # BLD AUTO: 0.3 10E9/L (ref 0–0.7)
EOSINOPHIL NFR BLD AUTO: 5.6 %
ERYTHROCYTE [DISTWIDTH] IN BLOOD BY AUTOMATED COUNT: 13.4 % (ref 10–15)
GFR SERPL CREATININE-BSD FRML MDRD: 29 ML/MIN/{1.73_M2}
GLUCOSE BLDC GLUCOMTR-MCNC: 140 MG/DL (ref 70–99)
GLUCOSE SERPL-MCNC: 136 MG/DL (ref 70–99)
HCT VFR BLD AUTO: 39.4 % (ref 35–47)
HGB BLD-MCNC: 12.7 G/DL (ref 11.7–15.7)
IMM GRANULOCYTES # BLD: 0 10E9/L (ref 0–0.4)
IMM GRANULOCYTES NFR BLD: 0.3 %
LACTATE BLD-SCNC: 0.9 MMOL/L (ref 0.7–2)
LACTATE BLD-SCNC: 2.1 MMOL/L (ref 0.7–2)
LYMPHOCYTES # BLD AUTO: 0.7 10E9/L (ref 0.8–5.3)
LYMPHOCYTES NFR BLD AUTO: 12.1 %
MCH RBC QN AUTO: 30.2 PG (ref 26.5–33)
MCHC RBC AUTO-ENTMCNC: 32.2 G/DL (ref 31.5–36.5)
MCV RBC AUTO: 94 FL (ref 78–100)
MONOCYTES # BLD AUTO: 0.5 10E9/L (ref 0–1.3)
MONOCYTES NFR BLD AUTO: 8.7 %
NEUTROPHILS # BLD AUTO: 4.5 10E9/L (ref 1.6–8.3)
NEUTROPHILS NFR BLD AUTO: 72.6 %
NRBC # BLD AUTO: 0 10*3/UL
NRBC BLD AUTO-RTO: 0 /100
PLATELET # BLD AUTO: 249 10E9/L (ref 150–450)
POTASSIUM SERPL-SCNC: 4.4 MMOL/L (ref 3.4–5.3)
PROT SERPL-MCNC: 6.9 G/DL (ref 6.8–8.8)
RBC # BLD AUTO: 4.21 10E12/L (ref 3.8–5.2)
SARS-COV-2 PCR COMMENT: NORMAL
SARS-COV-2 RNA SPEC QL NAA+PROBE: NEGATIVE
SARS-COV-2 RNA SPEC QL NAA+PROBE: NORMAL
SODIUM SERPL-SCNC: 140 MMOL/L (ref 133–144)
SPECIMEN SOURCE: NORMAL
SPECIMEN SOURCE: NORMAL
WBC # BLD AUTO: 6.1 10E9/L (ref 4–11)

## 2020-04-24 PROCEDURE — 25000128 H RX IP 250 OP 636: Performed by: FAMILY MEDICINE

## 2020-04-24 PROCEDURE — 12000000 ZZH R&B MED SURG/OB

## 2020-04-24 PROCEDURE — 99285 EMERGENCY DEPT VISIT HI MDM: CPT | Mod: 25 | Performed by: FAMILY MEDICINE

## 2020-04-24 PROCEDURE — 25800030 ZZH RX IP 258 OP 636: Performed by: FAMILY MEDICINE

## 2020-04-24 PROCEDURE — 99223 1ST HOSP IP/OBS HIGH 75: CPT | Mod: AI | Performed by: PHYSICIAN ASSISTANT

## 2020-04-24 PROCEDURE — 93005 ELECTROCARDIOGRAM TRACING: CPT | Performed by: FAMILY MEDICINE

## 2020-04-24 PROCEDURE — 87635 SARS-COV-2 COVID-19 AMP PRB: CPT | Performed by: FAMILY MEDICINE

## 2020-04-24 PROCEDURE — 00000146 ZZHCL STATISTIC GLUCOSE BY METER IP

## 2020-04-24 PROCEDURE — 96375 TX/PRO/DX INJ NEW DRUG ADDON: CPT | Performed by: FAMILY MEDICINE

## 2020-04-24 PROCEDURE — 80053 COMPREHEN METABOLIC PANEL: CPT | Performed by: FAMILY MEDICINE

## 2020-04-24 PROCEDURE — 71250 CT THORAX DX C-: CPT

## 2020-04-24 PROCEDURE — 85025 COMPLETE CBC W/AUTO DIFF WBC: CPT | Performed by: FAMILY MEDICINE

## 2020-04-24 PROCEDURE — 83605 ASSAY OF LACTIC ACID: CPT | Performed by: FAMILY MEDICINE

## 2020-04-24 PROCEDURE — 96361 HYDRATE IV INFUSION ADD-ON: CPT | Performed by: FAMILY MEDICINE

## 2020-04-24 PROCEDURE — 96365 THER/PROPH/DIAG IV INF INIT: CPT | Performed by: FAMILY MEDICINE

## 2020-04-24 PROCEDURE — 99285 EMERGENCY DEPT VISIT HI MDM: CPT | Mod: Z6 | Performed by: FAMILY MEDICINE

## 2020-04-24 RX ORDER — IOPAMIDOL 755 MG/ML
85 INJECTION, SOLUTION INTRAVASCULAR ONCE
Status: DISCONTINUED | OUTPATIENT
Start: 2020-04-24 | End: 2020-04-25 | Stop reason: HOSPADM

## 2020-04-24 RX ORDER — SODIUM CHLORIDE 9 MG/ML
INJECTION, SOLUTION INTRAVENOUS CONTINUOUS
Status: DISCONTINUED | OUTPATIENT
Start: 2020-04-24 | End: 2020-04-25

## 2020-04-24 RX ORDER — ONDANSETRON 4 MG/1
4 TABLET, ORALLY DISINTEGRATING ORAL EVERY 6 HOURS PRN
Status: DISCONTINUED | OUTPATIENT
Start: 2020-04-24 | End: 2020-04-25

## 2020-04-24 RX ORDER — ONDANSETRON 2 MG/ML
4 INJECTION INTRAMUSCULAR; INTRAVENOUS EVERY 6 HOURS PRN
Status: DISCONTINUED | OUTPATIENT
Start: 2020-04-24 | End: 2020-04-25

## 2020-04-24 RX ORDER — LORAZEPAM 2 MG/ML
0.5 INJECTION INTRAMUSCULAR ONCE
Status: COMPLETED | OUTPATIENT
Start: 2020-04-24 | End: 2020-04-24

## 2020-04-24 RX ADMIN — LORAZEPAM 0.5 MG: 2 INJECTION INTRAMUSCULAR; INTRAVENOUS at 21:14

## 2020-04-24 RX ADMIN — SODIUM CHLORIDE 1000 ML: 9 INJECTION, SOLUTION INTRAVENOUS at 13:37

## 2020-04-24 RX ADMIN — TAZOBACTAM SODIUM AND PIPERACILLIN SODIUM 3.38 G: 375; 3 INJECTION, SOLUTION INTRAVENOUS at 17:56

## 2020-04-24 RX ADMIN — SODIUM CHLORIDE 500 ML: 9 INJECTION, SOLUTION INTRAVENOUS at 13:12

## 2020-04-24 ASSESSMENT — ACTIVITIES OF DAILY LIVING (ADL)
AMBULATION: 3-->ASSISTIVE EQUIPMENT AND PERSON
FALL_HISTORY_WITHIN_LAST_SIX_MONTHS: YES
RETIRED_EATING: 3-->ASSISTIVE EQUIPMENT AND PERSON
SWALLOWING: 0-->SWALLOWS FOODS/LIQUIDS WITHOUT DIFFICULTY
NUMBER_OF_TIMES_PATIENT_HAS_FALLEN_WITHIN_LAST_SIX_MONTHS: 2
COGNITION: 1 - ATTENTION OR MEMORY DEFICITS
DRESS: 4-->COMPLETELY DEPENDENT
BATHING: 4-->COMPLETELY DEPENDENT
TRANSFERRING: 3-->ASSISTIVE EQUIPMENT AND PERSON
TOILETING: 3-->ASSISTIVE EQUIPMENT AND PERSON
RETIRED_COMMUNICATION: 0-->UNDERSTANDS/COMMUNICATES WITHOUT DIFFICULTY

## 2020-04-24 ASSESSMENT — MIFFLIN-ST. JEOR
SCORE: 1349.98
SCORE: 1400.5

## 2020-04-24 NOTE — TELEPHONE ENCOUNTER
Talked to Mell DONIS. Pt has fever of 101.8 and coughing. Was recently in the hospital. Mell thinks pt may have aspiration pneumonia but is consider for covid as well. Advised pt needs to be evaluated in the emergency room. Mell verbalized understanding. Wilda Naqvi RN

## 2020-04-24 NOTE — TELEPHONE ENCOUNTER
Reason for call:  Patient reporting a symptom    Symptom or request:  1) Fever - 101.8,   2) Coughing - Pt has a order for thin liquids she is allowed for pills & regular milk on Cereal. Coughing when taking these Liquids.    3)Pt was in the Hospital last week. Should pt be tested for Covid with these symptoms?  Would like to discuss symptoms with Nurse.    Phone Number patient can be reached at:  Home number on file [phone] [facility]    Best Time:  Any Time      Can we leave a detailed message on this number:  YES    Call taken on 4/24/2020 at 11:07 AM by Leilani Jurado

## 2020-04-25 VITALS
BODY MASS INDEX: 42.72 KG/M2 | OXYGEN SATURATION: 94 % | RESPIRATION RATE: 20 BRPM | HEART RATE: 52 BPM | DIASTOLIC BLOOD PRESSURE: 42 MMHG | HEIGHT: 60 IN | SYSTOLIC BLOOD PRESSURE: 122 MMHG | WEIGHT: 217.59 LBS | TEMPERATURE: 98.2 F

## 2020-04-25 PROBLEM — Z79.4 TYPE 2 DIABETES MELLITUS WITH DIABETIC NEPHROPATHY, WITH LONG-TERM CURRENT USE OF INSULIN (H): Chronic | Status: ACTIVE | Noted: 2017-06-27

## 2020-04-25 PROBLEM — E11.21 TYPE 2 DIABETES MELLITUS WITH DIABETIC NEPHROPATHY, WITH LONG-TERM CURRENT USE OF INSULIN (H): Chronic | Status: ACTIVE | Noted: 2017-06-27

## 2020-04-25 PROBLEM — H91.93 BILATERAL HEARING LOSS, UNSPECIFIED HEARING LOSS TYPE: Status: ACTIVE | Noted: 2018-12-14

## 2020-04-25 PROBLEM — Z79.01 CHRONIC ANTICOAGULATION: Status: ACTIVE | Noted: 2020-04-25

## 2020-04-25 PROBLEM — R91.8 PULMONARY NODULES: Status: ACTIVE | Noted: 2020-04-25

## 2020-04-25 PROBLEM — N18.4 CKD (CHRONIC KIDNEY DISEASE) STAGE 4, GFR 15-29 ML/MIN (H): Chronic | Status: ACTIVE | Noted: 2019-02-03

## 2020-04-25 PROBLEM — Z86.718 H/O DEEP VENOUS THROMBOSIS: Status: ACTIVE | Noted: 2019-05-21

## 2020-04-25 LAB
GLUCOSE BLDC GLUCOMTR-MCNC: 107 MG/DL (ref 70–99)
GLUCOSE BLDC GLUCOMTR-MCNC: 139 MG/DL (ref 70–99)
NT-PROBNP SERPL-MCNC: 373 PG/ML (ref 0–1800)
PROCALCITONIN SERPL-MCNC: 0.27 NG/ML

## 2020-04-25 PROCEDURE — 99238 HOSP IP/OBS DSCHRG MGMT 30/<: CPT | Performed by: FAMILY MEDICINE

## 2020-04-25 PROCEDURE — 00000146 ZZHCL STATISTIC GLUCOSE BY METER IP

## 2020-04-25 PROCEDURE — 25800030 ZZH RX IP 258 OP 636: Performed by: PHYSICIAN ASSISTANT

## 2020-04-25 PROCEDURE — 25000128 H RX IP 250 OP 636: Performed by: FAMILY MEDICINE

## 2020-04-25 PROCEDURE — 99207 ZZC CDG-CODE CATEGORY CHANGED: CPT | Performed by: FAMILY MEDICINE

## 2020-04-25 PROCEDURE — 25000132 ZZH RX MED GY IP 250 OP 250 PS 637: Performed by: PHYSICIAN ASSISTANT

## 2020-04-25 PROCEDURE — 25000128 H RX IP 250 OP 636: Performed by: PHYSICIAN ASSISTANT

## 2020-04-25 PROCEDURE — 84145 PROCALCITONIN (PCT): CPT | Performed by: FAMILY MEDICINE

## 2020-04-25 PROCEDURE — 36415 COLL VENOUS BLD VENIPUNCTURE: CPT | Performed by: FAMILY MEDICINE

## 2020-04-25 PROCEDURE — 25000131 ZZH RX MED GY IP 250 OP 636 PS 637: Performed by: PHYSICIAN ASSISTANT

## 2020-04-25 PROCEDURE — 83880 ASSAY OF NATRIURETIC PEPTIDE: CPT | Performed by: FAMILY MEDICINE

## 2020-04-25 RX ORDER — PROCHLORPERAZINE MALEATE 5 MG
5 TABLET ORAL EVERY 6 HOURS PRN
Status: DISCONTINUED | OUTPATIENT
Start: 2020-04-25 | End: 2020-04-25 | Stop reason: HOSPADM

## 2020-04-25 RX ORDER — PROCHLORPERAZINE 25 MG
12.5 SUPPOSITORY, RECTAL RECTAL EVERY 12 HOURS PRN
Status: DISCONTINUED | OUTPATIENT
Start: 2020-04-25 | End: 2020-04-25 | Stop reason: HOSPADM

## 2020-04-25 RX ORDER — BISACODYL 5 MG
10 TABLET, DELAYED RELEASE (ENTERIC COATED) ORAL DAILY PRN
Status: DISCONTINUED | OUTPATIENT
Start: 2020-04-25 | End: 2020-04-25 | Stop reason: HOSPADM

## 2020-04-25 RX ORDER — ONDANSETRON 2 MG/ML
4 INJECTION INTRAMUSCULAR; INTRAVENOUS EVERY 6 HOURS PRN
Status: DISCONTINUED | OUTPATIENT
Start: 2020-04-25 | End: 2020-04-25 | Stop reason: HOSPADM

## 2020-04-25 RX ORDER — NALOXONE HYDROCHLORIDE 0.4 MG/ML
.1-.4 INJECTION, SOLUTION INTRAMUSCULAR; INTRAVENOUS; SUBCUTANEOUS
Status: DISCONTINUED | OUTPATIENT
Start: 2020-04-25 | End: 2020-04-25 | Stop reason: HOSPADM

## 2020-04-25 RX ORDER — DEXTROSE MONOHYDRATE 25 G/50ML
25-50 INJECTION, SOLUTION INTRAVENOUS
Status: DISCONTINUED | OUTPATIENT
Start: 2020-04-25 | End: 2020-04-25

## 2020-04-25 RX ORDER — SIMVASTATIN 40 MG
40 TABLET ORAL DAILY
Status: DISCONTINUED | OUTPATIENT
Start: 2020-04-25 | End: 2020-04-25 | Stop reason: HOSPADM

## 2020-04-25 RX ORDER — SENNOSIDES 8.6 MG
650 CAPSULE ORAL 3 TIMES DAILY PRN
Status: DISCONTINUED | OUTPATIENT
Start: 2020-04-25 | End: 2020-04-25

## 2020-04-25 RX ORDER — ACETAMINOPHEN 650 MG/1
650 SUPPOSITORY RECTAL EVERY 4 HOURS PRN
Status: DISCONTINUED | OUTPATIENT
Start: 2020-04-25 | End: 2020-04-25 | Stop reason: HOSPADM

## 2020-04-25 RX ORDER — PRAMIPEXOLE DIHYDROCHLORIDE 0.5 MG/1
0.5 TABLET ORAL 3 TIMES DAILY PRN
Status: DISCONTINUED | OUTPATIENT
Start: 2020-04-25 | End: 2020-04-25 | Stop reason: HOSPADM

## 2020-04-25 RX ORDER — CEFDINIR 300 MG/1
300 CAPSULE ORAL 2 TIMES DAILY
Qty: 10 CAPSULE | Refills: 0 | Status: SHIPPED | OUTPATIENT
Start: 2020-04-25 | End: 2020-05-14

## 2020-04-25 RX ORDER — LIDOCAINE 40 MG/G
CREAM TOPICAL
Status: DISCONTINUED | OUTPATIENT
Start: 2020-04-25 | End: 2020-04-25 | Stop reason: HOSPADM

## 2020-04-25 RX ORDER — POLYETHYLENE GLYCOL 3350 17 G/17G
17 POWDER, FOR SOLUTION ORAL DAILY PRN
Status: DISCONTINUED | OUTPATIENT
Start: 2020-04-25 | End: 2020-04-25 | Stop reason: HOSPADM

## 2020-04-25 RX ORDER — LEVOTHYROXINE SODIUM 88 UG/1
44 TABLET ORAL
Status: DISCONTINUED | OUTPATIENT
Start: 2020-04-26 | End: 2020-04-25 | Stop reason: HOSPADM

## 2020-04-25 RX ORDER — LEVOTHYROXINE SODIUM 88 UG/1
88 TABLET ORAL
Status: DISCONTINUED | OUTPATIENT
Start: 2020-04-25 | End: 2020-04-25 | Stop reason: HOSPADM

## 2020-04-25 RX ORDER — POTASSIUM CHLORIDE 750 MG/1
10 TABLET, EXTENDED RELEASE ORAL 2 TIMES DAILY
Status: DISCONTINUED | OUTPATIENT
Start: 2020-04-25 | End: 2020-04-25 | Stop reason: HOSPADM

## 2020-04-25 RX ORDER — BISACODYL 5 MG
5 TABLET, DELAYED RELEASE (ENTERIC COATED) ORAL DAILY PRN
Status: DISCONTINUED | OUTPATIENT
Start: 2020-04-25 | End: 2020-04-25 | Stop reason: HOSPADM

## 2020-04-25 RX ORDER — PRAMIPEXOLE DIHYDROCHLORIDE 0.5 MG/1
0.5 TABLET ORAL AT BEDTIME
Status: DISCONTINUED | OUTPATIENT
Start: 2020-04-25 | End: 2020-04-25 | Stop reason: HOSPADM

## 2020-04-25 RX ORDER — ASPIRIN 81 MG/1
81 TABLET, CHEWABLE ORAL DAILY
Status: DISCONTINUED | OUTPATIENT
Start: 2020-04-25 | End: 2020-04-25 | Stop reason: HOSPADM

## 2020-04-25 RX ORDER — GABAPENTIN 100 MG/1
100 CAPSULE ORAL AT BEDTIME
Status: DISCONTINUED | OUTPATIENT
Start: 2020-04-25 | End: 2020-04-25 | Stop reason: HOSPADM

## 2020-04-25 RX ORDER — ONDANSETRON 4 MG/1
4 TABLET, ORALLY DISINTEGRATING ORAL EVERY 6 HOURS PRN
Status: DISCONTINUED | OUTPATIENT
Start: 2020-04-25 | End: 2020-04-25 | Stop reason: HOSPADM

## 2020-04-25 RX ORDER — DEXTROSE MONOHYDRATE 25 G/50ML
25-50 INJECTION, SOLUTION INTRAVENOUS
Status: DISCONTINUED | OUTPATIENT
Start: 2020-04-25 | End: 2020-04-25 | Stop reason: HOSPADM

## 2020-04-25 RX ORDER — ACETAMINOPHEN 325 MG/1
650 TABLET ORAL EVERY 4 HOURS PRN
Status: DISCONTINUED | OUTPATIENT
Start: 2020-04-25 | End: 2020-04-25 | Stop reason: HOSPADM

## 2020-04-25 RX ORDER — NICOTINE POLACRILEX 4 MG
15-30 LOZENGE BUCCAL
Status: DISCONTINUED | OUTPATIENT
Start: 2020-04-25 | End: 2020-04-25 | Stop reason: HOSPADM

## 2020-04-25 RX ORDER — NICOTINE POLACRILEX 4 MG
15-30 LOZENGE BUCCAL
Status: DISCONTINUED | OUTPATIENT
Start: 2020-04-25 | End: 2020-04-25

## 2020-04-25 RX ORDER — BISACODYL 5 MG
15 TABLET, DELAYED RELEASE (ENTERIC COATED) ORAL DAILY PRN
Status: DISCONTINUED | OUTPATIENT
Start: 2020-04-25 | End: 2020-04-25 | Stop reason: HOSPADM

## 2020-04-25 RX ADMIN — ASPIRIN 81 MG 81 MG: 81 TABLET ORAL at 08:10

## 2020-04-25 RX ADMIN — SODIUM CHLORIDE: 9 INJECTION, SOLUTION INTRAVENOUS at 01:25

## 2020-04-25 RX ADMIN — TAZOBACTAM SODIUM AND PIPERACILLIN SODIUM 3.38 G: 375; 3 INJECTION, SOLUTION INTRAVENOUS at 00:38

## 2020-04-25 RX ADMIN — Medication 1 MG: at 02:09

## 2020-04-25 RX ADMIN — SIMVASTATIN 40 MG: 40 TABLET, FILM COATED ORAL at 08:09

## 2020-04-25 RX ADMIN — TAZOBACTAM SODIUM AND PIPERACILLIN SODIUM 3.38 G: 375; 3 INJECTION, SOLUTION INTRAVENOUS at 05:48

## 2020-04-25 RX ADMIN — GABAPENTIN 100 MG: 100 CAPSULE ORAL at 01:27

## 2020-04-25 RX ADMIN — LANSOPRAZOLE 15 MG: 15 TABLET, ORALLY DISINTEGRATING ORAL at 05:44

## 2020-04-25 RX ADMIN — APIXABAN 5 MG: 5 TABLET, FILM COATED ORAL at 08:09

## 2020-04-25 RX ADMIN — PRAMIPEXOLE DIHYDROCHLORIDE 0.5 MG: 0.5 TABLET ORAL at 01:27

## 2020-04-25 RX ADMIN — INSULIN ASPART 14 UNITS: 100 INJECTION, SOLUTION INTRAVENOUS; SUBCUTANEOUS at 08:36

## 2020-04-25 RX ADMIN — INSULIN GLARGINE 24 UNITS: 100 INJECTION, SOLUTION SUBCUTANEOUS at 01:30

## 2020-04-25 RX ADMIN — POTASSIUM CHLORIDE 10 MEQ: 750 TABLET, FILM COATED, EXTENDED RELEASE ORAL at 08:10

## 2020-04-25 RX ADMIN — LEVOTHYROXINE SODIUM 88 MCG: 88 TABLET ORAL at 05:44

## 2020-04-25 ASSESSMENT — ACTIVITIES OF DAILY LIVING (ADL)
ADLS_ACUITY_SCORE: 38
ADLS_ACUITY_SCORE: 41
ADLS_ACUITY_SCORE: 38
ADLS_ACUITY_SCORE: 38

## 2020-04-25 ASSESSMENT — MIFFLIN-ST. JEOR: SCORE: 1393.5

## 2020-04-25 NOTE — PROGRESS NOTES
DATE:  4/24/2020   TIME OF RECEIPT FROM LAB:  [facility]  LAB TEST:  Covid 19  LAB VALUE:  Negative RESULTS GIVEN WITH READ-BACK TO (PROVIDER):  {Choose the provider you called    Carolina LUJAN  TIME LAB VALUE REPORTED TO PROVIDER:  [id number]

## 2020-04-25 NOTE — H&P
[facility]    History and Physical - Hospitalist Service       Date of Admission:  4/24/2020    Assessment & Plan   Stefanie Velazquez is a 75 year old female admitted on 4/24/2020. She presented to the emergency department from her assisted living facility for evaluation of fever and mild cough, found to have pneumonia vs COVID for which she is being admitted for further evaluation and treatment.    Atypical pneumonia vs aspiration pneumonia  Recently hospitalized 2 weeks ago, was intubated at that time, questionable aspiration since. Patient higher risk for healthcare associated pneumonia and aspiration pneumonia. Afebrile, no leukocytosis on admission. No hypoxia. Chest CT with some groundglass opacities bilaterally, right worse than left. Was started on Zosyn in the emergency department due to risks as above.  - Continue Zosyn  - Procalcitonin pending  - Sputum culture and gram stain pending  - Prn supplemental O2 if needed    COVID PUI - negative  Tested for COVID in the emergency department due to abnormal CT. Patient afebrile, not high suspicion otherwise.   - COVID returned negative  - COVID specific precautions can be discontinued, but still needs droplet precautions for pneumonia and contact due to history of MRSA    Lung nodules   Multiple nodules noted on chest CT. Radiology recommending follow-up CT in 3 months.  - Will need outpatient follow-up CT in July 2020 - to be ordered on discharge    CKD (chronic kidney disease) stage 4, GFR 15-29 ml/min  Admit creatinine 1.66 (baseline 1.2 - 1.6), GFR 29 (baseline 30 - 37). At the worse end of normal for patient's baseline. Was given 1.5L IV fluids in the emergency department.  - BMP in am   - Hold prior to admission lasix for now, but resume as appropriate    Type 2 diabetes mellitus with diabetic nephropathy, with long-term current use of insulin  Managed prior to admission with Lantus 24 U q hs and Novolog 14U with lunch and dinner.    - Continue prior to admission Lantus  - Continue prior to admission Novolog  - Medium sliding scale insulin   - High consistent carbohydrate diet  - Hyper/hypoglycemia protocols    Benign essential hypertension  Managed prior to admission with lasix 20 mg bid.  - Hold lasix due to renal function, resume as appropriate    Chronic ischemic heart disease  Hyperlipidemia LDL goal <100  History of CABG x3 in 2002. Managed prior to admission with aspirin 81 mg daily, lasix 20 mg bid, and Zocor 40 mg daily. Not on beta blocker or ACE/ARB prior to admission.  - Continue aspirin and Zocor  - Lasix as above    H/O deep venous thrombosis  Chronic anticoagulation   Managed prior to admission with Eliquis 5 mg bid, continue.    Hypothyroidism  Managed prior to admission with levothyroxine 88 mcg daily (except for on Sundays, dose is reduced to 44 mcg), continue.    Neuropathy  Lumbar radiculopathy  Managed prior to admission with gabapentin 100 mg q hs and Mirapex 0.5 mg q hs, as well as prn Mirapex tid.  - Continue gabapentin and Mirapex    Esophageal reflux  Managed prior to admission with Prevacid 15 mg bid, continue.    Bowel and bladder incontinence  Wears a brief. Not on anticholinergic medications.    Bilateral hearing loss, unspecified hearing loss type  Requires pocket talker.    RC-moderate (AHI 12, LSat 60%); REM RDI-73  No CPAP ordered         Diet: Moderate Consistent CHO Diet    DVT Prophylaxis: Continue prior to admission Eliquis  Lr Catheter: not present  Code Status: Full Code  - unable to discuss with patient, deferred to prior code status    Disposition Plan   Expected discharge: 1-2 days, recommended to return to nursing home/TCU once antibiotic plan established, renal function improved and safe disposition plan/ TCU bed available.  Entered: Carolina Colby PA-C 04/25/2020, 2:12 AM     The patient's care was discussed with the Attending Physician, Dr. Ulises Gaytan and Patient.    Carolina Colby  ERIC  [facility]    ______________________________________________________________________    Chief Complaint   Cough, fever    History is obtained from the patient (very minimal history given), review of EMR, and emergency department sign out from Dr. Gerald Tidwell.    History of Present Illness   Stefanie Velazquez is a 75 year old female who presented to the emergency department from her living facility for evaluation of fever and cough.    Attempted to obtain history from patient, who is somnolent and wants to sleep. She does not provide much history. No family or care staff from her facility are present to provide any ancillary information. Most of the history is from Dr. Tidwell of the emergency department and EMR review.    Patient was recently hospitalized at [facility] from April 6-13, 2020. During that time she had confusion and agitation and was intubated, after which there was some concern about recurrent aspiration. Patient had apparently refused a feeding tube in discussion with palliative care.    After returning to her living facility she apparently developed a recent fever, and possibly a mild cough. There was concern that she may have aspirated, although no definitive event was witnessed. Patient was sent to the emergency department for evaluation. CT was abnormal, suggestive of possible pneumonia. There was concern for possible COVID as well given her recent hospital stay and residence in long term care.    Patient is unable to provide much for review of systems. She denies pain, denies any respiratory changes. Requests warm blankets, wants to stay wrapped up in her blankets.     Review of Systems    Review of systems was attempted but was very limited due to patient's somnolence and request to continue sleeping, as well as her significant difficulty hearing.     Past Medical History    I have reviewed this patient's medical history and updated it with pertinent  information if needed.   Past Medical History:   Diagnosis Date     Acute deep vein thrombosis (DVT) of right lower extremity, unspecified vein (H) 10/31/2018     Acute myocardial infarction of other specified sites, episode of care unspecified 6/2002    MI 2 stints     Cancer of colon (H)      Cellulitis of lower extremity, bilateral 9/30/2016     Chronic ischemic heart disease, unspecified 6/22/2003    cabgx3 , 2002 2/05 adenosine ef70%     Coronary atherosclerosis of unspecified type of vessel, native or graft     Coronary artery disease     Diabetes mellitus (H)      Esophageal reflux      Fracture of femur, distal, left, closed (H) 2/11/2013     Gastro-oesophageal reflux disease      Generalized osteoarthrosis, unspecified site 6/22/2005     Generalized osteoarthrosis, unspecified site 6/22/2005     HEARING LOSS CONDUCTIVE, COMBINED TYPE 6/22/2005    [place] measles as child     HYPOTHYROIDISM  6/22/2003     Mixed hyperlipidemia 6/22/2005     Obesity, unspecified 6/22/2005     RC-moderate (AHI 12, LSat 60%); REM RDI-73 6/1/2009    Sleep study [facility] was performed 5/26/09 in order to re-evaluate severity of RC. The total sleep time was 412.0 minutes. The sleep latency was decreased at 8.7 minutes with Ambien 10mg. The REM sleep latency was 426.0 minutes. Sleep efficiency was reduced at 79.0%. The sleep architecture was disrupted with frequent sleep stage changes and arousals.  Snoring:  loud. 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, uncontrolled(250.42) (H) 6/22/2005     Type II or unspecified type diabetes mellitus with renal manifestations, uncontrolled(250.42) (H) 6/22/2005     Unspecified disorder resulting from impaired renal function 6/22/2005    CR     1.82   06/21/2005      Unspecified essential hypertension        Past Surgical History   I have reviewed this patient's surgical history and updated it with pertinent information if needed.  Past Surgical History:   Procedure Laterality Date     ANESTHESIA OUT OF OR MRI N/A 4/8/2020    Procedure: ANESTHESIA OUT OF OR MRI;  Surgeon: GENERIC ANESTHESIA PROVIDER;  Location: [facility] OR     cabg       COLECTOMY LOW ANTERIOR  6/21/2011    Procedure:COLECTOMY LOW ANTERIOR; with loop ielostomy; Surgeon:ROBBIN MCDONNELL; Location:[facility]     COLONOSCOPY  1/26/2011    COLONOSCOPY performed by MASOUD BILL at [facility]     COLONOSCOPY N/A 3/1/2016    Procedure: COLONOSCOPY;  Surgeon: Liza Neal MD;  Location: [facility]     INSERT PORT VASCULAR ACCESS  3/2/2011    INSERT PORT VASCULAR ACCESS performed by LIZA NEAL at [facility] OR     OPEN REDUCTION INTERNAL FIXATION FEMUR DISTAL  2/12/2013    Procedure: OPEN REDUCTION INTERNAL FIXATION FEMUR DISTAL;  Open reduction internal fixation left femur fracture--Anesth[facility]Choice;  Surgeon: Ley, Jeffrey Duane, MD;  Location: [facility] OR     ORTHOPEDIC SURGERY       PHACOEMULSIFICATION WITH STANDARD INTRAOCULAR LENS IMPLANT Left 1/22/2018    Procedure: PHACOEMULSIFICATION WITH STANDARD INTRAOCULAR LENS IMPLANT;  Left cataract removal with implant;  Surgeon: Rony Key MD;  Location: [facility] OR     PHACOEMULSIFICATION WITH STANDARD INTRAOCULAR LENS IMPLANT Right 2/19/2018    Procedure: PHACOEMULSIFICATION WITH STANDARD INTRAOCULAR LENS IMPLANT;  Right cataract removal with implant;  Surgeon: Rony Key MD;  Location: [facility] OR     SIGMOIDOSCOPY FLEXIBLE  9/9/2011    Procedure:SIGMOIDOSCOPY FLEXIBLE; Performed prior to induction.; Surgeon:ROBBIN MCDONNELL; Location: OR     SURGICAL HISTORY OF -   10/24/2002    Heart bypass     SURGICAL HISTORY OF -   2002    R Knee arthroplasty     SURGICAL HISTORY OF -   2002    Mi 2 stints     TAKEDOWN ILEOSTOMY  9/9/2011    Procedure:TAKEDOWN  ILEOSTOMY; Exploratory Laparotomy,  Loop Ileostomy Takedown, Small Bowel Resection x 2.; Surgeon:ROBBIN MCDONNELL; Location:[facility] OR       Social History   I have reviewed this patient's social history and updated it with pertinent information if needed.  Social History     Tobacco Use     Smoking status: Former Smoker     Smokeless tobacco: Never Used   Substance Use Topics     Alcohol use: Yes     Comment: rare social use     Drug use: No       Family History   I have reviewed this patient's family history and updated it with pertinent information if needed.   Family History   Problem Relation Age of Onset     Diabetes Sister      C.A.D. Sister      Breast Cancer Sister        Prior to Admission Medications   Prior to Admission Medications   Prescriptions Last Dose Informant Patient Reported? Taking?   ACCU-CHEK MILVIA MELODY  Self No No   Sig: dispense one meter   Continuous Blood Gluc  (FREESTYLE JAJA 14 DAY READER) MELODY   No No   Si each as needed (use to scan the sensor to check the blood sugars)   Continuous Blood Gluc Sensor (FREESTYLE JAJA 14 DAY SENSOR) MISC   No No   Si each every 14 days   ELIQUIS ANTICOAGULANT 5 MG tablet   Yes No   Sig: Take 1 tablet by mouth 2 times daily   LANsoprazole (PREVACID SOLUTAB) 15 MG ODT   No No   Sig: Take 1 tablet (15 mg) by mouth 2 times daily (before meals)   acetaminophen (TYLENOL) 650 MG CR tablet   No No   Sig: Take 1 tablet (650 mg) by mouth 3 times daily as needed for mild pain or fever   aspirin (ASA) 81 MG tablet   Yes No   Sig: Take 81 mg by mouth daily   blood glucose monitoring (ACCU-CHEK MILVIA) test strip   No No   Sig: Use to test blood sugars 4 times daily or as directed.   blood glucose monitoring (ACCU-CHEK MULTICLIX) lancets  Self No No   Sig: Test BG 4 times daily   Patient taking differently: by In Vitro route 4 times daily Test BG 4 times daily   cholecalciferol 1000 units TABS   Yes No   Sig: Take 1,000 Units by mouth daily   diclofenac  (VOLTAREN) 50 MG EC tablet   Yes No   Sig: Take 50 mg by mouth 3 times daily as needed for moderate pain   ferrous sulfate (FEROSUL) 325 (65 Fe) MG tablet   No No   Sig: Take 1 tablet (325 mg) by mouth daily (with breakfast)   fluocinonide (LIDEX) 0.05 % ointment   No No   Sig: Apply sparingly to rash on legs twice daily as needed.  Do not apply to face.   fluticasone (FLONASE) 50 MCG/ACT spray   No No   Sig: Spray 1 spray into both nostrils daily   furosemide (LASIX) 20 MG tablet   No No   Sig: Take 1 tablet (20 mg) by mouth 2 times daily   gabapentin (NEURONTIN) 100 MG capsule   No No   Sig: Take 1 capsule (100 mg) by mouth At Bedtime   hydrocortisone (ANUSOL-HC) 2.5 % cream   No No   Sig: Place rectally 2 times daily as needed for hemorrhoids   insulin aspart (NOVOLOG FLEXPEN) 100 UNIT/ML pen   Yes No   Sig: Inject 14 Units Subcutaneous 2 times daily (with meals) Lunch and dinner    insulin glargine (LANTUS PEN) 100 UNIT/ML pen   Yes No   Sig: Inject 24 Units Subcutaneous At Bedtime   insulin pen needle (BD GABRIELLA U/F) 32G X 4 MM   No No   Sig: Use 4 times per day or as directed.   levothyroxine (SYNTHROID/LEVOTHROID) 88 MCG tablet   Yes No   Sig: Take 88 mcg by mouth daily Except on Sunday   levothyroxine (SYNTHROID/LEVOTHROID) 88 MCG tablet   Yes No   Sig: Take 44 mcg by mouth once a week = 1/2  Tab on Sunday   loperamide (IMODIUM) 2 MG capsule   No No   Sig: One capsule once a day PRN, can use a second one if needed   magnesium 250 MG tablet   Yes No   Sig: Take 1 tablet by mouth daily   menthol-zinc oxide (CALMOSEPTINE) 0.44-20.625 % OINT ointment   No No   Sig: Apply topically 4 times daily as needed for skin protection   order for DME   No No   Sig: Equipment being ordered: Hospital Bed service and repair   order for DME   No No   Sig: Equipment being ordered: Compression stockings   order for DME   No No   Sig: Equipment being ordered:  order for XL Size  Pull ups.  Pt is currently using 12 pull ups a day    order for DME   No No   Sig: Equipment being ordered: Incontinence briefs/Depends   order for DME   No No   Sig: Equipment being ordered: Wheelchair   order for DME   No No   Sig: Equipment being ordered:   Incontinence Products: Gloves (4 Boxes) & chux pads   potassium chloride ER (K-DUR/KLOR-CON M) 10 MEQ CR tablet   No No   Sig: Take 1 tablet (10 mEq) by mouth 2 times daily   pramipexole (MIRAPEX) 0.5 MG tablet   Yes No   Sig: Take 1 tablet by mouth At Bedtime    pramipexole (MIRAPEX) 0.5 MG tablet   Yes No   Sig: Take 0.5 mg by mouth One hour after scheduled dose and every 8 hours as needed   simvastatin (ZOCOR) 40 MG tablet   No No   Sig: Take 1 tablet (40 mg) by mouth daily      Facility-Administered Medications: None     Allergies   Allergies   Allergen Reactions     Ciprofloxacin Anxiety     Pt developed agitation, paranoia while on cipro--on clear if this is what caused it.  But would try to avoid in future.     Hydrocodone Other (See Comments)     dizzy     Lisinopril Cough            Vicodin [Hydrocodone-Acetaminophen] Other (See Comments)     Caused extreme dizziness       Physical Exam   Vital Signs: Temp: 98.7  F (37.1  C) Temp src: Oral BP: 132/56 Pulse: 56 Heart Rate: 58 Resp: 20 SpO2: 90 % O2 Device: None (Room air)    Weight: 219 lbs 2.2 oz    Constitutional: Somnolent, but wakes to voice, although quickly falls asleep again. Oriented to self only. Cooperates on a limited basis. No apparent distress, appears nontoxic.    Eyes: Eyes are clear, pupils are reactive. No scleral icterus.    HEENT: Oropharynx is clear and moist, no lesions. Normocephalic, no evidence of cranial trauma.      Cardiovascular: Regular rhythm and rate, normal S1 and S2. No murmur, rubs, or gallops. Peripheral pulses intact bilaterally. Bilateral lower extremity edema.    Respiratory: Lung sounds are clear to auscultation bilaterally without wheezes, rhonchi, or crackles.    GI: Soft, non-distended. Non-tender, no rebound or  guarding. No hepatosplenomegaly or masses appreciated. Normal bowel sounds.     Musculoskeletal: CAM boot present on right lower extremity. Otherwise without obvious deformity. Normal muscle bulk and tone. Distal CMS intact.      Skin: Warm and dry, no rashes or ecchymoses. No mottling of skin.      Neurologic: Patient moves all extremities spontaneously but not on command.  is symmetric. Gross strength and sensation are equal bilaterally.    Genitourinary: Deferred      Data   Data reviewed today: I reviewed all medications, new labs and imaging results over the last 24 hours. I personally reviewed the chest CT image(s) showing infiltrates bilaterally, right > left. EKG sinus rhythm, borderline bradycardia.    Recent Labs   Lab 04/24/20  1305   WBC 6.1   HGB 12.7   MCV 94         POTASSIUM 4.4   CHLORIDE 107   CO2 27   BUN 26   CR 1.66*   ANIONGAP 6   DWIGHT 8.8   *   ALBUMIN 2.7*   PROTTOTAL 6.9   BILITOTAL 0.6   ALKPHOS 126   ALT 19   AST 26     Recent Results (from the past 24 hour(s))   CT Chest w/o Contrast    Narrative    CT CHEST WITHOUT CONTRAST 4/24/2020 4:26 PM    CLINICAL HISTORY: Cough, fever, shortness of breath, right leg  immobilization, history of deep venous thrombosis. Evaluate for  potential pneumonia as well as pulmonary embolism.    TECHNIQUE: CT chest without IV contrast. Multiplanar reformats were  obtained. Dose reduction techniques were used.    CONTRAST: None.    COMPARISON: Chest x-ray 4/10/2020, CT chest, abdomen and pelvis  2/23/2017.    FINDINGS:   LUNGS AND PLEURA: Trace right pleural fluid. Moderate right base  atelectasis or scarring. Some mild groundglass opacities diffusely and  smooth interstitial prominence. There is a newly identified  ill-defined 0.4 cm nodular opacity posterior right upper lobe series 5  image 99. Another new nodule is seen at the right middle lobe  anteriorly measuring 0.6 cm series 5 image 125. Multiple additional  pulmonary nodules  noted bilaterally. Several are stable, but several  very small lesions are limited in comparison.    MEDIASTINUM/AXILLAE: Right chest port venous catheter tip ends at the  low SVC. Sternotomy. Diffuse coronary artery calcifications and/or  stenting. Stable size of the ascending thoracic aorta measuring 4.3 cm  series 2 image 27. No adenopathy is seen within the limits of  unenhanced scanning.    UPPER ABDOMEN: Cholelithiasis. Stable left hepatic cyst series 2 image  55. A right renal cyst also noted appearing larger since 2017, but of  doubtful significance. No specific imaging follow-up is recommended.    MUSCULOSKELETAL: Unremarkable.      Impression    IMPRESSION:   1.  Ill-defined mild groundglass opacities within the bilateral lungs  may relate to pulmonary edema. Atypical infectious etiologies are also  possible.  2.  Moderate right base atelectasis and/or scarring.  3.  Multiple bilateral pulmonary nodules noted. Several appear to be  new since 2017. Therefore, neoplasm is a possibility including  metastatic disease. Recommend short interval follow-up CT chest in  three months.  4.  Diffuse coronary artery calcifications and/or stenting.  5.  Trace right pleural fluid.  6.  Stable ectasia of the ascending thoracic aorta measuring 4.3 cm.    KIMI HINTON MD

## 2020-04-25 NOTE — PROGRESS NOTES
Skin affirmation note    Admitting nurse completed full skin assessment, Efe score and Efe interventions. This writer agrees with the initial skin assessment findings.

## 2020-04-25 NOTE — DISCHARGE SUMMARY
[facility] Hospitalist Discharge Summary    Stefanie Velazquez MRN# [id number]   Age: 75 year old YOB: 1945     Date of Admission:  4/24/2020  Date of Discharge::  4/25/2020  1:00 PM  Admitting Physician:  Ulises Gaytan MD  Discharge Physician:  Rony Barfield MD  Primary Physician: Sandra Medina       Home clinic: [facility]               Discharge Diagnosis:   Principle diagnosis: pneumonia due to recent mechanical ventilation for non-infectious causes     Secondary diagnoses:  Recent severe encephalopathy unknown etiology.   CKD  HGN  CAD   Lung nodules   RC         Discharge Instructions:   For the cough and vague mild infiltrates on CT.   -cefdinir 300 mg 2 times/day   -thickened liquids have been recommended in the past  -this may have been a complication of her recent intubation/ventilation.    -would  follow up in clinic in the next week and recheck CXR      Follow up with primary care provider in 7 days        Procedures:       Results for orders placed or performed during the hospital encounter of 04/24/20   CT Chest w/o Contrast    Narrative    CT CHEST WITHOUT CONTRAST 4/24/2020 4:26 PM    CLINICAL HISTORY: Cough, fever, shortness of breath, right leg  immobilization, history of deep venous thrombosis. Evaluate for  potential pneumonia as well as pulmonary embolism.    TECHNIQUE: CT chest without IV contrast. Multiplanar reformats were  obtained. Dose reduction techniques were used.    CONTRAST: None.    COMPARISON: Chest x-ray 4/10/2020, CT chest, abdomen and pelvis  2/23/2017.    FINDINGS:   LUNGS AND PLEURA: Trace right pleural fluid. Moderate right base  atelectasis or scarring. Some mild groundglass opacities diffusely and  smooth interstitial prominence. There is a newly identified  ill-defined 0.4 cm nodular opacity posterior right upper lobe series 5  image 99. Another new nodule is seen at the right middle lobe  anteriorly measuring 0.6 cm series 5  image 125. Multiple additional  pulmonary nodules noted bilaterally. Several are stable, but several  very small lesions are limited in comparison.    MEDIASTINUM/AXILLAE: Right chest port venous catheter tip ends at the  low SVC. Sternotomy. Diffuse coronary artery calcifications and/or  stenting. Stable size of the ascending thoracic aorta measuring 4.3 cm  series 2 image 27. No adenopathy is seen within the limits of  unenhanced scanning.    UPPER ABDOMEN: Cholelithiasis. Stable left hepatic cyst series 2 image  55. A right renal cyst also noted appearing larger since 2017, but of  doubtful significance. No specific imaging follow-up is recommended.    MUSCULOSKELETAL: Unremarkable.      Impression    IMPRESSION:   1.  Ill-defined mild groundglass opacities within the bilateral lungs  may relate to pulmonary edema. Atypical infectious etiologies are also  possible.  2.  Moderate right base atelectasis and/or scarring.  3.  Multiple bilateral pulmonary nodules noted. Several appear to be  new since 2017. Therefore, neoplasm is a possibility including  metastatic disease. Recommend short interval follow-up CT chest in  three months.  4.  Diffuse coronary artery calcifications and/or stenting.  5.  Trace right pleural fluid.  6.  Stable ectasia of the ascending thoracic aorta measuring 4.3 cm.    KIMI HINTON MD     *Note: Due to a large number of results and/or encounters for the requested time period, some results have not been displayed. A complete set of results can be found in Results Review.                    Allergies:      Allergies   Allergen Reactions     Ciprofloxacin Anxiety     Pt developed agitation, paranoia while on cipro--on clear if this is what caused it.  But would try to avoid in future.     Hydrocodone Other (See Comments)     dizzy     Lisinopril Cough            Vicodin [Hydrocodone-Acetaminophen] Other (See Comments)     Caused extreme dizziness                  Discharge Medications:      Discharge Medication List as of 4/25/2020 11:48 AM      START taking these medications    Details   cefdinir (OMNICEF) 300 MG capsule Take 1 capsule (300 mg) by mouth 2 times daily, Disp-10 capsule,R-0, E-Prescribe         CONTINUE these medications which have NOT CHANGED    Details   ACCU-CHEK MILVIA MELODY dispense one meter, Disp-1 device, R-0, FaxDiagnosis code: 250.02      acetaminophen (TYLENOL) 650 MG CR tablet Take 1 tablet (650 mg) by mouth 3 times daily as needed for mild pain or fever, Disp-60 tablet, No Print Out      aspirin (ASA) 81 MG tablet Take 81 mg by mouth daily, Historical      blood glucose monitoring (ACCU-CHEK MILVIA) test strip Use to test blood sugars 4 times daily or as directed., Disp-360 each, R-1, E-Prescribe      blood glucose monitoring (ACCU-CHEK MULTICLIX) lancets Test BG 4 times dailyDisp-1 Box, [id number]      cholecalciferol 1000 units TABS Take 1,000 Units by mouth daily, Historical      Continuous Blood Gluc  (FREESTYLE JAJA 14 DAY READER) MELODY 1 each as needed (use to scan the sensor to check the blood sugars), Disp-1 Device, R-0, E-Prescribe      Continuous Blood Gluc Sensor (FREESTYLE JAJA 14 DAY SENSOR) MISC 1 each every 14 days, Disp-2 each, R-11, E-PrescribePt at an assisted living so make sure you follow up if no call back      diclofenac (VOLTAREN) 50 MG EC tablet Take 50 mg by mouth 3 times daily as needed for moderate pain, Historical      ELIQUIS ANTICOAGULANT 5 MG tablet Take 1 tablet by mouth 2 times daily, AMANDA, Historical      ferrous sulfate (FEROSUL) 325 (65 Fe) MG tablet Take 1 tablet (325 mg) by mouth daily (with breakfast), Disp-30 tablet, R-1, E-Prescribe      fluocinonide (LIDEX) 0.05 % ointment Apply sparingly to rash on legs twice daily as needed.  Do not apply to face.Disp-60 g, R-11No Print Out      fluticasone (FLONASE) 50 MCG/ACT spray Spray 1 spray into both nostrils daily, Disp-1 Bottle, R-3, No Print Out      furosemide (LASIX) 20 MG  tablet Take 1 tablet (20 mg) by mouth 2 times daily, Disp-180 tablet, R-3, E-Prescribe      gabapentin (NEURONTIN) 100 MG capsule Take 1 capsule (100 mg) by mouth At Bedtime, Disp-90 capsule, R-1, E-Prescribe      hydrocortisone (ANUSOL-HC) 2.5 % cream Place rectally 2 times daily as needed for hemorrhoidsDisp-30 g, [id number]      insulin aspart (NOVOLOG FLEXPEN) 100 UNIT/ML pen Inject 14 Units Subcutaneous 2 times daily (with meals) Lunch and dinner , Historical      insulin glargine (LANTUS PEN) 100 UNIT/ML pen Inject 24 Units Subcutaneous At Bedtime, Disp-18 mL,R-3, HistoricalIf Lantus is not covered by insurance, may substitute Basaglar at same dose and frequency.        insulin pen needle (BD GABRIELLA U/F) 32G X 4 MM Use 4 times per day or as directed.Disp-120 each, [id number]      LANsoprazole (PREVACID SOLUTAB) 15 MG ODT Take 1 tablet (15 mg) by mouth 2 times daily (before meals), Disp-60 tablet,R-0, Local Print      !! levothyroxine (SYNTHROID/LEVOTHROID) 88 MCG tablet Take 44 mcg by mouth once a week = 1/2  Tab on Sunday, Historical      !! levothyroxine (SYNTHROID/LEVOTHROID) 88 MCG tablet Take 88 mcg by mouth daily Except on Sunday, Historical      loperamide (IMODIUM) 2 MG capsule One capsule once a day PRN, can use a second one if needed, Disp-90 capsule, R-3, No Print Out      magnesium 250 MG tablet Take 1 tablet by mouth daily, Historical      menthol-zinc oxide (CALMOSEPTINE) 0.44-20.625 % OINT ointment Apply topically 4 times daily as needed for skin protectionOTC      !! order for DME Equipment being ordered:   Incontinence Products: Gloves (4 Boxes) & chux padsDisp-300 each, R-11, Local Print      !! order for DME Equipment being ordered: WheelchairDisp-1 Device, R-0, Local Print      !! order for DME Equipment being ordered: Incontinence briefs/DependsDisp-12 Package, R-11, Local Print      !! order for DME Equipment being ordered:  order for XL Size  Pull ups.  Pt is currently using 12  pull ups a dayDisp-350 each, R-11, Local Print      !! order for DME Equipment being ordered: Compression stockingsDisp-2 Device, R-0, Local Print      !! order for DME Equipment being ordered: Hospital Bed service and repairDisp-1 each, R-0, Local Print      potassium chloride ER (K-DUR/KLOR-CON M) 10 MEQ CR tablet Take 1 tablet (10 mEq) by mouth 2 times daily, Disp-180 tablet, R-1, E-Prescribe      !! pramipexole (MIRAPEX) 0.5 MG tablet Take 1 tablet by mouth At Bedtime , Historical      !! pramipexole (MIRAPEX) 0.5 MG tablet Take 0.5 mg by mouth One hour after scheduled dose and every 8 hours as needed, Historical      simvastatin (ZOCOR) 40 MG tablet Take 1 tablet (40 mg) by mouth daily, Disp-90 tablet, R-2, E-Prescribe       !! - Potential duplicate medications found. Please discuss with provider.                Consultations:     None           Brief History of Presenting Illness:   Stefanie Velazquez is a 75 year old female who presented to the emergency department from her living facility for evaluation of fever and cough.     Attempted to obtain history from patient, who is somnolent and wants to sleep. She does not provide much history. No family or care staff from her facility are present to provide any ancillary information. Most of the history is from Dr. Tidwell of the emergency department and EMR review.     Patient was recently hospitalized at [facility] from April 6-13, 2020. During that time she had confusion and agitation and was intubated, after which there was some concern about recurrent aspiration. Patient had apparently refused a feeding tube in discussion with palliative care.     After returning to her living facility she apparently developed a recent fever, and possibly a mild cough. There was concern that she may have aspirated, although no definitive event was witnessed. Patient was sent to the emergency department for evaluation. CT was abnormal, suggestive of possible pneumonia. There  was concern for possible COVID as well given her recent hospital stay and residence in long term care.     Patient is unable to provide much for review of systems. She denies pain, denies any respiratory changes. Requests warm blankets, wants to stay wrapped up in her blankets.              Hospital Course:   Stefanie Velazquez is a 75 year old female admitted on 4/24/2020. She presented to the emergency department from her assisted living facility for evaluation of fever and mild cough, found to have pneumonia vs COVID for which she is being admitted for further evaluation and treatment.     Atypical pneumonia vs aspiration pneumonia  Recently hospitalized 2 weeks ago, was intubated at that time, questionable aspiration since. Patient higher risk for healthcare associated pneumonia and aspiration pneumonia. Afebrile, no leukocytosis on admission. No hypoxia. Chest CT with some groundglass opacities bilaterally, right worse than left. Was started on Zosyn in the emergency department due to risks as above.  - zosyn given x 24 hrs but she was markedly improved prior to discharge the next day and wanted to go home.    - Procalcitonin pending  - Sputum culture and gram stain pending  - no supplemental O2 if needed     COVID PUI - negative  Tested for COVID in the emergency department due to abnormal CT. Patient afebrile, not high suspicion otherwise.   - COVID returned negative  - COVID specific precautions can be discontinued, but still needs droplet precautions for pneumonia and contact due to history of MRSA     Lung nodules   Multiple nodules noted on chest CT. Radiology recommending follow-up CT in 3 months.  - Will need outpatient follow-up CT in July 2020 - to be ordered on discharge     CKD (chronic kidney disease) stage 4, GFR 15-29 ml/min  Admit creatinine 1.66 (baseline 1.2 - 1.6), GFR 29 (baseline 30 - 37). At the worse end of normal for patient's baseline. Was given 1.5L IV fluids in the emergency  department.  - BMP in am   - Hold prior to admission lasix for now, but resume as appropriate     Type 2 diabetes mellitus with diabetic nephropathy, with long-term current use of insulin  Managed prior to admission with Lantus 24 U q hs and Novolog 14U with lunch and dinner.   - Continue prior to admission Lantus  - Continue prior to admission Novolog       Benign essential hypertension  Managed prior to admission with lasix 20 mg bid.  - Hold lasix due to renal function, resume as appropriate     Chronic ischemic heart disease  Hyperlipidemia LDL goal <100  History of CABG x3 in 2002. Managed prior to admission with aspirin 81 mg daily, lasix 20 mg bid, and Zocor 40 mg daily. Not on beta blocker or ACE/ARB prior to admission.  - Continue aspirin and Zocor  - Lasix as above     H/O deep venous thrombosis  Chronic anticoagulation   Managed prior to admission with Eliquis 5 mg bid, continue.     Hypothyroidism  Managed prior to admission with levothyroxine 88 mcg daily (except for on Sundays, dose is reduced to 44 mcg), continue.     Neuropathy  Lumbar radiculopathy  Managed prior to admission with gabapentin 100 mg q hs and Mirapex 0.5 mg q hs, as well as prn Mirapex tid.  - Continue gabapentin and Mirapex     Esophageal reflux  Managed prior to admission with Prevacid 15 mg bid, continue.     Bowel and bladder incontinence  Wears a brief. Not on anticholinergic medications.     Bilateral hearing loss, unspecified hearing loss type  Requires pocket talker.     RC-moderate (AHI 12, LSat 60%); REM RDI-73  No CPAP ordered           Diet: Moderate Consistent CHO Diet    DVT Prophylaxis: Continue prior to admission Eliquis  Lr Catheter: not present  Code Status: Full Code  - unable to discuss with patient, deferred to prior code status               Discharge Exam:   OBJECTIVE:   /42   Pulse 52   Temp 98.2  F (36.8  C) (Oral)   Resp 20   Ht 1.524 m (5')   Wt 98.7 kg (217 lb 9.5 oz)   SpO2 94%   BMI 42.50  kg/m      GENERAL APPEARANCE:  Alert, NAD, Ox3, thick speech, gets confused on details, very [place] so difficult to assess      RESP:clear      CV: regular rates and rhythm,no murmur, no click or rub - no edema     Abdomen: soft, nontender, no liver or spleen enlargement, no masses, BSs normal   Skin: no cyanosis, pallor, or jaundice            Pending Tests at Discharge:     Unresulted Labs Ordered in the Past 30 Days of this Admission     Date and Time Order Name Status Description    4/25/2020 1018 Procalcitonin In process                    Discharge Disposition:   Discharged to home      Attestation:  Amount of time performed on this discharge : 30 minutes.    Rony Barfield MD MD

## 2020-04-25 NOTE — PLAN OF CARE
[facility] DISCHARGE NOTE    Patient discharged to assisted living at 1:02 PM via wheel chair. Accompanied by other:[facility] transport and staff. Discharge instructions reviewed with patient, opportunity offered to ask questions. Prescriptions filled and sent with patient upon discharge. All belongings sent with patient.    Angela Ferrell RN

## 2020-04-25 NOTE — PLAN OF CARE
[facility] ADMISSION NOTE    Patient admitted to room 2213 at approximately 2145 via cart from emergency room. Patient was accompanied by transport tech.     Verbal SBAR report received from Hebert MURO ED RN prior to patient arrival.     Patient ambulated to bed with one assist. Patient alert and oriented X 3. The patient is not having any pain.  . Admission vital signs: Blood pressure 132/56, pulse 56, temperature 98.7  F (37.1  C), temperature source Oral, resp. rate 20, height 1.524 m (5'), weight 99.4 kg (219 lb 2.2 oz), SpO2 90 %, not currently breastfeeding. Patient was oriented to plan of care, call light, bed controls, tv, telephone, bathroom, and visiting hours.     Risk Assessment    The following safety risks were identified during admission: fall. Yellow risk band applied: YES.     Skin Initial Assessment    This writer admitted this patient and completed a full skin assessment and Efe score in the Adult PCS flowsheet. Appropriate interventions initiated as needed.     Secondary skin check completed by Jacqui Rees RN.    Efe Risk Assessment  Sensory Perception: 4-->no impairment  Moisture: 3-->occasionally moist  Activity: 3-->walks occasionally  Mobility: 3-->slightly limited  Nutrition: 3-->adequate  Friction and Shear: 3-->no apparent problem  Efe Score: 19    Education    Patient has a [place] to Observation order: No  Observation education completed and documented: N/A      Jesenia Echeverria RN

## 2020-04-25 NOTE — ED NOTES
Pt repeatedly on call light asking to go home.    Has been told multiple times via IPad and in person she is being admitted pt seems unable to fully understand and repeats question with call light immediately after situation is explained

## 2020-04-25 NOTE — CONSULTS
Care Transition Initial Assessment - RN    Admission Date and Time:  4/24/20 at 10:06 pm        Spoke with: Patient, Family and Caregiver.    DATA   Principal Problem:    Atypical pneumonia  Active Problems:    Hypothyroidism    Chronic ischemic heart disease    Esophageal reflux    RC-moderate (AHI 12, LSat 60%); REM RDI-73    Neuropathy (H)    Hyperlipidemia LDL goal <100    Bowel and bladder incontinence    Benign essential hypertension    Type 2 diabetes mellitus with diabetic nephropathy, with long-term current use of insulin (H)    Bilateral hearing loss, unspecified hearing loss type    Lumbar radiculopathy    CKD (chronic kidney disease) stage 4, GFR 15-29 ml/min (H)    H/O deep venous thrombosis    Chronic anticoagulation    Pulmonary nodules       Primary Care Clinic Name: [facility]  Primary Care MD Name: Dr. Morales  Contact information and PCP information verified: Yes    ASSESSMENT  Cognitive Status: awake, alert and confused.  Lives With: facility resident     Description of Support System: Supportive, Involved   Who is your support system?: Sibling(s), Facility resident(s)/Staff, Other (specify)(Nieces)   Support Assessment: Adequate family and caregiver support   Insurance Concerns: No Insurance issues identified      This writer spoke with the patient's niece Channing DONIS ( [phone]) at the [facility].  Introduced myself and role.      The patient currently resides at [facility] (phone: [phone] Fax: [phone]).  Spoke with Mell DONIS @ [facility] ([phone]).  Patient has help medication administration, laundry, showers and incontinence care.  She is wheelchair bound at baseline, but is able to pull herself up with hand rail assist in the bathroom.  She has some short term memory loss at baseline.  Patient's two sisters live at [facility].  Her two nieces, Crystal and Lori live locally and are supportive.      PLAN    Return to [facility] today.  Transportation will be provided by [facility] at 1300.      Discussed the discharge plan with ninfa Rojas and Mell DONIS, Chantell LANDRY.  They are in agreement with the discharge plan.      Siomara Merrill RN, Care Coordinator [phone]

## 2020-04-25 NOTE — PLAN OF CARE
No coughing or shortness of breath. LS are diminished bilaterally. Weaned off of oxygen and sat was 94% this am. Patient was up to chair. Tolerated well. Alert and oriented. IV saline locked. Wants to discharge back to AL today. Spoke with Crystal ocasio who called and was updated on status. Good appetite. HARD OF HEARING even with her hearing aides and uses pocket talker over them. Still need to elevate your voice when speaking to her.

## 2020-04-27 ENCOUNTER — TELEPHONE (OUTPATIENT)
Dept: FAMILY MEDICINE | Facility: CLINIC | Age: 75
End: 2020-04-27

## 2020-04-27 NOTE — TELEPHONE ENCOUNTER
ED/UC/IP follow up phone call: Atypical Pneumonia - 04/25/20    RN please call to follow up.    Number of ED visits in past 12 months = 1

## 2020-04-27 NOTE — TELEPHONE ENCOUNTER
ED / Discharge Outreach Protocol    Patient Contact    Attempt # 1    Was call answered?  No.  Unable to leave message.  Alice Cavazos RN

## 2020-04-28 ENCOUNTER — TELEPHONE (OUTPATIENT)
Dept: FAMILY MEDICINE | Facility: CLINIC | Age: 75
End: 2020-04-28

## 2020-04-28 NOTE — TELEPHONE ENCOUNTER
Reason for call:  Patient reporting a symptom    Symptom or request: Pt has a wound area 5 cm on her inner Left thigh. Open blister now cleansing and keeping covered. Urine touches and not improving. This has been there since 4/19/20  Discuss Treatment  [facility] does not have Supplies. Give order ( Referral )  to Skilled Nursing and they do wound treatment. They can have them come in with there supplies.    Phone Number patient can be reached at:  Home number on file [phone] (home)    Best Time:  Any Time      Can we leave a detailed message on this number:  YES    Call taken on 4/28/2020 at 3:01 PM by Leilani Jurado

## 2020-04-28 NOTE — TELEPHONE ENCOUNTER
Prior Authorization Retail Medication Request    Medication/Dose:   ICD code (if different than what is on RX):  Prevacid Tab 15 mg   Previously Tried and Failed:    Pantoprazole / Protonix 7/18/05-7/27/11  Omeprazole 2/4/09 - until Recent   Rationale:  Pt was recently Hospitalized med changed in Hospital / Aspiration Pneumonitis  R13.12 Oropharyngeal Dysphagia  K21.9 Gastroesophageal Reflux Disease esophagitis presence    Insurance Name:  [phone]  Insurance ID:  [id number]      Pharmacy Information (if different than what is on RX)  Name:  [facility]   Phone:  [phone]  Fax [phone]

## 2020-04-28 NOTE — TELEPHONE ENCOUNTER
ED / Discharge Outreach Protocol    Patient Contact    Attempt # 2    Was call answered?  No.  Unable to leave a message.  Has three appointments set up.

## 2020-04-29 NOTE — TELEPHONE ENCOUNTER
PA Initiation    Medication: Prevacid Tab - INITIATED  Insurance Company: [employer] - Phone [phone] Fax [phone]  Pharmacy Filling the Rx: [facility] - [address]  Filling Pharmacy Phone:    Filling Pharmacy Fax:    Start Date: 4/29/2020

## 2020-04-30 NOTE — TELEPHONE ENCOUNTER
Prior Authorization Approval    Authorization Effective Date: 3/30/2020  Authorization Expiration Date: 4/29/2021  Medication: Prevacid Tab - APPROVED  Approved Dose/Quantity: 60 TABLETS PER 30 DAYS  Reference #: [id number]   Insurance Company: [employer] - Phone [phone] Fax [phone]  Expected CoPay:       CoPay Card Available:      Foundation Assistance Needed:    Which Pharmacy is filling the prescription (Not needed for infusion/clinic administered): [facility] - [address]  Pharmacy Notified: Yes  Patient Notified: Yes

## 2020-05-11 ENCOUNTER — PATIENT OUTREACH (OUTPATIENT)
Dept: GERIATRIC MEDICINE | Facility: CLINIC | Age: 75
End: 2020-05-11

## 2020-05-11 ASSESSMENT — ACTIVITIES OF DAILY LIVING (ADL)
DEPENDENT_IADLS:: CLEANING;COOKING;LAUNDRY;SHOPPING;MEAL PREPARATION;MEDICATION MANAGEMENT;MONEY MANAGEMENT;TRANSPORTATION

## 2020-05-11 NOTE — PROGRESS NOTES
[facility] Care Coordination Contact    Received after visit chart from care coordinator.  Completed following tasks: Mailed copy of care plan to client, Updated services in access and Submitted referrals/auths for Assisted Living  , Mailed copy of CL tool to member, faxed copy to AL facility, uploaded into Hand[employer] and submitted authorization to health plan.   and Provider Signature - Full Care Plan:  Member indicates that they would like their POC shared with the following EW providers:  Janessa LANDRY.  Letter and full POC faxed to providers for signature.     Mailed POC signature page to member to sign and return.    Gina Wheatley  Care Management Specialist   [facility]   [phone]

## 2020-05-11 NOTE — PROGRESS NOTES
[facility] Care Coordination Contact      [facility] Health Plan or Product Change    CC received notification that member's health plan or health plan product changed from [facility]+ to are [facility] effective 4/1/2020.     cc had a telephonic visit with member on March 30,2020.        CC contacted members Crystal ocasio,  anay, 5/11/2020  and discussed change and another face-to-face visit was offered.Family  declined need for home visit as a visit was just completed. Crystal stated they were aware and knew the benefits. They will contact cc with any questions or concerns.    Crystal also updated cc that POA was obtained last week.  She will now manage members finances.  A rep payee will be reviewed if she feels she needs the assistance.      Follow-Up Plan: Member informed of future contact, plan to f/u with member with at next regularly scheduled contact.  Contact information shared with member and family, encouraged member to call with any questions or concerns.  Komal Krishnan RN-[facility]   [phone]

## 2020-05-11 NOTE — LETTER
May 11, 2020      SOL WORRELL  [id number]  [address] [place] [id number]      Dear Sol:    At [facility], we are dedicated to improving your health and well-being. Enclosed is the Comprehensive Care Plan that we developed with you on 03-. Please review the Care Plan carefully.    As a reminder, some of the things we discussed at your visit include:    Your physical and mental health    Ways to reduce falls    Health care needs you may have    Don t forget to contact your care coordinator if you:    Have been hospitalized or plan to be hospitalized     Have had a fall     Have experienced a change in physical health    Are experiencing emotional problems     If you do not agree with your Care Plan, have questions about it, or have experienced a change in your needs, please call me at [phone]. If you are hearing impaired, please call the [place] Relay at 131 or [phone] (kcmtuq-[id number] relay service).    Sincerely,    Komal Krishnan RN-BC    E-mail: [email or web address]  Phone: [phone]      [facility] ([facility]) is a health plan that contracts with both Medicare and the [place] Medical Assistance (Medicaid) program to provide benefits of both programs to enrollees. Enrollment in [facility] depends on contract renewal.    [id number])     [id number] (11/18)

## 2020-05-11 NOTE — PROGRESS NOTES
[facility] Care Coordination Contact    [facility] Home Visit Assessment     Home visit for Health Risk Assessment with Stefanie Velazquez completed on April 30 2020       Current living arrangement:: I live in assisted living     Assessment completed with:: Patient, Care Team Member    Current Care Plan  Member currently receiving the following home care services:   Assisted Living   Member currently receiving the following community resources:   No      Medication Review  Medication reconciliation completed in Epic: No  Medication set-up & administration: RN set up weekly.  Assisting Living staff administers medications.  Medication Risk Assessment Medication (1 or more, place referral to MTM): N/A: No risk factors identified  MTM Referral Placed: No: No risk factors idenified    Mental/Behavioral Health   Depression Screening:   PHQ-2 Total Score (Adult) - Positive if 3 or more points; Administer PHQ-9 if positive: 1       Mental health DX:: No        Falls Assessment:   Fallen 2 or more times in the past year?: No   Any fall with injury in the past year?: No    ADL/IADL Dependencies:   Dependent ADLs:: Ambulation-walker, Bathing  Dependent IADLs:: Cleaning, Cooking, Laundry, Shopping, Meal Preparation, Medication Management, Money Management, Transportation    [facility] Health Plan sponsored benefits: Shared information re: Silver Sneakers/gym memberships, ASA, Calcium +D.    PCA Assessment completed at visit: Not Applicable     Elderly Waiver Eligibility: Yes-will continue on EW    Care Plan & Recommendations: Continue to live at Assisted Living. No additional DME / other services needed at this time.     See [facility] for detailed assessment information.    Follow-Up Plan: Member informed of future contact, plan to f/u with member with a 6 month telephone assessment.  Contact information shared with member and family, encouraged member to call with any questions or concerns at any time.    [facility]  continuum providers: Please refer to Health Care Home on the Epic Problem List to view this patient's [facility] Care Plan Summary.    Komal Krishnan RN-[facility]   [phone]

## 2020-05-11 NOTE — PROGRESS NOTES
[facility] Care Coordination Contact    cc received a call from Stefanie cano home care nurse stating member Needs thickened liquids. Home care nurse # is [phone].      cc returned call and requested amount per month needed.    Awaiting for return call.     Komal Krishnan RN-[facility]   [phone]

## 2020-05-12 DIAGNOSIS — Z53.9 DIAGNOSIS NOT YET DEFINED: Primary | ICD-10-CM

## 2020-05-12 PROCEDURE — G0180 MD CERTIFICATION HHA PATIENT: HCPCS | Performed by: FAMILY MEDICINE

## 2020-05-14 ENCOUNTER — ANCILLARY PROCEDURE (OUTPATIENT)
Dept: GENERAL RADIOLOGY | Facility: CLINIC | Age: 75
End: 2020-05-14
Attending: FAMILY MEDICINE
Payer: COMMERCIAL

## 2020-05-14 ENCOUNTER — OFFICE VISIT (OUTPATIENT)
Dept: FAMILY MEDICINE | Facility: CLINIC | Age: 75
End: 2020-05-14
Payer: COMMERCIAL

## 2020-05-14 ENCOUNTER — TELEPHONE (OUTPATIENT)
Dept: FAMILY MEDICINE | Facility: CLINIC | Age: 75
End: 2020-05-14

## 2020-05-14 VITALS
BODY MASS INDEX: 37.11 KG/M2 | DIASTOLIC BLOOD PRESSURE: 80 MMHG | WEIGHT: 190 LBS | TEMPERATURE: 97.2 F | HEART RATE: 71 BPM | SYSTOLIC BLOOD PRESSURE: 120 MMHG | OXYGEN SATURATION: 98 %

## 2020-05-14 DIAGNOSIS — C20 RECTAL CANCER (H): ICD-10-CM

## 2020-05-14 DIAGNOSIS — I10 BENIGN ESSENTIAL HYPERTENSION: ICD-10-CM

## 2020-05-14 DIAGNOSIS — D50.8 IRON DEFICIENCY ANEMIA SECONDARY TO INADEQUATE DIETARY IRON INTAKE: ICD-10-CM

## 2020-05-14 DIAGNOSIS — R91.8 PULMONARY NODULES: ICD-10-CM

## 2020-05-14 DIAGNOSIS — Z79.4 TYPE 2 DIABETES MELLITUS WITH DIABETIC NEPHROPATHY, WITH LONG-TERM CURRENT USE OF INSULIN (H): ICD-10-CM

## 2020-05-14 DIAGNOSIS — N18.4 CKD (CHRONIC KIDNEY DISEASE) STAGE 4, GFR 15-29 ML/MIN (H): ICD-10-CM

## 2020-05-14 DIAGNOSIS — E03.8 OTHER SPECIFIED HYPOTHYROIDISM: ICD-10-CM

## 2020-05-14 DIAGNOSIS — K21.9 GASTROESOPHAGEAL REFLUX DISEASE, ESOPHAGITIS PRESENCE NOT SPECIFIED: Chronic | ICD-10-CM

## 2020-05-14 DIAGNOSIS — S92.404A CLOSED NONDISPLACED FRACTURE OF PHALANX OF RIGHT GREAT TOE, UNSPECIFIED PHALANX, INITIAL ENCOUNTER: ICD-10-CM

## 2020-05-14 DIAGNOSIS — J69.0 ASPIRATION PNEUMONIA OF BOTH UPPER LOBES, UNSPECIFIED ASPIRATION PNEUMONIA TYPE (H): Primary | ICD-10-CM

## 2020-05-14 DIAGNOSIS — I82.403 RECURRENT ACUTE DEEP VEIN THROMBOSIS (DVT) OF BOTH LOWER EXTREMITIES (H): ICD-10-CM

## 2020-05-14 DIAGNOSIS — E78.5 HYPERLIPIDEMIA LDL GOAL <100: ICD-10-CM

## 2020-05-14 DIAGNOSIS — E11.21 TYPE 2 DIABETES MELLITUS WITH DIABETIC NEPHROPATHY, WITH LONG-TERM CURRENT USE OF INSULIN (H): ICD-10-CM

## 2020-05-14 DIAGNOSIS — J69.0 ASPIRATION PNEUMONIA OF BOTH UPPER LOBES, UNSPECIFIED ASPIRATION PNEUMONIA TYPE (H): ICD-10-CM

## 2020-05-14 PROCEDURE — 71046 X-RAY EXAM CHEST 2 VIEWS: CPT

## 2020-05-14 PROCEDURE — 99495 TRANSJ CARE MGMT MOD F2F 14D: CPT | Performed by: FAMILY MEDICINE

## 2020-05-14 PROCEDURE — 73660 X-RAY EXAM OF TOE(S): CPT | Mod: RT

## 2020-05-14 NOTE — PROGRESS NOTES
Subjective     Stefanie Velazquez is a 75 year old female who presents to clinic today for the following health issues:    HPI       Hospital Follow-up Visit:    Hospital/Nursing Home/IP Rehab Facility: [facility]  Date of Admission: 4/24/20  Date of Discharge: 4/25/20  Reason(s) for Admission: pneumonia - aspiration vs intubation injury      Was your hospitalization related to COVID-19? No   Problems taking medications regularly:  None  Medication changes since discharge: None  Problems adhering to non-medication therapy:  None    Summary of hospitalization:  [facility] discharge summary reviewed  Diagnostic Tests/Treatments reviewed.  Follow up needed: CXR, will need CT scan 3 months  Other Healthcare Providers Involved in Patient s Care:         None  Update since discharge: improved. Post Discharge Medication Reconciliation: discharge medications reconciled, continue medications without change.  Plan of care communicated with patient          Pneumonia  Was hospitalized with pneumonia, thought from her prior recent intubation. She is feeling much better, cough is gone  COVID 19 negative.   They have recommended a repeat chest x-ray in follow up.   She had multiple nodules on her CT scan and recommend follow up CT in 3 months   Impression      IMPRESSION:   1.  Ill-defined mild groundglass opacities within the bilateral lungs  may relate to pulmonary edema. Atypical infectious etiologies are also  possible.  2.  Moderate right base atelectasis and/or scarring.  3.  Multiple bilateral pulmonary nodules noted. Several appear to be  new since 2017. Therefore, neoplasm is a possibility including  metastatic disease. Recommend short interval follow-up CT chest in  three months.  4.  Diffuse coronary artery calcifications and/or stenting.  5.  Trace right pleural fluid.  6.  Stable ectasia of the ascending thoracic aorta measuring 4.3 cm.     KIMI HINTON MD     She has a history of rectal cancer, so  there is concern these are metastasis.     Rt large toe follow up - will not remove boot until Dr. Bernstein ok's  She broke her right big toe, has had a boot cast on for 8 weeks.   She won't let them take it off to do skin cares and gets very despondent about it. Wants me to ok her taking it off.   Not sure if still painful    History of iron def anemia  That has improved on iron  Hemoglobin   Date Value Ref Range Status   04/24/2020 12.7 11.7 - 15.7 g/dL Final   ]     Discuss thickened liquids  She is back on thickened liquids as a precaution. No swallow study done recently. She hates them and wants to take the risk of aspiration. She remembers the tricks they taught her. She still coughs with taking her pills.    gastroesophageal reflux disease   Her omeprazole was switched to prevacid at prior hospitalization, maybe thinking the granules were easier to swallow? In any case, her gastroesophageal reflux disease is back and she'd like to switch back to omeprazole again. Was on it bid.     Hypothyroidism  On levothyroxine   TSH   Date Value Ref Range Status   04/07/2020 2.69 0.40 - 4.00 mU/L Final        History of recurrent DVT  On Eliquis    coronary artery disease   History of CABG x 3 in 2002. On aspirin, lasix and simvastatin.   Due to hypotension, not on ACE or beta blocker    diabetes   Lab Results   Component Value Date    A1C 6.9 03/16/2020    A1C 7.7 11/08/2019    A1C 7.8 08/08/2019    A1C 8.7 05/02/2019    A1C 8.1 01/31/2019     Blood sugars are very good at the assisted living facility. She is on insulin. She has a CGM but her insurance sent them a letter that they won't cover it after 14 days.   Patient has Diabetes Type 2.    Patient is on Continuous Personal CGM and is treated with 4 injections daily or continuous subcutaneous insulin infusion pump.  Pt requires frequent adjustments to their insulin treatment regimen on the basis of therapeutic CGM testing results.  Pt is checking their blood sugars at  least 4 times daily via sensor.  Pt is seen every 3-6 months for evaluation of diabetes control.     Chronic kidney disease   Alternates from stage 3 to 4  Never did see the nephrologist, but seems to be stable with GFR in 30-40 range  No longer on lasix    hypertension   I've had to take her off antihypertensives due to lower blood pressures.   Even off of them, she still has some blood pressures of 90s/50s.     BP Readings from Last 3 Encounters:   05/14/20 120/80   04/25/20 122/42   04/16/20 130/62    Wt Readings from Last 3 Encounters:   05/14/20 86.2 kg (190 lb)   04/25/20 98.7 kg (217 lb 9.5 oz)   04/12/20 98.1 kg (216 lb 4.3 oz)         Chronic back pain  She is doing ok on small dose of gabapentin at night    Reviewed and updated as needed this visit by Provider         Review of Systems   ROS: 5 point ROS negative except as noted above in HPI, including Gen., Resp., CV, GI &  system review except does have some constipation      Objective    /80 (BP Location: Right arm)   Pulse 71   Temp 97.2  F (36.2  C)   Wt 86.2 kg (190 lb)   SpO2 98%   BMI 37.11 kg/m    Body mass index is 37.11 kg/m .  Physical Exam   GENERAL: elderly white female, hard of hearing, mask over mouth only, alert and no distress sitting comfortably in wheelchair  RESP:few crackles right base otherwise clear  CV: regular rate and rhythm, normal S1 S2, 2-3/6 systolic ejection murmur   ABDOMEN: soft, nontender, no mass or distention  MS: lymphedema wraps on and boot cast on right foot. These were removed, her big toe still with ecchymoses at base and tenderness to palpation    xrays read by myself:   chest x-ray looks ok, no acute findings  Xray of toe shows fracture about the same, I don't see a lot of healing    Diagnostic Test Results:  Labs reviewed in Epic        Assessment & Plan     1. Type 2 diabetes mellitus with diabetic nephropathy, with long-term current use of insulin (H)  Well controlled  Continue current  "insulin  I\"ll ask Concha Bradley to help with the sensor    2. Benign essential hypertension  Now rather low  There was some confusion whether she was on her furosemide or not, I'll check with the facility and stop if so    3. CKD (chronic kidney disease) stage 4, GFR 15-29 ml/min (H)  Stable for now  No NSAIDs, stop furosemide if can, monitor    4. Iron deficiency anemia secondary to inadequate dietary iron intake  Better on iron, continue for now    5. Closed nondisplaced fracture of phalanx of right great toe, unspecified phalanx, initial encounter  Not healing well  - XR Toe Right G/E 2 Views; Future  - PODIATRY/FOOT & ANKLE SURGERY REFERRAL  Will ask podiatry for his opinion    6. Aspiration pneumonia of both upper lobes, unspecified aspiration pneumonia type (H)  Likely from recent intubation  - XR Chest 2 Views; Future  Will monitor  As for the thickened liquids, I can't give her a definitive answer without getting a swallow study, which the facility is on lock down and we can consider in future. For now, I think she understands the risks and is coherent enough cognitively to make that decision    7. Rectal cancer (H)  Followed by oncology  CT scan in July    8. Pulmonary nodules  On CT, recheck 3 months  - CT Chest w/o Contrast; Future    9. Gastroesophageal reflux disease, esophagitis presence not specified  Poor control  - omeprazole (PRILOSEC) 20 MG DR capsule; Take 1 capsule (20 mg) by mouth 2 times daily  Will switch back to omeprazole    10. Recurrent acute deep vein thrombosis (DVT) of both lower extremities (H)  Stable  Continue eliquis    11. Other specified hypothyroidism  Just had tsh, continue current dose of levothyroxine     12. Hyperlipidemia LDL goal <100  Continue statin     patient very complicated, close follow up    Return in about 1 month (around 6/14/2020) for diabetes.    Sandra Morales MD  [facility]      "

## 2020-05-14 NOTE — NURSING NOTE
Chief Complaint   Patient presents with     Toe Injury     rt large toe follow up -      Throat Problem     discuss thick liquid/ acid reflux       Initial /80 (BP Location: Right arm)   Pulse 71   Temp 97.2  F (36.2  C)   Wt 86.2 kg (190 lb)   SpO2 98%   BMI 37.11 kg/m   Estimated body mass index is 37.11 kg/m  as calculated from the following:    Height as of 4/24/20: 1.524 m (5').    Weight as of this encounter: 86.2 kg (190 lb).    Patient presents to the clinic using Wheel Chair    Health Maintenance that is potentially due pending provider review:  NONE    n/a    Is there anyone who you would like to be able to receive your results? No  If yes have patient fill out HELLEN

## 2020-05-14 NOTE — TELEPHONE ENCOUNTER
Voice message left at 12:27 PM    Mell nurse for Stefanie says she got a message to call us. Stefanie does have apt today (1:20 with Dr Morales).    Her phone is [phone]

## 2020-05-19 ENCOUNTER — TELEPHONE (OUTPATIENT)
Dept: FAMILY MEDICINE | Facility: CLINIC | Age: 75
End: 2020-05-19

## 2020-05-19 DIAGNOSIS — K59.01 SLOW TRANSIT CONSTIPATION: Primary | ICD-10-CM

## 2020-05-19 DIAGNOSIS — K21.9 GASTROESOPHAGEAL REFLUX DISEASE, ESOPHAGITIS PRESENCE NOT SPECIFIED: Chronic | ICD-10-CM

## 2020-05-19 RX ORDER — POLYETHYLENE GLYCOL 3350 17 G/17G
1 POWDER, FOR SOLUTION ORAL DAILY
Qty: 578 G | Refills: 4 | Status: ON HOLD | OUTPATIENT
Start: 2020-05-19 | End: 2020-08-31

## 2020-05-19 NOTE — TELEPHONE ENCOUNTER
Per 05-14-20 OV-  . Gastroesophageal reflux disease, esophagitis presence not specified  Poor control  - omeprazole (PRILOSEC) 20 MG DR capsule; Take 1 capsule (20 mg) by mouth 2 times daily  Will switch back to omeprazole    Omeprazole pended.  Did you also want to order Miralax?  JAM Rees RN

## 2020-05-19 NOTE — TELEPHONE ENCOUNTER
Reason for Call:  Medication or medication refill:    Do you use a [facility]?  Name of the pharmacy and phone number for the current request:  [facility] [place]     Name of the medication requested: Omeprazole & requesting Miralax to be sent to Pharmacy. These meds are on the AVS but not at the Pharmacy.  Pt had OV 5/14/20 with Dr. Bernstein    Can we leave a detailed message on this number? YES    Phone number patient can be reached at: Home number on file [phone] (home)    Best Time: Any Time      Call taken on 5/19/2020 at 4:06 PM by Leilani Jurado

## 2020-05-20 ENCOUNTER — OFFICE VISIT (OUTPATIENT)
Dept: PODIATRY | Facility: CLINIC | Age: 75
End: 2020-05-20
Attending: FAMILY MEDICINE
Payer: COMMERCIAL

## 2020-05-20 VITALS
TEMPERATURE: 97.7 F | DIASTOLIC BLOOD PRESSURE: 73 MMHG | HEART RATE: 72 BPM | SYSTOLIC BLOOD PRESSURE: 135 MMHG | WEIGHT: 190 LBS | HEIGHT: 60 IN | BODY MASS INDEX: 37.3 KG/M2

## 2020-05-20 DIAGNOSIS — S92.414A CLOSED NONDISPLACED FRACTURE OF PROXIMAL PHALANX OF RIGHT GREAT TOE, INITIAL ENCOUNTER: Primary | ICD-10-CM

## 2020-05-20 PROCEDURE — 99213 OFFICE O/P EST LOW 20 MIN: CPT | Performed by: PODIATRIST

## 2020-05-20 ASSESSMENT — MIFFLIN-ST. JEOR: SCORE: 1268.33

## 2020-05-20 NOTE — NURSING NOTE
Chief Complaint   Patient presents with     Fracture     Right great toe FX-not healing, XR 5/14       Initial /73   Pulse 72   Temp 97.7  F (36.5  C) (Tympanic)   Ht 1.524 m (5')   Wt 86.2 kg (190 lb)   BMI 37.11 kg/m   Estimated body mass index is 37.11 kg/m  as calculated from the following:    Height as of this encounter: 1.524 m (5').    Weight as of this encounter: 86.2 kg (190 lb).  Medications and allergies reviewed.      Tanya OLIVER MA

## 2020-05-20 NOTE — LETTER
5/20/2020         RE: Stefanie Velazquez  [address]  [address]  [facility] [address]        Dear Colleague,    Thank you for referring your patient, Stefanie Velazquez, to the [facility]. Please see a copy of my visit note below.    PATIENT HISTORY:  Stefanie Velazquez is a 75 year old female who presents to clinic for evaluation of a previously fractured great toe on the right foot.  Relates tripping and stubbing the great toe several weeks ago.  Patient has had x-rays revealing a fracture.  Proximal phalanx with minimal intra-articular displacement.  The patient currently denies any pain to the right great toe.    REVIEW OF SYSTEMS:  Constitutional, HEENT, cardiovascular, pulmonary, GI, , musculoskeletal, neuro, skin, endocrine and psych systems are negative, except as otherwise noted.     PAST MEDICAL HISTORY:   Past Medical History:   Diagnosis Date     Acute deep vein thrombosis (DVT) of right lower extremity, unspecified vein (H) 10/31/2018     Acute myocardial infarction of other specified sites, episode of care unspecified 6/2002    MI 2 stints     Cancer of colon (H)      Cellulitis of lower extremity, bilateral 9/30/2016     Chronic ischemic heart disease, unspecified 6/22/2003    cabgx3 , 2002 2/05 adenosine ef70%     Coronary atherosclerosis of unspecified type of vessel, native or graft     Coronary artery disease     Diabetes mellitus (H)      Esophageal reflux      Fracture of femur, distal, left, closed (H) 2/11/2013     Gastro-oesophageal reflux disease      Generalized osteoarthrosis, unspecified site 6/22/2005     Generalized osteoarthrosis, unspecified site 6/22/2005     HEARING LOSS CONDUCTIVE, COMBINED TYPE 6/22/2005    Italian measles as child     HYPOTHYROIDISM  6/22/2003     Mixed hyperlipidemia 6/22/2005     Obesity, unspecified 6/22/2005     RC-moderate (AHI 12, LSat 60%); REM RDI-73 6/1/2009    Sleep study [facility] was performed 5/26/09 in order to re-evaluate  severity of RC. The total sleep time was 412.0 minutes. The sleep latency was decreased at 8.7 minutes with Ambien 10mg. The REM sleep latency was 426.0 minutes. Sleep efficiency was reduced at 79.0%. The sleep architecture was disrupted with frequent sleep stage changes and arousals.  Snoring:  loud. 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, uncontrolled(250.42) (H) 6/22/2005     Type II or unspecified type diabetes mellitus with renal manifestations, uncontrolled(250.42) (H) 6/22/2005     Unspecified disorder resulting from impaired renal function 6/22/2005    CR     1.82   06/21/2005     Unspecified essential hypertension         PAST SURGICAL HISTORY:   Past Surgical History:   Procedure Laterality Date     ANESTHESIA OUT OF OR MRI N/A 4/8/2020    Procedure: ANESTHESIA OUT OF OR MRI;  Surgeon: GENERIC ANESTHESIA PROVIDER;  Location: [facility]     cabg       COLECTOMY LOW ANTERIOR  6/21/2011    Procedure:COLECTOMY LOW ANTERIOR; with loop ielostomy; Surgeon:ROBBIN MCDONNELL; Location:[facility]     COLONOSCOPY  1/26/2011    COLONOSCOPY performed by MASOUD BILL at [facility]     COLONOSCOPY N/A 3/1/2016    Procedure: COLONOSCOPY;  Surgeon: Liza Neal MD;  Location: [facility]     INSERT PORT VASCULAR ACCESS  3/2/2011    INSERT PORT VASCULAR ACCESS performed by LIZA NEAL at [facility]     OPEN REDUCTION INTERNAL FIXATION FEMUR DISTAL  2/12/2013    Procedure: OPEN REDUCTION INTERNAL FIXATION FEMUR DISTAL;  Open reduction internal fixation left femur fracture--Anesth.Choice;  Surgeon: Ley, Jeffrey Duane, MD;  Location: [facility]     ORTHOPEDIC SURGERY       PHACOEMULSIFICATION WITH STANDARD INTRAOCULAR LENS IMPLANT Left 1/22/2018    Procedure: PHACOEMULSIFICATION WITH  STANDARD INTRAOCULAR LENS IMPLANT;  Left cataract removal with implant;  Surgeon: Rony Key MD;  Location: [facility]     PHACOEMULSIFICATION WITH STANDARD INTRAOCULAR LENS IMPLANT Right 2/19/2018    Procedure: PHACOEMULSIFICATION WITH STANDARD INTRAOCULAR LENS IMPLANT;  Right cataract removal with implant;  Surgeon: Rony Key MD;  Location: [facility]     SIGMOIDOSCOPY FLEXIBLE  9/9/2011    Procedure:SIGMOIDOSCOPY FLEXIBLE; Performed prior to induction.; Surgeon:ROBBIN MCDONNELL; Location:[facility]     SURGICAL HISTORY OF -   10/24/2002    Heart bypass     SURGICAL HISTORY OF -   2002    R Knee arthroplasty     SURGICAL HISTORY OF -   2002    Mi 2 stints     TAKEDOWN ILEOSTOMY  9/9/2011    Procedure:TAKEDOWN ILEOSTOMY; Exploratory Laparotomy,  Loop Ileostomy Takedown, Small Bowel Resection x 2.; Surgeon:ROBBIN MCDONNELL; Location:[facility]        MEDICATIONS:   Current Outpatient Medications:      ACCU-CHEK MILVIA MELODY, dispense one meter, Disp: 1 device, Rfl: 0     acetaminophen (TYLENOL) 650 MG CR tablet, Take 1 tablet (650 mg) by mouth 3 times daily as needed for mild pain or fever, Disp: 60 tablet, Rfl:      aspirin (ASA) 81 MG tablet, Take 81 mg by mouth daily, Disp: , Rfl:      blood glucose monitoring (ACCU-CHEK MILVIA) test strip, Use to test blood sugars 4 times daily or as directed., Disp: 360 each, Rfl: 1     blood glucose monitoring (ACCU-CHEK MULTICLIX) lancets, Test BG 4 times daily (Patient taking differently: by In Vitro route 4 times daily Test BG 4 times daily), Disp: 1 Box, Rfl: 11     cholecalciferol 1000 units TABS, Take 1,000 Units by mouth daily, Disp: , Rfl:      Continuous Blood Gluc  (FREESTYLE JAJA 14 DAY READER) MELODY, 1 each as needed (use to scan the sensor to check the blood sugars), Disp: 1 Device, Rfl: 0     Continuous Blood Gluc Sensor (FREESTYLE JAJA 14 DAY SENSOR) MIS, 1 each every 14 days, Disp: 2 each, Rfl: 11     ELIQUIS ANTICOAGULANT 5 MG tablet, Take 1 tablet  by mouth 2 times daily, Disp: , Rfl:      ferrous sulfate (FEROSUL) 325 (65 Fe) MG tablet, Take 1 tablet (325 mg) by mouth daily (with breakfast), Disp: 30 tablet, Rfl: 1     fluocinonide (LIDEX) 0.05 % ointment, Apply sparingly to rash on legs twice daily as needed.  Do not apply to face., Disp: 60 g, Rfl: 11     fluticasone (FLONASE) 50 MCG/ACT spray, Spray 1 spray into both nostrils daily, Disp: 1 Bottle, Rfl: 3     gabapentin (NEURONTIN) 100 MG capsule, Take 1 capsule (100 mg) by mouth At Bedtime, Disp: 90 capsule, Rfl: 1     hydrocortisone (ANUSOL-HC) 2.5 % cream, Place rectally 2 times daily as needed for hemorrhoids, Disp: 30 g, Rfl: 0     insulin aspart (NOVOLOG FLEXPEN) 100 UNIT/ML pen, Inject 14 Units Subcutaneous 2 times daily (with meals) Lunch and dinner , Disp: , Rfl:      insulin glargine (LANTUS PEN) 100 UNIT/ML pen, Inject 24 Units Subcutaneous At Bedtime, Disp: 18 mL, Rfl: 3     insulin pen needle (BD GABRIELLA U/F) 32G X 4 MM, Use 4 times per day or as directed., Disp: 120 each, Rfl: 5     levothyroxine (SYNTHROID/LEVOTHROID) 88 MCG tablet, Take 44 mcg by mouth once a week = 1/2  Tab on Sunday, Disp: , Rfl:      levothyroxine (SYNTHROID/LEVOTHROID) 88 MCG tablet, Take 88 mcg by mouth daily Except on Sunday, Disp: , Rfl:      loperamide (IMODIUM) 2 MG capsule, One capsule once a day PRN, can use a second one if needed, Disp: 90 capsule, Rfl: 3     magnesium 250 MG tablet, Take 1 tablet by mouth daily, Disp: , Rfl:      menthol-zinc oxide (CALMOSEPTINE) 0.44-20.625 % OINT ointment, Apply topically 4 times daily as needed for skin protection, Disp: , Rfl:      omeprazole (PRILOSEC) 20 MG DR capsule, Take 1 capsule (20 mg) by mouth 2 times daily, Disp: 60 capsule, Rfl: 11     order for DME, Equipment being ordered:  Incontinence Products: Gloves (4 Boxes) & chux pads, Disp: 300 each, Rfl: 11     order for DME, Equipment being ordered: Wheelchair, Disp: 1 Device, Rfl: 0     order for DME, Equipment being  ordered: Incontinence briefs/Depends, Disp: 12 Package, Rfl: 11     order for DME, Equipment being ordered: order for XL Size  Pull ups. Pt is currently using 12 pull ups a day, Disp: 350 each, Rfl: 11     order for DME, Equipment being ordered: Compression stockings, Disp: 2 Device, Rfl: 0     order for DME, Equipment being ordered: Hospital Bed service and repair, Disp: 1 each, Rfl: 0     polyethylene glycol (MIRALAX) 17 GM/SCOOP powder, Take 17 g (1 capful) by mouth daily, Disp: 578 g, Rfl: 4     potassium chloride ER (K-DUR/KLOR-CON M) 10 MEQ CR tablet, Take 1 tablet (10 mEq) by mouth 2 times daily, Disp: 180 tablet, Rfl: 1     pramipexole (MIRAPEX) 0.5 MG tablet, Take 1 tablet by mouth At Bedtime , Disp: , Rfl:      pramipexole (MIRAPEX) 0.5 MG tablet, Take 0.5 mg by mouth One hour after scheduled dose and every 8 hours as needed, Disp: , Rfl:      simvastatin (ZOCOR) 40 MG tablet, Take 1 tablet (40 mg) by mouth daily, Disp: 90 tablet, Rfl: 2     ALLERGIES:    Allergies   Allergen Reactions     Ciprofloxacin Anxiety     Pt developed agitation, paranoia while on cipro--on clear if this is what caused it.  But would try to avoid in future.     Hydrocodone Other (See Comments)     dizzy     Lisinopril Cough            Vicodin [Hydrocodone-Acetaminophen] Other (See Comments)     Caused extreme dizziness        SOCIAL HISTORY:   Social History     Socioeconomic History     Marital status: Single     Spouse name: Not on file     Number of children: Not on file     Years of education: Not on file     Highest education level: Not on file   Occupational History     Not on file   Social Needs     Financial resource strain: Not on file     Food insecurity     Worry: Not on file     Inability: Not on file     Transportation needs     Medical: Not on file     Non-medical: Not on file   Tobacco Use     Smoking status: Former Smoker     Smokeless tobacco: Never Used   Substance and Sexual Activity     Alcohol use: Yes      Comment: rare social use     Drug use: No     Sexual activity: Never   Lifestyle     Physical activity     Days per week: Not on file     Minutes per session: Not on file     Stress: Not on file   Relationships     Social connections     Talks on phone: Not on file     Gets together: Not on file     Attends [employer] service: Not on file     Active member of club or organization: Not on file     Attends meetings of clubs or organizations: Not on file     Relationship status: Not on file     Intimate partner violence     Fear of current or ex partner: Not on file     Emotionally abused: Not on file     Physically abused: Not on file     Forced sexual activity: Not on file   Other Topics Concern     Parent/sibling w/ CABG, MI or angioplasty before 65F 55M? Yes   Social History Narrative     Not on file        FAMILY HISTORY:   Family History   Problem Relation Age of Onset     Diabetes Sister      C.A.D. Sister      Breast Cancer Sister         EXAM:Vitals: /73   Pulse 72   Temp 97.7  F (36.5  C) (Tympanic)   Ht 1.524 m (5')   Wt 86.2 kg (190 lb)   BMI 37.11 kg/m    BMI= Body mass index is 37.11 kg/m .  Weight management plan: Patient was referred to their PCP to discuss a diet and exercise plan.    General appearance: Patient is alert and fully cooperative with history & exam.  No sign of distress is noted during the visit.     Psychiatric: Affect is pleasant & appropriate.  Patient appears motivated to improve health.     Respiratory: Breathing is regular & unlabored while sitting.     HEENT: Hearing is intact to spoken word.  Speech is clear.  No gross evidence of visual impairment that would impact ambulation.     Dermatologic: Skin is intact to both lower extremities without significant lesions, rash or abrasion.  No paronychia or evidence of soft tissue infection is noted.     Vascular: DP & PT pulses are intact & regular bilaterally.  No significant edema or varicosities noted.  CFT and skin  temperature is normal to both lower extremities.     Neurologic: Lower extremity sensation is intact to light touch.  No evidence of weakness or contracture in the lower extremities.  No evidence of neuropathy.     Musculoskeletal: Patient is non-ambulatory with crutches.  No gross ankle deformity noted.  No foot or ankle joint effusion is noted.    One notes negative edema, negative ecchymosis.  One notes no pain with palpation over the great toe on the left.    Radiograph review of previous films including non weightbearing AP, lateral and medial oblique views of the left foot reveals an apparent minimally displaced intra-articular fracture of the head of the proximal phalanx of the great toe.  Generalized osteopenia noted..  All joint margins appear stable.  There is no apparent tumor formation noted.  There is no evidence of foreign body.    Assessment:  1.  Minimally displaced intra-articular proximal phalanx fracture of the left great toe.    Plan:  I have explained to Stefanie  about the conditions.  Given that there is no pain to the great toe I have recommended the patient may advance activity as tolerated in a supportive shoe.  The patient was instructed to notify the office if increased pain and swelling is noted to the great toe.    Disclaimer: This note consists of symbols derived from keyboarding, dictation and/or voice recognition software. As a result, there may be errors in the script that have gone undetected. Please consider this when interpreting information found in this chart.        BENITO Barcenas.P.ELIDA., F.A.C.F.A.S.      Again, thank you for allowing me to participate in the care of your patient.        Sincerely,        Vinicio Ramos DPM

## 2020-05-21 NOTE — PROGRESS NOTES
PATIENT HISTORY:  Stefanie Velazquez is a 75 year old female who presents to clinic for evaluation of a previously fractured great toe on the right foot.  Relates tripping and stubbing the great toe several weeks ago.  Patient has had x-rays revealing a fracture.  Proximal phalanx with minimal intra-articular displacement.  The patient currently denies any pain to the right great toe.    REVIEW OF SYSTEMS:  Constitutional, HEENT, cardiovascular, pulmonary, GI, , musculoskeletal, neuro, skin, endocrine and psych systems are negative, except as otherwise noted.     PAST MEDICAL HISTORY:   Past Medical History:   Diagnosis Date     Acute deep vein thrombosis (DVT) of right lower extremity, unspecified vein (H) 10/31/2018     Acute myocardial infarction of other specified sites, episode of care unspecified 6/2002    MI 2 stints     Cancer of colon (H)      Cellulitis of lower extremity, bilateral 9/30/2016     Chronic ischemic heart disease, unspecified 6/22/2003    cabgx3 , 2002 2/05 adenosine ef70%     Coronary atherosclerosis of unspecified type of vessel, native or graft     Coronary artery disease     Diabetes mellitus (H)      Esophageal reflux      Fracture of femur, distal, left, closed (H) 2/11/2013     Gastro-oesophageal reflux disease      Generalized osteoarthrosis, unspecified site 6/22/2005     Generalized osteoarthrosis, unspecified site 6/22/2005     HEARING LOSS CONDUCTIVE, COMBINED TYPE 6/22/2005    Faroese measles as child     HYPOTHYROIDISM  6/22/2003     Mixed hyperlipidemia 6/22/2005     Obesity, unspecified 6/22/2005     RC-moderate (AHI 12, LSat 60%); REM RDI-73 6/1/2009    Sleep study [facility] was performed 5/26/09 in order to re-evaluate severity of RC. The total sleep time was 412.0 minutes. The sleep latency was decreased at 8.7 minutes with Ambien 10mg. The REM sleep latency was 426.0 minutes. Sleep efficiency was reduced at 79.0%. The sleep architecture was disrupted with frequent sleep  stage changes and arousals.  Snoring:  loud. 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, uncontrolled(250.42) (H) 6/22/2005     Type II or unspecified type diabetes mellitus with renal manifestations, uncontrolled(250.42) (H) 6/22/2005     Unspecified disorder resulting from impaired renal function 6/22/2005    CR     1.82   06/21/2005     Unspecified essential hypertension         PAST SURGICAL HISTORY:   Past Surgical History:   Procedure Laterality Date     ANESTHESIA OUT OF OR MRI N/A 4/8/2020    Procedure: ANESTHESIA OUT OF OR MRI;  Surgeon: GENERIC ANESTHESIA PROVIDER;  Location: [facility]     cabg       COLECTOMY LOW ANTERIOR  6/21/2011    Procedure:COLECTOMY LOW ANTERIOR; with loop ielostomy; Surgeon:ROBBIN MCDONNELL; Location:[facility]     COLONOSCOPY  1/26/2011    COLONOSCOPY performed by MASOUD BILL at [facility]     COLONOSCOPY N/A 3/1/2016    Procedure: COLONOSCOPY;  Surgeon: Liza Neal MD;  Location: [facility]     INSERT PORT VASCULAR ACCESS  3/2/2011    INSERT PORT VASCULAR ACCESS performed by LIZA NEAL at [facility]     OPEN REDUCTION INTERNAL FIXATION FEMUR DISTAL  2/12/2013    Procedure: OPEN REDUCTION INTERNAL FIXATION FEMUR DISTAL;  Open reduction internal fixation left femur fracture--Anesth[facility]Choice;  Surgeon: Ley, Jeffrey Duane, MD;  Location: [facility]     ORTHOPEDIC SURGERY       PHACOEMULSIFICATION WITH STANDARD INTRAOCULAR LENS IMPLANT Left 1/22/2018    Procedure: PHACOEMULSIFICATION WITH STANDARD INTRAOCULAR LENS IMPLANT;  Left cataract removal with implant;  Surgeon: Rony Key MD;  Location: [facility]     PHACOEMULSIFICATION WITH STANDARD INTRAOCULAR LENS IMPLANT Right 2/19/2018    Procedure: PHACOEMULSIFICATION WITH STANDARD  INTRAOCULAR LENS IMPLANT;  Right cataract removal with implant;  Surgeon: Rony Key MD;  Location: [facility]     SIGMOIDOSCOPY FLEXIBLE  9/9/2011    Procedure:SIGMOIDOSCOPY FLEXIBLE; Performed prior to induction.; Surgeon:ROBBIN MCDONNELL; Location:[facility]     SURGICAL HISTORY OF -   10/24/2002    Heart bypass     SURGICAL HISTORY OF -   2002    R Knee arthroplasty     SURGICAL HISTORY OF -   2002    Mi 2 stints     TAKEDOWN ILEOSTOMY  9/9/2011    Procedure:TAKEDOWN ILEOSTOMY; Exploratory Laparotomy,  Loop Ileostomy Takedown, Small Bowel Resection x 2.; Surgeon:ROBBIN MCDONNELL; Location:[facility]        MEDICATIONS:   Current Outpatient Medications:      ACCU-CHEK MILVIA MELODY, dispense one meter, Disp: 1 device, Rfl: 0     acetaminophen (TYLENOL) 650 MG CR tablet, Take 1 tablet (650 mg) by mouth 3 times daily as needed for mild pain or fever, Disp: 60 tablet, Rfl:      aspirin (ASA) 81 MG tablet, Take 81 mg by mouth daily, Disp: , Rfl:      blood glucose monitoring (ACCU-CHEK MILVIA) test strip, Use to test blood sugars 4 times daily or as directed., Disp: 360 each, Rfl: 1     blood glucose monitoring (ACCU-CHEK MULTICLIX) lancets, Test BG 4 times daily (Patient taking differently: by In Vitro route 4 times daily Test BG 4 times daily), Disp: 1 Box, Rfl: 11     cholecalciferol 1000 units TABS, Take 1,000 Units by mouth daily, Disp: , Rfl:      Continuous Blood Gluc  (FREESTYLE JAJA 14 DAY READER) MELODY, 1 each as needed (use to scan the sensor to check the blood sugars), Disp: 1 Device, Rfl: 0     Continuous Blood Gluc Sensor (FREESTYLE JAJA 14 DAY SENSOR) [facility], 1 each every 14 days, Disp: 2 each, Rfl: 11     ELIQUIS ANTICOAGULANT 5 MG tablet, Take 1 tablet by mouth 2 times daily, Disp: , Rfl:      ferrous sulfate (FEROSUL) 325 (65 Fe) MG tablet, Take 1 tablet (325 mg) by mouth daily (with breakfast), Disp: 30 tablet, Rfl: 1     fluocinonide (LIDEX) 0.05 % ointment, Apply sparingly to rash on legs twice  daily as needed.  Do not apply to face., Disp: 60 g, Rfl: 11     fluticasone (FLONASE) 50 MCG/ACT spray, Spray 1 spray into both nostrils daily, Disp: 1 Bottle, Rfl: 3     gabapentin (NEURONTIN) 100 MG capsule, Take 1 capsule (100 mg) by mouth At Bedtime, Disp: 90 capsule, Rfl: 1     hydrocortisone (ANUSOL-HC) 2.5 % cream, Place rectally 2 times daily as needed for hemorrhoids, Disp: 30 g, Rfl: 0     insulin aspart (NOVOLOG FLEXPEN) 100 UNIT/ML pen, Inject 14 Units Subcutaneous 2 times daily (with meals) Lunch and dinner , Disp: , Rfl:      insulin glargine (LANTUS PEN) 100 UNIT/ML pen, Inject 24 Units Subcutaneous At Bedtime, Disp: 18 mL, Rfl: 3     insulin pen needle (BD GABRIELLA U/F) 32G X 4 MM, Use 4 times per day or as directed., Disp: 120 each, Rfl: 5     levothyroxine (SYNTHROID/LEVOTHROID) 88 MCG tablet, Take 44 mcg by mouth once a week = 1/2  Tab on Sunday, Disp: , Rfl:      levothyroxine (SYNTHROID/LEVOTHROID) 88 MCG tablet, Take 88 mcg by mouth daily Except on Sunday, Disp: , Rfl:      loperamide (IMODIUM) 2 MG capsule, One capsule once a day PRN, can use a second one if needed, Disp: 90 capsule, Rfl: 3     magnesium 250 MG tablet, Take 1 tablet by mouth daily, Disp: , Rfl:      menthol-zinc oxide (CALMOSEPTINE) 0.44-20.625 % OINT ointment, Apply topically 4 times daily as needed for skin protection, Disp: , Rfl:      omeprazole (PRILOSEC) 20 MG DR capsule, Take 1 capsule (20 mg) by mouth 2 times daily, Disp: 60 capsule, Rfl: 11     order for DME, Equipment being ordered:  Incontinence Products: Gloves (4 Boxes) & chux pads, Disp: 300 each, Rfl: 11     order for DME, Equipment being ordered: Wheelchair, Disp: 1 Device, Rfl: 0     order for DME, Equipment being ordered: Incontinence briefs/Depends, Disp: 12 Package, Rfl: 11     order for DME, Equipment being ordered: order for XL Size  Pull ups. Pt is currently using 12 pull ups a day, Disp: 350 each, Rfl: 11     order for DME, Equipment being ordered:  Compression stockings, Disp: 2 Device, Rfl: 0     order for DME, Equipment being ordered: Hospital Bed service and repair, Disp: 1 each, Rfl: 0     polyethylene glycol (MIRALAX) 17 GM/SCOOP powder, Take 17 g (1 capful) by mouth daily, Disp: 578 g, Rfl: 4     potassium chloride ER (K-DUR/KLOR-CON M) 10 MEQ CR tablet, Take 1 tablet (10 mEq) by mouth 2 times daily, Disp: 180 tablet, Rfl: 1     pramipexole (MIRAPEX) 0.5 MG tablet, Take 1 tablet by mouth At Bedtime , Disp: , Rfl:      pramipexole (MIRAPEX) 0.5 MG tablet, Take 0.5 mg by mouth One hour after scheduled dose and every 8 hours as needed, Disp: , Rfl:      simvastatin (ZOCOR) 40 MG tablet, Take 1 tablet (40 mg) by mouth daily, Disp: 90 tablet, Rfl: 2     ALLERGIES:    Allergies   Allergen Reactions     Ciprofloxacin Anxiety     Pt developed agitation, paranoia while on cipro--on clear if this is what caused it.  But would try to avoid in future.     Hydrocodone Other (See Comments)     dizzy     Lisinopril Cough            Vicodin [Hydrocodone-Acetaminophen] Other (See Comments)     Caused extreme dizziness        SOCIAL HISTORY:   Social History     Socioeconomic History     Marital status: Single     Spouse name: Not on file     Number of children: Not on file     Years of education: Not on file     Highest education level: Not on file   Occupational History     Not on file   Social Needs     Financial resource strain: Not on file     Food insecurity     Worry: Not on file     Inability: Not on file     Transportation needs     Medical: Not on file     Non-medical: Not on file   Tobacco Use     Smoking status: Former Smoker     Smokeless tobacco: Never Used   Substance and Sexual Activity     Alcohol use: Yes     Comment: rare social use     Drug use: No     Sexual activity: Never   Lifestyle     Physical activity     Days per week: Not on file     Minutes per session: Not on file     Stress: Not on file   Relationships     Social connections     Talks on  phone: Not on file     Gets together: Not on file     Attends [employer] service: Not on file     Active member of club or organization: Not on file     Attends meetings of clubs or organizations: Not on file     Relationship status: Not on file     Intimate partner violence     Fear of current or ex partner: Not on file     Emotionally abused: Not on file     Physically abused: Not on file     Forced sexual activity: Not on file   Other Topics Concern     Parent/sibling w/ CABG, MI or angioplasty before 65F 55M? Yes   Social History Narrative     Not on file        FAMILY HISTORY:   Family History   Problem Relation Age of Onset     Diabetes Sister      C.A.D. Sister      Breast Cancer Sister         EXAM:Vitals: /73   Pulse 72   Temp 97.7  F (36.5  C) (Tympanic)   Ht 1.524 m (5')   Wt 86.2 kg (190 lb)   BMI 37.11 kg/m    BMI= Body mass index is 37.11 kg/m .  Weight management plan: Patient was referred to their PCP to discuss a diet and exercise plan.    General appearance: Patient is alert and fully cooperative with history & exam.  No sign of distress is noted during the visit.     Psychiatric: Affect is pleasant & appropriate.  Patient appears motivated to improve health.     Respiratory: Breathing is regular & unlabored while sitting.     HEENT: Hearing is intact to spoken word.  Speech is clear.  No gross evidence of visual impairment that would impact ambulation.     Dermatologic: Skin is intact to both lower extremities without significant lesions, rash or abrasion.  No paronychia or evidence of soft tissue infection is noted.     Vascular: DP & PT pulses are intact & regular bilaterally.  No significant edema or varicosities noted.  CFT and skin temperature is normal to both lower extremities.     Neurologic: Lower extremity sensation is intact to light touch.  No evidence of weakness or contracture in the lower extremities.  No evidence of neuropathy.     Musculoskeletal: Patient is  non-ambulatory with crutches.  No gross ankle deformity noted.  No foot or ankle joint effusion is noted.    One notes negative edema, negative ecchymosis.  One notes no pain with palpation over the great toe on the left.    Radiograph review of previous films including non weightbearing AP, lateral and medial oblique views of the left foot reveals an apparent minimally displaced intra-articular fracture of the head of the proximal phalanx of the great toe.  Generalized osteopenia noted..  All joint margins appear stable.  There is no apparent tumor formation noted.  There is no evidence of foreign body.    Assessment:  1.  Minimally displaced intra-articular proximal phalanx fracture of the left great toe.    Plan:  I have explained to Stefanie  about the conditions.  Given that there is no pain to the great toe I have recommended the patient may advance activity as tolerated in a supportive shoe.  The patient was instructed to notify the office if increased pain and swelling is noted to the great toe.    Disclaimer: This note consists of symbols derived from keyboarding, dictation and/or voice recognition software. As a result, there may be errors in the script that have gone undetected. Please consider this when interpreting information found in this chart.        VELMA Ramos D.P.M., JD.F.A.S.

## 2020-05-26 ENCOUNTER — APPOINTMENT (OUTPATIENT)
Dept: CT IMAGING | Facility: CLINIC | Age: 75
DRG: 091 | End: 2020-05-26
Attending: EMERGENCY MEDICINE
Payer: COMMERCIAL

## 2020-05-26 ENCOUNTER — APPOINTMENT (OUTPATIENT)
Dept: CT IMAGING | Facility: CLINIC | Age: 75
DRG: 091 | End: 2020-05-26
Attending: FAMILY MEDICINE
Payer: COMMERCIAL

## 2020-05-26 ENCOUNTER — PATIENT OUTREACH (OUTPATIENT)
Dept: GERIATRIC MEDICINE | Facility: CLINIC | Age: 75
End: 2020-05-26

## 2020-05-26 ENCOUNTER — APPOINTMENT (OUTPATIENT)
Dept: GENERAL RADIOLOGY | Facility: CLINIC | Age: 75
DRG: 091 | End: 2020-05-26
Attending: FAMILY MEDICINE
Payer: COMMERCIAL

## 2020-05-26 ENCOUNTER — HOSPITAL ENCOUNTER (INPATIENT)
Facility: CLINIC | Age: 75
LOS: 11 days | Discharge: HOME HEALTH | DRG: 091 | End: 2020-06-06
Attending: EMERGENCY MEDICINE | Admitting: INTERNAL MEDICINE
Payer: COMMERCIAL

## 2020-05-26 DIAGNOSIS — G93.40 ENCEPHALOPATHY: Primary | ICD-10-CM

## 2020-05-26 DIAGNOSIS — I48.91 ATRIAL FIBRILLATION, RAPID (H): ICD-10-CM

## 2020-05-26 DIAGNOSIS — R41.0 DELIRIUM: ICD-10-CM

## 2020-05-26 DIAGNOSIS — D64.9 ANEMIA, UNSPECIFIED TYPE: ICD-10-CM

## 2020-05-26 DIAGNOSIS — E87.20 LACTIC ACIDOSIS: ICD-10-CM

## 2020-05-26 PROBLEM — R41.82 ALTERED MENTAL STATUS: Status: RESOLVED | Noted: 2020-04-07 | Resolved: 2020-05-26

## 2020-05-26 PROBLEM — G93.41 ACUTE METABOLIC ENCEPHALOPATHY: Status: RESOLVED | Noted: 2020-04-07 | Resolved: 2020-05-26

## 2020-05-26 PROBLEM — J18.9 ATYPICAL PNEUMONIA: Status: RESOLVED | Noted: 2020-04-24 | Resolved: 2020-05-26

## 2020-05-26 PROBLEM — R55 SYNCOPE: Status: RESOLVED | Noted: 2019-05-21 | Resolved: 2020-05-26

## 2020-05-26 LAB
ALBUMIN SERPL-MCNC: 2.8 G/DL (ref 3.4–5)
ALBUMIN UR-MCNC: NEGATIVE MG/DL
ALP SERPL-CCNC: 128 U/L (ref 40–150)
ALT SERPL W P-5'-P-CCNC: 26 U/L (ref 0–50)
ANION GAP SERPL CALCULATED.3IONS-SCNC: 8 MMOL/L (ref 3–14)
APPEARANCE UR: CLEAR
APTT PPP: 37 SEC (ref 22–37)
AST SERPL W P-5'-P-CCNC: 57 U/L (ref 0–45)
BACTERIA #/AREA URNS HPF: ABNORMAL /HPF
BASE EXCESS BLDV CALC-SCNC: 0.2 MMOL/L
BASOPHILS # BLD AUTO: 0.1 10E9/L (ref 0–0.2)
BASOPHILS NFR BLD AUTO: 1 %
BILIRUB SERPL-MCNC: 0.7 MG/DL (ref 0.2–1.3)
BILIRUB UR QL STRIP: NEGATIVE
BUN SERPL-MCNC: 39 MG/DL (ref 7–30)
CALCIUM SERPL-MCNC: 9.7 MG/DL (ref 8.5–10.1)
CHLORIDE SERPL-SCNC: 110 MMOL/L (ref 94–109)
CO2 SERPL-SCNC: 24 MMOL/L (ref 20–32)
COLOR UR AUTO: ABNORMAL
CREAT SERPL-MCNC: 1.98 MG/DL (ref 0.52–1.04)
CRP SERPL-MCNC: 5.5 MG/L (ref 0–8)
DIFFERENTIAL METHOD BLD: NORMAL
EOSINOPHIL # BLD AUTO: 0.2 10E9/L (ref 0–0.7)
EOSINOPHIL NFR BLD AUTO: 3.6 %
ERYTHROCYTE [DISTWIDTH] IN BLOOD BY AUTOMATED COUNT: 14.3 % (ref 10–15)
GFR SERPL CREATININE-BSD FRML MDRD: 24 ML/MIN/{1.73_M2}
GLUCOSE BLDC GLUCOMTR-MCNC: 93 MG/DL (ref 70–99)
GLUCOSE SERPL-MCNC: 130 MG/DL (ref 70–99)
GLUCOSE UR STRIP-MCNC: NEGATIVE MG/DL
HCO3 BLDV-SCNC: 26 MMOL/L (ref 21–28)
HCT VFR BLD AUTO: 44.6 % (ref 35–47)
HGB BLD-MCNC: 14.4 G/DL (ref 11.7–15.7)
HGB UR QL STRIP: ABNORMAL
IMM GRANULOCYTES # BLD: 0 10E9/L (ref 0–0.4)
IMM GRANULOCYTES NFR BLD: 0.3 %
INR PPP: 1.87 (ref 0.86–1.14)
KETONES UR STRIP-MCNC: NEGATIVE MG/DL
LACTATE BLD-SCNC: 1.5 MMOL/L (ref 0.7–2)
LACTATE BLD-SCNC: 3.4 MMOL/L (ref 0.7–2)
LEUKOCYTE ESTERASE UR QL STRIP: ABNORMAL
LYMPHOCYTES # BLD AUTO: 2.4 10E9/L (ref 0.8–5.3)
LYMPHOCYTES NFR BLD AUTO: 38.6 %
MCH RBC QN AUTO: 29.8 PG (ref 26.5–33)
MCHC RBC AUTO-ENTMCNC: 32.3 G/DL (ref 31.5–36.5)
MCV RBC AUTO: 92 FL (ref 78–100)
MONOCYTES # BLD AUTO: 0.5 10E9/L (ref 0–1.3)
MONOCYTES NFR BLD AUTO: 8.6 %
NEUTROPHILS # BLD AUTO: 2.9 10E9/L (ref 1.6–8.3)
NEUTROPHILS NFR BLD AUTO: 47.9 %
NITRATE UR QL: NEGATIVE
NRBC # BLD AUTO: 0 10*3/UL
NRBC BLD AUTO-RTO: 0 /100
O2/TOTAL GAS SETTING VFR VENT: NORMAL %
PCO2 BLDV: 45 MM HG (ref 40–50)
PH BLDV: 7.37 PH (ref 7.32–7.43)
PH UR STRIP: 7 PH (ref 5–7)
PLATELET # BLD AUTO: 292 10E9/L (ref 150–450)
PO2 BLDV: 28 MM HG (ref 25–47)
POTASSIUM SERPL-SCNC: 4.9 MMOL/L (ref 3.4–5.3)
PROCALCITONIN SERPL-MCNC: 0.17 NG/ML
PROT SERPL-MCNC: 7.8 G/DL (ref 6.8–8.8)
RBC # BLD AUTO: 4.84 10E12/L (ref 3.8–5.2)
RBC #/AREA URNS AUTO: 2 /HPF (ref 0–2)
SARS-COV-2 PCR COMMENT: NORMAL
SARS-COV-2 RNA SPEC QL NAA+PROBE: NEGATIVE
SARS-COV-2 RNA SPEC QL NAA+PROBE: NORMAL
SODIUM SERPL-SCNC: 142 MMOL/L (ref 133–144)
SOURCE: ABNORMAL
SP GR UR STRIP: 1 (ref 1–1.03)
SPECIMEN SOURCE: NORMAL
SPECIMEN SOURCE: NORMAL
SQUAMOUS #/AREA URNS AUTO: <1 /HPF (ref 0–1)
TROPONIN I SERPL-MCNC: <0.015 UG/L (ref 0–0.04)
TSH SERPL DL<=0.005 MIU/L-ACNC: 3.9 MU/L (ref 0.4–4)
UROBILINOGEN UR STRIP-MCNC: 0 MG/DL (ref 0–2)
WBC # BLD AUTO: 6.1 10E9/L (ref 4–11)
WBC #/AREA URNS AUTO: 11 /HPF (ref 0–5)

## 2020-05-26 PROCEDURE — 96361 HYDRATE IV INFUSION ADD-ON: CPT | Performed by: FAMILY MEDICINE

## 2020-05-26 PROCEDURE — 96375 TX/PRO/DX INJ NEW DRUG ADDON: CPT | Performed by: FAMILY MEDICINE

## 2020-05-26 PROCEDURE — 25000128 H RX IP 250 OP 636: Performed by: EMERGENCY MEDICINE

## 2020-05-26 PROCEDURE — 71250 CT THORAX DX C-: CPT

## 2020-05-26 PROCEDURE — 51701 INSERT BLADDER CATHETER: CPT | Performed by: FAMILY MEDICINE

## 2020-05-26 PROCEDURE — U0003 INFECTIOUS AGENT DETECTION BY NUCLEIC ACID (DNA OR RNA); SEVERE ACUTE RESPIRATORY SYNDROME CORONAVIRUS 2 (SARS-COV-2) (CORONAVIRUS DISEASE [COVID-19]), AMPLIFIED PROBE TECHNIQUE, MAKING USE OF HIGH THROUGHPUT TECHNOLOGIES AS DESCRIBED BY CMS-2020-01-R: HCPCS | Performed by: FAMILY MEDICINE

## 2020-05-26 PROCEDURE — 99292 CRITICAL CARE ADDL 30 MIN: CPT | Mod: 25 | Performed by: FAMILY MEDICINE

## 2020-05-26 PROCEDURE — 96365 THER/PROPH/DIAG IV INF INIT: CPT | Performed by: FAMILY MEDICINE

## 2020-05-26 PROCEDURE — 25000128 H RX IP 250 OP 636: Performed by: INTERNAL MEDICINE

## 2020-05-26 PROCEDURE — 25000132 ZZH RX MED GY IP 250 OP 250 PS 637: Performed by: FAMILY MEDICINE

## 2020-05-26 PROCEDURE — 25000132 ZZH RX MED GY IP 250 OP 250 PS 637: Performed by: INTERNAL MEDICINE

## 2020-05-26 PROCEDURE — 93010 ELECTROCARDIOGRAM REPORT: CPT | Mod: Z6 | Performed by: FAMILY MEDICINE

## 2020-05-26 PROCEDURE — 70450 CT HEAD/BRAIN W/O DYE: CPT

## 2020-05-26 PROCEDURE — 87086 URINE CULTURE/COLONY COUNT: CPT | Performed by: EMERGENCY MEDICINE

## 2020-05-26 PROCEDURE — 25000128 H RX IP 250 OP 636: Performed by: FAMILY MEDICINE

## 2020-05-26 PROCEDURE — 87088 URINE BACTERIA CULTURE: CPT | Performed by: EMERGENCY MEDICINE

## 2020-05-26 PROCEDURE — 93005 ELECTROCARDIOGRAM TRACING: CPT | Performed by: FAMILY MEDICINE

## 2020-05-26 PROCEDURE — 85730 THROMBOPLASTIN TIME PARTIAL: CPT | Performed by: EMERGENCY MEDICINE

## 2020-05-26 PROCEDURE — 93010 ELECTROCARDIOGRAM REPORT: CPT | Mod: 76 | Performed by: FAMILY MEDICINE

## 2020-05-26 PROCEDURE — 25800029 ZZH RX IP 258 OP 250: Performed by: INTERNAL MEDICINE

## 2020-05-26 PROCEDURE — 96366 THER/PROPH/DIAG IV INF ADDON: CPT | Performed by: FAMILY MEDICINE

## 2020-05-26 PROCEDURE — 87186 SC STD MICRODIL/AGAR DIL: CPT | Performed by: EMERGENCY MEDICINE

## 2020-05-26 PROCEDURE — 80053 COMPREHEN METABOLIC PANEL: CPT | Performed by: EMERGENCY MEDICINE

## 2020-05-26 PROCEDURE — 93005 ELECTROCARDIOGRAM TRACING: CPT | Mod: 76 | Performed by: FAMILY MEDICINE

## 2020-05-26 PROCEDURE — 96376 TX/PRO/DX INJ SAME DRUG ADON: CPT | Performed by: FAMILY MEDICINE

## 2020-05-26 PROCEDURE — 99207 ZZC APP CREDIT; MD BILLING SHARED VISIT: CPT | Performed by: PHYSICIAN ASSISTANT

## 2020-05-26 PROCEDURE — 25800030 ZZH RX IP 258 OP 636: Performed by: EMERGENCY MEDICINE

## 2020-05-26 PROCEDURE — 99223 1ST HOSP IP/OBS HIGH 75: CPT | Mod: AI | Performed by: INTERNAL MEDICINE

## 2020-05-26 PROCEDURE — 20000003 ZZH R&B ICU

## 2020-05-26 PROCEDURE — 85610 PROTHROMBIN TIME: CPT | Performed by: EMERGENCY MEDICINE

## 2020-05-26 PROCEDURE — 71045 X-RAY EXAM CHEST 1 VIEW: CPT

## 2020-05-26 PROCEDURE — 86140 C-REACTIVE PROTEIN: CPT | Performed by: PHYSICIAN ASSISTANT

## 2020-05-26 PROCEDURE — 84484 ASSAY OF TROPONIN QUANT: CPT | Performed by: EMERGENCY MEDICINE

## 2020-05-26 PROCEDURE — 81001 URINALYSIS AUTO W/SCOPE: CPT | Performed by: EMERGENCY MEDICINE

## 2020-05-26 PROCEDURE — 84145 PROCALCITONIN (PCT): CPT | Performed by: PHYSICIAN ASSISTANT

## 2020-05-26 PROCEDURE — 25000125 ZZHC RX 250: Performed by: FAMILY MEDICINE

## 2020-05-26 PROCEDURE — 82803 BLOOD GASES ANY COMBINATION: CPT | Performed by: FAMILY MEDICINE

## 2020-05-26 PROCEDURE — 99291 CRITICAL CARE FIRST HOUR: CPT | Mod: 25 | Performed by: FAMILY MEDICINE

## 2020-05-26 PROCEDURE — 00000146 ZZHCL STATISTIC GLUCOSE BY METER IP

## 2020-05-26 PROCEDURE — 84443 ASSAY THYROID STIM HORMONE: CPT | Performed by: EMERGENCY MEDICINE

## 2020-05-26 PROCEDURE — 25000132 ZZH RX MED GY IP 250 OP 250 PS 637: Performed by: PHYSICIAN ASSISTANT

## 2020-05-26 PROCEDURE — 83605 ASSAY OF LACTIC ACID: CPT | Performed by: FAMILY MEDICINE

## 2020-05-26 PROCEDURE — 25000131 ZZH RX MED GY IP 250 OP 636 PS 637: Performed by: INTERNAL MEDICINE

## 2020-05-26 PROCEDURE — 25800030 ZZH RX IP 258 OP 636: Performed by: FAMILY MEDICINE

## 2020-05-26 PROCEDURE — 87040 BLOOD CULTURE FOR BACTERIA: CPT | Performed by: EMERGENCY MEDICINE

## 2020-05-26 PROCEDURE — 25800030 ZZH RX IP 258 OP 636: Performed by: INTERNAL MEDICINE

## 2020-05-26 PROCEDURE — 85025 COMPLETE CBC W/AUTO DIFF WBC: CPT | Performed by: EMERGENCY MEDICINE

## 2020-05-26 RX ORDER — POLYETHYLENE GLYCOL 3350 17 G/17G
17 POWDER, FOR SOLUTION ORAL DAILY PRN
Status: DISCONTINUED | OUTPATIENT
Start: 2020-05-26 | End: 2020-06-06 | Stop reason: HOSPADM

## 2020-05-26 RX ORDER — LORAZEPAM 0.5 MG/1
0.5 TABLET ORAL EVERY 6 HOURS PRN
Status: DISCONTINUED | OUTPATIENT
Start: 2020-05-26 | End: 2020-05-26

## 2020-05-26 RX ORDER — ASPIRIN 81 MG/1
81 TABLET, CHEWABLE ORAL DAILY
Status: DISCONTINUED | OUTPATIENT
Start: 2020-05-27 | End: 2020-05-28

## 2020-05-26 RX ORDER — CEFAZOLIN SODIUM 1 G/50ML
2000 SOLUTION INTRAVENOUS ONCE
Status: COMPLETED | OUTPATIENT
Start: 2020-05-26 | End: 2020-05-26

## 2020-05-26 RX ORDER — CEFAZOLIN SODIUM 1 G/50ML
1750 SOLUTION INTRAVENOUS
Status: DISCONTINUED | OUTPATIENT
Start: 2020-05-28 | End: 2020-05-26

## 2020-05-26 RX ORDER — DEXTROSE MONOHYDRATE 25 G/50ML
25-50 INJECTION, SOLUTION INTRAVENOUS
Status: DISCONTINUED | OUTPATIENT
Start: 2020-05-26 | End: 2020-06-06 | Stop reason: HOSPADM

## 2020-05-26 RX ORDER — LORAZEPAM 2 MG/ML
.5-1 INJECTION INTRAMUSCULAR
Status: DISCONTINUED | OUTPATIENT
Start: 2020-05-26 | End: 2020-06-03

## 2020-05-26 RX ORDER — ONDANSETRON 4 MG/1
4 TABLET, ORALLY DISINTEGRATING ORAL EVERY 6 HOURS PRN
Status: DISCONTINUED | OUTPATIENT
Start: 2020-05-26 | End: 2020-06-06 | Stop reason: HOSPADM

## 2020-05-26 RX ORDER — LORAZEPAM 0.5 MG/1
.5-1 TABLET ORAL
Status: DISCONTINUED | OUTPATIENT
Start: 2020-05-26 | End: 2020-06-03

## 2020-05-26 RX ORDER — LORAZEPAM 2 MG/ML
1 INJECTION INTRAMUSCULAR ONCE
Status: COMPLETED | OUTPATIENT
Start: 2020-05-26 | End: 2020-05-26

## 2020-05-26 RX ORDER — NICOTINE POLACRILEX 4 MG
15-30 LOZENGE BUCCAL
Status: DISCONTINUED | OUTPATIENT
Start: 2020-05-26 | End: 2020-06-06 | Stop reason: HOSPADM

## 2020-05-26 RX ORDER — LEVOTHYROXINE SODIUM 88 UG/1
88 TABLET ORAL DAILY
Status: DISCONTINUED | OUTPATIENT
Start: 2020-05-27 | End: 2020-05-27 | Stop reason: DRUGHIGH

## 2020-05-26 RX ORDER — DILTIAZEM HYDROCHLORIDE 5 MG/ML
10 INJECTION INTRAVENOUS ONCE
Status: DISCONTINUED | OUTPATIENT
Start: 2020-05-26 | End: 2020-05-26

## 2020-05-26 RX ORDER — LORAZEPAM 2 MG/ML
0.5 INJECTION INTRAMUSCULAR EVERY 6 HOURS PRN
Status: DISCONTINUED | OUTPATIENT
Start: 2020-05-26 | End: 2020-05-26

## 2020-05-26 RX ORDER — DILTIAZEM HYDROCHLORIDE 5 MG/ML
15 INJECTION INTRAVENOUS ONCE
Status: DISCONTINUED | OUTPATIENT
Start: 2020-05-26 | End: 2020-05-26

## 2020-05-26 RX ORDER — PROCHLORPERAZINE MALEATE 5 MG
5 TABLET ORAL EVERY 6 HOURS PRN
Status: DISCONTINUED | OUTPATIENT
Start: 2020-05-26 | End: 2020-05-26

## 2020-05-26 RX ORDER — LORAZEPAM 2 MG/ML
0.5 INJECTION INTRAMUSCULAR ONCE
Status: COMPLETED | OUTPATIENT
Start: 2020-05-26 | End: 2020-05-26

## 2020-05-26 RX ORDER — LORAZEPAM 2 MG/ML
INJECTION INTRAMUSCULAR
Status: DISCONTINUED
Start: 2020-05-26 | End: 2020-05-26 | Stop reason: HOSPADM

## 2020-05-26 RX ORDER — SODIUM CHLORIDE 450 MG/100ML
INJECTION, SOLUTION INTRAVENOUS CONTINUOUS
Status: DISCONTINUED | OUTPATIENT
Start: 2020-05-26 | End: 2020-05-27

## 2020-05-26 RX ORDER — PROCHLORPERAZINE 25 MG
12.5 SUPPOSITORY, RECTAL RECTAL EVERY 12 HOURS PRN
Status: DISCONTINUED | OUTPATIENT
Start: 2020-05-26 | End: 2020-05-26

## 2020-05-26 RX ORDER — NALOXONE HYDROCHLORIDE 0.4 MG/ML
.1-.4 INJECTION, SOLUTION INTRAMUSCULAR; INTRAVENOUS; SUBCUTANEOUS
Status: DISCONTINUED | OUTPATIENT
Start: 2020-05-26 | End: 2020-06-06 | Stop reason: HOSPADM

## 2020-05-26 RX ORDER — ONDANSETRON 2 MG/ML
4 INJECTION INTRAMUSCULAR; INTRAVENOUS EVERY 6 HOURS PRN
Status: DISCONTINUED | OUTPATIENT
Start: 2020-05-26 | End: 2020-06-06 | Stop reason: HOSPADM

## 2020-05-26 RX ORDER — SIMVASTATIN 40 MG
40 TABLET ORAL DAILY
Status: DISCONTINUED | OUTPATIENT
Start: 2020-05-27 | End: 2020-05-28

## 2020-05-26 RX ORDER — SODIUM CHLORIDE 9 MG/ML
1000 INJECTION, SOLUTION INTRAVENOUS CONTINUOUS
Status: DISCONTINUED | OUTPATIENT
Start: 2020-05-26 | End: 2020-05-26

## 2020-05-26 RX ADMIN — SODIUM CHLORIDE 1000 ML: 9 INJECTION, SOLUTION INTRAVENOUS at 10:26

## 2020-05-26 RX ADMIN — LORAZEPAM 1 MG: 2 INJECTION INTRAMUSCULAR; INTRAVENOUS at 22:44

## 2020-05-26 RX ADMIN — VANCOMYCIN HYDROCHLORIDE 2000 MG: 10 INJECTION, POWDER, LYOPHILIZED, FOR SOLUTION INTRAVENOUS at 09:00

## 2020-05-26 RX ADMIN — SODIUM CHLORIDE 1000 ML: 9 INJECTION, SOLUTION INTRAVENOUS at 13:11

## 2020-05-26 RX ADMIN — SODIUM CHLORIDE 1000 ML: 9 INJECTION, SOLUTION INTRAVENOUS at 08:06

## 2020-05-26 RX ADMIN — LORAZEPAM 0.5 MG: 2 INJECTION INTRAMUSCULAR; INTRAVENOUS at 12:39

## 2020-05-26 RX ADMIN — LORAZEPAM 1 MG: 2 INJECTION INTRAMUSCULAR; INTRAVENOUS at 07:16

## 2020-05-26 RX ADMIN — LORAZEPAM 0.5 MG: 0.5 TABLET ORAL at 21:53

## 2020-05-26 RX ADMIN — OMEPRAZOLE 20 MG: 20 CAPSULE, DELAYED RELEASE ORAL at 21:53

## 2020-05-26 RX ADMIN — LORAZEPAM 0.5 MG: 2 INJECTION INTRAMUSCULAR; INTRAVENOUS at 11:06

## 2020-05-26 RX ADMIN — MIDAZOLAM 1 MG: 1 INJECTION INTRAMUSCULAR; INTRAVENOUS at 06:11

## 2020-05-26 RX ADMIN — TAZOBACTAM SODIUM AND PIPERACILLIN SODIUM 3.38 G: 375; 3 INJECTION, SOLUTION INTRAVENOUS at 08:02

## 2020-05-26 RX ADMIN — SODIUM CHLORIDE: 4.5 INJECTION, SOLUTION INTRAVENOUS at 22:44

## 2020-05-26 RX ADMIN — APIXABAN 5 MG: 5 TABLET, FILM COATED ORAL at 21:53

## 2020-05-26 RX ADMIN — DILTIAZEM HYDROCHLORIDE 5 MG/HR: 5 INJECTION INTRAVENOUS at 08:35

## 2020-05-26 RX ADMIN — INSULIN GLARGINE 24 UNITS: 100 INJECTION, SOLUTION SUBCUTANEOUS at 21:53

## 2020-05-26 RX ADMIN — SODIUM CHLORIDE 500 ML: 9 INJECTION, SOLUTION INTRAVENOUS at 06:11

## 2020-05-26 ASSESSMENT — MIFFLIN-ST. JEOR: SCORE: 1362.75

## 2020-05-26 ASSESSMENT — ACTIVITIES OF DAILY LIVING (ADL): ADLS_ACUITY_SCORE: 39

## 2020-05-26 NOTE — CONSULTS
[facility]    Consult - Hospitalist Service    ADDENDUM: Hospitalist Service History and Physical        Date of Admission:  5/26/2020    Assessment & Plan   Stefanie Velazquez is a 75 year old female admitted on 5/26/2020. She has history of coronary artery disease, hypertension, hyperlipidemia, DVT, type 2 diabetes, chronic back pain, hypothyroidism, rectal cancer, GERD, restless leg syndrome and RC. She presents with confusion.    Acute Encephalopathy, unclear etiology  3 days of reported confusion at nursing home with question of slurred speech and possible right sided weakness. Labs show elevated lactic of 3.4, elevated creatinine, normal WBC, normal CRP, normal troponin, normal TSH, normal VBG. CT head shows no acute findings. CT chest/abomdomen pelvis shows lung nodules as well as adnexal fluid collection. UA shows 11 WBC, small leukocyte esterase, and few bacteria. Vitals show tachycardic, initially up to the 150s in the ED with atrial fibrillation noted on monitor, briefly placed on diltiazem drip and converted to NSR though again tachycardic with agitation.  Recent admission 4/7 - 4/13 as discussed below for similar presentation, ultimately no clear etiology though possibly related to mild temporal CVA seen on imaging. Blood cultures pending. Urine culture pending. Differential includes dementia (lewy-body) vs medication effects vs dehydration vs other.   - After dicussion with ED provider and attending provider, patient would likely benefit from admission to facility where neurology and psychiatry were available for further evaluation.    - Patient will be admitted to [facility] for further work up and evaluation, consider transferring tomorrow for further work up with neurology and psychiatry.   - Continue to monitor.     - Ativan PRN for agitation.    - Avoid use of antipsychotics given recent adverse effect.     COVID Rule Out  Due to concerns of unclear etiology of above  "and residence in nursing home, COVID test sent. CT chest as above shows \"Scattered tiny indeterminate lung nodules, possibly infectious or Inflammatory.\" Which have previously been seen, at that time COVID testing was performed and negative.   - COVID result, 5/26/2020: negative    - No need for special precautions at this time.     Elevated Lactic Acid  Abnormal UA  Lactic 3.4 on presentation, corrected with IVF. UA mildly abnormal though CRP normal, WBC normal. Given encephalopathy of unclear origin and concern for possible infectious cause covered broadly with IV vancomycin and zosyn. Unclear if infection is contributing.    - Continue to monitor for signs of infection.    - Urine culture pending, follow up on results.    - Blood cultures pending, follow up on results.    - Hold further antibiotics at this time. Will follow up on need to re-start.     Elevated creatinine on CKD  Creatinine 1.98, GFR 24 on presentation. Baseline quite variable over past few years, likely 1.3-1.6 though has been up closer to 1.8-2 at times. Appears to be above baseline presently, possible TITA due to recent confusion at nursing home. Patient given 1.5 L NS in ED.        - Check CMP in AM.    - Continue IVF.    - Avoid nephrotoxins.     Coronary Artery Disease  History of 3 vessel CABG in 2002. Unable to tolerate ACEi or beta blocker due to hypotension. Managed on aspirin/statin.   - Continue home medications.     History of Hypertension  Patient managed prior to admission with Lasix 20 mg BID.    - Hold lasix for now given TITA.     Hyperlipidemia  Chronic. Managed with simvastatin 40 mg per day.   - Continue home medication.     History of DVT  Managed chronically on Eliquis 5 mg BID.   - Continue home medication.     Diabetes Mellitus, Type II  Chronic.  Last a1C 6.9% on 3/2020. Managed at home with Lantus 24 units at bedtime and novolog 14 units with lunch and dinner.    - Moderate CHO diet.    - Continue lantus 24 units at " bedtime.    - Hold novolog 14 units with lunch and dinner given patient not eating at this time. Reevaluate need for novolog in the morning.    - Medium sliding scale insulin.      Lumbar Radiculopathy   Neuropathy  Previously diagnosed. Managed with gabapentin 100 mg at bedtime.    - Hold for now.     Hypothyroidism  Chronic. Normal TSH of 3.9 on presentation. Managed with levothyroxine 88 mcg daily except for Sunday (1/2 tablet).   - Continue home medication.     Gastroesophageal reflux disease  Chronic. Managed with omeprazole 20 mg BID.    - Continue home medication.     Restless Leg Syndrome  Chronic. Managed with pramipexole 0.5 mg at bedtime and PRN every 8 hours.   - Hold pramipexole for now.     RC  On CPAP at assisted living facility, follows with sleep medicine.    Rectal Cancer  Follows with Dr. Martinez, diagnosed in 2011, underwent neoadjuvant chemoradiation, resection with reanastomosis. Follows yearly with oncology, last seen 9/2019.  - Continue outpatient surveillance.          DIET: Moderate CHO diet with honey thick liquids   CODE STATUS: Full Code     Disposition Plan : ORIGINAL  The patient's care was discussed with the Attending Physician, Dr. Brayan Lei and ED provider. Based on chart review, recent admission with similar presentation to this facility with broad work up with similar presentation and not entirely clear etiology identified (ultimately attributed to mild temporal lobe stroke vs recent ciprofloxacin use), would recommend patient be evaluated in facility to neurology and psychiatry available.    Entered: Kristel Crespo PA-C 05/26/2020, 11:19 AM     Kristel Crespo PA-C  [facility]    UPDATED DISPOSITION PLAN  Patient will be admitted at [facility] for further evaluation and treatment. Anticipate discharge in 3-4 days once patient returns to baseline mental status, consults completed, safe disposition plan identified.     The patient's care  was discussed with ED provider, Dr. Leroy Patino, attending Dr. Brayan Lei and admitting provider Dr. Ulises Gaytan.    Skylar Roberts PA-C   [facility]  ______________________________________________________________________    Chief Complaint   Confusion    History is obtained from the electronic health record and emergency department physician.    Visit/Communication Style   VIRTUAL (VIDEO) communication was used to evaluate Stefanie due to the COVID pandemic.    Stefanie consented to the use of video communication: unable to consent due to mental status  Video START time: 11:55 AM, 5/26/2020  Video STOP time: 12:03 PM, 5/26/2020   Patient's location: [facility]   Provider's location during the visit: [facility]         History of Present Illness   Stefanie Velazquez is a 75 year old female who presents with confusion.    Per ED provider, has reportedly been confused over past 3 day at her nursing home facility. There was reportedly concerns of slurred speech and some possible right sided weakness. In the ED there was not any focal findings on neurological exam though exam was limited due to patient's confusion and agitation.     She has had 2 recent admissions to this facility, reviewed and briefly summarized below:   She was admitted 4/7/20 - 4/13/20 for confusion and paranoia. Upon admission she acutely worsened requiring 5-point restraints, multiple sedating medications, a Precedex drip and ultimately intubation due to ongoing agitation as well as airway protection with above treatments. It was ultimately thought that this was precipitated by a possible small right temporal lobe stroke on top of underlying mild cognitive decline versus recent ciprofloxacin use as outpatient. Her work up included: no clear infectious cause including a negative LP. There was also concerns for possible aspiration during the hospitalization.     She  "was again admitted 4/24/20 - 4/25/20 due to fever and cough. Her chest CT showed evidence of ground glass opacities and with prior concerns for aspiration she was covered with IV Zosyn for possible aspiration pneumonia. She was markedly improved the following day and discharged home with cefdinir x 5 days. She had a negative COVID swab at that time.    Patient unable to contribute to her medical history and current admission due to mental status. She keeps repeating \"I want to see Dr. Monte.\" \"I have to go to the bathroom.\" \"Why are you keeping me here.\"  She is sleepy throughout the history and exam.     Per EMS report, staff at assisted living facility reported patient's baseline is normal and not like her current state. Staff denied history of dementia, but looking back at further records there has been some mention of possible dementia, but no clear diagnosis. Staff indicate patient usually is able to transfer self from wheelchair to chair/bed on her own and can toilet herself.     Review of Systems    Unable to obtain due to mental status.    Past Medical History    I have reviewed this patient's medical history and updated it with pertinent information if needed.   Past Medical History:   Diagnosis Date     Acute deep vein thrombosis (DVT) of right lower extremity, unspecified vein (H) 10/31/2018     Acute myocardial infarction of other specified sites, episode of care unspecified 6/2002    MI 2 stints     Cancer of colon (H)      Cellulitis of lower extremity, bilateral 9/30/2016     Chronic ischemic heart disease, unspecified 6/22/2003    cabgx3 , 2002 2/05 adenosine ef70%     Coronary atherosclerosis of unspecified type of vessel, native or graft     Coronary artery disease     Diabetes mellitus (H)      Esophageal reflux      Fracture of femur, distal, left, closed (H) 2/11/2013     Gastro-oesophageal reflux disease      Generalized osteoarthrosis, unspecified site 6/22/2005     Generalized osteoarthrosis, " unspecified site 6/22/2005     HEARING LOSS CONDUCTIVE, COMBINED TYPE 6/22/2005    [place] measles as child     HYPOTHYROIDISM  6/22/2003     Mixed hyperlipidemia 6/22/2005     Obesity, unspecified 6/22/2005     RC-moderate (AHI 12, LSat 60%); REM RDI-73 6/1/2009    Sleep study [facility] was performed 5/26/09 in order to re-evaluate severity of RC. The total sleep time was 412.0 minutes. The sleep latency was decreased at 8.7 minutes with Ambien 10mg. The REM sleep latency was 426.0 minutes. Sleep efficiency was reduced at 79.0%. The sleep architecture was disrupted with frequent sleep stage changes and arousals.  Snoring:  loud. 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, uncontrolled(250.42) (H) 6/22/2005     Type II or unspecified type diabetes mellitus with renal manifestations, uncontrolled(250.42) (H) 6/22/2005     Unspecified disorder resulting from impaired renal function 6/22/2005    CR     1.82   06/21/2005     Unspecified essential hypertension        Past Surgical History   I have reviewed this patient's surgical history and updated it with pertinent information if needed.  Past Surgical History:   Procedure Laterality Date     ANESTHESIA OUT OF OR MRI N/A 4/8/2020    Procedure: ANESTHESIA OUT OF OR MRI;  Surgeon: GENERIC ANESTHESIA PROVIDER;  Location: [facility]     cabg       COLECTOMY LOW ANTERIOR  6/21/2011    Procedure:COLECTOMY LOW ANTERIOR; with loop ielostomy; Surgeon:ROBBIN MCDONNELL; Location:[facility]     COLONOSCOPY  1/26/2011    COLONOSCOPY performed by MASOUD BILL at [facility]     COLONOSCOPY N/A 3/1/2016    Procedure: COLONOSCOPY;  Surgeon: Angelika Neal MD;  Location: [facility]     INSERT PORT VASCULAR ACCESS  3/2/2011    INSERT PORT VASCULAR ACCESS  performed by LIZA MAGALLANES at [facility]     OPEN REDUCTION INTERNAL FIXATION FEMUR DISTAL  2/12/2013    Procedure: OPEN REDUCTION INTERNAL FIXATION FEMUR DISTAL;  Open reduction internal fixation left femur fracture--Anesth[facility]Choice;  Surgeon: Ley, Jeffrey Duane, MD;  Location: [facility] OR     ORTHOPEDIC SURGERY       PHACOEMULSIFICATION WITH STANDARD INTRAOCULAR LENS IMPLANT Left 1/22/2018    Procedure: PHACOEMULSIFICATION WITH STANDARD INTRAOCULAR LENS IMPLANT;  Left cataract removal with implant;  Surgeon: Rony Key MD;  Location: [facility]     PHACOEMULSIFICATION WITH STANDARD INTRAOCULAR LENS IMPLANT Right 2/19/2018    Procedure: PHACOEMULSIFICATION WITH STANDARD INTRAOCULAR LENS IMPLANT;  Right cataract removal with implant;  Surgeon: Rony Key MD;  Location: [facility]     SIGMOIDOSCOPY FLEXIBLE  9/9/2011    Procedure:SIGMOIDOSCOPY FLEXIBLE; Performed prior to induction.; Surgeon:ROBBIN MCDONNELL; Location: OR     SURGICAL HISTORY OF -   10/24/2002    Heart bypass     SURGICAL HISTORY OF -   2002    R Knee arthroplasty     SURGICAL HISTORY OF -   2002    Mi 2 stints     TAKEDOWN ILEOSTOMY  9/9/2011    Procedure:TAKEDOWN ILEOSTOMY; Exploratory Laparotomy,  Loop Ileostomy Takedown, Small Bowel Resection x 2.; Surgeon:ROBBIN MCDONNELL; [facility]     Social History   I have reviewed this patient's social history and updated it with pertinent information if needed.  Social History     Tobacco Use     Smoking status: Former Smoker     Smokeless tobacco: Never Used   Substance Use Topics     Alcohol use: Yes     Comment: rare social use     Drug use: No     Family History   I have reviewed this patient's family history and updated it with pertinent information if needed.   Family History   Problem Relation Age of Onset     Diabetes Sister      C.A.D. Sister      Breast Cancer Sister      Prior to Admission Medications   Prior to Admission Medications   Prescriptions Last Dose Informant  Patient Reported? Taking?   ACCU-CHEK MILVIA MELODY  Self No No   Sig: dispense one meter   Continuous Blood Gluc  (FREESTYLE JAJA 14 DAY READER) MELODY   No No   Si each as needed (use to scan the sensor to check the blood sugars)   Continuous Blood Gluc Sensor (FREESTYLE JAJA 14 DAY SENSOR) [facility]   No No   Si each every 14 days   ELIQUIS ANTICOAGULANT 5 MG tablet   Yes No   Sig: Take 1 tablet by mouth 2 times daily   acetaminophen (TYLENOL) 650 MG CR tablet   No No   Sig: Take 1 tablet (650 mg) by mouth 3 times daily as needed for mild pain or fever   aspirin (ASA) 81 MG tablet   Yes No   Sig: Take 81 mg by mouth daily   blood glucose monitoring (ACCU-CHEK MILVIA) test strip   No No   Sig: Use to test blood sugars 4 times daily or as directed.   blood glucose monitoring (ACCU-CHEK MULTICLIX) lancets  Self No No   Sig: Test BG 4 times daily   Patient taking differently: by In Vitro route 4 times daily Test BG 4 times daily   cholecalciferol 1000 units TABS   Yes No   Sig: Take 1,000 Units by mouth daily   ferrous sulfate (FEROSUL) 325 (65 Fe) MG tablet   No No   Sig: Take 1 tablet (325 mg) by mouth daily (with breakfast)   fluocinonide (LIDEX) 0.05 % ointment   No No   Sig: Apply sparingly to rash on legs twice daily as needed.  Do not apply to face.   fluticasone (FLONASE) 50 MCG/ACT spray   No No   Sig: Spray 1 spray into both nostrils daily   gabapentin (NEURONTIN) 100 MG capsule   No No   Sig: Take 1 capsule (100 mg) by mouth At Bedtime   hydrocortisone (ANUSOL-HC) 2.5 % cream   No No   Sig: Place rectally 2 times daily as needed for hemorrhoids   insulin aspart (NOVOLOG FLEXPEN) 100 UNIT/ML pen   Yes No   Sig: Inject 14 Units Subcutaneous 2 times daily (with meals) Lunch and dinner    insulin glargine (LANTUS PEN) 100 UNIT/ML pen   Yes No   Sig: Inject 24 Units Subcutaneous At Bedtime   insulin pen needle (BD GABRIELLA U/F) 32G X 4 MM   No No   Sig: Use 4 times per day or as directed.   levothyroxine  (SYNTHROID/LEVOTHROID) 88 MCG tablet   Yes No   Sig: Take 88 mcg by mouth daily Except on Sunday   levothyroxine (SYNTHROID/LEVOTHROID) 88 MCG tablet   Yes No   Sig: Take 44 mcg by mouth once a week = 1/2  Tab on Sunday   loperamide (IMODIUM) 2 MG capsule   No No   Sig: One capsule once a day PRN, can use a second one if needed   magnesium 250 MG tablet   Yes No   Sig: Take 1 tablet by mouth daily   menthol-zinc oxide (CALMOSEPTINE) 0.44-20.625 % OINT ointment   No No   Sig: Apply topically 4 times daily as needed for skin protection   omeprazole (PRILOSEC) 20 MG DR capsule   No No   Sig: Take 1 capsule (20 mg) by mouth 2 times daily   order for DME   No No   Sig: Equipment being ordered: Hospital Bed service and repair   order for DME   No No   Sig: Equipment being ordered: Compression stockings   order for DME   No No   Sig: Equipment being ordered:  order for XL Size  Pull ups.  Pt is currently using 12 pull ups a day   order for DME   No No   Sig: Equipment being ordered: Incontinence briefs/Depends   order for DME   No No   Sig: Equipment being ordered: Wheelchair   order for DME   No No   Sig: Equipment being ordered:   Incontinence Products: Gloves (4 Boxes) & chux pads   polyethylene glycol (MIRALAX) 17 GM/SCOOP powder   No No   Sig: Take 17 g (1 capful) by mouth daily   potassium chloride ER (K-DUR/KLOR-CON M) 10 MEQ CR tablet   No No   Sig: Take 1 tablet (10 mEq) by mouth 2 times daily   pramipexole (MIRAPEX) 0.5 MG tablet   Yes No   Sig: Take 1 tablet by mouth At Bedtime    pramipexole (MIRAPEX) 0.5 MG tablet   Yes No   Sig: Take 0.5 mg by mouth One hour after scheduled dose and every 8 hours as needed   simvastatin (ZOCOR) 40 MG tablet   No No   Sig: Take 1 tablet (40 mg) by mouth daily      Facility-Administered Medications: None     Allergies   Allergies   Allergen Reactions     Ciprofloxacin Anxiety     Pt developed agitation, paranoia while on cipro--on clear if this is what caused it.  But would try  to avoid in future.     Hydrocodone Other (See Comments)     dizzy     Lisinopril Cough            Vicodin [Hydrocodone-Acetaminophen] Other (See Comments)     Caused extreme dizziness     Physical Exam              Performed by Skylar Roberts PA-C, 5/26/2020 at 18:27  Temp: 96.3  F (35.7  C) Temp src: Axillary BP: (!) 150/72 Pulse: 65 Heart Rate: 63 Resp: 20 SpO2: 97 % O2 Device: None (Room air)       Constitutional: Will wake to voice, not following simple commands, repeating self over and over, uncooperative, in apparent distress, appears nontoxic. Laying in bed with bilateral soft wrist restraints applied.  Eyes: Eyes are clear.   HEENT: Oropharynx is clear, dry membranes. Normocephalic, no evidence of cranial trauma.  Cardiovascular: Regular rhythm and rate, normal S1 and S2. Systolic murmur noted. Peripheral pulses intact bilaterally. No lower extremity edema.  Respiratory: Lung sounds are clear to auscultation bilaterally.  GI: Obese. Soft, non-distended. Non-tender, no rebound or guarding. Normal bowel sounds.  Musculoskeletal: Moving all extremities. Without obvious deformity, unable to assess ROM.  Skin: Rash under bilateral breasts left worse than right. Surgical scar on abdomen and chest.   Neurologic: Patient moves all extremities. She is alert. She is confused.     Data   Data reviewed today: I reviewed all medications, new labs and imaging results over the last 24 hours.     Recent Labs   Lab 05/26/20  0605   WBC 6.1   HGB 14.4   MCV 92      INR 1.87*      POTASSIUM 4.9   CHLORIDE 110*   CO2 24   BUN 39*   CR 1.98*   ANIONGAP 8   DWIGHT 9.7   *   ALBUMIN 2.8*   PROTTOTAL 7.8   BILITOTAL 0.7   ALKPHOS 128   ALT 26   AST 57*   TROPI <0.015     Recent Results (from the past 24 hour(s))   CT Head w/o Contrast    Narrative    CT SCAN OF THE HEAD WITHOUT CONTRAST   5/26/2020 7:43 AM     HISTORY: Right-sided weakness, slurred speech, confusion.    TECHNIQUE: Axial images of the head  and coronal reformations without  IV contrast material. Radiation dose for this scan was reduced using  automated exposure control, adjustment of the mA and/or kV according  to patient size, or iterative reconstruction technique.    COMPARISON: None.    FINDINGS: Mild cerebral atrophy is present. There are some moderate  patchy white matter changes in both hemispheres without mass effect.  There is an old lacunar infarct in the right centrum semiovale. There  is no evidence for intracranial hemorrhage, mass effect, acute  infarct, or a skull fracture. Visualized paranasal sinuses and mastoid  air cells are clear. Vascular calcifications are noted at the skull  base.      Impression    IMPRESSION: Chronic changes. No evidence for intracranial hemorrhage  or any acute process.    JESSIE JOSEPH MD   XR Chest Port 1 View    Narrative    CHEST PORTABLE ONE VIEW   5/26/2020 9:32 AM     HISTORY: Delirium, unknown source.    COMPARISON: Chest x-ray 5/14/2020.      Impression    IMPRESSION: Portable view of the chest. Lungs are clear with mild  scarring or calcified plaque right lung base, unchanged. Heart may be  mildly enlarged, but unchanged. Prior CABG. Right chest port is  unchanged. No evidence of pneumothorax or significant pleural fluid.  No change since prior study.    SUNNY HOYOS MD   CT Chest Abdomen Pelvis w/o Contrast    Narrative    CT CHEST, ABDOMEN AND PELVIS WITHOUT CONTRAST  5/26/2020 11:26 AM    CLINICAL HISTORY:  Delirium, elevated lactic acid, no source of  presumed sepsis.    TECHNIQUE: CT scan of the chest, abdomen, and pelvis was performed  without IV contrast. Multiplanar reformats were obtained. Dose  reduction techniques were used.   CONTRAST: None.    COMPARISON: CT chest 4/24/2020.    FINDINGS:   LUNGS AND PLEURA: Respiratory motion artifact is present. A few  scattered indeterminate groundglass nodules are noted within the lungs  bilaterally, possibly infectious or inflammatory in nature. No  focal  infiltrate or lung consolidation. Area of bandlike scarring right lung  base is noted. No pleural effusions.    MEDIASTINUM/AXILLAE: Heart is normal in size. Coronary artery  calcifications are present. Prior CABG. Right-sided central venous  line terminates in the distal SVC. No enlarged lymph nodes. Esophagus  is unremarkable.    HEPATOBILIARY: Indeterminate low-attenuation left hepatic lobe lesion  measures approximately 1.5 cm on image 116 of series 8. A few  calcified gallstones are noted in the gallbladder which is otherwise  unremarkable.    PANCREAS: Normal.    SPLEEN: Normal.    ADRENAL GLANDS: Normal.    KIDNEYS/BLADDER: Probable small bilateral renal cysts. These are  incompletely characterized on this noncontrast study. No  hydronephrosis or urinary tract calculi. The bladder is unremarkable.    BOWEL: Mild colonic constipation is noted. No diverticulitis or  appendicitis. Anastomosis in the region of the rectum is patent. Right  periumbilical hernia contains loops of small bowel without obvious  obstruction or incarceration. Anastomosis near this area is widely  patent.    LYMPH NODES: No enlarged abdominal or pelvic lymph nodes.    VASCULATURE: Calcified plaque abdominal aorta and branch vessels are  noted. No evidence of aortic aneurysm.    PELVIC ORGANS: The bladder, uterus, right adnexal region and rectum  are within normal limits. Fluid collection left adnexa may be ovarian  and measures 4.0 x 1.9 cm on image 249 of series 8.    OTHER: None.    MUSCULOSKELETAL: Intramedullary left femoral aron is noted.  Degenerative hip changes are noted bilaterally. Degenerative spine  changes also noted. Bones overall appear osteopenic. No definite  destructive bone lesions.      Impression    IMPRESSION:  1.  No evidence of bowel obstruction, diverticulitis or appendicitis.  Periumbilical hernia contains loops of small bowel without obstruction  or incarceration. Scattered anastomoses involving the bowel  appear  patent. Mild colonic constipation.    2.  Scattered tiny indeterminate lung nodules, possibly infectious or  inflammatory. No focal infiltrate or lung consolidation is evident.  Bandlike scarring right lower lobe is noted. No enlarged lymph nodes.    3.  Prior CABG.    4.  Cholelithiasis. No surrounding gallbladder inflammation.    5.  Left adnexal fluid collection measuring 4.0 x 1.9 cm, possibly  ovarian. A follow-up pelvic ultrasound could be performed for further  assessment if clinically warranted. No surrounding inflammatory  changes are present to suggest an abscess.    SUNNY HOYOS MD

## 2020-05-26 NOTE — ED PROVIDER NOTES
HPI   The patient is a 75-year-old female presenting by EMS transport from a nursing home with confusion and slurred speech.  She has had multiple medical problems.  These include diabetes, acute myocardial infarction, CABG, recurrent DVTs on Eliquis.      I am unable to obtain a history from the patient because of her acute medical condition.  She is apparently confused over the past 3 days.  Early this morning she was found to have slurred speech and so she was sent here for evaluation.  No reported falls or trauma.  No reported fever.        Allergies:  Allergies   Allergen Reactions     Ciprofloxacin Anxiety     Pt developed agitation, paranoia while on cipro--on clear if this is what caused it.  But would try to avoid in future.     Hydrocodone Other (See Comments)     dizzy     Lisinopril Cough            Vicodin [Hydrocodone-Acetaminophen] Other (See Comments)     Caused extreme dizziness     Problem List:    Patient Active Problem List    Diagnosis Date Noted     Chronic anticoagulation 04/25/2020     Priority: Medium     Pulmonary nodules 04/25/2020     Priority: Medium     Noted on chest CT 4/24/2020. Radiology recommending 3 month follow-up.       H/O deep venous thrombosis 05/21/2019     Priority: Medium     Syncope and collapse 05/21/2019     Priority: Medium     Impacted cerumen of right ear 03/31/2019     Priority: Medium     CKD (chronic kidney disease) stage 4, GFR 15-29 ml/min (H) 02/03/2019     Priority: Medium     Lumbar radiculopathy 01/31/2019     Priority: Medium     Oropharyngeal dysphagia 12/14/2018     Priority: Medium     Bilateral hearing loss, unspecified hearing loss type 12/14/2018     Priority: Medium     Lymphedema of genitalia 08/12/2018     Priority: Medium     Spinal stenosis of lumbar region without neurogenic claudication 05/01/2018     Priority: Medium     DDD (degenerative disc disease), lumbar 05/01/2018     Priority: Medium     Symptomatic bradycardia 10/29/2017      Priority: Medium     Type 2 diabetes mellitus with diabetic nephropathy, with long-term current use of insulin (H) 2017     Priority: Medium     Restless leg syndrome 2017     Priority: Medium     Chronic diarrhea 2017     Priority: Medium     Health Care Home 10/28/2016     Priority: Medium     [facility] Care Coordination  JUNE Vega  Phone: [phone]    [facility] CARE PLAN SUMMARY    Member Name:  Stefanie Velazquez        *Assisted Living*  Address:   [place] [id number] Phone: [phone] (Home)    :  1945 Date of Assessment: 3/30/2020   Health Plan:  [facility]  Health Plan Number: [id number] Medical Assistance Number: [id number]  Financial Worker:    Case #:     P Care Coordinator:    JUNE Vega CC Phone: [phone]  CC Fax:  [phone]   FVP Enrollment Date: 2019 Case Mix:  A-  Rate Cell:  B   Waiver Type:  EW   Primary Emergency Contact: Dori Pena (niece)  Address: [address] [place] [address]  Mobile Phone: [phone]  Relation: Relative  Secondary Emergency Contact: Lori Pope (niece)           [place], [place]   Home Phone: [phone]  Mobile Phone: [phone]  Relation: Relative Language:  English  :  No   Health Care Agent/POA:  Yes  Changed   Crystal Dennis [phone] Advanced Directives/Living Will:  Yes   Primary Care Clinic/Phone/Fax:  [facility]/(p) [phone], (f) [phone] Primary Dx: Diabetes Type II  E11.21  Secondary Dx: CKD Stage 4 N18.4      Primary Physician:  Sandra Medina   Height:  5' 0''  Weight:  201 lbs   Specialty Physician:    Audiologist:     Eye Care Provider:   Dental Care Provider:    [facility]: [facility] [phone] or [phone]   Other:        [facility] CURRENT SERVICES SUMMARY  Equipment owned/DME history: WC 2020)  SERVICE TYPE/PROVIDER  NAME/PHONE AUTH DATE FREQUENCY Units OR $ Amt DESCRIPTION   Medical Transportation: [employer] [phone]  Fax:  4/1/2020 - 3/31/2021 As needed Varies    [facility]  Phone: [phone]    Fax: 4/1/2020 - 3/31/2021 Monthly Per [employer]      DME: [employer] [phone]  Fax:  5/11/2020 - 3/31/2021 Monthly TBD Thick it  - ____ cans per month         * For ADC please select ADC provider and [employer] in order to process auth               Benign essential hypertension 09/30/2016     Priority: Medium     Bowel and bladder incontinence 05/22/2015     Priority: Medium     Dysuria 10/24/2013     Priority: Medium     Vitamin B 12 deficiency 05/14/2012     Priority: Medium     Vitamin D deficiency 05/14/2012     Priority: Medium     Radiation therapy complication 11/09/2011     Priority: Medium     Anemia 08/26/2011     Priority: Medium     Rectal cancer- newly diagnosed adenoCA rectum Jan 2011 and Positive Tubular Adenoma 2019 02/17/2011     Priority: Medium     Hyperlipidemia LDL goal <100 10/31/2010     Priority: Medium     Neuropathy (H) 04/09/2010     Priority: Medium     RC-moderate (AHI 12, LSat 60%); REM RDI-73 06/01/2009     Priority: Medium     HST performed 11/11/2019 with weight 201 lbs.  Analysis time 540.2 minutes.  AHI 6.8.  Baseline SpO2 91.1%, <= 88% for 43 minutes.    Sleep study [facility] was performed 5/26/09 in order to re-evaluate severity of RC. The total sleep time was 412.0 minutes. The sleep latency was decreased at 8.7 minutes with Ambien 10mg. The REM sleep latency was 426.0 minutes. Sleep efficiency was reduced at 79.0%. The sleep architecture was disrupted with frequent sleep stage changes and arousals.  Snoring:  loud. Respiratory Events:   RDI of 57.5 and an AHI of 12.2. The REM RDI was 74.3 The lowest O2 saturation was 60.0%. This study is suggestive of moderate sleep apnea, profound desaturations during REM sleep, with 35 second  apneas.    Other: PLM index was 13.8.      Recommendations:  Due to the profound desaturations during REM sleep, consider CPAP titration study to establish optimal CPAP treatment pressure.  2. Check ferritin given her RLS symptoms. If RLS symptoms persist after treatment of CR, further therapy may be warranted. Replace iron as indicated by ferritin.         Osteoporosis 02/29/2008     Priority: Medium     Problem list name updated by automated process. Provider to review       Esophageal reflux 10/13/2007     Priority: Medium     On protonix;        bmi 40 06/22/2005     Priority: Medium     Problem list name updated by automated process. Provider to review       Generalized osteoarthrosis, unspecified site 06/22/2005     Priority: Medium     HEARING LOSS CONDUCTIVE, COMBINED TYPE 06/22/2005     Priority: Medium     Bengali measles as child       Hypothyroidism 06/22/2003     Priority: Medium     Problem list name updated by automated process. Provider to review       Chronic ischemic heart disease 06/22/2003     Priority: Medium     Class: Chronic     cabgx3 09/2002,  with a left internal mammary (LIMA) to the left anterior descending and a bypass graft to the obtuse marginal branch of the circumflex and also PDA branch of the right coronary artery. She had an episode of atrial fibrillation postoperatively and was treated with sotalol.   PTCA and stent placement for recurrent restenosis.   2/05 adenosine ef70%  9/24/12 - Lexiscan nuclear stress test was positive for mild partially reversible ischemia in the LAD distribution. Imdur added.            Past Medical History:    Past Medical History:   Diagnosis Date     Acute deep vein thrombosis (DVT) of right lower extremity, unspecified vein (H) 10/31/2018     Acute myocardial infarction of other specified sites, episode of care unspecified 6/2002     Cancer of colon (H)      Cellulitis of lower extremity, bilateral 9/30/2016     Chronic ischemic heart disease,  unspecified 6/22/2003     Coronary atherosclerosis of unspecified type of vessel, native or graft      Diabetes mellitus (H)      Esophageal reflux      Fracture of femur, distal, left, closed (H) 2/11/2013     Gastro-oesophageal reflux disease      Generalized osteoarthrosis, unspecified site 6/22/2005     Generalized osteoarthrosis, unspecified site 6/22/2005     HEARING LOSS CONDUCTIVE, COMBINED TYPE 6/22/2005     HYPOTHYROIDISM  6/22/2003     Mixed hyperlipidemia 6/22/2005     Obesity, unspecified 6/22/2005     RC-moderate (AHI 12, LSat 60%); REM RDI-73 6/1/2009     Recurrent acute deep vein thrombosis (DVT) of both lower extremities (H) 3/31/2019     Rubeola      Stented coronary artery      Type II or unspecified type diabetes mellitus with renal manifestations, uncontrolled(250.42) (H) 6/22/2005     Type II or unspecified type diabetes mellitus with renal manifestations, uncontrolled(250.42) (H) 6/22/2005     Unspecified disorder resulting from impaired renal function 6/22/2005     Unspecified essential hypertension      Past Surgical History:    Past Surgical History:   Procedure Laterality Date     ANESTHESIA OUT OF OR MRI N/A 4/8/2020    Procedure: ANESTHESIA OUT OF OR MRI;  Surgeon: GENERIC ANESTHESIA PROVIDER;  Location: [facility]     cabg       COLECTOMY LOW ANTERIOR  6/21/2011    Procedure:COLECTOMY LOW ANTERIOR; with loop ielostomy; Surgeon:ROBBIN MCDONNELL; Location: OR     COLONOSCOPY  1/26/2011    COLONOSCOPY performed by MASOUD BILL at [facility]     COLONOSCOPY N/A 3/1/2016    Procedure: COLONOSCOPY;  Surgeon: Liza Neal MD;  Location: [facility]     INSERT PORT VASCULAR ACCESS  3/2/2011    INSERT PORT VASCULAR ACCESS performed by LIZA NEAL at [facility]     OPEN REDUCTION INTERNAL FIXATION FEMUR DISTAL  2/12/2013    Procedure: OPEN REDUCTION INTERNAL FIXATION FEMUR DISTAL;  Open reduction internal fixation left femur fracture--Anesth[facility]Choice;  Surgeon: Gera Ramírez  Duane, MD;  Location: [facility]     ORTHOPEDIC SURGERY       PHACOEMULSIFICATION WITH STANDARD INTRAOCULAR LENS IMPLANT Left 1/22/2018    Procedure: PHACOEMULSIFICATION WITH STANDARD INTRAOCULAR LENS IMPLANT;  Left cataract removal with implant;  Surgeon: Rony Key MD;  Location: [facility]     PHACOEMULSIFICATION WITH STANDARD INTRAOCULAR LENS IMPLANT Right 2/19/2018    Procedure: PHACOEMULSIFICATION WITH STANDARD INTRAOCULAR LENS IMPLANT;  Right cataract removal with implant;  Surgeon: Rony Key MD;  Location: [facility]     SIGMOIDOSCOPY FLEXIBLE  9/9/2011    Procedure:SIGMOIDOSCOPY FLEXIBLE; Performed prior to induction.; Surgeon:ROBBIN MCDONNELL; Location: OR     SURGICAL HISTORY OF -   10/24/2002    Heart bypass     SURGICAL HISTORY OF -   2002    R Knee arthroplasty     SURGICAL HISTORY OF -   2002    Mi 2 stints     TAKEDOWN ILEOSTOMY  9/9/2011    Procedure:TAKEDOWN ILEOSTOMY; Exploratory Laparotomy,  Loop Ileostomy Takedown, Small Bowel Resection x 2.; Surgeon:ROBBIN MCDONNELL; Location: OR     Family History:    Family History   Problem Relation Age of Onset     Diabetes Sister      C.A.D. Sister      Breast Cancer Sister      Social History:  Marital Status:  Single [1]  Social History     Tobacco Use     Smoking status: Former Smoker     Smokeless tobacco: Never Used   Substance Use Topics     Alcohol use: Yes     Comment: rare social use     Drug use: No      Medications:    ACCU-CHEK MILVIA MELODY  acetaminophen (TYLENOL) 650 MG CR tablet  aspirin (ASA) 81 MG tablet  blood glucose monitoring (ACCU-CHEK MILVIA) test strip  blood glucose monitoring (ACCU-CHEK MULTICLIX) lancets  cholecalciferol 1000 units TABS  Continuous Blood Gluc  (FREESTYLE JAJA 14 DAY READER) MELODY  Continuous Blood Gluc Sensor (FREESTYLE JAJA 14 DAY SENSOR) MISC  ELIQUIS ANTICOAGULANT 5 MG tablet  ferrous sulfate (FEROSUL) 325 (65 Fe) MG tablet  fluocinonide (LIDEX) 0.05 % ointment  fluticasone (FLONASE) 50  MCG/ACT spray  gabapentin (NEURONTIN) 100 MG capsule  hydrocortisone (ANUSOL-HC) 2.5 % cream  insulin aspart (NOVOLOG FLEXPEN) 100 UNIT/ML pen  insulin glargine (LANTUS PEN) 100 UNIT/ML pen  insulin pen needle (BD GABRIELLA U/F) 32G X 4 MM  levothyroxine (SYNTHROID/LEVOTHROID) 88 MCG tablet  levothyroxine (SYNTHROID/LEVOTHROID) 88 MCG tablet  loperamide (IMODIUM) 2 MG capsule  magnesium 250 MG tablet  menthol-zinc oxide (CALMOSEPTINE) 0.44-20.625 % OINT ointment  omeprazole (PRILOSEC) 20 MG DR capsule  order for DME  order for DME  order for DME  order for DME  order for DME  order for DME  polyethylene glycol (MIRALAX) 17 GM/SCOOP powder  potassium chloride ER (K-DUR/KLOR-CON M) 10 MEQ CR tablet  pramipexole (MIRAPEX) 0.5 MG tablet  pramipexole (MIRAPEX) 0.5 MG tablet  simvastatin (ZOCOR) 40 MG tablet      Review of Systems   Unable to perform ROS: Mental status change       PE   BP: (!) 128/97  Pulse: 152  Heart Rate: 154  Temp: 96.3  F (35.7  C)  Resp: 20  SpO2: 98 %  Physical Exam  Vitals signs reviewed.   Constitutional:       General: She is in acute distress.      Appearance: She is well-developed. She is obese.      Comments: Talking tangentially and without making sense given the situation.  She will look us in the eye and use her arms and legs with purpose.  No evidence of trauma.  Agitated.  Confused.   HENT:      Head: Normocephalic and atraumatic.      Mouth/Throat:      Mouth: Mucous membranes are moist.   Eyes:      General: No scleral icterus.     Extraocular Movements: Extraocular movements intact.      Conjunctiva/sclera: Conjunctivae normal.      Pupils: Pupils are equal, round, and reactive to light. Pupils are equal.   Neck:      Musculoskeletal: Normal range of motion and neck supple.   Cardiovascular:      Rate and Rhythm: Regular rhythm. Tachycardia present.   Pulmonary:      Effort: Pulmonary effort is normal.      Breath sounds: Normal breath sounds.   Abdominal:      General: There is no  distension.      Palpations: Abdomen is soft. There is no mass.      Tenderness: There is no abdominal tenderness. There is no guarding.   Musculoskeletal: Normal range of motion.         General: No swelling or tenderness.   Skin:     General: Skin is warm and dry.   Neurological:      Mental Status: She is alert. She is confused.      GCS: GCS eye subscore is 4. GCS verbal subscore is 5. GCS motor subscore is 6.      Cranial Nerves: No cranial nerve deficit, dysarthria or facial asymmetry.      Motor: No weakness.   Psychiatric:      Comments: See above.         ED COURSE and MDM   0621.  The patient was found to have slurred speech early this morning and sent here for evaluation.  She comes from a nursing home and has apparently been confused for 3 days.  Broad differential present.  Code stroke initiated on arrival but has been de-escalated after consultation with neurology.  She will receive head CT and CTA in due time (management of acute stroke ongoing with another patient).  She has been given Versed 1 mg IV for agitation.  EKG is interpreted by me below.  There is a regular tachycardia, presumably atrial flutter at 153 bpm.  She does not have a history of atrial fibrillation or flutter.    0717.  The patient is found to have an elevated lactic acid.  Her white blood cell count is normal and there is no left shift.  She does not have a fever.  However, she has tachycardia without obvious source.  A fluid bolus will be given.  One dose of broad-spectrum antibiotic will be given for possible sepsis.  CT scan pending.    0756.  CT scan unremarkable for acute change.  The patient is calm now with Ativan given prior to her scan.  We are obtaining a urine analysis, cath specimen, at this time.  She continues to have a pulse of about 149 bpm.  I will repeat her EKG now.    0816.  EKG shows atrial fibrillation with rapid rate.  Diltiazem will be used for rate control.    1035.  Patient is currently in a sinus rhythm  at a rate of 67.  She continues to sleep after Ativan was given.  I reviewed the case with the hospitalist, Dr. Lei.  She is requesting a COVID swab.  This order is placed.  She is requesting a CT scan of her chest.  I will add this as well as a CT scan of her abdomen and pelvis since we are getting done.  We do not have a source of sepsis if this is the cause of her acute metabolic encephalopathy.    1324.  I spoke with Dr. Locke at the [facility].  I also spoke briefly with cardiology at the [facility].  The patient is back in a sinus rhythm with a rate of 105 and a blood pressure of 167 systolic.  She required nonviolent wrist constraint for cooperation.  She has been getting Ativan which she responds to but I do not want to sedate her to the point of requiring intubation.  She is currently relatively calm when not being disturbed.  Sepsis still possible but not obvious as I am without a cause.  She also does not have a fever or white blood cell count.  Not currently on diltiazem drip.      EKG  (0605)   Interpretation performed by me.  Rate: 153     Rhythm: atrial flutter vs svt     Axis: nl  Intervals: AR (12-2) -, QRS (<12) 80, QTc (>5) 438  P wave: -     QRS complex: nl  ST segment / T-wave: ST-depression  Conclusion: Poor EKG given the patient's condition.  I can recognize a tachycardia that is regular, rhythm unknown.  ST depression present in the inferior leads II, III, and aVF.  T wave inversion also present in these leads.    EKG  (0810)   Interpretation performed by me.  Rate: 149     Rhythm: atrial fib     Axis: nl  Intervals: AR (12-2) -, QRS (<12) 82, QTc (>5) 439  P wave: -     QRS complex: nl  ST segment / T-wave: Subtle ST depression in the inferior leads  Conclusion: Atrial fibrillation with rapid rate, subtle ST depression in the inferior leads.    EKG  (1020)   Interpretation performed by me.  Rate: 67     Rhythm: sinus     Axis: nl  Intervals: AR (12-2) 174, QRS  (<12) 96, QTc (>5) 418  P wave: nl     QRS complex: nl  ST segment / T-wave: nl  Conclusion: Sinus rhythm without acute ischemic change, unremarkable.    LABS  Labs Ordered and Resulted from Time of ED Arrival Up to the Time of Departure from the ED   INR - Abnormal; Notable for the following components:       Result Value    INR 1.87 (*)     All other components within normal limits   COMPREHENSIVE METABOLIC PANEL - Abnormal; Notable for the following components:    Chloride 110 (*)     Glucose 130 (*)     Urea Nitrogen 39 (*)     Creatinine 1.98 (*)     GFR Estimate 24 (*)     GFR Estimate If Black 28 (*)     Albumin 2.8 (*)     AST 57 (*)     All other components within normal limits   URINE MACROSCOPIC WITH REFLEX TO MICRO - Abnormal; Notable for the following components:    Blood Urine Moderate (*)     Leukocyte Esterase Urine Small (*)     WBC Urine 11 (*)     Bacteria Urine Few (*)     All other components within normal limits   LACTIC ACID WHOLE BLOOD - Abnormal; Notable for the following components:    Lactic Acid 3.4 (*)     All other components within normal limits   CBC WITH PLATELETS DIFFERENTIAL   PARTIAL THROMBOPLASTIN TIME   TROPONIN I   TSH WITH FREE T4 REFLEX   BLOOD GAS VENOUS   LACTIC ACID WHOLE BLOOD   COVID-19 VIRUS (CORONAVIRUS) BY PCR   CRP INFLAMMATION   PROCALCITONIN   SARS-COV-2 (COVID-19) VIRUS RT-PCR   VITAL SIGNS AND NEURO CHECKS   ACTIVITY   PULSE OXIMETRY NURSING   GLUCOSE MONITOR NURSING POCT   ASSESSMENT   NOTIFY   ATRIAL FIB ORDER SET MARKER (USED FOR ALERT TRIGGERS)   URINE CULTURE AEROBIC BACTERIAL   BLOOD CULTURE   BLOOD CULTURE       IMAGING  Images reviewed by me.  Radiology report also reviewed.  CT Chest Abdomen Pelvis w/o Contrast   Final Result   IMPRESSION:   1.  No evidence of bowel obstruction, diverticulitis or appendicitis.   Periumbilical hernia contains loops of small bowel without obstruction   or incarceration. Scattered anastomoses involving the bowel appear    patent. Mild colonic constipation.      2.  Scattered tiny indeterminate lung nodules, possibly infectious or   inflammatory. No focal infiltrate or lung consolidation is evident.   Bandlike scarring right lower lobe is noted. No enlarged lymph nodes.      3.  Prior CABG.      4.  Cholelithiasis. No surrounding gallbladder inflammation.      5.  Left adnexal fluid collection measuring 4.0 x 1.9 cm, possibly   ovarian. A follow-up pelvic ultrasound could be performed for further   assessment if clinically warranted. No surrounding inflammatory   changes are present to suggest an abscess.      SUNNY HOYOS MD      XR Chest Port 1 View   Final Result   IMPRESSION: Portable view of the chest. Lungs are clear with mild   scarring or calcified plaque right lung base, unchanged. Heart may be   mildly enlarged, but unchanged. Prior CABG. Right chest port is   unchanged. No evidence of pneumothorax or significant pleural fluid.   No change since prior study.      SUNNY HOYOS MD      CT Head w/o Contrast   Final Result   IMPRESSION: Chronic changes. No evidence for intracranial hemorrhage   or any acute process.      JESSIE JOSEPH MD          Procedures    Medications   0.9% sodium chloride BOLUS (0 mLs Intravenous Stopped 5/26/20 1312)     Followed by   sodium chloride 0.9% infusion (1,000 mLs Intravenous New Bag 5/26/20 1311)   vancomycin (VANCOCIN) 1,750 mg in sodium chloride 0.9 % 500 mL intermittent infusion (has no administration in time range)   diltiazem (CARDIZEM) injection 10 mg (0 mg Intravenous Hold 5/26/20 0859)   diltiazem (CARDIZEM) injection 15 mg (0 mg Intravenous Hold 5/26/20 0859)   diltiazem (CARDIZEM) 125 mg in sodium chloride 0.9 % 125 mL infusion (0 mg/hr Intravenous Stopped 5/26/20 1015)   LORazepam (ATIVAN) injection 0.5 mg (has no administration in time range)   0.9% sodium chloride BOLUS (0 mLs Intravenous Stopped 5/26/20 0657)   midazolam (VERSED) injection 1 mg (1 mg Intravenous Given 5/26/20  0611)   LORazepam (ATIVAN) injection 1 mg (1 mg Intravenous Given 5/26/20 0716)   piperacillin-tazobactam (ZOSYN) infusion 3.375 g (0 g Intravenous Stopped 5/26/20 0859)   vancomycin (VANCOCIN) 2,000 mg in sodium chloride 0.9 % 500 mL intermittent infusion (2,000 mg Intravenous Given 5/26/20 0900)   0.9% sodium chloride BOLUS (0 mLs Intravenous Stopped 5/26/20 1311)   LORazepam (ATIVAN) injection 0.5 mg (0.5 mg Intravenous Given 5/26/20 1239)         IMPRESSION       ICD-10-CM    1. Delirium  R41.0 Symptomatic COVID-19 Virus (Coronavirus) by PCR     SARS-CoV-2 COVID-19 Virus (Coronavirus) RT-PCR     SARS-CoV-2 COVID-19 Virus (Coronavirus) RT-PCR   2. Atrial fibrillation, rapid (H)  I48.91             Medication List      Modified    blood glucose monitoring lancets  Test BG 4 times daily  What changed:      how to take this    when to take this                    Critical Care Documentation  My initial assessment included review of prehospital provider report, review of nursing observations, review of vital signs, focused history, physical exam, review of cardiac rhythm monitor, 12 lead ECG analysis and discussion with Dr. Edmondson and cardiology at the [facility].  It was determined that the patient had altered mental status.  This required immediate intervention and critical care decision-making.     Critical care time: 72+30+30+30 minutes.            Zack Riley MD  05/26/20 8014

## 2020-05-26 NOTE — ED NOTES
Pt remains confused. Clean linens and dry brief applied after pt incontinent of urine. Pt continues to ask to see her primary MD.

## 2020-05-26 NOTE — ED NOTES
Pt requesting to see her primary MD; pt informed she is in ED. Pt is not oriented to situation; requiring redirection. 1:1 bedside sitter continued.

## 2020-05-26 NOTE — ED NOTES
Spoke with RN at nursing home. Pt lives in assisted living portion of  home. Pt is reportedly able to get in/out of the bathroom and in/out of bed independently (into her wheelchair). At baseline she is forgetful, but is usually able to recall the events of the previous day without much difficulty.

## 2020-05-26 NOTE — ED NOTES
Received call from Crystal FORTUNE who stated that she has the POA for this patient and wanted an update. After looking at demographics, this is her niece and it states that she is not the legal representative of this patient. Requested that Crystal bring in the paperwork reflecting her POA and at that time, we could share information regarding her medical status.

## 2020-05-26 NOTE — ED PROVIDER NOTES
75-year-old female presenting from nursing home by EMS with roughly 3 days of confusion and possible new onset of slurred speech with weakness.  Given the report of new onset weakness with confusion, stroke code called.  Patient with slurred speech and some right-sided weakness although mild.  Moving all extremities.  Maintaining airway.      5:52 AM: Discussed with Dr. Manuel, stroke neurology.  Reviewed case with him.  Given the current deficit and unclear timeframe.  Patient is not a candidate for TPA.  Recommends de-escalating stroke code but obtaining CT and CTA when able.  Okay to de-escalate stroke code.    6:00 AM; signed patient out to Dr. Melony a.m. physician coming on to shift.     Cowan, Qasim Patel MD  05/26/20 0636

## 2020-05-26 NOTE — TELEPHONE ENCOUNTER
[facility] staff, Malou, regino as Mell not available.  Also note pt currently in ED.  JAM Rees RN

## 2020-05-26 NOTE — ED NOTES
Report from JEWELS Ambrosio for continued care. Sitter remains at bedside for safety. Cardiac monitor shows NSR rate 70's. Awaiting bed placement at [facility].

## 2020-05-26 NOTE — ED NOTES
Pt uncooperative with pt gown, monitor and IV lines. Constantly pulling at everything, states she needs to leave and go see her primary MD. Attempts at reorientation unsuccessful.

## 2020-05-26 NOTE — ED PROVIDER NOTES
Emergency Department Patient Sign-out       Brief HPI:  This is a 75 year old female signed out to me by Dr. Ty .  See initial ED Provider note for details of the presentation.     Presented with a history of acute encephalopathy, delirium in April requiring hospitalization here and broad work-up without cause with complicated stay and then returning with similar acute delirium changes today that prompted broad work-up that is once again without obvious cause and was also accompanied by atrial fibrillation rapid ventricular rate and lactic acidosis that both improved with management by Dr. Ty.  However she remains with agitation that is been controlled with Ativan.  Prior episode last month was not controlled with Zyprexa and may have been worse with this.  Various agents have been tried the benzodiazepines appear to be most effective.  Due to the patient's complicated prior stay and unclear source of acute encephalopathy the plan was to transfer to higher level of care and [facility] has been consulted they had planned to accept the patient.           Significant Events prior to my assuming care:         Exam:   Patient Vitals for the past 24 hrs:   BP Temp Temp src Pulse Heart Rate Resp SpO2   05/26/20 1515 (!) 141/73 -- -- 85 97 18 95 %   05/26/20 1500 (!) 140/71 -- -- 79 97 22 99 %   05/26/20 1445 (!) 149/79 -- -- 98 98 27 96 %   05/26/20 1430 116/73 -- -- 99 90 19 99 %   05/26/20 1415 126/82 -- -- 100 104 13 97 %   05/26/20 1400 (!) 127/95 -- -- 105 92 19 96 %   05/26/20 1330 133/83 -- -- 101 93 19 97 %   05/26/20 1315 -- -- -- -- 106 -- 98 %   05/26/20 1300 (!) 167/132 -- -- 111 110 (!) 38 96 %   05/26/20 1254 (!) 164/78 -- -- 103 114 -- 95 %   05/26/20 1245 -- -- -- -- 105 22 96 %   05/26/20 1230 -- -- -- 156 156 24 95 %   05/26/20 1215 -- -- -- -- -- -- 94 %   05/26/20 1200 -- -- -- -- -- -- 95 %   05/26/20 1145 -- -- -- -- 172 28 97 %   05/26/20 1130 -- -- -- -- 147 28 --    05/26/20 1045 (!) 141/79 -- -- 88 89 22 98 %   05/26/20 1030 95/52 -- -- 68 75 16 95 %   05/26/20 1000 123/74 -- -- 92 76 (!) 31 99 %   05/26/20 0945 114/60 -- -- 79 77 14 96 %   05/26/20 0930 103/49 -- -- 81 80 16 97 %   05/26/20 0915 -- -- -- 117 116 17 97 %   05/26/20 0900 95/58 -- -- 83 118 23 94 %   05/26/20 0830 102/67 -- -- 123 128 26 95 %   05/26/20 0815 96/62 -- -- 95 135 29 97 %   05/26/20 0800 -- -- -- 146 149 25 99 %   05/26/20 0745 (!) 137/90 -- -- 147 -- -- --   05/26/20 0700 118/76 -- -- 152 146 24 98 %   05/26/20 0553 (!) 128/97 96.3  F (35.7  C) Axillary -- 154 20 98 %           ED RESULTS:   Results for orders placed or performed during the hospital encounter of 05/26/20 (from the past 24 hour(s))   CBC with platelets differential     Status: None    Collection Time: 05/26/20  6:05 AM   Result Value Ref Range    WBC 6.1 4.0 - 11.0 10e9/L    RBC Count 4.84 3.8 - 5.2 10e12/L    Hemoglobin 14.4 11.7 - 15.7 g/dL    Hematocrit 44.6 35.0 - 47.0 %    MCV 92 78 - 100 fl    MCH 29.8 26.5 - 33.0 pg    MCHC 32.3 31.5 - 36.5 g/dL    RDW 14.3 10.0 - 15.0 %    Platelet Count 292 150 - 450 10e9/L    Diff Method Automated Method     % Neutrophils 47.9 %    % Lymphocytes 38.6 %    % Monocytes 8.6 %    % Eosinophils 3.6 %    % Basophils 1.0 %    % Immature Granulocytes 0.3 %    Nucleated RBCs 0 0 /100    Absolute Neutrophil 2.9 1.6 - 8.3 10e9/L    Absolute Lymphocytes 2.4 0.8 - 5.3 10e9/L    Absolute Monocytes 0.5 0.0 - 1.3 10e9/L    Absolute Eosinophils 0.2 0.0 - 0.7 10e9/L    Absolute Basophils 0.1 0.0 - 0.2 10e9/L    Abs Immature Granulocytes 0.0 0 - 0.4 10e9/L    Absolute Nucleated RBC 0.0    INR     Status: Abnormal    Collection Time: 05/26/20  6:05 AM   Result Value Ref Range    INR 1.87 (H) 0.86 - 1.14   Partial thromboplastin time     Status: None    Collection Time: 05/26/20  6:05 AM   Result Value Ref Range    PTT 37 22 - 37 sec   Troponin I     Status: None    Collection Time: 05/26/20  6:05 AM    Result Value Ref Range    Troponin I ES <0.015 0.000 - 0.045 ug/L   Comprehensive metabolic panel     Status: Abnormal    Collection Time: 05/26/20  6:05 AM   Result Value Ref Range    Sodium 142 133 - 144 mmol/L    Potassium 4.9 3.4 - 5.3 mmol/L    Chloride 110 (H) 94 - 109 mmol/L    Carbon Dioxide 24 20 - 32 mmol/L    Anion Gap 8 3 - 14 mmol/L    Glucose 130 (H) 70 - 99 mg/dL    Urea Nitrogen 39 (H) 7 - 30 mg/dL    Creatinine 1.98 (H) 0.52 - 1.04 mg/dL    GFR Estimate 24 (L) >60 mL/min/[1.73_m2]    GFR Estimate If Black 28 (L) >60 mL/min/[1.73_m2]    Calcium 9.7 8.5 - 10.1 mg/dL    Bilirubin Total 0.7 0.2 - 1.3 mg/dL    Albumin 2.8 (L) 3.4 - 5.0 g/dL    Protein Total 7.8 6.8 - 8.8 g/dL    Alkaline Phosphatase 128 40 - 150 U/L    ALT 26 0 - 50 U/L    AST 57 (H) 0 - 45 U/L   Blood culture     Status: None (Preliminary result)    Collection Time: 05/26/20  6:05 AM    Specimen: Blood    Right Arm   Result Value Ref Range    Specimen Description Blood Right Arm     Culture Micro No growth after 5 hours    TSH with free T4 reflex     Status: None    Collection Time: 05/26/20  6:05 AM   Result Value Ref Range    TSH 3.90 0.40 - 4.00 mU/L   CRP inflammation     Status: None    Collection Time: 05/26/20  6:05 AM   Result Value Ref Range    CRP Inflammation 5.5 0.0 - 8.0 mg/L   Lactic acid whole blood     Status: Abnormal    Collection Time: 05/26/20  6:31 AM   Result Value Ref Range    Lactic Acid 3.4 (H) 0.7 - 2.0 mmol/L   Blood gas venous     Status: None    Collection Time: 05/26/20  6:31 AM   Result Value Ref Range    Ph Venous 7.37 7.32 - 7.43 pH    PCO2 Venous 45 40 - 50 mm Hg    PO2 Venous 28 25 - 47 mm Hg    Bicarbonate Venous 26 21 - 28 mmol/L    Base Excess Venous 0.2 mmol/L    FIO2 21%    Blood culture     Status: None (Preliminary result)    Collection Time: 05/26/20  6:50 AM    Specimen: Blood    Left Arm   Result Value Ref Range    Specimen Description Blood Left Arm     Culture Micro No growth after 5  hours    CT Head w/o Contrast     Status: None    Collection Time: 05/26/20  7:43 AM    Narrative    CT SCAN OF THE HEAD WITHOUT CONTRAST   5/26/2020 7:43 AM     HISTORY: Right-sided weakness, slurred speech, confusion.    TECHNIQUE: Axial images of the head and coronal reformations without  IV contrast material. Radiation dose for this scan was reduced using  automated exposure control, adjustment of the mA and/or kV according  to patient size, or iterative reconstruction technique.    COMPARISON: None.    FINDINGS: Mild cerebral atrophy is present. There are some moderate  patchy white matter changes in both hemispheres without mass effect.  There is an old lacunar infarct in the right centrum semiovale. There  is no evidence for intracranial hemorrhage, mass effect, acute  infarct, or a skull fracture. Visualized paranasal sinuses and mastoid  air cells are clear. Vascular calcifications are noted at the skull  base.      Impression    IMPRESSION: Chronic changes. No evidence for intracranial hemorrhage  or any acute process.    JESSIE JOSEPH MD   UA reflex to Microscopic     Status: Abnormal    Collection Time: 05/26/20  8:01 AM   Result Value Ref Range    Color Urine Straw     Appearance Urine Clear     Glucose Urine Negative NEG^Negative mg/dL    Bilirubin Urine Negative NEG^Negative    Ketones Urine Negative NEG^Negative mg/dL    Specific Gravity Urine 1.005 1.003 - 1.035    Blood Urine Moderate (A) NEG^Negative    pH Urine 7.0 5.0 - 7.0 pH    Protein Albumin Urine Negative NEG^Negative mg/dL    Urobilinogen mg/dL 0.0 0.0 - 2.0 mg/dL    Nitrite Urine Negative NEG^Negative    Leukocyte Esterase Urine Small (A) NEG^Negative    Source Catheterized Urine     RBC Urine 2 0 - 2 /HPF    WBC Urine 11 (H) 0 - 5 /HPF    Bacteria Urine Few (A) NEG^Negative /HPF    Squamous Epithelial /HPF Urine <1 0 - 1 /HPF   Urine Culture     Status: None (Preliminary result)    Collection Time: 05/26/20  8:01 AM    Specimen:  Catheterized Urine   Result Value Ref Range    Specimen Description Catheterized Urine     Special Requests Specimen received in preservative     Culture Micro PENDING    XR Chest Port 1 View     Status: None    Collection Time: 05/26/20  9:32 AM    Narrative    CHEST PORTABLE ONE VIEW   5/26/2020 9:32 AM     HISTORY: Delirium, unknown source.    COMPARISON: Chest x-ray 5/14/2020.      Impression    IMPRESSION: Portable view of the chest. Lungs are clear with mild  scarring or calcified plaque right lung base, unchanged. Heart may be  mildly enlarged, but unchanged. Prior CABG. Right chest port is  unchanged. No evidence of pneumothorax or significant pleural fluid.  No change since prior study.    SUNNY HOYOS MD   Lactic acid whole blood     Status: None    Collection Time: 05/26/20 10:28 AM   Result Value Ref Range    Lactic Acid 1.5 0.7 - 2.0 mmol/L   CT Chest Abdomen Pelvis w/o Contrast     Status: None    Collection Time: 05/26/20 11:26 AM    Narrative    CT CHEST, ABDOMEN AND PELVIS WITHOUT CONTRAST  5/26/2020 11:26 AM    CLINICAL HISTORY:  Delirium, elevated lactic acid, no source of  presumed sepsis.    TECHNIQUE: CT scan of the chest, abdomen, and pelvis was performed  without IV contrast. Multiplanar reformats were obtained. Dose  reduction techniques were used.   CONTRAST: None.    COMPARISON: CT chest 4/24/2020.    FINDINGS:   LUNGS AND PLEURA: Respiratory motion artifact is present. A few  scattered indeterminate groundglass nodules are noted within the lungs  bilaterally, possibly infectious or inflammatory in nature. No focal  infiltrate or lung consolidation. Area of bandlike scarring right lung  base is noted. No pleural effusions.    MEDIASTINUM/AXILLAE: Heart is normal in size. Coronary artery  calcifications are present. Prior CABG. Right-sided central venous  line terminates in the distal SVC. No enlarged lymph nodes. Esophagus  is unremarkable.    HEPATOBILIARY: Indeterminate low-attenuation  left hepatic lobe lesion  measures approximately 1.5 cm on image 116 of series 8. A few  calcified gallstones are noted in the gallbladder which is otherwise  unremarkable.    PANCREAS: Normal.    SPLEEN: Normal.    ADRENAL GLANDS: Normal.    KIDNEYS/BLADDER: Probable small bilateral renal cysts. These are  incompletely characterized on this noncontrast study. No  hydronephrosis or urinary tract calculi. The bladder is unremarkable.    BOWEL: Mild colonic constipation is noted. No diverticulitis or  appendicitis. Anastomosis in the region of the rectum is patent. Right  periumbilical hernia contains loops of small bowel without obvious  obstruction or incarceration. Anastomosis near this area is widely  patent.    LYMPH NODES: No enlarged abdominal or pelvic lymph nodes.    VASCULATURE: Calcified plaque abdominal aorta and branch vessels are  noted. No evidence of aortic aneurysm.    PELVIC ORGANS: The bladder, uterus, right adnexal region and rectum  are within normal limits. Fluid collection left adnexa may be ovarian  and measures 4.0 x 1.9 cm on image 249 of series 8.    OTHER: None.    MUSCULOSKELETAL: Intramedullary left femoral aron is noted.  Degenerative hip changes are noted bilaterally. Degenerative spine  changes also noted. Bones overall appear osteopenic. No definite  destructive bone lesions.      Impression    IMPRESSION:  1.  No evidence of bowel obstruction, diverticulitis or appendicitis.  Periumbilical hernia contains loops of small bowel without obstruction  or incarceration. Scattered anastomoses involving the bowel appear  patent. Mild colonic constipation.    2.  Scattered tiny indeterminate lung nodules, possibly infectious or  inflammatory. No focal infiltrate or lung consolidation is evident.  Bandlike scarring right lower lobe is noted. No enlarged lymph nodes.    3.  Prior CABG.    4.  Cholelithiasis. No surrounding gallbladder inflammation.    5.  Left adnexal fluid collection measuring  4.0 x 1.9 cm, possibly  ovarian. A follow-up pelvic ultrasound could be performed for further  assessment if clinically warranted. No surrounding inflammatory  changes are present to suggest an abscess.    SUNNY HOYOS MD   Symptomatic COVID-19 Virus (Coronavirus) by PCR     Status: None    Collection Time: 05/26/20 11:36 AM    Specimen: Nasopharyngeal   Result Value Ref Range    COVID-19 Virus PCR to [facility] - Source Nasopharyngeal     COVID-19 Virus PCR to [facility] - Result       Test received-See reflex to IDDL test SARS CoV2 (COVID-19) Virus RT-PCR       ED MEDICATIONS:   Medications   0.9% sodium chloride BOLUS (0 mLs Intravenous Stopped 5/26/20 1312)     Followed by   sodium chloride 0.9% infusion (1,000 mLs Intravenous New Bag 5/26/20 1311)   vancomycin (VANCOCIN) 1,750 mg in sodium chloride 0.9 % 500 mL intermittent infusion (has no administration in time range)   diltiazem (CARDIZEM) injection 10 mg (0 mg Intravenous Hold 5/26/20 0859)   diltiazem (CARDIZEM) injection 15 mg (0 mg Intravenous Hold 5/26/20 0859)   diltiazem (CARDIZEM) 125 mg in sodium chloride 0.9 % 125 mL infusion (0 mg/hr Intravenous Stopped 5/26/20 1015)   0.9% sodium chloride BOLUS (0 mLs Intravenous Stopped 5/26/20 0657)   midazolam (VERSED) injection 1 mg (1 mg Intravenous Given 5/26/20 0611)   LORazepam (ATIVAN) injection 1 mg (1 mg Intravenous Given 5/26/20 0716)   piperacillin-tazobactam (ZOSYN) infusion 3.375 g (0 g Intravenous Stopped 5/26/20 0859)   vancomycin (VANCOCIN) 2,000 mg in sodium chloride 0.9 % 500 mL intermittent infusion (2,000 mg Intravenous Given 5/26/20 0900)   0.9% sodium chloride BOLUS (0 mLs Intravenous Stopped 5/26/20 1311)   LORazepam (ATIVAN) injection 0.5 mg (0.5 mg Intravenous Given 5/26/20 1106)   LORazepam (ATIVAN) injection 0.5 mg (0.5 mg Intravenous Given 5/26/20 1239)         Impression:    ICD-10-CM    1. Delirium  R41.0 Blood culture     Blood culture     Symptomatic COVID-19 Virus (Coronavirus) by PCR      SARS-CoV-2 COVID-19 Virus (Coronavirus) RT-PCR     SARS-CoV-2 COVID-19 Virus (Coronavirus) RT-PCR   2. Atrial fibrillation, rapid (H)  I48.91        Plan:    Pending studies include N admit callback.    I spoke several times with [facility] triage, Dr. Quintana , regarding this complicated patient who now has a stable heart rate after sinus rhythm converted on diltiazem drip and lactic acidosis that improved with IV fluids.  However her mental status is still altered and confused and her last evaluation was complicated with gradual agitation unresponsive to medical management and required intubation.  The hospitalist service here therefore was concerned that this patient with recurrent mental status changes without obvious other etiology warranted a transfer to a higher level of care that had available consultation with neurology and psychiatry.    Dr. Quintana ultimately refused the transfer as he recommended that the patient be further evaluated on the internal medicine, hospitalist service at this facility first and then if needing transfer tomorrow he would discuss with the hospitalist at that time.  Spoke to him twice regarding this patient I also spoke to Skylar on the hospitalist service and Dr. Lei as well as Dr. Gaytan.  Dr. Gaytan accepted for admission at this facility.  Patient required no other stabilization measures other than periodic Ativan given.  Despite her more advanced age and the typical paradoxical agitation related to Ativan use in most delirium patients, Ativan in her case appears to be the only measure that has been effective during the prior hospitalization and it certainly has been effective during this evaluation as well.        .        MD Sukumar Bermudez Scott J, MD  05/26/20 1933

## 2020-05-26 NOTE — ED NOTES
DATE:  5/26/2020   TIME OF RECEIPT FROM LAB:  0701  LAB TEST:  Lactic  LAB VALUE:  3.4  RESULTS GIVEN WITH READ-BACK TO (PROVIDER):  Osvaldo  TIME LAB VALUE REPORTED TO PROVIDER:   0702

## 2020-05-26 NOTE — ED TRIAGE NOTES
Patient as reported by nursing home staff to EMS is altered mental statues x 3 days a long with sitting in front room x 3 days in WC tonight started with slurred speech and right sided weakness. EMS blood sugar 117.

## 2020-05-27 LAB
AMMONIA PLAS-SCNC: 49 UMOL/L (ref 10–50)
ANION GAP SERPL CALCULATED.3IONS-SCNC: 6 MMOL/L (ref 3–14)
BUN SERPL-MCNC: 30 MG/DL (ref 7–30)
CALCIUM SERPL-MCNC: 8.1 MG/DL (ref 8.5–10.1)
CHLORIDE SERPL-SCNC: 117 MMOL/L (ref 94–109)
CK SERPL-CCNC: 294 U/L (ref 30–225)
CO2 SERPL-SCNC: 22 MMOL/L (ref 20–32)
CREAT SERPL-MCNC: 1.53 MG/DL (ref 0.52–1.04)
ERYTHROCYTE [DISTWIDTH] IN BLOOD BY AUTOMATED COUNT: 14.1 % (ref 10–15)
FOLATE SERPL-MCNC: 14 NG/ML
GFR SERPL CREATININE-BSD FRML MDRD: 32 ML/MIN/{1.73_M2}
GLUCOSE BLDC GLUCOMTR-MCNC: 107 MG/DL (ref 70–99)
GLUCOSE BLDC GLUCOMTR-MCNC: 109 MG/DL (ref 70–99)
GLUCOSE BLDC GLUCOMTR-MCNC: 59 MG/DL (ref 70–99)
GLUCOSE BLDC GLUCOMTR-MCNC: 64 MG/DL (ref 70–99)
GLUCOSE SERPL-MCNC: 68 MG/DL (ref 70–99)
HCT VFR BLD AUTO: 39.1 % (ref 35–47)
HGB BLD-MCNC: 12.3 G/DL (ref 11.7–15.7)
MCH RBC QN AUTO: 30.2 PG (ref 26.5–33)
MCHC RBC AUTO-ENTMCNC: 31.5 G/DL (ref 31.5–36.5)
MCV RBC AUTO: 96 FL (ref 78–100)
PLATELET # BLD AUTO: 195 10E9/L (ref 150–450)
POTASSIUM SERPL-SCNC: 3.6 MMOL/L (ref 3.4–5.3)
RBC # BLD AUTO: 4.07 10E12/L (ref 3.8–5.2)
SODIUM SERPL-SCNC: 145 MMOL/L (ref 133–144)
VIT B12 SERPL-MCNC: 510 PG/ML (ref 193–986)
WBC # BLD AUTO: 4.8 10E9/L (ref 4–11)

## 2020-05-27 PROCEDURE — 80048 BASIC METABOLIC PNL TOTAL CA: CPT | Performed by: INTERNAL MEDICINE

## 2020-05-27 PROCEDURE — 85027 COMPLETE CBC AUTOMATED: CPT | Performed by: INTERNAL MEDICINE

## 2020-05-27 PROCEDURE — 25800029 ZZH RX IP 258 OP 250: Performed by: INTERNAL MEDICINE

## 2020-05-27 PROCEDURE — 82607 VITAMIN B-12: CPT | Performed by: FAMILY MEDICINE

## 2020-05-27 PROCEDURE — 20000003 ZZH R&B ICU

## 2020-05-27 PROCEDURE — 99233 SBSQ HOSP IP/OBS HIGH 50: CPT | Performed by: FAMILY MEDICINE

## 2020-05-27 PROCEDURE — 25000132 ZZH RX MED GY IP 250 OP 250 PS 637: Performed by: PHYSICIAN ASSISTANT

## 2020-05-27 PROCEDURE — 82746 ASSAY OF FOLIC ACID SERUM: CPT | Performed by: FAMILY MEDICINE

## 2020-05-27 PROCEDURE — 00000146 ZZHCL STATISTIC GLUCOSE BY METER IP

## 2020-05-27 PROCEDURE — 25800025 ZZH RX 258: Performed by: INTERNAL MEDICINE

## 2020-05-27 PROCEDURE — 25000128 H RX IP 250 OP 636: Performed by: FAMILY MEDICINE

## 2020-05-27 PROCEDURE — 25000128 H RX IP 250 OP 636: Performed by: INTERNAL MEDICINE

## 2020-05-27 PROCEDURE — 36415 COLL VENOUS BLD VENIPUNCTURE: CPT | Performed by: FAMILY MEDICINE

## 2020-05-27 PROCEDURE — 82607 VITAMIN B-12: CPT | Performed by: INTERNAL MEDICINE

## 2020-05-27 PROCEDURE — 25800025 ZZH RX 258: Performed by: FAMILY MEDICINE

## 2020-05-27 PROCEDURE — 36415 COLL VENOUS BLD VENIPUNCTURE: CPT | Performed by: INTERNAL MEDICINE

## 2020-05-27 PROCEDURE — 82550 ASSAY OF CK (CPK): CPT | Performed by: INTERNAL MEDICINE

## 2020-05-27 PROCEDURE — 82140 ASSAY OF AMMONIA: CPT | Performed by: FAMILY MEDICINE

## 2020-05-27 PROCEDURE — 25000132 ZZH RX MED GY IP 250 OP 250 PS 637: Performed by: INTERNAL MEDICINE

## 2020-05-27 RX ORDER — LIDOCAINE 40 MG/G
CREAM TOPICAL
Status: DISCONTINUED | OUTPATIENT
Start: 2020-05-27 | End: 2020-06-06 | Stop reason: HOSPADM

## 2020-05-27 RX ORDER — LEVOTHYROXINE SODIUM 88 UG/1
44 TABLET ORAL WEEKLY
Status: DISCONTINUED | OUTPATIENT
Start: 2020-05-31 | End: 2020-05-28

## 2020-05-27 RX ORDER — CEFTRIAXONE SODIUM 1 G/50ML
1 INJECTION, SOLUTION INTRAVENOUS EVERY 24 HOURS
Status: DISCONTINUED | OUTPATIENT
Start: 2020-05-27 | End: 2020-05-28

## 2020-05-27 RX ORDER — LEVOTHYROXINE SODIUM 88 UG/1
88 TABLET ORAL
Status: DISCONTINUED | OUTPATIENT
Start: 2020-05-27 | End: 2020-05-28

## 2020-05-27 RX ADMIN — LORAZEPAM 1 MG: 2 INJECTION INTRAMUSCULAR; INTRAVENOUS at 02:03

## 2020-05-27 RX ADMIN — SIMVASTATIN 40 MG: 40 TABLET, FILM COATED ORAL at 08:33

## 2020-05-27 RX ADMIN — LORAZEPAM 1 MG: 2 INJECTION INTRAMUSCULAR; INTRAVENOUS at 14:36

## 2020-05-27 RX ADMIN — LORAZEPAM 1 MG: 2 INJECTION INTRAMUSCULAR; INTRAVENOUS at 10:40

## 2020-05-27 RX ADMIN — DEXTROSE AND SODIUM CHLORIDE: 5; 450 INJECTION, SOLUTION INTRAVENOUS at 09:28

## 2020-05-27 RX ADMIN — DEXTROSE MONOHYDRATE 25 ML: 500 INJECTION PARENTERAL at 06:00

## 2020-05-27 RX ADMIN — LORAZEPAM 1 MG: 2 INJECTION INTRAMUSCULAR; INTRAVENOUS at 20:05

## 2020-05-27 RX ADMIN — DEXTROSE MONOHYDRATE 25 ML: 500 INJECTION PARENTERAL at 02:08

## 2020-05-27 RX ADMIN — DEXTROSE AND SODIUM CHLORIDE: 5; 450 INJECTION, SOLUTION INTRAVENOUS at 21:05

## 2020-05-27 RX ADMIN — SODIUM CHLORIDE: 4.5 INJECTION, SOLUTION INTRAVENOUS at 06:48

## 2020-05-27 RX ADMIN — CEFTRIAXONE SODIUM 1 G: 1 INJECTION, SOLUTION INTRAVENOUS at 09:06

## 2020-05-27 RX ADMIN — DEXTROSE MONOHYDRATE 50 ML: 500 INJECTION PARENTERAL at 11:42

## 2020-05-27 RX ADMIN — DEXTROSE MONOHYDRATE 25 ML: 500 INJECTION PARENTERAL at 08:29

## 2020-05-27 ASSESSMENT — ACTIVITIES OF DAILY LIVING (ADL)
ADLS_ACUITY_SCORE: 38
ADLS_ACUITY_SCORE: 22

## 2020-05-27 ASSESSMENT — MIFFLIN-ST. JEOR: SCORE: 1356.75

## 2020-05-27 NOTE — PROGRESS NOTES
Finally resting again, medicated with Ativan 1 mg IV, was able to reapply CPAP mask for night and maintaining saturations at 94% and above.  Continue to monitor.

## 2020-05-27 NOTE — CONSULTS
CARE TRANSITION SOCIAL WORK INITIAL ASSESSMENT:    Admit date/time:  5-26-20 1959      Met with: Caregiver at [facility] (Phone: [phone] Fax:[phone]).  Spoke with staff member, Kamini, who shared that prior to hospitalization, staff assist pt with meals, medications, bathing, assistance with toileting, laundry & that pt is typically an up with assist of 1 at baseline. Kamini shared that the pt can transfer independently, but often declines to do so.  In order to be able to return to [facility], pt MUST be assist of 1, otherwise she will need a TCU.    SW reviewed pt's EMR and notes that in the event the pt is not able to make own decision, her [facility] lists her Health Care Agents as:    1.  janna Mcbridemattie; [phone]  2.  ninfa Wren, [phone]    Pt has a  Partner's RN Case Manager, Komal Krishnan, [phone], who follows the pt in the community.    DATA  Principal Problem:    Encephalopathy  Active Problems:    Hypothyroidism    Chronic ischemic heart disease    Esophageal reflux    RC-moderate (AHI 12, LSat 60%); REM RDI-73    Neuropathy (H)    Hyperlipidemia LDL goal <100    Rectal cancer- newly diagnosed adenoCA rectum Jan 2011 and Positive Tubular Adenoma 2019    Benign essential hypertension    Restless leg syndrome    Type 2 diabetes mellitus with diabetic nephropathy, with long-term current use of insulin (H)    Lumbar radiculopathy    CKD (chronic kidney disease) stage 4, GFR 15-29 ml/min (H)    H/O deep venous thrombosis    Acute confusion        Contact information and PCP information verified: Yes, ELIDA Begum [facility]    ASSESSMENT  Cognitive Status: awake and confused.    Insurance Concerns: No Insurance issues identified     PLAN:  CTS to continue to follow for discharge planning.    PT/OT to assess & make recommendations for disposition.  If pt is baseline of assist of 1, she can return to [facility] (Phone:  [phone] Fax:[phone]).    ARIELLA Tyson  [facility] [phone]   [facility]  [phone]

## 2020-05-27 NOTE — PROGRESS NOTES
Dr. Gaytan in to see patient and new orders received.  IV solution changed to 1/2 NS and medicated per order with Ativan IV 1 mg now.  Given home medication crushed in applesauce but unsure how much she received as she spit out such, stating we were poisoning her and besides she HATES applesauce.  Attendent in room will attempt to get out of restrainsts at [facility] and place on own CPAP at that time.

## 2020-05-27 NOTE — PLAN OF CARE
"Patient continues to say \"I want my Dr. Monte, I want to go , \"  Pt refuses any form of nourishment,  Patient will spit any oral meds attempted to be given.  Patient incontinent of urine changed frequently with sigifredo care.  Restraints in place due to pulling lines and clothes off.  Continue to monitor...  "

## 2020-05-27 NOTE — PROGRESS NOTES
"Called niece and asked who \"Don\" was, Patient constantly repeating \"I want to see Don\",  Niece explained Mell is the RN in charge of [facility] where the patient resides.  "

## 2020-05-27 NOTE — PROGRESS NOTES
"[facility] [facility] [place]    Patient admitted to room 1005 at approximately 1945 via cart from emergency room. Patient was accompanied by nurse.     Verbal SBAR report received from AnandED RN prior to patient arrival.     Patient trasferred to bed via slip sheet and 4 assist Patient alert and oriented X 1. Pain is controlled without any medications.  . Admission vital signs: Blood pressure (!) 163/87, pulse 87, temperature 97.1  F (36.2  C), temperature source Axillary, resp. rate 16, height 1.676 m (5' 6\"), weight 86.1 kg (189 lb 13.1 oz), SpO2 98 %, not currently breastfeeding. Patient was oriented to plan of care, call light, bed controls, tv, telephone, bathroom and visiting hours.     Risk Assessment    The following safety risks were identified during admission: fall. Yellow risk band applied: YES.     Skin Initial Assessment    This writer admitted this patient and completed a full skin assessment and Efe score in the Adult PCS flowsheet. Appropriate interventions initiated as needed.     Secondary skin check completed by [facility] Ch[facility].RN.    Efe Risk Assessment  Sensory Perception: 3-->slightly limited  Moisture: 2-->very moist  Activity: 2-->chairfast  Mobility: 2-->very limited  Nutrition: 2-->probably inadequate  Friction and Shear: 2-->potential problem  Efe Score: 13  Bed Support Surface: Atmos Air mattress  Efe Intervention(s) Implemented: draw sheets, heels suspended, HOB elevated 30 degrees or less, patient /family education on pressure injury prevention, reposition head q 2 hours, repositioned/turned q2hr    Education    Patient has a Guion to Observation order: No  Observation education completed and documented: N/A      Cindy Johanson, RN    "

## 2020-05-27 NOTE — PROGRESS NOTES
Blood sugar 54 at 0600, medicated with 50% Dextrose 25 ml given IV and 15 minutes later blood sugar was 107.

## 2020-05-27 NOTE — H&P
[facility]  Hospitalist Service History and Physical        Date of Admission:  5/26/2020    Assessment & Plan   Stefanie Velazquez is a 75 year old female admitted on 5/26/2020. She has history of coronary artery disease, hypertension, hyperlipidemia, DVT, type 2 diabetes, chronic back pain, hypothyroidism, rectal cancer, GERD, restless leg syndrome and RC. She presents with confusion.    Acute Encephalopathy, unclear etiology  3 days of reported confusion at nursing home with question of slurred speech and possible right sided weakness. Labs show elevated lactic of 3.4, elevated creatinine, normal WBC, normal CRP, normal troponin, normal TSH, normal VBG. CT head shows no acute findings. CT chest/abomdomen pelvis shows lung nodules as well as adnexal fluid collection. UA shows 11 WBC, small leukocyte esterase, and few bacteria. Vitals show tachycardic, initially up to the 150s in the ED with atrial fibrillation noted on monitor, briefly placed on diltiazem drip and converted to NSR though again tachycardic with agitation.  Recent admission 4/7 - 4/13 as discussed below for similar presentation, ultimately no clear etiology though possibly related to mild temporal CVA seen on imaging. Blood cultures pending. Urine culture pending. Differential includes dementia (lewy-body) vs medication effects vs dehydration vs other.   - After dicussion with ED provider and attending provider, patient would likely benefit from admission to facility where neurology and psychiatry were available for further evaluation.    - Patient will be admitted to [facility] for further work up and evaluation, consider transferring tomorrow for further work up with neurology and psychiatry.   - Continue to monitor.     - Ativan PRN for agitation.    - Avoid use of antipsychotics given recent adverse effect.     COVID Rule Out  Due to concerns of unclear etiology of above and residence in nursing home, COVID test  "sent. CT chest as above shows \"Scattered tiny indeterminate lung nodules, possibly infectious or Inflammatory.\" Which have previously been seen, at that time COVID testing was performed and negative.   - COVID result, 5/26/2020: negative    - No need for special precautions at this time.     Elevated Lactic Acid  Abnormal UA  Lactic 3.4 on presentation, corrected with IVF. UA mildly abnormal though CRP normal, WBC normal. Given encephalopathy of unclear origin and concern for possible infectious cause covered broadly with IV vancomycin and zosyn. Unclear if infection is contributing.    - Continue to monitor for signs of infection.    - Urine culture pending, follow up on results.    - Blood cultures pending, follow up on results.    - Hold further antibiotics at this time. Will follow up on need to re-start.     Elevated creatinine on CKD  Creatinine 1.98, GFR 24 on presentation. Baseline quite variable over past few years, likely 1.3-1.6 though has been up closer to 1.8-2 at times. Appears to be above baseline presently, possible TITA due to recent confusion at nursing home. Patient given 1.5 L NS in ED.        - Check CMP in AM.    - Continue IVF.    - Avoid nephrotoxins.     Coronary Artery Disease  History of 3 vessel CABG in 2002. Unable to tolerate ACEi or beta blocker due to hypotension. Managed on aspirin/statin.   - Continue home medications.     History of Hypertension  Patient managed prior to admission with Lasix 20 mg BID.    - Hold lasix for now given TITA.     Hyperlipidemia  Chronic. Managed with simvastatin 40 mg per day.   - Continue home medication.     History of DVT  Managed chronically on Eliquis 5 mg BID.   - Continue home medication.     Diabetes Mellitus, Type II  Chronic.  Last a1C 6.9% on 3/2020. Managed at home with Lantus 24 units at bedtime and novolog 14 units with lunch and dinner.    - Moderate CHO diet.    - Continue lantus 24 units at bedtime.    - Hold novolog 14 units with lunch " and dinner given patient not eating at this time. Reevaluate need for novolog in the morning.    - Medium sliding scale insulin.      Lumbar Radiculopathy   Neuropathy  Previously diagnosed. Managed with gabapentin 100 mg at bedtime.    - Hold for now.     Hypothyroidism  Chronic. Normal TSH of 3.9 on presentation. Managed with levothyroxine 88 mcg daily except for Sunday (1/2 tablet).   - Continue home medication.     Gastroesophageal reflux disease  Chronic. Managed with omeprazole 20 mg BID.    - Continue home medication.     Restless Leg Syndrome  Chronic. Managed with pramipexole 0.5 mg at bedtime and PRN every 8 hours.   - Hold pramipexole for now.     RC  On CPAP at assisted living facility, follows with sleep medicine.    Rectal Cancer  Follows with Dr. Martinez, diagnosed in 2011, underwent neoadjuvant chemoradiation, resection with reanastomosis. Follows yearly with oncology, last seen 9/2019.  - Continue outpatient surveillance.          DIET: Moderate CHO diet with honey thick liquids   CODE STATUS: Full Code     Disposition Plan : ORIGINAL  The patient's care was discussed with the Attending Physician, Dr. Brayan Lei and ED provider. Based on chart review, recent admission with similar presentation to this facility with broad work up with similar presentation and not entirely clear etiology identified (ultimately attributed to mild temporal lobe stroke vs recent ciprofloxacin use), would recommend patient be evaluated in facility to neurology and psychiatry available.    Entered: Skylar Roberts PA-C 05/27/2020, 1:15 AM     Skylar Roberts PA-C  [facility]    UPDATED DISPOSITION PLAN  Patient will be admitted at [facility] for further evaluation and treatment. Anticipate discharge in 3-4 days once patient returns to baseline mental status, consults completed, safe disposition plan identified.     The patient's care was discussed with ED provider, Dr. Harper  Sukumar, attending Dr. Brayan Lei and admitting provider Dr. Ulises Gaytan.    Skylar Roberts PA-C   [facility]  ______________________________________________________________________    Chief Complaint   Confusion    History is obtained from the electronic health record and emergency department physician.    Visit/Communication Style   VIRTUAL (VIDEO) communication was used to evaluate Stefanie due to the COVID pandemic.    Stefanie consented to the use of video communication: unable to consent due to mental status  Video START time: 11:55 AM, 5/26/2020  Video STOP time: 12:03 PM, 5/26/2020   Patient's location: [facility]   Provider's location during the visit: [facility]         History of Present Illness   Stefanie Velazquez is a 75 year old female who presents with confusion.    Per ED provider, has reportedly been confused over past 3 day at her nursing home facility. There was reportedly concerns of slurred speech and some possible right sided weakness. In the ED there was not any focal findings on neurological exam though exam was limited due to patient's confusion and agitation.     She has had 2 recent admissions to this facility, reviewed and briefly summarized below:   She was admitted 4/7/20 - 4/13/20 for confusion and paranoia. Upon admission she acutely worsened requiring 5-point restraints, multiple sedating medications, a Precedex drip and ultimately intubation due to ongoing agitation as well as airway protection with above treatments. It was ultimately thought that this was precipitated by a possible small right temporal lobe stroke on top of underlying mild cognitive decline versus recent ciprofloxacin use as outpatient. Her work up included: no clear infectious cause including a negative LP. There was also concerns for possible aspiration during the hospitalization.     She was again admitted 4/24/20 - 4/25/20 due to  "fever and cough. Her chest CT showed evidence of ground glass opacities and with prior concerns for aspiration she was covered with IV Zosyn for possible aspiration pneumonia. She was markedly improved the following day and discharged home with cefdinir x 5 days. She had a negative COVID swab at that time.    Patient unable to contribute to her medical history and current admission due to mental status. She keeps repeating \"I want to see Dr. Monte.\" \"I have to go to the bathroom.\" \"Why are you keeping me here.\"  She is sleepy throughout the history and exam.     Per EMS report, staff at assisted living facility reported patient's baseline is normal and not like her current state. Staff denied history of dementia, but looking back at further records there has been some mention of possible dementia, but no clear diagnosis. Staff indicate patient usually is able to transfer self from wheelchair to chair/bed on her own and can toilet herself.     Review of Systems    Unable to obtain due to mental status.    Past Medical History    I have reviewed this patient's medical history and updated it with pertinent information if needed.   Past Medical History:   Diagnosis Date     Acute deep vein thrombosis (DVT) of right lower extremity, unspecified vein (H) 10/31/2018     Acute myocardial infarction of other specified sites, episode of care unspecified 6/2002    MI 2 stints     Cancer of colon (H)      Cellulitis of lower extremity, bilateral 9/30/2016     Chronic ischemic heart disease, unspecified 6/22/2003    cabgx3 , 2002 2/05 adenosine ef70%     Coronary atherosclerosis of unspecified type of vessel, native or graft     Coronary artery disease     Diabetes mellitus (H)      Esophageal reflux      Fracture of femur, distal, left, closed (H) 2/11/2013     Gastro-oesophageal reflux disease      Generalized osteoarthrosis, unspecified site 6/22/2005     Generalized osteoarthrosis, unspecified site 6/22/2005     HEARING LOSS " CONDUCTIVE, COMBINED TYPE 6/22/2005    Portuguese measles as child     HYPOTHYROIDISM  6/22/2003     Mixed hyperlipidemia 6/22/2005     Obesity, unspecified 6/22/2005     RC-moderate (AHI 12, LSat 60%); REM RDI-73 6/1/2009    Sleep study [facility] was performed 5/26/09 in order to re-evaluate severity of RC. The total sleep time was 412.0 minutes. The sleep latency was decreased at 8.7 minutes with Ambien 10mg. The REM sleep latency was 426.0 minutes. Sleep efficiency was reduced at 79.0%. The sleep architecture was disrupted with frequent sleep stage changes and arousals.  Snoring:  loud. 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, uncontrolled(250.42) (H) 6/22/2005     Type II or unspecified type diabetes mellitus with renal manifestations, uncontrolled(250.42) (H) 6/22/2005     Unspecified disorder resulting from impaired renal function 6/22/2005    CR     1.82   06/21/2005     Unspecified essential hypertension        Past Surgical History   I have reviewed this patient's surgical history and updated it with pertinent information if needed.  Past Surgical History:   Procedure Laterality Date     ANESTHESIA OUT OF OR MRI N/A 4/8/2020    Procedure: ANESTHESIA OUT OF OR MRI;  Surgeon: GENERIC ANESTHESIA PROVIDER;  Location: [facility] OR     cabg       COLECTOMY LOW ANTERIOR  6/21/2011    Procedure:COLECTOMY LOW ANTERIOR; with loop ielostomy; Surgeon:ROBBIN MCDONNELL; Location:[facility] OR     COLONOSCOPY  1/26/2011    COLONOSCOPY performed by MASOUD BILL at [facility]     COLONOSCOPY N/A 3/1/2016    Procedure: COLONOSCOPY;  Surgeon: Liza Neal MD;  Location: [facility]     INSERT PORT VASCULAR ACCESS  3/2/2011    INSERT PORT VASCULAR ACCESS performed by LIZA NEAL at [facility]  OR     OPEN REDUCTION INTERNAL FIXATION FEMUR DISTAL  2/12/2013    Procedure: OPEN REDUCTION INTERNAL FIXATION FEMUR DISTAL;  Open reduction internal fixation left femur fracture--Anesth.Choice;  Surgeon: Ley, Jeffrey Duane, MD;  Location: [facility] OR     ORTHOPEDIC SURGERY       PHACOEMULSIFICATION WITH STANDARD INTRAOCULAR LENS IMPLANT Left 1/22/2018    Procedure: PHACOEMULSIFICATION WITH STANDARD INTRAOCULAR LENS IMPLANT;  Left cataract removal with implant;  Surgeon: Rony Key MD;  Location: [facility] OR     PHACOEMULSIFICATION WITH STANDARD INTRAOCULAR LENS IMPLANT Right 2/19/2018    Procedure: PHACOEMULSIFICATION WITH STANDARD INTRAOCULAR LENS IMPLANT;  Right cataract removal with implant;  Surgeon: Rony Key MD;  Location: [facility] OR     SIGMOIDOSCOPY FLEXIBLE  9/9/2011    Procedure:SIGMOIDOSCOPY FLEXIBLE; Performed prior to induction.; Surgeon:ROBBIN MCDONNELL; Location: OR     SURGICAL HISTORY OF -   10/24/2002    Heart bypass     SURGICAL HISTORY OF -   2002    R Knee arthroplasty     SURGICAL HISTORY OF -   2002    Mi 2 stints     TAKEDOWN ILEOSTOMY  9/9/2011    Procedure:TAKEDOWN ILEOSTOMY; Exploratory Laparotomy,  Loop Ileostomy Takedown, Small Bowel Resection x 2.; Surgeon:ROBBIN MCDONNELL; Location: OR     Social History   I have reviewed this patient's social history and updated it with pertinent information if needed.  Social History     Tobacco Use     Smoking status: Former Smoker     Smokeless tobacco: Never Used   Substance Use Topics     Alcohol use: Yes     Comment: rare social use     Drug use: No     Family History   I have reviewed this patient's family history and updated it with pertinent information if needed.   Family History   Problem Relation Age of Onset     Diabetes Sister      C.A.D. Sister      Breast Cancer Sister      Prior to Admission Medications   Prior to Admission Medications   Prescriptions Last Dose Informant Patient Reported? Taking?   FAUZIAU-REJI OLIVER   Self No No   Sig: dispense one meter   Continuous Blood Gluc  (FREESTYLE JAJA 14 DAY READER) MELODY   No No   Si each as needed (use to scan the sensor to check the blood sugars)   Continuous Blood Gluc Sensor (FREESTYLE JAJA 14 DAY SENSOR) [facility]   No No   Si each every 14 days   ELIQUIS ANTICOAGULANT 5 MG tablet   Yes No   Sig: Take 1 tablet by mouth 2 times daily   acetaminophen (TYLENOL) 650 MG CR tablet   No No   Sig: Take 1 tablet (650 mg) by mouth 3 times daily as needed for mild pain or fever   aspirin (ASA) 81 MG tablet   Yes No   Sig: Take 81 mg by mouth daily   blood glucose monitoring (ACCU-CHEK MILVIA) test strip   No No   Sig: Use to test blood sugars 4 times daily or as directed.   blood glucose monitoring (ACCU-CHEK MULTICLIX) lancets  Self No No   Sig: Test BG 4 times daily   Patient taking differently: by In Vitro route 4 times daily Test BG 4 times daily   cholecalciferol 1000 units TABS   Yes No   Sig: Take 1,000 Units by mouth daily   ferrous sulfate (FEROSUL) 325 (65 Fe) MG tablet   No No   Sig: Take 1 tablet (325 mg) by mouth daily (with breakfast)   fluocinonide (LIDEX) 0.05 % ointment   No No   Sig: Apply sparingly to rash on legs twice daily as needed.  Do not apply to face.   fluticasone (FLONASE) 50 MCG/ACT spray   No No   Sig: Spray 1 spray into both nostrils daily   gabapentin (NEURONTIN) 100 MG capsule   No No   Sig: Take 1 capsule (100 mg) by mouth At Bedtime   hydrocortisone (ANUSOL-HC) 2.5 % cream   No No   Sig: Place rectally 2 times daily as needed for hemorrhoids   insulin aspart (NOVOLOG FLEXPEN) 100 UNIT/ML pen   Yes No   Sig: Inject 14 Units Subcutaneous 2 times daily (with meals) Lunch and dinner    insulin glargine (LANTUS PEN) 100 UNIT/ML pen   Yes No   Sig: Inject 24 Units Subcutaneous At Bedtime   insulin pen needle (BD GABRIELLA U/F) 32G X 4 MM   No No   Sig: Use 4 times per day or as directed.   levothyroxine (SYNTHROID/LEVOTHROID) 88 MCG tablet   Yes No   Sig:  Take 88 mcg by mouth daily Except on Sunday   levothyroxine (SYNTHROID/LEVOTHROID) 88 MCG tablet   Yes No   Sig: Take 44 mcg by mouth once a week = 1/2  Tab on Sunday   loperamide (IMODIUM) 2 MG capsule   No No   Sig: One capsule once a day PRN, can use a second one if needed   magnesium 250 MG tablet   Yes No   Sig: Take 1 tablet by mouth daily   menthol-zinc oxide (CALMOSEPTINE) 0.44-20.625 % OINT ointment   No No   Sig: Apply topically 4 times daily as needed for skin protection   omeprazole (PRILOSEC) 20 MG DR capsule   No No   Sig: Take 1 capsule (20 mg) by mouth 2 times daily   order for DME   No No   Sig: Equipment being ordered: Hospital Bed service and repair   order for DME   No No   Sig: Equipment being ordered: Compression stockings   order for DME   No No   Sig: Equipment being ordered:  order for XL Size  Pull ups.  Pt is currently using 12 pull ups a day   order for DME   No No   Sig: Equipment being ordered: Incontinence briefs/Depends   order for DME   No No   Sig: Equipment being ordered: Wheelchair   order for DME   No No   Sig: Equipment being ordered:   Incontinence Products: Gloves (4 Boxes) & chux pads   polyethylene glycol (MIRALAX) 17 GM/SCOOP powder   No No   Sig: Take 17 g (1 capful) by mouth daily   potassium chloride ER (K-DUR/KLOR-CON M) 10 MEQ CR tablet   No No   Sig: Take 1 tablet (10 mEq) by mouth 2 times daily   pramipexole (MIRAPEX) 0.5 MG tablet   Yes No   Sig: Take 1 tablet by mouth At Bedtime    pramipexole (MIRAPEX) 0.5 MG tablet   Yes No   Sig: Take 0.5 mg by mouth One hour after scheduled dose and every 8 hours as needed   simvastatin (ZOCOR) 40 MG tablet   No No   Sig: Take 1 tablet (40 mg) by mouth daily      Facility-Administered Medications: None     Allergies   Allergies   Allergen Reactions     Ciprofloxacin Anxiety     Pt developed agitation, paranoia while on cipro--on clear if this is what caused it.  But would try to avoid in future.     Hydrocodone Other (See  Comments)     dizzy     Lisinopril Cough            Vicodin [Hydrocodone-Acetaminophen] Other (See Comments)     Caused extreme dizziness     Physical Exam         O2 Device: BiPAP/CPAP(own machine)    Performed by Skylar Roberts PA-C, 5/26/2020 at 18:27  Temp: 96.3  F (35.7  C) Temp src: Axillary BP: (!) 150/72 Pulse: 65 Heart Rate: 63 Resp: 20 SpO2: 97 % O2 Device: None (Room air)       Constitutional: Will wake to voice, not following simple commands, repeating self over and over, uncooperative, in apparent distress, appears nontoxic. Laying in bed with bilateral soft wrist restraints applied.  Eyes: Eyes are clear.   HEENT: Oropharynx is clear, dry membranes. Normocephalic, no evidence of cranial trauma.  Cardiovascular: Regular rhythm and rate, normal S1 and S2. Systolic murmur noted. Peripheral pulses intact bilaterally. No lower extremity edema.  Respiratory: Lung sounds are clear to auscultation bilaterally.  GI: Obese. Soft, non-distended. Non-tender, no rebound or guarding. Normal bowel sounds.  Musculoskeletal: Moving all extremities. Without obvious deformity, unable to assess ROM.  Skin: Rash under bilateral breasts left worse than right. Surgical scar on abdomen and chest.   Neurologic: Patient moves all extremities. She is alert. She is confused.     Data   Data reviewed today: I reviewed all medications, new labs and imaging results over the last 24 hours.     Recent Labs   Lab 05/26/20  0605   WBC 6.1   HGB 14.4   MCV 92      INR 1.87*      POTASSIUM 4.9   CHLORIDE 110*   CO2 24   BUN 39*   CR 1.98*   ANIONGAP 8   DWIGHT 9.7   *   ALBUMIN 2.8*   PROTTOTAL 7.8   BILITOTAL 0.7   ALKPHOS 128   ALT 26   AST 57*   TROPI <0.015     Recent Results (from the past 24 hour(s))   CT Head w/o Contrast    Narrative    CT SCAN OF THE HEAD WITHOUT CONTRAST   5/26/2020 7:43 AM     HISTORY: Right-sided weakness, slurred speech, confusion.    TECHNIQUE: Axial images of the head and coronal  reformations without  IV contrast material. Radiation dose for this scan was reduced using  automated exposure control, adjustment of the mA and/or kV according  to patient size, or iterative reconstruction technique.    COMPARISON: None.    FINDINGS: Mild cerebral atrophy is present. There are some moderate  patchy white matter changes in both hemispheres without mass effect.  There is an old lacunar infarct in the right centrum semiovale. There  is no evidence for intracranial hemorrhage, mass effect, acute  infarct, or a skull fracture. Visualized paranasal sinuses and mastoid  air cells are clear. Vascular calcifications are noted at the skull  base.      Impression    IMPRESSION: Chronic changes. No evidence for intracranial hemorrhage  or any acute process.    JESSIE JOSEPH MD   XR Chest Port 1 View    Narrative    CHEST PORTABLE ONE VIEW   5/26/2020 9:32 AM     HISTORY: Delirium, unknown source.    COMPARISON: Chest x-ray 5/14/2020.      Impression    IMPRESSION: Portable view of the chest. Lungs are clear with mild  scarring or calcified plaque right lung base, unchanged. Heart may be  mildly enlarged, but unchanged. Prior CABG. Right chest port is  unchanged. No evidence of pneumothorax or significant pleural fluid.  No change since prior study.    SUNNY HOYOS MD   CT Chest Abdomen Pelvis w/o Contrast    Narrative    CT CHEST, ABDOMEN AND PELVIS WITHOUT CONTRAST  5/26/2020 11:26 AM    CLINICAL HISTORY:  Delirium, elevated lactic acid, no source of  presumed sepsis.    TECHNIQUE: CT scan of the chest, abdomen, and pelvis was performed  without IV contrast. Multiplanar reformats were obtained. Dose  reduction techniques were used.   CONTRAST: None.    COMPARISON: CT chest 4/24/2020.    FINDINGS:   LUNGS AND PLEURA: Respiratory motion artifact is present. A few  scattered indeterminate groundglass nodules are noted within the lungs  bilaterally, possibly infectious or inflammatory in nature. No  focal  infiltrate or lung consolidation. Area of bandlike scarring right lung  base is noted. No pleural effusions.    MEDIASTINUM/AXILLAE: Heart is normal in size. Coronary artery  calcifications are present. Prior CABG. Right-sided central venous  line terminates in the distal SVC. No enlarged lymph nodes. Esophagus  is unremarkable.    HEPATOBILIARY: Indeterminate low-attenuation left hepatic lobe lesion  measures approximately 1.5 cm on image 116 of series 8. A few  calcified gallstones are noted in the gallbladder which is otherwise  unremarkable.    PANCREAS: Normal.    SPLEEN: Normal.    ADRENAL GLANDS: Normal.    KIDNEYS/BLADDER: Probable small bilateral renal cysts. These are  incompletely characterized on this noncontrast study. No  hydronephrosis or urinary tract calculi. The bladder is unremarkable.    BOWEL: Mild colonic constipation is noted. No diverticulitis or  appendicitis. Anastomosis in the region of the rectum is patent. Right  periumbilical hernia contains loops of small bowel without obvious  obstruction or incarceration. Anastomosis near this area is widely  patent.    LYMPH NODES: No enlarged abdominal or pelvic lymph nodes.    VASCULATURE: Calcified plaque abdominal aorta and branch vessels are  noted. No evidence of aortic aneurysm.    PELVIC ORGANS: The bladder, uterus, right adnexal region and rectum  are within normal limits. Fluid collection left adnexa may be ovarian  and measures 4.0 x 1.9 cm on image 249 of series 8.    OTHER: None.    MUSCULOSKELETAL: Intramedullary left femoral aron is noted.  Degenerative hip changes are noted bilaterally. Degenerative spine  changes also noted. Bones overall appear osteopenic. No definite  destructive bone lesions.      Impression    IMPRESSION:  1.  No evidence of bowel obstruction, diverticulitis or appendicitis.  Periumbilical hernia contains loops of small bowel without obstruction  or incarceration. Scattered anastomoses involving the bowel  appear  patent. Mild colonic constipation.    2.  Scattered tiny indeterminate lung nodules, possibly infectious or  inflammatory. No focal infiltrate or lung consolidation is evident.  Bandlike scarring right lower lobe is noted. No enlarged lymph nodes.    3.  Prior CABG.    4.  Cholelithiasis. No surrounding gallbladder inflammation.    5.  Left adnexal fluid collection measuring 4.0 x 1.9 cm, possibly  ovarian. A follow-up pelvic ultrasound could be performed for further  assessment if clinically warranted. No surrounding inflammatory  changes are present to suggest an abscess.    SUNNY HOYOS MD

## 2020-05-27 NOTE — PROGRESS NOTES
[facility]  Transfer Triage Note    Followed up on call from yesterday, see yesterday's triage note for details.  Now admitted to ICU. Being treated for UTI, improving mental status.   Received update from transfer center that ICU cancelled transfer request. Care much appreciated.     Alek Wesley MD

## 2020-05-27 NOTE — PROGRESS NOTES
Writer has reviewed initial/admission skin assessment and adilene assessment and agrees with documentation and assessment findings.

## 2020-05-27 NOTE — PROGRESS NOTES
Despite medicating with IV Ativan unable to get out of restraints remains very restless, calling out to go home see own physician Mell.  At 0200 BS reading was 64, had scheduled dose of Lantus at 2200 so did mediate with Dextrose 50%, only 25 ml of amp was used,   after 15 minutes recheck was 109. Continue to monitor.

## 2020-05-27 NOTE — PROGRESS NOTES
[facility] Hospitalist Progress Note           Assessment & Plan      Stefanie Velazquez is a 75 year old female admitted on 5/26/2020. She has history of coronary artery disease, hypertension, hyperlipidemia, DVT, type 2 diabetes, chronic back pain, hypothyroidism, rectal cancer, GERD, restless leg syndrome and RC. She presents with confusion.     Acute Encephalopathy, suspect due to UTI and compounded by underlying medications, hypoglycemia and likely underlying dementia, possibly Lewey Body as below.   5/26/20 -- 3 days of reported confusion at nursing home with question of slurred speech and possible right sided weakness. Labs show elevated lactic of 3.4, elevated creatinine, normal WBC, normal CRP, normal troponin, normal TSH, normal VBG. CT head shows no acute findings. CT chest/abomdomen pelvis shows lung nodules as well as adnexal fluid collection. UA shows 11 WBC, small leukocyte esterase, and few bacteria. Vitals show tachycardic, initially up to the 150s in the ED with atrial fibrillation noted on monitor, briefly placed on diltiazem drip and converted to NSR though again tachycardic with agitation.  Recent admission 4/7 - 4/13 as discussed below for similar presentation, ultimately no clear etiology though possibly related to mild temporal CVA seen on imaging. Blood cultures pending. Urine culture pending. Differential includes dementia (lewy-body) vs medication effects vs dehydration vs other.   - After dicussion with ED provider and attending provider, patient would likely benefit from admission to facility where neurology and psychiatry were available for further evaluation.    - Patient will be admitted to [facility] for further work up and evaluation, consider transferring tomorrow for further work up with neurology and psychiatry.   - Continue to monitor.     - Ativan PRN for agitation.   5/27/2020 -- minimal change, but with positive urine culture suspect this is the acute trigger for her  "encephalopathy.  Treating UTI as below.   Holding gabapentin and mirapex.  Will check B12, folate and ammonia. Continue ativan prn, this is more effective if this is in fact lewey body dementia regardless.  Treating diabetes as below.   If not improving over the next 1-2 days may need inpatient transfer for neurology consultation for Lewey body dementia vs other etiology, if does not return to baseline with treatment of UTI may need at least outpatient referral to neurology.  Could consider aricept if not improving.    - Avoid use of antipsychotics given recent adverse effect.      COVID Rule Out  Due to concerns of unclear etiology of above and residence in nursing home, COVID test sent. CT chest as above shows \"Scattered tiny indeterminate lung nodules, possibly infectious or Inflammatory.\" Which have previously been seen, at that time COVID testing was performed and negative.   - COVID result, 5/26/2020: negative    - No need for special precautions at this time.      UTI with Elevated Lactic Acid and likely encephalopathy due to this   5/26/20 -- Lactic 3.4 on presentation, corrected with IVF. UA mildly abnormal though CRP normal, WBC normal. Given encephalopathy of unclear origin and concern for possible infectious cause covered broadly with IV vancomycin and zosyn. Unclear if infection is contributing.    - Continue to monitor for signs of infection.    - Urine culture pending, follow up on results.    - Blood cultures pending, follow up on results.    - Hold further antibiotics at this time. Will follow up on need to re-start.   5/27/2020 -- urine culture positive, all prior urine culture results have been susceptible to rocephin so started on rocephin daily.   Susceptibilities and speciation pending.       TITA on CKD  5/26/20 -- Creatinine 1.98, GFR 24 on presentation. Baseline quite variable over past few years, likely 1.3-1.6 though has been up closer to 1.8-2 at times. Appears to be above baseline " presently, possible TITA due to recent confusion at nursing home. Patient given 1.5 L NS in ED.        - Check CMP in AM.    - Continue IVF.    - Avoid nephrotoxins.   5/27/2020 -- creatinine down to 1.5, baseline has fluctuated quite a bit so hard to say for sure but I think this is now pretty much baseline - slowing IVF to 75/h D5 1/2 as below      Coronary Artery Disease  History of 3 vessel CABG in 2002. Unable to tolerate ACEi or beta blocker due to hypotension. Managed on aspirin/statin.   - Continue home medications.      History of Hypertension  5/26/20 -- Patient managed prior to admission with Lasix 20 mg BID.  Hold lasix for now given TITA.  Resuming metoprolol that she was on until April.    5/27/2020 -- blood pressure doing well, reassess if she really needs the lasix or if she can just continue the metoprolol that was restarted by admitter.         Hyperlipidemia  Chronic. Managed with simvastatin 40 mg per day - Continue      History of DVT  Managed chronically on Eliquis 5 mg BID.   - Continue home medication.      Diabetes Mellitus, Type II  5/26/20 -- Chronic.  Last a1C 6.9% on 3/2020. Managed at home with Lantus 24 units at bedtime and novolog 14 units with lunch and dinner.    - Moderate CHO diet.    - Continue lantus 24 units at bedtime.    - Hold novolog 14 units with lunch and dinner given patient not eating at this time. Reevaluate need for novolog in the morning.    - Medium sliding scale insulin.    5/27/2020 -- continued poor intake due to encephalopathy, patient hypoglycemic overnight - decreasing lantus to 12u daily.  changed IVF to D5 1/2 NS, reassess once intake improves.       Lumbar Radiculopathy   Neuropathy  5/26/20 -- Previously diagnosed. Managed with gabapentin 100 mg at bedtime.    - Hold for now.   5/27/2020 -- no apparent pain.  Reassess once mental status improves or if appears to be having pain.       Hypothyroidism  Chronic. Normal TSH of 3.9 on presentation. Managed with  levothyroxine 88 mcg daily except for Sunday (1/2 tablet).   - Continue home medication.      Gastroesophageal reflux disease  Chronic. Managed with omeprazole 20 mg TWICE DAILY - Continue      Restless Leg Syndrome  5/26/20 -- Chronic. Managed with pramipexole 0.5 mg at bedtime and PRN every 8 hours.   - Hold pramipexole for now.   5/27/2020 -- reassess once mental status improves.         RC  On CPAP at assisted living facility, follows with sleep medicine.     Rectal Cancer  Follows with Dr. Martinez, diagnosed in 2011, underwent neoadjuvant chemoradiation, resection with reanastomosis. Follows yearly with oncology, last seen 9/2019.  - Continue outpatient surveillance.              DIET: Moderate CHO diet with honey thick liquids     CODE STATUS: Full Code         Prophylaxis  On Eliquis     Lines  PIV    Restraints  Continue soft restraints for patient and staff safety and for pulling on lines etc           Disposition  Anticipate at least 2 more days inpatient.              Interval History:   Minimal change.  Needing restraints due to aggressive confused behavior still.  Mental status about the same, still mostly just saying that she wants to see Mell, who apparently is the head nurse at her facility.  At least appears to know she's in the hospital, saying she wants to see mell and doesn't want to be in the hospital and did ask to sit up but not answering any questions or following commands beyond taking breaths, clearly still significantly confused but perhaps slightly better than yesterday.  No apparent pain but won't directly answer questions.               Review of Systems:   Unable to assess due to mental status as above.          Medications:   Current active medications and PTA medications reviewed, see medication list for details.            Physical Exam:   Vitals were reviewed  Patient Vitals for the past 24 hrs:   BP Temp Temp src Pulse Heart Rate Resp SpO2 Height Weight   05/27/20 0700 (!) 140/87  "-- -- 79 77 14 99 % -- --   05/27/20 0615 -- -- -- -- -- -- -- -- 85.5 kg (188 lb 7.9 oz)   05/27/20 0600 (!) 154/76 -- -- 72 60 20 100 % -- --   05/27/20 0500 105/82 -- -- 50 (!) 47 18 100 % -- --   05/27/20 0449 -- -- -- -- 53 -- -- -- --   05/27/20 0400 (!) 107/93 -- -- 53 55 18 99 % -- --   05/27/20 0305 105/51 -- -- 56 55 -- 98 % -- --   05/27/20 0300 (!) 83/51 -- -- 56 54 16 98 % -- --   05/27/20 0200 112/55 -- -- 64 66 14 97 % -- --   05/27/20 0100 113/57 -- -- 61 65 (!) 38 100 % -- --   05/27/20 0000 (!) 147/85 97.6  F (36.4  C) Axillary -- 86 20 100 % -- --   05/26/20 2318 -- -- -- -- 87 -- -- -- --   05/26/20 2100 (!) 163/87 -- -- 87 84 16 98 % -- --   05/26/20 2056 -- -- -- -- 86 -- -- -- --   05/26/20 2045 (!) 148/65 -- -- 62 86 16 99 % -- --   05/26/20 2030 (!) 168/87 -- -- 90 88 16 99 % -- --   05/26/20 2015 (!) 176/95 -- -- 89 90 17 98 % -- --   05/26/20 2000 (!) 162/109 -- -- 96 95 15 97 % 1.676 m (5' 6\") 86.1 kg (189 lb 13.1 oz)   05/26/20 1945 (!) 176/126 -- -- 92 90 19 96 % -- --   05/26/20 1930 (!) 169/96 97.1  F (36.2  C) Axillary 92 90 20 97 % -- --   05/26/20 1915 (!) 169/87 -- -- 91 63 19 99 % -- --   05/26/20 1900 (!) 169/78 -- -- 71 96 19 94 % -- --   05/26/20 1845 (!) 156/92 -- -- 94 75 18 95 % -- --   05/26/20 1830 117/75 -- -- 68 68 17 96 % -- --   05/26/20 1815 (!) 157/131 -- -- 98 95 26 95 % -- --   05/26/20 1800 (!) 150/72 -- -- 65 63 20 97 % -- --   05/26/20 1745 (!) 166/82 -- -- 91 91 15 100 % -- --   05/26/20 1730 (!) 164/89 -- -- 95 93 24 97 % -- --   05/26/20 1715 (!) 154/78 -- -- 92 93 23 100 % -- --   05/26/20 1700 (!) 156/89 -- -- 90 97 12 -- -- --   05/26/20 1645 (!) 159/86 -- -- 94 94 -- -- -- --   05/26/20 1630 (!) 162/94 -- -- 94 93 -- -- -- --   05/26/20 1615 (!) 150/90 -- -- 100 75 15 97 % -- --   05/26/20 1600 (!) 145/64 -- -- 72 86 16 97 % -- --   05/26/20 1545 136/74 -- -- 96 68 16 97 % -- --   05/26/20 1530 (!) 141/90 -- -- 98 78 17 98 % -- --   05/26/20 1524 -- -- " -- -- -- -- -- -- 86.2 kg (190 lb)   20 1515 (!) 141/73 -- -- 85 97 18 95 % -- --   20 1500 (!) 140/71 -- -- 79 97 22 99 % -- --   20 1445 (!) 149/79 -- -- 98 98 27 96 % -- --   20 1430 116/73 -- -- 99 90 19 99 % -- --   20 1415 126/82 -- -- 100 104 13 97 % -- --   20 1400 (!) 127/95 -- -- 105 92 19 96 % -- --   20 1330 133/83 -- -- 101 93 19 97 % -- --   20 1315 -- -- -- -- 106 -- 98 % -- --   20 1300 (!) 167/132 -- -- 111 110 (!) 38 96 % -- --   20 1254 (!) 164/78 -- -- 103 114 -- 95 % -- --   20 1245 -- -- -- -- 105 22 96 % -- --   20 1230 -- -- -- 156 156 24 95 % -- --   20 1215 -- -- -- -- -- -- 94 % -- --   20 1200 -- -- -- -- -- -- 95 % -- --   20 1145 -- -- -- -- 172 28 97 % -- --   20 1130 -- -- -- -- 147 28 -- -- --   20 1045 (!) 141/79 -- -- 88 89 22 98 % -- --   20 1030 95/52 -- -- 68 75 16 95 % -- --   20 1000 123/74 -- -- 92 76 (!) 31 99 % -- --   20 0945 114/60 -- -- 79 77 14 96 % -- --   20 0930 103/49 -- -- 81 80 16 97 % -- --   20 0915 -- -- -- 117 116 17 97 % -- --   20 0900 95/58 -- -- 83 118 23 94 % -- --   20 0830 102/67 -- -- 123 128 26 95 % -- --   20 0815 96/62 -- -- 95 135 29 97 % -- --       Temperatures:  Current - Temp: 97.6  F (36.4  C); Max - Temp  Av.4  F (36.3  C)  Min: 97.1  F (36.2  C)  Max: 97.6  F (36.4  C)  Respiration range: Resp  Av.1  Min: 12  Max: 38  Pulse range: Pulse  Av.4  Min: 50  Max: 156  Blood pressure range: Systolic (24hrs), Av , Min:83 , Max:176   ; Diastolic (24hrs), Av, Min:49, Max:132    Pulse oximetry range: SpO2  Av.2 %  Min: 94 %  Max: 100 %  I/O last 3 completed shifts:  In: 950 [I.V.:950]  Out: -     Intake/Output Summary (Last 24 hours) at 2020 0810  Last data filed at 2020 2244  Gross per 24 hour   Intake 950 ml   Output --   Net 950 ml     EXAM:  General: awake and  alert, clearly confused as above but NAD, oriented x 2 surprisingly   Head: normocephalic  Neck: unremarkable, no lymphadenopathy   HEENT: oropharynx pink and moist    Heart: Regular rate and rhythm, no murmurs, rubs, or gallops  Lungs: clear to auscultation bilaterally with good air movement throughout  Abdomen: soft, non-tender, no masses or organomegaly  Extremities: no edema in lower extremities   Neuro: non-compliant with neuro exam but tracks to voice, opens eyes spontaneously, moving all extremities, no clear facial droop or other focal changes.    Skin unremarkable.               Data:     Results for orders placed or performed during the hospital encounter of 05/26/20 (from the past 24 hour(s))   XR Chest Port 1 View    Narrative    CHEST PORTABLE ONE VIEW   5/26/2020 9:32 AM     HISTORY: Delirium, unknown source.    COMPARISON: Chest x-ray 5/14/2020.      Impression    IMPRESSION: Portable view of the chest. Lungs are clear with mild  scarring or calcified plaque right lung base, unchanged. Heart may be  mildly enlarged, but unchanged. Prior CABG. Right chest port is  unchanged. No evidence of pneumothorax or significant pleural fluid.  No change since prior study.    SUNNY HOYOS MD   Lactic acid whole blood   Result Value Ref Range    Lactic Acid 1.5 0.7 - 2.0 mmol/L   CT Chest Abdomen Pelvis w/o Contrast    Narrative    CT CHEST, ABDOMEN AND PELVIS WITHOUT CONTRAST  5/26/2020 11:26 AM    CLINICAL HISTORY:  Delirium, elevated lactic acid, no source of  presumed sepsis.    TECHNIQUE: CT scan of the chest, abdomen, and pelvis was performed  without IV contrast. Multiplanar reformats were obtained. Dose  reduction techniques were used.   CONTRAST: None.    COMPARISON: CT chest 4/24/2020.    FINDINGS:   LUNGS AND PLEURA: Respiratory motion artifact is present. A few  scattered indeterminate groundglass nodules are noted within the lungs  bilaterally, possibly infectious or inflammatory in nature. No  focal  infiltrate or lung consolidation. Area of bandlike scarring right lung  base is noted. No pleural effusions.    MEDIASTINUM/AXILLAE: Heart is normal in size. Coronary artery  calcifications are present. Prior CABG. Right-sided central venous  line terminates in the distal SVC. No enlarged lymph nodes. Esophagus  is unremarkable.    HEPATOBILIARY: Indeterminate low-attenuation left hepatic lobe lesion  measures approximately 1.5 cm on image 116 of series 8. A few  calcified gallstones are noted in the gallbladder which is otherwise  unremarkable.    PANCREAS: Normal.    SPLEEN: Normal.    ADRENAL GLANDS: Normal.    KIDNEYS/BLADDER: Probable small bilateral renal cysts. These are  incompletely characterized on this noncontrast study. No  hydronephrosis or urinary tract calculi. The bladder is unremarkable.    BOWEL: Mild colonic constipation is noted. No diverticulitis or  appendicitis. Anastomosis in the region of the rectum is patent. Right  periumbilical hernia contains loops of small bowel without obvious  obstruction or incarceration. Anastomosis near this area is widely  patent.    LYMPH NODES: No enlarged abdominal or pelvic lymph nodes.    VASCULATURE: Calcified plaque abdominal aorta and branch vessels are  noted. No evidence of aortic aneurysm.    PELVIC ORGANS: The bladder, uterus, right adnexal region and rectum  are within normal limits. Fluid collection left adnexa may be ovarian  and measures 4.0 x 1.9 cm on image 249 of series 8.    OTHER: None.    MUSCULOSKELETAL: Intramedullary left femoral aron is noted.  Degenerative hip changes are noted bilaterally. Degenerative spine  changes also noted. Bones overall appear osteopenic. No definite  destructive bone lesions.      Impression    IMPRESSION:  1.  No evidence of bowel obstruction, diverticulitis or appendicitis.  Periumbilical hernia contains loops of small bowel without obstruction  or incarceration. Scattered anastomoses involving the bowel  appear  patent. Mild colonic constipation.    2.  Scattered tiny indeterminate lung nodules, possibly infectious or  inflammatory. No focal infiltrate or lung consolidation is evident.  Bandlike scarring right lower lobe is noted. No enlarged lymph nodes.    3.  Prior CABG.    4.  Cholelithiasis. No surrounding gallbladder inflammation.    5.  Left adnexal fluid collection measuring 4.0 x 1.9 cm, possibly  ovarian. A follow-up pelvic ultrasound could be performed for further  assessment if clinically warranted. No surrounding inflammatory  changes are present to suggest an abscess.    SUNNY HOYOS MD   Symptomatic COVID-19 Virus (Coronavirus) by PCR    Specimen: Nasopharyngeal   Result Value Ref Range    COVID-19 Virus PCR to [facility] - Source Nasopharyngeal     COVID-19 Virus PCR to [facility] - Result       Test received-See reflex to IDDL test SARS CoV2 (COVID-19) Virus RT-PCR   SARS-CoV-2 COVID-19 Virus (Coronavirus) RT-PCR Nasopharyngeal    Specimen: Nasopharyngeal   Result Value Ref Range    SARS-CoV-2 Virus Specimen Source Nasopharyngeal     SARS-CoV-2 PCR Result NEGATIVE     SARS-CoV-2 PCR Comment       The Simplexa COVID-19 direct PCR assay by [employer] on the [employer] instrument has been   given Emergency Use Authorization (EUA) for the in vitro qualitative detection of RNA from   the SARS-CoV2 virus in nasopharyngeal swabs in viral transport medium from patients with   signs and symptoms of infection who are suspected of COVID-19. Performance is unknown in   asymptomatic patients.     Procalcitonin   Result Value Ref Range    Procalcitonin 0.17 ng/ml   Glucose by meter   Result Value Ref Range    Glucose 93 70 - 99 mg/dL   Glucose by meter   Result Value Ref Range    Glucose 64 (L) 70 - 99 mg/dL   Glucose by meter   Result Value Ref Range    Glucose 109 (H) 70 - 99 mg/dL   Basic metabolic panel   Result Value Ref Range    Sodium 145 (H) 133 - 144 mmol/L    Potassium 3.6 3.4 - 5.3 mmol/L    Chloride 117 (H)  94 - 109 mmol/L    Carbon Dioxide 22 20 - 32 mmol/L    Anion Gap 6 3 - 14 mmol/L    Glucose 68 (L) 70 - 99 mg/dL    Urea Nitrogen 30 7 - 30 mg/dL    Creatinine 1.53 (H) 0.52 - 1.04 mg/dL    GFR Estimate 32 (L) >60 mL/min/[1.73_m2]    GFR Estimate If Black 38 (L) >60 mL/min/[1.73_m2]    Calcium 8.1 (L) 8.5 - 10.1 mg/dL   CBC with platelets   Result Value Ref Range    WBC 4.8 4.0 - 11.0 10e9/L    RBC Count 4.07 3.8 - 5.2 10e12/L    Hemoglobin 12.3 11.7 - 15.7 g/dL    Hematocrit 39.1 35.0 - 47.0 %    MCV 96 78 - 100 fl    MCH 30.2 26.5 - 33.0 pg    MCHC 31.5 31.5 - 36.5 g/dL    RDW 14.1 10.0 - 15.0 %    Platelet Count 195 150 - 450 10e9/L   CK total   Result Value Ref Range    CK Total 294 (H) 30 - 225 U/L   Glucose by meter   Result Value Ref Range    Glucose 59 (L) 70 - 99 mg/dL   Glucose by meter   Result Value Ref Range    Glucose 107 (H) 70 - 99 mg/dL     *Note: Due to a large number of results and/or encounters for the requested time period, some results have not been displayed. A complete set of results can be found in Results Review.     All cardiac studies reviewed by me.  All imaging studies reviewed by me.    Attestation:  I have reviewed today's vital signs, notes, medications, labs and imaging.  Amount of time spent in direct patient care: 60 minutes.     Gerald Morrison MD, MD

## 2020-05-28 ENCOUNTER — APPOINTMENT (OUTPATIENT)
Dept: PHYSICAL THERAPY | Facility: CLINIC | Age: 75
DRG: 091 | End: 2020-05-28
Payer: COMMERCIAL

## 2020-05-28 ENCOUNTER — APPOINTMENT (OUTPATIENT)
Dept: OCCUPATIONAL THERAPY | Facility: CLINIC | Age: 75
DRG: 091 | End: 2020-05-28
Payer: COMMERCIAL

## 2020-05-28 LAB
ALBUMIN SERPL-MCNC: 2.2 G/DL (ref 3.4–5)
ALP SERPL-CCNC: 100 U/L (ref 40–150)
ALT SERPL W P-5'-P-CCNC: 24 U/L (ref 0–50)
ANION GAP SERPL CALCULATED.3IONS-SCNC: 5 MMOL/L (ref 3–14)
AST SERPL W P-5'-P-CCNC: 38 U/L (ref 0–45)
BILIRUB SERPL-MCNC: 0.5 MG/DL (ref 0.2–1.3)
BUN SERPL-MCNC: 22 MG/DL (ref 7–30)
CALCIUM SERPL-MCNC: 8.1 MG/DL (ref 8.5–10.1)
CHLORIDE SERPL-SCNC: 114 MMOL/L (ref 94–109)
CO2 SERPL-SCNC: 25 MMOL/L (ref 20–32)
CREAT SERPL-MCNC: 1.41 MG/DL (ref 0.52–1.04)
ERYTHROCYTE [DISTWIDTH] IN BLOOD BY AUTOMATED COUNT: 13.9 % (ref 10–15)
GFR SERPL CREATININE-BSD FRML MDRD: 36 ML/MIN/{1.73_M2}
GLUCOSE SERPL-MCNC: 110 MG/DL (ref 70–99)
HCT VFR BLD AUTO: 37.5 % (ref 35–47)
HGB BLD-MCNC: 12.2 G/DL (ref 11.7–15.7)
MAGNESIUM SERPL-MCNC: 1.9 MG/DL (ref 1.6–2.3)
MCH RBC QN AUTO: 29.8 PG (ref 26.5–33)
MCHC RBC AUTO-ENTMCNC: 32.5 G/DL (ref 31.5–36.5)
MCV RBC AUTO: 92 FL (ref 78–100)
PHOSPHATE SERPL-MCNC: 3.1 MG/DL (ref 2.5–4.5)
PLATELET # BLD AUTO: 187 10E9/L (ref 150–450)
POTASSIUM SERPL-SCNC: 3.7 MMOL/L (ref 3.4–5.3)
PROT SERPL-MCNC: 5.9 G/DL (ref 6.8–8.8)
RBC # BLD AUTO: 4.09 10E12/L (ref 3.8–5.2)
SODIUM SERPL-SCNC: 144 MMOL/L (ref 133–144)
WBC # BLD AUTO: 4.7 10E9/L (ref 4–11)

## 2020-05-28 PROCEDURE — 80053 COMPREHEN METABOLIC PANEL: CPT | Performed by: FAMILY MEDICINE

## 2020-05-28 PROCEDURE — 25000128 H RX IP 250 OP 636: Performed by: INTERNAL MEDICINE

## 2020-05-28 PROCEDURE — 25800025 ZZH RX 258: Performed by: INTERNAL MEDICINE

## 2020-05-28 PROCEDURE — 85027 COMPLETE CBC AUTOMATED: CPT | Performed by: FAMILY MEDICINE

## 2020-05-28 PROCEDURE — 97161 PT EVAL LOW COMPLEX 20 MIN: CPT | Mod: GP | Performed by: PHYSICAL THERAPIST

## 2020-05-28 PROCEDURE — 83735 ASSAY OF MAGNESIUM: CPT | Performed by: FAMILY MEDICINE

## 2020-05-28 PROCEDURE — 97165 OT EVAL LOW COMPLEX 30 MIN: CPT | Mod: GO

## 2020-05-28 PROCEDURE — 25000132 ZZH RX MED GY IP 250 OP 250 PS 637: Performed by: INTERNAL MEDICINE

## 2020-05-28 PROCEDURE — 25800025 ZZH RX 258: Performed by: FAMILY MEDICINE

## 2020-05-28 PROCEDURE — 12000011 ZZH R&B MS OVERFLOW

## 2020-05-28 PROCEDURE — 25000125 ZZHC RX 250: Performed by: INTERNAL MEDICINE

## 2020-05-28 PROCEDURE — 84100 ASSAY OF PHOSPHORUS: CPT | Performed by: FAMILY MEDICINE

## 2020-05-28 PROCEDURE — 99232 SBSQ HOSP IP/OBS MODERATE 35: CPT | Performed by: INTERNAL MEDICINE

## 2020-05-28 PROCEDURE — 36415 COLL VENOUS BLD VENIPUNCTURE: CPT | Performed by: FAMILY MEDICINE

## 2020-05-28 PROCEDURE — 25000128 H RX IP 250 OP 636: Performed by: FAMILY MEDICINE

## 2020-05-28 RX ORDER — RIVASTIGMINE 4.6 MG/24H
1 PATCH, EXTENDED RELEASE TRANSDERMAL DAILY
Status: DISCONTINUED | OUTPATIENT
Start: 2020-05-28 | End: 2020-06-02

## 2020-05-28 RX ORDER — ASPIRIN 300 MG/1
150 SUPPOSITORY RECTAL ONCE
Status: DISCONTINUED | OUTPATIENT
Start: 2020-05-28 | End: 2020-05-28

## 2020-05-28 RX ORDER — LEVOTHYROXINE SODIUM 20 UG/ML
37.5 INJECTION, SOLUTION INTRAVENOUS DAILY
Status: DISCONTINUED | OUTPATIENT
Start: 2020-05-28 | End: 2020-06-05

## 2020-05-28 RX ORDER — ASPIRIN 300 MG/1
150 SUPPOSITORY RECTAL DAILY
Status: DISCONTINUED | OUTPATIENT
Start: 2020-05-28 | End: 2020-06-05

## 2020-05-28 RX ADMIN — LORAZEPAM 1 MG: 2 INJECTION INTRAMUSCULAR; INTRAVENOUS at 00:19

## 2020-05-28 RX ADMIN — DEXTROSE AND SODIUM CHLORIDE: 5; 450 INJECTION, SOLUTION INTRAVENOUS at 11:00

## 2020-05-28 RX ADMIN — ASPIRIN 150 MG: 300 SUPPOSITORY RECTAL at 16:37

## 2020-05-28 RX ADMIN — LEVOTHYROXINE SODIUM 37.5 MCG: 20 INJECTION, SOLUTION INTRAVENOUS at 16:36

## 2020-05-28 RX ADMIN — LORAZEPAM 1 MG: 2 INJECTION INTRAMUSCULAR; INTRAVENOUS at 18:21

## 2020-05-28 RX ADMIN — ENOXAPARIN SODIUM 80 MG: 80 INJECTION SUBCUTANEOUS at 20:12

## 2020-05-28 RX ADMIN — LORAZEPAM 1 MG: 2 INJECTION INTRAMUSCULAR; INTRAVENOUS at 21:45

## 2020-05-28 RX ADMIN — CEFTRIAXONE SODIUM 1 G: 1 INJECTION, SOLUTION INTRAVENOUS at 09:11

## 2020-05-28 RX ADMIN — CEFEPIME 2 G: 2 INJECTION, POWDER, FOR SOLUTION INTRAVENOUS at 17:26

## 2020-05-28 RX ADMIN — LORAZEPAM 1 MG: 2 INJECTION INTRAMUSCULAR; INTRAVENOUS at 15:13

## 2020-05-28 RX ADMIN — RIVASTIGMINE 1 PATCH: 4.6 PATCH TRANSDERMAL at 16:54

## 2020-05-28 ASSESSMENT — ACTIVITIES OF DAILY LIVING (ADL)
ADLS_ACUITY_SCORE: 32
ADLS_ACUITY_SCORE: 32
IADL_COMMENTS: FACILITY COMPLETES
ADLS_ACUITY_SCORE: 38
ADLS_ACUITY_SCORE: 32
ADLS_ACUITY_SCORE: 38
ADLS_ACUITY_SCORE: 38

## 2020-05-28 ASSESSMENT — MIFFLIN-ST. JEOR: SCORE: 1336.75

## 2020-05-28 NOTE — PROGRESS NOTES
Patient laying in bed, remains confused, alert and oriented to year only.  Restless in bed, reoriented several times, assisted with repositioning several times. MD ordered new medications, will given once approved by pharmacy.  Vital signs stable, patient does not verbalize pain.

## 2020-05-28 NOTE — PROGRESS NOTES
Pt started becoming increasingly restless, yelling out and kicking side rail. Unable to calm or reorientate patient. Pharmacy states Exelon patch will not be here for ~3 hours. PRN IV ativan given for agitation

## 2020-05-28 NOTE — PROGRESS NOTES
Right wrist and Left wrist restraints discontinued at 7:50 AM on 5/28/2020.    Restraint discontinue criteria met, patient is restless and safe. Restraints removed.     Patient's Response: Needs reinforcement  Family Notification: Other(all ready aware)  Attending Physician Notified: Yes, Attending Physician's Name: EVELIA Diaz RN

## 2020-05-28 NOTE — PROGRESS NOTES
Pt yelling out constantly still. Stating that she needs her own shoes and clothes, these aren't her clothes and that we cant force her to wear someone else's clothes. Continued attempts to calm patient were tried, attempted to give coffee again after yelling for it but pt spilled all over herself again because she wont accept help from staff, bedding changed. MD paged for assistance, will try another dose of PRN ativan and if this doesn't help calm pt we will try a Precedex drip.

## 2020-05-28 NOTE — PROGRESS NOTES
05/28/20 0800   Quick Adds   Type of Visit Initial PT Evaluation   Living Environment   Lives With facility resident   Living Arrangements assisted living   Home Accessibility no concerns   Transportation Anticipated family or friend will provide   Living Environment Comment from Assisted living, received help with meals, bathing, toileting and had A x 1 for all transfers and mobility    Self-Care   Usual Activity Tolerance fair   Current Activity Tolerance poor   Regular Exercise No   Equipment Currently Used at Home walker, rolling;wheelchair, manual   Functional Level Prior   Ambulation 3-->assistive equipment and person   Transferring 3-->assistive equipment and person   Toileting 3-->assistive equipment and person   Bathing 3-->assistive equipment and person   Communication 0-->understands/communicates without difficulty   Swallowing 0-->swallows foods/liquids without difficulty   Cognition 1 - attention or memory deficits   Prior Functional Level Comment unable to gather subjective history due to cognitive status   General Information   Onset of Illness/Injury or Date of Surgery - Date 05/26/20   Referring Physician Gerald Benitez   Patient/Family Goals Statement back to [facility]    Pertinent History of Current Problem (include personal factors and/or comorbidities that impact the POC) Stefanie Velazquez is a 75 year old female admitted on 5/26/2020. She has history of coronary artery disease, hypertension, hyperlipidemia, DVT, type 2 diabetes, chronic back pain, hypothyroidism, rectal cancer, GERD, restless leg syndrome and RC. She presents with confusion.   Precautions/Limitations fall precautions   Cognitive Status Examination   Orientation not oriented to person, place or time   Level of Consciousness confused;agitated   Follows Commands and Answers Questions unable to follow commands   Range of Motion (ROM)   ROM Comment B AROM WFL    Strength   Strength Comments unable to test due to patient not following  "commands   Bed Mobility   Bed Mobility Comments supine > sit maxA x 1, sit > supine maxA x 2, boost in bed maxA x 2   Transfer Skills   Transfer Comments unable to transfer, patient not able to sit at EOB without maxA, not following commands in order to attempt transfer   Balance   Balance Comments sitting at EOB, needs maxA x 1 to stay upright   General Therapy Interventions   Planned Therapy Interventions balance training;bed mobility training;gait training;neuromuscular re-education;ROM;strengthening;stretching;transfer training   Clinical Impression   Criteria for Skilled Therapeutic Intervention yes, treatment indicated   PT Diagnosis generalized weakness   Influenced by the following impairments weakness, confusion, decreased activity tolerance   Functional limitations due to impairments transfers, bed mobility,    Clinical Presentation Evolving/Changing   Clinical Presentation Rationale clinical decision making and chart review   Clinical Decision Making (Complexity) Low complexity   Therapy Frequency Daily   Predicted Duration of Therapy Intervention (days/wks) 4 days   Anticipated Discharge Disposition Transitional Care Facility   Risk & Benefits of therapy have been explained Yes   Patient, Family & other staff in agreement with plan of care Yes   [facility] Optimal+ TM \"6 Clicks\"   2016, Trustees of [facility], under license to [employer].  All rights reserved.   6 Clicks Short Forms Basic Mobility Inpatient Short Form   [facility] Useful Systems[employer]AC  \"6 Clicks\" V.2 Basic Mobility Inpatient Short Form   1. Turning from your back to your side while in a flat bed without using bedrails? 1 - Total   2. Moving from lying on your back to sitting on the side of a flat bed without using bedrails? 1 - Total   3. Moving to and from a bed to a chair (including a wheelchair)? 1 - Total   4. Standing up from a chair using your arms (e.g., wheelchair, or bedside chair)? 1 - Total   5. To walk in hospital " room? 1 - Total   6. Climbing 3-5 steps with a railing? 1 - Total   Basic Mobility Raw Score (Score out of 24.Lower scores equate to lower levels of function) 6   Total Evaluation Time   Total Evaluation Time (Minutes) 9       Kallie Stevens  PT, DPT       5/28/2020   [address] [address]  [email or web address]  Voicemail: [phone]

## 2020-05-28 NOTE — PROGRESS NOTES
"Pt continuous to be competely delirious. Crying and yelling out-- stating she wants her \"shoes tied tight and my blue turquoise top.\" \"I need to play BINGO!!!\" Yelling that staff are forcing her to wear what she doesn't want to wear. Pulling off gown, pulling at lines, swatting at staff, unable to redirect. Mitts applied   "

## 2020-05-28 NOTE — PLAN OF CARE
Discharge Planner PT   Patient plan for discharge: unable to determine patient goals due to cognitive status and not answering questions appropriately   Current status: needing maxA x 1 sit > supine, maxA x 1 for sitting at EOB, did not try transfer as patient not following commands, maxA x 2 for sit > supine and boost in bed  Barriers to return to prior living situation: cognitive status, mobility status  Recommendations for discharge: TCU, may be able to return to [facility] as cognitive status improves to allow for improved PT participation  Rationale for recommendations: needing heavy assist for transfer and bed mobility, needs to be Ax1 for returning to Assisted Living facility,        Entered by: Kallie Stevens 05/28/2020 8:53 AM

## 2020-05-28 NOTE — PROGRESS NOTES
Patients mood has continues to be labile overnight.  She cycles between being restless and agitated and then calm. She did sleep for about 1-2 hours, but was awake the rest pf the night.  She does at time to have a right sided facial drop but then takes and returns to normal.  She continues to call out for Dr. Ding and Dr. Monte all night regardless or reorientation.  Patient continues to need restraints for her safety.  She otherwise is pulling at things and not redirectable. Patient prefers her head to be arched back, despite frequent repositioning of pillow this is the position she hold herself in.  Brief changed frequently. Continues to refuse to eat or drink and oral cares have been performed with resistance from patient.

## 2020-05-28 NOTE — PROGRESS NOTES
[facility]    Hospitalist Progress Note    Date of Service (when I saw the patient): 05/28/2020    Assessment & Plan   Stefanie Velazquez is a 75 year old female admitted on 5/26/2020. She has history of coronary artery disease, hypertension, hyperlipidemia, DVT, type 2 diabetes, chronic back pain, hypothyroidism, rectal cancer, GERD, restless leg syndrome and RC. She presents with confusion.     Acute encephalopathy, suspect due to UTI and compounded by underlying medications, hypoglycemia and likely underlying dementia, possibly Lewey Body as below.   5/26/20 -- 3 days of reported confusion at nursing home with question of slurred speech and possible right sided weakness. Labs show elevated lactic of 3.4, elevated creatinine, normal WBC, normal CRP, normal troponin, normal TSH, normal VBG. CT head shows no acute findings. CT chest/abomdomen pelvis shows lung nodules as well as adnexal fluid collection. UA shows 11 WBC, small leukocyte esterase, and few bacteria. Vitals show tachycardic, initially up to the 150s in the ED with atrial fibrillation noted on monitor, briefly placed on diltiazem drip and converted to NSR though again tachycardic with agitation.  Recent admission 4/7 - 4/13 as discussed below for similar presentation, ultimately no clear etiology though possibly related to mild temporal CVA seen on imaging. Blood cultures pending. Urine culture pending. Differential includes dementia (lewy-body) vs medication effects vs dehydration vs other.   - After dicussion with ED provider and attending provider, patient would likely benefit from admission to facility where neurology and psychiatry were available for further evaluation.    - Patient will be admitted to [facility] for further work up and evaluation, consider transferring tomorrow for further work up with neurology and psychiatry.   - Continue to monitor.     - Ativan PRN for agitation.   5/27/2020 -- minimal change, but  "with positive urine culture suspect this is the acute trigger for her encephalopathy.  Treating UTI as below.   Holding gabapentin and mirapex.  B12, folate and ammonia normal. Continue ativan prn, this is more effective if this is in fact lewey body dementia regardless.  Treating diabetes as below.   If not improving over the next 1-2 days may need inpatient transfer for neurology consultation for Lewey body dementia vs other etiology, if does not return to baseline with treatment of UTI may need at least outpatient referral to neurology.  Could consider aricept if not improving.    - Avoid use of antipsychotics given recent adverse effect.   - Start rivastigmine patch 4.6 mg/day.  - Patient hemodynamically stable and agitated due to monitoring.  Transfer patient to Med/Surg.     COVID Rule Out  Due to concerns of unclear etiology of above and residence in nursing home, COVID test sent. CT chest as above shows \"Scattered tiny indeterminate lung nodules, possibly infectious or Inflammatory.\" Which have previously been seen, at that time COVID testing was performed and negative.   - COVID result, 5/26/2020: negative    - No need for special precautions at this time.      UTI with Elevated Lactic Acid and likely encephalopathy due to this   5/26/20 -- Lactic 3.4 on presentation, corrected with IVF. UA mildly abnormal though CRP normal, WBC normal. Given encephalopathy of unclear origin and concern for possible infectious cause covered broadly with IV vancomycin and zosyn. Unclear if infection is contributing.    - Zosyn and Vanco discontinued on 5/26   - Urine culture growing NLF GNR. awaiting speciation   - Started on Rocephin on 5/27   - Blood cultures NGTD   - Due to resistance change abx to Cefepime on 5/28     TITA on CKD  5/26/20 -- Creatinine 1.98, GFR 24 on presentation. Baseline quite variable over past few years, likely 1.3-1.6 though has been up closer to 1.8-2 at times. Appears to be above baseline " presently, possible TITA due to recent confusion at nursing home. Patient given 1.5 L NS in ED.        - Check CMP in AM.    - Continue IVF.    - Avoid nephrotoxins.   5/27/2020 -- creatinine down to 1.5, baseline has fluctuated quite a bit so hard to say for sure but I think this is now pretty much baseline - slowing IVF to 75/h D5 1/2 as below   - Cr improved to 1.41 on 5/28.  Recheck BMP in AM     Coronary Artery Disease  History of 3 vessel CABG in 2002. Unable to tolerate ACEi or beta blocker due to hypotension. Managed on aspirin/statin.   - Change to aspirin suppository as patient refusing PO meds     History of Hypertension  5/26/20 -- Patient managed prior to admission with Lasix 20 mg BID.  Hold lasix for now given TITA.  5/27/2020 -- blood pressure doing well, follow     Hyperlipidemia  Chronic. Managed with simvastatin 40 mg per day   - Discontinue simvastatin as patient has been refusing     History of DVT  Managed chronically on Eliquis 5 mg BID.   - Patient refusing PO meds, change to enoxaparin 1 mg/kg bid     Diabetes Mellitus, Type II  5/26/20 -- Chronic.  Last a1C 6.9% on 3/2020. Managed at home with Lantus 24 units at bedtime and novolog 14 units with lunch and dinner.    - Moderate CHO diet.    - Continue lantus 24 units at bedtime.    - Hold novolog 14 units with lunch and dinner given patient not eating at this time. Reevaluate need for novolog in the morning.    - Medium sliding scale insulin.    5/27/2020 -- continued poor intake due to encephalopathy, patient hypoglycemic overnight - decreasing lantus to 12u daily.  changed IVF to D5 1/2 NS, reassess once intake improves.  - 5/28/19, glucose borderline and patient not eating.  Discontinue lantus but continue ISS.         Lumbar Radiculopathy   Neuropathy  5/26/20 -- Previously diagnosed. Managed with gabapentin 100 mg at bedtime.    - Hold for now.   5/27/2020 -- no apparent pain.  Reassess once mental status improves or if appears to be  having pain.       Hypothyroidism  Chronic. Normal TSH of 3.9 on presentation. Managed with levothyroxine 88 mcg daily except for Sunday (1/2 tablet).   - Patient refusing PO, change to IV levothyroxine 37.5 mcg daily     Gastroesophageal reflux disease  Chronic. Managed with omeprazole 20 mg TWICE DAILY  - Change to IV protonix.    Restless Leg Syndrome  5/26/20 -- Chronic. Managed with pramipexole 0.5 mg at bedtime and PRN every 8 hours.   - Hold pramipexole for now.   5/27/2020 -- reassess once mental status improves.         RC  On CPAP at assisted living facility, follows with sleep medicine.     Rectal Cancer  Follows with Dr. Martinez, diagnosed in 2011, underwent neoadjuvant chemoradiation, resection with reanastomosis. Follows yearly with oncology, last seen 9/2019.  - Continue outpatient surveillance.    CODE STATUS: Full Code     DIET: Moderate CHO diet with honey thick liquids    Prophylaxis: On Eliquis    Lines: PIV  Restraints: Continue soft restraints for patient and staff safety and for pulling on lines etc       Disposition  Anticipate at least 2 more days inpatient.       Ernesto Mckenna    Interval History   The patient remains impulsive and is not redirectable.  She is pulling at lines and calling out.  She is attempting to get out of bed repeatedly.  She does not answer questions appropriately or follow directions.    -Data reviewed today: I reviewed all new labs and imaging results over the last 24 hours. I personally reviewed no images or EKG's today.    Physical Exam   Temp: 97.1  F (36.2  C) Temp src: Axillary BP: (!) 96/39 Pulse: 58 Heart Rate: 59 Resp: 15 SpO2: 94 % O2 Device: None (Room air)    Vitals:    05/26/20 2000 05/27/20 0615 05/28/20 0600   Weight: 86.1 kg (189 lb 13.1 oz) 85.5 kg (188 lb 7.9 oz) 83.5 kg (184 lb 1.4 oz)     Vital Signs with Ranges  Temp:  [97.1  F (36.2  C)-98.1  F (36.7  C)] 97.1  F (36.2  C)  Pulse:  [50-89] 58  Heart Rate:  [52-88] 59  Resp:  [12-38] 15  BP:  ()/(37-88) 96/39  SpO2:  [91 %-100 %] 94 %  I/O last 3 completed shifts:  In: 1390 [I.V.:1390]  Out: -     Gen: Well nourished, disheveled female, alert and oriented x 0, agitated  HEENT: Atraumatic, normocephalic; sclera non-injected, anicterric; oral mucosa moist, no lesion, no exudate  Lungs: Clear to ausculation, no wheezes, no rhonchi, no rales  Heart: Regular rate, regular rhythm, no gallops, no rubs, no murmurs  GI: Bowel sound normal, no hepatosplenomegaly, no masses, non-tender, non-distended, no guarding, no rebound tenderness  Lymph: No lymphadenopathy, no edema  Skin: No rashes, no chronic venous stasis     Medications     dextrose 5% and 0.45% NaCl 75 mL/hr at 05/28/20 1100     - MEDICATION INSTRUCTIONS -         apixaban ANTICOAGULANT  5 mg Oral BID     aspirin  81 mg Oral Daily     cefTRIAXone  1 g Intravenous Q24H     levothyroxine  88 mcg Oral Once per day on Mon Tue Wed Thu Fri Sat    And     [START ON 5/31/2020] levothyroxine  44 mcg Oral Weekly     metoprolol succinate ER  12.5 mg Oral Daily     insulin glargine  12 Units Subcutaneous At Bedtime     omeprazole  20 mg Oral BID     simvastatin  40 mg Oral Daily     sodium chloride (PF)  3 mL Intracatheter Q8H       Data   Recent Labs   Lab 05/28/20  0558 05/27/20  0527 05/26/20  0605   WBC 4.7 4.8 6.1   HGB 12.2 12.3 14.4   MCV 92 96 92    195 292   INR  --   --  1.87*    145* 142   POTASSIUM 3.7 3.6 4.9   CHLORIDE 114* 117* 110*   CO2 25 22 24   BUN 22 30 39*   CR 1.41* 1.53* 1.98*   ANIONGAP 5 6 8   DWIGHT 8.1* 8.1* 9.7   * 68* 130*   ALBUMIN 2.2*  --  2.8*   PROTTOTAL 5.9*  --  7.8   BILITOTAL 0.5  --  0.7   ALKPHOS 100  --  128   ALT 24  --  26   AST 38  --  57*   TROPI  --   --  <0.015       No results found for this or any previous visit (from the past 24 hour(s)).

## 2020-05-28 NOTE — PROGRESS NOTES
CARE TRANSITIONS PROGRESS NOTE:    Reason for Follow up: Discharge planning.  Per therapies pt may need a TCU upon discharge vs being able to return to [facility].    Sw placed call to pt's family, Crystal and discuss discharge planning.  Crystal is hopeful that the pt will be able to return to [facility], but also understands that pt may need TCU cares.    Crystal requested that referrals be sent to:  1.  [facility] (Phone: [phone] Admissions: [phone] Fax: [phone])  2.  [facility] (Admissions Phone: [phone] Main Phone: [phone] Fax: [phone])  3.  [facility] (Phone: [phone] Fax: [phone])    SW sent referrals to above agencies for TCU cares. ANTONINO notified by admissions staff that pt must be off mits and no behaviors for TCU to accept for cares.       Anticipated discharge needs: TCU vs return to [facility].  Awaiting responses from above facilities & ongoing PT/OT recommendations.    Next steps: CTS to follow    Discharge Planner   Discharge Plans in progress: Return to [facility] vs TCU  Barriers to discharge plan: medical stability  Follow up plan: CTS to follow       Entered by: Gina Nayak 05/28/2020 1:14 PM       ARIELLA Tyson  [facility] [phone]   [facility]  [phone]

## 2020-05-28 NOTE — PROGRESS NOTES
05/28/20 0856   Quick Adds   Type of Visit Initial Occupational Therapy Evaluation   Living Environment   Lives With facility resident   Living Arrangements assisted living   Home Accessibility no concerns   Transportation Anticipated family or friend will provide   Self-Care   Usual Activity Tolerance fair   Current Activity Tolerance poor   Regular Exercise No   Equipment Currently Used at Home walker, rolling;wheelchair, manual;shower chair;grab bar, toilet;grab bar, tub/shower   Functional Level   Ambulation 3-->assistive equipment and person   Transferring 3-->assistive equipment and person   Toileting 3-->assistive equipment and person   Bathing 3-->assistive equipment and person   Dressing 2-->assistive person   Eating 0-->independent   Communication 0-->understands/communicates without difficulty   Swallowing 0-->swallows foods/liquids without difficulty   Cognition 1 - attention or memory deficits   Fall history within last six months yes   Number of times patient has fallen within last six months 2  (per chart review)   Which of the above functional risks had a recent onset or change? none   Prior Functional Level Comment Unable to gather subjective information d/t confusion. Per chart review pt is able to complete ADLs and functional mobility using FWW with Ax1. Is able to transfer independently but declines to do so.    General Information   Onset of Illness/Injury or Date of Surgery - Date 05/26/20   Referring Physician Prince   Patient/Family Goals Statement Pt unable to state d/t significant confusion    Additional Occupational Profile Info/Pertinent History of Current Problem Stefanie Velazquez is a 75 year old female admitted on 5/26/2020. She has history of coronary artery disease, hypertension, hyperlipidemia, DVT, type 2 diabetes, chronic back pain, hypothyroidism, rectal cancer, GERD, restless leg syndrome and RC. She presents with confusion.   Precautions/Limitations fall precautions  "  Cognitive Status Examination   Cognitive Comment does not answer orientation, however does ask at one point \"am I still in the hospital\". Very distractible throughout. Asking for her sister. Falls asleep frequently during eval.    Pain Assessment   Patient Currently in Pain   (no pain behaviors noted )   Range of Motion (ROM)   ROM Comment unable to assess d/t confusion. does appear to have at least 90* shoulder FF    Strength   Strength Comments unable to assess d/t confusion.    Mobility   Bed Mobility Comments Max Ax1 for supine to sit. Max Ax2 for sit to supine. Sat EOB x2 min in attempt to particpiate in OOB activity. Pt not appropriate for OOB activity as unable to follow simple commands and is currently Max Ax1 to sit upright seated.    Upper Body Dressing   Level of [place]: Dress Upper Body maximum assist (25% patients effort)   Physical Assist/Nonphysical Assist: Dress Upper Body 1 person assist   Lower Body Dressing   Level of [place]: Dress Lower Body maximum assist (25% patients effort)   Physical Assist/Nonphysical Assist: Dress Lower Body 2 person assist   Instrumental Activities of Daily Living (IADL)   IADL Comments facility completes   Activities of Daily Living Analysis   Impairments Contributing to Impaired Activities of Daily Living balance impaired;cognition impaired;strength decreased   General Therapy Interventions   Planned Therapy Interventions ADL retraining;strengthening;progressive activity/exercise   Clinical Impression   Criteria for Skilled Therapeutic Interventions Met yes, treatment indicated   OT Diagnosis decreased independence with ADLs   Influenced by the following impairments confusion, weakness    Assessment of Occupational Performance 3-5 Performance Deficits   Identified Performance Deficits dressing, toileting, bathing, functional mobility    Clinical Decision Making (Complexity) Low complexity   Therapy Frequency Daily   Predicted Duration of Therapy " "Intervention (days/wks) 3-4 days   Anticipated Discharge Disposition Transitional Care Facility   Risks and Benefits of Treatment have been explained. Yes   Patient, Family & other staff in agreement with plan of care Yes   [facility] AM-PAC  \"6 Clicks\" Daily Activity Inpatient Short Form   1. Putting on and taking off regular lower body clothing? 1 - Total   2. Bathing (including washing, rinsing, drying)? 1 - Total   3. Toileting, which includes using toilet, bedpan or urinal? 1 - Total   4. Putting on and taking off regular upper body clothing? 2 - A Lot   5. Taking care of personal grooming such as brushing teeth? 2 - A Lot   6. Eating meals? 3 - A Little   Daily Activity Raw Score (Score out of 24.Lower scores equate to lower levels of function) 10   Total Evaluation Time   Total Evaluation Time (Minutes) 10     "

## 2020-05-28 NOTE — PLAN OF CARE
Problem: UTI (Urinary Tract Infection)  Goal: Improved Infection Symptoms     Pt remains on antibiotics, WBC improving.    Problem: Adult Inpatient Plan of Care  Goal: Plan of Care Review    Transferred patient to the chair, after being in the chair for a while noted that BP dropped a bit ~70s-80s/30s, 02 decreased momentarily as well.  Got patient back to bed, BP improved, 121/64.  Offered PO, patient holding sippy cup, has yet to take in PO; refusing assistance.

## 2020-05-28 NOTE — PROGRESS NOTES
Pt constantly requesting coffee. Attempted to give pt coffee with a straw and with a cover multiple times by multiple people today but pt refuses, says she wants coffee with no cover but when attempted, pt spills all over herself and cant hold it steadyt. Again hitting staff in the arms and thrashing in bed. Reorientation and calming measures attempted. Mitts remain on for safety.

## 2020-05-29 ENCOUNTER — AMBULATORY - HEALTHEAST (OUTPATIENT)
Dept: OTHER | Facility: CLINIC | Age: 75
End: 2020-05-29

## 2020-05-29 ENCOUNTER — DOCUMENTATION ONLY (OUTPATIENT)
Dept: OTHER | Facility: CLINIC | Age: 75
End: 2020-05-29

## 2020-05-29 ENCOUNTER — MEDICAL CORRESPONDENCE (OUTPATIENT)
Dept: HEALTH INFORMATION MANAGEMENT | Facility: CLINIC | Age: 75
End: 2020-05-29

## 2020-05-29 LAB
ANION GAP SERPL CALCULATED.3IONS-SCNC: 5 MMOL/L (ref 3–14)
BUN SERPL-MCNC: 22 MG/DL (ref 7–30)
CALCIUM SERPL-MCNC: 8 MG/DL (ref 8.5–10.1)
CHLORIDE SERPL-SCNC: 112 MMOL/L (ref 94–109)
CO2 SERPL-SCNC: 22 MMOL/L (ref 20–32)
CREAT SERPL-MCNC: 1.42 MG/DL (ref 0.52–1.04)
GFR SERPL CREATININE-BSD FRML MDRD: 35 ML/MIN/{1.73_M2}
GLUCOSE SERPL-MCNC: 114 MG/DL (ref 70–99)
POTASSIUM SERPL-SCNC: 3.7 MMOL/L (ref 3.4–5.3)
SODIUM SERPL-SCNC: 139 MMOL/L (ref 133–144)

## 2020-05-29 PROCEDURE — 25000128 H RX IP 250 OP 636: Performed by: INTERNAL MEDICINE

## 2020-05-29 PROCEDURE — 25000125 ZZHC RX 250: Performed by: INTERNAL MEDICINE

## 2020-05-29 PROCEDURE — 80048 BASIC METABOLIC PNL TOTAL CA: CPT | Performed by: INTERNAL MEDICINE

## 2020-05-29 PROCEDURE — 25000132 ZZH RX MED GY IP 250 OP 250 PS 637: Performed by: INTERNAL MEDICINE

## 2020-05-29 PROCEDURE — C9113 INJ PANTOPRAZOLE SODIUM, VIA: HCPCS | Performed by: INTERNAL MEDICINE

## 2020-05-29 PROCEDURE — 25800025 ZZH RX 258: Performed by: INTERNAL MEDICINE

## 2020-05-29 PROCEDURE — 36415 COLL VENOUS BLD VENIPUNCTURE: CPT | Performed by: INTERNAL MEDICINE

## 2020-05-29 PROCEDURE — 99232 SBSQ HOSP IP/OBS MODERATE 35: CPT | Performed by: INTERNAL MEDICINE

## 2020-05-29 PROCEDURE — 12000011 ZZH R&B MS OVERFLOW

## 2020-05-29 RX ORDER — HALOPERIDOL 5 MG/ML
5 INJECTION INTRAMUSCULAR EVERY 6 HOURS PRN
Status: DISCONTINUED | OUTPATIENT
Start: 2020-05-29 | End: 2020-06-05

## 2020-05-29 RX ORDER — HALOPERIDOL 5 MG/ML
5 INJECTION INTRAMUSCULAR ONCE
Status: COMPLETED | OUTPATIENT
Start: 2020-05-29 | End: 2020-05-29

## 2020-05-29 RX ORDER — QUETIAPINE FUMARATE 25 MG/1
50 TABLET, FILM COATED ORAL AT BEDTIME
Status: DISCONTINUED | OUTPATIENT
Start: 2020-05-29 | End: 2020-06-01

## 2020-05-29 RX ORDER — QUETIAPINE FUMARATE 25 MG/1
25 TABLET, FILM COATED ORAL 2 TIMES DAILY
Status: DISCONTINUED | OUTPATIENT
Start: 2020-05-29 | End: 2020-06-01

## 2020-05-29 RX ADMIN — HALOPERIDOL LACTATE 5 MG: 5 INJECTION INTRAMUSCULAR at 12:46

## 2020-05-29 RX ADMIN — DEXTROSE AND SODIUM CHLORIDE: 5; 450 INJECTION, SOLUTION INTRAVENOUS at 12:48

## 2020-05-29 RX ADMIN — QUETIAPINE 25 MG: 25 TABLET ORAL at 13:18

## 2020-05-29 RX ADMIN — PANTOPRAZOLE SODIUM 40 MG: 40 INJECTION, POWDER, LYOPHILIZED, FOR SOLUTION INTRAVENOUS at 09:01

## 2020-05-29 RX ADMIN — ENOXAPARIN SODIUM 80 MG: 80 INJECTION SUBCUTANEOUS at 08:55

## 2020-05-29 RX ADMIN — QUETIAPINE 25 MG: 25 TABLET ORAL at 08:57

## 2020-05-29 RX ADMIN — RIVASTIGMINE 1 PATCH: 4.6 PATCH TRANSDERMAL at 16:45

## 2020-05-29 RX ADMIN — ASPIRIN 300 MG: 300 SUPPOSITORY RECTAL at 08:57

## 2020-05-29 RX ADMIN — ENOXAPARIN SODIUM 80 MG: 80 INJECTION SUBCUTANEOUS at 20:57

## 2020-05-29 RX ADMIN — LEVOTHYROXINE SODIUM 37.5 MCG: 20 INJECTION, SOLUTION INTRAVENOUS at 08:53

## 2020-05-29 RX ADMIN — CEFEPIME 2 G: 2 INJECTION, POWDER, FOR SOLUTION INTRAVENOUS at 15:59

## 2020-05-29 ASSESSMENT — ACTIVITIES OF DAILY LIVING (ADL)
ADLS_ACUITY_SCORE: 32

## 2020-05-29 ASSESSMENT — MIFFLIN-ST. JEOR: SCORE: 1322.75

## 2020-05-29 NOTE — PROGRESS NOTES
Patient continues to be in constant motion and repeatedly calling out.  She is calling out mostly about [place].  She is non-redirectable and agitated with staff.  Refusing cares, hitting staff, and screaming when we come near her.

## 2020-05-29 NOTE — PROGRESS NOTES
No change in yelling, removing clothing, and constant motion.  MD paged to make aware of patient status

## 2020-05-29 NOTE — PLAN OF CARE
OT: Per discussion in rounds pt not appropriate for therapy today d/t cognition. Pt not following commands and resistive to cares. Will attempt tomorrow as appropriate.

## 2020-05-29 NOTE — PROGRESS NOTES
[facility]    Hospitalist Progress Note    Date of Service (when I saw the patient): 05/29/2020    Assessment & Plan   Stefanie Velazquez is a 75 year old female admitted on 5/26/2020. She has history of coronary artery disease, hypertension, hyperlipidemia, DVT, type 2 diabetes, chronic back pain, hypothyroidism, rectal cancer, GERD, restless leg syndrome and RC. She presents with confusion.     Acute encephalopathy, suspect due to UTI and compounded by underlying medications, hypoglycemia and likely underlying dementia, possibly Lewey Body as below.   5/26/20 -- 3 days of reported confusion at nursing home with question of slurred speech and possible right sided weakness. Labs show elevated lactic of 3.4, elevated creatinine, normal WBC, normal CRP, normal troponin, normal TSH, normal VBG. CT head shows no acute findings. CT chest/abomdomen pelvis shows lung nodules as well as adnexal fluid collection. UA shows 11 WBC, small leukocyte esterase, and few bacteria. Vitals show tachycardic, initially up to the 150s in the ED with atrial fibrillation noted on monitor, briefly placed on diltiazem drip and converted to NSR though again tachycardic with agitation.  Recent admission 4/7 - 4/13 as discussed below for similar presentation, ultimately no clear etiology though possibly related to mild temporal CVA seen on imaging. Blood cultures pending. Urine culture pending. Differential includes dementia (lewy-body) vs medication effects vs dehydration vs other.   - After dicussion with ED provider and attending provider, patient would likely benefit from admission to facility where neurology and psychiatry were available for further evaluation.    - Patient will be admitted to [facility] for further work up and evaluation, consider transferring tomorrow for further work up with neurology and psychiatry.   - Continue to monitor.     - Ativan PRN for agitation.   5/27/2020 -- minimal change, but  "with positive urine culture suspect this is the acute trigger for her encephalopathy.  Treating UTI as below.   Holding gabapentin and mirapex.  B12, folate and ammonia normal. Continue ativan prn, this is more effective if this is in fact lewey body dementia regardless.  Treating diabetes as below.   If not improving over the next 1-2 days may need inpatient transfer for neurology consultation for Lewey body dementia vs other etiology, if does not return to baseline with treatment of UTI may need at least outpatient referral to neurology.  Could consider aricept if not improving.    - Start rivastigmine patch 4.6 mg/day on 5/28  - Patient hemodynamically stable and agitated due to monitoring.  Transfer patient to Med/Surg on 5/28.  - On 5/29, patient remains combative and confused.  Patient refusing all medications by mouth.  Give IV haldol 5 mg, then start Seroquel 25 mg bid and 50 mg at bedtime.  OK to repeat haldol if patient refusing oral Seroquel.     COVID Rule Out  Due to concerns of unclear etiology of above and residence in nursing home, COVID test sent. CT chest as above shows \"Scattered tiny indeterminate lung nodules, possibly infectious or Inflammatory.\" Which have previously been seen, at that time COVID testing was performed and negative.   - COVID result, 5/26/2020: negative    - No need for special precautions at this time.      UTI with Elevated Lactic Acid and likely encephalopathy due to this   5/26/20 -- Lactic 3.4 on presentation, corrected with IVF. UA mildly abnormal though CRP normal, WBC normal. Given encephalopathy of unclear origin and concern for possible infectious cause covered broadly with IV vancomycin and zosyn. Unclear if infection is contributing.    - Zosyn and Vanco discontinued on 5/26   - Urine culture growing NLF GNR. awaiting speciation   - Started on Rocephin on 5/27   - Blood cultures NGTD   - Due to resistance change abx to Cefepime on 5/28     TITA on CKD  5/26/20 -- " Creatinine 1.98, GFR 24 on presentation. Baseline quite variable over past few years, likely 1.3-1.6 though has been up closer to 1.8-2 at times. Appears to be above baseline presently, possible TITA due to recent confusion at nursing home. Patient given 1.5 L NS in ED.        - Check CMP in AM.    - Continue IVF.    - Avoid nephrotoxins.   5/27/2020 -- creatinine down to 1.5, baseline has fluctuated quite a bit so hard to say for sure but I think this is now pretty much baseline - slowing IVF to 75/h D5 1/2 as below   - Cr improved to 1.41 on 5/28.  Recheck BMP in AM  - Cr stable at 1.42 on 5/29, repeat BMP in AM     Coronary Artery Disease  History of 3 vessel CABG in 2002. Unable to tolerate ACEi or beta blocker due to hypotension. Managed on aspirin/statin.   - Change to aspirin suppository as patient refusing PO meds     History of Hypertension  5/26/20 -- Patient managed prior to admission with Lasix 20 mg BID.  Hold lasix for now given TITA.  5/27/2020 -- blood pressure doing well, follow     Hyperlipidemia  Chronic. Managed with simvastatin 40 mg per day   - Discontinue simvastatin as patient has been refusing     History of DVT  Managed chronically on Eliquis 5 mg BID.   - Patient refusing PO meds, change to enoxaparin 1 mg/kg bid     Diabetes Mellitus, Type II  5/26/20 -- Chronic.  Last a1C 6.9% on 3/2020. Managed at home with Lantus 24 units at bedtime and novolog 14 units with lunch and dinner.    - Moderate CHO diet.    - Continue lantus 24 units at bedtime.    - Hold novolog 14 units with lunch and dinner given patient not eating at this time. Reevaluate need for novolog in the morning.    - Medium sliding scale insulin.    5/27/2020 -- continued poor intake due to encephalopathy, patient hypoglycemic overnight - decreasing lantus to 12u daily.  changed IVF to D5 1/2 NS, reassess once intake improves.  - 5/28/19, glucose borderline and patient not eating.  Discontinue lantus but continue ISS.      Lumbar  Radiculopathy   Neuropathy  5/26/20 -- Previously diagnosed. Managed with gabapentin 100 mg at bedtime.    - Hold for now.   5/27/2020 -- no apparent pain.  Reassess once mental status improves or if appears to be having pain.       Hypothyroidism  Chronic. Normal TSH of 3.9 on presentation. Managed with levothyroxine 88 mcg daily except for Sunday (1/2 tablet).   - Patient refusing PO, change to IV levothyroxine 37.5 mcg daily     Gastroesophageal reflux disease  Chronic. Managed with omeprazole 20 mg TWICE DAILY  - Change to IV protonix.    Restless Leg Syndrome  5/26/20 -- Chronic. Managed with pramipexole 0.5 mg at bedtime and PRN every 8 hours.   - Hold pramipexole for now.   5/27/2020 -- reassess once mental status improves.         RC  On CPAP at assisted living facility, follows with sleep medicine.     Rectal Cancer  Follows with Dr. Martinez, diagnosed in 2011, underwent neoadjuvant chemoradiation, resection with reanastomosis. Follows yearly with oncology, last seen 9/2019.  - Continue outpatient surveillance.    CODE STATUS: Full Code     DIET: Moderate CHO diet with honey thick liquids    Prophylaxis: On Eliquis    Lines: PIV  Restraints: Continue soft restraints for patient and staff safety and for pulling on lines etc       Disposition  Anticipate at least 2 more days inpatient.       Ernesto Mckenna    Interval History   The patient combative with cares and calling out when disturbed.  Otherwise sleeping in bed.    -Data reviewed today: I reviewed all new labs and imaging results over the last 24 hours. I personally reviewed no images or EKG's today.    Physical Exam   Temp: 97.4  F (36.3  C) Temp src: Axillary BP: 121/58 Pulse: 73 Heart Rate: 73 Resp: 20 SpO2: 97 % O2 Device: None (Room air)    Vitals:    05/27/20 0615 05/28/20 0600 05/29/20 0601   Weight: 85.5 kg (188 lb 7.9 oz) 83.5 kg (184 lb 1.4 oz) 82.1 kg (181 lb)     Vital Signs with Ranges  Temp:  [97  F (36.1  C)-97.4  F (36.3  C)] 97.4   F (36.3  C)  Pulse:  [60-79] 73  Heart Rate:  [60-73] 73  Resp:  [20-24] 20  BP: (121-127)/(56-63) 121/58  SpO2:  [94 %-97 %] 97 %  I/O last 3 completed shifts:  In: 1725 [I.V.:1725]  Out: -     Gen: Well nourished, disheveled female, alert and oriented x 0, somnolent  HEENT: Atraumatic, normocephalic; sclera non-injected, anicterric; oral mucosa moist, no lesion, no exudate  Lungs: Clear to ausculation, no wheezes, no rhonchi, no rales  Heart: Regular rate, regular rhythm, no gallops, no rubs, no murmurs  GI: Bowel sound normal, no hepatosplenomegaly, no masses, non-tender, non-distended, no guarding, no rebound tenderness  Lymph: No lymphadenopathy, no edema  Skin: No rashes, no chronic venous stasis     Medications     dextrose 5% and 0.45% NaCl 75 mL/hr at 05/29/20 1248     - MEDICATION INSTRUCTIONS -         aspirin  300 mg Rectal Daily     ceFEPIme (MAXIPIME) IV  2 g Intravenous Q24H     enoxaparin ANTICOAGULANT  1 mg/kg Subcutaneous Q12H     levothyroxine  37.5 mcg Intravenous Daily     pantoprazole (PROTONIX) IV  40 mg Intravenous Daily with breakfast     QUEtiapine  25 mg Oral BID     QUEtiapine  50 mg Oral At Bedtime     rivastigmine  1 patch Transdermal Daily     rivastigmine   Transdermal Q8H       Data   Recent Labs   Lab 05/29/20  0555 05/28/20  0558 05/27/20  0527 05/26/20  0605   WBC  --  4.7 4.8 6.1   HGB  --  12.2 12.3 14.4   MCV  --  92 96 92   PLT  --  187 195 292   INR  --   --   --  1.87*    144 145* 142   POTASSIUM 3.7 3.7 3.6 4.9   CHLORIDE 112* 114* 117* 110*   CO2 22 25 22 24   BUN 22 22 30 39*   CR 1.42* 1.41* 1.53* 1.98*   ANIONGAP 5 5 6 8   DWIGHT 8.0* 8.1* 8.1* 9.7   * 110* 68* 130*   ALBUMIN  --  2.2*  --  2.8*   PROTTOTAL  --  5.9*  --  7.8   BILITOTAL  --  0.5  --  0.7   ALKPHOS  --  100  --  128   ALT  --  24  --  26   AST  --  38  --  57*   TROPI  --   --   --  <0.015       No results found for this or any previous visit (from the past 24 hour(s)).

## 2020-05-29 NOTE — PLAN OF CARE
PT: Per discussion in rounds pt not appropriate for therapy today d/t cognition. Pt not following commands and resistive to cares. Will attempt tomorrow as appropriate.       Kallie Stevens  PT, DPT       5/29/2020   [address] [address]  [email or web address]  [email or web address]  Voicemail: [phone]

## 2020-05-29 NOTE — PROGRESS NOTES
Patient has finally slept from about midnight and is currently continuing to rest.  She is continuing to stir at times, but no longer yelling out and her body hasn't been in constant motion.  Was able to place her CPAP on her, which she has left in place for the most part.  Will continue to promote sleep to help decrease the risk or delirium.

## 2020-05-30 LAB
ALBUMIN SERPL-MCNC: 1.9 G/DL (ref 3.4–5)
ALP SERPL-CCNC: 94 U/L (ref 40–150)
ALT SERPL W P-5'-P-CCNC: 23 U/L (ref 0–50)
ANION GAP SERPL CALCULATED.3IONS-SCNC: 5 MMOL/L (ref 3–14)
ANION GAP SERPL CALCULATED.3IONS-SCNC: 7 MMOL/L (ref 3–14)
AST SERPL W P-5'-P-CCNC: 34 U/L (ref 0–45)
BASE DEFICIT BLDV-SCNC: 3.2 MMOL/L
BASOPHILS # BLD AUTO: 0.1 10E9/L (ref 0–0.2)
BASOPHILS NFR BLD AUTO: 1.3 %
BILIRUB SERPL-MCNC: 0.7 MG/DL (ref 0.2–1.3)
BUN SERPL-MCNC: 20 MG/DL (ref 7–30)
BUN SERPL-MCNC: 22 MG/DL (ref 7–30)
CALCIUM SERPL-MCNC: 7.7 MG/DL (ref 8.5–10.1)
CALCIUM SERPL-MCNC: 7.9 MG/DL (ref 8.5–10.1)
CHLORIDE SERPL-SCNC: 114 MMOL/L (ref 94–109)
CHLORIDE SERPL-SCNC: 119 MMOL/L (ref 94–109)
CO2 SERPL-SCNC: 21 MMOL/L (ref 20–32)
CO2 SERPL-SCNC: 25 MMOL/L (ref 20–32)
CREAT SERPL-MCNC: 1.54 MG/DL (ref 0.52–1.04)
CREAT SERPL-MCNC: 1.62 MG/DL (ref 0.52–1.04)
DIFFERENTIAL METHOD BLD: ABNORMAL
EOSINOPHIL # BLD AUTO: 0.3 10E9/L (ref 0–0.7)
EOSINOPHIL NFR BLD AUTO: 6.5 %
ERYTHROCYTE [DISTWIDTH] IN BLOOD BY AUTOMATED COUNT: 14 % (ref 10–15)
GFR SERPL CREATININE-BSD FRML MDRD: 30 ML/MIN/{1.73_M2}
GFR SERPL CREATININE-BSD FRML MDRD: 32 ML/MIN/{1.73_M2}
GLUCOSE BLDC GLUCOMTR-MCNC: 113 MG/DL (ref 70–99)
GLUCOSE BLDC GLUCOMTR-MCNC: 91 MG/DL (ref 70–99)
GLUCOSE BLDC GLUCOMTR-MCNC: 97 MG/DL (ref 70–99)
GLUCOSE SERPL-MCNC: 88 MG/DL (ref 70–99)
GLUCOSE SERPL-MCNC: 96 MG/DL (ref 70–99)
HCO3 BLDV-SCNC: 21 MMOL/L (ref 21–28)
HCT VFR BLD AUTO: 35.4 % (ref 35–47)
HGB BLD-MCNC: 11.3 G/DL (ref 11.7–15.7)
IMM GRANULOCYTES # BLD: 0 10E9/L (ref 0–0.4)
IMM GRANULOCYTES NFR BLD: 0.3 %
LYMPHOCYTES # BLD AUTO: 1 10E9/L (ref 0.8–5.3)
LYMPHOCYTES NFR BLD AUTO: 26.7 %
MAGNESIUM SERPL-MCNC: 1.8 MG/DL (ref 1.6–2.3)
MCH RBC QN AUTO: 29.6 PG (ref 26.5–33)
MCHC RBC AUTO-ENTMCNC: 31.9 G/DL (ref 31.5–36.5)
MCV RBC AUTO: 93 FL (ref 78–100)
MONOCYTES # BLD AUTO: 0.5 10E9/L (ref 0–1.3)
MONOCYTES NFR BLD AUTO: 13.1 %
NEUTROPHILS # BLD AUTO: 2 10E9/L (ref 1.6–8.3)
NEUTROPHILS NFR BLD AUTO: 52.1 %
NRBC # BLD AUTO: 0 10*3/UL
NRBC BLD AUTO-RTO: 0 /100
O2/TOTAL GAS SETTING VFR VENT: 21 %
PCO2 BLDV: 35 MM HG (ref 40–50)
PH BLDV: 7.39 PH (ref 7.32–7.43)
PHOSPHATE SERPL-MCNC: 2.8 MG/DL (ref 2.5–4.5)
PLATELET # BLD AUTO: 163 10E9/L (ref 150–450)
PO2 BLDV: 32 MM HG (ref 25–47)
POTASSIUM SERPL-SCNC: 3.6 MMOL/L (ref 3.4–5.3)
POTASSIUM SERPL-SCNC: 3.9 MMOL/L (ref 3.4–5.3)
PROT SERPL-MCNC: 5.6 G/DL (ref 6.8–8.8)
RBC # BLD AUTO: 3.82 10E12/L (ref 3.8–5.2)
SODIUM SERPL-SCNC: 144 MMOL/L (ref 133–144)
SODIUM SERPL-SCNC: 147 MMOL/L (ref 133–144)
WBC # BLD AUTO: 3.8 10E9/L (ref 4–11)

## 2020-05-30 PROCEDURE — 83735 ASSAY OF MAGNESIUM: CPT | Performed by: INTERNAL MEDICINE

## 2020-05-30 PROCEDURE — 85025 COMPLETE CBC W/AUTO DIFF WBC: CPT | Performed by: INTERNAL MEDICINE

## 2020-05-30 PROCEDURE — 80053 COMPREHEN METABOLIC PANEL: CPT | Performed by: INTERNAL MEDICINE

## 2020-05-30 PROCEDURE — 25800025 ZZH RX 258: Performed by: INTERNAL MEDICINE

## 2020-05-30 PROCEDURE — 25000128 H RX IP 250 OP 636: Performed by: INTERNAL MEDICINE

## 2020-05-30 PROCEDURE — 84100 ASSAY OF PHOSPHORUS: CPT | Performed by: INTERNAL MEDICINE

## 2020-05-30 PROCEDURE — 25000125 ZZHC RX 250: Performed by: INTERNAL MEDICINE

## 2020-05-30 PROCEDURE — 82803 BLOOD GASES ANY COMBINATION: CPT | Performed by: INTERNAL MEDICINE

## 2020-05-30 PROCEDURE — C9113 INJ PANTOPRAZOLE SODIUM, VIA: HCPCS | Performed by: INTERNAL MEDICINE

## 2020-05-30 PROCEDURE — 99232 SBSQ HOSP IP/OBS MODERATE 35: CPT | Performed by: INTERNAL MEDICINE

## 2020-05-30 PROCEDURE — 25800030 ZZH RX IP 258 OP 636: Performed by: INTERNAL MEDICINE

## 2020-05-30 PROCEDURE — 25000132 ZZH RX MED GY IP 250 OP 250 PS 637: Performed by: INTERNAL MEDICINE

## 2020-05-30 PROCEDURE — 12000011 ZZH R&B MS OVERFLOW

## 2020-05-30 PROCEDURE — 36415 COLL VENOUS BLD VENIPUNCTURE: CPT | Performed by: INTERNAL MEDICINE

## 2020-05-30 PROCEDURE — 80048 BASIC METABOLIC PNL TOTAL CA: CPT | Performed by: INTERNAL MEDICINE

## 2020-05-30 PROCEDURE — 00000146 ZZHCL STATISTIC GLUCOSE BY METER IP

## 2020-05-30 RX ORDER — SODIUM CHLORIDE 9 MG/ML
INJECTION, SOLUTION INTRAVENOUS CONTINUOUS
Status: DISCONTINUED | OUTPATIENT
Start: 2020-05-30 | End: 2020-05-31

## 2020-05-30 RX ADMIN — LORAZEPAM 1 MG: 2 INJECTION INTRAMUSCULAR; INTRAVENOUS at 23:27

## 2020-05-30 RX ADMIN — PANTOPRAZOLE SODIUM 40 MG: 40 INJECTION, POWDER, LYOPHILIZED, FOR SOLUTION INTRAVENOUS at 08:12

## 2020-05-30 RX ADMIN — LEVOTHYROXINE SODIUM 37.5 MCG: 20 INJECTION, SOLUTION INTRAVENOUS at 08:07

## 2020-05-30 RX ADMIN — RIVASTIGMINE 1 PATCH: 4.6 PATCH TRANSDERMAL at 16:54

## 2020-05-30 RX ADMIN — CEFEPIME 2 G: 2 INJECTION, POWDER, FOR SOLUTION INTRAVENOUS at 15:33

## 2020-05-30 RX ADMIN — HALOPERIDOL LACTATE 5 MG: 5 INJECTION INTRAMUSCULAR at 20:18

## 2020-05-30 RX ADMIN — LORAZEPAM 1 MG: 2 INJECTION INTRAMUSCULAR; INTRAVENOUS at 20:28

## 2020-05-30 RX ADMIN — ENOXAPARIN SODIUM 80 MG: 80 INJECTION SUBCUTANEOUS at 20:42

## 2020-05-30 RX ADMIN — QUETIAPINE 50 MG: 25 TABLET ORAL at 20:20

## 2020-05-30 RX ADMIN — SODIUM CHLORIDE 1000 ML: 9 INJECTION, SOLUTION INTRAVENOUS at 12:26

## 2020-05-30 RX ADMIN — HALOPERIDOL LACTATE 5 MG: 5 INJECTION INTRAMUSCULAR at 14:51

## 2020-05-30 RX ADMIN — QUETIAPINE 25 MG: 25 TABLET ORAL at 08:10

## 2020-05-30 RX ADMIN — SODIUM CHLORIDE: 9 INJECTION, SOLUTION INTRAVENOUS at 13:40

## 2020-05-30 RX ADMIN — ENOXAPARIN SODIUM 80 MG: 80 INJECTION SUBCUTANEOUS at 08:12

## 2020-05-30 RX ADMIN — QUETIAPINE 25 MG: 25 TABLET ORAL at 16:48

## 2020-05-30 RX ADMIN — ASPIRIN 300 MG: 300 SUPPOSITORY RECTAL at 08:13

## 2020-05-30 RX ADMIN — HALOPERIDOL LACTATE 5 MG: 5 INJECTION INTRAMUSCULAR at 08:43

## 2020-05-30 RX ADMIN — DEXTROSE AND SODIUM CHLORIDE: 5; 450 INJECTION, SOLUTION INTRAVENOUS at 02:12

## 2020-05-30 ASSESSMENT — ACTIVITIES OF DAILY LIVING (ADL)
ADLS_ACUITY_SCORE: 32

## 2020-05-30 NOTE — PROGRESS NOTES
Patient is lethargic with periods of restlessness, when having to be boosted in bed or had cares performed, becomes agitated, moving back and fourth in bed. The patient does not speak, follow commands or open her eyes. Per report from off going RN this is how patient was most of the day. NC applied to patient in middle of night d/t O2 dropping into the 70's, but recovering quickly without oxygen. Pt only took NC off twice this shift.

## 2020-05-30 NOTE — CONSULTS
CLINICAL NUTRITION SERVICES  -  ASSESSMENT NOTE      RECOMMENDATIONS FOR MD/PROVIDER TO ORDER:   -Recommend decreasing IVF once TF is started to prevent overhydration. IVF currently provides 2400 mL/day. Once TF is initiated, pt will be receiving 1090 mL free water through TF + water flushes. IVF+ TF = 3490 mL/day (1600 mL above pt's needs)     Recommendations Ordered by Registered Dietitian (RD):   None at this time, as tube has not been successfully placed yet.      Future/Additional Recommendations:   1) Unsure about intake PTA, and PO intake of 0% meals for past 4 days with significant wt loss over past 3 months, so pt is at risk for refeeding syndrome. Check K+, Mag, and Phos and replace if needed, prior to initiation of TF.   -RD asked pt's RN to order Mag and Phos to be checked today.    2) Recommend Isosource 1.5 delfino continuous via NG tube at goal rate of 50 mL/hr x 24 hrs (1200 mL/day). This will provide 1800 kcal, 81 g protein, 212 g CHO, 18.2 g fiber, and 912 mL free water.     3) Recommend water flushes of 60 mL Q 4 hrs     4) Recommend initiating at 20 mL/hr x 24 hrs. Recheck K+, Mag, and Phos and if WNL, advance to 30 mL/hr. Continue advancement of 10 mL Q 12 hrs until goal rate is reached.     5) Recommend stopping IVF to prevent overhydration. Once IVF is stopped, pt will need additional bolus water flushes of 200 mL TID (600 mL/day)    Total regimen at goal rate including water flushes provides: 1800 kcal (100% estimated needs), 81 g protein (100% needs), 212 g CHO, 18.2 g fiber, and 1872 mL free water (100% needs)    6) HOB >30 degrees     Malnutrition: Severe malnutrition  In Context of:  Acute on Chronic illness or disease     REASON FOR ASSESSMENT  Stefanie Velazquez is a 75 year old female seen by Registered Dietitian for Provider Order by Ernesto Mckenna MD- Registered Dietitian to Assess and Order TF per Medical Nutrition Therapy Protocol       NUTRITION HISTORY  Information obtained from EMR and  "ICU RN:  -Pt admitted for encephalopathy  -Hx of CAD, HTN, Hyperlipidemia, DVT, DM Type II, rectal CA (2011), GERD  -\"Acute encephalopathy suspect due to UTI and compounded by underlying medications, hypoglycemia and likely underlying dementia, possibly Lewey Body\"- per MD note today  -\"On 5/29, patient remains combative and confused.  Patient refusing all medications by mouth.\"...Will place feeding tube today and start tube feeds for enteral feeding.\"...\"Feeding tube placement unsuccessful due to patient combativeness.\"- per MD note today  -Spoke w/pt's nurse on the phone and she stated that the placement of an NG tube was unsuccessful today d/t pt's encephalopathy and combativeness. She is very lethargic and is refusing to swallow food and ONS. RN stated that TF placement will be re-attempted later today.   -Pt lives at an [facility]  -COVID negative  -TITA on CKD. Pt given 1.5 L NS in ED  -Pt takes Lasix for HTN at [facility]. This will be held d/t TITA.   -DM Type II managed at [facility] w/Lantus 24 units at HS and Novolog 14 units w/L and D. Last A1c in 3/2020 was 6.9%  -Pt has had poor oral intake since admission and episodes of hypoglycemia. Lantus has been discontinued but continuing on ISS  -Chronic GERD, managed at [facility] w/Omeprazole, on IV protonix now  -Rectal CA was dx in 2011 and completed chemoradiation and resection w/reanastomosis at that time. Follows yearly w/oncology.  -Confusion for 3 days PTA.   -Staff at [facility] assisted pt w/meals    CURRENT NUTRITION ORDERS  Diet Order:     Moderate Consistent Carbohydrate with Honey Thick Liquids    Current Intake/Tolerance:  \"not eating due to somnolence\"  -No intake recorded in flowsheets since admission 4 days ago    NUTRITION FOCUSED PHYSICAL ASSESSMENT FOR DIAGNOSING MALNUTRITION)  No:  RDs not completing NFPA per COVID pandemic nutrition policy           Obtained from Chart/Interdisciplinary Team:  No edema, \"Well nourished, disheveled female\"    ANTHROPOMETRICS  Height: 5' " "6\"  Weight: 180 lbs 15.96 oz  Body mass index is 29.21 kg/m .  Weight Status:  Overweight BMI 25-29.9  IBW: 130 lb/59 kg  % IBW: 138%  Weight History:   Wt Readings from Last 10 Encounters:   05/29/20 82.1 kg (181 lb)   05/20/20 86.2 kg (190 lb)   05/14/20 86.2 kg (190 lb)   04/25/20 98.7 kg (217 lb 9.5 oz)   04/12/20 98.1 kg (216 lb 4.3 oz)   03/16/20 89.8 kg (198 lb)   01/03/20 91.2 kg (201 lb)   11/08/19 91.2 kg (201 lb)   09/27/19 92.7 kg (204 lb 6.4 oz)   09/23/19 91.2 kg (201 lb)   -Per chart above, pt has lost 20 lb/10.0% over past 4 months, which is clinically significant  -Suspected that wt gain in April was d/t fluid retention    LABS  5/30:  K 3.6  Chl 114 (H)  Creat 1.62 (H) --> TITA  GFR 30 (L) --> CKD  Ca 7.9 (L)  Glucose 113 (H), 96, 91    MEDICATIONS  Protonix  Seroquel  IVF NS @100 mL/hr continuous  -Noted pt was on IVF D5 and 0.45% NaCl @ 75 mL/hr continuous from 5/27 to 1223 today    ASSESSED NUTRITION NEEDS PER APPROVED PRACTICE GUIDELINES:    Dosing Weight 65 kg (adj wt)  Estimated Energy Needs: 4977-5708 kcals (25-30 Kcal/Kg)  Justification: overweight  Estimated Protein Needs: 78-98 grams protein (1.2-1.5 g pro/Kg)  Justification: Repletion, overweight, and CKD  Estimated Fluid Needs: 1 mL/Kcal  Justification: maintenance    MALNUTRITION:  % Weight Loss:  > 10% in 6 months (severe malnutrition)  % Intake:  </= 50% for >/= 5 days (severe malnutrition)  Subcutaneous Fat Loss:  Unable to evaluate  Muscle Loss:  Unable to evaluate  Fluid Retention:  None noted    Malnutrition Diagnosis: Severe malnutrition  In Context of:  Acute on Chronic illness or disease    NUTRITION DIAGNOSIS:  Inadequate oral intake related to confusion/dysphagia as evidenced by encephalopathy, PO intake of 0% for past 4 days and suspected poor PO PTA, significant wt loss of 20 lb/10.0% over past 4 months, currently on honey thick liquids and need for nutrition support      NUTRITION INTERVENTIONS  Recommendations / Nutrition " Prescription  1) Unsure about intake PTA, and PO intake of 0% meals for past 4 days with significant wt loss over past 3 months, so pt is at risk for refeeding syndrome. Check K+, Mag, and Phos and replace if needed, prior to initiation of TF.   -RD asked pt's RN to order Mag and Phos to be checked today.    2) Recommend Isosource 1.5 delfino continuous via NG tube at goal rate of 50 mL/hr (1200 mL/day). This will provide 1800 kcal, 81 g protein, 212 g CHO, 18.2 g fiber, and 912 mL free water.     3) Recommend water flushes of 60 mL Q 4 hrs     4) Recommend initiating at 20 mL/hr x 24 hrs. Recheck K+, Mag, and Phos and if WNL, advance to 30 mL/hr. Continue advancement of 10 mL every 12 hrs until goal rate is reached.     5) Recommend stopping IVF to prevent overhydration. Once IVF is stopped, pt will need additional bolus water flushes of and bolus flushes of 200 mL TID at 0800, 1200, and 2000 (600 mL/day)    Total regimen at goal rate including water flushes provides: 1800 kcal (100% estimated needs), 81 g protein (100% needs), 212 g CHO, 18.2 g fiber, and 1872 mL free water (100% needs)    Implementation  Nutrition education: No education needs assessed at this time      Nutrition Goals  1) Pt to tolerate initiation of TF    MONITORING AND EVALUATION:  Enteral access, Enteral Nutrition intake, Weight and Biochemical data (specifically glucose, K+, Phos, and Mag)          Caridad Tuttle RD, LD  Clinical Dietitian  [facility]: [phone]  [facility]: [phone]

## 2020-05-30 NOTE — PROGRESS NOTES
Pt has been intermittently awake, restless. Repetative with phrases. Did ask to go to the bathroom and asked to get out of bed. Both questions were appropriate to the situation.    Pt having difficulty making an attempt to drink out of a cup. Using a syringe to administer seroquel. Attempts at placing an NGT to give nutrition were unsuccessful as pt was quite agitated and entirely uncooperative. Unable to sedate pt as VS were unstable. MD notified.. IVF bolus given with improved VS.  No respiratory distress, has an occasional congested cough. Afebrile..

## 2020-05-30 NOTE — PLAN OF CARE
[place]  PT: PT will HOLD at this time. Will resume treatment when patient is medically stable and appropriate.

## 2020-05-30 NOTE — PROGRESS NOTES
[facility]    Hospitalist Progress Note    Date of Service (when I saw the patient): 05/30/2020    Assessment & Plan   Stefanie Velazquez is a 75 year old female admitted on 5/26/2020. She has history of coronary artery disease, hypertension, hyperlipidemia, DVT, type 2 diabetes, chronic back pain, hypothyroidism, rectal cancer, GERD, restless leg syndrome and RC. She presents with confusion.     Acute encephalopathy, suspect due to UTI and compounded by underlying medications, hypoglycemia and likely underlying dementia, possibly Lewey Body as below.   5/26/20 -- 3 days of reported confusion at nursing home with question of slurred speech and possible right sided weakness. Labs show elevated lactic of 3.4, elevated creatinine, normal WBC, normal CRP, normal troponin, normal TSH, normal VBG. CT head shows no acute findings. CT chest/abomdomen pelvis shows lung nodules as well as adnexal fluid collection. UA shows 11 WBC, small leukocyte esterase, and few bacteria. Vitals show tachycardic, initially up to the 150s in the ED with atrial fibrillation noted on monitor, briefly placed on diltiazem drip and converted to NSR though again tachycardic with agitation.  Recent admission 4/7 - 4/13 as discussed below for similar presentation, ultimately no clear etiology though possibly related to mild temporal CVA seen on imaging. Blood cultures pending. Urine culture pending. Differential includes dementia (lewy-body) vs medication effects vs dehydration vs other.   - After dicussion with ED provider and attending provider, patient would likely benefit from admission to facility where neurology and psychiatry were available for further evaluation.    - Patient will be admitted to [facility] for further work up and evaluation, consider transferring tomorrow for further work up with neurology and psychiatry.   - Continue to monitor.     - Ativan PRN for agitation.   5/27/2020 -- minimal change, but  "with positive urine culture suspect this is the acute trigger for her encephalopathy.  Treating UTI as below.   Holding gabapentin and mirapex.  B12, folate and ammonia normal. Continue ativan prn, this is more effective if this is in fact lewey body dementia regardless.  Treating diabetes as below.   If not improving over the next 1-2 days may need inpatient transfer for neurology consultation for Lewey body dementia vs other etiology, if does not return to baseline with treatment of UTI may need at least outpatient referral to neurology.  Could consider aricept if not improving.    - Start rivastigmine patch 4.6 mg/day on 5/28  - Patient hemodynamically stable and agitated due to monitoring.  Transfer patient to Med/Surg on 5/28.  - On 5/29, patient remains combative and confused.  Patient refusing all medications by mouth.  Give IV haldol 5 mg, then start Seroquel 25 mg bid and 50 mg at bedtime.  OK to repeat haldol if patient refusing oral Seroquel.   - Somnolent yesterday evening, sleeping for most of night.  AM of 5/30, remains somnolent.  Seroquel crushed and given by syringe to back of mouth.  Will place feeding tube today and start tube feeds for enteral feeding.  - Feeding tube placement unsuccessful due to patient combativeness.     COVID Rule Out  Due to concerns of unclear etiology of above and residence in nursing home, COVID test sent. CT chest as above shows \"Scattered tiny indeterminate lung nodules, possibly infectious or Inflammatory.\" Which have previously been seen, at that time COVID testing was performed and negative.   - COVID result, 5/26/2020: negative    - No need for special precautions at this time.      UTI with elevated lactic ccid and metabolic encephalopathy  5/26/20 -- Lactic 3.4 on presentation, corrected with IVF. UA mildly abnormal though CRP normal, WBC normal. Given encephalopathy of unclear origin and concern for possible infectious cause covered broadly with IV vancomycin and " zosyn. Unclear if infection is contributing.    - Zosyn and Vanco discontinued on 5/26   - Urine culture growing NLF GNR, Halfnia species   - Started on Rocephin on 5/27   - Blood cultures NGTD   - Due to resistance change abx to Cefepime on 5/28     TITA on CKD  5/26/20 -- Creatinine 1.98, GFR 24 on presentation. Baseline quite variable over past few years, likely 1.3-1.6 though has been up closer to 1.8-2 at times. Appears to be above baseline presently, possible TITA due to recent confusion at nursing home. Patient given 1.5 L NS in ED.        - Check CMP in AM.    - Continue IVF.    - Avoid nephrotoxins.   5/27/2020 -- creatinine down to 1.5, baseline has fluctuated quite a bit so hard to say for sure but I think this is now pretty much baseline - slowing IVF to 75/h D5 1/2 as below   - Cr improved to 1.41 on 5/28.  Recheck BMP in AM  - Cr stable at 1.42 on 5/29, repeat BMP in AM  - Cr up slightly to 1.62 on 5/30, repeat in AM     Coronary Artery Disease  History of 3 vessel CABG in 2002. Unable to tolerate ACEi or beta blocker due to hypotension. Managed on aspirin/statin.   - Change to aspirin suppository as patient refusing PO meds     History of Hypertension  5/26/20 -- Patient managed prior to admission with Lasix 20 mg BID.  Hold lasix for now given TITA.  5/27/2020 -- blood pressure doing well, follow     Hyperlipidemia  Chronic. Managed with simvastatin 40 mg per day   - Discontinue simvastatin as patient has been refusing     History of DVT  Managed chronically on Eliquis 5 mg BID.   - Patient refusing PO meds, change to enoxaparin 1 mg/kg bid     Diabetes Mellitus, Type II  5/26/20 -- Chronic.  Last a1C 6.9% on 3/2020. Managed at home with Lantus 24 units at bedtime and novolog 14 units with lunch and dinner.    - Moderate CHO diet.    - Continue lantus 24 units at bedtime.    - Hold novolog 14 units with lunch and dinner given patient not eating at this time. Reevaluate need for novolog in the morning.     - Medium sliding scale insulin.    5/27/2020 -- continued poor intake due to encephalopathy, patient hypoglycemic overnight - decreasing lantus to 12u daily.  changed IVF to D5 1/2 NS, reassess once intake improves.  - 5/28/19, glucose borderline and patient not eating.  Discontinue lantus but continue ISS.      Lumbar Radiculopathy   Neuropathy  5/26/20 -- Previously diagnosed. Managed with gabapentin 100 mg at bedtime.    - Hold for now.   5/27/2020 -- no apparent pain.  Reassess once mental status improves or if appears to be having pain.       Hypothyroidism  Chronic. Normal TSH of 3.9 on presentation. Managed with levothyroxine 88 mcg daily except for Sunday (1/2 tablet).   - Patient refusing PO, change to IV levothyroxine 37.5 mcg daily     Gastroesophageal reflux disease  Chronic. Managed with omeprazole 20 mg TWICE DAILY  - Change to IV protonix.    Restless Leg Syndrome  5/26/20 -- Chronic. Managed with pramipexole 0.5 mg at bedtime and PRN every 8 hours.   - Hold pramipexole for now.   5/27/2020 -- reassess once mental status improves.         RC  On CPAP at assisted living facility, follows with sleep medicine.     Rectal Cancer  Follows with Dr. Martinez, diagnosed in 2011, underwent neoadjuvant chemoradiation, resection with reanastomosis. Follows yearly with oncology, last seen 9/2019.  - Continue outpatient surveillance.    CODE STATUS: Full Code     DIET: Moderate CHO diet with honey thick liquids, but not eating due to somnolence.  Prophylaxis: On Eliquis    Lines: PIV  Restraints: Continue soft restraints for patient and staff safety and for pulling on lines etc       Disposition  Anticipate at least 2 more days inpatient.       Ernesto Mckenna    Interval History   The patient very somnolent, but combative with cares.  Remains confused.    -Data reviewed today: I reviewed all new labs and imaging results over the last 24 hours. I personally reviewed no images or EKG's today.    Physical Exam    Temp: 97.5  F (36.4  C) Temp src: Axillary BP: (!) 87/40 Pulse: (!) 48 Heart Rate: 98 Resp: 16 SpO2: 97 % O2 Device: None (Room air) Oxygen Delivery: 2 LPM  Vitals:    05/27/20 0615 05/28/20 0600 05/29/20 0601   Weight: 85.5 kg (188 lb 7.9 oz) 83.5 kg (184 lb 1.4 oz) 82.1 kg (181 lb)     Vital Signs with Ranges  Temp:  [97.5  F (36.4  C)-98.7  F (37.1  C)] 97.5  F (36.4  C)  Pulse:  [43-98] 48  Heart Rate:  [73-98] 98  Resp:  [16-20] 16  BP: ()/(40-78) 87/40  SpO2:  [94 %-100 %] 97 %  I/O last 3 completed shifts:  In: 4370 [I.V.:4370]  Out: 150 [Urine:150]    Gen: Well nourished, disheveled female, alert and oriented x 0, somnolent  HEENT: Atraumatic, normocephalic; sclera non-injected, anicterric; oral mucosa moist, no lesion, no exudate  Lungs: Clear to ausculation, no wheezes, no rhonchi, no rales  Heart: Regular rate, regular rhythm, no gallops, no rubs, no murmurs  GI: Bowel sound normal, no hepatosplenomegaly, no masses, non-tender, non-distended, no guarding, no rebound tenderness  Lymph: No lymphadenopathy, no edema  Skin: No rashes, no chronic venous stasis     Medications     dextrose 5% and 0.45% NaCl 75 mL/hr at 05/30/20 0900     - MEDICATION INSTRUCTIONS -         aspirin  300 mg Rectal Daily     ceFEPIme (MAXIPIME) IV  2 g Intravenous Q24H     enoxaparin ANTICOAGULANT  1 mg/kg Subcutaneous Q12H     levothyroxine  37.5 mcg Intravenous Daily     pantoprazole (PROTONIX) IV  40 mg Intravenous Daily with breakfast     QUEtiapine  25 mg Oral BID     QUEtiapine  50 mg Oral At Bedtime     rivastigmine  1 patch Transdermal Daily     rivastigmine   Transdermal Q8H       Data   Recent Labs   Lab 05/30/20  0500 05/29/20  0555 05/28/20  0558 05/27/20  0527 05/26/20  0605   WBC  --   --  4.7 4.8 6.1   HGB  --   --  12.2 12.3 14.4   MCV  --   --  92 96 92   PLT  --   --  187 195 292   INR  --   --   --   --  1.87*    139 144 145* 142   POTASSIUM 3.6 3.7 3.7 3.6 4.9   CHLORIDE 114* 112* 114* 117* 110*    CO2 25 22 25 22 24   BUN 22 22 22 30 39*   CR 1.62* 1.42* 1.41* 1.53* 1.98*   ANIONGAP 5 5 5 6 8   DWIGHT 7.9* 8.0* 8.1* 8.1* 9.7   GLC 96 114* 110* 68* 130*   ALBUMIN  --   --  2.2*  --  2.8*   PROTTOTAL  --   --  5.9*  --  7.8   BILITOTAL  --   --  0.5  --  0.7   ALKPHOS  --   --  100  --  128   ALT  --   --  24  --  26   AST  --   --  38  --  57*   TROPI  --   --   --   --  <0.015       No results found for this or any previous visit (from the past 24 hour(s)).

## 2020-05-30 NOTE — PROGRESS NOTES
"At approx. 0500, pt opened her eyes spontaneously, had taken off her gown and was stating, \"I'm cold, I'm cold.\" Writer and charge nurse put the gown back on the patient and covered her with warm blankets. The patient did not follow commands or respond to any questions asked.    "

## 2020-05-31 LAB
ALBUMIN SERPL-MCNC: 1.6 G/DL (ref 3.4–5)
ALP SERPL-CCNC: 83 U/L (ref 40–150)
ALT SERPL W P-5'-P-CCNC: 21 U/L (ref 0–50)
ANION GAP SERPL CALCULATED.3IONS-SCNC: 8 MMOL/L (ref 3–14)
AST SERPL W P-5'-P-CCNC: 34 U/L (ref 0–45)
BASOPHILS # BLD AUTO: 0 10E9/L (ref 0–0.2)
BASOPHILS NFR BLD AUTO: 1.3 %
BILIRUB SERPL-MCNC: 0.5 MG/DL (ref 0.2–1.3)
BUN SERPL-MCNC: 20 MG/DL (ref 7–30)
CALCIUM SERPL-MCNC: 7.6 MG/DL (ref 8.5–10.1)
CHLORIDE SERPL-SCNC: 121 MMOL/L (ref 94–109)
CO2 SERPL-SCNC: 19 MMOL/L (ref 20–32)
CREAT SERPL-MCNC: 1.49 MG/DL (ref 0.52–1.04)
DIFFERENTIAL METHOD BLD: ABNORMAL
EOSINOPHIL # BLD AUTO: 0.3 10E9/L (ref 0–0.7)
EOSINOPHIL NFR BLD AUTO: 8.8 %
ERYTHROCYTE [DISTWIDTH] IN BLOOD BY AUTOMATED COUNT: 14.2 % (ref 10–15)
GFR SERPL CREATININE-BSD FRML MDRD: 33 ML/MIN/{1.73_M2}
GLUCOSE BLDC GLUCOMTR-MCNC: 106 MG/DL (ref 70–99)
GLUCOSE BLDC GLUCOMTR-MCNC: 109 MG/DL (ref 70–99)
GLUCOSE BLDC GLUCOMTR-MCNC: 110 MG/DL (ref 70–99)
GLUCOSE BLDC GLUCOMTR-MCNC: 116 MG/DL (ref 70–99)
GLUCOSE BLDC GLUCOMTR-MCNC: 67 MG/DL (ref 70–99)
GLUCOSE BLDC GLUCOMTR-MCNC: 70 MG/DL (ref 70–99)
GLUCOSE BLDC GLUCOMTR-MCNC: 71 MG/DL (ref 70–99)
GLUCOSE SERPL-MCNC: 98 MG/DL (ref 70–99)
HCT VFR BLD AUTO: 34.5 % (ref 35–47)
HGB BLD-MCNC: 11.3 G/DL (ref 11.7–15.7)
IMM GRANULOCYTES # BLD: 0 10E9/L (ref 0–0.4)
IMM GRANULOCYTES NFR BLD: 0.3 %
LYMPHOCYTES # BLD AUTO: 1 10E9/L (ref 0.8–5.3)
LYMPHOCYTES NFR BLD AUTO: 31.8 %
MCH RBC QN AUTO: 29.8 PG (ref 26.5–33)
MCHC RBC AUTO-ENTMCNC: 32.8 G/DL (ref 31.5–36.5)
MCV RBC AUTO: 91 FL (ref 78–100)
MONOCYTES # BLD AUTO: 0.4 10E9/L (ref 0–1.3)
MONOCYTES NFR BLD AUTO: 13.8 %
NEUTROPHILS # BLD AUTO: 1.4 10E9/L (ref 1.6–8.3)
NEUTROPHILS NFR BLD AUTO: 44 %
NRBC # BLD AUTO: 0 10*3/UL
NRBC BLD AUTO-RTO: 0 /100
PLATELET # BLD AUTO: 142 10E9/L (ref 150–450)
POTASSIUM SERPL-SCNC: 3.2 MMOL/L (ref 3.4–5.3)
POTASSIUM SERPL-SCNC: 4.4 MMOL/L (ref 3.4–5.3)
PROT SERPL-MCNC: 5 G/DL (ref 6.8–8.8)
RBC # BLD AUTO: 3.79 10E12/L (ref 3.8–5.2)
SODIUM SERPL-SCNC: 148 MMOL/L (ref 133–144)
WBC # BLD AUTO: 3.2 10E9/L (ref 4–11)

## 2020-05-31 PROCEDURE — 25000128 H RX IP 250 OP 636: Performed by: INTERNAL MEDICINE

## 2020-05-31 PROCEDURE — 00000146 ZZHCL STATISTIC GLUCOSE BY METER IP

## 2020-05-31 PROCEDURE — 25800030 ZZH RX IP 258 OP 636: Performed by: INTERNAL MEDICINE

## 2020-05-31 PROCEDURE — 25800030 ZZH RX IP 258 OP 636: Performed by: FAMILY MEDICINE

## 2020-05-31 PROCEDURE — 12000011 ZZH R&B MS OVERFLOW

## 2020-05-31 PROCEDURE — 36415 COLL VENOUS BLD VENIPUNCTURE: CPT | Performed by: INTERNAL MEDICINE

## 2020-05-31 PROCEDURE — 99232 SBSQ HOSP IP/OBS MODERATE 35: CPT | Performed by: INTERNAL MEDICINE

## 2020-05-31 PROCEDURE — 85025 COMPLETE CBC W/AUTO DIFF WBC: CPT | Performed by: INTERNAL MEDICINE

## 2020-05-31 PROCEDURE — 80053 COMPREHEN METABOLIC PANEL: CPT | Performed by: INTERNAL MEDICINE

## 2020-05-31 PROCEDURE — 25800025 ZZH RX 258: Performed by: INTERNAL MEDICINE

## 2020-05-31 PROCEDURE — 84132 ASSAY OF SERUM POTASSIUM: CPT | Performed by: INTERNAL MEDICINE

## 2020-05-31 PROCEDURE — C9113 INJ PANTOPRAZOLE SODIUM, VIA: HCPCS | Performed by: INTERNAL MEDICINE

## 2020-05-31 PROCEDURE — 25000125 ZZHC RX 250: Performed by: INTERNAL MEDICINE

## 2020-05-31 PROCEDURE — 25000132 ZZH RX MED GY IP 250 OP 250 PS 637: Performed by: INTERNAL MEDICINE

## 2020-05-31 RX ORDER — POTASSIUM CHLORIDE 1500 MG/1
20-40 TABLET, EXTENDED RELEASE ORAL
Status: DISCONTINUED | OUTPATIENT
Start: 2020-05-31 | End: 2020-06-06 | Stop reason: HOSPADM

## 2020-05-31 RX ORDER — POTASSIUM CHLORIDE 1.5 G/1.58G
20-40 POWDER, FOR SOLUTION ORAL
Status: DISCONTINUED | OUTPATIENT
Start: 2020-05-31 | End: 2020-06-06 | Stop reason: HOSPADM

## 2020-05-31 RX ORDER — DEXTROSE MONOHYDRATE, SODIUM CHLORIDE, AND POTASSIUM CHLORIDE 50; 1.49; 4.5 G/1000ML; G/1000ML; G/1000ML
INJECTION, SOLUTION INTRAVENOUS CONTINUOUS
Status: DISCONTINUED | OUTPATIENT
Start: 2020-05-31 | End: 2020-06-05

## 2020-05-31 RX ORDER — MAGNESIUM SULFATE HEPTAHYDRATE 40 MG/ML
4 INJECTION, SOLUTION INTRAVENOUS EVERY 4 HOURS PRN
Status: DISCONTINUED | OUTPATIENT
Start: 2020-05-31 | End: 2020-06-06 | Stop reason: HOSPADM

## 2020-05-31 RX ORDER — POTASSIUM CL/LIDO/0.9 % NACL 10MEQ/0.1L
10 INTRAVENOUS SOLUTION, PIGGYBACK (ML) INTRAVENOUS DAILY PRN
Status: DISCONTINUED | OUTPATIENT
Start: 2020-05-31 | End: 2020-06-06 | Stop reason: HOSPADM

## 2020-05-31 RX ORDER — POTASSIUM CHLORIDE 29.8 MG/ML
20 INJECTION INTRAVENOUS
Status: DISCONTINUED | OUTPATIENT
Start: 2020-05-31 | End: 2020-06-06 | Stop reason: ALTCHOICE

## 2020-05-31 RX ORDER — POTASSIUM CHLORIDE 7.45 MG/ML
10 INJECTION INTRAVENOUS
Status: DISCONTINUED | OUTPATIENT
Start: 2020-05-31 | End: 2020-06-06 | Stop reason: HOSPADM

## 2020-05-31 RX ORDER — BISACODYL 10 MG
10 SUPPOSITORY, RECTAL RECTAL DAILY PRN
Status: DISCONTINUED | OUTPATIENT
Start: 2020-05-31 | End: 2020-06-06 | Stop reason: HOSPADM

## 2020-05-31 RX ADMIN — ENOXAPARIN SODIUM 80 MG: 80 INJECTION SUBCUTANEOUS at 19:41

## 2020-05-31 RX ADMIN — Medication 10 MEQ: at 14:30

## 2020-05-31 RX ADMIN — Medication 10 MEQ: at 18:26

## 2020-05-31 RX ADMIN — Medication 10 MEQ: at 16:43

## 2020-05-31 RX ADMIN — POTASSIUM CHLORIDE, DEXTROSE MONOHYDRATE AND SODIUM CHLORIDE: 150; 5; 450 INJECTION, SOLUTION INTRAVENOUS at 18:52

## 2020-05-31 RX ADMIN — SODIUM CHLORIDE: 9 INJECTION, SOLUTION INTRAVENOUS at 04:43

## 2020-05-31 RX ADMIN — BISACODYL 10 MG: 10 SUPPOSITORY RECTAL at 13:16

## 2020-05-31 RX ADMIN — CEFEPIME 2 G: 2 INJECTION, POWDER, FOR SOLUTION INTRAVENOUS at 17:30

## 2020-05-31 RX ADMIN — ASPIRIN 300 MG: 300 SUPPOSITORY RECTAL at 13:23

## 2020-05-31 RX ADMIN — PANTOPRAZOLE SODIUM 40 MG: 40 INJECTION, POWDER, LYOPHILIZED, FOR SOLUTION INTRAVENOUS at 08:16

## 2020-05-31 RX ADMIN — LORAZEPAM 1 MG: 2 INJECTION INTRAMUSCULAR; INTRAVENOUS at 23:45

## 2020-05-31 RX ADMIN — DEXTROSE MONOHYDRATE 25 ML: 500 INJECTION PARENTERAL at 04:19

## 2020-05-31 RX ADMIN — SODIUM PHOSPHATE 1 ENEMA: 7; 19 ENEMA RECTAL at 14:20

## 2020-05-31 RX ADMIN — HALOPERIDOL LACTATE 5 MG: 5 INJECTION INTRAMUSCULAR at 09:57

## 2020-05-31 RX ADMIN — Medication 10 MEQ: at 15:32

## 2020-05-31 RX ADMIN — RIVASTIGMINE 1 PATCH: 4.6 PATCH TRANSDERMAL at 17:31

## 2020-05-31 RX ADMIN — POTASSIUM CHLORIDE, DEXTROSE MONOHYDRATE AND SODIUM CHLORIDE: 150; 5; 450 INJECTION, SOLUTION INTRAVENOUS at 08:26

## 2020-05-31 RX ADMIN — QUETIAPINE 25 MG: 25 TABLET ORAL at 07:52

## 2020-05-31 RX ADMIN — LEVOTHYROXINE SODIUM 37.5 MCG: 20 INJECTION, SOLUTION INTRAVENOUS at 08:15

## 2020-05-31 RX ADMIN — QUETIAPINE 50 MG: 25 TABLET ORAL at 22:00

## 2020-05-31 RX ADMIN — QUETIAPINE 25 MG: 25 TABLET ORAL at 17:30

## 2020-05-31 RX ADMIN — ENOXAPARIN SODIUM 80 MG: 80 INJECTION SUBCUTANEOUS at 08:16

## 2020-05-31 ASSESSMENT — ACTIVITIES OF DAILY LIVING (ADL)
ADLS_ACUITY_SCORE: 36
ADLS_ACUITY_SCORE: 32
ADLS_ACUITY_SCORE: 32
ADLS_ACUITY_SCORE: 36
ADLS_ACUITY_SCORE: 32
ADLS_ACUITY_SCORE: 36

## 2020-05-31 NOTE — PROGRESS NOTES
Pt has been more wakeful today off and on. Confused with moments of clarity. Refusing anything orally. Incontinent of urine. Hard stool expelled after a suppository. Fleets given without any results. Did have bright red blood with the hard stool. Using a ceiling lift to get pt OOB. Pt is now up in a lounge chair, asleep.  Glucose levels improved with IVF change. Will continue with current plan of care..

## 2020-05-31 NOTE — PROGRESS NOTES
At approx. 2030, patient stated yelling out and trying to get out of bed. Pt was yelling that she had to pee and poop. Four nursing staff members assisted the patient to the commode with the lift. Pt had a medium BM and urinated. Pt was still agitated, trying to sit up and yelling out. Prn haldol and prn ativan administered. Writer tried to reorient patient and explain that she is in the hospital and pt did not appear to understand. Pt repositioned back into bed, cares performed and warm blankets given. Pt started to calm down after back in bed. Bed in low position, bed alarm on and frequent checks on patient.

## 2020-05-31 NOTE — PROGRESS NOTES
Nutrition Note    Per EMR, it is indicated that pt is still refusing meals d/t agitation and confusion. Since feeding tube placement has not been successful, nutrition supplements Magic Cup will be ordered BID and Boost made honey thick once per day.  RD will continue to monitor pt's nutrition status and adjust nutrition intervention as needed.      Caridad Tuttle RD, LD  Clinical Dietitian  [facility]: [phone]  [facility]: [phone]

## 2020-05-31 NOTE — PROGRESS NOTES
CARE TRANSITIONS PROGRESS NOTE:    Reason for Follow up: Discharge planning    Anticipated discharge needs: TBD.  Pt lives at [facility] (Phone: [phone] Fax:[phone]) and must be assist of 1 in order to return.    If TCU cares are needed, family requesting cares at the following centers:  1.   [facility] (Phone: [phone] Admissions: [phone] Fax: [phone])  2.  [facility] (Admissions Phone: [phone] Main Phone: [phone] Fax: [phone])  3.  [facility] (Phone: [phone] Fax: [phone])    Referrals were sent on 5-28-20.    Next steps: Awaiting medical stability    Discharge Planner   Discharge Plans in progress: TBD  Barriers to discharge plan: medical stability  Follow up plan: CTS to follow       Entered by: Gina Nayak 05/31/2020 8:07 AM       ARIELLA Tyson  [facility] [phone]   [facility]  [phone]

## 2020-05-31 NOTE — PROGRESS NOTES
Spoke with Nelida at pt's facility. [phone] to ask about pt's baseline. Nelida reports that Pt has been completely independent with ADL's.  Also, pt is alert, oriented and cooperative..Pt does not wear dentures at home, eats only soft foods. Refused thickened liquids and signed a waiver that she is refusing. Favorite foods are P&J sandwiches, oatmeal and likes to suck on potato chips..

## 2020-05-31 NOTE — PLAN OF CARE
[place] PT: PT will continue to  HOLD as patient remains medically unstable and inappropriate to participate with therapy.

## 2020-05-31 NOTE — PROGRESS NOTES
[facility]    Hospitalist Progress Note    Date of Service (when I saw the patient): 05/31/2020    Assessment & Plan   Stefanie Velazquez is a 75 year old female admitted on 5/26/2020. She has history of coronary artery disease, hypertension, hyperlipidemia, DVT, type 2 diabetes, chronic back pain, hypothyroidism, rectal cancer, GERD, restless leg syndrome and RC. She presents with confusion.     Acute encephalopathy, suspect due to UTI and compounded by underlying medications, hypoglycemia and likely underlying dementia, possibly Lewey Body as below.   5/26/20 -- 3 days of reported confusion at nursing home with question of slurred speech and possible right sided weakness. Labs show elevated lactic of 3.4, elevated creatinine, normal WBC, normal CRP, normal troponin, normal TSH, normal VBG. CT head shows no acute findings. CT chest/abomdomen pelvis shows lung nodules as well as adnexal fluid collection. UA shows 11 WBC, small leukocyte esterase, and few bacteria. Vitals show tachycardic, initially up to the 150s in the ED with atrial fibrillation noted on monitor, briefly placed on diltiazem drip and converted to NSR though again tachycardic with agitation.  Recent admission 4/7 - 4/13 as discussed below for similar presentation, ultimately no clear etiology though possibly related to mild temporal CVA seen on imaging. Blood cultures pending. Urine culture pending. Differential includes dementia (lewy-body) vs medication effects vs dehydration vs other.   - After dicussion with ED provider and attending provider, patient would likely benefit from admission to facility where neurology and psychiatry were available for further evaluation.    - Patient will be admitted to [facility] for further work up and evaluation, consider transferring tomorrow for further work up with neurology and psychiatry.   - CT head on 5/26 demonstrates no new changes.   - Ativan PRN for agitation.   5/27/2020  -- minimal change, but with positive urine culture suspect this is the acute trigger for her encephalopathy.  Treating UTI as below.   Holding gabapentin and mirapex.  B12, folate and ammonia normal. Continue ativan prn, this is more effective if this is in fact lewey body dementia regardless.  Treating diabetes as below.   If not improving over the next 1-2 days may need inpatient transfer for neurology consultation for Lewey body dementia vs other etiology, if does not return to baseline with treatment of UTI may need at least outpatient referral to neurology.  Could consider aricept if not improving.    - Start rivastigmine patch 4.6 mg/day on 5/28  - Patient hemodynamically stable and agitated due to monitoring.  Transfer patient to Med/Surg on 5/28.  - On 5/29, patient remains combative and confused.  Patient refusing all medications by mouth.  Give IV haldol 5 mg, then start Seroquel 25 mg bid and 50 mg at bedtime.  OK to repeat haldol if patient refusing oral Seroquel.   - Somnolent yesterday evening, sleeping for most of night.  AM of 5/30, remains somnolent.  Seroquel crushed and given by syringe to back of mouth.  Will place feeding tube today and start tube feeds for enteral feeding.  - Feeding tube placement unsuccessful due to patient combativeness.  - On evening of 5/30, patient became agitated and combative, requiring IV haldol and IV ativan.  Continue oral Seroquel due to combativeness.  I am hopeful that if patient can sleep better at night, that the delirium may improve.  Unfortunately, she remain sedated and intermittently agitated both day and night.  Consider increasing bedtime Seroquel if not sleeping at night and decreasing daytime Seroquel to improve daytime wakefulness.  Minimize Ativan use, though this is difficult due to combativeness.  MRI might be helpful, but patient unable to lie still long enough.  Consider transfer for EEG, or consult with Psychiatry and Neurology.     COVID Rule  "Out  Due to concerns of unclear etiology of above and residence in nursing home, COVID test sent. CT chest as above shows \"Scattered tiny indeterminate lung nodules, possibly infectious or Inflammatory.\" Which have previously been seen, at that time COVID testing was performed and negative.   - COVID result, 5/26/2020: negative    - No need for special precautions at this time.      UTI with elevated lactic ccid and metabolic encephalopathy  5/26/20 -- Lactic 3.4 on presentation, corrected with IVF. UA mildly abnormal though CRP normal, WBC normal. Given encephalopathy of unclear origin and concern for possible infectious cause covered broadly with IV vancomycin and zosyn. Unclear if infection is contributing.    - Zosyn and Vanco discontinued on 5/26   - Urine culture growing NLF GNR, Halfnia species   - Started on Rocephin on 5/27   - Blood cultures NGTD   - Due to resistance change abx to Cefepime on 5/28, now on day 4/7.     TITA on CKD  5/26/20 -- Creatinine 1.98, GFR 24 on presentation. Baseline quite variable over past few years, likely 1.3-1.6 though has been up closer to 1.8-2 at times. Appears to be above baseline presently, possible TITA due to recent confusion at nursing home. Patient given 1.5 L NS in ED.        - Check CMP in AM.    - Continue IVF.    - Avoid nephrotoxins.   5/27/2020 -- creatinine down to 1.5, baseline has fluctuated quite a bit so hard to say for sure but I think this is now pretty much baseline - slowing IVF to 75/h D5 1/2 as below   - Cr improved to 1.41 on 5/28.  Recheck BMP in AM  - Cr stable at 1.42 on 5/29, repeat BMP in AM  - Cr up slightly to 1.62 on 5/30, repeat in AM  - Cr improved slightly to 1.49 on 5     Coronary Artery Disease  History of 3 vessel CABG in 2002. Unable to tolerate ACEi or beta blocker due to hypotension. Managed on aspirin/statin.   - Change to aspirin suppository as patient refusing PO meds     History of Hypertension  5/26/20 -- Patient managed prior to " admission with Lasix 20 mg BID.  Hold lasix for now given TITA.  5/27/2020 -- blood pressure doing well, follow     Hyperlipidemia  Chronic. Managed with simvastatin 40 mg per day   - Discontinue simvastatin as patient has been refusing     History of DVT  Managed chronically on Eliquis 5 mg BID.   - Patient refusing PO meds, change to enoxaparin 1 mg/kg bid     Diabetes Mellitus, Type II  5/26/20 -- Chronic.  Last a1C 6.9% on 3/2020. Managed at home with Lantus 24 units at bedtime and novolog 14 units with lunch and dinner.    - Moderate CHO diet.    - Continue lantus 24 units at bedtime.    - Hold novolog 14 units with lunch and dinner given patient not eating at this time. Reevaluate need for novolog in the morning.    - Medium sliding scale insulin.    5/27/2020 -- continued poor intake due to encephalopathy, patient hypoglycemic overnight - decreasing lantus to 12u daily.  changed IVF to D5 1/2 NS, reassess once intake improves.  - 5/28/19, glucose borderline and patient not eating.  Discontinue lantus but continue ISS.      Lumbar Radiculopathy   Neuropathy  5/26/20 -- Previously diagnosed. Managed with gabapentin 100 mg at bedtime.    - Hold for now.   5/27/2020 -- no apparent pain.  Reassess once mental status improves or if appears to be having pain.       Hypothyroidism  Chronic. Normal TSH of 3.9 on presentation. Managed with levothyroxine 88 mcg daily except for Sunday (1/2 tablet).   - Patient refusing PO, change to IV levothyroxine 37.5 mcg daily     Gastroesophageal reflux disease  Chronic. Managed with omeprazole 20 mg TWICE DAILY  - Change to IV protonix.    Restless Leg Syndrome  5/26/20 -- Chronic. Managed with pramipexole 0.5 mg at bedtime and PRN every 8 hours.   - Hold pramipexole for now.   5/27/2020 -- reassess once mental status improves.         RC  On CPAP at assisted living facility, follows with sleep medicine.     Rectal Cancer  Follows with Dr. Martinez, diagnosed in 2011, underwent  neoadjuvant chemoradiation, resection with reanastomosis. Follows yearly with oncology, last seen 9/2019.  - Continue outpatient surveillance.    CODE STATUS: Full Code     DIET: Moderate CHO diet with honey thick liquids, but not eating due to somnolence.  Prophylaxis: On Eliquis    Lines: PIV  Restraints: Continue soft restraints for patient and staff safety and for pulling on lines etc       Disposition  Anticipate at least 2 more days inpatient.       Ernesto Mckenna    Interval History   The patient agitated overnight, requiring IV haldol and ativan.  Somnolent today but intermittently attempting to get out of bed.    -Data reviewed today: I reviewed all new labs and imaging results over the last 24 hours. I personally reviewed no images or EKG's today.    Physical Exam   Temp: 97.5  F (36.4  C) Temp src: Axillary BP: (!) 163/101 Pulse: 81 Heart Rate: 82 Resp: 20 SpO2: 96 % O2 Device: None (Room air)    Vitals:    05/27/20 0615 05/28/20 0600 05/29/20 0601   Weight: 85.5 kg (188 lb 7.9 oz) 83.5 kg (184 lb 1.4 oz) 82.1 kg (181 lb)     Vital Signs with Ranges  Temp:  [97.5  F (36.4  C)-98.2  F (36.8  C)] 97.5  F (36.4  C)  Pulse:  [46-91] 81  Heart Rate:  [43-82] 82  Resp:  [11-21] 20  BP: ()/() 163/101  SpO2:  [94 %-100 %] 96 %  I/O last 3 completed shifts:  In: 3225 [I.V.:2225; IV Piggyback:1000]  Out: 1400 [Urine:1400]    Gen: Well nourished, disheveled female, alert and oriented x 0, somnolent  HEENT: Atraumatic, normocephalic; sclera non-injected, anicterric; oral mucosa moist, no lesion, no exudate  Lungs: Clear to ausculation, no wheezes, no rhonchi, no rales  Heart: Regular rate, regular rhythm, no gallops, no rubs, no murmurs  GI: Bowel sound normal, no hepatosplenomegaly, no masses, non-tender, non-distended, no guarding, no rebound tenderness  Lymph: No lymphadenopathy, no edema  Skin: No rashes, no chronic venous stasis     Medications     dextrose 5% and 0.45% NaCl + KCl 20 mEq/L 150  mL/hr at 05/31/20 0900     - MEDICATION INSTRUCTIONS -         aspirin  300 mg Rectal Daily     ceFEPIme (MAXIPIME) IV  2 g Intravenous Q24H     enoxaparin ANTICOAGULANT  1 mg/kg Subcutaneous Q12H     levothyroxine  37.5 mcg Intravenous Daily     pantoprazole (PROTONIX) IV  40 mg Intravenous Daily with breakfast     QUEtiapine  25 mg Oral BID     QUEtiapine  50 mg Oral At Bedtime     rivastigmine  1 patch Transdermal Daily     rivastigmine   Transdermal Q8H       Data   Recent Labs   Lab 05/31/20  0528 05/30/20  1701 05/30/20  0500  05/28/20  0558  05/26/20  0605   WBC 3.2* 3.8*  --   --  4.7   < > 6.1   HGB 11.3* 11.3*  --   --  12.2   < > 14.4   MCV 91 93  --   --  92   < > 92   * 163  --   --  187   < > 292   INR  --   --   --   --   --   --  1.87*   * 147* 144   < > 144   < > 142   POTASSIUM 3.2* 3.9 3.6   < > 3.7   < > 4.9   CHLORIDE 121* 119* 114*   < > 114*   < > 110*   CO2 19* 21 25   < > 25   < > 24   BUN 20 20 22   < > 22   < > 39*   CR 1.49* 1.54* 1.62*   < > 1.41*   < > 1.98*   ANIONGAP 8 7 5   < > 5   < > 8   DWIGHT 7.6* 7.7* 7.9*   < > 8.1*   < > 9.7   GLC 98 88 96   < > 110*   < > 130*   ALBUMIN 1.6* 1.9*  --   --  2.2*  --  2.8*   PROTTOTAL 5.0* 5.6*  --   --  5.9*  --  7.8   BILITOTAL 0.5 0.7  --   --  0.5  --  0.7   ALKPHOS 83 94  --   --  100  --  128   ALT 21 23  --   --  24  --  26   AST 34 34  --   --  38  --  57*   TROPI  --   --   --   --   --   --  <0.015    < > = values in this interval not displayed.       No results found for this or any previous visit (from the past 24 hour(s)).

## 2020-05-31 NOTE — PLAN OF CARE
"Pt lethargic, arouses to voice. Resists nursing cares. Reassurance provided. [employer] orientation status. Did ask \"why am I here?\" No indicators of pain. Up to chair w/ assist of 2 and mechanical lift. Weight shift assistance provided. VSS on RA, afebrile. BS hypoactive. Incontinent of urine-purewick in place, no output. BG 70, IVF restarted, recheck . Pt remains NPO due to lethargy and refusal. K replaced per protocol-recheck at 2030.  "

## 2020-06-01 ENCOUNTER — TELEPHONE (OUTPATIENT)
Dept: FAMILY MEDICINE | Facility: CLINIC | Age: 75
End: 2020-06-01

## 2020-06-01 LAB
ANION GAP SERPL CALCULATED.3IONS-SCNC: 6 MMOL/L (ref 3–14)
BACTERIA SPEC CULT: NO GROWTH
BACTERIA SPEC CULT: NO GROWTH
BUN SERPL-MCNC: 16 MG/DL (ref 7–30)
CALCIUM SERPL-MCNC: 7.9 MG/DL (ref 8.5–10.1)
CHLORIDE SERPL-SCNC: 120 MMOL/L (ref 94–109)
CO2 SERPL-SCNC: 19 MMOL/L (ref 20–32)
CREAT SERPL-MCNC: 1.48 MG/DL (ref 0.52–1.04)
ERYTHROCYTE [DISTWIDTH] IN BLOOD BY AUTOMATED COUNT: 14.4 % (ref 10–15)
GFR SERPL CREATININE-BSD FRML MDRD: 34 ML/MIN/{1.73_M2}
GLUCOSE BLDC GLUCOMTR-MCNC: 105 MG/DL (ref 70–99)
GLUCOSE BLDC GLUCOMTR-MCNC: 96 MG/DL (ref 70–99)
GLUCOSE SERPL-MCNC: 102 MG/DL (ref 70–99)
HCT VFR BLD AUTO: 34 % (ref 35–47)
HGB BLD-MCNC: 11 G/DL (ref 11.7–15.7)
MAGNESIUM SERPL-MCNC: 1.9 MG/DL (ref 1.6–2.3)
MCH RBC QN AUTO: 29.6 PG (ref 26.5–33)
MCHC RBC AUTO-ENTMCNC: 32.4 G/DL (ref 31.5–36.5)
MCV RBC AUTO: 92 FL (ref 78–100)
PLATELET # BLD AUTO: 149 10E9/L (ref 150–450)
POTASSIUM SERPL-SCNC: 4.3 MMOL/L (ref 3.4–5.3)
RBC # BLD AUTO: 3.71 10E12/L (ref 3.8–5.2)
SODIUM SERPL-SCNC: 145 MMOL/L (ref 133–144)
SPECIMEN SOURCE: NORMAL
SPECIMEN SOURCE: NORMAL
WBC # BLD AUTO: 4.1 10E9/L (ref 4–11)

## 2020-06-01 PROCEDURE — 85027 COMPLETE CBC AUTOMATED: CPT | Performed by: INTERNAL MEDICINE

## 2020-06-01 PROCEDURE — 25800030 ZZH RX IP 258 OP 636: Performed by: INTERNAL MEDICINE

## 2020-06-01 PROCEDURE — 99233 SBSQ HOSP IP/OBS HIGH 50: CPT | Performed by: FAMILY MEDICINE

## 2020-06-01 PROCEDURE — C9113 INJ PANTOPRAZOLE SODIUM, VIA: HCPCS | Performed by: INTERNAL MEDICINE

## 2020-06-01 PROCEDURE — 25000128 H RX IP 250 OP 636: Performed by: INTERNAL MEDICINE

## 2020-06-01 PROCEDURE — 25000132 ZZH RX MED GY IP 250 OP 250 PS 637: Performed by: FAMILY MEDICINE

## 2020-06-01 PROCEDURE — 12000011 ZZH R&B MS OVERFLOW

## 2020-06-01 PROCEDURE — 36415 COLL VENOUS BLD VENIPUNCTURE: CPT | Performed by: INTERNAL MEDICINE

## 2020-06-01 PROCEDURE — 25800030 ZZH RX IP 258 OP 636: Performed by: FAMILY MEDICINE

## 2020-06-01 PROCEDURE — 83735 ASSAY OF MAGNESIUM: CPT | Performed by: INTERNAL MEDICINE

## 2020-06-01 PROCEDURE — 00000146 ZZHCL STATISTIC GLUCOSE BY METER IP

## 2020-06-01 PROCEDURE — 80048 BASIC METABOLIC PNL TOTAL CA: CPT | Performed by: INTERNAL MEDICINE

## 2020-06-01 PROCEDURE — 25000125 ZZHC RX 250: Performed by: INTERNAL MEDICINE

## 2020-06-01 PROCEDURE — 25000132 ZZH RX MED GY IP 250 OP 250 PS 637: Performed by: INTERNAL MEDICINE

## 2020-06-01 RX ORDER — BISACODYL 10 MG
10 SUPPOSITORY, RECTAL RECTAL DAILY
Status: DISCONTINUED | OUTPATIENT
Start: 2020-06-01 | End: 2020-06-06 | Stop reason: HOSPADM

## 2020-06-01 RX ORDER — QUETIAPINE FUMARATE 25 MG/1
50 TABLET, FILM COATED ORAL 2 TIMES DAILY
Status: DISCONTINUED | OUTPATIENT
Start: 2020-06-01 | End: 2020-06-03

## 2020-06-01 RX ORDER — QUETIAPINE FUMARATE 100 MG/1
100 TABLET, FILM COATED ORAL AT BEDTIME
Status: DISCONTINUED | OUTPATIENT
Start: 2020-06-01 | End: 2020-06-03

## 2020-06-01 RX ADMIN — POTASSIUM CHLORIDE, DEXTROSE MONOHYDRATE AND SODIUM CHLORIDE: 150; 5; 450 INJECTION, SOLUTION INTRAVENOUS at 09:50

## 2020-06-01 RX ADMIN — LORAZEPAM 1 MG: 2 INJECTION INTRAMUSCULAR; INTRAVENOUS at 02:48

## 2020-06-01 RX ADMIN — LEVOTHYROXINE SODIUM 37.5 MCG: 20 INJECTION, SOLUTION INTRAVENOUS at 07:34

## 2020-06-01 RX ADMIN — QUETIAPINE FUMARATE 100 MG: 100 TABLET ORAL at 21:56

## 2020-06-01 RX ADMIN — PANTOPRAZOLE SODIUM 40 MG: 40 INJECTION, POWDER, LYOPHILIZED, FOR SOLUTION INTRAVENOUS at 07:34

## 2020-06-01 RX ADMIN — QUETIAPINE 50 MG: 25 TABLET ORAL at 07:34

## 2020-06-01 RX ADMIN — POTASSIUM CHLORIDE, DEXTROSE MONOHYDRATE AND SODIUM CHLORIDE: 150; 5; 450 INJECTION, SOLUTION INTRAVENOUS at 01:35

## 2020-06-01 RX ADMIN — ENOXAPARIN SODIUM 80 MG: 80 INJECTION SUBCUTANEOUS at 07:34

## 2020-06-01 RX ADMIN — QUETIAPINE 50 MG: 25 TABLET ORAL at 17:14

## 2020-06-01 RX ADMIN — LORAZEPAM 1 MG: 2 INJECTION INTRAMUSCULAR; INTRAVENOUS at 23:08

## 2020-06-01 RX ADMIN — CEFEPIME 2 G: 2 INJECTION, POWDER, FOR SOLUTION INTRAVENOUS at 17:14

## 2020-06-01 RX ADMIN — ASPIRIN 300 MG: 300 SUPPOSITORY RECTAL at 07:34

## 2020-06-01 RX ADMIN — BISACODYL 10 MG: 10 SUPPOSITORY RECTAL at 07:34

## 2020-06-01 RX ADMIN — RIVASTIGMINE 1 PATCH: 4.6 PATCH TRANSDERMAL at 17:17

## 2020-06-01 RX ADMIN — ENOXAPARIN SODIUM 80 MG: 80 INJECTION SUBCUTANEOUS at 20:44

## 2020-06-01 ASSESSMENT — ACTIVITIES OF DAILY LIVING (ADL)
ADLS_ACUITY_SCORE: 36
ADLS_ACUITY_SCORE: 32
ADLS_ACUITY_SCORE: 36
ADLS_ACUITY_SCORE: 32
ADLS_ACUITY_SCORE: 32
ADLS_ACUITY_SCORE: 36

## 2020-06-01 ASSESSMENT — MIFFLIN-ST. JEOR: SCORE: 1321.75

## 2020-06-01 NOTE — TELEPHONE ENCOUNTER
[facility]- visits for wound care   Form received back signed  5/29/20  Faxed Back & Sent to be scanned to this encounter  Leilani [facility]

## 2020-06-01 NOTE — PROGRESS NOTES
[facility]      Subjective:  Unable  The nurses note that she has been awake all night long, restless in bed, mostly not verbalizing.  There unable to see much response to Ativan, IV Haldol or Seroquel.  She does not eat.  She has had some bowel movements over the weekend and has been given suppositories.  She is not on oxygen.    Review of Systems:  Unable    Physical Exam:  Vitals Were Reviewed    Patient Vitals for the past 16 hrs:   BP Temp Temp src Pulse Heart Rate Resp SpO2 Weight   06/01/20 0100 107/68 98.2  F (36.8  C) Axillary 97 -- 20 95 % --   06/01/20 0000 -- -- -- -- -- -- -- 82 kg (180 lb 12.4 oz)   05/31/20 1940 97/58 97.8  F (36.6  C) Axillary -- 53 16 99 % --   05/31/20 1600 -- 97.1  F (36.2  C) Axillary -- -- -- 99 % --   05/31/20 1526 (!) 141/88 -- -- 53 -- 16 99 % --         Intake/Output Summary (Last 24 hours) at 6/1/2020 0612  Last data filed at 6/1/2020 0500  Gross per 24 hour   Intake 3170 ml   Output 1050 ml   Net 2120 ml       GENERAL APPEARANCE: She is lying in bed with her eyes closed with continuous motor activity mostly upper arms.  Much of it seems purposeful she reaches out and grabs the edge of the bed.  EYES: She mostly has her eyes closed pupils are equal round reactive  RESP: lungs clear to auscultation - no rales, rhonchi or wheezes  CV: regular rate and rhythm, normal S1 S2, no S3 or S4 and no murmur, click or rub   ABDOMEN: soft, nontender, no HSM or masses and bowel sounds normal  MS: no clubbing, cyanosis; no edema  SKIN: clear without significant rashes or lesions  NEURO: As above, she does not respond to voice, her eyes are mostly closed, she has continuous restless motor activity mostly of her upper extremities, she moves all extremities, much of it is purposeful.  She apparently verbalizes intermittently.  There is no obvious cranial nerve deficit.    Lab:  Recent Labs   Lab Test 06/01/20 0513 05/31/20 2124 05/31/20  0528   *  --  148*    POTASSIUM 4.3 4.4 3.2*   CHLORIDE 120*  --  121*   CO2 19*  --  19*   ANIONGAP 6  --  8   *  --  98   BUN 16  --  20   CR 1.48*  --  1.49*   DWIGHT 7.9*  --  7.6*     CBC RESULTS:   Recent Labs   Lab Test 06/01/20  0513 05/31/20  0528   WBC 4.1 3.2*   RBC 3.71* 3.79*   HGB 11.0* 11.3*   HCT 34.0* 34.5*   * 142*       Results for orders placed or performed during the hospital encounter of 05/26/20 (from the past 24 hour(s))   Glucose by meter   Result Value Ref Range    Glucose 110 (H) 70 - 99 mg/dL   Glucose by meter   Result Value Ref Range    Glucose 70 70 - 99 mg/dL   Glucose by meter   Result Value Ref Range    Glucose 109 (H) 70 - 99 mg/dL   Potassium   Result Value Ref Range    Potassium 4.4 3.4 - 5.3 mmol/L   Glucose by meter   Result Value Ref Range    Glucose 106 (H) 70 - 99 mg/dL   Basic metabolic panel   Result Value Ref Range    Sodium 145 (H) 133 - 144 mmol/L    Potassium 4.3 3.4 - 5.3 mmol/L    Chloride 120 (H) 94 - 109 mmol/L    Carbon Dioxide 19 (L) 20 - 32 mmol/L    Anion Gap 6 3 - 14 mmol/L    Glucose 102 (H) 70 - 99 mg/dL    Urea Nitrogen 16 7 - 30 mg/dL    Creatinine 1.48 (H) 0.52 - 1.04 mg/dL    GFR Estimate 34 (L) >60 mL/min/[1.73_m2]    GFR Estimate If Black 39 (L) >60 mL/min/[1.73_m2]    Calcium 7.9 (L) 8.5 - 10.1 mg/dL   CBC with platelets   Result Value Ref Range    WBC 4.1 4.0 - 11.0 10e9/L    RBC Count 3.71 (L) 3.8 - 5.2 10e12/L    Hemoglobin 11.0 (L) 11.7 - 15.7 g/dL    Hematocrit 34.0 (L) 35.0 - 47.0 %    MCV 92 78 - 100 fl    MCH 29.6 26.5 - 33.0 pg    MCHC 32.4 31.5 - 36.5 g/dL    RDW 14.4 10.0 - 15.0 %    Platelet Count 149 (L) 150 - 450 10e9/L   Magnesium   Result Value Ref Range    Magnesium 1.9 1.6 - 2.3 mg/dL     *Note: Due to a large number of results and/or encounters for the requested time period, some results have not been displayed. A complete set of results can be found in Results Review.       Assessment and Plan:    Stefanie Velazquez is a 75 year old female  admitted on 5/26/2020. She has history of coronary artery disease, hypertension, hyperlipidemia, DVT, type 2 diabetes, chronic back pain, hypothyroidism, rectal cancer, GERD, restless leg syndrome and RC. She presents with confusion.     Acute encephalopathy, suspect due to UTI and compounded by underlying medications, hypoglycemia and likely underlying dementia, possibly Lewey Body as below.   5/26/20 -- 3 days of reported confusion at nursing home with question of slurred speech and possible right sided weakness. Labs show elevated lactic of 3.4, elevated creatinine, normal WBC, normal CRP, normal troponin, normal TSH, normal VBG. CT head shows no acute findings. CT chest/abomdomen pelvis shows lung nodules as well as adnexal fluid collection. UA shows 11 WBC, small leukocyte esterase, and few bacteria. Vitals show tachycardic, initially up to the 150s in the ED with atrial fibrillation noted on monitor, briefly placed on diltiazem drip and converted to NSR though again tachycardic with agitation.  Recent admission 4/7 - 4/13 as discussed below for similar presentation, ultimately no clear etiology though possibly related to mild temporal CVA seen on imaging. Blood cultures pending. Urine culture pending. Differential includes dementia (lewy-body) vs medication effects vs dehydration vs other.      - CT head on 5/26 demonstrates no new changes.   - Ativan PRN for agitation.   5/27/2020 -- minimal change, but with positive urine culture suspect this is the acute trigger for her encephalopathy.  Treating UTI as below.   Holding gabapentin and mirapex.  B12, folate and ammonia normal. Continue ativan prn, this is more effective if this is in fact lewey body dementia regardless.  Treating diabetes as below.     - Start rivastigmine patch 4.6 mg/day on 5/28  - Patient hemodynamically stable and agitated due to monitoring.  Transfer patient to Med/Surg on 5/28.  - On 5/29, patient remains combative and confused.   "Patient refusing all medications by mouth.  Give IV haldol 5 mg, then start Seroquel 25 mg bid and 50 mg at bedtime.  OK to repeat haldol if patient refusing oral Seroquel.   - Somnolent yesterday evening, sleeping for most of night.  AM of 5/30, remains somnolent.  Seroquel crushed and given by syringe to back of mouth.  Will place feeding tube today and start tube feeds for enteral feeding.  - Feeding tube placement unsuccessful due to patient combativeness.  - On evening of 5/30, patient became agitated and combative, requiring IV haldol and IV ativan.  Continue oral Seroquel due to combativeness.  I am hopeful that if patient can sleep better at night, that the delirium may improve.  Unfortunately, she remain sedated and intermittently agitated both day and night.  Consider increasing bedtime Seroquel if not sleeping at night and decreasing daytime Seroquel to improve daytime wakefulness.  Minimize Ativan use, though this is difficult due to combativeness.  MRI might be helpful, but patient unable to lie still long enough.  Consider transfer for EEG, or consult with Psychiatry and Neurology.    06/01/20 awake all night with motor restlessness. Etiology is unclear.Will increase Seroquel.      COVID 19 negative  Due to concerns of unclear etiology of above and residence in nursing home, COVID test sent. CT chest as above shows \"Scattered tiny indeterminate lung nodules, possibly infectious or Inflammatory.\" Which have previously been seen, at that time COVID testing was performed and negative.   - COVID result, 5/26/2020: negative    - No need for special precautions at this time.     Afib with RVR.  Has converted to NSR. Patient already on anticoagulation. Has been off betablocker since April when had bradycardia.   Unable to take pta Elquis so on full dose lovenox.        UTI with elevated lactic ccid and metabolic encephalopathy  5/26/20 -- Lactic 3.4 on presentation, corrected with IVF. UA mildly abnormal " though CRP normal, WBC normal. Given encephalopathy of unclear origin and concern for possible infectious cause covered broadly upon admit with  IV vancomycin and zosyn. Unclear if infection is contributing.    - Zosyn and Vanco discontinued on 5/26   - Urine culture growing NLF GNR, Halfnia species   - Started on Rocephin on 5/27   - Blood cultures NGTD   - Changed abx to Cefepime on 5/28, now on day 5/7.  Probably convert to quinolone soon-no sens but organism is generally sens to quinolone.     TITA on CKD  5/26/20 -- Creatinine 1.98, GFR 24 on presentation. Baseline quite variable over past few years, likely 1.3-1.6 though has been up closer to 1.8-2 at times. Appears to be above baseline presently, possible TITA due to recent confusion at nursing home. Patient given 1.5 L NS in ED.       5/27/2020 -- creatinine down to 1.5, baseline has fluctuated quite a bit so hard to say for sure but I think this is now pretty much baseline - slowing IVF to 75/h D5 1/2 as below   - Cr improved to 1.41 on 5/28.  Recheck BMP in AM  - Cr stable at 1.42 on 5/29, repeat BMP in AM  - Cr up slightly to 1.62 on 5/30, repeat in AM  - Cr improved slightly to 1.49 on 5  -Cr 1.48 06/01/20      Coronary Artery Disease  History of 3 vessel CABG in 2002. Unable to tolerate ACEi or beta blocker due to hypotension. Managed on aspirin/statin.   - Change to aspirin suppository as patient refusing PO meds     History of Hypertension  5/26/20 -- Patient managed prior to admission with Lasix 20 mg BID.  Hold lasix for now given TITA.  5/27/2020 -- blood pressure doing well, follow     Hyperlipidemia  Chronic. Managed with simvastatin 40 mg per day   - Discontinue simvastatin as patient has been refusing     History of DVT  Managed chronically on Eliquis 5 mg BID.   - Patient refusing PO meds, change to enoxaparin 1 mg/kg bid     Diabetes Mellitus, Type II  5/26/20 -- Chronic.  Last a1C 6.9% on 3/2020. Managed at home with Lantus 24 units at bedtime  and novolog 14 units with lunch and dinner.    - Moderate CHO diet.    - Continue lantus 24 units at bedtime.    - Hold novolog 14 units with lunch and dinner given patient not eating at this time. Reevaluate need for novolog in the morning.    - Medium sliding scale insulin.    5/27/2020 -- continued poor intake due to encephalopathy, patient hypoglycemic overnight - decreasing lantus to 12u daily.  changed IVF to D5 1/2 NS, reassess once intake improves.  - 5/28/19, glucose borderline and patient not eating.  Discontinue lantus but continue ISS.       Lumbar Radiculopathy   Neuropathy  5/26/20 -- Previously diagnosed. Managed with gabapentin 100 mg at bedtime.    - Hold for now.   5/27/2020 -- no apparent pain.  Reassess once mental status improves or if appears to be having pain.       Hypothyroidism  Chronic. Normal TSH of 3.9 on presentation. Managed with levothyroxine 88 mcg daily except for Sunday (1/2 tablet).   - Patient refusing PO, change to IV levothyroxine 37.5 mcg daily     Gastroesophageal reflux disease  Chronic. Managed with omeprazole 20 mg TWICE DAILY  - Change to IV protonix.    Restless Leg Syndrome  5/26/20 -- Chronic. Managed with pramipexole 0.5 mg at bedtime and PRN every 8 hours.   - Hold pramipexole for now.   5/27/2020 -- reassess once mental status improves.         RC  On CPAP at assisted living facility, follows with sleep medicine.     Rectal Cancer  Follows with Dr. Martinez, diagnosed in 2011, underwent neoadjuvant chemoradiation, resection with reanastomosis. Follows yearly with oncology, last seen 9/2019.  - Continue outpatient surveillance.     CODE STATUS: Full Code      DIET: Moderate CHO diet with honey thick liquids, but not eating due to somnolence.  Prophylaxis: On Eliquis    Lines: PIV  Restraints: Continue soft restraints for patient and staff safety and for pulling on lines etc       Plan-the etiology remains unclear.  At this point I will increase the Seroquel (50 bid  and 100 at bedtime) Transfer for more evaluation remains an option.  Continue cefepime and IV fluids.  Convert to levaquin soon. We will give a Dulcolax suppository today.  Long-term prognosis is guarded since the patient has not eaten and remains on IV fluids.  She did refuse a feeding tube in the past.  An attempt to place a feeding tube here during this hospitalization was not successful.  When palliative care is available I will consult them again. She had a spinal tap with the last episode.     12:29 PM   Discussed with Dori ocasio  Her mom is in the same care unit  Apparently she was doing ok just pta.  She would consider hospice for her depending on clinical situation.

## 2020-06-01 NOTE — PROGRESS NOTES
PT Cancel:  Per nursing, hold OT/PT today due to medical instability.  Will assess again tomorrow and see as appropriate.

## 2020-06-01 NOTE — PROGRESS NOTES
Patient restless all night long, frequently sitting up and leaning on side rails and taking off gown. Writer frequently checked on patient, and tried to reorient the pt, prn ativan given x2. Pt up to bedside commode with assist of 3 nurses and the lift. Bed in the lowest position, bed alarm on, call light within reach, frequent checks on patient. Pt not able to answer questions appropriately or follow any commands.

## 2020-06-01 NOTE — PROGRESS NOTES
CARE TRANSITIONS PROGRESS NOTE:    Reason for Follow up: discharge planning.  Per IDT rounds, pt continues to be lethargic, restless, resistant of cares, and is not taking PO by mouth.    Possible Palliative Care consult tomorrow.    Anticipated discharge needs: TBD.      Pt lives at [facility] (Phone: [phone] Fax:[phone]) and must be assist of 1 in order to return.     If TCU cares are needed, family requesting cares at the following centers:  1.   [facility] (Phone: [phone] Admissions: [phone] Fax: [phone])  2.  [facility] (Admissions Phone: [phone] Main Phone: [phone] Fax: [phone])  3.  [facility] (Phone: [phone] Fax: [phone])     Referrals were sent on 5-28-20.     Next steps: Awaiting medical stability       ARIELLA Tyson  [facility] [phone]   [facility]  [phone]

## 2020-06-01 NOTE — PROGRESS NOTES
Blanchable redness noted on coccyx, preventative foam barrier applied and pt placed on a pulsate mattress to prevent skin breakdown.

## 2020-06-01 NOTE — PLAN OF CARE
Problem: Adult Inpatient Plan of Care  Goal: Plan of Care Review  Outcome: No Change   Pt has slept soundly since the pulsate mattress was applied. Prior to that, continued to be restless, agitated and resistant to cares. Purewick remains in place, draining adequate amounts of urine. Attempted mouth swabs but was unsuccessful as pt clenches down and refuses. Appears comfortable, 1:1 remains in place for safety. Alarms activated and audible.

## 2020-06-02 LAB
ALBUMIN SERPL-MCNC: 1.7 G/DL (ref 3.4–5)
ALP SERPL-CCNC: 92 U/L (ref 40–150)
ALT SERPL W P-5'-P-CCNC: 23 U/L (ref 0–50)
ANION GAP SERPL CALCULATED.3IONS-SCNC: 4 MMOL/L (ref 3–14)
AST SERPL W P-5'-P-CCNC: 35 U/L (ref 0–45)
BILIRUB SERPL-MCNC: 0.6 MG/DL (ref 0.2–1.3)
BUN SERPL-MCNC: 12 MG/DL (ref 7–30)
CALCIUM SERPL-MCNC: 8 MG/DL (ref 8.5–10.1)
CHLORIDE SERPL-SCNC: 116 MMOL/L (ref 94–109)
CO2 SERPL-SCNC: 21 MMOL/L (ref 20–32)
CREAT SERPL-MCNC: 1.34 MG/DL (ref 0.52–1.04)
ERYTHROCYTE [DISTWIDTH] IN BLOOD BY AUTOMATED COUNT: 14.5 % (ref 10–15)
GFR SERPL CREATININE-BSD FRML MDRD: 38 ML/MIN/{1.73_M2}
GLUCOSE BLDC GLUCOMTR-MCNC: 104 MG/DL (ref 70–99)
GLUCOSE BLDC GLUCOMTR-MCNC: 113 MG/DL (ref 70–99)
GLUCOSE BLDC GLUCOMTR-MCNC: 97 MG/DL (ref 70–99)
GLUCOSE SERPL-MCNC: 97 MG/DL (ref 70–99)
HCT VFR BLD AUTO: 32.8 % (ref 35–47)
HGB BLD-MCNC: 10.8 G/DL (ref 11.7–15.7)
MCH RBC QN AUTO: 30.3 PG (ref 26.5–33)
MCHC RBC AUTO-ENTMCNC: 32.9 G/DL (ref 31.5–36.5)
MCV RBC AUTO: 92 FL (ref 78–100)
PLATELET # BLD AUTO: 134 10E9/L (ref 150–450)
POTASSIUM SERPL-SCNC: 4.2 MMOL/L (ref 3.4–5.3)
PROT SERPL-MCNC: 5.2 G/DL (ref 6.8–8.8)
RBC # BLD AUTO: 3.56 10E12/L (ref 3.8–5.2)
SODIUM SERPL-SCNC: 141 MMOL/L (ref 133–144)
WBC # BLD AUTO: 2.9 10E9/L (ref 4–11)

## 2020-06-02 PROCEDURE — 99223 1ST HOSP IP/OBS HIGH 75: CPT | Performed by: NURSE PRACTITIONER

## 2020-06-02 PROCEDURE — 25000128 H RX IP 250 OP 636: Performed by: INTERNAL MEDICINE

## 2020-06-02 PROCEDURE — 85027 COMPLETE CBC AUTOMATED: CPT | Performed by: FAMILY MEDICINE

## 2020-06-02 PROCEDURE — 99233 SBSQ HOSP IP/OBS HIGH 50: CPT | Performed by: FAMILY MEDICINE

## 2020-06-02 PROCEDURE — 25000128 H RX IP 250 OP 636: Performed by: FAMILY MEDICINE

## 2020-06-02 PROCEDURE — 25800030 ZZH RX IP 258 OP 636: Performed by: FAMILY MEDICINE

## 2020-06-02 PROCEDURE — 25000125 ZZHC RX 250: Performed by: INTERNAL MEDICINE

## 2020-06-02 PROCEDURE — 80053 COMPREHEN METABOLIC PANEL: CPT | Performed by: FAMILY MEDICINE

## 2020-06-02 PROCEDURE — 25000132 ZZH RX MED GY IP 250 OP 250 PS 637: Performed by: INTERNAL MEDICINE

## 2020-06-02 PROCEDURE — C9113 INJ PANTOPRAZOLE SODIUM, VIA: HCPCS | Performed by: INTERNAL MEDICINE

## 2020-06-02 PROCEDURE — 25000132 ZZH RX MED GY IP 250 OP 250 PS 637: Performed by: FAMILY MEDICINE

## 2020-06-02 PROCEDURE — 00000146 ZZHCL STATISTIC GLUCOSE BY METER IP

## 2020-06-02 PROCEDURE — 36415 COLL VENOUS BLD VENIPUNCTURE: CPT | Performed by: FAMILY MEDICINE

## 2020-06-02 PROCEDURE — 12000011 ZZH R&B MS OVERFLOW

## 2020-06-02 RX ORDER — LEVOFLOXACIN 5 MG/ML
500 INJECTION, SOLUTION INTRAVENOUS EVERY 24 HOURS
Status: DISCONTINUED | OUTPATIENT
Start: 2020-06-02 | End: 2020-06-02

## 2020-06-02 RX ORDER — MEROPENEM 1 G/1
1 INJECTION, POWDER, FOR SOLUTION INTRAVENOUS EVERY 12 HOURS
Status: COMPLETED | OUTPATIENT
Start: 2020-06-02 | End: 2020-06-05

## 2020-06-02 RX ADMIN — POTASSIUM CHLORIDE, DEXTROSE MONOHYDRATE AND SODIUM CHLORIDE: 150; 5; 450 INJECTION, SOLUTION INTRAVENOUS at 11:29

## 2020-06-02 RX ADMIN — POTASSIUM CHLORIDE, DEXTROSE MONOHYDRATE AND SODIUM CHLORIDE: 150; 5; 450 INJECTION, SOLUTION INTRAVENOUS at 03:24

## 2020-06-02 RX ADMIN — POTASSIUM CHLORIDE, DEXTROSE MONOHYDRATE AND SODIUM CHLORIDE: 150; 5; 450 INJECTION, SOLUTION INTRAVENOUS at 19:58

## 2020-06-02 RX ADMIN — LEVOTHYROXINE SODIUM 37.5 MCG: 20 INJECTION, SOLUTION INTRAVENOUS at 07:50

## 2020-06-02 RX ADMIN — ENOXAPARIN SODIUM 80 MG: 80 INJECTION SUBCUTANEOUS at 21:03

## 2020-06-02 RX ADMIN — QUETIAPINE 50 MG: 25 TABLET ORAL at 07:50

## 2020-06-02 RX ADMIN — ENOXAPARIN SODIUM 80 MG: 80 INJECTION SUBCUTANEOUS at 07:57

## 2020-06-02 RX ADMIN — MEROPENEM 1 G: 1 INJECTION, POWDER, FOR SOLUTION INTRAVENOUS at 14:03

## 2020-06-02 RX ADMIN — PANTOPRAZOLE SODIUM 40 MG: 40 INJECTION, POWDER, LYOPHILIZED, FOR SOLUTION INTRAVENOUS at 07:49

## 2020-06-02 RX ADMIN — ASPIRIN 300 MG: 300 SUPPOSITORY RECTAL at 07:50

## 2020-06-02 RX ADMIN — LORAZEPAM 1 MG: 2 INJECTION INTRAMUSCULAR; INTRAVENOUS at 22:43

## 2020-06-02 RX ADMIN — QUETIAPINE FUMARATE 100 MG: 100 TABLET ORAL at 22:43

## 2020-06-02 RX ADMIN — BISACODYL 10 MG: 10 SUPPOSITORY RECTAL at 07:49

## 2020-06-02 ASSESSMENT — ACTIVITIES OF DAILY LIVING (ADL)
ADLS_ACUITY_SCORE: 31
ADLS_ACUITY_SCORE: 32

## 2020-06-02 ASSESSMENT — MIFFLIN-ST. JEOR: SCORE: 1339.75

## 2020-06-02 NOTE — PROGRESS NOTES
Addendum:  Antonino received return call from JEWELS Monte at [facility].  Mell explained that as the pt's room is now, they will not be able to accommocate a marcos or any other hospice equipment in her current room.  Mell explained that the pt is a hoarder and that family is going to come & clean her room on 6-3-20.    Mell shared that due to pt's behaviors & level of care needs, she must elevate the request of taking pt back for cares to her , Malou Dunn, [phone].  ANTONINO must call & speak with Malou on 6-3-20.    If pt is not able to return to [facility], this writer will work with pt's family for alternative living environment for cares.  ARIELLA Martinez on 6/2/2020 at 3:50 PM    CARE TRANSITIONS PROGRESS NOTE:    Reason for Follow up: Discharge planning.    ANTONINO has placed call & left message for JEWELS Monte at  [facility] (Phone: [phone] Fax:[phone]), requesting information on availability of having the pt come back with hospice cares.  Awaiting return call.    ANTONINO notified by ICU RN that pt's home care RN, Neel called & requested an update.  [facility]  (phone: [phone] Fax: [phone]).  Sw returned call to Neel, provided an update and explained that at this time, discharge plan of care is unknown; awaiting Palliative Care & Neurology input.    Anticipated discharge needs: TBD    Next steps: CTS awaiting return call from staff at [facility] re: ability of pt to return to them if on hospice cares.  If not able to accept pt back, SW will need to secure safe discharge plan.    Discharge Planner   Discharge Plans in progress: [facility] vs LTC  Barriers to discharge plan: medical stability  Follow up plan: CTS to follow       Entered by: Gina Nayak 06/02/2020 10:50 AM       ARIELLA Tyson  [facility] [phone]   [facility]  [phone]

## 2020-06-02 NOTE — PROGRESS NOTES
Incontinence care completed, purewick replaced. Pt woke up and complained of being hungry, sat up in bed and attempted to give pt thickened water and juice but pt would cover her mouth and turn her head and yell that she is hungry. Hearing aids placed to aid in communication.  Attempted again and gave her some apple juice but pt started to spit at staff and continued to fight being fed then fell back asleep. Alarms activated and audible.

## 2020-06-02 NOTE — CONSULTS
"[facility]    Palliative Care Consultation--Inpatient  Admission Date: 5/26/2020   Visit Date: June 2, 2020  PCP: Sandra Medina   Requested by: Vinicio Desir      HISTORY of PRESENT ILLNESS:  Stefanie Velazquez is a 75 year old year old female known to me, last seen on 4/11/20 during her last hospitalization.  with a history of diabetes, LE DVTs, CVA, RC/CPAP, ischemic CAD and obesity.  She was hospitalized in April with encephelopathy so severe that she required sedation (Precedex, Propofol) and, thereafter, intubation to retain a patent airway.  The cause of the agitation was never definitively ascertained.  She had resumed an oral diet by discharge.  She was admitted here again with a several-day h/o altered mental status.  Since admission she has been treated with antibiotics for presumed UTI without, as yet, a return to her baseline mental status.  Rivastigmine was initiated on 5/28 due to concern for Lewy Body Dementia, with no improvement in mental status.  She has been unable to feed herself or be fed.   She has had recurrent episodes of hypoglycemia requiring boluses of D50, and insulin now on hold.  Seroquel has been initiated but at twice the doses given during her last hospitalization, and yet the patient remains agitated when awake.   She was referred to Palliative Care for exploration of goals of care, assistance with symptom management..      ASSESSMENT & PLAN:    Agitated delirium, unknown etiology, no improvement despite 6-7d ATBs  On rare occasions, can appropriately communicates her needs (ie, to move bowels on 5/30)  - discontinue Exelon; niece reports Concha is very \"sensitive\" to multiple medications  - monitor; consider Thorazine if a higher level of sedation is required (consider prolonged QT risk)    Unplanned weight loss, 15% in approximately 6 weeks  Refusing food/oral fluids when agitated, food/fluids held when lethargic.  No in-between.     Advance Care Planning:  - Understanding " of condition:  n/a  - Decisional capacity:  lacking  - Code status:  Full Code  - Health Care Directive: Yes, If Yes, is there a copy in the EMR?   Yes  - POLST:  No    Goals of Care:  - Crystal and patient's other nieces are considering whether to proceed with a potential transfer after 48 hours to a facility that can conduct an EEG.  They're also considering hospice.    - ABSOLUTELY NO FEEDING TUBE per health care directive   - currently remains full code; if she is is eventually enrolled in hospice, her agents will change code status to DNR/DNI.  They are also considering (no decision yet) whether to change her code status at this point in time.         Thank you for the opportunity to be of service to this patient and family.      Maldonado Nair (Ann) CNP  Palliative Care  [facility] cell:  [phone]     Face to face:   1305 - 1330, 25 min, > 50% spent assessing patient, conferring with nursing.   Non face to face:   [id number] and 2171-5447 and 5951-8421, 125 min, > 50% spent conferring by phone (d/t Covid quarantine) with health care agent Crystal discussing prognosis and treatment options including possible transfer vs hospice; coordinating also with attending and Care Transitions..       = = = = = = = = = = = = = = = =      PROBLEM LIST  Patient Active Problem List   Diagnosis     Hypothyroidism     bmi 40     Chronic ischemic heart disease     Generalized osteoarthrosis, unspecified site     HEARING LOSS CONDUCTIVE, COMBINED TYPE     Esophageal reflux     Osteoporosis     RC-moderate (AHI 12, LSat 60%); REM RDI-73     Neuropathy (H)     Hyperlipidemia LDL goal <100     Rectal cancer- newly diagnosed adenoCA rectum Jan 2011 and Positive Tubular Adenoma 2019     Anemia     Radiation therapy complication     Vitamin B 12 deficiency     Vitamin D deficiency     Dysuria     Bowel and bladder incontinence     Benign essential hypertension     Health Care Home     Chronic diarrhea     Restless leg syndrome     Type 2  diabetes mellitus with diabetic nephropathy, with long-term current use of insulin (H)     Spinal stenosis of lumbar region without neurogenic claudication     DDD (degenerative disc disease), lumbar     Lymphedema of genitalia     Oropharyngeal dysphagia     Bilateral hearing loss, unspecified hearing loss type     Lumbar radiculopathy     CKD (chronic kidney disease) stage 4, GFR 15-29 ml/min (H)     Impacted cerumen of right ear     H/O deep venous thrombosis     Symptomatic bradycardia     Syncope and collapse     Chronic anticoagulation     Pulmonary nodules     Encephalopathy     Acute confusion       PAST MEDICAL HISTORY  Past Medical History:   Diagnosis Date     Acute deep vein thrombosis (DVT) of right lower extremity, unspecified vein (H) 10/31/2018     Acute myocardial infarction of other specified sites, episode of care unspecified 6/2002    MI 2 stints     Cancer of colon (H)      Cellulitis of lower extremity, bilateral 9/30/2016     Chronic ischemic heart disease, unspecified 6/22/2003    cabgx3 , 2002 2/05 adenosine ef70%     Coronary atherosclerosis of unspecified type of vessel, native or graft     Coronary artery disease     Diabetes mellitus (H)      Esophageal reflux      Fracture of femur, distal, left, closed (H) 2/11/2013     Gastro-oesophageal reflux disease      Generalized osteoarthrosis, unspecified site 6/22/2005     Generalized osteoarthrosis, unspecified site 6/22/2005     HEARING LOSS CONDUCTIVE, COMBINED TYPE 6/22/2005    Tamazight measles as child     HYPOTHYROIDISM  6/22/2003     Mixed hyperlipidemia 6/22/2005     Obesity, unspecified 6/22/2005     RC-moderate (AHI 12, LSat 60%); REM RDI-73 6/1/2009    Sleep study [facility] was performed 5/26/09 in order to re-evaluate severity of RC. The total sleep time was 412.0 minutes. The sleep latency was decreased at 8.7 minutes with Ambien 10mg. The REM sleep latency was 426.0 minutes. Sleep efficiency was reduced at 79.0%. The sleep  architecture was disrupted with frequent sleep stage changes and arousals.  Snoring:  loud. 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, uncontrolled(250.42) (H) 6/22/2005     Type II or unspecified type diabetes mellitus with renal manifestations, uncontrolled(250.42) (H) 6/22/2005     Unspecified disorder resulting from impaired renal function 6/22/2005    CR     1.82   06/21/2005     Unspecified essential hypertension        PAST SURGICAL HISTORY  Past Surgical History:   Procedure Laterality Date     ANESTHESIA OUT OF OR MRI N/A 4/8/2020    Procedure: ANESTHESIA OUT OF OR MRI;  Surgeon: GENERIC ANESTHESIA PROVIDER;  Location: [facility] OR     cabg       COLECTOMY LOW ANTERIOR  6/21/2011    Procedure:COLECTOMY LOW ANTERIOR; with loop ielostomy; Surgeon:ROBBIN MCDONNELL; Location:[facility]     COLONOSCOPY  1/26/2011    COLONOSCOPY performed by MASOUD BILL at [facility]     COLONOSCOPY N/A 3/1/2016    Procedure: COLONOSCOPY;  Surgeon: Liza Neal MD;  Location: [facility]     INSERT PORT VASCULAR ACCESS  3/2/2011    INSERT PORT VASCULAR ACCESS performed by LIZA NEAL at [facility]     OPEN REDUCTION INTERNAL FIXATION FEMUR DISTAL  2/12/2013    Procedure: OPEN REDUCTION INTERNAL FIXATION FEMUR DISTAL;  Open reduction internal fixation left femur fracture--Anesth.Choice;  Surgeon: Ley, Jeffrey Duane, MD;  Location: [facility]     ORTHOPEDIC SURGERY       PHACOEMULSIFICATION WITH STANDARD INTRAOCULAR LENS IMPLANT Left 1/22/2018    Procedure: PHACOEMULSIFICATION WITH STANDARD INTRAOCULAR LENS IMPLANT;  Left cataract removal with implant;  Surgeon: Rony Key MD;  Location: [facility]     PHACOEMULSIFICATION WITH STANDARD INTRAOCULAR LENS IMPLANT Right 2/19/2018    Procedure:  PHACOEMULSIFICATION WITH STANDARD INTRAOCULAR LENS IMPLANT;  Right cataract removal with implant;  Surgeon: Rony Key MD;  Location: [facility]     SIGMOIDOSCOPY FLEXIBLE  9/9/2011    Procedure:SIGMOIDOSCOPY FLEXIBLE; Performed prior to induction.; Surgeon:ROBBIN MCDONNELL; Location: OR     SURGICAL HISTORY OF -   10/24/2002    Heart bypass     SURGICAL HISTORY OF -   2002    R Knee arthroplasty     SURGICAL HISTORY OF -   2002    Mi 2 stints     TAKEDOWN ILEOSTOMY  9/9/2011    Procedure:TAKEDOWN ILEOSTOMY; Exploratory Laparotomy,  Loop Ileostomy Takedown, Small Bowel Resection x 2.; Surgeon:ROBBIN MCDONNELL; Location: OR       FAMILY MEDICAL HISTORY  Family History   Problem Relation Age of Onset     Diabetes Sister      C.A.D. Sister      Breast Cancer Sister        SOCIAL HISTORY  History   Smoking Status     Former Smoker   Smokeless Tobacco     Never Used     Social History    Substance and Sexual Activity      Alcohol use: Not Currently        Comment: rare social use    History   Drug Use No     Social History     Social History Narrative    June 2, 2020:  Never , no children.  College graduate.  Worked as a  and .  Former smoker.  Now lives in an [facility] at which two of her sisters also reside and formerly placed a high priority on returning there.      Maldonado Nair (Ann), CNP    [facility] Palliative Care    Pager:  [phone]        SPIRITUAL HISTORY   visit requested by Crystal.    ALLERGIES  Allergies   Allergen Reactions     Ciprofloxacin Anxiety     Pt developed agitation, paranoia while on cipro--on clear if this is what caused it.  But would try to avoid in future.     Hydrocodone Other (See Comments)     dizzy     Lisinopril Cough            Vicodin [Hydrocodone-Acetaminophen] Other (See Comments)     Caused extreme dizziness       MEDICATIONS  Medications Prior to Admission  Current Facility-Administered Medications   Medication     aspirin (ASA)  Suppository 150 mg     bisacodyl (DULCOLAX) Suppository 10 mg     bisacodyl (DULCOLAX) Suppository 10 mg     dextrose 5% and 0.45% NaCl + KCl 20 mEq/L infusion     glucose gel 15-30 g    Or     dextrose 50 % injection 25-50 mL    Or     glucagon injection 1 mg     enoxaparin ANTICOAGULANT (LOVENOX) injection 80 mg     haloperidol lactate (HALDOL) injection 5 mg     levothyroxine injection 37.5 mcg     lidocaine (LMX4) kit     lidocaine 1 % 0.1-1 mL     LORazepam (ATIVAN) injection 0.5-1 mg    Or     LORazepam (ATIVAN) tablet 0.5-1 mg     magnesium sulfate 4 g in 100 mL sterile water (premade)     meropenem (MERREM) 1 g vial to attach to  mL bag     naloxone (NARCAN) injection 0.1-0.4 mg     ondansetron (ZOFRAN-ODT) ODT tab 4 mg    Or     ondansetron (ZOFRAN) injection 4 mg     pantoprazole (PROTONIX) 40 mg IV push injection     Patient is already receiving anticoagulation with heparin, enoxaparin (LOVENOX), warfarin (COUMADIN)  or other anticoagulant medication     polyethylene glycol (MIRALAX) Packet 17 g     potassium chloride (KLOR-CON) Packet 20-40 mEq     potassium chloride 10 mEq in 100 mL intermittent infusion with 10 mg lidocaine     potassium chloride 10 mEq in 100 mL sterile water intermittent infusion (premix)     potassium chloride 20 mEq in 50 mL intermittent infusion     potassium chloride ER (KLOR-CON M) CR tablet 20-40 mEq     QUEtiapine (SEROquel) tablet 100 mg     QUEtiapine (SEROquel) tablet 50 mg     sodium chloride (PF) 0.9% PF flush 3 mL       Current Medications    aspirin  300 mg Rectal Daily     bisacodyl  10 mg Rectal Daily     enoxaparin ANTICOAGULANT  1 mg/kg Subcutaneous Q12H     levothyroxine  37.5 mcg Intravenous Daily     meropenem  1 g Intravenous Q12H     pantoprazole (PROTONIX) IV  40 mg Intravenous Daily with breakfast     QUEtiapine  100 mg Oral At Bedtime     QUEtiapine  50 mg Oral BID       PRN Medications  bisacodyl, glucose **OR** dextrose **OR** glucagon, haloperidol  lactate, lidocaine 4%, lidocaine (buffered or not buffered), LORazepam **OR** LORazepam, magnesium sulfate, naloxone, ondansetron **OR** ondansetron, - MEDICATION INSTRUCTIONS -, polyethylene glycol, potassium chloride, potassium chloride with lidocaine, potassium chloride, potassium chloride, potassium chloride, sodium chloride (PF)      REVIEW OF SYSTEMS  Review of systems not obtained due to patient factors - mental status.  Nursing reports patient attempts to hit, bite when awake; also typically attempts to remove clothing, monitors, etc.     Performance Assessment:    Palliative Performance Scale, v.2     10%  Extensive disease. Bedbound & requires total care. No oral intake. Drowsy or comatose or confused.        PHYSICAL EXAMINATION  Patient Vitals for the past 24 hrs:   BP Temp Temp src Pulse Heart Rate Resp SpO2 Weight   06/02/20 1132 120/67 97  F (36.1  C) Temporal 67 67 18 95 % --   06/02/20 0800 134/67 97.5  F (36.4  C) Tympanic 50 -- 16 98 % --   06/02/20 0400 121/78 97  F (36.1  C) Axillary 54 -- 18 96 % 83.8 kg (184 lb 11.9 oz)   06/01/20 1910 (!) 159/67 96.9  F (36.1  C) Axillary 56 -- 18 97 % --      Wt Readings from Last 5 Encounters:   06/02/20 83.8 kg (184 lb 11.9 oz)   05/20/20 86.2 kg (190 lb)   05/14/20 86.2 kg (190 lb)   04/25/20 98.7 kg (217 lb 9.5 oz)   04/12/20 98.1 kg (216 lb 4.3 oz)     Constitutional:  Nonverbal, sleeping, appears calm, does not follow commands.   Eyes:  Miotic pupils, sclera anicteric  HEENT:  Tongue protruding from mouth, OP dry  Lymph/Hematologic:  No epitrochlear, axillary, anterior or posterior cervical, or supraclavicular lymphadenopathy is appreciated  Cardiovascular:  RRR w/m, w/o r/g.  No distal edema.  Respiratory:  Lungs clear to ausculatation; breathing appears unlabored.   GI: Soft, non-tender, normal bowel sounds, no hepatosplenomegaly  Genitourinary:  purewick collecting device  Musculoskeletal:  Resists my attempt to move arms  Skin:  Warm,  dry  Neurological:  No tremors, no clear muscle rigidity  Psych:  Nonresponsive to verbal commands, withdraws to touch      Intake/Output Summary (Last 24 hours) at 6/2/2020 1641  Last data filed at 6/2/2020 1600  Gross per 24 hour   Intake 2262.5 ml   Output 1150 ml   Net 1112.5 ml        DATA  ROUTINE ICU LABS (Last four results)  CMP  Recent Labs   Lab 06/02/20  0517 06/01/20  0513 05/31/20  2124 05/31/20  0528 05/30/20  1701 05/30/20  0500  05/28/20  0558    145*  --  148* 147* 144   < > 144   POTASSIUM 4.2 4.3 4.4 3.2* 3.9 3.6   < > 3.7   CHLORIDE 116* 120*  --  121* 119* 114*   < > 114*   CO2 21 19*  --  19* 21 25   < > 25   ANIONGAP 4 6  --  8 7 5   < > 5   GLC 97 102*  --  98 88 96   < > 110*   BUN 12 16  --  20 20 22   < > 22   CR 1.34* 1.48*  --  1.49* 1.54* 1.62*   < > 1.41*   GFRESTIMATED 38* 34*  --  33* 32* 30*   < > 36*   GFRESTBLACK 44* 39*  --  39* 37* 35*   < > 41*   DWIGHT 8.0* 7.9*  --  7.6* 7.7* 7.9*   < > 8.1*   MAG  --  1.9  --   --   --  1.8  --  1.9   PHOS  --   --   --   --   --  2.8  --  3.1   PROTTOTAL 5.2*  --   --  5.0* 5.6*  --   --  5.9*   ALBUMIN 1.7*  --   --  1.6* 1.9*  --   --  2.2*   BILITOTAL 0.6  --   --  0.5 0.7  --   --  0.5   ALKPHOS 92  --   --  83 94  --   --  100   AST 35  --   --  34 34  --   --  38   ALT 23  --   --  21 23  --   --  24    < > = values in this interval not displayed.     CBC  Recent Labs   Lab 06/02/20  0517 06/01/20  0513 05/31/20  0528 05/30/20  1701   WBC 2.9* 4.1 3.2* 3.8*   RBC 3.56* 3.71* 3.79* 3.82   HGB 10.8* 11.0* 11.3* 11.3*   HCT 32.8* 34.0* 34.5* 35.4   MCV 92 92 91 93   MCH 30.3 29.6 29.8 29.6   MCHC 32.9 32.4 32.8 31.9   RDW 14.5 14.4 14.2 14.0   * 149* 142* 163     INRNo lab results found in last 7 days.  Arterial Blood Gas  Recent Labs   Lab 05/30/20  1701   O2PER 21         No results found for this or any previous visit (from the past 48 hour(s)).

## 2020-06-02 NOTE — PROGRESS NOTES
[facility] Hospitalist Service      Subjective:  Unable  Sleeping for last three hours  Prior agitated, moves around with eyes closed, occasional verbalizations, refuses cares.  Doesn't always take oral meds    Review of Systems:  unable    Physical Exam:  Vitals Were Reviewed    Patient Vitals for the past 16 hrs:   BP Temp Temp src Pulse Resp SpO2 Weight   06/02/20 0400 121/78 97  F (36.1  C) Axillary 54 18 96 % 83.8 kg (184 lb 11.9 oz)   06/01/20 1910 (!) 159/67 96.9  F (36.1  C) Axillary 56 18 97 % --   06/01/20 1530 (!) 153/71 97.2  F (36.2  C) Axillary 54 18 91 % --         Intake/Output Summary (Last 24 hours) at 6/2/2020 0608  Last data filed at 6/2/2020 0324  Gross per 24 hour   Intake 2864.58 ml   Output 900 ml   Net 1964.58 ml       GENERAL APPEARANCE: sleeping-I did not wake her  EYES: conjunctiva clear, eyes grossly normal  RESP: lungs clear to auscultation - no rales, rhonchi or wheezes  CV: regular rate and rhythm, normal S1 S2, no S3 or S4 and no murmur, click or rub   ABDOMEN: soft, nontender, no HSM or masses and bowel sounds normal  MS: no clubbing, cyanosis; no edema  SKIN: clear without significant rashes or lesions  NEURO: sleeping, as above    Lab:  Recent Labs   Lab Test 06/02/20  0517 06/01/20  0513    145*   POTASSIUM 4.2 4.3   CHLORIDE 116* 120*   CO2 21 19*   ANIONGAP 4 6   GLC 97 102*   BUN 12 16   CR 1.34* 1.48*   DWIGHT 8.0* 7.9*     CBC RESULTS:   Recent Labs   Lab Test 06/02/20  0517 06/01/20  0513   WBC 2.9* 4.1   RBC 3.56* 3.71*   HGB 10.8* 11.0*   HCT 32.8* 34.0*   * 149*       Results for orders placed or performed during the hospital encounter of 05/26/20 (from the past 24 hour(s))   Glucose by meter   Result Value Ref Range    Glucose 105 (H) 70 - 99 mg/dL   Glucose by meter   Result Value Ref Range    Glucose 96 70 - 99 mg/dL   CBC with platelets   Result Value Ref Range    WBC 2.9 (L) 4.0 - 11.0 10e9/L    RBC Count 3.56 (L) 3.8 - 5.2 10e12/L    Hemoglobin 10.8  (L) 11.7 - 15.7 g/dL    Hematocrit 32.8 (L) 35.0 - 47.0 %    MCV 92 78 - 100 fl    MCH 30.3 26.5 - 33.0 pg    MCHC 32.9 31.5 - 36.5 g/dL    RDW 14.5 10.0 - 15.0 %    Platelet Count 134 (L) 150 - 450 10e9/L   Comprehensive metabolic panel   Result Value Ref Range    Sodium 141 133 - 144 mmol/L    Potassium 4.2 3.4 - 5.3 mmol/L    Chloride 116 (H) 94 - 109 mmol/L    Carbon Dioxide 21 20 - 32 mmol/L    Anion Gap 4 3 - 14 mmol/L    Glucose 97 70 - 99 mg/dL    Urea Nitrogen 12 7 - 30 mg/dL    Creatinine 1.34 (H) 0.52 - 1.04 mg/dL    GFR Estimate 38 (L) >60 mL/min/[1.73_m2]    GFR Estimate If Black 44 (L) >60 mL/min/[1.73_m2]    Calcium 8.0 (L) 8.5 - 10.1 mg/dL    Bilirubin Total 0.6 0.2 - 1.3 mg/dL    Albumin 1.7 (L) 3.4 - 5.0 g/dL    Protein Total 5.2 (L) 6.8 - 8.8 g/dL    Alkaline Phosphatase 92 40 - 150 U/L    ALT 23 0 - 50 U/L    AST 35 0 - 45 U/L     *Note: Due to a large number of results and/or encounters for the requested time period, some results have not been displayed. A complete set of results can be found in Results Review.       Assessment and Plan:    Stefanie Velazquez is a 75 year old female admitted on 5/26/2020. She has history of coronary artery disease, hypertension, hyperlipidemia, DVT, type 2 diabetes, chronic back pain, hypothyroidism, rectal cancer, GERD, restless leg syndrome and RC. She presents with confusion.     Acute encephalopathy, UTI under treatment, initial dehydration, history of previous similar episode that resolved, some  intial concern for  Lewey Body (dementia, history of syncope). Ultimately etiology is unclear.  5/26/20 -- 3 days of reported confusion at nursing home with question of slurred speech and possible right sided weakness. Labs show elevated lactic of 3.4, elevated creatinine, normal WBC, normal CRP, normal troponin, normal TSH, normal VBG. CT head shows no acute findings. CT chest/abomdomen pelvis shows lung nodules as well as adnexal fluid collection. UA shows 11 WBC,  "small leukocyte esterase, and few bacteria. Vitals show tachycardic, initially up to the 150s in the ED with atrial fibrillation noted on monitor, briefly placed on diltiazem drip and converted to NSR though again tachycardic with agitation.     Recent admission 4/7 - 4/13 a for similar presentation, ultimately no clear etiology though possibly related to mild temporal CVA seen on imaging. LP done during that admit.Pt required intubation because agitation that was did not respond to other meds.      CT head on 5/26 demonstrates no new changes.  Pt started on antibiotics on   5/27/2020 for  positive urine culture .   Started rivastigmine patch 4.6 mg/day on 5/28  On 5/29 started Seroquel 25 mg bid and 50 mg at bedtime.    On 5/30 feeding tube placement unsuccessful due to patient combativeness.  On 6/1 haldol increased to 50 bid and 100 at bedtime.  06/02/20 slept some overnight,restless, refuses care, threatening to bite staff, minimal po intake     COVID 19 negative   CT chest as above shows \"Scattered tiny indeterminate lung nodules, possibly infectious or Inflammatory.\" Which have previously been seen, at that time COVID testing was performed and negative.   COVID result, 5/26/2020: negative   .    Afib with RVR.  Has converted to NSR. Patient already on anticoagulation. Has been off betablocker since April when had bradycardia.   Unable to take pta Elquis so on full dose lovenox.      UTI with elevated lactic ccid and metabolic encephalopathy   Lactic 3.4 on presentation, corrected with IVF. UA mildly abnormal though CRP normal, WBC normal. Given encephalopathy of unclear origin and concern for possible infectious cause covered broadly upon admit with  IV vancomycin and zosyn initially. Unclear if infection is contributing.  Zosyn and Vanco discontinued on 5/26   Urine culture growing NLF GNR, Halfnia species- Started on Rocephin on 5/27  Changed abx to Cefepime on 5/28, now on day 5/7.  Probably convert to " quinolone soon-no sens but organism is generally sens to quinolone.     TITA on CKD  5/26/20 -- Creatinine 1.98, GFR 24 on presentation. Baseline quite variable over past few years, likely 1.3-1.6 though has been up closer to 1.8-2 at times. Appears to be above baseline presently, possible TITA due to recent confusion at nursing home.   Creat 1.34 .06/02/20     Coronary Artery Disease  History of 3 vessel CABG in 2002. Unable to tolerate ACEi or beta blocker due to hypotension. Managed on aspirin/statin.   Changed to aspirin suppository as patient refusing PO meds     History of Hypertension  5/26/20 -- Patient managed prior to admission with Lasix 20 mg BID.  Holding lasix for now given TITA.  BP is generally ok 06/02/20-continue to hold lasix     Hyperlipidemia  Chronic. Managed with simvastatin 40 mg per day   Discontinue simvastatin as patient has been refusing     History of DVT  Managed chronically on Eliquis 5 mg BID.  Patient refusing PO meds, change to enoxaparin 1 mg/kg bid     Diabetes Mellitus, Type II  5/26/20 -- Chronic.  Last a1C 6.9% on 3/2020. Managed at home with Lantus 24 units at bedtime and novolog 14 units with lunch and dinner.     Currently off lantus due to hypoglycemia and on dextrose infusion due to lack of eating. On medium sliding scale.     Lumbar Radiculopathy   Neuropathy  Previously diagnosed. Managed with gabapentin 100 mg at bedtime.   Holding gabapentin       Hypothyroidism  Chronic. Normal TSH of 3.9 on presentation. Managed with levothyroxine 88 mcg daily except for Sunday (1/2 tablet).   Changed to IV levothyroxine 37.5 mcg daily     Gastroesophageal reflux disease  Chronic. Managed with omeprazole 20 mg TWICE DAILY  Changed to IV protonix.    Restless Leg Syndrome  5/26/20 -- Chronic. Managed with pramipexole 0.5 mg at bedtime and PRN every 8 hours.   Holding pramipexole for now.       RC  On CPAP at assisted living facility, follows with sleep medicine.     Rectal  Cancer  Follows with Dr. Martinez, diagnosed in 2011, underwent neoadjuvant chemoradiation, resection with reanastomosis. Follows yearly with oncology, last seen 9/2019.     CODE STATUS: Full Code      DIET: Moderate CHO diet with honey thick liquids, but not eating due to AMS.  Prophylaxis: On lovenox    Lines: PIV  Restraints: Continue soft restraints for patient and staff safety and for pulling on lines etc       Plan-the etiology remains unclear. Transfer for more evaluation remains an option.  Continue cefepime and IV fluids.  Convert to levaquin soon.   Long-term prognosis is guarded since the patient has not eaten and remains on IV fluids.  She did refuse a feeding tube in the past.  An attempt to place a feeding tube here during this hospitalization was not successful.  Palliative consult placed. I discussed with Dori ocasio 6/1/20. She would consider hospice for her depending on clinical situation.    I will talk to neuro if they think more diagnostics are warranted (EEG has not been done), Will talk to palliative. If pt doesn't improve--would need to consider a palliative approach.    12:56 PM   Discussed with neuro at .  Stop cefepime-can cause neuro toxicity.  Will give levaquin 250 iv  Stopping exelon.  If no better in 48 hours -consider transfer for eeg    2:33 PM   Discussed with pharm  Will avoid quinolone since pt had agitation last hospitalization after cipro,  Unable to give oral meds.  They recommend meropenem---will plan short course.  If able to take oral-bactrim

## 2020-06-02 NOTE — PROGRESS NOTES
Pt continues to be resistant to cares. Has arms clenched together at chest and refuses to straighten arms and fights staff when they try. Covers her mouth when staff try to do mouth cares, covers her face with pillows and whips her head from side to side. Patch removed per MD orders and IV antibiotics infusing.

## 2020-06-02 NOTE — PLAN OF CARE
OT: Patient remains inappropriate for therapy today. Per notes, patient has continued delirium and is hitting and threatening to bite staff. Will attempt to see tomorrow.

## 2020-06-02 NOTE — PROGRESS NOTES
Patient has intermittently slept overnight but been restless. Remains impulsive sitting up, putting her legs out of bed, hitting, and threatening to bite staff.  Continued to refuse oral care.  Remains a 1:1 for her safety.

## 2020-06-02 NOTE — PLAN OF CARE
PT: Patient remain inappropriate for therapy today. Per notes, patient has continued delirium and is hitting and threatening to bite staff. Will attempt to see tomorrow.       Kallie Stevens  PT, DPT       6/2/2020   [address]  [place] [address]  [email or web address]  Voicemail: [phone]

## 2020-06-02 NOTE — PROGRESS NOTES
Patient did drink a little bit of water for the NST however she refused to take her evening medication and unsure how much she swallowed vs spit out at staff.  Patient clenches blankets and pulls them to her face.  Refusing oral care as well.

## 2020-06-03 LAB
ALBUMIN SERPL-MCNC: 1.6 G/DL (ref 3.4–5)
ALP SERPL-CCNC: 101 U/L (ref 40–150)
ALT SERPL W P-5'-P-CCNC: 23 U/L (ref 0–50)
ANION GAP SERPL CALCULATED.3IONS-SCNC: 5 MMOL/L (ref 3–14)
AST SERPL W P-5'-P-CCNC: 43 U/L (ref 0–45)
BILIRUB SERPL-MCNC: 0.8 MG/DL (ref 0.2–1.3)
BUN SERPL-MCNC: 9 MG/DL (ref 7–30)
CALCIUM SERPL-MCNC: 7.8 MG/DL (ref 8.5–10.1)
CHLORIDE SERPL-SCNC: 117 MMOL/L (ref 94–109)
CO2 SERPL-SCNC: 20 MMOL/L (ref 20–32)
CREAT SERPL-MCNC: 1.18 MG/DL (ref 0.52–1.04)
ERYTHROCYTE [DISTWIDTH] IN BLOOD BY AUTOMATED COUNT: 14.6 % (ref 10–15)
GFR SERPL CREATININE-BSD FRML MDRD: 44 ML/MIN/{1.73_M2}
GLUCOSE BLDC GLUCOMTR-MCNC: 80 MG/DL (ref 70–99)
GLUCOSE BLDC GLUCOMTR-MCNC: 86 MG/DL (ref 70–99)
GLUCOSE SERPL-MCNC: 93 MG/DL (ref 70–99)
HCT VFR BLD AUTO: 33.7 % (ref 35–47)
HGB BLD-MCNC: 11.3 G/DL (ref 11.7–15.7)
LACTATE BLD-SCNC: 1.3 MMOL/L (ref 0.7–2)
MCH RBC QN AUTO: 30.2 PG (ref 26.5–33)
MCHC RBC AUTO-ENTMCNC: 33.5 G/DL (ref 31.5–36.5)
MCV RBC AUTO: 90 FL (ref 78–100)
PLATELET # BLD AUTO: 128 10E9/L (ref 150–450)
POTASSIUM SERPL-SCNC: 4.6 MMOL/L (ref 3.4–5.3)
PROT SERPL-MCNC: 5.2 G/DL (ref 6.8–8.8)
RBC # BLD AUTO: 3.74 10E12/L (ref 3.8–5.2)
SODIUM SERPL-SCNC: 142 MMOL/L (ref 133–144)
WBC # BLD AUTO: 3.4 10E9/L (ref 4–11)

## 2020-06-03 PROCEDURE — 80053 COMPREHEN METABOLIC PANEL: CPT | Performed by: FAMILY MEDICINE

## 2020-06-03 PROCEDURE — 99232 SBSQ HOSP IP/OBS MODERATE 35: CPT | Performed by: NURSE PRACTITIONER

## 2020-06-03 PROCEDURE — 25000132 ZZH RX MED GY IP 250 OP 250 PS 637: Performed by: FAMILY MEDICINE

## 2020-06-03 PROCEDURE — 12000011 ZZH R&B MS OVERFLOW

## 2020-06-03 PROCEDURE — 99233 SBSQ HOSP IP/OBS HIGH 50: CPT | Performed by: FAMILY MEDICINE

## 2020-06-03 PROCEDURE — 25800030 ZZH RX IP 258 OP 636: Performed by: FAMILY MEDICINE

## 2020-06-03 PROCEDURE — 83605 ASSAY OF LACTIC ACID: CPT | Performed by: FAMILY MEDICINE

## 2020-06-03 PROCEDURE — 00000146 ZZHCL STATISTIC GLUCOSE BY METER IP

## 2020-06-03 PROCEDURE — 36415 COLL VENOUS BLD VENIPUNCTURE: CPT | Performed by: FAMILY MEDICINE

## 2020-06-03 PROCEDURE — 25000128 H RX IP 250 OP 636: Performed by: FAMILY MEDICINE

## 2020-06-03 PROCEDURE — 85027 COMPLETE CBC AUTOMATED: CPT | Performed by: FAMILY MEDICINE

## 2020-06-03 PROCEDURE — 25000125 ZZHC RX 250: Performed by: INTERNAL MEDICINE

## 2020-06-03 PROCEDURE — 25000132 ZZH RX MED GY IP 250 OP 250 PS 637: Performed by: INTERNAL MEDICINE

## 2020-06-03 PROCEDURE — 25000128 H RX IP 250 OP 636: Performed by: INTERNAL MEDICINE

## 2020-06-03 PROCEDURE — C9113 INJ PANTOPRAZOLE SODIUM, VIA: HCPCS | Performed by: INTERNAL MEDICINE

## 2020-06-03 RX ORDER — QUETIAPINE FUMARATE 25 MG/1
25-50 TABLET, FILM COATED ORAL
Status: DISCONTINUED | OUTPATIENT
Start: 2020-06-03 | End: 2020-06-03

## 2020-06-03 RX ORDER — LORAZEPAM 0.5 MG/1
0.5 TABLET ORAL EVERY 4 HOURS PRN
Status: DISCONTINUED | OUTPATIENT
Start: 2020-06-03 | End: 2020-06-03

## 2020-06-03 RX ORDER — LORAZEPAM 2 MG/ML
0.5 INJECTION INTRAMUSCULAR EVERY 4 HOURS PRN
Status: DISCONTINUED | OUTPATIENT
Start: 2020-06-03 | End: 2020-06-03

## 2020-06-03 RX ADMIN — ASPIRIN 300 MG: 300 SUPPOSITORY RECTAL at 09:06

## 2020-06-03 RX ADMIN — LORAZEPAM 1 MG: 2 INJECTION INTRAMUSCULAR; INTRAVENOUS at 01:59

## 2020-06-03 RX ADMIN — POTASSIUM CHLORIDE, DEXTROSE MONOHYDRATE AND SODIUM CHLORIDE: 150; 5; 450 INJECTION, SOLUTION INTRAVENOUS at 20:55

## 2020-06-03 RX ADMIN — PANTOPRAZOLE SODIUM 40 MG: 40 INJECTION, POWDER, LYOPHILIZED, FOR SOLUTION INTRAVENOUS at 09:05

## 2020-06-03 RX ADMIN — LEVOTHYROXINE SODIUM 37.5 MCG: 20 INJECTION, SOLUTION INTRAVENOUS at 09:07

## 2020-06-03 RX ADMIN — MEROPENEM 1 G: 1 INJECTION, POWDER, FOR SOLUTION INTRAVENOUS at 02:46

## 2020-06-03 RX ADMIN — POTASSIUM CHLORIDE, DEXTROSE MONOHYDRATE AND SODIUM CHLORIDE: 150; 5; 450 INJECTION, SOLUTION INTRAVENOUS at 02:47

## 2020-06-03 RX ADMIN — QUETIAPINE 50 MG: 25 TABLET ORAL at 09:07

## 2020-06-03 RX ADMIN — ENOXAPARIN SODIUM 80 MG: 80 INJECTION SUBCUTANEOUS at 19:54

## 2020-06-03 RX ADMIN — MEROPENEM 1 G: 1 INJECTION, POWDER, FOR SOLUTION INTRAVENOUS at 15:25

## 2020-06-03 RX ADMIN — ENOXAPARIN SODIUM 80 MG: 80 INJECTION SUBCUTANEOUS at 09:07

## 2020-06-03 ASSESSMENT — ACTIVITIES OF DAILY LIVING (ADL)
ADLS_ACUITY_SCORE: 29
ADLS_ACUITY_SCORE: 28
ADLS_ACUITY_SCORE: 30
ADLS_ACUITY_SCORE: 30

## 2020-06-03 ASSESSMENT — MIFFLIN-ST. JEOR: SCORE: 1362.75

## 2020-06-03 NOTE — PROGRESS NOTES
CARE TRANSITIONS PROGRESS NOTE:    Reason for Follow up: Discharge planning.  SW was informed by Palliative Care, Maldonado, that pt is more engaging today with staff & has eaten ice cream.  Maldonado requested that pt's family bring her hearing aides & IPAD.    ANTONINO spoke with Malou WILHELM,  at [facility] (Phone: [phone] Fax:[phone]) and she informed this writer that family is coming to their facility today at 1:30 PM to clean the pt's room.  Malou will inform family that hearing aides & IPad need to be delivered to the hospital.    Anticipated discharge needs: TBD;   1.  Pt may transfer to [facility] for an EEG  2.  Pt may need TCU cares, if she engages with PT/OT & she has a rehab need.  (referrals sent on 5-28-20)  3.  Pt may discharge back to her [facility], if staff able to accommodated pt's needs.  4.  Pt may need a hospice consult; TBD    Next steps: CTS to continue to follow    Discharge Planner   Discharge Plans in progress: TBD  Barriers to discharge plan: medical stability  Follow up plan: CTS to follow       Entered by: Gina Nayak 06/03/2020 10:50 AM       ARIELLA Tyson  [facility] [phone]   [facility]  [phone]

## 2020-06-03 NOTE — PROGRESS NOTES
"Palliative Care Follow-up Hospital Note  Date of Admission:  5/26/2020   Visit Date:  Melinda 3, 2020        HISTORY of PRESENT ILLNESS:  Stefanie Velazquez is a 75 year old year old female known to me, last seen on 4/11/20 during her last hospitalization.  with a history of diabetes, LE DVTs, CVA, RC/CPAP, ischemic CAD and obesity.  She was hospitalized in April with encephelopathy so severe that she required sedation (Precedex, Propofol) and, thereafter, intubation to retain a patent airway.  The cause of the agitation was never definitively ascertained.  She had resumed an oral diet by discharge.  She was admitted here again with a several-day h/o altered mental status.  Since admission she has been treated with antibiotics for presumed UTI without, as yet, a return to her baseline mental status.  Rivastigmine was initiated on 5/28 due to concern for Lewy Body Dementia, with no improvement in mental status.  She has been unable to feed herself or be fed.   She has had recurrent episodes of hypoglycemia requiring boluses of D50, and insulin now on hold.  Seroquel has been initiated but at twice the doses given during her last hospitalization, and yet the patient remains agitated when awake.   She was referred to Palliative Care for exploration of goals of care, assistance with symptom management..        ASSESSMENT & PLAN:     Agitated delirium, unknown etiology, no improvement despite 6-7d ATBs  On rare occasions, can appropriately communicates her needs (ie, to move bowels on 5/30)  - discontinue Exelon; niece reports Concha is very \"sensitive\" to multiple medications  - monitor; consider Thorazine if a higher level of sedation is required (consider prolonged QT risk)  - 6/3:  Cognitively \"lighter,\" asking appropriate questions when prompted, cooperative with cares despite a little confusion.  Biggest issue now is excess sedation, so have DC'd seroquel and ativan with Dr Milian's consent; at baseline she was taking neither " of these two meds. EXTREMELY hard of hearing, so family to bring in her hearing aids and iPad today.  Change due to replacement of Cefepime with Meropenem and/or discontinuation of Exelon?     Unplanned weight loss, 15% in approximately 6 weeks  Refusing food/oral fluids when agitated, food/fluids held when lethargic.  No in-between.   - 6/3: taking bites of soft foods today when prompted     Advance Care Planning:  - Understanding of condition:  n/a  - Decisional capacity:  lacking  - Code status:  Full Code  - Health Care Directive: Yes, If Yes, is there a copy in the EMR?   Yes  - POLST:  No     Goals of Care:  - Crystal and patient's other nieces are considering whether to proceed with a potential transfer after 48 hours to a facility that can conduct an EEG.  They're also considering hospice.    - ABSOLUTELY NO FEEDING TUBE per health care directive   - currently remains full code; if she is is eventually enrolled in hospice, her agents will change code status to DNR/DNI.  They are also considering (no decision yet) whether to change her code status at this point in time.        Thank you for the opportunity to be of service to this patient and family.      Maldonado Nair (Ann), CNP  Palliative Care  Private cell:  [phone]       Face to face:   1020 - 1045, 25 min, > 50% spent coordinating care with  Nursing and assessing pt's cognition.    Non face to face:  [id number] and [id number] and 1630 - 1640, 40 min, > 50% spent coordinating care with  attending, nursing and Care Transitions about change in cognitive status    ========================    SUBJECTIVE:  Very hard of hearing, but responds appropriately to questions when yelled into her ear.  CTS contacted her facility w/request that her hearing aids and iPad be brought to the hospital.  She even promised me she would NOT spit and NOT hit people. Asking where she is, why she was brought to the hospital.  Swallowed a bit of ice cream, coughed thereafter so  Nursing will switch to clear liquids.  She requested mashed potatoes for lunch/supper.     ROS:  As described in HPI and Subjective.  Otherwise, ALL are negative.    MEDICATIONS:  Allergies   Allergen Reactions     Ciprofloxacin Anxiety     Pt developed agitation, paranoia while on cipro--on clear if this is what caused it.  But would try to avoid in future.     Hydrocodone Other (See Comments)     dizzy     Lisinopril Cough            Vicodin [Hydrocodone-Acetaminophen] Other (See Comments)     Caused extreme dizziness     Scheduled meds:    aspirin  300 mg Rectal Daily     bisacodyl  10 mg Rectal Daily     enoxaparin ANTICOAGULANT  1 mg/kg Subcutaneous Q12H     levothyroxine  37.5 mcg Intravenous Daily     meropenem  1 g Intravenous Q12H     pantoprazole (PROTONIX) IV  40 mg Intravenous Daily with breakfast     QUEtiapine  100 mg Oral At Bedtime     QUEtiapine  50 mg Oral BID     PRN meds:  bisacodyl, glucose **OR** dextrose **OR** glucagon, haloperidol lactate, lidocaine 4%, lidocaine (buffered or not buffered), LORazepam **OR** LORazepam, magnesium sulfate, naloxone, ondansetron **OR** ondansetron, - MEDICATION INSTRUCTIONS -, polyethylene glycol, potassium chloride, potassium chloride with lidocaine, potassium chloride, potassium chloride, potassium chloride, sodium chloride (PF)      OBJECTIVE:  Patient Vitals for the past 24 hrs:   BP Temp Temp src Pulse Heart Rate Resp SpO2 Weight   06/03/20 0900 103/52 96.5  F (35.8  C) Axillary -- 51 14 -- --   06/03/20 0538 -- -- -- -- -- -- -- 86.1 kg (189 lb 13.1 oz)   06/03/20 0400 101/42 -- -- (!) 47 -- -- 95 % --   06/03/20 0200 118/50 -- -- 57 -- -- -- --   06/03/20 0100 -- -- -- -- -- -- 100 % --   06/03/20 0000 121/89 -- -- 54 -- -- 96 % --   06/02/20 2219 -- 96  F (35.6  C) Axillary -- -- -- -- --   06/02/20 2200 (!) 149/66 -- -- (!) 44 -- -- 99 % --   06/02/20 2000 130/65 97.2  F (36.2  C) Axillary (!) 44 -- -- 98 % --   06/02/20 1900 -- -- -- -- -- -- 98 % --    06/02/20 1800 (!) 142/68 -- -- (!) 46 -- 15 97 % --   06/02/20 1700 -- -- -- -- -- -- 97 % --   06/02/20 1600 112/41 97.1  F (36.2  C) Axillary (!) 47 -- 15 99 % --   06/02/20 1132 120/67 97  F (36.1  C) Temporal 67 67 18 95 % --       Wt Readings from Last 10 Encounters:   06/03/20 86.1 kg (189 lb 13.1 oz)   05/20/20 86.2 kg (190 lb)   05/14/20 86.2 kg (190 lb)   04/25/20 98.7 kg (217 lb 9.5 oz)   04/12/20 98.1 kg (216 lb 4.3 oz)   03/16/20 89.8 kg (198 lb)   01/03/20 91.2 kg (201 lb)   11/08/19 91.2 kg (201 lb)   09/27/19 92.7 kg (204 lb 6.4 oz)   09/23/19 91.2 kg (201 lb)   Extremely hard of hearing  Somnolent, but able to speak short phrases intelligibly  Eyelids heavy  Bilateral ear exam showed little wax, light reflex seen  Speech somewhat slurred  Urine evacuated to suction cannister per wick is concentrated but clear    Intake/Output Summary (Last 24 hours) at 6/3/2020 1048  Last data filed at 6/3/2020 0900  Gross per 24 hour   Intake 500 ml   Output 1900 ml   Net -1400 ml         ROUTINE ICU LABS (Last four results)  CMP  Recent Labs   Lab 06/03/20  0524 06/02/20  0517 06/01/20  0513 05/31/20  2124 05/31/20  0528 05/30/20  1701 05/30/20  0500  05/28/20  0558    141 145*  --  148* 147* 144   < > 144   POTASSIUM 4.6 4.2 4.3 4.4 3.2* 3.9 3.6   < > 3.7   CHLORIDE 117* 116* 120*  --  121* 119* 114*   < > 114*   CO2 20 21 19*  --  19* 21 25   < > 25   ANIONGAP 5 4 6  --  8 7 5   < > 5   GLC 93 97 102*  --  98 88 96   < > 110*   BUN 9 12 16  --  20 20 22   < > 22   CR 1.18* 1.34* 1.48*  --  1.49* 1.54* 1.62*   < > 1.41*   GFRESTIMATED 44* 38* 34*  --  33* 32* 30*   < > 36*   GFRESTBLACK 51* 44* 39*  --  39* 37* 35*   < > 41*   DWIGHT 7.8* 8.0* 7.9*  --  7.6* 7.7* 7.9*   < > 8.1*   MAG  --   --  1.9  --   --   --  1.8  --  1.9   PHOS  --   --   --   --   --   --  2.8  --  3.1   PROTTOTAL 5.2* 5.2*  --   --  5.0* 5.6*  --   --  5.9*   ALBUMIN 1.6* 1.7*  --   --  1.6* 1.9*  --   --  2.2*   BILITOTAL 0.8 0.6  --    --  0.5 0.7  --   --  0.5   ALKPHOS 101 92  --   --  83 94  --   --  100   AST 43 35  --   --  34 34  --   --  38   ALT 23 23  --   --  21 23  --   --  24    < > = values in this interval not displayed.     CBC  Recent Labs   Lab 06/03/20  0524 06/02/20  0517 06/01/20  0513 05/31/20  0528   WBC 3.4* 2.9* 4.1 3.2*   RBC 3.74* 3.56* 3.71* 3.79*   HGB 11.3* 10.8* 11.0* 11.3*   HCT 33.7* 32.8* 34.0* 34.5*   MCV 90 92 92 91   MCH 30.2 30.3 29.6 29.8   MCHC 33.5 32.9 32.4 32.8   RDW 14.6 14.5 14.4 14.2   * 134* 149* 142*     INRNo lab results found in last 7 days.  Arterial Blood Gas  Recent Labs   Lab 05/30/20  1701   O2PER 21       No results found for this or any previous visit (from the past 48 hour(s)).

## 2020-06-03 NOTE — PROGRESS NOTES
"Pt obtunded. Oral cares, repositioning and sigifredo cares completed. Purewick in place and collecting urine. PRN Ativan given for agitation. Pt crying stating, \"I want to go home\". Seroquel given, pt spitting it out at writer.     Dorina Myers RN    "

## 2020-06-03 NOTE — PROGRESS NOTES
Patient continue to sleep most of shift. Patient does wake to repeated voice. Able to swallow small amounts of ice cream and flavored ice. Still crushing med and mix w/water, using syringe to give oral .Able to sit up in chair  but does sleep most of time. Will continue to try to feed when we find patient awake enough.

## 2020-06-03 NOTE — PLAN OF CARE
PT: patient not appropriate to participate in therapy today. Will check on status tomorrow.       Kallie Stevens  PT, DPT       6/3/2020   [address] [address]  [email or web address]  Voicemail: [phone]

## 2020-06-03 NOTE — PROGRESS NOTES
[facility]      Subjective:  At rest she fidgets eyes closed.  Often repeats the same phrase.  Occasionally spits at people.  Slept overnight    Review of Systems:  unable    Physical Exam:  Vitals Were Reviewed    Patient Vitals for the past 16 hrs:   BP Temp Temp src Pulse Resp SpO2 Weight   06/03/20 0538 -- -- -- -- -- -- 86.1 kg (189 lb 13.1 oz)   06/03/20 0400 101/42 -- -- (!) 47 -- 95 % --   06/03/20 0200 118/50 -- -- 57 -- -- --   06/03/20 0100 -- -- -- -- -- 100 % --   06/03/20 0000 121/89 -- -- 54 -- 96 % --   06/02/20 2219 -- 96  F (35.6  C) Axillary -- -- -- --   06/02/20 2200 (!) 149/66 -- -- (!) 44 -- 99 % --   06/02/20 2000 130/65 97.2  F (36.2  C) Axillary (!) 44 -- 98 % --   06/02/20 1900 -- -- -- -- -- 98 % --   06/02/20 1800 (!) 142/68 -- -- (!) 46 15 97 % --   06/02/20 1700 -- -- -- -- -- 97 % --   06/02/20 1600 112/41 97.1  F (36.2  C) Axillary (!) 47 15 99 % --         Intake/Output Summary (Last 24 hours) at 6/3/2020 0612  Last data filed at 6/3/2020 0201  Gross per 24 hour   Intake 1087.5 ml   Output 1450 ml   Net -362.5 ml       GENERAL APPEARANCE: sleeping, wakes, pulls covers up, directs her vision to me when I speak, said leave me alone  EYES: conjunctiva clear, eyes grossly normal  RESP: lungs clear to auscultation - no rales, rhonchi or wheezes  CV: regular rate and rhythm, normal S1 S2, no S3 or S4 and no murmur, click or rub   ABDOMEN: soft, nontender, no HSM or masses and bowel sounds normal  MS: no clubbing, cyanosis; no edema  SKIN: clear without significant rashes or lesions  NEURO: as above, moves all extremities, no cn deficit seen    Lab:  Recent Labs   Lab Test 06/03/20  0524 06/02/20  0517    141   POTASSIUM 4.6 4.2   CHLORIDE 117* 116*   CO2 20 21   ANIONGAP 5 4   GLC 93 97   BUN 9 12   CR 1.18* 1.34*   DWIGHT 7.8* 8.0*     CBC RESULTS:   Recent Labs   Lab Test 06/03/20  0524 06/02/20  0517   WBC 3.4* 2.9*   RBC 3.74* 3.56*   HGB 11.3* 10.8*   HCT  33.7* 32.8*   * 134*       Results for orders placed or performed during the hospital encounter of 05/26/20 (from the past 24 hour(s))   Glucose by meter   Result Value Ref Range    Glucose 97 70 - 99 mg/dL   Glucose by meter   Result Value Ref Range    Glucose 113 (H) 70 - 99 mg/dL   Glucose by meter   Result Value Ref Range    Glucose 104 (H) 70 - 99 mg/dL   CBC with platelets   Result Value Ref Range    WBC 3.4 (L) 4.0 - 11.0 10e9/L    RBC Count 3.74 (L) 3.8 - 5.2 10e12/L    Hemoglobin 11.3 (L) 11.7 - 15.7 g/dL    Hematocrit 33.7 (L) 35.0 - 47.0 %    MCV 90 78 - 100 fl    MCH 30.2 26.5 - 33.0 pg    MCHC 33.5 31.5 - 36.5 g/dL    RDW 14.6 10.0 - 15.0 %    Platelet Count 128 (L) 150 - 450 10e9/L   Comprehensive metabolic panel   Result Value Ref Range    Sodium 142 133 - 144 mmol/L    Potassium 4.6 3.4 - 5.3 mmol/L    Chloride 117 (H) 94 - 109 mmol/L    Carbon Dioxide 20 20 - 32 mmol/L    Anion Gap 5 3 - 14 mmol/L    Glucose 93 70 - 99 mg/dL    Urea Nitrogen 9 7 - 30 mg/dL    Creatinine 1.18 (H) 0.52 - 1.04 mg/dL    GFR Estimate 44 (L) >60 mL/min/[1.73_m2]    GFR Estimate If Black 51 (L) >60 mL/min/[1.73_m2]    Calcium 7.8 (L) 8.5 - 10.1 mg/dL    Bilirubin Total 0.8 0.2 - 1.3 mg/dL    Albumin 1.6 (L) 3.4 - 5.0 g/dL    Protein Total 5.2 (L) 6.8 - 8.8 g/dL    Alkaline Phosphatase 101 40 - 150 U/L    ALT 23 0 - 50 U/L    AST 43 0 - 45 U/L     *Note: Due to a large number of results and/or encounters for the requested time period, some results have not been displayed. A complete set of results can be found in Results Review.       Assessment and Plan:    Stefanie Velazquez is a 75 year old female admitted on 5/26/2020. She has history of coronary artery disease, hypertension, hyperlipidemia, DVT, type 2 diabetes, chronic back pain, hypothyroidism, rectal cancer, GERD, restless leg syndrome and RC. She presents with confusion.     Acute encephalopathy, UTI under treatment, initial dehydration, history of previous  "similar episode that resolved, some  intial concern for  Herrera Aguillon (dementia, history of syncope). Ultimately etiology is unclear.  5/26/20 -- 3 days of reported confusion at nursing home with question of slurred speech and possible right sided weakness. Labs show elevated lactic of 3.4, elevated creatinine, normal WBC, normal CRP, normal troponin, normal TSH, normal VBG. CT head shows no acute findings. CT chest/abomdomen pelvis shows lung nodules as well as adnexal fluid collection. UA shows 11 WBC, small leukocyte esterase, and few bacteria. Vitals show tachycardic, initially up to the 150s in the ED with atrial fibrillation noted on monitor, briefly placed on diltiazem drip and converted to NSR though again tachycardic with agitation.      Recent admission 4/7 - 4/13 a for similar presentation, ultimately no clear etiology though possibly related to mild temporal CVA seen on imaging. LP done during that admit.Pt required intubation because agitation that was did not respond to other meds.      CT head on 5/26 demonstrates no new changes.  Pt started on antibiotics on   5/27/2020 for  positive urine culture .   Started rivastigmine patch 4.6 mg/day on 5/28  On 5/29 started Seroquel 25 mg bid and 50 mg at bedtime.    On 5/30 feeding tube placement unsuccessful due to patient combativeness.  On 6/1 seroquel increased to 50 bid and 100 at bedtime.  On 6/2/20 discussed with neuro at -they suggest continued care as we have and consider transfer for EEG 6/4/20 if not better. Family wants no feeding tube.     COVID 19 negative   CT chest as above shows \"Scattered tiny indeterminate lung nodules, possibly infectious or Inflammatory.\" Which have previously been seen, at that time COVID testing was performed and negative.   COVID result, 5/26/2020: negative   .    Afib with RVR.  Presented with above. Patient already on anticoagulation. Has been off betablocker since April when had bradycardia.   Unable to take pta " Elquis so on full dose lovenox.      UTI with elevated lactic ccid and metabolic encephalopathy   Lactic 3.4 on presentation, corrected with IVF. UA mildly abnormal though CRP normal, WBC normal. Given encephalopathy of unclear origin and concern for possible infectious cause covered broadly upon admit with  IV vancomycin and zosyn initially. Unclear if infection is contributing.  Zosyn and Vanco discontinued on 5/26   Urine culture growing NLF GNR, Halfnia species- Started on Rocephin on 5/27  Changed abx to Cefepime on 5/28, now on day 5/7.  On yesterday neuro suggested stopping Cefepime due to cases of neuro toxicity-discussed Hafnia with pharmacy---they suggested meropenem since pt not taking orals . Stop atb by 6/5 (day 10 atb)     TITA on CKD  5/26/20 -- Creatinine 1.98, GFR 24 on presentation. Baseline quite variable over past few years, likely 1.3-1.6 though has been up closer to 1.8-2 at times. Appears to be above baseline presently, possible TITA due to recent confusion at nursing home.   Creat 1.18 .06/03/20     Mild pancytopenia  Present to some degree last hospitilization-etiology unclear.     Coronary Artery Disease  History of 3 vessel CABG in 2002. Unable to tolerate ACEi or beta blocker due to hypotension. Managed on aspirin/statin.   Changed to aspirin suppository as patient refusing PO meds     History of Hypertension  5/26/20 -- Patient managed prior to admission with Lasix 20 mg BID.  Holding lasix for now given TITA.  BP is generally ok 06/02/20-continue to hold lasix     Hyperlipidemia  Chronic. Managed with simvastatin 40 mg per day   Discontinue simvastatin as patient has been refusing     History of DVT  Managed chronically on Eliquis 5 mg BID.  Patient refusing PO meds, change to enoxaparin 1 mg/kg bid     Diabetes Mellitus, Type II  5/26/20 -- Chronic.  Last a1C 6.9% on 3/2020. Managed at home with Lantus 24 units at bedtime and novolog 14 units with lunch and dinner.      Currently off  lantus due to hypoglycemia and on dextrose infusion due to lack of eating. On medium sliding scale.     Lumbar Radiculopathy   Neuropathy  Previously diagnosed. Managed with gabapentin 100 mg at bedtime.   Holding gabapentin       Hypothyroidism  Chronic. Normal TSH of 3.9 on presentation. Managed with levothyroxine 88 mcg daily except for Sunday (1/2 tablet).   Changed to IV levothyroxine 37.5 mcg daily     Gastroesophageal reflux disease  Chronic. Managed with omeprazole 20 mg TWICE DAILY  Changed to IV protonix.    Restless Leg Syndrome  5/26/20 -- Chronic. Managed with pramipexole 0.5 mg at bedtime and PRN every 8 hours.   Holding pramipexole for now.       RC  On CPAP at assisted living facility, follows with sleep medicine.     Rectal Cancer  Follows with Dr. Martinez, diagnosed in 2011, underwent neoadjuvant chemoradiation, resection with reanastomosis. Follows yearly with oncology, last seen 9/2019.     CODE STATUS: Full Code      DIET: Moderate CHO diet with honey thick liquids, but not eating due to AMS.  Prophylaxis: On lovenox    Lines: PIV  Restraints: Continue soft restraints for patient and staff safety and for pulling on lines etc       Plan-the etiology of encephalopathy/delerium remains unclear. Currently on meropenem for Hafnia uti-stop by 6/5.   Long-term prognosis is guarded since the patient has not eaten and remains on IV fluids.  She did refuse a feeding tube in the past.  An attempt to place a feeding tube here during this hospitalization was not successful. Discussed with neuro at [facility] On 6/2/20. Stopped cefepime 6/2/20 -can cause neuro toxicity. If no better in 48 hours -consider transfer for eeg.

## 2020-06-03 NOTE — PROGRESS NOTES
Pt resting in bed. Position changed q 2 hours. Tiffany wic draining clear yellow urine. VSS, afebrile. Sofia Flaherty RN BSN

## 2020-06-03 NOTE — PROGRESS NOTES
SPIRITUAL HEALTH SERVICES  [facility] - ICU    Referral Source: Family request    - Concha was sitting up in a chair but was sleeping soundly.    - I spoke with JEWELS Bianchi, who is providing Concha's care today and [facility], who has been in primary contact with the family.  They stated that Concha has been communicating slightly with them.    - As I spoke with Concha, she was sleeping soundly.  I spoke of her family's request for me to visit with her and said a prayer for her.    Plan: I will remain available for any ongoing support needs during Concha's LOS.    Ernesto Hernandez M.A., [facility]  Lead   [facility]  Office: [phone]  Cell: [phone]  Pager [phone]

## 2020-06-04 ENCOUNTER — APPOINTMENT (OUTPATIENT)
Dept: PHYSICAL THERAPY | Facility: CLINIC | Age: 75
DRG: 091 | End: 2020-06-04
Payer: COMMERCIAL

## 2020-06-04 ENCOUNTER — APPOINTMENT (OUTPATIENT)
Dept: OCCUPATIONAL THERAPY | Facility: CLINIC | Age: 75
DRG: 091 | End: 2020-06-04
Payer: COMMERCIAL

## 2020-06-04 LAB
ALBUMIN SERPL-MCNC: 1.8 G/DL (ref 3.4–5)
ALP SERPL-CCNC: 127 U/L (ref 40–150)
ALT SERPL W P-5'-P-CCNC: 27 U/L (ref 0–50)
ANION GAP SERPL CALCULATED.3IONS-SCNC: 5 MMOL/L (ref 3–14)
AST SERPL W P-5'-P-CCNC: 45 U/L (ref 0–45)
BACTERIA SPEC CULT: ABNORMAL
BILIRUB SERPL-MCNC: 0.5 MG/DL (ref 0.2–1.3)
BUN SERPL-MCNC: 9 MG/DL (ref 7–30)
CALCIUM SERPL-MCNC: 8.1 MG/DL (ref 8.5–10.1)
CHLORIDE SERPL-SCNC: 115 MMOL/L (ref 94–109)
CO2 SERPL-SCNC: 23 MMOL/L (ref 20–32)
CREAT SERPL-MCNC: 1.24 MG/DL (ref 0.52–1.04)
ERYTHROCYTE [DISTWIDTH] IN BLOOD BY AUTOMATED COUNT: 14.9 % (ref 10–15)
GFR SERPL CREATININE-BSD FRML MDRD: 42 ML/MIN/{1.73_M2}
GLUCOSE BLDC GLUCOMTR-MCNC: 109 MG/DL (ref 70–99)
GLUCOSE BLDC GLUCOMTR-MCNC: 121 MG/DL (ref 70–99)
GLUCOSE BLDC GLUCOMTR-MCNC: 86 MG/DL (ref 70–99)
GLUCOSE BLDC GLUCOMTR-MCNC: 96 MG/DL (ref 70–99)
GLUCOSE SERPL-MCNC: 90 MG/DL (ref 70–99)
HCT VFR BLD AUTO: 37.6 % (ref 35–47)
HGB BLD-MCNC: 12.3 G/DL (ref 11.7–15.7)
Lab: ABNORMAL
MCH RBC QN AUTO: 30.1 PG (ref 26.5–33)
MCHC RBC AUTO-ENTMCNC: 32.7 G/DL (ref 31.5–36.5)
MCV RBC AUTO: 92 FL (ref 78–100)
PLATELET # BLD AUTO: 131 10E9/L (ref 150–450)
POTASSIUM SERPL-SCNC: 5.1 MMOL/L (ref 3.4–5.3)
PROT SERPL-MCNC: 5.6 G/DL (ref 6.8–8.8)
RBC # BLD AUTO: 4.08 10E12/L (ref 3.8–5.2)
SODIUM SERPL-SCNC: 143 MMOL/L (ref 133–144)
SPECIMEN SOURCE: ABNORMAL
WBC # BLD AUTO: 2.8 10E9/L (ref 4–11)

## 2020-06-04 PROCEDURE — 36415 COLL VENOUS BLD VENIPUNCTURE: CPT | Performed by: FAMILY MEDICINE

## 2020-06-04 PROCEDURE — 25000132 ZZH RX MED GY IP 250 OP 250 PS 637: Performed by: INTERNAL MEDICINE

## 2020-06-04 PROCEDURE — 80053 COMPREHEN METABOLIC PANEL: CPT | Performed by: FAMILY MEDICINE

## 2020-06-04 PROCEDURE — 85027 COMPLETE CBC AUTOMATED: CPT | Performed by: FAMILY MEDICINE

## 2020-06-04 PROCEDURE — 00000146 ZZHCL STATISTIC GLUCOSE BY METER IP

## 2020-06-04 PROCEDURE — 25000125 ZZHC RX 250: Performed by: INTERNAL MEDICINE

## 2020-06-04 PROCEDURE — 12000011 ZZH R&B MS OVERFLOW

## 2020-06-04 PROCEDURE — 97530 THERAPEUTIC ACTIVITIES: CPT | Mod: GP | Performed by: PHYSICAL THERAPIST

## 2020-06-04 PROCEDURE — 99233 SBSQ HOSP IP/OBS HIGH 50: CPT | Performed by: FAMILY MEDICINE

## 2020-06-04 PROCEDURE — 25000132 ZZH RX MED GY IP 250 OP 250 PS 637: Performed by: FAMILY MEDICINE

## 2020-06-04 PROCEDURE — 25800030 ZZH RX IP 258 OP 636: Performed by: FAMILY MEDICINE

## 2020-06-04 PROCEDURE — 25000128 H RX IP 250 OP 636: Performed by: INTERNAL MEDICINE

## 2020-06-04 PROCEDURE — 25000128 H RX IP 250 OP 636: Performed by: FAMILY MEDICINE

## 2020-06-04 PROCEDURE — 97535 SELF CARE MNGMENT TRAINING: CPT | Mod: GO

## 2020-06-04 PROCEDURE — C9113 INJ PANTOPRAZOLE SODIUM, VIA: HCPCS | Performed by: INTERNAL MEDICINE

## 2020-06-04 RX ADMIN — PANTOPRAZOLE SODIUM 40 MG: 40 INJECTION, POWDER, LYOPHILIZED, FOR SOLUTION INTRAVENOUS at 09:01

## 2020-06-04 RX ADMIN — LEVOTHYROXINE SODIUM 37.5 MCG: 20 INJECTION, SOLUTION INTRAVENOUS at 09:01

## 2020-06-04 RX ADMIN — ENOXAPARIN SODIUM 80 MG: 80 INJECTION SUBCUTANEOUS at 23:04

## 2020-06-04 RX ADMIN — ENOXAPARIN SODIUM 80 MG: 80 INJECTION SUBCUTANEOUS at 09:01

## 2020-06-04 RX ADMIN — ASPIRIN 300 MG: 300 SUPPOSITORY RECTAL at 09:01

## 2020-06-04 RX ADMIN — MEROPENEM 1 G: 1 INJECTION, POWDER, FOR SOLUTION INTRAVENOUS at 02:00

## 2020-06-04 RX ADMIN — MEROPENEM 1 G: 1 INJECTION, POWDER, FOR SOLUTION INTRAVENOUS at 13:42

## 2020-06-04 RX ADMIN — BISACODYL 10 MG: 10 SUPPOSITORY RECTAL at 09:01

## 2020-06-04 RX ADMIN — POTASSIUM CHLORIDE, DEXTROSE MONOHYDRATE AND SODIUM CHLORIDE: 150; 5; 450 INJECTION, SOLUTION INTRAVENOUS at 17:23

## 2020-06-04 RX ADMIN — POTASSIUM CHLORIDE, DEXTROSE MONOHYDRATE AND SODIUM CHLORIDE: 150; 5; 450 INJECTION, SOLUTION INTRAVENOUS at 05:27

## 2020-06-04 ASSESSMENT — ACTIVITIES OF DAILY LIVING (ADL)
ADLS_ACUITY_SCORE: 32
ADLS_ACUITY_SCORE: 32
ADLS_ACUITY_SCORE: 34
ADLS_ACUITY_SCORE: 32

## 2020-06-04 ASSESSMENT — MIFFLIN-ST. JEOR: SCORE: 1371.75

## 2020-06-04 NOTE — PROGRESS NOTES
CARE TRANSITIONS PROGRESS NOTE:    Reason for Follow up: Discharge planning.    Per IDT rounds & chart review, pt continues to improve medially & her mentation is becoming more clear.    Per RN review, pt eating/drinking small amounts.    Pt worked with PT today & recommendations are for TCU placement & than back to [facility] (Phone: [phone] Fax:[phone])    Anticipated discharge needs: TCU placement.  Per conversation with Crystal ocasio, ANTONINO initially sent referral to:  1.   [facility] (Phone: [phone] Admissions: [phone] Fax: [phone])- admissions feels pt is more LTC appropriate, declined admission into TCU.  2.  [facility] (Admissions Phone: [phone] Main Phone: [phone] Fax: [phone])  3.  [facility] [place] [facility] (Phone: [phone] Fax: [phone])- reviewing for cares.    ANTONINO called & left message with TCU admissions staff asking them to review pt's updated medical status.  Awaiting return calls.    ANTONINO left message & updated Komal Krishnan, RN-[facility] , [phone].     Next steps: Medical stability & TCU bed acceptance.  Pt has MA & will transport via [facility] wheelchair at time of discharge, if RN states this is acceptable    Discharge Planner   Discharge Plans in progress: TCU   Barriers to discharge plan: Medical stability  Follow up plan: CTS to follow       Entered by: Gina Nayak 06/04/2020 12:27 PM       ARIELLA Tyson  [facility] [phone]   [facility]  [phone]

## 2020-06-04 NOTE — PROGRESS NOTES
"Patient up in chair for dinner.  Is starting to get more restless and demanding this evening wanting \"to get dressed and go play.\"  Was able to check BG this afternoon while patient was sleeping- was 121.    "

## 2020-06-04 NOTE — PROGRESS NOTES
"Pt was able to verbalize her needs and thoughts such as, \"I want a blanket\" and \"I won't be able to sleep after you woke me\". Denied pain. Purewick in place, adequate urine output. Repositioning, sigifredo and oral cares complete. Slept comfortably through night.     Dorina Myers RN    "

## 2020-06-04 NOTE — PROGRESS NOTES
Patient able to verbalize that she was hungary. She had approx 5 spoonfuls of thickened apple juice and half the container of vanilla ice cream. The patient stated no when asked if she was having any pain. Patient still resistive to cares and BG checks.

## 2020-06-04 NOTE — PLAN OF CARE
Discharge Planner OT   Patient plan for discharge: Pt does not verbalize goal     Current status: Mod A for supine to sit. Min Ax2 for sit to stand, Mod Ax2 using FWW to pivot transfer to bedside commode. Dependent for pericares and brief management. Unable to pivot transfer to bedside chair despite attempts. Max Ax2 to stand/remain standing while chair is pulled up behind pt. Pt much more verbal today. Making needs known and following set-up of lunch, independently feeding self.       Barriers to return to prior living situation: assist level (currently Ax2)     Recommendations for discharge: TCU    Rationale for recommendations: to increase independence with ADLs and related transfers.        Entered by: Savannah Rees 06/04/2020 12:47 PM

## 2020-06-04 NOTE — PLAN OF CARE
Discharge Planner PT   Patient plan for discharge: unclear, patient no verbal about current goals  Current status: needing ModA x 1 for supine > sit, Ashley x 2 for sit <> stand from bed. Pivot transfer to commode modA x 2, sit <> stand from commode modA x 2, attempted pivot transfer to chair needing mod-MaxA x 2 to stand and remain standing. Moved chair behind patient to sit.   Barriers to return to prior living situation: transfers, heavy A x 2  Recommendations for discharge: TCU  Rationale for recommendations:  needing A x 2 at this time for transfers.        Entered by: Kallie Stevens 06/04/2020 12:04 PM

## 2020-06-04 NOTE — PROGRESS NOTES
[facility] Hospitalist Service      Subjective:  Leave me alone   Pull up the covers  Nurses note much improvement in status    Review of Systems:  Doesn't participate in ros    Physical Exam:  Vitals Were Reviewed    Patient Vitals for the past 16 hrs:   BP Temp Temp src Pulse Heart Rate Resp SpO2 Weight   06/04/20 0500 -- -- -- -- -- -- -- 87 kg (191 lb 12.8 oz)   06/04/20 0254 138/86 97.6  F (36.4  C) Axillary 55 -- 18 97 % --   06/03/20 1951 135/56 97.5  F (36.4  C) Axillary -- 52 18 98 % --   06/03/20 1537 113/49 96.9  F (36.1  C) Oral -- -- 22 99 % --   06/03/20 1448 108/50 97.4  F (36.3  C) Axillary 51 -- 16 94 % --         Intake/Output Summary (Last 24 hours) at 6/4/2020 0613  Last data filed at 6/3/2020 2300  Gross per 24 hour   Intake 2527 ml   Output 2500 ml   Net 27 ml       GENERAL APPEARANCE: groggy, speaks, irritable  EYES: conjunctiva clear, eyes grossly normal  HENT: cracked tongue  RESP: lungs clear to auscultation - no rales, rhonchi or wheezes  CV: regular rate and rhythm, normal S1 S2, no S3 or S4 and no murmur, click or rub   ABDOMEN: soft, nontender, no HSM or masses and bowel sounds normal  MS: bruises left upper arm  SKIN: clear without significant rashes or lesions  NEURO: she now is sleeping, the constant motor activity is gone, she has eaten a bit, she is irritable but speaks, diffusely weak, I don't see focal deficit.  Lab:  Recent Labs   Lab Test 06/04/20  0524 06/03/20  0524    142   POTASSIUM 5.1 4.6   CHLORIDE 115* 117*   CO2 23 20   ANIONGAP 5 5   GLC 90 93   BUN 9 9   CR 1.24* 1.18*   DWIGHT 8.1* 7.8*     CBC RESULTS:   Recent Labs   Lab Test 06/04/20  0524 06/03/20  0524   WBC 2.8* 3.4*   RBC 4.08 3.74*   HGB 12.3 11.3*   HCT 37.6 33.7*   * 128*       Results for orders placed or performed during the hospital encounter of 05/26/20 (from the past 24 hour(s))   Glucose by meter   Result Value Ref Range    Glucose 80 70 - 99 mg/dL   Lactic acid level STAT   Result  Value Ref Range    Lactate for Sepsis Protocol 1.3 0.7 - 2.0 mmol/L   Glucose by meter   Result Value Ref Range    Glucose 86 70 - 99 mg/dL   Glucose by meter   Result Value Ref Range    Glucose 86 70 - 99 mg/dL   CBC with platelets   Result Value Ref Range    WBC 2.8 (L) 4.0 - 11.0 10e9/L    RBC Count 4.08 3.8 - 5.2 10e12/L    Hemoglobin 12.3 11.7 - 15.7 g/dL    Hematocrit 37.6 35.0 - 47.0 %    MCV 92 78 - 100 fl    MCH 30.1 26.5 - 33.0 pg    MCHC 32.7 31.5 - 36.5 g/dL    RDW 14.9 10.0 - 15.0 %    Platelet Count 131 (L) 150 - 450 10e9/L   Comprehensive metabolic panel   Result Value Ref Range    Sodium 143 133 - 144 mmol/L    Potassium 5.1 3.4 - 5.3 mmol/L    Chloride 115 (H) 94 - 109 mmol/L    Carbon Dioxide 23 20 - 32 mmol/L    Anion Gap 5 3 - 14 mmol/L    Glucose 90 70 - 99 mg/dL    Urea Nitrogen 9 7 - 30 mg/dL    Creatinine 1.24 (H) 0.52 - 1.04 mg/dL    GFR Estimate 42 (L) >60 mL/min/[1.73_m2]    GFR Estimate If Black 48 (L) >60 mL/min/[1.73_m2]    Calcium 8.1 (L) 8.5 - 10.1 mg/dL    Bilirubin Total 0.5 0.2 - 1.3 mg/dL    Albumin 1.8 (L) 3.4 - 5.0 g/dL    Protein Total 5.6 (L) 6.8 - 8.8 g/dL    Alkaline Phosphatase 127 40 - 150 U/L    ALT 27 0 - 50 U/L    AST 45 0 - 45 U/L     *Note: Due to a large number of results and/or encounters for the requested time period, some results have not been displayed. A complete set of results can be found in Results Review.       Assessment and Plan:    Stefanie Velazquez is a 75 year old female admitted on 5/26/2020. She has history of coronary artery disease, hypertension, hyperlipidemia, DVT, type 2 diabetes, chronic back pain, hypothyroidism, rectal cancer, GERD, restless leg syndrome and RC. She presents with confusion.     Acute encephalopathy, UTI under treatment, initial dehydration, history of previous similar episode that resolved  5/26/20 -- 3 days of reported confusion at nursing home with question of slurred speech and possible right sided weakness. Labs show  "elevated lactic of 3.4, elevated creatinine, normal WBC, normal CRP, normal troponin, normal TSH, normal VBG. CT head shows no acute findings. CT chest/abomdomen pelvis shows lung nodules as well as adnexal fluid collection. UA shows 11 WBC, small leukocyte esterase, and few bacteria. Vitals show tachycardic, initially up to the 150s in the ED with atrial fibrillation noted on monitor, briefly placed on diltiazem drip and converted to NSR though again tachycardic with agitation.      Recent admission 4/7 - 4/13 a for similar presentation, ultimately no clear etiology though possibly related to mild temporal CVA seen on imaging. LP done during that admit.Pt required intubation because agitation that was did not respond to other meds.    Upon admit-agitated, constant motor restlessness, non verbal, not sleeping, eyes closed, unresponsive to voice or pain.      CT head on 5/26 demonstrates no new changes.  Pt started on antibiotics on   5/27/2020 for  positive urine culture .   Started rivastigmine patch 4.6 mg/day on 5/28  On 5/29 started Seroquel 25 mg bid and 50 mg at bedtime.    On 5/30 feeding tube placement unsuccessful due to patient combativeness.  On 6/1 seroquel increased to 50 bid and 100 at bedtime.  On 6/2/20 discussed with neuro at -they suggest continued care as we have and consider transfer for EEG 6/4/20 if not better. Family wants no feeding tube.  On 6/3/20  seroquel stopped. Rivastigmine stopped.  On 06/04/20 much improvement, has eaten some, does speak some, sleeping, not as restless     COVID 19 negative   CT chest as above shows \"Scattered tiny indeterminate lung nodules, possibly infectious or Inflammatory.\" Which have previously been seen, at that time COVID testing was performed and negative.   COVID result, 5/26/2020: negative   .    Afib with RVR.  Presented with above. Patient already on anticoagulation. Has been off betablocker since April when had bradycardia.   Unable to take pta Elquis " so on full dose lovenox.      UTI with elevated lactic ccid and metabolic encephalopathy   Lactic 3.4 on presentation, corrected with IVF. UA mildly abnormal though CRP normal, WBC normal. Given encephalopathy of unclear origin and concern for possible infectious cause covered broadly upon admit with  IV vancomycin and zosyn initially. Unclear if infection is contributing.  Zosyn and Vanco discontinued on 5/26   Urine culture growing NLF GNR, Halfnia species- Started on Rocephin on 5/27  Changed abx to Cefepime on 5/28, now on day 5/7.  On 6/2 neuro suggested stopping Cefepime due to cases of neuro toxicity-discussed Hafnia with pharmacy---they suggested meropenem since pt not taking orals . Stop atb y 6/5 (day 10 atb)     TITA on CKD  5/26/20 -- Creatinine 1.98, GFR 24 on presentation. Baseline quite variable over past few years, likely 1.3-1.6 though has been up closer to 1.8-2 at times. Appears to be above baseline presently, possible TITA due to recent confusion at nursing home.   Creat 1.24  .06/04/20      Mild pancytopenia  Present to some degree last hospitilization-etiology unclear.     Coronary Artery Disease  History of 3 vessel CABG in 2002. Unable to tolerate ACEi or beta blocker due to hypotension. Managed on aspirin/statin.   Changed to aspirin suppository as patient refusing PO meds     History of Hypertension  5/26/20 -- Patient managed prior to admission with Lasix 20 mg BID.  Holding lasix for now given TITA.  BP is generally ok 06/04/20-continue to hold lasix     Hyperlipidemia  Chronic. Managed with simvastatin 40 mg per day   Discontinue simvastatin as patient has been refusing     History of DVT  Managed chronically on Eliquis 5 mg BID.  Patient refusing PO meds, changed to enoxaparin 1 mg/kg bid     Diabetes Mellitus, Type II  5/26/20 -- Chronic.  Last a1C 6.9% on 3/2020. Managed at home with Lantus 24 units at bedtime and novolog 14 units with lunch and dinner.      Currently off lantus due to  hypoglycemia and on dextrose infusion due to lack of eating. On medium sliding scale.     Lumbar Radiculopathy   Neuropathy  Previously diagnosed. Managed with gabapentin 100 mg at bedtime.   Holding gabapentin       Hypothyroidism  Chronic. Normal TSH of 3.9 on presentation. Managed with levothyroxine 88 mcg daily except for Sunday (1/2 tablet).   Changed to IV levothyroxine 37.5 mcg daily     Gastroesophageal reflux disease  Chronic. Managed with omeprazole 20 mg TWICE DAILY  Changed to IV protonix.    Restless Leg Syndrome  5/26/20 -- Chronic. Managed with pramipexole 0.5 mg at bedtime and PRN every 8 hours.   Holding pramipexole for now.       RC  On CPAP at assisted living facility, follows with sleep medicine.     Rectal Cancer  Follows with Dr. Martinez, diagnosed in 2011, underwent neoadjuvant chemoradiation, resection with reanastomosis. Follows yearly with oncology, last seen 9/2019.     CODE STATUS: Full Code      DIET: Moderate CHO diet with honey thick liquids, but not eating due to AMS.  Prophylaxis: On lovenox    Lines: PIV  Restraints: Continue soft restraints for patient and staff safety and for pulling on lines etc       Plan-the etiology of encephalopathy/delerium remains unclear.  It is possible that UTI because that in the context of dementia.  Currently on meropenem for Hafnia uti-stop  6/5.   She did refuse a feeding tube in the past.  An attempt to place a feeding tube here during this hospitalization was not successful. [facility] the  [facility] on 6[facility]/20.   They recommended stopping cefepime since it can cause neurotoxicity.  Stopped cefepime 6/2/20 .  If she was no better by today they suggested consideration of transfer for an EEG.  The patient seems definitively better today and I think an EEG would be of low yield.  Continue current cares.  Hopefully she will reestablish her diet.    2:00 PM  Message left for Dori on answering machine with simple update.  Pt now feeding herself  and is interactive. Resists some cares.

## 2020-06-04 NOTE — PLAN OF CARE
VSS, patient progressing very well today, has had periods of wakefulness, transferred with (heavy) assist x2 from bed to commode and then commode to chair, otherwise have been using ceiling lift for transfers.  Had a small BM after suppository given.  Fed herself lunch of which she ate approx. 25% of.  Purewick still in place with good UOP.  Patient did refuse lunchtime BG check but otherwise has been quite cooperative with cares.

## 2020-06-05 ENCOUNTER — APPOINTMENT (OUTPATIENT)
Dept: PHYSICAL THERAPY | Facility: CLINIC | Age: 75
DRG: 091 | End: 2020-06-05
Payer: COMMERCIAL

## 2020-06-05 ENCOUNTER — APPOINTMENT (OUTPATIENT)
Dept: OCCUPATIONAL THERAPY | Facility: CLINIC | Age: 75
DRG: 091 | End: 2020-06-05
Payer: COMMERCIAL

## 2020-06-05 LAB
ALBUMIN SERPL-MCNC: 1.8 G/DL (ref 3.4–5)
ALP SERPL-CCNC: 132 U/L (ref 40–150)
ALT SERPL W P-5'-P-CCNC: 23 U/L (ref 0–50)
ANION GAP SERPL CALCULATED.3IONS-SCNC: 9 MMOL/L (ref 3–14)
AST SERPL W P-5'-P-CCNC: 34 U/L (ref 0–45)
BILIRUB SERPL-MCNC: 0.5 MG/DL (ref 0.2–1.3)
BUN SERPL-MCNC: 11 MG/DL (ref 7–30)
CALCIUM SERPL-MCNC: 7.9 MG/DL (ref 8.5–10.1)
CHLORIDE SERPL-SCNC: 114 MMOL/L (ref 94–109)
CO2 SERPL-SCNC: 21 MMOL/L (ref 20–32)
CREAT SERPL-MCNC: 1.32 MG/DL (ref 0.52–1.04)
ERYTHROCYTE [DISTWIDTH] IN BLOOD BY AUTOMATED COUNT: 14.9 % (ref 10–15)
GFR SERPL CREATININE-BSD FRML MDRD: 39 ML/MIN/{1.73_M2}
GLUCOSE BLDC GLUCOMTR-MCNC: 124 MG/DL (ref 70–99)
GLUCOSE BLDC GLUCOMTR-MCNC: 96 MG/DL (ref 70–99)
GLUCOSE SERPL-MCNC: 94 MG/DL (ref 70–99)
HCT VFR BLD AUTO: 35.5 % (ref 35–47)
HGB BLD-MCNC: 11.5 G/DL (ref 11.7–15.7)
MAGNESIUM SERPL-MCNC: 1.6 MG/DL (ref 1.6–2.3)
MCH RBC QN AUTO: 29.4 PG (ref 26.5–33)
MCHC RBC AUTO-ENTMCNC: 32.4 G/DL (ref 31.5–36.5)
MCV RBC AUTO: 91 FL (ref 78–100)
PLATELET # BLD AUTO: 136 10E9/L (ref 150–450)
POTASSIUM SERPL-SCNC: 4.7 MMOL/L (ref 3.4–5.3)
PROT SERPL-MCNC: 5.4 G/DL (ref 6.8–8.8)
RBC # BLD AUTO: 3.91 10E12/L (ref 3.8–5.2)
SODIUM SERPL-SCNC: 144 MMOL/L (ref 133–144)
WBC # BLD AUTO: 3.7 10E9/L (ref 4–11)

## 2020-06-05 PROCEDURE — 36415 COLL VENOUS BLD VENIPUNCTURE: CPT | Performed by: FAMILY MEDICINE

## 2020-06-05 PROCEDURE — 25000132 ZZH RX MED GY IP 250 OP 250 PS 637: Performed by: FAMILY MEDICINE

## 2020-06-05 PROCEDURE — 99233 SBSQ HOSP IP/OBS HIGH 50: CPT | Performed by: FAMILY MEDICINE

## 2020-06-05 PROCEDURE — 00000146 ZZHCL STATISTIC GLUCOSE BY METER IP

## 2020-06-05 PROCEDURE — 85027 COMPLETE CBC AUTOMATED: CPT | Performed by: FAMILY MEDICINE

## 2020-06-05 PROCEDURE — 80053 COMPREHEN METABOLIC PANEL: CPT | Performed by: FAMILY MEDICINE

## 2020-06-05 PROCEDURE — 25000132 ZZH RX MED GY IP 250 OP 250 PS 637: Performed by: INTERNAL MEDICINE

## 2020-06-05 PROCEDURE — 25800030 ZZH RX IP 258 OP 636: Performed by: FAMILY MEDICINE

## 2020-06-05 PROCEDURE — 97535 SELF CARE MNGMENT TRAINING: CPT | Mod: GO

## 2020-06-05 PROCEDURE — 83735 ASSAY OF MAGNESIUM: CPT | Performed by: FAMILY MEDICINE

## 2020-06-05 PROCEDURE — 25000128 H RX IP 250 OP 636: Performed by: FAMILY MEDICINE

## 2020-06-05 PROCEDURE — 97530 THERAPEUTIC ACTIVITIES: CPT | Mod: GP | Performed by: PHYSICAL THERAPIST

## 2020-06-05 PROCEDURE — 12000011 ZZH R&B MS OVERFLOW

## 2020-06-05 RX ORDER — GABAPENTIN 100 MG/1
100 CAPSULE ORAL AT BEDTIME
Status: DISCONTINUED | OUTPATIENT
Start: 2020-06-05 | End: 2020-06-06 | Stop reason: HOSPADM

## 2020-06-05 RX ORDER — LORAZEPAM 0.5 MG/1
0.5 TABLET ORAL EVERY 4 HOURS PRN
Status: DISCONTINUED | OUTPATIENT
Start: 2020-06-05 | End: 2020-06-06 | Stop reason: HOSPADM

## 2020-06-05 RX ORDER — ASPIRIN 81 MG/1
81 TABLET, CHEWABLE ORAL DAILY
Status: DISCONTINUED | OUTPATIENT
Start: 2020-06-05 | End: 2020-06-06 | Stop reason: HOSPADM

## 2020-06-05 RX ORDER — LEVOTHYROXINE SODIUM 88 UG/1
88 TABLET ORAL
Status: DISCONTINUED | OUTPATIENT
Start: 2020-06-05 | End: 2020-06-06 | Stop reason: HOSPADM

## 2020-06-05 RX ADMIN — ASPIRIN 81 MG 81 MG: 81 TABLET ORAL at 07:49

## 2020-06-05 RX ADMIN — POTASSIUM CHLORIDE, DEXTROSE MONOHYDRATE AND SODIUM CHLORIDE: 150; 5; 450 INJECTION, SOLUTION INTRAVENOUS at 03:49

## 2020-06-05 RX ADMIN — POLYETHYLENE GLYCOL 3350 17 G: 17 POWDER, FOR SOLUTION ORAL at 08:32

## 2020-06-05 RX ADMIN — APIXABAN 5 MG: 5 TABLET, FILM COATED ORAL at 19:34

## 2020-06-05 RX ADMIN — APIXABAN 5 MG: 5 TABLET, FILM COATED ORAL at 07:49

## 2020-06-05 RX ADMIN — GABAPENTIN 100 MG: 100 CAPSULE ORAL at 21:38

## 2020-06-05 RX ADMIN — LEVOTHYROXINE SODIUM 88 MCG: 0.09 TABLET ORAL at 06:54

## 2020-06-05 RX ADMIN — OMEPRAZOLE 20 MG: 20 CAPSULE, DELAYED RELEASE ORAL at 07:49

## 2020-06-05 RX ADMIN — MEROPENEM 1 G: 1 INJECTION, POWDER, FOR SOLUTION INTRAVENOUS at 02:06

## 2020-06-05 RX ADMIN — OMEPRAZOLE 20 MG: 20 CAPSULE, DELAYED RELEASE ORAL at 19:34

## 2020-06-05 RX ADMIN — BISACODYL 10 MG: 10 SUPPOSITORY RECTAL at 08:21

## 2020-06-05 ASSESSMENT — MIFFLIN-ST. JEOR: SCORE: 1377.75

## 2020-06-05 ASSESSMENT — ACTIVITIES OF DAILY LIVING (ADL)
ADLS_ACUITY_SCORE: 32

## 2020-06-05 NOTE — PROGRESS NOTES
"Pt transfer from chair to bed with lift. Pt crying, \"I want to go back to [facility]\". Pt tore out both IV's and gown. Was able to establish another IV in R forearm. Purewick in place with adequate urine output. Pt slept on and off through night. Unable to calm pt when upset and crying.     Dorina Myers RN    "

## 2020-06-05 NOTE — PROGRESS NOTES
[facility] Hospitalist Service      Subjective:  I am hungry  What happened to me?    Review of Systems:  Hard to obtain    Physical Exam:  Vitals Were Reviewed    Patient Vitals for the past 16 hrs:   BP Temp Temp src Pulse Heart Rate Resp SpO2 Weight   06/05/20 0300 128/48 97.5  F (36.4  C) Axillary -- 60 18 95 % 87.6 kg (193 lb 2 oz)   06/04/20 2100 118/68 97.1  F (36.2  C) Axillary -- 63 20 98 % --   06/04/20 1525 (!) 140/67 97.8  F (36.6  C) Axillary 52 52 18 100 % --         Intake/Output Summary (Last 24 hours) at 6/5/2020 0607  Last data filed at 6/4/2020 1800  Gross per 24 hour   Intake 1391.25 ml   Output 800 ml   Net 591.25 ml       GENERAL APPEARANCE: very [place], alert, answers some questions  EYES: conjunctiva clear, eyes grossly normal  RESP: lungs clear to auscultation - no rales, rhonchi or wheezes  CV: regular rate and rhythm, normal S1 S2, no S3 or S4 and no murmur, click or rub   ABDOMEN: soft, nontender, no HSM or masses and bowel sounds normal  MS: no clubbing, cyanosis; no edema  SKIN: clear without significant rashes or lesions  NEURO: irritable, but making sense, diffusely weak, unable to walk, no focal lesion    Lab:  Recent Labs   Lab Test 06/05/20  0510 06/04/20  0524    143   POTASSIUM 4.7 5.1   CHLORIDE 114* 115*   CO2 21 23   ANIONGAP 9 5   GLC 94 90   BUN 11 9   CR 1.32* 1.24*   DWIGHT 7.9* 8.1*     CBC RESULTS:   Recent Labs   Lab Test 06/05/20  0510 06/04/20  0524   WBC 3.7* 2.8*   RBC 3.91 4.08   HGB 11.5* 12.3   HCT 35.5 37.6   * 131*       Results for orders placed or performed during the hospital encounter of 05/26/20 (from the past 24 hour(s))   Glucose by meter   Result Value Ref Range    Glucose 96 70 - 99 mg/dL   Glucose by meter   Result Value Ref Range    Glucose 121 (H) 70 - 99 mg/dL   Glucose by meter   Result Value Ref Range    Glucose 109 (H) 70 - 99 mg/dL   CBC with platelets   Result Value Ref Range    WBC 3.7 (L) 4.0 - 11.0 10e9/L    RBC Count 3.91 3.8 -  5.2 10e12/L    Hemoglobin 11.5 (L) 11.7 - 15.7 g/dL    Hematocrit 35.5 35.0 - 47.0 %    MCV 91 78 - 100 fl    MCH 29.4 26.5 - 33.0 pg    MCHC 32.4 31.5 - 36.5 g/dL    RDW 14.9 10.0 - 15.0 %    Platelet Count 136 (L) 150 - 450 10e9/L   Comprehensive metabolic panel   Result Value Ref Range    Sodium 144 133 - 144 mmol/L    Potassium 4.7 3.4 - 5.3 mmol/L    Chloride 114 (H) 94 - 109 mmol/L    Carbon Dioxide 21 20 - 32 mmol/L    Anion Gap 9 3 - 14 mmol/L    Glucose 94 70 - 99 mg/dL    Urea Nitrogen 11 7 - 30 mg/dL    Creatinine 1.32 (H) 0.52 - 1.04 mg/dL    GFR Estimate 39 (L) >60 mL/min/[1.73_m2]    GFR Estimate If Black 45 (L) >60 mL/min/[1.73_m2]    Calcium 7.9 (L) 8.5 - 10.1 mg/dL    Bilirubin Total 0.5 0.2 - 1.3 mg/dL    Albumin 1.8 (L) 3.4 - 5.0 g/dL    Protein Total 5.4 (L) 6.8 - 8.8 g/dL    Alkaline Phosphatase 132 40 - 150 U/L    ALT 23 0 - 50 U/L    AST 34 0 - 45 U/L     *Note: Due to a large number of results and/or encounters for the requested time period, some results have not been displayed. A complete set of results can be found in Results Review.       Assessment and Plan:    Stefanie Velazquez is a 75 year old female admitted on 5/26/2020. She has history of coronary artery disease, hypertension, hyperlipidemia, DVT, type 2 diabetes, chronic back pain, hypothyroidism, rectal cancer, GERD, restless leg syndrome and RC. She presents with confusion.     Acute encephalopathy, UTI under treatment, initial dehydration, history of previous similar episode that resolved  5/26/20 -- 3 days of reported confusion at nursing home with question of slurred speech and possible right sided weakness. Labs show elevated lactic of 3.4, elevated creatinine, normal WBC, normal CRP, normal troponin, normal TSH, normal VBG. CT head shows no acute findings. CT chest/abomdomen pelvis shows lung nodules as well as adnexal fluid collection. UA shows 11 WBC, small leukocyte esterase, and few bacteria. Vitals show tachycardic,  "initially up to the 150s in the ED with atrial fibrillation noted on monitor, briefly placed on diltiazem drip and converted to NSR though again tachycardic with agitation.      Recent admission 4/7 - 4/13 a for similar presentation, ultimately no clear etiology though possibly related to mild temporal CVA seen on imaging. LP done during that admit.Pt required intubation because agitation that was did not respond to other meds.     Upon admit-agitated, constant motor restlessness, non verbal, not sleeping, eyes closed, unresponsive to voice or pain.      CT head on 5/26 demonstrates no new changes.  Pt started on antibiotics on   5/27/2020 for  positive urine culture .   Started rivastigmine patch 4.6 mg/day on 5/28  On 5/29 started Seroquel 25 mg bid and 50 mg at bedtime.    On 5/30 feeding tube placement unsuccessful due to patient combativeness.  On 6/1 seroquel increased to 50 bid and 100 at bedtime.  On 6/2/20 discussed with neuro at -they suggest continued care as we have and consider transfer for EEG 6/4/20 if not better. Family wants no feeding tube.  On 6/3/20  seroquel stopped. Rivastigmine stopped.  On 06/04/20 much improvement, has eaten some, does speak some, sleeping, not as restless  On 06/05/20 again improved. Weak, can't walk, has eaten very little.     COVID 19 negative   CT chest as above shows \"Scattered tiny indeterminate lung nodules, possibly infectious or Inflammatory.\" Which have previously been seen, at that time COVID testing was performed and negative.   COVID result, 5/26/2020: negative   .    Afib with RVR.  Presented with above. Patient already on anticoagulation. Has been off betablocker since April when had bradycardia.   On lovenox full dose while unable to take meds.  Try resuming Eliquis      UTI with elevated lactic ccid and metabolic encephalopathy   Lactic 3.4 on presentation, corrected with IVF. UA mildly abnormal though CRP normal, WBC normal. Given encephalopathy of unclear " origin and concern for possible infectious cause covered broadly upon admit with  IV vancomycin and zosyn initially. Unclear if infection is contributing.  Zosyn and Vanco discontinued on 5/26   Urine culture growing NLF GNR, Halfnia species- Started on Rocephin on 5/27  Changed abx to Cefepime on 5/28, now on day 5/7.  On 6/2 neuro suggested stopping Cefepime due to cases of neuro toxicity-discussed Hafnia with pharmacy---they suggested meropenem since pt not taking orals . Stopping meropenem 6/5 (day 10 atb)     TITA on CKD  5/26/20 -- Creatinine 1.98, GFR 24 on presentation. Baseline quite variable over past few years, likely 1.3-1.6 though has been up closer to 1.8-2 at times. Possible TITA due to recent confusion/decreased po intake at nursing home.   Creat 1.32  .06/05/20      Mild pancytopenia  Present to some degree last hospitilization-etiology unclear.     Coronary Artery Disease  History of 3 vessel CABG in 2002. Unable to tolerate ACEi or beta blocker due to hypotension. Managed on aspirin/statin.   Changed to aspirin suppository as patient refusing PO meds  Try resuming oral asa.     History of Hypertension  5/26/20 -- Patient managed prior to admission with Lasix 20 mg BID.  Holding lasix for now given TITA.  BP is generally ok 06/04/20-continue to hold lasix     Hyperlipidemia  Chronic. Managed with simvastatin 40 mg per day   Discontinue simvastatin as patient has been refusing.  Will continue to hold     History of DVT  Managed chronically on Eliquis 5 mg BID.  Patient unable to take PO meds, changed to enoxaparin 1 mg/kg bid.  Will try resuming Eliquis     Diabetes Mellitus, Type II  5/26/20 -- Chronic.  Last a1C 6.9% on 3/2020. Managed at home with Lantus 24 units at bedtime and novolog 14 units with lunch and dinner.      Currently off lantus due to hypoglycemia.  Will stop iv fluids today--like reinstitute lantus soon.     Lumbar Radiculopathy   Neuropathy  Previously diagnosed. Managed with  gabapentin 100 mg at bedtime.   Holding gabapentin       Hypothyroidism  Chronic. Normal TSH of 3.9 on presentation. Managed with levothyroxine 88 mcg daily except for Sunday (1/2 tablet).   Changed to IV levothyroxine 37.5 mcg daily.  Will try to restart oral thyroid     Gastroesophageal reflux disease  Chronic. Managed with omeprazole 20 mg TWICE DAILY  Changed to IV protonix.  Will try to restart omeprazole    Restless Leg Syndrome  5/26/20 -- Chronic. Managed with pramipexole 0.5 mg at bedtime and PRN every 8 hours.   Holding pramipexole for now. Also on gabapentin at at bedtime which I have restarted.      RC  On CPAP at assisted living facility, follows with sleep medicine.     Rectal Cancer  Follows with Dr. Martinez, diagnosed in 2011, underwent neoadjuvant chemoradiation, resection with reanastomosis. Follows yearly with oncology, last seen 9/2019.     CODE STATUS: Full Code      DIET: Moderate CHO diet with honey thick liquids, but not eating due to AMS.  Prophylaxis: On lovenox    Lines: PIV        Plan-the etiology of encephalopathy/delerium remains unclear.  It is possible that UTI  the context of dementia precipitated it.  Stopping antibiotic today.  Is dramatically improved in the last 36 hours. Still diffusely weak and irritable. At times paranoid. Stop iv. Restart oral meds if possible. Assess needs and determine dispo soon. Determine if able to take adequate po. Will need to have lantus restarted. Try to ambulate.

## 2020-06-05 NOTE — PROGRESS NOTES
Patient's niece, Crystal, brought in hearing aid batteries, hearing aid filters, hearing aid case, I Pad and I Pad .

## 2020-06-05 NOTE — PLAN OF CARE
Discharge Planner PT   Patient plan for discharge: back to [facility]  Current status: patient upright in wheel chair at start of session, patient not trusting of staff and rehab that they won't take her wheel chair or take her items. Took several minutes to talk patient into standing up. Sit <> stand x 2 times with modA, stood for 10 seconds on first attempt and 20 seconds on second attempt needing modA to stay standing.   Barriers to return to prior living situation: transfers and assist needed for mobility/transfers  Recommendations for discharge: TCU  Rationale for recommendations: needs improved strength for improving transfers and helping patient return to baseline so she can return to [facility].        Entered by: Kallie Stevens 06/05/2020 1:22 PM

## 2020-06-05 NOTE — PROGRESS NOTES
Pt has been awake, alert, oriented with some paranoid and inappropriate behavior. Did drink water with mirilax for severe constipation. Has had very solid stools on a commode today.  Unable to stand or bear weight with assist of 2. Using ceiling lift to move from bed to commode to chair.  Pt became angry because she wanted a nurse from [facility] to be called as she has an appointment today. Explained that the [facility] staff have told the visiting nurse that she is in the hospital. Pt then thew her juice on the floor and is threatening to spill more until she gets what she wants. Pt will not listen to reason.   [facility] called, left a message for staff to call.

## 2020-06-05 NOTE — PROGRESS NOTES
Pt has been tearful, uncooperative and demanding most of the day. Was able to get her Niece, Crystal to bring in new hearing aid batteries which reduced pt's stress. Diet changed to softer foods as pt is without teeth.

## 2020-06-05 NOTE — PLAN OF CARE
Discharge Planner OT   Patient plan for discharge: wants to return to [facility].     Current status: Pt up in chair. Tearful wanting to return to [facility]. Therapeutic listening provided. Difficult to redirect, however was able to complete sit to stand with Mod Ax2 and FWW. Then transfers from chair to w/c with Mod Ax2. Pt appears more comfortable in the wheelchair.     Barriers to return to prior living situation: assist level     Recommendations for discharge: TCU    Rationale for recommendations: to increase independence with ADLs and functional mobility       Entered by: Savannah Rees 06/05/2020 11:53 AM

## 2020-06-05 NOTE — PROGRESS NOTES
CLINICAL NUTRITION SERVICES - REASSESSMENT NOTE      RECOMMENDATIONS FOR MD/PROVIDER TO ORDER:   None   Recommendations Ordered by Registered Dietitian (RD):   None    Future/Additional Recommendations:   Mod CHO/honey thick liquids/soft foods as ordered  Due to pt's risk for refeeding syndrome as oral intake improves, recommend check Phos value on 6/6. Spoke with RN - RN placed order.  Continue supplements of Boost and Magic Cup  Continue to monitor and encourage oral intake   Malnutrition: Severe malnutrition  In Context of:  Acute on Chronic illness or disease       EVALUATION OF PROGRESS TOWARD GOALS   Diet: Orders Placed This Encounter      Consistent Carbohydrate Diet 4945-8392 Calories: Moderate Consistent CHO (4-6 CHO units/meal)  Honey thick liquids. Soft, ground meat d/t no teeth.    Snacks/Supplements: Magic Cup BID at 10am and HS; Boost Plus daily at 2pm    Intake/Tolerance: overall very poor. Attempted to place NG tube on 5/30 but d/t agitation and pt being uncooperative, and VS too unstable to sedate pt unable to place NG tube. Per RN note 5/31, pt's favorite foods are PB&J sandwiches, oatmeal, and likes to suck on potato chips. Pt was refusing anything orally (especially food) until 6/3 when she started eating small amounts of ice cream and flavored ice. Later in the evening on 6/3 pt ate about 5 spoonfuls of thickened apple juice and 1/2 container vanilla ice cream - stated she was hungry. Ate about 25% of lunch 6/4 and 50% of breakfast and lunch today.      ASSESSED NUTRITION NEEDS (5/31):  Dosing Weight 65 kg (adj wt)  Estimated Energy Needs: 2201-7326 kcals (25-30 Kcal/Kg)  Justification: overweight  Estimated Protein Needs: 78-98 grams protein (1.2-1.5 g pro/Kg)  Justification: Repletion, overweight, and CKD  Estimated Fluid Needs: 1 mL/Kcal  Justification: maintenance      NEW FINDINGS:   -refusing cares, threatening staff, and agitated most of admission  -6/2: palliative consulted. no feeding  tube per health care directive. Stopped cefepime d/t side effect of causing neurotoxicity.  -6/3: started taking bites of soft foods when prompted and more cooperative with cares while still a little confused. Seroquel and rivastigmine stopped.  -6/4: much improved, speaking some, and not as restless. EEG was being considered if not improving.  -6/5: stop IVF and antibiotic and restart oral meds if possible.  -labs: BG 94, Mag 1.6 (WNL) and K+ 4.7 (WNL) today, Phos 2.8 (WNL) on 5/30 - recommend checking with labs 6/6, expected to come back low d/t poor oral intake but starting to improve. Given wt loss, pt is at-risk for refeeding syndrome.  -meds: reviewed.  -last BM today. Very solid stools on commode today - given miralax.   -wt: likely affected since admit by fluid shifts and poor oral intake. Of note, pt's current wt is within 4 lbs of admit wt.  Vitals:    05/26/20 1524 05/26/20 2000 05/27/20 0615 05/28/20 0600   Weight: 86.2 kg (190 lb) 86.1 kg (189 lb 13.1 oz) 85.5 kg (188 lb 7.9 oz) 83.5 kg (184 lb 1.4 oz)    05/29/20 0601 06/01/20 0000 06/02/20 0400 06/03/20 0538   Weight: 82.1 kg (181 lb) 82 kg (180 lb 12.4 oz) 83.8 kg (184 lb 11.9 oz) 86.1 kg (189 lb 13.1 oz)    06/04/20 0500 06/05/20 0300   Weight: 87 kg (191 lb 12.8 oz) 87.6 kg (193 lb 2 oz)   -net I/O for 24-hrs since admit: +01668 mL today. Of note, pt has been on IVF since admit.  -edema: 1+ (trace) dependent per flowsheet entry at 1200 today.    Previous Goals:   Pt to tolerate initiation of TF  Evaluation: Unable to evaluate - tube unable to be placed d/t pt condition    Previous Nutrition Diagnosis:   Inadequate oral intake related to confusion/dysphagia as evidenced by encephalopathy, PO intake of 0% for past 4 days and suspected poor PO PTA, significant wt loss of 20 lb/10.0% over past 4 months, currently on honey thick liquids and need for nutrition support  Evaluation: Improving - very slightly      MALNUTRITION (5/30)  % Weight Loss:  > 10%  in 6 months (severe malnutrition)  % Intake:  </= 50% for >/= 5 days (severe malnutrition)  Subcutaneous Fat Loss:  Unable to evaluate  Muscle Loss:  Unable to evaluate  Fluid Retention:  1+ (trace) dependent    Malnutrition Diagnosis: Severe malnutrition  In Context of:  Acute on Chronic illness or disease    CURRENT NUTRITION DIAGNOSIS  Inadequate oral intake related to confusion and refusal to eat as evidenced by RN documentation of very poor intake since admit and suspected poor intake PTA, and significant wt loss of 20 lb/10.0% over the past 4 months.    INTERVENTIONS  Recommendations / Nutrition Prescription  Mod CHO/honey thick liquids/soft foods as ordered  Due to pt's risk for refeeding syndrome as oral intake improves, recommend check Phos value on 6/6. Spoke with RN - RN placed order.  Continue supplements of Boost and Magic Cup  Continue to monitor and encourage oral intake    Implementation  Medical Food Supplement and labs: see above  Collaboration and Referral of Nutrition care: Pt POC discussed during IDT rounds this morning. Also discussed ordering Phos with JEWELS Perea.    Goals  Pt to consume >/= 50% of meals BID      MONITORING AND EVALUATION:  Progress towards goals will be monitored and evaluated per protocol and Practice Guidelines      Maite Millard RDN, LD  Clinical Dietitian  [phone]

## 2020-06-05 NOTE — PROGRESS NOTES
Reason for Follow up: discharge planning    Anticipated discharge needs: Spoke with patient's niece, Crystal via phone.  Family's goal remains for patient to discharge to TCU at discharge, however Crystal requesting more information regarding hospice if patient unable to return to [facility] following TCU.  Of note, CTS has had no direct contact with patient today.  Crystal feels it would be best to discuss TCU with patient once a facility has been secured.  CTS to communicate to accepting SNF, possible need for hospice consult.      Patient has been declined at below facilities:    [facility] (Phone: [phone] Admissions: [phone] Fax: [phone])--no bed available    [facility] (Admissions Phone: [phone] Main Phone: [phone] Fax: [phone])--out of [facility] partner network    [facility] (Phone: [phone] Fax: [phone])-no bed available    [facility] Phone (Main Phone:[phone] Admissions Phone:[phone] Fax: [phone])--no bed available    [facility] (Main: [phone] Admissions: [phone] Fax: [phone])--no beds available    Additional referrals placed at below facilities:    [facility] (Admissions phone: [phone] RN Report: [phone]  Main Phone: [phone] Fax: [phone])--pending    [facility] Phone: (Admissions: [phone] RN Report: [phone] Fax: [phone]) --pending    [facility] (Phone: [phone] Fax: [phone])-pending    [facility] (Main phone: [phone] Fax: [phone])-pending    [facility] (Main phone: [phone] Fax: [phone])-pending      Crystal reports patient would NOT want to discharge to [facility].      Left message with Komal Krishnan RN-[facility] , [phone] regarding discharge plans and barriers to SNF acceptance.      Next steps:  CTS to follow    Discharge Planner   Discharge Plans in progress: TCU (ref pend)  Barriers to discharge plan: medical stability/bed availability  Follow up plan: CTS to follow       Entered by: Magdalena Hamilton 06/05/2020 2:43 PM       SHILPA GilbertN RN  Inpatient RN care coordinator  [facility] [phone]  [facility] [phone]

## 2020-06-06 ENCOUNTER — APPOINTMENT (OUTPATIENT)
Dept: PHYSICAL THERAPY | Facility: CLINIC | Age: 75
DRG: 091 | End: 2020-06-06
Payer: COMMERCIAL

## 2020-06-06 ENCOUNTER — APPOINTMENT (OUTPATIENT)
Dept: OCCUPATIONAL THERAPY | Facility: CLINIC | Age: 75
DRG: 091 | End: 2020-06-06
Payer: COMMERCIAL

## 2020-06-06 VITALS
BODY MASS INDEX: 31.04 KG/M2 | HEIGHT: 66 IN | WEIGHT: 193.12 LBS | RESPIRATION RATE: 18 BRPM | TEMPERATURE: 97.7 F | HEART RATE: 61 BPM | DIASTOLIC BLOOD PRESSURE: 62 MMHG | SYSTOLIC BLOOD PRESSURE: 124 MMHG | OXYGEN SATURATION: 97 %

## 2020-06-06 LAB
ALBUMIN SERPL-MCNC: 1.8 G/DL (ref 3.4–5)
ALP SERPL-CCNC: 138 U/L (ref 40–150)
ALT SERPL W P-5'-P-CCNC: 24 U/L (ref 0–50)
ANION GAP SERPL CALCULATED.3IONS-SCNC: 8 MMOL/L (ref 3–14)
AST SERPL W P-5'-P-CCNC: 30 U/L (ref 0–45)
BILIRUB SERPL-MCNC: 0.5 MG/DL (ref 0.2–1.3)
BUN SERPL-MCNC: 15 MG/DL (ref 7–30)
CALCIUM SERPL-MCNC: 8.2 MG/DL (ref 8.5–10.1)
CHLORIDE SERPL-SCNC: 116 MMOL/L (ref 94–109)
CO2 SERPL-SCNC: 20 MMOL/L (ref 20–32)
CREAT SERPL-MCNC: 1.38 MG/DL (ref 0.52–1.04)
ERYTHROCYTE [DISTWIDTH] IN BLOOD BY AUTOMATED COUNT: 15 % (ref 10–15)
GFR SERPL CREATININE-BSD FRML MDRD: 37 ML/MIN/{1.73_M2}
GLUCOSE BLDC GLUCOMTR-MCNC: 133 MG/DL (ref 70–99)
GLUCOSE SERPL-MCNC: 92 MG/DL (ref 70–99)
HCT VFR BLD AUTO: 35.1 % (ref 35–47)
HGB BLD-MCNC: 11.7 G/DL (ref 11.7–15.7)
MCH RBC QN AUTO: 30.5 PG (ref 26.5–33)
MCHC RBC AUTO-ENTMCNC: 33.3 G/DL (ref 31.5–36.5)
MCV RBC AUTO: 91 FL (ref 78–100)
PHOSPHATE SERPL-MCNC: 1.9 MG/DL (ref 2.5–4.5)
PLATELET # BLD AUTO: 142 10E9/L (ref 150–450)
POTASSIUM SERPL-SCNC: 4.6 MMOL/L (ref 3.4–5.3)
PROT SERPL-MCNC: 5.5 G/DL (ref 6.8–8.8)
RBC # BLD AUTO: 3.84 10E12/L (ref 3.8–5.2)
SODIUM SERPL-SCNC: 144 MMOL/L (ref 133–144)
WBC # BLD AUTO: 4.2 10E9/L (ref 4–11)

## 2020-06-06 PROCEDURE — 99239 HOSP IP/OBS DSCHRG MGMT >30: CPT | Performed by: INTERNAL MEDICINE

## 2020-06-06 PROCEDURE — 97530 THERAPEUTIC ACTIVITIES: CPT | Mod: GP | Performed by: PHYSICAL THERAPIST

## 2020-06-06 PROCEDURE — 85027 COMPLETE CBC AUTOMATED: CPT | Performed by: FAMILY MEDICINE

## 2020-06-06 PROCEDURE — 80053 COMPREHEN METABOLIC PANEL: CPT | Performed by: FAMILY MEDICINE

## 2020-06-06 PROCEDURE — 25000132 ZZH RX MED GY IP 250 OP 250 PS 637: Performed by: FAMILY MEDICINE

## 2020-06-06 PROCEDURE — 97535 SELF CARE MNGMENT TRAINING: CPT | Mod: GO | Performed by: OCCUPATIONAL THERAPIST

## 2020-06-06 PROCEDURE — 84100 ASSAY OF PHOSPHORUS: CPT | Performed by: FAMILY MEDICINE

## 2020-06-06 PROCEDURE — 36415 COLL VENOUS BLD VENIPUNCTURE: CPT | Performed by: FAMILY MEDICINE

## 2020-06-06 PROCEDURE — 00000146 ZZHCL STATISTIC GLUCOSE BY METER IP

## 2020-06-06 RX ORDER — FERROUS SULFATE 325(65) MG
325 TABLET ORAL
Qty: 30 TABLET | Refills: 1
Start: 2020-06-08 | End: 2020-06-08

## 2020-06-06 RX ORDER — FUROSEMIDE 20 MG
20 TABLET ORAL 2 TIMES DAILY
Status: ON HOLD | COMMUNITY
End: 2020-06-06

## 2020-06-06 RX ADMIN — ASPIRIN 81 MG 81 MG: 81 TABLET ORAL at 08:26

## 2020-06-06 RX ADMIN — LEVOTHYROXINE SODIUM 88 MCG: 0.09 TABLET ORAL at 05:39

## 2020-06-06 RX ADMIN — OMEPRAZOLE 20 MG: 20 CAPSULE, DELAYED RELEASE ORAL at 08:26

## 2020-06-06 RX ADMIN — APIXABAN 5 MG: 5 TABLET, FILM COATED ORAL at 08:26

## 2020-06-06 ASSESSMENT — ACTIVITIES OF DAILY LIVING (ADL)
ADLS_ACUITY_SCORE: 36
ADLS_ACUITY_SCORE: 34
ADLS_ACUITY_SCORE: 34

## 2020-06-06 NOTE — PLAN OF CARE
Patient was very emotional and upset for the first few hours of the shift but rested well afterwards and is pleasant this morning. Incontinent of B&B, agreed to taking pills w/pudding. VSS, remains on room air. Denies pain.   Marva Kingston RN on 6/6/2020 at 5:57 AM

## 2020-06-06 NOTE — PLAN OF CARE
Discharge Planner PT   Patient plan for discharge: back to [facility]  Current status: improved participation today with therapy, scooting to Edge of chair with SBA, sit <> Stand Ashley, stood for 5 minutes with CGA, took 3 small steps forward with FWW and CGA  Barriers to return to prior living situation: none  Recommendations for discharge: back to half-way  Rationale for recommendations: improved participation with therapy, decreased assistance needed with transfers.        Entered by: Kallie Stevens 06/06/2020 9:52 AM

## 2020-06-06 NOTE — DISCHARGE SUMMARY
[facility]  Discharge Summary  Hospital Medicine       Date of Admission:  5/26/2020  Date of Discharge:  6/6/2020  Discharging Provider: Ulises Gaytan MD      Identification and Chief Compaint: Stefanie Velazquez is a 75 year old female who presented on 5/26/2020 with complaint of 3 days of progressive confusion.    Discharge Diagnoses   Acute encephalopathy, possible metabolic due to UTI  Possible toxic encephalopathy due to Mirapex  Dehydration  UTI due to Hafnia  Acute kidney injury on chronic kidney disease   Atrial fibrillation with RVR  Severe malnutrition ( 7/1/2020)   Suspect underlying cognitive decline  Restless legs syndrome  Lumbar Radiculopathy   Neuropathy  Diabetes mellitus type 2  Hypertension   H/o DVT  Hypothyroidism  Gastroesophageal reflux disease   RC  H/o rectal cancer    Follow-ups Needed After Discharge   Follow-up Appointments     Follow-up and recommended labs and tests       Follow up with primary care provider, Sandra Morales, within 7 days   for hospital follow- up. Review blood glucose readings with Dr. Morales.           Eval blood glucose for resumption of insulin. Consider neurology eval for cognitive decline.     Hospital Course      Stefanie Velazquez is a 75 year old female admitted on 5/26/2020. She has history of coronary artery disease, hypertension, hyperlipidemia, DVT, type 2 diabetes, chronic back pain, hypothyroidism, rectal cancer, GERD, restless leg syndrome and RC. She presents with confusion.    Briefly, the patient's hospital course has been complex with agitation and confusion, refusal to take anything oral, dehydration with acute kidney injury and poor tolerance of multiple psychiatric medications. UA abnormal and urine culture positive for Hafnia which has multiple resistances. An IV course of antibiotics was completed. It is possible that the UTI led to the initial encephalopathy. Alternatively, the patient is on Mirapex with  increased dose and the acute kidney injury may have increased levels to toxic. Mirapex was stopped. Patient ultimately became lethargic on multiple mediations, and then these were all stopped and patient has returned to baseline over the last 3 days.     The patient's blood glucose has been running lower and insulin was held. Initially, it was felt due to poor oral intake, but as her diet has resumed, BG continues to run 90's-130's. Insulin has not been restarted at the time of discharge.      Acute encephalopathy, UTI under treatment, initial dehydration, history of previous similar episode that resolved  Possible toxic encephalopathy due to Mirapex and acute kidney injury   Suspect underlying cognitive decline    3 days of reported confusion at nursing home with question of slurred speech and possible right sided weakness. Labs show elevated lactic of 3.4, elevated creatinine, normal WBC, normal CRP, normal troponin, normal TSH, normal VBG. CT head shows no acute findings. CT chest/abomdomen pelvis shows lung nodules as well as adnexal fluid collection. UA shows 11 WBC, small leukocyte esterase, and few bacteria. Vitals show tachycardic, initially up to the 150s in the ED with atrial fibrillation noted on monitor, briefly placed on diltiazem drip and converted to NSR though again tachycardic with agitation.    Possible patient has underlying Lewy body dementia given her history and poor response to antipsychotics. Exelon was tried as noted below but later stopped. Could consider outpatient neurology eval when returned to baseline.     Recent admission 4/7 - 4/13 a for similar presentation, ultimately no clear etiology though possibly related to mild temporal CVA seen on imaging. LP done during that admit.Pt required intubation because agitation that was did not respond to other meds.   Upon admit-agitated, constant motor restlessness, non verbal, not sleeping, eyes closed, unresponsive to voice or pain.      CT  "head on 5/26 demonstrates no new changes.  Pt started on antibiotics on   5/27/2020 for  positive urine culture .   Started rivastigmine patch 4.6 mg/day on 5/28  On 5/29 started Seroquel 25 mg bid and 50 mg at bedtime.    On 5/30 feeding tube placement unsuccessful due to patient combativeness.  On 6/1 seroquel increased to 50 bid and 100 at bedtime.  On 6/2/20 discussed with neuro at -they suggest continued care as we have and consider transfer for EEG 6/4/20 if not better. Family wants no feeding tube.  On 6/3/20  seroquel stopped. Rivastigmine stopped.  On 06/04/20 much improvement, has eaten some, does speak some, sleeping, not as restless  On 06/05/20 again improved. Weak, can't walk, has eaten very little.  On day of discharge, patient appears at baseline mentation and is able to mobilize with minimal assist. Appears appropriate to discharge to [facility].      COVID 19 negative   CT chest as above shows \"Scattered tiny indeterminate lung nodules, possibly infectious or Inflammatory.\" Which have previously been seen, at that time COVID testing was performed and negative.   COVID result, 5/26/2020: negative   .    Afib with RVR.  Presented with above. Patient already on anticoagulation. Has been off betablocker since April when had bradycardia. Rate likely physiologic as normalized with hydration.   Resumed apixaban.       UTI with elevated lactic ccid and metabolic encephalopathy   Lactic 3.4 on presentation, corrected with IVF. UA mildly abnormal though CRP normal, WBC normal. Given encephalopathy of unclear origin and concern for possible infectious cause covered broadly upon admit with  IV vancomycin and zosyn initially. Unclear if infection is contributing.  Zosyn and Vanco discontinued on 5/26   Urine culture growing NLF GNR, Halfnia species- Started on Rocephin on 5/27  Changed abx to Cefepime on 5/28, now on day 5/7.  On 6/2 neuro suggested stopping Cefepime due to cases of neuro toxicity-discussed Hafnia " with pharmacy---they suggested meropenem since pt not taking orals . Stopping meropenem 6/5 (day 10 atb)     TITA on CKD  5/26/20 -- Creatinine 1.98, GFR 24 on presentation. Baseline quite variable over past few years, likely 1.3-1.6 though has been up closer to 1.8-2 at times. Possible TITA due to recent confusion/decreased po intake at nursing home.   Creat 1.32  .06/05/20     Severe malnutrition due to acute on chronic illness ( 7/1/2020)    Patient seen by RD on 5/30/2020 to address potential tube feeding needs. Patient found to have 10% weight loss over 4 months and poor enteral nutrition from admission and likely at least a few days preceding admission. These findings are consistent with severe malnutrition in the context of acute on chronic illness or disease.     A NG feeding tube was attempted but could not be placed at bedside. Instead, nutrition supplements Magic Cup was ordered BID and Boost made honey thick once per day. After return of patient's normal mentation, oral intake is improved and appears adequate at the time of discharge.      Mild pancytopenia  Present to some degree last hospitilization-etiology unclear.     Coronary Artery Disease  History of 3 vessel CABG in 2002. Unable to tolerate ACEi or beta blocker due to hypotension. Managed on aspirin/statin.    Resumed aspirin and resuming simvastatin on discharge.      History of Hypertension  Patient appeared to be on furosemide 20 BID on [facility] med list, though appears this had been intended to be stopped when she saw her PCP 5/14. Furosemide was not resumed on discharge as patient has had 3 admissions with dehydration.      History of DVT  Managed chronically on Eliquis 5 mg BID.  Patient unable to take PO meds, changed to enoxaparin 1 mg/kg bid.  Resumed Eliquis     Diabetes Mellitus, Type II   Chronic.  Last a1C 6.9% on 3/2020. Managed at home with Lantus 24 units at bedtime and novolog 14 units with lunch and dinner.    Currently off lantus  due to hypoglycemia. BG still running 90's-130's with diet on day of discharge, so no insulin on discharge. This needs outpatient follow up.      Lumbar Radiculopathy   Neuropathy  Previously diagnosed. Managed with gabapentin 100 mg at bedtime.     Resumed gabapentin.      Hypothyroidism  Chronic. Normal TSH of 3.9 on presentation. Managed with levothyroxine 88 mcg daily except for Sunday (1/2 tablet).   Changed to IV levothyroxine 37.5 mcg daily.  Will try to restart oral thyroid     Gastroesophageal reflux disease  Chronic. Managed with omeprazole 20 mg TWICE DAILY  Changed to IV protonix.  Will try to restart omeprazole    Restless Leg Syndrome   Managed with pramipexole 0.5 mg at bedtime and PRN every 8 hours. Mirapex was stopped as may contribute to her confusion. Also on gabapentin at at bedtime which has been restarted.      RC  On CPAP at assisted living facility, follows with sleep medicine.     Rectal Cancer  Follows with Dr. Martinez, diagnosed in 2011, underwent neoadjuvant chemoradiation, resection with reanastomosis. Follows yearly with oncology, last seen 9/2019.     CODE STATUS: Full Code      DIET: Moderate CHO diet with honey thick liquids, but not eating due to AMS.  Prophylaxis: On lovenox    Lines: PIV         Plan-the etiology of encephalopathy/delerium remains unclear.  It is possible that UTI  the context of dementia precipitated it.  Stopping antibiotic today.  Is dramatically improved in the last 36 hours. Still diffusely weak and irritable. At times paranoid. Stop iv. Restart oral meds if possible. Assess needs and determine dispo soon. Determine if able to take adequate po. Will need to have lantus restarted. Try to ambulate.    Consultations This Hospital Stay   PHARMACY TO DOSE VANCO  PHYSICAL THERAPY ADULT IP CONSULT  OCCUPATIONAL THERAPY ADULT IP CONSULT  PHARMACY IP CONSULT  NUTRITION SERVICES ADULT IP CONSULT  PALLIATIVE CARE ADULT IP CONSULT  SPIRITUAL HEALTH SERVICES IP  CONSULT    Code Status   Special Code    The discharge plan was discussed with the patient, and she expressed understanding.     Time Spent on this Encounter   Total time on this discharge was 60 minutes.       Ulises Gaytan MD  [facility]  ______________________________________________________________________    Physical Exam   Vital Signs: Temp: 97.7  F (36.5  C) Temp src: Oral BP: 124/62 Pulse: 61   Resp: 18 SpO2: 97 % O2 Device: None (Room air)    Weight: 193 lbs 1.97 oz  Constitutional: alert to day, month, year, place and that she has a memory gap of last few weeks. NAD, answers questions appropriately, very [place] and reads lips well, repeats some questions  CV: Regular, 1+ bilateral lower extremity edema   Respiratory: CTA bilaterally  GI: Soft, non-tender   Skin: Warm and dry       Primary Care Physician   Sandra Morales  [address] [facility] [address]     Discharge Disposition   Discharged to assisted living  Condition at discharge: Good    Significant Results and Procedures   Results for orders placed or performed during the hospital encounter of 05/26/20   CT Head w/o Contrast    Narrative    CT SCAN OF THE HEAD WITHOUT CONTRAST   5/26/2020 7:43 AM     HISTORY: Right-sided weakness, slurred speech, confusion.    TECHNIQUE: Axial images of the head and coronal reformations without  IV contrast material. Radiation dose for this scan was reduced using  automated exposure control, adjustment of the mA and/or kV according  to patient size, or iterative reconstruction technique.    COMPARISON: None.    FINDINGS: Mild cerebral atrophy is present. There are some moderate  patchy white matter changes in both hemispheres without mass effect.  There is an old lacunar infarct in the right centrum semiovale. There  is no evidence for intracranial hemorrhage, mass effect, acute  infarct, or a skull fracture. Visualized paranasal sinuses and mastoid  air cells are clear. Vascular  calcifications are noted at the skull  base.      Impression    IMPRESSION: Chronic changes. No evidence for intracranial hemorrhage  or any acute process.    JESSIE JOSEPH MD   XR Chest Port 1 View    Narrative    CHEST PORTABLE ONE VIEW   5/26/2020 9:32 AM     HISTORY: Delirium, unknown source.    COMPARISON: Chest x-ray 5/14/2020.      Impression    IMPRESSION: Portable view of the chest. Lungs are clear with mild  scarring or calcified plaque right lung base, unchanged. Heart may be  mildly enlarged, but unchanged. Prior CABG. Right chest port is  unchanged. No evidence of pneumothorax or significant pleural fluid.  No change since prior study.    SUNNY HOYOS MD   CT Chest Abdomen Pelvis w/o Contrast    Narrative    CT CHEST, ABDOMEN AND PELVIS WITHOUT CONTRAST  5/26/2020 11:26 AM    CLINICAL HISTORY:  Delirium, elevated lactic acid, no source of  presumed sepsis.    TECHNIQUE: CT scan of the chest, abdomen, and pelvis was performed  without IV contrast. Multiplanar reformats were obtained. Dose  reduction techniques were used.   CONTRAST: None.    COMPARISON: CT chest 4/24/2020.    FINDINGS:   LUNGS AND PLEURA: Respiratory motion artifact is present. A few  scattered indeterminate groundglass nodules are noted within the lungs  bilaterally, possibly infectious or inflammatory in nature. No focal  infiltrate or lung consolidation. Area of bandlike scarring right lung  base is noted. No pleural effusions.    MEDIASTINUM/AXILLAE: Heart is normal in size. Coronary artery  calcifications are present. Prior CABG. Right-sided central venous  line terminates in the distal SVC. No enlarged lymph nodes. Esophagus  is unremarkable.    HEPATOBILIARY: Indeterminate low-attenuation left hepatic lobe lesion  measures approximately 1.5 cm on image 116 of series 8. A few  calcified gallstones are noted in the gallbladder which is otherwise  unremarkable.    PANCREAS: Normal.    SPLEEN: Normal.    ADRENAL GLANDS:  Normal.    KIDNEYS/BLADDER: Probable small bilateral renal cysts. These are  incompletely characterized on this noncontrast study. No  hydronephrosis or urinary tract calculi. The bladder is unremarkable.    BOWEL: Mild colonic constipation is noted. No diverticulitis or  appendicitis. Anastomosis in the region of the rectum is patent. Right  periumbilical hernia contains loops of small bowel without obvious  obstruction or incarceration. Anastomosis near this area is widely  patent.    LYMPH NODES: No enlarged abdominal or pelvic lymph nodes.    VASCULATURE: Calcified plaque abdominal aorta and branch vessels are  noted. No evidence of aortic aneurysm.    PELVIC ORGANS: The bladder, uterus, right adnexal region and rectum  are within normal limits. Fluid collection left adnexa may be ovarian  and measures 4.0 x 1.9 cm on image 249 of series 8.    OTHER: None.    MUSCULOSKELETAL: Intramedullary left femoral aron is noted.  Degenerative hip changes are noted bilaterally. Degenerative spine  changes also noted. Bones overall appear osteopenic. No definite  destructive bone lesions.      Impression    IMPRESSION:  1.  No evidence of bowel obstruction, diverticulitis or appendicitis.  Periumbilical hernia contains loops of small bowel without obstruction  or incarceration. Scattered anastomoses involving the bowel appear  patent. Mild colonic constipation.    2.  Scattered tiny indeterminate lung nodules, possibly infectious or  inflammatory. No focal infiltrate or lung consolidation is evident.  Bandlike scarring right lower lobe is noted. No enlarged lymph nodes.    3.  Prior CABG.    4.  Cholelithiasis. No surrounding gallbladder inflammation.    5.  Left adnexal fluid collection measuring 4.0 x 1.9 cm, possibly  ovarian. A follow-up pelvic ultrasound could be performed for further  assessment if clinically warranted. No surrounding inflammatory  changes are present to suggest an abscess.    SUNNY HOYOS MD     *Note:  Due to a large number of results and/or encounters for the requested time period, some results have not been displayed. A complete set of results can be found in Results Review.     Procedures    None    Discharge Orders      Home Care Referral      Reason for your hospital stay    Confusion may be due to multiple factors including bladder infection, dehydration and possibly the Mirapex. It is unclear if furosemide was still being taken prior to admission, but this should be stopped. The bladder infection has been treated with antibiotics and this course is complete. Mirapex could contribute to confusion especially when you are dehydrated, so this was stopped. (If something is again needed for restless legs, then Sinemet in low doses might be a better alternative.) At the time of discharge, the diabetes is well controlled without any insulin, but this may need insulin again later as you are eating better, but for now we are stopping the insulin. The iron is changed to taking just Monday, Wednesday and Friday as this will replace iron with less constipation. Follow up with Dr. Morales in the next week.     Follow-up and recommended labs and tests     Follow up with primary care provider, Sandra Morales, within 7 days for hospital follow- up. Review blood glucose readings with Dr. Morales.     Activity    Your activity upon discharge: activity as tolerated     Monitor and record    blood glucose 4 times a day, before meals and at bedtime and bring record to review with Dr. Morales     Diet    Follow this diet upon discharge:       Snacks/Supplements Adult: Boost Plus; Between Meals      Snacks/Supplements Adult: Magic Cup; Between Meals      Diabetic diet     Discharge Medications   Current Discharge Medication List      CONTINUE these medications which have CHANGED    Details   ferrous sulfate (FEROSUL) 325 (65 Fe) MG tablet Take 1 tablet (325 mg) by mouth Every Mon, Wed, Fri Morning  Qty: 30 tablet, Refills: 1     Associated Diagnoses: Anemia, unspecified type         CONTINUE these medications which have NOT CHANGED    Details   ACCU-CHEK MILVIA MELODY dispense one meter  Qty: 1 device, Refills: 0    Comments: Diagnosis code: 250.02  Associated Diagnoses: Type II or unspecified type diabetes mellitus without mention of complication, uncontrolled      acetaminophen (TYLENOL) 650 MG CR tablet Take 1 tablet (650 mg) by mouth 3 times daily as needed for mild pain or fever  Qty: 60 tablet    Associated Diagnoses: Bilateral cellulitis of lower leg      aspirin (ASA) 81 MG tablet Take 81 mg by mouth daily      blood glucose monitoring (ACCU-CHEK MILVIA) test strip Use to test blood sugars 4 times daily or as directed.  Qty: 360 each, Refills: 1    Associated Diagnoses: Type 2 diabetes mellitus with diabetic nephropathy, with long-term current use of insulin (H)      blood glucose monitoring (ACCU-CHEK MULTICLIX) lancets Test BG 4 times daily  Qty: 1 Box, Refills: 11    Associated Diagnoses: Type II or unspecified type diabetes mellitus without mention of complication, uncontrolled      cholecalciferol 1000 units TABS Take 1,000 Units by mouth daily      ELIQUIS ANTICOAGULANT 5 MG tablet Take 1 tablet by mouth 2 times daily      fluocinonide (LIDEX) 0.05 % ointment Apply sparingly to rash on legs twice daily as needed.  Do not apply to face.  Qty: 60 g, Refills: 11    Associated Diagnoses: Venous stasis dermatitis of both lower extremities      fluticasone (FLONASE) 50 MCG/ACT spray Spray 1 spray into both nostrils daily  Qty: 1 Bottle, Refills: 3    Associated Diagnoses: Chronic rhinitis, unspecified type      gabapentin (NEURONTIN) 100 MG capsule Take 1 capsule (100 mg) by mouth At Bedtime  Qty: 90 capsule, Refills: 1    Associated Diagnoses: Lumbar radiculopathy      hydrocortisone (ANUSOL-HC) 2.5 % cream Place rectally 2 times daily as needed for hemorrhoids  Qty: 30 g, Refills: 0    Associated Diagnoses: External hemorrhoids       !! levothyroxine (SYNTHROID/LEVOTHROID) 88 MCG tablet Take 44 mcg by mouth once a week = 1/2  Tab on Sunday      loperamide (IMODIUM) 2 MG capsule One capsule once a day PRN, can use a second one if needed  Qty: 90 capsule, Refills: 3    Associated Diagnoses: Rectal cancer (H); Chronic diarrhea      magnesium 250 MG tablet Take 1 tablet by mouth daily      polyethylene glycol (MIRALAX) 17 GM/SCOOP powder Take 17 g (1 capful) by mouth daily  Qty: 578 g, Refills: 4    Associated Diagnoses: Slow transit constipation      Continuous Blood Gluc  (FREESTYLE JAJA 14 DAY READER) MELODY 1 each as needed (use to scan the sensor to check the blood sugars)  Qty: 1 Device, Refills: 0    Associated Diagnoses: Type 2 diabetes mellitus with diabetic nephropathy, with long-term current use of insulin (H)      Continuous Blood Gluc Sensor (FREESTYLE JAJA 14 DAY SENSOR) MISC 1 each every 14 days  Qty: 2 each, Refills: 11    Comments: Pt at an assisted living so make sure you follow up if no call back  Associated Diagnoses: Type 2 diabetes mellitus with diabetic nephropathy, with long-term current use of insulin (H)      insulin pen needle (BD GABRIELLA U/F) 32G X 4 MM Use 4 times per day or as directed.  Qty: 120 each, Refills: 5    Associated Diagnoses: Type 2 diabetes mellitus with diabetic nephropathy, with long-term current use of insulin (H)      !! levothyroxine (SYNTHROID/LEVOTHROID) 88 MCG tablet Take 88 mcg by mouth daily Except on Sunday      menthol-zinc oxide (CALMOSEPTINE) 0.44-20.625 % OINT ointment Apply topically 4 times daily as needed for skin protection    Associated Diagnoses: Rash and nonspecific skin eruption      omeprazole (PRILOSEC) 20 MG DR capsule Take 1 capsule (20 mg) by mouth 2 times daily  Qty: 60 capsule, Refills: 11    Associated Diagnoses: Gastroesophageal reflux disease, esophagitis presence not specified      !! order for DME Equipment being ordered:   Incontinence Products: Gloves (4 Boxes) &  chux pads  Qty: 300 each, Refills: 11    Associated Diagnoses: Rectal cancer (H); Bowel and bladder incontinence      !! order for DME Equipment being ordered: Wheelchair  Qty: 1 Device, Refills: 0    Associated Diagnoses: Spinal stenosis of lumbar region without neurogenic claudication; Lumbar radiculopathy; DDD (degenerative disc disease), lumbar      !! order for DME Equipment being ordered: Incontinence briefs/Depends  Qty: 12 Package, Refills: 11    Associated Diagnoses: Rectal cancer (H)      !! order for DME Equipment being ordered:  order for XL Size  Pull ups.  Pt is currently using 12 pull ups a day  Qty: 350 each, Refills: 11    Associated Diagnoses: Rectal cancer (H); Bowel and bladder incontinence      !! order for DME Equipment being ordered: Compression stockings  Qty: 2 Device, Refills: 0    Associated Diagnoses: Acute deep vein thrombosis (DVT) of right lower extremity, unspecified vein (H)      !! order for DME Equipment being ordered: Hospital Bed service and repair  Qty: 1 each, Refills: 0    Associated Diagnoses: DDD (degenerative disc disease), lumbar; Type 2 diabetes mellitus with diabetic nephropathy, with long-term current use of insulin (H); Neuropathy; Rectal cancer (H)      simvastatin (ZOCOR) 40 MG tablet Take 1 tablet (40 mg) by mouth daily  Qty: 90 tablet, Refills: 2    Associated Diagnoses: Hyperlipidemia LDL goal <100       !! - Potential duplicate medications found. Please discuss with provider.      STOP taking these medications       furosemide (LASIX) 20 MG tablet Comments:   Reason for Stopping:         insulin aspart (NOVOLOG FLEXPEN) 100 UNIT/ML pen Comments:   Reason for Stopping:         insulin glargine (LANTUS PEN) 100 UNIT/ML pen Comments:   Reason for Stopping:         potassium chloride ER (K-DUR/KLOR-CON M) 10 MEQ CR tablet Comments:   Reason for Stopping:         pramipexole (MIRAPEX) 0.5 MG tablet Comments:   Reason for Stopping:         pramipexole (MIRAPEX) 0.5 MG  tablet Comments:   Reason for Stopping:             Allergies   Allergies   Allergen Reactions     Cefepime Other (See Comments)     Neurotoxicity, ie, agitated delirium     Ciprofloxacin Anxiety     Pt developed agitation, paranoia while on cipro--on clear if this is what caused it.  But would try to avoid in future.     Hydrocodone Other (See Comments)     dizzy     Lisinopril Cough            Vicodin [Hydrocodone-Acetaminophen] Other (See Comments)     Caused extreme dizziness

## 2020-06-06 NOTE — PROGRESS NOTES
Care Transitions Discharge Note:      Name: Stefanie Velazquez    MRN: [id number]    Reason for Hospitalization: Lactic acidosis [E87.2]  Delirium [R41.0]  Atrial fibrillation, rapid (H) [I48.91]    Cognitive/Behavioral Status: awake, alert and appropriate.    Follow-up Appointments:   Future Appointments   Date Time Provider Department Center   6/7/2020  9:00 AM Kallie Stevens PT [facility]   6/7/2020  9:00 AM Steph Nascimento OT [facility]   7/21/2020 11:00 AM [facility]   7/22/2020 11:00 AM Apolinar Martinez MD [facility]       Discharge Date:  6/6/2020    Patient/Care Partner in agreement and understands the discharge plan:  Yes, spoke with Dori (niece).    Discharge Disposition:  Return to [facility] ([facility] (phone: [phone] Fax: [phone]) with [facility]  (phone: [phone] Fax: [phone]).  Trans[facility] will be provided by [facility].    Referral placed for Komal Tanner RN, FVP CM regarding the discharge plan.    Siomara Merrill RN Care Coordinator  [phone]

## 2020-06-06 NOTE — PLAN OF CARE
"Patient was able to stand with stand by assist/gait belt and walker and pivot transfer into her chair without assist of staff. Denying any pain. Patient currently sitting upright in chair, eating breakfast and talking on tablet with niece. Patient cooperative with staff and agreeable to getting out of bed. Encouraged patient to feed self and increase oral intake. VS stable, afebrile. /62 (BP Location: Right arm)   Pulse 61   Temp 97.7  F (36.5  C) (Oral)   Resp 18   Ht 1.676 m (5' 6\")   Wt 87.6 kg (193 lb 2 oz)   SpO2 97%   BMI 31.17 kg/m      "

## 2020-06-06 NOTE — PROGRESS NOTES
Patient discharged at 1735 via wheelchair via [facility] transportation, with all belongings.

## 2020-06-06 NOTE — PLAN OF CARE
Discharge Planner OT   Patient plan for discharge: She would like to go back to her previous living environment  Current status: Alert and engaged socially with niece and therapist.  Feeding self today and requesting more toast.  Able to get depends over right leg with min A over left leg and pulling up while standing, does adjust over hips.  Max A for sigifredo cares.  Standing approximately 5 minutes with CGA.  Able to adjust self in chair, taking 2-3 side steps in standing  Barriers to return to prior living situation: weakness, need for assist with mobility and LE dressing  Recommendations [facility]: [facility] ([facility] living if they can increase assist), or TCU for ongoing therapy  Rationale for recommendations: Demonstrating increasing engagement and independence with mobility and ADL's       Entered by: Abhishek Huff 06/06/2020 9:52 AM

## 2020-06-07 NOTE — PLAN OF CARE
Occupational Therapy Discharge Summary    Reason for therapy discharge:    Discharged to prior living (assisted living facility)    Progress towards therapy goal(s). See goals on Care Plan in [facility] electronic health record for goal details.  Goals partially met.  Barriers to achieving goals:   limited tolerance for therapy and discharge from facility.    Therapy recommendation(s):    Continued therapy is recommended.  Rationale/Recommendations:  to improve strength and ADL independence.

## 2020-06-07 NOTE — PLAN OF CARE
Physical Therapy Discharge Summary    Reason for therapy discharge:    Discharged to home with home therapy. Discharged to previous AL     Progress towards therapy goal(s). See goals on Care Plan in [facility] electronic health record for goal details.  Goals partially met.  Barriers to achieving goals:   discharge from facility.    Therapy recommendation(s):    Continued therapy is recommended.  Rationale/Recommendations:  Per PT note..       Bladder non-tender and non-distended. Urine clear yellow.

## 2020-06-08 ENCOUNTER — TELEPHONE (OUTPATIENT)
Dept: FAMILY MEDICINE | Facility: CLINIC | Age: 75
End: 2020-06-08

## 2020-06-08 DIAGNOSIS — D64.9 ANEMIA, UNSPECIFIED TYPE: ICD-10-CM

## 2020-06-08 RX ORDER — FERROUS SULFATE 325(65) MG
325 TABLET ORAL
Qty: 30 TABLET | Refills: 1 | Status: ON HOLD | OUTPATIENT
Start: 2020-06-08 | End: 2020-08-31

## 2020-06-08 NOTE — TELEPHONE ENCOUNTER
"ED/Discharge Protocol    \"Hi, my name is Alice Cavazos RN, a registered nurse, and I am calling on behalf of Dr. Medina's office at [facility].  I am calling to follow up and see how things are going for you after your recent visit.\"    \"I see that you were in the (ER/UC/IP) on 55/26/20.    How are you doing now that you are home?\" Mell from assisted living says she is back to baseline    Is patient experiencing symptoms that may require a hospital visit?  no    Discharge Instructions    \"Let's review your discharge instructions.  What is/are the follow-up recommendations?  Pt. Response: follow up     \"Were you instructed to make a follow-up appointment?\"  Pt. Response: Yes.  Has appointment been made?   Yes, but needed to reschedule      \"When you see the provider, I would recommend that you bring your discharge instructions with you.    Medications    \"How many new medications are you on since your hospitalization/ED visit?\"    0-1  \"How many of your current medicines changed (dose, timing, name, etc.) while you were in the hospital/ED visit?\"   0-1  \"Do you have questions about your medications?\"   No  \"Were you newly diagnosed with heart failure, COPD, diabetes or did you have a heart attack?\"   No  For patients on insulin: \"Did you start on insulin in the hospital or did you have your insulin dose changed?\"   No  Post Discharge Medication Reconciliation Status: discharge medications reconciled, continue medications without change.    Was MTM referral placed (*Make sure to put transitions as reason for referral)?   No    Call Summary    \"Do you have any questions or concerns about your condition or care plan at the moment?\"    No  Triage nurse advice given: call if questions        \"If you have questions or things don't continue to improve, we encourage you contact us through the main clinic number,  [phone].  Even if the clinic is not open, triage nurses are available 24/7 to help you.     We would " "like you to know that our clinic has extended hours (provide information).  We also have urgent care (provide details on closest location and hours/contact info)\"      \"Thank you for your time and take care!\"        "

## 2020-06-08 NOTE — TELEPHONE ENCOUNTER
Mell from her assisted living called.  Pt was in the hospital and they made some med changes. The pharmacy was faxed the AVS but the pharmacy can't accept that as orders.  Please send the orders to "[facility]    Stop Furosemide  Stop Lantus insulin  Stop Potassium CL 10 MEQ  Stop Pramipexole    They changed her Ferrous Sulfate to M- W- F    [facility]

## 2020-06-08 NOTE — TELEPHONE ENCOUNTER
Reason for Call: Request for an order or referral:    Order or referral being requested: Pt has returned hospital requesting   Resume continued skilled nursing visits  1 x for 1 week  2 x for 1 week  1 x for 1 week.      Date needed: as soon as possible    Has the patient been seen by the PCP for this problem? YES    Phone number Patient can be reached at:  Other phone number:  [facility] *    Best Time:  Any Time      Can we leave a detailed message on this number?  YES    Call taken on 6/8/2020 at 2:51 PM by Leilani Jurado

## 2020-06-08 NOTE — TELEPHONE ENCOUNTER
ED/UC/IP follow up phone call: [facility] discharge 6/6/20  Delirium, Encephalopathy  RN please call to follow up.    Number of ED visits in past 12 months = 1        ]

## 2020-06-08 NOTE — TELEPHONE ENCOUNTER
ED / Discharge Outreach Protocol    Patient Contact    Attempt # 1    Was call answered?  No.  Left message on Mell's voicemail with information to call me back. Concha armstrong has appointment tomorrow  Alice Cavazos RN

## 2020-06-10 ENCOUNTER — PATIENT OUTREACH (OUTPATIENT)
Dept: GERIATRIC MEDICINE | Facility: CLINIC | Age: 75
End: 2020-06-10

## 2020-06-10 NOTE — PROGRESS NOTES
[facility] Care Coordination Contact    cc received a call from members Shira ocasio, stating member is getting Hospital bills that she does not feel she should be getting. Shira was not at home of the call and will call back with detailed information later this week.    Komal Krishnan RN-[facility]   [phone]     6/23/2020  cc attempted to reach Shira again to obtain information. Will await a call from her.    Komal Krishnan RN-[facility]   [phone]

## 2020-06-11 NOTE — PROGRESS NOTES
[facility] Care Coordination Contact    2nd Attempt: Signed Letter not received from katelyn Yoon per process.     Shaunna Salmeron  Care Management Specialist  [facility]  [phone]

## 2020-06-12 ENCOUNTER — OFFICE VISIT (OUTPATIENT)
Dept: FAMILY MEDICINE | Facility: CLINIC | Age: 75
End: 2020-06-12
Payer: COMMERCIAL

## 2020-06-12 VITALS
OXYGEN SATURATION: 98 % | WEIGHT: 192 LBS | HEART RATE: 58 BPM | TEMPERATURE: 97.2 F | BODY MASS INDEX: 30.99 KG/M2 | SYSTOLIC BLOOD PRESSURE: 136 MMHG | RESPIRATION RATE: 18 BRPM | DIASTOLIC BLOOD PRESSURE: 68 MMHG

## 2020-06-12 DIAGNOSIS — Z79.899 POLYPHARMACY: ICD-10-CM

## 2020-06-12 DIAGNOSIS — E11.21 TYPE 2 DIABETES MELLITUS WITH DIABETIC NEPHROPATHY, WITH LONG-TERM CURRENT USE OF INSULIN (H): ICD-10-CM

## 2020-06-12 DIAGNOSIS — L03.032 CELLULITIS OF TOE OF LEFT FOOT: ICD-10-CM

## 2020-06-12 DIAGNOSIS — G93.40 ENCEPHALOPATHY: ICD-10-CM

## 2020-06-12 DIAGNOSIS — Z79.4 TYPE 2 DIABETES MELLITUS WITH DIABETIC NEPHROPATHY, WITH LONG-TERM CURRENT USE OF INSULIN (H): ICD-10-CM

## 2020-06-12 DIAGNOSIS — Z09 HOSPITAL DISCHARGE FOLLOW-UP: Primary | ICD-10-CM

## 2020-06-12 PROCEDURE — 99495 TRANSJ CARE MGMT MOD F2F 14D: CPT | Performed by: NURSE PRACTITIONER

## 2020-06-12 RX ORDER — DOXYCYCLINE 100 MG/1
100 CAPSULE ORAL 2 TIMES DAILY
Qty: 14 CAPSULE | Refills: 0 | Status: SHIPPED | OUTPATIENT
Start: 2020-06-12 | End: 2020-06-29

## 2020-06-12 RX ORDER — BIOTIN 10000 MCG
CAPSULE ORAL
COMMUNITY
End: 2020-06-12

## 2020-06-12 RX ORDER — BIOTIN 1 MG
1000 TABLET ORAL DAILY
Status: ON HOLD | COMMUNITY
End: 2020-08-31

## 2020-06-12 NOTE — PROGRESS NOTES
Subjective     Stefanie Velazquez is a 75 year old female who presents to clinic today for the following health issues:    HPI     Hospital Follow-up Visit:    Hospital/Nursing Home/IP Rehab Facility: [facility]  Date of Admission: 05/26/20  Date of Discharge: 06/06/20  Reason(s) for Admission: Acute encephalopathy, possible metabolic due to UTI  Possible toxic encephalopathy due to Mirapex  Dehydration  UTI due to Hafnia  Acute kidney injury on chronic kidney disease   Atrial fibrillation with RVR  Suspect underlying cognitive decline  Restless legs syndrome  Lumbar Radiculopathy   Neuropathy  Diabetes mellitus type 2  Hypertension   H/o DVT  Hypothyroidism  Gastroesophageal reflux disease   RC  H/o rectal cancer      Was your hospitalization related to COVID-19? No   Problems taking medications regularly:  None  Medication changes since discharge: None  Problems adhering to non-medication therapy:  None    Summary of hospitalization:  [facility] discharge summary reviewed  Diagnostic Tests/Treatments reviewed.  Follow up needed: none  Other Healthcare Providers Involved in Patient s Care:         Homecare  Update since discharge: improved.   Post Discharge Medication Reconciliation: discharge medications reconciled, continue medications without change.  Plan of care communicated with patient and family          Has been doing well since discharge  Family worried about recurrent encephalopathy/ICU stays over the past few months-would like to work with pharmacist for concern of polypharmacy   Blood sugars running between  with most less than 140    Patient Active Problem List   Diagnosis     Hypothyroidism     bmi 40     Chronic ischemic heart disease     Generalized osteoarthrosis, unspecified site     HEARING LOSS CONDUCTIVE, COMBINED TYPE     Esophageal reflux     Osteoporosis     RC-moderate (AHI 12, LSat 60%); REM RDI-73     Neuropathy (H)     Hyperlipidemia LDL goal <100     Rectal  cancer- newly diagnosed adenoCA rectum Jan 2011 and Positive Tubular Adenoma 2019     Anemia     Radiation therapy complication     Vitamin B 12 deficiency     Vitamin D deficiency     Dysuria     Bowel and bladder incontinence     Benign essential hypertension     Health Care Home     Chronic diarrhea     Restless leg syndrome     Type 2 diabetes mellitus with diabetic nephropathy, with long-term current use of insulin (H)     Spinal stenosis of lumbar region without neurogenic claudication     DDD (degenerative disc disease), lumbar     Lymphedema of genitalia     Oropharyngeal dysphagia     Bilateral hearing loss, unspecified hearing loss type     Lumbar radiculopathy     CKD (chronic kidney disease) stage 4, GFR 15-29 ml/min (H)     Impacted cerumen of right ear     H/O deep venous thrombosis     Symptomatic bradycardia     Syncope and collapse     Chronic anticoagulation     Pulmonary nodules     Encephalopathy     Acute confusion     Past Surgical History:   Procedure Laterality Date     ANESTHESIA OUT OF OR MRI N/A 4/8/2020    Procedure: ANESTHESIA OUT OF OR MRI;  Surgeon: GENERIC ANESTHESIA PROVIDER;  Location: [facility]     cabg       COLECTOMY LOW ANTERIOR  6/21/2011    Procedure:COLECTOMY LOW ANTERIOR; with loop ielostomy; Surgeon:ROBBIN MCDONNELL; Location:[facility]     COLONOSCOPY  1/26/2011    COLONOSCOPY performed by MASOUD BILL at [facility]     COLONOSCOPY N/A 3/1/2016    Procedure: COLONOSCOPY;  Surgeon: Liza Neal MD;  Location: [facility]     INSERT PORT VASCULAR ACCESS  3/2/2011    INSERT PORT VASCULAR ACCESS performed by LIZA NEAL at [facility]     OPEN REDUCTION INTERNAL FIXATION FEMUR DISTAL  2/12/2013    Procedure: OPEN REDUCTION INTERNAL FIXATION FEMUR DISTAL;  Open reduction internal fixation left femur fracture--[facility];  Surgeon: Ley, Jeffrey Duane, MD;  Location: [facility] OR     ORTHOPEDIC SURGERY       PHACOEMULSIFICATION WITH STANDARD INTRAOCULAR LENS IMPLANT Left  1/22/2018    Procedure: PHACOEMULSIFICATION WITH STANDARD INTRAOCULAR LENS IMPLANT;  Left cataract removal with implant;  Surgeon: Rony Key MD;  Location: [facility]     PHACOEMULSIFICATION WITH STANDARD INTRAOCULAR LENS IMPLANT Right 2/19/2018    Procedure: PHACOEMULSIFICATION WITH STANDARD INTRAOCULAR LENS IMPLANT;  Right cataract removal with implant;  Surgeon: Rony Key MD;  Location: [facility]     SIGMOIDOSCOPY FLEXIBLE  9/9/2011    Procedure:SIGMOIDOSCOPY FLEXIBLE; Performed prior to induction.; Surgeon:ROBBIN MCDONNELL; Location: OR     SURGICAL HISTORY OF -   10/24/2002    Heart bypass     SURGICAL HISTORY OF -   2002    R Knee arthroplasty     SURGICAL HISTORY OF -   2002    Mi 2 stints     TAKEDOWN ILEOSTOMY  9/9/2011    Procedure:TAKEDOWN ILEOSTOMY; Exploratory Laparotomy,  Loop Ileostomy Takedown, Small Bowel Resection x 2.; Surgeon:ROBBIN MCDONNELL; Location: OR       Social History     Tobacco Use     Smoking status: Former Smoker     Smokeless tobacco: Never Used   Substance Use Topics     Alcohol use: Not Currently     Comment: rare social use     Family History   Problem Relation Age of Onset     Diabetes Sister      C.A.D. Sister      Breast Cancer Sister          Current Outpatient Medications   Medication Sig Dispense Refill     ACCU-CHEK MILVIA MELODY dispense one meter 1 device 0     acetaminophen (TYLENOL) 650 MG CR tablet Take 1 tablet (650 mg) by mouth 3 times daily as needed for mild pain or fever 60 tablet      aspirin (ASA) 81 MG tablet Take 81 mg by mouth daily       biotin 1000 MCG TABS tablet Take 1,000 mcg by mouth daily       blood glucose monitoring (ACCU-CHEK MILVIA) test strip Use to test blood sugars 4 times daily or as directed. 360 each 1     blood glucose monitoring (ACCU-CHEK MULTICLIX) lancets Test BG 4 times daily (Patient taking differently: by In Vitro route 4 times daily Test BG 4 times daily) 1 Box 11     cholecalciferol 1000 units TABS Take 1,000  Units by mouth daily       Continuous Blood Gluc  (FREESTYLE JAJA 14 DAY READER) MELODY 1 each as needed (use to scan the sensor to check the blood sugars) 1 Device 0     Continuous Blood Gluc Sensor (FREESTYLE JAJA 14 DAY SENSOR) MISC 1 each every 14 days 2 each 11     ELIQUIS ANTICOAGULANT 5 MG tablet Take 1 tablet by mouth 2 times daily       ferrous sulfate (FEROSUL) 325 (65 Fe) MG tablet Take 1 tablet (325 mg) by mouth Every Mon, Wed, Fri Morning 30 tablet 1     fluocinonide (LIDEX) 0.05 % ointment Apply sparingly to rash on legs twice daily as needed.  Do not apply to face. 60 g 11     fluticasone (FLONASE) 50 MCG/ACT spray Spray 1 spray into both nostrils daily 1 Bottle 3     hydrocortisone (ANUSOL-HC) 2.5 % cream Place rectally 2 times daily as needed for hemorrhoids 30 g 0     levothyroxine (SYNTHROID/LEVOTHROID) 88 MCG tablet Take 44 mcg by mouth once a week = 1/2  Tab on Sunday       levothyroxine (SYNTHROID/LEVOTHROID) 88 MCG tablet Take 88 mcg by mouth daily Except on Sunday       loperamide (IMODIUM) 2 MG capsule One capsule once a day PRN, can use a second one if needed 90 capsule 3     magnesium 250 MG tablet Take 1 tablet by mouth daily       menthol-zinc oxide (CALMOSEPTINE) 0.44-20.625 % OINT ointment Apply topically 4 times daily as needed for skin protection       omeprazole (PRILOSEC) 20 MG DR capsule Take 1 capsule (20 mg) by mouth 2 times daily 60 capsule 11     order for DME Equipment being ordered:   Incontinence Products: Gloves (4 Boxes) & chux pads 300 each 11     order for DME Equipment being ordered: Wheelchair 1 Device 0     order for DME Equipment being ordered: Incontinence briefs/Depends 12 Package 11     order for DME Equipment being ordered:  order for XL Size  Pull ups.  Pt is currently using 12 pull ups a day 350 each 11     order for DME Equipment being ordered: Compression stockings 2 Device 0     order for DME Equipment being ordered: Hospital Bed service and repair  1 each 0     polyethylene glycol (MIRALAX) 17 GM/SCOOP powder Take 17 g (1 capful) by mouth daily 578 g 4     simvastatin (ZOCOR) 40 MG tablet Take 1 tablet (40 mg) by mouth daily 90 tablet 2     gabapentin (NEURONTIN) 100 MG capsule Take 2 capsules (200 mg) by mouth At Bedtime 180 capsule 1     insulin pen needle (BD GABRIELLA U/F) 32G X 4 MM Use 4 times per day or as directed. (Patient not taking: Reported on 6/12/2020) 120 each 5     Allergies   Allergen Reactions     Cefepime Other (See Comments)     Neurotoxicity, ie, agitated delirium     Ciprofloxacin Anxiety     Pt developed agitation, paranoia while on cipro--on clear if this is what caused it.  But would try to avoid in future.     Hydrocodone Other (See Comments)     dizzy     Lisinopril Cough            Vicodin [Hydrocodone-Acetaminophen] Other (See Comments)     Caused extreme dizziness     Reviewed and updated as needed this visit by Provider  Tobacco  Allergies  Meds  Problems  Med Hx  Surg Hx  Fam Hx         Review of Systems   Constitutional, HEENT, cardiovascular, pulmonary, gi and gu systems are negative, except as otherwise noted.      Objective    /68 (BP Location: Right arm, Patient Position: Sitting, Cuff Size: Adult Large)   Pulse 58   Temp 97.2  F (36.2  C) (Tympanic)   Resp 18   Wt 87.1 kg (192 lb)   SpO2 98%   BMI 30.99 kg/m    Body mass index is 30.99 kg/m .  Physical Exam   GENERAL: healthy, alert and no distress  NECK: no adenopathy, no asymmetry, masses, or scars and thyroid normal to palpation  RESP: lungs clear to auscultation - no rales, rhonchi or wheezes  CV: regular rate and rhythm, normal S1 S2, no S3 or S4, 3/6 systolic murmur, click or rub, no peripheral edema and peripheral pulses strong  ABDOMEN: soft, nontender, no hepatosplenomegaly, no masses and bowel sounds normal  SKIN: erythema, warmth and swelling left great toe  PSYCH: mentation appears normal, affect normal/bright  Diagnostic Test Results:  Labs  reviewed in Epic        Assessment & Plan     1. Hospital discharge follow-up  No acute distress. Will start Doxycycline with concern of cellulitis.  Will monitor closely as she has had trouble with confusion with recent antibiotics/infections.  Referral to [facility] pharmacy for polypharmacy.  Plan to stay off insulin at this time as blood sugars running less than 140. Follow up if symptoms do not improve or worsen.    2. Encephalopathy  Per above.     3. Cellulitis of toe of left foot  Per above.   - doxycycline monohydrate (MONODOX) 100 MG capsule; Take 1 capsule (100 mg) by mouth 2 times daily for 7 days  Dispense: 14 capsule; Refill: 0    4. Polypharmacy  Per above  - MED THERAPY MANAGE REFERRAL    5. Type 2 diabetes mellitus with diabetic nephropathy, with long term use of insulin (H)  Per above     Home care instructions were reviewed with the patient. The risks, benefits and treatment options of prescribed medications or other treatments have been discussed with the patient. The patient verbalized their understanding and should call or follow up if no improvement or if they develop further problems.    Patient Instructions   [facility] pharmacy referral placed    Doxycycline sent to the pharmacy for left great toe infection      Return in about 1 week (around 6/19/2020), or if symptoms worsen or fail to improve.    CLEMENCIA [facility]

## 2020-06-15 ENCOUNTER — PATIENT OUTREACH (OUTPATIENT)
Dept: GERIATRIC MEDICINE | Facility: CLINIC | Age: 75
End: 2020-06-15

## 2020-06-15 NOTE — PROGRESS NOTES
TRANSITIONS OF CARE (NITA) LOG   NITA tasks should be completed by the CC within one (1) business day of notification of each transition. Follow up contact with member is required after return to their usual care setting.  Note:  If CC finds out about the transitions fifteen (15) days or more after the member has returned to their usual care setting, no NITA log is needed. However, the CC should check in with the member to discuss the transition process, any changes needed to the care plan and document it in a case note.    Member Name:  Stefanie Velazquez [facility] Member ID#: [id number]   Product: [facility] Care Coordinator Contact:  Komal Krishnan RN-BC Agency/County/Care System: [facility]   Transition Communication Actions from Care Management Contact   Transition #1   Notification Date: 05/27/2020 Transition Date:   5/26/2020 Transition From: Home     Is this the member s usual care setting?               yes Transition To: Hospital, [facility]   Transition Type:  Unplanned  Reason for Admission/Comments:  3 days of progressive confusion.      MD NOTE: Briefly, the patient's hospital course has been complex with agitation and confusion, refusal to take anything oral, dehydration with acute kidney injury and poor tolerance of multiple psychiatric medications. UA abnormal and urine culture positive for Hafnia which has multiple resistances. An IV course of antibiotics was completed. It is possible that the UTI led to the initial encephalopathy. Alternatively, the patient is on Mirapex with increased dose and the acute kidney injury may have increased levels to toxic. Mirapex was stopped. Patient ultimately became lethargic on multiple mediations, and then these were all stopped and patient has returned to baseline over the last 3 days.      The patient's blood glucose has been running lower and insulin was held. Initially, it was felt due to poor oral intake, but as her diet has  resumed, BG continues to run 90's-130's. Insulin has not been restarted at the time of discharge.     6/2/2020  Updated by discharge planners member may need a TCU at time of discharge.       6/7/2020  Caleb Sauceda  Stefanie Caroline discharged 6/6/20.  She returned to [facility] in [place] with [facility].  If you have questions contact our dept as I will be off next week.  Siomara Ruiz       Shared CC contact info, care plan/services with receiving setting--Date completed: 5/27/2020    Notified PCP of transition--Date completed:  5/26/2020   via  EMR   Transition #2   Transition #3  (if applicable)   Notification Date: 6/7/2020        Transition To:  Home  Transition Date: 6/6/2020     Transition Type:    Planned  Notified PCP -- Date completed: 6/6/2020              Shared CC contact info, care plan/services with receiving setting or, if applicable, home care agency--Date completed: 6/7/2020  *Complete additional tasks below, if this transition is a return to usual care setting.      Comments:        *Complete tasks below when the member is discharging TO their usual care setting within one (1) business day of notification.  For situations where the Care Coordinator is notified of the discharge prior to the date of discharge, the Care Coordinator must follow up with the member or designated representative to confirm that discharge actually occurred and discuss required NITA tasks as outlined in the NITA Instructions.  (This includes situations where it may be a  new  usual care setting for the member. (i.e., a community member who decides upon permanent nursing home placement following hospitalization and rehab).    Date completed: 6/7/2020Communicated with member or their designated representative about the following:  care transition process; about changes to the member s health status; plan of care updates; education about transitions and how to prevent unplanned transitions/readmissions  Four Pillars for  Optimal Transition:    Check  Yes  - if the member, family member and/or SNF/facility staff manages the following:    If  No  provide explanation in the comments section.          [x]  Yes     []  No     Does the member have a follow-up appointment scheduled with primary care or specialist? (Mental health hospitalizations--the appt. should be w/in 7 days)   [x]  Yes     []  No     Can the member manage their medications or is there a system in place to manage medications (e.g. home care set-up)?         [x]  Yes     []  No     Can the member verbalize warning signs and symptoms to watch for and how to respond?         [x]  Yes     []  No     Does the member use a Personal Health Care Record?  Check  Yes  if visit summary, discharge summary, and/or healthcare summary are being used as a PHR.                                                                                                                                                                                    [x] Yes      [] No      Have you updated the member s care plan?  If  No  provide explanation in comments.   Comments:  HC started

## 2020-06-15 NOTE — PROGRESS NOTES
[facility] Care Coordination Contact    cc received a VM message from Jose ([phone]) stating that the AL member resides in was sold as of 9/30/2019.  As of 10/1/2019, they are requesting their NPI be updated on the Auth so that they are able to bill.  Esha stated it was a communication error on their part and it was just noted in June, 2020 that the NPI numbers had not be changed for multiple clients.    cc forwarded information to CMS and requested and updated Auth be processed.    Komal Krishnan, RN-[facility]   [phone]

## 2020-06-16 ENCOUNTER — MEDICAL CORRESPONDENCE (OUTPATIENT)
Dept: HEALTH INFORMATION MANAGEMENT | Facility: CLINIC | Age: 75
End: 2020-06-16

## 2020-06-16 ENCOUNTER — TELEPHONE (OUTPATIENT)
Dept: FAMILY MEDICINE | Facility: CLINIC | Age: 75
End: 2020-06-16

## 2020-06-16 NOTE — TELEPHONE ENCOUNTER
MTM referral from: [facility] visit (referral by provider)    MTM referral outreach attempt #2 on June 16, 2020 at 11:17 AM      Outcome: Patient not reachable after several attempts, will route to MTM Pharmacist/Provider as an FYI. Thank you for the referral.    Kitty Thurman, MTM Coordinator

## 2020-06-16 NOTE — PROGRESS NOTES
[facility] Care Coordination Contact    Auths updated with new NPI and resubmitted to health plan.     Gina Wheatley  Care Management Specialist   [facility]   [phone]

## 2020-06-16 NOTE — TELEPHONE ENCOUNTER
Reason for Call:  Other     Detailed comments: Mell - from assisted living- [facility] - [phone] - Since starting the doxycyline she is having sleep issues - confusion - crying - needing tranfers to toilet when she usually does it herself - Clearer today and remembers her confusion last night - patient is also not happy her restless leg medication has been taken away. - please advise     Phone Number Patient can be reached at:     Best Time:     Can we leave a detailed message on this number?     Call taken on 6/16/2020 at 2:49 PM by Sandra Milian

## 2020-06-16 NOTE — TELEPHONE ENCOUNTER
[facility]- Resumption of Care   Form placed on Provider Dr. Amandeep shrestha for [facility].  Leilani Orn Station Sec

## 2020-06-25 ENCOUNTER — TELEPHONE (OUTPATIENT)
Dept: FAMILY MEDICINE | Facility: CLINIC | Age: 75
End: 2020-06-25

## 2020-06-25 DIAGNOSIS — M54.16 LUMBAR RADICULOPATHY: ICD-10-CM

## 2020-06-25 RX ORDER — GABAPENTIN 100 MG/1
200 CAPSULE ORAL AT BEDTIME
Qty: 180 CAPSULE | Refills: 1 | Status: ON HOLD | OUTPATIENT
Start: 2020-06-25 | End: 2020-08-31

## 2020-06-25 NOTE — TELEPHONE ENCOUNTER
Reason for call:  Patient reporting a symptom    Symptom or request: Pain in both legs - Pt's restless leg med that was Discontinued did not really work well.  Mell is wondering about increasing her Gabapentin. Pt is currently on 100 mg at night. Tylenol is not effective.Please Advise. Pt does have appt on Monday pt does not want to deal with this until Monday.    Phone Number patient can be reached at:  Home number on file [phone] (home)    Best Time:  Any Time      Can we leave a detailed message on this number:  YES    Call taken on 6/25/2020 at 12:53 PM by Leilani Jurado

## 2020-06-25 NOTE — TELEPHONE ENCOUNTER
Mell notified, she needs PCP to change the script and send to [facility], then the order needs faxing to her at [phone], routing back to PCP to send.    JEWELS Crooks

## 2020-06-26 ENCOUNTER — TELEPHONE (OUTPATIENT)
Dept: FAMILY MEDICINE | Facility: CLINIC | Age: 75
End: 2020-06-26

## 2020-06-26 NOTE — TELEPHONE ENCOUNTER
Reason for Call:  Other    Detailed comments: Alisha from [facility] says they need verbal orders for nursing 2x a week for 2 weeks and 1x a week for 4 weeks. This would be for wound, pain and falls.     Phone Number Alisha can be reached at:  [phone]    Best Time: anytime    Can we leave a detailed message on this number? YES    Call taken on 6/26/2020 at 12:25 PM by Crystal Skelton

## 2020-06-26 NOTE — TELEPHONE ENCOUNTER
I left a message with order below on confidential VM of Alisha on her cell:  [phone].    Lisa RICHARDS RN, BSN

## 2020-06-26 NOTE — TELEPHONE ENCOUNTER
Reason for Call:  Other     Detailed comments: Mell from her assisted living says since Stefanie got out of a hospital a month ago she is no longer on insulin. They have been testing her blood sugars 4x a day and they have been normal. Pt and nurse are wondering since she is no longer on insulin, if they could just check her blood sugars once a day. Pt is hoping this could start on the weekend so she doesn't have to get poked so much. She does have apt with Dr Morales on Monday morning, June 29 .     Phone Number Mell Nurse can be reached at:  [phone]    Fax order to: [phone] Att: Mell    Best Time: anytime    Can we leave a detailed message on this number? YES    Call taken on 6/26/2020 at 12:21 PM by Crystal Skelton

## 2020-06-29 ENCOUNTER — OFFICE VISIT (OUTPATIENT)
Dept: FAMILY MEDICINE | Facility: CLINIC | Age: 75
End: 2020-06-29
Payer: COMMERCIAL

## 2020-06-29 ENCOUNTER — ANCILLARY PROCEDURE (OUTPATIENT)
Dept: GENERAL RADIOLOGY | Facility: CLINIC | Age: 75
End: 2020-06-29
Attending: FAMILY MEDICINE
Payer: COMMERCIAL

## 2020-06-29 ENCOUNTER — TELEPHONE (OUTPATIENT)
Dept: FAMILY MEDICINE | Facility: CLINIC | Age: 75
End: 2020-06-29

## 2020-06-29 VITALS
BODY MASS INDEX: 28.08 KG/M2 | HEART RATE: 55 BPM | OXYGEN SATURATION: 97 % | WEIGHT: 174 LBS | DIASTOLIC BLOOD PRESSURE: 82 MMHG | RESPIRATION RATE: 14 BRPM | SYSTOLIC BLOOD PRESSURE: 160 MMHG

## 2020-06-29 DIAGNOSIS — I10 BENIGN ESSENTIAL HYPERTENSION: ICD-10-CM

## 2020-06-29 DIAGNOSIS — M79.604 PAIN IN BOTH LOWER EXTREMITIES: ICD-10-CM

## 2020-06-29 DIAGNOSIS — M79.605 PAIN IN BOTH LOWER EXTREMITIES: ICD-10-CM

## 2020-06-29 DIAGNOSIS — R91.8 PULMONARY NODULES: ICD-10-CM

## 2020-06-29 DIAGNOSIS — G25.81 RESTLESS LEG SYNDROME: Primary | ICD-10-CM

## 2020-06-29 DIAGNOSIS — R60.9 EDEMA, UNSPECIFIED TYPE: ICD-10-CM

## 2020-06-29 DIAGNOSIS — D50.8 IRON DEFICIENCY ANEMIA SECONDARY TO INADEQUATE DIETARY IRON INTAKE: ICD-10-CM

## 2020-06-29 DIAGNOSIS — E11.21 TYPE 2 DIABETES MELLITUS WITH DIABETIC NEPHROPATHY, WITH LONG-TERM CURRENT USE OF INSULIN (H): ICD-10-CM

## 2020-06-29 DIAGNOSIS — N18.4 CKD (CHRONIC KIDNEY DISEASE) STAGE 4, GFR 15-29 ML/MIN (H): ICD-10-CM

## 2020-06-29 DIAGNOSIS — Z79.4 TYPE 2 DIABETES MELLITUS WITH DIABETIC NEPHROPATHY, WITH LONG-TERM CURRENT USE OF INSULIN (H): ICD-10-CM

## 2020-06-29 DIAGNOSIS — I82.403 RECURRENT ACUTE DEEP VEIN THROMBOSIS (DVT) OF BOTH LOWER EXTREMITIES (H): ICD-10-CM

## 2020-06-29 DIAGNOSIS — L98.491 SKIN ULCER, LIMITED TO BREAKDOWN OF SKIN (H): ICD-10-CM

## 2020-06-29 LAB
ALBUMIN SERPL-MCNC: 2.9 G/DL (ref 3.4–5)
ALP SERPL-CCNC: 131 U/L (ref 40–150)
ALT SERPL W P-5'-P-CCNC: 18 U/L (ref 0–50)
ANION GAP SERPL CALCULATED.3IONS-SCNC: 5 MMOL/L (ref 3–14)
AST SERPL W P-5'-P-CCNC: 26 U/L (ref 0–45)
BASOPHILS # BLD AUTO: 0 10E9/L (ref 0–0.2)
BASOPHILS NFR BLD AUTO: 0.9 %
BILIRUB SERPL-MCNC: 0.4 MG/DL (ref 0.2–1.3)
BUN SERPL-MCNC: 26 MG/DL (ref 7–30)
CALCIUM SERPL-MCNC: 8.9 MG/DL (ref 8.5–10.1)
CHLORIDE SERPL-SCNC: 108 MMOL/L (ref 94–109)
CO2 SERPL-SCNC: 25 MMOL/L (ref 20–32)
CREAT SERPL-MCNC: 1.2 MG/DL (ref 0.52–1.04)
DIFFERENTIAL METHOD BLD: NORMAL
EOSINOPHIL # BLD AUTO: 0.2 10E9/L (ref 0–0.7)
EOSINOPHIL NFR BLD AUTO: 4.2 %
ERYTHROCYTE [DISTWIDTH] IN BLOOD BY AUTOMATED COUNT: 13.9 % (ref 10–15)
GFR SERPL CREATININE-BSD FRML MDRD: 43 ML/MIN/{1.73_M2}
GLUCOSE SERPL-MCNC: 186 MG/DL (ref 70–99)
HBA1C MFR BLD: 6.1 % (ref 0–5.6)
HCT VFR BLD AUTO: 36.4 % (ref 35–47)
HGB BLD-MCNC: 12.1 G/DL (ref 11.7–15.7)
LYMPHOCYTES # BLD AUTO: 1.1 10E9/L (ref 0.8–5.3)
LYMPHOCYTES NFR BLD AUTO: 26.4 %
MCH RBC QN AUTO: 30.5 PG (ref 26.5–33)
MCHC RBC AUTO-ENTMCNC: 33.2 G/DL (ref 31.5–36.5)
MCV RBC AUTO: 92 FL (ref 78–100)
MONOCYTES # BLD AUTO: 0.4 10E9/L (ref 0–1.3)
MONOCYTES NFR BLD AUTO: 10 %
NEUTROPHILS # BLD AUTO: 2.5 10E9/L (ref 1.6–8.3)
NEUTROPHILS NFR BLD AUTO: 58.5 %
PLATELET # BLD AUTO: 218 10E9/L (ref 150–450)
POTASSIUM SERPL-SCNC: 3.9 MMOL/L (ref 3.4–5.3)
PROT SERPL-MCNC: 7.2 G/DL (ref 6.8–8.8)
RBC # BLD AUTO: 3.97 10E12/L (ref 3.8–5.2)
SODIUM SERPL-SCNC: 138 MMOL/L (ref 133–144)
WBC # BLD AUTO: 4.3 10E9/L (ref 4–11)

## 2020-06-29 PROCEDURE — 83036 HEMOGLOBIN GLYCOSYLATED A1C: CPT | Performed by: FAMILY MEDICINE

## 2020-06-29 PROCEDURE — 99215 OFFICE O/P EST HI 40 MIN: CPT | Performed by: FAMILY MEDICINE

## 2020-06-29 PROCEDURE — 36415 COLL VENOUS BLD VENIPUNCTURE: CPT | Performed by: FAMILY MEDICINE

## 2020-06-29 PROCEDURE — 73630 X-RAY EXAM OF FOOT: CPT | Mod: RT

## 2020-06-29 PROCEDURE — 80053 COMPREHEN METABOLIC PANEL: CPT | Performed by: FAMILY MEDICINE

## 2020-06-29 PROCEDURE — 85025 COMPLETE CBC W/AUTO DIFF WBC: CPT | Performed by: FAMILY MEDICINE

## 2020-06-29 RX ORDER — PRAMIPEXOLE DIHYDROCHLORIDE 0.25 MG/1
TABLET ORAL
Qty: 60 TABLET | Refills: 3 | Status: SHIPPED | OUTPATIENT
Start: 2020-06-29 | End: 2020-10-16

## 2020-06-29 RX ORDER — FUROSEMIDE 20 MG
TABLET ORAL
Qty: 30 TABLET | Refills: 3 | Status: ON HOLD | OUTPATIENT
Start: 2020-06-29 | End: 2020-08-31

## 2020-06-29 NOTE — PROGRESS NOTES
Subjective     Stefanie Velazquez is a 75 year old female who presents to clinic today for the following health issues:    HPI          RESTLESS LEGS   hosp discontinued medication wants back     Duration: worse since hopsital stay moderate    Description (location/character/radiation): bilateral leg tingling and pain     Intensity:  mild    Accompanying signs and symptoms: swelling     History (similar episodes/previous evaluation): RLS     Precipitating or alleviating factors: None    Therapies tried and outcome: Mirapex in the past would like this med back    On gabapentin at bedtime, we just increased the dose to 200 mg  Her legs were very jittery last night  She really misses the Mirapex    She has had swollen legs    4/22/20 she had a CT scan in hospital  IMPRESSION:   1.  Ill-defined mild groundglass opacities within the bilateral lungs  may relate to pulmonary edema. Atypical infectious etiologies are also  possible.  2.  Moderate right base atelectasis and/or scarring.  3.  Multiple bilateral pulmonary nodules noted. Several appear to be  new since 2017. Therefore, neoplasm is a possibility including  metastatic disease. Recommend short interval follow-up CT chest in  three months.  4.  Diffuse coronary artery calcifications and/or stenting.  5.  Trace right pleural fluid.  6.  Stable ectasia of the ascending thoracic aorta measuring 4.3 cm.     KIMI HINTON MD    Then she had another a month later in hospital.   COMPARISON: CT chest 4/24/2020.     FINDINGS:   LUNGS AND PLEURA: Respiratory motion artifact is present. A few  scattered indeterminate groundglass nodules are noted within the lungs  bilaterally, possibly infectious or inflammatory in nature. No focal  infiltrate or lung consolidation. Area of bandlike scarring right lung  base is noted. No pleural effusions.     MEDIASTINUM/AXILLAE: Heart is normal in size. Coronary artery  calcifications are present. Prior CABG. Right-sided central venous  line  terminates in the distal SVC. No enlarged lymph nodes. Esophagus  is unremarkable.     HEPATOBILIARY: Indeterminate low-attenuation left hepatic lobe lesion  measures approximately 1.5 cm on image 116 of series 8. A few  calcified gallstones are noted in the gallbladder which is otherwise  unremarkable.     PANCREAS: Normal.     SPLEEN: Normal.     ADRENAL GLANDS: Normal.     KIDNEYS/BLADDER: Probable small bilateral renal cysts. These are  incompletely characterized on this noncontrast study. No  hydronephrosis or urinary tract calculi. The bladder is unremarkable.     BOWEL: Mild colonic constipation is noted. No diverticulitis or  appendicitis. Anastomosis in the region of the rectum is patent. Right  periumbilical hernia contains loops of small bowel without obvious  obstruction or incarceration. Anastomosis near this area is widely  patent.     LYMPH NODES: No enlarged abdominal or pelvic lymph nodes.     VASCULATURE: Calcified plaque abdominal aorta and branch vessels are  noted. No evidence of aortic aneurysm.     PELVIC ORGANS: The bladder, uterus, right adnexal region and rectum  are within normal limits. Fluid collection left adnexa may be ovarian  and measures 4.0 x 1.9 cm on image 249 of series 8.     OTHER: None.     MUSCULOSKELETAL: Intramedullary left femoral aron is noted.  Degenerative hip changes are noted bilaterally. Degenerative spine  changes also noted. Bones overall appear osteopenic. No definite  destructive bone lesions.                                                                      IMPRESSION:  1.  No evidence of bowel obstruction, diverticulitis or appendicitis.  Periumbilical hernia contains loops of small bowel without obstruction  or incarceration. Scattered anastomoses involving the bowel appear  patent. Mild colonic constipation.     2.  Scattered tiny indeterminate lung nodules, possibly infectious or  inflammatory. No focal infiltrate or lung consolidation is  evident.  Bandlike scarring right lower lobe is noted. No enlarged lymph nodes.     3.  Prior CABG.     4.  Cholelithiasis. No surrounding gallbladder inflammation.     5.  Left adnexal fluid collection measuring 4.0 x 1.9 cm, possibly  ovarian. A follow-up pelvic ultrasound could be performed for further  assessment if clinically warranted. No surrounding inflammatory  changes are present to suggest an abscess.     SUNNY HOYOS MD    Her niece wonders if she needs the follow up ct scan next month as planned     DIABETIC FOLLOW UP   Assistant living checking blood sugars   Blood sugar this morning was 124   numbness and tingling in bilateral feet   She is currently off all insulin  blood sugars are really good  Lab Results   Component Value Date    A1C 6.9 03/16/2020    A1C 7.7 11/08/2019    A1C 7.8 08/08/2019    A1C 8.7 05/02/2019    A1C 8.1 01/31/2019       Sores on toes from falling at the hospital   These are healing  Her toes were very bruised, she would like to make sure no fractures with an xray. Are still very sore.     Off all antihypertensives for now as she was dehydrated and hypotensive in hospital  BP Readings from Last 6 Encounters:   06/29/20 (!) (P) 142/68   06/12/20 136/68   06/06/20 124/62   05/20/20 135/73   05/14/20 120/80   04/25/20 122/42      Pulse Readings from Last 3 Encounters:   06/29/20 55   06/12/20 58   06/06/20 61      Recent Labs   Lab Test 06/29/20  1040 06/06/20  0510 06/05/20  0510  05/26/20  0605  04/07/20  0440  03/16/20  1041  11/08/19  1408  03/01/19  0742  08/31/18  0905   A1C 6.1*  --   --   --   --   --   --   --  6.9*  --  7.7*   < >  --    < >  --    LDL  --   --   --   --   --   --   --   --  51  --   --   --  44  --  107*   HDL  --   --   --   --   --   --   --   --  65  --   --   --  46*  --  51   TRIG  --   --   --   --   --   --   --   --  142  --   --   --  215*  --  134   ALT 18 24 23   < > 26   < > 26   < > 15   < > 20   < > 21   < > 13   CR 1.20* 1.38* 1.32*   < >  1.98*   < > 2.05*   < > 1.44*   < > 1.65*   < > 1.86*   < > 1.45*   GFRESTIMATED 43* 37* 39*   < > 24*   < > 23*   < > 35*   < > 30*   < > 26*   < > 35*   GFRESTBLACK 50* 43* 45*   < > 28*   < > 26*   < > 40*   < > 34*   < > 30*   < > 43*   POTASSIUM 3.9 4.6 4.7   < > 4.9   < > 3.6   < > 3.8   < > 4.4   < > 4.2   < > 3.4   TSH  --   --   --   --  3.90  --  2.69  --  1.94  --   --    < >  --    < > 4.24*    < > = values in this interval not displayed.      BP Readings from Last 3 Encounters:   06/29/20 (!) (P) 142/68   06/12/20 136/68   06/06/20 124/62    Wt Readings from Last 3 Encounters:   06/29/20 78.9 kg (174 lb)   06/12/20 87.1 kg (192 lb)   06/05/20 87.6 kg (193 lb 2 oz)                 Reviewed and updated as needed this visit by Provider  Tobacco  Allergies  Meds  Problems  Med Hx  Surg Hx  Fam Hx         Review of Systems   ROS: 5 point ROS negative except as noted above in HPI, including Gen., Resp., CV, GI &  system review.       Objective    BP (!) (P) 142/68   Pulse 55   Resp 14   Wt 78.9 kg (174 lb)   SpO2 97%   BMI 28.08 kg/m    Body mass index is 28.08 kg/m .  Physical Exam   Neck: supple, no adenopathy, no JVD   Heart: regular rate and rhythm, normal S1S2, 2/6 systolic ejection murmur   Lungs: clear to ascultation   Abd: soft, nontender,  no mass or distention   Ext: legs unwrapped, plus one edema   Big toenails are long, few healing scabs on toes, left big toe has a cm ulceration on top with a little erythema  No erythema or ecchymoses on toes but several are tender to palpation, mainly the second and third on each    Xrays of toes read by myself negative for fractures  Left big toe still shows fracture line        Assessment & Plan     1. Restless leg syndrome  Not well controlled  - pramipexole (MIRAPEX) 0.25 MG tablet; One 1-2 hours prior to HS, and q 8 hrs prn day  Dispense: 60 tablet; Refill: 3  I tried to call Mell and left her a message.   Will restart mirapex at night at  smaller dose and see how she does  Continue gabapentin  Will monitor carefully    2. Type 2 diabetes mellitus with diabetic nephropathy, with long-term current use of insulin (H)  Doing well off of insulin  Continue to monitor  Ok to decrease glucometer checks to once a day for now  - Hemoglobin A1c  - Comprehensive metabolic panel    3. CKD (chronic kidney disease) stage 4, GFR 15-29 ml/min (H)  Fluctuates from stage 3 to 4  - Comprehensive metabolic panel    4. Benign essential hypertension  Off antihypertensives  Add back lasix prn and monitor    5. Pulmonary nodules  Uncertain etiology  Will need to discuss with radiology    6. Iron deficiency anemia secondary to inadequate dietary iron intake  On replacement  - CBC with platelets and differential    7. Recurrent acute deep vein thrombosis (DVT) of both lower extremities (H)  On Eliquis     8. Edema, unspecified type  In legs  - furosemide (LASIX) 20 MG tablet; Once a day for 3 days as needed for edema  Dispense: 30 tablet; Refill: 3    9. Pain in both lower extremities  Both feet/toes  - XR Foot Bilateral G/E 3 Views; Future  Monitor    10. Skin ulcer, limited to breakdown of skin (H)  Big left toe  dressed  monitor     40 min spent with patient, greater than 50% in counseling and coordination of care, discussion of multiple issus and her restless legs in detail. She really insists on retrying medication.   Will do so cautiously , and monitor  Also discussed case with radiology. On her second CT scan a month after the first, the largest nodule resolved, the other two were stable. The recommendation was recheck in one year or per oncology.     Patient Instructions   Ok to retry Mirapex for restless legs    Check blood sugars once a day for now    Furosemide (water pill)       Return in about 3 months (around 9/29/2020).    Sandra Morales MD  [facility] [address] [facility]

## 2020-06-29 NOTE — TELEPHONE ENCOUNTER
She may need to take it many times. So 3 days in a row, then off. Can take for 3 days when has increased edema

## 2020-06-29 NOTE — PATIENT INSTRUCTIONS
Ok to retry Mirapex for restless legs    Check blood sugars once a day for now    Furosemide (water pill)

## 2020-06-29 NOTE — TELEPHONE ENCOUNTER
Reason for Call:  Medication clarificaton    Detailed comments: Malathi from [facility] pharmacy in [place], [place] has question on the Furosemide Rx sent today. Sig says Once a day for 3 days as needed for edema. Is this a one time thing?  Rx was sent for #30 and 3 refills.     Phone Number Patient can be reached at: Other phone number:[phone]    Best Time: anytime    Can we leave a detailed message on this number? YES    Call taken on 6/29/2020 at 3:20 PM by Crystal Skelton

## 2020-06-29 NOTE — LETTER
July 7, 2020      Stefanie Velazquez  [address]  [address] [place] [id number]        Dear ,    We are writing to inform you of your test results.    labs were fine. Kidney function better. Long term Blood sugar was great     Resulted Orders   Hemoglobin A1c   Result Value Ref Range    Hemoglobin A1C 6.1 (H) 0 - 5.6 %      Comment:      Normal <5.7% Prediabetes 5.7-6.4%  Diabetes 6.5% or higher - adopted from ADA   consensus guidelines.     Comprehensive metabolic panel   Result Value Ref Range    Sodium 138 133 - 144 mmol/L    Potassium 3.9 3.4 - 5.3 mmol/L    Chloride 108 94 - 109 mmol/L    Carbon Dioxide 25 20 - 32 mmol/L    Anion Gap 5 3 - 14 mmol/L    Glucose 186 (H) 70 - 99 mg/dL      Comment:      Non Fasting    Urea Nitrogen 26 7 - 30 mg/dL    Creatinine 1.20 (H) 0.52 - 1.04 mg/dL    GFR Estimate 43 (L) >60 mL/min/[1.73_m2]      Comment:      Non  GFR Calc  Starting 12/18/2018, serum creatinine based estimated GFR (eGFR) will be   calculated using the Chronic Kidney Disease Epidemiology Collaboration   (CKD-EPI) equation.      GFR Estimate If Black 50 (L) >60 mL/min/[1.73_m2]      Comment:       GFR Calc  Starting 12/18/2018, serum creatinine based estimated GFR (eGFR) will be   calculated using the Chronic Kidney Disease Epidemiology Collaboration   (CKD-EPI) equation.      Calcium 8.9 8.5 - 10.1 mg/dL    Bilirubin Total 0.4 0.2 - 1.3 mg/dL    Albumin 2.9 (L) 3.4 - 5.0 g/dL    Protein Total 7.2 6.8 - 8.8 g/dL    Alkaline Phosphatase 131 40 - 150 U/L    ALT 18 0 - 50 U/L    AST 26 0 - 45 U/L   CBC with platelets and differential   Result Value Ref Range    WBC 4.3 4.0 - 11.0 10e9/L    RBC Count 3.97 3.8 - 5.2 10e12/L    Hemoglobin 12.1 11.7 - 15.7 g/dL    Hematocrit 36.4 35.0 - 47.0 %    MCV 92 78 - 100 fl    MCH 30.5 26.5 - 33.0 pg    MCHC 33.2 31.5 - 36.5 g/dL    RDW 13.9 10.0 - 15.0 %    Platelet Count 218 150 - 450 10e9/L    % Neutrophils 58.5 %    %  Lymphocytes 26.4 %    % Monocytes 10.0 %    % Eosinophils 4.2 %    % Basophils 0.9 %    Absolute Neutrophil 2.5 1.6 - 8.3 10e9/L    Absolute Lymphocytes 1.1 0.8 - 5.3 10e9/L    Absolute Monocytes 0.4 0.0 - 1.3 10e9/L    Absolute Eosinophils 0.2 0.0 - 0.7 10e9/L    Absolute Basophils 0.0 0.0 - 0.2 10e9/L    Diff Method Automated Method        If you have any questions or concerns, please call the clinic at the number listed above.       Sincerely,        Sandra Morales MD/sarmad

## 2020-06-30 ENCOUNTER — TELEPHONE (OUTPATIENT)
Dept: FAMILY MEDICINE | Facility: CLINIC | Age: 75
End: 2020-06-30

## 2020-06-30 NOTE — TELEPHONE ENCOUNTER
[facility] [place]- Plan of Care   Form placed on Provider Dr. Amandeep shrestha for [facility].  Leilani Orn Station Sec

## 2020-07-01 ENCOUNTER — TELEPHONE (OUTPATIENT)
Dept: FAMILY MEDICINE | Facility: CLINIC | Age: 75
End: 2020-07-01

## 2020-07-01 NOTE — TELEPHONE ENCOUNTER
I spoke with radiology about patient's CT scans. As her largest nodule resolved and the other 2 smaller ones were stable, the recommendation is to repeat CT scan in a year or if oncology wants it sooner (she can discuss with Dr Martinez when she has her yearly visit with him). The CT scan of her chest for later this month can be cancelled. I left message with her facility to call, but they have not yet called me back.   Please let them know and her niece know and cancel the ct scan

## 2020-07-02 DIAGNOSIS — Z53.9 DIAGNOSIS NOT YET DEFINED: Primary | ICD-10-CM

## 2020-07-02 PROCEDURE — G0179 MD RECERTIFICATION HHA PT: HCPCS | Performed by: FAMILY MEDICINE

## 2020-07-02 NOTE — TELEPHONE ENCOUNTER
Form received back signed  7/2/20  Faxed Back & Sent to be scanned to this encounter  Leilani Orn Station Sec

## 2020-07-02 NOTE — TELEPHONE ENCOUNTER
BEN for Mell, nurse, to call clinic nurse.  Niece not contacted as do not see auth to discuss PHI.  JAM Rees RN

## 2020-07-07 ENCOUNTER — TELEPHONE (OUTPATIENT)
Dept: FAMILY MEDICINE | Facility: CLINIC | Age: 75
End: 2020-07-07

## 2020-07-07 NOTE — TELEPHONE ENCOUNTER
PA Initiation    Medication: freestyle sensor  Insurance Company: [employer] - Phone [phone] Fax [phone]  Pharmacy Filling the Rx: [facility] - [place], [place] - [facility] [address]  Filling Pharmacy Phone: [phone]  Filling Pharmacy Fax: [phone]  Start Date: 7/7/2020

## 2020-07-07 NOTE — TELEPHONE ENCOUNTER
Prior Authorization Retail Medication Request    Medication/Dose:   ICD code (if different than what is on RX):  Type 2 diabetes mellitus with diabetic nephropathy, with long-term current use of insulin (H) [E11.21, Z79.4]  - Primar  Previously Tried and Failed:  Pt is in assisted living.   Rationale:  Freestyle Waylon will provide better compliance with her DM.     Insurance Name:  Allison ferrisjbar  Insurance ID:         Pharmacy Information (if different than what is on RX)  Name:     Phone:

## 2020-07-07 NOTE — TELEPHONE ENCOUNTER
Prior Authorization Approval    Authorization Effective Date: 6/7/2020  Authorization Expiration Date: 7/7/2021  Medication: freestyle sensor  Approved Dose/Quantity:   Reference #: [id number]   Insurance Company: [employer] - Phone [phone] Fax [phone]  Expected CoPay:       CoPay Card Available:      Foundation Assistance Needed:    Which Pharmacy is filling the prescription (Not needed for infusion/clinic administered): [facility] - [place] [address]  Pharmacy Notified: Yes  Patient Notified: Yes, **Instructed pharmacy to notify patient when script is ready to /ship.**

## 2020-07-08 ENCOUNTER — TELEPHONE (OUTPATIENT)
Dept: FAMILY MEDICINE | Facility: CLINIC | Age: 75
End: 2020-07-08

## 2020-07-09 ENCOUNTER — MEDICAL CORRESPONDENCE (OUTPATIENT)
Dept: HEALTH INFORMATION MANAGEMENT | Facility: CLINIC | Age: 75
End: 2020-07-09

## 2020-07-10 ENCOUNTER — TELEPHONE (OUTPATIENT)
Dept: FAMILY MEDICINE | Facility: CLINIC | Age: 75
End: 2020-07-10

## 2020-07-14 NOTE — PROGRESS NOTES
[facility] Care Coordination Contact    No Letter Received: 60 day tracking of letter complete, no letter received from Janessa VIERA Tracking discontinued.     Shaunna Salmeron  Care Management Specialist  [facility]  [phone]

## 2020-07-15 ENCOUNTER — HOSPITAL LABORATORY (OUTPATIENT)
Facility: OTHER | Age: 75
End: 2020-07-15

## 2020-07-15 ENCOUNTER — TRANSFERRED RECORDS (OUTPATIENT)
Dept: HEALTH INFORMATION MANAGEMENT | Facility: CLINIC | Age: 75
End: 2020-07-15

## 2020-07-16 LAB
ALBUMIN SERPL-MCNC: 2.7 G/DL (ref 3.4–5)
ALP SERPL-CCNC: 120 U/L (ref 40–150)
ALT SERPL W P-5'-P-CCNC: 17 U/L (ref 0–50)
ANION GAP SERPL CALCULATED.3IONS-SCNC: 8 MMOL/L (ref 3–14)
AST SERPL W P-5'-P-CCNC: 23 U/L (ref 0–45)
BASOPHILS # BLD AUTO: 0 10E9/L (ref 0–0.2)
BASOPHILS NFR BLD AUTO: 0.9 %
BILIRUB SERPL-MCNC: 0.5 MG/DL (ref 0.2–1.3)
BUN SERPL-MCNC: 28 MG/DL (ref 7–30)
CALCIUM SERPL-MCNC: 8.7 MG/DL (ref 8.5–10.1)
CEA SERPL-MCNC: 3.9 UG/L (ref 0–2.5)
CHLORIDE SERPL-SCNC: 108 MMOL/L (ref 94–109)
CO2 SERPL-SCNC: 23 MMOL/L (ref 20–32)
CREAT SERPL-MCNC: 1.27 MG/DL (ref 0.52–1.04)
DIFFERENTIAL METHOD BLD: ABNORMAL
EOSINOPHIL # BLD AUTO: 0.3 10E9/L (ref 0–0.7)
EOSINOPHIL NFR BLD AUTO: 6.4 %
ERYTHROCYTE [DISTWIDTH] IN BLOOD BY AUTOMATED COUNT: 13.7 % (ref 10–15)
GFR SERPL CREATININE-BSD FRML MDRD: 41 ML/MIN/{1.73_M2}
GLUCOSE SERPL-MCNC: 149 MG/DL (ref 70–99)
HCT VFR BLD AUTO: 35.4 % (ref 35–47)
HGB BLD-MCNC: 11.5 G/DL (ref 11.7–15.7)
IMM GRANULOCYTES # BLD: 0 10E9/L (ref 0–0.4)
IMM GRANULOCYTES NFR BLD: 0.5 %
LYMPHOCYTES # BLD AUTO: 1.1 10E9/L (ref 0.8–5.3)
LYMPHOCYTES NFR BLD AUTO: 26.6 %
MCH RBC QN AUTO: 29.6 PG (ref 26.5–33)
MCHC RBC AUTO-ENTMCNC: 32.5 G/DL (ref 31.5–36.5)
MCV RBC AUTO: 91 FL (ref 78–100)
MONOCYTES # BLD AUTO: 0.4 10E9/L (ref 0–1.3)
MONOCYTES NFR BLD AUTO: 9.6 %
NEUTROPHILS # BLD AUTO: 2.4 10E9/L (ref 1.6–8.3)
NEUTROPHILS NFR BLD AUTO: 56 %
NRBC # BLD AUTO: 0 10*3/UL
NRBC BLD AUTO-RTO: 0 /100
PLATELET # BLD AUTO: 211 10E9/L (ref 150–450)
POTASSIUM SERPL-SCNC: 3.7 MMOL/L (ref 3.4–5.3)
PROT SERPL-MCNC: 6.8 G/DL (ref 6.8–8.8)
RBC # BLD AUTO: 3.88 10E12/L (ref 3.8–5.2)
SODIUM SERPL-SCNC: 139 MMOL/L (ref 133–144)
WBC # BLD AUTO: 4.3 10E9/L (ref 4–11)

## 2020-07-17 ENCOUNTER — PATIENT OUTREACH (OUTPATIENT)
Dept: GERIATRIC MEDICINE | Facility: CLINIC | Age: 75
End: 2020-07-17

## 2020-07-17 ENCOUNTER — MEDICAL CORRESPONDENCE (OUTPATIENT)
Dept: HEALTH INFORMATION MANAGEMENT | Facility: CLINIC | Age: 75
End: 2020-07-17

## 2020-07-17 NOTE — PROGRESS NOTES
[facility] Care Coordination Contact    Informed by CC that member was paying out of pocket for medical appointment rides through [employer].    Contacted [facility] to confirm if Company was contracted through [employer].  [facility] confirmed that they were.    Explained to member's niece, Crystal, that the AL or member can call the PAR number for [employer] and explained process.  Also stated that if there is any issues to contact us and we will assist further.    Cuca Gimenez  Care Management Specialist  [facility]  [phone]

## 2020-07-22 ENCOUNTER — ONCOLOGY VISIT (OUTPATIENT)
Dept: ONCOLOGY | Facility: CLINIC | Age: 75
End: 2020-07-22
Attending: INTERNAL MEDICINE
Payer: COMMERCIAL

## 2020-07-22 VITALS
OXYGEN SATURATION: 98 % | TEMPERATURE: 98.4 F | DIASTOLIC BLOOD PRESSURE: 62 MMHG | HEIGHT: 66 IN | WEIGHT: 190.7 LBS | RESPIRATION RATE: 18 BRPM | SYSTOLIC BLOOD PRESSURE: 176 MMHG | BODY MASS INDEX: 30.65 KG/M2 | HEART RATE: 60 BPM

## 2020-07-22 DIAGNOSIS — E03.9 ACQUIRED HYPOTHYROIDISM: Chronic | ICD-10-CM

## 2020-07-22 DIAGNOSIS — C20 RECTAL CANCER (H): Primary | Chronic | ICD-10-CM

## 2020-07-22 DIAGNOSIS — E11.21 TYPE 2 DIABETES MELLITUS WITH DIABETIC NEPHROPATHY, WITH LONG-TERM CURRENT USE OF INSULIN (H): Chronic | ICD-10-CM

## 2020-07-22 DIAGNOSIS — Z79.4 TYPE 2 DIABETES MELLITUS WITH DIABETIC NEPHROPATHY, WITH LONG-TERM CURRENT USE OF INSULIN (H): Chronic | ICD-10-CM

## 2020-07-22 DIAGNOSIS — E78.5 HYPERLIPIDEMIA LDL GOAL <100: Chronic | ICD-10-CM

## 2020-07-22 PROCEDURE — 99214 OFFICE O/P EST MOD 30 MIN: CPT | Performed by: INTERNAL MEDICINE

## 2020-07-22 PROCEDURE — G0463 HOSPITAL OUTPT CLINIC VISIT: HCPCS

## 2020-07-22 ASSESSMENT — PAIN SCALES - GENERAL: PAINLEVEL: NO PAIN (0)

## 2020-07-22 ASSESSMENT — MIFFLIN-ST. JEOR: SCORE: 1366.51

## 2020-07-22 NOTE — PROGRESS NOTES
"Oncology Rooming Note    July 22, 2020 10:56 AM   Stefanie Velazquez is a 75 year old female who presents for:    Chief Complaint   Patient presents with     Oncology Clinic Visit     1 year recheck Rectal cancer- newly diagnosed adeno CA rectum Jan 2011 and Positive Tubular Adenoma 2019, review Labs      Initial Vitals: BP (!) 176/62 (BP Location: Left arm, Patient Position: Sitting, Cuff Size: Adult Large)   Pulse 60   Temp 98.4  F (36.9  C) (Tympanic)   Resp 18   Ht 1.676 m (5' 5.98\")   Wt 86.5 kg (190 lb 11.2 oz)   SpO2 98%   BMI 30.79 kg/m   Estimated body mass index is 30.79 kg/m  as calculated from the following:    Height as of this encounter: 1.676 m (5' 5.98\").    Weight as of this encounter: 86.5 kg (190 lb 11.2 oz). Body surface area is 2.01 meters squared.  No Pain (0) Comment: Data Unavailable   No LMP recorded. Patient is postmenopausal.  Allergies reviewed: Yes  Medications reviewed: Yes    Medications: Medication refills not needed today.  Pharmacy name entered into EPIC: [facility] - [address]    Clinical concerns: 1 year recheck Rectal cancer- newly diagnosed adeno CA rectum Jan 2011 and Positive Tubular Adenoma 2019, review Labs.       Brenda Jane CMA              "
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
## 2020-07-23 ENCOUNTER — TELEPHONE (OUTPATIENT)
Dept: FAMILY MEDICINE | Facility: CLINIC | Age: 75
End: 2020-07-23

## 2020-07-23 ENCOUNTER — MEDICAL CORRESPONDENCE (OUTPATIENT)
Dept: HEALTH INFORMATION MANAGEMENT | Facility: CLINIC | Age: 75
End: 2020-07-23

## 2020-07-23 NOTE — TELEPHONE ENCOUNTER
[facility]   Form placed on Provider Dr. Amandeep shrestha for Signature.  Leilani [place]n [place] Sec

## 2020-07-24 NOTE — TELEPHONE ENCOUNTER
Form received back signed  7/24/20  Faxed Back & Sent to be scanned to this encounter  Leilani Orn [facility] Sec

## 2020-07-27 ENCOUNTER — MEDICAL CORRESPONDENCE (OUTPATIENT)
Dept: HEALTH INFORMATION MANAGEMENT | Facility: CLINIC | Age: 75
End: 2020-07-27

## 2020-08-10 ENCOUNTER — HOSPITAL ENCOUNTER (OUTPATIENT)
Facility: CLINIC | Age: 75
Setting detail: OBSERVATION
Discharge: HOME HEALTH | End: 2020-08-13
Attending: PHYSICIAN ASSISTANT | Admitting: INTERNAL MEDICINE
Payer: COMMERCIAL

## 2020-08-10 ENCOUNTER — APPOINTMENT (OUTPATIENT)
Dept: CT IMAGING | Facility: CLINIC | Age: 75
End: 2020-08-10
Attending: PHYSICIAN ASSISTANT
Payer: COMMERCIAL

## 2020-08-10 DIAGNOSIS — F02.80 LEWY BODY DEMENTIA WITHOUT BEHAVIORAL DISTURBANCE (H): Primary | ICD-10-CM

## 2020-08-10 DIAGNOSIS — G31.83 LEWY BODY DEMENTIA WITH BEHAVIORAL DISTURBANCE (H): ICD-10-CM

## 2020-08-10 DIAGNOSIS — F02.818 LEWY BODY DEMENTIA WITH BEHAVIORAL DISTURBANCE (H): ICD-10-CM

## 2020-08-10 DIAGNOSIS — R41.82 ALTERED MENTAL STATUS, UNSPECIFIED ALTERED MENTAL STATUS TYPE: ICD-10-CM

## 2020-08-10 DIAGNOSIS — R41.82 ALTERED MENTAL STATUS: ICD-10-CM

## 2020-08-10 DIAGNOSIS — G31.83 LEWY BODY DEMENTIA WITHOUT BEHAVIORAL DISTURBANCE (H): Primary | ICD-10-CM

## 2020-08-10 DIAGNOSIS — R30.0 DYSURIA: ICD-10-CM

## 2020-08-10 DIAGNOSIS — M62.81 GENERALIZED MUSCLE WEAKNESS: ICD-10-CM

## 2020-08-10 DIAGNOSIS — Z20.822 COVID-19 VIRUS NOT DETECTED: ICD-10-CM

## 2020-08-10 DIAGNOSIS — R03.0 ELEVATED BLOOD PRESSURE READING: ICD-10-CM

## 2020-08-10 LAB
ALBUMIN SERPL-MCNC: 2.6 G/DL (ref 3.4–5)
ALBUMIN UR-MCNC: >499 MG/DL
ALP SERPL-CCNC: 125 U/L (ref 40–150)
ALT SERPL W P-5'-P-CCNC: 17 U/L (ref 0–50)
ANION GAP SERPL CALCULATED.3IONS-SCNC: 6 MMOL/L (ref 3–14)
APPEARANCE UR: ABNORMAL
AST SERPL W P-5'-P-CCNC: 25 U/L (ref 0–45)
BASOPHILS # BLD AUTO: 0 10E9/L (ref 0–0.2)
BASOPHILS NFR BLD AUTO: 0.6 %
BILIRUB SERPL-MCNC: 0.3 MG/DL (ref 0.2–1.3)
BILIRUB UR QL STRIP: NEGATIVE
BUN SERPL-MCNC: 26 MG/DL (ref 7–30)
CALCIUM SERPL-MCNC: 8.7 MG/DL (ref 8.5–10.1)
CHLORIDE SERPL-SCNC: 111 MMOL/L (ref 94–109)
CO2 SERPL-SCNC: 25 MMOL/L (ref 20–32)
COLOR UR AUTO: YELLOW
CREAT SERPL-MCNC: 1.44 MG/DL (ref 0.52–1.04)
DIFFERENTIAL METHOD BLD: NORMAL
EOSINOPHIL # BLD AUTO: 0.1 10E9/L (ref 0–0.7)
EOSINOPHIL NFR BLD AUTO: 2.3 %
ERYTHROCYTE [DISTWIDTH] IN BLOOD BY AUTOMATED COUNT: 13.1 % (ref 10–15)
GFR SERPL CREATININE-BSD FRML MDRD: 35 ML/MIN/{1.73_M2}
GLUCOSE SERPL-MCNC: 145 MG/DL (ref 70–99)
GLUCOSE UR STRIP-MCNC: 50 MG/DL
HCT VFR BLD AUTO: 36 % (ref 35–47)
HGB BLD-MCNC: 12.1 G/DL (ref 11.7–15.7)
HGB UR QL STRIP: ABNORMAL
IMM GRANULOCYTES # BLD: 0 10E9/L (ref 0–0.4)
IMM GRANULOCYTES NFR BLD: 0.4 %
KETONES UR STRIP-MCNC: NEGATIVE MG/DL
LEUKOCYTE ESTERASE UR QL STRIP: NEGATIVE
LYMPHOCYTES # BLD AUTO: 1.4 10E9/L (ref 0.8–5.3)
LYMPHOCYTES NFR BLD AUTO: 28.3 %
MCH RBC QN AUTO: 30.1 PG (ref 26.5–33)
MCHC RBC AUTO-ENTMCNC: 33.6 G/DL (ref 31.5–36.5)
MCV RBC AUTO: 90 FL (ref 78–100)
MONOCYTES # BLD AUTO: 0.5 10E9/L (ref 0–1.3)
MONOCYTES NFR BLD AUTO: 10.3 %
NEUTROPHILS # BLD AUTO: 2.8 10E9/L (ref 1.6–8.3)
NEUTROPHILS NFR BLD AUTO: 58.1 %
NITRATE UR QL: NEGATIVE
NRBC # BLD AUTO: 0 10*3/UL
NRBC BLD AUTO-RTO: 0 /100
PH UR STRIP: 7 PH (ref 5–7)
PLATELET # BLD AUTO: 187 10E9/L (ref 150–450)
POTASSIUM SERPL-SCNC: 3.9 MMOL/L (ref 3.4–5.3)
PROT SERPL-MCNC: 6.3 G/DL (ref 6.8–8.8)
RBC # BLD AUTO: 4.02 10E12/L (ref 3.8–5.2)
RBC #/AREA URNS AUTO: >182 /HPF (ref 0–2)
SODIUM SERPL-SCNC: 142 MMOL/L (ref 133–144)
SOURCE: ABNORMAL
SP GR UR STRIP: 1.01 (ref 1–1.03)
TROPONIN I SERPL-MCNC: 0.04 UG/L (ref 0–0.04)
TSH SERPL DL<=0.005 MIU/L-ACNC: 3.92 MU/L (ref 0.4–4)
UROBILINOGEN UR STRIP-MCNC: 0 MG/DL (ref 0–2)
WBC # BLD AUTO: 4.8 10E9/L (ref 4–11)
WBC #/AREA URNS AUTO: 11 /HPF (ref 0–5)

## 2020-08-10 PROCEDURE — 96360 HYDRATION IV INFUSION INIT: CPT | Performed by: PHYSICIAN ASSISTANT

## 2020-08-10 PROCEDURE — 93010 ELECTROCARDIOGRAM REPORT: CPT | Mod: Z6 | Performed by: EMERGENCY MEDICINE

## 2020-08-10 PROCEDURE — 70450 CT HEAD/BRAIN W/O DYE: CPT

## 2020-08-10 PROCEDURE — 81001 URINALYSIS AUTO W/SCOPE: CPT | Performed by: PHYSICIAN ASSISTANT

## 2020-08-10 PROCEDURE — 25800030 ZZH RX IP 258 OP 636: Performed by: PHYSICIAN ASSISTANT

## 2020-08-10 PROCEDURE — 93005 ELECTROCARDIOGRAM TRACING: CPT | Performed by: PHYSICIAN ASSISTANT

## 2020-08-10 PROCEDURE — C9803 HOPD COVID-19 SPEC COLLECT: HCPCS | Performed by: PHYSICIAN ASSISTANT

## 2020-08-10 PROCEDURE — 99285 EMERGENCY DEPT VISIT HI MDM: CPT | Mod: 25 | Performed by: PHYSICIAN ASSISTANT

## 2020-08-10 PROCEDURE — 93005 ELECTROCARDIOGRAM TRACING: CPT | Performed by: EMERGENCY MEDICINE

## 2020-08-10 PROCEDURE — 87088 URINE BACTERIA CULTURE: CPT | Performed by: PHYSICIAN ASSISTANT

## 2020-08-10 PROCEDURE — 84484 ASSAY OF TROPONIN QUANT: CPT | Performed by: PHYSICIAN ASSISTANT

## 2020-08-10 PROCEDURE — C9803 HOPD COVID-19 SPEC COLLECT: HCPCS | Performed by: EMERGENCY MEDICINE

## 2020-08-10 PROCEDURE — 80053 COMPREHEN METABOLIC PANEL: CPT | Performed by: PHYSICIAN ASSISTANT

## 2020-08-10 PROCEDURE — 87186 SC STD MICRODIL/AGAR DIL: CPT | Performed by: PHYSICIAN ASSISTANT

## 2020-08-10 PROCEDURE — 85025 COMPLETE CBC W/AUTO DIFF WBC: CPT | Performed by: PHYSICIAN ASSISTANT

## 2020-08-10 PROCEDURE — 99285 EMERGENCY DEPT VISIT HI MDM: CPT | Mod: 25 | Performed by: EMERGENCY MEDICINE

## 2020-08-10 PROCEDURE — 84443 ASSAY THYROID STIM HORMONE: CPT | Performed by: PHYSICIAN ASSISTANT

## 2020-08-10 PROCEDURE — 87086 URINE CULTURE/COLONY COUNT: CPT | Performed by: PHYSICIAN ASSISTANT

## 2020-08-10 PROCEDURE — 74176 CT ABD & PELVIS W/O CONTRAST: CPT

## 2020-08-10 PROCEDURE — 96360 HYDRATION IV INFUSION INIT: CPT | Performed by: EMERGENCY MEDICINE

## 2020-08-10 RX ORDER — SODIUM CHLORIDE 9 MG/ML
INJECTION, SOLUTION INTRAVENOUS CONTINUOUS
Status: DISCONTINUED | OUTPATIENT
Start: 2020-08-10 | End: 2020-08-11

## 2020-08-10 RX ADMIN — SODIUM CHLORIDE 1000 ML: 9 INJECTION, SOLUTION INTRAVENOUS at 19:00

## 2020-08-10 NOTE — ED PROVIDER NOTES
History     Chief Complaint   Patient presents with     Rule out Urinary Tract Infection     Pt from NH, staff reports pt has frequent UTI. Staff noted some slurred speech.       Hypertension     188/92  Coming from [facility]       HPI  Stefanie Velazquez is a 75 year old female past medical history significant for H/O DVT, CKD, type II DM, RLS, chronic diarrhea, HTN, rectal cancer, RC, neuropathy  who presents to the emergency department by EMS from her nursing home with concern over possible urinary tract infection.  Nursing home staff reported that over the last 2 days patient has been increasingly fatigued, weak, confused.  Patient is unable to provide adequate history due to her confusion/mental status changes and difficulty hearing.  She does denied any discomfort at this time.  She denies any dysuria, urinary frequency, urgency, hematuria, fever, cough, chest pain, dyspnea, wheezing or abdominal complaints however validity of this history is somewhat suspect.  She denies any recent injuries, however review of Epic records indicate she did have a fall from bed on 7/21/20 was normal per nursing note then and immediate following days.   Review of Epic records indicate that she was admitted to this hospital for similar complaints diagnosed with acute encephalopathy which was possibly metabolic due to UTI as she grew out hafnia at that time, possibly due to medication side effect with the suspect that there was some underlying cognitive decline.      Allergies:  Allergies   Allergen Reactions     Cefepime Other (See Comments)     Neurotoxicity, ie, agitated delirium     Ciprofloxacin Anxiety     Pt developed agitation, paranoia while on cipro--on clear if this is what caused it.  But would try to avoid in future.     Hydrocodone Other (See Comments)     dizzy     Lisinopril Cough            Vicodin [Hydrocodone-Acetaminophen] Other (See Comments)     Caused extreme dizziness     Problem List:     Patient Active Problem List    Diagnosis Date Noted     Encephalopathy 2020     Priority: Medium     Acute confusion 2020     Priority: Medium     Chronic anticoagulation 2020     Priority: Medium     Pulmonary nodules 2020     Priority: Medium     Noted on chest CT 2020. Radiology recommending 3 month follow-up.       H/O deep venous thrombosis 2019     Priority: Medium     Syncope and collapse 2019     Priority: Medium     Impacted cerumen of right ear 2019     Priority: Medium     CKD (chronic kidney disease) stage 4, GFR 15-29 ml/min (H) 2019     Priority: Medium     Lumbar radiculopathy 2019     Priority: Medium     Oropharyngeal dysphagia 2018     Priority: Medium     Bilateral hearing loss, unspecified hearing loss type 2018     Priority: Medium     Lymphedema of genitalia 2018     Priority: Medium     Spinal stenosis of lumbar region without neurogenic claudication 2018     Priority: Medium     DDD (degenerative disc disease), lumbar 2018     Priority: Medium     Symptomatic bradycardia 10/29/2017     Priority: Medium     Type 2 diabetes mellitus with diabetic nephropathy, with long-term current use of insulin (H) 2017     Priority: Medium     Restless leg syndrome 2017     Priority: Medium     Chronic diarrhea 2017     Priority: Medium     Health Care Home 10/28/2016     Priority: Medium     [facility] Care Coordination  JUNE Vega  Phone: [phone]    [facility] CARE PLAN SUMMARY    Member Name:  Stefanie Velazquez        *Assisted Living*  Address:   [place] [address] Phone: [phone] (Home)    :  1945 Date of Assessment: 3/30/2020   Health Plan:  [facility]  Health Plan Number: [id number] Medical Assistance Number: [id number]  Financial Worker:    Case #:     FVP Care Coordinator:    JUNE Vega CC Phone:  [phone]  CC Fax:  [phone]   [facility] Enrollment Date: 09/01/2019 Case Mix:  A-  Rate Cell:  B   Waiver Type:  EW   Primary Emergency Contact: Dori Pena (niece)  Address: [address] [place] [address]  Mobile Phone: [phone]  Relation: Relative  Secondary Emergency Contact: Lori Pope (niece)           [place], [place]   Home Phone: [phone]  Mobile Phone: [phone]  Relation: Relative Language:  English  :  No   Health Care Agent/POA:  Yes  Changed 5.2020  Crystal Dennis [phone] Advanced Directives/Living Will:  Yes   Primary Care Clinic/Phone/Fax:  [facility]/(p) [phone], (f) [phone] Primary Dx: Diabetes Type II  E11.21  Secondary Dx: CKD Stage 4 N18.4      Primary Physician:  Sandra Medina   Height:  5' 0''  Weight:  201 lbs   Specialty Physician:    Audiologist:     Eye Care Provider:   Dental Care Provider:    [facility]: [facility] [phone] or [phone]   Other:        [facility] CURRENT SERVICES SUMMARY  Equipment owned/DME history: WC 2/2020)  SERVICE TYPE/PROVIDER NAME/PHONE AUTH DATE FREQUENCY Units OR $ Amt DESCRIPTION   Medical Transportation: [facility] [phone]  Fax:  4/1/2020 - 3/31/2021 As needed Varies    [facility]  Phone: [phone]    Fax: 4/1/2020 - 3/31/2021 Monthly Per [employer]      DME: [employer] [phone]  Fax:  5/11/2020 - 3/31/2021 Monthly TBD Thick it  - ____ cans per month         * For ADC please select ADC provider and [employer] in order to process auth               Benign essential hypertension 09/30/2016     Priority: Medium     Bowel and bladder incontinence 05/22/2015     Priority: Medium     Dysuria 10/24/2013     Priority: Medium     Vitamin B 12 deficiency 05/14/2012     Priority: Medium     Vitamin D deficiency 05/14/2012     Priority: Medium     Radiation therapy complication 11/09/2011     Priority:  Medium     Anemia 08/26/2011     Priority: Medium     Rectal cancer- newly diagnosed adenoCA rectum Jan 2011 and Positive Tubular Adenoma 2019 02/17/2011     Priority: Medium     Hyperlipidemia LDL goal <100 10/31/2010     Priority: Medium     Neuropathy (H) 04/09/2010     Priority: Medium     RC-moderate (AHI 12, LSat 60%); REM RDI-73 06/01/2009     Priority: Medium     HST performed 11/11/2019 with weight 201 lbs.  Analysis time 540.2 minutes.  AHI 6.8.  Baseline SpO2 91.1%, <= 88% for 43 minutes.    Sleep study [facility] was performed 5/26/09 in order to re-evaluate severity of RC. The total sleep time was 412.0 minutes. The sleep latency was decreased at 8.7 minutes with Ambien 10mg. The REM sleep latency was 426.0 minutes. Sleep efficiency was reduced at 79.0%. The sleep architecture was disrupted with frequent sleep stage changes and arousals.  Snoring:  loud. Respiratory Events:   RDI of 57.5 and an AHI of 12.2. The REM RDI was 74.3 The lowest O2 saturation was 60.0%. This study is suggestive of moderate sleep apnea, profound desaturations during REM sleep, with 35 second apneas.    Other: PLM index was 13.8.      Recommendations:  Due to the profound desaturations during REM sleep, consider CPAP titration study to establish optimal CPAP treatment pressure.  2. Check ferritin given her RLS symptoms. If RLS symptoms persist after treatment of RC, further therapy may be warranted. Replace iron as indicated by ferritin.         Osteoporosis 02/29/2008     Priority: Medium     Problem list name updated by automated process. Provider to review       Esophageal reflux 10/13/2007     Priority: Medium     On protonix;        bmi 40 06/22/2005     Priority: Medium     Problem list name updated by automated process. Provider to review       Generalized osteoarthrosis, unspecified site 06/22/2005     Priority: Medium     HEARING LOSS CONDUCTIVE, COMBINED TYPE 06/22/2005     Priority: Medium     Cymraes measles as  child       Hypothyroidism 06/22/2003     Priority: Medium     Problem list name updated by automated process. Provider to review       Chronic ischemic heart disease 06/22/2003     Priority: Medium     Class: Chronic     cabgx3 09/2002,  with a left internal mammary (LIMA) to the left anterior descending and a bypass graft to the obtuse marginal branch of the circumflex and also PDA branch of the right coronary artery. She had an episode of atrial fibrillation postoperatively and was treated with sotalol.   PTCA and stent placement for recurrent restenosis.   2/05 adenosine ef70%  9/24/12 - Lexiscan nuclear stress test was positive for mild partially reversible ischemia in the LAD distribution. Imdur added.            Past Medical History:    Past Medical History:   Diagnosis Date     Acute deep vein thrombosis (DVT) of right lower extremity, unspecified vein (H) 10/31/2018     Acute myocardial infarction of other specified sites, episode of care unspecified 6/2002     Cancer of colon (H)      Cellulitis of lower extremity, bilateral 9/30/2016     Chronic ischemic heart disease, unspecified 6/22/2003     Coronary atherosclerosis of unspecified type of vessel, native or graft      Diabetes mellitus (H)      Esophageal reflux      Fracture of femur, distal, left, closed (H) 2/11/2013     Gastro-oesophageal reflux disease      Generalized osteoarthrosis, unspecified site 6/22/2005     Generalized osteoarthrosis, unspecified site 6/22/2005     HEARING LOSS CONDUCTIVE, COMBINED TYPE 6/22/2005     HYPOTHYROIDISM  6/22/2003     Mixed hyperlipidemia 6/22/2005     Obesity, unspecified 6/22/2005     RC-moderate (AHI 12, LSat 60%); REM RDI-73 6/1/2009     Recurrent acute deep vein thrombosis (DVT) of both lower extremities (H) 3/31/2019     Hai      Stented coronary artery      Type II or unspecified type diabetes mellitus with renal manifestations, uncontrolled(250.42) (H) 6/22/2005     Type II or unspecified type  diabetes mellitus with renal manifestations, uncontrolled(250.42) (H) 6/22/2005     Unspecified disorder resulting from impaired renal function 6/22/2005     Unspecified essential hypertension      Past Surgical History:    Past Surgical History:   Procedure Laterality Date     ANESTHESIA OUT OF OR MRI N/A 4/8/2020    Procedure: ANESTHESIA OUT OF OR MRI;  Surgeon: GENERIC ANESTHESIA PROVIDER;  Location: [facility]     cabg       COLECTOMY LOW ANTERIOR  6/21/2011    Procedure:COLECTOMY LOW ANTERIOR; with loop ielostomy; Surgeon:ROBBIN MCDONNELL; Location: OR     COLONOSCOPY  1/26/2011    COLONOSCOPY performed by MASOUD BILL at [facility]     COLONOSCOPY N/A 3/1/2016    Procedure: COLONOSCOPY;  Surgeon: Liza Neal MD;  Location: [facility]     INSERT PORT VASCULAR ACCESS  3/2/2011    INSERT PORT VASCULAR ACCESS performed by LIZA NEAL at [facility]     OPEN REDUCTION INTERNAL FIXATION FEMUR DISTAL  2/12/2013    Procedure: OPEN REDUCTION INTERNAL FIXATION FEMUR DISTAL;  Open reduction internal fixation left femur fracture--Anesth.Choice;  Surgeon: Ley, Jeffrey Duane, MD;  Location: [facility] OR     ORTHOPEDIC SURGERY       PHACOEMULSIFICATION WITH STANDARD INTRAOCULAR LENS IMPLANT Left 1/22/2018    Procedure: PHACOEMULSIFICATION WITH STANDARD INTRAOCULAR LENS IMPLANT;  Left cataract removal with implant;  Surgeon: Rony Key MD;  Location: [facility]     PHACOEMULSIFICATION WITH STANDARD INTRAOCULAR LENS IMPLANT Right 2/19/2018    Procedure: PHACOEMULSIFICATION WITH STANDARD INTRAOCULAR LENS IMPLANT;  Right cataract removal with implant;  Surgeon: Rony Key MD;  Location: [facility]     SIGMOIDOSCOPY FLEXIBLE  9/9/2011    Procedure:SIGMOIDOSCOPY FLEXIBLE; Performed prior to induction.; Surgeon:ROBBIN MCDONNELL; Location: OR     SURGICAL HISTORY OF -   10/24/2002    Heart bypass     SURGICAL HISTORY OF -   2002    R Knee arthroplasty     SURGICAL HISTORY OF -   2002    Mi 2 stints      TAKEDOWN ILEOSTOMY  9/9/2011    Procedure:TAKEDOWN ILEOSTOMY; Exploratory Laparotomy,  Loop Ileostomy Takedown, Small Bowel Resection x 2.; Surgeon:ROBBIN MCDONNELL; Location: OR     Family History:    Family History   Problem Relation Age of Onset     Diabetes Sister      C.A.D. Sister      Breast Cancer Sister      Social History:  Marital Status:  Single [1]  Social History     Tobacco Use     Smoking status: Former Smoker     Smokeless tobacco: Never Used   Substance Use Topics     Alcohol use: Not Currently     Comment: rare social use     Drug use: No      Medications:    ACCU-CHEK MILVIA MELODY  acetaminophen (TYLENOL) 650 MG CR tablet  aspirin (ASA) 81 MG tablet  biotin 1000 MCG TABS tablet  blood glucose monitoring (ACCU-CHEK MILVIA) test strip  blood glucose monitoring (ACCU-CHEK MULTICLIX) lancets  cholecalciferol 1000 units TABS  Continuous Blood Gluc  (FREESTYLE JAJA 14 DAY READER) MELODY  Continuous Blood Gluc Sensor (FREESTYLE JAJA 14 DAY SENSOR) MISC  ELIQUIS ANTICOAGULANT 5 MG tablet  ferrous sulfate (FEROSUL) 325 (65 Fe) MG tablet  fluocinonide (LIDEX) 0.05 % ointment  fluticasone (FLONASE) 50 MCG/ACT spray  furosemide (LASIX) 20 MG tablet  gabapentin (NEURONTIN) 100 MG capsule  hydrocortisone (ANUSOL-HC) 2.5 % cream  insulin pen needle (BD GABRIELLA U/F) 32G X 4 MM  levothyroxine (SYNTHROID/LEVOTHROID) 88 MCG tablet  levothyroxine (SYNTHROID/LEVOTHROID) 88 MCG tablet  loperamide (IMODIUM) 2 MG capsule  magnesium 250 MG tablet  menthol-zinc oxide (CALMOSEPTINE) 0.44-20.625 % OINT ointment  omeprazole (PRILOSEC) 20 MG DR capsule  order for DME  order for DME  order for DME  order for DME  order for DME  order for DME  polyethylene glycol (MIRALAX) 17 GM/SCOOP powder  pramipexole (MIRAPEX) 0.25 MG tablet  simvastatin (ZOCOR) 40 MG tablet      Review of Systems  Unable to accurately complete due to patents metal status changes and diminished hearing  Physical Exam   BP: (!) 182/91  Pulse: 87  Heart  Rate: 88  Temp: 97.7  F (36.5  C)  Resp: 20  Weight: 86.2 kg (190 lb)  SpO2: 96 %  Physical Exam  GENERAL APPEARANCE: alert. Cooperative and no acute distress, appears stated age  EYES: EOMI,  PERRL, conjunctiva clear  HENT: normocephalic, atraumatic, oral mucosa moist, no pharyngeal erythema  NECK: supple, nontender, no lymphadenopathy  RESP: lungs clear to auscultation - no rales, rhonchi or wheezes  CV: regular rates and rhythm, normal S1 S2, no murmur noted  ABDOMEN:  soft, nontender, normal bowel sounds  NEURO: Awake oriented to name place not to time, CN III-XII grossly intact Normal strength and tone, sensory exam grossly normal.   SKIN: no suspicious lesions or rashes   ED Course        Procedures             EKG Interpretation:      Interpreted by Marilyn Ramirez PA-C and Dr. Cameron Nguyễn   Time reviewed: 21:22  Symptoms at time of EKG: confusion, weakness   Rhythm: normal sinus   Rate: 81  Axis: Normal  Ectopy: none  Conduction: normal  ST Segments/ T Waves: non-specific T wave abnormality  Q Waves: none  Comparison to prior: Unchanged significantly from 5/26/20    Clinical Impression: non-specific EKG    Critical Care time:  none          Results for orders placed or performed during the hospital encounter of 08/10/20   Abd/pelvis CT - no contrast - Stone Protocol     Status: None    Narrative    EXAM: CT ABDOMEN PELVIS W/O CONTRAST  LOCATION: [facility]  DATE/TIME: 8/10/2020 10:27 PM    INDICATION: Confusion, hematuria  COMPARISON: 05/26/2020.  TECHNIQUE: CT scan of the abdomen and pelvis was performed without IV contrast. Multiplanar reformats were obtained. Dose reduction techniques were used.  CONTRAST: None.    FINDINGS:   LOWER CHEST: Redemonstrated right basilar scarring.    HEPATOBILIARY: Cholelithiasis. Nondistended gallbladder. Left liver lobe hypodense lesion measuring 1.4 cm on image 28 is stable.    PANCREAS: Normal.    SPLEEN: Normal.    ADRENAL GLANDS:  Normal.    KIDNEYS/BLADDER: Evaluation is degraded by motion artifact. There is a simple cortical cyst measuring 2.8 cm at the right upper pole. There is no hydronephrosis. The bladder is normal.    BOWEL: There is a large bowel containing paraumbilical hernia, though there is no obstructive change at this level. There is a moderate to large amount of formed stool in the colon. Anastomotic bowel sutures again noted at the rectosigmoid junction with   presacral soft tissue thickening.    LYMPH NODES: Normal.    VASCULATURE: Moderate to severe aortic atherosclerotic change without aneurysm.    PELVIC ORGANS: Previously seen left adnexal cystic focus less conspicuous on today's exam. Chronic presacral soft tissue thickening likely reflecting posttreatment change.    MUSCULOSKELETAL: Moderate lumbar spine degenerative changes. Severe arthritic change of the hips. Partially imaged left femoral intramedullary nail. Paraumbilical bowel containing hernia. Supraumbilical fat-containing ventral hernia or diastases.      Impression    IMPRESSION:   1.  Although evaluation is degraded by motion artifact, there is no hydronephrosis. The bladder is normal in appearance.    2.  Moderate to large amount of colonic stool suggesting constipation.    3.  Cholelithiasis.     Head CT w/o contrast     Status: None    Narrative    EXAM: CT HEAD W/O CONTRAST  LOC[facility]  DATE/TIME: 8/10/2020 10:22 PM    INDICATION: Elderly patient with increased  COMPARISON: 05/26/2020 CT head  TECHNIQUE: Routine without IV contrast. Multiplanar reformats. Dose reduction techniques were used.    FINDINGS:  INTRACRANIAL CONTENTS: No intracranial hemorrhage, extraaxial collection, or mass effect.  No CT evidence of acute infarct. Chronic lacunar infarct in the right frontal lobe white matter is unchanged. Severe presumed chronic small vessel ischemic   changes. Mild generalized volume loss. No hydrocephalus.     VISUALIZED  ORBITS/SINUSES/MASTOIDS: Prior bilateral cataract surgery. Visualized portions of the orbits are otherwise unremarkable. No paranasal sinus mucosal disease. Scattered fluid/membrane thickening in the right mastoid air cells. No apparent mass   in the posterior nasopharynx or skull base.    BONES/SOFT TISSUES: No acute abnormality.      Impression    IMPRESSION:  1.  No CT evidence for acute intracranial process. No significant interval change from the prior exam.  2.  Brain atrophy and presumed chronic microvascular ischemic changes as above.   CBC with platelets differential     Status: None   Result Value Ref Range    WBC 4.8 4.0 - 11.0 10e9/L    RBC Count 4.02 3.8 - 5.2 10e12/L    Hemoglobin 12.1 11.7 - 15.7 g/dL    Hematocrit 36.0 35.0 - 47.0 %    MCV 90 78 - 100 fl    MCH 30.1 26.5 - 33.0 pg    MCHC 33.6 31.5 - 36.5 g/dL    RDW 13.1 10.0 - 15.0 %    Platelet Count 187 150 - 450 10e9/L    Diff Method Automated Method     % Neutrophils 58.1 %    % Lymphocytes 28.3 %    % Monocytes 10.3 %    % Eosinophils 2.3 %    % Basophils 0.6 %    % Immature Granulocytes 0.4 %    Nucleated RBCs 0 0 /100    Absolute Neutrophil 2.8 1.6 - 8.3 10e9/L    Absolute Lymphocytes 1.4 0.8 - 5.3 10e9/L    Absolute Monocytes 0.5 0.0 - 1.3 10e9/L    Absolute Eosinophils 0.1 0.0 - 0.7 10e9/L    Absolute Basophils 0.0 0.0 - 0.2 10e9/L    Abs Immature Granulocytes 0.0 0 - 0.4 10e9/L    Absolute Nucleated RBC 0.0    Comprehensive metabolic panel     Status: Abnormal   Result Value Ref Range    Sodium 142 133 - 144 mmol/L    Potassium 3.9 3.4 - 5.3 mmol/L    Chloride 111 (H) 94 - 109 mmol/L    Carbon Dioxide 25 20 - 32 mmol/L    Anion Gap 6 3 - 14 mmol/L    Glucose 145 (H) 70 - 99 mg/dL    Urea Nitrogen 26 7 - 30 mg/dL    Creatinine 1.44 (H) 0.52 - 1.04 mg/dL    GFR Estimate 35 (L) >60 mL/min/[1.73_m2]    GFR Estimate If Black 40 (L) >60 mL/min/[1.73_m2]    Calcium 8.7 8.5 - 10.1 mg/dL    Bilirubin Total 0.3 0.2 - 1.3 mg/dL    Albumin 2.6 (L)  3.4 - 5.0 g/dL    Protein Total 6.3 (L) 6.8 - 8.8 g/dL    Alkaline Phosphatase 125 40 - 150 U/L    ALT 17 0 - 50 U/L    AST 25 0 - 45 U/L   UA with Microscopic reflex to Culture     Status: Abnormal    Specimen: Catheterized Urine   Result Value Ref Range    Color Urine Yellow     Appearance Urine Slightly Cloudy     Glucose Urine 50 (A) NEG^Negative mg/dL    Bilirubin Urine Negative NEG^Negative    Ketones Urine Negative NEG^Negative mg/dL    Specific Gravity Urine 1.013 1.003 - 1.035    Blood Urine Large (A) NEG^Negative    pH Urine 7.0 5.0 - 7.0 pH    Protein Albumin Urine >499 (A) NEG^Negative mg/dL    Urobilinogen mg/dL 0.0 0.0 - 2.0 mg/dL    Nitrite Urine Negative NEG^Negative    Leukocyte Esterase Urine Negative NEG^Negative    Source Catheterized Urine     WBC Urine 11 (H) 0 - 5 /HPF    RBC Urine >182 (H) 0 - 2 /HPF   Troponin I     Status: None   Result Value Ref Range    Troponin I ES 0.041 0.000 - 0.045 ug/L   TSH with free T4 reflex     Status: None   Result Value Ref Range    TSH 3.92 0.40 - 4.00 mU/L     Medications   0.9% sodium chloride BOLUS (0 mLs Intravenous Stopped 8/10/20 2000)     Followed by   sodium chloride 0.9% infusion (has no administration in time range)     Assessments & Plan (with Medical Decision Making)     I have reviewed the nursing notes.  I have reviewed the findings, diagnosis, plan and need for follow up with the patient.  ED to Inpatient Handoff:    Discussed with Skylar Roberts at 11:33  Patient accepted for Observation Stay  Pending studies include COVID-19  Code Status: Not Addressed       New Prescriptions    No medications on file     Final diagnoses:   Altered mental status   Generalized muscle weakness   Elevated blood pressure reading   Lewy body dementia without behavioral disturbance (H)   Dysuria   Lewy body dementia with behavioral disturbance (H)     75-year-old female presents to the emergency department at recommendation of assisted living facility with  concerns over increased confusion, weakness and fatigue today noted over the last 2 days similar requesting evaluation to rule out UTI.  Patient had elevated blood pressure upon arrival, remainder vital signs were stable.  Physical exam findings as described above.  As part of evaluation patient did have laboratory testing which was significant for elevated creatinine at 1.44 however unsure baseline, review or records show numerous readings from 1.2-1.4, low albumin and total protein, Urinalysis showed large amount of blood, few WBCs however was leukocyte esterase negative and no bacteria at this time.  This is not definitive for infection, however review of Epic records did show positive urine cultures in the past for multiple different bacteria with similar number of WBC.  Given presence of hematuria associated with CT of her abdomen pelvis which was negative for nephrolithiasis.  Given unclear history and notation of fall within the last 2-3 weeks she had also have CT of her head was negative for evidence of acute trauma.  Given Patient's altered mental status I did discuss case with hospitalist on-call TAI Roberts who did agree to admit patient for observation stay, etiology is unclear at this time, however would consider possibility of UTI, will defer antibiotic selection to hospital team.   I did also discuss case with ED physician Dr. Blue Zuleta.      Disclaimer: This note consists of symbols derived from keyboarding, dictation, and/or voice recognition software. As a result, there may be errors in the script that have gone undetected.  Please consider this when interpreting information found in the chart.    8/10/2020   [facility] EMERGENCY DEPARTMENT     Marilyn Ramirez PA-C  08/14/20 2006

## 2020-08-10 NOTE — LETTER
SORRY FOR THE DELAY.  Transition Communication Hand-off for Care Transitions to Next Level of Care Provider    Name: Stefanie Velazquez  : 1945  MRN #: [id number]  Primary Care Provider: Sandra Morales  Primary Care MD Name: Amandeep  Primary Clinic: [address] [place] [id number]  Primary Care Clinic Name: [facility]  Reason for Hospitalization:  Altered mental status [R41.82]  Elevated blood pressure reading [R03.0]  Generalized muscle weakness [M62.81]  Admit Date/Time: 8/10/2020  5:29 PM  Discharge Date: 20  Payor Source: Payor: ARE / Plan: [employer] / Product Type: HMO /   Readmission Assessment Measure (DEVON) Risk Score/category: HIGH    Reason for Communication Hand-off Referral: Fragility    Discharge Plan:  Return to [facility] with [facility] enrollment   Concern for non-adherence with plan of care:   Y/N NO  Discharge Needs Assessment:  Needs      Most Recent Value   Equipment Currently Used at Home  grab bar, toilet, wheelchair, manual   Assisted Living  -- [facility]]   Other Resources  Transportation Services      Already enrolled in Tele-monitoring program and name of program:  NO  Follow-up specialty is recommended: No    Follow-up plan:  No future appointments.    Any outstanding tests or procedures:        Referrals     Future Labs/Procedures    Home Care Referral     Comments:    _______________________________________________________________    Your provider has referred you to: [facility] (Phone: [phone] Fax: [phone])      Extended Emergency Contact Information  Primary Emergency Contact: Dori Pena ([facility])  Address: [address] [place] [address] [employer]  Mobile Phone: [phone]  Relation: Relative  Secondary Emergency Contact[facility]  Address: [address] [place] [address] [facility]  Mobile Phone: [phone]  Relation: Relative    Patient Anticipated Discharge Date:  8/13/20   RN, PT, HHA to begin 24 - 48 hours after discharge.  PLEASE EVALUATE AND TREAT (Evaluation timeline is 24 - 48 hrs. Please call if there is need for a variance to this timeline).    REASON FOR REFERRAL: Hospice - Diagnosis: Metabolic encephalopathy    ADDITIONAL SERVICES NEEDED: None    OTHER PERTINENT INFORMATION: Patient was last seen by provider on 8/13/20 for altered mental status.    No current outpatient medications on file.    Patient Active Problem List:     Hypothyroidism     bmi 40     Chronic ischemic heart disease     Generalized osteoarthrosis, unspecified site     HEARING LOSS CONDUCTIVE, COMBINED TYPE     Esophageal reflux     Osteoporosis     RC-moderate (AHI 12, LSat 60%); REM RDI-73     Neuropathy (H)     Hyperlipidemia LDL goal <100     Rectal cancer- newly diagnosed adenoCA rectum Jan 2011 and Positive Tubular Adenoma 2019     Anemia     Radiation therapy complication     Vitamin B 12 deficiency     Vitamin D deficiency     Dysuria     Bowel and bladder incontinence     Benign essential hypertension     Health Care Home     Chronic diarrhea     Restless leg syndrome     Type 2 diabetes mellitus with diabetic nephropathy, with long-term current use of insulin (H)     Spinal stenosis of lumbar region without neurogenic claudication     DDD (degenerative disc disease), lumbar     Lymphedema of genitalia     Oropharyngeal dysphagia     Bilateral hearing loss, unspecified hearing loss type     Lumbar radiculopathy     CKD (chronic kidney disease) stage 4, GFR 15-29 ml/min (H)     Impacted cerumen of right ear     H/O deep venous thrombosis     Symptomatic bradycardia     Syncope and collapse     Chronic anticoagulation     Pulmonary nodules     Altered mental status     Acute metabolic encephalopathy     Possible Lewy body dementia (H)      Documentation of Face to Face and Certification for Home Health Services    I certify that patient, Stefanie Velazquez is under my care and that I, or a  Nurse Practitioner or Physician's Assistant working with me, had a face-to-face encounter that meets the physician face-to-face encounter requirements with this patient on: 8/13/2020.    This encounter with the patient was in whole, or in part, for the following medical condition, which is the primary reason for Home Health Care: Hospice.    I certify that, based on my findings, the following services are medically necessary Home Health Services: Nursing    My clinical findings support the need for the above services because: Nurse is needed: Hospice.    Further, I certify that my clinical findings support that this patient is homebound (i.e. absences from home require considerable and taxing effort and are for medical reasons or [employer] services or infrequently or of short duration when for other reasons) because: Requires assistance of another person or specialized equipment to access medical services because patient: Requires supervision of another for safe transfer..    Based on the above findings, I certify that this patient is confined to the home and needs intermittent skilled nursing care, physical therapy and/or speech therapy.  The patient is under my care, and I have initiated the establishment of the plan of care.  This patient will be followed by a physician who will periodically review the plan of care.    Physician/Provider to provide follow up care: Sandra Medina certified Physician at time of discharge: Dr. Desir    Please be aware that coverage of these services is subject to the terms and limitations of your health insurance plan.  Call member services at your health plan with any benefit or coverage questions.          Key Recommendations:   Follow with PCP & Hospice team as appropriate    ARIELLA Martinez    AVS/Discharge Summary is the source of truth; this is a helpful guide for improved communication of patient story

## 2020-08-10 NOTE — ED NOTES
"Spoke with nurse at [facility] pt has been lethargic, weak and sleepy, pt is \"getting mixed up.\" Pt does have a hx UTI and tends to get this way when she gets a bladder infection. Per nurse at [facility], pt has been on PO doxycycline in the past and became psychotic and medication needed to be stopped. Pt is poor historian, pt [place]  "

## 2020-08-11 ENCOUNTER — AMBULATORY - HEALTHEAST (OUTPATIENT)
Dept: OTHER | Facility: CLINIC | Age: 75
End: 2020-08-11

## 2020-08-11 ENCOUNTER — DOCUMENTATION ONLY (OUTPATIENT)
Dept: OTHER | Facility: CLINIC | Age: 75
End: 2020-08-11

## 2020-08-11 PROBLEM — G31.83 LEWY BODY DEMENTIA (H): Status: ACTIVE | Noted: 2020-08-11

## 2020-08-11 PROBLEM — F02.80 LEWY BODY DEMENTIA (H): Status: ACTIVE | Noted: 2020-08-11

## 2020-08-11 PROBLEM — G93.40 ENCEPHALOPATHY: Status: RESOLVED | Noted: 2020-05-26 | Resolved: 2020-08-11

## 2020-08-11 PROBLEM — R41.82 ALTERED MENTAL STATUS: Status: ACTIVE | Noted: 2020-08-11

## 2020-08-11 PROBLEM — G93.41 ACUTE METABOLIC ENCEPHALOPATHY: Status: ACTIVE | Noted: 2020-08-11

## 2020-08-11 PROBLEM — R41.0 ACUTE CONFUSION: Status: RESOLVED | Noted: 2020-05-26 | Resolved: 2020-08-11

## 2020-08-11 LAB
GLUCOSE BLDC GLUCOMTR-MCNC: 123 MG/DL (ref 70–99)
GLUCOSE BLDC GLUCOMTR-MCNC: 129 MG/DL (ref 70–99)
GLUCOSE BLDC GLUCOMTR-MCNC: 188 MG/DL (ref 70–99)
SARS-COV-2 RNA SPEC QL NAA+PROBE: NOT DETECTED
SPECIMEN SOURCE: NORMAL

## 2020-08-11 PROCEDURE — 80048 BASIC METABOLIC PNL TOTAL CA: CPT | Performed by: PHYSICIAN ASSISTANT

## 2020-08-11 PROCEDURE — 25000132 ZZH RX MED GY IP 250 OP 250 PS 637: Performed by: INTERNAL MEDICINE

## 2020-08-11 PROCEDURE — 00000146 ZZHCL STATISTIC GLUCOSE BY METER IP

## 2020-08-11 PROCEDURE — U0003 INFECTIOUS AGENT DETECTION BY NUCLEIC ACID (DNA OR RNA); SEVERE ACUTE RESPIRATORY SYNDROME CORONAVIRUS 2 (SARS-COV-2) (CORONAVIRUS DISEASE [COVID-19]), AMPLIFIED PROBE TECHNIQUE, MAKING USE OF HIGH THROUGHPUT TECHNOLOGIES AS DESCRIBED BY CMS-2020-01-R: HCPCS | Performed by: PHYSICIAN ASSISTANT

## 2020-08-11 PROCEDURE — 25800030 ZZH RX IP 258 OP 636: Performed by: PHYSICIAN ASSISTANT

## 2020-08-11 PROCEDURE — G0378 HOSPITAL OBSERVATION PER HR: HCPCS

## 2020-08-11 PROCEDURE — 25000132 ZZH RX MED GY IP 250 OP 250 PS 637: Performed by: PHYSICIAN ASSISTANT

## 2020-08-11 PROCEDURE — 99220 ZZC INITIAL OBSERVATION CARE,LEVL III: CPT | Performed by: INTERNAL MEDICINE

## 2020-08-11 PROCEDURE — 99207 ZZC APP CREDIT; MD BILLING SHARED VISIT: CPT | Performed by: PHYSICIAN ASSISTANT

## 2020-08-11 RX ORDER — AMOXICILLIN 250 MG
1 CAPSULE ORAL 2 TIMES DAILY
Status: DISCONTINUED | OUTPATIENT
Start: 2020-08-11 | End: 2020-08-13 | Stop reason: HOSPADM

## 2020-08-11 RX ORDER — PROCHLORPERAZINE MALEATE 5 MG
5 TABLET ORAL EVERY 6 HOURS PRN
Status: DISCONTINUED | OUTPATIENT
Start: 2020-08-11 | End: 2020-08-13 | Stop reason: HOSPADM

## 2020-08-11 RX ORDER — ONDANSETRON 4 MG/1
4 TABLET, ORALLY DISINTEGRATING ORAL EVERY 6 HOURS PRN
Status: DISCONTINUED | OUTPATIENT
Start: 2020-08-11 | End: 2020-08-13 | Stop reason: HOSPADM

## 2020-08-11 RX ORDER — DONEPEZIL HYDROCHLORIDE 5 MG/1
5 TABLET, FILM COATED ORAL AT BEDTIME
Status: DISCONTINUED | OUTPATIENT
Start: 2020-08-11 | End: 2020-08-13 | Stop reason: HOSPADM

## 2020-08-11 RX ORDER — NICOTINE POLACRILEX 4 MG
15-30 LOZENGE BUCCAL
Status: DISCONTINUED | OUTPATIENT
Start: 2020-08-11 | End: 2020-08-11

## 2020-08-11 RX ORDER — ACETAMINOPHEN 325 MG/1
650 TABLET ORAL EVERY 4 HOURS PRN
Status: DISCONTINUED | OUTPATIENT
Start: 2020-08-11 | End: 2020-08-13 | Stop reason: HOSPADM

## 2020-08-11 RX ORDER — GABAPENTIN 100 MG/1
200 CAPSULE ORAL AT BEDTIME
Status: DISCONTINUED | OUTPATIENT
Start: 2020-08-11 | End: 2020-08-13 | Stop reason: HOSPADM

## 2020-08-11 RX ORDER — NALOXONE HYDROCHLORIDE 0.4 MG/ML
.1-.4 INJECTION, SOLUTION INTRAMUSCULAR; INTRAVENOUS; SUBCUTANEOUS
Status: DISCONTINUED | OUTPATIENT
Start: 2020-08-11 | End: 2020-08-13 | Stop reason: HOSPADM

## 2020-08-11 RX ORDER — CLONAZEPAM 0.5 MG/1
0.5 TABLET ORAL
Status: DISCONTINUED | OUTPATIENT
Start: 2020-08-11 | End: 2020-08-13 | Stop reason: HOSPADM

## 2020-08-11 RX ORDER — SULFAMETHOXAZOLE AND TRIMETHOPRIM 200; 40 MG/5ML; MG/5ML
20 SUSPENSION ORAL 2 TIMES DAILY
Status: DISCONTINUED | OUTPATIENT
Start: 2020-08-11 | End: 2020-08-13 | Stop reason: HOSPADM

## 2020-08-11 RX ORDER — SIMVASTATIN 40 MG
40 TABLET ORAL DAILY
Status: DISCONTINUED | OUTPATIENT
Start: 2020-08-11 | End: 2020-08-13 | Stop reason: HOSPADM

## 2020-08-11 RX ORDER — ONDANSETRON 2 MG/ML
4 INJECTION INTRAMUSCULAR; INTRAVENOUS EVERY 6 HOURS PRN
Status: DISCONTINUED | OUTPATIENT
Start: 2020-08-11 | End: 2020-08-11

## 2020-08-11 RX ORDER — CEFTRIAXONE SODIUM 1 G/50ML
1 INJECTION, SOLUTION INTRAVENOUS EVERY 24 HOURS
Status: DISCONTINUED | OUTPATIENT
Start: 2020-08-11 | End: 2020-08-11

## 2020-08-11 RX ORDER — PRAMIPEXOLE DIHYDROCHLORIDE 0.12 MG/1
0.12 TABLET ORAL
Status: DISCONTINUED | OUTPATIENT
Start: 2020-08-11 | End: 2020-08-13 | Stop reason: HOSPADM

## 2020-08-11 RX ORDER — ACETAMINOPHEN 325 MG/1
650 TABLET ORAL EVERY 4 HOURS PRN
Status: DISCONTINUED | OUTPATIENT
Start: 2020-08-11 | End: 2020-08-11

## 2020-08-11 RX ORDER — DEXTROSE MONOHYDRATE 25 G/50ML
25-50 INJECTION, SOLUTION INTRAVENOUS
Status: DISCONTINUED | OUTPATIENT
Start: 2020-08-11 | End: 2020-08-11

## 2020-08-11 RX ORDER — POLYETHYLENE GLYCOL 3350 17 G/17G
17 POWDER, FOR SOLUTION ORAL DAILY PRN
Status: DISCONTINUED | OUTPATIENT
Start: 2020-08-11 | End: 2020-08-13 | Stop reason: HOSPADM

## 2020-08-11 RX ORDER — ACETAMINOPHEN 650 MG/1
650 SUPPOSITORY RECTAL EVERY 4 HOURS PRN
Status: DISCONTINUED | OUTPATIENT
Start: 2020-08-11 | End: 2020-08-13 | Stop reason: HOSPADM

## 2020-08-11 RX ORDER — LEVOTHYROXINE SODIUM 88 UG/1
88 TABLET ORAL
Status: DISCONTINUED | OUTPATIENT
Start: 2020-08-11 | End: 2020-08-13 | Stop reason: HOSPADM

## 2020-08-11 RX ORDER — PROCHLORPERAZINE 25 MG
12.5 SUPPOSITORY, RECTAL RECTAL EVERY 12 HOURS PRN
Status: DISCONTINUED | OUTPATIENT
Start: 2020-08-11 | End: 2020-08-13 | Stop reason: HOSPADM

## 2020-08-11 RX ORDER — AMOXICILLIN 250 MG
2 CAPSULE ORAL 2 TIMES DAILY
Status: DISCONTINUED | OUTPATIENT
Start: 2020-08-11 | End: 2020-08-13 | Stop reason: HOSPADM

## 2020-08-11 RX ADMIN — APIXABAN 5 MG: 5 TABLET, FILM COATED ORAL at 12:11

## 2020-08-11 RX ADMIN — CLONAZEPAM 0.5 MG: 0.5 TABLET ORAL at 19:58

## 2020-08-11 RX ADMIN — APIXABAN 5 MG: 5 TABLET, FILM COATED ORAL at 19:58

## 2020-08-11 RX ADMIN — SIMVASTATIN 40 MG: 40 TABLET, FILM COATED ORAL at 12:11

## 2020-08-11 RX ADMIN — SODIUM CHLORIDE: 9 INJECTION, SOLUTION INTRAVENOUS at 02:30

## 2020-08-11 RX ADMIN — OMEPRAZOLE 20 MG: 20 CAPSULE, DELAYED RELEASE ORAL at 12:11

## 2020-08-11 RX ADMIN — GABAPENTIN 200 MG: 100 CAPSULE ORAL at 02:29

## 2020-08-11 RX ADMIN — DOCUSATE SODIUM 50 MG AND SENNOSIDES 8.6 MG 1 TABLET: 8.6; 5 TABLET, FILM COATED ORAL at 12:11

## 2020-08-11 RX ADMIN — PRAMIPEXOLE DIHYDROCHLORIDE 0.12 MG: 0.12 TABLET ORAL at 19:59

## 2020-08-11 RX ADMIN — SULFAMETHOXAZOLE AND TRIMETHOPRIM 160 MG: 200; 40 SUSPENSION ORAL at 16:17

## 2020-08-11 RX ADMIN — DONEPEZIL HYDROCHLORIDE 5 MG: 5 TABLET ORAL at 19:58

## 2020-08-11 RX ADMIN — ACETAMINOPHEN 650 MG: 325 TABLET, FILM COATED ORAL at 03:50

## 2020-08-11 RX ADMIN — SULFAMETHOXAZOLE AND TRIMETHOPRIM 160 MG: 200; 40 SUSPENSION ORAL at 21:14

## 2020-08-11 ASSESSMENT — MIFFLIN-ST. JEOR: SCORE: 1400.5

## 2020-08-11 NOTE — H&P
[facility]    History and Physical - Hospitalist Service       Date of Admission:  8/10/2020    Assessment & Plan   Stefanie Velazquez is a 75 year old female admitted on 8/10/2020. She has history of coronary artery disease, hypertension, hyperlipidemia, DVT, type 2 diabetes, chronic back pain, hypothyroidism, rectal cancer, GERD, restless leg syndrome and RC. She presents with fatigue, weakness and increased confusion.    Acute Encephalopathy, likely Metabolic with possible underlying cognitive decline  Increasing fatigue, weakness, confusion/paranoia at [facility] over past 2 days. Similar to previous changes noted prior to last hospitalizations. Neurological exam non-focal. Labs show abnormal UA, possibly consistent with infection. CT abdomen/pelvis without acute findings to explain. CT head without acute findings. On admission ongoing mild paranoia and agitation though fairly oriented, though difficulties with short term memory (believes she has been here for several days).  She has had 3 other admissions this year for AMS which each had differing suspected etiologies: possible stroke, aspiration pneumonia and UTI vs. Mirapex use. There has been consideration that an underlying cognitive decline contributing to her episodes. Differential includes infectious, medication related, vs other.   Etiology not entirely clear though suspect UTI. Plan to treat with IV antibiotics and monitor.    - follow urine culture  - antibiotics: oral Bactrim (initially ordered for IV though refusing IV, will use oral based on past sensitivities)  - monitor mental status  - avoid antipsychotic medications, has likely exacerbated symptoms in the past   - may benefit from outpatient neurology/neuropsychiatry evaluation as outpatient to evaluate baseline cognitive function    Abnormal UA, suspect UTI  Proteinuria  UA shows blood, WBC and protein. Previous urine cultures have grown different bacteria, last showed  Hafnia species which was resistant to multiple antibiotics. Prior to that E.coli which was pan-sensitive.  - follow urine culture  - antibiotics: oral Bactrim (initially ordered for IV though refusing IV, will use oral based on past sensitivities)    Elevated Creatinine on CKD  Creatinine 1.44, GFR 35. Baseline creatinine 1.2-1.3, GFR 39-43. Current renal function below typical baseline. UA shows protein >499, blood and WBC.  - IV: LR at 100 ml/hr  - follow renal function  - monitor I/O, daily weights  - avoid nephrotoxic agents, renally dose as appropriate    Coronary Artery Disease  History of 3 vessel CABG in 2002. Unable to tolerate ACEi or beta blocker due to hypotension.  - Continue home simvastatin, aspirin      History of Hypertension  Patient managed prior to admission with Lasix 20 mg BID.   - Hold furosemide given elevation in creatinine     Hyperlipidemia  Previously diagnosed.   - Continue home simvastatin      History of DVT  Previously diagnosed.   - Continue home apixaban      Diabetes Mellitus, Type II  Chronic. Last a1C 6.1 on 6/2020. Not currently on medications   - Moderate CHO diet  - Medium sliding scale insulin  - monitor for hypo/hyerglycemia  ADDENDUM: patient refusing insulin/accuchecks, given good control as outpatient will stop checking to minimize increasing agitation.     Lumbar Radiculopathy   Neuropathy  Previously diagnosed.    - continue home gabapentin      Hypothyroidism  Chronic. TSH 3.92 on presentation.   - Continue home levothyroxine     Gastroesophageal reflux disease  Previously diagnosed.    - Continue home omeprazole     Restless Leg Syndrome  Previously diagnosed. Pramipexole previously stopped due to concerns of AMS with this medications. PCP restarted 6/2020 at lower dose.    - continue home pramixpexole at lower dose of 0.125 mg at bedtime prn     RC  Previously diagnosed. On CPAP at assisted living facility, follows with sleep medicine.     Rectal Cancer  Follows  with Dr. Martinez, diagnosed in 2011, underwent neoadjuvant chemoradiation, resection with reanastomosis, adjuvant chemorherapy. Follows yearly with oncology, last seen 7/2020, no evidence of recurrence or plans for further follow up based on stability.  - Continue outpatient surveillance     Diet: Consistent Carbohydrate Diet 5023-2257 Calories: Moderate Consistent CHO (4-6 CHO units/meal)    DVT Prophylaxis: apixaban  Lr Catheter: not present  Code Status: Full code    Disposition Plan   Expected discharge: 2 - 3 days, recommended to prior living arranagement vs TCU once mental status is at baseline, antibiotic plan establish and no barriers to discharge identified. Given patient's previous clinical course, high likelihood for worsening with transition to inpatient admission. Anticipate she will need > 24 hours of antibiotic therapy prior to improvement. Plan to continue observation status for now.  Entered: Kristel Crespo PA-C 08/11/2020, 8:26 AM     The patient's care was discussed with the Attending Physician, Dr. Ulises Gaytan, Patient and Patient's niece, Dori.    Kristel Crespo PA-C  [facility]  ______________________________________________________________________    Chief Complaint   Fatigue, weakness, increased confusion    History is obtained from the patient, Nursing home staff, Mell, and review of medical chart    History of Present Illness   Stefanie Velazquez is a 75 year old female who presents with concerns from nursing home staff for fatigue, weakness and increased confusion.    Spoke with Mell, one of the nurses at [facility] for further history. The nursing staff at [facility] noted that she was showing signs of precursors to being paranoid. She would not let the nursing staff assist her. She seems to not be sleeping well due to anxiety. The nursing staff believes she becomes sleep deprived and gets confused. They were unable to redirect her and was starting to get  paranoid. She seemed quite sleepy. She has not shown any specific signs of illness. She often cough following eating, has history of aspiration. She is incontinent at baseline, unclear if this could be a UTI.     The patient is unsure why she is in the hospital, she is initially quite tearful, wanting to go home to [facility]. She is alert and oriented to self and location, she is unsure of the date and so she looks at the whiteboard to cue her. She recalls recently seeing Dr. Martinez who stated she no longer needs to follow up for her rectal cancer.     She has no complaints at present. Reports chronic cough. Has not felt ill recently that she recalls. She states she recalls eating her evening meal and that is the last thing she remembers. She thinks she has been in the hospital for a few days. She has had frequent urination this morning, only going small amounts each time. No dysuria. She denies fever, chills, myalgias, lightheadedness, dizziness, sore throat, shortness of breath, palpitations, chest pain, abdominal pain, nausea, vomiting, diarrhea, rash or wounds.    Of note, she has had 3 admissions to this facility for altered mental status, reviewed and briefly summarized below:   - Admitted 4/7/20 - 4/13/20 for confusion and paranoia. She worsened on admission ultimately requiring intubation due to her agitation. Ultimately thought to be precipitated by a possible small right temporal lobe stroke on top of underlying mild cognitive decline versus recent ciprofloxacin use as outpatient.    - Admitted 4/24/20 - 4/25/20 due to fever and cough. Ultimately thought to be due to aspiration pneumonia. She was markedly improved overnight.  - Admitted 5/26 - 6/6/20 due to altered mental status. Ultimately thought to be related to UTI vs mirapex. UTI was treated and Mirapex was stopped.    Review of Systems    The 10 point Review of Systems is negative other than noted in the HPI or here.     Past Medical History    I have  reviewed this patient's medical history and updated it with pertinent information if needed.   Past Medical History:   Diagnosis Date     Acute deep vein thrombosis (DVT) of right lower extremity, unspecified vein (H) 10/31/2018     Acute myocardial infarction of other specified sites, episode of care unspecified 6/2002    MI 2 stints     Cancer of colon (H)      Cellulitis of lower extremity, bilateral 9/30/2016     Chronic ischemic heart disease, unspecified 6/22/2003    cabgx3 , 2002 2/05 adenosine ef70%     Coronary atherosclerosis of unspecified type of vessel, native or graft     Coronary artery disease     Diabetes mellitus (H)      Esophageal reflux      Fracture of femur, distal, left, closed (H) 2/11/2013     Gastro-oesophageal reflux disease      Generalized osteoarthrosis, unspecified site 6/22/2005     Generalized osteoarthrosis, unspecified site 6/22/2005     HEARING LOSS CONDUCTIVE, COMBINED TYPE 6/22/2005    [place] measles as child     HYPOTHYROIDISM  6/22/2003     Mixed hyperlipidemia 6/22/2005     Obesity, unspecified 6/22/2005     RC-moderate (AHI 12, LSat 60%); REM RDI-73 6/1/2009    Sleep study [facility] was performed 5/26/09 in order to re-evaluate severity of RC. The total sleep time was 412.0 minutes. The sleep latency was decreased at 8.7 minutes with Ambien 10mg. The REM sleep latency was 426.0 minutes. Sleep efficiency was reduced at 79.0%. The sleep architecture was disrupted with frequent sleep stage changes and arousals.  Snoring:  loud. 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, uncontrolled(250.42) (H) 6/22/2005     Type II or unspecified type diabetes mellitus with renal manifestations, uncontrolled(250.42) (H) 6/22/2005     Unspecified disorder  resulting from impaired renal function 6/22/2005    CR     1.82   06/21/2005     Unspecified essential hypertension      Past Surgical History   I have reviewed this patient's surgical history and updated it with pertinent information if needed.  Past Surgical History:   Procedure Laterality Date     ANESTHESIA OUT OF OR MRI N/A 4/8/2020    Procedure: ANESTHESIA OUT OF OR MRI;  Surgeon: GENERIC ANESTHESIA PROVIDER;  Location: [facility]     cabg       COLECTOMY LOW ANTERIOR  6/21/2011    Procedure:COLECTOMY LOW ANTERIOR; with loop ielostomy; Surgeon:ROBBIN MCDONNELL; Location:[facility]     COLONOSCOPY  1/26/2011    COLONOSCOPY performed by MASOUD BILL at [facility]     COLONOSCOPY N/A 3/1/2016    Procedure: COLONOSCOPY;  Surgeon: Liza Neal MD;  Location: [facility]     INSERT PORT VASCULAR ACCESS  3/2/2011    INSERT PORT VASCULAR ACCESS performed by LIZA NEAL at [facility]     OPEN REDUCTION INTERNAL FIXATION FEMUR DISTAL  2/12/2013    Procedure: OPEN REDUCTION INTERNAL FIXATION FEMUR DISTAL;  Open reduction internal fixation left femur fracture--Anesth.Choice;  Surgeon: Ley, Jeffrey Duane, MD;  Location: [facility]     ORTHOPEDIC SURGERY       PHACOEMULSIFICATION WITH STANDARD INTRAOCULAR LENS IMPLANT Left 1/22/2018    Procedure: PHACOEMULSIFICATION WITH STANDARD INTRAOCULAR LENS IMPLANT;  Left cataract removal with implant;  Surgeon: Rony Key MD;  Location: [facility]     PHACOEMULSIFICATION WITH STANDARD INTRAOCULAR LENS IMPLANT Right 2/19/2018    Procedure: PHACOEMULSIFICATION WITH STANDARD INTRAOCULAR LENS IMPLANT;  Right cataract removal with implant;  Surgeon: Rony Key MD;  Location: [facility]     SIGMOIDOSCOPY FLEXIBLE  9/9/2011    Procedure:SIGMOIDOSCOPY FLEXIBLE; Performed prior to induction.; Surgeon:ROBBIN MCDONNELL; Location: OR     SURGICAL HISTORY OF -   10/24/2002    Heart bypass     SURGICAL HISTORY OF -   2002    R Knee arthroplasty     SURGICAL HISTORY OF -    2002    Mi 2 stints     TAKEDOWN ILEOSTOMY  2011    Procedure:TAKEDOWN ILEOSTOMY; Exploratory Laparotomy,  Loop Ileostomy Takedown, Small Bowel Resection x 2.; Surgeon:ROBBIN MCDONNELL; Location:[facility] OR     Social History   I have reviewed this patient's social history and updated it with pertinent information if needed.  She lives in [facility] assisted living. She requires a fair amount of assistance, only doing [employer] transfers.  Social History     Tobacco Use     Smoking status: Former Smoker     Smokeless tobacco: Never Used   Substance Use Topics     Alcohol use: Not Currently     Comment: rare social use     Drug use: No     Family History   I have reviewed this patient's family history and updated it with pertinent information if needed.  Family History   Problem Relation Age of Onset     Diabetes Sister      C.A.D. Sister      Breast Cancer Sister      Prior to Admission Medications   Prior to Admission Medications   Prescriptions Last Dose Informant Patient Reported? Taking?   ACCU-CHEK MILVIA MELODY  Self No No   Sig: dispense one meter   Continuous Blood Gluc  (FREESTYLE JAJA 14 DAY READER) MELODY   No No   Si each as needed (use to scan the sensor to check the blood sugars)   Continuous Blood Gluc Sensor (FREESTYLE JAJA 14 DAY SENSOR) [facility]   No No   Si each every 14 days   ELIQUIS ANTICOAGULANT 5 MG tablet   Yes No   Sig: Take 1 tablet by mouth 2 times daily   acetaminophen (TYLENOL) 650 MG CR tablet 8/10/2020  No No   Sig: Take 1 tablet (650 mg) by mouth 3 times daily as needed for mild pain or fever   aspirin (ASA) 81 MG tablet 8/10/2020  Yes No   Sig: Take 81 mg by mouth daily   biotin 1000 MCG TABS tablet   Yes No   Sig: Take 1,000 mcg by mouth daily   blood glucose monitoring (ACCU-CHEK MILVIA) test strip 2020  No No   Sig: Use to test blood sugars 4 times daily or as directed.   blood glucose monitoring (ACCU-CHEK MULTICLIX) lancets  Self No No   Sig: Test BG 4 times daily   Patient  taking differently: by In Vitro route 4 times daily Test BG 4 times daily   cholecalciferol 1000 units TABS   Yes No   Sig: Take 1,000 Units by mouth daily   ferrous sulfate (FEROSUL) 325 (65 Fe) MG tablet   No No   Sig: Take 1 tablet (325 mg) by mouth Every Mon, Wed, Fri Morning   fluocinonide (LIDEX) 0.05 % ointment   No No   Sig: Apply sparingly to rash on legs twice daily as needed.  Do not apply to face.   fluticasone (FLONASE) 50 MCG/ACT spray   No No   Sig: Spray 1 spray into both nostrils daily   furosemide (LASIX) 20 MG tablet   No No   Sig: Once a day for 3 days as needed for edema   gabapentin (NEURONTIN) 100 MG capsule   No No   Sig: Take 2 capsules (200 mg) by mouth At Bedtime   hydrocortisone (ANUSOL-HC) 2.5 % cream   No No   Sig: Place rectally 2 times daily as needed for hemorrhoids   insulin pen needle (BD GABRIELLA U/F) 32G X 4 MM   No No   Sig: Use 4 times per day or as directed.   levothyroxine (SYNTHROID/LEVOTHROID) 88 MCG tablet   Yes No   Sig: Take 88 mcg by mouth daily Except on Sunday   levothyroxine (SYNTHROID/LEVOTHROID) 88 MCG tablet   Yes No   Sig: Take 44 mcg by mouth once a week = 1/2  Tab on Sunday   loperamide (IMODIUM) 2 MG capsule   No No   Sig: One capsule once a day PRN, can use a second one if needed   magnesium 250 MG tablet   Yes No   Sig: Take 1 tablet by mouth daily   menthol-zinc oxide (CALMOSEPTINE) 0.44-20.625 % OINT ointment   No No   Sig: Apply topically 4 times daily as needed for skin protection   omeprazole (PRILOSEC) 20 MG DR capsule   No No   Sig: Take 1 capsule (20 mg) by mouth 2 times daily   order for DME   No No   Sig: Equipment being ordered: Hospital Bed service and repair   order for DME   No No   Sig: Equipment being ordered: Compression stockings   order for DME   No No   Sig: Equipment being ordered:  order for XL Size  Pull ups.  Pt is currently using 12 pull ups a day   order for DME   No No   Sig: Equipment being ordered: Incontinence briefs/Depends   order  for DME   No No   Sig: Equipment being ordered: Wheelchair   order for DME   No No   Sig: Equipment being ordered:   Incontinence Products: Gloves (4 Boxes) & chux pads   polyethylene glycol (MIRALAX) 17 GM/SCOOP powder   No No   Sig: Take 17 g (1 capful) by mouth daily   pramipexole (MIRAPEX) 0.25 MG tablet   No No   Sig: One 1-2 hours prior to HS, and q 8 hrs prn day   simvastatin (ZOCOR) 40 MG tablet   No No   Sig: Take 1 tablet (40 mg) by mouth daily      Facility-Administered Medications: None     Allergies   Allergies   Allergen Reactions     Cefepime Other (See Comments)     Neurotoxicity, ie, agitated delirium     Ciprofloxacin Anxiety     Pt developed agitation, paranoia while on cipro--on clear if this is what caused it.  But would try to avoid in future.     Hydrocodone Other (See Comments)     dizzy     Lisinopril Cough            Vicodin [Hydrocodone-Acetaminophen] Other (See Comments)     Caused extreme dizziness     Physical Exam   Vital Signs: Temp: 97.3  F (36.3  C) Temp src: Oral BP: (!) 154/73 Pulse: 74 Heart Rate: 88 Resp: 20 SpO2: 94 % O2 Device: None (Room air)    Weight: 191 lbs 2.22 oz    Constitutional: tearful initially needing to go to the bathroom, somewhat uncooperative, awake, alert, and appears stated age  Eyes: Lids and lashes normal, pupils equal, round and reactive to light, extra ocular muscles intact, sclera clear, conjunctiva normal  ENT: Normocephalic, without obvious abnormality, atraumatic, oral pharynx with moist mucous membranes.  Hematologic / Lymphatic: no cervical lymphadenopathy and no supraclavicular lymphadenopathy  Respiratory: No increased work of breathing, good air exchange, clear to auscultation bilaterally, no crackles or wheezing  Cardiovascular: regular rate and rhythm, and no murmur noted. Trace lower extremity edema, wraps in place initially.  GI: normal bowel sounds, soft, non-distended, non-tender  Genitounirinary: deferred  Skin: normal skin color,  texture, turgor  Musculoskeletal: normal bulk and tone. Moves all 4 extremities appropriately.  Neurologic: Awake, alert, oriented to name, place but not to time.  Cranial nerves II-XII are grossly intact. Strength is grossly 5/5 bilaterally in upper and lower extremities. Sensation to light touch is grossly intact. Difficulty with rest of exam due to cooperation/ability to follow instructions.  Neuropsychiatric: quite tearful, anxious, wants to go home, somewhat resistant of cares, short term memory altered: able to recall recent clinic visit with Dr. Martinez but believes she has been here in the hospital for a few days    Data   Data reviewed today: I reviewed all medications, new labs and imaging results over the last 24 hours. I personally reviewed no images or EKG's today.    Recent Labs   Lab 08/10/20  1946   WBC 4.8   HGB 12.1   MCV 90         POTASSIUM 3.9   CHLORIDE 111*   CO2 25   BUN 26   CR 1.44*   ANIONGAP 6   DWIGHT 8.7   *   ALBUMIN 2.6*   PROTTOTAL 6.3*   BILITOTAL 0.3   ALKPHOS 125   ALT 17   AST 25   TROPI 0.041     Recent Results (from the past 24 hour(s))   Abd/pelvis CT - no contrast - Stone Protocol    Narrative    EXAM: CT ABDOMEN PELVIS W/O CONTRAST  LOCATION: [facility]  DATE/TIME: 8/10/2020 10:27 PM    INDICATION: Confusion, hematuria  COMPARISON: 05/26/2020.  TECHNIQUE: CT scan of the abdomen and pelvis was performed without IV contrast. Multiplanar reformats were obtained. Dose reduction techniques were used.  CONTRAST: None.    FINDINGS:   LOWER CHEST: Redemonstrated right basilar scarring.    HEPATOBILIARY: Cholelithiasis. Nondistended gallbladder. Left liver lobe hypodense lesion measuring 1.4 cm on image 28 is stable.    PANCREAS: Normal.    SPLEEN: Normal.    ADRENAL GLANDS: Normal.    KIDNEYS/BLADDER: Evaluation is degraded by motion artifact. There is a simple cortical cyst measuring 2.8 cm at the right upper pole. There is no hydronephrosis. The  bladder is normal.    BOWEL: There is a large bowel containing paraumbilical hernia, though there is no obstructive change at this level. There is a moderate to large amount of formed stool in the colon. Anastomotic bowel sutures again noted at the rectosigmoid junction with   presacral soft tissue thickening.    LYMPH NODES: Normal.    VASCULATURE: Moderate to severe aortic atherosclerotic change without aneurysm.    PELVIC ORGANS: Previously seen left adnexal cystic focus less conspicuous on today's exam. Chronic presacral soft tissue thickening likely reflecting posttreatment change.    MUSCULOSKELETAL: Moderate lumbar spine degenerative changes. Severe arthritic change of the hips. Partially imaged left femoral intramedullary nail. Paraumbilical bowel containing hernia. Supraumbilical fat-containing ventral hernia or diastases.      Impression    IMPRESSION:   1.  Although evaluation is degraded by motion artifact, there is no hydronephrosis. The bladder is normal in appearance.    2.  Moderate to large amount of colonic stool suggesting constipation.    3.  Cholelithiasis.     Head CT w/o contrast    Narrative    EXAM: CT HEAD W/O CONTRAST  LOCATION: [facility]  DATE/TIME: 8/10/2020 10:22 PM    INDICATION: Elderly patient with increased  COMPARISON: 05/26/2020 CT head  TECHNIQUE: Routine without IV contrast. Multiplanar reformats. Dose reduction techniques were used.    FINDINGS:  INTRACRANIAL CONTENTS: No intracranial hemorrhage, extraaxial collection, or mass effect.  No CT evidence of acute infarct. Chronic lacunar infarct in the right frontal lobe white matter is unchanged. Severe presumed chronic small vessel ischemic   changes. Mild generalized volume loss. No hydrocephalus.     VISUALIZED ORBITS/SINUSES/MASTOIDS: Prior bilateral cataract surgery. Visualized portions of the orbits are otherwise unremarkable. No paranasal sinus mucosal disease. Scattered fluid/membrane thickening in the right  mastoid air cells. No apparent mass   in the posterior nasopharynx or skull base.    BONES/SOFT TISSUES: No acute abnormality.      Impression    IMPRESSION:  1.  No CT evidence for acute intracranial process. No significant interval change from the prior exam.  2.  Brain atrophy and presumed chronic microvascular ischemic changes as above.

## 2020-08-11 NOTE — CONSULTS
Care Transition Initial Assessment - RN      Met with: Family and Caregiver.    DATA  Principal Problem:    Altered mental status  Active Problems:    Hypothyroidism    Chronic ischemic heart disease    Esophageal reflux    RC-moderate (AHI 12, LSat 60%); REM RDI-73    Neuropathy (H)    Hyperlipidemia LDL goal <100    Rectal cancer- newly diagnosed adenoCA rectum Jan 2011 and Positive Tubular Adenoma 2019    Benign essential hypertension    Restless leg syndrome    Type 2 diabetes mellitus with diabetic nephropathy, with long-term current use of insulin (H)    Lumbar radiculopathy    CKD (chronic kidney disease) stage 4, GFR 15-29 ml/min (H)    H/O deep venous thrombosis         Primary Care Clinic Name: [facility]  Primary Care MD Name: Amandeep  Contact information and PCP information verified: Yes  Lives With: facility resident      Quality of Family Relationships: supportive, involved, helpful  Description of Support System: Supportive, Involved   Who is your support system?: Other (specify)(nieces)   Support Assessment: Adequate family and caregiver support   Insurance concerns: No Insurance issues identified  Admit date and time:  8/10/2020  5:29 PM        This writer spoke with patient's niece and health care agent, Dori via phone, introduced self and role.  Patient currently lives at [facility] (Phone: [phone] Fax:[phone]).  Spoke with JEWELS Monte ([phone]).  Confirmed current services include, dressing, grooming, medication management, meals.  Patient does not have any current home care services.  She recently discontinued home care nursing via [facility].  At baseline, patient has mild cognitive deficit per Mell, mostly forgetfulness.  She does NOT ambulate at baseline.  She stands, pivots and sits only, using her wheelchair for all transportation assist.  Patient uses medical insurance transportation when needed.  She is active with Komal Krishnan [facility]  care coordinator (see below for current services).  Emailed to notify of admission.      Discussed discharge planning and medicare guidelines in regards to home care and SNF benefits.  Dori states patient will plan to discharge back to [facility] upon discharge.  Discussed transportation options with Dori.  Discussed vendor, mode of transportation and anticipated private pay cost.  Dori agrees to private pay cost associated with [facility] services.  CTS to coordinate transportation upon discharge.      PLAN    Return to [facility]    MARIN Gilbert RN  Inpatient Care Coordinator  [facility] [phone]  [facility] [phone]      [facility] Care Coordination  Komal Krishnan RN-BC  Phone: [phone]     [facility] CARE PLAN SUMMARY     Member Name:  Stefanie Velazquez        *Assisted Living*  Address:   [place] [id number] Phone: [phone] (Home)    :  1945 Date of Assessment: 3/30/2020   Health Plan:  [facility]  Health Plan Number: [id number] Medical Assistance Number: [id number]  Financial Worker:    Case #:     P Care Coordinator:    Komal Krishnan RN-BC CC Phone: [phone]  CC Fax:  [phone]   FVP Enrollment Date: 2019 Case Mix:  A-  Rate Cell:  B   Waiver Type:  EW   Primary Emergency Contact: Dori Pena (niece)  Address: [address] [place] [address]  Mobile Phone: [phone]  Relation: Relative  Secondary Emergency Contact: Lori Pope (niece)           [place], [place]   Home Phone: [phone]  Mobile Phone: [phone]  Relation: Relative Language:  English  :  No   Health Care Agent/POA:  Yes  Changed   Crystal Dennis [phone] Advanced Directives/Living Will:  Yes   Primary Care Clinic/Phone/Fax:  [facility]/(p) [phone], (f) [phone] Primary Dx: Diabetes Type II   E11.21  Secondary Dx: CKD Stage 4 N18.4      Primary Physician:  Sandra Medina    Height:  5' 0''  Weight:  201 lbs   Specialty Physician:    Audiologist:     Eye Care Provider:   Dental Care Provider:    [facility]: [facility] [phone] or [phone]   Other:          [employer] CURRENT SERVICES SUMMARY  Equipment owned/DME history: WC 2/2020)  SERVICE TYPE/PROVIDER NAME/PHONE AUTH DATE FREQUENCY Units OR $ Amt DESCRIPTION   Medical Transportation: [facility] [phone]  Fax:  4/1/2020 - 3/31/2021 As needed Varies     [facility]  Phone: [phone]     Fax: 4/1/2020 - 3/31/2021 Monthly Per [employer]        DME: [facility] [phone]  Fax:  5/11/2020 - 3/31/2021 Monthly TBD Thick it  - ____ cans per month

## 2020-08-11 NOTE — PLAN OF CARE
"[facility] ADMISSION NOTE    Patient admitted to room 0030 at approximately 2401 via cart from emergency room. Patient was accompanied by transport tech.     Verbal SBAR report received from Luiza prior to patient arrival.     Patient trasferred to bed via air pam. Patient alert and oriented X 3. The patient is not having any pain. 0-10 Pain Scale: 0. Admission vital signs: Blood pressure (!) 181/83, pulse 76, temperature 97.7  F (36.5  C), temperature source Oral, resp. rate 20, height 1.727 m (5' 8\"), weight 86.7 kg (191 lb 2.2 oz), SpO2 94 %, not currently breastfeeding. Patient was oriented to plan of care, call light, bed controls, tv, telephone, bathroom, and visiting hours. Patient is difficult to understand when she is upset and frustrated but speaks clearly if she is calm and slow.  Patient afraid of cares ie;  sigifredo care, IV site, lab draws, blood glucose checks and taking pills.  Patient is also afraid of the dark and needs lights left on.  Patient needs lift assist.  Placed on commode 2 times this shift to void but no stool yet.  Patient can get very agitated at times. Patient is very hard of hearing and requires a pocket talker for communication.       Risk Assessment    The following safety risks were identified during admission: fall. Yellow risk band applied: YES.     Skin Initial Assessment    This writer admitted this patient and completed a full skin assessment and Efe score in the Adult PCS flowsheet. Appropriate interventions initiated as needed. Patient has large bruise on right hip and multiple bruising all over body.  Patient's coccyx red but blanchable.  Patient has a small area on inner left thigh that is shiny and pink.  Groin folds are red in groin and under breasts.      Secondary skin check completed by Shanice.    Efe Risk Assessment  Sensory Perception: 4-->no impairment  Moisture: 3-->occasionally moist  Activity: 2-->chairfast  Mobility: 2-->very limited  Nutrition: " 3-->adequate  Friction and Shear: 2-->potential problem  Efe Score: 16  Bed Support Surface: Atmos Air mattress  Efe Intervention(s) Implemented: antiembolic/splint/device removed for skin assessment, assistive lifting/lateral transfer device, draw sheets, encouraged fluids, fecal incontinence , HOB elevated 30 degrees or less, incontinence care, patient /family education on pressure injury prevention    Education    Patient has a [place] to Observation order: Yes  Observation education completed and documented: Yes      Renee Murphy RN

## 2020-08-11 NOTE — PROGRESS NOTES
This writer/charge nurse and MD attempted to get pt to take oral antibiotic-pt only argues and refuses to listen

## 2020-08-11 NOTE — UTILIZATION REVIEW
"Admission Status; Secondary Review Determination     Under the authority of the Utilization Management Committee, the utilization review process indicated a secondary review on the above patient.  The review outcome is based on review of the medical records, discussions with staff, and applying clinical experience noted on the date of the review.          (x) Observation Status Appropriate - This patient does not meet hospital inpatient criteria and is placed in observation status. If this patient's primary payer is Medicare and was admitted as an inpatient, Condition Code 44 should be used and patient status changed to \"observation\".     RATIONALE FOR DETERMINATION   76 yo female with CAD, hypertension, dyslipidemia, type 2 diabetes, chronic back pain, h/o rectal cancer, RLS, RC admitted with weakness and increased confusion. She resides in an assisted living facility and was noted to be more agitated and paranoid. This is similar to several previous admissions with increased confusion. Head CT with diffuse severe atrophy but no new bleed. She was treated as an outpatient for UTI and is now on bactrim. UA with blood, negative LE, nitrite, 11 WBC. Urine culture pending. WBC count normal (4.8) and remains afebrile. Appears likely to be an underlying progressive dementia with behavioral disturbances (paranoia, agitation) that is exacerbated by small changes in medical condition. At this time does not meet inpatient criteria, but noted that she has become so agitated in the past that she has required ICU treatment and intubation for airway protection. Will need to reassess tomorrow for continued stay observation vs inpatient status appropriateness.     The severity of illness, intensity of service provided, expected LOS and risk for adverse outcome make the care appropriate for further observation; however, doesn't meet criteria for hospital inpatient admission. Perri LUJAN notified of this determination. She " will discuss further with Dr. Gaytan.     This document was produced using voice recognition software.      The information on this document is developed by the utilization review team in order for the business office to ensure compliance.  This only denotes the appropriateness of proper admission status and does not reflect the quality of care rendered.         The definitions of Inpatient Status and Observation Status used in making the determination above are those provided in the CMS Coverage Manual, Chapter 1 and Chapter 6, section 70.4.      Sincerely,  Harriett Felix MD  Utilization Review  Physician Advisor  [facility].

## 2020-08-12 PROCEDURE — 25000132 ZZH RX MED GY IP 250 OP 250 PS 637: Performed by: FAMILY MEDICINE

## 2020-08-12 PROCEDURE — 99207 ZZC CDG-CODE CATEGORY CHANGED: CPT | Performed by: FAMILY MEDICINE

## 2020-08-12 PROCEDURE — 99226 ZZC SUBSEQUENT OBSERVATION CARE,LEVEL III: CPT | Performed by: FAMILY MEDICINE

## 2020-08-12 PROCEDURE — G0378 HOSPITAL OBSERVATION PER HR: HCPCS

## 2020-08-12 PROCEDURE — 25000132 ZZH RX MED GY IP 250 OP 250 PS 637: Performed by: PHYSICIAN ASSISTANT

## 2020-08-12 PROCEDURE — 25000132 ZZH RX MED GY IP 250 OP 250 PS 637: Performed by: INTERNAL MEDICINE

## 2020-08-12 RX ORDER — ASPIRIN 81 MG/1
81 TABLET, CHEWABLE ORAL DAILY
Status: DISCONTINUED | OUTPATIENT
Start: 2020-08-12 | End: 2020-08-13 | Stop reason: HOSPADM

## 2020-08-12 RX ADMIN — ASPIRIN 81 MG 81 MG: 81 TABLET ORAL at 13:12

## 2020-08-12 RX ADMIN — SULFAMETHOXAZOLE AND TRIMETHOPRIM 160 MG: 200; 40 SUSPENSION ORAL at 09:29

## 2020-08-12 RX ADMIN — DOCUSATE SODIUM 50 MG AND SENNOSIDES 8.6 MG 1 TABLET: 8.6; 5 TABLET, FILM COATED ORAL at 09:29

## 2020-08-12 RX ADMIN — SULFAMETHOXAZOLE AND TRIMETHOPRIM 160 MG: 200; 40 SUSPENSION ORAL at 21:18

## 2020-08-12 RX ADMIN — APIXABAN 5 MG: 5 TABLET, FILM COATED ORAL at 09:30

## 2020-08-12 RX ADMIN — LEVOTHYROXINE SODIUM 88 MCG: 0.09 TABLET ORAL at 06:29

## 2020-08-12 RX ADMIN — OMEPRAZOLE 20 MG: 20 CAPSULE, DELAYED RELEASE ORAL at 09:30

## 2020-08-12 RX ADMIN — SIMVASTATIN 40 MG: 40 TABLET, FILM COATED ORAL at 09:29

## 2020-08-12 NOTE — PLAN OF CARE
"Patient slept soundly until 0500. Cooperated with vital signs. Oriented to person and place but disoriented to time and occasionally situation. Asks questions such as \"which procedure am I having\" and \"when does my assistant come in\". Incontinence care provided.   Marva Kingston RN on 8/12/2020 at 5:56 AM    "

## 2020-08-12 NOTE — PLAN OF CARE
"Patient up in chair for breakfast. Upset with attempt of lab draw, was calmed down and able to speak with ninfa Sandoval. Utilizing pocket talker as patient is very hard of hearing. Patient requested periwick for incontinence needs. Took scheduled medications this morning per MD order. VS stable, afebrile. BP (!) 161/58 (BP Location: Right arm)   Pulse 67   Temp 97.6  F (36.4  C) (Oral)   Resp 18   Ht 1.727 m (5' 8\")   Wt 86.7 kg (191 lb 2.2 oz)   SpO2 96%   BMI 29.06 kg/m      "

## 2020-08-12 NOTE — PROGRESS NOTES
Pt VSS.  Tolerating food and fluids.  Pt is very [place] and uses a pocket talker. Lift used to assist pt to commode. Up to chair 2-3 x today.  Alarms on. Calling out frequently throughout the day.  Refused needle sticks/medication/new iv and taking antibiotic.  Oriented x4 however very argumentative.  Md is aware and charge nurse has assisted with this pt multiple times

## 2020-08-12 NOTE — PLAN OF CARE
"Nursing Assessment:   Patient is alert and oriented, was disoriented to situation/time at certain times.  Able to make needs known.  Writer sat in room with patient discussing the plan of care and her reason for needing to stay at hospital over night.  Patient agreed to take her medications with pudding.   Took liquid antibiotic.  PRN klonopin given to help with some agitation patient was having.    No IVs in.  Bruising to skin.  Lift up to bsc to have a bm. Incontinent of urine.     Vital signs:  Temp: 97.2  F (36.2  C) Temp src: Oral BP: (!) 169/92 Pulse: 76 Heart Rate: 67 Resp: 20 SpO2: 96 % O2 Device: None (Room air)   Height: 172.7 cm (5' 8\") Weight: 86.7 kg (191 lb 2.2 oz)  Estimated body mass index is 29.06 kg/m  as calculated from the following:    Height as of this encounter: 1.727 m (5' 8\").    Weight as of this encounter: 86.7 kg (191 lb 2.2 oz).     Nelida Street RN on 8/11/2020 at 9:10 PM    "

## 2020-08-12 NOTE — PROGRESS NOTES
[facility]      Subjective:  Patient denies current physical problems.  But she is oppositional and refusing cares.  The nurses report paranoia.    Review of Systems:  CONSTITUTIONAL: NEGATIVE for fever, chills, change in weight  INTEGUMENTARY/SKIN: NEGATIVE for worrisome rashes, moles or lesions  EYES: NEGATIVE for vision changes or irritation  ENT/MOUTH: NEGATIVE for ear, mouth and throat problems  RESP: NEGATIVE for significant cough or SOB  BREAST: NEGATIVE for masses, tenderness or discharge  CV: NEGATIVE for chest pain, palpitations or peripheral edema  GI: NEGATIVE for nausea, abdominal pain, heartburn, or change in bowel habits  : NEGATIVE for frequency, dysuria, or hematuria  MUSCULOSKELETAL: NEGATIVE for significant arthralgias or myalgia  NEURO: abovce  ENDOCRINE: NEGATIVE for temperature intolerance, skin/hair changes  HEME: NEGATIVE for bleeding problems  PSYCHIATRIC: above    Physical Exam:  Vitals Were Reviewed    Patient Vitals for the past 16 hrs:   BP Temp Temp src Pulse Resp SpO2   08/12/20 1056 (!) 161/58 97.6  F (36.4  C) Oral 67 18 96 %   08/12/20 0804 (!) 162/71 97.1  F (36.2  C) Oral 60 16 96 %   08/12/20 0530 (!) 168/73 98.2  F (36.8  C) Axillary 71 18 99 %       No intake or output data in the 24 hours ending 08/12/20 1221    GENERAL APPEARANCE: healthy, alert and no distress  EYES: conjunctiva clear, eyes grossly normal  RESP: lungs clear to auscultation - no rales, rhonchi or wheezes  CV: regular rate and rhythm, normal S1 S2, no S3 or S4 and no murmur, click or rub   ABDOMEN: soft, nontender, no HSM or masses and bowel sounds normal  MS: no clubbing, cyanosis; no edema  SKIN: clear without significant rashes or lesions  NEURO: She is oriented to time and place but she seems paranoid and oppositional.  I saw no focal lesion.    Lab:  Recent Labs   Lab Test 08/10/20  1946 07/15/20  0853    139   POTASSIUM 3.9 3.7   CHLORIDE 111* 108   CO2 25 23   ANIONGAP 6  8   * 149*   BUN 26 28   CR 1.44* 1.27*   DWIGHT 8.7 8.7     CBC RESULTS:   Recent Labs   Lab Test 08/10/20  1946 07/15/20  0853   WBC 4.8 4.3   RBC 4.02 3.88   HGB 12.1 11.5*   HCT 36.0 35.4    211       No results found. However, due to the size of the patient record, not all encounters were searched. Please check Results Review for a complete set of results.    Assessment and Plan:    Stefanie Velazquez is a 75 year old female admitted on 8/10/2020. She has history of coronary artery disease, hypertension, hyperlipidemia, DVT, type 2 diabetes, chronic back pain, hypothyroidism, rectal cancer, GERD, restless leg syndrome and RC. She presents with fatigue, weakness and increased confusion.     Acute Encephalopathy, likely Metabolic with possible underlying cognitive decline  Increasing fatigue, weakness, confusion/paranoia at [facility] over past 2 days. Similar to previous changes noted prior to last hospitalizations. Neurological exam non-focal. Labs show abnormal UA, possibly consistent with infection. CT abdomen/pelvis without acute findings to explain. CT head without acute findings. On admission ongoing mild paranoia and agitation though fairly oriented, though difficulties with short term memory (believes she has been here for several days).  She has had 3 other admissions this year for AMS which each had differing suspected etiologies: possible stroke, aspiration pneumonia and UTI vs. Mirapex use. There has been consideration that an underlying cognitive decline contributing to her episodes. Differential includes infectious, medication related, vs other.   Etiology not entirely clear though suspect UTI. Plan to treat with IV antibiotics and monitor.    - follow urine culture  - antibiotics: oral Bactrim (initially ordered for IV though refusing IV, will use oral based on past sensitivities)  - monitor mental status  - avoid antipsychotic medications, has likely exacerbated symptoms in the past   - may  benefit from outpatient neurology/neuropsychiatry evaluation as outpatient to evaluate baseline cognitive function    This is a recurrent problem and an underlying dementing process such as Lewy Body Ds is favored.  Is not clear that UTI is part of the problem.     Abnormal UA, suspect UTI  Proteinuria  UA shows blood, WBC and protein. Previous urine cultures have grown different bacteria, last showed Hafnia species which was resistant to multiple antibiotics. Prior to that E.coli which was pan-sensitive.  - antibiotics: oral Bactrim (initially ordered for IV though refusing IV, will use oral based on past sensitivities)  -UC is still pending     Elevated Creatinine on CKD  Creatinine 1.44, GFR 35. Baseline creatinine 1.2-1.3, GFR 39-43. Current renal function below typical baseline. UA shows protein >499, blood and WBC.    Refusing repeat blood draw     Coronary Artery Disease  History of 3 vessel CABG in 2002. Unable to tolerate ACEi or beta blocker due to hypotension.  - Continue home simvastatin, asa     History of Hypertension  Patient managed prior to admission with Lasix 20 mg BID.   - Hold furosemide given elevation in creatinine     Hyperlipidemia  Previously diagnosed.   - Continue home simvastatin      History of DVT  Previously diagnosed.   - Continue home apixaban      Diabetes Mellitus, Type II  Chronic. Last a1C 6.1 on 6/2020. Not currently on medications   - Moderate CHO diet  - Medium sliding scale insulin  - monitor for hypo/hyerglycemia  ADDENDUM: patient refusing insulin/accuchecks, given good control as outpatient will stop checking to minimize increasing agitation.     Lumbar Radiculopathy   Neuropathy  Previously diagnosed.    - continue home gabapentin      Hypothyroidism  Chronic. TSH 3.92 on presentation.   - Continue home levothyroxine     Gastroesophageal reflux disease  Previously diagnosed.    - Continue home omeprazole     Restless Leg Syndrome  Previously diagnosed. Pramipexole  previously stopped due to concerns of AMS with this medications. PCP restarted 6/2020 at lower dose.    - continue home pramixpexole at lower dose of 0.125 mg at bedtime prn     RC  Previously diagnosed. On CPAP at assisted living facility, follows with sleep medicine.     Rectal Cancer  Follows with Dr. Martinez, diagnosed in 2011, underwent neoadjuvant chemoradiation, resection with reanastomosis, adjuvant chemorherapy. Follows yearly with oncology, last seen 7/2020, no evidence of recurrence or plans for further follow up based on stability.  - Continue outpatient surveillance     Diet: Consistent Carbohydrate Diet 5219-5095 Calories: Moderate Consistent CHO (4-6 CHO units/meal)    DVT Prophylaxis: apixaban  Lr Catheter: not present  Code Status: Full code    Plan- await UC, I talked to social service and we are going to work on a higher level of care.  Continue empiric Bactrim.

## 2020-08-12 NOTE — UTILIZATION REVIEW
"Concurrent stay review; Secondary Review Determination     Under the authority of the Utilization Management Committee, the utilization review process indicated a secondary review on the above patient.  The review outcome is based on review of the medical records, discussions with staff, and applying clinical experience noted on the date of the review.          (x) Observation Status Appropriate - Concurrent stay review    RATIONALE FOR DETERMINATION   This is a continued stay observation review. Per my note from yesterday: \"76 yo female with CAD, hypertension, dyslipidemia, type 2 diabetes, chronic back pain, h/o rectal cancer, RLS, RC admitted with weakness and increased confusion. She resides in an assisted living facility and was noted to be more agitated and paranoid. This is similar to several previous admissions with increased confusion. Head CT with diffuse severe atrophy but no new bleed. She was treated as an outpatient for UTI and is now on bactrim. UA with blood, negative LE, nitrite, 11 WBC. Urine culture pending. WBC count normal (4.8) and remains afebrile. Appears likely to be an underlying progressive dementia with behavioral disturbances (paranoia, agitation) that is exacerbated by small changes in medical condition. At this time does not meet inpatient criteria, but noted that she has become so agitated in the past that she has required ICU treatment and intubation for airway protection\".     No events overnight. Urine culture with no updated result. Per physician documentation today, the patient likely has an underlying dementing process that has continued to progress per the natural course of disease. Lewy Body dementia would fit with escalating confusion, agitation, paranoid symptoms. Not clear that potential UTI is causing current symptoms. Hospitalization is likely to make dementing process worse the longer she stays. Anticipate she will need a higher level of care.     Patient is clinically " improving and there is no clear indication to change patient's status to inpatient. The severity of illness, intensity of service provided, expected LOS and risk for adverse outcome make the care appropriate for observation.    This document was produced using voice recognition software     The information on this document is developed by the utilization review team in order for the business office to ensure compliance.  This only denotes the appropriateness of proper admission status and does not reflect the quality of care rendered.         The definitions of Inpatient Status and Observation Status used in making the determination above are those provided in the CMS Coverage Manual, Chapter 1 and Chapter 6, section 70.4.      Sincerely,   Harriett Felix MD  Utilization Review  Physician Advisor  [facility].   Statement Selected

## 2020-08-12 NOTE — PROGRESS NOTES
Reason for Follow up: discharge planning    Anticipated discharge needs: Spoke with JEWELS Monte @ [facility] ([phone]).  Mell spoke directly with nursing staff today regarding current status.  Due to patient's status and ongoing behavioral issues, Mell states the only way patient is able to return to [facility] is if patient is agreeable to use [facility] provider group (for easier access to providers) or is patient is enrolled in hospice (added layer of support).      Met with patient and Lori ocasio.  Discussed discharge plans.  Patient is VERY motivated to return to [facility] (Phone: [phone] Fax:[phone]).  She is adamantly against using the [facility] group of providers.  She is adamant about continuing to follow with Dr. Bernstein.  She is agreeable to hospice services.  Explained medicare regulations related to hospice.  Discussed with Dr. Desir.  He is agreeable.      Pt/family was provided with the Medicare Compare list for Hospice.  Discussed associated Medicare star ratings to assist with choice for referrals/discharge planning Yes    Education was given to pt/family that star ratings are updated/maintained by Medicare and can be reviewed by visiting www.medicare.gov Yes    Patient and family choose to use [facility] (Phone: [phone] Fax: [phone]).  Spoke with Radha @ [facility].  Faxed appropriate clinical documentation.  Radha is reviewing documents, however feels patient will meet criteria to qualify for hospice.  Radha confirms patient will be able to maintain current PCP while on hospice.  Hospice order/referral placed.      To fully explain hospice plan and assess patient, Radha has planned an in-person meeting with patient and Lori ocasio at 0900 on 8/13 at [facility].  Discussed directly with nursing management for allowed additional visitor.      Spoke with Mell @ [facility] with plan for hospice.  Mell is agreeable for patient to return to [facility] upon discharge  following Radha's assessment and enrollment in hospice, likely 8/13.      CTS to arrange transportation upon discharge.     Left message with  [facility] Care Coordinator:  Komal Krishnan, RN-BC ([phone]) regarding changed discharge plans.      Next steps: CTS to follow    Discharge Planner   Discharge Plans in progress: return to [facility]/ [facility]  Barriers to discharge plan: medical stability  Follow up plan: CTS to follow       Entered by: Magdalena Hamilton 08/12/2020 3:06 PM       Magdalena Hamilton, BSN RN  Inpatient RN care coordinator  [facility] [phone]  [facility] [phone]

## 2020-08-13 VITALS
HEART RATE: 72 BPM | DIASTOLIC BLOOD PRESSURE: 55 MMHG | SYSTOLIC BLOOD PRESSURE: 140 MMHG | HEIGHT: 68 IN | RESPIRATION RATE: 18 BRPM | WEIGHT: 191.14 LBS | BODY MASS INDEX: 28.97 KG/M2 | OXYGEN SATURATION: 95 % | TEMPERATURE: 98.3 F

## 2020-08-13 PROCEDURE — 99207 ZZC CDG-CODE CATEGORY CHANGED: CPT | Performed by: FAMILY MEDICINE

## 2020-08-13 PROCEDURE — G0378 HOSPITAL OBSERVATION PER HR: HCPCS

## 2020-08-13 PROCEDURE — 99217 ZZC OBSERVATION CARE DISCHARGE: CPT | Performed by: FAMILY MEDICINE

## 2020-08-13 PROCEDURE — 25000132 ZZH RX MED GY IP 250 OP 250 PS 637: Performed by: INTERNAL MEDICINE

## 2020-08-13 RX ORDER — DONEPEZIL HYDROCHLORIDE 5 MG/1
5 TABLET, FILM COATED ORAL AT BEDTIME
COMMUNITY
Start: 2020-08-13 | End: 2020-08-13

## 2020-08-13 RX ORDER — SULFAMETHOXAZOLE AND TRIMETHOPRIM 200; 40 MG/5ML; MG/5ML
20 SUSPENSION ORAL 2 TIMES DAILY
Qty: 200 ML | Refills: 0 | Status: SHIPPED | OUTPATIENT
Start: 2020-08-13 | End: 2020-08-19

## 2020-08-13 RX ORDER — DONEPEZIL HYDROCHLORIDE 5 MG/1
5 TABLET, FILM COATED ORAL AT BEDTIME
Qty: 30 TABLET | Refills: 0 | Status: SHIPPED | OUTPATIENT
Start: 2020-08-13 | End: 2020-10-16

## 2020-08-13 RX ORDER — CLONAZEPAM 0.5 MG/1
0.5 TABLET ORAL
Qty: 14 TABLET | Refills: 0 | Status: ON HOLD | OUTPATIENT
Start: 2020-08-13 | End: 2020-08-31

## 2020-08-13 RX ADMIN — SULFAMETHOXAZOLE AND TRIMETHOPRIM 160 MG: 200; 40 SUSPENSION ORAL at 08:36

## 2020-08-13 NOTE — PLAN OF CARE
"Alert, oriented to place, repetitive rambling. Placed with sitter d/t behaviors, safety and increased risk of injury. (see previous note) Patient in wheelchair currently, much more calm, has been awake all shift and stating \"I'm not going to sleep.\" Refused all bedtime medication, did agree to take liquid antibiotic. Yfn Sandoval to arrive aprox 0800, meeting with [facility] 0900.   "

## 2020-08-13 NOTE — PLAN OF CARE
[place] [facility] DISCHARGE NOTE    Patient discharged to assisted living at 2:46 PM via wheel chair. Accompanied by [facility] transportation staff. Discharge instructions reviewed with patient and neice , opportunity offered to ask questions. Prescriptions sent with patient to fill at assisted living. All belongings sent with patient.    Debbie Christianson RN

## 2020-08-13 NOTE — PROGRESS NOTES
CARE TRANSITIONS DISCHARGE NOTE:    PLAN:  [facility] and enroll with [facility].     Name: Stefanie Velazquez    MRN#: [id number]    Reason for Hospitalization: Altered mental status [R41.82]  Elevated blood pressure reading [R03.0]  Generalized muscle weakness [M62.81]    Discharge Date: 8/13/2020    Patient / Family response to discharge plan: SW met with pt, her niece ([facility]) Lori and they are in agreement with this plan of care.    Other Providers (Care Coordinator, County Services, PCA services etc): Yes: Komal VELEZ,  [facility], [facility] staff.    CTS Hand Off Completed: Yes: PCP     PAS #: N/A    DEVON Score: VERY HIGH    Future Appointments: No future appointments.    Discharge Disposition: [facility] with [facility]    Discharge Planner   Discharge Plans in progress: Return to care home with hospice  Barriers to discharge plan: None  Follow up plan: care home & Hospice staff to follow       Entered by: Gina Nayak 08/13/2020 10:37 AM       [facility]ina [facility] [phone]   [facility]  [phone]

## 2020-08-13 NOTE — PROGRESS NOTES
[facility]      Subjective:  Nothing new-wants to discharge    Review of Systems:  CONSTITUTIONAL: NEGATIVE for fever, chills, change in weight  ENT/MOUTH: NEGATIVE for ear, mouth and throat problems  RESP: NEGATIVE for significant cough or SOB  CV: NEGATIVE for chest pain, palpitations or peripheral edema    Physical Exam:  Vitals Were Reviewed    Patient Vitals for the past 16 hrs:   BP Temp Temp src Pulse Resp SpO2   08/13/20 0735 (!) 140/55 98.3  F (36.8  C) Oral 72 18 95 %   08/13/20 0300 (!) 161/62 98  F (36.7  C) Oral 65 16 94 %   08/12/20 2223 (!) 153/63 98  F (36.7  C) Oral 67 18 94 %       No intake or output data in the 24 hours ending 08/13/20 1356    GENERAL APPEARANCE: healthy, alert and no distress  RESP: lungs clear to auscultation - no rales, rhonchi or wheezes  CV: regular rate and rhythm, normal S1 S2, no S3 or S4 and no murmur, click or rub   ABDOMEN: soft, nontender, no HSM or masses and bowel sounds normal  MS: no clubbing, cyanosis; tr edema  SKIN: clear without significant rashes or lesions  NEURO: She is again oppositional and somewhat paranoid but oriented.    Lab:  Recent Labs   Lab Test 08/10/20  1946 07/15/20  0853    139   POTASSIUM 3.9 3.7   CHLORIDE 111* 108   CO2 25 23   ANIONGAP 6 8   * 149*   BUN 26 28   CR 1.44* 1.27*   DWIGHT 8.7 8.7     CBC RESULTS:   Recent Labs   Lab Test 08/10/20  1946 07/15/20  0853   WBC 4.8 4.3   RBC 4.02 3.88   HGB 12.1 11.5*   HCT 36.0 35.4    211       No results found. However, due to the size of the patient record, not all encounters were searched. Please check Results Review for a complete set of results.    Assessment and Plan:    Stefanie Velazquez is a 75 year old female admitted on 8/10/2020. She has history of coronary artery disease, hypertension, hyperlipidemia, DVT, type 2 diabetes, chronic back pain, hypothyroidism, rectal cancer, GERD, restless leg syndrome and RC. She presents with fatigue, weakness  and increased confusion.     Acute Encephalopathy, likely Metabolic with possible underlying cognitive decline  Increasing fatigue, weakness, confusion/paranoia at [facility] over past 2 days. Similar to previous changes noted prior to last hospitalizations. Neurological exam non-focal. Labs show abnormal UA, possibly consistent with infection. CT abdomen/pelvis without acute findings to explain. CT head without acute findings. On admission ongoing mild paranoia and agitation though fairly oriented, though difficulties with short term memory (believes she has been here for several days).  She has had 3 other admissions this year for AMS which each had differing suspected etiologies     Ultimately patient appeared to have more chronic brain process.  Our consensus is that this may be consistent with Lewy body dementia given the fact that it fluctuates.  Aricept was started.  We avoided antipsychotic agents.  Klonopin was started as needed nightly.     Abnormal UA, suspect UTI  Proteinuria  Urine culture is growing out a low level of gram-negative rods.  Discharge will be on Septra.  I doubt that the UTI is the sole cause of her encephalopathy.     Elevated Creatinine on CKD  Creatinine 1.44, GFR 35. Baseline creatinine 1.2-1.3, GFR 39-43. Current renal function below typical baseline. UA shows protein >499, blood and WBC.     Refusing repeat blood draw     Coronary Artery Disease  History of 3 vessel CABG in 2002. Unable to tolerate ACEi or beta blocker due to hypotension.  - Continue home simvastatin, asa     History of Hypertension  Patient managed prior to admission with Lasix 20 mg BID.   - Hold furosemide given elevation in creatinine     Hyperlipidemia  Previously diagnosed.   - Continue home simvastatin      History of DVT  Previously diagnosed.   - Continue home apixaban      Diabetes Mellitus, Type II  Chronic. Last a1C 6.1 on 6/2020. Not currently on medications   - Moderate CHO diet  - Medium sliding scale  insulin  - monitor for hypo/hyerglycemia  ADDENDUM: patient refusing insulin/accuchecks, given good control as outpatient will stop checking to minimize increasing agitation.     Lumbar Radiculopathy   Neuropathy  Previously diagnosed.    - continue home gabapentin      Hypothyroidism  Chronic. TSH 3.92 on presentation.   - Continue home levothyroxine     Gastroesophageal reflux disease  Previously diagnosed.    - Continue home omeprazole     Restless Leg Syndrome  Previously diagnosed. Pramipexole previously stopped due to concerns of AMS with this medications. PCP restarted 6/2020 at lower dose.    - continue home pramixpexole at lower dose of 0.125 mg at bedtime prn     RC  Previously diagnosed. On CPAP at assisted living facility, follows with sleep medicine.     Rectal Cancer  Follows with Dr. Martinez, diagnosed in 2011, underwent neoadjuvant chemoradiation, resection with reanastomosis, adjuvant chemorherapy. Follows yearly with oncology, last seen 7/2020, no evidence of recurrence or plans for further follow up based on stability.  - Continue outpatient surveillance     Diet: Consistent Carbohydrate Diet 3441-9141 Calories: Moderate Consistent CHO (4-6 CHO units/meal)    DVT Prophylaxis: apixaban  Lr Catheter: not present  Code Status: Full code     Plan-ultimately there is concern for Lewy body dementia.  She has some low-level UTI.  I doubt the UTI is the sole cause of the encephalopathy.  Ultimately she is going to discharge back to her previous facility with hospice care.  I discussed the case again with her niece who was present today.

## 2020-08-13 NOTE — PLAN OF CARE
"Patient up in chair eating breakfast. Currently refusing to take morning medications, stating she won't until she has bottled water. Requested from the kitchen. Patient repeatedly stating that these do not look like the pills that her primary MD prescribed her. Patient's niece at bedside, explaining to patient that we are trying to help her. Patient has had 5 bm's since dinner last evening, so held scheduled senna. With last bowel movement, aide noted that there was a small amount of blood on wipe while cleaning patient. It was noted that early this AM it was charted that patient had a black stool. Updated MD.  Patient agreeable to vital sign checks. Plan for hospice meeting at 0900 with niece and patient at bedside. BP (!) 140/55   Pulse 72   Temp 98.3  F (36.8  C) (Oral)   Resp 18   Ht 1.727 m (5' 8\")   Wt 86.7 kg (191 lb 2.2 oz)   SpO2 95%   BMI 29.06 kg/m      "

## 2020-08-13 NOTE — PROGRESS NOTES
"Patient becoming increasingly agitated, paranoid, threatening. Stating she will be suing, speaking with her , \"throw myself on the floor\", attempting to scoot to edge of bed, third side rail put up, patient continued to throw legs over edge of bed, got scooted inbetween rails, firmly attempting to push self through side rails attempting to get to floor, refusing to adjust to a more safe position. Patient assisted back into bed, then attempted to break pocket talker by breaking wires and headphones. Pocket talker removed from patients reach, she was told that if she is going to break it she will not be able to use it, she was told when she will not attempt to break it she will be able to have it back. Patient continued attempting to slap, bite, kick and \"throw myself on the floor\" Initiated 1:1 sitter due to significantsafety risk, risk of fall, injury d/t behaviors.     VELMA Miller RN  "

## 2020-08-13 NOTE — PROGRESS NOTES
Called Chantell LANDRY of [place] in attempt to give warm hand off. Awaiting call back from JEWELS Monte.

## 2020-08-13 NOTE — DISCHARGE SUMMARY
Admit Date:     08/10/2020   Discharge Date:           HISTORY OF PRESENT ILLNESS:  Stefanie Velazquez is a 75-year-old female with a history of coronary artery disease, hypertension, hyperlipidemia, DVT, type 2 diabetes, chronic back pain, hypothyroidism, rectal cancer, GERD, restless leg syndrome and obstructive sleep apnea.  She presented with fatigue, weakness and increased confusion.      The patient has a history of recent recurrent episodes of encephalopathy associated with agitation and paranoia.  On one of these recent episodes she became so agitated and combative that she ultimately ended up needing intubation.  These episodes have generally been associated with urinary tract infection, but there has really been no obvious connection between urinary tract infection and her encephalopathy.  There has been concern that antipsychotic medication has actually worsened her agitation.  She ultimately seems to clear and be left with some mild cognitive dysfunction.      On this admission, she presented with confusion and paranoia.  She seemed to clear somewhat during her hospitalization.  Antipsychotics were avoided because of concern that she might have Lewy body dementia.  Lewy body dementia was questioned because her mental status seems to fluctuate.  She was found to have some low level gram-negative rods in her urine.  Identification and antibiotic sensitivities are pending at this time.  The patient was treated with oral Bactrim in the hospital.      She was initially quite oppositional and refused a lot of cares.  She continued to have some paranoia and some mild agitation.  We had multiple discussions with her nieces and ultimately a plan was made that she would return to her facility, which I believe is some type of assisted living facility if she would have a hospice consult.      She was started on Aricept and also some p.r.n. nighttime Klonopin during this hospitalization in hopes that this might help her  long-term cognitive status in light of suspected Lewy body dementia.      Her creatinine was 1.44.  Upon admission, she did not allow a repeat blood draw.      ASSESSMENT:   1.  Recurrent episodes of acute encephalopathy, probably with underlying dementia or cognitive decline -- Lewy body dementia is suspected.   2.  Low level uti with gram negative aron -- complete laboratory workup pending, Septra prescribed.   3.  Elevated creatinine upon admission (1.44) -- the patient refused subsequent blood draws.   4.  History of coronary artery disease.   5.  Hypertension.   6.  Hyperlipidemia.   7.  DVT.   8.  Diabetes mellitus type 2.   9.  Lumbar radiculopathy.   10.  Hypothyroidism.   11.  GERD.   12.  Restless leg syndrome.   13.  Obstructive sleep apnea -- uses CPAP.   14.  History of rectal cancer, followed by Dr. Martinez.      PLAN:  She has been started on Aricept.  She can use p.r.n. nightly Klonopin.  We are going to try to avoid typical antipsychotic drugs due to concern about Lewy body dementia..  She is getting a hospice consult.  I think depending on future goals of care, she could be considered for a neuropsych consult.  Our hopes at this point would be that we could limit readmission.     Current Discharge Medication List      START taking these medications    Details   clonazePAM (KLONOPIN) 0.5 MG tablet Take 1 tablet (0.5 mg) by mouth nightly as needed for anxiety or sleep  Qty: 14 tablet, Refills: 0    Associated Diagnoses: Lewy body dementia without behavioral disturbance (H)      sulfamethoxazole-trimethoprim (BACTRIM/SEPTRA) 8 mg/mL suspension Take 20 mLs (160 mg) by mouth 2 times daily for 5 days  Qty: 200 mL, Refills: 0    Comments: Dose based on TMP component.  Associated Diagnoses: Dysuria         CONTINUE these medications which have CHANGED    Details   donepezil (ARICEPT) 5 MG tablet Take 1 tablet (5 mg) by mouth At Bedtime  Qty: 30 tablet, Refills: 0    Associated Diagnoses: Lewy body dementia  with behavioral disturbance (H)         CONTINUE these medications which have NOT CHANGED    Details   ACCU-CHEK MILVIA MELODY dispense one meter  Qty: 1 device, Refills: 0    Comments: Diagnosis code: 250.02  Associated Diagnoses: Type II or unspecified type diabetes mellitus without mention of complication, uncontrolled      acetaminophen (TYLENOL) 650 MG CR tablet Take 1 tablet (650 mg) by mouth 3 times daily as needed for mild pain or fever  Qty: 60 tablet    Associated Diagnoses: Bilateral cellulitis of lower leg      aspirin (ASA) 81 MG tablet Take 81 mg by mouth daily      biotin 1000 MCG TABS tablet Take 1,000 mcg by mouth daily      blood glucose monitoring (ACCU-CHEK MILVIA) test strip Use to test blood sugars 4 times daily or as directed.  Qty: 360 each, Refills: 1    Associated Diagnoses: Type 2 diabetes mellitus with diabetic nephropathy, with long-term current use of insulin (H)      blood glucose monitoring (ACCU-CHEK MULTICLIX) lancets Test BG 4 times daily  Qty: 1 Box, Refills: 11    Associated Diagnoses: Type II or unspecified type diabetes mellitus without mention of complication, uncontrolled      cholecalciferol 1000 units TABS Take 1,000 Units by mouth daily      Continuous Blood Gluc  (FREESTYLE JAJA 14 DAY READER) MELODY 1 each as needed (use to scan the sensor to check the blood sugars)  Qty: 1 Device, Refills: 0    Associated Diagnoses: Type 2 diabetes mellitus with diabetic nephropathy, with long-term current use of insulin (H)      Continuous Blood Gluc Sensor (FREESTYLE JAJA 14 DAY SENSOR) MISC 1 each every 14 days  Qty: 2 each, Refills: 11    Comments: Pt at an assisted living so make sure you follow up if no call back  Associated Diagnoses: Type 2 diabetes mellitus with diabetic nephropathy, with long-term current use of insulin (H)      ELIQUIS ANTICOAGULANT 5 MG tablet Take 1 tablet by mouth 2 times daily      ferrous sulfate (FEROSUL) 325 (65 Fe) MG tablet Take 1 tablet (325 mg)  by mouth Every Mon, Wed, Fri Morning  Qty: 30 tablet, Refills: 1    Associated Diagnoses: Anemia, unspecified type      fluocinonide (LIDEX) 0.05 % ointment Apply sparingly to rash on legs twice daily as needed.  Do not apply to face.  Qty: 60 g, Refills: 11    Associated Diagnoses: Venous stasis dermatitis of both lower extremities      fluticasone (FLONASE) 50 MCG/ACT spray Spray 1 spray into both nostrils daily  Qty: 1 Bottle, Refills: 3    Associated Diagnoses: Chronic rhinitis, unspecified type      furosemide (LASIX) 20 MG tablet Once a day for 3 days as needed for edema  Qty: 30 tablet, Refills: 3    Associated Diagnoses: Edema, unspecified type      gabapentin (NEURONTIN) 100 MG capsule Take 2 capsules (200 mg) by mouth At Bedtime  Qty: 180 capsule, Refills: 1    Associated Diagnoses: Lumbar radiculopathy      hydrocortisone (ANUSOL-HC) 2.5 % cream Place rectally 2 times daily as needed for hemorrhoids  Qty: 30 g, Refills: 0    Associated Diagnoses: External hemorrhoids      insulin pen needle (BD GABRIELLA U/F) 32G X 4 MM Use 4 times per day or as directed.  Qty: 120 each, Refills: 5    Associated Diagnoses: Type 2 diabetes mellitus with diabetic nephropathy, with long-term current use of insulin (H)      !! levothyroxine (SYNTHROID/LEVOTHROID) 88 MCG tablet Take 44 mcg by mouth once a week = 1/2  Tab on Sunday      !! levothyroxine (SYNTHROID/LEVOTHROID) 88 MCG tablet Take 88 mcg by mouth daily Except on Sunday      loperamide (IMODIUM) 2 MG capsule One capsule once a day PRN, can use a second one if needed  Qty: 90 capsule, Refills: 3    Associated Diagnoses: Rectal cancer (H); Chronic diarrhea      magnesium 250 MG tablet Take 1 tablet by mouth daily      menthol-zinc oxide (CALMOSEPTINE) 0.44-20.625 % OINT ointment Apply topically 4 times daily as needed for skin protection    Associated Diagnoses: Rash and nonspecific skin eruption      omeprazole (PRILOSEC) 20 MG DR capsule Take 1 capsule (20 mg) by mouth  2 times daily  Qty: 60 capsule, Refills: 11    Associated Diagnoses: Gastroesophageal reflux disease, esophagitis presence not specified      !! order for DME Equipment being ordered:   Incontinence Products: Gloves (4 Boxes) & chux pads  Qty: 300 each, Refills: 11    Associated Diagnoses: Rectal cancer (H); Bowel and bladder incontinence      !! order for DME Equipment being ordered: Wheelchair  Qty: 1 Device, Refills: 0    Associated Diagnoses: Spinal stenosis of lumbar region without neurogenic claudication; Lumbar radiculopathy; DDD (degenerative disc disease), lumbar      !! order for DME Equipment being ordered: Incontinence briefs/Depends  Qty: 12 Package, Refills: 11    Associated Diagnoses: Rectal cancer (H)      !! order for DME Equipment being ordered:  order for XL Size  Pull ups.  Pt is currently using 12 pull ups a day  Qty: 350 each, Refills: 11    Associated Diagnoses: Rectal cancer (H); Bowel and bladder incontinence      !! order for DME Equipment being ordered: Compression stockings  Qty: 2 Device, Refills: 0    Associated Diagnoses: Acute deep vein thrombosis (DVT) of right lower extremity, unspecified vein (H)      !! order for DME Equipment being ordered: Hospital Bed service and repair  Qty: 1 each, Refills: 0    Associated Diagnoses: DDD (degenerative disc disease), lumbar; Type 2 diabetes mellitus with diabetic nephropathy, with long-term current use of insulin (H); Neuropathy; Rectal cancer (H)      polyethylene glycol (MIRALAX) 17 GM/SCOOP powder Take 17 g (1 capful) by mouth daily  Qty: 578 g, Refills: 4    Associated Diagnoses: Slow transit constipation      pramipexole (MIRAPEX) 0.25 MG tablet One 1-2 hours prior to HS, and q 8 hrs prn day  Qty: 60 tablet, Refills: 3    Associated Diagnoses: Restless leg syndrome      simvastatin (ZOCOR) 40 MG tablet Take 1 tablet (40 mg) by mouth daily  Qty: 90 tablet, Refills: 2    Associated Diagnoses: Hyperlipidemia LDL goal <100       !! -  Potential duplicate medications found. Please discuss with provider.        Unresulted Labs Ordered in the Past 30 Days of this Admission     Date and Time Order Name Status Description    8/10/2020 2010 Urine Culture Aerobic Bacterial Preliminary               TOTAL TIME SPENT:  Greater than 30 minutes spent on this.         PRINCE OLSON MD             D: 2020   T: 2020   MT: MOISE      Name:     SOL WORRELL   MRN:      [id number]        Account:        [id number]   :      1945           Admit Date:     08/10/2020                                  Discharge Date:       Document: [id number]

## 2020-08-14 ENCOUNTER — TELEPHONE (OUTPATIENT)
Dept: FAMILY MEDICINE | Facility: CLINIC | Age: 75
End: 2020-08-14

## 2020-08-14 LAB
BACTERIA SPEC CULT: ABNORMAL
Lab: ABNORMAL
SPECIMEN SOURCE: ABNORMAL

## 2020-08-14 NOTE — TELEPHONE ENCOUNTER
Spoke with Mell, states pt weepy, not as clear mentally, fatigued. Admitted to hospice.   JAM Rees RN

## 2020-08-14 NOTE — TELEPHONE ENCOUNTER
Resided at [facility] facility.  Pt admitted to [facility].  ED / Discharge Outreach Protocol    Patient Contact    Attempt # 1    Was call answered?  No.  LM for Mell to call clinic nurse.  JAM Rees RN

## 2020-08-14 NOTE — TELEPHONE ENCOUNTER
Reason for Call:  MARIE Patel [facility] lettering Dr. Bernstein know she was admitted to Hospice.  [facility] - [phone]      Phone Number Patient can be reached at: Home number on file [phone]    Best Time: Any Time      Can we leave a detailed message on this number? YES    Call taken on 8/14/2020 at 10:29 AM by Leilani Jurado

## 2020-08-14 NOTE — TELEPHONE ENCOUNTER
ED/UC/IP follow up phone call: 08.13.2020  IP    RN please call to follow up. Altered Mental Status    Number of ED visits in past 12 months = 0    Ilene ROBLES

## 2020-08-17 ENCOUNTER — MEDICAL CORRESPONDENCE (OUTPATIENT)
Dept: HEALTH INFORMATION MANAGEMENT | Facility: CLINIC | Age: 75
End: 2020-08-17

## 2020-08-18 ENCOUNTER — HOSPITAL ENCOUNTER (EMERGENCY)
Facility: CLINIC | Age: 75
Discharge: ANOTHER HOSPITAL | End: 2020-08-19
Attending: FAMILY MEDICINE | Admitting: FAMILY MEDICINE
Payer: COMMERCIAL

## 2020-08-18 ENCOUNTER — MEDICAL CORRESPONDENCE (OUTPATIENT)
Dept: HEALTH INFORMATION MANAGEMENT | Facility: CLINIC | Age: 75
End: 2020-08-18

## 2020-08-18 ENCOUNTER — PATIENT OUTREACH (OUTPATIENT)
Dept: GERIATRIC MEDICINE | Facility: CLINIC | Age: 75
End: 2020-08-18

## 2020-08-18 ENCOUNTER — TELEPHONE (OUTPATIENT)
Dept: FAMILY MEDICINE | Facility: CLINIC | Age: 75
End: 2020-08-18

## 2020-08-18 DIAGNOSIS — G31.83 LEWY BODY DEMENTIA WITH BEHAVIORAL DISTURBANCE (H): ICD-10-CM

## 2020-08-18 DIAGNOSIS — Z20.828 EXPOSURE TO SARS-ASSOCIATED CORONAVIRUS: ICD-10-CM

## 2020-08-18 DIAGNOSIS — N39.0 URINARY TRACT INFECTION WITHOUT HEMATURIA, SITE UNSPECIFIED: ICD-10-CM

## 2020-08-18 DIAGNOSIS — F02.818 LEWY BODY DEMENTIA WITH BEHAVIORAL DISTURBANCE (H): ICD-10-CM

## 2020-08-18 DIAGNOSIS — I48.91 ATRIAL FIBRILLATION, RAPID (H): ICD-10-CM

## 2020-08-18 DIAGNOSIS — R41.0 CONFUSION: ICD-10-CM

## 2020-08-18 PROCEDURE — 99285 EMERGENCY DEPT VISIT HI MDM: CPT | Mod: 25 | Performed by: FAMILY MEDICINE

## 2020-08-18 PROCEDURE — 96372 THER/PROPH/DIAG INJ SC/IM: CPT | Performed by: FAMILY MEDICINE

## 2020-08-18 PROCEDURE — 27211117 ZZH CARDIOVERT/DEFIB/PACER SUPP: Performed by: FAMILY MEDICINE

## 2020-08-18 PROCEDURE — 96361 HYDRATE IV INFUSION ADD-ON: CPT | Performed by: FAMILY MEDICINE

## 2020-08-18 PROCEDURE — 93010 ELECTROCARDIOGRAM REPORT: CPT | Mod: 59 | Performed by: FAMILY MEDICINE

## 2020-08-18 PROCEDURE — 96365 THER/PROPH/DIAG IV INF INIT: CPT | Performed by: FAMILY MEDICINE

## 2020-08-18 PROCEDURE — 92960 CARDIOVERSION ELECTRIC EXT: CPT | Performed by: FAMILY MEDICINE

## 2020-08-18 PROCEDURE — 25000128 H RX IP 250 OP 636: Performed by: FAMILY MEDICINE

## 2020-08-18 PROCEDURE — 93005 ELECTROCARDIOGRAM TRACING: CPT | Mod: 59 | Performed by: FAMILY MEDICINE

## 2020-08-18 PROCEDURE — 25000132 ZZH RX MED GY IP 250 OP 250 PS 637: Performed by: FAMILY MEDICINE

## 2020-08-18 PROCEDURE — 92960 CARDIOVERSION ELECTRIC EXT: CPT | Mod: Z6 | Performed by: FAMILY MEDICINE

## 2020-08-18 PROCEDURE — C9803 HOPD COVID-19 SPEC COLLECT: HCPCS | Performed by: FAMILY MEDICINE

## 2020-08-18 RX ORDER — LORAZEPAM 2 MG/ML
1 INJECTION INTRAMUSCULAR ONCE
Status: COMPLETED | OUTPATIENT
Start: 2020-08-18 | End: 2020-08-18

## 2020-08-18 RX ORDER — ACETAMINOPHEN 325 MG/1
650 TABLET ORAL ONCE
Status: COMPLETED | OUTPATIENT
Start: 2020-08-18 | End: 2020-08-18

## 2020-08-18 RX ORDER — LORAZEPAM 1 MG/1
1 TABLET ORAL ONCE
Status: COMPLETED | OUTPATIENT
Start: 2020-08-18 | End: 2020-08-18

## 2020-08-18 RX ORDER — LORAZEPAM 2 MG/ML
INJECTION INTRAMUSCULAR
Status: DISCONTINUED
Start: 2020-08-18 | End: 2020-08-19 | Stop reason: HOSPADM

## 2020-08-18 RX ADMIN — ACETAMINOPHEN 650 MG: 325 TABLET, FILM COATED ORAL at 22:58

## 2020-08-18 RX ADMIN — LORAZEPAM 1 MG: 1 TABLET ORAL at 22:59

## 2020-08-18 RX ADMIN — LORAZEPAM 1 MG: 2 INJECTION INTRAMUSCULAR; INTRAVENOUS at 23:59

## 2020-08-18 RX ADMIN — LORAZEPAM 1 MG: 2 INJECTION INTRAMUSCULAR; INTRAVENOUS at 23:33

## 2020-08-18 NOTE — TELEPHONE ENCOUNTER
[facility]- Orders  Form placed on Provider Malou KHANNA-CNP desk for [facility].  .rlol

## 2020-08-18 NOTE — TELEPHONE ENCOUNTER
Form received back signed  8/17/20  Faxed Back & Sent to be scanned to this encounter  [facility]

## 2020-08-19 ENCOUNTER — ANESTHESIA (OUTPATIENT)
Dept: EMERGENCY MEDICINE | Facility: CLINIC | Age: 75
End: 2020-08-19
Payer: COMMERCIAL

## 2020-08-19 ENCOUNTER — ANESTHESIA EVENT (OUTPATIENT)
Dept: EMERGENCY MEDICINE | Facility: CLINIC | Age: 75
End: 2020-08-19
Payer: COMMERCIAL

## 2020-08-19 VITALS
WEIGHT: 191 LBS | TEMPERATURE: 97.1 F | RESPIRATION RATE: 18 BRPM | DIASTOLIC BLOOD PRESSURE: 78 MMHG | OXYGEN SATURATION: 98 % | SYSTOLIC BLOOD PRESSURE: 104 MMHG | BODY MASS INDEX: 29.04 KG/M2 | HEART RATE: 85 BPM

## 2020-08-19 LAB
ALBUMIN UR-MCNC: 100 MG/DL
ALBUMIN UR-MCNC: >499 MG/DL
ANION GAP SERPL CALCULATED.3IONS-SCNC: 6 MMOL/L (ref 3–14)
ANION GAP SERPL CALCULATED.3IONS-SCNC: 7 MMOL/L (ref 3–14)
APPEARANCE UR: ABNORMAL
APPEARANCE UR: CLEAR
BACTERIA #/AREA URNS HPF: ABNORMAL /HPF
BACTERIA #/AREA URNS HPF: ABNORMAL /HPF
BASOPHILS # BLD AUTO: 0.1 10E9/L (ref 0–0.2)
BASOPHILS # BLD AUTO: 0.1 10E9/L (ref 0–0.2)
BASOPHILS NFR BLD AUTO: 0.9 %
BASOPHILS NFR BLD AUTO: 1.1 %
BILIRUB UR QL STRIP: NEGATIVE
BILIRUB UR QL STRIP: NEGATIVE
BUN SERPL-MCNC: 27 MG/DL (ref 7–30)
BUN SERPL-MCNC: 29 MG/DL (ref 7–30)
CALCIUM SERPL-MCNC: 9 MG/DL (ref 8.5–10.1)
CALCIUM SERPL-MCNC: 9 MG/DL (ref 8.5–10.1)
CHLORIDE SERPL-SCNC: 111 MMOL/L (ref 94–109)
CHLORIDE SERPL-SCNC: 112 MMOL/L (ref 94–109)
CO2 SERPL-SCNC: 22 MMOL/L (ref 20–32)
CO2 SERPL-SCNC: 24 MMOL/L (ref 20–32)
COLOR UR AUTO: ABNORMAL
COLOR UR AUTO: YELLOW
CREAT SERPL-MCNC: 1.66 MG/DL (ref 0.52–1.04)
CREAT SERPL-MCNC: 1.91 MG/DL (ref 0.52–1.04)
DIFFERENTIAL METHOD BLD: NORMAL
DIFFERENTIAL METHOD BLD: NORMAL
EOSINOPHIL # BLD AUTO: 0.2 10E9/L (ref 0–0.7)
EOSINOPHIL # BLD AUTO: 0.3 10E9/L (ref 0–0.7)
EOSINOPHIL NFR BLD AUTO: 2.8 %
EOSINOPHIL NFR BLD AUTO: 4.7 %
ERYTHROCYTE [DISTWIDTH] IN BLOOD BY AUTOMATED COUNT: 12.9 % (ref 10–15)
ERYTHROCYTE [DISTWIDTH] IN BLOOD BY AUTOMATED COUNT: 13 % (ref 10–15)
GFR SERPL CREATININE-BSD FRML MDRD: 25 ML/MIN/{1.73_M2}
GFR SERPL CREATININE-BSD FRML MDRD: 29 ML/MIN/{1.73_M2}
GLUCOSE BLDC GLUCOMTR-MCNC: 90 MG/DL (ref 70–99)
GLUCOSE SERPL-MCNC: 102 MG/DL (ref 70–99)
GLUCOSE SERPL-MCNC: 115 MG/DL (ref 70–99)
GLUCOSE UR STRIP-MCNC: 50 MG/DL
GLUCOSE UR STRIP-MCNC: NEGATIVE MG/DL
HCT VFR BLD AUTO: 37.5 % (ref 35–47)
HCT VFR BLD AUTO: 39.1 % (ref 35–47)
HGB BLD-MCNC: 12.5 G/DL (ref 11.7–15.7)
HGB BLD-MCNC: 12.9 G/DL (ref 11.7–15.7)
HGB UR QL STRIP: ABNORMAL
HGB UR QL STRIP: ABNORMAL
IMM GRANULOCYTES # BLD: 0 10E9/L (ref 0–0.4)
IMM GRANULOCYTES # BLD: 0 10E9/L (ref 0–0.4)
IMM GRANULOCYTES NFR BLD: 0.2 %
IMM GRANULOCYTES NFR BLD: 0.2 %
KETONES UR STRIP-MCNC: NEGATIVE MG/DL
KETONES UR STRIP-MCNC: NEGATIVE MG/DL
LABORATORY COMMENT REPORT: NORMAL
LACTATE BLD-SCNC: 1.2 MMOL/L (ref 0.7–2)
LEUKOCYTE ESTERASE UR QL STRIP: ABNORMAL
LEUKOCYTE ESTERASE UR QL STRIP: ABNORMAL
LYMPHOCYTES # BLD AUTO: 0.9 10E9/L (ref 0.8–5.3)
LYMPHOCYTES # BLD AUTO: 2.2 10E9/L (ref 0.8–5.3)
LYMPHOCYTES NFR BLD AUTO: 14.2 %
LYMPHOCYTES NFR BLD AUTO: 39.8 %
MCH RBC QN AUTO: 29.5 PG (ref 26.5–33)
MCH RBC QN AUTO: 29.8 PG (ref 26.5–33)
MCHC RBC AUTO-ENTMCNC: 33 G/DL (ref 31.5–36.5)
MCHC RBC AUTO-ENTMCNC: 33.3 G/DL (ref 31.5–36.5)
MCV RBC AUTO: 90 FL (ref 78–100)
MCV RBC AUTO: 90 FL (ref 78–100)
MONOCYTES # BLD AUTO: 0.5 10E9/L (ref 0–1.3)
MONOCYTES # BLD AUTO: 0.7 10E9/L (ref 0–1.3)
MONOCYTES NFR BLD AUTO: 11 %
MONOCYTES NFR BLD AUTO: 9.9 %
NEUTROPHILS # BLD AUTO: 2.4 10E9/L (ref 1.6–8.3)
NEUTROPHILS # BLD AUTO: 4.6 10E9/L (ref 1.6–8.3)
NEUTROPHILS NFR BLD AUTO: 44.3 %
NEUTROPHILS NFR BLD AUTO: 70.9 %
NITRATE UR QL: NEGATIVE
NITRATE UR QL: NEGATIVE
NRBC # BLD AUTO: 0 10*3/UL
NRBC # BLD AUTO: 0 10*3/UL
NRBC BLD AUTO-RTO: 0 /100
NRBC BLD AUTO-RTO: 0 /100
PH UR STRIP: 6 PH (ref 5–7)
PH UR STRIP: 6 PH (ref 5–7)
PLATELET # BLD AUTO: 212 10E9/L (ref 150–450)
PLATELET # BLD AUTO: 239 10E9/L (ref 150–450)
POTASSIUM SERPL-SCNC: 4.4 MMOL/L (ref 3.4–5.3)
POTASSIUM SERPL-SCNC: 4.4 MMOL/L (ref 3.4–5.3)
RBC # BLD AUTO: 4.19 10E12/L (ref 3.8–5.2)
RBC # BLD AUTO: 4.37 10E12/L (ref 3.8–5.2)
RBC #/AREA URNS AUTO: 116 /HPF (ref 0–2)
RBC #/AREA URNS AUTO: 41 /HPF (ref 0–2)
RENAL EPI CELLS #/AREA URNS HPF: <1 /HPF
SARS-COV-2 RNA SPEC QL NAA+PROBE: NEGATIVE
SARS-COV-2 RNA SPEC QL NAA+PROBE: NORMAL
SODIUM SERPL-SCNC: 140 MMOL/L (ref 133–144)
SODIUM SERPL-SCNC: 142 MMOL/L (ref 133–144)
SOURCE: ABNORMAL
SOURCE: ABNORMAL
SP GR UR STRIP: 1.01 (ref 1–1.03)
SP GR UR STRIP: 1.01 (ref 1–1.03)
SPECIMEN SOURCE: NORMAL
SPECIMEN SOURCE: NORMAL
SQUAMOUS #/AREA URNS AUTO: 1 /HPF (ref 0–1)
UROBILINOGEN UR STRIP-MCNC: 0 MG/DL (ref 0–2)
UROBILINOGEN UR STRIP-MCNC: 0 MG/DL (ref 0–2)
WBC # BLD AUTO: 5.5 10E9/L (ref 4–11)
WBC # BLD AUTO: 6.5 10E9/L (ref 4–11)
WBC #/AREA URNS AUTO: 39 /HPF (ref 0–5)
WBC #/AREA URNS AUTO: >182 /HPF (ref 0–5)
WBC CLUMPS #/AREA URNS HPF: PRESENT /HPF

## 2020-08-19 PROCEDURE — 83605 ASSAY OF LACTIC ACID: CPT | Performed by: FAMILY MEDICINE

## 2020-08-19 PROCEDURE — 25800030 ZZH RX IP 258 OP 636: Performed by: NURSE ANESTHETIST, CERTIFIED REGISTERED

## 2020-08-19 PROCEDURE — 81001 URINALYSIS AUTO W/SCOPE: CPT | Performed by: FAMILY MEDICINE

## 2020-08-19 PROCEDURE — 25800030 ZZH RX IP 258 OP 636: Performed by: FAMILY MEDICINE

## 2020-08-19 PROCEDURE — 25000128 H RX IP 250 OP 636: Performed by: FAMILY MEDICINE

## 2020-08-19 PROCEDURE — 25000132 ZZH RX MED GY IP 250 OP 250 PS 637: Performed by: FAMILY MEDICINE

## 2020-08-19 PROCEDURE — 80048 BASIC METABOLIC PNL TOTAL CA: CPT | Performed by: FAMILY MEDICINE

## 2020-08-19 PROCEDURE — 81001 URINALYSIS AUTO W/SCOPE: CPT | Mod: 91 | Performed by: FAMILY MEDICINE

## 2020-08-19 PROCEDURE — 25000128 H RX IP 250 OP 636: Performed by: EMERGENCY MEDICINE

## 2020-08-19 PROCEDURE — 87088 URINE BACTERIA CULTURE: CPT | Performed by: FAMILY MEDICINE

## 2020-08-19 PROCEDURE — 87040 BLOOD CULTURE FOR BACTERIA: CPT | Performed by: FAMILY MEDICINE

## 2020-08-19 PROCEDURE — U0003 INFECTIOUS AGENT DETECTION BY NUCLEIC ACID (DNA OR RNA); SEVERE ACUTE RESPIRATORY SYNDROME CORONAVIRUS 2 (SARS-COV-2) (CORONAVIRUS DISEASE [COVID-19]), AMPLIFIED PROBE TECHNIQUE, MAKING USE OF HIGH THROUGHPUT TECHNOLOGIES AS DESCRIBED BY CMS-2020-01-R: HCPCS | Performed by: FAMILY MEDICINE

## 2020-08-19 PROCEDURE — 87186 SC STD MICRODIL/AGAR DIL: CPT | Performed by: FAMILY MEDICINE

## 2020-08-19 PROCEDURE — 87086 URINE CULTURE/COLONY COUNT: CPT | Performed by: FAMILY MEDICINE

## 2020-08-19 PROCEDURE — 40000671 ZZH STATISTIC ANESTHESIA CASE

## 2020-08-19 PROCEDURE — 99220 ZZC INITIAL OBSERVATION CARE,LEVL III: CPT | Performed by: INTERNAL MEDICINE

## 2020-08-19 PROCEDURE — 90791 PSYCH DIAGNOSTIC EVALUATION: CPT

## 2020-08-19 PROCEDURE — 25000128 H RX IP 250 OP 636: Performed by: NURSE ANESTHETIST, CERTIFIED REGISTERED

## 2020-08-19 PROCEDURE — 85025 COMPLETE CBC W/AUTO DIFF WBC: CPT | Performed by: FAMILY MEDICINE

## 2020-08-19 PROCEDURE — 00000146 ZZHCL STATISTIC GLUCOSE BY METER IP

## 2020-08-19 RX ORDER — MIDAZOLAM HYDROCHLORIDE 5 MG/ML
2.5 INJECTION, SOLUTION INTRAMUSCULAR; INTRAVENOUS
Status: COMPLETED | OUTPATIENT
Start: 2020-08-19 | End: 2020-08-19

## 2020-08-19 RX ORDER — CIPROFLOXACIN 500 MG/1
500 TABLET, FILM COATED ORAL ONCE
Status: DISCONTINUED | OUTPATIENT
Start: 2020-08-19 | End: 2020-08-19

## 2020-08-19 RX ORDER — OLANZAPINE 5 MG/1
5 TABLET, ORALLY DISINTEGRATING ORAL ONCE
Status: COMPLETED | OUTPATIENT
Start: 2020-08-19 | End: 2020-08-19

## 2020-08-19 RX ORDER — PROPOFOL 10 MG/ML
INJECTION, EMULSION INTRAVENOUS PRN
Status: DISCONTINUED | OUTPATIENT
Start: 2020-08-19 | End: 2020-08-19

## 2020-08-19 RX ORDER — ACETAMINOPHEN 650 MG/1
650 SUPPOSITORY RECTAL EVERY 8 HOURS PRN
Status: ON HOLD | COMMUNITY
End: 2020-08-31

## 2020-08-19 RX ORDER — MORPHINE SULFATE 30 MG/1
5 TABLET ORAL
Status: ON HOLD | COMMUNITY
End: 2020-08-31

## 2020-08-19 RX ORDER — SODIUM CHLORIDE 9 MG/ML
INJECTION, SOLUTION INTRAVENOUS CONTINUOUS PRN
Status: DISCONTINUED | OUTPATIENT
Start: 2020-08-19 | End: 2020-08-19

## 2020-08-19 RX ORDER — MEROPENEM 1 G/1
1 INJECTION, POWDER, FOR SOLUTION INTRAVENOUS EVERY 12 HOURS
Status: DISCONTINUED | OUTPATIENT
Start: 2020-08-19 | End: 2020-08-20 | Stop reason: HOSPADM

## 2020-08-19 RX ADMIN — MEROPENEM 1 G: 1 INJECTION, POWDER, FOR SOLUTION INTRAVENOUS at 17:01

## 2020-08-19 RX ADMIN — OLANZAPINE 5 MG: 5 TABLET, ORALLY DISINTEGRATING ORAL at 09:03

## 2020-08-19 RX ADMIN — MIDAZOLAM 2.5 MG: 5 INJECTION INTRAMUSCULAR; INTRAVENOUS at 05:40

## 2020-08-19 RX ADMIN — OLANZAPINE 5 MG: 5 TABLET, ORALLY DISINTEGRATING ORAL at 13:44

## 2020-08-19 RX ADMIN — SODIUM CHLORIDE 1000 ML: 9 INJECTION, SOLUTION INTRAVENOUS at 16:59

## 2020-08-19 RX ADMIN — MIDAZOLAM 2.5 MG: 5 INJECTION INTRAMUSCULAR; INTRAVENOUS at 04:20

## 2020-08-19 RX ADMIN — OLANZAPINE 5 MG: 5 TABLET, ORALLY DISINTEGRATING ORAL at 07:34

## 2020-08-19 RX ADMIN — ENOXAPARIN SODIUM 135 MG: 150 INJECTION SUBCUTANEOUS at 18:52

## 2020-08-19 RX ADMIN — SODIUM CHLORIDE: 9 INJECTION, SOLUTION INTRAVENOUS at 19:48

## 2020-08-19 RX ADMIN — PROPOFOL 30 MG: 10 INJECTION, EMULSION INTRAVENOUS at 19:50

## 2020-08-19 ASSESSMENT — LIFESTYLE VARIABLES: TOBACCO_USE: 1

## 2020-08-19 NOTE — ED NOTES
"Pt stating \"I have to go\", incontinent in brief. Placed on bedpan. Tiffany-care completed and new brief placed. Vitals checked and pt found to be tachycardic, MD notified.  "

## 2020-08-19 NOTE — ED PROVIDER NOTES
Emergency Department Psychiatric Patient Sign-out       Brief HPI:  This is a 75 year old female signed out to me by Dr. Patino .  See initial ED Provider note for details of the presentation.     The patient has required medication for agitation.    Medications   meropenem (MERREM) 1 g vial to attach to  mL bag (0 g Intravenous Stopped 8/19/20 1732)   enoxaparin ANTICOAGULANT (LOVENOX) injection 135 mg (135 mg Subcutaneous Given 8/19/20 1852)   acetaminophen (TYLENOL) tablet 650 mg (650 mg Oral Given 8/18/20 2258)   LORazepam (ATIVAN) tablet 1 mg (1 mg Sublingual Not Given 8/18/20 2315)   LORazepam (ATIVAN) injection 1 mg (1 mg Intramuscular Given 8/18/20 2359)   midazolam (VERSED) injection 2.5 mg (2.5 mg Nasal Given 8/19/20 0540)   OLANZapine zydis (zyPREXA) ODT tab 5 mg (5 mg Oral Given 8/19/20 0734)   OLANZapine zydis (zyPREXA) ODT tab 5 mg (5 mg Oral Given 8/19/20 0903)   OLANZapine zydis (zyPREXA) ODT tab 5 mg (5 mg Oral Given 8/19/20 1344)   0.9% sodium chloride BOLUS (0 mLs Intravenous Stopped 8/19/20 1952)       Exam:   Patient Vitals for the past 24 hrs:   BP Temp Temp src Pulse Resp SpO2   08/19/20 2145 101/76 -- -- 113 18 96 %   08/19/20 2030 112/58 -- -- 87 23 94 %   08/19/20 2006 -- -- -- 94 26 96 %   08/19/20 2005 -- -- -- 87 18 96 %   08/19/20 2004 -- -- -- 88 20 97 %   08/19/20 2003 -- -- -- 90 21 97 %   08/19/20 2000 94/59 -- -- 91 20 99 %   08/19/20 1953 133/74 -- -- 101 21 98 %   08/19/20 1950 133/74 -- -- 142 24 99 %   08/19/20 1945 136/88 -- -- 146 14 98 %   08/19/20 1937 -- -- -- 146 26 97 %   08/19/20 1936 -- -- -- 147 13 98 %   08/19/20 1904 (!) 157/87 -- -- -- -- --   08/19/20 1840 (!) 162/101 97.1  F (36.2  C) Axillary 147 20 96 %   08/19/20 1505 (!) 152/76 -- -- 78 -- 96 %   08/19/20 0735 134/75 -- -- -- -- --   08/19/20 0732 134/75 -- -- 111 -- --   08/19/20 0430 (!) 140/55 98  F (36.7  C) -- 72 15 98 %         ED Course:    1628.  It was brought to my attention by nursing  that the patient had an abnormal urine analysis performed this morning.  I am told that a urine analysis was obtained very early this morning and it was concerning for infection but it was not a straight catheterization.  The most recent urine analysis is a straight cath specimen.  The specimen is certainly abnormal and concerning for infection.  She has a known history of metabolic encephalopathy thought to be related to bladder infections.  Unfortunately, she was not treated with antibiotics this morning and so these will be started today.  She is managed behaviorally with Zyprexa.  She is not cooperative to take medication by mouth.  She may not tolerate an IV but we will attempt to keep it in place and at the least provide some antibiotic treatment and recheck blood work.  She was on a cephalosporin during her hospitalization in June, 2020 but staff here was told to stop it because of its potential neurotoxicity and because of the concern that it may be contributing to her encephalopathy.  Therefore, I am ceftriaxone will not be used but instead we will try the IV route.  This changes her medical status and she will need to be hospitalized for medical management prior to neuropsychiatric care.    1646.  I spoke with Dr. Mckenna who is requesting that the patient be sent to the hospital with neurology and psychiatry.  I am awaiting callback from [facility].  Awaiting lab results.    1834.  No evidence for sepsis.  Fluid bolus complete.  I will provide Lovenox.  Meropenem for antibiotics.  I spoke with Dr. Calzada at the [facility] who accepted the patient to [facility].    2258.  The patient was found to have a pulse in the 140s to 150.  The EKG is documented below.  It was thought to be atrial fibrillation given her age and the very subtle irregularity that was there.  It was decided that emergent cardioversion will be done to help with transition to another hospital and to  avoid systemic medication to control her rate, especially in light of her existing encephalopathy and other medical problems.  See documentation below for the procedure.  Patient has been stable since that time and without recurrent tachycardia.  The patient will be transferred to [facility], as above.  I have updated Dr. Calzada.  It was discovered during our conversation with [facility] that the patient has been newly transitioned to hospice care.  She has been made DNR/DNI.    PROCEDURE  Performed by me.  Procedure is emergent electrical cardioversion for tachycardia.  Anesthesia was consulted for sedation.  Pads were placed on the front and back of her chest.  Synchronized cardioversion selected.  150 J selected.  When shock was provided and sinus rhythm followed.  No complications.    EKG  (1906)   Interpretation performed by me.  Rate: 153     Rhythm: atrial fib vs svt     Axis: nl  Intervals: AL (12-2) -, QRS (<12) 90, QTc (>5) 298  P wave: -     QRS complex: nl  ST segment / T-wave: Subtle ST depression in leads V4-V6, 2, 3, and aVF.  T wave inversion in 1 and aVL.  Conclusion: Tachycardia thought to be atrial fibrillation versus SVT, ST-T wave changes concerning for rate dependent ischemia or subendocardial ischemia    EKG  (1958)   Interpretation performed by me.  Rate: 94     Rhythm: sinus     Axis: nl  Intervals: AL (12-2) 150, QRS (<12) 94, QTc (>5) 433  P wave: down in v1     QRS complex: nl  ST segment / T-wave: nl  Conclusion: Normal.      Impression:    ICD-10-CM    1. Confusion  R41.0 SARS-CoV-2 COVID-19 Virus (Coronavirus) RT-PCR Nasopharyngeal     Urine Culture Aerobic Bacterial     Urine Culture Aerobic Bacterial     UA with Microscopic     Urine Culture Aerobic Bacterial     Glucose by meter     Glucose by meter     CBC with platelets, differential     Basic metabolic panel     Lactic acid whole blood     Blood culture     Blood culture   2. Lewy body dementia with behavioral  disturbance (H)  G31.83     F02.81    3. Urinary tract infection without hematuria, site unspecified  N39.0    4. Atrial fibrillation, rapid (H)  I48.91            RESULTS:   Results for orders placed or performed during the hospital encounter of 08/18/20 (from the past 24 hour(s))   UA with Microscopic reflex to Culture     Status: Abnormal    Collection Time: 08/19/20 12:44 AM    Specimen: Midstream Urine   Result Value Ref Range    Color Urine Straw     Appearance Urine Clear     Glucose Urine 50 (A) NEG^Negative mg/dL    Bilirubin Urine Negative NEG^Negative    Ketones Urine Negative NEG^Negative mg/dL    Specific Gravity Urine 1.010 1.003 - 1.035    Blood Urine Moderate (A) NEG^Negative    pH Urine 6.0 5.0 - 7.0 pH    Protein Albumin Urine 100 (A) NEG^Negative mg/dL    Urobilinogen mg/dL 0.0 0.0 - 2.0 mg/dL    Nitrite Urine Negative NEG^Negative    Leukocyte Esterase Urine Small (A) NEG^Negative    Source Midstream Urine     WBC Urine 39 (H) 0 - 5 /HPF    RBC Urine 41 (H) 0 - 2 /HPF    Bacteria Urine Few (A) NEG^Negative /HPF    Squamous Epithelial /HPF Urine 1 0 - 1 /HPF    Renal Tub Epi <1 (A) NEG^Negative /HPF   Urine Culture Aerobic Bacterial     Status: None (Preliminary result)    Collection Time: 08/19/20 12:44 AM    Specimen: Midstream Urine   Result Value Ref Range    Specimen Description Midstream Urine     Special Requests Specimen received in preservative     Culture Micro PENDING    Symptomatic COVID-19 Virus (Coronavirus) by PCR     Status: None    Collection Time: 08/19/20  1:10 AM    Specimen: Nasopharyngeal   Result Value Ref Range    COVID-19 Virus PCR to [facility] - Source Nasopharyngeal     COVID-19 Virus PCR to [facility] - Result       Test received-See reflex to IDDL test SARS CoV2 (COVID-19) Virus RT-PCR   SARS-CoV-2 COVID-19 Virus (Coronavirus) RT-PCR Nasopharyngeal     Status: None    Collection Time: 08/19/20  1:10 AM    Specimen: Nasopharyngeal   Result Value Ref Range    SARS-CoV-2 Virus  Specimen Source Nasopharyngeal     SARS-CoV-2 PCR Result NEGATIVE     SARS-CoV-2 PCR Comment       Testing was performed using the Aptima SARS-CoV-2 Assay on the [employer] Instrument System.   Additional information about this Emergency Use Authorization (EUA) assay can be found via   the Lab Guide.     Basic metabolic panel     Status: Abnormal    Collection Time: 08/19/20  1:42 AM   Result Value Ref Range    Sodium 140 133 - 144 mmol/L    Potassium 4.4 3.4 - 5.3 mmol/L    Chloride 111 (H) 94 - 109 mmol/L    Carbon Dioxide 22 20 - 32 mmol/L    Anion Gap 7 3 - 14 mmol/L    Glucose 102 (H) 70 - 99 mg/dL    Urea Nitrogen 29 7 - 30 mg/dL    Creatinine 1.91 (H) 0.52 - 1.04 mg/dL    GFR Estimate 25 (L) >60 mL/min/[1.73_m2]    GFR Estimate If Black 29 (L) >60 mL/min/[1.73_m2]    Calcium 9.0 8.5 - 10.1 mg/dL   CBC with platelets, differential     Status: None    Collection Time: 08/19/20  1:42 AM   Result Value Ref Range    WBC 5.5 4.0 - 11.0 10e9/L    RBC Count 4.37 3.8 - 5.2 10e12/L    Hemoglobin 12.9 11.7 - 15.7 g/dL    Hematocrit 39.1 35.0 - 47.0 %    MCV 90 78 - 100 fl    MCH 29.5 26.5 - 33.0 pg    MCHC 33.0 31.5 - 36.5 g/dL    RDW 13.0 10.0 - 15.0 %    Platelet Count 239 150 - 450 10e9/L    Diff Method Automated Method     % Neutrophils 44.3 %    % Lymphocytes 39.8 %    % Monocytes 9.9 %    % Eosinophils 4.7 %    % Basophils 1.1 %    % Immature Granulocytes 0.2 %    Nucleated RBCs 0 0 /100    Absolute Neutrophil 2.4 1.6 - 8.3 10e9/L    Absolute Lymphocytes 2.2 0.8 - 5.3 10e9/L    Absolute Monocytes 0.5 0.0 - 1.3 10e9/L    Absolute Eosinophils 0.3 0.0 - 0.7 10e9/L    Absolute Basophils 0.1 0.0 - 0.2 10e9/L    Abs Immature Granulocytes 0.0 0 - 0.4 10e9/L    Absolute Nucleated RBC 0.0    UA with Microscopic     Status: Abnormal    Collection Time: 08/19/20  8:55 AM   Result Value Ref Range    Color Urine Yellow     Appearance Urine Slightly Cloudy     Glucose Urine Negative NEG^Negative mg/dL    Bilirubin Urine Negative  NEG^Negative    Ketones Urine Negative NEG^Negative mg/dL    Specific Gravity Urine 1.013 1.003 - 1.035    Blood Urine Moderate (A) NEG^Negative    pH Urine 6.0 5.0 - 7.0 pH    Protein Albumin Urine >499 (A) NEG^Negative mg/dL    Urobilinogen mg/dL 0.0 0.0 - 2.0 mg/dL    Nitrite Urine Negative NEG^Negative    Leukocyte Esterase Urine Large (A) NEG^Negative    Source Catheterized Urine     WBC Urine >182 (H) 0 - 5 /HPF    RBC Urine 116 (H) 0 - 2 /HPF    WBC Clumps Present (A) NEG^Negative /HPF    Bacteria Urine Few (A) NEG^Negative /HPF   Urine Culture Aerobic Bacterial     Status: None (Preliminary result)    Collection Time: 08/19/20  8:55 AM    Specimen: Catheterized Urine   Result Value Ref Range    Specimen Description Catheterized Urine     Special Requests Specimen received in preservative     Culture Micro PENDING    Glucose by meter     Status: None    Collection Time: 08/19/20  3:35 PM   Result Value Ref Range    Glucose 90 70 - 99 mg/dL   CBC with platelets, differential     Status: None    Collection Time: 08/19/20  4:41 PM   Result Value Ref Range    WBC 6.5 4.0 - 11.0 10e9/L    RBC Count 4.19 3.8 - 5.2 10e12/L    Hemoglobin 12.5 11.7 - 15.7 g/dL    Hematocrit 37.5 35.0 - 47.0 %    MCV 90 78 - 100 fl    MCH 29.8 26.5 - 33.0 pg    MCHC 33.3 31.5 - 36.5 g/dL    RDW 12.9 10.0 - 15.0 %    Platelet Count 212 150 - 450 10e9/L    Diff Method Automated Method     % Neutrophils 70.9 %    % Lymphocytes 14.2 %    % Monocytes 11.0 %    % Eosinophils 2.8 %    % Basophils 0.9 %    % Immature Granulocytes 0.2 %    Nucleated RBCs 0 0 /100    Absolute Neutrophil 4.6 1.6 - 8.3 10e9/L    Absolute Lymphocytes 0.9 0.8 - 5.3 10e9/L    Absolute Monocytes 0.7 0.0 - 1.3 10e9/L    Absolute Eosinophils 0.2 0.0 - 0.7 10e9/L    Absolute Basophils 0.1 0.0 - 0.2 10e9/L    Abs Immature Granulocytes 0.0 0 - 0.4 10e9/L    Absolute Nucleated RBC 0.0    Basic metabolic panel     Status: Abnormal    Collection Time: 08/19/20  4:41 PM    Result Value Ref Range    Sodium 142 133 - 144 mmol/L    Potassium 4.4 3.4 - 5.3 mmol/L    Chloride 112 (H) 94 - 109 mmol/L    Carbon Dioxide 24 20 - 32 mmol/L    Anion Gap 6 3 - 14 mmol/L    Glucose 115 (H) 70 - 99 mg/dL    Urea Nitrogen 27 7 - 30 mg/dL    Creatinine 1.66 (H) 0.52 - 1.04 mg/dL    GFR Estimate 29 (L) >60 mL/min/[1.73_m2]    GFR Estimate If Black 34 (L) >60 mL/min/[1.73_m2]    Calcium 9.0 8.5 - 10.1 mg/dL   Lactic acid whole blood     Status: None    Collection Time: 08/19/20  4:45 PM   Result Value Ref Range    Lactic Acid 1.2 0.7 - 2.0 mmol/L   Blood culture     Status: None (Preliminary result)    Collection Time: 08/19/20  4:45 PM    Specimen: Blood   Result Value Ref Range    Specimen Description Blood     Special Requests Portacath     Culture Micro No growth after 1 hour    Blood culture     Status: None (Preliminary result)    Collection Time: 08/19/20  4:57 PM    Specimen: Blood   Result Value Ref Range    Specimen Description Blood     Culture Micro No growth after 1 hour          MD Osvaldo Hawkins Jason M, MD  08/19/20 0564

## 2020-08-19 NOTE — ED PROVIDER NOTES
Emergency Department Psychiatric Patient Sign-out       Brief HPI:  This is a 75 year old female signed out to me by Dr. Riley .  75-year-old female with Lewy body dementia dealing with uncontrolled behavior surrounding grief from her dying sister.  Behaviors at her care facility have been escalating and they feel she is unable to safely be managed at her current facility.  No beds currently available for geriatric psychiatry.  Currently being held for transfer.     Patient is medically cleared for admission to a Behavioral Health unit.      Pending studies include none.      The patient is not on a hold.      The patient has required medication for agitation.    Medications   LORazepam (ATIVAN) 2 MG/ML injection (has no administration in time range)   acetaminophen (TYLENOL) tablet 650 mg (650 mg Oral Given 8/18/20 2258)   LORazepam (ATIVAN) tablet 1 mg (1 mg Sublingual Not Given 8/18/20 2315)   LORazepam (ATIVAN) injection 1 mg (1 mg Intramuscular Given 8/18/20 7309)   midazolam (VERSED) injection 2.5 mg (2.5 mg Nasal Given 8/19/20 0540)       Exam:   Patient Vitals for the past 24 hrs:   BP Temp Temp src Pulse Resp SpO2 Weight   08/19/20 0430 (!) 140/55 98  F (36.7  C) -- 72 15 98 % --   08/18/20 2300 -- -- -- -- -- 96 % --   08/18/20 2214 116/77 98  F (36.7  C) Oral 116 18 95 % 86.6 kg (191 lb)         ED Course:    4:03 AM: Advised by nurse that patient has been somewhat more agitated and trying to crawl out of bed.  Patient assessed at bedside.  Not answering commands.  Trying to crawl out of bed.  Will attempt nasal Versed to help her relax.  4:32 AM: Re-assessed.  Slightly less uncomfortable appearing.  Still moving around in the bed.    6:06 AM Patient re-assessed: Restless in bed. Did not have much change with meds but is staying in bed now.    6:46 AM: Patient was signed out to the oncoming provider. Dr. Leroy Patino.  Awaiting negative COVID swab prior to being considered for admission by geriatric  psychiatry.      Impression:    ICD-10-CM    1. Confusion  R41.0    2. Lewy body dementia with behavioral disturbance (H)  G31.83     F02.81        Plan:    1. Await Transfer to Mental Health Facility      RESULTS:   Results for orders placed or performed during the hospital encounter of 08/18/20 (from the past 24 hour(s))   UA with Microscopic reflex to Culture     Status: Abnormal    Collection Time: 08/19/20 12:44 AM    Specimen: Midstream Urine   Result Value Ref Range    Color Urine Straw     Appearance Urine Clear     Glucose Urine 50 (A) NEG^Negative mg/dL    Bilirubin Urine Negative NEG^Negative    Ketones Urine Negative NEG^Negative mg/dL    Specific Gravity Urine 1.010 1.003 - 1.035    Blood Urine Moderate (A) NEG^Negative    pH Urine 6.0 5.0 - 7.0 pH    Protein Albumin Urine 100 (A) NEG^Negative mg/dL    Urobilinogen mg/dL 0.0 0.0 - 2.0 mg/dL    Nitrite Urine Negative NEG^Negative    Leukocyte Esterase Urine Small (A) NEG^Negative    Source Midstream Urine     WBC Urine 39 (H) 0 - 5 /HPF    RBC Urine 41 (H) 0 - 2 /HPF    Bacteria Urine Few (A) NEG^Negative /HPF    Squamous Epithelial /HPF Urine 1 0 - 1 /HPF    Renal Tub Epi <1 (A) NEG^Negative /HPF   Symptomatic COVID-19 Virus (Coronavirus) by PCR     Status: None    Collection Time: 08/19/20  1:10 AM    Specimen: Nasopharyngeal   Result Value Ref Range    COVID-19 Virus PCR to [facility] - Source Nasopharyngeal     COVID-19 Virus PCR to [facility] - Result       Test received-See reflex to IDDL test SARS CoV2 (COVID-19) Virus RT-PCR   Basic metabolic panel     Status: Abnormal    Collection Time: 08/19/20  1:42 AM   Result Value Ref Range    Sodium 140 133 - 144 mmol/L    Potassium 4.4 3.4 - 5.3 mmol/L    Chloride 111 (H) 94 - 109 mmol/L    Carbon Dioxide 22 20 - 32 mmol/L    Anion Gap 7 3 - 14 mmol/L    Glucose 102 (H) 70 - 99 mg/dL    Urea Nitrogen 29 7 - 30 mg/dL    Creatinine 1.91 (H) 0.52 - 1.04 mg/dL    GFR Estimate 25 (L) >60 mL/min/[1.73_m2]    GFR  Estimate If Black 29 (L) >60 mL/min/[1.73_m2]    Calcium 9.0 8.5 - 10.1 mg/dL   CBC with platelets, differential     Status: None    Collection Time: 08/19/20  1:42 AM   Result Value Ref Range    WBC 5.5 4.0 - 11.0 10e9/L    RBC Count 4.37 3.8 - 5.2 10e12/L    Hemoglobin 12.9 11.7 - 15.7 g/dL    Hematocrit 39.1 35.0 - 47.0 %    MCV 90 78 - 100 fl    MCH 29.5 26.5 - 33.0 pg    MCHC 33.0 31.5 - 36.5 g/dL    RDW 13.0 10.0 - 15.0 %    Platelet Count 239 150 - 450 10e9/L    Diff Method Automated Method     % Neutrophils 44.3 %    % Lymphocytes 39.8 %    % Monocytes 9.9 %    % Eosinophils 4.7 %    % Basophils 1.1 %    % Immature Granulocytes 0.2 %    Nucleated RBCs 0 0 /100    Absolute Neutrophil 2.4 1.6 - 8.3 10e9/L    Absolute Lymphocytes 2.2 0.8 - 5.3 10e9/L    Absolute Monocytes 0.5 0.0 - 1.3 10e9/L    Absolute Eosinophils 0.3 0.0 - 0.7 10e9/L    Absolute Basophils 0.1 0.0 - 0.2 10e9/L    Abs Immature Granulocytes 0.0 0 - 0.4 10e9/L    Absolute Nucleated RBC 0.0          MD Chapo Martines Christopher James, MD  08/19/20 5693

## 2020-08-19 NOTE — ED NOTES
In room- repositioning/reorienting patient.  Wraps applied to lower legs.  Awaiting Inpatient bed for patient comfort.

## 2020-08-19 NOTE — ED NOTES
Pt remains restless, no change in mentation. Blood glucose check ordered as pt is diabetic and has not ate/drank anything today.

## 2020-08-19 NOTE — ED PROVIDER NOTES
Emergency Department Patient Sign-out       Brief HPI:  This is a 75 year old female signed out to me by Dr. Cowan .  See initial ED Provider note for details of the presentation.     Patient has a history of likely Lewy body dementia, coronary disease hypertension hyperlipidemia DVT, type 2 diabetes hypothyroidism, restless legs and obstructive sleep apnea and recently admitted to this facility 8/10/2020 for weakness and increased confusion, fatigue and a history of recurrent encephalopathy agitation and paranoia with combativeness and at 1 point required intubation.  Also with recurrent urinary tract infections but no obvious connection between the urinary tract infection encephalopathy.    At this last hospitalization she was started on Aricept nighttime Klonopin as needed.  Likely Lewy body dementia.  Hospice was consulted and was actively caring for the patient at the facility for which she was at before arriving in this emergency department last evening.    The patient had been recently treated with Benadryl and Haldol but had refused these medications recently.  Her sister who is at the same facility has been quite ill and is expected to die in the next several days.  This is provoked significant sadness fear and worsening mental status for this patient.    Patient arrived by ambulance and had received 2 mg IM Haldol in the ambulance.  Lorazepam was given here.  Patient refused various medications and was given IM lorazepam but this resulted in no significant decrease in the patient being hyperactive, agitated walking around the room but somewhat directable.    She requires either a geriatric psychiatric facility or involvement of hospice who is been actively managing the patient at her current facility.  Apparently the facility she was sent from will not accept her back.  Other facilities that are geriatric psych have open beds but will not accept her until the COVID test is negative here.  This is been  sent and expedited and is anticipated to be back mid to late morning today.    Of note is that an initial urinalysis had been sent but unclear whether the specimen was adequate.  Trying to obtain a second specimen.      Significant Events prior to my assuming care:       Exam:   Patient Vitals for the past 24 hrs:   BP Temp Temp src Pulse Resp SpO2 Weight   08/19/20 0430 (!) 140/55 98  F (36.7  C) -- 72 15 98 % --   08/18/20 2300 -- -- -- -- -- 96 % --   08/18/20 2214 116/77 98  F (36.7  C) Oral 116 18 95 % 86.6 kg (191 lb)           ED RESULTS:   Results for orders placed or performed during the hospital encounter of 08/18/20 (from the past 24 hour(s))   UA with Microscopic reflex to Culture     Status: Abnormal    Collection Time: 08/19/20 12:44 AM    Specimen: Midstream Urine   Result Value Ref Range    Color Urine Straw     Appearance Urine Clear     Glucose Urine 50 (A) NEG^Negative mg/dL    Bilirubin Urine Negative NEG^Negative    Ketones Urine Negative NEG^Negative mg/dL    Specific Gravity Urine 1.010 1.003 - 1.035    Blood Urine Moderate (A) NEG^Negative    pH Urine 6.0 5.0 - 7.0 pH    Protein Albumin Urine 100 (A) NEG^Negative mg/dL    Urobilinogen mg/dL 0.0 0.0 - 2.0 mg/dL    Nitrite Urine Negative NEG^Negative    Leukocyte Esterase Urine Small (A) NEG^Negative    Source Midstream Urine     WBC Urine 39 (H) 0 - 5 /HPF    RBC Urine 41 (H) 0 - 2 /HPF    Bacteria Urine Few (A) NEG^Negative /HPF    Squamous Epithelial /HPF Urine 1 0 - 1 /HPF    Renal Tub Epi <1 (A) NEG^Negative /HPF   Symptomatic COVID-19 Virus (Coronavirus) by PCR     Status: None    Collection Time: 08/19/20  1:10 AM    Specimen: Nasopharyngeal   Result Value Ref Range    COVID-19 Virus PCR to [facility] - Source Nasopharyngeal     COVID-19 Virus PCR to [facility] - Result       Test received-See reflex to IDDL test SARS CoV2 (COVID-19) Virus RT-PCR   Basic metabolic panel     Status: Abnormal    Collection Time: 08/19/20  1:42 AM   Result Value  Ref Range    Sodium 140 133 - 144 mmol/L    Potassium 4.4 3.4 - 5.3 mmol/L    Chloride 111 (H) 94 - 109 mmol/L    Carbon Dioxide 22 20 - 32 mmol/L    Anion Gap 7 3 - 14 mmol/L    Glucose 102 (H) 70 - 99 mg/dL    Urea Nitrogen 29 7 - 30 mg/dL    Creatinine 1.91 (H) 0.52 - 1.04 mg/dL    GFR Estimate 25 (L) >60 mL/min/[1.73_m2]    GFR Estimate If Black 29 (L) >60 mL/min/[1.73_m2]    Calcium 9.0 8.5 - 10.1 mg/dL   CBC with platelets, differential     Status: None    Collection Time: 08/19/20  1:42 AM   Result Value Ref Range    WBC 5.5 4.0 - 11.0 10e9/L    RBC Count 4.37 3.8 - 5.2 10e12/L    Hemoglobin 12.9 11.7 - 15.7 g/dL    Hematocrit 39.1 35.0 - 47.0 %    MCV 90 78 - 100 fl    MCH 29.5 26.5 - 33.0 pg    MCHC 33.0 31.5 - 36.5 g/dL    RDW 13.0 10.0 - 15.0 %    Platelet Count 239 150 - 450 10e9/L    Diff Method Automated Method     % Neutrophils 44.3 %    % Lymphocytes 39.8 %    % Monocytes 9.9 %    % Eosinophils 4.7 %    % Basophils 1.1 %    % Immature Granulocytes 0.2 %    Nucleated RBCs 0 0 /100    Absolute Neutrophil 2.4 1.6 - 8.3 10e9/L    Absolute Lymphocytes 2.2 0.8 - 5.3 10e9/L    Absolute Monocytes 0.5 0.0 - 1.3 10e9/L    Absolute Eosinophils 0.3 0.0 - 0.7 10e9/L    Absolute Basophils 0.1 0.0 - 0.2 10e9/L    Abs Immature Granulocytes 0.0 0 - 0.4 10e9/L    Absolute Nucleated RBC 0.0        ED MEDICATIONS:   Medications   LORazepam (ATIVAN) 2 MG/ML injection (has no administration in time range)   acetaminophen (TYLENOL) tablet 650 mg (650 mg Oral Given 8/18/20 4006)   LORazepam (ATIVAN) tablet 1 mg (1 mg Sublingual Not Given 8/18/20 9210)   LORazepam (ATIVAN) injection 1 mg (1 mg Intramuscular Given 8/18/20 0034)   midazolam (VERSED) injection 2.5 mg (2.5 mg Nasal Given 8/19/20 1215)         Impression:    ICD-10-CM    1. Confusion  R41.0    2. Lewy body dementia with behavioral disturbance (H)  G31.83     F02.81        Plan:    Pending studies include covid test - awaiting transfer to geriatric psych once  COVID test is negative.  Alternatively could return on hospice care to her current facility.  Also awaiting repeat urinalysis and urine culture.  On my arrival the patient had not rested all night.  She was given Zyprexa 5 mg ODT on my arrival here and took that at approximately 745 without significant benefits and a second Zyprexa has been given 5 mg ODT.     This was after I had reviewed EKG from prior and demonstrates no QTc abnormalities or QRS widening.  No allergy to Zyprexa.  Last antipsychotic was approximately 10  hours ago.    Patient has been controlled on.  Dosing of Zyprexa 5 mg ODT and excepts taking this.    Awaiting transfer to geriatric psychiatric treatment.  I discussed several times with Gina and disposition planning.  She has been working with several facilities to try and obtain transfer.  Hospitalist service here has recommended transfer to geriatric psych.     Discussed with Dr. Ty who is aware the patient saw her last evening and will resume care.        MD Sukumar Bermudez Scott J, MD  08/19/20 3469

## 2020-08-19 NOTE — ED NOTES
Pt incontinent in depends, sigifredo-care performed and new brief placed. Pt unable to eat, oral care performed. Remains agitated, 1:1 at bedside. Lights dimmed and soothing music being played.
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
## 2020-08-19 NOTE — CONSULTS
CARE TRANSITION SOCIAL WORK INITIAL ASSESSMENT:    SW received phone call from Charge RN, Olga, regarding pt.  Olga states that pt was sent into the ED from [facility] (Phone: [phone] Fax:[phone]) due to behaviors at the facility.    ANTONINO is familiar with pt as this writer worked on pt's last discharge plan of care to return her to [facility] & enroll with [facility].    ANTONINO placed call to [facility] (Phone: [phone] Fax: [phone]), spoke with Kassidy, clinical director, and requested an update.  Kassidy shared that hospice staff have been at the facility daily since pt's admission to them & have spent hours trying to adjust pt's medication, to no avail.    ANTONINO spoke with ED MD and discussed pt's plan of care.  MD recommends a dameon-psychiatry placement based on the knowledge of pt failing hospice plan of care & unwillingness to take medications.    ANTONINO placed call to [facility], inpatient behavioral health unit, [phone], spoke with Sandra, who shared that we must initiate a DEC assessment & pt must be Covid negative prior to further action.  ANTONINO explained Covid is pending & pt has been negative in the past & is a facility resident.    ANTONINO placed call to ED, spoke with TINO Correa & requested an order for a DEC assessment.    SW to continue to follow.        Gina Law, ARIELLA  [facility] [phone]   [facility]  [phone]

## 2020-08-19 NOTE — ED PROVIDER NOTES
"  HPI   The patient is a 75-year-old female presenting by EMS transport with increasing agitation.  She has a known history of possible Lewy body dementia and acute metabolic encephalopathy.  She was just admitted here at [facility] 8 days ago for similar escalating behavior and agitation.  Please see the note on 8/10 for details.  The patient was supposed to be getting Xanax in the evenings for agitation, as needed.  She apparently has not been taking any of her recommended medication.  They have been trying to provide topical cream that includes Benadryl and Haldol instead but over the past 3 days she has refused this as well.  Unfortunately, the patient's sister is actively dying in the facility - I'm told that she will likely not make it through the night.  This evening, the patient was beside herself with grief and was trying to take her sister out of their living environment \"in order to save her.\"  Staff tried to separate the two but she began to hurt her sister with her effort of refusing removal.  EMS was called.  The patient was given Haldol 2 mg IM.    Currently, the patient cannot provide any feedback or history other than repeated episodes of yelling out and crying.  She talks about her sister and her sadness and her fear.  She does not respond to questions directed specifically to her such as if she is having pain or nausea or recent illness, etc.        Allergies:  Allergies   Allergen Reactions     Cefepime Other (See Comments)     Neurotoxicity, ie, agitated delirium     Ciprofloxacin Anxiety     Pt developed agitation, paranoia while on cipro--on clear if this is what caused it.  But would try to avoid in future.     Hydrocodone Other (See Comments)     dizzy     Lisinopril Cough            Vicodin [Hydrocodone-Acetaminophen] Other (See Comments)     Caused extreme dizziness     Problem List:    Patient Active Problem List    Diagnosis Date Noted     Altered mental status 08/11/2020     " Priority: Medium     Acute metabolic encephalopathy 2020     Priority: Medium     Possible Lewy body dementia (H) 2020     Priority: Medium     H/o paradoxical agitation to antipsychotics, episodes of delirium/psychosis, mild cognitive decline. Recommend outpatient neurology evaluation.        Chronic anticoagulation 2020     Priority: Medium     Pulmonary nodules 2020     Priority: Medium     Noted on chest CT 2020. Radiology recommending 3 month follow-up.       H/O deep venous thrombosis 2019     Priority: Medium     Syncope and collapse 2019     Priority: Medium     Impacted cerumen of right ear 2019     Priority: Medium     CKD (chronic kidney disease) stage 4, GFR 15-29 ml/min (H) 2019     Priority: Medium     Lumbar radiculopathy 2019     Priority: Medium     Oropharyngeal dysphagia 2018     Priority: Medium     Bilateral hearing loss, unspecified hearing loss type 2018     Priority: Medium     Lymphedema of genitalia 2018     Priority: Medium     Spinal stenosis of lumbar region without neurogenic claudication 2018     Priority: Medium     DDD (degenerative disc disease), lumbar 2018     Priority: Medium     Symptomatic bradycardia 10/29/2017     Priority: Medium     Type 2 diabetes mellitus with diabetic nephropathy, with long-term current use of insulin (H) 2017     Priority: Medium     Restless leg syndrome 2017     Priority: Medium     Chronic diarrhea 2017     Priority: Medium     Health Care Home 10/28/2016     Priority: Medium     [facility] Care Coordination  Komal Krishnan RN-BC  Phone: [phone]    [facility] CARE PLAN SUMMARY    Member Name:  Stefanie Velazquez        *Assisted Living*  Address:   [facility]    [address] [place] [address] Phone: [phone] (Home)    :  1945 Date of Assessment: 3/30/2020   Health Plan:  [facility]  Health Plan  Number: [id number] Medical Assistance Number: [id number]  Financial Worker:    Case #:     FVP Care Coordinator:    Komal Krishnan RN-BC CC Phone: [phone]  CC Fax:  [phone]   [facility] Enrollment Date: 09/01/2019 Case Mix:  A-  Rate Cell:  B   Waiver Type:  EW   Primary Emergency Contact: Dori Pena (niece)  Address: [address] [place] [address]  Mobile Phone: [phone]  Relation: Relative  Secondary Emergency Contact: Lori Pope (niece)           [place], [place]   Home Phone: [phone]  Mobile Phone: [phone]  Relation: Relative Language:  English  :  No   Health Care Agent/POA:  Yes  Changed 5.2020  Crystal Sonny [phone] Advanced Directives/Living Will:  Yes   Primary Care Clinic/Phone/Fax:  [facility]/(p) [phone], (f) [phone] Primary Dx: Diabetes Type II  E11.21  Secondary Dx: CKD Stage 4 N18.4      Primary Physician:  Sandra Medina   Height:  5' 0''  Weight:  201 lbs   Specialty Physician:    Audiologist:     Eye Care Provider:   Dental Care Provider:    [facility]: [facility] [phone] or [phone]   Other:        [facility] CURRENT SERVICES SUMMARY  Equipment owned/DME history: WC 2/2020)  SERVICE TYPE/PROVIDER NAME/PHONE AUTH DATE FREQUENCY Units OR $ Amt DESCRIPTION   Medical Transportation: [facility] [employer] [facility] [phone]  Fax:  4/1/2020 - 3/31/2021 As needed Varies    [facility]  Phone: [phone]    Fax: 4/1/2020 - 3/31/2021 Monthly Per [employer]      DME: [facility] [phone]  Fax:  5/11/2020 - 3/31/2021 Monthly TBD Thick it  - ____ cans per month         * For ADC please select ADC provider and [employer] in order to process auth               Benign essential hypertension 09/30/2016     Priority: Medium     Bowel and bladder incontinence 05/22/2015     Priority: Medium     Dysuria 10/24/2013     Priority: Medium     Vitamin B 12 deficiency  05/14/2012     Priority: Medium     Vitamin D deficiency 05/14/2012     Priority: Medium     Radiation therapy complication 11/09/2011     Priority: Medium     Anemia 08/26/2011     Priority: Medium     Rectal cancer- newly diagnosed adenoCA rectum Jan 2011 and Positive Tubular Adenoma 2019 02/17/2011     Priority: Medium     Hyperlipidemia LDL goal <100 10/31/2010     Priority: Medium     Neuropathy (H) 04/09/2010     Priority: Medium     RC-moderate (AHI 12, LSat 60%); REM RDI-73 06/01/2009     Priority: Medium     HST performed 11/11/2019 with weight 201 lbs.  Analysis time 540.2 minutes.  AHI 6.8.  Baseline SpO2 91.1%, <= 88% for 43 minutes.    Sleep study [facility] was performed 5/26/09 in order to re-evaluate severity of RC. The total sleep time was 412.0 minutes. The sleep latency was decreased at 8.7 minutes with Ambien 10mg. The REM sleep latency was 426.0 minutes. Sleep efficiency was reduced at 79.0%. The sleep architecture was disrupted with frequent sleep stage changes and arousals.  Snoring:  loud. Respiratory Events:   RDI of 57.5 and an AHI of 12.2. The REM RDI was 74.3 The lowest O2 saturation was 60.0%. This study is suggestive of moderate sleep apnea, profound desaturations during REM sleep, with 35 second apneas.    Other: PLM index was 13.8.      Recommendations:  Due to the profound desaturations during REM sleep, consider CPAP titration study to establish optimal CPAP treatment pressure.  2. Check ferritin given her RLS symptoms. If RLS symptoms persist after treatment of RC, further therapy may be warranted. Replace iron as indicated by ferritin.         Osteoporosis 02/29/2008     Priority: Medium     Problem list name updated by automated process. Provider to review       Esophageal reflux 10/13/2007     Priority: Medium     On protonix;        bmi 40 06/22/2005     Priority: Medium     Problem list name updated by automated process. Provider to review       Generalized osteoarthrosis,  unspecified site 06/22/2005     Priority: Medium     HEARING LOSS CONDUCTIVE, COMBINED TYPE 06/22/2005     Priority: Medium     [place] measles as child       Hypothyroidism 06/22/2003     Priority: Medium     Problem list name updated by automated process. Provider to review       Chronic ischemic heart disease 06/22/2003     Priority: Medium     Class: Chronic     cabgx3 09/2002,  with a left internal mammary (LIMA) to the left anterior descending and a bypass graft to the obtuse marginal branch of the circumflex and also PDA branch of the right coronary artery. She had an episode of atrial fibrillation postoperatively and was treated with sotalol.   PTCA and stent placement for recurrent restenosis.   2/05 adenosine ef70%  9/24/12 - Lexiscan nuclear stress test was positive for mild partially reversible ischemia in the LAD distribution. Imdur added.            Past Medical History:    Past Medical History:   Diagnosis Date     Acute deep vein thrombosis (DVT) of right lower extremity, unspecified vein (H) 10/31/2018     Acute myocardial infarction of other specified sites, episode of care unspecified 6/2002     Cancer of colon (H)      Cellulitis of lower extremity, bilateral 9/30/2016     Chronic ischemic heart disease, unspecified 6/22/2003     Coronary atherosclerosis of unspecified type of vessel, native or graft      Diabetes mellitus (H)      Esophageal reflux      Fracture of femur, distal, left, closed (H) 2/11/2013     Gastro-oesophageal reflux disease      Generalized osteoarthrosis, unspecified site 6/22/2005     Generalized osteoarthrosis, unspecified site 6/22/2005     HEARING LOSS CONDUCTIVE, COMBINED TYPE 6/22/2005     HYPOTHYROIDISM  6/22/2003     Mixed hyperlipidemia 6/22/2005     Obesity, unspecified 6/22/2005     RC-moderate (AHI 12, LSat 60%); REM RDI-73 6/1/2009     Recurrent acute deep vein thrombosis (DVT) of both lower extremities (H) 3/31/2019     Hai      Stented coronary artery       Type II or unspecified type diabetes mellitus with renal manifestations, uncontrolled(250.42) (H) 6/22/2005     Type II or unspecified type diabetes mellitus with renal manifestations, uncontrolled(250.42) (H) 6/22/2005     Unspecified disorder resulting from impaired renal function 6/22/2005     Unspecified essential hypertension      Past Surgical History:    Past Surgical History:   Procedure Laterality Date     ANESTHESIA OUT OF OR MRI N/A 4/8/2020    Procedure: ANESTHESIA OUT OF OR MRI;  Surgeon: GENERIC ANESTHESIA PROVIDER;  Location: [facility]     cabg       COLECTOMY LOW ANTERIOR  6/21/2011    Procedure:COLECTOMY LOW ANTERIOR; with loop ielostomy; Surgeon:ROBBIN MCDONNELL; Location:[facility]     COLONOSCOPY  1/26/2011    COLONOSCOPY performed by MASOUD BILL at [facility]     COLONOSCOPY N/A 3/1/2016    Procedure: COLONOSCOPY;  Surgeon: Liza Neal MD;  Location: [facility]     INSERT PORT VASCULAR ACCESS  3/2/2011    INSERT PORT VASCULAR ACCESS performed by LIZA NEAL at [facility]     OPEN REDUCTION INTERNAL FIXATION FEMUR DISTAL  2/12/2013    Procedure: OPEN REDUCTION INTERNAL FIXATION FEMUR DISTAL;  Open reduction internal fixation left femur fracture--[facility]esth[facility];  Surgeon: Ley, Jeffrey Duane, MD;  Location: [facility]     ORTHOPEDIC SURGERY       PHACOEMULSIFICATION WITH STANDARD INTRAOCULAR LENS IMPLANT Left 1/22/2018    Procedure: PHACOEMULSIFICATION WITH STANDARD INTRAOCULAR LENS IMPLANT;  Left cataract removal with implant;  Surgeon: Rony Key MD;  Location: [facility]     PHACOEMULSIFICATION WITH STANDARD INTRAOCULAR LENS IMPLANT Right 2/19/2018    Procedure: PHACOEMULSIFICATION WITH STANDARD INTRAOCULAR LENS IMPLANT;  Right cataract removal with implant;  Surgeon: Rony Key MD;  Location: [facility]     SIGMOIDOSCOPY FLEXIBLE  9/9/2011    Procedure:SIGMOIDOSCOPY FLEXIBLE; Performed prior to induction.; Surgeon:ROBBIN MCDONNELL; Location: OR     SURGICAL HISTORY OF  -   10/24/2002    Heart bypass     SURGICAL HISTORY OF -   2002    R Knee arthroplasty     SURGICAL HISTORY OF -   2002    Mi 2 stints     TAKEDOWN ILEOSTOMY  9/9/2011    Procedure:TAKEDOWN ILEOSTOMY; Exploratory Laparotomy,  Loop Ileostomy Takedown, Small Bowel Resection x 2.; Surgeon:ROBBIN MCDONNELL; [facility]     Family History:    Family History   Problem Relation Age of Onset     Diabetes Sister      C.A.D. Sister      Breast Cancer Sister      Social History:  Marital Status:  Single [1]  Social History     Tobacco Use     Smoking status: Former Smoker     Smokeless tobacco: Never Used   Substance Use Topics     Alcohol use: Not Currently     Comment: rare social use     Drug use: No      Medications:    ACCU-CHEK MILVIA MELODY  acetaminophen (TYLENOL) 650 MG CR tablet  aspirin (ASA) 81 MG tablet  biotin 1000 MCG TABS tablet  blood glucose monitoring (ACCU-CHEK MILVIA) test strip  blood glucose monitoring (ACCU-CHEK MULTICLIX) lancets  cholecalciferol 1000 units TABS  clonazePAM (KLONOPIN) 0.5 MG tablet  Continuous Blood Gluc  (FREESTYLE JAJA 14 DAY READER) MELODY  Continuous Blood Gluc Sensor (ZeroMailSTYLE JAJA 14 DAY SENSOR) MISC  donepezil (ARICEPT) 5 MG tablet  ELIQUIS ANTICOAGULANT 5 MG tablet  ferrous sulfate (FEROSUL) 325 (65 Fe) MG tablet  fluocinonide (LIDEX) 0.05 % ointment  fluticasone (FLONASE) 50 MCG/ACT spray  furosemide (LASIX) 20 MG tablet  gabapentin (NEURONTIN) 100 MG capsule  hydrocortisone (ANUSOL-HC) 2.5 % cream  insulin pen needle (BD GABRIELLA U/F) 32G X 4 MM  levothyroxine (SYNTHROID/LEVOTHROID) 88 MCG tablet  levothyroxine (SYNTHROID/LEVOTHROID) 88 MCG tablet  loperamide (IMODIUM) 2 MG capsule  magnesium 250 MG tablet  menthol-zinc oxide (CALMOSEPTINE) 0.44-20.625 % OINT ointment  omeprazole (PRILOSEC) 20 MG DR capsule  order for DME  order for DME  order for DME  order for DME  order for DME  order for DME  polyethylene glycol (MIRALAX) 17 GM/SCOOP powder  pramipexole (MIRAPEX) 0.25  MG tablet  simvastatin (ZOCOR) 40 MG tablet  sulfamethoxazole-trimethoprim (BACTRIM/SEPTRA) 8 mg/mL suspension      Review of Systems   Unable to perform ROS: Psychiatric disorder       PE   BP: 116/77  Pulse: 116  Temp: 98  F (36.7  C)  Resp: 18  Weight: 86.6 kg (191 lb)  SpO2: 95 %  Physical Exam  Vitals signs reviewed.   Constitutional:       General: She is in acute distress.      Appearance: She is well-developed.      Comments: Yelling out and crying and obviously distraught.  Tearful throughout.  Not cooperative to questions but not combative or threatening either.   HENT:      Head: Normocephalic and atraumatic.      Right Ear: External ear normal.      Left Ear: External ear normal.      Nose: Nose normal.      Mouth/Throat:      Mouth: Mucous membranes are moist.      Pharynx: Oropharynx is clear.   Eyes:      Extraocular Movements: Extraocular movements intact.      Conjunctiva/sclera: Conjunctivae normal.      Pupils: Pupils are equal, round, and reactive to light.   Neck:      Musculoskeletal: Normal range of motion.   Cardiovascular:      Rate and Rhythm: Regular rhythm. Tachycardia present.   Pulmonary:      Effort: Pulmonary effort is normal.   Abdominal:      Palpations: Abdomen is soft.      Tenderness: There is no abdominal tenderness.   Musculoskeletal: Normal range of motion.      Comments: She appears to have restless legs as she is moving them vigorously but intermittently, as if there are attacks that occur.   Skin:     General: Skin is warm and dry.   Neurological:      Mental Status: She is alert and oriented to person, place, and time.   Psychiatric:         Behavior: Behavior normal.         ED COURSE and [facility]   2256.  The patient has obvious grief and agitation.  She received Haldol 2 mg IM in route.  This may have calmed her slightly but she is still having difficulty.  She has symptoms consistent with restless legs.  Perhaps this is a side effect of the Haldol?  I will provide lorazepam  1 mg sublingual.  I will provide Tylenol.  The challenge in this situation is to find bed placement.  Her facility will not take her back which is reasonable given the fact that her sister is dying there.  They have been having difficulty with her over the past few days as well, probably related to her sister worsening.  There are no available neuropsych beds at the [facility] or [facility].    2320.  Patient took Tylenol but is refusing lorazepam.  She remains agitated and yelling out.  Lorazepam 1 mg IM will be ordered.    0039.  Pt signed out to Dr. Cowan for overnight.    LABS  Labs Ordered and Resulted from Time of ED Arrival Up to the Time of Departure from the ED - No data to display    IMAGING  Images reviewed by me.  Radiology report also reviewed.  No orders to display       Procedures    Medications - No data to display      IMPRESSION       ICD-10-CM    1. Confusion  R41.0 SARS-CoV-2 COVID-19 Virus (Coronavirus) RT-PCR Nasopharyngeal     Urine Culture Aerobic Bacterial     Urine Culture Aerobic Bacterial     UA with Microscopic     Urine Culture Aerobic Bacterial     Glucose by meter     Glucose by meter     CBC with platelets, differential     Basic metabolic panel     Lactic acid whole blood     Blood culture     Blood culture   2. Lewy body dementia with behavioral disturbance (H)  G31.83     F02.81    3. Urinary tract infection without hematuria, site unspecified  N39.0    4. Atrial fibrillation, rapid (H)  I48.91    5. Exposure to SARS-associated coronavirus  Z20.828             Medication List      Modified    blood glucose monitoring lancets  Test BG 4 times daily  What changed:      how to take this    when to take this                          Zack Riley MD  08/22/20 3291

## 2020-08-19 NOTE — ED NOTES
"Have been sitting with pt since 0900. She is continuously moving about in bed, throwing arms and legs over the side rails. She does strike out and kick if caregiver gets in her space. She pushes away any attempt at oral cares or nutrition. She is non verbal except for occasionally calling out for \"Ruth\" or \"I have to go to the bathroom\". She is incontinent of urine and has pull-ups in place. She does not respond to tender touch or calming verbalization. Music therapy does seem to help some although she has a HX of being [place] so not quite sure how much she can really hear. She requires 1:1 supervision for her own safety.  "

## 2020-08-19 NOTE — ED NOTES
Pt up to the commode using a marcos lift.  Pt had a wet attends. Did not use the commode even those she was saying she needed to pee.  Pt placed on IP bed for comfort.  Continues to be restless.  Refusing to eat breakfast at this time

## 2020-08-19 NOTE — ED NOTES
Medication Reconciliation completed with patient paperwork from facility ([facility] in [place])    List reconciled.

## 2020-08-19 NOTE — ED NOTES
Yfn Pope updated to patient arrival and POC. Understands that home will not accept patient back. Can be reached if needed, and nursing will call if transfer occurs. [phone]

## 2020-08-19 NOTE — ED NOTES
Report received from ERT that patient has been restless and has not slept for the night.  Pt continues to be restless in bed.  Not following directions.  Discussed with MD and order for meds obtained. VS taken and meds administered.  Continues to watch patient and provide a safe environment.

## 2020-08-20 ENCOUNTER — HOSPITAL ENCOUNTER (INPATIENT)
Facility: CLINIC | Age: 75
LOS: 11 days | Discharge: NURSING, REHAB OR LONG-TERM CARE | DRG: 056 | End: 2020-08-31
Attending: INTERNAL MEDICINE | Admitting: INTERNAL MEDICINE
Payer: COMMERCIAL

## 2020-08-20 ENCOUNTER — TELEPHONE (OUTPATIENT)
Dept: FAMILY MEDICINE | Facility: CLINIC | Age: 75
End: 2020-08-20

## 2020-08-20 DIAGNOSIS — R05.9 COUGH: Primary | ICD-10-CM

## 2020-08-20 DIAGNOSIS — R41.82 ALTERED MENTAL STATUS, UNSPECIFIED ALTERED MENTAL STATUS TYPE: ICD-10-CM

## 2020-08-20 DIAGNOSIS — A04.72 C. DIFFICILE COLITIS: ICD-10-CM

## 2020-08-20 DIAGNOSIS — R21 RASH: ICD-10-CM

## 2020-08-20 PROBLEM — R41.0 CONFUSION: Status: ACTIVE | Noted: 2020-08-20

## 2020-08-20 LAB
ALBUMIN SERPL-MCNC: 2.1 G/DL (ref 3.4–5)
ALP SERPL-CCNC: 96 U/L (ref 40–150)
ALT SERPL W P-5'-P-CCNC: 22 U/L (ref 0–50)
ANION GAP SERPL CALCULATED.3IONS-SCNC: 4 MMOL/L (ref 3–14)
AST SERPL W P-5'-P-CCNC: 54 U/L (ref 0–45)
BACTERIA SPEC CULT: ABNORMAL
BASE DEFICIT BLDV-SCNC: 1.9 MMOL/L
BILIRUB SERPL-MCNC: 0.7 MG/DL (ref 0.2–1.3)
BUN SERPL-MCNC: 26 MG/DL (ref 7–30)
CALCIUM SERPL-MCNC: 8.2 MG/DL (ref 8.5–10.1)
CHLORIDE SERPL-SCNC: 116 MMOL/L (ref 94–109)
CO2 SERPL-SCNC: 24 MMOL/L (ref 20–32)
CREAT SERPL-MCNC: 1.87 MG/DL (ref 0.52–1.04)
GFR SERPL CREATININE-BSD FRML MDRD: 25 ML/MIN/{1.73_M2}
GLUCOSE BLDC GLUCOMTR-MCNC: 87 MG/DL (ref 70–99)
GLUCOSE BLDC GLUCOMTR-MCNC: 89 MG/DL (ref 70–99)
GLUCOSE SERPL-MCNC: 89 MG/DL (ref 70–99)
HCO3 BLDV-SCNC: 23 MMOL/L (ref 21–28)
Lab: ABNORMAL
O2/TOTAL GAS SETTING VFR VENT: 2 %
PCO2 BLDV: 41 MM HG (ref 40–50)
PH BLDV: 7.36 PH (ref 7.32–7.43)
PO2 BLDV: 36 MM HG (ref 25–47)
POTASSIUM SERPL-SCNC: 4.1 MMOL/L (ref 3.4–5.3)
PROT SERPL-MCNC: 5.5 G/DL (ref 6.8–8.8)
SODIUM SERPL-SCNC: 144 MMOL/L (ref 133–144)
SPECIMEN SOURCE: ABNORMAL
TSH SERPL DL<=0.005 MIU/L-ACNC: 1.52 MU/L (ref 0.4–4)

## 2020-08-20 PROCEDURE — 25800030 ZZH RX IP 258 OP 636: Performed by: INTERNAL MEDICINE

## 2020-08-20 PROCEDURE — 36592 COLLECT BLOOD FROM PICC: CPT | Performed by: INTERNAL MEDICINE

## 2020-08-20 PROCEDURE — 99233 SBSQ HOSP IP/OBS HIGH 50: CPT | Performed by: NURSE PRACTITIONER

## 2020-08-20 PROCEDURE — G0378 HOSPITAL OBSERVATION PER HR: HCPCS

## 2020-08-20 PROCEDURE — 82803 BLOOD GASES ANY COMBINATION: CPT | Performed by: INTERNAL MEDICINE

## 2020-08-20 PROCEDURE — 99226 ZZC SUBSEQUENT OBSERVATION CARE,LEVEL III: CPT | Mod: GW | Performed by: INTERNAL MEDICINE

## 2020-08-20 PROCEDURE — 84443 ASSAY THYROID STIM HORMONE: CPT | Performed by: INTERNAL MEDICINE

## 2020-08-20 PROCEDURE — 12000001 ZZH R&B MED SURG/OB UMMC

## 2020-08-20 PROCEDURE — 99207 ZZC CDG-CODE CATEGORY CHANGED: CPT | Performed by: INTERNAL MEDICINE

## 2020-08-20 PROCEDURE — 80053 COMPREHEN METABOLIC PANEL: CPT | Performed by: INTERNAL MEDICINE

## 2020-08-20 PROCEDURE — 25000128 H RX IP 250 OP 636: Performed by: INTERNAL MEDICINE

## 2020-08-20 PROCEDURE — 00000146 ZZHCL STATISTIC GLUCOSE BY METER IP

## 2020-08-20 RX ORDER — LANOLIN ALCOHOL/MO/W.PET/CERES
3 CREAM (GRAM) TOPICAL AT BEDTIME
Status: DISCONTINUED | OUTPATIENT
Start: 2020-08-20 | End: 2020-08-21

## 2020-08-20 RX ORDER — GABAPENTIN 100 MG/1
200 CAPSULE ORAL AT BEDTIME
Status: DISCONTINUED | OUTPATIENT
Start: 2020-08-20 | End: 2020-08-22

## 2020-08-20 RX ORDER — AMOXICILLIN 250 MG
1 CAPSULE ORAL DAILY PRN
Status: ON HOLD | COMMUNITY
End: 2020-08-31

## 2020-08-20 RX ORDER — BISACODYL 10 MG
10 SUPPOSITORY, RECTAL RECTAL DAILY PRN
Status: ON HOLD | COMMUNITY
End: 2020-08-31

## 2020-08-20 RX ORDER — HYDROCORTISONE 2.5 %
CREAM (GRAM) TOPICAL 2 TIMES DAILY PRN
Status: ON HOLD | COMMUNITY
End: 2020-08-20

## 2020-08-20 RX ORDER — FLUTICASONE PROPIONATE 50 MCG
1 SPRAY, SUSPENSION (ML) NASAL DAILY PRN
Status: DISCONTINUED | OUTPATIENT
Start: 2020-08-20 | End: 2020-08-31 | Stop reason: HOSPADM

## 2020-08-20 RX ORDER — LEVOTHYROXINE SODIUM 88 UG/1
88 TABLET ORAL
Status: DISCONTINUED | OUTPATIENT
Start: 2020-08-20 | End: 2020-08-31 | Stop reason: HOSPADM

## 2020-08-20 RX ORDER — SIMVASTATIN 20 MG
40 TABLET ORAL DAILY
Status: DISCONTINUED | OUTPATIENT
Start: 2020-08-20 | End: 2020-08-22

## 2020-08-20 RX ORDER — LEVOTHYROXINE SODIUM 88 UG/1
44 TABLET ORAL
Status: DISCONTINUED | OUTPATIENT
Start: 2020-08-23 | End: 2020-08-31 | Stop reason: HOSPADM

## 2020-08-20 RX ORDER — LORAZEPAM 0.5 MG/1
0.5 TABLET ORAL EVERY 4 HOURS PRN
Status: ON HOLD | COMMUNITY
End: 2020-08-31

## 2020-08-20 RX ORDER — DONEPEZIL HYDROCHLORIDE 5 MG/1
5 TABLET, FILM COATED ORAL AT BEDTIME
Status: DISCONTINUED | OUTPATIENT
Start: 2020-08-20 | End: 2020-08-22

## 2020-08-20 RX ORDER — PRAMIPEXOLE DIHYDROCHLORIDE 0.12 MG/1
0.12 TABLET ORAL
Status: DISCONTINUED | OUTPATIENT
Start: 2020-08-20 | End: 2020-08-22

## 2020-08-20 RX ORDER — FLUOCINONIDE 0.5 MG/G
OINTMENT TOPICAL 2 TIMES DAILY PRN
Status: DISCONTINUED | OUTPATIENT
Start: 2020-08-20 | End: 2020-08-31 | Stop reason: HOSPADM

## 2020-08-20 RX ORDER — SODIUM CHLORIDE, SODIUM LACTATE, POTASSIUM CHLORIDE, CALCIUM CHLORIDE 600; 310; 30; 20 MG/100ML; MG/100ML; MG/100ML; MG/100ML
INJECTION, SOLUTION INTRAVENOUS CONTINUOUS
Status: DISCONTINUED | OUTPATIENT
Start: 2020-08-20 | End: 2020-08-20

## 2020-08-20 RX ORDER — SENNOSIDES 8.6 MG
650 CAPSULE ORAL EVERY 8 HOURS PRN
Status: ON HOLD | COMMUNITY
End: 2020-08-31

## 2020-08-20 RX ORDER — ESCITALOPRAM OXALATE 5 MG/5ML
10 SOLUTION ORAL DAILY
Status: DISCONTINUED | OUTPATIENT
Start: 2020-08-20 | End: 2020-08-22

## 2020-08-20 RX ORDER — SODIUM CHLORIDE, SODIUM LACTATE, POTASSIUM CHLORIDE, CALCIUM CHLORIDE 600; 310; 30; 20 MG/100ML; MG/100ML; MG/100ML; MG/100ML
INJECTION, SOLUTION INTRAVENOUS
Status: DISPENSED
Start: 2020-08-20 | End: 2020-08-20

## 2020-08-20 RX ORDER — CLONAZEPAM 0.5 MG/1
0.5 TABLET ORAL
Status: DISCONTINUED | OUTPATIENT
Start: 2020-08-20 | End: 2020-08-22

## 2020-08-20 RX ORDER — CEFTRIAXONE 1 G/1
1 INJECTION, POWDER, FOR SOLUTION INTRAMUSCULAR; INTRAVENOUS EVERY 24 HOURS
Status: DISCONTINUED | OUTPATIENT
Start: 2020-08-20 | End: 2020-08-21

## 2020-08-20 RX ORDER — NALOXONE HYDROCHLORIDE 0.4 MG/ML
.1-.4 INJECTION, SOLUTION INTRAMUSCULAR; INTRAVENOUS; SUBCUTANEOUS
Status: DISCONTINUED | OUTPATIENT
Start: 2020-08-20 | End: 2020-08-31 | Stop reason: HOSPADM

## 2020-08-20 RX ORDER — PROCHLORPERAZINE MALEATE 10 MG
10 TABLET ORAL EVERY 6 HOURS PRN
COMMUNITY
End: 2020-09-17

## 2020-08-20 RX ORDER — ASPIRIN 81 MG/1
81 TABLET ORAL DAILY
Status: DISCONTINUED | OUTPATIENT
Start: 2020-08-20 | End: 2020-08-22

## 2020-08-20 RX ORDER — ACETAMINOPHEN 650 MG/1
650 SUPPOSITORY RECTAL EVERY 8 HOURS PRN
Status: DISCONTINUED | OUTPATIENT
Start: 2020-08-20 | End: 2020-08-20

## 2020-08-20 RX ADMIN — ENOXAPARIN SODIUM 90 MG: 100 INJECTION SUBCUTANEOUS at 21:12

## 2020-08-20 RX ADMIN — SODIUM CHLORIDE, POTASSIUM CHLORIDE, SODIUM LACTATE AND CALCIUM CHLORIDE: 600; 310; 30; 20 INJECTION, SOLUTION INTRAVENOUS at 02:41

## 2020-08-20 RX ADMIN — DEXTROSE AND SODIUM CHLORIDE: 5; 900 INJECTION, SOLUTION INTRAVENOUS at 21:12

## 2020-08-20 RX ADMIN — CEFTRIAXONE SODIUM 1 G: 1 INJECTION, POWDER, FOR SOLUTION INTRAMUSCULAR; INTRAVENOUS at 02:48

## 2020-08-20 NOTE — PROGRESS NOTES
The patient was seen in psychiatry consult.  Appears that she received Ativan in the emergency room and she was sleeping when I met with her.  Due to her recent agitation I elected not to wake her up.      I spoke with [facility] social work, nursing staff and Dr. Berry about the case and reviewed her chart.  Patient has had a recent psychiatric hospitalization for agitated behavior with altered mental status. She has  Lewy body dementia.  She has a history of paradoxical agitation from antipsychotic medications and this may well add to what is described as restless leg syndrome, as she would be more sensitive to ADR akathisia from neuroleptic as part the observation of paradoxical agitation.   Typically we try to minimize use of antipsychotics with Lewy body, and in her case there is indication that they worsen rather than improve her symptoms.      It appears that much of her recent behavioral management has utilized benzodiazepines including Ativan and Klonopin. These carry risk of disinhibition and fall, cognitive ADR, but it does calm her down as I see today.    Uriine cultures are pending.  She is on antibiotics. UTI likely also part of her AMS    Antidepressants can be helpful with Lewy body dementia and with her case, per my Epic review,  I do not see use other than Celexa 20 mg in 2011.   I do not see a discussion in her chart by neurology about her care and I do wonder about the nature of what is been described as restless legs, or if it is the Parkinson's.  There is potential psychiatric implications of the use of ropinirole, as a dopamine agonist, and this may be part of her AMS.  I understand she has been reluctant to take PO medication so this is a consideration in any med regimen.  Plan:  1. Recommend transfer to geropsychiatry when medically stable. Family in agreement with plan.  2. Start Lexapro 10 mg daily- liquid    3. Depakene liquid 250 mg daily, may titrate 250-1000 mg w dose increase every 2-3  days while monitoring LFTS and platelets for other ADRs.  4. Neurology consult here or on geropsych unit  5. Continue 1:1  6. Full note to follow    I have entered med orders  Please call if you have any questions [phone]

## 2020-08-20 NOTE — ANESTHESIA POSTPROCEDURE EVALUATION
Patient: Stefanie Velazquez    * No procedures listed *    Diagnosis:* No pre-op diagnosis entered *  Diagnosis Additional Information: No value filed.    Anesthesia Type:  MAC    Note:  Anesthesia Post Evaluation    Patient location during evaluation: ED  Patient participation: Unable to participate in evaluation secondary to underlying medical condition  Level of consciousness: agitated  Pain management: unable to assess  Airway patency: patent  Cardiovascular status: acceptable  Respiratory status: acceptable  Hydration status: acceptable  PONV: unable to assess     Anesthetic complications: None          Last vitals:  Vitals:    08/19/20 2004 08/19/20 2005 08/19/20 2006   BP:      Pulse: 88 87 94   Resp: 20 18 26   Temp:      SpO2: 97% 96% 96%         Electronically Signed By: Gerry Diggs CRNA, APRN CRNA  August 19, 2020  8:12 PM

## 2020-08-20 NOTE — CONSULTS
The patient was seen in psychiatry consult.  Appears that she received Ativan in the emergency room and she was sleeping when I met with her.  Due to her recent agitation I elected not to wake her up.       I spoke with [facility] social work, nursing staff and Dr. Berry about the case and reviewed her chart.  Patient has had a recent psychiatric hospitalization for agitated behavior with altered mental status. She has Lewy body dementia.  She has a history of paradoxical agitation from antipsychotic medications and this may well add to what is described as restless leg syndrome, as she would be more sensitive to ADR akathisia from neuroleptic as part the observation of paradoxical agitation.   Typically we try to minimize use of antipsychotics with Lewy body, and in her case there is indication that they worsen rather than improve her symptoms.       It appears that much of her recent behavioral management has utilized benzodiazepines including Ativan and Klonopin. These carry risk of disinhibition and fall, cognitive ADR, but it does calm her down as I see today.     Urine cultures are pending.  She is on antibiotics. UTI likely also part of her AMS     Antidepressants can be helpful with Lewy body dementia and with her case, per my Epic review,  I do not see use other than Celexa 20 mg in 2011.   I do not see a discussion in her chart by neurology about her care and I do wonder about the nature of what is been described as restless legs, or if it is the Parkinson's.  There is potential psychiatric implications of the use of ropinirole, as a dopamine agonist, and this may be part of her AMS.  I understand she has been reluctant to take PO medication so this is a consideration in any med regimen.  Plan:  1. Recommend transfer to geropsychiatry when medically stable. Family in agreement with plan.  2. Start Lexapro 10 mg daily- liquid    3. Depakene liquid 250 mg daily, may titrate 250-1000 mg w dose increase every 2-3  days while monitoring LFTS and platelets for other ADRs.  4. Neurology consult here or on geropsych unit  5. Continue 1:1  6. Full note to follow     I have entered med orders  Please call if you have any questions [phone]      [facility], [place]   Initial Psychiatric Consult   Consult date: August 20, 2020         Reason for Consult, requesting source:    increasing episodes of AMS and agitation  Requesting source: Bryon Calzada        HPI:   Admitted 8/19/20  Per H&P: >Stefanie Velazquez is a 75 year old female with history of recurrent UTI, CKD, recurrent episodes of encephalopathy/behavioral changes, coronary artery disease, DVT, and history of rectal cancer, admitted for altered mental status and agitation   Acute Encephalopathy, likely metabolic with likely underlying cognitive decline  - Treat UTI as below  - Continue home donepezil started last admission  - Psychiatry evaluation while inpatient  - Olanzapine PRN agitation  - Melatonin HS<     As stated above the patient is unable to speak with me today she is sleeping soundly after receiving Ativan in the emergency room.  She had poor sleep last night.  I elected not to wake her up.  Much of this consultation is done by chart review.  I spoke with Tea MUÑIZ who is been in touch with the family and consulted with Dr. Berry nursing staff and bedside attendant.        Past Psychiatric History:   PSYCHIATRIC HOSPITALIZATIONS: The patient has had a number of psychiatric admissions for agitation and behavioral disturbance attributed to Lewy body dementia.  PSYCHIATRIC TREATMENT/DX: Patient with a history of Lewy body dementia and cognitive decline   CURRENT PSYCHIATRIC PROVIDER: PCP  THERAPIST: None   PSYCHOTROPIC HX/RESPONSE/ADR/ADHERENCE: I see the utilization of a number of different antipsychotics including Haldol, Zyprexa Zydis, Seroquel and Geodon.  Records indicate that she has a paradoxical agitation response to  antipsychotics and has never have discussed above this likely may represent akathisia or intolerance to antipsychotics due to her Lewy body disease.  Additionally she has been taking Requip apparently for restless legs but again this might be actually Parkinson's symptoms with the Lewy body.  She has been prescribed Exelon.  I do not see any history of mood stabilization and only a remote history of prescription of Celexa as noted above.  ONSET OF PSYCHIATRIC SYMPTOMS: Patient is not able to discuss this with me today and I elected not to call the family as they are busy with another family member who is near death  RECENT STRESSORS: Patient was residing in same assisted living facility as her sister.  Sister is approaching death and this is been very stressful for the patient  HX SUICIDE ATTEMPTS/SIB: Unknown at this time  SLEEP: Poor  INTEREST/ENERGY: Low  FOCUS/CONCENTRATION: Poor  ST AND LT MEMORY: Impaired short-term and long-term memory  APPETITE: Unknown at this time  FEEDING ISSUES: Unknown at this time  MOOD HX/DEPRESSION/TUYET: Depression and agitation but no clear lifelong history of any issues with tuyet  ANXIETY/PANIC: Anxiety/agitation as part of the behavioral disturbance with Lewy body   OBSESSION/COMPULSIONS: No known history  TRAUMA-NIGHTMARES/INTRUSIVE THOUGHTS/FLASHBACKS: Unable to assess  HALLUCINATIONS: Unable to assess   DELUSIONS: Unable to assess   PARANOIA: Likely based on behavioral descriptions in chart but I am unable to directly assess at this time    TBI/LOC: Unknown   DELIRIUM SCREEN: There may be an element of delirium with the patient being worked up for UTI.  She is on antibiotics so current presentation may be delirium in the setting of dementia  HISTORY OF COMMITMENT/COURT AUTH MED: No known          Substance Use and History:   No known problematic use of alcohol or drugs.  The patient does not smoke        Past Medical History:   PAST MEDICAL HISTORY:   Past Medical History:    Diagnosis Date     Acute deep vein thrombosis (DVT) of right lower extremity, unspecified vein (H) 10/31/2018     Acute myocardial infarction of other specified sites, episode of care unspecified 6/2002    MI 2 stints     Cancer of colon (H)      Cellulitis of lower extremity, bilateral 9/30/2016     Chronic ischemic heart disease, unspecified 6/22/2003    cabgx3 , 2002 2/05 adenosine ef70%     Coronary atherosclerosis of unspecified type of vessel, native or graft     Coronary artery disease     Diabetes mellitus (H)      Esophageal reflux      Fracture of femur, distal, left, closed (H) 2/11/2013     Gastro-oesophageal reflux disease      Generalized osteoarthrosis, unspecified site 6/22/2005     Generalized osteoarthrosis, unspecified site 6/22/2005     HEARING LOSS CONDUCTIVE, COMBINED TYPE 6/22/2005    [place] measles as child     HYPOTHYROIDISM  6/22/2003     Mixed hyperlipidemia 6/22/2005     Obesity, unspecified 6/22/2005     RC-moderate (AHI 12, LSat 60%); REM RDI-73 6/1/2009    Sleep study [facility] was performed 5/26/09 in order to re-evaluate severity of RC. The total sleep time was 412.0 minutes. The sleep latency was decreased at 8.7 minutes with Ambien 10mg. The REM sleep latency was 426.0 minutes. Sleep efficiency was reduced at 79.0%. The sleep architecture was disrupted with frequent sleep stage changes and arousals.  Snoring:  loud. 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, uncontrolled(250.42) (H) 6/22/2005     Type II or unspecified type diabetes mellitus with renal manifestations, uncontrolled(250.42) (H) 6/22/2005     Unspecified disorder resulting from impaired renal function 6/22/2005    CR     1.82   06/21/2005     Unspecified essential hypertension         PAST SURGICAL HISTORY:   Past Surgical History:   Procedure Laterality Date     ANESTHESIA OUT OF OR MRI N/A 4/8/2020    Procedure: ANESTHESIA OUT OF OR MRI;  Surgeon: GENERIC ANESTHESIA PROVIDER;  Location: [facility]     cabg       COLECTOMY LOW ANTERIOR  6/21/2011    Procedure:COLECTOMY LOW ANTERIOR; with loop ielostomy; Surgeon:ROBBIN MCDONNELL; [facility]     COLONOSCOPY  1/26/2011    COLONOSCOPY performed by MASOUD BILL at [facility]     COLONOSCOPY N/A 3/1/2016    Procedure: COLONOSCOPY;  Surgeon: Liza Neal MD;  Location: [facility]     INSERT PORT VASCULAR ACCESS  3/2/2011    INSERT PORT VASCULAR ACCESS performed by LIZA NEAL at [facility]     OPEN REDUCTION INTERNAL FIXATION FEMUR DISTAL  2/12/2013    Procedure: OPEN REDUCTION INTERNAL FIXATION FEMUR DISTAL;  Open reduction internal fixation left femur fracture--Anesth.Choice;  Surgeon: Ley, Jeffrey Duane, MD;  Location: [facility]     ORTHOPEDIC SURGERY       PHACOEMULSIFICATION WITH STANDARD INTRAOCULAR LENS IMPLANT Left 1/22/2018    Procedure: PHACOEMULSIFICATION WITH STANDARD INTRAOCULAR LENS IMPLANT;  Left cataract removal with implant;  Surgeon: Rony Key MD;  Location: [facility]     PHACOEMULSIFICATION WITH STANDARD INTRAOCULAR LENS IMPLANT Right 2/19/2018    Procedure: PHACOEMULSIFICATION WITH STANDARD INTRAOCULAR LENS IMPLANT;  Right cataract removal with implant;  Surgeon: Rony Key MD;  Location: [facility]     SIGMOIDOSCOPY FLEXIBLE  9/9/2011    Procedure:SIGMOIDOSCOPY FLEXIBLE; Performed prior to induction.; Surgeon:ROBBIN MCDONNELL; Location: OR     SURGICAL HISTORY OF -   10/24/2002    Heart bypass     SURGICAL HISTORY OF -   2002    R Knee arthroplasty     SURGICAL HISTORY OF -   2002    Mi 2 stints     TAKEDOWN ILEOSTOMY  9/9/2011    Procedure:TAKEDOWN ILEOSTOMY; Exploratory Laparotomy,  Loop Ileostomy Takedown, Small Bowel Resection x 2.; Surgeon:ROBBIN MCDONNELL; Location: OR              Family History:   FAMILY HISTORY:   Family History   Problem Relation Age of Onset     Diabetes Sister      C.A.D. Sister      Breast Cancer Sister    HX OF SUICIDE IN FAMILY: No known  HX OF CD IN FAMILY: No known        Social History:   Social history is limited at this time.  We do know that she was living at the same assisted living facility as her sister.  Her sister is in the process of dying and apparently the patient would become very upset and go to her sister's room and actually lay on top of her sisters and not allow staff to have access to provide cares.  Patient has been enrolled in hospice but was apparently disenrolled as she is in active treatment at this time.  Patient is no CPR and DO NOT INTUBATE and she has ACP documents.  [facility] social work has been involved in discussions with the family and I will defer calling them to duplicate some of these efforts as they are actively attending to a dying family member.           Physical ROS:   The patient is sleeping.  The remainder of 10-point review of systems was negative except as noted in HPI.         Medications:     Current Facility-Administered Medications:      apixaban ANTICOAGULANT (ELIQUIS) tablet 5 mg, 5 mg, Oral, BID, Jovani Berry MD     aspirin EC tablet 81 mg, 81 mg, Oral, Daily, Jovani Berry MD     cefTRIAXone (ROCEPHIN) 1 g vial to attach to  mL bag for ADULTS or NS 50 mL bag for PEDS, 1 g, Intravenous, Q24H, Jovani Berry MD, 1 g at 08/20/20 0248     clonazePAM (klonoPIN) tablet 0.5 mg, 0.5 mg, Oral, At Bedtime PRN, Jovani Berry MD     donepezil (ARICEPT) tablet 5 mg, 5 mg, Oral, At Bedtime, Jovani Berry MD     escitalopram (LEXAPRO) solution 10 mg, 10 mg, Oral, Daily, Steph Armijo APRN CNP     fluocinonide (LIDEX) 0.05 % ointment, , Topical, BID PRN, Jovani Berry MD     fluticasone (FLONASE) 50 MCG/ACT spray 1 spray, 1 spray, Both Nostrils, Daily PRN, Jovani Berry MD      gabapentin (NEURONTIN) capsule 200 mg, 200 mg, Oral, At Bedtime, Jovani Berry MD     [START ON 8/23/2020] levothyroxine (SYNTHROID/LEVOTHROID) half-tab 44 mcg, 44 mcg, Oral, Once per day on Sun, Jovani Berry MD     levothyroxine (SYNTHROID/LEVOTHROID) tablet 88 mcg, 88 mcg, Oral, Once per day on Mon Tue Wed Thu Fri Sat, Jovani Berry MD     melatonin tablet 3 mg, 3 mg, Oral, At Bedtime, Jovani Berry MD     naloxone (NARCAN) injection 0.1-0.4 mg, 0.1-0.4 mg, Intravenous, Q2 Min PRN, Jovani Berry MD     OLANZapine zydis (zyPREXA) ODT half-tab 5 mg, 5 mg, Oral, TID PRN, Jovani Berry MD     Patient is already receiving anticoagulation with heparin, enoxaparin (LOVENOX), warfarin (COUMADIN)  or other anticoagulant medication, , Does not apply, Continuous PRN, Jovani Berry MD     pramipexole (MIRAPEX) tablet 0.125 mg, 0.125 mg, Oral, At Bedtime PRN, Jovani Berry MD     simvastatin (ZOCOR) tablet 40 mg, 40 mg, Oral, Daily, Jovani Berry MD     valproic acid (DEPAKENE) solution 250 mg, 250 mg, Oral, Daily, Steph Armijo APRN CNP  Medications Prior to Admission   Medication Sig Dispense Refill Last Dose     acetaminophen (TYLENOL) 650 MG CR tablet Take 650 mg by mouth every 8 hours as needed for mild pain or fever   8/14/2020     LORazepam (ATIVAN) 0.5 MG tablet Take 0.5 mg by mouth every 4 hours as needed for anxiety   8/15/2020 at Unknown time     ACCU-CHEK MILVIA MELODY dispense one meter 1 device 0 Unknown at Unknown time     acetaminophen (TYLENOL) 650 MG suppository Place 650 mg rectally every 8 hours as needed for fever   Unknown at Unknown time     aspirin (ASA) 81 MG tablet Take 81 mg by mouth daily   8/18/2020 at 9:00 AM     biotin 1000 MCG TABS tablet Take 1,000 mcg by mouth daily   8/18/2020 at 9:00 AM     bisacodyl (DULCOLAX) 10 MG suppository Place 10 mg rectally daily as needed for constipation   Unknown at Unknown time     blood glucose  monitoring (ACCU-CHEK MILVIA) test strip Use to test blood sugars 4 times daily or as directed. 360 each 1 Unknown at Unknown time     blood glucose monitoring (ACCU-CHEK MULTICLIX) lancets Test BG 4 times daily (Patient taking differently: by In Vitro route 4 times daily Test BG 4 times daily) 1 Box 11 Unknown at Unknown time     cholecalciferol 1000 units TABS Take 1,000 Units by mouth daily   8/18/2020 at 9:00 AM     clonazePAM (KLONOPIN) 0.5 MG tablet Take 1 tablet (0.5 mg) by mouth nightly as needed for anxiety or sleep 14 tablet 0 Unknown at Unknown time     Continuous Blood Gluc  (FREESTYLE JAJA 14 DAY READER) MELODY 1 each as needed (use to scan the sensor to check the blood sugars) 1 Device 0 Unknown at Unknown time     Continuous Blood Gluc Sensor (FREESTYLE JAJA 14 DAY SENSOR) MISC 1 each every 14 days 2 each 11 Unknown at Unknown time     donepezil (ARICEPT) 5 MG tablet Take 1 tablet (5 mg) by mouth At Bedtime 30 tablet 0 8/17/2020 at 9:00 AM     ELIQUIS ANTICOAGULANT 5 MG tablet Take 1 tablet by mouth 2 times daily   8/18/2020 at 9:00 AM     ferrous sulfate (FEROSUL) 325 (65 Fe) MG tablet Take 1 tablet (325 mg) by mouth Every Mon, Wed, Fri Morning 30 tablet 1 8/17/2020 at 9:00 AM     fluocinonide (LIDEX) 0.05 % ointment Apply sparingly to rash on legs twice daily as needed.  Do not apply to face. 60 g 11 Unknown at Unknown time     fluticasone (FLONASE) 50 MCG/ACT spray Spray 1 spray into both nostrils daily 1 Bottle 3 8/18/2020 at 9:00 AM     furosemide (LASIX) 20 MG tablet Once a day for 3 days as needed for edema (Patient taking differently: Take 20 mg by mouth 3 times daily for 3 days as needed for edema) 30 tablet 3 Unknown at Unknown time     gabapentin (NEURONTIN) 100 MG capsule Take 2 capsules (200 mg) by mouth At Bedtime 180 capsule 1 8/17/2020 at 8:00 PM     insulin pen needle (BD GABRIELLA U/F) 32G X 4 MM Use 4 times per day or as directed. 120 each 5 Unknown at Unknown time      levothyroxine (SYNTHROID/LEVOTHROID) 88 MCG tablet Take 44 mcg by mouth every 7 days (0.5 x 88 mcg) On Sunday 8/16/2020 at 9:00 AM     levothyroxine (SYNTHROID/LEVOTHROID) 88 MCG tablet Take 88 mcg by mouth daily Except on Sunday 8/18/2020 at 9:00 AM     magnesium 250 MG tablet Take 1 tablet by mouth daily   8/18/2020 at 2:00 PM     morphine 5 MG solu-tab Take 5 mg by mouth every hour as needed (aggitation)    8/17/2020 at Unknown time     omeprazole (PRILOSEC) 20 MG DR capsule Take 1 capsule (20 mg) by mouth 2 times daily 60 capsule 11 8/18/2020 at 4:30 PM     order for DME Equipment being ordered:   Incontinence Products: Gloves (4 Boxes) & chux pads 300 each 11      order for DME Equipment being ordered: Wheelchair 1 Device 0      order for DME Equipment being ordered: Incontinence briefs/Depends 12 Package 11      order for DME Equipment being ordered:  order for XL Size  Pull ups.  Pt is currently using 12 pull ups a day 350 each 11      order for DME Equipment being ordered: Compression stockings 2 Device 0      order for DME Equipment being ordered: Hospital Bed service and repair 1 each 0      polyethylene glycol (MIRALAX) 17 GM/SCOOP powder Take 17 g (1 capful) by mouth daily 578 g 4 8/18/2020 at 8:00 AM     pramipexole (MIRAPEX) 0.25 MG tablet One 1-2 hours prior to HS, and q 8 hrs prn day (Patient taking differently: Take 0.25 mg by mouth At Bedtime Give 1-2 hours PRIOR to bedtime) 60 tablet 3 8/17/2020 at 8:00 PM     prochlorperazine (COMPAZINE) 10 MG tablet Take 10 mg by mouth every 6 hours as needed for nausea or vomiting   Unknown at Unknown time     senna-docusate (SENOKOT-S/PERICOLACE) 8.6-50 MG tablet Take 1 tablet by mouth daily as needed for constipation   Unknown at Unknown time     simvastatin (ZOCOR) 40 MG tablet Take 1 tablet (40 mg) by mouth daily 90 tablet 2 8/17/2020 at 8:00 PM          Allergies:     Allergies   Allergen Reactions     Cefepime Other (See Comments)     Neurotoxicity,  ie, agitated delirium     Ciprofloxacin Anxiety     Pt developed agitation, paranoia while on cipro--on clear if this is what caused it.  But would try to avoid in future.     Hydrocodone Other (See Comments)     dizzy     Lisinopril Cough            Vicodin [Hydrocodone-Acetaminophen] Other (See Comments)     Caused extreme dizziness          Labs:     Recent Results (from the past 48 hour(s))   UA with Microscopic reflex to Culture    Collection Time: 08/19/20 12:44 AM    Specimen: Midstream Urine   Result Value Ref Range    Color Urine Straw     Appearance Urine Clear     Glucose Urine 50 (A) NEG^Negative mg/dL    Bilirubin Urine Negative NEG^Negative    Ketones Urine Negative NEG^Negative mg/dL    Specific Gravity Urine 1.010 1.003 - 1.035    Blood Urine Moderate (A) NEG^Negative    pH Urine 6.0 5.0 - 7.0 pH    Protein Albumin Urine 100 (A) NEG^Negative mg/dL    Urobilinogen mg/dL 0.0 0.0 - 2.0 mg/dL    Nitrite Urine Negative NEG^Negative    Leukocyte Esterase Urine Small (A) NEG^Negative    Source Midstream Urine     WBC Urine 39 (H) 0 - 5 /HPF    RBC Urine 41 (H) 0 - 2 /HPF    Bacteria Urine Few (A) NEG^Negative /HPF    Squamous Epithelial /HPF Urine 1 0 - 1 /HPF    Renal Tub Epi <1 (A) NEG^Negative /HPF   Urine Culture Aerobic Bacterial    Collection Time: 08/19/20 12:44 AM    Specimen: Midstream Urine   Result Value Ref Range    Specimen Description Midstream Urine     Special Requests Specimen received in preservative     Culture Micro >100,000 colonies/mL  Enterococcus faecalis   (A)     Culture Micro       10,000 to 50,000 colonies/mL  mixed urogenital lex  Susceptibility testing in progress      Culture Micro Duplicate request  Canceled, Test credited      Symptomatic COVID-19 Virus (Coronavirus) by PCR    Collection Time: 08/19/20  1:10 AM    Specimen: Nasopharyngeal   Result Value Ref Range    COVID-19 Virus PCR to [facility] - Source Nasopharyngeal     COVID-19 Virus PCR to [facility] - Result       Test  received-See reflex to IDDL test SARS CoV2 (COVID-19) Virus RT-PCR   SARS-CoV-2 COVID-19 Virus (Coronavirus) RT-PCR Nasopharyngeal    Collection Time: 08/19/20  1:10 AM    Specimen: Nasopharyngeal   Result Value Ref Range    SARS-CoV-2 Virus Specimen Source Nasopharyngeal     SARS-CoV-2 PCR Result NEGATIVE     SARS-CoV-2 PCR Comment       Testing was performed using the Aptima SARS-CoV-2 Assay on the [employer] System.   Additional information about this Emergency Use Authorization (EUA) assay can be found via   the Lab Guide.     Basic metabolic panel    Collection Time: 08/19/20  1:42 AM   Result Value Ref Range    Sodium 140 133 - 144 mmol/L    Potassium 4.4 3.4 - 5.3 mmol/L    Chloride 111 (H) 94 - 109 mmol/L    Carbon Dioxide 22 20 - 32 mmol/L    Anion Gap 7 3 - 14 mmol/L    Glucose 102 (H) 70 - 99 mg/dL    Urea Nitrogen 29 7 - 30 mg/dL    Creatinine 1.91 (H) 0.52 - 1.04 mg/dL    GFR Estimate 25 (L) >60 mL/min/[1.73_m2]    GFR Estimate If Black 29 (L) >60 mL/min/[1.73_m2]    Calcium 9.0 8.5 - 10.1 mg/dL   CBC with platelets, differential    Collection Time: 08/19/20  1:42 AM   Result Value Ref Range    WBC 5.5 4.0 - 11.0 10e9/L    RBC Count 4.37 3.8 - 5.2 10e12/L    Hemoglobin 12.9 11.7 - 15.7 g/dL    Hematocrit 39.1 35.0 - 47.0 %    MCV 90 78 - 100 fl    MCH 29.5 26.5 - 33.0 pg    MCHC 33.0 31.5 - 36.5 g/dL    RDW 13.0 10.0 - 15.0 %    Platelet Count 239 150 - 450 10e9/L    Diff Method Automated Method     % Neutrophils 44.3 %    % Lymphocytes 39.8 %    % Monocytes 9.9 %    % Eosinophils 4.7 %    % Basophils 1.1 %    % Immature Granulocytes 0.2 %    Nucleated RBCs 0 0 /100    Absolute Neutrophil 2.4 1.6 - 8.3 10e9/L    Absolute Lymphocytes 2.2 0.8 - 5.3 10e9/L    Absolute Monocytes 0.5 0.0 - 1.3 10e9/L    Absolute Eosinophils 0.3 0.0 - 0.7 10e9/L    Absolute Basophils 0.1 0.0 - 0.2 10e9/L    Abs Immature Granulocytes 0.0 0 - 0.4 10e9/L    Absolute Nucleated RBC 0.0    UA with Microscopic    Collection  Time: 08/19/20  8:55 AM   Result Value Ref Range    Color Urine Yellow     Appearance Urine Slightly Cloudy     Glucose Urine Negative NEG^Negative mg/dL    Bilirubin Urine Negative NEG^Negative    Ketones Urine Negative NEG^Negative mg/dL    Specific Gravity Urine 1.013 1.003 - 1.035    Blood Urine Moderate (A) NEG^Negative    pH Urine 6.0 5.0 - 7.0 pH    Protein Albumin Urine >499 (A) NEG^Negative mg/dL    Urobilinogen mg/dL 0.0 0.0 - 2.0 mg/dL    Nitrite Urine Negative NEG^Negative    Leukocyte Esterase Urine Large (A) NEG^Negative    Source Catheterized Urine     WBC Urine >182 (H) 0 - 5 /HPF    RBC Urine 116 (H) 0 - 2 /HPF    WBC Clumps Present (A) NEG^Negative /HPF    Bacteria Urine Few (A) NEG^Negative /HPF   Urine Culture Aerobic Bacterial    Collection Time: 08/19/20  8:55 AM    Specimen: Catheterized Urine   Result Value Ref Range    Specimen Description Catheterized Urine     Special Requests Specimen received in preservative     Culture Micro (A)      >100,000 colonies/mL  Enterococcus faecalis  Susceptibility testing in progress     Glucose by meter    Collection Time: 08/19/20  3:35 PM   Result Value Ref Range    Glucose 90 70 - 99 mg/dL   CBC with platelets, differential    Collection Time: 08/19/20  4:41 PM   Result Value Ref Range    WBC 6.5 4.0 - 11.0 10e9/L    RBC Count 4.19 3.8 - 5.2 10e12/L    Hemoglobin 12.5 11.7 - 15.7 g/dL    Hematocrit 37.5 35.0 - 47.0 %    MCV 90 78 - 100 fl    MCH 29.8 26.5 - 33.0 pg    MCHC 33.3 31.5 - 36.5 g/dL    RDW 12.9 10.0 - 15.0 %    Platelet Count 212 150 - 450 10e9/L    Diff Method Automated Method     % Neutrophils 70.9 %    % Lymphocytes 14.2 %    % Monocytes 11.0 %    % Eosinophils 2.8 %    % Basophils 0.9 %    % Immature Granulocytes 0.2 %    Nucleated RBCs 0 0 /100    Absolute Neutrophil 4.6 1.6 - 8.3 10e9/L    Absolute Lymphocytes 0.9 0.8 - 5.3 10e9/L    Absolute Monocytes 0.7 0.0 - 1.3 10e9/L    Absolute Eosinophils 0.2 0.0 - 0.7 10e9/L    Absolute  Basophils 0.1 0.0 - 0.2 10e9/L    Abs Immature Granulocytes 0.0 0 - 0.4 10e9/L    Absolute Nucleated RBC 0.0    Basic metabolic panel    Collection Time: 08/19/20  4:41 PM   Result Value Ref Range    Sodium 142 133 - 144 mmol/L    Potassium 4.4 3.4 - 5.3 mmol/L    Chloride 112 (H) 94 - 109 mmol/L    Carbon Dioxide 24 20 - 32 mmol/L    Anion Gap 6 3 - 14 mmol/L    Glucose 115 (H) 70 - 99 mg/dL    Urea Nitrogen 27 7 - 30 mg/dL    Creatinine 1.66 (H) 0.52 - 1.04 mg/dL    GFR Estimate 29 (L) >60 mL/min/[1.73_m2]    GFR Estimate If Black 34 (L) >60 mL/min/[1.73_m2]    Calcium 9.0 8.5 - 10.1 mg/dL   Lactic acid whole blood    Collection Time: 08/19/20  4:45 PM   Result Value Ref Range    Lactic Acid 1.2 0.7 - 2.0 mmol/L   Blood culture    Collection Time: 08/19/20  4:45 PM    Specimen: Blood   Result Value Ref Range    Specimen Description Blood     Special Requests Portacath     Culture Micro No growth after 18 hours    Blood culture    Collection Time: 08/19/20  4:57 PM    Specimen: Blood   Result Value Ref Range    Specimen Description Blood     Culture Micro No growth after 18 hours    Glucose by meter    Collection Time: 08/20/20  3:28 AM   Result Value Ref Range    Glucose 89 70 - 99 mg/dL   Comprehensive metabolic panel    Collection Time: 08/20/20  5:35 AM   Result Value Ref Range    Sodium 144 133 - 144 mmol/L    Potassium 4.1 3.4 - 5.3 mmol/L    Chloride 116 (H) 94 - 109 mmol/L    Carbon Dioxide 24 20 - 32 mmol/L    Anion Gap 4 3 - 14 mmol/L    Glucose 89 70 - 99 mg/dL    Urea Nitrogen 26 7 - 30 mg/dL    Creatinine 1.87 (H) 0.52 - 1.04 mg/dL    GFR Estimate 25 (L) >60 mL/min/[1.73_m2]    GFR Estimate If Black 29 (L) >60 mL/min/[1.73_m2]    Calcium 8.2 (L) 8.5 - 10.1 mg/dL    Bilirubin Total 0.7 0.2 - 1.3 mg/dL    Albumin 2.1 (L) 3.4 - 5.0 g/dL    Protein Total 5.5 (L) 6.8 - 8.8 g/dL    Alkaline Phosphatase 96 40 - 150 U/L    ALT 22 0 - 50 U/L    AST 54 (H) 0 - 45 U/L   Blood gas venous    Collection Time:  08/20/20  5:35 AM   Result Value Ref Range    Ph Venous 7.36 7.32 - 7.43 pH    PCO2 Venous 41 40 - 50 mm Hg    PO2 Venous 36 25 - 47 mm Hg    Bicarbonate Venous 23 21 - 28 mmol/L    Base Deficit Venous 1.9 mmol/L    FIO2 2    TSH with free T4 reflex    Collection Time: 08/20/20  5:35 AM   Result Value Ref Range    TSH 1.52 0.40 - 4.00 mU/L          Physical and Psychiatric Examination:     BP (!) 151/58 (BP Location: Left arm)   Pulse 81   Temp 97.7  F (36.5  C) (Oral)   Resp 20   SpO2 98%   Weight is 0 lbs 0 oz  There is no height or weight on file to calculate BMI.    Physical Exam:  I have reviewed the physical exam as documented by the medical team and agree with findings and assessment and have no additional findings to add at this time.    Mental Status Exam    APPEARANCE/GROOMING/ATTITUDE: The patient is resting in bed after having doses of Ativan.  I am unable to fully assess mental status but based on history she has ha pronounced symptoms of dementia with disorientation and behavioral disturbance as well as disturbance of affect.  Thought processes have been disorganized and she has had some evidence of paranoia is seen in interfering in her dying sister's cares.  She is a limited fund of my knowledge again due to the dementia with poor insight and judgment and poor impulse control.  The patient is at risk of inadvertent harm to self as well as harm to others.  She is unable to provide for her basic safety needs.    Patient meets hospital level of care for acute psychiatric bed with geriatric focus         DSM-5 Diagnosis:   Lewy body dementia with disturbance of affect and behavior          Assessment:   Please see discussion above.            Summary of Recommendations:   Please see discussion above.  Please page me if you have any question [phone]

## 2020-08-20 NOTE — ED NOTES
Call from bed placement, want to hold on transfer until 3 hours post cardioversion for monitoring,  to  call to take place prior to sending, they stated they are holding her bed

## 2020-08-20 NOTE — PLAN OF CARE
VS:   /57 (BP Location: Left arm)   Pulse 58   Temp 97.5  F (36.4  C) (Oral)   Resp 16   SpO2 100%   VSS on 1LPM      Output:   Bowel and Bladder incontinence. Changed X3 of urine this shift    Lungs LS clear on 1LPM NC    Activity:   Has not been OOB    Skin: Intact Ex ecchymotic upper extremeties.    Pain:   PAXTON no visible signs of pain    Neuro/CMS:   Pt very sleepy all shift. PAXTON CMS    Dressing(s):   None    Diet:   Regular- did not eat at all this shift.    LDA:   Port A Cath R chest wall- saline locked    Equipment:   bdside attendent, IV pole, td stockings/velcro wraps,    Plan:   Continue POC. 1:1 sitter @ bedside.    Additional Info:   Soft mitts removed this shift. Pt resting quietly/sleeping.     Pt unable/ refused oral medications. Pushes hands away from her mouth.     Mouth swabs used to help with dryness

## 2020-08-20 NOTE — ANESTHESIA PREPROCEDURE EVALUATION
Anesthesia Pre-Procedure Evaluation    Patient: Stefanie Velazquez   MRN: [id number] : 1945          Preoperative Diagnosis: * No surgery found *        Past Medical History:   Diagnosis Date     Acute deep vein thrombosis (DVT) of right lower extremity, unspecified vein (H) 10/31/2018     Acute myocardial infarction of other specified sites, episode of care unspecified 2002    MI 2 stints     Cancer of colon (H)      Cellulitis of lower extremity, bilateral 2016     Chronic ischemic heart disease, unspecified 2003    cabgx3 ,  adenosine ef70%     Coronary atherosclerosis of unspecified type of vessel, native or graft     Coronary artery disease     Diabetes mellitus (H)      Esophageal reflux      Fracture of femur, distal, left, closed (H) 2013     Gastro-oesophageal reflux disease      Generalized osteoarthrosis, unspecified site 2005     Generalized osteoarthrosis, unspecified site 2005     HEARING LOSS CONDUCTIVE, COMBINED TYPE 2005    Divehi measles as child     HYPOTHYROIDISM  2003     Mixed hyperlipidemia 2005     Obesity, unspecified 2005     RC-moderate (AHI 12, LSat 60%); REM RDI-73 2009    Sleep study [facility] was performed 09 in order to re-evaluate severity of RC. The total sleep time was 412.0 minutes. The sleep latency was decreased at 8.7 minutes with Ambien 10mg. The REM sleep latency was 426.0 minutes. Sleep efficiency was reduced at 79.0%. The sleep architecture was disrupted with frequent sleep stage changes and arousals.  Snoring:  loud. 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, uncontrolled(250.42) (H) 2005     Type II or unspecified type diabetes mellitus with renal manifestations,  uncontrolled(250.42) (H) 6/22/2005     Unspecified disorder resulting from impaired renal function 6/22/2005    CR     1.82   06/21/2005     Unspecified essential hypertension      Past Surgical History:   Procedure Laterality Date     ANESTHESIA OUT OF OR MRI N/A 4/8/2020    Procedure: ANESTHESIA OUT OF OR MRI;  Surgeon: GENERIC ANESTHESIA PROVIDER;  Location: [facility]     cabg       COLECTOMY LOW ANTERIOR  6/21/2011    Procedure:COLECTOMY LOW ANTERIOR; with loop ielostomy; Surgeon:ROBBIN MCDONNELL; Location:[facility]     COLONOSCOPY  1/26/2011    COLONOSCOPY performed by MASOUD BILL at [facility]     COLONOSCOPY N/A 3/1/2016    Procedure: COLONOSCOPY;  Surgeon: Liza Neal MD;  Location: [facility]     INSERT PORT VASCULAR ACCESS  3/2/2011    INSERT PORT VASCULAR ACCESS performed by LIZA NEAL at [facility]     OPEN REDUCTION INTERNAL FIXATION FEMUR DISTAL  2/12/2013    Procedure: OPEN REDUCTION INTERNAL FIXATION FEMUR DISTAL;  Open reduction internal fixation left femur fracture--Anesth[facility];  Surgeon: Ley, Jeffrey Duane, MD;  Location: [facility]     ORTHOPEDIC SURGERY       PHACOEMULSIFICATION WITH STANDARD INTRAOCULAR LENS IMPLANT Left 1/22/2018    Procedure: PHACOEMULSIFICATION WITH STANDARD INTRAOCULAR LENS IMPLANT;  Left cataract removal with implant;  Surgeon: Rony Key MD;  Location: [facility]     PHACOEMULSIFICATION WITH STANDARD INTRAOCULAR LENS IMPLANT Right 2/19/2018    Procedure: PHACOEMULSIFICATION WITH STANDARD INTRAOCULAR LENS IMPLANT;  Right cataract removal with implant;  Surgeon: Rony Key MD;  Location: [facility]     SIGMOIDOSCOPY FLEXIBLE  9/9/2011    Procedure:SIGMOIDOSCOPY FLEXIBLE; Performed prior to induction.; Surgeon:ROBBIN MCDONNELL; Location: OR     SURGICAL HISTORY OF -   10/24/2002    Heart bypass     SURGICAL HISTORY OF -   2002    R Knee arthroplasty     SURGICAL HISTORY OF -   2002    Mi 2 stints     TAKEDOWN ILEOSTOMY  9/9/2011     Procedure:TAKEDOWN ILEOSTOMY; Exploratory Laparotomy,  Loop Ileostomy Takedown, Small Bowel Resection x 2.; Surgeon:ROBBIN MCDONNELL; Location:[facility]       Anesthesia Evaluation     . Pt has had prior anesthetic. Type: General           ROS/MED HX    ENT/Pulmonary:     (+)sleep apnea, tobacco use, Past use , . .    Neurologic: Comment: Altered mental status   Acute metabolic encephalopathy         (+)delerium resolved No, dementia, other neuro Lewy body dementia    Cardiovascular: Comment: Chronic ischemic heart disease    (+) Dyslipidemia, hypertension--CAD, --. : . . . :. .       METS/Exercise Tolerance:  1 - Eating, dressing   Hematologic:  - neg hematologic  ROS       Musculoskeletal: Comment: Spinal stenosis of lumbar region without neurogenic claudication   DDD (degenerative disc disease), lumbar             GI/Hepatic:     (+) GERD       Renal/Genitourinary:     (+) chronic renal disease, type: CRI,       Endo:     (+) type II DM Diabetic complications: nephropathy neuropathy, thyroid problem hypothyroidism, Obesity, .      Psychiatric:         Infectious Disease:  - neg infectious disease ROS       Malignancy:   (+) Malignancy History of GI          Other:    - neg other ROS                      Physical Exam      Airway   TM distance: >3 FB  Comment: frederick    Dental   Comment: [place]    Cardiovascular   Rhythm and rate: irregular and abnormal      Pulmonary (+) decreased breath sounds               Lab Results   Component Value Date    WBC 6.5 08/19/2020    HGB 12.5 08/19/2020    HCT 37.5 08/19/2020     08/19/2020    CRP 5.5 05/26/2020    SED 51 (H) 10/16/2013     08/19/2020    POTASSIUM 4.4 08/19/2020    CHLORIDE 112 (H) 08/19/2020    CO2 24 08/19/2020    BUN 27 08/19/2020    CR 1.66 (H) 08/19/2020     (H) 08/19/2020    DWIGHT 9.0 08/19/2020    PHOS 1.9 (L) 06/06/2020    MAG 1.6 06/05/2020    ALBUMIN 2.6 (L) 08/10/2020    PROTTOTAL 6.3 (L) 08/10/2020    ALT 17 08/10/2020    AST 25 08/10/2020     "ALKPHOS 125 08/10/2020    BILITOTAL 0.3 08/10/2020    LIPASE 221 04/06/2020    REGI 49 05/27/2020    PTT 37 05/26/2020    INR 1.87 (H) 05/26/2020    FIBR 325 06/21/2011    TSH 3.92 08/10/2020    T4 1.32 06/14/2013       Preop Vitals  BP Readings from Last 3 Encounters:   08/19/20 (!) 157/87   08/13/20 (!) 140/55   07/22/20 (!) 176/62    Pulse Readings from Last 3 Encounters:   08/19/20 146   08/13/20 72   07/22/20 60      Resp Readings from Last 3 Encounters:   08/19/20 26   08/13/20 18   07/22/20 18    SpO2 Readings from Last 3 Encounters:   08/19/20 97%   08/13/20 95%   07/22/20 98%      Temp Readings from Last 1 Encounters:   08/19/20 36.2  C (97.1  F) (Axillary)    Ht Readings from Last 1 Encounters:   08/11/20 1.727 m (5' 8\")      Wt Readings from Last 1 Encounters:   08/18/20 86.6 kg (191 lb)    Estimated body mass index is 29.04 kg/m  as calculated from the following:    Height as of 8/11/20: 1.727 m (5' 8\").    Weight as of this encounter: 86.6 kg (191 lb).       Anesthesia Plan      History & Physical Review  History and physical reviewed and following examination; no interval change.    ASA Status:  4 emergent.    NPO Status:  Waived due to emergency    Plan for MAC     Pt. On psych hold, non-verbal, uncooperative, implied  consent        Postoperative Care      Consents  Anesthetic plan, risks, benefits and alternatives discussed with:  Implied consent/emergency..                 Gerry Diggs CRNA, APRN CRNA  "

## 2020-08-20 NOTE — LETTER
Transition Communication Hand-off for Care Transitions to Next Level of Care Provider    Name: Stefanie Velazquez  : 1945  MRN #: [id number]  Primary Care Provider: Sandra Morales     Primary Clinic: [address] [facility] [address]     Reason for Hospitalization:  UTI  encephalopathy  Altered mental status  Confusion  Pulmonary aspiration  Admit Date/Time: 2020 12:37 AM  Discharge Date: 2020 3:00 PM  Payor Source: [facility]or[facility] / Product Type: HMO /     Reason for Communication Hand-off Referral: Other continuity of care    Discharge Plan: LTC- [facility] with plans for family to re-enroll in her in [facility]       Concern for non-adherence with plan of care:  No  Discharge Needs Assessment:        Any outstanding tests or procedures:        Referrals     Future Labs/Procedures    Occupational Therapy Adult Consult     Comments:    Evaluate and treat as clinically indicated.    Reason:  Deconditioning    Physical Therapy Adult Consult     Comments:    Evaluate and treat as clinically indicated.    Reason:  Deconditioning    Speech Language Path Adult Consult     Comments:    Evaluate and treat as clinically indicated.    Reason: Deconditioning            Key Recommendations:      ADELE Amezquita    AVS/Discharge Summary is the source of truth; this is a helpful guide for improved communication of patient story

## 2020-08-20 NOTE — PROGRESS NOTES
[facility], [place]    Medicine Progress Note - Hospitalist Service       Date of Admission:  8/20/2020  Assessment & Plan       Stefanie Velazquez is a 75 year old female with history of recurrent UTI, CKD, recurrent episodes of encephalopathy/behavioral changes, coronary artery disease, DVT, and history of rectal cancer, admitted for altered mental status and agitation. She was admitted to the hospital at [facility] 8/10 to 8/13 and discharged to assisted living and was sent form there due to escalating behaviors , which included hitting and kicking .    Patient has a history of likely Lewy body dementia, coronary disease hypertension hyperlipidemia DVT, type 2 diabetes hypothyroidism, restless legs and obstructive sleep apnea and recently admitted  8/10/2020 for weakness and increased confusion, fatigue and a history of recurrent encephalopathy agitation and paranoia with combativeness and at 1 point required intubation.  Also with recurrent urinary tract infections but no obvious connection between the urinary tract infection encephalopathy.     At this last hospitalization she was started on Aricept nighttime Klonopin as needed.  Likely Lewy body dementia.  Hospice was consulted and was actively caring for the patient at the facility for which she was at before arriving in this emergency department last evening.     The patient had been recently treated with Benadryl and Haldol but had refused these medications recently.  Her sister who is at the same facility has been quite ill and is expected to die in the next several days.  This is provoked significant sadness fear and worsening mental status for this patient.     Patient arrived by ambulance and had received 2 mg IM Haldol in the ambulance.  Lorazepam was given.  Patient refused various medications and was given IM lorazepam but this resulted in no significant decrease in the patient being hyperactive, agitated walking  around the room but somewhat directable.         Acute Encephalopathy, likely metabolic with likely underlying cognitive decline due to lewy body dementia  - Treat UTI   - Continue home donepezil started last admission  - Psychiatry evaluation while inpatient  - Olanzapine PRN agitation  - Melatonin HS     Abnormal UA, suspect UTI  Proteinuria  S/P treatment with bactrim.   On ceftriaxone     TITA on CKD;  baseline creatinine is 1.4 to 1.8   Creatinine 1.87 today     Coronary Artery Disease  History of 3 vessel CABG in 2002.   - Continue home simvastatin, asa  - Unable to tolerate ACEi or beta blocker due to hypotension.     Hyperlipidemia  - Continue home simvastatin      History of DVT   - Continue home apixaban      Diabetes Mellitus, Type II  Chronic. Last a1C 6.1 on 6/29/2020. Not currently on medications        Lumbar Radiculopathy   Neuropathy  Previously diagnosed.    - continue home gabapentin      Hypothyroidism  TSH 1.52 on 8/20   - Continue home levothyroxine 88mcg daily, 44mcg on Sunday     Gastroesophageal reflux disease  - Holding omeprazole     Restless Leg Syndrome    - continue pramixpexole at dose 0.125 mg at bedtime prn     RC  Previously diagnosed. On CPAP at assisted living facility, follows with sleep medicine.   CPAP when awake      Rectal Cancer  Follows with Dr. Martinez, diagnosed in 2011, underwent neoadjuvant chemoradiation, resection with reanastomosis, adjuvant chemotherapy. Follows yearly with oncology, last seen 7/2020, no evidence of recurrence or plans for further follow up based on stability.  - Continue outpatient surveillance          Diet: regular   DVT Prophylaxis: apxiban  Lr Catheter: not present           Disposition Plan   Expected discharge: 1-2 days, recommended to memory care  once safe disposition plan/ TCU bed available.  Entered: Mahnaz Berry MD 08/20/2020, 9:48 AM       The patient's care was discussed with the Bedside Nurse, Care Coordinator/  and Patient.    Mahnaz Berry MD  Hospitalist Service  [facility], [place]    ______________________________________________________________________    Interval History   No new issues reported per nursing   Was not hostile   Tired       Data reviewed today: I reviewed all medications, new labs and imaging results over the last 24 hours.  Physical Exam   Vital Signs: Temp: 97.5  F (36.4  C) Temp src: Oral BP: 116/57 Pulse: 58   Resp: 16 SpO2: 100 % O2 Device: Nasal cannula Oxygen Delivery: 2 LPM  Weight: 0 lbs 0 oz   Sleepy and not engaing in conversation .   Stacia cath left chest wall     Hematologic / Lymphatic: no cervical lymphadenopathy  Respiratory: No increased work of breathing, good air exchange, clear to auscultation bilaterally, no crackles or wheezing  Cardiovascular: Normal apical impulse, regular rate and rhythm, normal S1 and S2, no S3 or S4, and no murmur noted  GI: No scars, normal bowel sounds, soft, non-distended, non-tender, no masses palpated, no hepatosplenomegally    Data   Recent Labs   Lab 08/20/20  0535 08/19/20  1641 08/19/20  0142   WBC  --  6.5 5.5   HGB  --  12.5 12.9   MCV  --  90 90   PLT  --  212 239    142 140   POTASSIUM 4.1 4.4 4.4   CHLORIDE 116* 112* 111*   CO2 24 24 22   BUN 26 27 29   CR 1.87* 1.66* 1.91*   ANIONGAP 4 6 7   DWIGHT 8.2* 9.0 9.0   GLC 89 115* 102*   ALBUMIN 2.1*  --   --    PROTTOTAL 5.5*  --   --    BILITOTAL 0.7  --   --    ALKPHOS 96  --   --    ALT 22  --   --    AST 54*  --   --

## 2020-08-20 NOTE — ANESTHESIA CARE TRANSFER NOTE
Patient: Stefanie Velazquez    * No procedures listed *    Diagnosis: * No pre-op diagnosis entered *  Diagnosis Additional Information: No value filed.    Anesthesia Type:   MAC     Note:  Airway :Nasal Cannula  Patient transferred to:Emergency Department  Handoff Report: Identifed the Patient, Identified the Reponsible Provider, Reviewed the pertinent medical history, Discussed the surgical course, Reviewed Intra-OP anesthesia mangement and issues during anesthesia, Set expectations for post-procedure period and Allowed opportunity for questions and acknowledgement of understanding      Vitals: (Last set prior to Anesthesia Care Transfer)    CRNA VITALS  8/19/2020 1941 - 8/19/2020 2011 8/19/2020             SpO2:  98 %    EKG:  Sinus rhythm                Electronically Signed By: Gerry Diggs CRNA, APRN CRNA  August 19, 2020  8:11 PM

## 2020-08-20 NOTE — ED NOTES
Bed: ED06  Expected date: 8/19/20  Expected time: 8:37 PM  Means of arrival:   Comments:  HOLD FOR 1
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
## 2020-08-20 NOTE — PROGRESS NOTES
[facility]  Transfer Triage Note    Date of call: 08/19/20  Time of call: 8:07 PM    Is pandemic COVID-19 a concern? NO    Reason for transfer: Further diagnostic work up, management, and consultation for specialized care   Diagnosis: Lewey Body Dementia.  Behavioral disturbance    Outside Records: Available  Additional records requested to be faxed to [phone].    Stability of Patient: Patient is vitally stable, with no critical labs, and will likely remain stable throughout the transfer process  ICU: No    Expected Time of Arrival for Transfer: 0-8 hours    Arrival Location:  [address] [address] Phone: [phone]    Recommendations for Management and Stabilization: Given    Additional Comments 76 yo female with history of coronary artery disease, hypertension, hyperlipidemia, DVT, type 2 diabetes, chronic back pain, hypothyroidism, rectal cancer, GERD, restless leg syndrome and obstructive sleep apnea.and diagnosis of Lewey Body Dementia transferring from [facility] ED to [facility] for psychiatry input and placement in the setting of ongoing behavioral disturbance.   patient.  [facility] admit in June 2020 and 8 days ago for recurrent episodes of encephalopathy associated with agitation and paranoia.  According to record transitioned to Hospice.   Sent to ED from  for behavioral disturbance - apparently aggressive with staff and not allowing Hospice to administer care plan.  Refusing cares for the last 3 days.  In ED started on meropenem for active UA sediment c/w possible UTI, possibly contributing to behavioral disturbance in the past.  Also cardioverted for spontaneous tachyarrhythmia with  (? SVT).   COVID neg. Plan to transfer to [facility] for psychiatry input regarding behaviors, clarification of treatment goals and appropriate placement.  Per record confirmed DNR/DNI order. Med tele  with issue of SVT (?) pending clarification of treatment goals.     Bryon Calzada MD

## 2020-08-20 NOTE — PHARMACY-ADMISSION MEDICATION HISTORY
Admission Medication History Completed by Pharmacy    See [facility] Admission Navigator for allergy information, preferred outpatient pharmacy, prior to admission medications and immunization status.     Medication History Sources:     MAR from nursing home    Medication dispense report    Changes made to PTA medication list (reason):    Added: Tylenol 650mg CR tablet, senna, prochlorperazine  tablet    Deleted: Hydrocortisone 2.5% cream, loperamide, menthol zinc oxide ointment,     Changed: Last dose dates by data available    Additional Information:    Patient got admitted with MAR, verified MAR for last dose info, verified with dispense report.     Prior to Admission medications    Medication Sig Last Dose Taking? Auth Provider   acetaminophen (TYLENOL) 650 MG CR tablet Take 650 mg by mouth every 8 hours as needed for mild pain or fever 8/14/2020 Yes Unknown, Entered By History   LORazepam (ATIVAN) 0.5 MG tablet Take 0.5 mg by mouth every 4 hours as needed for anxiety 8/17/2020 Yes Unknown, Entered By History   ACCU-CHEK MILVIA MELODY dispense one meter Unknown at Unknown time  Rosa Fay MD   acetaminophen (TYLENOL) 650 MG suppository Place 650 mg rectally every 8 hours as needed for fever Unknown at Unknown time  Reported, Patient   aspirin (ASA) 81 MG tablet Take 81 mg by mouth daily 8/18/2020 at 9:00 AM  Reported, Patient   biotin 1000 MCG TABS tablet Take 1,000 mcg by mouth daily 8/18/2020 at 9:00 AM  Reported, Patient   bisacodyl (DULCOLAX) 10 MG suppository Place 10 mg rectally daily as needed for constipation Unknown at Unknown time  Unknown, Entered By History   blood glucose monitoring (ACCU-CHEK MILVIA) test strip Use to test blood sugars 4 times daily or as directed. Unknown at Unknown time  Sandra Medina MD   blood glucose monitoring (ACCU-CHEK MULTICLIX) lancets Test BG 4 times daily  Patient taking differently: by In Vitro route 4 times daily Test BG 4 times daily Unknown at Unknown time   Rosa Fay MD   cholecalciferol 1000 units TABS Take 1,000 Units by mouth daily 8/18/2020 at 9:00 AM  Reported, Patient   clonazePAM (KLONOPIN) 0.5 MG tablet Take 1 tablet (0.5 mg) by mouth nightly as needed for anxiety or sleep Unknown at Unknown time  Vinicio Desir MD   Continuous Blood Gluc  (FREESTYLE JAJA 14 DAY READER) MELODY 1 each as needed (use to scan the sensor to check the blood sugars) Unknown at Unknown time  Sandra Medina MD   Continuous Blood Gluc Sensor (FREESTYLE JAJA 14 DAY SENSOR) MISC 1 each every 14 days Unknown at Unknown time  Sandra Medina MD   donepezil (ARICEPT) 5 MG tablet Take 1 tablet (5 mg) by mouth At Bedtime 8/17/2020 at 9:00 AM  Vinicio Desir MD   ELIQUIS ANTICOAGULANT 5 MG tablet Take 1 tablet by mouth 2 times daily 8/18/2020 at 9:00 AM  Abstract, Provider   ferrous sulfate (FEROSUL) 325 (65 Fe) MG tablet Take 1 tablet (325 mg) by mouth Every Mon, Wed, Fri Morning 8/17/2020 at 9:00 AM  Sandra Medina MD   fluocinonide (LIDEX) 0.05 % ointment Apply sparingly to rash on legs twice daily as needed.  Do not apply to face. Unknown at Unknown time  Sandra Medina MD   fluticasone (FLONASE) 50 MCG/ACT spray Spray 1 spray into both nostrils daily 8/18/2020 at 9:00 AM  Sandra Medina MD   furosemide (LASIX) 20 MG tablet Once a day for 3 days as needed for edema  Patient taking differently: Take 20 mg by mouth 3 times daily for 3 days as needed for edema Unknown at Unknown time  Sandra Medina MD   gabapentin (NEURONTIN) 100 MG capsule Take 2 capsules (200 mg) by mouth At Bedtime 8/17/2020 at 8:00 PM  Sandra Medina MD   hydrocortisone (ANUSOL-HC) 2.5 % cream Place rectally 2 times daily as needed for hemorrhoids   Deloris Costello APRN CNP   hydrocortisone 2.5 % cream Apply topically 2 times daily as needed (hemorrhoids) Unknown at Unknown time  Unknown, Entered By History   insulin pen needle (BD GABRIELLA U/F) 32G X 4 MM Use 4  times per day or as directed. Unknown at Unknown time  Sandra Medina MD   levothyroxine (SYNTHROID/LEVOTHROID) 88 MCG tablet Take 44 mcg by mouth every 7 days (0.5 x 88 mcg) On Sunday 8/16/2020 at 9:00 AM  Abstract, Provider   levothyroxine (SYNTHROID/LEVOTHROID) 88 MCG tablet Take 88 mcg by mouth daily Except on Sunday 8/18/2020 at 9:00 AM  Reported, Patient   magnesium 250 MG tablet Take 1 tablet by mouth daily 8/18/2020 at 2:00 PM  Reported, Patient   morphine 5 MG solu-tab Take 5 mg by mouth every hour as needed (aggitation)  8/17/2020 at Unknown time  Reported, Patient   NEW MED Apply 1 mL topically every 2 hours as needed (Extreme behavior agitation) Medication Name: Loraz 1 MG/ DIPHEN 25 MG/HALO 1 MG/ 1ML   Reported, Patient   omeprazole (PRILOSEC) 20 MG DR capsule Take 1 capsule (20 mg) by mouth 2 times daily 8/18/2020 at 4:30 PM  Sandra Medina MD   order for DME Equipment being ordered:   Incontinence Products: Gloves (4 Boxes) & chux pads   Sandra Medina MD   order for DME Equipment being ordered: Wheelchair   Sandra Medina MD   order for DME Equipment being ordered: Incontinence briefs/Depends   Apolinar Martinez MD   order for DME Equipment being ordered:  order for XL Size  Pull ups.  Pt is currently using 12 pull ups a day   Sandra Medina MD   order for DME Equipment being ordered: Compression stockings   Sandra Medina MD   order for DME Equipment being ordered: Hospital Bed service and repair   Sandra Medina MD   polyethylene glycol (MIRALAX) 17 GM/SCOOP powder Take 17 g (1 capful) by mouth daily 8/18/2020 at 8:00 AM  Sandra Medina MD   pramipexole (MIRAPEX) 0.25 MG tablet One 1-2 hours prior to HS, and q 8 hrs prn day  Patient taking differently: Take 0.25 mg by mouth At Bedtime Give 1-2 hours PRIOR to bedtime 8/17/2020 at 8:00 PM  Sandra Medina MD   prochlorperazine (COMPAZINE) 10 MG tablet Take 10 mg by mouth every 6 hours as needed for nausea or  vomiting Unknown at Unknown time  Unknown, Entered By History   senna-docusate (SENOKOT-S/PERICOLACE) 8.6-50 MG tablet Take 1 tablet by mouth daily as needed for constipation Unknown at Unknown time  Unknown, Entered By History   simvastatin (ZOCOR) 40 MG tablet Take 1 tablet (40 mg) by mouth daily 8/17/2020 at 8:00 PM  Sandra Medina MD       Date completed: 08/20/20    Medication history completed by: Belkys Wheeler, Pharmacy Intern, PD4

## 2020-08-20 NOTE — LETTER
Health Information Management Services               Recipient: Ana          Sender: ADELE Reid [phone]          Date: August 28, 2020  Patient Name:  Stefanie Velazquez  Routing Message:  LTC referral for [place]. Pt will likely be enrolled in Hospice. Has POA/HCD and very sweet nieces who are involved. Would be ready at any time          The documents accompanying this notice contain confidential information belonging to the sender.  This information is intended only for the use of the individual or entity named above.  The authorized recipient of this information is prohibited from disclosing this information to any other party and is required to destroy the information after its stated need has been fulfilled, unless otherwise required by state law.      If you are not the intended recipient, you are hereby notified that any disclosure, copy, distribution or action taken in reliance on the contents of these documents is strictly prohibited.  If you have received this document in error, please return it by fax to [phone] with a note on the cover sheet explaining why you are returning it (e.g. not your patient, not your provider, etc.).  If you need further assistance, please call [facility] Centralized Transcription at [phone].  Documents may also be returned by mail to [employer], , [facility] [address], [place], [place] [address].

## 2020-08-20 NOTE — LETTER
Health Information Management Services               Recipient: Ana          Sender: Tea MOSES [phone]          Date: August 31, 2020  Patient Name:  Stefanie Velazquez  Routing Message:  Discharge orders, 3PM ridcarmen, [id number]        The documents accompanying this notice contain confidential information belonging to the sender.  This information is intended only for the use of the individual or entity named above.  The authorized recipient of this information is prohibited from disclosing this information to any other party and is required to destroy the information after its stated need has been fulfilled, unless otherwise required by state law.      If you are not the intended recipient, you are hereby notified that any disclosure, copy, distribution or action taken in reliance on the contents of these documents is strictly prohibited.  If you have received this document in error, please return it by fax to [phone] with a note on the cover sheet explaining why you are returning it (e.g. not your patient, not your provider, etc.).  If you need further assistance, please call [facility] Centralized Transcription at [phone].  Documents may also be returned by mail to [employer], , [facility] [address] [place], [place] [address].         No

## 2020-08-20 NOTE — PROGRESS NOTES
/57 (BP Location: Left arm)   Pulse 58   Temp 97.5  F (36.4  C) (Oral)   Resp 16   SpO2 100%    Pt came from [facility] at 0030. Pt was agitated and restless upon arrival to the unit. MD notified and fluid was ordered and CefTRIAXone was administered. Pt incontinent of bowel and bladder, Changed X3 this shift, no BM. Pt too sleepy to take po medication. Pt was tearful and restless. Vital sign stable, unable to do much assessment. Port on the right side, lymphedema wraps on both legs. Pt type 2 diabetes. Continue with plan of care.

## 2020-08-20 NOTE — CONSULTS
Social Work Services Progress Note    Hospital Day: 1  Date of Initial Social Work Evaluation:  Not yet completed  Collaborated with:  ANTONINO Nayak, chart review, [facility] [place] [phone]    Data:  Pt is a 75 year old female who presented to [facility] for behaviors at her MARY ELLEN/memory care. Pt enrolled in Hospice through [facility]. Pt ended up with heart issues & needed to be cardioverted-was then transferred to [facility].     Per handoff from ANTONINO Fuentes, pt was at [facility] in [place]. Pt was enrolled in Hospice. Per Gina, Hospice reported that they were at the facility daily but were unable to manage pt's behaviors. Behaviors include hitting, kicking, biting, & threatening to throw self off the bed & refusal of cares. Pt refused meds at the facility and they are not licensed to do IM medications. Per Gina, pt is unable to return to [facility] due to these reasons. Gina stated that pt was seen by [facility] and [facility] Gina initiated referrals to multiple geripsych units yesterday.     ANTONINO following up with referrals started by Gina.     [facility] [place] [facility]. [place] requested a referral; Facesheet, H&P, med list, EKG, UA, UC, CDC, CMP, TSH and COVID test. ANTONINO faxed all labs and documents requested.    A new TSH will be needed (want one within 24-48 hours). Provider re-ordering.     Pt does not have capacity to make decisions, pt's HCD is in place naming Lori Domingo as the first health care agent and Dori Pena as the second health care agent. Pt's financial POA lists Shira Dennis as the first  in fact.     ANTONINO to update family and hospice team.     Contacts:  [facility] - [place], Clinical Director  Phone: [phone]   Fax: [phone]      0958: [place] at [facility] received the referral and will review.     1130: ANTONINO updated pt's  niece/health care agent, Lori. Lori aware of plan for geripsych. Lori will be the point of contact for writer. Lori reports that Crystal typically works with [facility] but her mother is currently in an MARY ELLEN and is not doing well, and will likely pass in the next couple of days. Writer gave Lori the direct SW contact. Writer to update Lori when a geripsych bed is found. SW also spoke with [place] at [facility] who indicated that pt was officially discharged from hospice on 8/19 because [facility] does not service [facility].      1300: Pt declined by [facility]. Per [place] in intake, they believe pt has Acute Encephalopathy and UTI. Pt declined due to complexity. SW to initiate other referrals.     Intervention:  Discharge planning    Assessment:  Per handoff, pt needs geripsych placement    Plan:    Anticipated Disposition:  Facility:  [facility]     Barriers to d/c plan:  Bed availability, behaviors    Follow Up:  SW to continue to follow    ADELE Brenner  Unit 5/Unit 8 Ortho/Med/Surg & [facility]  Phone: [phone] Pager: [phone]

## 2020-08-20 NOTE — UTILIZATION REVIEW
"Inpatient to Observation note:    Admission Status; Secondary Review Determination         Under the authority of the Utilization Management Committee, the utilization review process indicated a secondary review on the above patient.  The review outcome is based on review of the medical records, discussions with staff, and applying clinical experience noted on the date of the review.          (x) Observation Status Appropriate - This patient does not meet hospital inpatient criteria and is placed in observation status. If this patient's primary payer is Medicare and was admitted as an inpatient, Condition Code 44 should be used and patient status changed to \"observation\".     RATIONALE FOR DETERMINATION     35-year-old female with history of recurrent UTI, likely Lewy body dementia, coronary artery disease, hypertension, hyperlipidemia, type 2 diabetes mellitus, was admitted at [facility] on 8/20/2020 with acute change in mental state.  There is concern for UTI for which patient is receiving Rocephin.  Discussed with Dr. Berry, primary hospitalist, patient will need [facility] psych unit placement.  She is on oral medications for her agitation.  She does not meet criteria for inpatient at this time.  Observation care is more appropriate.    The severity of illness, intensity of service provided, expected LOS and risk for adverse outcome make the care appropriate for further observation; however, doesn't meet criteria for hospital inpatient admission. Dr Berry notified of this determination.    This document was produced using voice recognition software.      The information on this document is developed by the utilization review team in order for the business office to ensure compliance.  This only denotes the appropriateness of proper admission status and does not reflect the quality of care rendered.         The definitions of Inpatient Status and Observation Status used in making the determination above are those provided " in the CMS Coverage Manual, Chapter 1 and Chapter 6, section 70.4.      Sincerely,  Madi Falcon MD    Utilization Review  Physician Advisor  [facility].

## 2020-08-21 ENCOUNTER — APPOINTMENT (OUTPATIENT)
Dept: SPEECH THERAPY | Facility: CLINIC | Age: 75
DRG: 056 | End: 2020-08-21
Attending: INTERNAL MEDICINE
Payer: COMMERCIAL

## 2020-08-21 LAB
ANION GAP SERPL CALCULATED.3IONS-SCNC: 7 MMOL/L (ref 3–14)
BUN SERPL-MCNC: 26 MG/DL (ref 7–30)
CALCIUM SERPL-MCNC: 7.9 MG/DL (ref 8.5–10.1)
CHLORIDE SERPL-SCNC: 116 MMOL/L (ref 94–109)
CO2 SERPL-SCNC: 24 MMOL/L (ref 20–32)
CREAT SERPL-MCNC: 1.75 MG/DL (ref 0.52–1.04)
ERYTHROCYTE [DISTWIDTH] IN BLOOD BY AUTOMATED COUNT: 13.1 % (ref 10–15)
GFR SERPL CREATININE-BSD FRML MDRD: 28 ML/MIN/{1.73_M2}
GLUCOSE SERPL-MCNC: 102 MG/DL (ref 70–99)
HCT VFR BLD AUTO: 32.8 % (ref 35–47)
HGB BLD-MCNC: 10.5 G/DL (ref 11.7–15.7)
LACTATE BLD-SCNC: 0.8 MMOL/L (ref 0.7–2)
MCH RBC QN AUTO: 29.6 PG (ref 26.5–33)
MCHC RBC AUTO-ENTMCNC: 32 G/DL (ref 31.5–36.5)
MCV RBC AUTO: 92 FL (ref 78–100)
PLATELET # BLD AUTO: 177 10E9/L (ref 150–450)
POTASSIUM SERPL-SCNC: 3.7 MMOL/L (ref 3.4–5.3)
RBC # BLD AUTO: 3.55 10E12/L (ref 3.8–5.2)
SODIUM SERPL-SCNC: 147 MMOL/L (ref 133–144)
WBC # BLD AUTO: 3.4 10E9/L (ref 4–11)

## 2020-08-21 PROCEDURE — 12000001 ZZH R&B MED SURG/OB UMMC

## 2020-08-21 PROCEDURE — 80048 BASIC METABOLIC PNL TOTAL CA: CPT | Performed by: INTERNAL MEDICINE

## 2020-08-21 PROCEDURE — 25000125 ZZHC RX 250

## 2020-08-21 PROCEDURE — 25000128 H RX IP 250 OP 636: Performed by: ORTHOPAEDIC SURGERY

## 2020-08-21 PROCEDURE — 25000128 H RX IP 250 OP 636: Performed by: INTERNAL MEDICINE

## 2020-08-21 PROCEDURE — 92610 EVALUATE SWALLOWING FUNCTION: CPT | Mod: GN | Performed by: SPEECH-LANGUAGE PATHOLOGIST

## 2020-08-21 PROCEDURE — 25800030 ZZH RX IP 258 OP 636: Performed by: INTERNAL MEDICINE

## 2020-08-21 PROCEDURE — 25000132 ZZH RX MED GY IP 250 OP 250 PS 637: Performed by: NURSE PRACTITIONER

## 2020-08-21 PROCEDURE — 99226 ZZC SUBSEQUENT OBSERVATION CARE,LEVEL III: CPT | Mod: GW | Performed by: INTERNAL MEDICINE

## 2020-08-21 PROCEDURE — 85027 COMPLETE CBC AUTOMATED: CPT | Performed by: INTERNAL MEDICINE

## 2020-08-21 PROCEDURE — 99207 ZZC CDG-CODE CATEGORY CHANGED: CPT | Performed by: INTERNAL MEDICINE

## 2020-08-21 PROCEDURE — 94640 AIRWAY INHALATION TREATMENT: CPT

## 2020-08-21 PROCEDURE — 25000132 ZZH RX MED GY IP 250 OP 250 PS 637: Performed by: PSYCHIATRY & NEUROLOGY

## 2020-08-21 PROCEDURE — G0378 HOSPITAL OBSERVATION PER HR: HCPCS

## 2020-08-21 PROCEDURE — 36592 COLLECT BLOOD FROM PICC: CPT | Performed by: INTERNAL MEDICINE

## 2020-08-21 PROCEDURE — 83605 ASSAY OF LACTIC ACID: CPT | Performed by: INTERNAL MEDICINE

## 2020-08-21 RX ORDER — DEXTROSE MONOHYDRATE 50 MG/ML
INJECTION, SOLUTION INTRAVENOUS CONTINUOUS
Status: DISCONTINUED | OUTPATIENT
Start: 2020-08-21 | End: 2020-08-22

## 2020-08-21 RX ORDER — ALBUTEROL SULFATE 0.83 MG/ML
2.5 SOLUTION RESPIRATORY (INHALATION)
Status: DISCONTINUED | OUTPATIENT
Start: 2020-08-21 | End: 2020-08-31 | Stop reason: HOSPADM

## 2020-08-21 RX ORDER — AMPICILLIN 2 G/1
2 INJECTION, POWDER, FOR SOLUTION INTRAVENOUS EVERY 6 HOURS
Status: DISCONTINUED | OUTPATIENT
Start: 2020-08-21 | End: 2020-08-22

## 2020-08-21 RX ORDER — HALOPERIDOL 5 MG/ML
2 INJECTION INTRAMUSCULAR ONCE
Status: COMPLETED | OUTPATIENT
Start: 2020-08-22 | End: 2020-08-21

## 2020-08-21 RX ORDER — DEXTROSE MONOHYDRATE 50 MG/ML
INJECTION, SOLUTION INTRAVENOUS
Status: DISPENSED
Start: 2020-08-21 | End: 2020-08-21

## 2020-08-21 RX ORDER — ALBUTEROL SULFATE 0.83 MG/ML
SOLUTION RESPIRATORY (INHALATION)
Status: COMPLETED
Start: 2020-08-21 | End: 2020-08-21

## 2020-08-21 RX ORDER — DEXTROSE MONOHYDRATE 50 MG/ML
INJECTION, SOLUTION INTRAVENOUS
Status: DISPENSED
Start: 2020-08-21 | End: 2020-08-22

## 2020-08-21 RX ADMIN — AMPICILLIN 2 G: 2 INJECTION, POWDER, FOR SOLUTION INTRAVENOUS at 11:37

## 2020-08-21 RX ADMIN — HALOPERIDOL LACTATE 2 MG: 5 INJECTION, SOLUTION INTRAMUSCULAR at 23:44

## 2020-08-21 RX ADMIN — Medication 5 MG: at 21:17

## 2020-08-21 RX ADMIN — CEFTRIAXONE SODIUM 1 G: 1 INJECTION, POWDER, FOR SOLUTION INTRAMUSCULAR; INTRAVENOUS at 02:27

## 2020-08-21 RX ADMIN — DEXTROSE MONOHYDRATE: 50 INJECTION, SOLUTION INTRAVENOUS at 09:25

## 2020-08-21 RX ADMIN — ALBUTEROL SULFATE 2.5 MG: 2.5 SOLUTION RESPIRATORY (INHALATION) at 08:42

## 2020-08-21 RX ADMIN — ESCITALOPRAM 10 MG: 5 SOLUTION ORAL at 08:14

## 2020-08-21 RX ADMIN — AMPICILLIN 2 G: 2 INJECTION, POWDER, FOR SOLUTION INTRAVENOUS at 18:37

## 2020-08-21 RX ADMIN — ENOXAPARIN SODIUM 80 MG: 80 INJECTION SUBCUTANEOUS at 21:19

## 2020-08-21 RX ADMIN — ENOXAPARIN SODIUM 80 MG: 80 INJECTION SUBCUTANEOUS at 09:24

## 2020-08-21 RX ADMIN — DEXTROSE AND SODIUM CHLORIDE: 5; 900 INJECTION, SOLUTION INTRAVENOUS at 06:09

## 2020-08-21 RX ADMIN — DEXTROSE MONOHYDRATE: 50 INJECTION, SOLUTION INTRAVENOUS at 23:03

## 2020-08-21 NOTE — PROVIDER NOTIFICATION
Martha paged regarding pt's status. Pt has been very sleepy and only wakes briefly with repeated stimulus. Pt is not awake enough to take PO meds and also concern for low amount of PO intake for the last two shifts.  Orders received to switch anticoagulant to Lovenox and IV fluids started for hydration. Calf PCD's started as well.

## 2020-08-21 NOTE — PROGRESS NOTES
PAXTON pt's mental status as pt has been very sleepy. Pt would arouse to pain (Lovenox shot), being wet and repeated stimulation. 1:1 sitter at beside.  IV fluids Infusing. Pt is incontinent of urine. Tiffany Cares done and Pure Wick started. Pt turned in bed to offset pressure.  Continue to monitor.

## 2020-08-21 NOTE — PROGRESS NOTES
[facility] Progress Note - Hospitalist Service       Date of Admission:  8/20/2020  Assessment & Plan   Stefanie Velazquez is a 75 year old female with history of recurrent UTI, CKD, recurrent episodes of encephalopathy/behavioral changes, coronary artery disease, DVT, and history of rectal cancer, admitted for altered mental status and agitation. She was admitted to the hospital at [facility] [place] 8/10 to 8/13 and discharged to assisted living and was sent form there due to escalating behaviors , which included hitting and kicking .    Patient has a history of likely Lewy body dementia, coronary disease, hypertension, hyperlipidemia DVT, type 2 diabetes hypothyroidism, restless legs and obstructive sleep apnea and recently admitted  8/10/2020 for weakness and increased confusion, fatigue and a history of recurrent encephalopathy agitation and paranoia with combativeness and at 1 point required intubation.  Also with recurrent urinary tract infections but no obvious connection between the urinary tract infection encephalopathy.     At this last hospitalization she was started on Aricept nighttime Klonopin as needed.  Likely Lewy body dementia.  Hospice was consulted and was actively caring for the patient at the facility for which she was at before arriving in this emergency department last evening.     The patient had been recently treated with Benadryl and Haldol but had refused these medications recently.  Her sister who is at the same facility has been quite ill and passed away yesterday .    This is provoked significant sadness fear and worsening mental status for this patient.     Patient arrived by ambulance and had received 2 mg IM Haldol in the ambulance.  Lorazepam was given.  Patient refused various medications and was given IM lorazepam but this resulted in no significant decrease in the patient being hyperactive, agitated walking around the room but  somewhat directable.         Acute Encephalopathy likely metabolic with likely underlying cognitive decline due to lewy body dementia  - Psychiatry evaluation   - Olanzapine PRN agitation  - Melatonin HS    ? Aspiration   Speech to eval for swallow        Enterococcus  UTI  Proteinuria  S/P treatment with bactrim.   Stop ceftriaxone   Start ampicillin as growing enterococcus, discussed with pharm D      TITA on CKD;  baseline creatinine is 1.4 to 1.8   Creatinine 1.75 8/21     Coronary Artery Disease  History of 3 vessel CABG in 2002.   - Continue home simvastatin, asa  - Unable to tolerate ACEi or beta blocker due to hypotension.     Hyperlipidemia  - Continue home simvastatin      History of DVT   -pit on lovenox for now as po intake unclear . Will switch to apixiban on discharge      Diabetes Mellitus, Type II  Chronic. Last a1C 6.1 on 6/29/2020. Not currently on medications        Lumbar Radiculopathy   Neuropathy  Previously diagnosed.    - continue home gabapentin      Hypothyroidism  TSH 1.52 on 8/20   - Continue home levothyroxine 88mcg daily, 44mcg on Sunday     Gastroesophageal reflux disease  - Holding omeprazole     Restless Leg Syndrome    - continue pramixpexole at dose 0.125 mg at bedtime prn     RC  Previously diagnosed. On CPAP at assisted living facility, follows with sleep medicine.   CPAP when awake      Rectal Cancer  Follows with Dr. Martinez, diagnosed in 2011, underwent neoadjuvant chemoradiation, resection with reanastomosis, adjuvant chemotherapy. Follows yearly with oncology, last seen 7/2020, no evidence of recurrence or plans for further follow up based on stability.  - Continue outpatient surveillance            Disposition Plan   Expected discharge: 2 - 3 days, recommended to [facility] once antibiotic plan established and mental status at baseline.  Entered: Mahnaz Berry MD 08/21/2020, 1:14 PM       The patient's care was discussed with the Bedside Nurse, Care Coordinator/Social  Worker, Patient and psych Consultant.  Spoke with ninfa Meadows and clarifies she is DNI and DNR and that they would like her to go to [facility] . Also updated that patients sister passed away ;ast evening . Patient un aware    Mahnaz Berry MD  Hospitalist Service  [facility] [place]    ______________________________________________________________________    Interval History   Had an episode of coughing earlier in am   Not clear if she aspirated   episode resolved after albuterol neb  Voices no complainst    Data reviewed today: I reviewed all medications, new labs and imaging results over the last 24 hours. I    Physical Exam   Vital Signs: Temp: 98.4  F (36.9  C) Temp src: Axillary BP: 136/53 Pulse: 52   Resp: 24 SpO2: 100 % O2 Device: Nasal cannula Oxygen Delivery: 2 LPM  Weight: 0 lbs 0 oz   Alert and wakes up , how ever soon tamara valerio will go back to sleeping   Has shown little or no interest in food     Hematologic / Lymphatic: no cervical lymphadenopathy  Respiratory: No increased work of breathing, good air exchange, clear to auscultation bilaterally, no crackles or wheezing  Cardiovascular: Normal apical impulse, regular rate and rhythm, normal S1 and S2, no S3 or S4, and no murmur noted  GI: No scars, normal bowel sounds, soft, non-distended, non-tender, no masses palpated, no hepatosplenomegally    Data   Recent Labs   Lab 08/21/20  0520 08/20/20  0535 08/19/20  1641 08/19/20  0142   WBC 3.4*  --  6.5 5.5   HGB 10.5*  --  12.5 12.9   MCV 92  --  90 90     --  212 239   * 144 142 140   POTASSIUM 3.7 4.1 4.4 4.4   CHLORIDE 116* 116* 112* 111*   CO2 24 24 24 22   BUN 26 26 27 29   CR 1.75* 1.87* 1.66* 1.91*   ANIONGAP 7 4 6 7   DWIGHT 7.9* 8.2* 9.0 9.0   * 89 115* 102*   ALBUMIN  --  2.1*  --   --    PROTTOTAL  --  5.5*  --   --    BILITOTAL  --  0.7  --   --    ALKPHOS  --  96  --   --    ALT  --  22  --   --    AST  --  54*  --   --

## 2020-08-21 NOTE — PLAN OF CARE
Pt is lethargic and non-verbal, moaning and grimacing when trying to turn or perform sigifredo-care, incontinent of urine and bowel, pure wick in place, and briefs on, skin intact except some bruising, port-a-cath in place, not able to perform full assessment, pt has been sleeping the entire shift, sitter in the room for safety, PIV infusing fluids, tele -sinus dilip, sepsis protocol triggered this am, will continue to monitor and assist.

## 2020-08-21 NOTE — PROGRESS NOTES
08/21/20 1600   General Information   Onset Date 08/10/20   Start of Care Date 08/21/20   Referring Physician Dr Jamal MD   Patient/Family Goals Statement not stated   Swallowing Evaluation Bedside swallow evaluation   Behaviorial Observations Combative/agitated;Confused;Uncooperative   Mode of current nutrition NPO   Respiratory Status Room air   Comments Stefanie Velazquez is a 75 year old female with history of recurrent UTI, CKD, recurrent episodes of encephalopathy/behavioral changes, coronary artery disease, DVT, and history of rectal cancer, admitted for altered mental status and agitation. She was admitted to the hospital at [facility] 8/10 to 8/13 and discharged to assisted living and was sent form there due to escalating behaviors , which included hitting and kicking . Patient has a history of likely Lewy body dementia, coronary disease, hypertension, hyperlipidemia DVT, type 2 diabetes hypothyroidism, restless legs and obstructive sleep apnea and recently admitted  8/10/2020 for weakness and increased confusion, fatigue and a history of recurrent encephalopathy agitation and paranoia with combativeness and at 1 point required intubation.  Also with recurrent urinary tract infections but no obvious connection between the urinary tract infection encephalopathy..   Clinical Swallow Evaluation   Oral Musculature unable to assess due to poor participation/comprehension   Dentition edentulous, does not have dentures   Oral Labial Strength and Mobility impaired retraction;impaired pursing;other (see comments)  (right labial droop)   Additional Documentation Yes   Additional evaluation(s) completed today No   Clinical Swallow Eval: Thin Liquid Texture Trial   Mode of Presentation, Thin Liquids spoon;fed by clinician   Volume of Liquid or Food Presented 1/2 teaspoon amount x2   Oral Phase of Swallow Poor AP movement   Pharyngeal Phase of Swallow impaired;coughing/choking;throat clearing;wet vocal quality  after swallow   Diagnostic Statement positive s/s of aspiraton with small 1/2 teaspoon amounts x2- delayed swallow initiation; difficulty with A-P propulsion of bolus; some liquid loss anterior with sputtering; able to visual a swallow but wet gurgly vocal quality and immediate coughing 1x and delayed coughing a 2nd time   Clinical Swallow Eval: Nectar Thick Liquid Texture Trial   Mode of Presentation, Nectar spoon;fed by clinician   Volume of Nectar Presented 1/4- 1/2 teaspoon amounts   Oral Phase, Barnhart Poor AP movement   Pharyngeal Phase, Nectar impaired;coughing/choking;repeated swallows;throat clearing;wet vocal quality after swallow   Diagnostic Statement similar findings as to above thin liquid trials: delayed swallow intiiatin- positive aspiration s/s with 2 trials of 1/4 to 1/2 teaspoon amounts with difficulty with A-P propulsion ; again some anterior liquid loss with some sputtering   Clinical Swallow Eval: Honey Thick Liquid Texture Trial   Mode of Presentation, Honey spoon   Volume of Honey Presented 1/4 teaspoon amounts   Oral Phase, Honey Poor AP movement   Pharyngeal Phase, Honey impaired;coughing/choking;repeated swallows;throat clearing;wet vocal quality after swallow   Diagnostic Statement same findings as above with nectar thick liquids- positive aspiration s/s - see above   Clinical Swallow Eval: Pudding Thick Liquid Texture Trial   Mode of Presentation, Pudding spoon;fed by clinician   Volume of Pudding Presented 1/4 teapoon amount   Oral Phase, Pudding Poor AP movement   Pharyngeal Phase, Pudding coughing/choking;repeated swallows;throat clearing;wet vocal quality after swallow   Diagnostic Statement Difficulty with A-P propulsion; noting repeated attempts to swallow- had immediate coughing, throat clearing and wet vocal quality, delayed swallow- same response as above trials   Swallow Compensations   Swallow Compensations Pacing;Reduce amounts   Results Suspect aspiration   Esophageal Phase  of Swallow   Patient reports or presents with symptoms of esophageal dysphagia No   General Therapy Interventions   Planned Therapy Interventions Dysphagia Treatment   Dysphagia treatment Oropharyngeal exercise training;Instruction of safe swallow strategies   Swallow Eval: Clinical Impressions   Skilled Criteria for Therapy Intervention Skilled criteria met.  Treatment indicated.   Functional Assessment Scale (FAS) 1   Dysphagia Outcome Severity Scale (ALEA) Level 1 - ALEA   Treatment Diagnosis Severe oral pharyngal dysphagia   Diet texture recommendations NPO   Demonstrates Need for Referral to Another Service occupational therapy;physical therapy   Therapy Frequency Daily   Predicted Duration of Therapy Intervention (days/wks) 10 days   Anticipated Discharge Disposition other (see comments)  (memory care unit)   Risks and Benefits of Treatment have been explained. Yes   Patient, family and/or staff in agreement with Plan of Care Yes   Clinical Impression Comments Completed clinical bedside swallow eval per MD orders. Pt is confused and minimally cooperative at times but was able to have pt complete some limited trials of po. Based on swallow eval- pt is demonstrating difficulty at times with A-P propulsion of bolus on all consistencies trialed.Further noting anterior loss of some of the material with some sputtering of material as it spills anteriorly from the mouth; also noting delayed swallow initiation on all trials.  On the small amounts that pt is able to swallow: 1/4 to 1/2 teaspoon amounts of the honey thick water, nectar thick water, thin water and 1/4 teaspoon of pudding- all resulted in wet, gurgly vocal quality, throat clearing and coughing-- sometimes immediate cough and other times delayed cough. Suspect aspiration on all consistencies -- given this- pt is not safe for po intake- Recommend strict NPO with frequent oral cares with oral swab ( with excess moisture squeezed out) and consideration of  alternative nutrition/hydration. SLP will continue to assess daily for readiness for po intake as well as can attempt oral pharyngeal excs but pt having difficulty following directions- so participation may be limited.    Total Evaluation Time   Total Evaluation Time (Minutes) 30

## 2020-08-21 NOTE — PLAN OF CARE
Completed clinical bedside swallow eval per MD orders. Pt is confused and minimally cooperative at times but was able to have pt complete some limited trials of po. Based on swallow eval- pt is demonstrating difficulty at times with A-P propulsion of bolus on all consistencies trialed.Further noting anterior loss of some of the material with some sputtering of material as it spills anteriorly from the mouth; also noting delayed swallow initiation on all trials.  On the small amounts that pt is able to swallow: 1/4 to 1/2 teaspoon amounts of the honey thick water, nectar thick water, thin water and 1/4 teaspoon of pudding- all resulted in wet, gurgly vocal quality, throat clearing and coughing-- sometimes immediate cough and other times delayed cough. Suspect aspiration on all consistencies -- given this- pt is not safe for po intake- Recommend strict NPO with frequent oral cares with oral swab ( with excess moisture squeezed out) and consideration of alternative nutrition/hydration. SLP will continue to assess daily for readiness for po intake as well as can attempt oral pharyngeal excs but pt having difficulty following directions- so participation may be limited.

## 2020-08-21 NOTE — PLAN OF CARE
VS:   /53 (BP Location: Left arm)   Pulse 52   Temp 98.4  F (36.9  C) (Axillary)   Resp 24   SpO2 100%   VSS on RA    Output:   Incontinent of bowel and bladder. LBM: 2 Xlarge soft/loose bowel movements this shift.Passing flatus     Lungs LS clear- Diminished @ bases    Activity:   Has not been out of bed. Active ROM performed, Pt moving arms overhead and helping move more with cares today.    Skin: Intact Ex: ecchymotic BUE,    Pain:   Denies   Neuro/CMS:   Pt continue to be lethargic- but improving. Sternal rub & continued stimuli needed to keep pt awake/attentive. Pt able to state name & date of birth today. Asking where she was- Pt reoriented. PAXTON CMS    Dressing(s):   None    Diet:   Speech therapy Eval to see pt. Sips of water given.    LDA:   Port A Cath R chest wall- infusing Dextrose 5%    Equipment:   IV pole, suction machine   Plan:   Continue POC. Expected discharge 2-3 days when medically stable and when proper placement is found. SW is following.    Additional Info:   During morning med pass after completing oral cares this writer attempted to give 10ml Escitalipram oral liquid order as pt swallowed she started coughing uncontrollably- cough strong and non productive. Only 5ml was administered other 5ml was disposed of. RN was able to signal Hospitalist to come in room. Minimal oral suction to cheek mucus membranes performed to remove excess saliva, Pt continuously coughing, breath sounds clear, after a couple of minutes RT was called to assist. Nebulizer treatment was administered. After several minutes pt was able to calm and stop coughing.   All other oral meds HELD by provider until further notice.

## 2020-08-21 NOTE — PROGRESS NOTES
Social Work Services Progress Note    Hospital Day: 2  Collaborated with:  Medical team, pt's ninfa Sandoval, pt's [facility] Nurse Care Coordinator Komal Krishnan Office: [phone]  Cell: [phone], [email or web address]     Data:  SW following for placement. It was recommended that pt be admitted to a geripsych unit. SW followed up referrals yesterday but placement was not secured.     Today the medical team reports IV abx for a UTI and a coughing fit this AM. Pt not medically stable for geripsych placement at this time. SW cannot initiated referrals for placement until pt is medically clear - geripsych facilities will not review until pt is medically cleared.     SW updated pt's niece Lori that the search for geripsych was put on hold until pt was medically stable. ANTONINO also updated pt's  RNCC Komal by e-mail.     ANTONINO did receive a VM from Lori indicating that pt's sister, Daiana, did pass yesterday evening. Lori unsure of when it would be an appropriate time to tell pt of this based on her current state.     Intervention:  Coordination of care    Assessment:  Pt is not medically cleared for discharge    Plan:    Anticipated Disposition:  Geripsych or other placement as appropriate     Barriers to d/c plan:  Medical stability, bed availability     Follow Up:  SW to continue to follow    ADELE Brenner  Unit 5/Unit 8 Ortho/Med/Surg & [facility] Adult [facility]  Phone: [phone] Pager: [phone]

## 2020-08-21 NOTE — UTILIZATION REVIEW
"  Admission Status; Secondary Review Determination         Under the authority of the Utilization Management Committee, the utilization review process indicated a secondary review on the above patient.  The review outcome is based on review of the medical records, discussions with staff, and applying clinical experience noted on the date of the review.        ()      Inpatient Status Appropriate - This patient's medical care is consistent with medical management for inpatient care and reasonable inpatient medical practice.      (x) Observation Status Appropriate - This patient does not meet hospital inpatient criteria and is placed in observation status. If this patient's primary payer is Medicare and was admitted as an inpatient, Condition Code 44 should be used and patient status changed to \"observation\".   () Admission Status NOT Appropriate - This patient's medical care is not consistent with medical management for Inpatient or Observation Status.          RATIONALE FOR DETERMINATION   The patient is a 75-year-old female who was admitted to [facility] on 8/18/2020.  At that time she had worsening encephalopathy and behavioral changes.  She had a history of recurrent urinary tract infections and likely history of Lewy body dementia.  She had been living in assisted living and was discharged to assisted living after her recent admission at [facility].  Her sister also very recently passed away.  She did have a utilization review on her chart yesterday by Dr. Merrill.  The recommendation was to switch to observation.  A rereview was requested today since the patient was found to have a UTI positive for enterococcus and resistant to ceftriaxone that had been prescribed.  Patient was switched to ampicillin and should improve from a UTI perspective.  Sensitivities are pending.  Likely she will be transferred to [facility] when her UTI symptoms are improved.  White count is 3.4 and she is afebrile with a " lactate for sepsis of 0.8.  Based on not having urosepsis and changing antibiotic treatment, observation status is still appropriate for this case.      The severity of illness, intensity of service provided, expected LOS and risk for adverse outcome make the care complex, high risk and appropriate for hospital admission.        The information on this document is developed by the utilization review team in order for the business office to ensure compliance.  This only denotes the appropriateness of proper admission status and does not reflect the quality of care rendered.         The definitions of Inpatient Status and Observation Status used in making the determination above are those provided in the CMS Coverage Manual, Chapter 1 and Chapter 6, section 70.4.      Sincerely,     Wilber Mcdonald MD  Physician Advisor  Utilization Review/ Case Management  [facility].

## 2020-08-21 NOTE — PROGRESS NOTES
RT called 0845 to administer PRN albuterol due to patient coughing. Upon arrival patient was 97% on 2L NC. Patient confused and non verbal. Auscultation of the neck and lungs were clear. Cough was strong and non productive. Patients cough subsided after albuterol neb administration.     Almita Li, RT on 8/21/2020 at 9:56 AM

## 2020-08-21 NOTE — PLAN OF CARE
VS: VSS,    O2: Room air sat. > 90%.    Output: Incontinent of urine.    Last BM: PAXTON, no BM this evening.    Activity: Not out of bed pt was sleeping this evening.    Skin: Intact.    Pain: PAXTON.   CMS: CMS intact.    Dressing: None except chest port dressing CDI.    Diet: Regular.   LDA: Right side chest port.   Equipment: IV pole, personal belongings.    Plan: TBD.    Additional Info: Bedside attendant in the room for safety.

## 2020-08-22 LAB
ANION GAP SERPL CALCULATED.3IONS-SCNC: 7 MMOL/L (ref 3–14)
BUN SERPL-MCNC: 21 MG/DL (ref 7–30)
CALCIUM SERPL-MCNC: 7.7 MG/DL (ref 8.5–10.1)
CHLORIDE SERPL-SCNC: 111 MMOL/L (ref 94–109)
CO2 SERPL-SCNC: 24 MMOL/L (ref 20–32)
CREAT SERPL-MCNC: 1.6 MG/DL (ref 0.52–1.04)
ERYTHROCYTE [DISTWIDTH] IN BLOOD BY AUTOMATED COUNT: 12.9 % (ref 10–15)
GFR SERPL CREATININE-BSD FRML MDRD: 31 ML/MIN/{1.73_M2}
GLUCOSE SERPL-MCNC: 94 MG/DL (ref 70–99)
HCT VFR BLD AUTO: 32 % (ref 35–47)
HGB BLD-MCNC: 10.4 G/DL (ref 11.7–15.7)
LMWH PPP CHRO-ACNC: 1.43 IU/ML
MCH RBC QN AUTO: 29.6 PG (ref 26.5–33)
MCHC RBC AUTO-ENTMCNC: 32.5 G/DL (ref 31.5–36.5)
MCV RBC AUTO: 91 FL (ref 78–100)
PLATELET # BLD AUTO: 163 10E9/L (ref 150–450)
POTASSIUM SERPL-SCNC: 3.6 MMOL/L (ref 3.4–5.3)
RBC # BLD AUTO: 3.51 10E12/L (ref 3.8–5.2)
SODIUM SERPL-SCNC: 142 MMOL/L (ref 133–144)
WBC # BLD AUTO: 3.7 10E9/L (ref 4–11)

## 2020-08-22 PROCEDURE — G0378 HOSPITAL OBSERVATION PER HR: HCPCS

## 2020-08-22 PROCEDURE — 36592 COLLECT BLOOD FROM PICC: CPT | Performed by: INTERNAL MEDICINE

## 2020-08-22 PROCEDURE — 99232 SBSQ HOSP IP/OBS MODERATE 35: CPT | Performed by: PSYCHIATRY & NEUROLOGY

## 2020-08-22 PROCEDURE — 85520 HEPARIN ASSAY: CPT | Performed by: INTERNAL MEDICINE

## 2020-08-22 PROCEDURE — 99207 ZZC CDG-CODE CATEGORY CHANGED: CPT | Performed by: INTERNAL MEDICINE

## 2020-08-22 PROCEDURE — 25000128 H RX IP 250 OP 636: Performed by: INTERNAL MEDICINE

## 2020-08-22 PROCEDURE — 12000001 ZZH R&B MED SURG/OB UMMC

## 2020-08-22 PROCEDURE — 40000895 ZZH STATISTIC SLP IP EVAL DEFER: Performed by: SPEECH-LANGUAGE PATHOLOGIST

## 2020-08-22 PROCEDURE — 80048 BASIC METABOLIC PNL TOTAL CA: CPT | Performed by: INTERNAL MEDICINE

## 2020-08-22 PROCEDURE — 25000132 ZZH RX MED GY IP 250 OP 250 PS 637: Performed by: PSYCHIATRY & NEUROLOGY

## 2020-08-22 PROCEDURE — 25000125 ZZHC RX 250: Performed by: ORTHOPAEDIC SURGERY

## 2020-08-22 PROCEDURE — 99226 ZZC SUBSEQUENT OBSERVATION CARE,LEVEL III: CPT | Mod: GW | Performed by: INTERNAL MEDICINE

## 2020-08-22 PROCEDURE — 25000128 H RX IP 250 OP 636: Performed by: ORTHOPAEDIC SURGERY

## 2020-08-22 PROCEDURE — 25800030 ZZH RX IP 258 OP 636: Performed by: INTERNAL MEDICINE

## 2020-08-22 PROCEDURE — 25800030 ZZH RX IP 258 OP 636: Performed by: ORTHOPAEDIC SURGERY

## 2020-08-22 PROCEDURE — 85027 COMPLETE CBC AUTOMATED: CPT | Performed by: INTERNAL MEDICINE

## 2020-08-22 RX ORDER — LORAZEPAM 2 MG/ML
0.5 INJECTION INTRAMUSCULAR EVERY 6 HOURS PRN
Status: DISCONTINUED | OUTPATIENT
Start: 2020-08-22 | End: 2020-08-23

## 2020-08-22 RX ORDER — HYDROMORPHONE HYDROCHLORIDE 1 MG/ML
0.3 INJECTION, SOLUTION INTRAMUSCULAR; INTRAVENOUS; SUBCUTANEOUS
Status: COMPLETED | OUTPATIENT
Start: 2020-08-22 | End: 2020-08-22

## 2020-08-22 RX ORDER — CLONAZEPAM 0.12 MG/1
0.12 TABLET, ORALLY DISINTEGRATING ORAL 3 TIMES DAILY PRN
Status: DISCONTINUED | OUTPATIENT
Start: 2020-08-22 | End: 2020-08-31 | Stop reason: HOSPADM

## 2020-08-22 RX ORDER — LORAZEPAM 2 MG/ML
0.5 INJECTION INTRAMUSCULAR
Status: COMPLETED | OUTPATIENT
Start: 2020-08-22 | End: 2020-08-22

## 2020-08-22 RX ORDER — ASENAPINE 5 MG/1
5 TABLET SUBLINGUAL AT BEDTIME
Status: DISCONTINUED | OUTPATIENT
Start: 2020-08-22 | End: 2020-08-25

## 2020-08-22 RX ORDER — AMPICILLIN 2 G/1
2 INJECTION, POWDER, FOR SOLUTION INTRAVENOUS EVERY 8 HOURS
Status: DISCONTINUED | OUTPATIENT
Start: 2020-08-22 | End: 2020-08-26

## 2020-08-22 RX ORDER — SODIUM CHLORIDE, SODIUM LACTATE, POTASSIUM CHLORIDE, CALCIUM CHLORIDE 600; 310; 30; 20 MG/100ML; MG/100ML; MG/100ML; MG/100ML
INJECTION, SOLUTION INTRAVENOUS CONTINUOUS
Status: DISCONTINUED | OUTPATIENT
Start: 2020-08-22 | End: 2020-08-25

## 2020-08-22 RX ADMIN — ENOXAPARIN SODIUM 60 MG: 60 INJECTION SUBCUTANEOUS at 22:34

## 2020-08-22 RX ADMIN — AMPICILLIN 2 G: 2 INJECTION, POWDER, FOR SOLUTION INTRAVENOUS at 06:45

## 2020-08-22 RX ADMIN — HYDROMORPHONE HYDROCHLORIDE 0.3 MG: 1 INJECTION, SOLUTION INTRAMUSCULAR; INTRAVENOUS; SUBCUTANEOUS at 04:38

## 2020-08-22 RX ADMIN — AMPICILLIN 2 G: 2 INJECTION, POWDER, FOR SOLUTION INTRAVENOUS at 15:50

## 2020-08-22 RX ADMIN — VALPROATE SODIUM 250 MG: 100 INJECTION, SOLUTION INTRAVENOUS at 04:38

## 2020-08-22 RX ADMIN — AMPICILLIN 2 G: 2 INJECTION, POWDER, FOR SOLUTION INTRAVENOUS at 00:11

## 2020-08-22 RX ADMIN — SODIUM CHLORIDE, POTASSIUM CHLORIDE, SODIUM LACTATE AND CALCIUM CHLORIDE: 600; 310; 30; 20 INJECTION, SOLUTION INTRAVENOUS at 14:13

## 2020-08-22 RX ADMIN — LORAZEPAM 0.5 MG: 2 INJECTION INTRAMUSCULAR; INTRAVENOUS at 15:04

## 2020-08-22 RX ADMIN — ENOXAPARIN SODIUM 80 MG: 80 INJECTION SUBCUTANEOUS at 09:22

## 2020-08-22 RX ADMIN — ASENAPINE MALEATE 5 MG: 5 TABLET SUBLINGUAL at 21:56

## 2020-08-22 RX ADMIN — LORAZEPAM 0.5 MG: 2 INJECTION INTRAMUSCULAR; INTRAVENOUS at 01:26

## 2020-08-22 NOTE — PROGRESS NOTES
Writer cared for pt from [id number]. Pt was sleeping intermittently. Confused, frustrated, and agitated at times due to the desire for food. Sponge swabs provided for mouth moisture when pt would ask for food. Strict NPO per SLP. Incontinent of urine and stool. LBM 8/21. Assist x2 for brief change. Pt is hard of hearing. Pocket talker used. BLE compression wraps in place. Removed for assessment. Skin is bruised with blanchable redness under bilateral breast folds. Psychiatry saw during shift and changed medications. Clonazepam wafers to be tried instead of IV ativan. 1:1 sitter present at bedside for safety.

## 2020-08-22 NOTE — PHARMACY-ANTICOAGULATION SERVICE
Lovenox dosing adjustment    Indication: Hx DVT  Current Lovenox dose: 80mg BID (~1mg//kg)  Goal therapeutic heparin Xa range: 0.6-1     Heparin 10 A level drawn 4 hours after the dose= 1.43     A/P:   1. Level was drawn appropriately 4 hours after dose and is supratherapeutic.   2. Will reduce dose by ~20% to 60mg BID    Kyaw CrooksD

## 2020-08-22 NOTE — PLAN OF CARE
VS:       Pt oriented to self only, baseline Lewy Body dementia. Afebrile. VSS. Lungs- clear bilaterally with both anterior and posterior, diminished in bilateral bases. Denies nausea, shortness of breath, and chest pain.     Output:       Bowels-active in all four quadrants. Loose stools this am per RN. Pt incontinent of bladder and bowels. Incontinence brief in place.      Activity:       Pt did not get out of bed this shift. Repositions self in bed, also repositioned with assist of 2 to boost in bed.     Skin:   Ecchymotic BUEs.     Pain:       Pt denies pain, just keeps repeating she wants to eat and drink. Stated her throat hurts from not drinking. Oral cares done multiple times throughout this shift.      CMS:       Unable to assess due to dementia. Pt awake and repeatedly asking for water and food this shift, strict NPO.      Dressing:       No dressing..      Diet:       Pt has swallow study completed by SLP this shift, see note. Pt is on strict NPO. Oral cares completed.        LDA:       Port a cath is patent in the right chest wall and infusing.      Equipment:       PCDs on BLEs. Hear device.     Plan:       Pt calls out frequently for food and water. Explaining to pt why she is NPO multiple times this shift and she continues to call out for food and water.       Additional Info:       Re-evaluate swallow study with SLP tomorrow. Pt very upset she cannot eat or drink at this time.

## 2020-08-22 NOTE — PROGRESS NOTES
Cross-cover:  Patient with sig agitation, restlessness. Reviewed chart. Patient is strict NPO. Trial of olanzepine about 2 hours prior. Had been on haldol prior to admission. Psych recommending against antipsychotics as could exacerbate symptoms in setting of lewy body. Given limited options, will cautiously trial a 2 mg IV dose of haldol. If there is minimal benefit or pt agitation worsens, would be good to come up with alternate plan with psych/neuro for management of complex symptoms that do not rely on oral meds and explicitly avoid the use of antipsychotcs. Could certainly consider IV depakote if this might be of more benefit.     Samy Mason MD

## 2020-08-22 NOTE — CONSULTS
"        Psychiatry Consultation; Follow up              Reason for Consult, requesting source:    Agitation. Was initially seen by Steph KHANNA CNP from our service 8/20  Requesting source: Bryon Calzada               Interim history:    This woman has been diagnosed with Lewy body dementia, and intermittent agitation has been a problem. Due to aspiration risk she is now NPO, so Lexapro and gabapentin and HS Klonopin has been discontinued. Received 5 mg Zyprexa Zydis last night; I don't know if it helped. She just received 0.5 mg IV Ativan which has put her to sleep.             Medications:     Current Facility-Administered Medications   Medication     albuterol (PROVENTIL) neb solution 2.5 mg     ampicillin (OMNIPEN) 2 g vial to attach to  ml bag     enoxaparin ANTICOAGULANT (LOVENOX) injection 60 mg     fluocinonide (LIDEX) 0.05 % ointment     fluticasone (FLONASE) 50 MCG/ACT spray 1 spray     lactated ringers infusion     [START ON 8/23/2020] levothyroxine (SYNTHROID/LEVOTHROID) half-tab 44 mcg     levothyroxine (SYNTHROID/LEVOTHROID) tablet 88 mcg     LORazepam (ATIVAN) injection 0.5 mg     naloxone (NARCAN) injection 0.1-0.4 mg     OLANZapine zydis (zyPREXA) ODT half-tab 5 mg     Patient is already receiving anticoagulation with heparin, enoxaparin (LOVENOX), warfarin (COUMADIN)  or other anticoagulant medication     valproate (DEPACON) 250 mg in sodium chloride 0.9 % 50 mL intermittent infusion             MSE:     She is soundly sleeping, so was not disturbed, except to check for muscle stiffness; was not present. Appears well groomed, no tremor seen.    Vital signs:  Temp: 97.3  F (36.3  C) Temp src: Oral BP: 128/57 Pulse: 51   Resp: 16 SpO2: 97 % O2 Device: None (Room air) Oxygen Delivery: 2 LPM      Estimated body mass index is 29.04 kg/m  as calculated from the following:    Height as of 8/11/20: 1.727 m (5' 8\").    Weight as of 8/18/20: 86.6 kg (191 lb).            DSM-5 Diagnosis: " "  Lewy body dementia           Assessment:   Patients with DLB are noted for their sensitivity to antipsychotics, especially the more potent ones like Haldol or even Zyprexa. Sometimes benzodiazepines are helpful, so we can continue to try them.   Her NPO status complicates things; nursing thinks that she would not even tolerate liquid Ativan. Continuing IV Ativan will complicate placement.           Summary of Recommendations:   I have ordered Saphris (asenapine) 5 mg HS; is a sublingual formulation that is absorbed by mucous membrane  I put warning on Zyprexa Zydis for nursing to look for adverse reactions (this should apply to Saphris as well)  Would avoid Haldol   I ordered Klonopin wafer 0.125 mg TID PRN to try instead of IV Ativan.   Page me at [phone], or re-consult psychiatry as needed.      \"Much or all of the text in this note was generated through the use of Dragon Dictate voice to text software. Errors in spelling or words which appear to be out of contact are unintentional, may be present due having escaped editing\"             "

## 2020-08-22 NOTE — PLAN OF CARE
Tx session canceled per RN request- Pt had a very difficult night- pt had just gotten to sleep this a.m-  RN asking to defer session for now- but agreed to reattempt later if schedule allows. Was able to call MD- Dr Berry about pt to discuss aspiration risk/ status. Per MD's discussion with family- dysphagia appears to have been going on for awhile as family stating pt had been coughing previously for some time after eating/drinking. Currently based on eval yesterday- pt showing positive s/s on all po attempted ( honey thick, nectar thick, thin and pudding- puree in very small amounts- 1/4-1/2 teaspoon amounts). Pt is DNI/DNR- MD asking if pt's family were to elect for po diet - what would be most restrictive with least risk. At this point all would be a risk of aspiration- but most restrictive would be a honey thick clear liquid diet or honey thick full liquid diet - given by 1/2 teaspoon amounts. However even with this pt would be at high risk of aspiration - based on observations yesterday;  and family would need to be educated on the risks of aspiration ( ie aspiration pneumonia and potentially death) if they elect an oral diet for pleasure feeding and family would need to state back that they fully understand these risks ( due to pt's cognitive decline/ status- she is not able to demonstrate understanding of these risks).

## 2020-08-22 NOTE — PLAN OF CARE
"Pt has been disoriented x3, confused, restless, agitated, anxious, tearful, tossing and turning, trying to get out of bed, speech garbled, was able to express \"I need my leg medication\", NPO since speech eval/swallow test earlier last evening, not able to take any oral meds for now, IV haldol, IV ativan given with little improvement, incontinent of bladder and bowel, briefs in  place and pt can be resistant to have briefs changed, Ax2, sitter in the room monitoring and assisting, Martha notified on several occasions, music therapy in progress but pt is [place], port-a-cath infusing D5, will continue to monitor and assist.     Update: pt has been moaning, and crying at times, MD notified again, IV dilaudid has been added and given, pt seems more comfortable but still restless, not able to sleep, pericare performed with warm washcloths which pt seems to tolerate since pt has been very agitated when cold or room temperature sigifredo cloths are used, lights turned down, music therapy still in progress, will continue to monitor and assist.     Update: After valproate infusion pt has been able to rest for about 2 hours, now awake again, still somewhat restless, moving in bed but not crying or moaning, labs drawn this am, will continue to monitor and assist.   "

## 2020-08-22 NOTE — PLAN OF CARE
Pt has been sleeping on & off. Pt has been calm, no calling out, not trying to get up. Crit level anti 10A 1.43 MD aware, & staff called pharmacy they will adjust lovenox.

## 2020-08-22 NOTE — PROGRESS NOTES
[facility] Progress Note - Hospitalist Service       Date of Admission:  8/20/2020  Assessment & Plan   Stefanie Velazquez is a 75 year old female with history of recurrent UTI, CKD, recurrent episodes of encephalopathy and behavioral changes, coronary artery disease, DVT, and history of rectal cancer, admitted for altered mental status and agitation.     She was recently  admitted to the hospital at [facility] 8/10 to 8/13 for similar concenrs of worsening agitation and behaviors. Had Ct head done with out any acute pathology and discharged to assisted living. Hospice was consulted as well.     She has a history of likely Lewy body dementia, coronary disease, hypertension, hyperlipidemia DVT, type 2 diabetes hypothyroidism, restless legs and obstructive sleep apnea and recently admitted  8/10/2020 for weakness and increased confusion, fatigue and a history of recurrent encephalopathy agitation and paranoia with combativeness and at 1 point required intubation.  Also with recurrent urinary tract infections but no obvious connection between the urinary tract infection encephalopathy. She was started on Aricept and Klonopin as needed during her stay     She wasagain sent form Assisted living due to escalating behaviors , which included hitting and kicking .    Spoke with nimattie Meadows 8/21 and clarified she is DNI and DNR and that they would like her to [facility] . Also updated that patients sister passed away 8/21 . Patient un aware, family will relate    Acute Encephalopathy likely metabolic with likely underlying cognitive decline due to lewy body dementia  - tisha psych input .  - Olanzapine PRN agitation  8/22 start prn ativan for agitation and depakene switched to IV as unable to take po   - Melatonin HS    High Risk of  Aspiration   Speech evaluated patient 8/21 and recommended npo for now  .       Enterococcus  UTI  Proteinuria  S/P treatment with  bactrim.   Stop ceftriaxone 8/21  Started ampicillin 8/21 as growing enterococcus, discussed with pharm       TITA on CKD;  baseline creatinine is 1.4 to 1.8   Creatinine 1.60 ... 8/22     Coronary Artery Disease  History of 3 vessel CABG in 2002.   - hold  home simvastatin, asa  - Unable to tolerate ACEi or beta blocker due to hypotension.     Hyperlipidemia  -home simvastatin  ( hold )      History of DVT  ON LOVENOX      Diabetes Mellitus, Type II  Chronic. Last a1C 6.1 on 6/29/2020. Not currently on medications        Lumbar Radiculopathy   Neuropathy  Previously diagnosed.    - on  gabapentin ( hold)     Hypothyroidism  TSH 1.52 on 8/20   home levothyroxine 88mcg daily, 44mcg on Sunday..  HOLD FOR NOW ,      Gastroesophageal reflux disease  - Holding omeprazole     Restless Leg Syndrome    hold pramixpexole      RC  Previously diagnosed. On CPAP at assisted living facility, follows with sleep medicine.   CPAP when awake      Rectal Cancer  Follows with Dr. Martinez, diagnosed in 2011, underwent neoadjuvant chemoradiation, resection with reanastomosis, adjuvant chemotherapy. Follows yearly with oncology, last seen 7/2020, no evidence of recurrence or plans for further follow up based on stability.  - Continue outpatient surveillance                 Diet: npo   DVT Prophylaxis: lovenox  Lr Catheter: not present  Code Status: DNI/DNR           ______________________________________________________________________    Interval History   Was agitated and confused last night and was given haldol and dilaudid and ativan with relief and she slept for few hours.  More alert today     Data reviewed today: I reviewed all medications, new labs and imaging results over the last 24 hours. I    Physical Exam   Vital Signs: Temp: 97.4  F (36.3  C) Temp src: Axillary BP: 135/58   Resp: 12 SpO2: 97 % O2 Device: None (Room air)    Weight: 0 lbs 0 oz  Very hard of hearing   Stacia cath left chest wall   Chest ; clear  cvs ;  RRR  GI ; sof non tender , bs positive  Neuro ;  She knows she is at hospital , recognizes nieces name , moves all 4 ext , tongue she keeps slight protruded and has hirrsutism    Data   Recent Labs   Lab 08/22/20  0639 08/21/20  0520 08/20/20  0535 08/19/20  1641   WBC 3.7* 3.4*  --  6.5   HGB 10.4* 10.5*  --  12.5   MCV 91 92  --  90    177  --  212    147* 144 142   POTASSIUM 3.6 3.7 4.1 4.4   CHLORIDE 111* 116* 116* 112*   CO2 24 24 24 24   BUN 21 26 26 27   CR 1.60* 1.75* 1.87* 1.66*   ANIONGAP 7 7 4 6   DWIGHT 7.7* 7.9* 8.2* 9.0   GLC 94 102* 89 115*   ALBUMIN  --   --  2.1*  --    PROTTOTAL  --   --  5.5*  --    BILITOTAL  --   --  0.7  --    ALKPHOS  --   --  96  --    ALT  --   --  22  --    AST  --   --  54*  --

## 2020-08-22 NOTE — PLAN OF CARE
VS:   /68 (BP Location: Right arm)   Pulse 50   Temp 98.2  F (36.8  C) (Axillary)   Resp 16   SpO2 99%     VSS. RA.   Output:   Incontinent of urine and stool.   Activity:   Turned every 2 hrs. Pt not OOB.   Skin: Ecchymotic/ bruising.    Pain:   Denies pain.   Neuro/CMS:   Disoriented to place, time and situation.   Dressing(s):   No dressings.   Diet:   Strict NPO per SLP.   LDA:   Port a cath IV infusing D5%.   Equipment:   IV pole, call light, PCDs.   Plan:   Continue to Monitor. Discharge plan to be determined.   Additional Info:   Pt is 1 to 1 for safety.  Lovenox given.  PRN Zyprexa given

## 2020-08-23 ENCOUNTER — APPOINTMENT (OUTPATIENT)
Dept: SPEECH THERAPY | Facility: CLINIC | Age: 75
DRG: 056 | End: 2020-08-23
Attending: INTERNAL MEDICINE
Payer: COMMERCIAL

## 2020-08-23 LAB
ANION GAP SERPL CALCULATED.3IONS-SCNC: 6 MMOL/L (ref 3–14)
BUN SERPL-MCNC: 17 MG/DL (ref 7–30)
CALCIUM SERPL-MCNC: 8.2 MG/DL (ref 8.5–10.1)
CHLORIDE SERPL-SCNC: 111 MMOL/L (ref 94–109)
CO2 SERPL-SCNC: 25 MMOL/L (ref 20–32)
CREAT SERPL-MCNC: 1.33 MG/DL (ref 0.52–1.04)
ERYTHROCYTE [DISTWIDTH] IN BLOOD BY AUTOMATED COUNT: 12.6 % (ref 10–15)
GFR SERPL CREATININE-BSD FRML MDRD: 38 ML/MIN/{1.73_M2}
GLUCOSE SERPL-MCNC: 75 MG/DL (ref 70–99)
HCT VFR BLD AUTO: 33.2 % (ref 35–47)
HGB BLD-MCNC: 11 G/DL (ref 11.7–15.7)
LACTATE BLD-SCNC: 1.1 MMOL/L (ref 0.7–2)
MCH RBC QN AUTO: 29.6 PG (ref 26.5–33)
MCHC RBC AUTO-ENTMCNC: 33.1 G/DL (ref 31.5–36.5)
MCV RBC AUTO: 90 FL (ref 78–100)
PLATELET # BLD AUTO: 179 10E9/L (ref 150–450)
POTASSIUM SERPL-SCNC: 3.4 MMOL/L (ref 3.4–5.3)
RBC # BLD AUTO: 3.71 10E12/L (ref 3.8–5.2)
SODIUM SERPL-SCNC: 142 MMOL/L (ref 133–144)
WBC # BLD AUTO: 2.5 10E9/L (ref 4–11)

## 2020-08-23 PROCEDURE — 25000132 ZZH RX MED GY IP 250 OP 250 PS 637: Performed by: INTERNAL MEDICINE

## 2020-08-23 PROCEDURE — 12000001 ZZH R&B MED SURG/OB UMMC

## 2020-08-23 PROCEDURE — 25000128 H RX IP 250 OP 636: Performed by: INTERNAL MEDICINE

## 2020-08-23 PROCEDURE — 25800030 ZZH RX IP 258 OP 636: Performed by: INTERNAL MEDICINE

## 2020-08-23 PROCEDURE — 25000125 ZZHC RX 250: Performed by: ORTHOPAEDIC SURGERY

## 2020-08-23 PROCEDURE — 25800030 ZZH RX IP 258 OP 636: Performed by: ORTHOPAEDIC SURGERY

## 2020-08-23 PROCEDURE — 80048 BASIC METABOLIC PNL TOTAL CA: CPT | Performed by: INTERNAL MEDICINE

## 2020-08-23 PROCEDURE — 36592 COLLECT BLOOD FROM PICC: CPT | Performed by: INTERNAL MEDICINE

## 2020-08-23 PROCEDURE — G0378 HOSPITAL OBSERVATION PER HR: HCPCS

## 2020-08-23 PROCEDURE — 92526 ORAL FUNCTION THERAPY: CPT | Mod: GN | Performed by: SPEECH-LANGUAGE PATHOLOGIST

## 2020-08-23 PROCEDURE — 99225 ZZC SUBSEQUENT OBSERVATION CARE,LEVEL II: CPT | Mod: GW | Performed by: INTERNAL MEDICINE

## 2020-08-23 PROCEDURE — 85027 COMPLETE CBC AUTOMATED: CPT | Performed by: INTERNAL MEDICINE

## 2020-08-23 PROCEDURE — 25000132 ZZH RX MED GY IP 250 OP 250 PS 637: Performed by: PSYCHIATRY & NEUROLOGY

## 2020-08-23 PROCEDURE — 25000125 ZZHC RX 250: Performed by: PSYCHIATRY & NEUROLOGY

## 2020-08-23 PROCEDURE — 83605 ASSAY OF LACTIC ACID: CPT | Performed by: INTERNAL MEDICINE

## 2020-08-23 PROCEDURE — 99207 ZZC CDG-CODE CATEGORY CHANGED: CPT | Performed by: INTERNAL MEDICINE

## 2020-08-23 RX ORDER — HYDRALAZINE HYDROCHLORIDE 20 MG/ML
10 INJECTION INTRAMUSCULAR; INTRAVENOUS EVERY 6 HOURS PRN
Status: DISCONTINUED | OUTPATIENT
Start: 2020-08-23 | End: 2020-08-31 | Stop reason: HOSPADM

## 2020-08-23 RX ADMIN — CLONAZEPAM 0.12 MG: 0.12 TABLET, ORALLY DISINTEGRATING ORAL at 15:05

## 2020-08-23 RX ADMIN — AMPICILLIN 2 G: 2 INJECTION, POWDER, FOR SOLUTION INTRAVENOUS at 09:13

## 2020-08-23 RX ADMIN — VALPROATE SODIUM 250 MG: 100 INJECTION, SOLUTION INTRAVENOUS at 05:17

## 2020-08-23 RX ADMIN — ASENAPINE MALEATE 5 MG: 5 TABLET SUBLINGUAL at 22:20

## 2020-08-23 RX ADMIN — SODIUM CHLORIDE, POTASSIUM CHLORIDE, SODIUM LACTATE AND CALCIUM CHLORIDE: 600; 310; 30; 20 INJECTION, SOLUTION INTRAVENOUS at 05:04

## 2020-08-23 RX ADMIN — ENOXAPARIN SODIUM 60 MG: 60 INJECTION SUBCUTANEOUS at 09:11

## 2020-08-23 RX ADMIN — AMPICILLIN 2 G: 2 INJECTION, POWDER, FOR SOLUTION INTRAVENOUS at 15:40

## 2020-08-23 RX ADMIN — Medication 44 MCG: at 09:08

## 2020-08-23 RX ADMIN — AMPICILLIN 2 G: 2 INJECTION, POWDER, FOR SOLUTION INTRAVENOUS at 00:14

## 2020-08-23 RX ADMIN — SODIUM CHLORIDE, POTASSIUM CHLORIDE, SODIUM LACTATE AND CALCIUM CHLORIDE: 600; 310; 30; 20 INJECTION, SOLUTION INTRAVENOUS at 21:16

## 2020-08-23 RX ADMIN — CLONAZEPAM 0.12 MG: 0.12 TABLET, ORALLY DISINTEGRATING ORAL at 11:24

## 2020-08-23 NOTE — PLAN OF CARE
VS:   /57 (BP Location: Left arm)   Pulse 51   Temp 97.3  F (36.3  C) (Oral)   Resp 16   SpO2 97%   VSS. RA. Afebrile. No shortness of breath, chest pain or dizziness.   Output:   Incontinent of bowel and bladder. Small, loose, green stool.    Activity:   Pt not OOB.    Skin: Bruising on extremities. Redness under brief due to incontinence moisture.    Pain:   Pt was restless, staff used distraction.   Neuro/CMS:   Disoriented to place, time and situation. Intermittent restlessness. CMS  intact.   Dressing(s):   Bilateral Compression wraps on legs.   Diet:   NPO.   LDA:   Port infusing LR 75ml/hr   Equipment:   IV pole. Call light. Compression wraps. Pocket talker.    Plan:   Continue to monitor.    Additional Info:

## 2020-08-23 NOTE — PLAN OF CARE
Pt seen for swallow tx- is more alert, conversant and cooperative- used pocket talker as pt very [place]. Pt able to tolerate HOB upright - so better positioning. Trialed of 1/2 teaspoon amounts of thin water- ( x2) still resulting in wet gurgly vocal quality and coughing. Trial of nectar thick by teaspoon - no aspiration s/s but with trial of small sip by cup rim- resulted in immediate coughing episode and wet vocal quality. With trials of honey thick by teaspoon ( 1/2 teaspoon amounts) x3- no aspiration s/s and with trials by cup rim- clinician controlling amount to small sips- no aspiration s/s on 2 trials but when pt took a larger sip then did have some wet vocal quality. With trials of pudding -- no aspiration s/s with 3/3 trials. Pt appears improved for honey thick liquids and pudding consistencies. Recommending that pt initiate an oral diet - but more for pleasure feeding on Honey thick Full liquid diet-- but will need strict 1:1 supervision and assist to control the amounts to 1/2 teaspoon amounts and small single sips. Pt needs to be upright for all po, alternate consistencies and should continue to have frequent oral cares and also oral cares prior to po with use of oral swab. If s/s of aspiration are noted then discontinue po. SLP will continue to follow for readiness for further diet advancement

## 2020-08-23 NOTE — PLAN OF CARE
Alert to self. Disoriented to time, place and situation. [place]. Needs anticipated.  BP elevated and HR 50s -110sl otherwise VSS .Maintaining O2 sats in mid 90s on room air.  Triggered septic protocol at beginning of shift,  Lactate level 1.1.  Restless, moving self around in bed, flops legs off side of bed. . Incontinent of bladder. Last BM on 8/22. NPO. Stacia cath patent, IV fluids and antibiotics infusing as ordered. Pushed  NA's  and writer's hand away when trying to do oral cares.  Assist of 2 to change and boost up in bed. Sitter at bedside for safety. Contact isolation precautions maintained.  Continue with plan of care.

## 2020-08-23 NOTE — PROGRESS NOTES
"[facility] Progress Note - Hospitalist Service       Date of Admission:  8/20/2020  Assessment & Plan     Stefanie Velazquez is a 75 year old female with history of recurrent UTI, CKD, recurrent episodes of encephalopathy one point required intubation and behavioral changes thought to be lewy body dementia, CVA , coronary artery disease,SVT,  DVT, and history of rectal cancer, admitted for altered mental status and agitation from her assisted living on 8/20  due escalating behaviors , which included hitting and kicking staff.     She was admitted to [facility] 8/10 to 8/13 for similar concerns of worsening agitation and behaviors. Had Ct head done with out any acute pathology and  Hospice was consulted.She was started on Aricept and Klonopin as needed during her stay      Spoke with ninfa Meadows 8/21 and clarified she is DNI and DNR and that they would [facility] to [facility] . Also updated that patients sister passed away 8/21 . Patient un aware, family will relate     Acute Encephalopathy likely metabolic with likely underlying cognitive decline due to lewy body dementia  - [facility] input on 8/22  --Due to aspiration risk , wafers and s/l medications started and some po medications switched to IV.  Currently on asenpine S/L 5 mg hs  Depacon 250 mg IV  Klonopin 0.125 ODT TID prn   Zyprexa ODT 5 mg TID prn        High Risk of Aspiration   Speech evaluated patient 8/21 and 8/22  Now on honey thickened liquids with 1;1 supervision .  Spoke with Ninfa Meadows 8/21 who endorses patient has had \" coughing episodes \" with food for some time .... months   MRI done 4/8;  Findings suspicious for small acute infarct in the subcortical  white matter of the fusiform gyrus of the right temporal lobe.and chronic small vessel disease           Enterococcus  UTI  Proteinuria  S/P treatment with bactrim.   Stopped ceftriaxone 8/21  Started ampicillin 8/21 as growing " enterococcus, discussed with pharm         TITA on CKD;  baseline creatinine is 1.4 to 1.8   Creatinine 1.33... 8/23     Coronary Artery Disease  History of 3 vessel CABG in 2002.   - hold  home simvastatin, asa due to npo status   - Unable to tolerate ACEi or beta blocker due to hypotension.     Hyperlipidemia  -home simvastatin  ( hold )      History of DVT  On lovenox ( had been on DOAC , changed due to swallow issues )      Diabetes Mellitus, Type II  Chronic. Last a1C 6.1 on 6/29/2020. Not currently on medications         Lumbar Radiculopathy   Neuropathy  Previously diagnosed.    - on  gabapentin ( hold)     Hypothyroidism  TSH 1.52 on 8/20   home levothyroxine 88mcg daily, 44mcg on Sunday..       Gastroesophageal reflux disease  - Holding omeprazole     Restless Leg Syndrome    hold pramixpexole      RC  Previously diagnosed. On CPAP at assisted living facility, follows with sleep medicine.   CPAP if she allows     Rectal Cancer  Follows with Dr. Martinez, diagnosed in 2011, underwent neoadjuvant chemoradiation, resection with reanastomosis, adjuvant chemotherapy. Follows yearly with oncology, last seen 7/2020, no evidence of recurrence or plans for further follow up based on stability.  - Continue outpatient surveillance                        Diet: Dysphagia Diet Level 1 Pureed Honey Thickened Liquids (pre-thickened or use instant food thickener)    DVT Prophylaxis: Enoxaparin (Lovenox) SQ  Lr Catheter: not present  Code Status: DNI/DNR per niece . Confirmed 8/21         Disposition Plan   Expected discharge: 2 - 3 days, recommended to transitional care unit once mental status at baseline.  Entered: Mahnaz Berry MD 08/23/2020, 10:53 AM           Mahnaz Berry MD  Hospitalist Service  [facility], [place]    ______________________________________________________________________    Interval History   Seen by psychiatry last evening  More interactive and alert today    Less combative   No cp   No sob   No coughing episode    Data reviewed today: I reviewed all medications, new labs and imaging results over the last 24 hours.    Physical Exam   Vital Signs: Temp: 97.7  F (36.5  C) Temp src: Axillary BP: (!) 157/62 Pulse: 51   Resp: 22 SpO2: 96 % O2 Device: None (Room air)    Weight: 0 lbs 0 oz   Alert and interactive today . Slight facial droop left side ( not new ) moves all 4 extremeties  Very hard of hearing   Keeps mouth open and tongue mildly protruded     Respiratory: No increased work of breathing, good air exchange, clear to auscultation bilaterally, no crackles or wheezing  Cardiovascular: Normal apical impulse, regular rate and rhythm, normal S1 and S2, no S3 or S4, and no murmur noted  GI: No scars, normal bowel sounds, soft, non-distended, non-tender, no masses palpated, no hepatosplenomegally    Data   Recent Labs   Lab 08/23/20  0628 08/22/20  0639 08/21/20  0520 08/20/20  0535   WBC 2.5* 3.7* 3.4*  --    HGB 11.0* 10.4* 10.5*  --    MCV 90 91 92  --     163 177  --     142 147* 144   POTASSIUM 3.4 3.6 3.7 4.1   CHLORIDE 111* 111* 116* 116*   CO2 25 24 24 24   BUN 17 21 26 26   CR 1.33* 1.60* 1.75* 1.87*   ANIONGAP 6 7 7 4   DWIGHT 8.2* 7.7* 7.9* 8.2*   GLC 75 94 102* 89   ALBUMIN  --   --   --  2.1*   PROTTOTAL  --   --   --  5.5*   BILITOTAL  --   --   --  0.7   ALKPHOS  --   --   --  96   ALT  --   --   --  22   AST  --   --   --  54*

## 2020-08-23 NOTE — PLAN OF CARE
VS:   BP (!) 159/69 (BP Location: Left arm)   Pulse 51   Temp 97.9  F (36.6  C) (Oral)   Resp 19   SpO2 99%    VSS. RA. Afebrile. No shortness of breath, chest pain or dizziness.     Output:   Incontinent of bladder and bowel. Watery stool x2.   Activity:   Independently positioning in bed.   Skin: WNL.    Pain:   Complaints of discomfort during cares due to cold wipes.   Neuro/CMS:   Disoriented to place, situation and time.   Dressing(s):   No dressings.   Diet:   Honey thickened liquid, full liquid diet   LDA:   Port infusing LR 75ml/hr. IV antibiotics given.   Equipment:   IV pole. Call light. Compression wraps.   Plan:   Continue to monitor.    Additional Info:   Pt was calm for most of this shift. Agitated and uncooperative during perineal cares and med administration. Hitting staff during perineal cares while staff were cleaning pt after a watery stools.    Pt refused lovenox shot and says she will talk to the dr in the morning.

## 2020-08-23 NOTE — PLAN OF CARE
VS:    BP (!) 157/62 (BP Location: Left arm)   Pulse 51   Temp 97.7  F (36.5  C) (Axillary)   Resp 22   SpO2 96%    Output:    Pt is incontinent of urine and stool. LBM today.    Activity:     Assist of 2 for cares. Pt is bedfast.    Skin: Intact ex some bruising on hands   Pain:    Denies.   Neuro/CMS:    Pt is alert to self only and confused. Pt has some restlessness at times in legs. Clonazepam given x2    Dressing(s):     None   Diet:    Advanced to Honey thick full liquid diet today. Tolerated well, ate 100% of lunch.    Equipment:     IV pole    IV's/Drains:    Portacath infusing.    Plan:    Pt is on hospice and DNR/DNI. Continue plan of care. Sitter at bedside for safety.    Additional Info:

## 2020-08-24 ENCOUNTER — MEDICAL CORRESPONDENCE (OUTPATIENT)
Dept: HEALTH INFORMATION MANAGEMENT | Facility: CLINIC | Age: 75
End: 2020-08-24

## 2020-08-24 ENCOUNTER — APPOINTMENT (OUTPATIENT)
Dept: SPEECH THERAPY | Facility: CLINIC | Age: 75
DRG: 056 | End: 2020-08-24
Attending: INTERNAL MEDICINE
Payer: COMMERCIAL

## 2020-08-24 LAB
BACTERIA SPEC CULT: ABNORMAL
ERYTHROCYTE [DISTWIDTH] IN BLOOD BY AUTOMATED COUNT: 12.9 % (ref 10–15)
HCT VFR BLD AUTO: 34.5 % (ref 35–47)
HGB BLD-MCNC: 11.6 G/DL (ref 11.7–15.7)
LMWH PPP CHRO-ACNC: 0.71 IU/ML
Lab: ABNORMAL
MCH RBC QN AUTO: 29.8 PG (ref 26.5–33)
MCHC RBC AUTO-ENTMCNC: 33.6 G/DL (ref 31.5–36.5)
MCV RBC AUTO: 89 FL (ref 78–100)
PLATELET # BLD AUTO: 168 10E9/L (ref 150–450)
RBC # BLD AUTO: 3.89 10E12/L (ref 3.8–5.2)
SPECIMEN SOURCE: ABNORMAL
WBC # BLD AUTO: 2.6 10E9/L (ref 4–11)

## 2020-08-24 PROCEDURE — G0378 HOSPITAL OBSERVATION PER HR: HCPCS

## 2020-08-24 PROCEDURE — 25000132 ZZH RX MED GY IP 250 OP 250 PS 637: Performed by: PSYCHIATRY & NEUROLOGY

## 2020-08-24 PROCEDURE — 36415 COLL VENOUS BLD VENIPUNCTURE: CPT | Performed by: INTERNAL MEDICINE

## 2020-08-24 PROCEDURE — 25800030 ZZH RX IP 258 OP 636: Performed by: INTERNAL MEDICINE

## 2020-08-24 PROCEDURE — 12000001 ZZH R&B MED SURG/OB UMMC

## 2020-08-24 PROCEDURE — 85520 HEPARIN ASSAY: CPT | Performed by: INTERNAL MEDICINE

## 2020-08-24 PROCEDURE — 99207 ZZC CDG-CODE CATEGORY CHANGED: CPT | Performed by: INTERNAL MEDICINE

## 2020-08-24 PROCEDURE — 25000125 ZZHC RX 250: Performed by: PSYCHIATRY & NEUROLOGY

## 2020-08-24 PROCEDURE — 36592 COLLECT BLOOD FROM PICC: CPT | Performed by: INTERNAL MEDICINE

## 2020-08-24 PROCEDURE — 85027 COMPLETE CBC AUTOMATED: CPT | Performed by: INTERNAL MEDICINE

## 2020-08-24 PROCEDURE — 99232 SBSQ HOSP IP/OBS MODERATE 35: CPT | Performed by: NURSE PRACTITIONER

## 2020-08-24 PROCEDURE — 25000125 ZZHC RX 250: Performed by: ORTHOPAEDIC SURGERY

## 2020-08-24 PROCEDURE — 25000132 ZZH RX MED GY IP 250 OP 250 PS 637: Performed by: INTERNAL MEDICINE

## 2020-08-24 PROCEDURE — 25000128 H RX IP 250 OP 636: Performed by: INTERNAL MEDICINE

## 2020-08-24 PROCEDURE — 25800030 ZZH RX IP 258 OP 636: Performed by: ORTHOPAEDIC SURGERY

## 2020-08-24 PROCEDURE — 92526 ORAL FUNCTION THERAPY: CPT | Mod: GN

## 2020-08-24 PROCEDURE — 99225 ZZC SUBSEQUENT OBSERVATION CARE,LEVEL II: CPT | Mod: GW | Performed by: INTERNAL MEDICINE

## 2020-08-24 RX ADMIN — ASENAPINE MALEATE 5 MG: 5 TABLET SUBLINGUAL at 21:59

## 2020-08-24 RX ADMIN — VALPROATE SODIUM 250 MG: 100 INJECTION, SOLUTION INTRAVENOUS at 08:00

## 2020-08-24 RX ADMIN — AMPICILLIN 2 G: 2 INJECTION, POWDER, FOR SOLUTION INTRAVENOUS at 16:23

## 2020-08-24 RX ADMIN — ENOXAPARIN SODIUM 60 MG: 60 INJECTION SUBCUTANEOUS at 22:00

## 2020-08-24 RX ADMIN — LEVOTHYROXINE SODIUM 88 MCG: 88 TABLET ORAL at 09:21

## 2020-08-24 RX ADMIN — ENOXAPARIN SODIUM 60 MG: 60 INJECTION SUBCUTANEOUS at 09:21

## 2020-08-24 RX ADMIN — SODIUM CHLORIDE, POTASSIUM CHLORIDE, SODIUM LACTATE AND CALCIUM CHLORIDE: 600; 310; 30; 20 INJECTION, SOLUTION INTRAVENOUS at 14:59

## 2020-08-24 RX ADMIN — CLONAZEPAM 0.12 MG: 0.12 TABLET, ORALLY DISINTEGRATING ORAL at 00:55

## 2020-08-24 RX ADMIN — AMPICILLIN 2 G: 2 INJECTION, POWDER, FOR SOLUTION INTRAVENOUS at 23:50

## 2020-08-24 RX ADMIN — AMPICILLIN 2 G: 2 INJECTION, POWDER, FOR SOLUTION INTRAVENOUS at 09:21

## 2020-08-24 RX ADMIN — AMPICILLIN 2 G: 2 INJECTION, POWDER, FOR SOLUTION INTRAVENOUS at 00:02

## 2020-08-24 RX ADMIN — SODIUM CHLORIDE, POTASSIUM CHLORIDE, SODIUM LACTATE AND CALCIUM CHLORIDE: 600; 310; 30; 20 INJECTION, SOLUTION INTRAVENOUS at 23:58

## 2020-08-24 NOTE — PLAN OF CARE
VS:   BP (!) 159/69 (BP Location: Left arm)   Pulse 51   Temp 97.9  F (36.6  C) (Oral)   Resp 18   SpO2 99%   Pt refused night time vitals   Output:   Incontinent of bowel and bladder. Loose stool. Requires assist x 2. Last BM 8/24   Activity:   Bedfast, independently positioning in bed, occasional boost with assist x 2 needed   Skin: Intact ex bruising to both hands, barrier cream applied to buttocks    Pain:   Denies, states legs uncomfortable due to restless legs. MD paged advised not to give medications for RLS. Clonazepam x 1 given   Neuro/CMS:   Pt is disoriented to situation and time with confusion, aware of self. Has restless leg syndrom   Dressing(s):   None   Diet:   Honey thin liquids and pudding consistency per speech    LDA:   Port infusing LR 75 mL/h and antibiotics    Equipment:   IV pole   Plan:   Continue plan of care, sitter at bedside   Additional Info:   Pt was restless most of shift, wants to eat solid food and refused the honey thick liquids

## 2020-08-24 NOTE — PLAN OF CARE
VS:   BP (!) 159/71 (BP Location: Right arm)   Pulse 55   Temp 96.5  F (35.8  C) (Axillary)   Resp 18   SpO2 98%   Room air   Output:   Pt voiding spontaneously, Incontinent of B&B, loose stool today   Lungs Lung sounds clear.    Activity:   Pt up to chair, using marcelina lift to transfer   Skin: WDL    Pain:   Difficult to assess due to intermittent confusion. Pt denies pain, absence of nonverbal indicators of pain   Neuro/CMS:   CMS intact, disoriented to time, place, situationon.   Dressing(s):   None present   Diet:   DDI diet, honey thickened liquids   LDA:   Port a cath in R upper chest, infusing IV maintinence   Equipment:   Marcelina, FEROZ pole    Plan:   Discharge to [facility] when medically stable   Additional Info:   DNR/DNI.  IV abx for UTI   Pt on 1:1 for confusion

## 2020-08-24 NOTE — PLAN OF CARE
Patient alert and able to recall some of her dysphagia history and having previously been on thickened liquids.  Patient stating overall that she does not care for them but that some are better than others.  Even prior to p.o. intake, patient was having some coughing at baseline.  During p.o. intake, patient does seem to be at an increased aspiration risk and unless alternative nutrition is being considered, current NDD-1 food texture diet and honey thick liquids seem to be the safest yet least restrictive diet.  Will need to continue monitoring overall acceptance of p.o. intake and explore further diet advancements if unable to get adequate nutrition/hydration.  Patient needs to be fully upright and one-to-one supervision with meals to ensure maximal safe swallowing situation.  We will plan to follow-up again on 8/25.

## 2020-08-24 NOTE — PHARMACY-ANTICOAGULATION SERVICE
Stefanie Velazquez is a 75 year old female on Enoxaparin 60 mg SubQ q12hr indicated for hx DVT (previously on DOAC, switched d/t swallowing issues).  Actual Body Weight: 86.6 kg.    Renal function is improved from 8/20.  Estimated Creatinine Clearance: 40.8 mL/min (A) (based on SCr of 1.33 mg/dL (H)). The trend in platelet count is 215.    Current dose began 8/22 and pt is at steady state.  After reaching steady state, the 4 hour post-dose GOAL Heparin 10A is 0.6-1 units/mL for typical 1 mg/kg BID dosing.         Heparin 10A level (units/mL)     0.71  on August 24, 2020   @ 1323      Last dose given              on August 24, 2020   @ 0921       The level was drawn 4 hours after the dose which is appropriate.  Current level is within goal. Recommend continuing current dose.    The pharmacist will continue to monitor and follow Stefanie Velazquez daily.  Please contact pharmacy if enoxaparin needs to be held for a procedure or if goal levels change.    Fredi Saxena, PharmD

## 2020-08-24 NOTE — CONSULTS
"    Psychiatry Consultation; Follow up              Reason for Consult, requesting source:    Follow-up agitation  Requesting source: Mahnaz Berry MD          Interim history:    Ms. Concha Velazquez is a 75-year-old female with suspected Lewy Body dementia with problematic agitation. She has been seen by psychiatry, is currently receiving Saphris 5 mg at bedtime as well as Depakene 250 mg daily. She is currently on NDD-1 food texture diet and honey thick liquids.     On interview today, Ms. Velazquez reports being \"bored sick.\" She reports that at her previous residence, she had activities to do each day. She now does have much to keep her occupied. She reports doing well overall today. She denies any worries or concerns. When asked about worries, she mentioned that she used to worry about her sister after sister was diagnosed with Alzheimer's disease. She is calm this morning, says she feels tired. No specific complaints. She made one comment that seemed a bit disorganized, asking \"am I the only passenger?\" It was unclear as to what she was referring.          Medications:     Current Facility-Administered Medications   Medication     albuterol (PROVENTIL) neb solution 2.5 mg     ampicillin (OMNIPEN) 2 g vial to attach to  ml bag     asenapine (SAPHRIS) sublingual tablet 5 mg     clonazePAM (klonoPIN) ODT tab TBDP 0.125 mg     enoxaparin ANTICOAGULANT (LOVENOX) injection 60 mg     fluocinonide (LIDEX) 0.05 % ointment     fluticasone (FLONASE) 50 MCG/ACT spray 1 spray     hydrALAZINE (APRESOLINE) injection 10 mg     lactated ringers infusion     levothyroxine (SYNTHROID/LEVOTHROID) half-tab 44 mcg     levothyroxine (SYNTHROID/LEVOTHROID) tablet 88 mcg     naloxone (NARCAN) injection 0.1-0.4 mg     OLANZapine zydis (zyPREXA) ODT half-tab 5 mg     Patient is already receiving anticoagulation with heparin, enoxaparin (LOVENOX), warfarin (COUMADIN)  or other anticoagulant medication     valproate (DEPACON) 250 mg in " "sodium chloride 0.9 % 50 mL intermittent infusion              MSE:     Appearance: age-appearing, mood-congruent,  Behavior: calmly lying in bed, displaying fair eye contact  Orientation: alert and oriented to self  Movements: no abnormal or involuntary movements observed  Mood: \"bored sick\"  Affect: stable, mood-congruent, restricted in range  Speech: Normal rate, rhythm, tone  Memory: no impairment in immediate, recent, or remote memory  Intellectual capacity: Average for development based on word choice  Concentration: attentive to interview  Thought process and content: mild disorganization at times, although for the most part appears logical and coherent. No evidence of anomalous perceptions. Possibly some mild delusions - mentioned being a \"passenger.\"   Insight: limited  Judgement: fair, she is calm, not agitated, compliant with medical cares  Safety: no self-injurious or aggressive behaviors      Vital signs:  Temp: 96.5  F (35.8  C) Temp src: Axillary BP: (!) 159/71 Pulse: 55   Resp: 18 SpO2: 98 % O2 Device: None (Room air) Oxygen Delivery: 2 LPM      Estimated body mass index is 29.04 kg/m  as calculated from the following:    Height as of 8/11/20: 1.727 m (5' 8\").    Weight as of 8/18/20: 86.6 kg (191 lb).              DSM-5 Diagnosis:   Lewy body dementia          Assessment:   Ms. Stefanie Velazquez is a 75-year-old female who was admitted for altered mental status and escalating behavioral issues secondary to acute encephalopathy. She carries a diagnosis of Lewy body dementia. Psychiatry is consulted for management of agitation/delirium.     Over the weekend, she transitioned to asenapine 5 mg sublingual tablet at bedtime. In addition, she has clonazepam 0.125 mg ODT tid prn for agitation. We are attempting to avoid IV lorazepam. SW has been working on [facility] placement for ongoing management of her dementia secondary to agitation.     She appears to be doing quite well this morning. She is calm, not " agitated. At this point, I do not see a good indication to change any of her medications. Goal would be to use the lowest effective doses, and she currently appears stable, although this may change. I suspect the resolution of her encephalopathy has led to some of this improvement.           Summary of Recommendations:   1) Recommend to continue current dosing of asenapine, valproic acid, and clonazepam.     2) Will discuss with SW the status of referrals to [facility]    3) Page [phone] with additional questions or concerns.

## 2020-08-24 NOTE — PROGRESS NOTES
"[facility], [facility] Progress Note - Hospitalist Service       Date of Admission:  8/20/2020  Assessment & Plan     Stefanie Velazquez is a 75 year old female with history of recurrent UTI, CKD, recurrent episodes of encephalopathy one point required intubation and behavioral changes thought to be lewy body dementia, CVA , coronary artery disease,SVT,  DVT, and history of rectal cancer, admitted for altered mental status and agitation from her assisted living on 8/20  due escalating behaviors , which included hitting and kicking staff.     She was admitted to [facility] 8/10 to 8/13 for similar concerns of worsening agitation and behaviors. Had Ct head done with out any acute pathology and  Hospice was consulted.She was started on Aricept and Klonopin as needed during her stay      Spoke with ninfa Meadows 8/21 and clarified she is DNI and DNR and that they would like [facility] to [facility] . Also updated that patients sister passed away 8/21 . Patient un aware, family will relate     Acute Encephalopathy likely metabolic with likely underlying cognitive decline due to lewy body dementia  - [facility] psych input on 8/22  Re-consult psych 8/24  --Due to aspiration risk , wafers and s/l medications started and some po medications switched to IV.  Currently on asenpine S/L 5 mg hs  Depacon 250 mg IV  Klonopin 0.125 ODT TID prn   Zyprexa ODT 5 mg TID prn         High Risk of Aspiration   Dysphagia   Speech evaluated patient 8/21 and 8/22 and 8/24  Now on honey thickened liquids with 1;1 supervision .  Spoke with Ninfa Meadows 8/21 who endorses patient has had \" coughing episodes \" with food for some time .... months   MRI done 4/8;  Findings suspicious for small acute infarct in the subcortical  white matter of the fusiform gyrus of the right temporal lobe.and chronic small vessel disease        Enterococcus  UTI  Proteinuria  S/P treatment with bactrim.   Stopped " ceftriaxone 8/21  Started ampicillin 8/21 as growing enterococcus, discussed with pharm . Stop in 5 days so 8/26    Neutropenia ; recheck cbc in am ? Infection vs nutrition         TITA on CKD;  baseline creatinine is 1.4 to 1.8   Creatinine 1.33... 8/23 . today's pending      Coronary Artery Disease  History of 3 vessel CABG in 2002.   - hold  home simvastatin, asa due to aspiration risk   - Unable to tolerate ACEi or beta blocker due to hypotension.     Hyperlipidemia  -home simvastatin  ( hold )      History of DVT  On lovenox ( had been on DOAC , changed due to swallow issues )      Diabetes Mellitus, Type II  Chronic. Last a1C 6.1 on 6/29/2020. Not currently on medications         Lumbar Radiculopathy   Neuropathy  Previously diagnosed.    - on  gabapentin ( hold)     Hypothyroidism  TSH 1.52 on 8/20   home levothyroxine 88mcg daily, 44mcg on Sunday..       Gastroesophageal reflux disease  - Holding omeprazole     Restless Leg Syndrome    hold pramixpexole      RC  Previously diagnosed. On CPAP at assisted living facility, follows with sleep medicine.   CPAP if she allows     Rectal Cancer  Follows with Dr. Martinez, diagnosed in 2011, underwent neoadjuvant chemoradiation, resection with reanastomosis, adjuvant chemotherapy. Follows yearly with oncology, last seen 7/2020, no evidence of recurrence or plans for further follow up based on stability.  - Continue outpatient surveillance                     Diet: Dysphagia Diet Level 1 Pureed Honey Thickened Liquids (pre-thickened or use instant food thickener)    DVT Prophylaxis: lovenox 60 mg BID   Lr Catheter: not present  Code Status: DNi/DNR ( discussed with ninfa dove )          Disposition Plan   Expected discharge: 2 - 3 days, recommended to [facility] once safe disposition plan/ TCU bed available.  Entered: Mahnaz Berry MD 08/24/2020, 9:06 AM       The patient's care was discussed with the Bedside Nurse, Care Coordinator/, Patient,  Patient's Family and psych  Consultant.    Mahnaz Berry MD  Hospitalist Service  [facility], [place]    ______________________________________________________________________    Interval History   Behavior more manageable   Tolerated honey thickened diet   Refused sigifredo cares   No cp   occasional cough   No fever     Data reviewed today: I reviewed all medications, new labs and imaging results over the last 24 hours.   Physical Exam   Vital Signs: Temp: 96.5  F (35.8  C) Temp src: Axillary BP: (!) 159/71 Pulse: 55   Resp: 18 SpO2: 98 % O2 Device: None (Room air)    Weight: 0 lbs 0 oz   keeps tongue protrude at times  Very hard of hearing . Use pocket speaker   Hematologic / Lymphatic: no cervical lymphadenopathy  Respiratory: No increased work of breathing, good air exchange, clear to auscultation bilaterally, no crackles or wheezing  Cardiovascular: Normal apical impulse, regular rate and rhythm, normal S1 and S2, no S3 or S4, and no murmur noted  GI: No scars, normal bowel sounds, soft, non-distended, non-tender, no masses palpated, no hepatosplenomegally    Data   Recent Labs   Lab 08/24/20  0749 08/23/20  0628 08/22/20  0639 08/21/20  0520 08/20/20  0535   WBC 2.6* 2.5* 3.7* 3.4*  --    HGB 11.6* 11.0* 10.4* 10.5*  --    MCV 89 90 91 92  --     179 163 177  --    NA  --  142 142 147* 144   POTASSIUM  --  3.4 3.6 3.7 4.1   CHLORIDE  --  111* 111* 116* 116*   CO2  --  25 24 24 24   BUN  --  17 21 26 26   CR  --  1.33* 1.60* 1.75* 1.87*   ANIONGAP  --  6 7 7 4   DWIGHT  --  8.2* 7.7* 7.9* 8.2*   GLC  --  75 94 102* 89   ALBUMIN  --   --   --   --  2.1*   PROTTOTAL  --   --   --   --  5.5*   BILITOTAL  --   --   --   --  0.7   ALKPHOS  --   --   --   --  96   ALT  --   --   --   --  22   AST  --   --   --   --  54*

## 2020-08-25 ENCOUNTER — APPOINTMENT (OUTPATIENT)
Dept: SPEECH THERAPY | Facility: CLINIC | Age: 75
DRG: 056 | End: 2020-08-25
Attending: INTERNAL MEDICINE
Payer: COMMERCIAL

## 2020-08-25 LAB
ANION GAP SERPL CALCULATED.3IONS-SCNC: 7 MMOL/L (ref 3–14)
BACTERIA SPEC CULT: NO GROWTH
BACTERIA SPEC CULT: NO GROWTH
BUN SERPL-MCNC: 9 MG/DL (ref 7–30)
C DIFF TOX B STL QL: POSITIVE
CALCIUM SERPL-MCNC: 8.5 MG/DL (ref 8.5–10.1)
CHLORIDE SERPL-SCNC: 111 MMOL/L (ref 94–109)
CO2 SERPL-SCNC: 26 MMOL/L (ref 20–32)
CREAT SERPL-MCNC: 1.11 MG/DL (ref 0.52–1.04)
ERYTHROCYTE [DISTWIDTH] IN BLOOD BY AUTOMATED COUNT: 13.1 % (ref 10–15)
GFR SERPL CREATININE-BSD FRML MDRD: 48 ML/MIN/{1.73_M2}
GLUCOSE SERPL-MCNC: 81 MG/DL (ref 70–99)
HCT VFR BLD AUTO: 37.2 % (ref 35–47)
HGB BLD-MCNC: 12.5 G/DL (ref 11.7–15.7)
Lab: NORMAL
MCH RBC QN AUTO: 30 PG (ref 26.5–33)
MCHC RBC AUTO-ENTMCNC: 33.6 G/DL (ref 31.5–36.5)
MCV RBC AUTO: 89 FL (ref 78–100)
PLATELET # BLD AUTO: 188 10E9/L (ref 150–450)
POTASSIUM SERPL-SCNC: 3.5 MMOL/L (ref 3.4–5.3)
RBC # BLD AUTO: 4.16 10E12/L (ref 3.8–5.2)
SODIUM SERPL-SCNC: 144 MMOL/L (ref 133–144)
SPECIMEN SOURCE: ABNORMAL
SPECIMEN SOURCE: NORMAL
SPECIMEN SOURCE: NORMAL
WBC # BLD AUTO: 2.9 10E9/L (ref 4–11)

## 2020-08-25 PROCEDURE — 25000125 ZZHC RX 250: Performed by: ORTHOPAEDIC SURGERY

## 2020-08-25 PROCEDURE — 87493 C DIFF AMPLIFIED PROBE: CPT | Performed by: INTERNAL MEDICINE

## 2020-08-25 PROCEDURE — 25800030 ZZH RX IP 258 OP 636: Performed by: ORTHOPAEDIC SURGERY

## 2020-08-25 PROCEDURE — 25000132 ZZH RX MED GY IP 250 OP 250 PS 637: Performed by: INTERNAL MEDICINE

## 2020-08-25 PROCEDURE — 99232 SBSQ HOSP IP/OBS MODERATE 35: CPT | Performed by: NURSE PRACTITIONER

## 2020-08-25 PROCEDURE — 85027 COMPLETE CBC AUTOMATED: CPT | Performed by: INTERNAL MEDICINE

## 2020-08-25 PROCEDURE — 25000132 ZZH RX MED GY IP 250 OP 250 PS 637: Performed by: NURSE PRACTITIONER

## 2020-08-25 PROCEDURE — 36415 COLL VENOUS BLD VENIPUNCTURE: CPT | Performed by: INTERNAL MEDICINE

## 2020-08-25 PROCEDURE — 12000001 ZZH R&B MED SURG/OB UMMC

## 2020-08-25 PROCEDURE — 25000125 ZZHC RX 250: Performed by: PSYCHIATRY & NEUROLOGY

## 2020-08-25 PROCEDURE — G0378 HOSPITAL OBSERVATION PER HR: HCPCS

## 2020-08-25 PROCEDURE — 25800030 ZZH RX IP 258 OP 636: Performed by: INTERNAL MEDICINE

## 2020-08-25 PROCEDURE — 85049 AUTOMATED PLATELET COUNT: CPT | Performed by: INTERNAL MEDICINE

## 2020-08-25 PROCEDURE — 99207 ZZC CDG-CODE CATEGORY CHANGED: CPT | Performed by: INTERNAL MEDICINE

## 2020-08-25 PROCEDURE — 92526 ORAL FUNCTION THERAPY: CPT | Mod: GN

## 2020-08-25 PROCEDURE — 99225 ZZC SUBSEQUENT OBSERVATION CARE,LEVEL II: CPT | Mod: GW | Performed by: INTERNAL MEDICINE

## 2020-08-25 PROCEDURE — 25000128 H RX IP 250 OP 636: Performed by: INTERNAL MEDICINE

## 2020-08-25 PROCEDURE — 80048 BASIC METABOLIC PNL TOTAL CA: CPT | Performed by: INTERNAL MEDICINE

## 2020-08-25 RX ORDER — HEPARIN SODIUM,PORCINE 10 UNIT/ML
5-10 VIAL (ML) INTRAVENOUS EVERY 24 HOURS
Status: DISCONTINUED | OUTPATIENT
Start: 2020-08-26 | End: 2020-08-31 | Stop reason: HOSPADM

## 2020-08-25 RX ORDER — LACTOBACILLUS RHAMNOSUS GG 10B CELL
1 CAPSULE ORAL 2 TIMES DAILY
Status: DISCONTINUED | OUTPATIENT
Start: 2020-08-25 | End: 2020-08-31 | Stop reason: HOSPADM

## 2020-08-25 RX ORDER — HEPARIN SODIUM,PORCINE 10 UNIT/ML
5-10 VIAL (ML) INTRAVENOUS
Status: DISCONTINUED | OUTPATIENT
Start: 2020-08-25 | End: 2020-08-31 | Stop reason: HOSPADM

## 2020-08-25 RX ORDER — ASENAPINE 5 MG/1
5 TABLET SUBLINGUAL 2 TIMES DAILY
Status: DISCONTINUED | OUTPATIENT
Start: 2020-08-25 | End: 2020-08-31 | Stop reason: HOSPADM

## 2020-08-25 RX ORDER — LIDOCAINE 40 MG/G
CREAM TOPICAL
Status: DISCONTINUED | OUTPATIENT
Start: 2020-08-25 | End: 2020-08-31 | Stop reason: HOSPADM

## 2020-08-25 RX ORDER — HEPARIN SODIUM (PORCINE) LOCK FLUSH IV SOLN 100 UNIT/ML 100 UNIT/ML
5 SOLUTION INTRAVENOUS
Status: DISCONTINUED | OUTPATIENT
Start: 2020-08-26 | End: 2020-08-31 | Stop reason: HOSPADM

## 2020-08-25 RX ADMIN — CLONAZEPAM 0.12 MG: 0.12 TABLET, ORALLY DISINTEGRATING ORAL at 10:24

## 2020-08-25 RX ADMIN — ENOXAPARIN SODIUM 60 MG: 60 INJECTION SUBCUTANEOUS at 08:20

## 2020-08-25 RX ADMIN — AMPICILLIN 2 G: 2 INJECTION, POWDER, FOR SOLUTION INTRAVENOUS at 08:20

## 2020-08-25 RX ADMIN — SODIUM CHLORIDE, POTASSIUM CHLORIDE, SODIUM LACTATE AND CALCIUM CHLORIDE: 600; 310; 30; 20 INJECTION, SOLUTION INTRAVENOUS at 08:42

## 2020-08-25 RX ADMIN — ASENAPINE MALEATE 5 MG: 5 TABLET SUBLINGUAL at 13:54

## 2020-08-25 RX ADMIN — VALPROATE SODIUM 250 MG: 100 INJECTION, SOLUTION INTRAVENOUS at 06:42

## 2020-08-25 RX ADMIN — CLONAZEPAM 0.12 MG: 0.12 TABLET, ORALLY DISINTEGRATING ORAL at 02:34

## 2020-08-25 RX ADMIN — ENOXAPARIN SODIUM 60 MG: 60 INJECTION SUBCUTANEOUS at 22:08

## 2020-08-25 RX ADMIN — AMPICILLIN 2 G: 2 INJECTION, POWDER, FOR SOLUTION INTRAVENOUS at 23:57

## 2020-08-25 RX ADMIN — LEVOTHYROXINE SODIUM 88 MCG: 88 TABLET ORAL at 10:29

## 2020-08-25 RX ADMIN — ASENAPINE MALEATE 5 MG: 5 TABLET SUBLINGUAL at 19:31

## 2020-08-25 RX ADMIN — AMPICILLIN 2 G: 2 INJECTION, POWDER, FOR SOLUTION INTRAVENOUS at 17:26

## 2020-08-25 NOTE — PROGRESS NOTES
Social Work Services Progress Note    Hospital Day: 6  Collaborated with:  Medical team, psych NP Anjel BULLOCK, pt's ninfa Sandoval    Data:  SW following for discharge. The original recommendation at [facility] before transferring to [facility] was for geripsych. At the time of admission here, Psych here agreed with the recommendation for geripsych placement.     Pt was treated for acute encephalopathy and a UTI while hospitalized. The medical team has seen improvements. Pt's 1:1 sitter was removed this AM after a good day yesterday but a sitter was added again this afternoon.     Per conversation with psych NP Anjel, it is unclear if pt will be appropriate for geripsych. The medical team anticipate that pt will be medically stable tomorrow. Anjel will assess pt and help assist with recommendations.     SW called pt's ninfa Sandoval to update her on the plan. ANTONINO discussed discharge possibilities:    -Geripsych placement at an outside facility   -Going to a SNF (LTC) and re-enrolling in hospice again if appropriate.    SW to update Lori tomorrow after assessing pt and discussing plan with medical team.      Intervention:  Discharge planning    Assessment:  Discharge plan TBD    Plan:    Anticipated Disposition:  Facility:  Geripsych v SNF w/ hospice    Barriers to d/c plan:  Pt stability, bed availability     Follow Up:  ANTONINO to continue to follow    ADELE Brenner  Unit 5/[facility] 8 Ortho/Med/Surg & [facility]  Phone: [phone] Pager: [phone]

## 2020-08-25 NOTE — CONSULTS
"    Psychiatry Consultation; Follow up              Reason for Consult, requesting source:    F/u lewy body dementia  Requesting source: Eleanor Castillo MD            Interim history:    Ms. Concha Velazquez is a 75-year-old female with suspected Lewy Body dementia with problematic agitation. She has been seen by psychiatry, is currently receiving Saphris 5 mg at bedtime as well as Depakene 250 mg daily.     In discussion with nursing staff, patient was trialed off 1:1 staffing. She apparently did not do well with this and had been trying to get out of bed. She has periods of intermittent agitation and yelling out.     On interview today, Ms. Velazquez was seen shortly after attempting to get out of bed. She appears a bit more confused today than yesterday and it is likely that there is some lingering delirium. When asked where she is today, she says she is at her sister Daiana's house. She then begins talking about how Daiana sold her house to her grandson and how difficult it is toward the end of life to give up things like that. She then asks \"when I get back to the hospital, will there be papers I need to sign?\" She was re-oriented to the fact that she is currently in the hospital. She had difficulty answering questions appropriately, often referencing past events, experiences, or family members when attempting to answer.          Medications:     Current Facility-Administered Medications   Medication     albuterol (PROVENTIL) neb solution 2.5 mg     ampicillin (OMNIPEN) 2 g vial to attach to  ml bag     asenapine (SAPHRIS) sublingual tablet 5 mg     clonazePAM (klonoPIN) ODT tab TBDP 0.125 mg     enoxaparin ANTICOAGULANT (LOVENOX) injection 60 mg     fluocinonide (LIDEX) 0.05 % ointment     fluticasone (FLONASE) 50 MCG/ACT spray 1 spray     hydrALAZINE (APRESOLINE) injection 10 mg     levothyroxine (SYNTHROID/LEVOTHROID) half-tab 44 mcg     levothyroxine (SYNTHROID/LEVOTHROID) tablet 88 mcg     " "naloxone (NARCAN) injection 0.1-0.4 mg     OLANZapine zydis (zyPREXA) ODT half-tab 5 mg     Patient is already receiving anticoagulation with heparin, enoxaparin (LOVENOX), warfarin (COUMADIN)  or other anticoagulant medication     valproate (DEPACON) 250 mg in sodium chloride 0.9 % 50 mL intermittent infusion              MSE:     Appearance: age-appearing, sitting up in bed, wearing hospital gown, adequately groomed, in no acute distress.   Behavior: calmly lying in bed at time of my interview. Shortly before this, she had been attempting to exit her bed  Orientation: alert but confused. Oriented to self. Not oriented to place, date, or situaton  Movements: did not observe abnormal or involuntary muscle movements  Mood: \"great\"  Affect: mood-congruent, stable, within a normal range  Speech: Normal rate, rhythm, tone  Memory: recent and remote memory are both impaired based off inability to accurately recall events and conversations  Intellectual capacity: Average for development based on word choice  Concentration: appears attentive to writer's questions  Thought process and content: disorganized, tangential. Appears to experience delusions about where she is and what is going on. Does not appear paranoid. Does not appear to respond to internal stimuli.   Insight: impaired  Judgement: impaired  Safety: no attempts to harm self or others. Does not voice plans or intent to harm self or others.       Vital signs:  Temp: 97.4  F (36.3  C) Temp src: Oral BP: (!) 169/74(Simultaneous filing. User may not have seen previous data.) Pulse: 55   Resp: 18 SpO2: 97 % O2 Device: None (Room air) Oxygen Delivery: 2 LPM      Estimated body mass index is 29.04 kg/m  as calculated from the following:    Height as of 8/11/20: 1.727 m (5' 8\").    Weight as of 8/18/20: 86.6 kg (191 lb).              DSM-5 Diagnosis:   #1 Lewy body dementia  #2 Possible delirium          Assessment:   Ms. Concha Velazquez is a 75-year-old female with " suspected Lewy Body dementia with problematic agitation. She has been seen by psychiatry, is currently receiving Saphris 5 mg at bedtime as well as Depakene 250 mg daily.     She had been doing quite well yesterday but today she has been attempting to get out of bed frequently. She does appear more confused today which could be related to delirium. She appears more disorganized and appears to be experiencing delusions. Given her dementia, her brain is more vulnerable to becoming delirious. She is on a rather low dose of asenapine and it may be worthwhile to add a morning dose. Hopefully this will not be too sedating.           Summary of Recommendations:   1) Recommend to increase asenapine to 5 mg sublingual BID    2) Will re-evaluate for appropriateness for inpatient geriatric psychiatry tomorrow     3) 1:1 sitter per nursing discretion    4) Delirium precautions (up during the day with lights on; lights off at night, avoid interruptions during the night as much as possible; family visits; encourage corrective lenses or hearing aids as applicable; frequent reorientation)    5) Will plan to follow-up tomorrow. Page [phone] with additional questions or concerns.

## 2020-08-25 NOTE — PROGRESS NOTES
"[facility], [facility] Progress Note - Hospitalist Service       Date of Admission:  8/20/2020  Assessment & Plan     Stefanie Velazquez is a 75 year old female with history of recurrent UTI, CKD, recurrent episodes of encephalopathy one point required intubation and behavioral changes thought to be lewy body dementia, CVA, coronary artery disease,SVT,  DVT, and history of rectal cancer, admitted for altered mental status and agitation from her assisted living on 8/20  due escalating behaviors, which included hitting and kicking staff.     She was admitted to [facility] 8/10 to 8/13 for similar concerns of worsening agitation and behaviors. Had CT head done without any acute pathology and  Hospice was consulted.She was started on Aricept and Klonopin as needed during her stay        Acute Encephalopathy likely metabolic with likely underlying cognitive decline due to lewy body dementia    MRI done 4/8;  Findings suspicious for small acute infarct in the subcortical white matter of the fusiform gyrus of the right temporal lobe.and chronic small vessel diseas   Appreciate  psych input on 8/22 and 8/24  Currently on asenpine S/L 5 mg hs  Depacon 250 mg IV  Klonopin 0.125 ODT TID prn   Zyprexa ODT 5 mg TID prn   Due to aspiration risk , wafers and s/l medications started and some po medications switched to IV.  Patient is doing much better on the above regimen   Consider removing sitter later today       High Risk of Aspiration   Dysphagia   Speech evaluated patient 8/21 and 8/22 and 8/24  Now on honey thickened liquids with 1:1 supervision .  Dr Berry with [facility] 8/21 who endorses patient has had \" coughing episodes \" with food for some time .... months          Enterococcus  UTI  Proteinuria  S/P treatment with bactrim.   Stopped ceftriaxone 8/21  Started ampicillin 8/21 as growing enterococcus, discussed with pharm . Stop date on 8/26    Leucopenia   Patient has had " intermittent chronic low WBC count from as early as 2012/2013.  Krunal in the setting pof combination of  Infection and antibiotics  Repeat CBC with stable WBC count at 2.9          TITA on CKD;  baseline creatinine is 1.4 to 1.8   Creatinine stable at 1.11 on 8/25     Coronary Artery Disease  History of 3 vessel CABG in 2002.   Hold  home simvastatin, asa due to aspiration risk   Unable to tolerate ACEi or beta blocker due to hypotension.     Hyperlipidemia  Home simvastatin  ( hold )      History of DVT  On lovenox ( had been on DOAC, changed due to swallow issues )      Diabetes Mellitus, Type II  Chronic. Last a1C 6.1 on 6/29/2020. Not currently on medications         Lumbar Radiculopathy   Neuropathy  Previously diagnosed.    On   gabapentin ( hold)     Hypothyroidism  TSH 1.52 on 8/20   home levothyroxine 88mcg daily, 44mcg on Sunday..       Gastroesophageal reflux disease  - Holding omeprazole     Restless Leg Syndrome    hold pramixpexole      RC  Previously diagnosed. On CPAP at assisted living facility, follows with sleep medicine.   CPAP if she allows     Rectal Cancer  Follows with Dr. Martinez, diagnosed in 2011, underwent neoadjuvant chemoradiation, resection with reanastomosis, adjuvant chemotherapy. Follows yearly with oncology, last seen 7/2020, no evidence of recurrence or plans for further follow up based on stability.  - Continue outpatient surveillance                     Diet: Dysphagia Diet Level 1 Pureed Honey Thickened Liquids (pre-thickened or use instant food thickener)    DVT Prophylaxis: lovenox 60 mg BID   Lr Catheter: not present  Code Status: DNi/DNR ( discussed with ninfa dove )          Disposition Plan   Expected discharge: 2 - 3 days, recommended to [facility] psych once safe disposition plan/ TCU bed available.  Entered: Edward Coronado MD 08/25/2020, 10:25 AM       The patient's care was discussed with the Bedside Nurse, Care Coordinator/, Patient,  Patient's Family and psych  Consultant.    Edward Coronado MD  Hospitalist Service  [facility], [place]    ______________________________________________________________________    Interval History   This is te  first time I am meeting with patient. Her H&P and progress of events has been reviewed   Sign out received from Dr Berry  Patient feels well this morning   No acute events overnight  No fever or chills   Tolerating diet  Per Rn staff, encephalopathy and confusion much better and we may consider stopping 1:1     Data reviewed today: I reviewed all medications, new labs and imaging results over the last 24 hours.   Physical Exam   Vital Signs: Temp: 97.4  F (36.3  C) Temp src: Oral BP: (!) 169/74(Simultaneous filing. User may not have seen previous data.) Pulse: 55   Resp: 18 SpO2: 97 % O2 Device: None (Room air)    Weight: 0 lbs 0 oz   keeps tongue protrude at times  Very hard of hearing . Use pocket speaker   Hematologic / Lymphatic: no cervical lymphadenopathy  Respiratory: No increased work of breathing, good air exchange, clear to auscultation bilaterally, no crackles or wheezing  Cardiovascular: Normal apical impulse, regular rate and rhythm, normal S1 and S2, no S3 or S4, and no murmur noted  GI: No scars, normal bowel sounds, soft, non-distended, non-tender, no masses palpated, no hepatosplenomegally    Data   Recent Labs   Lab 08/25/20  0635 08/24/20  0749 08/23/20  0628 08/22/20  0639  08/20/20  0535   WBC 2.9* 2.6* 2.5* 3.7*   < >  --    HGB 12.5 11.6* 11.0* 10.4*   < >  --    MCV 89 89 90 91   < >  --     168 179 163   < >  --      --  142 142   < > 144   POTASSIUM 3.5  --  3.4 3.6   < > 4.1   CHLORIDE 111*  --  111* 111*   < > 116*   CO2 26  --  25 24   < > 24   BUN 9  --  17 21   < > 26   CR 1.11*  --  1.33* 1.60*   < > 1.87*   ANIONGAP 7  --  6 7   < > 4   DWIGHT 8.5  --  8.2* 7.7*   < > 8.2*   GLC 81  --  75 94   < > 89   ALBUMIN  --   --    --   --   --  2.1*   PROTTOTAL  --   --   --   --   --  5.5*   BILITOTAL  --   --   --   --   --  0.7   ALKPHOS  --   --   --   --   --  96   ALT  --   --   --   --   --  22   AST  --   --   --   --   --  54*    < > = values in this interval not displayed.

## 2020-08-25 NOTE — PLAN OF CARE
VS:    BP (!) 142/72 (BP Location: Left arm)   Pulse 57   Temp 97.5  F (36.4  C) (Oral)   Resp 18   SpO2 97%     Output:    Pt is incontinent of urine and stool. LBM today, overnight, and watery    Activity:     Assist of 2 for cares. Pt is chairfast.    Skin: Intact ex some bruising on hands   Pain:    Denies.   Neuro/CMS:    Pt is alert to self only, but is less confused than yesterday. Pt has some restlessness at times in legs, Clonazepam given x1 with relief. CMS/neuros intact.   Dressing(s):     None   Diet:    DD1 with honey thick liquids.    Equipment:     IV pole    IV's/Drains:    Portacath infusing.    Plan:    Pt is on hospice and DNR/DNI. Continue plan of care. Sitter at bedside for safety.    Additional Info:

## 2020-08-25 NOTE — PLAN OF CARE
Patient A&O with some confusion noted. Lung sounds are clear and no SOB/JOAQUIN noted. Bowel sounds are active in all 4Qs. Denied CP, lightheadedness, dizziness, numbness, tingling. Drinking thick NDD1 liquids well and voiding spontaneously without difficulties. Pt was agitated and Clonazepam 0.125mg was given and effective. Repositioned and turned in bed, heels elevated off bed, IV infusing continuously Via Port. Attendant/assistant on bed side. Will continue with POC.

## 2020-08-25 NOTE — PLAN OF CARE
Patient just finishing up her breakfast meal upon arrival of clinician.  Staff present reported no difficulties with the meal.  Mild wet vocal quality noted but not showing any overt signs and symptoms of aspiration.  Trial of nectar thick liquids resulting in some increased wet vocal quality.  At this time recommend continue with NDD-1 food textures with nectar thick liquids.

## 2020-08-25 NOTE — PLAN OF CARE
VS:    BP (!) 142/72 (BP Location: Left arm)   Pulse 57   Temp 97.5  F (36.4  C) (Oral)   Resp 18   SpO2 97%     Output:    Pt is incontinent of urine and stool. LBM today, and watery    Activity:     Assist of 2 for cares. Pt is chairfast.    Skin: Intact ex some bruising on hands   Pain:    Denies.   Neuro/CMS:    Pt is alert to self only, but is less confused than yesterday. Pt has some restlessness at times in legs. CMS/neuros intact.   Dressing(s):     None   Diet:    DD1 with honey thick liquids. Ate 100% of dinner   Equipment:     IV pole    IV's/Drains:    Portacath infusing.    Plan:    Pt is on hospice and DNR/DNI. Continue plan of care. Sitter at bedside for safety.    Additional Info:

## 2020-08-26 ENCOUNTER — APPOINTMENT (OUTPATIENT)
Dept: SPEECH THERAPY | Facility: CLINIC | Age: 75
DRG: 056 | End: 2020-08-26
Attending: INTERNAL MEDICINE
Payer: COMMERCIAL

## 2020-08-26 PROCEDURE — 25000132 ZZH RX MED GY IP 250 OP 250 PS 637: Performed by: INTERNAL MEDICINE

## 2020-08-26 PROCEDURE — 25800030 ZZH RX IP 258 OP 636: Performed by: ORTHOPAEDIC SURGERY

## 2020-08-26 PROCEDURE — 25000125 ZZHC RX 250: Performed by: PSYCHIATRY & NEUROLOGY

## 2020-08-26 PROCEDURE — 25000125 ZZHC RX 250: Performed by: ORTHOPAEDIC SURGERY

## 2020-08-26 PROCEDURE — 99225 ZZC SUBSEQUENT OBSERVATION CARE,LEVEL II: CPT | Mod: GW | Performed by: INTERNAL MEDICINE

## 2020-08-26 PROCEDURE — 25000128 H RX IP 250 OP 636: Performed by: INTERNAL MEDICINE

## 2020-08-26 PROCEDURE — 92526 ORAL FUNCTION THERAPY: CPT | Mod: GN

## 2020-08-26 PROCEDURE — G0463 HOSPITAL OUTPT CLINIC VISIT: HCPCS

## 2020-08-26 PROCEDURE — G0378 HOSPITAL OBSERVATION PER HR: HCPCS

## 2020-08-26 PROCEDURE — 25000132 ZZH RX MED GY IP 250 OP 250 PS 637: Performed by: NURSE PRACTITIONER

## 2020-08-26 PROCEDURE — 99207 ZZC CDG-CODE CATEGORY CHANGED: CPT | Performed by: INTERNAL MEDICINE

## 2020-08-26 PROCEDURE — 12000001 ZZH R&B MED SURG/OB UMMC

## 2020-08-26 RX ORDER — MICONAZOLE NITRATE 20 MG/G
CREAM TOPICAL 2 TIMES DAILY
Status: DISCONTINUED | OUTPATIENT
Start: 2020-08-26 | End: 2020-08-31 | Stop reason: HOSPADM

## 2020-08-26 RX ORDER — VANCOMYCIN HYDROCHLORIDE 125 MG/1
125 CAPSULE ORAL 4 TIMES DAILY
Status: DISCONTINUED | OUTPATIENT
Start: 2020-08-26 | End: 2020-08-28

## 2020-08-26 RX ADMIN — Medication 1 CAPSULE: at 19:32

## 2020-08-26 RX ADMIN — VALPROATE SODIUM 250 MG: 100 INJECTION, SOLUTION INTRAVENOUS at 06:34

## 2020-08-26 RX ADMIN — MICONAZOLE NITRATE: 20 CREAM TOPICAL at 19:32

## 2020-08-26 RX ADMIN — VANCOMYCIN HYDROCHLORIDE 125 MG: 125 CAPSULE ORAL at 01:26

## 2020-08-26 RX ADMIN — LEVOTHYROXINE SODIUM 88 MCG: 88 TABLET ORAL at 09:12

## 2020-08-26 RX ADMIN — HEPARIN, PORCINE (PF) 10 UNIT/ML INTRAVENOUS SYRINGE 5 ML: at 10:34

## 2020-08-26 RX ADMIN — VANCOMYCIN HYDROCHLORIDE 125 MG: 125 CAPSULE ORAL at 18:47

## 2020-08-26 RX ADMIN — HEPARIN, PORCINE (PF) 10 UNIT/ML INTRAVENOUS SYRINGE 5 ML: at 23:51

## 2020-08-26 RX ADMIN — ASENAPINE MALEATE 5 MG: 5 TABLET SUBLINGUAL at 19:33

## 2020-08-26 RX ADMIN — CLONAZEPAM 0.12 MG: 0.12 TABLET, ORALLY DISINTEGRATING ORAL at 01:26

## 2020-08-26 RX ADMIN — ASENAPINE MALEATE 5 MG: 5 TABLET SUBLINGUAL at 09:12

## 2020-08-26 RX ADMIN — HEPARIN, PORCINE (PF) 10 UNIT/ML INTRAVENOUS SYRINGE 5 ML: at 14:20

## 2020-08-26 RX ADMIN — ENOXAPARIN SODIUM 60 MG: 60 INJECTION SUBCUTANEOUS at 21:58

## 2020-08-26 RX ADMIN — VANCOMYCIN HYDROCHLORIDE 125 MG: 125 CAPSULE ORAL at 21:58

## 2020-08-26 RX ADMIN — VANCOMYCIN HYDROCHLORIDE 125 MG: 125 CAPSULE ORAL at 14:19

## 2020-08-26 RX ADMIN — MICONAZOLE NITRATE: 20 CREAM TOPICAL at 11:15

## 2020-08-26 RX ADMIN — ENOXAPARIN SODIUM 60 MG: 60 INJECTION SUBCUTANEOUS at 09:17

## 2020-08-26 RX ADMIN — HYDRALAZINE HYDROCHLORIDE 10 MG: 20 INJECTION INTRAMUSCULAR; INTRAVENOUS at 23:51

## 2020-08-26 RX ADMIN — VANCOMYCIN HYDROCHLORIDE 125 MG: 125 CAPSULE ORAL at 09:12

## 2020-08-26 RX ADMIN — HEPARIN, PORCINE (PF) 10 UNIT/ML INTRAVENOUS SYRINGE 5 ML: at 00:45

## 2020-08-26 RX ADMIN — Medication 1 CAPSULE: at 09:12

## 2020-08-26 NOTE — PLAN OF CARE
Patient A&O with some confusion noted. Lungs sounds are clear, and no SOB/JOAQUIN. Bowel sounds are active and pt is treated with Vanco for C-diff and tolerating well. Denied CP, lightheadedness, dizziness, numbness, or tingling. No IV infusing and the Port is Hep locked. Port needle and dressing was changed. Pt is drinking well and voiding spontaneously without difficulties and is incontinent B&B. No BM today.  Pt repositioned and turned in bed, heels elevated off bed. Will continue with POC.

## 2020-08-26 NOTE — PLAN OF CARE
Patient tolerating current NDD-1 food textures and honey thick liquids without any overt signs and symptoms of aspiration.  Occasional wet vocal quality that clears with subsequent swallows.  Trials of nectar thick liquids resulting in some increased wet vocal quality but again able to be cleared with subsequent dry swallows.  At this time recommend continue with current NDD-1 food textures with honey thick liquids.  Patient reports that at baseline she does use dentures when eating though they do not appear to be here at the hospital with her currently.

## 2020-08-26 NOTE — CONSULTS
WOC Consult    S: WOC Consult to evaluate and treat incontinence dermatitis of labia.    B: Per Dr Leon on 8/26/2020: Stfeanie Velazquez is a 75 year old female with history of recurrent UTI, CKD, recurrent episodes of encephalopathy one point required intubation and behavioral changes thought to be lewy body dementia, CVA, coronary artery disease,SVT,  DVT, and history of rectal cancer, admitted for altered mental status and agitation from her assisted living on 8/20  due escalating behaviors, which included hitting and kicking staff.     She was admitted to [facility] 8/10 to 8/13 for similar concerns of worsening agitation and behaviors. Had CT head done without any acute pathology and  Hospice was consulted.She was started on Aricept and Klonopin as needed during her stay     A: Patient is very hard of hearing but cooperative with assessment and turning. Skin on labia, buttocks and perineum assessed. Mildly denuded skin in intergluteal cleft, red with satellite lesions consistent with fungal infection. Labia reddened but without open areas.     P: Encourage RN to follow Incontinence Protocol with use of Gabi Antifungal Cream:    -Remove the bulk of urine and stool from the skin  using a warm moist soft disposable cloth (i.e.  Curity Wipes, Bairon Wings).    At first and after each episode of incontinence,  cleanse with Gabi Cleanse and Protect All-in-One  Protectant Lotion (this is an all in one cleanser,  moisturizer, and barrier ointment).    Apply THIN layer of Gabi Anti-fungal Barrier Cream to all skin that is in contact with stool  or urine.    With repeat cleansings, DO NOT scrub paste  down to skin, just gently remove surface  incontinence soil and reapply additional paste,  if needed.    If complete removal of paste is required, use a  warm wash cloth and Gabi cleanser: place the  warm wash cloth to the area for a minute and  then cleanse, or use mineral oil/baby oil and soft  disposable  washcloth.    WOC will follow up at end of week. Please call with any questions or concerns.    Malena Guillory RN, CWOCN

## 2020-08-26 NOTE — PLAN OF CARE
Pt alert, oriented to self, disoriented to situation place or time.  LS clear, VSS, on DD1 diet with honey thickened liquids, tolerated well. Pt repositions with A of 2 in bed, bed alarm on at all times. Pt trying to sit at EOB and get OOB multiple times, however was easily redirected. Pt having loose watery stools- MD notified and sample sent to lab. Writer received a call from lab and stool sample tested positive for C-Diff, MD notified. Pt is on Enteric precautions. Incont. Of bowel and bladder, Buttocks and labia very red and raw, some bleeding from labia due to raw skin. Barrier cream applied and WOC consult in place. MD aware. Port SL in between IV abx. Continue to monitor pt closely, keep bed alarm on for safety.

## 2020-08-26 NOTE — PLAN OF CARE
Pt alert and oriented to self and situation. VSS. No complaints of shortness of breath or chest pain. Did complain of aching of her legs and lower back pain. Passive ROM performed and relaxation promoted. Slept well through the night. Up with 2 assist, liko lift. Needs assistance turning in bed. Dd1, nectar thick fluids. Took pills crushed in pudding.Tolerates well. Incontinent of bowel and bladder. Loose stools. Positive for C-diff. Perineal area and bottom red and sore. WOC consult ordered. Incontinence care performed and barrier cream applied. Repo q2hrs. Port heparin locked. Inconsistent with using call light. Bed alarm on for patient safety. Pt is [place], uses pocket talker. Will continue with plan of care.

## 2020-08-26 NOTE — PROGRESS NOTES
Social Work Services Progress Note    Hospital Day: 7  Collaborated with:  Medical team, pt's ninfa Sandoval     Data:  Per medical team, pt is currently being treated for C-diff. Pt will need a couple more days before she is medically stable. Per team and psychiatry, pt will likely not need geripsych placement.     SW updated ninfa Sandoval. SW discussing pt discharging to LTC with hospice re-enrollment if family desired. Per Lori, family is interested in [facility] SNF. SW encouraged family to call and voice interest.     SW also initiated a referral:  [facility] - [phone]    Lori also inquiring about Lewy Body Dementia and whether or not pt would receive a MRI to confirm she was diagnosed with it. SW to share question with provider.     Intervention:  Discharge planning    Assessment:  Pt will need SNF/LTC placement with hospice    Plan:    Anticipated Disposition:  Facility:  D    Barriers to d/c plan:  Medical stability, behaviors, bed availability     Follow Up:  SW to continue to follow    ADELE Brenner  Unit 5/[address] 8 Ortho/Med/Surg & [facility] Adult ED  Phone: [phone] Pager: [phone]

## 2020-08-26 NOTE — PROGRESS NOTES
"[facility] Progress Note - Hospitalist Service       Date of Admission:  8/20/2020  Assessment & Plan     Stefanie Velazquez is a 75 year old female with history of recurrent UTI, CKD, recurrent episodes of encephalopathy one point required intubation and behavioral changes thought to be lewy body dementia, CVA, coronary artery disease,SVT,  DVT, and history of rectal cancer, admitted for altered mental status and agitation from her assisted living on 8/20  due escalating behaviors, which included hitting and kicking staff.     She was admitted to [facility] 8/10 to 8/13 for similar concerns of worsening agitation and behaviors. Had CT head done without any acute pathology and  Hospice was consulted.She was started on Aricept and Klonopin as needed during her stay        Acute Encephalopathy likely metabolic underlying cognitive decline due to lewy body dementia    MRI done 4/8;  Findings suspicious for small acute infarct in the subcortical white matter of the fusiform gyrus of the right temporal lobe.and chronic small vessel disease  Appreciate  psych input on 8/22, 8/24 and 8/25  Currently on asenpine S/L 5 mg hs increased to 5 mg BID  Depacon 250 mg IV  Klonopin 0.125 ODT TID prn  ( max uses 1-2 times/ day)   Zyprexa ODT 5 mg TID prn  ( Has not used this in the last 5 days)  Due to aspiration risk , wafers and s/l medications started and some po medications switched to IV.  Patient is doing much better on the above regimen   Patient has had sitter off for 24 hrs       High Risk of Aspiration   Dysphagia   Speech evaluated patient 8/21 and 8/22 and 8/24  Now on honey thickened liquids with 1:1 supervision .  Dr Berry  Discussed with Yfn Meadows 8/21 who endorses patient has had \" coughing episodes \" with food for some time, likely months          Enterococcus  UTI  Proteinuria  S/P treatment with bactrim.   Stopped ceftriaxone 8/21  Started ampicillin " 8/21 as growing enterococcus, discussed with pharm. Stop date on 8/26 ( today)     Leucopenia   Patient has had intermittent chronic low WBC count from as early as 2012/2013.  [place] in the setting pof combination of  Valproic acid,  Infection and antibiotics  Repeat CBC with stable WBC count at 2.9 on 8/26  Antibiotics stopped on 8/26       C Diff Diarrhea:   Noted on 8/25. Initiated on Vancomycin 125 mg QID. Last date will be 9/10  IV ampicillin has been stopped ( Completed treatment for UTI)  Initiate culturelle BID         TITA on CKD;  baseline creatinine is 1.4 to 1.8   Creatinine stable at 1.11 on 8/25     Coronary Artery Disease  History of 3 vessel CABG in 2002.   Hold  home simvastatin, asa due to aspiration risk   Unable to tolerate ACEi or beta blocker due to hypotension.     Hyperlipidemia  Home simvastatin  ( hold )      History of DVT  On lovenox ( had been on DOAC, changed due to swallow issues )      Diabetes Mellitus, Type II  Chronic. Last a1C 6.1 on 6/29/2020. Not currently on medications         Lumbar Radiculopathy   Neuropathy  Previously diagnosed.    On   gabapentin ( hold)     Hypothyroidism  TSH 1.52 on 8/20   home levothyroxine 88mcg daily, 44mcg on Sunday..       Gastroesophageal reflux disease  - Holding omeprazole     Restless Leg Syndrome    hold pramixpexole      RC  Previously diagnosed. On CPAP at assisted living facility, follows with sleep medicine.   CPAP if she allows     Rectal Cancer  Follows with Dr. Martinez, diagnosed in 2011, underwent neoadjuvant chemoradiation, resection with reanastomosis, adjuvant chemotherapy. Follows yearly with oncology, last seen 7/2020, no evidence of recurrence or plans for further follow up based on stability.  - Continue outpatient surveillance                     Diet: Dysphagia Diet Level 1 Pureed Honey Thickened Liquids (pre-thickened or use instant food thickener)    DVT Prophylaxis: lovenox 60 mg BID   Lr Catheter: not present  Code  Status: DNi/DNR ( discussed with ninfa dove )          Disposition Plan   Expected discharge: 2 - 3 days, recommended to LTC once safe disposition plan/ TCU bed available.  Entered: Edward Coronado MD 08/26/2020, 8:26 AM       The patient's care was discussed with the Bedside Nurse, Care Coordinator/, Patient, Patient's Family and psych  Consultant.    Edward Coronado MD  Hospitalist Service  [facility], [place]    ______________________________________________________________________    Interval History   Concha feels well this morning. She has had diarrhea, but denies abdominal pain   No diarrhea this morning  No fever or chills  Tolerating diet  No significant episodes of encephalopathy    Data reviewed today: I reviewed all medications, new labs and imaging results over the last 24 hours.   Physical Exam   Vital Signs: Temp: 97.6  F (36.4  C) Temp src: Oral BP: (!) 149/62 Pulse: 61   Resp: 18 SpO2: 98 % O2 Device: None (Room air)    Weight: 0 lbs 0 oz   keeps tongue protrude at times  Very hard of hearing . Use pocket speaker   Hematologic / Lymphatic: no cervical lymphadenopathy  Respiratory: No increased work of breathing, good air exchange, clear to auscultation bilaterally, no crackles or wheezing  Cardiovascular: Normal apical impulse, regular rate and rhythm, normal S1 and S2, no S3 or S4, and no murmur noted  GI: No scars, normal bowel sounds, soft, non-distended, non-tender, no masses palpated, no hepatosplenomegally    Data   Recent Labs   Lab 08/25/20  0635 08/24/20  0749 08/23/20  0628 08/22/20  0639  08/20/20  0535   WBC 2.9* 2.6* 2.5* 3.7*   < >  --    HGB 12.5 11.6* 11.0* 10.4*   < >  --    MCV 89 89 90 91   < >  --     168 179 163   < >  --      --  142 142   < > 144   POTASSIUM 3.5  --  3.4 3.6   < > 4.1   CHLORIDE 111*  --  111* 111*   < > 116*   CO2 26  --  25 24   < > 24   BUN 9  --  17 21   < > 26   CR 1.11*  --   1.33* 1.60*   < > 1.87*   ANIONGAP 7  --  6 7   < > 4   DWIGHT 8.5  --  8.2* 7.7*   < > 8.2*   GLC 81  --  75 94   < > 89   ALBUMIN  --   --   --   --   --  2.1*   PROTTOTAL  --   --   --   --   --  5.5*   BILITOTAL  --   --   --   --   --  0.7   ALKPHOS  --   --   --   --   --  96   ALT  --   --   --   --   --  22   AST  --   --   --   --   --  54*    < > = values in this interval not displayed.

## 2020-08-26 NOTE — PROGRESS NOTES
Patient with multiple watery loose stools (per RN), no iv antibiotics  Will check C.diff pcr  Start on probiotics.     Addendum:     C.diff returned positive: PO Vanco 125 qid ordered.       Cross cover.

## 2020-08-26 NOTE — PROVIDER NOTIFICATION
1330 notified Dr. Leon. Pt has disc present, L upper arm, possible BG monitering device. No new orders.

## 2020-08-27 ENCOUNTER — APPOINTMENT (OUTPATIENT)
Dept: SPEECH THERAPY | Facility: CLINIC | Age: 75
DRG: 056 | End: 2020-08-27
Attending: INTERNAL MEDICINE
Payer: COMMERCIAL

## 2020-08-27 PROBLEM — T17.900A PULMONARY ASPIRATION: Status: ACTIVE | Noted: 2020-08-27

## 2020-08-27 PROCEDURE — 25000132 ZZH RX MED GY IP 250 OP 250 PS 637: Performed by: NURSE PRACTITIONER

## 2020-08-27 PROCEDURE — 92526 ORAL FUNCTION THERAPY: CPT | Mod: GN

## 2020-08-27 PROCEDURE — 12000001 ZZH R&B MED SURG/OB UMMC

## 2020-08-27 PROCEDURE — 25800030 ZZH RX IP 258 OP 636: Performed by: ORTHOPAEDIC SURGERY

## 2020-08-27 PROCEDURE — 25000128 H RX IP 250 OP 636: Performed by: INTERNAL MEDICINE

## 2020-08-27 PROCEDURE — 25000132 ZZH RX MED GY IP 250 OP 250 PS 637: Performed by: INTERNAL MEDICINE

## 2020-08-27 PROCEDURE — 25000125 ZZHC RX 250: Performed by: ORTHOPAEDIC SURGERY

## 2020-08-27 RX ORDER — LOSARTAN POTASSIUM 25 MG/1
25 TABLET ORAL DAILY
Status: DISCONTINUED | OUTPATIENT
Start: 2020-08-27 | End: 2020-08-27

## 2020-08-27 RX ADMIN — ENOXAPARIN SODIUM 60 MG: 60 INJECTION SUBCUTANEOUS at 22:08

## 2020-08-27 RX ADMIN — VANCOMYCIN HYDROCHLORIDE 125 MG: 125 CAPSULE ORAL at 09:26

## 2020-08-27 RX ADMIN — ASENAPINE MALEATE 5 MG: 5 TABLET SUBLINGUAL at 09:30

## 2020-08-27 RX ADMIN — MICONAZOLE NITRATE: 20 CREAM TOPICAL at 09:30

## 2020-08-27 RX ADMIN — MICONAZOLE NITRATE: 20 CREAM TOPICAL at 22:03

## 2020-08-27 RX ADMIN — LEVOTHYROXINE SODIUM 88 MCG: 88 TABLET ORAL at 09:25

## 2020-08-27 RX ADMIN — ENOXAPARIN SODIUM 60 MG: 60 INJECTION SUBCUTANEOUS at 09:30

## 2020-08-27 RX ADMIN — VANCOMYCIN HYDROCHLORIDE 125 MG: 125 CAPSULE ORAL at 15:46

## 2020-08-27 RX ADMIN — VALPROATE SODIUM 250 MG: 100 INJECTION, SOLUTION INTRAVENOUS at 07:03

## 2020-08-27 RX ADMIN — VANCOMYCIN HYDROCHLORIDE 125 MG: 125 CAPSULE ORAL at 13:54

## 2020-08-27 RX ADMIN — Medication 1 CAPSULE: at 09:28

## 2020-08-27 RX ADMIN — MICONAZOLE NITRATE: 20 CREAM TOPICAL at 06:25

## 2020-08-27 NOTE — PLAN OF CARE
Ongoing improvement noted in swallowing function.  Patient seems on the verge of being able to advance to nectar thick liquids though towards the end of the meal this date did have 2 instances where nectar thick liquids seem to cause cough.  We will hold off on diet advancement at this time.  Limited trials of NDD-2 food textures were tolerated without difficulties.  On 8/28 will trial a full meal with harder food textures and advance as appropriate.

## 2020-08-27 NOTE — PROGRESS NOTES
Social Work Services Progress Note    Hospital Day: 8  Collaborated with:  Medical team, [facility]    Data:  SW following for LTC placement. Referral was initiated yesterday to [facility] in [place] per family's preference.     SW followed up with the referral and left a  for admissions.     SW awaiting response. SW to collect additional preferences from family if [facility] does not have beds available.    8/27 1600: [facility] LTC is at capacity, no beds available. SW to call Niece in AM to collect additional preferences    Intervention:  Discharge planning    Assessment:  Pt likely ready for discharge tomorrow    Plan:    Anticipated Disposition:  Facility:  LTC    Barriers to d/c plan:  Bed availability     Follow Up:  SW to continue to follow    ADELE Brenner  Unit 5/Unit 8 Ortho/Med/Surg & [facility]  Phone: [phone] Pager: [phone]

## 2020-08-27 NOTE — UTILIZATION REVIEW
Admission Status; Secondary Review Determination       Under the authority of the Utilization Management Committee, the utilization review process indicated a secondary review on the above patient. The review outcome is based on review of the medical records, discussions with staff, and applying clinical experience noted on the date of the review.     (x) Inpatient Status Appropriate - This patient's medical care is consistent with medical management for inpatient care and reasonable inpatient medical practice.     RATIONALE FOR DETERMINATION   75 year old female with history of recurrent UTI, CKD, recurrent episodes of encephalopathy one point required intubation and behavioral changes thought to be lewy body dementia, CVA, coronary artery disease,SVT,  DVT, and history of rectal cancer, admitted for altered mental status and agitation from her assisted living on 8/20  due escalating behaviors, which included hitting and kicking staff.She was admitted to [facility] 8/10 to 8/13 for similar concerns of worsening agitation and behaviors.  Patient has had prolonged observation stay due to disposition difficulty.  Now she developed diarrhea and has a positive C. difficile requiring medically necessary hospital care.  Dr. Calzada advised to advance to inpatient.    Please use initial inpatient order 8/20/2020.    This document was produced using voice recognition software       The information on this document is developed by the utilization review team in order for the business office to ensure compliance. This only denotes the appropriateness of proper admission status and does not reflect the quality of care rendered.   The definitions of Inpatient Status and Observation Status used in making the determination above are those provided in the CMS Coverage Manual, Chapter 1 and Chapter 6, section 70.4.   Sincerely,   MANA HOLLINGSWORTH MD   System Medical Director   Utilization Management   [facility]  Services.

## 2020-08-27 NOTE — PLAN OF CARE
VS:   BP (!) 170/75 (BP Location: Left arm)   Pulse 56   Temp 98.3  F (36.8  C) (Oral)   Resp 18   SpO2 98%  BP (!) 151/77 (BP Location: Left arm)   Pulse 56   Temp 98.3  F (36.8  C) (Oral)   Resp 18   SpO2 98% Hydralazine x1 given for SBP or 170  Denies SOB or CP. On RA   Output:   Pt is incontinent of bowel and bladder. Loose stools decreasing with antibiotic   Activity:   Independently positioning in bed. Pt not oob during shift, requires mechanical lift   Skin: Intact ex ecchymosis and incontinence dermatitis of the labia and buttocks, see [facility] nurse note for treatment protocol    Pain:   Pt tarsha pain, states legs are uncomfortable due to restless leg syndrome    Neuro/CMS:   Pt is alert and aware of self, confused to time and place. Denies n/t   Dressing(s): none   Diet: Honey thick liquid diet   LDA: Port access R chest, heparin locked   Equipment: Iv pole   Plan: Continue plan of care, pt able to make needs known   Additional Info:   Discharge to SNF or TCU when medically stable

## 2020-08-27 NOTE — PLAN OF CARE
"BP (!) 164/64 (BP Location: Left arm)   Pulse 52   Temp 97.5  F (36.4  C) (Axillary)   Resp 18   SpO2 97%   Pt oriented to only herself. Is [place], pocket talker is in room. Follows commands. Occasionally communicates with writer. Pt asked if she could \"go to [employer].\" Reminded pt that she was in hospital.    Pt had 1 large loose incontinent bowel movement this morning and a medium loose this afternoon, incontinent with bladder.  Pt skin is intact ex ecchymosis and incontinent dermatitis  in sigifredo/groin area, cleansed per order and barrier cream applied. Pt received a bed bath as well.   Repositioning Q2-3H with Ax1 able to roll from side to side independently or with minimal assistance. Pt did not want to sit in chair this afternoon, pt very tired throughout morning/afternoon sleeping on and off. Encouraged activity but pt would shortly fall back asleep.   DD1 diet with honey thickened liquids, pt is tolerating well ate 100% of breakfast (no coughing noted). Pt takes pills crushed in applesauce well.   Denies pain, SOB, and chest pain. LS are clear and BS are active.    Port R chest is heparin locked    Pt status changed from observation to inpatient  "

## 2020-08-27 NOTE — PLAN OF CARE
Pt alert, oriented to self, arouses to voice- pt very [place], pocket talker on. VSS, LS clear, 1 loose watery stool this evening. Reposition patient Q3 hours, pt does repo herself in bed as well, A of 1 with turning. Port Hep Locked. Barrier cream applied to buttocks and labia area. Pt very pleasant and cooperative this evening. Is not able to use call light appropriately, continue to monitor pt closely.

## 2020-08-28 ENCOUNTER — APPOINTMENT (OUTPATIENT)
Dept: SPEECH THERAPY | Facility: CLINIC | Age: 75
DRG: 056 | End: 2020-08-28
Attending: INTERNAL MEDICINE
Payer: COMMERCIAL

## 2020-08-28 ENCOUNTER — MEDICAL CORRESPONDENCE (OUTPATIENT)
Dept: HEALTH INFORMATION MANAGEMENT | Facility: CLINIC | Age: 75
End: 2020-08-28

## 2020-08-28 LAB
GLUCOSE BLDC GLUCOMTR-MCNC: 183 MG/DL (ref 70–99)
PLATELET # BLD AUTO: 164 10E9/L (ref 150–450)

## 2020-08-28 PROCEDURE — G0463 HOSPITAL OUTPT CLINIC VISIT: HCPCS

## 2020-08-28 PROCEDURE — 25000128 H RX IP 250 OP 636: Performed by: INTERNAL MEDICINE

## 2020-08-28 PROCEDURE — 36592 COLLECT BLOOD FROM PICC: CPT | Performed by: INTERNAL MEDICINE

## 2020-08-28 PROCEDURE — 25000132 ZZH RX MED GY IP 250 OP 250 PS 637: Performed by: NURSE PRACTITIONER

## 2020-08-28 PROCEDURE — 25800030 ZZH RX IP 258 OP 636: Performed by: ORTHOPAEDIC SURGERY

## 2020-08-28 PROCEDURE — 85049 AUTOMATED PLATELET COUNT: CPT | Performed by: INTERNAL MEDICINE

## 2020-08-28 PROCEDURE — 25000125 ZZHC RX 250: Performed by: ORTHOPAEDIC SURGERY

## 2020-08-28 PROCEDURE — 25000132 ZZH RX MED GY IP 250 OP 250 PS 637: Performed by: INTERNAL MEDICINE

## 2020-08-28 PROCEDURE — 12000001 ZZH R&B MED SURG/OB UMMC

## 2020-08-28 PROCEDURE — 40000275 ZZH STATISTIC RCP TIME EA 10 MIN

## 2020-08-28 PROCEDURE — 25000125 ZZHC RX 250: Performed by: INTERNAL MEDICINE

## 2020-08-28 PROCEDURE — 94640 AIRWAY INHALATION TREATMENT: CPT

## 2020-08-28 PROCEDURE — 92526 ORAL FUNCTION THERAPY: CPT | Mod: GN | Performed by: SPEECH-LANGUAGE PATHOLOGIST

## 2020-08-28 PROCEDURE — 99232 SBSQ HOSP IP/OBS MODERATE 35: CPT | Mod: GW | Performed by: INTERNAL MEDICINE

## 2020-08-28 PROCEDURE — 00000146 ZZHCL STATISTIC GLUCOSE BY METER IP

## 2020-08-28 RX ORDER — FUROSEMIDE 20 MG
40 TABLET ORAL ONCE
Status: COMPLETED | OUTPATIENT
Start: 2020-08-28 | End: 2020-08-28

## 2020-08-28 RX ORDER — PRAMIPEXOLE DIHYDROCHLORIDE 0.12 MG/1
0.12 TABLET ORAL
Status: DISCONTINUED | OUTPATIENT
Start: 2020-08-28 | End: 2020-08-31 | Stop reason: HOSPADM

## 2020-08-28 RX ORDER — FUROSEMIDE 20 MG
40 TABLET ORAL DAILY
Status: DISCONTINUED | OUTPATIENT
Start: 2020-08-28 | End: 2020-08-28

## 2020-08-28 RX ORDER — BENZONATATE 100 MG/1
100 CAPSULE ORAL 3 TIMES DAILY PRN
Status: DISCONTINUED | OUTPATIENT
Start: 2020-08-28 | End: 2020-08-31 | Stop reason: HOSPADM

## 2020-08-28 RX ORDER — VANCOMYCIN HYDROCHLORIDE 50 MG/ML
125 KIT ORAL 4 TIMES DAILY
Status: DISCONTINUED | OUTPATIENT
Start: 2020-08-28 | End: 2020-08-31 | Stop reason: HOSPADM

## 2020-08-28 RX ORDER — AMLODIPINE BESYLATE 5 MG/1
5 TABLET ORAL DAILY
Status: DISCONTINUED | OUTPATIENT
Start: 2020-08-28 | End: 2020-08-30

## 2020-08-28 RX ORDER — SIMVASTATIN 20 MG
40 TABLET ORAL DAILY
Status: DISCONTINUED | OUTPATIENT
Start: 2020-08-28 | End: 2020-08-31 | Stop reason: HOSPADM

## 2020-08-28 RX ORDER — POTASSIUM CHLORIDE 1.5 G/1.58G
40 POWDER, FOR SOLUTION ORAL ONCE
Status: COMPLETED | OUTPATIENT
Start: 2020-08-28 | End: 2020-08-28

## 2020-08-28 RX ADMIN — Medication 1 CAPSULE: at 09:53

## 2020-08-28 RX ADMIN — FUROSEMIDE 40 MG: 20 TABLET ORAL at 09:52

## 2020-08-28 RX ADMIN — HEPARIN, PORCINE (PF) 10 UNIT/ML INTRAVENOUS SYRINGE 5 ML: at 00:36

## 2020-08-28 RX ADMIN — LEVOTHYROXINE SODIUM 88 MCG: 88 TABLET ORAL at 13:03

## 2020-08-28 RX ADMIN — VALPROATE SODIUM 250 MG: 100 INJECTION, SOLUTION INTRAVENOUS at 06:06

## 2020-08-28 RX ADMIN — MICONAZOLE NITRATE: 20 CREAM TOPICAL at 10:12

## 2020-08-28 RX ADMIN — POTASSIUM CHLORIDE 40 MEQ: 1.5 POWDER, FOR SOLUTION ORAL at 11:35

## 2020-08-28 RX ADMIN — SIMVASTATIN 40 MG: 20 TABLET, FILM COATED ORAL at 12:00

## 2020-08-28 RX ADMIN — VANCOMYCIN HYDROCHLORIDE 125 MG: KIT at 12:01

## 2020-08-28 RX ADMIN — ASENAPINE MALEATE 5 MG: 5 TABLET SUBLINGUAL at 21:05

## 2020-08-28 RX ADMIN — VALPROIC ACID 250 MG: 250 SOLUTION ORAL at 09:52

## 2020-08-28 RX ADMIN — MICONAZOLE NITRATE: 20 CREAM TOPICAL at 21:06

## 2020-08-28 RX ADMIN — FUROSEMIDE 40 MG: 20 TABLET ORAL at 12:00

## 2020-08-28 RX ADMIN — APIXABAN 5 MG: 2.5 TABLET, FILM COATED ORAL at 09:52

## 2020-08-28 RX ADMIN — Medication 1 CAPSULE: at 20:59

## 2020-08-28 RX ADMIN — APIXABAN 5 MG: 2.5 TABLET, FILM COATED ORAL at 20:58

## 2020-08-28 RX ADMIN — VANCOMYCIN HYDROCHLORIDE 125 MG: KIT at 16:32

## 2020-08-28 RX ADMIN — ASENAPINE MALEATE 5 MG: 5 TABLET SUBLINGUAL at 09:53

## 2020-08-28 RX ADMIN — ALBUTEROL SULFATE 2.5 MG: 2.5 SOLUTION RESPIRATORY (INHALATION) at 10:34

## 2020-08-28 RX ADMIN — Medication 80 MG: at 12:01

## 2020-08-28 RX ADMIN — VANCOMYCIN HYDROCHLORIDE 125 MG: KIT at 21:01

## 2020-08-28 NOTE — PLAN OF CARE
/57   Pulse (!) 49   Temp 97.2  F (36.2  C) (Oral)   Resp 18   SpO2 96%   Pt oriented to only herself. Is very [place], pocket talker is in room. Follows commands. Occasionally communicates with writer.  Pt had 1 large loose incontinent bowel movement this morning and a small loose this afternoon, incontinent with bladder (had lasix today).  Pt skin is intact ex ecchymosis and incontinent dermatitis  in sigifredo/groin area, cleansed per order and barrier cream applied. Pt received a bed bath as well.   Repositioning Q2-3H with Ax1 able to roll from side to side independently or with minimal assistance.   DD1 diet with honey thickened liquids, pt is tolerating well ate 100% of breakfast.  Pt had a long coughing episode this morning (30 min) after taking morning medications, notified MD. Albuterol neb given with improvement. Changed Vancomycin to a solution rather than the capsule. The solution, per pharmacy, can be mixed with applesauce for pt to take. Pt takes pills crushed in applesauce well.   Denies pain, SOB, and chest pain. LS are clear and BS are active.    Port R chest is heparin locked

## 2020-08-28 NOTE — PROGRESS NOTES
"[facility], [facility] Progress Note - Hospitalist Service       Date of Admission:  8/20/2020  Assessment & Plan     Stefanie Velazquez is a 75 year old female with history of recurrent UTI, CKD, recurrent episodes of encephalopathy one point required intubation and behavioral changes thought to be lewy body dementia, CVA, coronary artery disease,SVT,  DVT, and history of rectal cancer, admitted for altered mental status and agitation from her assisted living on 8/20  due escalating behaviors, which included hitting and kicking staff.     She was admitted to [facility] 8/10 to 8/13 for similar concerns of worsening agitation and behaviors. Had CT head done without any acute pathology and  Hospice was consulted.She was started on Aricept and Klonopin as needed during her stay        Acute Encephalopathy likely metabolic underlying cognitive decline due to lewy body dementia    MRI done 4/8;  Findings suspicious for small acute infarct in the subcortical white matter of the fusiform gyrus of the right temporal lobe.and chronic small vessel disease  Appreciate  psych input on 8/22, 8/24 and 8/25  Currently on asenpine S/L 5 mg hs increased to 5 mg BID  Depacon 250 mg IV ( changed to solution form on 8/28)  Klonopin 0.125 ODT TID prn  ( max uses 1-2 times/ day)   Zyprexa ODT 5 mg TID prn  ( Has not used this in the last 5 days)  Due to aspiration risk , wafers and s/l medications started   Patient is doing much better on the above regimen   Patient has had sitter off for 72 hrs       High Risk of Aspiration   Dysphagia   Speech evaluated patient 8/21 and 8/22 and 8/24  Now on honey thickened liquids with 1:1 supervision .  Dr Berry  Discussed with Yfn Meadows 8/21 who endorses patient has had \" coughing episodes \" with food for some time, likely months   Per speech and swallow evaluation on 8/28:  Nectar thick liquids by teaspoon and also by cup rim resulting in intermittent " cough and throat clear - 3 out of 4 trials. With trials of current honey thick liquids- 1st sip - had delayed cough but all other trials by cup rim ( x8)- no aspiration s/s. With trials of DD2 items-- pt also with intermittent cough and pt also after a few bites indicating some sensation of pharyngeal retention. With trials of current DD1 diet with pt self- feeding- no aspiration s/s. Recommend continue with current DD1 with honey thick liquids. Pt  upright for all po, small bites/sips, alternate consistencies, slow rate- will continue to trial DD2 and nectar as appropriate for readiness for further diet advancement but currently safest to remain on current DD1 with honey thick liquids.         Discussed with pharmacy to re-look into medications so that she is not aspirating on the syrups forms        Enterococcus  UTI  Proteinuria  S/P treatment with bactrim.   Stopped ceftriaxone 8/21  Started ampicillin 8/21 as growing enterococcus, discussed with pharm. Stop date on 8/26     Leucopenia   Patient has had intermittent chronic low WBC count from as early as 2012/2013.  [place] in the setting pof combination of  Valproic acid,  Infection and antibiotics  Repeat CBC with stable WBC count at 2.9 on 8/26  Antibiotics stopped on 8/26       C Diff Diarrhea:   Noted on 8/25. Initiated on Vancomycin 125 mg QID. Last date will be 9/10  IV ampicillin has been stopped ( Completed treatment for UTI)  Initiate culturelle BID   Currently, diarrhea has stopped. Will complete course of C Diff         TITA on CKD;  baseline creatinine is 1.4 to 1.8   Creatinine stable at 1.11 on 8/25     Coronary Artery Disease  History of 3 vessel CABG in 2002.   Hold  home simvastatin, asa due to aspiration risk   Unable to tolerate ACEi or beta blocker due to hypotension.  Given higher BP inpatient, we will start with l,ow dose amlodipine on 8/28     Hyperlipidemia  Home simvastatin  ( Resume  )      History of DVT  On lovenox ( had been on DOAC,  changed due to swallow issues )      Diabetes Mellitus, Type II  Chronic. Last a1C 6.1 on 6/29/2020. Not currently on medications    Has a subcutaneous sensor in place on the left arm     Lumbar Radiculopathy   Neuropathy  Previously diagnosed.   Was on gabapentin, which has not been resumed      Hypothyroidism  TSH 1.52 on 8/20   home levothyroxine 88mcg daily, 44mcg on Sunday..       Gastroesophageal reflux disease  - Holding omeprazole. Patient has not really complained of GERD symptoms. Hence we will not be resuming this in an effort to reduce polypharmacy     Restless Leg Syndrome   resume Pramipexole ( This is used on a PRN basis),. If she does not require, then we wont be inclined to resume this at discharge       RC  Previously diagnosed. On CPAP at assisted living facility, follows with sleep medicine.   CPAP if she allows     Rectal Cancer  Follows with Dr. Martinez, diagnosed in 2011, underwent neoadjuvant chemoradiation, resection with reanastomosis, adjuvant chemotherapy. Follows yearly with oncology, last seen 7/2020, no evidence of recurrence or plans for further follow up based on stability.  - Continue outpatient surveillance                     Diet: Dysphagia Diet Level 1 Pureed Honey Thickened Liquids (pre-thickened or use instant food thickener)    DVT Prophylaxis: lovenox 60 mg BID   Lr Catheter: not present  Code Status: DNi/DNR ( discussed with ninfa dove )          Disposition Plan   Expected discharge: 2 - 3 days, recommended to LTC once safe disposition plan/ TCU bed available.  Entered: Edward Coronado MD 08/28/2020, 10:01 AM       The patient's care was discussed with the Bedside Nurse, Care Coordinator/, Patient, Patient's Family and psych  Consultant.    Edward Coronado MD  Hospitalist Service  [facility], [place]    ______________________________________________________________________    Interval History   Concha  feels well this morning. No diarrhea  Denies chest pain/ SOB   No events of agitation overnight  No fever or chills  Eating and drinking well     Data reviewed today: I reviewed all medications, new labs and imaging results over the last 24 hours.   Physical Exam   Vital Signs: Temp: 97.8  F (36.6  C) Temp src: Axillary BP: (!) 151/60 Pulse: (!) 49   Resp: 18 SpO2: 96 % O2 Device: None (Room air)    Weight: 0 lbs 0 oz   keeps tongue protrude at times  Very hard of hearing . Use pocket speaker   Hematologic / Lymphatic: no cervical lymphadenopathy  Respiratory: No increased work of breathing, good air exchange, clear to auscultation bilaterally, no crackles or wheezing  Cardiovascular: Normal apical impulse, regular rate and rhythm, normal S1 and S2, no S3 or S4, and no murmur noted  GI: No scars, normal bowel sounds, soft, non-distended, non-tender, no masses palpated, no hepatosplenomegally    Data   Recent Labs   Lab 08/28/20  0615 08/25/20  0635 08/24/20  0749 08/23/20  0628 08/22/20  0639   WBC  --  2.9* 2.6* 2.5* 3.7*   HGB  --  12.5 11.6* 11.0* 10.4*   MCV  --  89 89 90 91    188 168 179 163   NA  --  144  --  142 142   POTASSIUM  --  3.5  --  3.4 3.6   CHLORIDE  --  111*  --  111* 111*   CO2  --  26  --  25 24   BUN  --  9  --  17 21   CR  --  1.11*  --  1.33* 1.60*   ANIONGAP  --  7  --  6 7   DWIGHT  --  8.5  --  8.2* 7.7*   GLC  --  81  --  75 94

## 2020-08-28 NOTE — PLAN OF CARE
VS:   BP (!) 155/70 (BP Location: Right arm)   Pulse 127   Temp 97.9  F (36.6  C) (Axillary)   Resp 16   SpO2 95%   VSS. RA. Afebrile. No shortness of breath, chest pain or dizziness.   Output:   Incontinent of bowel and bladder.     Activity:   Pt not OOB. Assist of 2 positioning in bed.   Skin: Sigifredo area redness. Antifungal ointment/barrier cream used per plan of care.   Pain:   Negligible. No nonverbal indicators of pain.    Neuro/CMS:   Disoriented to situation, place and time. Unable to assess sensation.   Dressing(s):   Port Dressing.   Diet:   Honey thickened liquids.   LDA:   Port heparin locked.   Equipment:   Call light.    Plan:   Continue to monitor. WOC consult ordered for reddened sigifredo area. Pt plans to discharge to nursing home when appropriate.   Additional Info:

## 2020-08-28 NOTE — PROGRESS NOTES
CLINICAL NUTRITION SERVICES - ASSESSMENT NOTE     Nutrition Prescription    RECOMMENDATIONS FOR MDs/PROVIDERS TO ORDER:  Unit staff may enter room service not appropriate order for automatic default trays if desired  If N/V dont improve and pt cannot maintain PO intakes/weight, may consider nutrition support, however may not be appropriate with POC    Malnutrition Status:    Unable to determine    Recommendations already ordered by Registered Dietitian (RD):  Magic Cup TID with meals     Future/Additional Recommendations:  Adjust supplement pending pt preference      Unable to obtain in-person nutrition history or nutrition focused physical assessment (NFPA) from patient as the number of staff going into rooms is restricted to limit pt exposure during this time. Information obtained via chart review. Unable to reach pt via phone x3 attempts.    REASON FOR ASSESSMENT  Stefanie Velazquez is a/an 75 year old female assessed by the dietitian for LOS    History of likely Lewy body dementia, coronary disease hypertension hyperlipidemia DVT, type 2 diabetes hypothyroidism, restless legs and obstructive sleep apnea and recently admitted  8/10/2020 for weakness and increased confusion, fatigue and a history of recurrent encephalopathy agitation and paranoia with combativeness and at 1 point required intubation.    NUTRITION HISTORY  - Pt presented to  [facility] for behaviors at her [facility]/memory care. Pt enrolled in Hospice through [facility].   - SLP following - recommend continue with DD1 with honey thickened liquids 8/28.    CURRENT NUTRITION ORDERS  Diet: Dysphagia Level 1: Pureed and Honey Thickened Liquids   Intake/Tolerance: 100% of meals per flowsheet (1 instance of 50%). Pt has a good appetite and is eating well per RN/MD notes. Yesterday afternoon/evening pt experienced N/V and had a poor appetite for dinner.    LABS  Labs reviewed    MEDICATIONS  Medications reviewed    ANTHROPOMETRICS  Ht Readings from Last 1  "Encounters:   08/11/20 1.727 m (5' 8\")   Most Recent Weight: 86.6 kg (191 lb)  IBW: 63.6 kg (136% IBW)  BMI: Overweight BMI 25-29.9  Weight History: weight has been stable over the last 3 months, overall losing 7 lbs (3.5%) over the last 5 months.  Wt Readings from Last 11 Encounters:   08/18/20 86.6 kg (191 lb)   08/11/20 86.7 kg (191 lb 2.2 oz)   07/22/20 86.5 kg (190 lb 11.2 oz)   06/29/20 78.9 kg (174 lb)   06/12/20 87.1 kg (192 lb)   06/05/20 87.6 kg (193 lb 2 oz)   05/20/20 86.2 kg (190 lb)   05/14/20 86.2 kg (190 lb)   04/25/20 98.7 kg (217 lb 9.5 oz)   04/12/20 98.1 kg (216 lb 4.3 oz)   03/16/20 89.8 kg (198 lb)     Dosing Weight: 69 kg - adjusted from most recent wt    ASSESSED NUTRITION NEEDS  Estimated Energy Needs: 0909-6060 kcals/day (25 - 30 kcals/kg)  Justification: Maintenance  Estimated Protein Needs: 69-83 grams protein/day (1 - 1.2 grams of pro/kg)  Justification: Maintenance   Estimated Fluid Needs: 1 mL/kcal  Justification: Per provider pending fluid status    PHYSICAL FINDINGS  See malnutrition section below.  - WOCN following for incontinence dermatitis of labia which is improving per note on 8/28    MALNUTRITION  % Intake: Decreased intake does not meet criteria  % Weight Loss: Weight loss does not meet criteria  Subcutaneous Fat Loss: Unable to assess  Muscle Loss: Unable to assess  Fluid Accumulation/Edema: None noted  Malnutrition Diagnosis: Unable to determine    NUTRITION DIAGNOSIS  Inadequate oral intake related to N/V as evidenced by poor intakes x2 meals         INTERVENTIONS  Implementation  Medical food supplement therapy - Magic Cup    Goals  Patient to consume % of nutritionally adequate meal trays TID, or the equivalent with supplements/snacks.     Monitoring/Evaluation  Progress toward goals will be monitored and evaluated per protocol.      Marva Self RD, UBALDO  TCU/8A Pager: [phone]  "

## 2020-08-28 NOTE — PLAN OF CARE
Pt very sleepy this evening, oriented to self, otherwise disoriented to situation, place or time. VSS, port heparin locked. LS clear. 1 loose stool this evening, incont. Of bowel and bladder. Repositions with A of 1-2 in bed. DD1 with honey thickened liquids. Unable to get patient to wake up enough to take evening meds, did not feel comfortable administering due to pt's high risk of aspiration. Pt is very [place], pocket talker at bedside. Pt does not use call light appropriately, continue to monitor pt closely.

## 2020-08-28 NOTE — PLAN OF CARE
Pt seen for swallow tx: test tray of DD2 items and also trials of nectar thick liquids. Nectar thick liquids by teaspoon and also by cup rim resulting in intermittent cough and throat clear - 3 out of 4 trials. With trials of current honey thick liquids- 1st sip - had delayed cough but all other trials by cup rim ( x8)- no aspiration s/s. With trials of DD2 items-- pt also with intermittent cough and pt also after a few bites indicating some sensation of pharyngeal retention. With trials of current DD1 diet with pt self- feeding- no aspiration s/s. Recommend continue with current DD1 with honey thick liquids. Pt  upright for all po, small bites/sips, alternate consistencies, slow rate- will continue to trial DD2 and nectar as appropriate for readiness for further diet advancement but currently safest to remain on current DD1 with honey thick liquids.

## 2020-08-28 NOTE — PROGRESS NOTES
WOC Consult    S: WOC Consult to evaluate and treat incontinence dermatitis of labia.    B: Per Dr Leon on 8/26/2020: Stefanie Velazquez is a 75 year old female with history of recurrent UTI, CKD, recurrent episodes of encephalopathy one point required intubation and behavioral changes thought to be lewy body dementia, CVA, coronary artery disease,SVT,  DVT, and history of rectal cancer, admitted for altered mental status and agitation from her assisted living on 8/20  due escalating behaviors, which included hitting and kicking staff.     She was admitted to [facility] 8/10 to 8/13 for similar concerns of worsening agitation and behaviors. Had CT head done without any acute pathology and  Hospice was consulted.She was started on Aricept and Klonopin as needed during her stay     A: Patient is very hard of hearing but cooperative with assessment and turning. Skin on labia, buttocks and perineum assessed. Mildly denuded skin in intergluteal cleft, red with satellite lesions consistent with fungal infection. Labia reddened but without open areas.   8/28: improved skin.  No evidence of satellite lesions.  Redness persists.  Will continue POC    P: Encourage RN to follow Incontinence Protocol with use of Gabi Antifungal Cream:    -Remove the bulk of urine and stool from the skin  using a warm moist soft disposable cloth (i.e.  Curity Wipes, Bairon Wings).    At first and after each episode of incontinence,  cleanse with Gabi Cleanse and Protect All-in-One  Protectant Lotion (this is an all in one cleanser,  moisturizer, and barrier ointment).    Apply THIN layer of Gabi Anti-fungal Barrier Cream to all skin that is in contact with stool  or urine.    With repeat cleansings, DO NOT scrub paste  down to skin, just gently remove surface  incontinence soil and reapply additional paste,  if needed.    If complete removal of paste is required, use a  warm wash cloth and Gabi cleanser: place the  warm wash cloth to  the area for a minute and  then cleanse, or use mineral oil/baby oil and soft  disposable washcloth.    No further follow up planned.  Please re-consult if needed    Kathrine Rai RN BSN CWOCN

## 2020-08-28 NOTE — PROGRESS NOTES
Social Work Services Progress Note    Hospital Day: 9  Collaborated with:  Pt's ninfa Christin Sandoval     Data:  SW called pt's ninfa Sandoval to collect additional preferences for LTC placement. Lori requesting a referral to [facility].     Referral initiated to [facility]. VM left for Ana.  Phone: [phone]  Fax: [phone]    1143: Per [facility] is reviewing for LTC placement. SW provided clarification on when the sitter was discontinued and C-Diff treatment. Ana to provide to facility and update writer.    Intervention:  Discharge planning    Assessment:  Pt medically clear for discharge, LTC placement is needed    Plan:    Anticipated Disposition:  Facility:  LTC     Barriers to d/c plan:  Bed availability     Follow Up:  ANTONINO to continue to follow    ADELE Brenner  Unit 5/Unit 8 Ortho/Med/Surg & [facility] Adult ED  Phone: [phone] Pager: [phone]

## 2020-08-29 LAB
ALBUMIN SERPL-MCNC: 2.4 G/DL (ref 3.4–5)
ALP SERPL-CCNC: 215 U/L (ref 40–150)
ALT SERPL W P-5'-P-CCNC: 53 U/L (ref 0–50)
ANION GAP SERPL CALCULATED.3IONS-SCNC: 11 MMOL/L (ref 3–14)
AST SERPL W P-5'-P-CCNC: 138 U/L (ref 0–45)
BASOPHILS # BLD AUTO: 0 10E9/L (ref 0–0.2)
BASOPHILS NFR BLD AUTO: 0.2 %
BILIRUB SERPL-MCNC: 1.4 MG/DL (ref 0.2–1.3)
BUN SERPL-MCNC: 22 MG/DL (ref 7–30)
CALCIUM SERPL-MCNC: 8.8 MG/DL (ref 8.5–10.1)
CHLORIDE SERPL-SCNC: 105 MMOL/L (ref 94–109)
CO2 SERPL-SCNC: 29 MMOL/L (ref 20–32)
CREAT SERPL-MCNC: 1.72 MG/DL (ref 0.52–1.04)
DIFFERENTIAL METHOD BLD: ABNORMAL
EOSINOPHIL # BLD AUTO: 0 10E9/L (ref 0–0.7)
EOSINOPHIL NFR BLD AUTO: 0 %
ERYTHROCYTE [DISTWIDTH] IN BLOOD BY AUTOMATED COUNT: 13.2 % (ref 10–15)
GFR SERPL CREATININE-BSD FRML MDRD: 28 ML/MIN/{1.73_M2}
GLUCOSE BLDC GLUCOMTR-MCNC: 174 MG/DL (ref 70–99)
GLUCOSE SERPL-MCNC: 150 MG/DL (ref 70–99)
HCT VFR BLD AUTO: 41.7 % (ref 35–47)
HGB BLD-MCNC: 13.4 G/DL (ref 11.7–15.7)
IMM GRANULOCYTES # BLD: 0 10E9/L (ref 0–0.4)
IMM GRANULOCYTES NFR BLD: 0.2 %
LYMPHOCYTES # BLD AUTO: 0.7 10E9/L (ref 0.8–5.3)
LYMPHOCYTES NFR BLD AUTO: 10 %
MAGNESIUM SERPL-MCNC: 1.6 MG/DL (ref 1.6–2.3)
MCH RBC QN AUTO: 29.7 PG (ref 26.5–33)
MCHC RBC AUTO-ENTMCNC: 32.1 G/DL (ref 31.5–36.5)
MCV RBC AUTO: 93 FL (ref 78–100)
MONOCYTES # BLD AUTO: 0.4 10E9/L (ref 0–1.3)
MONOCYTES NFR BLD AUTO: 6.5 %
NEUTROPHILS # BLD AUTO: 5.5 10E9/L (ref 1.6–8.3)
NEUTROPHILS NFR BLD AUTO: 83.1 %
NRBC # BLD AUTO: 0 10*3/UL
NRBC BLD AUTO-RTO: 0 /100
PLATELET # BLD AUTO: 193 10E9/L (ref 150–450)
POTASSIUM SERPL-SCNC: 4.6 MMOL/L (ref 3.4–5.3)
PROT SERPL-MCNC: 6.7 G/DL (ref 6.8–8.8)
RBC # BLD AUTO: 4.51 10E12/L (ref 3.8–5.2)
SODIUM SERPL-SCNC: 145 MMOL/L (ref 133–144)
WBC # BLD AUTO: 6.6 10E9/L (ref 4–11)

## 2020-08-29 PROCEDURE — 80053 COMPREHEN METABOLIC PANEL: CPT | Performed by: INTERNAL MEDICINE

## 2020-08-29 PROCEDURE — 99233 SBSQ HOSP IP/OBS HIGH 50: CPT | Mod: GW | Performed by: INTERNAL MEDICINE

## 2020-08-29 PROCEDURE — 36415 COLL VENOUS BLD VENIPUNCTURE: CPT | Performed by: INTERNAL MEDICINE

## 2020-08-29 PROCEDURE — 25800025 ZZH RX 258: Performed by: INTERNAL MEDICINE

## 2020-08-29 PROCEDURE — 85025 COMPLETE CBC W/AUTO DIFF WBC: CPT | Performed by: INTERNAL MEDICINE

## 2020-08-29 PROCEDURE — 25000132 ZZH RX MED GY IP 250 OP 250 PS 637: Performed by: INTERNAL MEDICINE

## 2020-08-29 PROCEDURE — 00000146 ZZHCL STATISTIC GLUCOSE BY METER IP

## 2020-08-29 PROCEDURE — 25000132 ZZH RX MED GY IP 250 OP 250 PS 637: Performed by: NURSE PRACTITIONER

## 2020-08-29 PROCEDURE — 25000128 H RX IP 250 OP 636: Performed by: PEDIATRICS

## 2020-08-29 PROCEDURE — 25000128 H RX IP 250 OP 636: Performed by: INTERNAL MEDICINE

## 2020-08-29 PROCEDURE — 12000001 ZZH R&B MED SURG/OB UMMC

## 2020-08-29 PROCEDURE — 83735 ASSAY OF MAGNESIUM: CPT | Performed by: INTERNAL MEDICINE

## 2020-08-29 RX ORDER — ONDANSETRON 2 MG/ML
4 INJECTION INTRAMUSCULAR; INTRAVENOUS EVERY 6 HOURS PRN
Status: DISCONTINUED | OUTPATIENT
Start: 2020-08-29 | End: 2020-08-31 | Stop reason: HOSPADM

## 2020-08-29 RX ORDER — LOPERAMIDE HCL 2 MG
2 CAPSULE ORAL 4 TIMES DAILY PRN
Status: DISCONTINUED | OUTPATIENT
Start: 2020-08-29 | End: 2020-08-31 | Stop reason: HOSPADM

## 2020-08-29 RX ORDER — DEXTROSE MONOHYDRATE, SODIUM CHLORIDE, AND POTASSIUM CHLORIDE 50; 1.49; 9 G/1000ML; G/1000ML; G/1000ML
INJECTION, SOLUTION INTRAVENOUS CONTINUOUS
Status: DISCONTINUED | OUTPATIENT
Start: 2020-08-29 | End: 2020-08-31

## 2020-08-29 RX ORDER — PROCHLORPERAZINE MALEATE 5 MG
5 TABLET ORAL EVERY 6 HOURS PRN
Status: DISCONTINUED | OUTPATIENT
Start: 2020-08-29 | End: 2020-08-31 | Stop reason: HOSPADM

## 2020-08-29 RX ORDER — ASPIRIN 81 MG/1
81 TABLET, CHEWABLE ORAL DAILY
Status: DISCONTINUED | OUTPATIENT
Start: 2020-08-29 | End: 2020-08-31 | Stop reason: HOSPADM

## 2020-08-29 RX ADMIN — Medication 1 CAPSULE: at 20:03

## 2020-08-29 RX ADMIN — ASPIRIN 81 MG CHEWABLE TABLET 81 MG: 81 TABLET CHEWABLE at 13:44

## 2020-08-29 RX ADMIN — MICONAZOLE NITRATE: 20 CREAM TOPICAL at 13:04

## 2020-08-29 RX ADMIN — VANCOMYCIN HYDROCHLORIDE 125 MG: KIT at 20:00

## 2020-08-29 RX ADMIN — LEVOTHYROXINE SODIUM 88 MCG: 88 TABLET ORAL at 12:49

## 2020-08-29 RX ADMIN — Medication 1 CAPSULE: at 12:51

## 2020-08-29 RX ADMIN — VANCOMYCIN HYDROCHLORIDE 125 MG: KIT at 12:58

## 2020-08-29 RX ADMIN — MICONAZOLE NITRATE: 20 CREAM TOPICAL at 20:04

## 2020-08-29 RX ADMIN — POTASSIUM CHLORIDE, DEXTROSE MONOHYDRATE AND SODIUM CHLORIDE: 150; 5; 900 INJECTION, SOLUTION INTRAVENOUS at 23:56

## 2020-08-29 RX ADMIN — VANCOMYCIN HYDROCHLORIDE 125 MG: KIT at 16:42

## 2020-08-29 RX ADMIN — HEPARIN, PORCINE (PF) 10 UNIT/ML INTRAVENOUS SYRINGE 5 ML: at 04:30

## 2020-08-29 RX ADMIN — ASENAPINE MALEATE 5 MG: 5 TABLET SUBLINGUAL at 13:45

## 2020-08-29 RX ADMIN — APIXABAN 5 MG: 2.5 TABLET, FILM COATED ORAL at 20:03

## 2020-08-29 RX ADMIN — POTASSIUM CHLORIDE, DEXTROSE MONOHYDRATE AND SODIUM CHLORIDE: 150; 5; 900 INJECTION, SOLUTION INTRAVENOUS at 11:13

## 2020-08-29 RX ADMIN — APIXABAN 5 MG: 2.5 TABLET, FILM COATED ORAL at 12:52

## 2020-08-29 RX ADMIN — VALPROIC ACID 250 MG: 250 SOLUTION ORAL at 12:58

## 2020-08-29 RX ADMIN — ASENAPINE MALEATE 5 MG: 5 TABLET SUBLINGUAL at 20:10

## 2020-08-29 RX ADMIN — ONDANSETRON 4 MG: 2 INJECTION INTRAMUSCULAR; INTRAVENOUS at 04:29

## 2020-08-29 NOTE — PROGRESS NOTES
"[facility] Progress Note - Hospitalist Service       Date of Admission:  8/20/2020  Assessment & Plan     Stefanie Velazquez is a 75 year old female with history of recurrent UTI, CKD, recurrent episodes of encephalopathy one point required intubation and behavioral changes thought to be lewy body dementia, CVA, coronary artery disease,SVT,  DVT, and history of rectal cancer, admitted for altered mental status and agitation from her assisted living on 8/20  due escalating behaviors, which included hitting and kicking staff.     She was admitted to [facility] 8/10 to 8/13 for similar concerns of worsening agitation and behaviors. Had CT head done without any acute pathology and  Hospice was consulted.She was started on Aricept and Klonopin as needed during her stay        Acute Encephalopathy likely metabolic underlying cognitive decline due to lewy body dementia    MRI done 4/8;  Findings suspicious for small acute infarct in the subcortical white matter of the fusiform gyrus of the right temporal lobe.and chronic small vessel disease  Appreciate  psych input on 8/22, 8/24 and 8/25  Currently on asenpine S/L 5 mg hs increased to 5 mg BID  Depacon 250 mg IV ( changed to solution form on 8/28)  Klonopin 0.125 ODT TID prn  ( max uses 1-2 times/ day)   Zyprexa ODT 5 mg TID prn  ( Has not used this in the last 5 days)  Due to aspiration risk , wafers and s/l medications started   Patient is doing much better on the above regimen and really has not had significant episodes of confusion or agitation  Patient has had sitter off for >72 hrs       High Risk of Aspiration   Dysphagia   Speech evaluated patient 8/21 and 8/22 and 8/24  Now on honey thickened liquids with 1:1 supervision .  Dr Berry  Discussed with [facility] 8/21 who endorses patient has had \" coughing episodes \" with food for some time, likely months   Per speech and swallow evaluation on 8/28:    [place] " thick liquids by teaspoon and also by cup rim resulting in intermittent cough and throat clear - 3 out of 4 trials. With trials of current honey thick liquids- 1st sip - had delayed cough but all other trials by cup rim ( x8)- no aspiration s/s. With trials of DD2 items-- pt also with intermittent cough and pt also after a few bites indicating some sensation of pharyngeal retention. With trials of current DD1 diet with pt self- feeding- no aspiration s/s. Recommend continue with current DD1 with honey thick liquids. Pt  upright for all po, small bites/sips, alternate consistencies, slow rate- will continue to trial DD2 and nectar as appropriate for readiness for further diet advancement but currently safest to remain on current DD1 with honey thick liquids.         Discussed with pharmacy to re-look into medications so that she is not aspirating on the syrups forms        Enterococcus  UTI  Proteinuria  S/P treatment with bactrim.   Stopped ceftriaxone 8/21  Started ampicillin 8/21 as growing enterococcus, discussed with pharm. Stop date on 8/26     Leucopenia   Patient has had intermittent chronic low WBC count from as early as 2012/2013.  [place] in the setting pof combination of  Valproic acid,  Infection and antibiotics  Repeat CBC with stable WBC count at 2.9 on 8/26  Antibiotics stopped on 8/26       C Diff Diarrhea:   Noted on 8/25. Initiated on Vancomycin 125 mg QID. Last date will be 9/10  IV ampicillin has been stopped ( Completed treatment for UTI)  Initiate culturelle BID   Intermittent diarrhea and abdominal pain overnight, which has resolved this  Morning  Continue Vancomycin mixed with apple sauce         TITA on CKD;  baseline creatinine is 1.4 to 1.8   Creatinine increased to 1.72 on 8/29 with sodium at 145. Likely secondary to poor nutrition ( with dysphagia diet), vomiting and C diff diarrhea  Plan to infuse 1 lts of IV fluids and recheck labs tomorrow      Coronary Artery Disease  History of 3  vessel CABG in 2002.   Hold  home simvastatin, asa due to aspiration risk   Unable to tolerate ACEi or beta blocker due to hypotension.  Given higher BP inpatient, we will start with l,ow dose amlodipine on 8/28     Hyperlipidemia  Home simvastatin  ( Resume  )      History of DVT  On lovenox ( had been on DOAC, changed due to swallow issues )   Restarted on apixaban on 8/28     Diabetes Mellitus, Type II  Chronic. Last a1C 6.1 on 6/29/2020. Not currently on medications    Has a subcutaneous sensor in place on the left arm     Lumbar Radiculopathy   Neuropathy  Previously diagnosed.   Was on gabapentin, which has not been resumed      Hypothyroidism  TSH 1.52 on 8/20   home levothyroxine 88mcg daily, 44mcg on Sunday..       Gastroesophageal reflux disease  - Holding omeprazole. Patient has not really complained of GERD symptoms. Hence we will not be resuming this in an effort to reduce polypharmacy     Restless Leg Syndrome   resume Pramipexole ( This is used on a PRN basis),. If she does not require, then we wont be inclined to resume this at discharge       RC  Previously diagnosed. On CPAP at assisted living facility, follows with sleep medicine.   CPAP if she allows     Elevated LFT's  T bili at 1.4, ALKP at 215, AST at 138 and ALt at 53  Has not had elevated LFT's before. Will monitor trend for now as this is mildly elevated.     Rectal Cancer  Follows with Dr. Martinez, diagnosed in 2011, underwent neoadjuvant chemoradiation, resection with reanastomosis, adjuvant chemotherapy. Follows yearly with oncology, last seen 7/2020, no evidence of recurrence or plans for further follow up based on stability.  - Continue outpatient surveillance                     Diet: Dysphagia Diet Level 1 Pureed Honey Thickened Liquids (pre-thickened or use instant food thickener)  Snacks/Supplements Adult: Magic Cup; With Meals    DVT Prophylaxis: lovenox 60 mg BID   Lr Catheter: not present  Code Status: DNi/DNR ( discussed  with ninfa dove )          Disposition Plan   Expected discharge: 2 - 3 days, recommended to LTC once safe disposition plan/ TCU bed available.  Entered: Edward Coronado MD 08/29/2020, 9:48 AM       The patient's care was discussed with the Bedside Nurse, Care Coordinator/, Patient, Patient's Family and psych  Consultant.    Edward Coronado MD  Hospitalist Service  [facility], [place]    ______________________________________________________________________    Interval History   Events from last night noted. Nausea, vomiting, abdominal pain and diarrhea   fortunately, this as resolved as of this morning  Patient was very distressed due to these episodes  No fever or chills      Data reviewed today: I reviewed all medications, new labs and imaging results over the last 24 hours.   Physical Exam   Vital Signs: Temp: 98.1  F (36.7  C) Temp src: Oral BP: 102/42 Pulse: 70   Resp: 18 SpO2: 95 % O2 Device: None (Room air)    Weight: 0 lbs 0 oz   keeps tongue protrude at times  Very hard of hearing . Use pocket speaker   Hematologic / Lymphatic: no cervical lymphadenopathy  Respiratory: No increased work of breathing, good air exchange, clear to auscultation bilaterally, no crackles or wheezing  Cardiovascular: Normal apical impulse, regular rate and rhythm, normal S1 and S2, no S3 or S4, and no murmur noted  GI: No scars, normal bowel sounds, soft, non-distended, non-tender, no masses palpated, no hepatosplenomegally    Data   Recent Labs   Lab 08/29/20  0801 08/28/20  0615 08/25/20  0635 08/24/20  0749 08/23/20  0628   WBC 6.6  --  2.9* 2.6* 2.5*   HGB 13.4  --  12.5 11.6* 11.0*   MCV 93  --  89 89 90    164 188 168 179   *  --  144  --  142   POTASSIUM 4.6  --  3.5  --  3.4   CHLORIDE 105  --  111*  --  111*   CO2 29  --  26  --  25   BUN 22  --  9  --  17   CR 1.72*  --  1.11*  --  1.33*   ANIONGAP 11  --  7  --  6   DWIGHT 8.8  --  8.5  --   8.2*   *  --  81  --  75   ALBUMIN 2.4*  --   --   --   --    PROTTOTAL 6.7*  --   --   --   --    BILITOTAL 1.4*  --   --   --   --    ALKPHOS 215*  --   --   --   --    ALT 53*  --   --   --   --    *  --   --   --   --

## 2020-08-29 NOTE — PROGRESS NOTES
Social Work Services Progress Note    Hospital Day: 10  Date of Initial Social Work Evaluation:  Not completed  Consulted with:  Facility (VM left), admissions (VM left), and Ninfa Sandoval by phone    Data:  Per charting patient is a 75 year old female who requires LTC placement.     Intervention:  Instructed by weekday SWer to call facility and admissions at [facility] to see if they had decided about their ability to accept patient for admission.     0856 - Call to facility [phone] and left VM on admissions line    0858 - Call to Admissions liaison Ana [phone] and left VM on her phone    0900 - Call to ninfa Sandoval [phone] and updated her to above. She asked for the phone number for [facility] (provided) in order to call and see what of patient's personal belongings could accompany her from [facility] to LTC.     Assessment:  Patient is medically ready for discharge and waiting on placement.     Plan:    Anticipated Disposition:  Facility:  TBD    Barriers to d/c plan:  Accepting facility    Follow Up:  Will await return calls from [facility] with their decision on accepting patient for admission.     Christa OAKLEY LICSW  8/29/2020    Searchable on [employer] - search SOCIAL WORK - for text paging options    Saturday [employer] PAGER 0800  1600   - [phone]    Sunday [employer] PAGER 0800  1600   -   [phone]    FOR HOURS 1600 - midnight PAGER [phone]

## 2020-08-29 NOTE — PLAN OF CARE
Nursing     S- PT very sleepy, tired, sleeping most of am, briefly aroused,  too tired to take po food or meds.   1300- aroused pt enough to gradually start po sips of haoney thick fluid.  Pt did cough quite a few times initially,  needed freq remonider to open eyes, remind to swallow again.    Gradually more awake, then able to take cooked cereal, magic cup, thickened coffee and water.   Less cough.  Gradually pt able to take most of am meds,  held norvasc w low BP,  and zocor since pt had so much emeissi overninte.  Minimized meds a s able.  Crushed meds.    Pt fell soundly back to sleep.   IV fluids started per order.   Voided in attends x1- small- mod amt .  Tiffany redness- cream applied.   No emesis, no diarrhea this shift  Pt was appropriate when awake, not fully oriented, pt concenred with how miserable she was overnite.  Wanting to talk w her own MD to review her care.   Pt very [place]- using pocket talker   A- exhausted from so much emeisis and diarrhea overnite.   R- allow rest,  enc po fluids as able   try chair if awake enough.  Reoient prn. Skin care  turn q2hr

## 2020-08-29 NOTE — PLAN OF CARE
"Patient sleepy, lethergic, forgetful, disoriented to times and situation and [place]; VSS; denies pain, but later c/o heartburn at HS and had medium light brown, mucous emesis; during that time, patient crying and states \"I just want to go home\". Comfort, support and reassurance provided; pocket talker utilized; repositioned every two hours; bed bath provided, sigifredo care done and linen changed; barrier cream applied to perineum and coccyx; warm blanket provided for comfort. Poor appetite; tolerated med, crushed in pudding; incontinent of urine and stool multiple times; perineum and coccyx reddened. Patient asleep again and denies pain. Continue with patient care and assess pain.  "

## 2020-08-29 NOTE — PLAN OF CARE
Problem: Adult Inpatient Plan of Care  Goal: Plan of Care Review  8/29/2020 0435 by Shannon Rubi RN  Outcome: No Change   Note for the night shift.  D: Pt sleeping off and on tonight. Throwing up from time to time tonight. Diaper changed x2 tonight for incontinent urine and liquidy yellow, smelly stool. Had several emeisis tonight amd then the house Dr was notified and got order for Zofran. This AM said she wants the Dr. To order her Compazine, cause it works for her. At times she is so sleepy/lethargic and then the next minute awake and talking. Her speech is slightly garbled.  Vomiting in 200-300 ml amts.   At 0430, throwing up again. Zofran being given IV. Very hard of hearing. Has been throwing up off and on all night. Port has a good blood return. Pt turned about q2h. Does not like to turn because of the nausea and because it hurts. Speech is a little hard to under stand. Questions and responses when she can hear make sense tonight. Tiffany area reddened. Call light with in reach. But sometimes has trouble using it.  Able to make most needs known.  A; Has not been able to rest tonight due to nausea and emesis. P: Monitor closely.  Supportive cares. Medicate for pain/nausea as needed.

## 2020-08-30 LAB
ALBUMIN SERPL-MCNC: 2.2 G/DL (ref 3.4–5)
ALP SERPL-CCNC: 228 U/L (ref 40–150)
ALT SERPL W P-5'-P-CCNC: 68 U/L (ref 0–50)
ANION GAP SERPL CALCULATED.3IONS-SCNC: 8 MMOL/L (ref 3–14)
AST SERPL W P-5'-P-CCNC: 132 U/L (ref 0–45)
BILIRUB SERPL-MCNC: 1.4 MG/DL (ref 0.2–1.3)
BUN SERPL-MCNC: 24 MG/DL (ref 7–30)
CALCIUM SERPL-MCNC: 8 MG/DL (ref 8.5–10.1)
CHLORIDE SERPL-SCNC: 108 MMOL/L (ref 94–109)
CO2 SERPL-SCNC: 26 MMOL/L (ref 20–32)
CREAT SERPL-MCNC: 1.88 MG/DL (ref 0.52–1.04)
GFR SERPL CREATININE-BSD FRML MDRD: 25 ML/MIN/{1.73_M2}
GLUCOSE SERPL-MCNC: 148 MG/DL (ref 70–99)
POTASSIUM SERPL-SCNC: 4.5 MMOL/L (ref 3.4–5.3)
PROT SERPL-MCNC: 6.3 G/DL (ref 6.8–8.8)
SODIUM SERPL-SCNC: 142 MMOL/L (ref 133–144)

## 2020-08-30 PROCEDURE — 12000001 ZZH R&B MED SURG/OB UMMC

## 2020-08-30 PROCEDURE — 25000132 ZZH RX MED GY IP 250 OP 250 PS 637: Performed by: INTERNAL MEDICINE

## 2020-08-30 PROCEDURE — 99233 SBSQ HOSP IP/OBS HIGH 50: CPT | Mod: GW | Performed by: INTERNAL MEDICINE

## 2020-08-30 PROCEDURE — 36415 COLL VENOUS BLD VENIPUNCTURE: CPT | Performed by: INTERNAL MEDICINE

## 2020-08-30 PROCEDURE — 25000132 ZZH RX MED GY IP 250 OP 250 PS 637: Performed by: NURSE PRACTITIONER

## 2020-08-30 PROCEDURE — 25800025 ZZH RX 258: Performed by: INTERNAL MEDICINE

## 2020-08-30 PROCEDURE — 80053 COMPREHEN METABOLIC PANEL: CPT | Performed by: INTERNAL MEDICINE

## 2020-08-30 PROCEDURE — 25800025 ZZH RX 258

## 2020-08-30 RX ORDER — DEXTROSE MONOHYDRATE, SODIUM CHLORIDE, AND POTASSIUM CHLORIDE 50; 1.49; 9 G/1000ML; G/1000ML; G/1000ML
INJECTION, SOLUTION INTRAVENOUS
Status: COMPLETED
Start: 2020-08-30 | End: 2020-08-30

## 2020-08-30 RX ADMIN — MICONAZOLE NITRATE: 20 CREAM TOPICAL at 20:12

## 2020-08-30 RX ADMIN — VANCOMYCIN HYDROCHLORIDE 125 MG: KIT at 13:44

## 2020-08-30 RX ADMIN — Medication 1 CAPSULE: at 20:12

## 2020-08-30 RX ADMIN — POTASSIUM CHLORIDE, DEXTROSE MONOHYDRATE AND SODIUM CHLORIDE 1000 ML: 150; 5; 900 INJECTION, SOLUTION INTRAVENOUS at 22:24

## 2020-08-30 RX ADMIN — POTASSIUM CHLORIDE, DEXTROSE MONOHYDRATE AND SODIUM CHLORIDE: 150; 5; 900 INJECTION, SOLUTION INTRAVENOUS at 12:15

## 2020-08-30 RX ADMIN — APIXABAN 5 MG: 2.5 TABLET, FILM COATED ORAL at 20:12

## 2020-08-30 RX ADMIN — VANCOMYCIN HYDROCHLORIDE 125 MG: KIT at 20:12

## 2020-08-30 RX ADMIN — Medication 44 MCG: at 13:43

## 2020-08-30 RX ADMIN — VANCOMYCIN HYDROCHLORIDE 125 MG: KIT at 17:18

## 2020-08-30 RX ADMIN — ASPIRIN 81 MG CHEWABLE TABLET 81 MG: 81 TABLET CHEWABLE at 13:44

## 2020-08-30 RX ADMIN — Medication 1 CAPSULE: at 13:44

## 2020-08-30 RX ADMIN — ASENAPINE MALEATE 5 MG: 5 TABLET SUBLINGUAL at 21:24

## 2020-08-30 RX ADMIN — APIXABAN 5 MG: 2.5 TABLET, FILM COATED ORAL at 13:43

## 2020-08-30 RX ADMIN — MICONAZOLE NITRATE: 20 CREAM TOPICAL at 08:15

## 2020-08-30 RX ADMIN — SIMVASTATIN 40 MG: 20 TABLET, FILM COATED ORAL at 13:44

## 2020-08-30 NOTE — PROGRESS NOTES
"[facility], [facility] Progress Note - Hospitalist Service       Date of Admission:  8/20/2020  Assessment & Plan     Stefanie Velazquez is a 75 year old female with history of recurrent UTI, CKD, recurrent episodes of encephalopathy one point required intubation and behavioral changes thought to be lewy body dementia, CVA, coronary artery disease,SVT,  DVT, and history of rectal cancer, admitted for altered mental status and agitation from her assisted living on 8/20  due escalating behaviors, which included hitting and kicking staff.     She was admitted to [facility] 8/10 to 8/13 for similar concerns of worsening agitation and behaviors. Had CT head done without any acute pathology and  Hospice was consulted.She was started on Aricept and Klonopin as needed during her stay        Acute Encephalopathy likely metabolic underlying cognitive decline due to lewy body dementia    MRI done 4/8;  Findings suspicious for small acute infarct in the subcortical white matter of the fusiform gyrus of the right temporal lobe.and chronic small vessel disease  Appreciate  psych input on 8/22, 8/24 and 8/25  Currently on asenpine S/L 5 mg hs increased to 5 mg BID  Depacon 250 mg IV ( changed to solution form on 8/28)  Klonopin 0.125 ODT TID prn  ( max uses 1-2 times/ day)   Zyprexa ODT 5 mg TID prn  ( Has not used this in the last 7 days and hence will stop))  Due to aspiration risk , wafers and s/l medications started   Patient is doing much better on the above regimen and really has not had significant episodes of confusion or agitation  Patient has had sitter off for 4 days       High Risk of Aspiration   Dysphagia   Speech evaluated patient 8/21 and 8/22 and 8/24  Now on honey thickened liquids with 1:1 supervision .  Dr Berry  Discussed with Yfn Meadows 8/21 who endorses patient has had \" coughing episodes \" with food for some time, likely months   Per speech and swallow " evaluation on 8/28:    Nectar thick liquids by teaspoon and also by cup rim resulting in intermittent cough and throat clear - 3 out of 4 trials. With trials of current honey thick liquids- 1st sip - had delayed cough but all other trials by cup rim ( x8)- no aspiration s/s. With trials of DD2 items-- pt also with intermittent cough and pt also after a few bites indicating some sensation of pharyngeal retention. With trials of current DD1 diet with pt self- feeding- no aspiration s/s. Recommend continue with current DD1 with honey thick liquids. Pt  upright for all po, small bites/sips, alternate consistencies, slow rate- will continue to trial DD2 and nectar as appropriate for readiness for further diet advancement but currently safest to remain on current DD1 with honey thick liquids.         Discussed with pharmacy to re-look into medications so that she is not aspirating on the syrups forms        Enterococcus  UTI  Proteinuria  S/P treatment with bactrim.   Stopped ceftriaxone 8/21  Started ampicillin 8/21 as growing enterococcus, discussed with pharm. Patient completed course on 8/26    Leucopenia   Patient has had intermittent chronic low WBC count from as early as 2012/2013.  Krunal in the setting pof combination of  Valproic acid,  Infection and antibiotics  Repeat CBC with stable WBC count at 2.9 on 8/26  Antibiotics stopped on 8/26       C Diff Diarrhea:   Noted on 8/25. Initiated on Vancomycin 125 mg QID. Last date will be 9/10  IV ampicillin has been stopped ( Completed treatment for UTI)  Initiate culturelle BID   Intermittent diarrhea and abdominal pain overnight, which has resolved this  Morning  Continue Vancomycin mixed with apple sauce         TITA on CKD;  baseline creatinine is 1.4 to 1.8   Creatinine increased to 1.72 on 8/29 with sodium at 145.   Likely secondary to poor nutrition ( with dysphagia diet), vomiting and C diff diarrhea  1 lts of IV fluids given over the last 18 hrs, repeated for a  further 18 hrs as Creatinine up to 1.88 on 8/30  Per RN, urine very concentarated  Recheck labs on 8/32     Coronary Artery Disease  History of 3 vessel CABG in 2002.   Hold  home simvastatin, asa due to aspiration risk   Unable to tolerate ACEi or beta blocker due to hypotension.       Hyperlipidemia  Home simvastatin  ( Resume  )      History of DVT  On lovenox ( had been on DOAC, changed due to swallow issues )   Restarted on apixaban on 8/28     Diabetes Mellitus, Type II  Chronic. Last a1C 6.1 on 6/29/2020. Not currently on medications    Has a subcutaneous sensor in place on the left arm     Lumbar Radiculopathy   Neuropathy  Previously diagnosed.   Was on gabapentin, which has not been resumed      Hypothyroidism  TSH 1.52 on 8/20   home levothyroxine 88mcg daily, 44mcg on Sunday..       Gastroesophageal reflux disease  - Holding omeprazole. Patient has not really complained of GERD symptoms. Hence we will not be resuming this in an effort to reduce polypharmacy     Restless Leg Syndrome   resume Pramipexole ( This is used on a PRN basis),. If she does not require, then we wont be inclined to resume this at discharge       RC  Previously diagnosed. On CPAP at assisted living facility, follows with sleep medicine.   CPAP if she allows     Elevated LFT's  T bili at 1.4, ALKP at 215, AST at 138 and ALt at 53  Has not had elevated LFT's before. Will monitor trend for now as this is mildly elevated.   Labs on 8/30 with stable LFT's   Rectal Cancer  Follows with Dr. Martinez, diagnosed in 2011, underwent neoadjuvant chemoradiation, resection with reanastomosis, adjuvant chemotherapy. Follows yearly with oncology, last seen 7/2020, no evidence of recurrence or plans for further follow up based on stability.  - Continue outpatient surveillance                     Diet: Dysphagia Diet Level 1 Pureed Honey Thickened Liquids (pre-thickened or use instant food thickener)  Snacks/Supplements Adult: Magic Cup; With Meals     DVT Prophylaxis: lovenox 60 mg BID   Lr Catheter: not present  Code Status: DNi/DNR ( discussed with ninfa meadows )          Disposition Plan   Expected discharge: Medically stable to discharge when TCU bed available   Entered: Edward Coronado MD 08/30/2020, 9:08 AM      Discussed with ninfa Meadows today (8/30)        Edward Coronado MD  Hospitalist Service  [facility], [place]    ______________________________________________________________________    Interval History    Patient feels much better today  No new complaints  Nausea, vomiting and abdominal pain has resolved  Eating and drinking well. Asking for thin liquids       Data reviewed today: I reviewed all medications, new labs and imaging results over the last 24 hours.   Physical Exam   Vital Signs: Temp: 97.8  F (36.6  C) Temp src: Axillary BP: 134/63 Pulse: 62   Resp: 18 SpO2: 93 % O2 Device: None (Room air)    Weight: 0 lbs 0 oz   keeps tongue protrude at times  Very hard of hearing . Use pocket speaker   Hematologic / Lymphatic: no cervical lymphadenopathy  Respiratory: No increased work of breathing, good air exchange, clear to auscultation bilaterally, no crackles or wheezing  Cardiovascular: Normal apical impulse, regular rate and rhythm, normal S1 and S2, no S3 or S4, and no murmur noted  GI: No scars, normal bowel sounds, soft, non-distended, non-tender, no masses palpated, no hepatosplenomegally    Data   Recent Labs   Lab 08/29/20  0801 08/28/20  0615 08/25/20  0635 08/24/20  0749   WBC 6.6  --  2.9* 2.6*   HGB 13.4  --  12.5 11.6*   MCV 93  --  89 89    164 188 168   *  --  144  --    POTASSIUM 4.6  --  3.5  --    CHLORIDE 105  --  111*  --    CO2 29  --  26  --    BUN 22  --  9  --    CR 1.72*  --  1.11*  --    ANIONGAP 11  --  7  --    DWIGHT 8.8  --  8.5  --    *  --  81  --    ALBUMIN 2.4*  --   --   --    PROTTOTAL 6.7*  --   --   --    BILITOTAL 1.4*  --   --   --     ALKPHOS 215*  --   --   --    ALT 53*  --   --   --    *  --   --   --

## 2020-08-30 NOTE — PLAN OF CARE
Problem: Adult Inpatient Plan of Care  Goal: Plan of Care Review  8/30/2020 0339 by Shannon Rubi RN  Outcome: No Change   Note for the night shift.  D: Pt awake a lot of the first half of the night. So mad and upset she can't have ice water. Does NOT want the thickened water. Putting her light on a lot to ask for ice water. Did ok with a popsicle.  But still just wanting to drink water. Says she is so thirsty. Oriented to self and place tonight.  After several small spoonfuls of ice water, pt fell asleep. IV patent. Incontinent of stool x2 tonight. Stool more of a soft mushy yellow stool. Good amts. Incontinent of urine. Diaper changed at 0630 and pt very very sleepy. At 0715 pt wide awake and sitting at the edge of the bed to get up. Day help came to help her. Pt knows her name and that she is in our hospital.   Able to make needs known.  Call light with in reach. A: Pt is about the same tonight. P: Monitor closely.  Supportive cares. Encourage rest tonight. Up in chair today.

## 2020-08-30 NOTE — PLAN OF CARE
Nursing   mentation/  uo/   S- Pt attempting to get out of bed to go to bathroom at 0715.  Assisted to commode w assit of 2.    Voided 150cc dk jone urine + mod soft tan stool w small streak red noted.    Assisted to chair,  had 75cc emesis,  pt not sure why nauseated,  improved  pt able to eat her brfst.  Ate most by herself  minimal cough    pt exhausted, assisted to bed,  fell sound asleep.    Pt slept deeply- tried to arouse at 1330, multilpe attempts   able to be awake to try few sips water.  Pt spit out any crushed med in pudding.  1445- tried po again   able to give most of them, not all, refused some.   Spoon fed thickend water.  Still keeping eyes closed most of time.  Very sleepy.  Small amt urine on attends mid am w incont stool,  scant amt at 1445.    IV rate increased to 100cc/hr.  Plan to check bladder scan.  Call if low uo  A- stood in am but then extremely sleepy since. Stools soft, not diarrhea  R- try po fluids and meds and nutriton when pt more awake again.  Monitor uo  reoirent prn.    Turn q2hr.

## 2020-08-31 ENCOUNTER — APPOINTMENT (OUTPATIENT)
Dept: SPEECH THERAPY | Facility: CLINIC | Age: 75
DRG: 056 | End: 2020-08-31
Attending: INTERNAL MEDICINE
Payer: COMMERCIAL

## 2020-08-31 ENCOUNTER — PATIENT OUTREACH (OUTPATIENT)
Dept: CARE COORDINATION | Facility: CLINIC | Age: 75
End: 2020-08-31

## 2020-08-31 VITALS
OXYGEN SATURATION: 97 % | RESPIRATION RATE: 18 BRPM | TEMPERATURE: 98 F | DIASTOLIC BLOOD PRESSURE: 66 MMHG | HEART RATE: 63 BPM | SYSTOLIC BLOOD PRESSURE: 137 MMHG

## 2020-08-31 LAB
ALBUMIN SERPL-MCNC: 1.7 G/DL (ref 3.4–5)
ALP SERPL-CCNC: 193 U/L (ref 40–150)
ALT SERPL W P-5'-P-CCNC: 43 U/L (ref 0–50)
ANION GAP SERPL CALCULATED.3IONS-SCNC: 5 MMOL/L (ref 3–14)
AST SERPL W P-5'-P-CCNC: 57 U/L (ref 0–45)
BILIRUB SERPL-MCNC: 0.5 MG/DL (ref 0.2–1.3)
BUN SERPL-MCNC: 23 MG/DL (ref 7–30)
CALCIUM SERPL-MCNC: 7.2 MG/DL (ref 8.5–10.1)
CHLORIDE SERPL-SCNC: 115 MMOL/L (ref 94–109)
CO2 SERPL-SCNC: 26 MMOL/L (ref 20–32)
CREAT SERPL-MCNC: 1.48 MG/DL (ref 0.52–1.04)
GFR SERPL CREATININE-BSD FRML MDRD: 34 ML/MIN/{1.73_M2}
GLUCOSE SERPL-MCNC: 155 MG/DL (ref 70–99)
POTASSIUM SERPL-SCNC: 5 MMOL/L (ref 3.4–5.3)
PROT SERPL-MCNC: 5 G/DL (ref 6.8–8.8)
SODIUM SERPL-SCNC: 146 MMOL/L (ref 133–144)

## 2020-08-31 PROCEDURE — 25000132 ZZH RX MED GY IP 250 OP 250 PS 637: Performed by: INTERNAL MEDICINE

## 2020-08-31 PROCEDURE — 92526 ORAL FUNCTION THERAPY: CPT | Mod: GN

## 2020-08-31 PROCEDURE — 25000132 ZZH RX MED GY IP 250 OP 250 PS 637: Performed by: NURSE PRACTITIONER

## 2020-08-31 PROCEDURE — 36592 COLLECT BLOOD FROM PICC: CPT | Performed by: INTERNAL MEDICINE

## 2020-08-31 PROCEDURE — 99239 HOSP IP/OBS DSCHRG MGMT >30: CPT | Mod: GW | Performed by: INTERNAL MEDICINE

## 2020-08-31 PROCEDURE — 80053 COMPREHEN METABOLIC PANEL: CPT | Performed by: INTERNAL MEDICINE

## 2020-08-31 RX ORDER — VANCOMYCIN HYDROCHLORIDE 50 MG/ML
125 KIT ORAL 4 TIMES DAILY
DISCHARGE
Start: 2020-08-31 | End: 2020-09-10

## 2020-08-31 RX ORDER — CLONAZEPAM 0.12 MG/1
0.12 TABLET, ORALLY DISINTEGRATING ORAL 3 TIMES DAILY PRN
Status: SHIPPED | DISCHARGE
Start: 2020-08-31 | End: 2020-08-31

## 2020-08-31 RX ORDER — BENZONATATE 100 MG/1
100 CAPSULE ORAL 3 TIMES DAILY PRN
DISCHARGE
Start: 2020-08-31 | End: 2020-11-16

## 2020-08-31 RX ORDER — ASENAPINE 5 MG/1
5 TABLET SUBLINGUAL 2 TIMES DAILY
DISCHARGE
Start: 2020-08-31 | End: 2020-11-16

## 2020-08-31 RX ORDER — LACTOBACILLUS RHAMNOSUS GG 10B CELL
1 CAPSULE ORAL 2 TIMES DAILY
DISCHARGE
Start: 2020-08-31 | End: 2020-09-02

## 2020-08-31 RX ORDER — MICONAZOLE NITRATE 20 MG/G
CREAM TOPICAL 2 TIMES DAILY
DISCHARGE
Start: 2020-08-31 | End: 2020-09-09

## 2020-08-31 RX ADMIN — VALPROIC ACID 250 MG: 250 SOLUTION ORAL at 09:34

## 2020-08-31 RX ADMIN — SIMVASTATIN 40 MG: 20 TABLET, FILM COATED ORAL at 09:32

## 2020-08-31 RX ADMIN — ASENAPINE MALEATE 5 MG: 5 TABLET SUBLINGUAL at 09:33

## 2020-08-31 RX ADMIN — VANCOMYCIN HYDROCHLORIDE 125 MG: KIT at 09:33

## 2020-08-31 RX ADMIN — LEVOTHYROXINE SODIUM 88 MCG: 88 TABLET ORAL at 09:31

## 2020-08-31 RX ADMIN — MICONAZOLE NITRATE: 20 CREAM TOPICAL at 09:44

## 2020-08-31 RX ADMIN — ASPIRIN 81 MG CHEWABLE TABLET 81 MG: 81 TABLET CHEWABLE at 09:32

## 2020-08-31 RX ADMIN — VANCOMYCIN HYDROCHLORIDE 125 MG: KIT at 14:45

## 2020-08-31 RX ADMIN — APIXABAN 5 MG: 2.5 TABLET, FILM COATED ORAL at 09:32

## 2020-08-31 RX ADMIN — Medication 1 CAPSULE: at 09:32

## 2020-08-31 NOTE — PROGRESS NOTES
Social Work Services Progress Note    Hospital Day: 12  Collaborated with:  Chart review, Ana Waller liaison     Data:  Per medical team, pt medically clear for discharge today. Per chart review, ANTONINO had left a VM for Ana at [place] this weekend.     ANTONINO left another VM for Ana this AM to inquire about pt's admission to [facility].      Ana Admission Liaison   Phone: [phone]  Fax: [phone]    SW to update niece after connecting with Ana.     1154: [facility] can accept pt for today. Ana reports pt can admit and use [facility] days and then would be billed as a LTC pt and it would be $50/day. ANTONINO updated pt's ninfa Sandoval. Lori inquiring about pt using their MA benefits or if they are responsible for the private cost. ANTONINO left a VM for lializa Perez [phone] to obtain clarification.     1215: Pt's MA will cover LTC, the $50 is because the room is private. The facility is waiving the fee for the family. Family agreeable to placement.    Intervention:  Discharge planning    Assessment:  Pt medically stable for discharge    Plan:    Anticipated Disposition:  [facility]    Barriers to d/c plan:  Bed availability     Follow Up:  ANTONINO to continue to follow    ADELE Brenner  Unit 5/Unit 8 Ortho/Med/Surg & [facility]  Phone: [phone] Pager: [phone]

## 2020-08-31 NOTE — PLAN OF CARE
VS:    /65 (BP Location: Right arm)   Pulse 64   Temp 98.2  F (36.8  C) (Axillary)   Resp 16   SpO2 94%     LS clear, slightly diminished in bilat lower lobes. Cough infrequent.   Output: Incontinent of urine, pt was too asleep to call to get up to go to the bathroom. LBM this afternoon. Tiffany care provided when incontinent, Baxa cream to groin. PVR 66.   No emesis this evening.    Activity: Repo q2 hrs, side to back to side. Very [place], has pocket talker on when talking to staff. More alert this ciro and watched TV after HS meds.    Skin: Ex r chest port   Blanchable redness buttocks   Pain: Denies pain, declines pain interventions   Neuro/CMS: No change: Pt oriented to self, intermittently forgetful of time.   Disoriented to time and place.    Dressing(s): Port dressing/tegaderm CDI.    Diet: DD1, Nectar thick liquids. Able to take pills crushed in pudding.   Ate 100% supper. 360 fluids including po juice and water with meds. Better intake today compared with yesterday.    LDA: IV fluids infusing to R chest port d/t Creatinine of 1.88. Continue to monitor.    Equipment: Port. IV pole. PCDS.    Plan: Placement pending, SW has called out to [facility].

## 2020-08-31 NOTE — PROGRESS NOTES
Social Work Services Discharge Note      Patient Name:  Stefanie Velazquez     Anticipated Discharge Date:  8/31/2020 at 1500    Discharge Disposition:   LTC:  [facility]   [address] [place] [address]  Phone: [phone]  Admissions Fax: [phone]    Family has plans to re-enroll pt in [facility]. Family will connect with hospice after pt admits. Facility admissions made aware.    Following MD:  Dr. Leon      Pre-Admission Screening (PAS) online form has been completed.  The Level of Care (LOC) is:  Determined  Confirmation Code is:  [id number]     Additional Services/Equipment Arranged:  [employer] - PCS form completed     Patient / Family response to discharge plan:  Agreeable     Persons notified of above discharge plan:  Pt, cl Sandoval/Crystal, RN, provider    Staff Discharge Instructions:  Please fax discharge orders and signed hard scripts for any controlled substances.  Please print a packet and send with patient.     CTS Handoff completed:  YES    Medicare Notice of Rights provided to the patient/family:  YES    ADELE Brenner  Unit 5/Unit 8 Ortho/Med/Surg & [facility]  Phone: [phone] Pager: [phone]

## 2020-08-31 NOTE — PLAN OF CARE
"Speech Language Therapy Discharge Summary    Reason for therapy discharge:    Discharged to transitional care facility.    Progress towards therapy goal(s). See goals on Care Plan in [facility] electronic health record for goal details.  Goals not met.  Barriers to achieving goals:   discharge from facility.    Therapy recommendation(s):    Continued therapy is recommended.  Rationale/Recommendations:  modify diet as appropriate.    Per nursing report, patient has been resistant to some of the thickened liquids.  Patient frequently requesting to drink just regular water.  After completion of oral cares, patient was provided with a cup of thin water.  Initially, patient taking in small single swallows with resulted in no overt signs and symptoms of aspiration or changes in vocal quality but then began to do serial swallows which resulted in hard cough indicative of aspiration.  Given conservative approach will recommend patient continue with NDD-1 food textures and honey thick liquids with meals but will start patient on \"free water protocol\" which will allow for patient to drink ice chips and/or water between meals.  Oral care should be completed immediately following each meal at which point patient may then regular water or ice chips if fully upright.  Patient encouraged ,though likely to require further reinforcement, to take just small single swallows at a time to decrease aspiration risk.  Patient will continue to be at a higher risk for aspiration with the thin liquids but if the oral oral cares are completed risk of aspiration pneumonia would be lower.  Also attempting to decrease risk of dehydration as patient transitions to TCU.  Would recommend ongoing SLP intervention at facility discharge where TCU staff can continue to monitor diet tolerance and implementation of free water protocol as deemed appropriate by TCU staff.  "

## 2020-08-31 NOTE — PLAN OF CARE
Pt cont to need thick liquids. And to sit upright when eating. Tiffany care given with diaper change. Pt to transfer .

## 2020-08-31 NOTE — PROGRESS NOTES
"[facility], [facility] Progress Note - Hospitalist Service       Date of Admission:  8/20/2020  Assessment & Plan     Stefanie Velazquez is a 75 year old female with history of recurrent UTI, CKD, recurrent episodes of encephalopathy one point required intubation and behavioral changes thought to be lewy body dementia, CVA, coronary artery disease,SVT,  DVT, and history of rectal cancer, admitted for altered mental status and agitation from her assisted living on 8/20  due escalating behaviors, which included hitting and kicking staff.     She was admitted to [facility] 8/10 to 8/13 for similar concerns of worsening agitation and behaviors. Had CT head done without any acute pathology and  Hospice was consulted.She was started on Aricept and Klonopin as needed during her stay        Acute Encephalopathy likely metabolic underlying cognitive decline due to lewy body dementia    MRI done 4/8;  Findings suspicious for small acute infarct in the subcortical white matter of the fusiform gyrus of the right temporal lobe.and chronic small vessel disease  Appreciate  psych input on 8/22, 8/24 and 8/25  Currently on asenpine S/L 5 mg hs increased to 5 mg BID  Depacon 250 mg IV ( changed to solution form on 8/28)  Klonopin 0.125 ODT TID prn  ( max uses 1-2 times/ day)   Zyprexa ODT 5 mg TID prn  ( Has not used this in the last 7 days and hence will stop))  Due to aspiration risk , wafers and s/l medications started   Patient is doing much better on the above regimen and really has not had significant episodes of confusion or agitation  Patient has had sitter off for 4 days       High Risk of Aspiration   Dysphagia   Speech evaluated patient 8/21 and 8/22 and 8/24  Now on honey thickened liquids with 1:1 supervision .  Dr Berry  Discussed with Yfn Meadows 8/21 who endorses patient has had \" coughing episodes \" with food for some time, likely months   Per speech and swallow " "evaluation on 8/31:    Given conservative approach will recommend patient continue with NDD-1 food textures and honey thick liquids with meals but will start patient on \"free water protocol\" which will allow for patient to drink ice chips and/or water between meals.  Oral care should be completed immediately following each meal at which point patient may then regular water or ice chips if fully upright.  Patient encouraged though likely to require further reinforcement to take just small single swallows at a time to decrease aspiration risk.  Patient will continue to be at a higher risk for aspiration with the thin liquids but if the oral oral cares are not be completed risk of aspiration pneumonia would be lower.  Also attempting to decrease risk of dehydration    Discussed with pharmacy to re-look into medications so that se is not aspirating on the syrups forms        Enterococcus  UTI  Proteinuria  S/P treatment with bactrim.   Stopped ceftriaxone 8/21  Started ampicillin 8/21 as growing enterococcus, discussed with pharm. Patient completed course on 8/26    Leucopenia ( Resolved)   Patient has had intermittent chronic low WBC count from as early as 2012/2013.  [place]al in the setting pof combination of  Valproic acid,  Infection and antibiotics  Repeat CBC with stable WBC count at 2.9 on 8/26  Antibiotics stopped on 8/26. WBC at 6.6 on 8/29       C Diff Diarrhea:   Noted on 8/25. Initiated on Vancomycin 125 mg QID.   Last date will be 9/10  IV ampicillin has been stopped ( Completed treatment for UTI)  Initiated culturelle BID   Stools have changed to semi soft with red  Continue Vancomycin mixed with apple sauce         TITA on CKD;  baseline creatinine is 1.4 to 1.8   Creatinine increased to 1.72 on 8/29 with sodium at 145.   Likely secondary to poor nutrition ( with dysphagia diet), vomiting and C diff diarrhea  This has largely resolved with IV fluids, with creatinine at 1.48 on 8/31      Coronary Artery " Disease  History of 3 vessel CABG in 2002.   Hold  home simvastatin, asa due to aspiration risk   Unable to tolerate ACEi or beta blocker due to hypotension.       Hyperlipidemia  Home simvastatin  ( Resume  )      History of DVT  On lovenox ( had been on DOAC, changed due to swallow issues )   Restarted on apixaban on 8/28     Diabetes Mellitus, Type II  Chronic. Last a1C 6.1 on 6/29/2020. Not currently on medications    Has a subcutaneous sensor in place on the left arm     Lumbar Radiculopathy   Neuropathy  Previously diagnosed.   Was on gabapentin, which has not been resumed      Hypothyroidism  TSH 1.52 on 8/20   home levothyroxine 88mcg daily, 44mcg on Sunday..       Gastroesophageal reflux disease  - Holding omeprazole. Patient has not really complained of GERD symptoms. Hence we will not be resuming this in an effort to reduce polypharmacy     Restless Leg Syndrome   resume Pramipexole ( This is used on a PRN basis). If she does not require, then we wont be inclined to resume this at discharge       RC  Previously diagnosed. On CPAP at assisted living facility, follows with sleep medicine.   Resume CPAP     Elevated LFT's  T bili at 1.4, ALKP at 215, AST at 138 and ALt at 53  Has not had elevated LFT's before. Will monitor trend for now as this is mildly elevated.   Labs on 8/31 with improving ALKP at 193, ALT at 43 and AST at 57  Rectal Cancer  Follows with Dr. Martinez, diagnosed in 2011, underwent neoadjuvant chemoradiation, resection with reanastomosis, adjuvant chemotherapy. Follows yearly with oncology, last seen 7/2020, no evidence of recurrence or plans for further follow up based on stability.  - Continue outpatient surveillance                     Diet: Snacks/Supplements Adult: Magic Cup; With Meals  Dysphagia Diet Level 1 Pureed Honey Thickened Liquids (pre-thickened or use instant food thickener)    DVT Prophylaxis: lovenox 60 mg BID   Lr Catheter: not present  Code Status: DNi/DNR (  discussed with ninfa meadows )          Disposition Plan   Expected discharge: Medically stable to discharge when TCU bed available   Entered: Edward Coronado MD 08/31/2020, 10:06 AM      Discussed with ninfa Meadows on (8/30)        Edward Coronado MD  Hospitalist Service  [facility], [place]    ______________________________________________________________________    Interval History   Patient feels well this morning   Denies fevers or chills  Eating and drinking well, but asking for thin liquids  Not happy that she is not being accepted at the NH she came from         Data reviewed today: I reviewed all medications, new labs and imaging results over the last 24 hours.   Physical Exam   Vital Signs: Temp: 98  F (36.7  C) Temp src: Oral BP: 137/66 Pulse: 63   Resp: 18 SpO2: 97 % O2 Device: None (Room air)    Weight: 0 lbs 0 oz   keeps tongue protrude at times  Very hard of hearing . Use pocket speaker   Hematologic / Lymphatic: no cervical lymphadenopathy  Respiratory: No increased work of breathing, good air exchange, clear to auscultation bilaterally, no crackles or wheezing  Cardiovascular: Normal apical impulse, regular rate and rhythm, normal S1 and S2, no S3 or S4, and no murmur noted  GI: No scars, normal bowel sounds, soft, non-distended, non-tender, no masses palpated, no hepatosplenomegally    Data   Recent Labs   Lab 08/31/20  0604 08/30/20  0853 08/29/20  0801  08/28/20  0615 08/25/20  0635   WBC  --   --  6.6  --   --  2.9*   HGB  --   --  13.4  --   --  12.5   MCV  --   --  93  --   --  89   PLT  --   --  193  --  164 188   * 142 145*  --   --  144   POTASSIUM 5.0 4.5 4.6  --   --  3.5   CHLORIDE 115* 108 105  --   --  111*   CO2 26 26 29  --   --  26   BUN 23 24 22  --   --  9   CR 1.48* 1.88* 1.72*  --   --  1.11*   ANIONGAP 5 8 11  --   --  7   DWIGHT 7.2* 8.0* 8.8  --   --  8.5   * 148* 150*  --   --  81   ALBUMIN 1.7* 2.2* 2.4*   < >   --   --    PROTTOTAL 5.0* 6.3* 6.7*   < >  --   --    BILITOTAL 0.5 1.4* 1.4*   < >  --   --    ALKPHOS 193* 228* 215*   < >  --   --    ALT 43 68* 53*   < >  --   --    AST 57* 132* 138*   < >  --   --     < > = values in this interval not displayed.

## 2020-08-31 NOTE — PLAN OF CARE
Problem: Adult Inpatient Plan of Care  Goal: Plan of Care Review  8/31/2020 0207 by Shannon Rubi RN  Outcome: No Change   Note for the night shift.  D: Pt. Sleeping up till 0030 when her diaper was checked. Pt cleaned up for moderate amt of mushy yellow stool. Pretty much slept through the clean up and turn. But then woke up and wanted real ice water, not thickened water. Given tiny teaspoons full of water. Pt back to sleep. Tiffany area reddened, but not as red as 2 nights ago. About the same as last night. LEO's IV infusing.At 0300 crawling out of bed to go to the bathroom. Bed alarm sounded. Transferred to commode. Was incont of yellow mushy stool again in her diaper before getting on the commode.Did void and poop when up on the commode. Sleeping after that. Very [place].  Call light with in reach. Able to make needs known.  A: Doing OK. P: Monitor closely.  Supportive cares. Up in chair today for meals.

## 2020-08-31 NOTE — DISCHARGE SUMMARY
[facility]      Date of Admission:  8/20/2020  Date of Discharge:  8/31/2020  Discharging Provider: Edward Coronado MD      Discharge Diagnoses   #Acute Encephalopathy likely metabolic underlying cognitive decline due to lewy body dementia  #High Risk of Aspiration   #Dysphagia   #Enterococcus  UTI  #Proteinuria  #Leucopenia ( Resolved)  #C Diff Diarrhea  #TITA on CKD  #Coronary Artery Disease  #Hyperlipidemia  #History of DVT  #Diabetes Mellitus, Type II  #Gastroesophageal reflux disease  #Hypothyroidism  #Restless Leg Syndrome   #RC   #Elevated LFT's  #Rectal Cancer    Follow-ups Needed After Discharge   Follow-up Appointments     Follow Up and recommended labs and tests      Patient to follow up with NH physician on discharge. Labs * CBC, CMP) in   1-2 weeks of admission to TCU.  Consider evaluation by Hospice team in 1-2 weeks of admission to TCU                 Discharge Disposition   Discharged to home  Condition at discharge: Stable      Hospital Course     Stefanie Velazquez is a 75 year old female with history of recurrent UTI, CKD, recurrent episodes of encephalopathy one point required intubation and behavioral changes thought to be lewy body dementia, CVA, coronary artery disease,SVT,  DVT, and history of rectal cancer, admitted for altered mental status and agitation from her assisted living on 8/20  due escalating behaviors, which included hitting and kicking staff.     She was admitted to [facility] 8/10 to 8/13 for similar concerns of worsening agitation and behaviors. Had CT head done without any acute pathology and  Hospice was consulted.She was started on Aricept and Klonopin as needed during her stay         Acute Encephalopathy likely metabolic underlying cognitive decline due to lewy body dementia     MRI done 4/8;  Findings suspicious for small acute infarct in the subcortical white matter of the fusiform  "gyrus of the right temporal lobe.and chronic small vessel disease  Appreciate  psych input on 8/22, 8/24 and 8/25  Currently on asenpine S/L 5 mg hs increased to 5 mg BID  Depacon 250 mg IV ( changed to solution form on 8/28)  Klonopin 0.125 ODT TID prn  ( max uses 1-2 times/ day, has not needed this and therefore not prescribed at discharge )   Zyprexa ODT 5 mg TID prn  ( Has not used this in the last 7 days and hence will stop))  Resume Apiprazole at discharge   Due to aspiration risk , wafers and s/l medications started   Patient is doing much better on the above regimen and really has not had significant episodes of confusion or agitation  Patient has had sitter off for 4 days         High Risk of Aspiration   Dysphagia   Speech evaluated patient 8/21 and 8/22 and 8/24  Now on honey thickened liquids with 1:1 supervision .  Dr Berry  Discussed with [facility] 8/21 who endorses patient has had \" coughing episodes \" with food for some time, likely months   Per speech and swallow evaluation on 8/31:     Given conservative approach will recommend patient continue with NDD-1 food textures and honey thick liquids with meals but will start patient on \"free water protocol\" which will allow for patient to drink ice chips and/or water between meals.  Oral care should be completed immediately following each meal at which point patient may then regular water or ice chips if fully upright.  Patient encouraged though likely to require further reinforcement to take just small single swallows at a time to decrease aspiration risk.  Patient will continue to be at a higher risk for aspiration with the thin liquids but if the oral oral cares are not be completed risk of aspiration pneumonia would be lower.  Also attempting to decrease risk of dehydration     Discussed with pharmacy to re-look into medications so that se is not aspirating on the syrups forms         Enterococcus  UTI  Proteinuria  S/P treatment with bactrim. "   Stopped ceftriaxone 8/21  Started ampicillin 8/21 as growing enterococcus, discussed with pharm. Patient completed course on 8/26     Leucopenia ( Resolved)   Patient has had intermittent chronic low WBC count from as early as 2012/2013.  Krunal in the setting pof combination of  Valproic acid,  Infection and antibiotics  Repeat CBC with stable WBC count at 2.9 on 8/26  Antibiotics stopped on 8/26. WBC at 6.6 on 8/29         C Diff Diarrhea:   Noted on 8/25. Initiated on Vancomycin 125 mg QID.   Last date will be 9/10  IV ampicillin has been stopped ( Completed treatment for UTI)  Initiated culturelle BID   Stools have changed to semi soft with red  Continue Vancomycin mixed with apple sauce         TITA on CKD;  baseline creatinine is 1.4 to 1.8   Creatinine increased to 1.72 on 8/29 with sodium at 145.   Likely secondary to poor nutrition ( with dysphagia diet), vomiting and C diff diarrhea  This has largely resolved with IV fluids, with creatinine at 1.48 on 8/31      Coronary Artery Disease  History of 3 vessel CABG in 2002.   Hold  home simvastatin, asa due to aspiration risk   Unable to tolerate ACEi or beta blocker due to hypotension.        Hyperlipidemia  Home simvastatin  ( Resume  )      History of DVT  On lovenox ( had been on DOAC, changed due to swallow issues )   Restarted on apixaban on 8/28     Diabetes Mellitus, Type II  Chronic. Last a1C 6.1 on 6/29/2020. Not currently on medications    Has a subcutaneous sensor in place on the left arm     Lumbar Radiculopathy   Neuropathy  Previously diagnosed.   Was on gabapentin, which has not been resumed in an effort to minimize medications   Patient does not have significant complaints of neuropathy      Hypothyroidism  TSH 1.52 on 8/20   home levothyroxine 88mcg daily, 44mcg on Sunday..       Gastroesophageal reflux disease  - Holding omeprazole. Patient has not really complained of GERD symptoms. Hence we will not be resuming this in an effort to reduce  polypharmacy     Restless Leg Syndrome   resume Pramipexole ( This is used on a PRN basis). If she does not require, then we wont be inclined to resume this at discharge       RC  Previously diagnosed. On CPAP at assisted living facility, follows with sleep medicine.   Resume CPAP      Elevated LFT's  T bili at 1.4, ALKP at 215, AST at 138 and ALt at 53  Has not had elevated LFT's before. Will monitor trend for now as this is mildly elevated.   Labs on 8/31 with improving ALKP at 193, ALT at 43 and AST at 57  Rectal Cancer  Follows with Dr. Martinez, diagnosed in 2011, underwent neoadjuvant chemoradiation, resection with reanastomosis, adjuvant chemotherapy. Follows yearly with oncology, last seen 7/2020, no evidence of recurrence or plans for further follow up based on stability.  - Continue outpatient surveillance             Consultations This Hospital Stay   PSYCHIATRY IP CONSULT  SOCIAL WORK IP CONSULT  MEDICATION HISTORY IP PHARMACY CONSULT  SWALLOW EVAL SPEECH PATH AT BEDSIDE IP CONSULT  PSYCHIATRY IP CONSULT  PSYCHIATRY IP CONSULT  PSYCHIATRY IP CONSULT  WOUND OSTOMY CONTINENCE NURSE  IP CONSULT  PHARMACY IP CONSULT  PHYSICAL THERAPY ADULT IP CONSULT  OCCUPATIONAL THERAPY ADULT IP CONSULT  SPEECH LANGUAGE PATH ADULT IP CONSULT    Code Status   No CPR- Do NOT Intubate    Time Spent on this Encounter   I, Edward Coronado MD, personally saw the patient today and spent greater than 30 minutes discharging this patient.       Edward Coronado MD  [facility], [place]  ______________________________________________________________________    Physical Exam   See separate physical exam note on discharge day     Primary Care Physician   Sandra Morales    Discharge Orders      General info for SNF    Length of Stay Estimate: Long Term Care  Condition at Discharge: Stable  Level of care:skilled   Rehabilitation Potential: Fair  Admission H&P remains valid and  "up-to-date: Yes  Recent Chemotherapy: N/A  Use Nursing Home Standing Orders: N/A     Mantoux instructions    Give two-step Mantoux (PPD) Per Facility Policy Yes     Reason for your hospital stay    Encephalopathy due to UTI     Glucose monitor nursing POCT    BID     Activity - Up with nursing assistance     Additional Discharge Instructions    Given conservative approach will recommend patient continue with NDD-1 food textures and honey thick liquids with meals but will start patient on \"free water protocol\" which will allow for patient to drink ice chips and/or water between meals.  Oral care should be completed immediately following each meal at which point patient may then regular water or ice chips if fully upright.  Patient encouraged though likely to require further reinforcement to take just small single swallows at a time to decrease aspiration risk.  Patient will continue to be at a higher risk for aspiration with the thin liquids but if the oral oral cares are not be completed risk of aspiration pneumonia would be lower.  Also attempting to decrease risk of dehydration     Follow Up and recommended labs and tests    Patient to follow up with NH physician on discharge. Labs * CBC, CMP) in 1-2 weeks of admission to TCU.  Consider evaluation by Hospice team in 1-2 weeks of admission to TCU     No CPR- Do NOT Intubate     Physical Therapy Adult Consult    Evaluate and treat as clinically indicated.    Reason:  Deconditioning     Occupational Therapy Adult Consult    Evaluate and treat as clinically indicated.    Reason:  Deconditioning     Speech Language Path Adult Consult    Evaluate and treat as clinically indicated.    Reason: Deconditioning     Contact Isolation     Advance Diet as Tolerated    Follow this diet upon discharge: Orders Placed This Encounter      Snacks/Supplements Adult: Magic Cup; With Meals      Dysphagia Diet Level 1 Pureed Honey Thickened Liquids (pre-thickened or use instant food " "thickener)     Start patient on \"free water protocol\" which will allow for patient to drink ice chips and/or water between meals.  Oral care should be completed immediately following each meal       Significant Results and Procedures   Results for orders placed or performed during the hospital encounter of 08/10/20   Abd/pelvis CT - no contrast - Stone Protocol    Narrative    EXAM: CT ABDOMEN PELVIS W/O CONTRAST  LOCATION: [facility]  DATE/TIME: 8/10/2020 10:27 PM    INDICATION: Confusion, hematuria  COMPARISON: 05/26/2020.  TECHNIQUE: CT scan of the abdomen and pelvis was performed without IV contrast. Multiplanar reformats were obtained. Dose reduction techniques were used.  CONTRAST: None.    FINDINGS:   LOWER CHEST: Redemonstrated right basilar scarring.    HEPATOBILIARY: Cholelithiasis. Nondistended gallbladder. Left liver lobe hypodense lesion measuring 1.4 cm on image 28 is stable.    PANCREAS: Normal.    SPLEEN: Normal.    ADRENAL GLANDS: Normal.    KIDNEYS/BLADDER: Evaluation is degraded by motion artifact. There is a simple cortical cyst measuring 2.8 cm at the right upper pole. There is no hydronephrosis. The bladder is normal.    BOWEL: There is a large bowel containing paraumbilical hernia, though there is no obstructive change at this level. There is a moderate to large amount of formed stool in the colon. Anastomotic bowel sutures again noted at the rectosigmoid junction with   presacral soft tissue thickening.    LYMPH NODES: Normal.    VASCULATURE: Moderate to severe aortic atherosclerotic change without aneurysm.    PELVIC ORGANS: Previously seen left adnexal cystic focus less conspicuous on today's exam. Chronic presacral soft tissue thickening likely reflecting posttreatment change.    MUSCULOSKELETAL: Moderate lumbar spine degenerative changes. Severe arthritic change of the hips. Partially imaged left femoral intramedullary nail. Paraumbilical bowel containing hernia. " Supraumbilical fat-containing ventral hernia or diastases.      Impression    IMPRESSION:   1.  Although evaluation is degraded by motion artifact, there is no hydronephrosis. The bladder is normal in appearance.    2.  Moderate to large amount of colonic stool suggesting constipation.    3.  Cholelithiasis.     Head CT w/o contrast    Narrative    EXAM: CT HEAD W/O CONTRAST  LOCATION: [facility]  DATE/TIME: 8/10/2020 10:22 PM    INDICATION: Elderly patient with increased  COMPARISON: 05/26/2020 CT head  TECHNIQUE: Routine without IV contrast. Multiplanar reformats. Dose reduction techniques were used.    FINDINGS:  INTRACRANIAL CONTENTS: No intracranial hemorrhage, extraaxial collection, or mass effect.  No CT evidence of acute infarct. Chronic lacunar infarct in the right frontal lobe white matter is unchanged. Severe presumed chronic small vessel ischemic   changes. Mild generalized volume loss. No hydrocephalus.     VISUALIZED ORBITS/SINUSES/MASTOIDS: Prior bilateral cataract surgery. Visualized portions of the orbits are otherwise unremarkable. No paranasal sinus mucosal disease. Scattered fluid/membrane thickening in the right mastoid air cells. No apparent mass   in the posterior nasopharynx or skull base.    BONES/SOFT TISSUES: No acute abnormality.      Impression    IMPRESSION:  1.  No CT evidence for acute intracranial process. No significant interval change from the prior exam.  2.  Brain atrophy and presumed chronic microvascular ischemic changes as above.     *Note: Due to a large number of results and/or encounters for the requested time period, some results have not been displayed. A complete set of results can be found in Results Review.       Discharge Medications   Current Discharge Medication List      START taking these medications    Details   asenapine (SAPHRIS) 5 MG SUBL sublingual tablet Place 1 tablet (5 mg) under the tongue 2 times daily  Qty:      Associated Diagnoses:  Altered mental status, unspecified altered mental status type      benzonatate (TESSALON) 100 MG capsule Take 1 capsule (100 mg) by mouth 3 times daily as needed for cough  Qty:      Associated Diagnoses: Cough      lactobacillus rhamnosus, GG, (CULTURELL) capsule Take 1 capsule by mouth 2 times daily  Qty:      Associated Diagnoses: C. difficile colitis      miconazole with skin protectant (ALVIN ANTIFUNGAL) 2 % CREA cream Apply topically 2 times daily  Qty:      Associated Diagnoses: Rash      valproic acid (DEPAKENE) 250 MG/5ML syrup Take 5 mLs (250 mg) by mouth daily  Qty:      Associated Diagnoses: Altered mental status, unspecified altered mental status type      vancomycin (FIRVANQ) 50 MG/ML oral solution Take 2.5 mLs (125 mg) by mouth 4 times daily for 10 days    Associated Diagnoses: C. difficile colitis         CONTINUE these medications which have NOT CHANGED    Details   ACCU-CHEK MILVIA MELODY dispense one meter  Qty: 1 device, Refills: 0    Comments: Diagnosis code: 250.02  Associated Diagnoses: Type II or unspecified type diabetes mellitus without mention of complication, uncontrolled      aspirin (ASA) 81 MG tablet Take 81 mg by mouth daily      blood glucose monitoring (ACCU-CHEK MILVIA) test strip Use to test blood sugars 4 times daily or as directed.  Qty: 360 each, Refills: 1    Associated Diagnoses: Type 2 diabetes mellitus with diabetic nephropathy, with long-term current use of insulin (H)      blood glucose monitoring (ACCU-CHEK MULTICLIX) lancets Test BG 4 times daily  Qty: 1 Box, Refills: 11    Associated Diagnoses: Type II or unspecified type diabetes mellitus without mention of complication, uncontrolled      cholecalciferol 1000 units TABS Take 1,000 Units by mouth daily      Continuous Blood Gluc  (FREESTYLE JAJA 14 DAY READER) MELODY 1 each as needed (use to scan the sensor to check the blood sugars)  Qty: 1 Device, Refills: 0    Associated Diagnoses: Type 2 diabetes mellitus with  diabetic nephropathy, with long-term current use of insulin (H)      Continuous Blood Gluc Sensor (FREESTYLE JAJA 14 DAY SENSOR) MISC 1 each every 14 days  Qty: 2 each, Refills: 11    Comments: Pt at an assisted living so make sure you follow up if no call back  Associated Diagnoses: Type 2 diabetes mellitus with diabetic nephropathy, with long-term current use of insulin (H)      donepezil (ARICEPT) 5 MG tablet Take 1 tablet (5 mg) by mouth At Bedtime  Qty: 30 tablet, Refills: 0    Associated Diagnoses: Lewy body dementia with behavioral disturbance (H)      ELIQUIS ANTICOAGULANT 5 MG tablet Take 1 tablet by mouth 2 times daily      fluocinonide (LIDEX) 0.05 % ointment Apply sparingly to rash on legs twice daily as needed.  Do not apply to face.  Qty: 60 g, Refills: 11    Associated Diagnoses: Venous stasis dermatitis of both lower extremities      fluticasone (FLONASE) 50 MCG/ACT spray Spray 1 spray into both nostrils daily  Qty: 1 Bottle, Refills: 3    Associated Diagnoses: Chronic rhinitis, unspecified type      insulin pen needle (BD GABRIELLA U/F) 32G X 4 MM Use 4 times per day or as directed.  Qty: 120 each, Refills: 5    Associated Diagnoses: Type 2 diabetes mellitus with diabetic nephropathy, with long-term current use of insulin (H)      !! levothyroxine (SYNTHROID/LEVOTHROID) 88 MCG tablet Take 44 mcg by mouth every 7 days (0.5 x 88 mcg) On Sunday      !! levothyroxine (SYNTHROID/LEVOTHROID) 88 MCG tablet Take 88 mcg by mouth daily Except on Sunday      !! order for DME Equipment being ordered:   Incontinence Products: Gloves (4 Boxes) & chux pads  Qty: 300 each, Refills: 11    Associated Diagnoses: Rectal cancer (H); Bowel and bladder incontinence      !! order for DME Equipment being ordered: Wheelchair  Qty: 1 Device, Refills: 0    Associated Diagnoses: Spinal stenosis of lumbar region without neurogenic claudication; Lumbar radiculopathy; DDD (degenerative disc disease), lumbar      !! order for DME  Equipment being ordered: Incontinence briefs/Depends  Qty: 12 Package, Refills: 11    Associated Diagnoses: Rectal cancer (H)      !! order for DME Equipment being ordered:  order for XL Size  Pull ups.  Pt is currently using 12 pull ups a day  Qty: 350 each, Refills: 11    Associated Diagnoses: Rectal cancer (H); Bowel and bladder incontinence      !! order for DME Equipment being ordered: Compression stockings  Qty: 2 Device, Refills: 0    Associated Diagnoses: Acute deep vein thrombosis (DVT) of right lower extremity, unspecified vein (H)      !! order for DME Equipment being ordered: Hospital Bed service and repair  Qty: 1 each, Refills: 0    Associated Diagnoses: DDD (degenerative disc disease), lumbar; Type 2 diabetes mellitus with diabetic nephropathy, with long-term current use of insulin (H); Neuropathy; Rectal cancer (H)      pramipexole (MIRAPEX) 0.25 MG tablet One 1-2 hours prior to HS, and q 8 hrs prn day  Qty: 60 tablet, Refills: 3    Associated Diagnoses: Restless leg syndrome      prochlorperazine (COMPAZINE) 10 MG tablet Take 10 mg by mouth every 6 hours as needed for nausea or vomiting      simvastatin (ZOCOR) 40 MG tablet Take 1 tablet (40 mg) by mouth daily  Qty: 90 tablet, Refills: 2    Associated Diagnoses: Hyperlipidemia LDL goal <100       !! - Potential duplicate medications found. Please discuss with provider.      STOP taking these medications       acetaminophen (TYLENOL) 650 MG CR tablet Comments:   Reason for Stopping:         acetaminophen (TYLENOL) 650 MG suppository Comments:   Reason for Stopping:         biotin 1000 MCG TABS tablet Comments:   Reason for Stopping:         bisacodyl (DULCOLAX) 10 MG suppository Comments:   Reason for Stopping:         clonazePAM (KLONOPIN) 0.5 MG tablet Comments:   Reason for Stopping:         ferrous sulfate (FEROSUL) 325 (65 Fe) MG tablet Comments:   Reason for Stopping:         furosemide (LASIX) 20 MG tablet Comments:   Reason for Stopping:          gabapentin (NEURONTIN) 100 MG capsule Comments:   Reason for Stopping:         LORazepam (ATIVAN) 0.5 MG tablet Comments:   Reason for Stopping:         magnesium 250 MG tablet Comments:   Reason for Stopping:         morphine 5 MG solu-tab Comments:   Reason for Stopping:         omeprazole (PRILOSEC) 20 MG DR capsule Comments:   Reason for Stopping:         polyethylene glycol (MIRALAX) 17 GM/SCOOP powder Comments:   Reason for Stopping:         senna-docusate (SENOKOT-S/PERICOLACE) 8.6-50 MG tablet Comments:   Reason for Stopping:             Allergies   Allergies   Allergen Reactions     Cefepime Other (See Comments)     Neurotoxicity, ie, agitated delirium     Ciprofloxacin Anxiety     Pt developed agitation, paranoia while on cipro--on clear if this is what caused it.  But would try to avoid in future.     Hydrocodone Other (See Comments)     dizzy     Lisinopril Cough            Vicodin [Hydrocodone-Acetaminophen] Other (See Comments)     Caused extreme dizziness

## 2020-08-31 NOTE — PLAN OF CARE
"  SLP: Per nursing report, patient has been resistant to some of the thickened liquids.  Patient frequently requesting to drink just regular water.  After completion of oral cares, patient was provided with a cup of thin water.  Initially, patient taking in small single swallows with resulted in no overt signs and symptoms of aspiration or changes in vocal quality but then began to do serial swallows which resulted in hard cough indicative of aspiration.  Given conservative approach will recommend patient continue with NDD-1 food textures and honey thick liquids with meals but will start patient on \"free water protocol\" which will allow for patient to drink ice chips and/or water between meals.  Oral care should be completed immediately following each meal at which point patient may then regular water or ice chips if fully upright.  Patient encouraged though likely to require further reinforcement to take just small single swallows at a time to decrease aspiration risk.  Patient will continue to be at a higher risk for aspiration with the thin liquids but if the oral oral cares are not be completed risk of aspiration pneumonia would be lower.  Also attempting to decrease risk of dehydration as patient transitions to TCU when bed is available.  Would recommend ongoing SLP intervention at facility discharge where TCU staff can continue to monitor diet tolerance and implementation of free water protocol as deemed appropriate by TCU staff.  "

## 2020-09-01 NOTE — PROGRESS NOTES
Discharge Disposition:   LTC:  [facility] on the [address] [address]  Phone: [phone]  Admissions Fax: [phone]    Patient discharged to care facility   No further calls were made at this time

## 2020-09-02 ENCOUNTER — NURSING HOME VISIT (OUTPATIENT)
Dept: GERIATRICS | Facility: CLINIC | Age: 75
End: 2020-09-02
Payer: COMMERCIAL

## 2020-09-02 ENCOUNTER — PATIENT OUTREACH (OUTPATIENT)
Dept: GERIATRIC MEDICINE | Facility: CLINIC | Age: 75
End: 2020-09-02

## 2020-09-02 VITALS
TEMPERATURE: 96.9 F | RESPIRATION RATE: 18 BRPM | DIASTOLIC BLOOD PRESSURE: 82 MMHG | WEIGHT: 193.3 LBS | BODY MASS INDEX: 29.39 KG/M2 | OXYGEN SATURATION: 94 % | SYSTOLIC BLOOD PRESSURE: 130 MMHG | HEART RATE: 108 BPM

## 2020-09-02 DIAGNOSIS — Z86.718 H/O DEEP VENOUS THROMBOSIS: ICD-10-CM

## 2020-09-02 DIAGNOSIS — Z79.01 CHRONIC ANTICOAGULATION: ICD-10-CM

## 2020-09-02 DIAGNOSIS — M51.369 DDD (DEGENERATIVE DISC DISEASE), LUMBAR: ICD-10-CM

## 2020-09-02 DIAGNOSIS — F02.818 LEWY BODY DEMENTIA WITH BEHAVIORAL DISTURBANCE (H): ICD-10-CM

## 2020-09-02 DIAGNOSIS — I25.9 CHRONIC ISCHEMIC HEART DISEASE: Chronic | ICD-10-CM

## 2020-09-02 DIAGNOSIS — A49.8 CLOSTRIDIUM DIFFICILE INFECTION: ICD-10-CM

## 2020-09-02 DIAGNOSIS — G31.83 LEWY BODY DEMENTIA WITH BEHAVIORAL DISTURBANCE (H): ICD-10-CM

## 2020-09-02 DIAGNOSIS — E03.9 ACQUIRED HYPOTHYROIDISM: ICD-10-CM

## 2020-09-02 DIAGNOSIS — I10 BENIGN ESSENTIAL HYPERTENSION: ICD-10-CM

## 2020-09-02 DIAGNOSIS — G62.9 NEUROPATHY: ICD-10-CM

## 2020-09-02 DIAGNOSIS — M48.061 SPINAL STENOSIS OF LUMBAR REGION WITHOUT NEUROGENIC CLAUDICATION: ICD-10-CM

## 2020-09-02 DIAGNOSIS — E11.40 TYPE 2 DIABETES MELLITUS WITH DIABETIC NEUROPATHY, WITHOUT LONG-TERM CURRENT USE OF INSULIN (H): ICD-10-CM

## 2020-09-02 DIAGNOSIS — R13.12 OROPHARYNGEAL DYSPHAGIA: ICD-10-CM

## 2020-09-02 DIAGNOSIS — G93.41 ACUTE METABOLIC ENCEPHALOPATHY: Primary | ICD-10-CM

## 2020-09-02 DIAGNOSIS — N39.0 RECURRENT UTI: ICD-10-CM

## 2020-09-02 PROBLEM — H61.21 IMPACTED CERUMEN OF RIGHT EAR: Status: RESOLVED | Noted: 2019-03-31 | Resolved: 2020-09-02

## 2020-09-02 PROBLEM — R41.0 CONFUSION: Status: RESOLVED | Noted: 2020-08-20 | Resolved: 2020-09-02

## 2020-09-02 PROBLEM — R41.82 ALTERED MENTAL STATUS: Status: RESOLVED | Noted: 2020-08-11 | Resolved: 2020-09-02

## 2020-09-02 PROBLEM — E11.21 TYPE 2 DIABETES MELLITUS WITH DIABETIC NEPHROPATHY, WITH LONG-TERM CURRENT USE OF INSULIN (H): Chronic | Status: RESOLVED | Noted: 2017-06-27 | Resolved: 2020-09-02

## 2020-09-02 PROBLEM — Z79.4 TYPE 2 DIABETES MELLITUS WITH DIABETIC NEPHROPATHY, WITH LONG-TERM CURRENT USE OF INSULIN (H): Chronic | Status: RESOLVED | Noted: 2017-06-27 | Resolved: 2020-09-02

## 2020-09-02 PROCEDURE — 99305 1ST NF CARE MODERATE MDM 35: CPT | Performed by: NURSE PRACTITIONER

## 2020-09-02 RX ORDER — L. ACIDOPHILUS/PECTIN, CITRUS 25MM-100MG
1 TABLET ORAL 2 TIMES DAILY
COMMUNITY
End: 2020-11-16

## 2020-09-02 NOTE — PROGRESS NOTES
TRANSITIONS OF CARE (NITA) LOG   NITA tasks should be completed by the CC within one (1) business day of notification of each transition. Follow up contact with member is required after return to their usual care setting.  Note:  If CC finds out about the transitions fifteen (15) days or more after the member has returned to their usual care setting, no NITA log is needed. However, the CC should check in with the member to discuss the transition process, any changes needed to the care plan and document it in a case note.    Member Name:  Stefanie Velazquez [facility] [id number]   [place] [facility] [place] Coordinator Contact:  Komal Krishnan RN-BC Agency/[facility]/Care System: [facility]   Transition Communication Actions from Care Management Contact   Transition #1   Notification Date: 8/18/2020 Transition Date:   8/18/2020 Transition From: Assisted Living, [facility]     Is this the member s usual care setting?               yes Transition To: Hospital, [facility]   Transition Type:  Unplanned  Reason for Admission/Comments:  Behavioral Concerns r/t Lewy Bodies Dementia    Uncontrolled behaviors - in need of geriatric psych unit.      Shared CC contact info, care plan/services with receiving setting--Date completed: 8/18/2020   Notified PCP of transition--Date completed:  8/18/2020    via  EMR   Transition #2   Transition #3  (if applicable)   Notification Date:        8/20/2020  Transition To:  Mental Health Facility, [facility]  Transition Date: 8/20/2020     Transition Type:    Planned  Notified PCP -- Date completed: 8/20/2020              Shared CC contact info, care plan/services with receiving setting or, if applicable, home care agency--Date completed:  8/20/2020  *Complete additional tasks below, if this transition is a return to usual care setting.      Comments:  [facility] unable to take member back d/t behaviors.    Notification Date:  8/31/2020   Transition  To:  Skilled Nursing Facility, [facility]  Transition Date:   8/31/2020         Transition Type:    Planned  Notified PCP--Date completed: 8/31/2020       Shared CC contact info, care plan/services with receiving setting or, if applicable, home care agency--Date completed: 8/31/2020  *Complete additional tasks below, if this transition is a return to usual care setting.      Comments:  Member will remain LTC at [facility].  Discharge from AL as of 8/31/2020.  Member will disenroll from [facility] as of 9/1/2020     *Complete tasks below when the member is discharging TO their usual care setting within one (1) business day of notification.  For situations where the Care Coordinator is notified of the discharge prior to the date of discharge, the Care Coordinator must follow up with the member or designated representative to confirm that discharge actually occurred and discuss required NITA tasks as outlined in the NITA Instructions.  (This includes situations where it may be a  new  usual care setting for the member. (i.e., a community member who decides upon permanent nursing home placement following hospitalization and rehab).    Date completed: 8/31/2020  Communicated with member or their designated representative about the following:  care transition process; about changes to the member s health status; plan of care updates; education about transitions and how to prevent unplanned transitions/readmissions  Four Pillars for Optimal Transition:    Check  Yes  - if the member, family member and/or SNF/facility staff manages the following:    If  No  provide explanation in the comments section.          [x]  Yes     []  No     Does the member have a follow-up appointment scheduled with primary care or specialist? (Mental health hospitalizations--the appt. should be w/in 7 days)   [x]  Yes     []  No     Can the member manage their medications or is there a system in place to manage medications (e.g. home care set-up)?          []  Yes     [x]  No     Can the member verbalize warning signs and symptoms to watch for and how to respond?         [x]  Yes     []  No     Does the member use a Personal Health Care Record?  Check  Yes  if visit summary, discharge summary, and/or healthcare summary are being used as a PHR.                                                                                                                                                                                    [x] Yes      [] No      Have you updated the member s care plan?  If  No  provide explanation in comments.   Comments:  Transfer to LTC.

## 2020-09-02 NOTE — LETTER
9/2/2020        RE: Stefanie Velazquez  [address]  [address]  [place] [address]        [facility]  PRIMARY CARE PROVIDER AND CLINIC:  Sandra Morales MD, [address] [facility] [id number]  Chief Complaint   Patient presents with     shelter Acute     [place] [facility] Record Number:  [id number]  Place of Service where encounter took place:  [facility] [[id number]]    Stefanie Velazquez  is a 75 year old  (1945), admitted to the above facility from  [facility]. Hospital stay 8/20/20 through 8/31/20..  Admitted to this facility for  rehab, medical management and nursing care.    HPI:    HPI information obtained from: facility chart records, facility staff, patient report and [facility] chart review.   Brief Summary of Hospital Course: Stefanie Velazquez has a past medical history of CVA, CAD S/P CABG x3 in 2002, SVT, DVT on apixaban, rectal cancer (with concern for lung nodules), hypertension, hyperlipidemia, DM2, hypothyroidism, GERD, RLS, RC on CPAP, chronic back pain, recurrent UTI, recurrent encephalopathy thought to be Lewy Body dementia.  S/he was admitted to the hospital with acute onset of fatigue, weakness, acute agitation, paranoia, hitting and kicking at staff at her assisted living facility, worsening encephalopathy and found to have enterococcus UTI.  It appears that her recurrent episodes of acute change in mental status are often related to UTI.  There has been concern that antipsychotic medications actually worsened her agitation, and when clear seems to have mild cognitive dysfunction. UTI was treated with bactrim, ampicillinS/he underwent evaluation by psych who recommended current regimen of asenapine, valproic acid, donepezil.  Head MRI showing small acute infarct R temp lobe as well as chronic small vessel ischemic disease . The hospital stay was complicated with noted aspiration (likely chronic), and she is  now on honey thick liquids with pureed food but OK for free water between meals.  Her hospital stay was complicated with development of diarrhea and found to have CDiff infection, now on oral vancomycin and BID probiotic.     She was living with her sister at an assisted living facility, that sister recently .  She is no longer able to be cared for at her assisted living facility, and will plan long term admission at skilled nursing facility, completing a quarantine of 14 days at the TCU first.  Updates on Status Since Skilled nursing Admission: Stefanie reports no new concerns, slept well last night.  She has no complaints of chest pain, cough, congestion, or dyspnea.        CODE STATUS/ADVANCE DIRECTIVES DISCUSSION:   DNR / DNI  Patient's living condition: lives in an assisted living facility  ALLERGIES: Cefepime; Ciprofloxacin; Hydrocodone; Lisinopril; and Vicodin [hydrocodone-acetaminophen]  PAST MEDICAL HISTORY:  has a past medical history of Acute deep vein thrombosis (DVT) of right lower extremity, unspecified vein (H) (10/31/2018), Acute myocardial infarction of other specified sites, episode of care unspecified (2002), Altered mental status (2020), Cancer of colon (H), Cellulitis of lower extremity, bilateral (2016), Chronic ischemic heart disease, unspecified (2003), Confusion (2020), Coronary atherosclerosis of unspecified type of vessel, native or graft, Diabetes mellitus (H), Esophageal reflux, Fracture of femur, distal, left, closed (H) (2013), Gastro-oesophageal reflux disease, Generalized osteoarthrosis, unspecified site (2005), Generalized osteoarthrosis, unspecified site (2005), HEARING LOSS CONDUCTIVE, COMBINED TYPE (2005), HYPOTHYROIDISM  (2003), Mixed hyperlipidemia (2005), Obesity, unspecified (2005), RC-moderate (AHI 12, LSat 60%); REM RDI-73 (2009), Recurrent acute deep vein thrombosis (DVT) of both lower extremities (H)  (3/31/2019), Hai, Stented coronary artery, Type 2 diabetes mellitus with diabetic nephropathy, with long-term current use of insulin (H) (6/27/2017), Type II or unspecified type diabetes mellitus with renal manifestations, uncontrolled(250.42) (H) (6/22/2005), Type II or unspecified type diabetes mellitus with renal manifestations, uncontrolled(250.42) (H) (6/22/2005), Unspecified disorder resulting from impaired renal function (6/22/2005), and Unspecified essential hypertension. She also has no past medical history of PONV (postoperative nausea and vomiting) or Walking and running.  PAST SURGICAL HISTORY:   has a past surgical history that includes surgical history of -  (10/24/2002); surgical history of -  (2002); surgical history of -  (2002); Colonoscopy (1/26/2011); orthopedic surgery; cabg; Insert port vascular access (3/2/2011); Colectomy low anterior (6/21/2011); Takedown ileostomy (9/9/2011); Sigmoidoscopy flexible (9/9/2011); Open reduction internal fixation femur distal (2/12/2013); Colonoscopy (N/A, 3/1/2016); Phacoemulsification with standard intraocular lens implant (Left, 1/22/2018); Phacoemulsification with standard intraocular lens implant (Right, 2/19/2018); and Anesthesia out of OR MRI (N/A, 4/8/2020).  FAMILY HISTORY: family history includes Breast Cancer in her sister; C.A.D. in her sister; Diabetes in her sister.  SOCIAL HISTORY:   reports that she has quit smoking. She has never used smokeless tobacco. She reports previous alcohol use. She reports that she does not use drugs.    Post Discharge Medication Reconciliation Status: discharge medications reconciled and changed, per note/orders    Current Outpatient Medications   Medication Sig Dispense Refill     asenapine (SAPHRIS) 5 MG SUBL sublingual tablet Place 1 tablet (5 mg) under the tongue 2 times daily       benzonatate (TESSALON) 100 MG capsule Take 1 capsule (100 mg) by mouth 3 times daily as needed for cough       donepezil  (ARICEPT) 5 MG tablet Take 1 tablet (5 mg) by mouth At Bedtime 30 tablet 0     fluocinonide (LIDEX) 0.05 % ointment Apply sparingly to rash on legs twice daily as needed.  Do not apply to face. 60 g 11     fluticasone (FLONASE) 50 MCG/ACT spray Spray 1 spray into both nostrils daily 1 Bottle 3     Lactobacillus Acid-Pectin (LACTOBACILLUS ACIDOPHILUS) TABS Take 1 tablet by mouth 2 times daily       miconazole with skin protectant (ALVIN ANTIFUNGAL) 2 % CREA cream Apply topically 2 times daily       pramipexole (MIRAPEX) 0.25 MG tablet One 1-2 hours prior to HS, and q 8 hrs prn day (Patient taking differently: Take 0.25 mg by mouth At Bedtime Give 1-2 hours PRIOR to bedtime) 60 tablet 3     simvastatin (ZOCOR) 40 MG tablet Take 1 tablet (40 mg) by mouth daily 90 tablet 2     valproic acid (DEPAKENE) 250 MG/5ML syrup Take 5 mLs (250 mg) by mouth daily       vancomycin (FIRVANQ) 50 MG/ML oral solution Take 2.5 mLs (125 mg) by mouth 4 times daily for 10 days       ACCU-CHEK MILVIA MELODY dispense one meter 1 device 0     aspirin (ASA) 81 MG tablet Take 81 mg by mouth daily       blood glucose monitoring (ACCU-CHEK MILVIA) test strip Use to test blood sugars 4 times daily or as directed. 360 each 1     cholecalciferol 1000 units TABS Take 1,000 Units by mouth daily       Continuous Blood Gluc  (FREESTYLE JAJA 14 DAY READER) MELODY 1 each as needed (use to scan the sensor to check the blood sugars) 1 Device 0     Continuous Blood Gluc Sensor (FREESTYLE JAJA 14 DAY SENSOR) MISC 1 each every 14 days 2 each 11     ELIQUIS ANTICOAGULANT 5 MG tablet Take 1 tablet by mouth 2 times daily       insulin pen needle (BD GABRIELLA U/F) 32G X 4 MM Use 4 times per day or as directed. 120 each 5     levothyroxine (SYNTHROID/LEVOTHROID) 88 MCG tablet Take 44 mcg by mouth every 7 days (0.5 x 88 mcg) On Sunday       levothyroxine (SYNTHROID/LEVOTHROID) 88 MCG tablet Take 88 mcg by mouth daily Except on Sunday       order for DME Equipment  being ordered:   Incontinence Products: Gloves (4 Boxes) & chux pads 300 each 11     order for DME Equipment being ordered: Wheelchair 1 Device 0     order for DME Equipment being ordered: Incontinence briefs/Depends 12 Package 11     order for DME Equipment being ordered:  order for XL Size  Pull ups.  Pt is currently using 12 pull ups a day 350 each 11     order for DME Equipment being ordered: Compression stockings 2 Device 0     order for DME Equipment being ordered: Hospital Bed service and repair 1 each 0     prochlorperazine (COMPAZINE) 10 MG tablet Take 10 mg by mouth every 6 hours as needed for nausea or vomiting           ROS:  Stefanie Velazquez complains of no pain, no chest pain or pressure, no shortness of breath. Sleep is good last night, appetite fair. Stefanie Velazquez does complain of bowel changes, having reports of diarrhea. S/he does not complain of bladder changes.  All other systems reviewed and are negative unless otherwise noted in HPI.     Vitals:  /82   Pulse 108   Temp 96.9  F (36.1  C)   Resp 18   Wt 87.7 kg (193 lb 4.8 oz)   SpO2 94%   BMI 29.39 kg/m    Exam:  GENERAL APPEARANCE:  Alert, in no distress   HEAD:  Normal, normocephalic, atraumatic  ENT:  Mouth and posterior oropharynx normal, moist mucous membranes, hearing acuity - hard of hearing-uses pocket talker  EYE EXAM:  EOM, conjunctivae, lids, pupils and irises normal   CHEST/RESP:  respiratory effort normal, no respiratory distress, lung sounds CTA    CV:  Rate and rhythm reg, no murmur/rub/gallop, no peripheral edema  M/S:   extremities normal, gait abnormal-requires assist of 1 with transfers and all ADLs, digits and nails within normal limits   NEUROLOGIC EXAM: Normal gross motor movement, tone and coordination. No tremor. Cranial nerves 2-12 are normal tested and grossly at patient's baseline  PSYCH:  Alert and confused     Lab/Diagnostic data:  Recent labs in [facility] reviewed by me today.     ASSESSMENT/PLAN:  Acute  metabolic encephalopathy  Lewy body dementia with behavioral disturbance (H)  Patient with history of recurrent encephalopathy with potentially some underlying dementia, concerning for Lewy Body dementia.  She has been tried on several different medications/treatment plans over last months.  She was seen by psychiatry while at [facility] , now on asenapine, valproic acid, donepezil.  Due to concern for LBD, use of antipsychotics should be avoided, but could consider clonazepam for anxiety if needed to try to reduce risk of readmissions.     Clostridium difficile infection  New onset, while hospitalized.  On vancocin and probiotic.  Avoid antibiotics if possible.   -monitor for improvement in diarrhea    Oropharyngeal dysphagia  Noted to have dysphagia, family reported long history of coughing with meals.  Evaluated by speech therapy, who recommends honey thick liquids with pureed diet.  She was cleared to have free water between meals, as long as she cleans her teeth/mouth after meals.   -speech therapy to evaluate     Recurrent UTI  Patient with history of recurrent UTIs, most recently enterococcus.  It is observed that UTI symptoms include increased confusion and encephalopathy for her.     Type 2 diabetes mellitus with diabetic neuropathy, without long-term current use of insulin (H)  Neuropathy (H)  Blood sugars have been under good control; last A1C 6.1% in 6/2020; she is not currently on insulin or oral medications, she has some neuropathy and has been on gabapentin in the past-also not on this right now.       Benign essential hypertension  Not currently on any antihypertensives. BP today within normal limits.  -monitor vital signs     Acquired hypothyroidism  Last TSH 1.52 on 8/20/20, on levothyroxine 88 mcg Mon-Sat and 44 mcg  on Sun.  TSH normal.   -The current medical regimen is effective;  continue present plan and medications.      Chronic ischemic heart disease  Known history of CHF,  with most recent echo showing EF of 60-65% in 4/2020.  On no routine meds.  Compensated,  dyspnea, minimal edema.  Previously was on lasix 20 mg daily.   -monitor weights, edema, dyspnea     DDD (degenerative disc disease), lumbar  Spinal stenosis of lumbar region without neurogenic claudication  Per history, with some chronic pain.  No pain today and on no routine pain medications, no narcotics.     H/O deep venous thrombosis  Chronic anticoagulation  History of DVT, on chronic apixaban.        Orders written by provider at facility  1.  Change lactobacillus to acidophilus  2.  Labs on 9/9/20 to include:     CMP, CBC with diff    Total time spent with patient visit at the [facility] was 37 min including patient visit, review of past records and phone call to patient contact. Greater than 50% of total time spent with counseling and coordinating care due to multiple medical comorbid conditions .     Electronically signed by:  CLEMENCIA Ellis CNP         Sincerely,        CLEMENCIA Ellis CNP

## 2020-09-02 NOTE — PROGRESS NOTES
[facility]  PRIMARY CARE PROVIDER AND CLINIC:  Sandra Morales MD, [address] [facility] / [facility] [address]  Chief Complaint   Patient presents with     USP Acute     [place] Medical Record Number:  [id number]  Place of Service where encounter took place:  [facility] [[id number]]    Stefanie Velazquez  is a 75 year old  (1945), admitted to the above facility from  [facility]. Hospital stay 8/20/20 through 8/31/20..  Admitted to this facility for  rehab, medical management and nursing care.    HPI:    HPI information obtained from: facility chart records, facility staff, patient report and [facility] chart review.   Brief Summary of Hospital Course: Stefanie Velazquez has a past medical history of CVA, CAD S/P CABG x3 in 2002, SVT, DVT on apixaban, rectal cancer (with concern for lung nodules), hypertension, hyperlipidemia, DM2, hypothyroidism, GERD, RLS, RC on CPAP, chronic back pain, recurrent UTI, recurrent encephalopathy thought to be Lewy Body dementia.  S/he was admitted to the hospital with acute onset of fatigue, weakness, acute agitation, paranoia, hitting and kicking at staff at her assisted living facility, worsening encephalopathy and found to have enterococcus UTI.  It appears that her recurrent episodes of acute change in mental status are often related to UTI.  There has been concern that antipsychotic medications actually worsened her agitation, and when clear seems to have mild cognitive dysfunction. UTI was treated with bactrim, ampicillinS/he underwent evaluation by psych who recommended current regimen of asenapine, valproic acid, donepezil.  Head MRI showing small acute infarct R temp lobe as well as chronic small vessel ischemic disease . The hospital stay was complicated with noted aspiration (likely chronic), and she is now on honey thick liquids with pureed food but OK for free water between meals.  Her hospital stay  was complicated with development of diarrhea and found to have CDiff infection, now on oral vancomycin and BID probiotic.     She was living with her sister at an assisted living facility, that sister recently .  She is no longer able to be cared for at her assisted living facility, and will plan long term admission at skilled nursing facility, completing a quarantine of 14 days at the TCU first.  Updates on Status Since Skilled nursing Admission: Stefanie reports no new concerns, slept well last night.  She has no complaints of chest pain, cough, congestion, or dyspnea.        CODE STATUS/ADVANCE DIRECTIVES DISCUSSION:   DNR / DNI  Patient's living condition: lives in an assisted living facility  ALLERGIES: Cefepime; Ciprofloxacin; Hydrocodone; Lisinopril; and Vicodin [hydrocodone-acetaminophen]  PAST MEDICAL HISTORY:  has a past medical history of Acute deep vein thrombosis (DVT) of right lower extremity, unspecified vein (H) (10/31/2018), Acute myocardial infarction of other specified sites, episode of care unspecified (2002), Altered mental status (2020), Cancer of colon (H), Cellulitis of lower extremity, bilateral (2016), Chronic ischemic heart disease, unspecified (2003), Confusion (2020), Coronary atherosclerosis of unspecified type of vessel, native or graft, Diabetes mellitus (H), Esophageal reflux, Fracture of femur, distal, left, closed (H) (2013), Gastro-oesophageal reflux disease, Generalized osteoarthrosis, unspecified site (2005), Generalized osteoarthrosis, unspecified site (2005), HEARING LOSS CONDUCTIVE, COMBINED TYPE (2005), HYPOTHYROIDISM  (2003), Mixed hyperlipidemia (2005), Obesity, unspecified (2005), RC-moderate (AHI 12, LSat 60%); REM RDI-73 (2009), Recurrent acute deep vein thrombosis (DVT) of both lower extremities (H) (3/31/2019), Rube[place], Stented coronary artery, Type 2 diabetes mellitus with diabetic nephropathy, with  long-term current use of insulin (H) (6/27/2017), Type II or unspecified type diabetes mellitus with renal manifestations, uncontrolled(250.42) (H) (6/22/2005), Type II or unspecified type diabetes mellitus with renal manifestations, uncontrolled(250.42) (H) (6/22/2005), Unspecified disorder resulting from impaired renal function (6/22/2005), and Unspecified essential hypertension. She also has no past medical history of PONV (postoperative nausea and vomiting) or Walking and running.  PAST SURGICAL HISTORY:   has a past surgical history that includes surgical history of -  (10/24/2002); surgical history of -  (2002); surgical history of -  (2002); Colonoscopy (1/26/2011); orthopedic surgery; cabg; Insert port vascular access (3/2/2011); Colectomy low anterior (6/21/2011); Takedown ileostomy (9/9/2011); Sigmoidoscopy flexible (9/9/2011); Open reduction internal fixation femur distal (2/12/2013); Colonoscopy (N/A, 3/1/2016); Phacoemulsification with standard intraocular lens implant (Left, 1/22/2018); Phacoemulsification with standard intraocular lens implant (Right, 2/19/2018); and Anesthesia out of OR MRI (N/A, 4/8/2020).  FAMILY HISTORY: family history includes Breast Cancer in her sister; C.A.D. in her sister; Diabetes in her sister.  SOCIAL HISTORY:   reports that she has quit smoking. She has never used smokeless tobacco. She reports previous alcohol use. She reports that she does not use drugs.    Post Discharge Medication Reconciliation Status: discharge medications reconciled and changed, per note/orders    Current Outpatient Medications   Medication Sig Dispense Refill     asenapine (SAPHRIS) 5 MG SUBL sublingual tablet Place 1 tablet (5 mg) under the tongue 2 times daily       benzonatate (TESSALON) 100 MG capsule Take 1 capsule (100 mg) by mouth 3 times daily as needed for cough       donepezil (ARICEPT) 5 MG tablet Take 1 tablet (5 mg) by mouth At Bedtime 30 tablet 0     fluocinonide (LIDEX) 0.05 %  ointment Apply sparingly to rash on legs twice daily as needed.  Do not apply to face. 60 g 11     fluticasone (FLONASE) 50 MCG/ACT spray Spray 1 spray into both nostrils daily 1 Bottle 3     Lactobacillus Acid-Pectin (LACTOBACILLUS ACIDOPHILUS) TABS Take 1 tablet by mouth 2 times daily       miconazole with skin protectant (ALVIN ANTIFUNGAL) 2 % CREA cream Apply topically 2 times daily       pramipexole (MIRAPEX) 0.25 MG tablet One 1-2 hours prior to HS, and q 8 hrs prn day (Patient taking differently: Take 0.25 mg by mouth At Bedtime Give 1-2 hours PRIOR to bedtime) 60 tablet 3     simvastatin (ZOCOR) 40 MG tablet Take 1 tablet (40 mg) by mouth daily 90 tablet 2     valproic acid (DEPAKENE) 250 MG/5ML syrup Take 5 mLs (250 mg) by mouth daily       vancomycin (FIRVANQ) 50 MG/ML oral solution Take 2.5 mLs (125 mg) by mouth 4 times daily for 10 days       ACCU-CHEK MILVIA MELODY dispense one meter 1 device 0     aspirin (ASA) 81 MG tablet Take 81 mg by mouth daily       blood glucose monitoring (ACCU-CHEK MILVIA) test strip Use to test blood sugars 4 times daily or as directed. 360 each 1     cholecalciferol 1000 units TABS Take 1,000 Units by mouth daily       Continuous Blood Gluc  (FREESTYLE JAJA 14 DAY READER) MELODY 1 each as needed (use to scan the sensor to check the blood sugars) 1 Device 0     Continuous Blood Gluc Sensor (FREESTYLE JAJA 14 DAY SENSOR) MISC 1 each every 14 days 2 each 11     ELIQUIS ANTICOAGULANT 5 MG tablet Take 1 tablet by mouth 2 times daily       insulin pen needle (BD GABRIELLA U/F) 32G X 4 MM Use 4 times per day or as directed. 120 each 5     levothyroxine (SYNTHROID/LEVOTHROID) 88 MCG tablet Take 44 mcg by mouth every 7 days (0.5 x 88 mcg) On Sunday       levothyroxine (SYNTHROID/LEVOTHROID) 88 MCG tablet Take 88 mcg by mouth daily Except on Sunday       order for DME Equipment being ordered:   Incontinence Products: Gloves (4 Boxes) & chux pads 300 each 11     order for DME Equipment  being ordered: Wheelchair 1 Device 0     order for DME Equipment being ordered: Incontinence briefs/Depends 12 Package 11     order for DME Equipment being ordered:  order for XL Size  Pull ups.  Pt is currently using 12 pull ups a day 350 each 11     order for DME Equipment being ordered: Compression stockings 2 Device 0     order for DME Equipment being ordered: Hospital Bed service and repair 1 each 0     prochlorperazine (COMPAZINE) 10 MG tablet Take 10 mg by mouth every 6 hours as needed for nausea or vomiting           ROS:  Stefanie Velazquez complains of no pain, no chest pain or pressure, no shortness of breath. Sleep is good last night, appetite fair. Stefanie Velazquez does complain of bowel changes, having reports of diarrhea. S/he does not complain of bladder changes.  All other systems reviewed and are negative unless otherwise noted in HPI.     Vitals:  /82   Pulse 108   Temp 96.9  F (36.1  C)   Resp 18   Wt 87.7 kg (193 lb 4.8 oz)   SpO2 94%   BMI 29.39 kg/m    Exam:  GENERAL APPEARANCE:  Alert, in no distress   HEAD:  Normal, normocephalic, atraumatic  ENT:  Mouth and posterior oropharynx normal, moist mucous membranes, hearing acuity - hard of hearing-uses pocket talker  EYE EXAM:  EOM, conjunctivae, lids, pupils and irises normal   CHEST/RESP:  respiratory effort normal, no respiratory distress, lung sounds CTA    CV:  Rate and rhythm reg, no murmur/rub/gallop, no peripheral edema  M/S:   extremities normal, gait abnormal-requires assist of 1 with transfers and all ADLs, digits and nails within normal limits   NEUROLOGIC EXAM: Normal gross motor movement, tone and coordination. No tremor. Cranial nerves 2-12 are normal tested and grossly at patient's baseline  PSYCH:  Alert and confused     Lab/Diagnostic data:  Recent labs in EPIC reviewed by me today.     ASSESSMENT/PLAN:  Acute metabolic encephalopathy  Lewy body dementia with behavioral disturbance (H)  Patient with history of recurrent  encephalopathy with potentially some underlying dementia, concerning for Lewy Body dementia.  She has been tried on several different medications/treatment plans over last months.  She was seen by psychiatry while at [facility] , now on asenapine, valproic acid, donepezil.  Due to concern for LBD, use of antipsychotics should be avoided, but could consider clonazepam for anxiety if needed to try to reduce risk of readmissions.     Clostridium difficile infection  New onset, while hospitalized.  On vancocin and probiotic.  Avoid antibiotics if possible.   -monitor for improvement in diarrhea    Oropharyngeal dysphagia  Noted to have dysphagia, family reported long history of coughing with meals.  Evaluated by speech therapy, who recommends honey thick liquids with pureed diet.  She was cleared to have free water between meals, as long as she cleans her teeth/mouth after meals.   -speech therapy to evaluate     Recurrent UTI  Patient with history of recurrent UTIs, most recently enterococcus.  It is observed that UTI symptoms include increased confusion and encephalopathy for her.     Type 2 diabetes mellitus with diabetic neuropathy, without long-term current use of insulin (H)  Neuropathy (H)  Blood sugars have been under good control; last A1C 6.1% in 6/2020; she is not currently on insulin or oral medications, she has some neuropathy and has been on gabapentin in the past-also not on this right now.       Benign essential hypertension  Not currently on any antihypertensives. BP today within normal limits.  -monitor vital signs     Acquired hypothyroidism  Last TSH 1.52 on 8/20/20, on levothyroxine 88 mcg Mon-Sat and 44 mcg  on Sun.  TSH normal.   -The current medical regimen is effective;  continue present plan and medications.      Chronic ischemic heart disease  Known history of CHF, with most recent echo showing EF of 60-65% in 4/2020.  On no routine meds.  Compensated,  dyspnea, minimal  edema.  Previously was on lasix 20 mg daily.   -monitor weights, edema, dyspnea     DDD (degenerative disc disease), lumbar  Spinal stenosis of lumbar region without neurogenic claudication  Per history, with some chronic pain.  No pain today and on no routine pain medications, no narcotics.     H/O deep venous thrombosis  Chronic anticoagulation  History of DVT, on chronic apixaban.        Orders written by provider at facility  1.  Change lactobacillus to acidophilus  2.  Labs on 9/9/20 to include:     CMP, CBC with diff    Total time spent with patient visit at the skilled nursing facility was 37 min including patient visit, review of past records and phone call to patient contact. Greater than 50% of total time spent with counseling and coordinating care due to multiple medical comorbid conditions .     Electronically signed by:  CLEMENCIA Ellis CNP

## 2020-09-02 NOTE — PROGRESS NOTES
[facility] Care Coordination Contact    No longer active with [facility] community case management effective 9/1/2020.  Reason for community disenrollment: Move to LTC - [facility] d/t behavioral issues.    [facility] notified of move.    Komal Krishnan RN-[facility]   [phone]

## 2020-09-03 ENCOUNTER — TELEPHONE (OUTPATIENT)
Dept: FAMILY MEDICINE | Facility: CLINIC | Age: 75
End: 2020-09-03

## 2020-09-03 DIAGNOSIS — F02.818 LEWY BODY DEMENTIA WITH BEHAVIORAL DISTURBANCE (H): ICD-10-CM

## 2020-09-03 DIAGNOSIS — G31.83 LEWY BODY DEMENTIA WITH BEHAVIORAL DISTURBANCE (H): ICD-10-CM

## 2020-09-03 DIAGNOSIS — C20 RECTAL CANCER (H): Primary | Chronic | ICD-10-CM

## 2020-09-07 ENCOUNTER — TELEPHONE (OUTPATIENT)
Dept: GERIATRICS | Facility: CLINIC | Age: 75
End: 2020-09-07

## 2020-09-07 DIAGNOSIS — A04.72 C. DIFFICILE COLITIS: Primary | ICD-10-CM

## 2020-09-07 RX ORDER — VANCOMYCIN HYDROCHLORIDE 250 MG/5ML
2.5 POWDER, FOR SOLUTION ORAL 4 TIMES DAILY
Qty: 50 ML | Refills: 0 | Status: SHIPPED | OUTPATIENT
Start: 2020-09-07 | End: 2020-09-09

## 2020-09-07 NOTE — TELEPHONE ENCOUNTER
Facility needing more liquid Vancomycin.  They have about 3 doses left and need 10 more.    Will send a script to [facility] via [employer].    Vancomycin 250mg/5ml give 2.5ml po QID that ends with the 7pm dose on 9/10/2020    Electronically signed by Renetta Trivedi RN, CNP

## 2020-09-09 ENCOUNTER — NURSING HOME VISIT (OUTPATIENT)
Dept: GERIATRICS | Facility: CLINIC | Age: 75
End: 2020-09-09
Payer: COMMERCIAL

## 2020-09-09 ENCOUNTER — HOSPITAL LABORATORY (OUTPATIENT)
Facility: OTHER | Age: 75
End: 2020-09-09

## 2020-09-09 VITALS
DIASTOLIC BLOOD PRESSURE: 63 MMHG | HEART RATE: 58 BPM | SYSTOLIC BLOOD PRESSURE: 166 MMHG | TEMPERATURE: 98 F | RESPIRATION RATE: 17 BRPM | BODY MASS INDEX: 29.1 KG/M2 | WEIGHT: 191.4 LBS | OXYGEN SATURATION: 96 %

## 2020-09-09 DIAGNOSIS — G93.41 ACUTE METABOLIC ENCEPHALOPATHY: ICD-10-CM

## 2020-09-09 DIAGNOSIS — E11.40 TYPE 2 DIABETES MELLITUS WITH DIABETIC NEUROPATHY, WITHOUT LONG-TERM CURRENT USE OF INSULIN (H): ICD-10-CM

## 2020-09-09 DIAGNOSIS — A04.72 C. DIFFICILE COLITIS: Primary | ICD-10-CM

## 2020-09-09 LAB
ALBUMIN SERPL-MCNC: 2.1 G/DL (ref 3.4–5)
ALP SERPL-CCNC: 151 U/L (ref 40–150)
ALT SERPL W P-5'-P-CCNC: 21 U/L (ref 0–50)
ANION GAP SERPL CALCULATED.3IONS-SCNC: 5 MMOL/L (ref 3–14)
AST SERPL W P-5'-P-CCNC: 35 U/L (ref 0–45)
BASOPHILS # BLD AUTO: 0.1 10E9/L (ref 0–0.2)
BASOPHILS NFR BLD AUTO: 1.2 %
BILIRUB SERPL-MCNC: 0.3 MG/DL (ref 0.2–1.3)
BUN SERPL-MCNC: 18 MG/DL (ref 7–30)
CALCIUM SERPL-MCNC: 8.2 MG/DL (ref 8.5–10.1)
CHLORIDE SERPL-SCNC: 109 MMOL/L (ref 94–109)
CO2 SERPL-SCNC: 26 MMOL/L (ref 20–32)
CREAT SERPL-MCNC: 1.4 MG/DL (ref 0.52–1.04)
DIFFERENTIAL METHOD BLD: ABNORMAL
EOSINOPHIL # BLD AUTO: 0.3 10E9/L (ref 0–0.7)
EOSINOPHIL NFR BLD AUTO: 6.5 %
ERYTHROCYTE [DISTWIDTH] IN BLOOD BY AUTOMATED COUNT: 13.3 % (ref 10–15)
GFR SERPL CREATININE-BSD FRML MDRD: 36 ML/MIN/{1.73_M2}
GLUCOSE SERPL-MCNC: 143 MG/DL (ref 70–99)
HCT VFR BLD AUTO: 35.2 % (ref 35–47)
HGB BLD-MCNC: 11.6 G/DL (ref 11.7–15.7)
IMM GRANULOCYTES # BLD: 0 10E9/L (ref 0–0.4)
IMM GRANULOCYTES NFR BLD: 0.5 %
LYMPHOCYTES # BLD AUTO: 1.1 10E9/L (ref 0.8–5.3)
LYMPHOCYTES NFR BLD AUTO: 24.8 %
MCH RBC QN AUTO: 29.7 PG (ref 26.5–33)
MCHC RBC AUTO-ENTMCNC: 33 G/DL (ref 31.5–36.5)
MCV RBC AUTO: 90 FL (ref 78–100)
MONOCYTES # BLD AUTO: 0.5 10E9/L (ref 0–1.3)
MONOCYTES NFR BLD AUTO: 10.9 %
NEUTROPHILS # BLD AUTO: 2.4 10E9/L (ref 1.6–8.3)
NEUTROPHILS NFR BLD AUTO: 56.1 %
NRBC # BLD AUTO: 0 10*3/UL
NRBC BLD AUTO-RTO: 0 /100
PLATELET # BLD AUTO: 277 10E9/L (ref 150–450)
POTASSIUM SERPL-SCNC: 4.2 MMOL/L (ref 3.4–5.3)
PROT SERPL-MCNC: 6 G/DL (ref 6.8–8.8)
RBC # BLD AUTO: 3.9 10E12/L (ref 3.8–5.2)
SODIUM SERPL-SCNC: 140 MMOL/L (ref 133–144)
WBC # BLD AUTO: 4.3 10E9/L (ref 4–11)

## 2020-09-09 PROCEDURE — 99309 SBSQ NF CARE MODERATE MDM 30: CPT | Performed by: NURSE PRACTITIONER

## 2020-09-09 RX ORDER — ROSUVASTATIN CALCIUM 10 MG/1
10 TABLET, COATED ORAL DAILY
COMMUNITY
End: 2020-11-16

## 2020-09-09 NOTE — PROGRESS NOTES
[facility]  [place] Medical Record Number:  [id number]  Place of Service where encounter took place:  [facility] ([facility] [[id number]]  Chief Complaint   Patient presents with     Nursing Home Acute       HPI:    Stefanie Velazquez  is a 75 year old (1945), who is being seen today for an episodic care visit.  HPI information obtained from: facility chart records, facility staff, patient report and [facility] chart review. Today's concern is:     C. difficile colitis  Acute metabolic encephalopathy  Type 2 diabetes mellitus with diabetic neuropathy, without long-term current use of insulin (H)     Stefanie reports she continues to have diarrhea, due to complete vanco this week.  Nursing reports today, bowels were putty like in consistency today.  She reports reflux is better since starting omeprazole BID. She also reports she does not want her blood sugar checked so frequently, she is not on insulin/anti diabetic drugs       Past Medical and Surgical History reviewed in Epic today.    MEDICATIONS:    Current Outpatient Medications   Medication Sig Dispense Refill     asenapine (SAPHRIS) 5 MG SUBL sublingual tablet Place 1 tablet (5 mg) under the tongue 2 times daily       benzonatate (TESSALON) 100 MG capsule Take 1 capsule (100 mg) by mouth 3 times daily as needed for cough       donepezil (ARICEPT) 5 MG tablet Take 1 tablet (5 mg) by mouth At Bedtime 30 tablet 0     fluocinonide (LIDEX) 0.05 % ointment Apply sparingly to rash on legs twice daily as needed.  Do not apply to face. 60 g 11     fluticasone (FLONASE) 50 MCG/ACT spray Spray 1 spray into both nostrils daily 1 Bottle 3     pramipexole (MIRAPEX) 0.25 MG tablet One 1-2 hours prior to HS, and q 8 hrs prn day (Patient taking differently: Take 0.25 mg by mouth At Bedtime Give 1-2 hours PRIOR to bedtime) 60 tablet 3     rosuvastatin (CRESTOR) 10 MG tablet Take 10 mg by mouth daily       valproic acid (DEPAKENE) 250 MG/5ML syrup  Take 5 mLs (250 mg) by mouth daily       vancomycin (FIRVANQ) 50 MG/ML oral solution Take 2.5 mLs (125 mg) by mouth 4 times daily for 10 days       ACCU-CHEK MILVIA MELODY dispense one meter 1 device 0     aspirin (ASA) 81 MG tablet Take 81 mg by mouth daily       blood glucose monitoring (ACCU-CHEK MILVIA) test strip Use to test blood sugars 4 times daily or as directed. 360 each 1     cholecalciferol 1000 units TABS Take 1,000 Units by mouth daily       Continuous Blood Gluc  (FREESTYLE JAJA 14 DAY READER) MELODY 1 each as needed (use to scan the sensor to check the blood sugars) 1 Device 0     Continuous Blood Gluc Sensor (FREESTYLE JAJA 14 DAY SENSOR) MISC 1 each every 14 days 2 each 11     ELIQUIS ANTICOAGULANT 5 MG tablet Take 1 tablet by mouth 2 times daily       insulin pen needle (BD GABRIELLA U/F) 32G X 4 MM Use 4 times per day or as directed. 120 each 5     Lactobacillus Acid-Pectin (LACTOBACILLUS ACIDOPHILUS) TABS Take 1 tablet by mouth 2 times daily       levothyroxine (SYNTHROID/LEVOTHROID) 88 MCG tablet Take 44 mcg by mouth every 7 days (0.5 x 88 mcg) On Sunday       levothyroxine (SYNTHROID/LEVOTHROID) 88 MCG tablet Take 88 mcg by mouth daily Except on Sunday       order for DME Equipment being ordered:   Incontinence Products: Gloves (4 Boxes) & chux pads 300 each 11     order for DME Equipment being ordered: Wheelchair 1 Device 0     order for DME Equipment being ordered: Incontinence briefs/Depends 12 Package 11     order for DME Equipment being ordered:  order for XL Size  Pull ups.  Pt is currently using 12 pull ups a day 350 each 11     order for DME Equipment being ordered: Compression stockings 2 Device 0     order for DME Equipment being ordered: Hospital Bed service and repair 1 each 0     prochlorperazine (COMPAZINE) 10 MG tablet Take 10 mg by mouth every 6 hours as needed for nausea or vomiting         REVIEW OF SYSTEMS:  Limited secondary to cognitive impairment but today pt reports no  new concerns, starting to feel better    Objective:  BP (!) 166/63   Pulse 58   Temp 98  F (36.7  C)   Resp 17   Wt 86.8 kg (191 lb 6.4 oz)   SpO2 96%   BMI 29.10 kg/m    Exam:  GENERAL APPEARANCE:  Alert, in no distress   HEAD:  Normal, normocephalic, atraumatic  ENT:  Mouth and posterior oropharynx normal, moist mucous membranes, hearing acuity - very hard of hearing, even with pocket talker  EYE EXAM:  EOM, conjunctivae, lids, pupils and irises normal   CHEST/RESP:  respiratory effort normal, no respiratory distress, lung sounds CTA    CV:  Rate and rhythm reg, no murmur/rub/gallop, no peripheral edema  M/S:   extremities normal, gait abnormal-weak, limited ambulation, digits and nails within normal limits   NEUROLOGIC EXAM: Normal gross motor movement, tone and coordination. No tremor. Cranial nerves 2-12 are normal tested and grossly at patient's baseline  PSYCH:  Alert and oriented to self and surroundings, affect pleasant      Labs:     Most Recent 3 CBC's:  Recent Labs   Lab Test 09/09/20  0950 08/29/20  0801 08/28/20  0615 08/25/20  0635   WBC 4.3 6.6  --  2.9*   HGB 11.6* 13.4  --  12.5   MCV 90 93  --  89    193 164 188     Most Recent 3 BMP's:  Recent Labs   Lab Test 09/09/20  0950 08/31/20  0604 08/30/20  0853    146* 142   POTASSIUM 4.2 5.0 4.5   CHLORIDE 109 115* 108   CO2 26 26 26   BUN 18 23 24   CR 1.40* 1.48* 1.88*   ANIONGAP 5 5 8   DWIGHT 8.2* 7.2* 8.0*   * 155* 148*       ASSESSMENT/PLAN:  C. difficile colitis  Patient with ongoing diarrhea, not yet resolved.  Completing 10 day course of vanco tomorrow.  -extend length of vanco x7 more days    Acute metabolic encephalopathy  Encephalopathy resolving.  She is alert, able to make needs known.      Type 2 diabetes mellitus with diabetic neuropathy, without long-term current use of insulin (H)  Fasting blood sugars trending 90 - 150, on NO meds. The current medical regimen is effective;  continue present plan and medications.     -ok to decrease blood sugar to daily       Orders written by provider at facility  1.  Continue vancomycin oral solution 2.5 ml (125 mg) QID x 7 more days for ongoing diarrhea assoc with cdiff infection      Electronically signed by:  CLEMENCIA Ellis CNP

## 2020-09-09 NOTE — LETTER
9/9/2020        RE: Stefanie Velazquez  [address]  [address]  [facility] [address]        [place] [facility]  [facility] Record Number:  [id number]  Place of Service where encounter took place:  [facility] ([facility] [[id number]]  Chief Complaint   Patient presents with     Nursing Home Acute       HPI:    Stefanie Velazquez  is a 75 year old (1945), who is being seen today for an episodic care visit.  HPI information obtained from: facility chart records, facility staff, patient report and [facility] chart review. Today's concern is:     C. difficile colitis  Acute metabolic encephalopathy  Type 2 diabetes mellitus with diabetic neuropathy, without long-term current use of insulin (H)     Stefanie reports she continues to have diarrhea, due to complete vanco this week.  Nursing reports today, bowels were putty like in consistency today.  She reports reflux is better since starting omeprazole BID. She also reports she does not want her blood sugar checked so frequently, she is not on insulin/anti diabetic drugs       Past Medical and Surgical History reviewed in Epic today.    MEDICATIONS:    Current Outpatient Medications   Medication Sig Dispense Refill     asenapine (SAPHRIS) 5 MG SUBL sublingual tablet Place 1 tablet (5 mg) under the tongue 2 times daily       benzonatate (TESSALON) 100 MG capsule Take 1 capsule (100 mg) by mouth 3 times daily as needed for cough       donepezil (ARICEPT) 5 MG tablet Take 1 tablet (5 mg) by mouth At Bedtime 30 tablet 0     fluocinonide (LIDEX) 0.05 % ointment Apply sparingly to rash on legs twice daily as needed.  Do not apply to face. 60 g 11     fluticasone (FLONASE) 50 MCG/ACT spray Spray 1 spray into both nostrils daily 1 Bottle 3     pramipexole (MIRAPEX) 0.25 MG tablet One 1-2 hours prior to HS, and q 8 hrs prn day (Patient taking differently: Take 0.25 mg by mouth At Bedtime Give 1-2 hours PRIOR to bedtime) 60 tablet 3      rosuvastatin (CRESTOR) 10 MG tablet Take 10 mg by mouth daily       valproic acid (DEPAKENE) 250 MG/5ML syrup Take 5 mLs (250 mg) by mouth daily       vancomycin (FIRVANQ) 50 MG/ML oral solution Take 2.5 mLs (125 mg) by mouth 4 times daily for 10 days       ACCU-CHEK MILVIA MELODY dispense one meter 1 device 0     aspirin (ASA) 81 MG tablet Take 81 mg by mouth daily       blood glucose monitoring (ACCU-CHEK MILVIA) test strip Use to test blood sugars 4 times daily or as directed. 360 each 1     cholecalciferol 1000 units TABS Take 1,000 Units by mouth daily       Continuous Blood Gluc  (FREESTYLE JAJA 14 DAY READER) MELODY 1 each as needed (use to scan the sensor to check the blood sugars) 1 Device 0     Continuous Blood Gluc Sensor (FREESTYLE JAJA 14 DAY SENSOR) MISC 1 each every 14 days 2 each 11     ELIQUIS ANTICOAGULANT 5 MG tablet Take 1 tablet by mouth 2 times daily       insulin pen needle (BD GABRIELLA U/F) 32G X 4 MM Use 4 times per day or as directed. 120 each 5     Lactobacillus Acid-Pectin (LACTOBACILLUS ACIDOPHILUS) TABS Take 1 tablet by mouth 2 times daily       levothyroxine (SYNTHROID/LEVOTHROID) 88 MCG tablet Take 44 mcg by mouth every 7 days (0.5 x 88 mcg) On Sunday       levothyroxine (SYNTHROID/LEVOTHROID) 88 MCG tablet Take 88 mcg by mouth daily Except on Sunday       order for DME Equipment being ordered:   Incontinence Products: Gloves (4 Boxes) & chux pads 300 each 11     order for DME Equipment being ordered: Wheelchair 1 Device 0     order for DME Equipment being ordered: Incontinence briefs/Depends 12 Package 11     order for DME Equipment being ordered:  order for XL Size  Pull ups.  Pt is currently using 12 pull ups a day 350 each 11     order for DME Equipment being ordered: Compression stockings 2 Device 0     order for DME Equipment being ordered: Hospital Bed service and repair 1 each 0     prochlorperazine (COMPAZINE) 10 MG tablet Take 10 mg by mouth every 6 hours as needed for  nausea or vomiting         REVIEW OF SYSTEMS:  Limited secondary to cognitive impairment but today pt reports no new concerns, starting to feel better    Objective:  BP (!) 166/63   Pulse 58   Temp 98  F (36.7  C)   Resp 17   Wt 86.8 kg (191 lb 6.4 oz)   SpO2 96%   BMI 29.10 kg/m    Exam:  GENERAL APPEARANCE:  Alert, in no distress   HEAD:  Normal, normocephalic, atraumatic  ENT:  Mouth and posterior oropharynx normal, moist mucous membranes, hearing acuity - very hard of hearing, even with pocket talker  EYE EXAM:  EOM, conjunctivae, lids, pupils and irises normal   CHEST/RESP:  respiratory effort normal, no respiratory distress, lung sounds CTA    CV:  Rate and rhythm reg, no murmur/rub/gallop, no peripheral edema  M/S:   extremities normal, gait abnormal-weak, limited ambulation, digits and nails within normal limits   NEUROLOGIC EXAM: Normal gross motor movement, tone and coordination. No tremor. Cranial nerves 2-12 are normal tested and grossly at patient's baseline  PSYCH:  Alert and oriented to self and surroundings, affect pleasant      Labs:     Most Recent 3 CBC's:  Recent Labs   Lab Test 09/09/20  0950 08/29/20  0801 08/28/20  0615 08/25/20  0635   WBC 4.3 6.6  --  2.9*   HGB 11.6* 13.4  --  12.5   MCV 90 93  --  89    193 164 188     Most Recent 3 BMP's:  Recent Labs   Lab Test 09/09/20  0950 08/31/20  0604 08/30/20  0853    146* 142   POTASSIUM 4.2 5.0 4.5   CHLORIDE 109 115* 108   CO2 26 26 26   BUN 18 23 24   CR 1.40* 1.48* 1.88*   ANIONGAP 5 5 8   DWIGHT 8.2* 7.2* 8.0*   * 155* 148*       ASSESSMENT/PLAN:  C. difficile colitis  Patient with ongoing diarrhea, not yet resolved.  Completing 10 day course of vanco tomorrow.  -extend length of vanco x7 more days    Acute metabolic encephalopathy  Encephalopathy resolving.  She is alert, able to make needs known.      Type 2 diabetes mellitus with diabetic neuropathy, without long-term current use of insulin (H)  Fasting blood sugars  trending 90 - 150, on NO meds. The current medical regimen is effective;  continue present plan and medications.    -ok to decrease blood sugar to daily       Orders written by provider at facility  1.  Continue vancomycin oral solution 2.5 ml (125 mg) QID x 7 more days for ongoing diarrhea assoc with cdiff infection      Electronically signed by:  CLEMENCIA Ellis CNP           Sincerely,        CLEMENCIA Ellis CNP

## 2020-09-11 ENCOUNTER — HOSPITAL LABORATORY (OUTPATIENT)
Facility: OTHER | Age: 75
End: 2020-09-11

## 2020-09-12 LAB
SARS-COV-2 RNA SPEC QL NAA+PROBE: NOT DETECTED
SPECIMEN SOURCE: NORMAL

## 2020-09-14 ENCOUNTER — NURSING HOME VISIT (OUTPATIENT)
Dept: GERIATRICS | Facility: CLINIC | Age: 75
End: 2020-09-14
Payer: COMMERCIAL

## 2020-09-14 VITALS
BODY MASS INDEX: 29.1 KG/M2 | RESPIRATION RATE: 20 BRPM | WEIGHT: 191.4 LBS | DIASTOLIC BLOOD PRESSURE: 65 MMHG | OXYGEN SATURATION: 95 % | HEART RATE: 56 BPM | SYSTOLIC BLOOD PRESSURE: 135 MMHG | TEMPERATURE: 97.7 F

## 2020-09-14 DIAGNOSIS — G31.83 LEWY BODY DEMENTIA WITH BEHAVIORAL DISTURBANCE (H): ICD-10-CM

## 2020-09-14 DIAGNOSIS — G93.41 ACUTE METABOLIC ENCEPHALOPATHY: ICD-10-CM

## 2020-09-14 DIAGNOSIS — M25.552 HIP PAIN, LEFT: ICD-10-CM

## 2020-09-14 DIAGNOSIS — F02.818 LEWY BODY DEMENTIA WITH BEHAVIORAL DISTURBANCE (H): ICD-10-CM

## 2020-09-14 DIAGNOSIS — M25.562 ACUTE PAIN OF LEFT KNEE: ICD-10-CM

## 2020-09-14 DIAGNOSIS — A04.72 C. DIFFICILE COLITIS: Primary | ICD-10-CM

## 2020-09-14 PROCEDURE — 99309 SBSQ NF CARE MODERATE MDM 30: CPT | Performed by: NURSE PRACTITIONER

## 2020-09-14 NOTE — LETTER
9/14/2020        RE: Stefanie Velazquez  [address]  [address]  [facility] [address]        [place] [facility]  [place] Medical Record Number:  [id number]  Place of Service where encounter took place:  [facility] [place] [[id number]]  Chief Complaint   Patient presents with     Nursing Home Acute       HPI:    Stefanie Velazquez  is a 75 year old (1945), who is being seen today for an episodic care visit.  HPI information obtained from: facility chart records, facility staff, patient report and [facility] chart review. Today's concern is:     C. difficile colitis  Lewy body dementia with behavioral disturbance (H)  Acute metabolic encephalopathy  Hip pain, left  Acute pain of left knee     Stefanie reports she has pain in the L hip, L knee and thinks she suffered an injury when pivot transferring at some point over last several days.  She reports she has a aron in the L femur after an accident several years ago, never has pain this bad, and now less able to transfer. Therapy reports she has walked up to 15 ft with 2w rolling walker, is independent in bed mobility.  Nursing reports she continues to have diarrhea occasionally, but is improving.  She remains mostly incontinent of bowel and bladder, so remains on contact precautions.   reports she has difficulty completing cognitive testing, unable to complete BIMS test.   She has remained appropriate and pleasant with personal cares.     Past Medical and Surgical History reviewed in Epic today.    MEDICATIONS:    Current Outpatient Medications   Medication Sig Dispense Refill     asenapine (SAPHRIS) 5 MG SUBL sublingual tablet Place 1 tablet (5 mg) under the tongue 2 times daily       aspirin (ASA) 81 MG tablet Take 81 mg by mouth daily       benzonatate (TESSALON) 100 MG capsule Take 1 capsule (100 mg) by mouth 3 times daily as needed for cough       cholecalciferol 1000 units TABS Take 1,000 Units by mouth daily        donepezil (ARICEPT) 5 MG tablet Take 1 tablet (5 mg) by mouth At Bedtime 30 tablet 0     ELIQUIS ANTICOAGULANT 5 MG tablet Take 1 tablet by mouth 2 times daily       fluticasone (FLONASE) 50 MCG/ACT spray Spray 1 spray into both nostrils daily 1 Bottle 3     Lactobacillus Acid-Pectin (LACTOBACILLUS ACIDOPHILUS) TABS Take 1 tablet by mouth 2 times daily       levothyroxine (SYNTHROID/LEVOTHROID) 88 MCG tablet Take 44 mcg by mouth every 7 days (0.5 x 88 mcg) On Sunday       levothyroxine (SYNTHROID/LEVOTHROID) 88 MCG tablet Take 88 mcg by mouth daily Except on Sunday       pramipexole (MIRAPEX) 0.25 MG tablet One 1-2 hours prior to HS, and q 8 hrs prn day (Patient taking differently: Take 0.25 mg by mouth At Bedtime Give 1-2 hours PRIOR to bedtime) 60 tablet 3     prochlorperazine (COMPAZINE) 10 MG tablet Take 10 mg by mouth every 6 hours as needed for nausea or vomiting       rosuvastatin (CRESTOR) 10 MG tablet Take 10 mg by mouth daily       valproic acid (DEPAKENE) 250 MG/5ML syrup Take 5 mLs (250 mg) by mouth daily       ACCU-CHEK MILVIA MELODY dispense one meter 1 device 0     blood glucose monitoring (ACCU-CHEK MILVIA) test strip Use to test blood sugars 4 times daily or as directed. 360 each 1     Continuous Blood Gluc  (FREESTYLE JAJA 14 DAY READER) MELODY 1 each as needed (use to scan the sensor to check the blood sugars) 1 Device 0     Continuous Blood Gluc Sensor (FREESTYLE JAJA 14 DAY SENSOR) MISC 1 each every 14 days 2 each 11     fluocinonide (LIDEX) 0.05 % ointment Apply sparingly to rash on legs twice daily as needed.  Do not apply to face. 60 g 11     insulin pen needle (BD GABRIELLA U/F) 32G X 4 MM Use 4 times per day or as directed. 120 each 5     order for DME Equipment being ordered:   Incontinence Products: Gloves (4 Boxes) & chux pads 300 each 11     order for DME Equipment being ordered: Wheelchair 1 Device 0     order for DME Equipment being ordered: Incontinence briefs/Depends 12 Package 11      order for DME Equipment being ordered:  order for XL Size  Pull ups.  Pt is currently using 12 pull ups a day 350 each 11     order for DME Equipment being ordered: Compression stockings 2 Device 0     order for DME Equipment being ordered: Hospital Bed service and repair 1 each 0     REVIEW OF SYSTEMS:  4 point ROS including Respiratory, CV, GI and , other than that noted in the HPI,  is negative    Objective:  /65   Pulse 56   Temp 97.7  F (36.5  C)   Resp 20   Wt 86.8 kg (191 lb 6.4 oz)   SpO2 95%   BMI 29.10 kg/m    Exam:  GENERAL APPEARANCE:  Alert, in no acute distress   HEAD:  Normal, normocephalic, atraumatic  ENT:  Mouth and posterior oropharynx normal, moist mucous membranes, hearing acuity - very hard of hearing   EYE EXAM:  EOM, conjunctivae, lids, pupils and irises normal   CHEST/RESP:  respiratory effort normal, no respiratory distress, lung sounds CTA    CV:  Rate and rhythm reg, no murmur/rub/gallop, no peripheral edema  M/S:   extremities normal, gait abnormal-walking only a few feet with rolling walker, digits and nails within normal limits , L hip with adequate complete ROM, L knee with adequate ROM.  She has pain to pressure on L thigh-at site of distal incision which is very well healed and throughout thigh. Also noted some pain in buttocks but no evidence of radiation of nerve pain down the leg.   NEUROLOGIC EXAM: Normal gross motor movement, tone and coordination. No tremor. Cranial nerves 2-12 are normal tested and grossly at patient's baseline  PSYCH:  Alert and oriented to self and surroundings with forgetfulness, SLUMS 18/30, affect pleasant today     Labs:   Recent labs in [facility] reviewed by me today.     ASSESSMENT/PLAN:  C. difficile colitis  Patient diagnosis with CDiff Colitis during last hospital stay, started on vanco for 10 day course.  Now nearing end of 10 day course and still with some diarrhea.  She is also often incontinent.  She does also have history of  rectal cancer, this likely contributes to chronic bowel incontinence and diarrhea. Vanco course extended x 7 more days, now due to end on 9/17/20.     Lewy body dementia with behavioral disturbance (H)  Acute metabolic encephalopathy  She was started on asenapine BID during hospital stay, has had no episodes of agitation, behaviors associated with dementia.  She often becomes frustrated when she cannot hear well and cannot answer questions-she refused to answer questions of BIMS test but has completed other portions of cognitive testing, with SLUMS 18/30, ACL 4.2/5.6, MOCA 20/30.  She was previously living in assisted living facility and is unable to return there.  Goal to remain now in long term care, may consider hospice enrollment if appropriate upon discharge from TCU.     Hip pain, left  Acute pain of left knee  Patient with new onset of L hip/knee pain.  She repots this became worse after pivot turning to transfer.  She was able to walk a short distance with therapy today.  She has adequate ROM of both L hip/knee so an injury to the joint or fracture is unlikely.  Out of caution, will get xray to confirm placement of known hardware.  She does have significant pain with pressure to L thigh, may consider scheduled tylenol first, gabapentin if pain continues.       Orders written by provider at facility  1.  Xray to L hip and L knee today due to increased pain and difficulty with ambulating      Electronically signed by:  CLEMENCIA Ellis CNP           Sincerely,        CLEMENCIA Ellis CNP

## 2020-09-14 NOTE — PROGRESS NOTES
[place] [facility]  [facility] Medical Record Number:  [id number]  Place of Service where encounter took place:  [facility] ([facility] [[id number]]  Chief Complaint   Patient presents with     Nursing Home Acute       HPI:    Stefanie Velazquez  is a 75 year old (1945), who is being seen today for an episodic care visit.  HPI information obtained from: facility chart records, facility staff, patient report and [facility] chart review. Today's concern is:     C. difficile colitis  Lewy body dementia with behavioral disturbance (H)  Acute metabolic encephalopathy  Hip pain, left  Acute pain of left knee     Stefanie reports she has pain in the L hip, L knee and thinks she suffered an injury when pivot transferring at some point over last several days.  She reports she has a aron in the L femur after an accident several years ago, never has pain this bad, and now less able to transfer. Therapy reports she has walked up to 15 ft with 2w rolling walker, is independent in bed mobility.  Nursing reports she continues to have diarrhea occasionally, but is improving.  She remains mostly incontinent of bowel and bladder, so remains on contact precautions.   reports she has difficulty completing cognitive testing, unable to complete BIMS test.   She has remained appropriate and pleasant with personal cares.     Past Medical and Surgical History reviewed in Epic today.    MEDICATIONS:    Current Outpatient Medications   Medication Sig Dispense Refill     asenapine (SAPHRIS) 5 MG SUBL sublingual tablet Place 1 tablet (5 mg) under the tongue 2 times daily       aspirin (ASA) 81 MG tablet Take 81 mg by mouth daily       benzonatate (TESSALON) 100 MG capsule Take 1 capsule (100 mg) by mouth 3 times daily as needed for cough       cholecalciferol 1000 units TABS Take 1,000 Units by mouth daily       donepezil (ARICEPT) 5 MG tablet Take 1 tablet (5 mg) by mouth At Bedtime 30 tablet 0     ELIQUIS  ANTICOAGULANT 5 MG tablet Take 1 tablet by mouth 2 times daily       fluticasone (FLONASE) 50 MCG/ACT spray Spray 1 spray into both nostrils daily 1 Bottle 3     Lactobacillus Acid-Pectin (LACTOBACILLUS ACIDOPHILUS) TABS Take 1 tablet by mouth 2 times daily       levothyroxine (SYNTHROID/LEVOTHROID) 88 MCG tablet Take 44 mcg by mouth every 7 days (0.5 x 88 mcg) On Sunday       levothyroxine (SYNTHROID/LEVOTHROID) 88 MCG tablet Take 88 mcg by mouth daily Except on Sunday       pramipexole (MIRAPEX) 0.25 MG tablet One 1-2 hours prior to HS, and q 8 hrs prn day (Patient taking differently: Take 0.25 mg by mouth At Bedtime Give 1-2 hours PRIOR to bedtime) 60 tablet 3     prochlorperazine (COMPAZINE) 10 MG tablet Take 10 mg by mouth every 6 hours as needed for nausea or vomiting       rosuvastatin (CRESTOR) 10 MG tablet Take 10 mg by mouth daily       valproic acid (DEPAKENE) 250 MG/5ML syrup Take 5 mLs (250 mg) by mouth daily       ACCU-CHEK MILVIA MELODY dispense one meter 1 device 0     blood glucose monitoring (ACCU-CHEK MILVIA) test strip Use to test blood sugars 4 times daily or as directed. 360 each 1     Continuous Blood Gluc  (FREESTYLE JAJA 14 DAY READER) MELODY 1 each as needed (use to scan the sensor to check the blood sugars) 1 Device 0     Continuous Blood Gluc Sensor (FREESTYLE JAJA 14 DAY SENSOR) MISC 1 each every 14 days 2 each 11     fluocinonide (LIDEX) 0.05 % ointment Apply sparingly to rash on legs twice daily as needed.  Do not apply to face. 60 g 11     insulin pen needle (BD GABRIELLA U/F) 32G X 4 MM Use 4 times per day or as directed. 120 each 5     order for DME Equipment being ordered:   Incontinence Products: Gloves (4 Boxes) & chux pads 300 each 11     order for DME Equipment being ordered: Wheelchair 1 Device 0     order for DME Equipment being ordered: Incontinence briefs/Depends 12 Package 11     order for DME Equipment being ordered:  order for XL Size  Pull ups.  Pt is currently using 12  pull ups a day 350 each 11     order for DME Equipment being ordered: Compression stockings 2 Device 0     order for DME Equipment being ordered: Hospital Bed service and repair 1 each 0     REVIEW OF SYSTEMS:  4 point ROS including Respiratory, CV, GI and , other than that noted in the HPI,  is negative    Objective:  /65   Pulse 56   Temp 97.7  F (36.5  C)   Resp 20   Wt 86.8 kg (191 lb 6.4 oz)   SpO2 95%   BMI 29.10 kg/m    Exam:  GENERAL APPEARANCE:  Alert, in no acute distress   HEAD:  Normal, normocephalic, atraumatic  ENT:  Mouth and posterior oropharynx normal, moist mucous membranes, hearing acuity - very hard of hearing   EYE EXAM:  EOM, conjunctivae, lids, pupils and irises normal   CHEST/RESP:  respiratory effort normal, no respiratory distress, lung sounds CTA    CV:  Rate and rhythm reg, no murmur/rub/gallop, no peripheral edema  M/S:   extremities normal, gait abnormal-walking only a few feet with rolling walker, digits and nails within normal limits , L hip with adequate complete ROM, L knee with adequate ROM.  She has pain to pressure on L thigh-at site of distal incision which is very well healed and throughout thigh. Also noted some pain in buttocks but no evidence of radiation of nerve pain down the leg.   NEUROLOGIC EXAM: Normal gross motor movement, tone and coordination. No tremor. Cranial nerves 2-12 are normal tested and grossly at patient's baseline  PSYCH:  Alert and oriented to self and surroundings with forgetfulness, SLUMS 18/30, affect pleasant today     Labs:   Recent labs in [facility] reviewed by me today.     ASSESSMENT/PLAN:  C. difficile colitis  Patient diagnosis with CDiff Colitis during last hospital stay, started on vanco for 10 day course.  Now nearing end of 10 day course and still with some diarrhea.  She is also often incontinent.  She does also have history of rectal cancer, this likely contributes to chronic bowel incontinence and diarrhea. Vanco course extended  x 7 more days, now due to end on 9/17/20.     Lewy body dementia with behavioral disturbance (H)  Acute metabolic encephalopathy  She was started on asenapine BID during hospital stay, has had no episodes of agitation, behaviors associated with dementia.  She often becomes frustrated when she cannot hear well and cannot answer questions-she refused to answer questions of BIMS test but has completed other portions of cognitive testing, with SLUMS 18/30, ACL 4.2/5.6, MOCA 20/30.  She was previously living in assisted living facility and is unable to return there.  Goal to remain now in long term care, may consider hospice enrollment if appropriate upon discharge from TCU.     Hip pain, left  Acute pain of left knee  Patient with new onset of L hip/knee pain.  She repots this became worse after pivot turning to transfer.  She was able to walk a short distance with therapy today.  She has adequate ROM of both L hip/knee so an injury to the joint or fracture is unlikely.  Out of caution, will get xray to confirm placement of known hardware.  She does have significant pain with pressure to L thigh, may consider scheduled tylenol first, gabapentin if pain continues.       Orders written by provider at facility  1.  Xray to L hip and L knee today due to increased pain and difficulty with ambulating      Electronically signed by:  CLEMENCIA Ellis CNP

## 2020-09-15 ENCOUNTER — VIRTUAL VISIT (OUTPATIENT)
Dept: GERIATRICS | Facility: CLINIC | Age: 75
End: 2020-09-15
Payer: COMMERCIAL

## 2020-09-15 VITALS
DIASTOLIC BLOOD PRESSURE: 65 MMHG | SYSTOLIC BLOOD PRESSURE: 137 MMHG | WEIGHT: 191.4 LBS | RESPIRATION RATE: 18 BRPM | TEMPERATURE: 95.7 F | HEART RATE: 80 BPM | OXYGEN SATURATION: 96 % | BODY MASS INDEX: 29.1 KG/M2

## 2020-09-15 DIAGNOSIS — E11.22 TYPE 2 DIABETES MELLITUS WITH STAGE 3 CHRONIC KIDNEY DISEASE, WITHOUT LONG-TERM CURRENT USE OF INSULIN (H): ICD-10-CM

## 2020-09-15 DIAGNOSIS — N18.30 TYPE 2 DIABETES MELLITUS WITH STAGE 3 CHRONIC KIDNEY DISEASE, WITHOUT LONG-TERM CURRENT USE OF INSULIN (H): ICD-10-CM

## 2020-09-15 DIAGNOSIS — R13.12 OROPHARYNGEAL DYSPHAGIA: ICD-10-CM

## 2020-09-15 DIAGNOSIS — F02.818 LEWY BODY DEMENTIA WITH BEHAVIORAL DISTURBANCE (H): ICD-10-CM

## 2020-09-15 DIAGNOSIS — Z51.5 HOSPICE CARE PATIENT: ICD-10-CM

## 2020-09-15 DIAGNOSIS — Z86.718 H/O DEEP VENOUS THROMBOSIS: ICD-10-CM

## 2020-09-15 DIAGNOSIS — A04.72 C. DIFFICILE COLITIS: Primary | ICD-10-CM

## 2020-09-15 DIAGNOSIS — I25.9 CHRONIC ISCHEMIC HEART DISEASE: ICD-10-CM

## 2020-09-15 DIAGNOSIS — G31.83 LEWY BODY DEMENTIA WITH BEHAVIORAL DISTURBANCE (H): ICD-10-CM

## 2020-09-15 PROCEDURE — 99207 ZZC CDG-MDM COMPONENT: MEETS MODERATE - UP CODED: CPT | Performed by: FAMILY MEDICINE

## 2020-09-15 PROCEDURE — 99306 1ST NF CARE HIGH MDM 50: CPT | Mod: GW | Performed by: FAMILY MEDICINE

## 2020-09-15 NOTE — PROGRESS NOTES
" [place] [facility]  Stefanie Velazquez is being evaluated via a billable video.   The patient has been notified of following:  \"This video visit will be conducted via a call between you and your provider. We have found that certain health care needs can be provided without the need for an in-person physical exam.  This service lets us provide the care you need with a video conversation. If during the course of the call the provider feels a video visit is not appropriate, you will not be charged for this service.\"   The provider has received verbal consent for a Video Visit from the patient and or first contact? Yes  Patient/facility staff would like the video invitation sent by: N/A   Video Start Time: 12:28  Which Facility the Patient is at during the time of visit: [facility]  PRIMARY CARE PROVIDER AND CLINIC:  Sandra Morales MD, [address] [place] [id number]  Chief Complaint   Patient presents with     Video Visit     Hospital F/U     [place] [facility] Medical Record Number:  [id number]  Stefanie Velazquez  is a 75 year old  (1945), [facility]. Hospital stay 8/20/20 through 8/31/20 Admitted to this facility for  rehab, medical management and nursing care.    HPI:    HPI information obtained from: facility staff, patient report, [facility] chart review and GNP.   Brief Summary of Hospital Course:   Pt with PMH notable for LBD, CABG, rectal cancer, hospitalzied with metabolic encephalopathy 2/2 enterococcus UTI treated with abx.   - Psych consulted recommended asenapine, valproic acid and donepezil.  - Evaluated by SLP for aspiration (? Chronic) started on dysphagia diet  - C-diff started on vancomycin po and probiotic.     Update since admission TCU:  - GNP reports no behavior concern   - vancomycin extended from 10 days to 17 days for ongoing diarrhea  - Left hip.knee pain, Xray ordered.       Today concern:  - GNP reports pt will be moving to LTC, and considering " "hospice enrollment.   - Pt seen in the presence of RN who graciously assisted with the virtual visit-  # C-diff: RN reports some BI, but not diarrhea.  Pt reports  Did see nugget like consistency yesterday, however, at times has watery , and sometimes is pudding.   # dysphagia: pt reports swallowing is  Very good.   # Rehab: Pt reports overall rehab is going well, getting stronger, able to stand longer.   # T2D: reports appetite is very good, \" she is getting  Tired of pureed  Diet.   # left hip.knee: RN reports x-ray was done. ' I could not put a step on my left leg\", reports was able to stand up for a 3 minutes, but could not lift up the right leg b/c could not put a wt on the left side. endorses  A kind of a sharp pain, but it getting better.       =====================================================    CODE STATUS/ADVANCE DIRECTIVES DISCUSSION:   DNR / DNI  Patient's living condition: lives in an assisted living facility  ALLERGIES: Cefepime; Ciprofloxacin; Hydrocodone; Lisinopril; and Vicodin [hydrocodone-acetaminophen]  PAST MEDICAL HISTORY:  has a past medical history of Acute deep vein thrombosis (DVT) of right lower extremity, unspecified vein (H) (10/31/2018), Acute myocardial infarction of other specified sites, episode of care unspecified (6/2002), Altered mental status (8/11/2020), Cancer of colon (H), Cellulitis of lower extremity, bilateral (9/30/2016), Chronic ischemic heart disease, unspecified (6/22/2003), Confusion (8/20/2020), Coronary atherosclerosis of unspecified type of vessel, native or graft, Diabetes mellitus (H), Esophageal reflux, Fracture of femur, distal, left, closed (H) (2/11/2013), Gastro-oesophageal reflux disease, Generalized osteoarthrosis, unspecified site (6/22/2005), Generalized osteoarthrosis, unspecified site (6/22/2005), HEARING LOSS CONDUCTIVE, COMBINED TYPE (6/22/2005), HYPOTHYROIDISM  (6/22/2003), Mixed hyperlipidemia (6/22/2005), Obesity, unspecified (6/22/2005), " RC-moderate (AHI 12, LSat 60%); REM RDI-73 (6/1/2009), Recurrent acute deep vein thrombosis (DVT) of both lower extremities (H) (3/31/2019), Rubeola, Stented coronary artery, Type 2 diabetes mellitus with diabetic nephropathy, with long-term current use of insulin (H) (6/27/2017), Type II or unspecified type diabetes mellitus with renal manifestations, uncontrolled(250.42) (H) (6/22/2005), Type II or unspecified type diabetes mellitus with renal manifestations, uncontrolled(250.42) (H) (6/22/2005), Unspecified disorder resulting from impaired renal function (6/22/2005), and Unspecified essential hypertension. She also has no past medical history of PONV (postoperative nausea and vomiting) or Walking and running.  PAST SURGICAL HISTORY:   has a past surgical history that includes surgical history of -  (10/24/2002); surgical history of -  (2002); surgical history of -  (2002); Colonoscopy (1/26/2011); orthopedic surgery; cabg; Insert port vascular access (3/2/2011); Colectomy low anterior (6/21/2011); Takedown ileostomy (9/9/2011); Sigmoidoscopy flexible (9/9/2011); Open reduction internal fixation femur distal (2/12/2013); Colonoscopy (N/A, 3/1/2016); Phacoemulsification with standard intraocular lens implant (Left, 1/22/2018); Phacoemulsification with standard intraocular lens implant (Right, 2/19/2018); and Anesthesia out of OR MRI (N/A, 4/8/2020).  FAMILY HISTORY: family history includes Breast Cancer in her sister; C.A.D. in her sister; Diabetes in her sister.  SOCIAL HISTORY:   reports that she has quit smoking. She has never used smokeless tobacco. She reports previous alcohol use. She reports that she does not use drugs.  Current Outpatient Medications   Medication Sig Dispense Refill     ACCU-CHEK MILVIA MELODY dispense one meter 1 device 0     asenapine (SAPHRIS) 5 MG SUBL sublingual tablet Place 1 tablet (5 mg) under the tongue 2 times daily       aspirin (ASA) 81 MG tablet Take 81 mg by mouth daily        benzonatate (TESSALON) 100 MG capsule Take 1 capsule (100 mg) by mouth 3 times daily as needed for cough       blood glucose monitoring (ACCU-CHEK MILVIA) test strip Use to test blood sugars 4 times daily or as directed. 360 each 1     cholecalciferol 1000 units TABS Take 1,000 Units by mouth daily       Continuous Blood Gluc  (FREESTYLE JAJA 14 DAY READER) MELODY 1 each as needed (use to scan the sensor to check the blood sugars) 1 Device 0     Continuous Blood Gluc Sensor (FREESTYLE JAJA 14 DAY SENSOR) MISC 1 each every 14 days 2 each 11     donepezil (ARICEPT) 5 MG tablet Take 1 tablet (5 mg) by mouth At Bedtime 30 tablet 0     ELIQUIS ANTICOAGULANT 5 MG tablet Take 1 tablet by mouth 2 times daily       fluocinonide (LIDEX) 0.05 % ointment Apply sparingly to rash on legs twice daily as needed.  Do not apply to face. 60 g 11     fluticasone (FLONASE) 50 MCG/ACT spray Spray 1 spray into both nostrils daily 1 Bottle 3     insulin pen needle (BD GABRIELLA U/F) 32G X 4 MM Use 4 times per day or as directed. 120 each 5     Lactobacillus Acid-Pectin (LACTOBACILLUS ACIDOPHILUS) TABS Take 1 tablet by mouth 2 times daily       levothyroxine (SYNTHROID/LEVOTHROID) 88 MCG tablet Take 44 mcg by mouth every 7 days (0.5 x 88 mcg) On Sunday       levothyroxine (SYNTHROID/LEVOTHROID) 88 MCG tablet Take 88 mcg by mouth daily Except on Sunday       order for DME Equipment being ordered:   Incontinence Products: Gloves (4 Boxes) & chux pads 300 each 11     order for DME Equipment being ordered: Wheelchair 1 Device 0     order for DME Equipment being ordered: Incontinence briefs/Depends 12 Package 11     order for DME Equipment being ordered:  order for XL Size  Pull ups.  Pt is currently using 12 pull ups a day 350 each 11     order for DME Equipment being ordered: Compression stockings 2 Device 0     order for DME Equipment being ordered: Hospital Bed service and repair 1 each 0     pramipexole (MIRAPEX) 0.25 MG tablet One 1-2  hours prior to HS, and q 8 hrs prn day (Patient taking differently: Take 0.25 mg by mouth At Bedtime Give 1-2 hours PRIOR to bedtime) 60 tablet 3     prochlorperazine (COMPAZINE) 10 MG tablet Take 10 mg by mouth every 6 hours as needed for nausea or vomiting       rosuvastatin (CRESTOR) 10 MG tablet Take 10 mg by mouth daily       valproic acid (DEPAKENE) 250 MG/5ML syrup Take 5 mLs (250 mg) by mouth daily       vancomycin (FIRST) 50 MG/ML SOLN Take 125 mg by mouth 4 times daily       discharge medications reconciled and changed, per note/orders    ROS: 10 point ROS of systems including Constitutional, Eyes, Respiratory, Cardiovascular, Gastroenterology, Genitourinary, Integumentary, Musculoskeletal, Psychiatric were all negative except for pertinent positives noted in my HPI.  Vitals:/65   Pulse 80   Temp 95.7  F (35.4  C)   Resp 18   Wt 86.8 kg (191 lb 6.4 oz)   SpO2 96%   BMI 29.10 kg/m     Limited Visit Exam done given COVID-19 precautions:  GENERAL APPEARANCE:  in no distress  RESP:  unlabored breathing  M/S:   no joint deformity noted  SKIN:  no rash noted  NEURO:   no purposeful movement in upper and lower extremities  PSYCH:  affect and mood normal    Lab/Diagnostic data: Reviewed in the chart and EHR.      ------------------------------  ASSESSMENT/PLAN:  Query LBD with recent metabolic encephalopathy 2/2 UTI and delirium  - on donepezil, valproic acid and aseanpine. Antipsychotic is recommended to be avoided.   - monitor LFTs and CBC (on valproic acid)    Clostridium difficile infection:  - vancomycin 125 mg QID, extended from 10 days to 17 days. unknown if this the first or recurrence episode. Improving.  If continued to have diarrhea, treat as 1st recurrence (125 mg po q12 hr x 7 days, then daily x 7 days, then q2-3 days x 2-8 weeks).       T2D, insulin dependent (H)   A1C 6.1 06/29/2020    A1C 6.9 03/16/2020   - over controlled. Recommended level is 8-9% given limited life expectancy.  -  In  this frail elderly adult with a limited life expectancy, the goal of  the management is to address the hyperglycemia sx if any,  rather than the  BGs numbers per se.       CKD GFR 30-59 ml/min (H): - Avoid nephrotoxic drugs. Renal dose the medications.       Hx of DVT: on eliquis.       Benign essential hypertension and CAD, hx of CABG: compensated.       Oropharyngeal dysphagia: on DD. Followed by SLP.     Hx of recurrent UTI: no concern.     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 Requiring extensive assistance from nursing. Up for meals only o/w spends the day resting in bed  - life expectancy is less than 6 months. Hospice appropriate.       Acquired hypothyroidism  TSH   Date Value Ref Range Status   08/20/2020 1.52 0.40 - 4.00 mU/L Final   - on the lower side for this age group. On Levothyroxine. In frail Elderly adult, recommended TSH level is around 6 providing patient is asymptomatic and FT4 is wnl- preferably on the lower normal level.     Order: See above, otherwise, continue the rest of the current POC.       Electronically signed by:  Radha Olivarez MD     Video-Visit Details  Type of service:  Video Visit  Video End Time (time video stopped): 12:37  Distant Location (provider location):  [facility]

## 2020-09-15 NOTE — LETTER
"    9/15/2020        RE: Stefanie Velazquez  [address]  [address]  [facility] [id number]         [facility]  Stefanie Velazquez is being evaluated via a billable video.   The patient has been notified of following:  \"This video visit will be conducted via a call between you and your provider. We have found that certain health care needs can be provided without the need for an in-person physical exam.  This service lets us provide the care you need with a video conversation. If during the course of the call the provider feels a video visit is not appropriate, you will not be charged for this service.\"   The provider has received verbal consent for a Video Visit from the patient and or first contact? Yes  Patient/facility staff would like the video invitation sent by: N/A   Video Start Time: 12:28  Which Facility the Patient is at during the time of visit: [facility]  PRIMARY CARE PROVIDER AND CLINIC:  Sandra Morales MD, [address] [place] [phone]  Chief Complaint   Patient presents with     Video Visit     Hospital F/U     [facility] Medical Record Number:  [id number]  Stefanie Velazquez  is a 75 year old  (1945), [facility]. Hospital stay 8/20/20 through 8/31/20 Admitted to this facility for  rehab, medical management and nursing care.    HPI:    HPI information obtained from: facility staff, patient report, [facility] chart review and GNP.   Brief Summary of Hospital Course:   Pt with PMH notable for LBD, CABG, rectal cancer, hospitalzied with metabolic encephalopathy 2/2 enterococcus UTI treated with abx.   - Psych consulted recommended asenapine, valproic acid and donepezil.  - Evaluated by SLP for aspiration (? Chronic) started on dysphagia diet  - C-diff started on vancomycin po and probiotic.     Update since admission TCU:  - GNP reports no behavior concern   - vancomycin extended from 10 days to 17 days for ongoing diarrhea  - Left hip.knee " "pain, Xray ordered.       Today concern:  - GNP reports pt will be moving to LTC, and considering hospice enrollment.   - Pt seen in the presence of RN who graciously assisted with the virtual visit-  # C-diff: RN reports some BI, but not diarrhea.  Pt reports  Did see nugget like consistency yesterday, however, at times has watery , and sometimes is pudding.   # dysphagia: pt reports swallowing is  Very good.   # Rehab: Pt reports overall rehab is going well, getting stronger, able to stand longer.   # T2D: reports appetite is very good, \" she is getting  Tired of pureed  Diet.   # left hip.knee: RN reports x-ray was done. ' I could not put a step on my left leg\", reports was able to stand up for a 3 minutes, but could not lift up the right leg b/c could not put a wt on the left side. endorses  A kind of a sharp pain, but it getting better.       =====================================================    CODE STATUS/ADVANCE DIRECTIVES DISCUSSION:   DNR / DNI  Patient's living condition: lives in an assisted living facility  ALLERGIES: Cefepime; Ciprofloxacin; Hydrocodone; Lisinopril; and Vicodin [hydrocodone-acetaminophen]  PAST MEDICAL HISTORY:  has a past medical history of Acute deep vein thrombosis (DVT) of right lower extremity, unspecified vein (H) (10/31/2018), Acute myocardial infarction of other specified sites, episode of care unspecified (6/2002), Altered mental status (8/11/2020), Cancer of colon (H), Cellulitis of lower extremity, bilateral (9/30/2016), Chronic ischemic heart disease, unspecified (6/22/2003), Confusion (8/20/2020), Coronary atherosclerosis of unspecified type of vessel, native or graft, Diabetes mellitus (H), Esophageal reflux, Fracture of femur, distal, left, closed (H) (2/11/2013), Gastro-oesophageal reflux disease, Generalized osteoarthrosis, unspecified site (6/22/2005), Generalized osteoarthrosis, unspecified site (6/22/2005), HEARING LOSS CONDUCTIVE, COMBINED TYPE (6/22/2005), " HYPOTHYROIDISM  (6/22/2003), Mixed hyperlipidemia (6/22/2005), Obesity, unspecified (6/22/2005), RC-moderate (AHI 12, LSat 60%); REM RDI-73 (6/1/2009), Recurrent acute deep vein thrombosis (DVT) of both lower extremities (H) (3/31/2019), Rubeola, Stented coronary artery, Type 2 diabetes mellitus with diabetic nephropathy, with long-term current use of insulin (H) (6/27/2017), Type II or unspecified type diabetes mellitus with renal manifestations, uncontrolled(250.42) (H) (6/22/2005), Type II or unspecified type diabetes mellitus with renal manifestations, uncontrolled(250.42) (H) (6/22/2005), Unspecified disorder resulting from impaired renal function (6/22/2005), and Unspecified essential hypertension. She also has no past medical history of PONV (postoperative nausea and vomiting) or Walking and running.  PAST SURGICAL HISTORY:   has a past surgical history that includes surgical history of -  (10/24/2002); surgical history of -  (2002); surgical history of -  (2002); Colonoscopy (1/26/2011); orthopedic surgery; cabg; Insert port vascular access (3/2/2011); Colectomy low anterior (6/21/2011); Takedown ileostomy (9/9/2011); Sigmoidoscopy flexible (9/9/2011); Open reduction internal fixation femur distal (2/12/2013); Colonoscopy (N/A, 3/1/2016); Phacoemulsification with standard intraocular lens implant (Left, 1/22/2018); Phacoemulsification with standard intraocular lens implant (Right, 2/19/2018); and Anesthesia out of OR MRI (N/A, 4/8/2020).  FAMILY HISTORY: family history includes Breast Cancer in her sister; C.A.D. in her sister; Diabetes in her sister.  SOCIAL HISTORY:   reports that she has quit smoking. She has never used smokeless tobacco. She reports previous alcohol use. She reports that she does not use drugs.  Current Outpatient Medications   Medication Sig Dispense Refill     ACCU-CHEK MILVIA MELODY dispense one meter 1 device 0     asenapine (SAPHRIS) 5 MG SUBL sublingual tablet Place 1 tablet (5 mg)  under the tongue 2 times daily       aspirin (ASA) 81 MG tablet Take 81 mg by mouth daily       benzonatate (TESSALON) 100 MG capsule Take 1 capsule (100 mg) by mouth 3 times daily as needed for cough       blood glucose monitoring (ACCU-CHEK MILVIA) test strip Use to test blood sugars 4 times daily or as directed. 360 each 1     cholecalciferol 1000 units TABS Take 1,000 Units by mouth daily       Continuous Blood Gluc  (FREESTYLE JAJA 14 DAY READER) MELODY 1 each as needed (use to scan the sensor to check the blood sugars) 1 Device 0     Continuous Blood Gluc Sensor (FREESTYLE JAJA 14 DAY SENSOR) MISC 1 each every 14 days 2 each 11     donepezil (ARICEPT) 5 MG tablet Take 1 tablet (5 mg) by mouth At Bedtime 30 tablet 0     ELIQUIS ANTICOAGULANT 5 MG tablet Take 1 tablet by mouth 2 times daily       fluocinonide (LIDEX) 0.05 % ointment Apply sparingly to rash on legs twice daily as needed.  Do not apply to face. 60 g 11     fluticasone (FLONASE) 50 MCG/ACT spray Spray 1 spray into both nostrils daily 1 Bottle 3     insulin pen needle (BD GABRIELLA U/F) 32G X 4 MM Use 4 times per day or as directed. 120 each 5     Lactobacillus Acid-Pectin (LACTOBACILLUS ACIDOPHILUS) TABS Take 1 tablet by mouth 2 times daily       levothyroxine (SYNTHROID/LEVOTHROID) 88 MCG tablet Take 44 mcg by mouth every 7 days (0.5 x 88 mcg) On Sunday       levothyroxine (SYNTHROID/LEVOTHROID) 88 MCG tablet Take 88 mcg by mouth daily Except on Sunday       order for DME Equipment being ordered:   Incontinence Products: Gloves (4 Boxes) & chux pads 300 each 11     order for DME Equipment being ordered: Wheelchair 1 Device 0     order for DME Equipment being ordered: Incontinence briefs/Depends 12 Package 11     order for DME Equipment being ordered:  order for XL Size  Pull ups.  Pt is currently using 12 pull ups a day 350 each 11     order for DME Equipment being ordered: Compression stockings 2 Device 0     order for DME Equipment being  ordered: Hospital Bed service and repair 1 each 0     pramipexole (MIRAPEX) 0.25 MG tablet One 1-2 hours prior to HS, and q 8 hrs prn day (Patient taking differently: Take 0.25 mg by mouth At Bedtime Give 1-2 hours PRIOR to bedtime) 60 tablet 3     prochlorperazine (COMPAZINE) 10 MG tablet Take 10 mg by mouth every 6 hours as needed for nausea or vomiting       rosuvastatin (CRESTOR) 10 MG tablet Take 10 mg by mouth daily       valproic acid (DEPAKENE) 250 MG/5ML syrup Take 5 mLs (250 mg) by mouth daily       vancomycin (FIRST) 50 MG/ML SOLN Take 125 mg by mouth 4 times daily       discharge medications reconciled and changed, per note/orders    ROS: 10 point ROS of systems including Constitutional, Eyes, Respiratory, Cardiovascular, Gastroenterology, Genitourinary, Integumentary, Musculoskeletal, Psychiatric were all negative except for pertinent positives noted in my HPI.  Vitals:/65   Pulse 80   Temp 95.7  F (35.4  C)   Resp 18   Wt 86.8 kg (191 lb 6.4 oz)   SpO2 96%   BMI 29.10 kg/m     Limited Visit Exam done given COVID-19 precautions:  GENERAL APPEARANCE:  in no distress  RESP:  unlabored breathing  M/S:   no joint deformity noted  SKIN:  no rash noted  NEURO:   no purposeful movement in upper and lower extremities  PSYCH:  affect and mood normal    Lab/Diagnostic data: Reviewed in the chart and EHR.      ------------------------------  ASSESSMENT/PLAN:  Query LBD with recent metabolic encephalopathy 2/2 UTI and delirium  - on donepezil, valproic acid and aseanpine. Antipsychotic is recommended to be avoided.   - monitor LFTs and CBC (on valproic acid)    Clostridium difficile infection:  - vancomycin 125 mg QID, extended from 10 days to 17 days. unknown if this the first or recurrence episode. Improving.  If continued to have diarrhea, treat as 1st recurrence (125 mg po q12 hr x 7 days, then daily x 7 days, then q2-3 days x 2-8 weeks).       T2D, insulin dependent (H)   A1C 6.1 06/29/2020    A1C  6.9 03/16/2020   - over controlled. Recommended level is 8-9% given limited life expectancy.  -  In this frail elderly adult with a limited life expectancy, the goal of  the management is to address the hyperglycemia sx if any,  rather than the  BGs numbers per se.       CKD GFR 30-59 ml/min (H): - Avoid nephrotoxic drugs. Renal dose the medications.       Hx of DVT: on eliquis.       Benign essential hypertension and CAD, hx of CABG: compensated.       Oropharyngeal dysphagia: on DD. Followed by SLP.     Hx of recurrent UTI: no concern.     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 Requiring extensive assistance from nursing. Up for meals only o/w spends the day resting in bed  - life expectancy is less than 6 months. Hospice appropriate.       Acquired hypothyroidism  TSH   Date Value Ref Range Status   08/20/2020 1.52 0.40 - 4.00 mU/L Final   - on the lower side for this age group. On Levothyroxine. In frail Elderly adult, recommended TSH level is around 6 providing patient is asymptomatic and FT4 is wnl- preferably on the lower normal level.     Order: See above, otherwise, continue the rest of the current POC.       Electronically signed by:  Radha Olivarez MD     Video-Visit Details  Type of service:  Video Visit  Video End Time (time video stopped): 12:37  Distant Location (provider location):  [facility]       Sincerely,        Radha Olivarez MD

## 2020-09-17 ENCOUNTER — HOSPITAL LABORATORY (OUTPATIENT)
Facility: OTHER | Age: 75
End: 2020-09-17

## 2020-09-17 ENCOUNTER — NURSING HOME VISIT (OUTPATIENT)
Dept: GERIATRICS | Facility: CLINIC | Age: 75
End: 2020-09-17
Payer: COMMERCIAL

## 2020-09-17 VITALS
DIASTOLIC BLOOD PRESSURE: 71 MMHG | HEART RATE: 69 BPM | TEMPERATURE: 97.4 F | SYSTOLIC BLOOD PRESSURE: 133 MMHG | BODY MASS INDEX: 27.86 KG/M2 | OXYGEN SATURATION: 94 % | RESPIRATION RATE: 17 BRPM | WEIGHT: 183.2 LBS

## 2020-09-17 DIAGNOSIS — M25.552 HIP PAIN, LEFT: ICD-10-CM

## 2020-09-17 DIAGNOSIS — G31.83 LEWY BODY DEMENTIA WITH BEHAVIORAL DISTURBANCE (H): ICD-10-CM

## 2020-09-17 DIAGNOSIS — I10 BENIGN ESSENTIAL HYPERTENSION: ICD-10-CM

## 2020-09-17 DIAGNOSIS — R13.12 OROPHARYNGEAL DYSPHAGIA: ICD-10-CM

## 2020-09-17 DIAGNOSIS — B37.0 THRUSH: ICD-10-CM

## 2020-09-17 DIAGNOSIS — A04.72 C. DIFFICILE COLITIS: Primary | ICD-10-CM

## 2020-09-17 DIAGNOSIS — M48.061 SPINAL STENOSIS OF LUMBAR REGION WITHOUT NEUROGENIC CLAUDICATION: ICD-10-CM

## 2020-09-17 DIAGNOSIS — F02.818 LEWY BODY DEMENTIA WITH BEHAVIORAL DISTURBANCE (H): ICD-10-CM

## 2020-09-17 DIAGNOSIS — E03.9 ACQUIRED HYPOTHYROIDISM: ICD-10-CM

## 2020-09-17 DIAGNOSIS — I25.9 CHRONIC ISCHEMIC HEART DISEASE: ICD-10-CM

## 2020-09-17 DIAGNOSIS — Z79.01 CHRONIC ANTICOAGULATION: ICD-10-CM

## 2020-09-17 DIAGNOSIS — Z86.718 H/O DEEP VENOUS THROMBOSIS: ICD-10-CM

## 2020-09-17 DIAGNOSIS — E11.40 TYPE 2 DIABETES MELLITUS WITH DIABETIC NEUROPATHY, WITHOUT LONG-TERM CURRENT USE OF INSULIN (H): ICD-10-CM

## 2020-09-17 PROCEDURE — 99309 SBSQ NF CARE MODERATE MDM 30: CPT | Performed by: NURSE PRACTITIONER

## 2020-09-17 RX ORDER — CALCIUM CARBONATE 500(1250)
1 TABLET ORAL 2 TIMES DAILY
COMMUNITY
End: 2020-11-06

## 2020-09-17 RX ORDER — ONDANSETRON 4 MG/1
TABLET, FILM COATED ORAL EVERY 6 HOURS PRN
COMMUNITY
End: 2020-11-16

## 2020-09-17 RX ORDER — ACETAMINOPHEN 325 MG/1
650 TABLET ORAL 3 TIMES DAILY PRN
COMMUNITY
Start: 2021-01-07 | End: 2021-01-01

## 2020-09-17 NOTE — LETTER
9/17/2020        RE: Stefanie Velazquez  [address]  [address]  [facility] [address]        [place] [facility]  [place] Medical Record Number:  [id number]  Place of Service where encounter took place:  [facility] ([facility] [[id number]]  Chief Complaint   Patient presents with     Nursing Home Acute       HPI:    Stefanie Velazquez  is a 75 year old (1945), who is being seen today for an episodic care visit.  HPI information obtained from: facility chart records, facility staff, patient report and [facility] chart review. Today's concern is:     C. difficile colitis  Lewy body dementia with behavioral disturbance (H)  Thrush  Type 2 diabetes mellitus with diabetic neuropathy, without long-term current use of insulin (H)  Spinal stenosis of lumbar region without neurogenic claudication  Hip pain, left  Chronic ischemic heart disease  Benign essential hypertension  Acquired hypothyroidism  H/O deep venous thrombosis  Chronic anticoagulation  Oropharyngeal dysphagia     Stefanie reports she has no pain today and is moving about in her bed without difficulty. She reports she is sleepy all the time, would like medications reviewed to see if there is a reason.  Nursing reports brownish hairy growth on her tongue.   Nursing notes she still is having some soft/putty/loose stools, has now completed her vancocin for cdiff infection.    reports she will be moving to LTC bed 9/18/2020 as she is not able to return to her previous assisted living facility.      Past Medical and Surgical History reviewed in Epic today.    MEDICATIONS:    Current Outpatient Medications   Medication Sig Dispense Refill     acetaminophen (TYLENOL) 325 MG tablet Take 650 mg by mouth 3 times daily       asenapine (SAPHRIS) 5 MG SUBL sublingual tablet Place 1 tablet (5 mg) under the tongue 2 times daily       aspirin (ASA) 81 MG tablet Take 81 mg by mouth daily       benzonatate (TESSALON) 100 MG capsule  Take 1 capsule (100 mg) by mouth 3 times daily as needed for cough       calcium carbonate (OS-DWIGHT) 500 MG tablet Take 1 tablet by mouth every 2 hours as needed       cholecalciferol 1000 units TABS Take 1,000 Units by mouth daily       donepezil (ARICEPT) 5 MG tablet Take 1 tablet (5 mg) by mouth At Bedtime 30 tablet 0     ELIQUIS ANTICOAGULANT 5 MG tablet Take 1 tablet by mouth 2 times daily       fluocinonide (LIDEX) 0.05 % ointment Apply sparingly to rash on legs twice daily as needed.  Do not apply to face. 60 g 11     fluticasone (FLONASE) 50 MCG/ACT spray Spray 1 spray into both nostrils daily 1 Bottle 3     Lactobacillus Acid-Pectin (LACTOBACILLUS ACIDOPHILUS) TABS Take 1 tablet by mouth 2 times daily       levothyroxine (SYNTHROID/LEVOTHROID) 88 MCG tablet Take 44 mcg by mouth every 7 days (0.5 x 88 mcg) On Sunday       levothyroxine (SYNTHROID/LEVOTHROID) 88 MCG tablet Take 88 mcg by mouth daily Except on Sunday       omeprazole (PRILOSEC) 20 MG DR capsule Take 20 mg by mouth 2 times daily       ondansetron (ZOFRAN) 4 MG tablet Take by mouth every 6 hours as needed       pramipexole (MIRAPEX) 0.25 MG tablet One 1-2 hours prior to HS, and q 8 hrs prn day (Patient taking differently: Take 0.25 mg by mouth At Bedtime Give 1-2 hours PRIOR to bedtime) 60 tablet 3     rosuvastatin (CRESTOR) 10 MG tablet Take 10 mg by mouth daily       ACCU-CHEK MILVIA MELODY dispense one meter 1 device 0     blood glucose monitoring (ACCU-CHEK MILVIA) test strip Use to test blood sugars 4 times daily or as directed. 360 each 1     Continuous Blood Gluc  (FREESTYLE JAJA 14 DAY READER) MELODY 1 each as needed (use to scan the sensor to check the blood sugars) 1 Device 0     Continuous Blood Gluc Sensor (FREESTYLE JAJA 14 DAY SENSOR) MISC 1 each every 14 days 2 each 11     insulin pen needle (BD GABRIELLA U/F) 32G X 4 MM Use 4 times per day or as directed. 120 each 5     order for DME Equipment being ordered:   Incontinence  Products: Gloves (4 Boxes) & chux pads 300 each 11     order for DME Equipment being ordered: Wheelchair 1 Device 0     order for DME Equipment being ordered: Incontinence briefs/Depends 12 Package 11     order for DME Equipment being ordered:  order for XL Size  Pull ups.  Pt is currently using 12 pull ups a day 350 each 11     order for DME Equipment being ordered: Compression stockings 2 Device 0     order for DME Equipment being ordered: Hospital Bed service and repair 1 each 0     REVIEW OF SYSTEMS:  4 point ROS including Respiratory, CV, GI and , other than that noted in the HPI,  is negative    Objective:  /71   Pulse 69   Temp 97.4  F (36.3  C)   Resp 17   Wt 83.1 kg (183 lb 3.2 oz)   SpO2 94%   BMI 27.86 kg/m    Exam:  GENERAL APPEARANCE:  Sleepy, in no distress   HEAD:  Normal, normocephalic, atraumatic  ENT:  Mouth and posterior oropharynx normal, moist, tongue with dark brown hairy growth which is not painful, hearing acuity - very hard of hearing   EYE EXAM:  EOM, conjunctivae, lids, pupils and irises normal   CHEST/RESP:  respiratory effort normal, no respiratory distress, lung sounds CTA    CV:  Rate and rhythm reg, no murmur/rub/gallop, no peripheral edema  M/S:   extremities normal, gait abnormal-walking only a short distance, digits and nails within normal limits   NEUROLOGIC EXAM: Normal gross motor movement, tone and coordination. No tremor. Cranial nerves 2-12 are normal tested and grossly at patient's baseline  PSYCH:  Alert and oriented to self and surroundings with forgetfulness , affect pleasant, no behaviors noted    SLUMS 18/30  MOCA 20/30  ACL 4.2/5.8  Unable to complete BIMS due to hard of hearing   Walking about 15 ft with 2w rolling walker      Labs:   Recent labs in EPIC reviewed by me today.     ASSESSMENT/PLAN:  C. difficile colitis  Patient has completed 16 day course of vancocin, still with some soft/loose/putty like stools.  She is often incontinent.  Unclear why  her stools are still so soft, not obviously still infected, still on lactobacillus as well.     Lewy body dementia with behavioral disturbance (H)  Noted to have history of agitation, hitting and kicking staff at previous assisted living facility , which prompted admission to [facility] where she was seen by psychiatry.  She was then started on asenapine, valproic acid, donepezil.  Her behaviors have moderated and she has had no episodes of agitation/inappropriate behaviors. Medication review indicates valproic acid may cause diarrhea as side effect.  She also notes significant daytime sleepiness, which may be caused by valproic acid as well.  -discontinue valproic acid  -monitor for behavior changes     Thrush  Noted on tongue  -nystatin S&S x7 days    Type 2 diabetes mellitus with diabetic neuropathy, without long-term current use of insulin (H)  Fasting blood sugars trending 96 - 122, on no medications, last A1C 6.1% in 6/2020. The current medical regimen is effective;  continue present plan with no medications.      Spinal stenosis of lumbar region without neurogenic claudication  Hip pain, left  Patient with known spinal stenosis, pain well controlled now with tylenol TID dosing.     Chronic ischemic heart disease  Benign essential hypertension  On aspirin, on no routine medications for hypertension. BP stable. No other new symptoms.   BP Readings from Last 3 Encounters:   09/17/20 133/71   09/15/20 137/65   09/14/20 135/65        Acquired hypothyroidism  Last TSH 1.52 on 8/20/20, on levothyroxine 88 mcg x6 and 44 mcg x 1 each week.  TSH normal.   -The current medical regimen is effective;  continue present plan and medications.      H/O deep venous thrombosis  Chronic anticoagulation  On apixaban without new symptoms, stable.     Oropharyngeal dysphagia  Chronic, ongoing.  On honey thick liquids with pureed diet. She is allowed to drink water between meals if good oral care is provided.       Orders  written by provider at facility  1.  Discontinue valproic acid  2.  Nystatin 100,000 unit(s)/ml, give 5 ml QID po x7 days Swish and Spit diagnosis oral thrush      Electronically signed by:  CLEMENCIA Ellis CNP         Sincerely,        CLEMENCIA Ellis CNP

## 2020-09-17 NOTE — PROGRESS NOTES
[place] [facility]  [place] Medical Record Number:  [id number]  Place of Service where encounter took place:  [facility] ([facility] [[id number]]  Chief Complaint   Patient presents with     Nursing Home Acute       HPI:    Stefanie Velazquez  is a 75 year old (1945), who is being seen today for an episodic care visit.  HPI information obtained from: facility chart records, facility staff, patient report and [facility] chart review. Today's concern is:     C. difficile colitis  Lewy body dementia with behavioral disturbance (H)  Thrush  Type 2 diabetes mellitus with diabetic neuropathy, without long-term current use of insulin (H)  Spinal stenosis of lumbar region without neurogenic claudication  Hip pain, left  Chronic ischemic heart disease  Benign essential hypertension  Acquired hypothyroidism  H/O deep venous thrombosis  Chronic anticoagulation  Oropharyngeal dysphagia     Stefanie reports she has no pain today and is moving about in her bed without difficulty. She reports she is sleepy all the time, would like medications reviewed to see if there is a reason.  Nursing reports brownish hairy growth on her tongue.   Nursing notes she still is having some soft/putty/loose stools, has now completed her vancocin for cdiff infection.    reports she will be moving to LTC bed 9/18/2020 as she is not able to return to her previous assisted living facility.      Past Medical and Surgical History reviewed in Epic today.    MEDICATIONS:    Current Outpatient Medications   Medication Sig Dispense Refill     acetaminophen (TYLENOL) 325 MG tablet Take 650 mg by mouth 3 times daily       asenapine (SAPHRIS) 5 MG SUBL sublingual tablet Place 1 tablet (5 mg) under the tongue 2 times daily       aspirin (ASA) 81 MG tablet Take 81 mg by mouth daily       benzonatate (TESSALON) 100 MG capsule Take 1 capsule (100 mg) by mouth 3 times daily as needed for cough       calcium carbonate (OS-DWIGHT) 500  MG tablet Take 1 tablet by mouth every 2 hours as needed       cholecalciferol 1000 units TABS Take 1,000 Units by mouth daily       donepezil (ARICEPT) 5 MG tablet Take 1 tablet (5 mg) by mouth At Bedtime 30 tablet 0     ELIQUIS ANTICOAGULANT 5 MG tablet Take 1 tablet by mouth 2 times daily       fluocinonide (LIDEX) 0.05 % ointment Apply sparingly to rash on legs twice daily as needed.  Do not apply to face. 60 g 11     fluticasone (FLONASE) 50 MCG/ACT spray Spray 1 spray into both nostrils daily 1 Bottle 3     Lactobacillus Acid-Pectin (LACTOBACILLUS ACIDOPHILUS) TABS Take 1 tablet by mouth 2 times daily       levothyroxine (SYNTHROID/LEVOTHROID) 88 MCG tablet Take 44 mcg by mouth every 7 days (0.5 x 88 mcg) On Sunday       levothyroxine (SYNTHROID/LEVOTHROID) 88 MCG tablet Take 88 mcg by mouth daily Except on Sunday       omeprazole (PRILOSEC) 20 MG DR capsule Take 20 mg by mouth 2 times daily       ondansetron (ZOFRAN) 4 MG tablet Take by mouth every 6 hours as needed       pramipexole (MIRAPEX) 0.25 MG tablet One 1-2 hours prior to HS, and q 8 hrs prn day (Patient taking differently: Take 0.25 mg by mouth At Bedtime Give 1-2 hours PRIOR to bedtime) 60 tablet 3     rosuvastatin (CRESTOR) 10 MG tablet Take 10 mg by mouth daily       ACCU-CHEK MILVIA MELODY dispense one meter 1 device 0     blood glucose monitoring (ACCU-CHEK MILVIA) test strip Use to test blood sugars 4 times daily or as directed. 360 each 1     Continuous Blood Gluc  (FREESTYLE JAJA 14 DAY READER) MELODY 1 each as needed (use to scan the sensor to check the blood sugars) 1 Device 0     Continuous Blood Gluc Sensor (FREESTYLE JAJA 14 DAY SENSOR) MISC 1 each every 14 days 2 each 11     insulin pen needle (BD GABRIELLA U/F) 32G X 4 MM Use 4 times per day or as directed. 120 each 5     order for DME Equipment being ordered:   Incontinence Products: Gloves (4 Boxes) & chux pads 300 each 11     order for DME Equipment being ordered: Wheelchair 1  Device 0     order for DME Equipment being ordered: Incontinence briefs/Depends 12 Package 11     order for DME Equipment being ordered:  order for XL Size  Pull ups.  Pt is currently using 12 pull ups a day 350 each 11     order for DME Equipment being ordered: Compression stockings 2 Device 0     order for DME Equipment being ordered: Hospital Bed service and repair 1 each 0     REVIEW OF SYSTEMS:  4 point ROS including Respiratory, CV, GI and , other than that noted in the HPI,  is negative    Objective:  /71   Pulse 69   Temp 97.4  F (36.3  C)   Resp 17   Wt 83.1 kg (183 lb 3.2 oz)   SpO2 94%   BMI 27.86 kg/m    Exam:  GENERAL APPEARANCE:  Sleepy, in no distress   HEAD:  Normal, normocephalic, atraumatic  ENT:  Mouth and posterior oropharynx normal, moist, tongue with dark brown hairy growth which is not painful, hearing acuity - very hard of hearing   EYE EXAM:  EOM, conjunctivae, lids, pupils and irises normal   CHEST/RESP:  respiratory effort normal, no respiratory distress, lung sounds CTA    CV:  Rate and rhythm reg, no murmur/rub/gallop, no peripheral edema  M/S:   extremities normal, gait abnormal-walking only a short distance, digits and nails within normal limits   NEUROLOGIC EXAM: Normal gross motor movement, tone and coordination. No tremor. Cranial nerves 2-12 are normal tested and grossly at patient's baseline  PSYCH:  Alert and oriented to self and surroundings with forgetfulness , affect pleasant, no behaviors noted    SLUMS 18/30  MOCA 20/30  ACL 4.2/5.8  Unable to complete BIMS due to hard of hearing   Walking about 15 ft with 2w rolling walker      Labs:   Recent labs in EPIC reviewed by me today.     ASSESSMENT/PLAN:  C. difficile colitis  Patient has completed 16 day course of vancocin, still with some soft/loose/putty like stools.  She is often incontinent.  Unclear why her stools are still so soft, not obviously still infected, still on lactobacillus as well.     Lewy body  dementia with behavioral disturbance (H)  Noted to have history of agitation, hitting and kicking staff at previous assisted living facility , which prompted admission to [facility] where she was seen by psychiatry.  She was then started on asenapine, valproic acid, donepezil.  Her behaviors have moderated and she has had no episodes of agitation/inappropriate behaviors. Medication review indicates valproic acid may cause diarrhea as side effect.  She also notes significant daytime sleepiness, which may be caused by valproic acid as well.  -discontinue valproic acid  -monitor for behavior changes     Thrush  Noted on tongue  -nystatin S&S x7 days    Type 2 diabetes mellitus with diabetic neuropathy, without long-term current use of insulin (H)  Fasting blood sugars trending 96 - 122, on no medications, last A1C 6.1% in 6/2020. The current medical regimen is effective;  continue present plan with no medications.      Spinal stenosis of lumbar region without neurogenic claudication  Hip pain, left  Patient with known spinal stenosis, pain well controlled now with tylenol TID dosing.     Chronic ischemic heart disease  Benign essential hypertension  On aspirin, on no routine medications for hypertension. BP stable. No other new symptoms.   BP Readings from Last 3 Encounters:   09/17/20 133/71   09/15/20 137/65   09/14/20 135/65        Acquired hypothyroidism  Last TSH 1.52 on 8/20/20, on levothyroxine 88 mcg x6 and 44 mcg x 1 each week.  TSH normal.   -The current medical regimen is effective;  continue present plan and medications.      H/O deep venous thrombosis  Chronic anticoagulation  On apixaban without new symptoms, stable.     Oropharyngeal dysphagia  Chronic, ongoing.  On honey thick liquids with pureed diet. She is allowed to drink water between meals if good oral care is provided.       Orders written by provider at facility  1.  Discontinue valproic acid  2.  Nystatin 100,000 unit(s)/ml, give 5 ml  QID po x7 days Swish and Spit diagnosis oral thrush      Electronically signed by:  CLEMNECIA Ellis CNP

## 2020-09-18 RX ORDER — NYSTATIN 100000/ML
500000 SUSPENSION, ORAL (FINAL DOSE FORM) ORAL 4 TIMES DAILY
COMMUNITY
Start: 2020-09-17 | End: 2020-09-24

## 2020-09-19 LAB
SARS-COV-2 RNA SPEC QL NAA+PROBE: NOT DETECTED
SPECIMEN SOURCE: NORMAL

## 2020-09-25 ENCOUNTER — TELEPHONE (OUTPATIENT)
Dept: GERIATRICS | Facility: CLINIC | Age: 75
End: 2020-09-25

## 2020-09-26 NOTE — TELEPHONE ENCOUNTER
Patient with hx c diff and completed vancomycin course x 2 recently. Today having multiple loose stools.     PLAN  Recheck stool for c diff    Electronically signed by CLEMENCIA Courtney, GNP

## 2020-09-29 ENCOUNTER — NURSING HOME VISIT (OUTPATIENT)
Dept: GERIATRICS | Facility: CLINIC | Age: 75
End: 2020-09-29
Payer: COMMERCIAL

## 2020-09-29 VITALS
HEART RATE: 58 BPM | DIASTOLIC BLOOD PRESSURE: 54 MMHG | RESPIRATION RATE: 16 BRPM | OXYGEN SATURATION: 92 % | BODY MASS INDEX: 27.61 KG/M2 | SYSTOLIC BLOOD PRESSURE: 133 MMHG | WEIGHT: 181.6 LBS | TEMPERATURE: 97.2 F

## 2020-09-29 DIAGNOSIS — Z86.718 H/O DEEP VENOUS THROMBOSIS: ICD-10-CM

## 2020-09-29 DIAGNOSIS — I25.9 CHRONIC ISCHEMIC HEART DISEASE: ICD-10-CM

## 2020-09-29 DIAGNOSIS — G31.83 LEWY BODY DEMENTIA WITH BEHAVIORAL DISTURBANCE (H): Primary | ICD-10-CM

## 2020-09-29 DIAGNOSIS — E03.9 ACQUIRED HYPOTHYROIDISM: ICD-10-CM

## 2020-09-29 DIAGNOSIS — R13.12 OROPHARYNGEAL DYSPHAGIA: ICD-10-CM

## 2020-09-29 DIAGNOSIS — M48.061 SPINAL STENOSIS OF LUMBAR REGION WITHOUT NEUROGENIC CLAUDICATION: ICD-10-CM

## 2020-09-29 DIAGNOSIS — E11.40 TYPE 2 DIABETES MELLITUS WITH DIABETIC NEUROPATHY, WITHOUT LONG-TERM CURRENT USE OF INSULIN (H): ICD-10-CM

## 2020-09-29 DIAGNOSIS — F02.818 LEWY BODY DEMENTIA WITH BEHAVIORAL DISTURBANCE (H): Primary | ICD-10-CM

## 2020-09-29 DIAGNOSIS — N18.4 CKD (CHRONIC KIDNEY DISEASE) STAGE 4, GFR 15-29 ML/MIN (H): ICD-10-CM

## 2020-09-29 DIAGNOSIS — G47.00 INSOMNIA, UNSPECIFIED TYPE: ICD-10-CM

## 2020-09-29 PROCEDURE — 99309 SBSQ NF CARE MODERATE MDM 30: CPT | Mod: GW | Performed by: NURSE PRACTITIONER

## 2020-09-29 NOTE — PROGRESS NOTES
[facility]  Chief Complaint   Patient presents with     residential Regulatory     [facility] Record Number:  [id number]  Place of Service where encounter took place:  [facility] [[id number]]    HPI:    Stefanie Velazquez  is 75 year old (1945), who is being seen today for a federally mandated E/M visit.      Seeing patient today for regulatory visit.  RN reports patient continues to ask for thin fluids however has coughing episodes with thin fluids during meals.  Nurses are crushing meds.  RN reports patient is hard of hearing and legally blind which makes communication difficult at times.  Met with patient in her room she is alert and using a pocket talker to communicate.  Patient states she is terribly bored and has nothing to do most of the day.  States she is not sleeping well at night due to inability to get comfortable.  She is often tired during the day due to lack of sleep at night.  No other patient or nursing concerns reported.    ALLERGIES:Cefepime; Ciprofloxacin; Hydrocodone; Lisinopril; and Vicodin [hydrocodone-acetaminophen]  PAST MEDICAL HISTORY:   has a past medical history of Acute deep vein thrombosis (DVT) of right lower extremity, unspecified vein (H) (10/31/2018), Acute myocardial infarction of other specified sites, episode of care unspecified (6/2002), Altered mental status (8/11/2020), Cancer of colon (H), Cellulitis of lower extremity, bilateral (9/30/2016), Chronic ischemic heart disease, unspecified (6/22/2003), Confusion (8/20/2020), Coronary atherosclerosis of unspecified type of vessel, native or graft, Diabetes mellitus (H), Esophageal reflux, Fracture of femur, distal, left, closed (H) (2/11/2013), Gastro-oesophageal reflux disease, Generalized osteoarthrosis, unspecified site (6/22/2005), Generalized osteoarthrosis, unspecified site (6/22/2005), HEARING LOSS CONDUCTIVE, COMBINED TYPE (6/22/2005), HYPOTHYROIDISM  (6/22/2003), Mixed  hyperlipidemia (6/22/2005), Obesity, unspecified (6/22/2005), RC-moderate (AHI 12, LSat 60%); REM RDI-73 (6/1/2009), Recurrent acute deep vein thrombosis (DVT) of both lower extremities (H) (3/31/2019), Rubeola, Stented coronary artery, Type 2 diabetes mellitus with diabetic nephropathy, with long-term current use of insulin (H) (6/27/2017), Type II or unspecified type diabetes mellitus with renal manifestations, uncontrolled(250.42) (H) (6/22/2005), Type II or unspecified type diabetes mellitus with renal manifestations, uncontrolled(250.42) (H) (6/22/2005), Unspecified disorder resulting from impaired renal function (6/22/2005), and Unspecified essential hypertension. She also has no past medical history of PONV (postoperative nausea and vomiting) or Walking and running.  PAST SURGICAL HISTORY:   has a past surgical history that includes surgical history of -  (10/24/2002); surgical history of -  (2002); surgical history of -  (2002); Colonoscopy (1/26/2011); orthopedic surgery; cabg; Insert port vascular access (3/2/2011); Colectomy low anterior (6/21/2011); Takedown ileostomy (9/9/2011); Sigmoidoscopy flexible (9/9/2011); Open reduction internal fixation femur distal (2/12/2013); Colonoscopy (N/A, 3/1/2016); Phacoemulsification with standard intraocular lens implant (Left, 1/22/2018); Phacoemulsification with standard intraocular lens implant (Right, 2/19/2018); and Anesthesia out of OR MRI (N/A, 4/8/2020).  FAMILY HISTORY: family history includes Breast Cancer in her sister; C.A.D. in her sister; Diabetes in her sister.  SOCIAL HISTORY:  reports that she has quit smoking. She has never used smokeless tobacco. She reports previous alcohol use. She reports that she does not use drugs.    MEDICATIONS:  Current Outpatient Medications   Medication Sig Dispense Refill     ACCU-CHEK MILVIA MELODY dispense one meter 1 device 0     acetaminophen (TYLENOL) 325 MG tablet Take 650 mg by mouth 3 times daily       asenapine  (SAPHRIS) 5 MG SUBL sublingual tablet Place 1 tablet (5 mg) under the tongue 2 times daily       aspirin (ASA) 81 MG tablet Take 81 mg by mouth daily       benzonatate (TESSALON) 100 MG capsule Take 1 capsule (100 mg) by mouth 3 times daily as needed for cough       blood glucose monitoring (ACCU-CHEK MILVIA) test strip Use to test blood sugars 4 times daily or as directed. 360 each 1     calcium carbonate (OS-DWIGHT) 500 MG tablet Take 1 tablet by mouth every 2 hours as needed       cholecalciferol 1000 units TABS Take 1,000 Units by mouth daily       Continuous Blood Gluc  (FREESTYLE JAJA 14 DAY READER) MELODY 1 each as needed (use to scan the sensor to check the blood sugars) 1 Device 0     Continuous Blood Gluc Sensor (FREESTYLE JAJA 14 DAY SENSOR) MISC 1 each every 14 days 2 each 11     donepezil (ARICEPT) 5 MG tablet Take 1 tablet (5 mg) by mouth At Bedtime 30 tablet 0     ELIQUIS ANTICOAGULANT 5 MG tablet Take 1 tablet by mouth 2 times daily       fluocinonide (LIDEX) 0.05 % ointment Apply sparingly to rash on legs twice daily as needed.  Do not apply to face. 60 g 11     fluticasone (FLONASE) 50 MCG/ACT spray Spray 1 spray into both nostrils daily 1 Bottle 3     insulin pen needle (BD GABRIELLA U/F) 32G X 4 MM Use 4 times per day or as directed. 120 each 5     Lactobacillus Acid-Pectin (LACTOBACILLUS ACIDOPHILUS) TABS Take 1 tablet by mouth 2 times daily       levothyroxine (SYNTHROID/LEVOTHROID) 88 MCG tablet Take 44 mcg by mouth every 7 days (0.5 x 88 mcg) On Sunday       levothyroxine (SYNTHROID/LEVOTHROID) 88 MCG tablet Take 88 mcg by mouth daily Except on Sunday       omeprazole (PRILOSEC) 20 MG DR capsule Take 20 mg by mouth 2 times daily       ondansetron (ZOFRAN) 4 MG tablet Take by mouth every 6 hours as needed       order for DME Equipment being ordered:   Incontinence Products: Gloves (4 Boxes) & chux pads 300 each 11     order for DME Equipment being ordered: Wheelchair 1 Device 0     order for  DME Equipment being ordered: Incontinence briefs/Depends 12 Package 11     order for DME Equipment being ordered:  order for XL Size  Pull ups.  Pt is currently using 12 pull ups a day 350 each 11     order for DME Equipment being ordered: Compression stockings 2 Device 0     order for DME Equipment being ordered: Hospital Bed service and repair 1 each 0     pramipexole (MIRAPEX) 0.25 MG tablet One 1-2 hours prior to HS, and q 8 hrs prn day (Patient taking differently: Take 0.25 mg by mouth At Bedtime Give 1-2 hours PRIOR to bedtime) 60 tablet 3     rosuvastatin (CRESTOR) 10 MG tablet Take 10 mg by mouth daily           Case Management:  I have reviewed the care plan and MDS and do agree with the plan. Patient's desire to return to the community is not present. Information reviewed:  Medications, vital signs, orders, and nursing notes.    ROS:  Limited secondary to cognitive impairment but today pt reports difficulty sleeping    Vitals:  /54   Pulse 58   Temp 97.2  F (36.2  C)   Resp 16   Wt 82.4 kg (181 lb 9.6 oz)   SpO2 92%   BMI 27.61 kg/m    Body mass index is 27.61 kg/m .  Exam:  GENERAL APPEARANCE:  Alert, in no distress, morbidly obese  RESP:  lungs clear to auscultation , no respiratory distress  CV:  regular rate and rhythm, no murmur, rub, or gallop, Trace BLE  ABDOMEN:  normal bowel sounds, soft, nontender, no hepatosplenomegaly or other masses  SKIN:  Inspection of skin and subcutaneous tissue baseline, , Pale/dry  PSYCH:  memory impaired , affect and mood normal    Lab/Diagnostic data:     Most Recent 3 CBC's:  Recent Labs   Lab Test 09/09/20  0950 08/29/20  0801 08/28/20  0615 08/25/20  0635   WBC 4.3 6.6  --  2.9*   HGB 11.6* 13.4  --  12.5   MCV 90 93  --  89    193 164 188     Most Recent 3 BMP's:  Recent Labs   Lab Test 09/09/20  0950 08/31/20  0604 08/30/20  0853    146* 142   POTASSIUM 4.2 5.0 4.5   CHLORIDE 109 115* 108   CO2 26 26 26   BUN 18 23 24   CR 1.40* 1.48*  1.88*   ANIONGAP 5 5 8   DWIGHT 8.2* 7.2* 8.0*   * 155* 148*     Most Recent 2 LFT's:  Recent Labs   Lab Test 09/09/20  0950 08/31/20  0604   AST 35 57*   ALT 21 43   ALKPHOS 151* 193*   BILITOTAL 0.3 0.5     Most Recent Cholesterol Panel:  Recent Labs   Lab Test 03/16/20  1041   CHOL 144   LDL 51   HDL 65   TRIG 142     Most Recent TSH and T4:  Recent Labs   Lab Test 08/20/20  0535  06/14/13  1628   TSH 1.52   < > 0.18*   T4  --   --  1.32    < > = values in this interval not displayed.     Most Recent Hemoglobin A1c:  Recent Labs   Lab Test 06/29/20  1040   A1C 6.1*       ASSESSMENT/PLAN  Lewy body dementia with behavioral disturbance (H)  -History of agitation with hitting and Kicking staff at previous AL which prompted admission to [facility] where she was seen by psychiatry.  During that admission she was started on valproic acid, Aricept and Saphris.  No recent reports of agitation or behavioral concerns.  -Patient with a history of loose stools and C. difficile, previous provider stopped valproic acid as felt could be contributing to loose stools no increase of behavior since stopping valproic acid    Spinal stenosis of lumbar region without neurogenic claudication  -Leg pain is worse at night could be contributing to insomnia.  Continue scheduled Tylenol consider adding gabapentin    CKD (chronic kidney disease) stage 4, GFR 15-29 ml/min (H)  -Creatinine has been stable.  Avoid nephrotoxic medications and renal dose when able    Acquired hypothyroidism  -Last TSH 1.52, is having insomnia.  Plan to check TSH, TSH goal 4-5    Type 2 diabetes mellitus with diabetic neuropathy, without long-term current use of insulin (H)  -Last A1c 6.1%, no hypoglycemic episodes reported.  On no hypoglycemic medications    Chronic ischemic heart disease  H/O deep venous thrombosis  -On Eliquis and aspirin, no hypertensive medications  -Nursing to update with concerns    Oropharyngeal dysphagia  -Coughing with thin  liquids, continue thickened liquids and crushing of medications    Insomnia, unspecified type  -Multiple possible causes including medication such as Synthroid, pain in legs/RLS  -We will check labs, continue Mirapex, consider starting gabapentin at bedtime to help with pain and sleep      Electronically signed by:  CLEMENCIA Brito CNP

## 2020-10-05 ENCOUNTER — HOSPITAL LABORATORY (OUTPATIENT)
Facility: OTHER | Age: 75
End: 2020-10-05

## 2020-10-05 LAB
ALBUMIN SERPL-MCNC: 2.8 G/DL (ref 3.4–5)
ALP SERPL-CCNC: 157 U/L (ref 40–150)
ALT SERPL W P-5'-P-CCNC: 23 U/L (ref 0–50)
ANION GAP SERPL CALCULATED.3IONS-SCNC: 10 MMOL/L (ref 3–14)
AST SERPL W P-5'-P-CCNC: 29 U/L (ref 0–45)
BILIRUB SERPL-MCNC: 0.6 MG/DL (ref 0.2–1.3)
BUN SERPL-MCNC: 26 MG/DL (ref 7–30)
CALCIUM SERPL-MCNC: 9.4 MG/DL (ref 8.5–10.1)
CHLORIDE SERPL-SCNC: 111 MMOL/L (ref 94–109)
CHOLEST SERPL-MCNC: 145 MG/DL
CO2 SERPL-SCNC: 21 MMOL/L (ref 20–32)
CREAT SERPL-MCNC: 1.63 MG/DL (ref 0.52–1.04)
ERYTHROCYTE [DISTWIDTH] IN BLOOD BY AUTOMATED COUNT: 14.2 % (ref 10–15)
GFR SERPL CREATININE-BSD FRML MDRD: 30 ML/MIN/{1.73_M2}
GLUCOSE SERPL-MCNC: 95 MG/DL (ref 70–99)
HCT VFR BLD AUTO: 35.8 % (ref 35–47)
HDLC SERPL-MCNC: 62 MG/DL
HGB BLD-MCNC: 11.9 G/DL (ref 11.7–15.7)
LDLC SERPL CALC-MCNC: 48 MG/DL
MCH RBC QN AUTO: 29.2 PG (ref 26.5–33)
MCHC RBC AUTO-ENTMCNC: 33.2 G/DL (ref 31.5–36.5)
MCV RBC AUTO: 88 FL (ref 78–100)
NONHDLC SERPL-MCNC: 83 MG/DL
PLATELET # BLD AUTO: 208 10E9/L (ref 150–450)
POTASSIUM SERPL-SCNC: 3.6 MMOL/L (ref 3.4–5.3)
PROT SERPL-MCNC: 6.7 G/DL (ref 6.8–8.8)
RBC # BLD AUTO: 4.07 10E12/L (ref 3.8–5.2)
SODIUM SERPL-SCNC: 142 MMOL/L (ref 133–144)
TRIGL SERPL-MCNC: 174 MG/DL
TSH SERPL DL<=0.005 MIU/L-ACNC: 4.6 MU/L (ref 0.4–4)
VIT B12 SERPL-MCNC: 591 PG/ML (ref 193–986)
WBC # BLD AUTO: 4.1 10E9/L (ref 4–11)

## 2020-10-16 ENCOUNTER — AMBULATORY - HEALTHEAST (OUTPATIENT)
Dept: OTHER | Facility: CLINIC | Age: 75
End: 2020-10-16

## 2020-10-16 ENCOUNTER — DOCUMENTATION ONLY (OUTPATIENT)
Dept: OTHER | Facility: CLINIC | Age: 75
End: 2020-10-16

## 2020-10-16 ENCOUNTER — NURSING HOME VISIT (OUTPATIENT)
Dept: GERIATRICS | Facility: CLINIC | Age: 75
End: 2020-10-16
Payer: COMMERCIAL

## 2020-10-16 VITALS
WEIGHT: 178.2 LBS | OXYGEN SATURATION: 98 % | TEMPERATURE: 97.4 F | RESPIRATION RATE: 20 BRPM | HEART RATE: 60 BPM | SYSTOLIC BLOOD PRESSURE: 110 MMHG | DIASTOLIC BLOOD PRESSURE: 60 MMHG | BODY MASS INDEX: 27.1 KG/M2

## 2020-10-16 DIAGNOSIS — I25.9 CHRONIC ISCHEMIC HEART DISEASE: ICD-10-CM

## 2020-10-16 DIAGNOSIS — G31.83 LEWY BODY DEMENTIA WITH BEHAVIORAL DISTURBANCE (H): Primary | ICD-10-CM

## 2020-10-16 DIAGNOSIS — Z86.73 HISTORY OF CVA (CEREBROVASCULAR ACCIDENT): ICD-10-CM

## 2020-10-16 DIAGNOSIS — Z86.718 H/O DEEP VENOUS THROMBOSIS: ICD-10-CM

## 2020-10-16 DIAGNOSIS — F02.818 LEWY BODY DEMENTIA WITH BEHAVIORAL DISTURBANCE (H): Primary | ICD-10-CM

## 2020-10-16 DIAGNOSIS — K90.9 DIARRHEA DUE TO MALABSORPTION: ICD-10-CM

## 2020-10-16 DIAGNOSIS — R19.7 DIARRHEA DUE TO MALABSORPTION: ICD-10-CM

## 2020-10-16 DIAGNOSIS — G25.81 RESTLESS LEG SYNDROME: ICD-10-CM

## 2020-10-16 PROCEDURE — 99309 SBSQ NF CARE MODERATE MDM 30: CPT | Mod: GW | Performed by: NURSE PRACTITIONER

## 2020-10-16 RX ORDER — PRAMIPEXOLE DIHYDROCHLORIDE 0.25 MG/1
TABLET ORAL
Qty: 60 TABLET | Refills: 3
Start: 2020-10-16 | End: 2020-11-16

## 2020-10-16 NOTE — LETTER
10/16/2020        [facility]: [facility]  [address]  [facility] [address]        [facility]  Chief Complaint   Patient presents with     longterm Regulatory     [facility] Medical Record Number:  [id number]  Place of Service where encounter took place:  [facility] ([facility] [[id number]]    HPI:    Stefanie Velazquez  is 75 year old (1945), who is being seen today for a federally mandated E/M visit.  HPI information obtained from: facility chart records, facility staff, patient report and [facility] chart review.    Concha is a LTC resident of [facility] hospital stay 8/20 - 8/31 after acute onset of encephalopathy treated with evaluation by psych who recommended current regimen of asenapine, valproic acid, donepezil.  Head MRI showing small acute infarct R temp lobe as well as chronic small vessel ischemic disease   and + UTI treated with bactrim and ampicilin. Hospitalization complicated with aspiration pneumonia then C diff treated with Vanco.     At SNF:  9/9: Vanco extended 125 mg QID x 7 more days.   9/17: stop Valproic acid, start nystatin s/s    Today staff note she is doing well - occ soft stool, behavior is stable as long as she has a private room (non pharm measures ). She denies problems - denies GERD or diarrhea.     ALLERGIES:Cefepime, Ciprofloxacin, Hydrocodone, Lisinopril, and Vicodin [hydrocodone-acetaminophen]  PAST MEDICAL HISTORY:   has a past medical history of Acute deep vein thrombosis (DVT) of right lower extremity, unspecified vein (H) (10/31/2018), Acute myocardial infarction of other specified sites, episode of care unspecified (6/2002), Altered mental status (8/11/2020), Cancer of colon (H), Cellulitis of lower extremity, bilateral (9/30/2016), Chronic ischemic heart disease, unspecified (6/22/2003), Confusion (8/20/2020), Coronary atherosclerosis of unspecified type of vessel, native or graft, Diabetes mellitus  (H), Esophageal reflux, Fracture of femur, distal, left, closed (H) (2/11/2013), Gastro-oesophageal reflux disease, Generalized osteoarthrosis, unspecified site (6/22/2005), Generalized osteoarthrosis, unspecified site (6/22/2005), HEARING LOSS CONDUCTIVE, COMBINED TYPE (6/22/2005), HYPOTHYROIDISM  (6/22/2003), Mixed hyperlipidemia (6/22/2005), Obesity, unspecified (6/22/2005), RC-moderate (AHI 12, LSat 60%); REM RDI-73 (6/1/2009), Recurrent acute deep vein thrombosis (DVT) of both lower extremities (H) (3/31/2019), Rubeola, Stented coronary artery, Type 2 diabetes mellitus with diabetic nephropathy, with long-term current use of insulin (H) (6/27/2017), Type II or unspecified type diabetes mellitus with renal manifestations, uncontrolled(250.42) (H) (6/22/2005), Type II or unspecified type diabetes mellitus with renal manifestations, uncontrolled(250.42) (H) (6/22/2005), Unspecified disorder resulting from impaired renal function (6/22/2005), and Unspecified essential hypertension. She also has no past medical history of PONV (postoperative nausea and vomiting) or Walking and running.  PAST SURGICAL HISTORY:   has a past surgical history that includes surgical history of -  (10/24/2002); surgical history of -  (2002); surgical history of -  (2002); Colonoscopy (1/26/2011); orthopedic surgery; cabg; Insert port vascular access (3/2/2011); Colectomy low anterior (6/21/2011); Takedown ileostomy (9/9/2011); Sigmoidoscopy flexible (9/9/2011); Open reduction internal fixation femur distal (2/12/2013); Colonoscopy (N/A, 3/1/2016); Phacoemulsification with standard intraocular lens implant (Left, 1/22/2018); Phacoemulsification with standard intraocular lens implant (Right, 2/19/2018); and Anesthesia out of OR MRI (N/A, 4/8/2020).  FAMILY HISTORY: family history includes Breast Cancer in her sister; C.A.D. in her sister; Diabetes in her sister.  SOCIAL HISTORY:  reports that she has quit smoking. She has never used  smokeless tobacco. She reports previous alcohol use. She reports that she does not use drugs.    MEDICATIONS:  Current Outpatient Medications   Medication Sig Dispense Refill     pramipexole (MIRAPEX) 0.25 MG tablet One 1-2 hours prior to HS, and q 8 hrs prn day 60 tablet 3     acetaminophen (TYLENOL) 325 MG tablet Take 650 mg by mouth 3 times daily       asenapine (SAPHRIS) 5 MG SUBL sublingual tablet Place 1 tablet (5 mg) under the tongue 2 times daily       aspirin (ASA) 81 MG tablet Take 81 mg by mouth daily       benzonatate (TESSALON) 100 MG capsule Take 1 capsule (100 mg) by mouth 3 times daily as needed for cough       calcium carbonate (OS-DWIGHT) 500 MG tablet Take 1 tablet by mouth every 2 hours as needed       cholecalciferol 1000 units TABS Take 1,000 Units by mouth daily       ELIQUIS ANTICOAGULANT 5 MG tablet Take 1 tablet by mouth 2 times daily       fluocinonide (LIDEX) 0.05 % ointment Apply sparingly to rash on legs twice daily as needed.  Do not apply to face. 60 g 11     fluticasone (FLONASE) 50 MCG/ACT spray Spray 1 spray into both nostrils daily 1 Bottle 3     Lactobacillus Acid-Pectin (LACTOBACILLUS ACIDOPHILUS) TABS Take 1 tablet by mouth 2 times daily       levothyroxine (SYNTHROID/LEVOTHROID) 88 MCG tablet Take 44 mcg by mouth every 7 days (0.5 x 88 mcg) On Sunday       levothyroxine (SYNTHROID/LEVOTHROID) 88 MCG tablet Take 88 mcg by mouth daily Except on Sunday       omeprazole (PRILOSEC) 20 MG DR capsule Take 20 mg by mouth 2 times daily       ondansetron (ZOFRAN) 4 MG tablet Take by mouth every 6 hours as needed       rosuvastatin (CRESTOR) 10 MG tablet Take 10 mg by mouth daily         Case Management:  I have reviewed the care plan and MDS and do agree with the plan. Patient's desire to return to the community is not present. Information reviewed:  Medications, vital signs, orders, and nursing notes.    ROS:  4 point ROS including Respiratory, CV, GI and , other than that noted in the  HPI,  is negative    Vitals:  /60   Pulse 60   Temp 97.4  F (36.3  C)   Resp 20   Wt 80.8 kg (178 lb 3.2 oz)   SpO2 98%   BMI 27.10 kg/m    Body mass index is 27.1 kg/m .  Exam:  GENERAL APPEARANCE:  Alert, in no distress  RESP:  respiratory effort and palpation of chest normal, auscultation of lungs clear , no respiratory distress  CV:  Palpation and auscultation of heart done , rate and rhythm reg, no murmur, no  peripheral edema  ABDOMEN:  normal bowel sounds, soft, nontender, no hepatosplenomegaly or other masses  M/S:   Gait and station w/c dependent, Digits and nails intact  SKIN:  Inspection and Palpation of skin and subcutaneous tissue intact  NEURO: 2-12 in normal limits and at patient's baseline - dysphagia - pureed diet/thicken liquids.   PSYCH:  insight and judgement, memory poor , affect and mood Pleasant     Lab/Diagnostic data:   Recent labs in [facility] reviewed by me today.  and Most Recent 3 CBC's:  Recent Labs   Lab Test 10/05/20  0750 09/09/20  0950 08/29/20  0801   WBC 4.1 4.3 6.6   HGB 11.9 11.6* 13.4   MCV 88 90 93    277 193     Most Recent 3 BMP's:  Recent Labs   Lab Test 10/05/20  0750 09/09/20  0950 08/31/20  0604    140 146*   POTASSIUM 3.6 4.2 5.0   CHLORIDE 111* 109 115*   CO2 21 26 26   BUN 26 18 23   CR 1.63* 1.40* 1.48*   ANIONGAP 10 5 5   DWIGHT 9.4 8.2* 7.2*   GLC 95 143* 155*     Most Recent 2 LFT's:  Recent Labs   Lab Test 10/05/20  0750 09/09/20  0950   AST 29 35   ALT 23 21   ALKPHOS 157* 151*   BILITOTAL 0.6 0.3     Most Recent TSH and T4:  Recent Labs   Lab Test 10/05/20  0750 06/14/13  1628 06/14/13  1628   TSH 4.60*   < > 0.18*   T4  --   --  1.32    < > = values in this interval not displayed.     Most Recent Hemoglobin A1c:  Recent Labs   Lab Test 06/29/20  1040   A1C 6.1*            ASSESSMENT/PLAN  Lewy body dementia with behavioral disturbance (H)  - doing well with private room and current medications - but noted Aricept likely cause of diarrhea and  "unlikely helping with current mental health as per up to date: \" One randomized study found that 12 weeks of therapy with either 5 or 10 mg of donepezil was associated with significant improvements in cognitive and behavioral measures, as well as caregiver burden when compared with placebo [25]. Results of a second randomized trial in 142 patients with DLB were more modest, however, and only the 10-mg dose was associated with an improvement in cognition compared with placebo [26]. Neither 5 nor 10 mg of donepezil led to significant improvements in neuropsychiatric symptoms.     Prognosis and treatment of dementia with Lewy bodies  Author:  Lalo Gill MD    GIven medication doesn't help neuropsychiatric symptoms - will wean to off.       Diarrhea due to malabsorption  Likely 2/2 aricept and or PPI - will stop aricept and decrease PPI given PPIs increase risk of pneumonia, C.Diff., fractures, hypomagnesemia.  Risk outweighs benefit of continued therapy.      H/O deep venous thrombosis  Cont on eliquis    Chronic ischemic heart disease and History of CVA (cerebrovascular accident) with subsequent dysphagia  On Statin and ASA - cont modified diet.     Restless leg syndrome  Stable with - pramipexole     Orders written by provider at facility  1. decrease Aricept to 5 mg po every other day x 5 doses then stop  2. Decrease omeprazole to 20 mg po q day  3. Start TUMS 500 mg po QID x 7, then tid x 7 then BID - keep prn.     Electronically signed by:  CLEMENCIA Medrano CNP            Sincerely,        CLEMENCIA Medrano CNP    "

## 2020-10-16 NOTE — PROGRESS NOTES
[facility]  Chief Complaint   Patient presents with     halfway Regulatory     [place] Medical Record Number:  [id number]  Place of Service where encounter took place:  [facility] [[id number]]    HPI:    Stefanie Velazquez  is 75 year old (1945), who is being seen today for a federally mandated E/M visit.  HPI information obtained from: facility chart records, facility staff, patient report and [facility] chart review.    Concah is a LTC resident of [facility] hospital stay 8/20 - 8/31 after acute onset of encephalopathy treated with evaluation by psych who recommended current regimen of asenapine, valproic acid, donepezil.  Head MRI showing small acute infarct R temp lobe as well as chronic small vessel ischemic disease   and + UTI treated with bactrim and ampicilin. Hospitalization complicated with aspiration pneumonia then C diff treated with Vanco.     At SNF:  9/9: Vanco extended 125 mg QID x 7 more days.   9/17: stop Valproic acid, start nystatin s/s    Today staff note she is doing well - occ soft stool, behavior is stable as long as she has a private room (non pharm measures ). She denies problems - denies GERD or diarrhea.     ALLERGIES:Cefepime, Ciprofloxacin, Hydrocodone, Lisinopril, and Vicodin [hydrocodone-acetaminophen]  PAST MEDICAL HISTORY:   has a past medical history of Acute deep vein thrombosis (DVT) of right lower extremity, unspecified vein (H) (10/31/2018), Acute myocardial infarction of other specified sites, episode of care unspecified (6/2002), Altered mental status (8/11/2020), Cancer of colon (H), Cellulitis of lower extremity, bilateral (9/30/2016), Chronic ischemic heart disease, unspecified (6/22/2003), Confusion (8/20/2020), Coronary atherosclerosis of unspecified type of vessel, native or graft, Diabetes mellitus (H), Esophageal reflux, Fracture of femur, distal, left, closed (H) (2/11/2013), Gastro-oesophageal reflux disease,  Generalized osteoarthrosis, unspecified site (6/22/2005), Generalized osteoarthrosis, unspecified site (6/22/2005), HEARING LOSS CONDUCTIVE, COMBINED TYPE (6/22/2005), HYPOTHYROIDISM  (6/22/2003), Mixed hyperlipidemia (6/22/2005), Obesity, unspecified (6/22/2005), RC-moderate (AHI 12, LSat 60%); REM RDI-73 (6/1/2009), Recurrent acute deep vein thrombosis (DVT) of both lower extremities (H) (3/31/2019), [place], Stented coronary artery, Type 2 diabetes mellitus with diabetic nephropathy, with long-term current use of insulin (H) (6/27/2017), Type II or unspecified type diabetes mellitus with renal manifestations, uncontrolled(250.42) (H) (6/22/2005), Type II or unspecified type diabetes mellitus with renal manifestations, uncontrolled(250.42) (H) (6/22/2005), Unspecified disorder resulting from impaired renal function (6/22/2005), and Unspecified essential hypertension. She also has no past medical history of PONV (postoperative nausea and vomiting) or Walking and running.  PAST SURGICAL HISTORY:   has a past surgical history that includes surgical history of -  (10/24/2002); surgical history of -  (2002); surgical history of -  (2002); Colonoscopy (1/26/2011); orthopedic surgery; cabg; Insert port vascular access (3/2/2011); Colectomy low anterior (6/21/2011); Takedown ileostomy (9/9/2011); Sigmoidoscopy flexible (9/9/2011); Open reduction internal fixation femur distal (2/12/2013); Colonoscopy (N/A, 3/1/2016); Phacoemulsification with standard intraocular lens implant (Left, 1/22/2018); Phacoemulsification with standard intraocular lens implant (Right, 2/19/2018); and Anesthesia out of OR MRI (N/A, 4/8/2020).  FAMILY HISTORY: family history includes Breast Cancer in her sister; C.A.D. in her sister; Diabetes in her sister.  SOCIAL HISTORY:  reports that she has quit smoking. She has never used smokeless tobacco. She reports previous alcohol use. She reports that she does not use drugs.    MEDICATIONS:  Current  Outpatient Medications   Medication Sig Dispense Refill     pramipexole (MIRAPEX) 0.25 MG tablet One 1-2 hours prior to HS, and q 8 hrs prn day 60 tablet 3     acetaminophen (TYLENOL) 325 MG tablet Take 650 mg by mouth 3 times daily       asenapine (SAPHRIS) 5 MG SUBL sublingual tablet Place 1 tablet (5 mg) under the tongue 2 times daily       aspirin (ASA) 81 MG tablet Take 81 mg by mouth daily       benzonatate (TESSALON) 100 MG capsule Take 1 capsule (100 mg) by mouth 3 times daily as needed for cough       calcium carbonate (OS-DWIGHT) 500 MG tablet Take 1 tablet by mouth every 2 hours as needed       cholecalciferol 1000 units TABS Take 1,000 Units by mouth daily       ELIQUIS ANTICOAGULANT 5 MG tablet Take 1 tablet by mouth 2 times daily       fluocinonide (LIDEX) 0.05 % ointment Apply sparingly to rash on legs twice daily as needed.  Do not apply to face. 60 g 11     fluticasone (FLONASE) 50 MCG/ACT spray Spray 1 spray into both nostrils daily 1 Bottle 3     Lactobacillus Acid-Pectin (LACTOBACILLUS ACIDOPHILUS) TABS Take 1 tablet by mouth 2 times daily       levothyroxine (SYNTHROID/LEVOTHROID) 88 MCG tablet Take 44 mcg by mouth every 7 days (0.5 x 88 mcg) On Sunday       levothyroxine (SYNTHROID/LEVOTHROID) 88 MCG tablet Take 88 mcg by mouth daily Except on Sunday       omeprazole (PRILOSEC) 20 MG DR capsule Take 20 mg by mouth 2 times daily       ondansetron (ZOFRAN) 4 MG tablet Take by mouth every 6 hours as needed       rosuvastatin (CRESTOR) 10 MG tablet Take 10 mg by mouth daily         Case Management:  I have reviewed the care plan and MDS and do agree with the plan. Patient's desire to return to the community is not present. Information reviewed:  Medications, vital signs, orders, and nursing notes.    ROS:  4 point ROS including Respiratory, CV, GI and , other than that noted in the HPI,  is negative    Vitals:  /60   Pulse 60   Temp 97.4  F (36.3  C)   Resp 20   Wt 80.8 kg (178 lb 3.2 oz)   " SpO2 98%   BMI 27.10 kg/m    Body mass index is 27.1 kg/m .  Exam:  GENERAL APPEARANCE:  Alert, in no distress  RESP:  respiratory effort and palpation of chest normal, auscultation of lungs clear , no respiratory distress  CV:  Palpation and auscultation of heart done , rate and rhythm reg, no murmur, no  peripheral edema  ABDOMEN:  normal bowel sounds, soft, nontender, no hepatosplenomegaly or other masses  M/S:   Gait and station w/c dependent, Digits and nails intact  SKIN:  Inspection and Palpation of skin and subcutaneous tissue intact  NEURO: 2-12 in normal limits and at patient's baseline - dysphagia - pureed diet/thicken liquids.   PSYCH:  insight and judgement, memory poor , affect and mood Pleasant     Lab/Diagnostic data:   Recent labs in [facility] reviewed by me today.  and Most Recent 3 CBC's:  Recent Labs   Lab Test 10/05/20  0750 09/09/20  0950 08/29/20  0801   WBC 4.1 4.3 6.6   HGB 11.9 11.6* 13.4   MCV 88 90 93    277 193     Most Recent 3 BMP's:  Recent Labs   Lab Test 10/05/20  0750 09/09/20  0950 08/31/20  0604    140 146*   POTASSIUM 3.6 4.2 5.0   CHLORIDE 111* 109 115*   CO2 21 26 26   BUN 26 18 23   CR 1.63* 1.40* 1.48*   ANIONGAP 10 5 5   DWIGHT 9.4 8.2* 7.2*   GLC 95 143* 155*     Most Recent 2 LFT's:  Recent Labs   Lab Test 10/05/20  0750 09/09/20  0950   AST 29 35   ALT 23 21   ALKPHOS 157* 151*   BILITOTAL 0.6 0.3     Most Recent TSH and T4:  Recent Labs   Lab Test 10/05/20  0750 06/14/13  1628 06/14/13  1628   TSH 4.60*   < > 0.18*   T4  --   --  1.32    < > = values in this interval not displayed.     Most Recent Hemoglobin A1c:  Recent Labs   Lab Test 06/29/20  1040   A1C 6.1*            ASSESSMENT/PLAN  Lewy body dementia with behavioral disturbance (H)  - doing well with private room and current medications - but noted Aricept likely cause of diarrhea and unlikely helping with current mental health as per up to date: \" One randomized study found that 12 weeks of therapy with " either 5 or 10 mg of donepezil was associated with significant improvements in cognitive and behavioral measures, as well as caregiver burden when compared with placebo [25]. Results of a second randomized trial in 142 patients with DLB were more modest, however, and only the 10-mg dose was associated with an improvement in cognition compared with placebo [26]. Neither 5 nor 10 mg of donepezil led to significant improvements in neuropsychiatric symptoms.     Prognosis and treatment of dementia with Lewy bodies  Author:  Lalo Gill MD    GIven medication doesn't help neuropsychiatric symptoms - will wean to off.       Diarrhea due to malabsorption  Likely 2/2 aricept and or PPI - will stop aricept and decrease PPI given PPIs increase risk of pneumonia, C.Diff., fractures, hypomagnesemia.  Risk outweighs benefit of continued therapy.      H/O deep venous thrombosis  Cont on eliquis    Chronic ischemic heart disease and History of CVA (cerebrovascular accident) with subsequent dysphagia  On Statin and ASA - cont modified diet.     Restless leg syndrome  Stable with - pramipexole     Orders written by provider at facility  1. decrease Aricept to 5 mg po every other day x 5 doses then stop  2. Decrease omeprazole to 20 mg po q day  3. Start TUMS 500 mg po QID x 7, then tid x 7 then BID - keep prn.     Electronically signed by:  CLEMENCIA Medrano CNP

## 2020-10-20 ENCOUNTER — TELEPHONE (OUTPATIENT)
Dept: GERIATRICS | Facility: CLINIC | Age: 75
End: 2020-10-20

## 2020-10-20 NOTE — TELEPHONE ENCOUNTER
Pt enrolled in [facility] with Dx of___(waiting to hear back from SNF, but presumed LB dementia)

## 2020-11-02 ENCOUNTER — HOSPITAL LABORATORY (OUTPATIENT)
Facility: OTHER | Age: 75
End: 2020-11-02

## 2020-11-03 LAB
SARS-COV-2 RNA SPEC QL NAA+PROBE: ABNORMAL
SPECIMEN SOURCE: ABNORMAL

## 2020-11-06 ENCOUNTER — NURSING HOME VISIT (OUTPATIENT)
Dept: GERIATRICS | Facility: CLINIC | Age: 75
End: 2020-11-06
Payer: COMMERCIAL

## 2020-11-06 VITALS
RESPIRATION RATE: 14 BRPM | SYSTOLIC BLOOD PRESSURE: 102 MMHG | BODY MASS INDEX: 26.38 KG/M2 | TEMPERATURE: 98.7 F | OXYGEN SATURATION: 91 % | DIASTOLIC BLOOD PRESSURE: 58 MMHG | WEIGHT: 173.5 LBS | HEART RATE: 51 BPM

## 2020-11-06 DIAGNOSIS — Z86.73 HISTORY OF CVA (CEREBROVASCULAR ACCIDENT): ICD-10-CM

## 2020-11-06 DIAGNOSIS — Z51.5 HOSPICE CARE PATIENT: ICD-10-CM

## 2020-11-06 DIAGNOSIS — G31.83 LEWY BODY DEMENTIA WITH BEHAVIORAL DISTURBANCE (H): ICD-10-CM

## 2020-11-06 DIAGNOSIS — F02.818 LEWY BODY DEMENTIA WITH BEHAVIORAL DISTURBANCE (H): ICD-10-CM

## 2020-11-06 DIAGNOSIS — U07.1 CLINICAL DIAGNOSIS OF COVID-19: Primary | ICD-10-CM

## 2020-11-06 PROCEDURE — 99308 SBSQ NF CARE LOW MDM 20: CPT | Mod: GW | Performed by: NURSE PRACTITIONER

## 2020-11-06 NOTE — PROGRESS NOTES
[facility]  [facility] Medical Record Number:  [id number]  Place of Service where encounter took place[facility] ([facility] [[id number]]  Chief Complaint   Patient presents with     Nursing Home Acute     HPI:    Stefanie Velazquez  is a 75 year old (1945), who is being seen today for an episodic care visit.  HPI information obtained from: facility chart records, facility staff and [facility] chart review. Today's concern is: Tested positive from PCR test at  lab on 11/2. Current experiencing moderate symptoms of general decline and fevers. Code status is confirmed DNR/DNI and on hospice, this is updated at the facility and [facility]. Goals of care are palliative and pt/family would NOT want hospitalization. PRN Tyelnol, zofran, O2 available/ordered. PRN comfort/end of life care medications are also available. .        Concha is a LTC resident of [place] [facility] hospital stay 8/20 - 8/31 after acute onset of encephalopathy treated with evaluation by psych who recommended current regimen of asenapine, valproic acid, donepezil.  Head MRI showing small acute infarct R temp lobe as well as chronic small vessel ischemic disease   and + UTI treated with bactrim and ampicilin. Hospitalization complicated with aspiration pneumonia then C diff treated with Vanco.      At SNF:  9/9: Vanco extended 125 mg QID x 7 more days.   9/17: stop Valproic acid, start nystatin s/s  10/16: decrease Aricept to 5 every other day x 5 then stop, decrease omeprazole to 20 daily, start TUMS>   10/20 started [facility] for late CVA affects/ dementia  11/2: +COVID test    She is unresponsive for me today, appears comfortable.     Past Medical and Surgical History reviewed in Epic today.  MEDICATIONS: Reviewed.   REVIEW OF SYSTEMS:  Unobtainable secondary to cognitive impairment.     Objective:  /58   Pulse 51   Temp 98.7  F (37.1  C)   Resp 14   Wt 78.7 kg (173 lb 8 oz)   SpO2 91%   BMI 26.38 kg/m     Exam:  GENERAL APPEARANCE:  unresponsive, comfortable, end stage of life  RESP:  lungs clear to auscultation , no respiratory distress    Labs:   Recent labs in EPIC reviewed by me today.     ASSESSMENT/PLAN:     Clinical diagnosis of COVID-19  Lewy body dementia with behavioral disturbance (H)  History of CVA (cerebrovascular accident)  Hospice care patient     Likely will pass in the next 1-4 days. On hospice and Goals of care are palliative and pt/family would NOT want hospitalization. PRN Tyelnol, zofran, O2 available/ordered. PRN comfort/end of life care medications are also available. .    nsg holding oral meds as/if she is uanble to take them.   No new orders.     Electronically signed by:  CLEMENCIA Medrano CNP

## 2020-11-06 NOTE — LETTER
11/6/2020        RE: [facility]  [address]  [facility] [address]        [facility]  [place] Medical Record Number:  [id number]  Place of Service where encounter took place:  [facility] [[id number]]  Chief Complaint   Patient presents with     Nursing Home Acute     HPI:    Stefanie Velazquez  is a 75 year old (1945), who is being seen today for an episodic care visit.  HPI information obtained from: facility chart records, facility staff and [facility] chart review. Today's concern is: Tested positive from PCR test at [facility] lab on 11/2. Current experiencing moderate symptoms of general decline and fevers. Code status is confirmed DNR/DNI and on hospice, this is updated at the facility and [facility]. Goals of care are palliative and pt/family would NOT want hospitalization. PRN Tyelnol, zofran, O2 available/ordered. PRN comfort/end of life care medications are also available. .        Concha is a LTC resident of [facility] hospital stay 8/20 - 8/31 after acute onset of encephalopathy treated with evaluation by psych who recommended current regimen of asenapine, valproic acid, donepezil.  Head MRI showing small acute infarct R temp lobe as well as chronic small vessel ischemic disease   and + UTI treated with bactrim and ampicilin. Hospitalization complicated with aspiration pneumonia then C diff treated with Vanco.      At SNF:  9/9: Vanco extended 125 mg QID x 7 more days.   9/17: stop Valproic acid, start nystatin s/s  10/16: decrease Aricept to 5 every other day x 5 then stop, decrease omeprazole to 20 daily, start TUMS>   10/20 started [facility] for late CVA affects/ dementia  11/2: +COVID test    She is unresponsive for me today, appears comfortable.     Past Medical and Surgical History reviewed in Epic today.  MEDICATIONS: Reviewed.   REVIEW OF SYSTEMS:  Unobtainable secondary to cognitive impairment.      Objective:  /58   Pulse 51   Temp 98.7  F (37.1  C)   Resp 14   Wt 78.7 kg (173 lb 8 oz)   SpO2 91%   BMI 26.38 kg/m    Exam:  GENERAL APPEARANCE:  unresponsive, comfortable, end stage of life  RESP:  lungs clear to auscultation , no respiratory distress    Labs:   Recent labs in [facility] reviewed by me today.     ASSESSMENT/PLAN:     Clinical diagnosis of COVID-19  Lewy body dementia with behavioral disturbance (H)  History of CVA (cerebrovascular accident)  Hospice care patient     Likely will pass in the next 1-4 days. On hospice and Goals of care are palliative and pt/family would NOT want hospitalization. PRN Tyelnol, zofran, O2 available/ordered. PRN comfort/end of life care medications are also available. .    nsg holding oral meds as/if she is uanble to take them.   No new orders.     Electronically signed by:  CLEMENCIA Medrano CNP           Sincerely,        CLEMENCIA Medrano CNP

## 2020-11-16 RX ORDER — HYOSCYAMINE SULFATE 0.125 MG
0.12 TABLET ORAL EVERY 4 HOURS PRN
COMMUNITY
End: 2021-01-08

## 2020-11-16 RX ORDER — BISACODYL 10 MG
10 SUPPOSITORY, RECTAL RECTAL DAILY PRN
COMMUNITY
End: 2021-01-08

## 2020-11-16 RX ORDER — LORAZEPAM 2 MG/ML
0.5 CONCENTRATE ORAL EVERY 4 HOURS PRN
COMMUNITY
End: 2021-01-08

## 2020-11-16 RX ORDER — AMOXICILLIN 250 MG
1 CAPSULE ORAL 2 TIMES DAILY
COMMUNITY

## 2020-11-16 RX ORDER — MORPHINE SULFATE 100 MG/5ML
5 SOLUTION ORAL
COMMUNITY
End: 2021-01-08

## 2020-11-17 ENCOUNTER — NURSING HOME VISIT (OUTPATIENT)
Dept: GERIATRICS | Facility: CLINIC | Age: 75
End: 2020-11-17
Payer: COMMERCIAL

## 2020-11-17 VITALS
HEART RATE: 80 BPM | DIASTOLIC BLOOD PRESSURE: 62 MMHG | OXYGEN SATURATION: 94 % | BODY MASS INDEX: 26.38 KG/M2 | TEMPERATURE: 98.1 F | SYSTOLIC BLOOD PRESSURE: 97 MMHG | WEIGHT: 173.5 LBS | RESPIRATION RATE: 18 BRPM

## 2020-11-17 DIAGNOSIS — F02.818 LEWY BODY DEMENTIA WITH BEHAVIORAL DISTURBANCE (H): ICD-10-CM

## 2020-11-17 DIAGNOSIS — U07.1 CLINICAL DIAGNOSIS OF COVID-19: Primary | ICD-10-CM

## 2020-11-17 DIAGNOSIS — Z86.73 HISTORY OF CVA (CEREBROVASCULAR ACCIDENT): ICD-10-CM

## 2020-11-17 DIAGNOSIS — G31.83 LEWY BODY DEMENTIA WITH BEHAVIORAL DISTURBANCE (H): ICD-10-CM

## 2020-11-17 DIAGNOSIS — Z51.5 HOSPICE CARE PATIENT: ICD-10-CM

## 2020-11-17 PROCEDURE — 99309 SBSQ NF CARE MODERATE MDM 30: CPT | Mod: GW | Performed by: NURSE PRACTITIONER

## 2020-11-17 NOTE — LETTER
11/17/2020        RE: [facility] [place]  [address] [facility] [address]        [facility]  [place] Medical Record Number:  [id number]  Place of Service where encounter took place:  [facility] [[id number]]  Chief Complaint   Patient presents with     Nursing Home Acute       HPI:    Stefanie Velazquez  is a 75 year old (1945), who is being seen today for an episodic care visit.  HPI information obtained from: facility chart records, facility staff, patient report and [facility] chart review. Today's concern is:     Clinical diagnosis of COVID-19  Lewy body dementia with behavioral disturbance (H)  History of CVA (cerebrovascular accident)  Hospice care patient       Tested positive on 11/2/20.    Day 15 since diagnosis     Code status is confirmed DNR/DNI . Goals of care remain comfort focused on hospice     PRN Tylenol, zofran, O2 available      Currently experiencing moderate symptoms of wheezing, loose stools, N/V    Follow up today re symptom control needs     Stefanie reports she is nauseated, has vomited x 2 in last 24 hours, has had some relief of nausea with use of zofran.  Nursing reports poor appetite, loose cough, loose stools, and BP trending low which need evaluation today.       Past Medical and Surgical History reviewed in Epic today.    MEDICATIONS:    Current Outpatient Medications   Medication Sig Dispense Refill     acetaminophen (TYLENOL) 325 MG tablet Take 650 mg by mouth 3 times daily       bisacodyl (DULCOLAX) 10 MG suppository Place 10 mg rectally daily as needed       fluocinonide (LIDEX) 0.05 % ointment Apply sparingly to rash on legs twice daily as needed.  Do not apply to face. 60 g 11     fluticasone (FLONASE) 50 MCG/ACT spray Spray 1 spray into both nostrils daily 1 Bottle 3     hyoscyamine (LEVSIN) 0.125 MG tablet Take 0.125 mg by mouth every 4 hours as needed       LORazepam (ATIVAN) 2 MG/ML (HIGH CONC)  solution Take 0.5 mg by mouth every 4 hours as needed       morphine sulfate, high concentrate, (ROXANOL-CONCENTRATED) 20 MG/ML concentrated solution Take 5 mg by mouth every 2 hours as needed       senna-docusate (SENOKOT-S/PERICOLACE) 8.6-50 MG tablet Take 1 tablet by mouth daily as needed         REVIEW OF SYSTEMS:  4 point ROS including Respiratory, CV, GI and , other than that noted in the HPI,  is negative    Objective:  BP 97/62   Pulse 80   Temp 98.1  F (36.7  C)   Resp 18   Wt 78.7 kg (173 lb 8 oz)   SpO2 94%   BMI 26.38 kg/m    Exam:  GENERAL APPEARANCE:  Alert, in moderate distress with nausea  HEAD:  Normal, normocephalic, atraumatic  ENT:  Mouth and posterior oropharynx normal, moist mucous membranes, hearing acuity - very hard of hearing and uses pocket talker  EYE EXAM:  EOM, conjunctivae, lids, pupils and irises normal   CHEST/RESP:  respiratory effort normal, no respiratory distress, lung sounds CTA, with loose congested cough   CV:  Rate and rhythm reg, no murmur/rub/gallop, no peripheral edema  NEUROLOGIC EXAM: Normal gross motor movement, tone and coordination. No tremor. Cranial nerves 2-12 are normal tested and grossly at patient's baseline  PSYCH:  Alert and oriented to self and surroundings with forgetfulness, affect pleasant today and able to answer simple questions.     Labs:   no recent labs, on hospice     ASSESSMENT/PLAN:  Clinical diagnosis of COVID-19  Patient with ongoing symptoms of cough, congestion, N/V, loose stools requiring ongoing quarantine and close observation to ensure adequate comfort cares are provided.  She has N/V and zofran was helpful.   -continue supportive cares for COVID symptoms     Lewy body dementia with behavioral disturbance (H)  History of CVA (cerebrovascular accident)  Hospice care patient  Patient with history of dementia and CVA but without new symptoms.  Enrolled with hospice, and medication regimen has been simplified. Has lorazepam and morphine  for pain and anxiety control.  Alert, able to make needs known today.  -continue current plan of care    No new orders today      Electronically signed by:  CLEMENCIA Ellis CNP                 Sincerely,        CLEMENCIA Ellis CNP

## 2020-11-17 NOTE — PROGRESS NOTES
[facility]  [place] Medical Record Number:  [id number]  Place of Service where encounter took place[facility] ([facility] [[id number]]  Chief Complaint   Patient presents with     Nursing Home Acute       HPI:    Stefanie Velazquez  is a 75 year old (1945), who is being seen today for an episodic care visit.  HPI information obtained from: facility chart records, facility staff, patient report and [facility] chart review. Today's concern is:     Clinical diagnosis of COVID-19  Lewy body dementia with behavioral disturbance (H)  History of CVA (cerebrovascular accident)  Hospice care patient       Tested positive on 11/2/20.    Day 15 since diagnosis     Code status is confirmed DNR/DNI . Goals of care remain comfort focused on hospice     PRN Tylenol, zofran, O2 available      Currently experiencing moderate symptoms of wheezing, loose stools, N/V    Follow up today re symptom control needs     Stefanie reports she is nauseated, has vomited x 2 in last 24 hours, has had some relief of nausea with use of zofran.  Nursing reports poor appetite, loose cough, loose stools, and BP trending low which need evaluation today.       Past Medical and Surgical History reviewed in Epic today.    MEDICATIONS:    Current Outpatient Medications   Medication Sig Dispense Refill     acetaminophen (TYLENOL) 325 MG tablet Take 650 mg by mouth 3 times daily       bisacodyl (DULCOLAX) 10 MG suppository Place 10 mg rectally daily as needed       fluocinonide (LIDEX) 0.05 % ointment Apply sparingly to rash on legs twice daily as needed.  Do not apply to face. 60 g 11     fluticasone (FLONASE) 50 MCG/ACT spray Spray 1 spray into both nostrils daily 1 Bottle 3     hyoscyamine (LEVSIN) 0.125 MG tablet Take 0.125 mg by mouth every 4 hours as needed       LORazepam (ATIVAN) 2 MG/ML (HIGH CONC) solution Take 0.5 mg by mouth every 4 hours as needed       morphine sulfate, high concentrate, (ROXANOL-CONCENTRATED)  20 MG/ML concentrated solution Take 5 mg by mouth every 2 hours as needed       senna-docusate (SENOKOT-S/PERICOLACE) 8.6-50 MG tablet Take 1 tablet by mouth daily as needed         REVIEW OF SYSTEMS:  4 point ROS including Respiratory, CV, GI and , other than that noted in the HPI,  is negative    Objective:  BP 97/62   Pulse 80   Temp 98.1  F (36.7  C)   Resp 18   Wt 78.7 kg (173 lb 8 oz)   SpO2 94%   BMI 26.38 kg/m    Exam:  GENERAL APPEARANCE:  Alert, in moderate distress with nausea  HEAD:  Normal, normocephalic, atraumatic  ENT:  Mouth and posterior oropharynx normal, moist mucous membranes, hearing acuity - very hard of hearing and uses pocket talker  EYE EXAM:  EOM, conjunctivae, lids, pupils and irises normal   CHEST/RESP:  respiratory effort normal, no respiratory distress, lung sounds CTA, with loose congested cough   CV:  Rate and rhythm reg, no murmur/rub/gallop, no peripheral edema  NEUROLOGIC EXAM: Normal gross motor movement, tone and coordination. No tremor. Cranial nerves 2-12 are normal tested and grossly at patient's baseline  PSYCH:  Alert and oriented to self and surroundings with forgetfulness, affect pleasant today and able to answer simple questions.     Labs:   no recent labs, on hospice     ASSESSMENT/PLAN:  Clinical diagnosis of COVID-19  Patient with ongoing symptoms of cough, congestion, N/V, loose stools requiring ongoing quarantine and close observation to ensure adequate comfort cares are provided.  She has N/V and zofran was helpful.   -continue supportive cares for COVID symptoms     Lewy body dementia with behavioral disturbance (H)  History of CVA (cerebrovascular accident)  Hospice care patient  Patient with history of dementia and CVA but without new symptoms.  Enrolled with hospice, and medication regimen has been simplified. Has lorazepam and morphine for pain and anxiety control.  Alert, able to make needs known today.  -continue current plan of care    No new orders  today      Electronically signed by:  CLEMENCIA Ellis CNP

## 2020-11-22 ENCOUNTER — HEALTH MAINTENANCE LETTER (OUTPATIENT)
Age: 75
End: 2020-11-22

## 2020-11-23 ENCOUNTER — NURSING HOME VISIT (OUTPATIENT)
Dept: GERIATRICS | Facility: CLINIC | Age: 75
End: 2020-11-23
Payer: COMMERCIAL

## 2020-11-23 VITALS
BODY MASS INDEX: 24.83 KG/M2 | HEART RATE: 55 BPM | DIASTOLIC BLOOD PRESSURE: 77 MMHG | OXYGEN SATURATION: 93 % | TEMPERATURE: 98.3 F | WEIGHT: 163.3 LBS | SYSTOLIC BLOOD PRESSURE: 143 MMHG | RESPIRATION RATE: 14 BRPM

## 2020-11-23 DIAGNOSIS — Z51.5 HOSPICE CARE PATIENT: ICD-10-CM

## 2020-11-23 DIAGNOSIS — U07.1 CLINICAL DIAGNOSIS OF COVID-19: Primary | ICD-10-CM

## 2020-11-23 DIAGNOSIS — Z86.73 HISTORY OF CVA (CEREBROVASCULAR ACCIDENT): ICD-10-CM

## 2020-11-23 DIAGNOSIS — R13.12 OROPHARYNGEAL DYSPHAGIA: ICD-10-CM

## 2020-11-23 PROCEDURE — 99309 SBSQ NF CARE MODERATE MDM 30: CPT | Mod: GW | Performed by: NURSE PRACTITIONER

## 2020-11-23 NOTE — PROGRESS NOTES
[place] [facility]  [place] [facility] Record Number:  [id number]  Place of Service where encounter took place:  [facility] [[id number]]  Chief Complaint   Patient presents with     Nursing Home Acute       HPI:    Stefanie Velazquez  is a 75 year old (1945), who is being seen today for an episodic care visit.  HPI information obtained from: facility chart records, facility staff, patient report and [facility] chart review. Today's concern is:     Clinical diagnosis of COVID-19  History of CVA (cerebrovascular accident)  Oropharyngeal dysphagia  Hospice care patient       Tested positive on 11/2.    Day 21 since diagnosis     Code status is confirmed DNR/DNI . Goals of care remain hospice focused    PRN Tylenol, zofran, O2 available      Currently experiencing mild symptoms of occasional wet cough, occasional poor sleep    Follow up today re end droplet precautions     Stefanie reports she would like to eat a PBJ sandwich, she is tired of pureed food and she is losing weight.  Nursing reports no new concerns .       Past Medical and Surgical History reviewed in Epic today.    MEDICATIONS:    Current Outpatient Medications   Medication Sig Dispense Refill     acetaminophen (TYLENOL) 325 MG tablet Take 650 mg by mouth 3 times daily       bisacodyl (DULCOLAX) 10 MG suppository Place 10 mg rectally daily as needed       fluocinonide (LIDEX) 0.05 % ointment Apply sparingly to rash on legs twice daily as needed.  Do not apply to face. 60 g 11     fluticasone (FLONASE) 50 MCG/ACT spray Spray 1 spray into both nostrils daily 1 Bottle 3     hyoscyamine (LEVSIN) 0.125 MG tablet Take 0.125 mg by mouth every 4 hours as needed       LORazepam (ATIVAN) 2 MG/ML (HIGH CONC) solution Take 0.5 mg by mouth every 4 hours as needed       morphine sulfate, high concentrate, (ROXANOL-CONCENTRATED) 20 MG/ML concentrated solution Take 5 mg by mouth every 2 hours as needed       senna-docusate  (SENOKOT-S/PERICOLACE) 8.6-50 MG tablet Take 1 tablet by mouth daily as needed       REVIEW OF SYSTEMS:  4 point ROS including Respiratory, CV, GI and , other than that noted in the HPI,  is negative    Objective:  BP (!) 143/77   Pulse 55   Temp 98.3  F (36.8  C)   Resp 14   Wt 74.1 kg (163 lb 4.8 oz)   SpO2 93%   BMI 24.83 kg/m    Exam:  GENERAL APPEARANCE:  Alert, in no distress   HEAD:  Normal, normocephalic, atraumatic  ENT:  Mouth and posterior oropharynx normal, moist mucous membranes, hearing acuity - very hard of hearing and uses pocket talker for communication   EYE EXAM:  EOM, conjunctivae, lids, pupils and irises normal CHEST/RESP:  respiratory effort normal, no respiratory distress, lung sounds CTA    CV:  Rate and rhythm reg, no murmur/rub/gallop, no peripheral edema  M/S:   extremities normal, gait abnormal-transfers with staff assisst, digits and nails within normal limits   NEUROLOGIC EXAM: Normal gross motor movement, tone and coordination. No tremor. Cranial nerves 2-12 are normal tested and grossly at patient's baseline  PSYCH:  Alert and oriented to self and surroundings, affect pleasant today      Labs:   no recent labs, on hospice    ASSESSMENT/PLAN:  Clinical diagnosis of COVID-19  Resolving symptoms of COVID, has completed >20 days of quarantine.  -ok to remove from droplet precautions     History of CVA (cerebrovascular accident)  Oropharyngeal dysphagia  Patient with chronic known dysphagia after CVA and on hospice.  Known history of aspiration.  She would like to liberalize her diet as she is so tired of eating pureed foods.  She has lost weight. Will have hospice and skilled nursing facility team work with her to discuss risks/benefits  Wt Readings from Last 4 Encounters:   11/23/20 74.1 kg (163 lb 4.8 oz)   11/17/20 78.7 kg (173 lb 8 oz)   11/06/20 78.7 kg (173 lb 8 oz)   10/16/20 80.8 kg (178 lb 3.2 oz)        Hospice care patient        Orders written by provider at  facility  1. DC Zofran   2. DC Tylenol suppository    3. DC Tylenol 1000 mg QID PRN if this was started as a result of COVID + status  4. DC  Hold insulin if not taking in PO  order  5. DC  Hold current dose of diuretics if active emesis  order  6. DC  O2 2 - 4 L NC to keep sats above 89% Dx. Hypoxia  order   7. DC  Hold blood pressure medications if sBP less than 100  order   8. DC droplet isolation precautions.   9.  and hospice team to work with patient/family to discuss risks/benefits of liberalizing diet to mechanical soft/thin liquids for quality of life at end of life      Electronically signed by:  CLEMENCIA Ellis CNP

## 2020-11-23 NOTE — LETTER
11/23/2020        RE: [facility] [place]  [address] [facility] [place] [address]        [place] [facility]  [place] [facility] Record Number:  [id number]  Place of Service where encounter took place:  [facility] [[id number]]  Chief Complaint   Patient presents with     Nursing Home Acute       HPI:    Stefanie Velazquez  is a 75 year old (1945), who is being seen today for an episodic care visit.  HPI information obtained from: facility chart records, facility staff, patient report and [facility] chart review. Today's concern is:     Clinical diagnosis of COVID-19  History of CVA (cerebrovascular accident)  Oropharyngeal dysphagia  Hospice care patient       Tested positive on 11/2.    Day 21 since diagnosis     Code status is confirmed DNR/DNI . Goals of care remain hospice focused    PRN Tylenol, zofran, O2 available      Currently experiencing mild symptoms of occasional wet cough, occasional poor sleep    Follow up today re end droplet precautions     Stefanie reports she would like to eat a PBJ sandwich, she is tired of pureed food and she is losing weight.  Nursing reports no new concerns .       Past Medical and Surgical History reviewed in Epic today.    MEDICATIONS:    Current Outpatient Medications   Medication Sig Dispense Refill     acetaminophen (TYLENOL) 325 MG tablet Take 650 mg by mouth 3 times daily       bisacodyl (DULCOLAX) 10 MG suppository Place 10 mg rectally daily as needed       fluocinonide (LIDEX) 0.05 % ointment Apply sparingly to rash on legs twice daily as needed.  Do not apply to face. 60 g 11     fluticasone (FLONASE) 50 MCG/ACT spray Spray 1 spray into both nostrils daily 1 Bottle 3     hyoscyamine (LEVSIN) 0.125 MG tablet Take 0.125 mg by mouth every 4 hours as needed       LORazepam (ATIVAN) 2 MG/ML (HIGH CONC) solution Take 0.5 mg by mouth every 4 hours as needed       morphine sulfate, high concentrate,  (ROXANOL-CONCENTRATED) 20 MG/ML concentrated solution Take 5 mg by mouth every 2 hours as needed       senna-docusate (SENOKOT-S/PERICOLACE) 8.6-50 MG tablet Take 1 tablet by mouth daily as needed       REVIEW OF SYSTEMS:  4 point ROS including Respiratory, CV, GI and , other than that noted in the HPI,  is negative    Objective:  BP (!) 143/77   Pulse 55   Temp 98.3  F (36.8  C)   Resp 14   Wt 74.1 kg (163 lb 4.8 oz)   SpO2 93%   BMI 24.83 kg/m    Exam:  GENERAL APPEARANCE:  Alert, in no distress   HEAD:  Normal, normocephalic, atraumatic  ENT:  Mouth and posterior oropharynx normal, moist mucous membranes, hearing acuity - very hard of hearing and uses pocket talker for communication   EYE EXAM:  EOM, conjunctivae, lids, pupils and irises normal CHEST/RESP:  respiratory effort normal, no respiratory distress, lung sounds CTA    CV:  Rate and rhythm reg, no murmur/rub/gallop, no peripheral edema  M/S:   extremities normal, gait abnormal-transfers with staff assisst, digits and nails within normal limits   NEUROLOGIC EXAM: Normal gross motor movement, tone and coordination. No tremor. Cranial nerves 2-12 are normal tested and grossly at patient's baseline  PSYCH:  Alert and oriented to self and surroundings, affect pleasant today      Labs:   no recent labs, on hospice    ASSESSMENT/PLAN:  Clinical diagnosis of COVID-19  Resolving symptoms of COVID, has completed >20 days of quarantine.  -ok to remove from droplet precautions     History of CVA (cerebrovascular accident)  Oropharyngeal dysphagia  Patient with chronic known dysphagia after CVA and on hospice.  Known history of aspiration.  She would like to liberalize her diet as she is so tired of eating pureed foods.  She has lost weight. Will have hospice and skilled nursing facility team work with her to discuss risks/benefits  Wt Readings from Last 4 Encounters:   11/23/20 74.1 kg (163 lb 4.8 oz)   11/17/20 78.7 kg (173 lb 8 oz)   11/06/20 78.7 kg (173  lb 8 oz)   10/16/20 80.8 kg (178 lb 3.2 oz)        Hospice care patient        Orders written by provider at facility  1. DC Zofran   2. DC Tylenol suppository    3. DC Tylenol 1000 mg QID PRN if this was started as a result of COVID + status  4. DC  Hold insulin if not taking in PO  order  5. DC  Hold current dose of diuretics if active emesis  order  6. DC  O2 2 - 4 L NC to keep sats above 89% Dx. Hypoxia  order   7. DC  Hold blood pressure medications if sBP less than 100  order   8. DC droplet isolation precautions.   9.  and hospice team to work with patient/family to discuss risks/benefits of liberalizing diet to mechanical soft/thin liquids for quality of life at end of life      Electronically signed by:  CLEMENCIA Ellis CNP ***                Sincerely,        CLEMENCIA Ellis CNP

## 2020-11-23 NOTE — LETTER
11/23/2020        RE: [place]  [address] [facility] [address]        [facility]  [place] [facility] Record Number:  [id number]  Place of Service where encounter took place:  [facility] [[id number]]  Chief Complaint   Patient presents with     Nursing Home Acute       HPI:    Stefanie Velazquez  is a 75 year old (1945), who is being seen today for an episodic care visit.  HPI information obtained from: facility chart records, facility staff, patient report and [facility] chart review. Today's concern is:     Clinical diagnosis of COVID-19  History of CVA (cerebrovascular accident)  Oropharyngeal dysphagia  Hospice care patient       Tested positive on 11/2.    Day 21 since diagnosis     Code status is confirmed DNR/DNI . Goals of care remain hospice focused    PRN Tylenol, zofran, O2 available      Currently experiencing mild symptoms of occasional wet cough, occasional poor sleep    Follow up today re end droplet precautions     Stefanie reports she would like to eat a PBJ sandwich, she is tired of pureed food and she is losing weight.  Nursing reports no new concerns .       Past Medical and Surgical History reviewed in Epic today.    MEDICATIONS:    Current Outpatient Medications   Medication Sig Dispense Refill     acetaminophen (TYLENOL) 325 MG tablet Take 650 mg by mouth 3 times daily       bisacodyl (DULCOLAX) 10 MG suppository Place 10 mg rectally daily as needed       fluocinonide (LIDEX) 0.05 % ointment Apply sparingly to rash on legs twice daily as needed.  Do not apply to face. 60 g 11     fluticasone (FLONASE) 50 MCG/ACT spray Spray 1 spray into both nostrils daily 1 Bottle 3     hyoscyamine (LEVSIN) 0.125 MG tablet Take 0.125 mg by mouth every 4 hours as needed       LORazepam (ATIVAN) 2 MG/ML (HIGH CONC) solution Take 0.5 mg by mouth every 4 hours as needed       morphine sulfate, high concentrate,  (ROXANOL-CONCENTRATED) 20 MG/ML concentrated solution Take 5 mg by mouth every 2 hours as needed       senna-docusate (SENOKOT-S/PERICOLACE) 8.6-50 MG tablet Take 1 tablet by mouth daily as needed       REVIEW OF SYSTEMS:  4 point ROS including Respiratory, CV, GI and , other than that noted in the HPI,  is negative    Objective:  BP (!) 143/77   Pulse 55   Temp 98.3  F (36.8  C)   Resp 14   Wt 74.1 kg (163 lb 4.8 oz)   SpO2 93%   BMI 24.83 kg/m    Exam:  GENERAL APPEARANCE:  Alert, in no distress   HEAD:  Normal, normocephalic, atraumatic  ENT:  Mouth and posterior oropharynx normal, moist mucous membranes, hearing acuity - very hard of hearing and uses pocket talker for communication   EYE EXAM:  EOM, conjunctivae, lids, pupils and irises normal CHEST/RESP:  respiratory effort normal, no respiratory distress, lung sounds CTA    CV:  Rate and rhythm reg, no murmur/rub/gallop, no peripheral edema  M/S:   extremities normal, gait abnormal-transfers with staff assisst, digits and nails within normal limits   NEUROLOGIC EXAM: Normal gross motor movement, tone and coordination. No tremor. Cranial nerves 2-12 are normal tested and grossly at patient's baseline  PSYCH:  Alert and oriented to self and surroundings, affect pleasant today      Labs:   no recent labs, on hospice    ASSESSMENT/PLAN:  Clinical diagnosis of COVID-19  Resolving symptoms of COVID, has completed >20 days of quarantine.  -ok to remove from droplet precautions     History of CVA (cerebrovascular accident)  Oropharyngeal dysphagia  Patient with chronic known dysphagia after CVA and on hospice.  Known history of aspiration.  She would like to liberalize her diet as she is so tired of eating pureed foods.  She has lost weight. Will have hospice and skilled nursing facility team work with her to discuss risks/benefits  Wt Readings from Last 4 Encounters:   11/23/20 74.1 kg (163 lb 4.8 oz)   11/17/20 78.7 kg (173 lb 8 oz)   11/06/20 78.7 kg (173  lb 8 oz)   10/16/20 80.8 kg (178 lb 3.2 oz)        Hospice care patient        Orders written by provider at facility  1. DC Zofran   2. DC Tylenol suppository    3. DC Tylenol 1000 mg QID PRN if this was started as a result of COVID + status  4. DC  Hold insulin if not taking in PO  order  5. DC  Hold current dose of diuretics if active emesis  order  6. DC  O2 2 - 4 L NC to keep sats above 89% Dx. Hypoxia  order   7. DC  Hold blood pressure medications if sBP less than 100  order   8. DC droplet isolation precautions.   9.  and hospice team to work with patient/family to discuss risks/benefits of liberalizing diet to mechanical soft/thin liquids for quality of life at end of life      Electronically signed by:  CLEMENCIA Ellis CNP                 Sincerely,        CLEMENCIA Ellis CNP

## 2020-12-02 ENCOUNTER — MEDICAL CORRESPONDENCE (OUTPATIENT)
Dept: HEALTH INFORMATION MANAGEMENT | Facility: CLINIC | Age: 75
End: 2020-12-02

## 2020-12-22 ENCOUNTER — TELEPHONE (OUTPATIENT)
Dept: FAMILY MEDICINE | Facility: CLINIC | Age: 75
End: 2020-12-22

## 2020-12-22 ENCOUNTER — TRANSFERRED RECORDS (OUTPATIENT)
Dept: HEALTH INFORMATION MANAGEMENT | Facility: CLINIC | Age: 75
End: 2020-12-22

## 2021-01-01 ENCOUNTER — NURSING HOME VISIT (OUTPATIENT)
Dept: GERIATRICS | Facility: CLINIC | Age: 76
End: 2021-01-01
Payer: COMMERCIAL

## 2021-01-01 ENCOUNTER — TELEPHONE (OUTPATIENT)
Dept: GERIATRICS | Facility: CLINIC | Age: 76
End: 2021-01-01

## 2021-01-01 ENCOUNTER — HOSPITAL LABORATORY (OUTPATIENT)
Facility: OTHER | Age: 76
End: 2021-01-01

## 2021-01-01 ENCOUNTER — HEALTH MAINTENANCE LETTER (OUTPATIENT)
Age: 76
End: 2021-01-01

## 2021-01-01 ENCOUNTER — LAB REQUISITION (OUTPATIENT)
Dept: LAB | Facility: CLINIC | Age: 76
End: 2021-01-01
Payer: OTHER MISCELLANEOUS

## 2021-01-01 ENCOUNTER — NURSING HOME VISIT (OUTPATIENT)
Dept: GERIATRICS | Facility: CLINIC | Age: 76
End: 2021-01-01
Payer: MEDICARE

## 2021-01-01 VITALS
OXYGEN SATURATION: 96 % | SYSTOLIC BLOOD PRESSURE: 120 MMHG | RESPIRATION RATE: 18 BRPM | TEMPERATURE: 98.4 F | HEART RATE: 70 BPM | BODY MASS INDEX: 24.31 KG/M2 | HEIGHT: 68 IN | DIASTOLIC BLOOD PRESSURE: 78 MMHG | WEIGHT: 160.4 LBS

## 2021-01-01 VITALS
WEIGHT: 162.3 LBS | TEMPERATURE: 97.8 F | HEART RATE: 60 BPM | SYSTOLIC BLOOD PRESSURE: 147 MMHG | HEIGHT: 68 IN | RESPIRATION RATE: 18 BRPM | OXYGEN SATURATION: 96 % | BODY MASS INDEX: 24.6 KG/M2 | DIASTOLIC BLOOD PRESSURE: 80 MMHG

## 2021-01-01 VITALS
RESPIRATION RATE: 16 BRPM | SYSTOLIC BLOOD PRESSURE: 172 MMHG | DIASTOLIC BLOOD PRESSURE: 79 MMHG | HEIGHT: 65 IN | WEIGHT: 162.5 LBS | OXYGEN SATURATION: 94 % | HEART RATE: 71 BPM | TEMPERATURE: 98 F | BODY MASS INDEX: 27.07 KG/M2

## 2021-01-01 VITALS
OXYGEN SATURATION: 95 % | RESPIRATION RATE: 16 BRPM | TEMPERATURE: 98.1 F | SYSTOLIC BLOOD PRESSURE: 126 MMHG | BODY MASS INDEX: 25.51 KG/M2 | DIASTOLIC BLOOD PRESSURE: 76 MMHG | WEIGHT: 153.1 LBS | HEIGHT: 65 IN | HEART RATE: 61 BPM

## 2021-01-01 VITALS
SYSTOLIC BLOOD PRESSURE: 155 MMHG | DIASTOLIC BLOOD PRESSURE: 84 MMHG | RESPIRATION RATE: 16 BRPM | BODY MASS INDEX: 27.97 KG/M2 | OXYGEN SATURATION: 97 % | HEART RATE: 79 BPM | TEMPERATURE: 98.7 F | HEIGHT: 65 IN | WEIGHT: 167.9 LBS

## 2021-01-01 VITALS
BODY MASS INDEX: 27.74 KG/M2 | TEMPERATURE: 98.2 F | OXYGEN SATURATION: 93 % | WEIGHT: 166.5 LBS | DIASTOLIC BLOOD PRESSURE: 74 MMHG | SYSTOLIC BLOOD PRESSURE: 117 MMHG | HEIGHT: 65 IN | RESPIRATION RATE: 18 BRPM | HEART RATE: 59 BPM

## 2021-01-01 DIAGNOSIS — Z51.5 HOSPICE CARE PATIENT: ICD-10-CM

## 2021-01-01 DIAGNOSIS — R13.12 OROPHARYNGEAL DYSPHAGIA: ICD-10-CM

## 2021-01-01 DIAGNOSIS — F51.02 ADJUSTMENT INSOMNIA: ICD-10-CM

## 2021-01-01 DIAGNOSIS — Z86.73 HISTORY OF CVA (CEREBROVASCULAR ACCIDENT): Primary | ICD-10-CM

## 2021-01-01 DIAGNOSIS — R54 FRAIL ELDERLY: ICD-10-CM

## 2021-01-01 DIAGNOSIS — C20 RECTAL CANCER (H): ICD-10-CM

## 2021-01-01 DIAGNOSIS — Z86.73 HISTORY OF CVA (CEREBROVASCULAR ACCIDENT): ICD-10-CM

## 2021-01-01 DIAGNOSIS — N18.9 CHRONIC KIDNEY DISEASE, UNSPECIFIED: ICD-10-CM

## 2021-01-01 DIAGNOSIS — E11.40 TYPE 2 DIABETES MELLITUS WITH DIABETIC NEUROPATHY, WITHOUT LONG-TERM CURRENT USE OF INSULIN (H): ICD-10-CM

## 2021-01-01 DIAGNOSIS — E11.9 DIET-CONTROLLED DIABETES MELLITUS (H): ICD-10-CM

## 2021-01-01 DIAGNOSIS — F02.818 LEWY BODY DEMENTIA WITH BEHAVIORAL DISTURBANCE (H): ICD-10-CM

## 2021-01-01 DIAGNOSIS — I10 ESSENTIAL HYPERTENSION: ICD-10-CM

## 2021-01-01 DIAGNOSIS — I11.9 HYPERTENSIVE HEART DISEASE WITHOUT HEART FAILURE: ICD-10-CM

## 2021-01-01 DIAGNOSIS — Z00.00 ANNUAL PHYSICAL EXAM: Primary | ICD-10-CM

## 2021-01-01 DIAGNOSIS — I10 BENIGN ESSENTIAL HYPERTENSION: ICD-10-CM

## 2021-01-01 DIAGNOSIS — G31.83 LEWY BODY DEMENTIA WITH BEHAVIORAL DISTURBANCE (H): ICD-10-CM

## 2021-01-01 DIAGNOSIS — N18.32 STAGE 3B CHRONIC KIDNEY DISEASE (H): ICD-10-CM

## 2021-01-01 DIAGNOSIS — G47.01 INSOMNIA DUE TO MEDICAL CONDITION: ICD-10-CM

## 2021-01-01 DIAGNOSIS — N18.4 CKD (CHRONIC KIDNEY DISEASE) STAGE 4, GFR 15-29 ML/MIN (H): ICD-10-CM

## 2021-01-01 DIAGNOSIS — Z79.01 CHRONIC ANTICOAGULATION: ICD-10-CM

## 2021-01-01 DIAGNOSIS — N18.4 CKD (CHRONIC KIDNEY DISEASE) STAGE 4, GFR 15-29 ML/MIN (H): Primary | ICD-10-CM

## 2021-01-01 LAB
ANION GAP SERPL CALCULATED.3IONS-SCNC: 7 MMOL/L (ref 3–14)
BUN SERPL-MCNC: 38 MG/DL (ref 7–30)
CALCIUM SERPL-MCNC: 8.8 MG/DL (ref 8.5–10.1)
CHLORIDE BLD-SCNC: 110 MMOL/L (ref 94–109)
CO2 SERPL-SCNC: 23 MMOL/L (ref 20–32)
CREAT SERPL-MCNC: 1.83 MG/DL (ref 0.52–1.04)
GFR SERPL CREATININE-BSD FRML MDRD: 26 ML/MIN/1.73M2
GLUCOSE BLD-MCNC: 105 MG/DL (ref 70–99)
LABORATORY COMMENT REPORT: NORMAL
POTASSIUM BLD-SCNC: 4.4 MMOL/L (ref 3.4–5.3)
SARS-COV-2 RNA RESP QL NAA+PROBE: NEGATIVE
SARS-COV-2 RNA RESP QL NAA+PROBE: NORMAL
SODIUM SERPL-SCNC: 140 MMOL/L (ref 133–144)
SPECIMEN SOURCE: NORMAL
SPECIMEN SOURCE: NORMAL

## 2021-01-01 PROCEDURE — 99207 PR CDG-CODE CATEGORY CHANGED: CPT | Performed by: NURSE PRACTITIONER

## 2021-01-01 PROCEDURE — 36415 COLL VENOUS BLD VENIPUNCTURE: CPT | Mod: ORL | Performed by: FAMILY MEDICINE

## 2021-01-01 PROCEDURE — 99207 PR NO DOCUMENTATION ON VISIT: CPT | Mod: GV | Performed by: NURSE PRACTITIONER

## 2021-01-01 PROCEDURE — 82310 ASSAY OF CALCIUM: CPT | Performed by: FAMILY MEDICINE

## 2021-01-01 PROCEDURE — 99309 SBSQ NF CARE MODERATE MDM 30: CPT | Performed by: NURSE PRACTITIONER

## 2021-01-01 PROCEDURE — 99309 SBSQ NF CARE MODERATE MDM 30: CPT | Mod: GW | Performed by: NURSE PRACTITIONER

## 2021-01-01 PROCEDURE — P9603 ONE-WAY ALLOW PRORATED MILES: HCPCS | Mod: ORL | Performed by: FAMILY MEDICINE

## 2021-01-01 PROCEDURE — 99309 SBSQ NF CARE MODERATE MDM 30: CPT | Mod: GW | Performed by: FAMILY MEDICINE

## 2021-01-01 PROCEDURE — 99214 OFFICE O/P EST MOD 30 MIN: CPT | Mod: GW | Performed by: FAMILY MEDICINE

## 2021-01-01 RX ORDER — TRAZODONE HYDROCHLORIDE 50 MG/1
75 TABLET, FILM COATED ORAL AT BEDTIME
Start: 2021-01-01

## 2021-01-01 RX ORDER — TRAZODONE HYDROCHLORIDE 50 MG/1
50 TABLET, FILM COATED ORAL AT BEDTIME
Start: 2021-01-29 | End: 2021-01-01

## 2021-01-01 ASSESSMENT — MIFFLIN-ST. JEOR
SCORE: 1175.34
SCORE: 1236.12
SCORE: 1256.07
SCORE: 1242.47
SCORE: 1217.98
SCORE: 1243.38
SCORE: 1264.69

## 2021-01-06 ENCOUNTER — NURSING HOME VISIT (OUTPATIENT)
Dept: GERIATRICS | Facility: CLINIC | Age: 76
End: 2021-01-06
Payer: MEDICARE

## 2021-01-06 VITALS
SYSTOLIC BLOOD PRESSURE: 114 MMHG | WEIGHT: 160.6 LBS | BODY MASS INDEX: 24.42 KG/M2 | HEART RATE: 67 BPM | RESPIRATION RATE: 17 BRPM | DIASTOLIC BLOOD PRESSURE: 69 MMHG | TEMPERATURE: 98 F | OXYGEN SATURATION: 93 %

## 2021-01-06 DIAGNOSIS — I10 BENIGN ESSENTIAL HYPERTENSION: ICD-10-CM

## 2021-01-06 DIAGNOSIS — E11.40 TYPE 2 DIABETES MELLITUS WITH DIABETIC NEUROPATHY, WITHOUT LONG-TERM CURRENT USE OF INSULIN (H): ICD-10-CM

## 2021-01-06 DIAGNOSIS — F02.818 LEWY BODY DEMENTIA WITH BEHAVIORAL DISTURBANCE (H): Primary | ICD-10-CM

## 2021-01-06 DIAGNOSIS — D70.9 NEUTROPENIA, UNSPECIFIED TYPE (H): ICD-10-CM

## 2021-01-06 DIAGNOSIS — N18.4 CKD (CHRONIC KIDNEY DISEASE) STAGE 4, GFR 15-29 ML/MIN (H): ICD-10-CM

## 2021-01-06 DIAGNOSIS — E66.01 MORBID OBESITY (H): ICD-10-CM

## 2021-01-06 DIAGNOSIS — R13.12 OROPHARYNGEAL DYSPHAGIA: ICD-10-CM

## 2021-01-06 DIAGNOSIS — R11.0 NAUSEA: ICD-10-CM

## 2021-01-06 DIAGNOSIS — C20 RECTAL CANCER (H): ICD-10-CM

## 2021-01-06 DIAGNOSIS — G31.83 LEWY BODY DEMENTIA WITH BEHAVIORAL DISTURBANCE (H): Primary | ICD-10-CM

## 2021-01-06 DIAGNOSIS — Z51.5 HOSPICE CARE PATIENT: ICD-10-CM

## 2021-01-06 DIAGNOSIS — I48.91 ATRIAL FIBRILLATION, RAPID (H): ICD-10-CM

## 2021-01-06 PROCEDURE — 99318 PR ANNUAL NURSING FAC ASSESSMNT, STABLE: CPT | Mod: GV | Performed by: NURSE PRACTITIONER

## 2021-01-06 NOTE — LETTER
1/6/2021        RE: [place]  [address] [facility] [address]        [place] [facility]  Chief Complaint   Patient presents with     Annual Comprehensive Nursing Home     [facility] Medical Record Number:  [id number]  Place of Service where encounter took place:  [facility] ([facility]) [[id number]]    HPI:    Stefanie Velazquez  is a 75 year old  (1945), who is being seen today for an annual comprehensive visit. HPI information obtained from: facility chart records, facility staff, patient report and [facility] chart review.      Brief History:  Stefanie Velazquez has a past medical history of CVA, CAD S/P CABG x3 in 2002, SVT, DVT on apixaban, rectal cancer (with concern for lung nodules), hypertension, hyperlipidemia, DM2, hypothyroidism, GERD, RLS, RC on CPAP, chronic back pain, recurrent UTI, recurrent encephalopathy thought to be Lewy Body dementia.  S/he was admitted to the hospital in Sept 2020 with acute onset of fatigue, weakness, acute agitation, paranoia, hitting and kicking at staff at her assisted living facility, worsening encephalopathy and found to have enterococcus UTI.  It appears that her recurrent episodes of acute change in mental status are often related to UTI.  There has been concern that antipsychotic medications actually worsened her agitation, and when clear seems to have mild cognitive dysfunction. Head MRI showing small acute infarct R temp lobe as well as chronic small vessel ischemic disease . The hospital stay was complicated with noted aspiration (likely chronic), and she is now on honey thick liquids with pureed food but OK for free water between meals.  she has been at [facility] since 9/2020, enrolled in hospice for end of life care on 10/16/20.      Recent changes:    10/22/20 - BIMS 15/15, PHQ9 8/27    Today's concerns are:     Lewy body dementia with behavioral disturbance (H)  Rectal cancer (H)  Type  2 diabetes mellitus with diabetic neuropathy, without long-term current use of insulin (H)  CKD (chronic kidney disease) stage 4, GFR 15-29 ml/min (H)  Atrial fibrillation, rapid (H)  Morbid obesity (H)  Neutropenia, unspecified type (H)  Oropharyngeal dysphagia  Nausea  Benign essential hypertension  Hospice care patient    Stefanie reports she continues to have significant nausea with vomiting, which is frequent - almost daily and unpredictable.  She reports this is distressing.  She reports she is otherwise doing well, almost all medications have been discontinued, and she is adjusting to her skilled nursing facility stay.  Nursing reports occasional nausea, not always controlled with zofran.       ALLERGIES: Cefepime, Ciprofloxacin, Hydrocodone, Lisinopril, and Vicodin [hydrocodone-acetaminophen]  PAST MEDICAL HISTORY:  has a past medical history of Acute deep vein thrombosis (DVT) of right lower extremity, unspecified vein (H) (10/31/2018), Acute myocardial infarction of other specified sites, episode of care unspecified (6/2002), Altered mental status (8/11/2020), Cancer of colon (H), Cellulitis of lower extremity, bilateral (9/30/2016), Chronic ischemic heart disease, unspecified (6/22/2003), Confusion (8/20/2020), Coronary atherosclerosis of unspecified type of vessel, native or graft, Diabetes mellitus (H), Esophageal reflux, Fracture of femur, distal, left, closed (H) (2/11/2013), Gastro-oesophageal reflux disease, Generalized osteoarthrosis, unspecified site (6/22/2005), Generalized osteoarthrosis, unspecified site (6/22/2005), H/O deep venous thrombosis (5/21/2019), HEARING LOSS CONDUCTIVE, COMBINED TYPE (6/22/2005), HYPOTHYROIDISM  (6/22/2003), Mixed hyperlipidemia (6/22/2005), Obesity, unspecified (6/22/2005), RC-moderate (AHI 12, LSat 60%); REM RDI-73 (6/1/2009), Radiation therapy complication (11/9/2011), Recurrent acute deep vein thrombosis (DVT) of both lower extremities (H) (3/31/2019), Rubeola,  Stented coronary artery, Type 2 diabetes mellitus with diabetic nephropathy, with long-term current use of insulin (H) (6/27/2017), Type II or unspecified type diabetes mellitus with renal manifestations, uncontrolled(250.42) (H) (6/22/2005), Type II or unspecified type diabetes mellitus with renal manifestations, uncontrolled(250.42) (H) (6/22/2005), Unspecified disorder resulting from impaired renal function (6/22/2005), and Unspecified essential hypertension. She also has no past medical history of PONV (postoperative nausea and vomiting) or Walking and running.  PAST SURGICAL HISTORY:  has a past surgical history that includes surgical history of -  (10/24/2002); surgical history of -  (2002); surgical history of -  (2002); Colonoscopy (1/26/2011); orthopedic surgery; cabg; Insert port vascular access (3/2/2011); Colectomy low anterior (6/21/2011); Takedown ileostomy (9/9/2011); Sigmoidoscopy flexible (9/9/2011); Open reduction internal fixation femur distal (2/12/2013); Colonoscopy (N/A, 3/1/2016); Phacoemulsification with standard intraocular lens implant (Left, 1/22/2018); Phacoemulsification with standard intraocular lens implant (Right, 2/19/2018); and Anesthesia out of OR MRI (N/A, 4/8/2020).  IMMUNIZATIONS:  Immunization History   Administered Date(s) Administered     Influenza (High Dose) 3 valent vaccine 10/19/2011, 10/21/2013, 11/17/2014, 01/05/2016, 09/20/2016, 11/10/2017, 09/18/2018, 11/08/2019     Influenza (IIV3) PF 11/03/2005, 11/08/2006, 11/10/2007, 10/20/2008, 10/30/2009, 09/27/2012     Pneumo Conj 13-V (2010&after) 09/20/2016     Pneumococcal 23 valent 12/06/2005, 06/07/2011     TD (ADULT, 7+) 12/06/2005, 08/24/2011     Above immunizations pulled from [facility]. [facility] and facility records also reconciled. Outstanding information sent to  to update [facility]   Future immunizations are deferred as: not clinically appropriate given goals of care    Current Outpatient  Medications   Medication Sig Dispense Refill     acetaminophen (TYLENOL) 325 MG tablet Take 650 mg by mouth 3 times daily       acetaminophen (TYLENOL) 325 MG tablet Take 650 mg by mouth 3 times daily as needed        fluocinonide (LIDEX) 0.05 % ointment Apply sparingly to rash on legs twice daily as needed.  Do not apply to face. 60 g 11     fluticasone (FLONASE) 50 MCG/ACT spray Spray 1 spray into both nostrils daily 1 Bottle 3     ondansetron (ZOFRAN) 4 MG tablet Take 4 mg by mouth every 6 hours as needed for nausea       prochlorperazine (COMPAZINE) 5 MG tablet Take 5 mg by mouth every 6 hours as needed for nausea or vomiting       prochlorperazine (COMPAZINE) 5 MG tablet Take 5 mg by mouth every morning       senna-docusate (SENOKOT-S/PERICOLACE) 8.6-50 MG tablet Take 1 tablet by mouth 2 times daily         Case Management:  I have reviewed the facility/SNF care plan/MDS, including the falls risk, nutrition and pain screening. I also reviewed the current immunizations, and preventive care. .Future cancer screening is not clinically indicated secondary to age/goals of care Patient's desire to return to the community is not present. Current Level of Care is appropriate.    Advance Directive Discussion:    I reviewed the current advanced directives as reflected in EPIC, the POLST and the facility chart, and verified the congruency of orders. I contacted the first party and discussed the plan of Care.  I did review the advance directives with the resident.     Team Discussion:  I communicated with the appropriate disciplines involved with the Plan of Care:   Nursing    Patient's goal is pain control and comfort.  Information reviewed:  Medications, vital signs, orders, and nursing notes.    ROS:  4 point ROS including Respiratory, CV, GI and , other than that noted in the HPI,  is negative    Vitals:  /69   Pulse 67   Temp 98  F (36.7  C)   Resp 17   Wt 72.8 kg (160 lb 9.6 oz)   SpO2 93%   BMI 24.42  kg/m   Body mass index is 24.42 kg/m .  Exam:  GENERAL APPEARANCE:  Alert, in no acute distress   HEAD:  Normal, normocephalic, atraumatic  ENT:  Mouth and posterior oropharynx normal, moist mucous membranes, hearing acuity - very hard of hearing-uses pocket talker  EYE EXAM:  EOM, conjunctivae, lids, pupils and irises normal   CHEST/RESP:  respiratory effort normal, no respiratory distress, lung sounds CTA    CV:  Rate and rhythm reg, no murmur/rub/gallop, no peripheral edema  M/S:   extremities ormal, gait & station abnormal-requires assistance with all mobility , digits and nails within normal limits   SKIN:  CDI  NEUROLOGIC EXAM: Cranial nerves 2-12 are normal tested and grossly at patient's baseline.  No tremor, gross motor movement at baseline.   PSYCH:  Alert and oriented to person-place-time , affect pleasant and appropriate       Lab/Diagnostic data:   no recent labs, on hospice    ASSESSMENT/PLAN  Lewy body dementia with behavioral disturbance (H)  Patient with presumed LBD who is very alert and oriented at baseline and able to make her needs known.  She is generally pleasant and cooperative-does have history of agitation and paranoia in her history.  No behaviors noted recently.   -Maintain safe living situation with goals focused on comfort    Rectal cancer (H)  Patient with history of rectal cancer, was last seen in 2019 with no further follow up planned at that time and without symptoms.     Type 2 diabetes mellitus with diabetic neuropathy, without long-term current use of insulin (H)  History of DM2, not on insulin nor oral agents and no routine blood sugar checks as she is in hospice.  Last A1C was 6.1% in 6/2020    CKD (chronic kidney disease) stage 4, GFR 15-29 ml/min (H)  Baseline creat 1.4-1.8, with eGFR 36.  No further labs planned as she is on hospice with goals of care focused on comfort.   -Avoid nephrotoxic medications  -Renal dosing of medications    Atrial fibrillation, rapid (H)  HR reg  today, all medications discontinued, and she is not in distress.      Morbid obesity (H)  She is eating what she wants-noting weight loss of about 30 pounds in last 4 months     Neutropenia, unspecified type (H)  Per history, and no further labs being checked as she is on hospice.  No new symptoms     Oropharyngeal dysphagia  Nausea  Patient with history of dysphagia, aspiration.  She has been upgraded to mechanical soft regular diet with thin liquids to encourage oral intake. She understands the aspiration risk.  She has chronic difficulty with nausea-which causes vomiting and happens almost daily.  It is unpredictable and of unclear etiology.  She has not found great benefit form the use of zofran.    Trial of scheduled prochlorperazine, with prn as well, to see if this improves her nausea and allows her to enjoy her food.   -Noted PRN orders for antipsychotic medications are limited to 14 days. Face to Face encounter done today. Evaluation of prn prochlorperazine medication indicates it is appropriate to try this to see if it works to control her nausea    Benign essential hypertension  BP goals are <150/90 mm Hg.This is higher than ACC and AHA recommendations due to goals of care, risk for hypotension, risk of dizziness and falls and frailty. Patient is stable and continue without pharmacological invention with routine assessment.     Hospice care patient  Enrolled with [facility] for end of life cares.      Orders written by provider at facility    Prochlorperazine 5 mg qam AND 5 mg q6h prn po for nausea x 14 days     Electronically signed by:  CLEMENCIA Ellis CNP               Sincerely,        CLEMENCIA Ellis CNP

## 2021-01-06 NOTE — PROGRESS NOTES
[place] [facility]  Chief Complaint   Patient presents with     Annual Comprehensive Nursing Home     [facility] Medical Record Number:  [id number]  Place of Service where encounter took place:  [facility] ([facility]) [[id number]]    HPI:    Stefanie Velazquez  is a 75 year old  (1945), who is being seen today for an annual comprehensive visit. HPI information obtained from: facility chart records, facility staff, patient report and [facility] chart review.      Brief History:  Stefanie Velazquez has a past medical history of CVA, CAD S/P CABG x3 in 2002, SVT, DVT on apixaban, rectal cancer (with concern for lung nodules), hypertension, hyperlipidemia, DM2, hypothyroidism, GERD, RLS, RC on CPAP, chronic back pain, recurrent UTI, recurrent encephalopathy thought to be Lewy Body dementia.  S/he was admitted to the hospital in Sept 2020 with acute onset of fatigue, weakness, acute agitation, paranoia, hitting and kicking at staff at her assisted living facility, worsening encephalopathy and found to have enterococcus UTI.  It appears that her recurrent episodes of acute change in mental status are often related to UTI.  There has been concern that antipsychotic medications actually worsened her agitation, and when clear seems to have mild cognitive dysfunction. Head MRI showing small acute infarct R temp lobe as well as chronic small vessel ischemic disease . The hospital stay was complicated with noted aspiration (likely chronic), and she is now on honey thick liquids with pureed food but OK for free water between meals.  she has been at [facility] since 9/2020, enrolled in hospice for end of life care on 10/16/20.      Recent changes:    10/22/20 - BIMS 15/15, PHQ9 8/27    Today's concerns are:     Lewy body dementia with behavioral disturbance (H)  Rectal cancer (H)  Type 2 diabetes mellitus with diabetic neuropathy, without long-term current use of insulin (H)  CKD (chronic kidney  disease) stage 4, GFR 15-29 ml/min (H)  Atrial fibrillation, rapid (H)  Morbid obesity (H)  Neutropenia, unspecified type (H)  Oropharyngeal dysphagia  Nausea  Benign essential hypertension  Hospice care patient    Stefanie reports she continues to have significant nausea with vomiting, which is frequent - almost daily and unpredictable.  She reports this is distressing.  She reports she is otherwise doing well, almost all medications have been discontinued, and she is adjusting to her skilled nursing facility stay.  Nursing reports occasional nausea, not always controlled with zofran.       ALLERGIES: Cefepime, Ciprofloxacin, Hydrocodone, Lisinopril, and Vicodin [hydrocodone-acetaminophen]  PAST MEDICAL HISTORY:  has a past medical history of Acute deep vein thrombosis (DVT) of right lower extremity, unspecified vein (H) (10/31/2018), Acute myocardial infarction of other specified sites, episode of care unspecified (6/2002), Altered mental status (8/11/2020), Cancer of colon (H), Cellulitis of lower extremity, bilateral (9/30/2016), Chronic ischemic heart disease, unspecified (6/22/2003), Confusion (8/20/2020), Coronary atherosclerosis of unspecified type of vessel, native or graft, Diabetes mellitus (H), Esophageal reflux, Fracture of femur, distal, left, closed (H) (2/11/2013), Gastro-oesophageal reflux disease, Generalized osteoarthrosis, unspecified site (6/22/2005), Generalized osteoarthrosis, unspecified site (6/22/2005), H/O deep venous thrombosis (5/21/2019), HEARING LOSS CONDUCTIVE, COMBINED TYPE (6/22/2005), HYPOTHYROIDISM  (6/22/2003), Mixed hyperlipidemia (6/22/2005), Obesity, unspecified (6/22/2005), RC-moderate (AHI 12, LSat 60%); REM RDI-73 (6/1/2009), Radiation therapy complication (11/9/2011), Recurrent acute deep vein thrombosis (DVT) of both lower extremities (H) (3/31/2019), Rubeola, Stented coronary artery, Type 2 diabetes mellitus with diabetic nephropathy, with long-term current use of insulin  (H) (6/27/2017), Type II or unspecified type diabetes mellitus with renal manifestations, uncontrolled(250.42) (H) (6/22/2005), Type II or unspecified type diabetes mellitus with renal manifestations, uncontrolled(250.42) (H) (6/22/2005), Unspecified disorder resulting from impaired renal function (6/22/2005), and Unspecified essential hypertension. She also has no past medical history of PONV (postoperative nausea and vomiting) or Walking and running.  PAST SURGICAL HISTORY:  has a past surgical history that includes surgical history of -  (10/24/2002); surgical history of -  (2002); surgical history of -  (2002); Colonoscopy (1/26/2011); orthopedic surgery; cabg; Insert port vascular access (3/2/2011); Colectomy low anterior (6/21/2011); Takedown ileostomy (9/9/2011); Sigmoidoscopy flexible (9/9/2011); Open reduction internal fixation femur distal (2/12/2013); Colonoscopy (N/A, 3/1/2016); Phacoemulsification with standard intraocular lens implant (Left, 1/22/2018); Phacoemulsification with standard intraocular lens implant (Right, 2/19/2018); and Anesthesia out of OR MRI (N/A, 4/8/2020).  IMMUNIZATIONS:  Immunization History   Administered Date(s) Administered     Influenza (High Dose) 3 valent vaccine 10/19/2011, 10/21/2013, 11/17/2014, 01/05/2016, 09/20/2016, 11/10/2017, 09/18/2018, 11/08/2019     Influenza (IIV3) PF 11/03/2005, 11/08/2006, 11/10/2007, 10/20/2008, 10/30/2009, 09/27/2012     Pneumo Conj 13-V (2010&after) 09/20/2016     Pneumococcal 23 valent 12/06/2005, 06/07/2011     TD (ADULT, 7+) 12/06/2005, 08/24/2011     Above immunizations pulled from [facility]. [facility] and facility records also reconciled. Outstanding information sent to  to update [facility]   Future immunizations are deferred as: not clinically appropriate given goals of care    Current Outpatient Medications   Medication Sig Dispense Refill     acetaminophen (TYLENOL) 325 MG tablet Take 650 mg by mouth 3 times daily        acetaminophen (TYLENOL) 325 MG tablet Take 650 mg by mouth 3 times daily as needed        fluocinonide (LIDEX) 0.05 % ointment Apply sparingly to rash on legs twice daily as needed.  Do not apply to face. 60 g 11     fluticasone (FLONASE) 50 MCG/ACT spray Spray 1 spray into both nostrils daily 1 Bottle 3     ondansetron (ZOFRAN) 4 MG tablet Take 4 mg by mouth every 6 hours as needed for nausea       prochlorperazine (COMPAZINE) 5 MG tablet Take 5 mg by mouth every 6 hours as needed for nausea or vomiting       prochlorperazine (COMPAZINE) 5 MG tablet Take 5 mg by mouth every morning       senna-docusate (SENOKOT-S/PERICOLACE) 8.6-50 MG tablet Take 1 tablet by mouth 2 times daily         Case Management:  I have reviewed the facility/SNF care plan/MDS, including the falls risk, nutrition and pain screening. I also reviewed the current immunizations, and preventive care. .Future cancer screening is not clinically indicated secondary to age/goals of care Patient's desire to return to the community is not present. Current Level of Care is appropriate.    Advance Directive Discussion:    I reviewed the current advanced directives as reflected in EPIC, the POLST and the facility chart, and verified the congruency of orders. I contacted the first party and discussed the plan of Care.  I did review the advance directives with the resident.     Team Discussion:  I communicated with the appropriate disciplines involved with the Plan of Care:   Nursing    Patient's goal is pain control and comfort.  Information reviewed:  Medications, vital signs, orders, and nursing notes.    ROS:  4 point ROS including Respiratory, CV, GI and , other than that noted in the HPI,  is negative    Vitals:  /69   Pulse 67   Temp 98  F (36.7  C)   Resp 17   Wt 72.8 kg (160 lb 9.6 oz)   SpO2 93%   BMI 24.42 kg/m   Body mass index is 24.42 kg/m .  Exam:  GENERAL APPEARANCE:  Alert, in no acute distress   HEAD:  Normal,  normocephalic, atraumatic  ENT:  Mouth and posterior oropharynx normal, moist mucous membranes, hearing acuity - very hard of hearing-uses pocket talker  EYE EXAM:  EOM, conjunctivae, lids, pupils and irises normal   CHEST/RESP:  respiratory effort normal, no respiratory distress, lung sounds CTA    CV:  Rate and rhythm reg, no murmur/rub/gallop, no peripheral edema  M/S:   extremities ormal, gait & station abnormal-requires assistance with all mobility , digits and nails within normal limits   SKIN:  CDI  NEUROLOGIC EXAM: Cranial nerves 2-12 are normal tested and grossly at patient's baseline.  No tremor, gross motor movement at baseline.   PSYCH:  Alert and oriented to person-place-time , affect pleasant and appropriate       Lab/Diagnostic data:   no recent labs, on hospice    ASSESSMENT/PLAN  Lewy body dementia with behavioral disturbance (H)  Patient with presumed LBD who is very alert and oriented at baseline and able to make her needs known.  She is generally pleasant and cooperative-does have history of agitation and paranoia in her history.  No behaviors noted recently.   -Maintain safe living situation with goals focused on comfort    Rectal cancer (H)  Patient with history of rectal cancer, was last seen in 2019 with no further follow up planned at that time and without symptoms.     Type 2 diabetes mellitus with diabetic neuropathy, without long-term current use of insulin (H)  History of DM2, not on insulin nor oral agents and no routine blood sugar checks as she is in hospice.  Last A1C was 6.1% in 6/2020    CKD (chronic kidney disease) stage 4, GFR 15-29 ml/min (H)  Baseline creat 1.4-1.8, with eGFR 36.  No further labs planned as she is on hospice with goals of care focused on comfort.   -Avoid nephrotoxic medications  -Renal dosing of medications    Atrial fibrillation, rapid (H)  HR reg today, all medications discontinued, and she is not in distress.      Morbid obesity (H)  She is eating what she  wants-noting weight loss of about 30 pounds in last 4 months     Neutropenia, unspecified type (H)  Per history, and no further labs being checked as she is on hospice.  No new symptoms     Oropharyngeal dysphagia  Nausea  Patient with history of dysphagia, aspiration.  She has been upgraded to mechanical soft regular diet with thin liquids to encourage oral intake. She understands the aspiration risk.  She has chronic difficulty with nausea-which causes vomiting and happens almost daily.  It is unpredictable and of unclear etiology.  She has not found great benefit form the use of zofran.    Trial of scheduled prochlorperazine, with prn as well, to see if this improves her nausea and allows her to enjoy her food.   -Noted PRN orders for antipsychotic medications are limited to 14 days. Face to Face encounter done today. Evaluation of prn prochlorperazine medication indicates it is appropriate to try this to see if it works to control her nausea    Benign essential hypertension  BP goals are <150/90 mm Hg.This is higher than ACC and AHA recommendations due to goals of care, risk for hypotension, risk of dizziness and falls and frailty. Patient is stable and continue without pharmacological invention with routine assessment.     Hospice care patient  Enrolled with [facility] for end of life cares.      Orders written by provider at facility    Prochlorperazine 5 mg qam AND 5 mg q6h prn po for nausea x 14 days     Electronically signed by:  CLEMENCIA Ellis CNP

## 2021-01-07 RX ORDER — ONDANSETRON 4 MG/1
4 TABLET, FILM COATED ORAL EVERY 6 HOURS PRN
COMMUNITY
End: 2021-01-01

## 2021-01-07 RX ORDER — PROCHLORPERAZINE MALEATE 5 MG
5 TABLET ORAL EVERY 6 HOURS PRN
COMMUNITY
Start: 2021-01-06 | End: 2021-01-22

## 2021-01-07 RX ORDER — ACETAMINOPHEN 325 MG/1
650 TABLET ORAL 3 TIMES DAILY
COMMUNITY

## 2021-01-07 RX ORDER — PROCHLORPERAZINE MALEATE 5 MG
5 TABLET ORAL EVERY MORNING
COMMUNITY
End: 2021-01-01

## 2021-01-08 PROBLEM — G93.41 ACUTE METABOLIC ENCEPHALOPATHY: Status: RESOLVED | Noted: 2020-08-11 | Resolved: 2021-01-08

## 2021-01-08 PROBLEM — G31.83 LEWY BODY DEMENTIA WITH BEHAVIORAL DISTURBANCE (H): Status: ACTIVE | Noted: 2021-01-08

## 2021-01-08 PROBLEM — D70.9 NEUTROPENIA, UNSPECIFIED TYPE (H): Status: ACTIVE | Noted: 2021-01-08

## 2021-01-08 PROBLEM — F02.818 LEWY BODY DEMENTIA WITH BEHAVIORAL DISTURBANCE (H): Status: ACTIVE | Noted: 2021-01-08

## 2021-01-08 PROBLEM — I48.91 ATRIAL FIBRILLATION, RAPID (H): Status: ACTIVE | Noted: 2021-01-08

## 2021-01-08 PROBLEM — H91.93 BILATERAL HEARING LOSS, UNSPECIFIED HEARING LOSS TYPE: Status: RESOLVED | Noted: 2018-12-14 | Resolved: 2021-01-08

## 2021-01-08 PROBLEM — Z86.718 H/O DEEP VENOUS THROMBOSIS: Status: RESOLVED | Noted: 2019-05-21 | Resolved: 2021-01-08

## 2021-01-12 ENCOUNTER — TRANSFERRED RECORDS (OUTPATIENT)
Dept: HEALTH INFORMATION MANAGEMENT | Facility: CLINIC | Age: 76
End: 2021-01-12

## 2021-01-12 LAB — RETINOPATHY: NEGATIVE

## 2021-01-15 ENCOUNTER — HEALTH MAINTENANCE LETTER (OUTPATIENT)
Age: 76
End: 2021-01-15

## 2021-01-20 ENCOUNTER — NURSING HOME VISIT (OUTPATIENT)
Dept: GERIATRICS | Facility: CLINIC | Age: 76
End: 2021-01-20
Payer: MEDICAID

## 2021-01-20 VITALS
HEART RATE: 83 BPM | RESPIRATION RATE: 16 BRPM | WEIGHT: 190.1 LBS | BODY MASS INDEX: 28.9 KG/M2 | OXYGEN SATURATION: 96 % | DIASTOLIC BLOOD PRESSURE: 68 MMHG | SYSTOLIC BLOOD PRESSURE: 113 MMHG | TEMPERATURE: 97.5 F

## 2021-01-20 DIAGNOSIS — E11.40 TYPE 2 DIABETES MELLITUS WITH DIABETIC NEUROPATHY, WITHOUT LONG-TERM CURRENT USE OF INSULIN (H): ICD-10-CM

## 2021-01-20 DIAGNOSIS — Z79.01 CHRONIC ANTICOAGULATION: ICD-10-CM

## 2021-01-20 DIAGNOSIS — I10 BENIGN ESSENTIAL HYPERTENSION: ICD-10-CM

## 2021-01-20 DIAGNOSIS — N18.4 CKD (CHRONIC KIDNEY DISEASE) STAGE 4, GFR 15-29 ML/MIN (H): ICD-10-CM

## 2021-01-20 DIAGNOSIS — Z86.73 HISTORY OF CVA (CEREBROVASCULAR ACCIDENT): Primary | ICD-10-CM

## 2021-01-20 DIAGNOSIS — R13.12 OROPHARYNGEAL DYSPHAGIA: ICD-10-CM

## 2021-01-20 PROCEDURE — 99309 SBSQ NF CARE MODERATE MDM 30: CPT | Mod: GV | Performed by: NURSE PRACTITIONER

## 2021-01-20 NOTE — LETTER
1/20/2021        RE: Stefanie Waller [facility] On [facility]  [address] [address] [address]        [place] [facility]  [place] Medical Record Number:  [id number]  Place of Service where encounter took place:  [facility] (S) [[id number]]  Chief Complaint   Patient presents with     Nursing Home Acute       HPI:    Stefanie Velazquez  is a 75 year old (1945), who is being seen today for an episodic care visit at: [facility] ([facility]) [[id number]].     Brief History:  Stefanie Velazquez has a past medical history of CVA, CAD S/P CABG x3 in 2002, SVT, DVT on apixaban, rectal cancer (with concern for lung nodules), hypertension, hyperlipidemia, DM2, hypothyroidism, GERD, RLS, RC on CPAP, chronic back pain, recurrent UTI, recurrent encephalopathy thought to be Lewy Body dementia.  S/he was admitted to the hospital in Sept 2020 with acute onset of fatigue, weakness, acute agitation, paranoia, hitting and kicking at staff at her assisted living facility, worsening encephalopathy and found to have enterococcus UTI.  It appears that her recurrent episodes of acute change in mental status are often related to UTI.  There has been concern that antipsychotic medications actually worsened her agitation, and when clear seems to have mild cognitive dysfunction. Head MRI showing small acute infarct R temp lobe as well as chronic small vessel ischemic disease . The hospital stay was complicated with noted aspiration (likely chronic).  She has been at [facility] since 9/2020.       Recent changes:    10/16/20 - enrolled in hospice for diagnosis of hypertensive heart disease    10/22/20 - BIMS 15/15, PHQ9 8/27 1/6/21 - trial of compazine for chronic nausea    1/21/21 - upgraded to regular diet and tolerating well    Today's concern is:   History of CVA (cerebrovascular accident)  Oropharyngeal dysphagia  Chronic anticoagulation  CKD (chronic kidney disease) stage  4, GFR 15-29 ml/min (H)  Type 2 diabetes mellitus with diabetic neuropathy, without long-term current use of insulin (H)  Benign essential hypertension    Stefanie reports she is generally doing well without new symptoms.  She is eating better, enjoying regular food again.  She reports she is worried she will have another stroke as she is not taking her apixaban. No recent weights as she is on hospice program.  Nursing reports no concerns, less nausea-rarely requesting prn medications .       Past Medical, Surgical, and Family History reviewed in Epic today.    MEDICATIONS:  Current medication list reviewed    REVIEW OF SYSTEMS:  4 point ROS including Respiratory, CV, GI and , other than that noted in the HPI,  is negative    OBJECTIVE:  /68   Pulse 83   Temp 97.5  F (36.4  C)   Resp 16   Wt 86.2 kg (190 lb 1.6 oz)   SpO2 96%   BMI 28.90 kg/m    Exam:  GENERAL APPEARANCE:  Alert, in no distress   CHEST/RESP:  respiratory effort normal, no respiratory distress, lung sounds CTA    CV:  Rate and rhythm reg, no murmur/rub/gallop, no peripheral edema  M/S:   gait & station abnormal-transfer with assist of staff, does not ambulate and uses wheelchair for most mobility , digits and nails within normal limits   PSYCH:  at baseline mentation, alert and oriented with mild forgetfulness    Labs:   no recent labs, on hospice     ASSESSMENT/PLAN:  History of CVA (cerebrovascular accident)  Oropharyngeal dysphagia  Chronic anticoagulation  Patient with history of CVA, DVTs, and previously on apixaban for anticoagulant.  This (and many other medications) were discontinued when she was very ill with COVID infection, presuming she was approaching end of life.  However, she has recovered remarkably, is eating better, drinking better and feeling well.  She has requested resumption of apixaban to prevent a recurrence of stroke.  This is reasonable as a stroke would not promote quality of life.  -resume apixaban 5 mg  BID    CKD (chronic kidney disease) stage 4, GFR 15-29 ml/min (H)  Baseline creat 1.4-1.8.  Most recent was 1.4 in 9/2020 prior to hospice enrollment   -Avoid nephrotoxic medications  -Renal dosing of medications     Type 2 diabetes mellitus with diabetic neuropathy, without long-term current use of insulin (H)  Diet controlled, not on medications nor insulin.  Last A1C was 6.1% in 6/2020.  Eating well without symptoms of hypoglycemia or hyperglycemia    Benign essential hypertension  BP as noted below, on no medications. Stable   BP Readings from Last 3 Encounters:   01/20/21 113/68   01/06/21 114/69   11/23/20 (!) 143/77          Orders written by provider at facility    Resume apixaban 5 mg BID po for CVA prophylaxis     Electronically signed by:  CLEMENCIA Ellis CNP            Sincerely,        CLEMENCIA Ellis CNP

## 2021-01-22 NOTE — PROGRESS NOTES
[place] [facility]  [place] Medical Record Number:  [id number]  Place of Service where encounter took place:  [facility] (S) [[id number]]  Chief Complaint   Patient presents with     Nursing Home Acute       HPI:    Stefanie Velazquez  is a 75 year old (1945), who is being seen today for an episodic care visit at: [facility] (S) [[id number]].     Brief History:  Stefanie Velazquez has a past medical history of CVA, CAD S/P CABG x3 in 2002, SVT, DVT on apixaban, rectal cancer (with concern for lung nodules), hypertension, hyperlipidemia, DM2, hypothyroidism, GERD, RLS, RC on CPAP, chronic back pain, recurrent UTI, recurrent encephalopathy thought to be Lewy Body dementia.  S/he was admitted to the hospital in Sept 2020 with acute onset of fatigue, weakness, acute agitation, paranoia, hitting and kicking at staff at her assisted living facility, worsening encephalopathy and found to have enterococcus UTI.  It appears that her recurrent episodes of acute change in mental status are often related to UTI.  There has been concern that antipsychotic medications actually worsened her agitation, and when clear seems to have mild cognitive dysfunction. Head MRI showing small acute infarct R temp lobe as well as chronic small vessel ischemic disease . The hospital stay was complicated with noted aspiration (likely chronic).  She has been at [facility] since 9/2020.       Recent changes:    10/16/20 - enrolled in hospice for diagnosis of hypertensive heart disease    10/22/20 - BIMS 15/15, PHQ9 8/27 1/6/21 - trial of compazine for chronic nausea    1/21/21 - upgraded to regular diet and tolerating well    Today's concern is:   History of CVA (cerebrovascular accident)  Oropharyngeal dysphagia  Chronic anticoagulation  CKD (chronic kidney disease) stage 4, GFR 15-29 ml/min (H)  Type 2 diabetes mellitus with diabetic neuropathy, without long-term current use of insulin  (H)  Benign essential hypertension    Stefanie reports she is generally doing well without new symptoms.  She is eating better, enjoying regular food again.  She reports she is worried she will have another stroke as she is not taking her apixaban. No recent weights as she is on hospice program.  Nursing reports no concerns, less nausea-rarely requesting prn medications .       Past Medical, Surgical, and Family History reviewed in Epic today.    MEDICATIONS:  Current medication list reviewed    REVIEW OF SYSTEMS:  4 point ROS including Respiratory, CV, GI and , other than that noted in the HPI,  is negative    OBJECTIVE:  /68   Pulse 83   Temp 97.5  F (36.4  C)   Resp 16   Wt 86.2 kg (190 lb 1.6 oz)   SpO2 96%   BMI 28.90 kg/m    Exam:  GENERAL APPEARANCE:  Alert, in no distress   CHEST/RESP:  respiratory effort normal, no respiratory distress, lung sounds CTA    CV:  Rate and rhythm reg, no murmur/rub/gallop, no peripheral edema  M/S:   gait & station abnormal-transfer with assist of staff, does not ambulate and uses wheelchair for most mobility , digits and nails within normal limits   PSYCH:  at baseline mentation, alert and oriented with mild forgetfulness    Labs:   no recent labs, on hospice     ASSESSMENT/PLAN:  History of CVA (cerebrovascular accident)  Oropharyngeal dysphagia  Chronic anticoagulation  Patient with history of CVA, DVTs, and previously on apixaban for anticoagulant.  This (and many other medications) were discontinued when she was very ill with COVID infection, presuming she was approaching end of life.  However, she has recovered remarkably, is eating better, drinking better and feeling well.  She has requested resumption of apixaban to prevent a recurrence of stroke.  This is reasonable as a stroke would not promote quality of life.  -resume apixaban 5 mg BID    CKD (chronic kidney disease) stage 4, GFR 15-29 ml/min (H)  Baseline creat 1.4-1.8.  Most recent was 1.4 in 9/2020  prior to hospice enrollment   -Avoid nephrotoxic medications  -Renal dosing of medications     Type 2 diabetes mellitus with diabetic neuropathy, without long-term current use of insulin (H)  Diet controlled, not on medications nor insulin.  Last A1C was 6.1% in 6/2020.  Eating well without symptoms of hypoglycemia or hyperglycemia    Benign essential hypertension  BP as noted below, on no medications. Stable   BP Readings from Last 3 Encounters:   01/20/21 113/68   01/06/21 114/69   11/23/20 (!) 143/77          Orders written by provider at facility    Resume apixaban 5 mg BID po for CVA prophylaxis     Electronically signed by:  CLEMENCIA Ellis CNP

## 2021-02-13 PROBLEM — E83.42 HYPOMAGNESEMIA: Status: ACTIVE | Noted: 2017-10-30

## 2021-02-13 PROBLEM — Z85.048 HISTORY OF RECTAL CANCER: Status: ACTIVE | Noted: 2017-10-30

## 2021-02-13 PROBLEM — R55 NEAR SYNCOPE: Status: ACTIVE | Noted: 2017-10-29

## 2021-02-13 PROBLEM — N30.90 CYSTITIS: Status: ACTIVE | Noted: 2017-10-31

## 2021-03-04 NOTE — TELEPHONE ENCOUNTER
Patient on hospice. Has chills, malaise and sore throat today. No fevers. SBP 150s. Able to have PO intake    PLAN  Test for COVID19  Tylenol PRN chills  Cepacol lozenge 1 PO every 4 hrs PRN sore throat    Electronically signed by CLEMENCIA Courtney GNP

## 2021-03-05 NOTE — PROGRESS NOTES
"[place] [facility]  Chief Complaint   Patient presents with     halfway Regulatory     [place] Medical Record Number:  [id number]  Place of Service where encounter took place[facility] ([facility] [[id number]]    HPI:    Stefanie Velazquez  is 76 year old (1945), who is being seen today for a federally mandated E/M visit.  HPI information obtained from: facility chart records, facility staff, patient report and [facility] chart review.       Today's concerns are:   - Resident seen and examined.   - Hospice rectal cancer: pt denies pain or blood.   - nausea:reports better now.   - dysphagia: reports no concern.   - reports left leg gets jumpy at night.   - \" my sleep pattern turned upside down, I sleep during the day.   - RN reports pt is sleepy today, slow decline.   --------------------------------  - - Past Medical, social, family histories, medications, and allergies reviewed and updated  - Medications reviewed: in the chart and EHR.   - Case Management:   I have reviewed the care plan and MDS and do agree with the plan. Patient's desire to return to the community is not present.  Information reviewed:  Medications, vital signs, orders, and nursing notes.      MEDICATIONS:  Current Outpatient Medications   Medication Sig Dispense Refill     acetaminophen (TYLENOL) 325 MG tablet Take 650 mg by mouth 3 times daily       acetaminophen (TYLENOL) 325 MG tablet Take 650 mg by mouth 3 times daily as needed        apixaban ANTICOAGULANT (ELIQUIS) 5 MG tablet Take 5 mg by mouth 2 times daily       fluocinonide (LIDEX) 0.05 % ointment Apply sparingly to rash on legs twice daily as needed.  Do not apply to face. 60 g 11     fluticasone (FLONASE) 50 MCG/ACT spray Spray 1 spray into both nostrils daily 1 Bottle 3     ondansetron (ZOFRAN) 4 MG tablet Take 4 mg by mouth every 6 hours as needed for nausea       prochlorperazine (COMPAZINE) 5 MG tablet Take 5 mg by mouth every morning       " "senna-docusate (SENOKOT-S/PERICOLACE) 8.6-50 MG tablet Take 1 tablet by mouth 2 times daily       ROS: 4 point ROS including Respiratory, CV, GI and , other than that noted in the HPI,  is negative  Vitals:  BP (!) 147/80   Pulse 60   Temp 97.8  F (36.6  C)   Resp 18   Ht 1.727 m (5' 8\")   Wt 73.6 kg (162 lb 4.8 oz)   SpO2 96%   BMI 24.68 kg/m    Body mass index is 24.68 kg/m .  Exam:  GENERAL APPEARANCE:  in no distress, cooperative  RESP:  lungs diminished at the base.   CV:  S1S2 audible, regular HR, no murmur appreciated.   ABDOMEN:  soft, NT/ND, BS audible. no mass appreciated on palpation.   M/S:   no joint deformity noted on observation.   SKIN:  No rash.   NEURO:   No NFD appreciated on observation. Hand  5/5 b/l  PSYCH:  affect and mood normal    Lab/Diagnostic data: no new lab to review    ASSESSMENT/PLAN  -----------------------------    Diet controlled T2D (H):     CKD (chronic kidney disease) stage 3b, GFR 30-44 ml/min: no new lab. Renally dose meds. Avoid nephrotoxic meds.     Benign essential hypertension   CAD, hx of CABG:   -compensated.       Hx of DVT:   Hx of small infarct of R temporal lobe and chronic SVID (MRI April 2020):  - eliquis resumed (1/20).     Oropharyngeal dysphagia: better.     Nausea: better.     Frailty: Significant  Deficits requiring NH placement. Requiring extensive assistance from nursing. Up for meals only o/w spends the day resting in bed    Insomnia 2/2 medical conditions: started on trazodone 1/29, better now, but seems her cycle changed to sleeping during day time.     Hx of rectal cancer with query mets to lung  Hospice  - analgesia optimal  - seem comfortable.   - progressive decline.  - Appreciate collaboration with  hospice team for symptom management in collaboration with cares for maximum comfort at end-of-life      Order: See above, otherwise, continue the rest of the current POC.         Electronically signed by:  Radha Olivarez MD          "

## 2021-03-08 NOTE — LETTER
"    3/8/2021        RE: Stefanie [facility] [place]  [id number] [place] [id number]        [place] [facility]  Chief Complaint   Patient presents with     longterm Regulatory     [place] Medical Record Number:  [id number]  Place of Service where encounter took place:  [facility] ([facility] [[id number]]    HPI:    Stefanie Vleazquez  is 76 year old (1945), who is being seen today for a federally mandated E/M visit.  HPI information obtained from: facility chart records, facility staff, patient report and [facility] chart review.       Today's concerns are:   - Resident seen and examined.   - Hospice rectal cancer: pt denies pain or blood.   - nausea:reports better now.   - dysphagia: reports no concern.   - reports left leg gets jumpy at night.   - \" my sleep pattern turned upside down, I sleep during the day.   - RN reports pt is sleepy today, slow decline.   --------------------------------  - - Past Medical, social, family histories, medications, and allergies reviewed and updated  - Medications reviewed: in the chart and EHR.   - Case Management:   I have reviewed the care plan and MDS and do agree with the plan. Patient's desire to return to the community is not present.  Information reviewed:  Medications, vital signs, orders, and nursing notes.      MEDICATIONS:  Current Outpatient Medications   Medication Sig Dispense Refill     acetaminophen (TYLENOL) 325 MG tablet Take 650 mg by mouth 3 times daily       acetaminophen (TYLENOL) 325 MG tablet Take 650 mg by mouth 3 times daily as needed        apixaban ANTICOAGULANT (ELIQUIS) 5 MG tablet Take 5 mg by mouth 2 times daily       fluocinonide (LIDEX) 0.05 % ointment Apply sparingly to rash on legs twice daily as needed.  Do not apply to face. 60 g 11     fluticasone (FLONASE) 50 MCG/ACT spray Spray 1 spray into both nostrils daily 1 Bottle 3     ondansetron (ZOFRAN) 4 MG tablet Take 4 mg by mouth every 6 " "hours as needed for nausea       prochlorperazine (COMPAZINE) 5 MG tablet Take 5 mg by mouth every morning       senna-docusate (SENOKOT-S/PERICOLACE) 8.6-50 MG tablet Take 1 tablet by mouth 2 times daily       ROS: 4 point ROS including Respiratory, CV, GI and , other than that noted in the HPI,  is negative  Vitals:  BP (!) 147/80   Pulse 60   Temp 97.8  F (36.6  C)   Resp 18   Ht 1.727 m (5' 8\")   Wt 73.6 kg (162 lb 4.8 oz)   SpO2 96%   BMI 24.68 kg/m    Body mass index is 24.68 kg/m .  Exam:  GENERAL APPEARANCE:  in no distress, cooperative  RESP:  lungs diminished at the base.   CV:  S1S2 audible, regular HR, no murmur appreciated.   ABDOMEN:  soft, NT/ND, BS audible. no mass appreciated on palpation.   M/S:   no joint deformity noted on observation.   SKIN:  No rash.   NEURO:   No NFD appreciated on observation. Hand  5/5 b/l  PSYCH:  affect and mood normal    Lab/Diagnostic data: no new lab to review    ASSESSMENT/PLAN  -----------------------------    Diet controlled T2D (H):     CKD (chronic kidney disease) stage 3b, GFR 30-44 ml/min: no new lab. Renally dose meds. Avoid nephrotoxic meds.     Benign essential hypertension   CAD, hx of CABG:   -compensated.       Hx of DVT:   Hx of small infarct of R temporal lobe and chronic SVID (MRI April 2020):  - eliquis resumed (1/20).     Oropharyngeal dysphagia: better.     Nausea: better.     Frailty: Significant  Deficits requiring NH placement. Requiring extensive assistance from nursing. Up for meals only o/w spends the day resting in bed    Insomnia 2/2 medical conditions: started on trazodone 1/29, better now, but seems her cycle changed to sleeping during day time.     Hx of rectal cancer with query mets to lung  Hospice  - analgesia optimal  - seem comfortable.   - progressive decline.  - Appreciate collaboration with  hospice team for symptom management in collaboration with cares for maximum comfort at end-of-life      Order: See above, " otherwise, continue the rest of the current POC.         Electronically signed by:  Radha Olivarez MD                Sincerely,        Radha Olivarez MD

## 2021-04-30 NOTE — PROGRESS NOTES
"[facility] GERIATRIC SERVICES  Chief Complaint   Patient presents with     detention Regulatory     [place] Medical Record Number: [id number]  Place of Service Where Encounter Took Place: [facility] () [[id number]]    HPI:    Stefanie Velazquez  is 76 year old (1945), who is being seen today for a federally mandated E/M visit. HPI information obtained from: facility chart records, facility staff and patient report. Today's concerns are:  Brief History:  Stefanie Velazquez has a past medical history of CVA, CAD S/P CABG x3 in 2002, SVT, DVT on apixaban, rectal cancer (with concern for lung nodules), hypertension, hyperlipidemia, DM2, hypothyroidism, GERD, RLS, RC on CPAP, chronic back pain, recurrent UTI, recurrent encephalopathy thought to be Lewy Body dementia. her recurrent episodes of acute change in mental status are often related to UTI.  There has been concern that antipsychotic medications actually worsened her agitation, and when clear seems to have mild cognitive dysfunction.She has been at [facility] since 9/2020.   Recent changes:    10/16/20 - enrolled in hospice for diagnosis of hypertensive heart disease    10/22/20 - BIMS 15/15, PHQ9 8/27 11/20 - COVID +. recovered    1/6/21 - trial of compazine for chronic nausea    1/21/21 - upgraded to regular diet and tolerating well    1/21 restarted on Apixaban    4/21 Omeprazole added, ?per Hospice. Patient unsure    Today, patient states she is doing well.  Initially she said she had a \"cold\" but later retracted.  Denies SOB, cough, fever, difficulties with sleep or appetite. States she does not have nausea, but rather, sometimes when she eats certain foods she gets the hiccups and gags and spits up phlegm.  Denies any concerns.  Staff indicate she occasionally refuses her meds--including the Apixaban that she was previously eager to start due to fear of another stroke. Today was refusing senna, however, agreed to take it after " discussion of its purpose     ALLERGIES: Cefepime, Ciprofloxacin, Hydrocodone, Lisinopril, and Vicodin [hydrocodone-acetaminophen]  PAST MEDICAL HISTORY:  has a past medical history of Acute deep vein thrombosis (DVT) of right lower extremity, unspecified vein (H) (10/31/2018), Acute myocardial infarction of other specified sites, episode of care unspecified (6/2002), Altered mental status (8/11/2020), Cancer of colon (H), Cellulitis of lower extremity, bilateral (9/30/2016), Chronic ischemic heart disease, unspecified (6/22/2003), Confusion (8/20/2020), Coronary atherosclerosis of unspecified type of vessel, native or graft, Diabetes mellitus (H), Esophageal reflux, Fracture of femur, distal, left, closed (H) (2/11/2013), Gastro-oesophageal reflux disease, Generalized osteoarthrosis, unspecified site (6/22/2005), Generalized osteoarthrosis, unspecified site (6/22/2005), H/O deep venous thrombosis (5/21/2019), HEARING LOSS CONDUCTIVE, COMBINED TYPE (6/22/2005), HYPOTHYROIDISM  (6/22/2003), Mixed hyperlipidemia (6/22/2005), Obesity, unspecified (6/22/2005), RC-moderate (AHI 12, LSat 60%); REM RDI-73 (6/1/2009), Radiation therapy complication (11/9/2011), Recurrent acute deep vein thrombosis (DVT) of both lower extremities (H) (3/31/2019), Rubeola, Stented coronary artery, Type 2 diabetes mellitus with diabetic nephropathy, with long-term current use of insulin (H) (6/27/2017), Type II or unspecified type diabetes mellitus with renal manifestations, uncontrolled(250.42) (H) (6/22/2005), Type II or unspecified type diabetes mellitus with renal manifestations, uncontrolled(250.42) (H) (6/22/2005), Unspecified disorder resulting from impaired renal function (6/22/2005), and Unspecified essential hypertension. She also has no past medical history of PONV (postoperative nausea and vomiting) or Walking and running.  PAST SURGICAL HISTORY:  has a past surgical history that includes surgical history of -  (10/24/2002);  surgical history of -  (2002); surgical history of -  (2002); Colonoscopy (1/26/2011); orthopedic surgery; cabg; Insert port vascular access (3/2/2011); Colectomy low anterior (6/21/2011); Takedown ileostomy (9/9/2011); Sigmoidoscopy flexible (9/9/2011); Open reduction internal fixation femur distal (2/12/2013); Colonoscopy (N/A, 3/1/2016); Phacoemulsification with standard intraocular lens implant (Left, 1/22/2018); Phacoemulsification with standard intraocular lens implant (Right, 2/19/2018); and Anesthesia out of OR MRI (N/A, 4/8/2020).  FAMILY HISTORY: family history includes Breast Cancer in her sister; C.A.D. in her sister; Diabetes in her sister.  SOCIAL HISTORY:  reports that she has quit smoking. She has never used smokeless tobacco. She reports previous alcohol use. She reports that she does not use drugs.    MEDICATIONS:  Current Outpatient Medications   Medication Sig Dispense Refill     omeprazole (PRILOSEC) 20 MG DR capsule Take 20 mg by mouth daily       acetaminophen (TYLENOL) 325 MG tablet Take 650 mg by mouth 3 times daily       apixaban ANTICOAGULANT (ELIQUIS) 5 MG tablet Take 2.5 mg by mouth 2 times daily       fluticasone (FLONASE) 50 MCG/ACT spray Spray 1 spray into both nostrils daily 1 Bottle 3     ondansetron (ZOFRAN) 4 MG tablet Take 4 mg by mouth every 6 hours as needed for nausea       senna-docusate (SENOKOT-S/PERICOLACE) 8.6-50 MG tablet Take 1 tablet by mouth 2 times daily       traZODone (DESYREL) 50 MG tablet Take 1 tablet (50 mg) by mouth At Bedtime       Case Management: I have reviewed the care plan and MDS and do agree with the plan. Patient's desire to return to the community is not assessible due to cognitive impairment. Information reviewed: Medications, vital signs, orders, and nursing notes.    ROS:  4 point ROS including Respiratory, CV, GI and , other than that noted in the HPI,  is negative    Vitals:  /76   Pulse 61   Temp 98.1  F (36.7  C)   Resp 16   Ht  "1.651 m (5' 5\")   Wt 69.4 kg (153 lb 1.6 oz)   SpO2 95%   BMI 25.48 kg/m    Body mass index is 25.48 kg/m .  Exam:  GENERAL APPEARANCE:  Alert, in no distress, anxious  RESP:  lungs clear to auscultation , no respiratory distress  CV:  regular rate and rhythm, no murmur, rub, or gallop, no edema  ABDOMEN:  bowel sounds normal, soft, non-tender  M/S:   Gait and station abnormal WC bound. assist with transfers  SKIN:  no concerning lesions noted  NEURO:   NFD  PSYCH:  insight and judgement impaired, affect and mood normal    Lab/Diagnostic data:   No new labs    ASSESSMENT/PLAN  History of CVA (cerebrovascular accident)  Chronic anticoagulation  History CVA and DVTs. Anticoagulated with Apixaban. No further s/s.     Oropharyngeal dysphagia  Eating and drinking fairly well.  Occasional episodes of phlegm emesis.  Discussed need for Compazine.  Patient indicates she is not nauseated (never was).  Given the antipsychotic category of Compazine will trial discontinuation, continue to monitor for increased N/V. Will continue to have Zofran available PRN.   Was started on Omeprazole by Hospice team.  Monitor for diarrhea, need.  Consider GDR      Lewy body dementia with behavioral disturbance (H)  Currently stable, patient is pleasant, but makes her wishes known assertively. Most recent BIMS = 3/15.  History of agitation and paranoia treated with psych meds.  None at this time.  Has clau weaned off Aricept. Continue to reassure, redirect as needed, maintain safe living situation with goals of care focused on comfort    CKD (chronic kidney disease) stage 4, GFR 15-29 ml/min (H)  Baseline creat 1.4-1.8.  Most recent was 1.4 in 9/2020 prior to hospice enrollment   -Avoid nephrotoxic medications  -Renal dosing of medications     Diet-controlled diabetes mellitus (H)  On no medications, last A1c=6.2 in June 2020.  Not rechecked since on Hospice    Hypertensive heart disease without heart failure  Blood pressurse within " acceptable range for patient this age.  On no meds  BPs:      Insomnia  Stable, compensated. The current medical regimen is effective;  continue present plan and medications. Trazodone.    Frail elderly  Significant deficits requiring nursing home placement.  requires assist with all ADLs.    Hospice care patient  appreciate collaboration with hospice team for comfort and symptom management      Orders written by provider at facility and transcribed by : Nataliia Cevallos  1. Discontinue Compazine    Total time spent with patient visit at the skilled nursing facility was 25 min including patient visit and review of past records. Greater than 50% of total time spent with counseling and coordinating care due to comprehensive assessment, med rec and education.    Electronically signed by:  CLEMENCIA Jain CNP

## 2021-05-03 NOTE — LETTER
"    5/3/2021        RE: Stefanie Velazquez  [facility]  [address] [address]        [facility] GERIATRIC SERVICES  Chief Complaint   Patient presents with     [facility] Regulatory     [place] Medical Record Number: [id number]  Place of Service Where Encounter Took Place: [facility] () [[id number]]    HPI:    Stefanie Velazquez  is 76 year old (1945), who is being seen today for a federally mandated E/M visit. HPI information obtained from: facility chart records, facility staff and patient report. Today's concerns are:  Brief History:  Stefanie Velazquez has a past medical history of CVA, CAD S/P CABG x3 in 2002, SVT, DVT on apixaban, rectal cancer (with concern for lung nodules), hypertension, hyperlipidemia, DM2, hypothyroidism, GERD, RLS, RC on CPAP, chronic back pain, recurrent UTI, recurrent encephalopathy thought to be Lewy Body dementia. her recurrent episodes of acute change in mental status are often related to UTI.  There has been concern that antipsychotic medications actually worsened her agitation, and when clear seems to have mild cognitive dysfunction.She has been at [facility] since 9/2020.   Recent changes:    10/16/20 - enrolled in hospice for diagnosis of hypertensive heart disease    10/22/20 - BIMS 15/15, PHQ9 8/27 11/20 - COVID +. recovered    1/6/21 - trial of compazine for chronic nausea    1/21/21 - upgraded to regular diet and tolerating well    1/21 restarted on Apixaban    4/21 Omeprazole added, ?per Hospice. Patient unsure    Today, patient states she is doing well.  Initially she said she had a \"cold\" but later retracted.  Denies SOB, cough, fever, difficulties with sleep or appetite. States she does not have nausea, but rather, sometimes when she eats certain foods she gets the hiccups and gags and spits up phlegm.  Denies any concerns.  Staff indicate she occasionally refuses her meds--including the Apixaban that she was previously " eager to start due to fear of another stroke. Today was refusing senna, however, agreed to take it after discussion of its purpose     ALLERGIES: Cefepime, Ciprofloxacin, Hydrocodone, Lisinopril, and Vicodin [hydrocodone-acetaminophen]  PAST MEDICAL HISTORY:  has a past medical history of Acute deep vein thrombosis (DVT) of right lower extremity, unspecified vein (H) (10/31/2018), Acute myocardial infarction of other specified sites, episode of care unspecified (6/2002), Altered mental status (8/11/2020), Cancer of colon (H), Cellulitis of lower extremity, bilateral (9/30/2016), Chronic ischemic heart disease, unspecified (6/22/2003), Confusion (8/20/2020), Coronary atherosclerosis of unspecified type of vessel, native or graft, Diabetes mellitus (H), Esophageal reflux, Fracture of femur, distal, left, closed (H) (2/11/2013), Gastro-oesophageal reflux disease, Generalized osteoarthrosis, unspecified site (6/22/2005), Generalized osteoarthrosis, unspecified site (6/22/2005), H/O deep venous thrombosis (5/21/2019), HEARING LOSS CONDUCTIVE, COMBINED TYPE (6/22/2005), HYPOTHYROIDISM  (6/22/2003), Mixed hyperlipidemia (6/22/2005), Obesity, unspecified (6/22/2005), RC-moderate (AHI 12, LSat 60%); REM RDI-73 (6/1/2009), Radiation therapy complication (11/9/2011), Recurrent acute deep vein thrombosis (DVT) of both lower extremities (H) (3/31/2019), Rubeola, Stented coronary artery, Type 2 diabetes mellitus with diabetic nephropathy, with long-term current use of insulin (H) (6/27/2017), Type II or unspecified type diabetes mellitus with renal manifestations, uncontrolled(250.42) (H) (6/22/2005), Type II or unspecified type diabetes mellitus with renal manifestations, uncontrolled(250.42) (H) (6/22/2005), Unspecified disorder resulting from impaired renal function (6/22/2005), and Unspecified essential hypertension. She also has no past medical history of PONV (postoperative nausea and vomiting) or Walking and  running.  PAST SURGICAL HISTORY:  has a past surgical history that includes surgical history of -  (10/24/2002); surgical history of -  (2002); surgical history of -  (2002); Colonoscopy (1/26/2011); orthopedic surgery; cabg; Insert port vascular access (3/2/2011); Colectomy low anterior (6/21/2011); Takedown ileostomy (9/9/2011); Sigmoidoscopy flexible (9/9/2011); Open reduction internal fixation femur distal (2/12/2013); Colonoscopy (N/A, 3/1/2016); Phacoemulsification with standard intraocular lens implant (Left, 1/22/2018); Phacoemulsification with standard intraocular lens implant (Right, 2/19/2018); and Anesthesia out of OR MRI (N/A, 4/8/2020).  FAMILY HISTORY: family history includes Breast Cancer in her sister; C.A.D. in her sister; Diabetes in her sister.  SOCIAL HISTORY:  reports that she has quit smoking. She has never used smokeless tobacco. She reports previous alcohol use. She reports that she does not use drugs.    MEDICATIONS:  Current Outpatient Medications   Medication Sig Dispense Refill     omeprazole (PRILOSEC) 20 MG DR capsule Take 20 mg by mouth daily       acetaminophen (TYLENOL) 325 MG tablet Take 650 mg by mouth 3 times daily       apixaban ANTICOAGULANT (ELIQUIS) 5 MG tablet Take 2.5 mg by mouth 2 times daily       fluticasone (FLONASE) 50 MCG/ACT spray Spray 1 spray into both nostrils daily 1 Bottle 3     ondansetron (ZOFRAN) 4 MG tablet Take 4 mg by mouth every 6 hours as needed for nausea       senna-docusate (SENOKOT-S/PERICOLACE) 8.6-50 MG tablet Take 1 tablet by mouth 2 times daily       traZODone (DESYREL) 50 MG tablet Take 1 tablet (50 mg) by mouth At Bedtime       Case Management: I have reviewed the care plan and MDS and do agree with the plan. Patient's desire to return to the community is not assessible due to cognitive impairment. Information reviewed: Medications, vital signs, orders, and nursing notes.    ROS:  4 point ROS including Respiratory, CV, GI and , other than  "that noted in the HPI,  is negative    Vitals:  /76   Pulse 61   Temp 98.1  F (36.7  C)   Resp 16   Ht 1.651 m (5' 5\")   Wt 69.4 kg (153 lb 1.6 oz)   SpO2 95%   BMI 25.48 kg/m    Body mass index is 25.48 kg/m .  Exam:  GENERAL APPEARANCE:  Alert, in no distress, anxious  RESP:  lungs clear to auscultation , no respiratory distress  CV:  regular rate and rhythm, no murmur, rub, or gallop, no edema  ABDOMEN:  bowel sounds normal, soft, non-tender  M/S:   Gait and station abnormal WC bound. assist with transfers  SKIN:  no concerning lesions noted  NEURO:   NFD  PSYCH:  insight and judgement impaired, affect and mood normal    Lab/Diagnostic data:   No new labs    ASSESSMENT/PLAN  History of CVA (cerebrovascular accident)  Chronic anticoagulation  History CVA and DVTs. Anticoagulated with Apixaban. No further s/s.     Oropharyngeal dysphagia  Eating and drinking fairly well.  Occasional episodes of phlegm emesis.  Discussed need for Compazine.  Patient indicates she is not nauseated (never was).  Given the antipsychotic category of Compazine will trial discontinuation, continue to monitor for increased N/V. Will continue to have Zofran available PRN.   Was started on Omeprazole by Hospice team.  Monitor for diarrhea, need.  Consider GDR      Lewy body dementia with behavioral disturbance (H)  Currently stable, patient is pleasant, but makes her wishes known assertively. Most recent BIMS = 3/15.  History of agitation and paranoia treated with psych meds.  None at this time.  Has clau weaned off Aricept. Continue to reassure, redirect as needed, maintain safe living situation with goals of care focused on comfort    CKD (chronic kidney disease) stage 4, GFR 15-29 ml/min (H)  Baseline creat 1.4-1.8.  Most recent was 1.4 in 9/2020 prior to hospice enrollment   -Avoid nephrotoxic medications  -Renal dosing of medications     Diet-controlled diabetes mellitus (H)  On no medications, last A1c=6.2 in June 2020.  " Not rechecked since on Hospice    Hypertensive heart disease without heart failure  Blood pressurse within acceptable range for patient this age.  On no meds  BPs:      Insomnia  Stable, compensated. The current medical regimen is effective;  continue present plan and medications. Trazodone.    Frail elderly  Significant deficits requiring nursing home placement.  requires assist with all ADLs.    Hospice care patient  appreciate collaboration with hospice team for comfort and symptom management      Orders written by provider at facility and transcribed by : Nataliia Cevallos  1. Discontinue Compazine    Total time spent with patient visit at the skilled nursing facility was 25 min including patient visit and review of past records. Greater than 50% of total time spent with counseling and coordinating care due to comprehensive assessment, med rec and education.    Electronically signed by:  CLEMENCIA Jain CNP          Sincerely,        CLEMENCIA Jain CNP

## 2021-07-09 NOTE — PROGRESS NOTES
"[place] GERIATRIC SERVICES  Chief Complaint   Patient presents with     intermediate Regulatory     [place] Medical Record Number:  [id number]  Place of Service where encounter took place:  [facility] () [[id number]]    HPI:    Stefanie Velazquez  is 76 year old (1945), who is being seen today for a federally mandated E/M visit.  HPI information obtained from: facility chart records, facility staff, patient report and [facility] chart review.       Today's concerns are:   - Resident seen and examined.   - Hospice rectal cancer: reports pain is better with Tylenol. reports appetite is low for the last two days. Reports sleep and BM are fine.      --------------------------------  - - Past Medical, social, family histories, medications, and allergies reviewed and updated  - Medications reviewed: in the chart and EHR.   - Case Management:   I have reviewed the care plan and MDS and do agree with the plan. Patient's desire to return to the community is not present.  Information reviewed:  Medications, vital signs, orders, and nursing notes.      MEDICATIONS:  Current Outpatient Medications   Medication Sig Dispense Refill     acetaminophen (TYLENOL) 325 MG tablet Take 650 mg by mouth 3 times daily       apixaban ANTICOAGULANT (ELIQUIS) 5 MG tablet Take 2.5 mg by mouth 2 times daily       fluticasone (FLONASE) 50 MCG/ACT spray Spray 1 spray into both nostrils daily 1 Bottle 3     omeprazole (PRILOSEC) 20 MG DR capsule Take 20 mg by mouth daily       ondansetron (ZOFRAN) 4 MG tablet Take 4 mg by mouth every 6 hours as needed for nausea       senna-docusate (SENOKOT-S/PERICOLACE) 8.6-50 MG tablet Take 1 tablet by mouth 2 times daily       traZODone (DESYREL) 50 MG tablet Take 1 tablet (50 mg) by mouth At Bedtime       ROS: 4 point ROS including Respiratory, CV, GI and , other than that noted in the HPI,  is negative  Vitals:  /78   Pulse 70   Temp 98.4  F (36.9  C)   Resp 18   Ht 1.727 m (5' 8\")   " Wt 72.8 kg (160 lb 6.4 oz)   SpO2 96%   BMI 24.39 kg/m    Body mass index is 24.39 kg/m .  Exam:  GENERAL APPEARANCE:  in no distress, cooperative  RESP:  lungs diminished at the base.   CV:  S1S2 audible, regular HR, no murmur appreciated.   ABDOMEN:  soft, NT/ND, BS audible. no mass appreciated on palpation.   M/S:   no joint deformity noted on observation.   SKIN:  No rash.   NEURO:   No NFD appreciated on observation. Hand  5/5 b/l  PSYCH:  affect and mood normal    Lab/Diagnostic data: no new lab to review    ASSESSMENT/PLAN  -----------------------------    Diet controlled T2D (H)  CKD (chronic kidney disease) stage 3b, GFR 30-44 ml/min: no new lab. Renally dose meds. Avoid nephrotoxic meds.   Benign essential hypertension, and CAD, hx of CABG: Compensated.     Hx of DVT, and Hx of small infarct of R temporal lobe and chronic SVID (MRI April 2020): Eliquis resumed (1/20). Recommends stopping it for no survival benefit.     Oropharyngeal dysphagia: at baseline.     Frailty: Significant  Deficits requiring NH placement. Requiring extensive assistance from nursing. Up for meals only o/w spends the day resting in bed    Insomnia 2/2 medical conditions: on Trazodone, stable.     Hx of rectal cancer with query mets to lung  Hospice  - analgesia optimal  - seem comfortable.   - progressive decline.  - Appreciate collaboration with  hospice team for symptom management in collaboration with cares for maximum comfort at end-of-life      Order: See above, otherwise, continue the rest of the current POC.         Electronically signed by:  Radha Olivarez MD

## 2021-07-12 NOTE — LETTER
7/12/2021        RE: Stefanie Velazquez  [address] [facility] [address]        [facility]  Chief Complaint   Patient presents with     alf Regulatory     [place] [facility] Record Number:  [id number]  Place of Service where encounter took place:  [facility] () [[id number]]    HPI:    Stefanie Velazquez  is 76 year old (1945), who is being seen today for a federally mandated E/M visit.  HPI information obtained from: facility chart records, facility staff, patient report and [facility] chart review.       Today's concerns are:   - Resident seen and examined.   - Hospice rectal cancer: reports pain is better with Tylenol. reports appetite is low for the last two days. Reports sleep and BM are fine.      --------------------------------  - - Past Medical, social, family histories, medications, and allergies reviewed and updated  - Medications reviewed: in the chart and EHR.   - Case Management:   I have reviewed the care plan and MDS and do agree with the plan. Patient's desire to return to the community is not present.  Information reviewed:  Medications, vital signs, orders, and nursing notes.      MEDICATIONS:  Current Outpatient Medications   Medication Sig Dispense Refill     acetaminophen (TYLENOL) 325 MG tablet Take 650 mg by mouth 3 times daily       apixaban ANTICOAGULANT (ELIQUIS) 5 MG tablet Take 2.5 mg by mouth 2 times daily       fluticasone (FLONASE) 50 MCG/ACT spray Spray 1 spray into both nostrils daily 1 Bottle 3     omeprazole (PRILOSEC) 20 MG DR capsule Take 20 mg by mouth daily       ondansetron (ZOFRAN) 4 MG tablet Take 4 mg by mouth every 6 hours as needed for nausea       senna-docusate (SENOKOT-S/PERICOLACE) 8.6-50 MG tablet Take 1 tablet by mouth 2 times daily       traZODone (DESYREL) 50 MG tablet Take 1 tablet (50 mg) by mouth At Bedtime       ROS: 4 point ROS including Respiratory, CV, GI and , other than that noted in the HPI,  is  "negative  Vitals:  /78   Pulse 70   Temp 98.4  F (36.9  C)   Resp 18   Ht 1.727 m (5' 8\")   Wt 72.8 kg (160 lb 6.4 oz)   SpO2 96%   BMI 24.39 kg/m    Body mass index is 24.39 kg/m .  Exam:  GENERAL APPEARANCE:  in no distress, cooperative  RESP:  lungs diminished at the base.   CV:  S1S2 audible, regular HR, no murmur appreciated.   ABDOMEN:  soft, NT/ND, BS audible. no mass appreciated on palpation.   M/S:   no joint deformity noted on observation.   SKIN:  No rash.   NEURO:   No NFD appreciated on observation. Hand  5/5 b/l  PSYCH:  affect and mood normal    Lab/Diagnostic data: no new lab to review    ASSESSMENT/PLAN  -----------------------------    Diet controlled T2D (H)  CKD (chronic kidney disease) stage 3b, GFR 30-44 ml/min: no new lab. Renally dose meds. Avoid nephrotoxic meds.   Benign essential hypertension, and CAD, hx of CABG: Compensated.     Hx of DVT, and Hx of small infarct of R temporal lobe and chronic SVID (MRI April 2020): Eliquis resumed (1/20). Recommends stopping it for no survival benefit.     Oropharyngeal dysphagia: at baseline.     Frailty: Significant  Deficits requiring NH placement. Requiring extensive assistance from nursing. Up for meals only o/w spends the day resting in bed    Insomnia 2/2 medical conditions: on Trazodone, stable.     Hx of rectal cancer with query mets to lung  Hospice  - analgesia optimal  - seem comfortable.   - progressive decline.  - Appreciate collaboration with  hospice team for symptom management in collaboration with cares for maximum comfort at end-of-life      Order: See above, otherwise, continue the rest of the current POC.         Electronically signed by:  Radha Olivarez MD                Sincerely,        Radha Olivarez MD      "

## 2021-08-12 NOTE — PLAN OF CARE
Claudio Jaquez is a 50 year old male who presents for a 6 month follow up visit;  Had interval trip to [place] to visit daughter.  Got J+J vaccine    No snoring.  Fatigue at baseline    Some headaches since getting J+J injection  Sinuses - doing well with masking.  Asthma - had chest tightness a few months prior, none since  Bp - doing well.     3 migraines over last 2months - +sun triggered  Break easily    Back - resovled with better lifting techniques    hypotestosterone - doing well with testosterone injections.    GERD - omeprazole as needed.  Inquires alternative treatment options.   tingling.  More joint pain - worse through the day - elbow, shoulders.  Worse with hot showers.  Some thumb pain as well.  Some weakness    Stopped junk food, eating fast food 1x/week - weight down 7lb    Healthcare Team:  PCP  -  Dr. Barrow  ENT/[facility]  -  Dr. Khoury  Allergy/Imm  -  Dr. Carlos-> Hortencia Herrera, CNP    PMH:  ESS/turb reduction - 2015, 10.2018  Tonsillectomy/UPPP - 2016  SHAWNEE -s/p Uppp off CPAP since 2016  lichen spinulosus   [place] palsy 10/2012  L5-S1 L foraminal stenosis on MRI 2/11, s/p PT, LESI x 3  R forarm mass, s/p resection 11/08, benign  hypotesto  Migraines  Depression  All rhin  Chronic blood donor  Eczema/dermatitis  L shoulder Abscess s/p  I&D 6.19.20, s/p excision 7.17.20 (Dr. Shin)  Current Outpatient Medications   Medication Sig   • verapamil 240 MG 24 hr capsule Take 240 mg by mouth every morning.   • lisinopril (ZESTRIL) 40 MG tablet Take 1 tablet by mouth daily.   • triamcinolone (ARISTOCORT) 0.1 % cream Apply thin layer to red, itchy areas daily for up to 2 weeks consecutively.   • montelukast (SINGULAIR) 10 MG tablet Take 1 tablet by mouth nightly.   • omeprazole (PrilOSEC) 20 MG capsule TAKE 1 CAPSULE BY MOUTH DAILY AS NEEDED FOR GERD   • SUMAtriptan (IMITREX) 50 MG tablet Take 1 tablet by mouth as needed for Migraine. Take 1 tablet by mouth at onset of migraine. May repeat  Has rested well when asleep but very agitated when awakened for cares.  Has been mouth breathing with nasal pillows to nares for CPAP through the night RA no additional oxygen added.  Remains confused to time, place and situations, just wants to go back to [facility].  Neuro unremarkable very strong but does not respond to commands or follow such.  Oral cares provided by RENETTA Miranda sitting with her thru the night.  Incontinent X 3 since admit but not voided since 0330.  Continue with close monitoring.     after 2 hours if needed.   • budesonide (PULMICORT) 0.5 MG/2ML nebulizer suspension MIX 1 RESPULE OF PULMICORT IN 4 OUNCES OF DISTILLED SALINE RECIPE. IRRIGATE 2 OUNCES IN EACH NOSTRIL ONCE DAILY   • albuterol 108 (90 Base) MCG/ACT inhaler INHALE 2 PUFFS INTO THE LUNGS EVERY 4 HOURS AS NEEDED FOR SHORTNESS OF BREATH OR WHEEZING   • amitriptyline (ELAVIL) 10 MG tablet TAKE 1 AND 1/2 TABLETS BY MOUTH EVERY NIGHT   • ketoconazole (NIZORAL) 2 % shampoo Apply to skin daily allow to soak x 5 minutes then rinse off   • gentamicin (GARAMYCIN) 0.1 % cream Apply thin layer to any new pimple type bump 1-2 times per day   • azelastine (ASTELIN) 0.1 % nasal spray Spray 2 sprays in each nostril 2 times daily.   • mupirocin (BACTROBAN) 2 % ointment Apply to the affected nostril three times a day for a week, then as needed when lesions present   • Lactic Acid 10 % Lotion Apply to the affected area twice daily as directed   • benzoyl peroxide (BREVOXYL) 4 % gel Apply bid   • fluticasone (FLONASE) 50 MCG/ACT nasal spray Spray 2 sprays in each nostril.   • ipratropium (ATROVENT) 0.06 % nasal spray Spray 1 spray in each nostril.   • levocetirizine (XYZAL) 5 MG tablet Take 5 mg by mouth.          ALLERGIES:   Allergen Reactions   • Clindamycin HIVES   • Flu Virus Vaccine Other (See Comments)   • Zoster Vac Recomb Adjuvanted SWELLING      SH: single, 12yo daughter - lives with mother is [place]. [employer] [facility] [place]. No tobacco. 6drinks/month. No illicits. Not sexually active currently. Minimal exercise.    FH:   M - 63 - asthma, SHAWNEE, HTN   F - 68 -  6.- dm, CVA x 2, ETOH abuse, dementia/pneumonia/COVID-19+  42yo B - DM  30yoB - well  31  17 yo daughter - eczema/psoriasis    HM:  PSA -  - 0.62, wnl   C scope -  - precancerous polyps, repeat 3y.   Immunization History   Administered Date(s) Administered   • DT: Child type diphtheria/tetanus 2008, 09/15/2009, 2010, 2011   • Influenza,  Unspecified Formulation 10/17/2007, 10/16/2008, 09/18/2009, 10/08/2010, 09/28/2012, 10/08/2013, 10/01/2015, 09/12/2016   • Influenza, seasonal, injectable, trivalent 11/10/2009   • Novel Influenza [id number], Unspecified Formulation 11/10/2009   • Pneumococcal polysaccharide, adult, 23 valent 08/05/2020   • Shingles Zoster (Shingrix) 08/05/2020, 10/22/2020   • Td:Adult type tetanus/diphtheria 05/08/2008, 09/15/2009, 04/30/2010, 04/22/2011     ROS:   GEN: no fevers or chills.   Neuro: +migraine/headache per HPI. No seizure.   Eye: no diplopia.   Ear: no tinnitus  Nose: no nasal bleeding  CV: no chest pain, palpitations  Pulm: no sob  GI: no vomiting, diarrhea, constipation  : no dysuria or hematuria.   Derm: no rashes  Endo: no hot or cold intolerance.    PE:  Blood pressure 136/78, pulse 88, temperature 98 °F (36.7 °C), resp. rate 18, height 6' 1\" (1.854 m), weight 134.7 kg (297 lb).  Gen: pleasant, comfortable, no apparent distress.  EYE: PERRL, EOMI, anicteric. Conjunctiva pink .  H: normocephalic, atraumatic.   E: external canals clear, TMs clear bilaterally.  Neck: supple, no LAD, nl thyroid, no carotid bruit. No JVD.   CV: RRR nl s1 and s2 no MRG.   Pulm: CTA bilaterally, no wheezes/rales/rhonchi.   Abd: Soft, NTND, nabs, no HSM.  Ext: no edema, 2+ pedal pulses.  Derm: no rashes  Neuro: A+Ox3, CN2-12 intact.  Sensation grossly intact, gait normal.    A/P:  1) chronic sinusitis s/p ESS in 2015/all rhin - avoid clinda given acute drug reaction (hives)   - cont pulmicort, singulair, Xyzal nightly, Atrovent, Flonase, Astelin prn, sinus rinses  2) sleep apnea, s/p tonsillectomy/UPPP in 2016 - off CPAP   3) HTN - elevated today at 142/88 - cont verapamil 240 mg qd   - increase lisinopril to 40 mg (from 30 mg qd)   - cont at home bp log  4) dyslipidemia -  - check lipid panel, CMP   - counseled on diet and exercise.  5) obesity - counseled on diet and exercise, track diet/exercise  6) sciatica, s/p lesi x3 -  well controlled, cont PT exercise and amitriptyline.    - improved after adjustment w/ Dr. Barrientos  7) migraines - stable, cont verapamil, amitriptyline 15 mg, Imitrex for rescue.    - consider Topamax if frequency increases.     8) hypotesto - cont testo injections, testosterone monitoring.    - check testo level  In 5w as recent # was low after mis-timing injections   - given ED, may consider Viagra.  9) cervicalgia - reviewed posture and lifting techniques.    - if persists, worsens, or triggers migraines, then plan PT.   10) eczema/dermatitis, suspect allergic dermatitis - per derm   - cont triamcinolone 0.1% ointment 2-3x qd   11) folliculitis/acne vulgaris - stable   - cont ketoconazole shampoo qd, gentamicin 1% cream for acne (per derm)  12) GERD - cont omeprazole 20 mg qd/prn   - advised dietary changes, exercise, weight loss, track diet/exercise  13) numbness/tingling 2nd to 5th digits bilateral hands - pt declined intervention at this time   - will discuss at next visit, suspect d/t elbow use/leaning   - pt to call if any weakness develops   - advised proper posture, avoidance of leaning on elbows  14) Am elbow/hand/shoulder pain - check autoimmune labs  HM - I have personally reviewed and analyzed labs (11.20) and medications.  Labs ordered (PSA).  Medications refilled.  t dap in 2-4 weeks with next testo dose.   Follow-up in 6 months/prn.      Electronically signed by: Christel Barrow MD

## 2021-09-07 NOTE — PROGRESS NOTES
[facility] Regulatory  Chief Complaint   Patient presents with     [facility] Regulatory   [place] MRN: [id number] Place of Service where encounter took place:  [facility] () [[id number]] HPI: Stefanie Velazquez  is 76 year old (1945), who is being seen today for a federally mandated E/M visit.  HPI information obtained from: facility chart records, facility staff, patient report, [facility] chart review, and Care Everywhere [facility] chart review. Today's concerns are:    Concha seen today per federal mandate. Her only complaint is her sleep/wake cycle. She feels like she has a lot of daytime sleepiness and then trouble going to sleep at night. She denies aches/pains, SOB, CP, plapitations, headaches, dizziness. She states her appetite is pretty good.     ALLERGIES:Cefepime, Ciprofloxacin, Hydrocodone, Lisinopril, and Vicodin [hydrocodone-acetaminophen]PAST MEDICAL HISTORY:   has a past medical history of Acute deep vein thrombosis (DVT) of right lower extremity, unspecified vein (H) (10/31/2018), Acute myocardial infarction of other specified sites, episode of care unspecified (6/2002), Altered mental status (8/11/2020), Cancer of colon (H), Cellulitis of lower extremity, bilateral (9/30/2016), Chronic ischemic heart disease, unspecified (6/22/2003), Confusion (8/20/2020), Coronary atherosclerosis of unspecified type of vessel, native or graft, Diabetes mellitus (H), Esophageal reflux, Fracture of femur, distal, left, closed (H) (2/11/2013), Gastro-oesophageal reflux disease, Generalized osteoarthrosis, unspecified site (6/22/2005), Generalized osteoarthrosis, unspecified site (6/22/2005), H/O deep venous thrombosis (5/21/2019), HEARING LOSS CONDUCTIVE, COMBINED TYPE (6/22/2005), HYPOTHYROIDISM  (6/22/2003), Mixed hyperlipidemia (6/22/2005), Obesity, unspecified (6/22/2005), RC-moderate (AHI 12, LSat 60%); REM RDI-73 (6/1/2009), Radiation therapy complication (11/9/2011), Recurrent acute deep vein  thrombosis (DVT) of both lower extremities (H) (3/31/2019), [place], Stented coronary artery, Type 2 diabetes mellitus with diabetic nephropathy, with long-term current use of insulin (H) (6/27/2017), Type II or unspecified type diabetes mellitus with renal manifestations, uncontrolled(250.42) (H) (6/22/2005), Type II or unspecified type diabetes mellitus with renal manifestations, uncontrolled(250.42) (H) (6/22/2005), Unspecified disorder resulting from impaired renal function (6/22/2005), and Unspecified essential hypertension. She also has no past medical history of PONV (postoperative nausea and vomiting) or Walking and running. PAST SURGICAL HISTORY:   has a past surgical history that includes surgical history of -  (10/24/2002); surgical history of -  (2002); surgical history of -  (2002); Colonoscopy (1/26/2011); orthopedic surgery; cabg; Insert port vascular access (3/2/2011); Colectomy low anterior (6/21/2011); Takedown ileostomy (9/9/2011); Sigmoidoscopy flexible (9/9/2011); Open reduction internal fixation femur distal (2/12/2013); Colonoscopy (N/A, 3/1/2016); Phacoemulsification with standard intraocular lens implant (Left, 1/22/2018); Phacoemulsification with standard intraocular lens implant (Right, 2/19/2018); and Anesthesia out of OR MRI (N/A, 4/8/2020).FAMILY HISTORY: family history includes Breast Cancer in her sister; C.A.D. in her sister; Diabetes in her sister.SOCIAL HISTORY:  reports that she has quit smoking. She has never used smokeless tobacco. She reports previous alcohol use. She reports that she does not use drugs.  MEDICATIONS:  Current Outpatient Medications   Medication Sig Dispense Refill     traZODone (DESYREL) 50 MG tablet Take 1.5 tablets (75 mg) by mouth At Bedtime       acetaminophen (TYLENOL) 325 MG tablet Take 650 mg by mouth 3 times daily       apixaban ANTICOAGULANT (ELIQUIS) 5 MG tablet Take 2.5 mg by mouth 2 times daily       fluticasone (FLONASE) 50 MCG/ACT spray Spray 1  "spray into both nostrils daily 1 Bottle 3     omeprazole (PRILOSEC) 20 MG DR capsule Take 20 mg by mouth daily       senna-docusate (SENOKOT-S/PERICOLACE) 8.6-50 MG tablet Take 1 tablet by mouth 2 times daily       Case Management:  I have reviewed the care plan and MDS and do agree with the plan. Patient's desire to return to the community is not present. Information reviewed:  Medications, vital signs, orders, and nursing notes.    ROS: Limited secondary to cognitive impairment but today pt reports the above and 4 point ROS including Respiratory, CV, GI and , other than that noted in the HPI, is negative.    Vitals: BP (!) 172/79   Pulse 71   Temp 98  F (36.7  C)   Resp 16   Ht 1.651 m (5' 5\")   Wt 73.7 kg (162 lb 8 oz)   SpO2 94%   BMI 27.04 kg/m    Exam:  GENERAL APPEARANCE: Alert, in no distress, cooperative.   ENT: Mouth/posterior oropharynx intact w/ moist mucous membranes, hearing acuity [place].  EYES: EOM, conjunctivae, lids, pupils and irises normal, PERRL2.   RESP: Respiratory effort unlabored, no respiratory distress, Lung sounds clear. On RA.   CV: Auscultation of heart reveals S1, S2, rate-controlled and rhythm irregular, 2/6 systolic murmur, no rub or gallop, Edema 0+ BLE. Peripheral pulses are 2+.  ABDOMEN: Normal bowel sounds, soft, non-tender abdomen, and no masses palpated.  SKIN: Inspection/Palpation of skin and subcutaneous tissue baseline w/ fragility. No wounds/rashes noted.   NEURO: CN II-XII at patient's baseline, sensation baseline PPS.  PSYCH: Insight, judgement, and memory are baseline impaired, affect and mood are happy/engaged.    Lab/Diagnostic data:   Recent labs in [place] reviewed by me today.     ASSESSMENT/PLAN  History of CVA (cerebrovascular accident)  Oropharyngeal dysphagia  Diet-controlled diabetes mellitus (H)  Adjustment insomnia  Rectal cancer (H)  Frail elderly  Hospice care patient  Chronic. Ongoing.    Zofran started on May for nausea, which was short-lived. No use " in 30 days, good appetite, no concerns, will discontinue.     Noting w/ trazodone, Concha may be having a hangover effect. Will trial a reduction in Trazodone.    In addition to pharmacological changes, will ask for nursing to try instituting changes to help Concha readjust her sleep cycle.     Her cancer, DM, dysphagia all appear at baseline.  Will continue plan-of-care and follow-up routinely or as needed.    Orders:  1. Discontinue PRN Zofran (no use).  2. Decrease Trazodone to 75mg PO at bedtime. Dx: insomnia.   3. Encourage OOB bed for all meals and awake during daytime.    Electronically signed by:  Dr. Angi Dumont, APRN CNP DNP

## 2021-09-08 NOTE — LETTER
9/8/2021        RE: Stefanie Velazquez  [address] [facility] [address]        [facility]alth [place] Regulatory  Chief Complaint   Patient presents with     [facility] Regulatory   [place] MRN: [id number] Place of Service where encounter took place:  [facility] () [[id number]] HPI: Stefanie Velazquez  is 76 year old (1945), who is being seen today for a federally mandated E/M visit.  HPI information obtained from: facility chart records, facility staff, patient report, [facility] chart review, and Care Everywhere [facility] chart review. Today's concerns are:    Concha seen today per federal mandate. Her only complaint is her sleep/wake cycle. She feels like she has a lot of daytime sleepiness and then trouble going to sleep at night. She denies aches/pains, SOB, CP, plapitations, headaches, dizziness. She states her appetite is pretty good.     ALLERGIES:Cefepime, Ciprofloxacin, Hydrocodone, Lisinopril, and Vicodin [hydrocodone-acetaminophen]PAST MEDICAL HISTORY:   has a past medical history of Acute deep vein thrombosis (DVT) of right lower extremity, unspecified vein (H) (10/31/2018), Acute myocardial infarction of other specified sites, episode of care unspecified (6/2002), Altered mental status (8/11/2020), Cancer of colon (H), Cellulitis of lower extremity, bilateral (9/30/2016), Chronic ischemic heart disease, unspecified (6/22/2003), Confusion (8/20/2020), Coronary atherosclerosis of unspecified type of vessel, native or graft, Diabetes mellitus (H), Esophageal reflux, Fracture of femur, distal, left, closed (H) (2/11/2013), Gastro-oesophageal reflux disease, Generalized osteoarthrosis, unspecified site (6/22/2005), Generalized osteoarthrosis, unspecified site (6/22/2005), H/O deep venous thrombosis (5/21/2019), HEARING LOSS CONDUCTIVE, COMBINED TYPE (6/22/2005), HYPOTHYROIDISM  (6/22/2003), Mixed hyperlipidemia (6/22/2005), Obesity, unspecified (6/22/2005), RC-moderate (AHI 12, LSat 60%); REM  RDI-73 (6/1/2009), Radiation therapy complication (11/9/2011), Recurrent acute deep vein thrombosis (DVT) of both lower extremities (H) (3/31/2019), Rubeola, Stented coronary artery, Type 2 diabetes mellitus with diabetic nephropathy, with long-term current use of insulin (H) (6/27/2017), Type II or unspecified type diabetes mellitus with renal manifestations, uncontrolled(250.42) (H) (6/22/2005), Type II or unspecified type diabetes mellitus with renal manifestations, uncontrolled(250.42) (H) (6/22/2005), Unspecified disorder resulting from impaired renal function (6/22/2005), and Unspecified essential hypertension. She also has no past medical history of PONV (postoperative nausea and vomiting) or Walking and running. PAST SURGICAL HISTORY:   has a past surgical history that includes surgical history of -  (10/24/2002); surgical history of -  (2002); surgical history of -  (2002); Colonoscopy (1/26/2011); orthopedic surgery; cabg; Insert port vascular access (3/2/2011); Colectomy low anterior (6/21/2011); Takedown ileostomy (9/9/2011); Sigmoidoscopy flexible (9/9/2011); Open reduction internal fixation femur distal (2/12/2013); Colonoscopy (N/A, 3/1/2016); Phacoemulsification with standard intraocular lens implant (Left, 1/22/2018); Phacoemulsification with standard intraocular lens implant (Right, 2/19/2018); and Anesthesia out of OR MRI (N/A, 4/8/2020).FAMILY HISTORY: family history includes Breast Cancer in her sister; C.A.D. in her sister; Diabetes in her sister.SOCIAL HISTORY:  reports that she has quit smoking. She has never used smokeless tobacco. She reports previous alcohol use. She reports that she does not use drugs.  MEDICATIONS:  Current Outpatient Medications   Medication Sig Dispense Refill     traZODone (DESYREL) 50 MG tablet Take 1.5 tablets (75 mg) by mouth At Bedtime       acetaminophen (TYLENOL) 325 MG tablet Take 650 mg by mouth 3 times daily       apixaban ANTICOAGULANT (ELIQUIS) 5 MG tablet  "Take 2.5 mg by mouth 2 times daily       fluticasone (FLONASE) 50 MCG/ACT spray Spray 1 spray into both nostrils daily 1 Bottle 3     omeprazole (PRILOSEC) 20 MG DR capsule Take 20 mg by mouth daily       senna-docusate (SENOKOT-S/PERICOLACE) 8.6-50 MG tablet Take 1 tablet by mouth 2 times daily       Case Management:  I have reviewed the care plan and MDS and do agree with the plan. Patient's desire to return to the community is not present. Information reviewed:  Medications, vital signs, orders, and nursing notes.    ROS: Limited secondary to cognitive impairment but today pt reports the above and 4 point ROS including Respiratory, CV, GI and , other than that noted in the HPI, is negative.    Vitals: BP (!) 172/79   Pulse 71   Temp 98  F (36.7  C)   Resp 16   Ht 1.651 m (5' 5\")   Wt 73.7 kg (162 lb 8 oz)   SpO2 94%   BMI 27.04 kg/m    Exam:  GENERAL APPEARANCE: Alert, in no distress, cooperative.   ENT: Mouth/posterior oropharynx intact w/ moist mucous membranes, hearing acuity [place].  EYES: EOM, conjunctivae, lids, pupils and irises normal, PERRL2.   RESP: Respiratory effort unlabored, no respiratory distress, Lung sounds clear. On RA.   CV: Auscultation of heart reveals S1, S2, rate-controlled and rhythm irregular, 2/6 systolic murmur, no rub or gallop, Edema 0+ BLE. Peripheral pulses are 2+.  ABDOMEN: Normal bowel sounds, soft, non-tender abdomen, and no masses palpated.  SKIN: Inspection/Palpation of skin and subcutaneous tissue baseline w/ fragility. No wounds/rashes noted.   NEURO: CN II-XII at patient's baseline, sensation baseline PPS.  PSYCH: Insight, judgement, and memory are baseline impaired, affect and mood are happy/engaged.    Lab/Diagnostic data:   Recent labs in [employer] reviewed by me today.     ASSESSMENT/PLAN  History of CVA (cerebrovascular accident)  Oropharyngeal dysphagia  Diet-controlled diabetes mellitus (H)  Adjustment insomnia  Rectal cancer (H)  Frail elderly  Hospice care " patient  Chronic. Ongoing.    Zofran started on May for nausea, which was short-lived. No use in 30 days, good appetite, no concerns, will discontinue.     Noting w/ trazodone, Concha may be having a hangover effect. Will trial a reduction in Trazodone.    In addition to pharmacological changes, will ask for nursing to try instituting changes to help Concha readjust her sleep cycle.     Her cancer, DM, dysphagia all appear at baseline.  Will continue plan-of-care and follow-up routinely or as needed.    Orders:  1. Discontinue PRN Zofran (no use).  2. Decrease Trazodone to 75mg PO at bedtime. Dx: insomnia.   3. Encourage OOB bed for all meals and awake during daytime.    Electronically signed by:  CLEMENCIA Bah CNP DNP        Sincerely,        CLEMENCIA Johnsno CNP

## 2021-11-08 NOTE — LETTER
11/8/2021        RE: Stefanie Velazquez  [address] [facility] [address]        [place] [facility]  Chief Complaint   Patient presents with     shelter Regulatory     [place] Medical Record Number:  [id number]  Place of Service where encounter took place:  [facility] () [[id number]]    HPI:    Stefanie Velazquez  is 76 year old (1945), who is being seen today for a federally mandated E/M visit.  HPI information obtained from: facility chart records, facility staff, patient report and [facility] chart review.       Today's concerns are:   - Resident seen and examined.   - Hospice rectal cancer: reports feeling fine, denies pain. Nurse manager reports recent increased confusion and paranoia, meds changed by [facility] and now is better.   - Denies fever, SOB, constipation, pain, fall, anorexia, loss of interest or feeling guilt.    --------------------------------  - - Past Medical, social, family histories, medications, and allergies reviewed and updated  - Medications reviewed: in the chart and EHR.   - Case Management:   I have reviewed the care plan and MDS and do agree with the plan. Patient's desire to return to the community is not present.  Information reviewed:  Medications, vital signs, orders, and nursing notes.      MEDICATIONS:  Current Outpatient Medications   Medication Sig Dispense Refill     acetaminophen (TYLENOL) 325 MG tablet Take 650 mg by mouth 3 times daily       apixaban ANTICOAGULANT (ELIQUIS) 5 MG tablet Take 2.5 mg by mouth 2 times daily       fluticasone (FLONASE) 50 MCG/ACT spray Spray 1 spray into both nostrils daily 1 Bottle 3     omeprazole (PRILOSEC) 20 MG DR capsule Take 20 mg by mouth daily       senna-docusate (SENOKOT-S/PERICOLACE) 8.6-50 MG tablet Take 1 tablet by mouth 2 times daily       traZODone (DESYREL) 50 MG tablet Take 1.5 tablets (75 mg) by mouth At Bedtime       ROS: 4 point ROS including Respiratory, CV, GI and , other than  "that noted in the HPI,  is negative  Vitals:  /74   Pulse 59   Temp 98.2  F (36.8  C)   Resp 18   Ht 1.651 m (5' 5\")   Wt 75.5 kg (166 lb 8 oz)   SpO2 93%   BMI 27.71 kg/m    Body mass index is 27.71 kg/m .  Exam:  GENERAL APPEARANCE:  in no distress, cooperative  RESP:  CTA b/l, no adding sounds. Unlabored breathing  CV:  S1S2 audible, regular HR, no murmur appreciated.   ABDOMEN:  soft, NT/ND, BS audible. no mass appreciated on palpation.   M/S:   no joint deformity noted on observation.   SKIN:  No rash.   NEURO:   No NFD appreciated on observation. Hand  5/5 b/l  PSYCH:  affect and mood normal    Lab/Diagnostic data: BMP reviewed     ASSESSMENT/PLAN  -----------------------------  CKD 4, GFR 26 ml/min: no new lab. Renally dose meds. Avoid nephrotoxic meds. Patient is enrolled in hospice, and unsure why BMP was ordered.  Benign essential hypertension, and CAD, hx of CABG: Compensated.   Diet controlled T2D (H)    Hx of DVT, and Hx of small infarct of R temporal lobe and chronic SVID (MRI April 2020): Eliquis resumed (1/20). Recommends stopping it for no survival benefit.     Oropharyngeal dysphagia: at baseline.     Frailty: Significant  Deficits requiring NH placement. Requiring extensive assistance from nursing. Up for meals only o/w spends the day resting in bed    Insomnia 2/2 medical conditions: on Trazodone, stable. recommends sleep hygiene.     Hx of rectal cancer with query mets to lung  Hospice, [facility]  - analgesia optimal  - seem comfortable.   - progressive decline.  - Appreciate collaboration with  hospice team for symptom management in collaboration with cares for maximum comfort at end-of-life      Order: See above, otherwise, continue the rest of the current POC.         Electronically signed by:  Radha Olivarez MD            Sincerely,        Radha Olivarez MD      "

## 2021-11-08 NOTE — PROGRESS NOTES
[place] [facility]  Chief Complaint   Patient presents with     snf Regulatory     [place] Medical Record Number:  [id number]  Place of Service where encounter took place:  [facility] () [[id number]]    HPI:    Stefanie Velazquez  is 76 year old (1945), who is being seen today for a federally mandated E/M visit.  HPI information obtained from: facility chart records, facility staff, patient report and [facility] chart review.       Today's concerns are:   - Resident seen and examined.   - Hospice rectal cancer: reports feeling fine, denies pain. Nurse manager reports recent increased confusion and paranoia, meds changed by [facility] and now is better.   - Denies fever, SOB, constipation, pain, fall, anorexia, loss of interest or feeling guilt.    --------------------------------  - - Past Medical, social, family histories, medications, and allergies reviewed and updated  - Medications reviewed: in the chart and EHR.   - Case Management:   I have reviewed the care plan and MDS and do agree with the plan. Patient's desire to return to the community is not present.  Information reviewed:  Medications, vital signs, orders, and nursing notes.      MEDICATIONS:  Current Outpatient Medications   Medication Sig Dispense Refill     acetaminophen (TYLENOL) 325 MG tablet Take 650 mg by mouth 3 times daily       apixaban ANTICOAGULANT (ELIQUIS) 5 MG tablet Take 2.5 mg by mouth 2 times daily       fluticasone (FLONASE) 50 MCG/ACT spray Spray 1 spray into both nostrils daily 1 Bottle 3     omeprazole (PRILOSEC) 20 MG DR capsule Take 20 mg by mouth daily       senna-docusate (SENOKOT-S/PERICOLACE) 8.6-50 MG tablet Take 1 tablet by mouth 2 times daily       traZODone (DESYREL) 50 MG tablet Take 1.5 tablets (75 mg) by mouth At Bedtime       ROS: 4 point ROS including Respiratory, CV, GI and , other than that noted in the HPI,  is negative  Vitals:  /74   Pulse 59   Temp 98.2  F (36.8  C)   " Resp 18   Ht 1.651 m (5' 5\")   Wt 75.5 kg (166 lb 8 oz)   SpO2 93%   BMI 27.71 kg/m    Body mass index is 27.71 kg/m .  Exam:  GENERAL APPEARANCE:  in no distress, cooperative  RESP:  CTA b/l, no adding sounds. Unlabored breathing  CV:  S1S2 audible, regular HR, no murmur appreciated.   ABDOMEN:  soft, NT/ND, BS audible. no mass appreciated on palpation.   M/S:   no joint deformity noted on observation.   SKIN:  No rash.   NEURO:   No NFD appreciated on observation. Hand  5/5 b/l  PSYCH:  affect and mood normal    Lab/Diagnostic data: BMP reviewed     ASSESSMENT/PLAN  -----------------------------  CKD 4, GFR 26 ml/min: no new lab. Renally dose meds. Avoid nephrotoxic meds. Patient is enrolled in hospice, and unsure why BMP was ordered.  Benign essential hypertension, and CAD, hx of CABG: Compensated.   Diet controlled T2D (H)    Hx of DVT, and Hx of small infarct of R temporal lobe and chronic SVID (MRI April 2020): Eliquis resumed (1/20). Recommends stopping it for no survival benefit.     Oropharyngeal dysphagia: at baseline.     Frailty: Significant  Deficits requiring NH placement. Requiring extensive assistance from nursing. Up for meals only o/w spends the day resting in bed    Insomnia 2/2 medical conditions: on Trazodone, stable. recommends sleep hygiene.     [facility] rectal cancer with [facility] mets [facility] lung  [facility]  [facility]  - seem comfortable.   - progressive decline.  - Appreciate collaboration with  hospice team for symptom management in collaboration with cares for maximum comfort at end-of-life      Order: See above, otherwise, continue the rest of the current POC.         Electronically signed by:  Radha Olivarez MD      "

## 2021-12-16 NOTE — PROGRESS NOTES
ealth [place] Annual Physical  Chief Complaint   Patient presents with     Annual Comprehensive Nursing Home    [place] MRN: [id number] Place of Service where encounter took place:  [facility] () [[id number]] HPI: Stefanie Velazquez  is a 76 year old  (1945), who is being seen today for an annual comprehensive visit. HPI information obtained from: facility chart records, facility staff, patient report and [facility] chart review.  Today's concerns are:    Concha seen today for annual physical exam. She has no new concerns. She does state that she has a little bit of discomfort in her back, but otherwise denies pain. She denies shortness of breath, headaches, dizziness, chest pain, wheezing, cough/fever. Given her goals of care, she will not pursue cancer screenings.    PHQ9: 6/27  BIMS: 13/15    ALLERGIES: Cefepime, Ciprofloxacin, Hydrocodone, Lisinopril, and Vicodin [hydrocodone-acetaminophen]PAST MEDICAL HISTORY:  has a past medical history of Acute deep vein thrombosis (DVT) of right lower extremity, unspecified vein (H) (10/31/2018), Acute myocardial infarction of other specified sites, episode of care unspecified (6/2002), Altered mental status (8/11/2020), Cancer of colon (H), Cellulitis of lower extremity, bilateral (9/30/2016), Chronic ischemic heart disease, unspecified (6/22/2003), Confusion (8/20/2020), Coronary atherosclerosis of unspecified type of vessel, native or graft, Diabetes mellitus (H), Esophageal reflux, Fracture of femur, distal, left, closed (H) (2/11/2013), Gastro-oesophageal reflux disease, Generalized osteoarthrosis, unspecified site (6/22/2005), Generalized osteoarthrosis, unspecified site (6/22/2005), H/O deep venous thrombosis (5/21/2019), HEARING LOSS CONDUCTIVE, COMBINED TYPE (6/22/2005), HYPOTHYROIDISM  (6/22/2003), Mixed hyperlipidemia (6/22/2005), Obesity, unspecified (6/22/2005), RC-moderate (AHI 12, LSat 60%); REM RDI-73 (6/1/2009), Radiation therapy complication  (11/9/2011), Recurrent acute deep vein thrombosis (DVT) of both lower extremities (H) (3/31/2019), Rubeola, Stented coronary artery, Type 2 diabetes mellitus with diabetic nephropathy, with long-term current use of insulin (H) (6/27/2017), Type II or unspecified type diabetes mellitus with renal manifestations, uncontrolled(250.42) (H) (6/22/2005), Type II or unspecified type diabetes mellitus with renal manifestations, uncontrolled(250.42) (H) (6/22/2005), Unspecified disorder resulting from impaired renal function (6/22/2005), and Unspecified essential hypertension.She has no past medical history of PONV (postoperative nausea and vomiting) or Walking and running.PAST SURGICAL HISTORY:  has a past surgical history that includes surgical history of -  (10/24/2002); surgical history of -  (2002); surgical history of -  (2002); Colonoscopy (1/26/2011); orthopedic surgery; cabg; Insert port vascular access (3/2/2011); Colectomy low anterior (6/21/2011); Takedown ileostomy (9/9/2011); Sigmoidoscopy flexible (9/9/2011); Open reduction internal fixation femur distal (2/12/2013); Colonoscopy (N/A, 3/1/2016); Phacoemulsification with standard intraocular lens implant (Left, 1/22/2018); Phacoemulsification with standard intraocular lens implant (Right, 2/19/2018); and Anesthesia out of OR MRI (N/A, 4/8/2020).  IMMUNIZATIONS:  Immunization History   Administered Date(s) Administered     COVID-19,PF,Moderna 12/30/2020, 01/28/2021, 11/15/2021     FLU 6-35 months 10/30/2009, 09/27/2012     FLUAD(HD)65+ QUAD 10/14/2021     Influenza (High Dose) 3 valent vaccine 10/19/2011, 10/21/2013, 11/17/2014, 01/05/2016, 09/20/2016, 11/10/2017, 09/18/2018, 11/08/2019, 10/02/2020     Influenza (IIV3) PF 11/03/2005, 11/08/2006, 11/10/2007, 10/20/2008, 10/30/2009, 09/27/2012     Pneumo Conj 13-V (2010&after) 09/20/2016     Pneumococcal 23 valent 12/06/2005, 06/07/2011     TD (ADULT, 7+) 12/06/2005, 08/24/2011    Above immunizations pulled from  [facility]. [facility] and facility records also reconciled. Outstanding information sent to  to update [facility].  Future immunizations are not needed at this point as all recommended immunizations are up to date.   Current Outpatient Medications   Medication Sig Dispense Refill     acetaminophen (TYLENOL) 325 MG tablet Take 650 mg by mouth 3 times daily       apixaban ANTICOAGULANT (ELIQUIS) 5 MG tablet Take 2.5 mg by mouth 2 times daily       fluticasone (FLONASE) 50 MCG/ACT spray Spray 1 spray into both nostrils daily 1 Bottle 3     omeprazole (PRILOSEC) 20 MG DR capsule Take 20 mg by mouth daily       senna-docusate (SENOKOT-S/PERICOLACE) 8.6-50 MG tablet Take 1 tablet by mouth 2 times daily       traZODone (DESYREL) 50 MG tablet Take 1.5 tablets (75 mg) by mouth At Bedtime       Case Management: I have reviewed the facility/SNF care plan/MDS, including the falls risk, nutrition and pain screening. I also reviewed the current immunizations, and preventive care. .Future cancer screening is not clinically indicated secondary to age/goals of care Patient's desire to return to the community is not assessible due to cognitive impairment. Current Level of Care is appropriate.    Advance Directive Discussion: I reviewed the current advanced directives as reflected in EPIC, the POLST and the facility chart, and verified the congruency of orders. I did not due to cognitive impairment review the advance directives with the resident.     Team Discussion: I communicated with the appropriate disciplines involved with the Plan of Care: Nursing  .Patient's goal is pain control and comfort.Information reviewed: Medications, vital signs, orders, and nursing notes.    ROS: Limited secondary to cognitive impairment but today pt reports the above and 10 point ROS of systems including Constitutional, Eyes, Respiratory, Cardiovascular, Gastroenterology, Genitourinary, Integumentary, Musculoskeletal, Psychiatric were  "all negative except for pertinent positives noted in my HPI.    Vitals: BP (!) 155/84   Pulse 79   Temp 98.7  F (37.1  C)   Resp 16   Ht 1.651 m (5' 5\")   Wt 76.2 kg (167 lb 14.4 oz)   SpO2 97%   BMI 27.94 kg/m     BPs:    Exam:  GENERAL APPEARANCE: Alert, in no distress, cooperative.   ENT: Mouth/posterior oropharynx intact w/ moist mucous membranes, hearing acuity [place].  EYES: EOM, conjunctivae, lids, pupils and irises normal, PERRL2.   RESP: Respiratory effort unlabored, no respiratory distress, Lung sounds clear/diminished. On RA.   CV: Auscultation of heart reveals S1, S2, rate and rhythm regular, 2/6 systolic murmur, no rub or gallop, Edema 1+ BLE. Peripheral pulses are 2+.  ABDOMEN: Normal bowel sounds, soft, non-tender abdomen, and no masses palpated.  SKIN: Inspection/Palpation of skin and subcutaneous tissue baseline w/ fragility. No wounds/rashes noted.   NEURO: CN II-XII at patient's baseline, sensation baseline PPS.  PSYCH: Insight, judgement, and memory are impaired at baseline, affect and mood are flat,cooperative.    Lab/Diagnostic data: Recent labs in [employer] reviewed by me today.     ASSESSMENT/PLAN   Annual physical exam  History of CVA (cerebrovascular accident)  Oropharyngeal dysphagia  Rectal cancer (H)  CKD (chronic kidney disease) stage 4, GFR 15-29 ml/min (H)  Diet-controlled diabetes mellitus (H)  Essential hypertension  Hospice care patient  Chronic. Ongoing.    Blood pressures appear slightly labile but overall controlled as noted above.    Current medication regimen is appropriate, as she continues anticoagulation, it is appropriate to continue PPI prophylaxis as well especially given her underlying history of rectal cancer.     As her diabetes is grossly diet controlled, will not pursue any blood work for A1c at this time.    As she does not have new complaints and is on hospice, no need to pursue other blood work for work-up such as a TSH or electrolytes, though these could be " considered in the future should her goals of care be changed or should discomfort arise.    Provider coordinated care with nursing to confirm POLST, and find communication with appropriate hospice staff. Hospice staff and nursing will communicate with family.    Concha is due for Tdap booster vaccination, and this was confirmed with facility/[place] staff. We will order as noted below.  Follow up routinely or as needed.    Orders:  1. TDap booster, IM x1, routine. Dx: vaccination.     Electronically signed by:  CLEMENCIA Bah CNP DNP

## 2021-12-22 NOTE — LETTER
12/22/2021        RE: Stefanie Velazquez  [address] [facility] [address]        [facility] Annual Physical  Chief Complaint   Patient presents with     Annual Comprehensive Nursing Home    [place] MRN: [id number] Place of Service where encounter took place:  [facility] () [[id number]] HPI: Stefanie Velazquez  is a 76 year old  (1945), who is being seen today for an annual comprehensive visit. HPI information obtained from: facility chart records, facility staff, patient report and [facility] chart review.  Today's concerns are:    Concha seen today for annual physical exam. She has no new concerns. She does state that she has a little bit of discomfort in her back, but otherwise denies pain. She denies shortness of breath, headaches, dizziness, chest pain, wheezing, cough/fever. Given her goals of care, she will not pursue cancer screenings.    PHQ9: 6/27  BIMS: 13/15    ALLERGIES: Cefepime, Ciprofloxacin, Hydrocodone, Lisinopril, and Vicodin [hydrocodone-acetaminophen]PAST MEDICAL HISTORY:  has a past medical history of Acute deep vein thrombosis (DVT) of right lower extremity, unspecified vein (H) (10/31/2018), Acute myocardial infarction of other specified sites, episode of care unspecified (6/2002), Altered mental status (8/11/2020), Cancer of colon (H), Cellulitis of lower extremity, bilateral (9/30/2016), Chronic ischemic heart disease, unspecified (6/22/2003), Confusion (8/20/2020), Coronary atherosclerosis of unspecified type of vessel, native or graft, Diabetes mellitus (H), Esophageal reflux, Fracture of femur, distal, left, closed (H) (2/11/2013), Gastro-oesophageal reflux disease, Generalized osteoarthrosis, unspecified site (6/22/2005), Generalized osteoarthrosis, unspecified site (6/22/2005), H/O deep venous thrombosis (5/21/2019), HEARING LOSS CONDUCTIVE, COMBINED TYPE (6/22/2005), HYPOTHYROIDISM  (6/22/2003), Mixed hyperlipidemia (6/22/2005), Obesity, unspecified  (6/22/2005), RC-moderate (AHI 12, LSat 60%); REM RDI-73 (6/1/2009), Radiation therapy complication (11/9/2011), Recurrent acute deep vein thrombosis (DVT) of both lower extremities (H) (3/31/2019), Rubeola, Stented coronary artery, Type 2 diabetes mellitus with diabetic nephropathy, with long-term current use of insulin (H) (6/27/2017), Type II or unspecified type diabetes mellitus with renal manifestations, uncontrolled(250.42) (H) (6/22/2005), Type II or unspecified type diabetes mellitus with renal manifestations, uncontrolled(250.42) (H) (6/22/2005), Unspecified disorder resulting from impaired renal function (6/22/2005), and Unspecified essential hypertension.She has no past medical history of PONV (postoperative nausea and vomiting) or Walking and running.PAST SURGICAL HISTORY:  has a past surgical history that includes surgical history of -  (10/24/2002); surgical history of -  (2002); surgical history of -  (2002); Colonoscopy (1/26/2011); orthopedic surgery; cabg; Insert port vascular access (3/2/2011); Colectomy low anterior (6/21/2011); Takedown ileostomy (9/9/2011); Sigmoidoscopy flexible (9/9/2011); Open reduction internal fixation femur distal (2/12/2013); Colonoscopy (N/A, 3/1/2016); Phacoemulsification with standard intraocular lens implant (Left, 1/22/2018); Phacoemulsification with standard intraocular lens implant (Right, 2/19/2018); and Anesthesia out of OR MRI (N/A, 4/8/2020).  IMMUNIZATIONS:  Immunization History   Administered Date(s) Administered     COVID-19,PF,Moderna 12/30/2020, 01/28/2021, 11/15/2021     FLU 6-35 months 10/30/2009, 09/27/2012     FLUAD(HD)65+ QUAD 10/14/2021     Influenza (High Dose) 3 valent vaccine 10/19/2011, 10/21/2013, 11/17/2014, 01/05/2016, 09/20/2016, 11/10/2017, 09/18/2018, 11/08/2019, 10/02/2020     Influenza (IIV3) PF 11/03/2005, 11/08/2006, 11/10/2007, 10/20/2008, 10/30/2009, 09/27/2012     Pneumo Conj 13-V (2010&after) 09/20/2016     Pneumococcal 23 valent  12/06/2005, 06/07/2011     TD (ADULT, 7+) 12/06/2005, 08/24/2011    Above immunizations pulled from [facility]. [facility] and facility records also reconciled. Outstanding information sent to  to update [facility].  Future immunizations are not needed at this point as all recommended immunizations are up to date.   Current Outpatient Medications   Medication Sig Dispense Refill     acetaminophen (TYLENOL) 325 MG tablet Take 650 mg by mouth 3 times daily       apixaban ANTICOAGULANT (ELIQUIS) 5 MG tablet Take 2.5 mg by mouth 2 times daily       fluticasone (FLONASE) 50 MCG/ACT spray Spray 1 spray into both nostrils daily 1 Bottle 3     omeprazole (PRILOSEC) 20 MG DR capsule Take 20 mg by mouth daily       senna-docusate (SENOKOT-S/PERICOLACE) 8.6-50 MG tablet Take 1 tablet by mouth 2 times daily       traZODone (DESYREL) 50 MG tablet Take 1.5 tablets (75 mg) by mouth At Bedtime       Case Management: I have reviewed the facility/SNF care plan/MDS, including the falls risk, nutrition and pain screening. I also reviewed the current immunizations, and preventive care. .Future cancer screening is not clinically indicated secondary to age/goals of care Patient's desire to return to the community is not assessible due to cognitive impairment. Current Level of Care is appropriate.    Advance Directive Discussion: I reviewed the current advanced directives as reflected in EPIC, the POLST and the facility chart, and verified the congruency of orders. I did not due to cognitive impairment review the advance directives with the resident.     Team Discussion: I communicated with the appropriate disciplines involved with the Plan of Care: Nursing  .Patient's goal is pain control and comfort.Information reviewed: Medications, vital signs, orders, and nursing notes.    ROS: Limited secondary to cognitive impairment but today pt reports the above and 10 point ROS of systems including Constitutional, Eyes,  "Respiratory, Cardiovascular, Gastroenterology, Genitourinary, Integumentary, Musculoskeletal, Psychiatric were all negative except for pertinent positives noted in my HPI.    Vitals: BP (!) 155/84   Pulse 79   Temp 98.7  F (37.1  C)   Resp 16   Ht 1.651 m (5' 5\")   Wt 76.2 kg (167 lb 14.4 oz)   SpO2 97%   BMI 27.94 kg/m     BPs:    Exam:  GENERAL APPEARANCE: Alert, in no distress, cooperative.   ENT: Mouth/posterior oropharynx intact w/ moist mucous membranes, hearing acuity very [place].  EYES: EOM, conjunctivae, lids, pupils and irises normal, PERRL2.   RESP: Respiratory effort unlabored, no respiratory distress, Lung sounds clear/diminished. On RA.   CV: Auscultation of heart reveals S1, S2, rate and rhythm regular, 2/6 systolic murmur, no rub or gallop, Edema 1+ BLE. Peripheral pulses are 2+.  ABDOMEN: Normal bowel sounds, soft, non-tender abdomen, and no masses palpated.  SKIN: Inspection/Palpation of skin and subcutaneous tissue baseline w/ fragility. No wounds/rashes noted.   NEURO: CN II-XII at patient's baseline, sensation baseline PPS.  PSYCH: Insight, judgement, and memory are impaired at baseline, affect and mood are flat,cooperative.    Lab/Diagnostic data: Recent labs in "[employer] reviewed by me today.     ASSESSMENT/PLAN   Annual physical exam  History of CVA (cerebrovascular accident)  Oropharyngeal dysphagia  Rectal cancer (H)  CKD (chronic kidney disease) stage 4, GFR 15-29 ml/min (H)  Diet-controlled diabetes mellitus (H)  Essential hypertension  Hospice care patient  Chronic. Ongoing.    Blood pressures appear slightly labile but overall controlled as noted above.    Current medication regimen is appropriate, as she continues anticoagulation, it is appropriate to continue PPI prophylaxis as well especially given her underlying history of rectal cancer.     As her diabetes is grossly diet controlled, will not pursue any blood work for A1c at this time.    As she does not have new complaints and is " on hospice, no need to pursue other blood work for work-up such as a TSH or electrolytes, though these could be considered in the future should her goals of care be changed or should discomfort arise.    Provider coordinated care with nursing to confirm POLST, and find communication with appropriate hospice staff. Hospice staff and nursing will communicate with family.    Concha is due for Tdap booster vaccination, and this was confirmed with facility/[facility] staff. We will order as noted below.  Follow up routinely or as needed.    Orders:  1. TDap booster, IM x1, routine. Dx: vaccination.     Electronically signed by:  Dr. Angi Dumont, APRN CNP DNP              Sincerely,        Angi Dumont, CLEMENCIA CNP

## 2022-01-01 ENCOUNTER — NURSING HOME VISIT (OUTPATIENT)
Dept: GERIATRICS | Facility: CLINIC | Age: 77
End: 2022-01-01
Payer: MEDICARE

## 2022-01-01 VITALS
HEART RATE: 76 BPM | WEIGHT: 163.5 LBS | OXYGEN SATURATION: 95 % | RESPIRATION RATE: 16 BRPM | BODY MASS INDEX: 27.24 KG/M2 | HEIGHT: 65 IN | DIASTOLIC BLOOD PRESSURE: 78 MMHG | SYSTOLIC BLOOD PRESSURE: 180 MMHG | TEMPERATURE: 97.3 F

## 2022-01-01 DIAGNOSIS — C20 RECTAL CANCER (H): ICD-10-CM

## 2022-01-01 DIAGNOSIS — F02.818 LEWY BODY DEMENTIA WITH BEHAVIORAL DISTURBANCE (H): ICD-10-CM

## 2022-01-01 DIAGNOSIS — E11.40 TYPE 2 DIABETES MELLITUS WITH DIABETIC NEUROPATHY, WITHOUT LONG-TERM CURRENT USE OF INSULIN (H): ICD-10-CM

## 2022-01-01 DIAGNOSIS — N18.4 CKD (CHRONIC KIDNEY DISEASE) STAGE 4, GFR 15-29 ML/MIN (H): ICD-10-CM

## 2022-01-01 DIAGNOSIS — G31.83 LEWY BODY DEMENTIA WITH BEHAVIORAL DISTURBANCE (H): ICD-10-CM

## 2022-01-01 DIAGNOSIS — Z51.5 HOSPICE CARE: Primary | ICD-10-CM

## 2022-01-01 PROCEDURE — 99309 SBSQ NF CARE MODERATE MDM 30: CPT | Mod: GV | Performed by: NURSE PRACTITIONER

## 2022-01-01 ASSESSMENT — MIFFLIN-ST. JEOR: SCORE: 1222.51

## 2022-02-03 NOTE — LETTER
2/3/2022        RE: Stefanie Velazquez  [address] [facility] [address]        ealth [place] [facility]  Chief Complaint   Patient presents with     P Care Coordination - Regulatory   [place] MRN: [id number] Place of Service where encounter took place:  [facility] [place] () [[id number]] HPI: Stefanie Velazquez  is 76 year old (1945), who is being seen today for a federally mandated E/M visit.  HPI information obtained from: facility chart records, facility staff, patient report, [facility] chart review, and Care Everywhere [facility] chart review. Today's concerns are:    Concha seen today per federal mandate.  She is nonverbal and has no new concerns.  Nursing reports that hospice chaplain just completed visit at bedside, and that Concha appears to be in the active dying process.    ALLERGIES:Cefepime, Ciprofloxacin, Hydrocodone, Lisinopril, and Vicodin [hydrocodone-acetaminophen]PAST MEDICAL HISTORY:   has a past medical history of Acute deep vein thrombosis (DVT) of right lower extremity, unspecified vein (H) (10/31/2018), Acute myocardial infarction of other specified sites, episode of care unspecified (6/2002), Altered mental status (8/11/2020), Cancer of colon (H), Cellulitis of lower extremity, bilateral (9/30/2016), Chronic ischemic heart disease, unspecified (6/22/2003), Confusion (8/20/2020), Coronary atherosclerosis of unspecified type of vessel, native or graft, Diabetes mellitus (H), Esophageal reflux, Fracture of femur, distal, left, closed (H) (2/11/2013), Gastro-oesophageal reflux disease, Generalized osteoarthrosis, unspecified site (6/22/2005), Generalized osteoarthrosis, unspecified site (6/22/2005), H/O deep venous thrombosis (5/21/2019), HEARING LOSS CONDUCTIVE, COMBINED TYPE (6/22/2005), HYPOTHYROIDISM  (6/22/2003), Mixed hyperlipidemia (6/22/2005), Obesity, unspecified (6/22/2005), RC-moderate (AHI 12, LSat 60%); REM RDI-73 (6/1/2009), Radiation therapy complication (11/9/2011),  Recurrent acute deep vein thrombosis (DVT) of both lower extremities (H) (3/31/2019), Rubeola, Stented coronary artery, Type 2 diabetes mellitus with diabetic nephropathy, with long-term current use of insulin (H) (6/27/2017), Type II or unspecified type diabetes mellitus with renal manifestations, uncontrolled(250.42) (H) (6/22/2005), Type II or unspecified type diabetes mellitus with renal manifestations, uncontrolled(250.42) (H) (6/22/2005), Unspecified disorder resulting from impaired renal function (6/22/2005), and Unspecified essential hypertension.She has no past medical history of PONV (postoperative nausea and vomiting) or Walking and running. PAST SURGICAL HISTORY:   has a past surgical history that includes surgical history of -  (10/24/2002); surgical history of -  (2002); surgical history of -  (2002); Colonoscopy (1/26/2011); orthopedic surgery; cabg; Insert port vascular access (3/2/2011); Colectomy low anterior (6/21/2011); Takedown ileostomy (9/9/2011); Sigmoidoscopy flexible (9/9/2011); Open reduction internal fixation femur distal (2/12/2013); Colonoscopy (N/A, 3/1/2016); Phacoemulsification with standard intraocular lens implant (Left, 1/22/2018); Phacoemulsification with standard intraocular lens implant (Right, 2/19/2018); and Anesthesia out of OR MRI (N/A, 4/8/2020).FAMILY HISTORY: family history includes Breast Cancer in her sister; C.A.D. in her sister; Diabetes in her sister.SOCIAL HISTORY:  reports that she has quit smoking. She has never used smokeless tobacco. She reports previous alcohol use. She reports that she does not use drugs.  MEDICATIONS:  Current Outpatient Medications   Medication Sig Dispense Refill     acetaminophen (TYLENOL) 325 MG tablet Take 650 mg by mouth 3 times daily       apixaban ANTICOAGULANT (ELIQUIS) 5 MG tablet Take 2.5 mg by mouth 2 times daily       fluticasone (FLONASE) 50 MCG/ACT spray Spray 1 spray into both nostrils daily 1 Bottle 3     omeprazole  "(PRILOSEC) 20 MG DR capsule Take 20 mg by mouth daily       senna-docusate (SENOKOT-S/PERICOLACE) 8.6-50 MG tablet Take 1 tablet by mouth 2 times daily       traZODone (DESYREL) 50 MG tablet Take 1.5 tablets (75 mg) by mouth At Bedtime       Case Management:  I have reviewed the care plan and MDS and do agree with the plan. Patient's desire to return to the community is not assessible due to cognitive impairment. Information reviewed:  Medications, vital signs, orders, and nursing notes.    ROS: Limited secondary to cognitive impairment but today pt reports the above and 4 point ROS including Respiratory, CV, GI and , other than that noted in the HPI, is negative.    Vitals: BP (!) 180/78   Pulse 76   Temp 97.3  F (36.3  C)   Resp 16   Ht 1.651 m (5' 5\")   Wt 74.2 kg (163 lb 8 oz)   SpO2 95%   BMI 27.21 kg/m    Exam:  GENERAL APPEARANCE: withdrawn, in no distress.   ENT: Mouth/posterior oropharynx intact w/ moist mucous membranes, hearing acuity [place].  EYES: EOM, conjunctivae, lids, pupils and irises normal, PERRL2, difficulty closing lids.  RESP: Respiratory effort unlabored, no respiratory distress, Lung sounds clear. On RA.   CV: Auscultation of heart reveals S1, S2, rate slightly tachycardic and rhythm regular, no murmur, no rub or gallop.   ABDOMEN: Normal bowel sounds, soft, non-tender abdomen, and no masses palpated.  SKIN: Inspection/Palpation of skin and subcutaneous tissue baseline w/ fragility. No wounds/rashes noted.   NEURO: CN II-XII at patient's baseline, sensation likely baseline but unknown.  PSYCH: Insight, judgement, and memory are impaired, affect and mood are withdrawn.    Lab/Diagnostic data:   Recent labs in [facility] reviewed by me today.     ASSESSMENT/PLAN  Hospice care  Lewy body dementia with behavioral disturbance (H)  Rectal cancer (H)  Type 2 diabetes mellitus with diabetic neuropathy, without long-term current use of insulin (H)  CKD (chronic kidney disease) stage 4, GFR 15-29 " ml/min (H)  Terminal. Progressive.    Dementia and cancer have ultimately progressed to end stage, and Concha will continue her journey as her body changes naturally.    Other chronic conditions are contributory, but ultimately not necessary to address today given her active dying processes.    Face timing with family during visit today (who are out of state), family has no new questions and are happy with care.  Follow up routinely or as needed.    Orders:  No new orders.    Electronically signed by:  CLEMENCIA Bah CNP DNP          Sincerely,        CLEMENCIA Johnson CNP

## 2022-02-03 NOTE — PROGRESS NOTES
e[facility] [place] [facility]  Chief Complaint   Patient presents with     FVP Care Coordination - Regulatory   [place] MRN: [id number] Place of Service where encounter took place:  [facility] [place] () [[id number]] HPI: Stefanie Velazquez  is 76 year old (1945), who is being seen today for a federally mandated E/M visit.  HPI information obtained from: facility chart records, facility staff, patient report, [facility] chart review, and Care Everywhere [facility] chart review. Today's concerns are:    Concha seen today per federal mandate.  She is nonverbal and has no new concerns.  Nursing reports that hospice chaplain just completed visit at bedside, and that Concha appears to be in the active dying process.    ALLERGIES:Cefepime, Ciprofloxacin, Hydrocodone, Lisinopril, and Vicodin [hydrocodone-acetaminophen]PAST MEDICAL HISTORY:   has a past medical history of Acute deep vein thrombosis (DVT) of right lower extremity, unspecified vein (H) (10/31/2018), Acute myocardial infarction of other specified sites, episode of care unspecified (6/2002), Altered mental status (8/11/2020), Cancer of colon (H), Cellulitis of lower extremity, bilateral (9/30/2016), Chronic ischemic heart disease, unspecified (6/22/2003), Confusion (8/20/2020), Coronary atherosclerosis of unspecified type of vessel, native or graft, Diabetes mellitus (H), Esophageal reflux, Fracture of femur, distal, left, closed (H) (2/11/2013), Gastro-oesophageal reflux disease, Generalized osteoarthrosis, unspecified site (6/22/2005), Generalized osteoarthrosis, unspecified site (6/22/2005), H/O deep venous thrombosis (5/21/2019), HEARING LOSS CONDUCTIVE, COMBINED TYPE (6/22/2005), HYPOTHYROIDISM  (6/22/2003), Mixed hyperlipidemia (6/22/2005), Obesity, unspecified (6/22/2005), RC-moderate (AHI 12, LSat 60%); REM RDI-73 (6/1/2009), Radiation therapy complication (11/9/2011), Recurrent acute deep vein thrombosis (DVT) of both lower extremities (H) (3/31/2019), [place]  Stented coronary artery, Type 2 diabetes mellitus with diabetic nephropathy, with long-term current use of insulin (H) (6/27/2017), Type II or unspecified type diabetes mellitus with renal manifestations, uncontrolled(250.42) (H) (6/22/2005), Type II or unspecified type diabetes mellitus with renal manifestations, uncontrolled(250.42) (H) (6/22/2005), Unspecified disorder resulting from impaired renal function (6/22/2005), and Unspecified essential hypertension.She has no past medical history of PONV (postoperative nausea and vomiting) or Walking and running. PAST SURGICAL HISTORY:   has a past surgical history that includes surgical history of -  (10/24/2002); surgical history of -  (2002); surgical history of -  (2002); Colonoscopy (1/26/2011); orthopedic surgery; cabg; Insert port vascular access (3/2/2011); Colectomy low anterior (6/21/2011); Takedown ileostomy (9/9/2011); Sigmoidoscopy flexible (9/9/2011); Open reduction internal fixation femur distal (2/12/2013); Colonoscopy (N/A, 3/1/2016); Phacoemulsification with standard intraocular lens implant (Left, 1/22/2018); Phacoemulsification with standard intraocular lens implant (Right, 2/19/2018); and Anesthesia out of OR MRI (N/A, 4/8/2020).FAMILY HISTORY: family history includes Breast Cancer in her sister; C.A.D. in her sister; Diabetes in her sister.SOCIAL HISTORY:  reports that she has quit smoking. She has never used smokeless tobacco. She reports previous alcohol use. She reports that she does not use drugs.  MEDICATIONS:  Current Outpatient Medications   Medication Sig Dispense Refill     acetaminophen (TYLENOL) 325 MG tablet Take 650 mg by mouth 3 times daily       apixaban ANTICOAGULANT (ELIQUIS) 5 MG tablet Take 2.5 mg by mouth 2 times daily       fluticasone (FLONASE) 50 MCG/ACT spray Spray 1 spray into both nostrils daily 1 Bottle 3     omeprazole (PRILOSEC) 20 MG DR capsule Take 20 mg by mouth daily       senna-docusate (SENOKOT-S/PERICOLACE)  "8.6-50 MG tablet Take 1 tablet by mouth 2 times daily       traZODone (DESYREL) 50 MG tablet Take 1.5 tablets (75 mg) by mouth At Bedtime       Case Management:  I have reviewed the care plan and MDS and do agree with the plan. Patient's desire to return to the community is not assessible due to cognitive impairment. Information reviewed:  Medications, vital signs, orders, and nursing notes.    ROS: Limited secondary to cognitive impairment but today pt reports the above and 4 point ROS including Respiratory, CV, GI and , other than that noted in the HPI, is negative.    Vitals: BP (!) 180/78   Pulse 76   Temp 97.3  F (36.3  C)   Resp 16   Ht 1.651 m (5' 5\")   Wt 74.2 kg (163 lb 8 oz)   SpO2 95%   BMI 27.21 kg/m    Exam:  GENERAL APPEARANCE: withdrawn, in no distress.   ENT: Mouth/posterior oropharynx intact w/ moist mucous membranes, hearing acuity [place].  EYES: EOM, conjunctivae, lids, pupils and irises normal, PERRL2, difficulty closing lids.  RESP: Respiratory effort unlabored, no respiratory distress, Lung sounds clear. On RA.   CV: Auscultation of heart reveals S1, S2, rate slightly tachycardic and rhythm regular, no murmur, no rub or gallop.   ABDOMEN: Normal bowel sounds, soft, non-tender abdomen, and no masses palpated.  SKIN: Inspection/Palpation of skin and subcutaneous tissue baseline w/ fragility. No wounds/rashes noted.   NEURO: CN II-XII at patient's baseline, sensation likely baseline but unknown.  PSYCH: Insight, judgement, and memory are impaired, affect and mood are withdrawn.    Lab/Diagnostic data:   Recent labs in [facility] reviewed by me today.     ASSESSMENT/PLAN  Hospice care  Lewy body dementia with behavioral disturbance (H)  Rectal cancer (H)  Type 2 diabetes mellitus with diabetic neuropathy, without long-term current use of insulin (H)  CKD (chronic kidney disease) stage 4, GFR 15-29 ml/min (H)  Terminal. Progressive.    Dementia and cancer have ultimately progressed to end stage, and " Concha will continue her journey as her body changes naturally.    Other chronic conditions are contributory, but ultimately not necessary to address today given her active dying processes.    Face timing with family during visit today (who are out of state), family has no new questions and are happy with care.  Follow up routinely or as needed.    Orders:  No new orders.    Electronically signed by:  Dr. Angi Dumont, CLEMENCIA CNP DNP

## 2022-02-04 PROBLEM — I48.91 ATRIAL FIBRILLATION, RAPID (H): Status: RESOLVED | Noted: 2021-01-08 | Resolved: 2022-01-01

## 2022-02-04 PROBLEM — D70.9 NEUTROPENIA, UNSPECIFIED TYPE (H): Status: RESOLVED | Noted: 2021-01-08 | Resolved: 2022-01-01

## 2022-02-04 PROBLEM — N18.4 CKD (CHRONIC KIDNEY DISEASE) STAGE 4, GFR 15-29 ML/MIN (H): Status: ACTIVE | Noted: 2022-01-01

## 2022-02-04 PROBLEM — Z51.5 HOSPICE CARE: Status: ACTIVE | Noted: 2022-01-01

## 2022-09-12 NOTE — TELEPHONE ENCOUNTER
Homecare OT needs verbal orders for One week one visit, One week 2 visits, Total of 3 visits to treat lower leg swelling.  Also order for assisted living for her to wear compression garments daily and remove one time daily for skin tear and re-apply garments  Also needs order for Social Work Eval.    Call [phone] with verbal order.  Lynette   Continuity of Care Form    Patient Name: Charu Hidalgo   :  1939  MRN:  [id number]    Admit date:  2022  Discharge date:  2022    Code Status Order: DNR-CCA   Advance Directives:     Admitting Physician:  Lico Deng MD  PCP: Lico Deng MD    Discharging Nurse: [facility] [place]/Room#: 2450/6759-96  Discharging Unit Phone Number: [phone]    Emergency Contact:   Extended Emergency Contact Information  Primary Emergency Contact: Maikel Villanueva  Address: [address] [place] [address] [place] [address] [place] [address] Phone: [phone]  Relation: Spouse  Secondary Emergency Contact: Stepan Villanueva  [place] Phone: [phone]  Relation: Child   needed? No    Past Surgical History:  Past Surgical History:   Procedure Laterality Date    ABDOMEN SURGERY      partial gastrectomy    ANGIOPLASTY  2016    Dr. Quintin Wooten @ [address]  10/02/2018    Dr. Cher Barriga @ [facility]-- Stent x 1    APPENDECTOMY      CARDIAC CATHETERIZATION Left 2014    Dr. hSannan Alvarez -2 vessel CAD-    CARDIAC CATHETERIZATION  2007    Dr Cher Barriga: 1. Normal left ventricular systolic function with an estimated ejection fraction of 70%. 2. Elevated left ventricular end-diastolic pressure following angiographic dye load. 3. Double-vessel coronary artery disease with angiographic edivence of restenosis within the intervened-upon segment in the right coronary artery and moderately severe disease just proximal     CARDIAC CATHETERIZATION  07 con't    to the stented segment in the mid left anterior descending. CARDIAC CATHETERIZATION  2008    Dr Blanco: 1. Moderate coronary artery disease, as described, with patent stents with evidence of mild at most in-stent restenosis of the right coronary artery. 2. Normal left ventricular systolic function.     CARDIAC CATHETERIZATION Left 2022    Dr. Quintin Wooten @ [facility]--Medical therapy    CARDIAC SURGERY      13 stents, aorta and CAD    CHOLECYSTECTOMY      COLECTOMY      COLONOSCOPY      COLONOSCOPY  03/20/2015    DILATATION, ESOPHAGUS      EYE SURGERY Bilateral     cataracts    HERNIA REPAIR Right     inguinal    JOINT REPLACEMENT Right 1998 and 2012     knee (3rd surgery)    JOINT REPLACEMENT Right 2012    hip    TX ESOPHAGOGASTRODUODENOSCOPY TRANSORAL DIAGNOSTIC N/A 06/14/2017    EGD ESOPHAGOGASTRODUODENOSCOPY; photos; bx's performed by Alvin Sharma MD at [address] [place] [address]  03/09/2011    Dr Ambrocio Early. Hyperdynamic left ventricular systolic function, estimated ejection fraction of 70%. 2. Normal left ventricular end-diastolic pressure. 3. Double-vessel coronary artery disease with angiographic disease progression involving the stented segment in the mid right coronary artery consistent with angiographic evidence of restenosis.     STOMACH SURGERY      ulcer, gastric bypass due to \"bleeding ulcer\", partial colectomy due to blockage    UPPER GASTROINTESTINAL ENDOSCOPY         Immunization History:   Immunization History   Administered Date(s) Administered    COVID-19, PFIZER PURPLE top, DILUTE for use, (age 15 y+), 30mcg/0.3mL 01/21/2021, 02/11/2021, 10/06/2021    Influenza Vaccine, unspecified formulation 10/01/2016, 09/24/2018    Influenza Virus Vaccine 10/01/2017, 09/24/2018, 10/03/2019    Influenza, FLUARIX, FLULAVAL, FLUZONE (age 10 mo+) AND AFLURIA, (age 1 y+), PF, 0.5mL 09/29/2020    Pneumococcal Conjugate 13-valent ([id number]) 10/01/2016    Pneumococcal Polysaccharide ([id number]) 10/01/2017       Active Problems:  Patient Active Problem List   Diagnosis Code    Hyperlipidemia E78.5    CAD (coronary artery disease) I25.10    Hypertension I10    Stable angina pectoris (HCC) I20.8    Cervical pain M54.2    History of stomach ulcers Z87.11    History of partial gastrectomy Z90.3    Vitamin D deficiency disease E55.9    Ureteral stone with hydronephrosis N13.2    BPH with obstruction/lower urinary tract symptoms N40.1, N13.8    Cerebrovascular accident (CVA) due to embolism of right middle cerebral artery ([facility]) I63.411    Chronic left-sided low back pain with left-sided sciatica M54.42, G89.29    Chronic pansinusitis J32.4    Idiopathic progressive polyneuropathy G60.3    S/P CABG x 3 Z95.1    Gastroesophageal reflux disease without esophagitis K21.9    Abdominal wall hernia K43.9    Generalized osteoarthritis M15.9    Shoulder pain M25.519    Atrial fibrillation ([facility]) I48.91    Chest pain R07.9    Chronic low back pain with left-sided sciatica, unspecified back pain laterality M54.42, G89.29    Acute anemia D64.9    Anemia D64.9    Mild malnutrition ([facility]) E44.1       Isolation/Infection:   Isolation            No Isolation          Patient Infection Status       Infection Onset Added Last Indicated Last Indicated By Review Planned Expiration Resolved Resolved By    None active    Resolved    COVID-19 (Rule Out) 21 COVID-19, Rapid (Ordered)   22     COVID-19 (Rule Out) 10/25/21 10/25/21 10/25/21 COVID-19, Rapid (Ordered)   10/25/21 Rule-Out Test Resulted            Nurse Assessment:  Last Vital Signs: BP (!) 166/86   Pulse 70   Temp 98.4 °F (36.9 °C) (Oral)   Resp 16   Ht 5' 10\" (1.778 m)   Wt 164 lb 12.8 oz (74.8 kg)   SpO2 96%   BMI 23.65 kg/m²     Last documented pain score (0-10 scale): Pain Level: 7  Last Weight:   Wt Readings from Last 1 Encounters:   22 164 lb 12.8 oz (74.8 kg)     Mental Status:  alert and disoriented at times. IV Access:  - None    Nursing Mobility/ADLs:  Walking   Assisted  Transfer  Independent  Bathing  Assisted  Dressing  Assisted  Toileting  Assisted  Feeding  [address]  Assisted  Med Delivery   whole    Wound Care Documentation and Therapy:        Elimination:  Continence:    Bowel: Yes  Bladder: Yes  Urinary Catheter: None   Colostomy/Ileostomy/Ileal Conduit: No       Date of Last BM: 2022    Intake/Output Summary (Last 24 hours) at 9/12/2022 1202  Last data filed at 9/12/2022 0845  Gross per 24 hour   Intake 711.17 ml   Output 1600 ml   Net -888.83 ml     I/O last 3 completed shifts: In: 471.2 [P.O.:150; I.V.:10; Blood:311.2]  Out: 1000 [Urine:1000]    Safety Concerns: At Risk for Falls    Impairments/Disabilities:      Hearing    Nutrition Therapy:  Current Nutrition Therapy:   - Oral Diet:  General    Routes of Feeding: Oral  Liquids: No Restrictions  Daily Fluid Restriction: no  Last Modified Barium Swallow with Video (Video Swallowing Test): not done    Treatments at the Time of Hospital Discharge:   Respiratory Treatments: NA  Oxygen Therapy:  is not on home oxygen therapy. Ventilator:    - No ventilator support    Rehab Therapies: Physical Therapy and Occupational Therapy  Weight Bearing Status/Restrictions: No weight bearing restrictions  Other Medical Equipment (for information only, NOT a DME order):  walker  Other Treatments: NA      Patient's personal belongings (please select all that are sent with patient):  Wedding band     RN SIGNATURE:  Electronically signed by Vaughn Mcdonough RN on 9/13/22 at 11:34 AM EDT    CASE MANAGEMENT/SOCIAL WORK SECTION    Inpatient Status Date: 9/11/2022    Readmission Risk Assessment Score:  Readmission Risk              Risk of Unplanned Readmission:  11           Discharging to Facility/ Agency   Name: Rocky Bray at [facility]  Address: [address] [facility], [address] [address]  Phone: [phone]    Dialysis Facility (if applicable)   Name:  Address:  Dialysis Schedule:  Phone:  Fax:    / signature: Electronically signed by SONIDO Garsia on 9/12/22 at 12:03 PM EDT    PHYSICIAN SECTION    Prognosis: Fair    Condition at Discharge: Stable    Rehab Potential (if transferring to Rehab):  Fair    Recommended Labs or Other Treatments After Discharge:     Physician Certification: I certify the above information and transfer of Boom Gupta  is necessary for the continuing treatment of the diagnosis listed and that he requires [place] for less 30 days.      Update Admission H&P: No change in H&P    PHYSICIAN SIGNATURE:  Electronically signed by Vicky Ni MD on 9/13/22 at 11:01 AM EDT

## 2022-11-01 NOTE — PATIENT INSTRUCTIONS
Order placed for biofreeze to use as needed.  Rx sent and Leonela at the group home is aware biofreeze was ordered.   See podiatrist, keep boot on until you see him    We'll change the stomach medication    I'll talk to Mell about your thickened liquids    miralax for contipation    CT scan in 2 months to recheck nodules

## 2022-11-22 NOTE — PROGRESS NOTES
SUBJECTIVE:   Stefanie Velazquez is a 73 year old female who presents to clinic today for the following health issues:      CT follow up for back pain    Taking Medication as prescribed: yes    Side Effects:  None    Medication Helping Symptoms:  yes     Having pain in low back with left sided leg weakness  She got a CT scan (not MRI due to aron in leg)  I had her start gabapentin, one at bedtime, that is helping.   Still having a lot of pain. The oxycodone helps.     Results for orders placed or performed during the hospital encounter of 04/25/18   CT Lumbar Spine w/o Contrast    Narrative    CT LUMBAR SPINE WITHOUT CONTRAST  4/25/2018 11:25 AM     HISTORY:  Left radiculopathy and pain with history of rectal cancer.  Acute midline low back pain with left-sided sciatica.    Radiation dose for this scan was reduced using automated exposure  control, adjustment of the mA and/or kV according to patient size, or  iterative reconstruction technique.    FINDINGS: There are five nonrib-bearing or lumbar-type vertebrae.  There is a well-corticated sagittally-oriented gap or lucency within  the right L2 transverse process, presumably related to an old ununited  fracture. Advanced apophyseal joint degenerative changes are noted in  the mid and lower lumbar spine, particularly from L2-L3 through L4-L5.  This is associated with mild degenerative grade 1 spondylolisthesis at  L4-L5 and L3-L4. Advanced degenerative disc disease is noted  throughout the lumbar spine from L1-L2 through L5-S1 with intradiscal  gas and mild to moderate loss of disc height which is greatest at  L5-S1. L1 inferior endplate discogenic irregularity is also noted  compatible with a chronic Schmorl's node. Vertebral body heights are  normal. Although differentiation between disc and thecal sac is poorly  seen, there is probable severe central stenosis at L1-L2, L3-L4, and  to a lesser degree at L2-L3 and L4-L5 related to a combination of  annular bulging and  Addended by: Phoebe De Los Santos on: 11/22/2022 03:03 PM     Modules accepted: Orders ligamentum flavum thickening as well as  degenerative spondylolisthesis at L3-L4 and L4-L5. Lateral annular  bulging results in multilevel foraminal stenosis which is greatest  bilaterally at L5-S1 and on the right at L4-L5 and L3-L4.      Impression    IMPRESSION:  1. Advanced degenerative disc disease throughout the lumbar spine,  greatest at L5-S1.  2. 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  3. Multilevel central stenosis, greatest at L1-L2 and L3-L4, but also  to a lesser degree at L2-L3 and L4-L5.  4. Multilevel foraminal stenosis, greatest bilaterally at L5-S1 as  well as on the right at L4-L5 and L3-L4.    ISI GIBBONS MD     *Note: Due to a large number of results and/or encounters for the requested time period, some results have not been displayed. A complete set of results can be found in Results Review.       Problem list and histories reviewed & adjusted, as indicated.  Additional history: as documented    BP Readings from Last 3 Encounters:   05/01/18 138/78   04/23/18 122/80   04/20/18 146/66    Wt Readings from Last 3 Encounters:   05/01/18 193 lb (87.5 kg)   04/23/18 193 lb (87.5 kg)   04/20/18 195 lb (88.5 kg)                    Reviewed and updated as needed this visit by clinical staff  Tobacco  Allergies  Meds       Reviewed and updated as needed this visit by Provider         OBJECTIVE: /78 (BP Location: Left arm)  Pulse 74  Temp 99  F (37.2  C) (Tympanic)  Resp 20  Wt 193 lb (87.5 kg)  SpO2 95%  BMI 35.02 kg/m2   General: appears well, in no distress   Sitting somewhat uncomfortably in wheelchair    ASSESSMENT:  1. Lumbar radiculopathy    2. Spinal stenosis of lumbar region without neurogenic claudication    3. DDD (degenerative disc disease), lumbar        PLAN:  Orders Placed This Encounter     PAIN MANAGEMENT REFERRAL     PHYSICAL THERAPY REFERRAL     oxyCODONE IR  (ROXICODONE) 5 MG tablet     25 min spent with patient, greater than 50% in counseling and coordination of care, going over the CT with a back model, options.   I'll have her go up on the gabapentin  Injections, physical therapy     Patient Instructions   Increase the gabapentin to 2 at bedtime  We can increase that if you need to    Ok to take oxycodone if you are really bad    I recommend injections, are done in [place]. They should call you.     Physical therapy      Sandra Bernstein MD

## 2023-08-10 NOTE — TELEPHONE ENCOUNTER
Date: 08/10/23   Phone #: [phone]  Dietitian Name: Talya Owens RD, CD  Weight:238.9  BMI: 37.98        Bariatric Nutrition and Activity Plan: (Getting Ready for surgery)    Attendance at a bariatric support group (done).     Personal Goals:    Nutrition Goals     Consuming adequate fruits and vegetables  1 cup fruit or 1 cup or more of vegetables at each meal    Progressing towards goal   No carbonated beverage consumption Goal met   Exercising adequately Goal met   Limited high calorie foods and beverages  Goal met   Low sugar food and beverage choices Goal met   Portion sizes are appropriate Goal met   Keeping detailed food records daily Goal met   Eating 3 meals per day at regular times   Goal met   Meals are well balanced   Start to practice eating protein first at meals, followed by vegetables/fruits, and lastly starch/grain if room.    Progressing towards goal   Consuming adequate protein daily and at meals  20-30 grams PER MEAL to reach goal of 60-80 grams per day with a much smaller stomach pouch after surgery   Progressing towards goal   No weight gain  Back to 231.5 lbs Not applicable             Janessa- Medication Pramipexole  Form placed on Provider [place] desk for [employer].  [facility]

## 2023-10-30 NOTE — TELEPHONE ENCOUNTER
RX for supplies done  Given to the   Alice Cavazos RN    
Reason for Call: Request for an order or referral:    Order or referral being requested: [facility] [employer]   Return order to Leilani Diaz. Sec.     Date needed: as soon as possible    Has the patient been seen by the PCP for this problem? YES    [facility] -[phone]    Best Time:  Any Time      Can we leave a detailed message on this number?  YES    Call taken on 9/3/2020 at 10:10 AM by Leilani Jurado    
98

## 2024-03-10 NOTE — PROGRESS NOTES
STM Recheck: Spoke with [facility] and patient is having a high mask leak and appears to pull the mask off the face for short periods of time. Will put in for a pressure change to reduce pressure.      Pressure change completed 6-13 cm H20 in [facility]. .   
Regular rate and rhythm, Heart sounds S1 S2 present, no murmurs, rubs or gallops

## 2024-12-12 NOTE — TELEPHONE ENCOUNTER
S: Referral received for homecare admission  B: Due to staffing constraints we are able to see her today, but not able to do a full SOC  A: Requesting orders for another nurse to finish her SOC tomorrow.  R: please let us know if you agree with this plan.  Thanks so much  Krystal Crawford, RN   [facility]  [phone]  
That is fine  
PROVIDER:[TOKEN:[[id number]],FOLLOWUP:[2 weeks]]

## (undated) DEVICE — PREP PAD ALCOHOL 6818

## (undated) DEVICE — SOL WATER IRRIG 1000ML BOTTLE 07139-09

## (undated) DEVICE — SOL NACL 0.9% IRRIG 1000ML BOTTLE 07138-09

## (undated) RX ORDER — PROPOFOL 10 MG/ML
INJECTION, EMULSION INTRAVENOUS
Status: DISPENSED
Start: 2020-08-19

## (undated) RX ORDER — EPHEDRINE SULFATE 50 MG/ML
INJECTION, SOLUTION INTRAVENOUS
Status: DISPENSED
Start: 2018-01-22

## (undated) RX ORDER — PHENYLEPHRINE HYDROCHLORIDE 25 MG/ML
SOLUTION/ DROPS OPHTHALMIC
Status: DISPENSED
Start: 2018-01-22

## (undated) RX ORDER — EPHEDRINE SULFATE 50 MG/ML
INJECTION, SOLUTION INTRAMUSCULAR; INTRAVENOUS; SUBCUTANEOUS
Status: DISPENSED
Start: 2020-04-08

## (undated) RX ORDER — LIDOCAINE HYDROCHLORIDE 10 MG/ML
INJECTION, SOLUTION EPIDURAL; INFILTRATION; INTRACAUDAL; PERINEURAL
Status: DISPENSED
Start: 2019-04-24

## (undated) RX ORDER — CYCLOPENTOLATE HYDROCHLORIDE 10 MG/ML
SOLUTION/ DROPS OPHTHALMIC
Status: DISPENSED
Start: 2018-02-19

## (undated) RX ORDER — PHENYLEPHRINE HYDROCHLORIDE 25 MG/ML
SOLUTION/ DROPS OPHTHALMIC
Status: DISPENSED
Start: 2018-02-19

## (undated) RX ORDER — BUPIVACAINE HYDROCHLORIDE 5 MG/ML
INJECTION, SOLUTION EPIDURAL; INTRACAUDAL
Status: DISPENSED
Start: 2018-05-17

## (undated) RX ORDER — TROPICAMIDE 10 MG/ML
SOLUTION/ DROPS OPHTHALMIC
Status: DISPENSED
Start: 2018-02-19

## (undated) RX ORDER — TROPICAMIDE 10 MG/ML
SOLUTION/ DROPS OPHTHALMIC
Status: DISPENSED
Start: 2018-01-22

## (undated) RX ORDER — DEXAMETHASONE SODIUM PHOSPHATE 10 MG/ML
INJECTION, SOLUTION INTRAMUSCULAR; INTRAVENOUS
Status: DISPENSED
Start: 2018-05-17

## (undated) RX ORDER — METHYLPREDNISOLONE ACETATE 40 MG/ML
INJECTION, SUSPENSION INTRA-ARTICULAR; INTRALESIONAL; INTRAMUSCULAR; SOFT TISSUE
Status: DISPENSED
Start: 2018-05-17

## (undated) RX ORDER — CYCLOPENTOLATE HYDROCHLORIDE 10 MG/ML
SOLUTION/ DROPS OPHTHALMIC
Status: DISPENSED
Start: 2018-01-22

## (undated) RX ORDER — PROPOFOL 10 MG/ML
INJECTION, EMULSION INTRAVENOUS
Status: DISPENSED
Start: 2020-04-08

## (undated) RX ORDER — PHENYLEPHRINE HCL IN 0.9% NACL 1 MG/10 ML
SYRINGE (ML) INTRAVENOUS
Status: DISPENSED
Start: 2020-04-08